# Patient Record
Sex: FEMALE | Race: WHITE | NOT HISPANIC OR LATINO | Employment: OTHER | URBAN - NONMETROPOLITAN AREA
[De-identification: names, ages, dates, MRNs, and addresses within clinical notes are randomized per-mention and may not be internally consistent; named-entity substitution may affect disease eponyms.]

---

## 2017-01-03 ENCOUNTER — OFFICE VISIT (OUTPATIENT)
Dept: UROLOGY | Facility: CLINIC | Age: 77
End: 2017-01-03

## 2017-01-03 ENCOUNTER — TELEPHONE (OUTPATIENT)
Dept: UROLOGY | Facility: CLINIC | Age: 77
End: 2017-01-03

## 2017-01-03 VITALS — HEIGHT: 62 IN | TEMPERATURE: 97 F | BODY MASS INDEX: 31.1 KG/M2 | WEIGHT: 169 LBS

## 2017-01-03 DIAGNOSIS — IMO0002 MEATAL STENOSIS: Primary | ICD-10-CM

## 2017-01-03 LAB
BILIRUB BLD-MCNC: NEGATIVE MG/DL
CLARITY, POC: CLEAR
COLOR UR: YELLOW
GLUCOSE UR STRIP-MCNC: NEGATIVE MG/DL
KETONES UR QL: NEGATIVE
LEUKOCYTE EST, POC: NEGATIVE
NITRITE UR-MCNC: NEGATIVE MG/ML
PH UR: 6 [PH] (ref 5–8)
PROT UR STRIP-MCNC: NEGATIVE MG/DL
RBC # UR STRIP: NEGATIVE /UL
SP GR UR: 1.01 (ref 1–1.03)
UROBILINOGEN UR QL: NORMAL

## 2017-01-03 PROCEDURE — 81003 URINALYSIS AUTO W/O SCOPE: CPT | Performed by: UROLOGY

## 2017-01-03 PROCEDURE — 99213 OFFICE O/P EST LOW 20 MIN: CPT | Performed by: UROLOGY

## 2017-01-03 PROCEDURE — 53661 DILATION OF URETHRA: CPT | Performed by: UROLOGY

## 2017-01-03 RX ORDER — LISINOPRIL 10 MG/1
10 TABLET ORAL DAILY
COMMUNITY
End: 2018-12-20 | Stop reason: SDUPTHER

## 2017-01-03 NOTE — MR AVS SNAPSHOT
Jameeldulce PENG Enrique   1/3/2017 2:10 PM   Office Visit    Dept Phone:  [phone]   Encounter #:  [id number]    Provider:  Trae Boggs MD   Department:  [facility] UROLOGY                Your Full Care Plan              Your Updated Medication List          This list is accurate as of: 1/3/17  3:32 PM.  Always use your most recent med list.                bisoprolol-hydrochlorothiazide 5-6.25 MG per tablet   Commonly known as:  ZIAC       calcium carbonate 600 MG tablet   Commonly known as:  OS-REAGAN       celecoxib 200 MG capsule   Commonly known as:  CeleBREX       diphenhydrAMINE 25 mg capsule   Commonly known as:  BENADRYL       esomeprazole 40 MG capsule   Commonly known as:  nexIUM       estrogens (conjugated) 0.625 MG tablet   Commonly known as:  PREMARIN       furosemide 40 MG tablet   Commonly known as:  LASIX       gabapentin 100 MG capsule   Commonly known as:  NEURONTIN       lisinopril 10 MG tablet   Commonly known as:  PRINIVIL,ZESTRIL       LORTAB 10  MG per tablet   Generic drug:  Hydrocodone-Acetaminophen       metFORMIN 500 MG tablet   Commonly known as:  GLUCOPHAGE       nitrofurantoin 50 MG capsule   Commonly known as:  MACRODANTIN   TAKE ONE CAPSULE BY MOUTH DAILY       potassium chloride 20 MEQ CR tablet   Commonly known as:  K-DUR,KLOR-CON       spironolactone 25 MG tablet   Commonly known as:  ALDACTONE               We Performed the Following     POC Urinalysis Dipstick, Automated       You Were Diagnosed With        Codes Comments    Meatal stenosis    -  Primary ICD-10-CM: N35.9  ICD-9-CM: 598.9       Instructions     None    Patient Instructions History      Upcoming Appointments     Visit Type Date Time Department    FOLLOW UP 1/3/2017  2:10 PM [facility]      MyChart Signup     [facility] allows you to send messages to your doctor, view your test results, renew your prescriptions, schedule appointments, and more. To sign  "up, go to [employer] and click on the Sign Up Now link in the New User? box. Enter your [employer] Activation Code exactly as it appears below along with the last four digits of your Social Security Number and your Date of Birth () to complete the sign-up process. If you do not sign up before the expiration date, you must request a new code.    [employer] Activation Code: [email or web address]  Expires: 2017  3:32 PM    If you have questions, you can email [email or web address] or call [phone] to talk to our [employer] staff. Remember, [employer] is NOT to be used for urgent needs. For medical emergencies, dial 911.               Other Info from Your Visit           Allergies     Septra [Sulfamethoxazole-trimethoprim]      Tequin [Gatifloxacin]      Trovan [Alatrofloxacin]      Amoxicillin-pot Clavulanate  Rash      Reason for Visit     Difficulty Urinating           Vital Signs     Temperature Height Weight Body Mass Index Smoking Status       97 °F (36.1 °C) 62\" (157.5 cm) 169 lb (76.7 kg) 30.91 kg/m2 Former Smoker       Problems and Diagnoses Noted     Urethral narrowing    -  Primary      Results     POC Urinalysis Dipstick, Automated      Component Value Standard Range & Units    Color Yellow Yellow, Straw, Dark Yellow, Madelin    Clarity, UA Clear Clear    Glucose, UA Negative Negative, 1000 mg/dL (3+) mg/dL    Bilirubin Negative Negative    Ketones, UA Negative Negative    Specific Gravity  1.010 1.005 - 1.030    Blood, UA Negative Negative    pH, Urine 6.0 5.0 - 8.0    Protein, POC Negative Negative mg/dL    Urobilinogen, UA Normal Normal    Leukocytes Negative Negative    Nitrite, UA Negative Negative                    "

## 2017-01-03 NOTE — PROGRESS NOTES
Subjective    Ms. Nunez is 76 y.o. female    CHIEF COMPLAINT: Ms. Nunez came back today with increasing symptoms of difficulties urinating she has been catheterizing herself twice a day but this she is been having more splaying of the stream and decreased force and tighter and more difficulties with the dilations    One ahead and try to dilate in fact I tried to pass a filiform and maybe even usually woven dilators just like to get in because a lot of time just as a blind shot but I could not even pass a filiform.    She went ahead and passed one of her urethral catheter dilation was is a 14 even tried to pass a filiform alongside that but would not go so a consult a way that the 14 was going so I using the female sounds are dilated with an 18-20 and a 22 this with relatively easily for her without a lot of bleeding etc. that we have gotten previously.    I did suggest that she continue dilators twice a day and I will see her back in 6 months    History of Present Illness      The following portions of the patient's history were reviewed and updated as appropriate: allergies, current medications, past family history, past medical history, past social history, past surgical history and problem list.    Review of Systems   Constitutional: Negative for chills and fever.   Gastrointestinal: Negative for abdominal pain, anal bleeding and blood in stool.   Genitourinary: Positive for difficulty urinating. Negative for flank pain and hematuria.         Current Outpatient Prescriptions:   •  bisoprolol-hydrochlorothiazide (ZIAC) 5-6.25 MG per tablet, Take 1 tablet by mouth Daily., Disp: , Rfl:   •  calcium carbonate (OS-REAGAN) 600 MG tablet, Take 600 mg by mouth Daily., Disp: , Rfl:   •  celecoxib (CeleBREX) 200 MG capsule, Take 200 mg by mouth Daily., Disp: , Rfl:   •  diphenhydrAMINE (BENADRYL) 25 mg capsule, Take 25 mg by mouth Every 6 (Six) Hours As Needed for itching., Disp: , Rfl:   •  esomeprazole (nexIUM) 40 MG  "capsule, Take 40 mg by mouth Every Morning Before Breakfast., Disp: , Rfl:   •  estrogens, conjugated, (PREMARIN) 0.625 MG tablet, Take 0.625 mg by mouth Daily. Take daily for 21 days then do not take for 7 days., Disp: , Rfl:   •  furosemide (LASIX) 40 MG tablet, Take 40 mg by mouth 2 (Two) Times a Day., Disp: , Rfl:   •  gabapentin (NEURONTIN) 100 MG capsule, Take 100 mg by mouth 3 (Three) Times a Day., Disp: , Rfl:   •  Hydrocodone-Acetaminophen (LORTAB 10)  MG per tablet, Take 1 tablet by mouth Every 6 (Six) Hours As Needed for moderate pain (4-6)., Disp: , Rfl:   •  lisinopril (PRINIVIL,ZESTRIL) 10 MG tablet, Take 10 mg by mouth Daily., Disp: , Rfl:   •  metFORMIN (GLUCOPHAGE) 500 MG tablet, Take 500 mg by mouth 2 (Two) Times a Day With Meals., Disp: , Rfl:   •  nitrofurantoin (MACRODANTIN) 50 MG capsule, TAKE ONE CAPSULE BY MOUTH DAILY, Disp: 90 capsule, Rfl: 3  •  potassium chloride (K-DUR,KLOR-CON) 20 MEQ CR tablet, Take 20 mEq by mouth 2 (Two) Times a Day., Disp: , Rfl:   •  spironolactone (ALDACTONE) 25 MG tablet, Take 25 mg by mouth Daily., Disp: , Rfl:     Past Medical History   Diagnosis Date   • Cellulitis    • Diabetes mellitus    • GERD (gastroesophageal reflux disease)    • Hyperlipidemia    • Hypertension        Past Surgical History   Procedure Laterality Date   • Hysterectomy     • Tonsillectomy     • Appendectomy     • Vagina surgery     • Colonoscopy  01/12/2011   • Endoscopy  07/01/2014       Social History     Social History   • Marital status:      Spouse name: N/A   • Number of children: N/A   • Years of education: N/A     Social History Main Topics   • Smoking status: Former Smoker   • Smokeless tobacco: Never Used   • Alcohol use No   • Drug use: No   • Sexual activity: Not Asked     Other Topics Concern   • None     Social History Narrative       History reviewed. No pertinent family history.    Objective    Visit Vitals   • Temp 97 °F (36.1 °C)   • Ht 62\" (157.5 cm)   • Wt " 169 lb (76.7 kg)   • BMI 30.91 kg/m2       Physical Exam      Admission on 03/17/2015, Discharged on 03/17/2015   Component Date Value Ref Range Status   • Color, UA 03/17/2015 Orange   Final   • Appearance, UA 03/17/2015 Cloudy* CLEAR Final   • pH, UA 03/17/2015 6.5  5.0 - 8.0 Final   • Specific Gravity, UA 03/17/2015 1.006  1.005 - 1.030 Final   • Glucose, UA 03/17/2015 Negative  NEGATIVE mg/dL Final   • Ketones, UA 03/17/2015 Negative  NEGATIVE mg/dL Final   • Bilirubin, UA 03/17/2015 Negative  NEGATIVE Final   • Blood, UA 03/17/2015 Negative  NEGATIVE Final   • Protein, UA 03/17/2015 Negative  NEGATIVE mg/dL Final   • Nitrite, UA 03/17/2015 Positive* NEGATIVE Final   • Leukocytes, UA 03/17/2015 Negative  NEGATIVE Final   • Urobilinogen, UA 03/17/2015 0.2  0.2,1.0 E.U./dL Final   • WBC, UA 03/17/2015 0-2* NONE SEEN /hpf Final   • RBC, UA 03/17/2015 0-2* NONE SEEN /hpf Final   • Epithelial Cells, UA 03/17/2015 0-2* NONE SEEN /hpf Final   • Bacteria, UA 03/17/2015 1+* NONE SEEN /hpf Final   • Casts 03/17/2015 None Seen  /lpf Final       Results for orders placed or performed in visit on 01/03/17   POC Urinalysis Dipstick, Automated   Result Value Ref Range    Color Yellow Yellow, Straw, Dark Yellow, Madelin    Clarity, UA Clear Clear    Glucose, UA Negative Negative, 1000 mg/dL (3+) mg/dL    Bilirubin Negative Negative    Ketones, UA Negative Negative    Specific Gravity  1.010 1.005 - 1.030    Blood, UA Negative Negative    pH, Urine 6.0 5.0 - 8.0    Protein, POC Negative Negative mg/dL    Urobilinogen, UA Normal Normal    Leukocytes Negative Negative    Nitrite, UA Negative Negative       Assessment and Plan    Dago was seen today for difficulty urinating.    Diagnoses and all orders for this visit:    Meatal stenosis  -     POC Urinalysis Dipstick, Automated   she is going to continue self-catheterization twice a day and I will see her back in 6 months she will call sooner as needed

## 2017-01-03 NOTE — TELEPHONE ENCOUNTER
Tried calling pt. I called [facility] and they said dr murphy office fills the lidocaine so she will have to call dr leon

## 2017-01-09 ENCOUNTER — TELEPHONE (OUTPATIENT)
Dept: UROLOGY | Facility: CLINIC | Age: 77
End: 2017-01-09

## 2017-01-09 NOTE — TELEPHONE ENCOUNTER
Tried to call the patient and let her know that she will have to call Dr. murphy office to get a refill on lidocaine jelly. ML

## 2017-05-04 ENCOUNTER — TRANSCRIBE ORDERS (OUTPATIENT)
Dept: ADMINISTRATIVE | Facility: HOSPITAL | Age: 77
End: 2017-05-04

## 2017-05-04 DIAGNOSIS — Z12.31 ENCOUNTER FOR SCREENING MAMMOGRAM FOR MALIGNANT NEOPLASM OF BREAST: Primary | ICD-10-CM

## 2017-05-05 ENCOUNTER — HOSPITAL ENCOUNTER (OUTPATIENT)
Dept: MAMMOGRAPHY | Facility: HOSPITAL | Age: 77
Discharge: HOME | End: 2017-05-05
Attending: INTERNAL MEDICINE | Admitting: INTERNAL MEDICINE

## 2017-05-05 DIAGNOSIS — Z12.31 ENCOUNTER FOR SCREENING MAMMOGRAM FOR MALIGNANT NEOPLASM OF BREAST: ICD-10-CM

## 2017-05-05 PROCEDURE — G0202 SCR MAMMO BI INCL CAD: HCPCS

## 2017-05-05 PROCEDURE — 77063 BREAST TOMOSYNTHESIS BI: CPT

## 2017-07-11 ENCOUNTER — OFFICE VISIT (OUTPATIENT)
Dept: UROLOGY | Facility: CLINIC | Age: 77
End: 2017-07-11

## 2017-07-11 VITALS
WEIGHT: 170 LBS | SYSTOLIC BLOOD PRESSURE: 110 MMHG | BODY MASS INDEX: 33.38 KG/M2 | TEMPERATURE: 97.6 F | HEIGHT: 60 IN | DIASTOLIC BLOOD PRESSURE: 74 MMHG

## 2017-07-11 DIAGNOSIS — IMO0002 MEATAL STENOSIS: Primary | ICD-10-CM

## 2017-07-11 DIAGNOSIS — R33.9 RETENTION OF URINE: ICD-10-CM

## 2017-07-11 LAB
BILIRUB BLD-MCNC: NEGATIVE MG/DL
CLARITY, POC: CLEAR
COLOR UR: YELLOW
GLUCOSE UR STRIP-MCNC: NEGATIVE MG/DL
KETONES UR QL: NEGATIVE
LEUKOCYTE EST, POC: NEGATIVE
NITRITE UR-MCNC: NEGATIVE MG/ML
PH UR: 5.5 [PH] (ref 5–8)
PROT UR STRIP-MCNC: NEGATIVE MG/DL
RBC # UR STRIP: NEGATIVE /UL
SP GR UR: 1.01 (ref 1–1.03)
UROBILINOGEN UR QL: NORMAL

## 2017-07-11 PROCEDURE — 99212 OFFICE O/P EST SF 10 MIN: CPT | Performed by: UROLOGY

## 2017-07-11 PROCEDURE — 53661 DILATION OF URETHRA: CPT | Performed by: UROLOGY

## 2017-07-11 PROCEDURE — 81003 URINALYSIS AUTO W/O SCOPE: CPT | Performed by: UROLOGY

## 2017-07-11 NOTE — PROGRESS NOTES
Subjective    Ms. Nunez is 76 y.o. female    CHIEF COMPLAINT: Urethral stricture    Patient comes back today follow-up of tight urethral stricture she has been cathing herself but her stream is gotten smaller and smaller and she comes in today for me to and dilator    We put the patient in the lithotomy position I could identify the urethral meatus we prepped her I was able to dilate at the meatus level up to about a 20 but again was quite difficult and did get some bleeding ramus stop there and let her go home if she has any problems she will let me know I see her back again in about 3-4 months    Urethral Dilation is explained to the patient. Risks of hematuria, pain, infection, recurrence or persistence of symptoms as well as major injuries such as urethral or bladder perforation are explained to the patient but the frequency of the latter is described as rare.     This is a/an a subseqent dilation.     Indication:  Meatal stenosis    Position:  lithotomy    Prep: hibiclens    Procedure: The labia were  and female urethral sounds are passed through the urethra consecutively at the following sizes 20    On the procedure bleeding was minimal.    This was quite difficult to do     The procedure was well tolerated.   History of Present Illness      The following portions of the patient's history were reviewed and updated as appropriate: allergies, current medications, past family history, past medical history, past social history, past surgical history and problem list.    Review of Systems   Constitutional: Negative for chills and fever.   Gastrointestinal: Negative for abdominal pain, anal bleeding and blood in stool.   Genitourinary: Positive for difficulty urinating. Negative for flank pain and hematuria.         Current Outpatient Prescriptions:   •  bisoprolol-hydrochlorothiazide (ZIAC) 5-6.25 MG per tablet, Take 1 tablet by mouth Daily., Disp: , Rfl:   •  calcium carbonate (OS-REAGAN) 600 MG tablet, Take  600 mg by mouth Daily., Disp: , Rfl:   •  celecoxib (CeleBREX) 200 MG capsule, Take 200 mg by mouth Daily., Disp: , Rfl:   •  diphenhydrAMINE (BENADRYL) 25 mg capsule, Take 25 mg by mouth Every 6 (Six) Hours As Needed for itching., Disp: , Rfl:   •  esomeprazole (nexIUM) 40 MG capsule, Take 40 mg by mouth Every Morning Before Breakfast., Disp: , Rfl:   •  estrogens, conjugated, (PREMARIN) 0.625 MG tablet, Take 0.625 mg by mouth Daily. Take daily for 21 days then do not take for 7 days., Disp: , Rfl:   •  furosemide (LASIX) 40 MG tablet, Take 40 mg by mouth 2 (Two) Times a Day., Disp: , Rfl:   •  gabapentin (NEURONTIN) 100 MG capsule, Take 100 mg by mouth 3 (Three) Times a Day., Disp: , Rfl:   •  Hydrocodone-Acetaminophen (LORTAB 10)  MG per tablet, Take 1 tablet by mouth Every 6 (Six) Hours As Needed for moderate pain (4-6)., Disp: , Rfl:   •  lisinopril (PRINIVIL,ZESTRIL) 10 MG tablet, Take 10 mg by mouth Daily., Disp: , Rfl:   •  metFORMIN (GLUCOPHAGE) 500 MG tablet, Take 500 mg by mouth 2 (Two) Times a Day With Meals., Disp: , Rfl:   •  nitrofurantoin (MACRODANTIN) 50 MG capsule, TAKE ONE CAPSULE BY MOUTH DAILY, Disp: 90 capsule, Rfl: 3  •  potassium chloride (K-DUR,KLOR-CON) 20 MEQ CR tablet, Take 20 mEq by mouth 2 (Two) Times a Day., Disp: , Rfl:   •  spironolactone (ALDACTONE) 25 MG tablet, Take 25 mg by mouth Daily., Disp: , Rfl:     Past Medical History:   Diagnosis Date   • Cellulitis    • Diabetes mellitus    • GERD (gastroesophageal reflux disease)    • Hyperlipidemia    • Hypertension        Past Surgical History:   Procedure Laterality Date   • APPENDECTOMY     • BREAST CYST ASPIRATION Left    • COLONOSCOPY  01/12/2011   • ENDOSCOPY  07/01/2014   • HYSTERECTOMY     • TONSILLECTOMY     • VAGINA SURGERY         Social History     Social History   • Marital status:      Spouse name: N/A   • Number of children: N/A   • Years of education: N/A     Social History Main Topics   • Smoking status:  "Former Smoker   • Smokeless tobacco: Never Used   • Alcohol use No   • Drug use: No   • Sexual activity: Not Asked     Other Topics Concern   • None     Social History Narrative       Family History   Problem Relation Age of Onset   • Breast cancer Neg Hx        Objective    /74  Temp 97.6 °F (36.4 °C)  Ht 60\" (152.4 cm)  Wt 170 lb (77.1 kg)  BMI 33.2 kg/m2    Physical Exam      Office Visit on 01/03/2017   Component Date Value Ref Range Status   • Color 01/03/2017 Yellow  Yellow, Straw, Dark Yellow, Madelin Final   • Clarity, UA 01/03/2017 Clear  Clear Final   • Glucose, UA 01/03/2017 Negative  Negative, 1000 mg/dL (3+) mg/dL Final   • Bilirubin 01/03/2017 Negative  Negative Final   • Ketones, UA 01/03/2017 Negative  Negative Final   • Specific Gravity  01/03/2017 1.010  1.005 - 1.030 Final   • Blood, UA 01/03/2017 Negative  Negative Final   • pH, Urine 01/03/2017 6.0  5.0 - 8.0 Final   • Protein, POC 01/03/2017 Negative  Negative mg/dL Final   • Urobilinogen, UA 01/03/2017 Normal  Normal Final   • Leukocytes 01/03/2017 Negative  Negative Final   • Nitrite, UA 01/03/2017 Negative  Negative Final       Results for orders placed or performed in visit on 07/11/17   POC Urinalysis Dipstick, Automated   Result Value Ref Range    Color Yellow Yellow, Straw, Dark Yellow, Madelin    Clarity, UA Clear Clear    Glucose, UA Negative Negative, 1000 mg/dL (3+) mg/dL    Bilirubin Negative Negative    Ketones, UA Negative Negative    Specific Gravity  1.010 1.005 - 1.030    Blood, UA Negative Negative    pH, Urine 5.5 5.0 - 8.0    Protein, POC Negative Negative mg/dL    Urobilinogen, UA Normal Normal    Leukocytes Negative Negative    Nitrite, UA Negative Negative       Assessment and Plan    Dago was seen today for difficulty urinating.    Diagnoses and all orders for this visit:    Meatal stenosis  -     POC Urinalysis Dipstick, Automated    Retention of urine  Plan I will see her back again in 3-4 months call sooner as " needed

## 2017-07-31 ENCOUNTER — TRANSCRIBE ORDERS (OUTPATIENT)
Dept: ADMINISTRATIVE | Facility: HOSPITAL | Age: 77
End: 2017-07-31

## 2017-07-31 DIAGNOSIS — M54.5 LOW BACK PAIN, UNSPECIFIED BACK PAIN LATERALITY, UNSPECIFIED CHRONICITY, WITH SCIATICA PRESENCE UNSPECIFIED: Primary | ICD-10-CM

## 2017-08-07 ENCOUNTER — HOSPITAL ENCOUNTER (OUTPATIENT)
Dept: MRI IMAGING | Facility: HOSPITAL | Age: 77
Discharge: HOME | End: 2017-08-07
Attending: PHYSICAL MEDICINE & REHABILITATION | Admitting: PHYSICAL MEDICINE & REHABILITATION

## 2017-08-07 DIAGNOSIS — M54.5 LOW BACK PAIN, UNSPECIFIED BACK PAIN LATERALITY, UNSPECIFIED CHRONICITY, WITH SCIATICA PRESENCE UNSPECIFIED: ICD-10-CM

## 2017-08-07 PROCEDURE — 72148 MRI LUMBAR SPINE W/O DYE: CPT

## 2018-01-29 ENCOUNTER — HOSPITAL ENCOUNTER (OUTPATIENT)
Dept: GENERAL RADIOLOGY | Facility: HOSPITAL | Age: 78
Discharge: HOME | End: 2018-01-29
Attending: INTERNAL MEDICINE | Admitting: INTERNAL MEDICINE

## 2018-01-29 ENCOUNTER — TRANSCRIBE ORDERS (OUTPATIENT)
Dept: GENERAL RADIOLOGY | Facility: HOSPITAL | Age: 78
End: 2018-01-29

## 2018-01-29 DIAGNOSIS — R05.8 POST-VIRAL COUGH SYNDROME: Primary | ICD-10-CM

## 2018-01-29 DIAGNOSIS — R05.8 POST-VIRAL COUGH SYNDROME: ICD-10-CM

## 2018-01-29 PROCEDURE — 71046 X-RAY EXAM CHEST 2 VIEWS: CPT

## 2018-01-31 ENCOUNTER — TRANSCRIBE ORDERS (OUTPATIENT)
Dept: ADMINISTRATIVE | Facility: HOSPITAL | Age: 78
End: 2018-01-31

## 2018-01-31 DIAGNOSIS — R91.1 LUNG NODULE: Primary | ICD-10-CM

## 2018-02-01 ENCOUNTER — HOSPITAL ENCOUNTER (OUTPATIENT)
Dept: CT IMAGING | Facility: HOSPITAL | Age: 78
Discharge: HOME | End: 2018-02-01
Attending: INTERNAL MEDICINE | Admitting: INTERNAL MEDICINE

## 2018-02-01 DIAGNOSIS — R91.1 LUNG NODULE: ICD-10-CM

## 2018-02-01 LAB — CREAT BLDA-MCNC: 0.9 MG/DL (ref 0.6–1.3)

## 2018-02-01 PROCEDURE — 71260 CT THORAX DX C+: CPT

## 2018-02-01 PROCEDURE — 82565 ASSAY OF CREATININE: CPT

## 2018-02-01 PROCEDURE — 0 IOPAMIDOL 61 % SOLUTION: Performed by: INTERNAL MEDICINE

## 2018-02-01 RX ADMIN — IOPAMIDOL 100 ML: 612 INJECTION, SOLUTION INTRAVENOUS at 11:00

## 2018-05-07 ENCOUNTER — TRANSCRIBE ORDERS (OUTPATIENT)
Dept: ADMINISTRATIVE | Facility: HOSPITAL | Age: 78
End: 2018-05-07

## 2018-05-07 DIAGNOSIS — Z12.31 ENCOUNTER FOR SCREENING MAMMOGRAM FOR MALIGNANT NEOPLASM OF BREAST: Primary | ICD-10-CM

## 2018-05-08 ENCOUNTER — HOSPITAL ENCOUNTER (OUTPATIENT)
Dept: MAMMOGRAPHY | Facility: HOSPITAL | Age: 78
Discharge: HOME | End: 2018-05-08
Attending: INTERNAL MEDICINE | Admitting: INTERNAL MEDICINE

## 2018-05-08 DIAGNOSIS — Z12.31 ENCOUNTER FOR SCREENING MAMMOGRAM FOR MALIGNANT NEOPLASM OF BREAST: ICD-10-CM

## 2018-05-08 PROCEDURE — 77063 BREAST TOMOSYNTHESIS BI: CPT

## 2018-05-08 PROCEDURE — 77067 SCR MAMMO BI INCL CAD: CPT

## 2018-05-23 ENCOUNTER — TRANSCRIBE ORDERS (OUTPATIENT)
Dept: ADMINISTRATIVE | Facility: HOSPITAL | Age: 78
End: 2018-05-23

## 2018-05-23 DIAGNOSIS — N18.30 CHRONIC KIDNEY DISEASE, STAGE III (MODERATE) (HCC): Primary | ICD-10-CM

## 2018-05-29 ENCOUNTER — HOSPITAL ENCOUNTER (OUTPATIENT)
Dept: ULTRASOUND IMAGING | Facility: HOSPITAL | Age: 78
Discharge: HOME | End: 2018-05-29
Attending: INTERNAL MEDICINE | Admitting: INTERNAL MEDICINE

## 2018-05-29 DIAGNOSIS — N18.30 CHRONIC KIDNEY DISEASE, STAGE III (MODERATE) (HCC): ICD-10-CM

## 2018-05-29 PROCEDURE — 76775 US EXAM ABDO BACK WALL LIM: CPT

## 2018-06-15 ENCOUNTER — TRANSCRIBE ORDERS (OUTPATIENT)
Dept: ADMINISTRATIVE | Facility: HOSPITAL | Age: 78
End: 2018-06-15

## 2018-06-15 DIAGNOSIS — R51.9 NONINTRACTABLE HEADACHE, UNSPECIFIED CHRONICITY PATTERN, UNSPECIFIED HEADACHE TYPE: Primary | ICD-10-CM

## 2018-06-18 ENCOUNTER — HOSPITAL ENCOUNTER (OUTPATIENT)
Dept: CT IMAGING | Facility: HOSPITAL | Age: 78
Discharge: HOME | End: 2018-06-18
Attending: INTERNAL MEDICINE | Admitting: INTERNAL MEDICINE

## 2018-06-18 DIAGNOSIS — R51.9 NONINTRACTABLE HEADACHE, UNSPECIFIED CHRONICITY PATTERN, UNSPECIFIED HEADACHE TYPE: ICD-10-CM

## 2018-06-18 PROCEDURE — 70450 CT HEAD/BRAIN W/O DYE: CPT

## 2018-10-24 ENCOUNTER — LAB (OUTPATIENT)
Dept: LAB | Facility: HOSPITAL | Age: 78
End: 2018-10-24

## 2018-10-24 ENCOUNTER — OFFICE VISIT (OUTPATIENT)
Dept: INTERNAL MEDICINE | Facility: CLINIC | Age: 78
End: 2018-10-24

## 2018-10-24 VITALS
RESPIRATION RATE: 16 BRPM | HEART RATE: 66 BPM | DIASTOLIC BLOOD PRESSURE: 78 MMHG | TEMPERATURE: 98.9 F | OXYGEN SATURATION: 98 % | HEIGHT: 60 IN | SYSTOLIC BLOOD PRESSURE: 124 MMHG | WEIGHT: 169.38 LBS | BODY MASS INDEX: 33.25 KG/M2

## 2018-10-24 DIAGNOSIS — M99.04 SOMATIC DYSFUNCTION OF SPINE, SACRAL: ICD-10-CM

## 2018-10-24 DIAGNOSIS — M99.06 SOMATIC DYSFUNCTION OF BOTH LOWER EXTREMITIES: ICD-10-CM

## 2018-10-24 DIAGNOSIS — G89.29 CHRONIC MIDLINE LOW BACK PAIN WITHOUT SCIATICA: ICD-10-CM

## 2018-10-24 DIAGNOSIS — E11.8 TYPE 2 DIABETES MELLITUS WITH COMPLICATION, WITHOUT LONG-TERM CURRENT USE OF INSULIN (HCC): ICD-10-CM

## 2018-10-24 DIAGNOSIS — M99.00 SOMATIC DYSFUNCTION OF HEAD REGION: ICD-10-CM

## 2018-10-24 DIAGNOSIS — I10 ESSENTIAL HYPERTENSION: ICD-10-CM

## 2018-10-24 DIAGNOSIS — M99.09 SEGMENTAL AND SOMATIC DYSFUNCTION OF ABDOMEN AND OTHER REGIONS: ICD-10-CM

## 2018-10-24 DIAGNOSIS — M99.05 SOMATIC DYSFUNCTION OF PELVIC REGION: ICD-10-CM

## 2018-10-24 DIAGNOSIS — M99.03 SOMATIC DYSFUNCTION OF SPINE, LUMBAR: ICD-10-CM

## 2018-10-24 DIAGNOSIS — R60.0 EDEMA OF LEFT LOWER EXTREMITY: ICD-10-CM

## 2018-10-24 DIAGNOSIS — F43.21 GRIEF REACTION: ICD-10-CM

## 2018-10-24 DIAGNOSIS — M54.50 CHRONIC MIDLINE LOW BACK PAIN WITHOUT SCIATICA: ICD-10-CM

## 2018-10-24 DIAGNOSIS — M99.02 SOMATIC DYSFUNCTION OF SPINE, THORACIC: ICD-10-CM

## 2018-10-24 DIAGNOSIS — E11.8 TYPE 2 DIABETES MELLITUS WITH COMPLICATION, WITHOUT LONG-TERM CURRENT USE OF INSULIN (HCC): Primary | ICD-10-CM

## 2018-10-24 PROBLEM — E11.9 TYPE 2 DIABETES MELLITUS, WITHOUT LONG-TERM CURRENT USE OF INSULIN: Status: ACTIVE | Noted: 2018-10-24

## 2018-10-24 PROBLEM — F43.20 GRIEF REACTION: Status: ACTIVE | Noted: 2018-10-24

## 2018-10-24 PROBLEM — D07.1 VULVAR INTRAEPITHELIAL NEOPLASIA (VIN) GRADE 3: Status: ACTIVE | Noted: 2018-10-24

## 2018-10-24 LAB
ANION GAP SERPL CALCULATED.3IONS-SCNC: 12 MMOL/L (ref 4–13)
ARTICHOKE IGE QN: 145 MG/DL (ref 0–99)
BUN BLD-MCNC: 15 MG/DL (ref 5–21)
BUN/CREAT SERPL: 15.3 (ref 7–25)
CALCIUM SPEC-SCNC: 10.4 MG/DL (ref 8.4–10.4)
CHLORIDE SERPL-SCNC: 96 MMOL/L (ref 98–110)
CHOLEST SERPL-MCNC: 242 MG/DL (ref 130–200)
CO2 SERPL-SCNC: 27 MMOL/L (ref 24–31)
CREAT BLD-MCNC: 0.98 MG/DL (ref 0.5–1.4)
GFR SERPL CREATININE-BSD FRML MDRD: 55 ML/MIN/1.73
GLUCOSE BLD-MCNC: 99 MG/DL (ref 70–100)
HBA1C MFR BLD: 5.8 %
HDLC SERPL-MCNC: 63 MG/DL
LDLC/HDLC SERPL: 2.28 {RATIO}
POTASSIUM BLD-SCNC: 4.1 MMOL/L (ref 3.5–5.3)
SODIUM BLD-SCNC: 135 MMOL/L (ref 135–145)
TRIGL SERPL-MCNC: 178 MG/DL (ref 0–149)

## 2018-10-24 PROCEDURE — 99204 OFFICE O/P NEW MOD 45 MIN: CPT | Performed by: FAMILY MEDICINE

## 2018-10-24 PROCEDURE — 98928 OSTEOPATH MANJ 7-8 REGIONS: CPT | Performed by: FAMILY MEDICINE

## 2018-10-24 PROCEDURE — 80061 LIPID PANEL: CPT | Performed by: FAMILY MEDICINE

## 2018-10-24 PROCEDURE — 80048 BASIC METABOLIC PNL TOTAL CA: CPT | Performed by: FAMILY MEDICINE

## 2018-10-24 PROCEDURE — 36415 COLL VENOUS BLD VENIPUNCTURE: CPT

## 2018-10-24 PROCEDURE — 83036 HEMOGLOBIN GLYCOSYLATED A1C: CPT | Performed by: FAMILY MEDICINE

## 2018-10-24 RX ORDER — GABAPENTIN 300 MG/1
600 CAPSULE ORAL 2 TIMES DAILY
COMMUNITY
End: 2018-12-20 | Stop reason: SDUPTHER

## 2018-10-24 RX ORDER — HYDROCODONE BITARTRATE AND ACETAMINOPHEN 10; 325 MG/1; MG/1
1 TABLET ORAL EVERY 6 HOURS PRN
COMMUNITY
End: 2019-01-09 | Stop reason: RX

## 2018-10-24 RX ORDER — VITAMIN B COMPLEX
1 CAPSULE ORAL DAILY
COMMUNITY
End: 2021-09-09

## 2018-10-24 RX ORDER — CHLORDIAZEPOXIDE HYDROCHLORIDE AND CLIDINIUM BROMIDE 5; 2.5 MG/1; MG/1
CAPSULE ORAL
COMMUNITY
Start: 2018-10-02 | End: 2019-08-01

## 2018-10-24 RX ORDER — CETIRIZINE HYDROCHLORIDE 10 MG/1
10 TABLET ORAL DAILY
COMMUNITY

## 2018-10-24 RX ORDER — SUCRALFATE ORAL 1 G/10ML
SUSPENSION ORAL
COMMUNITY
Start: 2018-07-02 | End: 2020-08-31

## 2018-10-24 NOTE — ASSESSMENT & PLAN NOTE
Appears well controlled, A1c today  
Chronic with severe compression of lumbar spine and poor motion of SI joints. OMT today to reduce spasm and improve respiratory compliance, follow up PRN. Encourage ambulation  
Chronic, on diuretic therapy. OMT to improve circulatory compliance, checking BMP today. Will obtain records from PCP to assess for prior workup  
Hypertension is controlled.  Continue current treatment regimen.  Labs today  F/u 3 months  
Suspect baseline DOMINGO, offered SSRI, pt declines at this time  
No

## 2018-10-24 NOTE — PROGRESS NOTES
CC:   Chief Complaint   Patient presents with   • Establish Care     Patient reports increased back pain and left foot swelling   • Back Pain   • Foot Swelling       History:  Dago Nunez is a 78 y.o. female who presents today for evaluation of the above problems.      Concerned about her BP meds, fears she is taking too many, taking bisoprolol/HCTZ, lasix, lisinopril, spironolactone. BP at home can be 130-150/80.     Very anxious today in the setting of sister with stage IV cancer with poor prognosis. Able to sleep, but constantly worrying. No on medication, has not tried med therapy for mood.     DMII for 3 years, does not test sugars at home, A1c usually lower than 6. Believes the last A1c in July. Does not like taking metformin    Getting an iron supplement for unknown anemia, has been feeling tired.     Very concerned about her chronic back pain. Has old Rx of Norco that she takes for exacerbations, never daily therapy. Last Rx for 12 pills in early September. Pain is located at the lumbosacral junction, better with chiropractic, has Hx of DDD L-spine and not having radicular symptoms currently.     Previous PCP Alex Alberts at [facility].     ROS:  Review of Systems   Constitutional: Positive for fatigue.   Respiratory: Negative for shortness of breath.    Cardiovascular: Negative for chest pain.   Musculoskeletal: Positive for back pain.   Psychiatric/Behavioral: Positive for behavioral problems. Negative for sleep disturbance. The patient is nervous/anxious.    All other systems reviewed and are negative.    Allergies   Allergen Reactions   • Septra [Sulfamethoxazole-Trimethoprim]    • Tequin [Gatifloxacin]    • Trovan [Alatrofloxacin]    • Amoxicillin-Pot Clavulanate Rash   • Keflex [Cephalexin] Rash     Past Medical History:   Diagnosis Date   • Arthritis    • Cellulitis    • Diabetes mellitus (CMS/HCC)    • GERD (gastroesophageal reflux disease)    • Hyperlipidemia    • Hypertension    •  Kyphosis    • Osteoporosis    • Scoliosis    • Vulvar intraepithelial neoplasia (MOODY) grade 3      Past Surgical History:   Procedure Laterality Date   • APPENDECTOMY     • BREAST CYST ASPIRATION Left    • COLONOSCOPY  01/12/2011   • ENDOSCOPY  07/01/2014   • HYSTERECTOMY     • TONSILLECTOMY     • VAGINA SURGERY       Family History   Problem Relation Age of Onset   • Cancer Mother    • Hypertension Mother    • Osteoporosis Mother    • Dementia Mother    • Heart disease Father    • Parkinsonism Father    • Cancer Sister    • Diabetes Brother    • Heart disease Paternal Grandfather    • Breast cancer Neg Hx       reports that she has quit smoking. She has never used smokeless tobacco. She reports that she does not drink alcohol or use drugs.      Current Outpatient Prescriptions:   •  B Complex Vitamins (VITAMIN B COMPLEX) capsule capsule, Take  by mouth Daily., Disp: , Rfl:   •  bisoprolol-hydrochlorothiazide (ZIAC) 5-6.25 MG per tablet, Take 1 tablet by mouth Daily., Disp: , Rfl:   •  calcium carbonate (OS-REAGAN) 600 MG tablet, Take 600 mg by mouth Daily., Disp: , Rfl:   •  cetirizine (zyrTEC) 10 MG tablet, Take 10 mg by mouth Daily., Disp: , Rfl:   •  diphenhydrAMINE (BENADRYL) 25 mg capsule, Take 25 mg by mouth Every 6 (Six) Hours As Needed for itching., Disp: , Rfl:   •  esomeprazole (nexIUM) 40 MG capsule, Take 40 mg by mouth Every Morning Before Breakfast., Disp: , Rfl:   •  furosemide (LASIX) 40 MG tablet, Take 40 mg by mouth 2 (Two) Times a Day., Disp: , Rfl:   •  gabapentin (NEURONTIN) 100 MG capsule, Take 100 mg by mouth 3 (Three) Times a Day., Disp: , Rfl:   •  HYDROcodone-acetaminophen (NORCO)  MG per tablet, Take 1 tablet by mouth Every 6 (Six) Hours As Needed for Moderate Pain ., Disp: , Rfl:   •  lisinopril (PRINIVIL,ZESTRIL) 10 MG tablet, Take 10 mg by mouth Daily., Disp: , Rfl:   •  metFORMIN (GLUCOPHAGE) 500 MG tablet, Take 500 mg by mouth 2 (Two) Times a Day With Meals., Disp: , Rfl:   •   "nitrofurantoin (MACRODANTIN) 50 MG capsule, TAKE ONE CAPSULE BY MOUTH DAILY, Disp: 90 capsule, Rfl: 3  •  potassium chloride (K-DUR,KLOR-CON) 20 MEQ CR tablet, Take 20 mEq by mouth 2 (Two) Times a Day., Disp: , Rfl:   •  spironolactone (ALDACTONE) 25 MG tablet, Take 25 mg by mouth Daily., Disp: , Rfl:   •  celecoxib (CeleBREX) 200 MG capsule, Take 200 mg by mouth Daily., Disp: , Rfl:   •  estrogens, conjugated, (PREMARIN) 0.625 MG tablet, Take 0.625 mg by mouth Daily. Take daily for 21 days then do not take for 7 days., Disp: , Rfl:     OBJECTIVE:  /78 (BP Location: Left arm, Patient Position: Sitting, Cuff Size: Adult)   Pulse 66   Temp 98.9 °F (37.2 °C) (Oral)   Resp 16   Ht 152.4 cm (60\")   Wt 76.8 kg (169 lb 6 oz)   SpO2 98%   BMI 33.08 kg/m²    Physical Exam   Constitutional: She is oriented to person, place, and time. No distress.   HENT:   Head: Normocephalic and atraumatic.   Eyes: Conjunctivae are normal.   Neck: Neck supple. No JVD present. No tracheal deviation present.   Cardiovascular: Normal rate, regular rhythm and normal heart sounds.    No murmur heard.  Pulmonary/Chest: Effort normal and breath sounds normal.   Abdominal: Soft.   Musculoskeletal: Edema: 1+ LLE. Tenderness: Lumbosacral junction.   Increased thoracic kyphosis and loss of lumbar lordosis with hypertonicity throughout paraspinals T7-L5 b/l   Lymphadenopathy:     She has no cervical adenopathy.   Neurological: She is alert and oriented to person, place, and time. She displays normal reflexes. No sensory deficit. She exhibits normal muscle tone. Coordination normal.   Skin: Skin is warm and dry. She is not diaphoretic.   Osteopathic Structural Exam  Procedure Note for Osteopathic Manipulative Treatment    Pre-procedure diagnoses: Somatic dysfunctions as listed below.  Consent: Oral consent given for Osteopathic Treatment  Post-procedure diagnoses: same  Complications of procedure: none, patient tolerated procedure " well    The evaluation including the history, physical exam and the management decisions, indicate than an appropriate intervention on this date of service is osteopathic manipulative treatment. Oral permission for the osteopathic procedure was obtained. The following treatment methods and the responses for each body region are listed below.        Region Somatic Dysfunction Severity OMT technique Response      Head R OM suture compression  OA ESrRl Moderate Osteopathy in the cranial field Improved      Cervical          Thoracic  Thoracic inlet FSrRl  T7 ERSL Moderate  Balanced ligamentous tension  Improved       Lumbar Lumbosacral compression  L4 FRSL  L3 FRSR Severe  Balanced ligamentous tension Improved      Sacral R > L SI compression  R on L torsion Moderate Balanced ligamentous tension Improved   Pelvic R posterior rotation  L anterior rotation with inflare Moderate Balanced ligamentous tension Improved      Lower Extremities  b/l psoas spasm Severe  Balanced ligamentous tension Improved       Upper Extremities          Rib Cage          Abdomen & Other Sites  thoracic diaphragm inhalation SD with celiac ganglion spasm Severe   Myofascial Release Improved        Assessment/Plan    Problem List Items Addressed This Visit     Type 2 diabetes mellitus, without long-term current use of insulin (CMS/[facility]) - Primary     Appears well controlled, A1c today         Relevant Orders    Hemoglobin A1c    Lipid panel    Basic metabolic panel    Essential hypertension     Hypertension is controlled.  Continue current treatment regimen.  Labs today  F/u 3 months         Relevant Orders    Basic metabolic panel    Grief reaction     Suspect baseline DOMINGO, offered SSRI, pt declines at this time         Chronic midline low back pain without sciatica     Chronic with severe compression of lumbar spine and poor motion of SI joints. OMT today to reduce spasm and improve respiratory compliance, follow up PRN. Encourage ambulation          Edema of left lower extremity     Chronic, on diuretic therapy. OMT to improve circulatory compliance, checking BMP today. Will obtain records from PCP to assess for prior workup           Other Visit Diagnoses     Somatic dysfunction of both lower extremities        Somatic dysfunction of pelvic region        Somatic dysfunction of spine, sacral        Somatic dysfunction of spine, lumbar        Segmental and somatic dysfunction of abdomen and other regions        Somatic dysfunction of spine, thoracic        Somatic dysfunction of head region              Patient's Body mass index is 33.08 kg/m². BMI is above normal parameters. Recommendations include: exercise counseling.      An After Visit Summary was printed and given to the patient at discharge.  Return in about 3 months (around 1/24/2019) for sooner if you need OMT for your back, Recheck.         Shaheen Akbar D.O.  Family Medicine  Osteopathic Neuromusculoskeletal Medicine  10/24/2018

## 2018-10-24 NOTE — PATIENT INSTRUCTIONS
What is Osteopathic Medicine  Osteopathic medicine provides all of the benefits of modern medicine including prescription drugs, surgery, and the use of technology to diagnose disease and evaluate injury. It also offers the added benefit of hands-on diagnosis and treatment through a system of therapy known as osteopathic manipulative medicine. Osteopathic medicine emphasizes helping each person achieve a high level of wellness by focusing on health promotion and disease prevention. ([email or web address])     What is a DO  Doctor's of Osteopathy (DO) are fully trained physicians, capable of practicing the entire scope of medicine. In addition to conventional medical practice, DO’s are trained to use their hands to palpate (feel) tissue function and dysfunction. Gentle manipulative techniques are applied, restoring optimal function (motion).     4 Principles define Osteopathic Medicine  • The body is a fully integrated being of body, mind and spirit  • The body is capable of self-regulation, self-healing, and health maintenance  • Structure and function are interrelated  • Rational treatment is based upon an understanding of the basic principles of body unity, self-regulation, and the relationship of structure and function     Osteopathic Manipulative Medicine (OMM)   OMM encompasses a wide range of techniques addressing problems in joints, ligaments, muscles, tendons, and fascia (tissue surrounding muscles and other organs) that may cause pain or interfere with the body’s function. When there are restrictions within the structure of the body it does not function properly, often times causing pain. Using different techniques (listed below) the restrictions in the body are relieved so the body can function at optimal health. Patients often experience a sense of deep relaxation, tingling, fluid flows and relief of pain. These changes may be experienced immediately as they occur or later after the treatment. OMM may be referred to  as Osteopathic Manipulative Treatment (OMT).     Common Treatment Modalities  Osteopathy in the Cranial Field- a system of treatment that utilizes the intrinsic motion of the cranial and neurological system to treat the whole body     Myofascial Release - used to treat restrictions of muscle and fascia     Counterstrain - focused on specific tender points on the body that are held in a position of comfort for 90 seconds, after which the tenderness is relieved     Muscle Energy - uses the relaxation after a muscle is contracted to stretch muscles and increase range of motion     Balanced Ligamentous Tension - ligaments or joints are placed into a state of balanced until the tension is relieved     Facilitated Positional Release - patient’s spine is placed at neutral position while the isolated segment for treatment is placed at ease. Compression or traction is then added to release the tension in the muscle, fascia, and/or joints     High Velocity Low Amplitude (HVLA)- use of fast, short thrusts through restricted joints; a technique with which most people are familiar (also known as the “cracking” or “popping” technique)     Who would benefit  Osteopathy treats the patient, not the disease. Our intention is to find and restore health as well as structural integrity and fluid continuity.      Some of the problems that typically respond to osteopathic treatment:  · SOMATIC PAIN  · Back, neck, and joint pain  · Sciatica  · Headaches/Migraines  · Temporal Mandibular Joint Dysfunction (TMJ)     · TRAUMATIC INJURIES  · Head trauma  · Post Concussion Syndrome  · Overuse Syndromes  · Whiplash Syndromes     · CHRONIC CONDITIONS UNRESPONSIVE TO CONVENTIAL TREATMENT  · Neurologic disorders  · Gastrointestinal disorders  · Genitourinary disorders  · Respiratory Disorders     · WOMEN DURING PREGNANCY can be made more comfortable, their labor and delivery eased considerably by providing freedom to the ligamentous support of the  uterus and pelvis.     ADDITIONAL RESOURCES  www.osteopathic.org  www.osteodoc.com  http://www.[employer].com/aboutosteopathy/research/ (Research articles)  Book: Dr. Bowles’s Touch of Life by Nik Bowles DO     Information gathered from osteodoc.[email or web address],  aacom.org, A Brief Guide to Osteopathic Medicine.

## 2018-10-25 ENCOUNTER — TELEPHONE (OUTPATIENT)
Dept: INTERNAL MEDICINE | Facility: CLINIC | Age: 78
End: 2018-10-25

## 2018-10-25 NOTE — TELEPHONE ENCOUNTER
Patient was informed          ----- Message from Shaheen Akbar DO sent at 10/25/2018  8:23 AM CDT -----  Please inform pt of the following:  -A1c looks great, if she wanted to talk about stopping the metformin or lowering the dose we could do that at an office visit, but I think she should take it until we chat about it at that time  -her cholesterol is high, and we need to come up with a plan to treat it (office visit preferred)  -electrolytes are ok while taking water pills, but she does have chronic kidney disease (they are filtering just below normal). She is already on a medication for this (lisinopril), and I don't think that she should make any changes to it at this time    If she has fears of questions on this matter, she has a 3 month f/u, but she may prefer an earlier appointment to discuss this in person

## 2018-12-04 ENCOUNTER — OFFICE VISIT (OUTPATIENT)
Dept: UROLOGY | Facility: CLINIC | Age: 78
End: 2018-12-04

## 2018-12-04 VITALS — TEMPERATURE: 97.5 F | HEIGHT: 62 IN | BODY MASS INDEX: 31.1 KG/M2 | WEIGHT: 169 LBS

## 2018-12-04 DIAGNOSIS — Z87.448 HISTORY OF URETHRAL STRICTURE: ICD-10-CM

## 2018-12-04 DIAGNOSIS — R33.9 RETENTION OF URINE: Primary | ICD-10-CM

## 2018-12-04 PROCEDURE — 99214 OFFICE O/P EST MOD 30 MIN: CPT | Performed by: UROLOGY

## 2018-12-04 PROCEDURE — 81001 URINALYSIS AUTO W/SCOPE: CPT | Performed by: UROLOGY

## 2018-12-04 RX ORDER — NITROFURANTOIN MACROCRYSTALS 50 MG/1
50 CAPSULE ORAL DAILY
Qty: 90 CAPSULE | Refills: 3 | Status: SHIPPED | OUTPATIENT
Start: 2018-12-04 | End: 2019-05-01

## 2018-12-04 NOTE — PROGRESS NOTES
Subjective    Ms. Nunez is 78 y.o. 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 again she self caths twice a day which keeps the stenosis (.    According the patient has been hours she was recently seen Dr. 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    She is very reluctant to have this done        History of Present Illness      The following portions of the patient's history were reviewed and updated as appropriate: allergies, current medications, past family history, past medical history, past social history, past surgical history and problem list.    Review of Systems   Constitutional: Negative for chills and fever.   Gastrointestinal: Negative for abdominal pain, anal bleeding and blood in stool.   Genitourinary: Positive for frequency. Negative for difficulty urinating, dysuria, enuresis, flank pain, hematuria, pelvic pain and urgency.   Psychiatric/Behavioral: Negative for agitation and confusion.         Current Outpatient Medications:   •  B Complex Vitamins (VITAMIN B COMPLEX) capsule capsule, Take  by mouth Daily., Disp: , Rfl:   •  bisoprolol-hydrochlorothiazide (ZIAC) 5-6.25 MG per tablet, Take 1 tablet by mouth Daily., Disp: , Rfl:   •  calcium carbonate (OS-REAGAN) 600 MG tablet, Take 600 mg by mouth Daily., Disp: , Rfl:   •  cetirizine (zyrTEC) 10 MG tablet, Take 10 mg by mouth Daily., Disp: , Rfl:   •  clidinium-chlordiazePOXIDE (LIBRAX) 5-2.5 MG per capsule, , Disp: , Rfl:   •  diphenhydrAMINE (BENADRYL) 25 mg capsule, Take 25 mg by mouth Every 6 (Six) Hours As Needed for itching., Disp: , Rfl:   •   esomeprazole (nexIUM) 40 MG capsule, Take 40 mg by mouth 2 (Two) Times a Day., Disp: , Rfl:   •  furosemide (LASIX) 40 MG tablet, Take 40 mg by mouth 2 (Two) Times a Day. Patient only takes once a day, Disp: , Rfl:   •  gabapentin (NEURONTIN) 300 MG capsule, Take 600 mg by mouth 2 (Two) Times a Day., Disp: , Rfl:   •  HYDROcodone-acetaminophen (NORCO)  MG per tablet, Take 1 tablet by mouth Every 6 (Six) Hours As Needed for Moderate Pain ., Disp: , Rfl:   •  lisinopril (PRINIVIL,ZESTRIL) 10 MG tablet, Take 10 mg by mouth Daily., Disp: , Rfl:   •  metFORMIN (GLUCOPHAGE) 500 MG tablet, Take 500 mg by mouth 2 (Two) Times a Day With Meals., Disp: , Rfl:   •  nitrofurantoin (MACRODANTIN) 50 MG capsule, Take 1 capsule by mouth Daily., Disp: 90 capsule, Rfl: 3  •  potassium chloride (K-DUR,KLOR-CON) 20 MEQ CR tablet, Take 20 mEq by mouth 2 (Two) Times a Day. Patient only takes once a day, Disp: , Rfl:   •  spironolactone (ALDACTONE) 25 MG tablet, Take 25 mg by mouth Daily., Disp: , Rfl:   •  sucralfate (CARAFATE) 1 GM/10ML suspension, , Disp: , Rfl:     Past Medical History:   Diagnosis Date   • Arthritis    • Cellulitis    • Diabetes mellitus (CMS/HCC)    • GERD (gastroesophageal reflux disease)    • Hyperlipidemia    • Hypertension    • Kyphosis    • Osteoporosis    • Scoliosis    • Vulvar intraepithelial neoplasia (MOODY) grade 3        Past Surgical History:   Procedure Laterality Date   • APPENDECTOMY     • BREAST CYST ASPIRATION Left    • COLONOSCOPY  01/12/2011   • ENDOSCOPY  07/01/2014   • HYSTERECTOMY     • TONSILLECTOMY     • VAGINA SURGERY         Social History     Socioeconomic History   • Marital status:      Spouse name: Not on file   • Number of children: Not on file   • Years of education: Not on file   • Highest education level: Not on file   Tobacco Use   • Smoking status: Former Smoker   • Smokeless tobacco: Never Used   Substance and Sexual Activity   • Alcohol use: No   • Drug use: No   •  "Sexual activity: No       Family History   Problem Relation Age of Onset   • Cancer Mother    • Hypertension Mother    • Osteoporosis Mother    • Dementia Mother    • Heart disease Father    • Parkinsonism Father    • Cancer Sister    • Diabetes Brother    • Heart disease Paternal Grandfather    • Breast cancer Neg Hx        Objective    Temp 97.5 °F (36.4 °C)   Ht 157.5 cm (62\")   Wt 76.7 kg (169 lb)   BMI 30.91 kg/m²     Physical Exam      Lab on 10/24/2018   Component Date Value Ref Range Status   • Hemoglobin A1C 10/24/2018 5.8  % Final   • Total Cholesterol 10/24/2018 242* 130 - 200 mg/dL Final   • Triglycerides 10/24/2018 178* 0 - 149 mg/dL Final   • HDL Cholesterol 10/24/2018 63  >=50 mg/dL Final   • LDL Cholesterol  10/24/2018 145* 0 - 99 mg/dL Final   • LDL/HDL Ratio 10/24/2018 2.28   Final   • Glucose 10/24/2018 99  70 - 100 mg/dL Final   • BUN 10/24/2018 15  5 - 21 mg/dL Final   • Creatinine 10/24/2018 0.98  0.50 - 1.40 mg/dL Final   • Sodium 10/24/2018 135  135 - 145 mmol/L Final   • Potassium 10/24/2018 4.1  3.5 - 5.3 mmol/L Final   • Chloride 10/24/2018 96* 98 - 110 mmol/L Final   • CO2 10/24/2018 27.0  24.0 - 31.0 mmol/L Final   • Calcium 10/24/2018 10.4  8.4 - 10.4 mg/dL Final   • eGFR Non African Amer 10/24/2018 55* >60 mL/min/1.73 Final   • BUN/Creatinine Ratio 10/24/2018 15.3  7.0 - 25.0 Final   • Anion Gap 10/24/2018 12.0  4.0 - 13.0 mmol/L Final       Results for orders placed or performed in visit on 12/04/18   POC Urinalysis Dipstick, Multipro   Result Value Ref Range    Color Yellow Yellow, Straw, Dark Yellow, Madelin    Clarity, UA Clear Clear    Glucose, UA Negative Negative, 1000 mg/dL (3+) mg/dL    Bilirubin Negative Negative    Ketones, UA Negative Negative    Specific Gravity  1.015 1.005 - 1.030    Blood, UA Negative Negative    pH, Urine 6.0 5.0 - 8.0    Protein, POC Negative Negative mg/dL    Urobilinogen, UA Normal Normal    Nitrite, UA Negative Negative    Leukocytes Negative " Negative   Patient's Body mass index is 30.91 kg/m². BMI is above normal parameters. Recommendations include: educational material.      Assessment and Plan    Diagnoses and all orders for this visit:    Retention of urine  -     POC Urinalysis Dipstick, Multipro  -     nitrofurantoin (MACRODANTIN) 50 MG capsule; Take 1 capsule by mouth Daily.    History of urethral stricture    Plan--I discussed a self cath with her recommend she continue that I did refill her Macrodantin for which he is under good control her urine today is clear.    Also had a very long discussion with both of them regarding the diagnosis of CIS and the concerns that we is urologist tablet CIS negative for cancer and spread of the cancer etc.    Apparently she does have appointment with Dr. Callahan back in several months I strongly recommended that she keep that observation she does not want to have a diversion but I told her that again unfortunately with the CIS we really do not know exactly when the disease may become aggressive and metastasize etc. and that really if he feels is necessary I think he is a very good position and would  go with his recommendation    I think I answered all her questions regarding this.    I am discussing the see her back when necessary since she is being followed by Dr. Callahan but if something happens prior then she will call

## 2018-12-04 NOTE — PATIENT INSTRUCTIONS

## 2018-12-19 DIAGNOSIS — J44.9 CHRONIC OBSTRUCTIVE PULMONARY DISEASE, UNSPECIFIED COPD TYPE (HCC): Primary | ICD-10-CM

## 2018-12-19 NOTE — TELEPHONE ENCOUNTER
Patient is needing a refill for Dulera Dr. Guzman gave her a sample and said if it worked he would send her more.

## 2018-12-20 RX ORDER — BISOPROLOL FUMARATE AND HYDROCHLOROTHIAZIDE 5; 6.25 MG/1; MG/1
1 TABLET ORAL DAILY
Qty: 30 TABLET | Refills: 5 | Status: SHIPPED | OUTPATIENT
Start: 2018-12-20 | End: 2019-06-21 | Stop reason: SDUPTHER

## 2018-12-20 RX ORDER — GABAPENTIN 300 MG/1
600 CAPSULE ORAL 2 TIMES DAILY
Qty: 120 CAPSULE | Refills: 0 | Status: SHIPPED | OUTPATIENT
Start: 2018-12-20 | End: 2019-03-18 | Stop reason: SDUPTHER

## 2018-12-20 RX ORDER — LISINOPRIL 10 MG/1
10 TABLET ORAL DAILY
Qty: 30 TABLET | Refills: 5 | Status: SHIPPED | OUTPATIENT
Start: 2018-12-20 | End: 2019-01-09 | Stop reason: ALTCHOICE

## 2018-12-20 RX ORDER — SPIRONOLACTONE 25 MG/1
25 TABLET ORAL DAILY
Qty: 30 TABLET | Refills: 5 | Status: SHIPPED | OUTPATIENT
Start: 2018-12-20 | End: 2019-06-21 | Stop reason: SDUPTHER

## 2019-01-03 ENCOUNTER — HOSPITAL ENCOUNTER (OUTPATIENT)
Dept: ULTRASOUND IMAGING | Age: 79
Discharge: HOME | End: 2019-01-03
Payer: MEDICARE

## 2019-01-03 DIAGNOSIS — N17.9 ACUTE KIDNEY INJURY (HCC): ICD-10-CM

## 2019-01-03 DIAGNOSIS — N17.9 ACUTE RENAL FAILURE, UNSPECIFIED ACUTE RENAL FAILURE TYPE (HCC): ICD-10-CM

## 2019-01-03 PROCEDURE — 76770 US EXAM ABDO BACK WALL COMP: CPT

## 2019-01-09 ENCOUNTER — OFFICE VISIT (OUTPATIENT)
Dept: INTERNAL MEDICINE | Facility: CLINIC | Age: 79
End: 2019-01-09

## 2019-01-09 VITALS
HEART RATE: 56 BPM | DIASTOLIC BLOOD PRESSURE: 92 MMHG | HEIGHT: 62 IN | WEIGHT: 171.38 LBS | SYSTOLIC BLOOD PRESSURE: 138 MMHG | RESPIRATION RATE: 16 BRPM | TEMPERATURE: 99.1 F | OXYGEN SATURATION: 97 % | BODY MASS INDEX: 31.54 KG/M2

## 2019-01-09 DIAGNOSIS — L72.0 EPIDERMAL CYST OF NECK: Primary | ICD-10-CM

## 2019-01-09 DIAGNOSIS — M54.2 NECK PAIN: ICD-10-CM

## 2019-01-09 PROCEDURE — 99213 OFFICE O/P EST LOW 20 MIN: CPT | Performed by: FAMILY MEDICINE

## 2019-01-09 PROCEDURE — 10060 I&D ABSCESS SIMPLE/SINGLE: CPT | Performed by: FAMILY MEDICINE

## 2019-01-09 RX ORDER — HYDROCODONE BITARTRATE AND ACETAMINOPHEN 5; 325 MG/1; MG/1
1 TABLET ORAL EVERY 8 HOURS PRN
Qty: 5 TABLET | Refills: 0 | OUTPATIENT
Start: 2019-01-09 | End: 2019-01-13

## 2019-01-09 RX ORDER — AMLODIPINE BESYLATE 10 MG/1
1 TABLET ORAL DAILY
COMMUNITY
Start: 2019-01-03 | End: 2019-01-29 | Stop reason: SDUPTHER

## 2019-01-09 NOTE — PATIENT INSTRUCTIONS
Warm compresses 4-6 times per day    If neck feels warmer, gets more red, or more painful, especially if fevers develop, see me or someone in urgent care. This should get better in 2-3 days.     Only use pain pill for severe pain.

## 2019-01-09 NOTE — ASSESSMENT & PLAN NOTE
Original concern for abscess, no purulent material obtained after I&D. Will use warm compresses at home, f/u if worsening or persistent.

## 2019-01-09 NOTE — PROGRESS NOTES
CC:   Chief Complaint   Patient presents with   • Cyst     Patient reports waking with a knot on the back of her neck this morning, painful to touch       History:  Dago Nunez is a 78 y.o. female who presents today for follow-up for evaluation of the above:    Painful bump on back of her neck started yesterday with severe pain. Felt chilled a few times today but no fever. Some warmth. Not used any treatment, no Hx of abscess. Cannot tell if size has changed    ROS:  Review of Systems   Constitutional: Positive for chills. Negative for fever.   Skin: Wound: neck        Ms. Nunez  reports that she has quit smoking. she has never used smokeless tobacco. She reports that she does not drink alcohol or use drugs.      Current Outpatient Medications:   •  amLODIPine (NORVASC) 10 MG tablet, Take 1 tablet by mouth Daily., Disp: , Rfl:   •  B Complex Vitamins (VITAMIN B COMPLEX) capsule capsule, Take  by mouth Daily., Disp: , Rfl:   •  bisoprolol-hydrochlorothiazide (ZIAC) 5-6.25 MG per tablet, Take 1 tablet by mouth Daily., Disp: 30 tablet, Rfl: 5  •  calcium carbonate (OS-REAGAN) 600 MG tablet, Take 600 mg by mouth Daily., Disp: , Rfl:   •  cetirizine (zyrTEC) 10 MG tablet, Take 10 mg by mouth Daily., Disp: , Rfl:   •  diphenhydrAMINE (BENADRYL) 25 mg capsule, Take 25 mg by mouth Every 6 (Six) Hours As Needed for itching., Disp: , Rfl:   •  esomeprazole (nexIUM) 40 MG capsule, Take 40 mg by mouth 2 (Two) Times a Day., Disp: , Rfl:   •  gabapentin (NEURONTIN) 300 MG capsule, Take 2 capsules by mouth 2 (Two) Times a Day., Disp: 120 capsule, Rfl: 0  •  metFORMIN (GLUCOPHAGE) 500 MG tablet, Take 1 tablet by mouth 2 (Two) Times a Day With Meals., Disp: 60 tablet, Rfl: 5  •  mometasone-formoterol (DULERA) 100-5 MCG/ACT inhaler, Inhale 2 puffs 2 (Two) Times a Day for 30 days., Disp: 1 inhaler, Rfl: 11  •  nitrofurantoin (MACRODANTIN) 50 MG capsule, Take 1 capsule by mouth Daily., Disp: 90 capsule, Rfl: 3  •  spironolactone  "(ALDACTONE) 25 MG tablet, Take 1 tablet by mouth Daily., Disp: 30 tablet, Rfl: 5  •  clidinium-chlordiazePOXIDE (LIBRAX) 5-2.5 MG per capsule, , Disp: , Rfl:   •  furosemide (LASIX) 40 MG tablet, Take 40 mg by mouth 2 (Two) Times a Day. Patient only takes once a day, Disp: , Rfl:   •  HYDROcodone-acetaminophen (NORCO) 5-325 MG per tablet, Take 1 tablet by mouth Every 8 (Eight) Hours As Needed for Severe Pain ., Disp: 5 tablet, Rfl: 0  •  potassium chloride (K-DUR,KLOR-CON) 20 MEQ CR tablet, Take 20 mEq by mouth 2 (Two) Times a Day. Patient only takes once a day, Disp: , Rfl:   •  sucralfate (CARAFATE) 1 GM/10ML suspension, , Disp: , Rfl:       OBJECTIVE:  /92 (BP Location: Left arm, Patient Position: Sitting, Cuff Size: Adult)   Pulse 56   Temp 99.1 °F (37.3 °C) (Temporal)   Resp 16   Ht 157.5 cm (62\")   Wt 77.7 kg (171 lb 6 oz)   SpO2 97%   BMI 31.34 kg/m²    Physical Exam   Constitutional: No distress.   No ill appearance   Lymphadenopathy:     She has no cervical adenopathy.   Skin: She is not diaphoretic.   5x2 cm raised soft tissue swelling of neck with small erythemic point medially measuring 1 cm with small amount of central fluctuance. Mild warmth, erythema confined as mentioned above. Moderately painful to exam.      Procedure Incision and Drainage  Informed consent obtained.  The area was prepped in the usual manner and the skin overlying the abscess was anesthetized with 3 cc of 1% plain lidocaine.  The area was sharply incised, after pressure there was no purulent material was obtained. Bandage applied, blood loss <2 mL with hemostasis achieved.         Plan:      Assessment/Plan    Problem List Items Addressed This Visit     Epidermal cyst of neck - Primary     Original concern for abscess, no purulent material obtained after I&D. Will use warm compresses at home, f/u if worsening or persistent.          Relevant Medications    HYDROcodone-acetaminophen (NORCO) 5-325 MG per tablet    "   Other Visit Diagnoses     Neck pain        PRN hydrocodone, 5 pills for severe pain    Relevant Medications    HYDROcodone-acetaminophen (NORCO) 5-325 MG per tablet        An After Visit Summary was printed and given to the patient at discharge.  Return if symptoms worsen or fail to improve. Sooner if problems arise.         Shaheen Akbar D.O.  [facility]  Osteopathic Neuromusculoskeletal Medicine

## 2019-01-11 PROCEDURE — 87070 CULTURE OTHR SPECIMN AEROBIC: CPT | Performed by: NURSE PRACTITIONER

## 2019-01-11 PROCEDURE — 87185 SC STD ENZYME DETCJ PER NZM: CPT | Performed by: NURSE PRACTITIONER

## 2019-01-11 PROCEDURE — 87186 SC STD MICRODIL/AGAR DIL: CPT | Performed by: NURSE PRACTITIONER

## 2019-01-11 PROCEDURE — 87147 CULTURE TYPE IMMUNOLOGIC: CPT | Performed by: NURSE PRACTITIONER

## 2019-01-11 PROCEDURE — 87205 SMEAR GRAM STAIN: CPT | Performed by: NURSE PRACTITIONER

## 2019-01-13 ENCOUNTER — HOSPITAL ENCOUNTER (EMERGENCY)
Facility: HOSPITAL | Age: 79
Discharge: HOME | End: 2019-01-13
Attending: EMERGENCY MEDICINE | Admitting: EMERGENCY MEDICINE

## 2019-01-13 VITALS
TEMPERATURE: 100 F | DIASTOLIC BLOOD PRESSURE: 39 MMHG | WEIGHT: 171 LBS | HEART RATE: 75 BPM | RESPIRATION RATE: 19 BRPM | OXYGEN SATURATION: 94 % | SYSTOLIC BLOOD PRESSURE: 122 MMHG | HEIGHT: 64 IN | BODY MASS INDEX: 29.19 KG/M2

## 2019-01-13 DIAGNOSIS — D64.9 NORMOCYTIC ANEMIA: ICD-10-CM

## 2019-01-13 DIAGNOSIS — L02.91 ABSCESS: Primary | ICD-10-CM

## 2019-01-13 LAB
ALBUMIN SERPL-MCNC: 4.2 G/DL (ref 3.5–5)
ALBUMIN/GLOB SERPL: 1.2 G/DL (ref 1.1–2.5)
ALP SERPL-CCNC: 54 U/L (ref 24–120)
ALT SERPL W P-5'-P-CCNC: 17 U/L (ref 0–54)
ANION GAP SERPL CALCULATED.3IONS-SCNC: 11 MMOL/L (ref 4–13)
AST SERPL-CCNC: 25 U/L (ref 7–45)
BASOPHILS # BLD AUTO: 0.03 10*3/MM3 (ref 0–0.2)
BASOPHILS NFR BLD AUTO: 0.3 % (ref 0–2)
BILIRUB SERPL-MCNC: 0.4 MG/DL (ref 0.1–1)
BUN BLD-MCNC: 12 MG/DL (ref 5–21)
BUN/CREAT SERPL: 14.3 (ref 7–25)
CALCIUM SPEC-SCNC: 9.4 MG/DL (ref 8.4–10.4)
CHLORIDE SERPL-SCNC: 95 MMOL/L (ref 98–110)
CO2 SERPL-SCNC: 29 MMOL/L (ref 24–31)
CREAT BLD-MCNC: 0.84 MG/DL (ref 0.5–1.4)
DEPRECATED RDW RBC AUTO: 43 FL (ref 40–54)
EOSINOPHIL # BLD AUTO: 0.23 10*3/MM3 (ref 0–0.7)
EOSINOPHIL NFR BLD AUTO: 2.2 % (ref 0–4)
ERYTHROCYTE [DISTWIDTH] IN BLOOD BY AUTOMATED COUNT: 12.2 % (ref 12–15)
GFR SERPL CREATININE-BSD FRML MDRD: 66 ML/MIN/1.73
GLOBULIN UR ELPH-MCNC: 3.6 GM/DL
GLUCOSE BLD-MCNC: 120 MG/DL (ref 70–100)
HCT VFR BLD AUTO: 30.9 % (ref 37–47)
HGB BLD-MCNC: 10.6 G/DL (ref 12–16)
IMM GRANULOCYTES # BLD AUTO: 0.05 10*3/MM3 (ref 0–0.03)
IMM GRANULOCYTES NFR BLD AUTO: 0.5 % (ref 0–5)
LYMPHOCYTES # BLD AUTO: 0.95 10*3/MM3 (ref 0.72–4.86)
LYMPHOCYTES NFR BLD AUTO: 9.1 % (ref 15–45)
MCH RBC QN AUTO: 32.6 PG (ref 28–32)
MCHC RBC AUTO-ENTMCNC: 34.3 G/DL (ref 33–36)
MCV RBC AUTO: 95.1 FL (ref 82–98)
MONOCYTES # BLD AUTO: 0.94 10*3/MM3 (ref 0.19–1.3)
MONOCYTES NFR BLD AUTO: 9 % (ref 4–12)
NEUTROPHILS # BLD AUTO: 8.24 10*3/MM3 (ref 1.87–8.4)
NEUTROPHILS NFR BLD AUTO: 78.9 % (ref 39–78)
NRBC BLD AUTO-RTO: 0 /100 WBC (ref 0–0)
PLATELET # BLD AUTO: 164 10*3/MM3 (ref 130–400)
PMV BLD AUTO: 11.1 FL (ref 6–12)
POTASSIUM BLD-SCNC: 3.6 MMOL/L (ref 3.5–5.3)
PROT SERPL-MCNC: 7.8 G/DL (ref 6.3–8.7)
RBC # BLD AUTO: 3.25 10*6/MM3 (ref 4.2–5.4)
SODIUM BLD-SCNC: 135 MMOL/L (ref 135–145)
WBC NRBC COR # BLD: 10.44 10*3/MM3 (ref 4.8–10.8)

## 2019-01-13 PROCEDURE — 25010000002 ONDANSETRON PER 1 MG: Performed by: EMERGENCY MEDICINE

## 2019-01-13 PROCEDURE — 96375 TX/PRO/DX INJ NEW DRUG ADDON: CPT

## 2019-01-13 PROCEDURE — 83540 ASSAY OF IRON: CPT | Performed by: FAMILY MEDICINE

## 2019-01-13 PROCEDURE — 99283 EMERGENCY DEPT VISIT LOW MDM: CPT

## 2019-01-13 PROCEDURE — 36415 COLL VENOUS BLD VENIPUNCTURE: CPT | Performed by: EMERGENCY MEDICINE

## 2019-01-13 PROCEDURE — 80053 COMPREHEN METABOLIC PANEL: CPT | Performed by: EMERGENCY MEDICINE

## 2019-01-13 PROCEDURE — 96374 THER/PROPH/DIAG INJ IV PUSH: CPT

## 2019-01-13 PROCEDURE — 25010000002 MORPHINE PER 10 MG: Performed by: EMERGENCY MEDICINE

## 2019-01-13 PROCEDURE — 85025 COMPLETE CBC W/AUTO DIFF WBC: CPT | Performed by: EMERGENCY MEDICINE

## 2019-01-13 RX ORDER — ONDANSETRON 2 MG/ML
4 INJECTION INTRAMUSCULAR; INTRAVENOUS ONCE
Status: COMPLETED | OUTPATIENT
Start: 2019-01-13 | End: 2019-01-13

## 2019-01-13 RX ORDER — HYDROCODONE BITARTRATE AND ACETAMINOPHEN 5; 325 MG/1; MG/1
1 TABLET ORAL EVERY 6 HOURS PRN
Qty: 5 TABLET | Refills: 0 | OUTPATIENT
Start: 2019-01-13 | End: 2019-01-14

## 2019-01-13 RX ORDER — SODIUM CHLORIDE 0.9 % (FLUSH) 0.9 %
10 SYRINGE (ML) INJECTION AS NEEDED
Status: DISCONTINUED | OUTPATIENT
Start: 2019-01-13 | End: 2019-01-13 | Stop reason: HOSPADM

## 2019-01-13 RX ORDER — LIDOCAINE HYDROCHLORIDE AND EPINEPHRINE 10; 10 MG/ML; UG/ML
10 INJECTION, SOLUTION INFILTRATION; PERINEURAL ONCE
Status: COMPLETED | OUTPATIENT
Start: 2019-01-13 | End: 2019-01-13

## 2019-01-13 RX ADMIN — LIDOCAINE HYDROCHLORIDE,EPINEPHRINE BITARTRATE 10 ML: 10; .01 INJECTION, SOLUTION INFILTRATION; PERINEURAL at 12:45

## 2019-01-13 RX ADMIN — MORPHINE SULFATE 4 MG: 4 INJECTION, SOLUTION INTRAMUSCULAR; INTRAVENOUS at 11:48

## 2019-01-13 RX ADMIN — ONDANSETRON 4 MG: 2 SOLUTION INTRAMUSCULAR; INTRAVENOUS at 12:04

## 2019-01-13 NOTE — ED PROVIDER NOTES
Subjective   History of Present Illness    78-year-old female presenting with abscess.    Patient notes onset approximately one week prior, gradually worsening. Patient went to urgent care and underwent a stab incision and drainage. Patient notes that it temporarily helped, but over the past several days has been gradually enlarging and worsening in pain. Patient is currently on clindamycin. The wound culture from the previous incision and drainage reveals MSSA susceptible to clindamycin. Patient reports pain in the area, worse with bending her neck area.    Patient reports subjective fevers and chills. Denies nausea, vomiting, diarrhea, shortness of breath or chest pain.    Review of Systems   Constitutional: Positive for chills and fever.   HENT: Negative for sinus pain.    Eyes: Negative for pain.   Respiratory: Negative for shortness of breath.    Cardiovascular: Negative for chest pain.   Gastrointestinal: Negative for abdominal pain.   Genitourinary: Negative for flank pain.   Musculoskeletal: Negative for back pain.   Skin: Positive for color change and wound.   Neurological: Negative for syncope and weakness.   Psychiatric/Behavioral: The patient is not hyperactive.        Past Medical History:   Diagnosis Date   • Arthritis    • Cellulitis    • Diabetes mellitus (CMS/HCC)    • GERD (gastroesophageal reflux disease)    • Hyperlipidemia    • Hypertension    • Kyphosis    • Osteoporosis    • Scoliosis    • Vulvar intraepithelial neoplasia (MOODY) grade 3        Allergies   Allergen Reactions   • Tequin [Gatifloxacin]    • Trovan [Alatrofloxacin]    • Amoxicillin-Pot Clavulanate Rash   • Keflex [Cephalexin] Rash   • Septra [Sulfamethoxazole-Trimethoprim] Rash       Past Surgical History:   Procedure Laterality Date   • APPENDECTOMY     • BREAST CYST ASPIRATION Left    • COLONOSCOPY  01/12/2011   • ENDOSCOPY  07/01/2014   • HYSTERECTOMY     • TONSILLECTOMY     • VAGINA SURGERY         Family History   Problem  Relation Age of Onset   • Cancer Mother    • Hypertension Mother    • Osteoporosis Mother    • Dementia Mother    • Heart disease Father    • Parkinsonism Father    • Cancer Sister    • Diabetes Brother    • Heart disease Paternal Grandfather    • Breast cancer Neg Hx        Social History     Socioeconomic History   • Marital status:      Spouse name: Not on file   • Number of children: Not on file   • Years of education: Not on file   • Highest education level: Not on file   Tobacco Use   • Smoking status: Former Smoker   • Smokeless tobacco: Never Used   Substance and Sexual Activity   • Alcohol use: No   • Drug use: No   • Sexual activity: No           Objective   Physical Exam   Constitutional: She is oriented to person, place, and time. She appears well-developed and well-nourished.   HENT:   Head: Normocephalic and atraumatic.       6 x 7 cm area of firmness, tenderness, erythema, warmth.  Bedside ultrasound reveals fluid collection approximately 2 cm deep.   Eyes: Conjunctivae are normal.   Neck: Normal range of motion.   Cardiovascular: Intact distal pulses.   Pulmonary/Chest: Effort normal and breath sounds normal. No respiratory distress.   Abdominal: Soft. She exhibits no distension. There is no tenderness.   Musculoskeletal: She exhibits no edema.   Neurological: She is alert and oriented to person, place, and time.   Skin: Skin is warm and dry.   Psychiatric: She has a normal mood and affect.   Nursing note and vitals reviewed.      Incision & Drainage  Date/Time: 1/13/2019 1:01 PM  Performed by: Daniel Nicole MD  Authorized by: Daniel Nicole MD     Consent:     Consent obtained:  Verbal and written    Consent given by:  Patient    Risks discussed:  Bleeding, incomplete drainage, pain, infection and damage to other organs    Alternatives discussed:  No treatment and alternative treatment  Universal protocol:     Immediately prior to procedure a time out was called: yes       Patient identity confirmed:  Verbally with patient and arm band  Location:     Type:  Abscess    Size:  6x7 cm    Location:  Neck    Neck location:  L posterior  Pre-procedure details:     Skin preparation:  Betadine  Anesthesia (see MAR for exact dosages):     Anesthesia method:  Local infiltration    Local anesthetic:  Lidocaine 1% WITH epi  Procedure type:     Complexity:  Complex  Procedure details:     Needle aspiration: no      Incision types:  Single straight    Incision depth:  Subcutaneous    Scalpel blade:  11    Wound management:  Probed and deloculated and extensive cleaning    Drainage:  Purulent and bloody    Drainage amount:  Moderate    Wound treatment:  Drain placed and wound left open    Packing materials:  1/2 in iodoform gauze  Post-procedure details:     Patient tolerance of procedure:  Tolerated well, no immediate complications               ED Course                  MDM  Number of Diagnoses or Management Options  Abscess:   Diagnosis management comments: 78-year-old female presenting with abscess. Previous stab incision likely inadequate, as it has closed off, and the abscess is now enlarging and worsening. Patient has no systemic signs of illness, with normal vital signs. Incision and drainage performed at bedside. See procedure note for further details. Patient has clindamycin at home. Instructed to continue this. Patient will need packing changes every 48 hours.       Amount and/or Complexity of Data Reviewed  Clinical lab tests: reviewed          Final diagnoses:   Abscess            Daniel Nicole MD  01/13/19 3795

## 2019-01-14 ENCOUNTER — OFFICE VISIT (OUTPATIENT)
Dept: INTERNAL MEDICINE | Facility: CLINIC | Age: 79
End: 2019-01-14

## 2019-01-14 ENCOUNTER — HOSPITAL ENCOUNTER (EMERGENCY)
Facility: HOSPITAL | Age: 79
Discharge: HOME | End: 2019-01-14
Attending: EMERGENCY MEDICINE | Admitting: EMERGENCY MEDICINE

## 2019-01-14 VITALS
RESPIRATION RATE: 18 BRPM | TEMPERATURE: 97.4 F | HEIGHT: 64 IN | BODY MASS INDEX: 29.19 KG/M2 | DIASTOLIC BLOOD PRESSURE: 54 MMHG | OXYGEN SATURATION: 100 % | HEART RATE: 62 BPM | SYSTOLIC BLOOD PRESSURE: 127 MMHG | WEIGHT: 171 LBS

## 2019-01-14 VITALS
HEIGHT: 64 IN | OXYGEN SATURATION: 98 % | TEMPERATURE: 98.3 F | HEART RATE: 62 BPM | RESPIRATION RATE: 12 BRPM | BODY MASS INDEX: 29.19 KG/M2 | DIASTOLIC BLOOD PRESSURE: 84 MMHG | SYSTOLIC BLOOD PRESSURE: 138 MMHG | WEIGHT: 171 LBS

## 2019-01-14 DIAGNOSIS — D64.9 NORMOCYTIC ANEMIA: ICD-10-CM

## 2019-01-14 DIAGNOSIS — L02.11 ABSCESS OF NECK: Primary | ICD-10-CM

## 2019-01-14 DIAGNOSIS — L02.11 NECK ABSCESS: Primary | ICD-10-CM

## 2019-01-14 LAB — IRON 24H UR-MRATE: 44 MCG/DL (ref 42–180)

## 2019-01-14 PROCEDURE — 99024 POSTOP FOLLOW-UP VISIT: CPT | Performed by: FAMILY MEDICINE

## 2019-01-14 PROCEDURE — 99283 EMERGENCY DEPT VISIT LOW MDM: CPT

## 2019-01-14 RX ORDER — LIDOCAINE HYDROCHLORIDE AND EPINEPHRINE 10; 10 MG/ML; UG/ML
10 INJECTION, SOLUTION INFILTRATION; PERINEURAL ONCE
Status: COMPLETED | OUTPATIENT
Start: 2019-01-14 | End: 2019-01-14

## 2019-01-14 RX ORDER — HYDROCODONE BITARTRATE AND ACETAMINOPHEN 7.5; 325 MG/1; MG/1
1 TABLET ORAL EVERY 4 HOURS PRN
Qty: 15 TABLET | Refills: 0 | Status: SHIPPED | OUTPATIENT
Start: 2019-01-14 | End: 2019-01-29

## 2019-01-14 RX ADMIN — LIDOCAINE HYDROCHLORIDE AND EPINEPHRINE 5 ML: 10; 10 INJECTION, SOLUTION INFILTRATION; PERINEURAL at 13:32

## 2019-01-14 NOTE — PATIENT INSTRUCTIONS
I would recommend seeing the ER to have your dressing changed in your neck. Just tell them it is for dressing change, and they will know what to do.     I will call home health and have a wound care nurse take care of your neck.

## 2019-01-14 NOTE — ED PROVIDER NOTES
Subjective   Patient had sore spot on neck that developed over past 5-7 days.  Was seen at one clinic and had probing but no I&D.  Was seen here in ED yesterday and had I&D with large amount of pus expressed and packing placed.  Followed up with PCP today to have it changed as directed and they sent her back here because they did not have correct supplies.  C/o continued neck pain and localized swelling and afraid there is still material in this swollen area.        History provided by:  Patient and spouse   used: No    Illness   Location:  Neck  Quality:  Painful, recheck I&D  Severity:  Moderate  Onset quality:  Gradual  Duration:  1 week  Timing:  Constant  Progression:  Partially resolved  Chronicity:  New  Associated symptoms: no abdominal pain, no chest pain, no congestion, no cough, no diarrhea, no ear pain, no fatigue, no fever, no headaches, no loss of consciousness, no myalgias, no nausea, no rash, no rhinorrhea, no shortness of breath, no sore throat, no vomiting and no wheezing        Review of Systems   Constitutional: Negative.  Negative for fatigue and fever.   HENT: Negative.  Negative for congestion, ear pain, rhinorrhea and sore throat.    Respiratory: Negative.  Negative for cough, shortness of breath and wheezing.    Cardiovascular: Negative.  Negative for chest pain.   Gastrointestinal: Negative.  Negative for abdominal pain, diarrhea, nausea and vomiting.   Genitourinary: Negative.    Musculoskeletal: Negative.  Negative for myalgias.   Skin: Negative.  Negative for rash.   Neurological: Negative.  Negative for loss of consciousness and headaches.   Hematological: Negative.    Psychiatric/Behavioral: Negative.    All other systems reviewed and are negative.      Past Medical History:   Diagnosis Date   • Arthritis    • Cellulitis    • Diabetes mellitus (CMS/HCC)    • GERD (gastroesophageal reflux disease)    • Hyperlipidemia    • Hypertension    • Kyphosis    • Osteoporosis     • Scoliosis    • Vulvar intraepithelial neoplasia (MOODY) grade 3        Allergies   Allergen Reactions   • Tequin [Gatifloxacin]    • Trovan [Alatrofloxacin]    • Amoxicillin-Pot Clavulanate Rash   • Keflex [Cephalexin] Rash   • Septra [Sulfamethoxazole-Trimethoprim] Rash       Past Surgical History:   Procedure Laterality Date   • APPENDECTOMY     • BREAST CYST ASPIRATION Left    • COLONOSCOPY  01/12/2011   • ENDOSCOPY  07/01/2014   • HYSTERECTOMY     • TONSILLECTOMY     • VAGINA SURGERY         Family History   Problem Relation Age of Onset   • Cancer Mother    • Hypertension Mother    • Osteoporosis Mother    • Dementia Mother    • Heart disease Father    • Parkinsonism Father    • Cancer Sister    • Diabetes Brother    • Heart disease Paternal Grandfather    • Breast cancer Neg Hx        Social History     Socioeconomic History   • Marital status:      Spouse name: Not on file   • Number of children: Not on file   • Years of education: Not on file   • Highest education level: Not on file   Tobacco Use   • Smoking status: Former Smoker   • Smokeless tobacco: Never Used   Substance and Sexual Activity   • Alcohol use: No   • Drug use: No   • Sexual activity: No       Prior to Admission medications    Medication Sig Start Date End Date Taking? Authorizing Provider   amLODIPine (NORVASC) 10 MG tablet Take 1 tablet by mouth Daily. 1/3/19   Homer Ahumada MD   B Complex Vitamins (VITAMIN B COMPLEX) capsule capsule Take  by mouth Daily.    ProviderHomer MD   bisoprolol-hydrochlorothiazide (ZIAC) 5-6.25 MG per tablet Take 1 tablet by mouth Daily. 12/20/18   Brandi Mccarthy APRN   calcium carbonate (OS-REAGAN) 600 MG tablet Take 600 mg by mouth Daily.    ProviderHomer MD   cetirizine (zyrTEC) 10 MG tablet Take 10 mg by mouth Daily.    ProviderHomer MD   clidinium-chlordiazePOXIDE (LIBRAX) 5-2.5 MG per capsule  10/2/18   ProviderHomer MD   clindamycin (CLEOCIN) 300 MG capsule  Take 1 capsule by mouth 3 (Three) Times a Day. 1/11/19   Latasha Alcantara APRN   diphenhydrAMINE (BENADRYL) 25 mg capsule Take 25 mg by mouth Every 6 (Six) Hours As Needed for itching.    Homer Ahumada MD   esomeprazole (nexIUM) 40 MG capsule Take 40 mg by mouth 2 (Two) Times a Day.    Homer Ahumada MD   furosemide (LASIX) 40 MG tablet Take 40 mg by mouth 2 (Two) Times a Day. Patient only takes once a day    Homer Ahumdaa MD   gabapentin (NEURONTIN) 300 MG capsule Take 2 capsules by mouth 2 (Two) Times a Day. 12/20/18   Brandi Mccarthy APRN   HYDROcodone-acetaminophen (NORCO) 5-325 MG per tablet Take 1 tablet by mouth Every 6 (Six) Hours As Needed for Severe Pain . 1/13/19   Daniel Nicole MD   metFORMIN (GLUCOPHAGE) 500 MG tablet Take 1 tablet by mouth 2 (Two) Times a Day With Meals. 12/20/18   Brandi Mccarthy APRN   mometasone-formoterol (DULERA) 100-5 MCG/ACT inhaler Inhale 2 puffs 2 (Two) Times a Day for 30 days. 12/19/18 1/18/19  Tunde Lee APRN   nitrofurantoin (MACRODANTIN) 50 MG capsule Take 1 capsule by mouth Daily. 12/4/18   Trae Boggs MD   potassium chloride (K-DUR,KLOR-CON) 20 MEQ CR tablet Take 20 mEq by mouth 2 (Two) Times a Day. Patient only takes once a day    Homer Ahumada MD   spironolactone (ALDACTONE) 25 MG tablet Take 1 tablet by mouth Daily. 12/20/18   Brandi Mccarthy APRN   sucralfate (CARAFATE) 1 GM/10ML suspension  7/2/18   Homer Ahumada MD       Medications   lidocaine-EPINEPHrine (XYLOCAINE W/EPI) 1 %-1:906976 injection 10 mL (5 mL Infiltration Given 1/14/19 1332)       Vitals:    01/14/19 1351   BP: 127/54   Pulse: 62   Resp: 18   Temp: 97.4 °F (36.3 °C)   SpO2: 100%         Objective   Physical Exam   Constitutional: She is oriented to person, place, and time. She appears well-developed and well-nourished.   HENT:   Head: Normocephalic and atraumatic.   Neck: Normal range of motion. Neck supple.   3 cm area of  localized swelling and erythema on left posterior neck area with indurated tissue with packing in I&D.   Cardiovascular: Normal rate and regular rhythm.   Pulmonary/Chest: Effort normal and breath sounds normal.   Neurological: She is alert and oriented to person, place, and time.   Psychiatric: She has a normal mood and affect. Her behavior is normal.   Nursing note and vitals reviewed.      Procedures         Lab Results (last 24 hours)     ** No results found for the last 24 hours. **          No orders to display       ED Course  ED Course as of Jan 14 1637 Mon Jan 14, 2019   1634 The packing was removed from the wound and then wound irrigated briefly with no further loculation or abscess found.  Round repacked with 1/2 inch iodoform gauze.  I told the patient the wound was healing but would take some time.  She says her PCP has arranged home health starting tomorrow.  [TR]      ED Course User Index  [TR] Mark Osorio Jr., MD          MDM  Number of Diagnoses or Management Options  Abscess of neck: established and improving  Risk of Complications, Morbidity, and/or Mortality  Presenting problems: moderate  Diagnostic procedures: minimal  Management options: moderate    Patient Progress  Patient progress: stable      Final diagnoses:   Abscess of neck          Mark Osorio Jr., MD  01/14/19 3467

## 2019-01-14 NOTE — PROGRESS NOTES
CC:   Chief Complaint   Patient presents with   • Follow-up     Patient here for ED f/u for cyst on her neck   • Cyst       History:  Dago Nunez is a 78 y.o. female who presents today for follow-up for evaluation of the above:    Here for f/u abscess, seen in clinic last week for I and D, then again in UC with culture revealing MSSA sensitive to clindamycin. Seen in ED yesterday.  Still reports pain in her neck, but overall better than her last visit with her.  She says that she needs packing removed after incision and I&D in ED.    Labs yesterday with normocytic anemia of 10.6. 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  She occasionally gets lightheaded and her balance is getting a little worse.    ROS:  Review of Systems   Constitutional: Negative for chills and fever.   Musculoskeletal: Positive for neck pain.   Skin: Positive for wound.     Ms. Nunez  reports that she has quit smoking. she has never used smokeless tobacco. She reports that she does not drink alcohol or use drugs.      Current Outpatient Medications:   •  amLODIPine (NORVASC) 10 MG tablet, Take 1 tablet by mouth Daily., Disp: , Rfl:   •  B Complex Vitamins (VITAMIN B COMPLEX) capsule capsule, Take  by mouth Daily., Disp: , Rfl:   •  bisoprolol-hydrochlorothiazide (ZIAC) 5-6.25 MG per tablet, Take 1 tablet by mouth Daily., Disp: 30 tablet, Rfl: 5  •  calcium carbonate (OS-REAGAN) 600 MG tablet, Take 600 mg by mouth Daily., Disp: , Rfl:   •  cetirizine (zyrTEC) 10 MG tablet, Take 10 mg by mouth Daily., Disp: , Rfl:   •  clidinium-chlordiazePOXIDE (LIBRAX) 5-2.5 MG per capsule, , Disp: , Rfl:   •  clindamycin (CLEOCIN) 300 MG capsule, Take 1 capsule by mouth 3 (Three) Times a Day., Disp: 30 capsule, Rfl: 0  •  esomeprazole (nexIUM) 40 MG capsule, Take 40 mg by mouth 2 (Two) Times a Day., Disp: , Rfl:   •  gabapentin (NEURONTIN) 300 MG capsule, Take 2 capsules by mouth 2 (Two) Times a Day., Disp:  "120 capsule, Rfl: 0  •  metFORMIN (GLUCOPHAGE) 500 MG tablet, Take 1 tablet by mouth 2 (Two) Times a Day With Meals., Disp: 60 tablet, Rfl: 5  •  nitrofurantoin (MACRODANTIN) 50 MG capsule, Take 1 capsule by mouth Daily., Disp: 90 capsule, Rfl: 3  •  potassium chloride (K-DUR,KLOR-CON) 20 MEQ CR tablet, Take 20 mEq by mouth 2 (Two) Times a Day. Patient only takes once a day, Disp: , Rfl:   •  spironolactone (ALDACTONE) 25 MG tablet, Take 1 tablet by mouth Daily., Disp: 30 tablet, Rfl: 5  •  diphenhydrAMINE (BENADRYL) 25 mg capsule, Take 25 mg by mouth Every 6 (Six) Hours As Needed for itching., Disp: , Rfl:   •  furosemide (LASIX) 40 MG tablet, Take 40 mg by mouth 2 (Two) Times a Day. Patient only takes once a day, Disp: , Rfl:   •  HYDROcodone-acetaminophen (NORCO) 7.5-325 MG per tablet, Take 1 tablet by mouth Every 4 (Four) Hours As Needed for Moderate Pain ., Disp: 15 tablet, Rfl: 0  •  mometasone-formoterol (DULERA) 100-5 MCG/ACT inhaler, Inhale 2 puffs 2 (Two) Times a Day for 30 days., Disp: 1 inhaler, Rfl: 11  •  sucralfate (CARAFATE) 1 GM/10ML suspension, , Disp: , Rfl:   No current facility-administered medications for this visit.       OBJECTIVE:  /84 (BP Location: Left arm, Patient Position: Sitting, Cuff Size: Adult)   Pulse 62   Temp 98.3 °F (36.8 °C) (Temporal)   Resp 12   Ht 162.6 cm (64\")   Wt 77.6 kg (171 lb)   SpO2 98%   BMI 29.35 kg/m²    Physical Exam   Cardiovascular: Normal rate, regular rhythm and normal heart sounds. Exam reveals no gallop and no friction rub.   No murmur heard.  Pulmonary/Chest: Effort normal and breath sounds normal. No respiratory distress. She has no wheezes. She has no rales.   Skin: Skin is warm and dry.   Improved erythema to abscess from my last visit with packing place and gauze bandage       Assessment/Plan    Problem List Items Addressed This Visit     Normocytic anemia     Unclear etiology, patient reports being followed by heme/onc, continue with " follow-up         Neck abscess - Primary     Overall improved.  Informed patient that we do not have packing and recommended follow-up in ER for packing removal.  Patient is very anxious and reports that  is unable to assist with wound care needs, referral to home health for wound care assistance at home.  Follow-up when necessary him a continue antibiotics         Relevant Orders    Ambulatory Referral to Home Health        An After Visit Summary was printed and given to the patient at discharge.  Return if symptoms worsen or fail to improve. Sooner if problems arise.         Shaheen Akbar D.O.  Family Medicine  Osteopathic Neuromusculoskeletal Medicine

## 2019-01-14 NOTE — ASSESSMENT & PLAN NOTE
Overall improved.  Informed patient that we do not have packing and recommended follow-up in ER for packing removal.  Patient is very anxious and reports that  is unable to assist with wound care needs, referral to home health for wound care assistance at home.  Follow-up when necessary him a continue antibiotics

## 2019-01-17 ENCOUNTER — TELEPHONE (OUTPATIENT)
Dept: INTERNAL MEDICINE | Facility: CLINIC | Age: 79
End: 2019-01-17

## 2019-01-17 DIAGNOSIS — L02.11 NECK ABSCESS: Primary | ICD-10-CM

## 2019-01-17 RX ORDER — ESOMEPRAZOLE MAGNESIUM 40 MG/1
40 CAPSULE, DELAYED RELEASE ORAL 2 TIMES DAILY
Qty: 180 CAPSULE | Refills: 3 | Status: SHIPPED | OUTPATIENT
Start: 2019-01-17 | End: 2020-06-19

## 2019-01-17 NOTE — TELEPHONE ENCOUNTER
Please tell pt that I will refer her to general surgery to take another look at her neck as it has had three I&Ds at this point. Continue clindamycin, culture was sensitive to it.

## 2019-01-17 NOTE — TELEPHONE ENCOUNTER
I spoke with the patient and she would like to go to Dr Hurtado's office, to see Jim Plata if possible, but Dr Hurtado will be fine.   MM

## 2019-01-17 NOTE — TELEPHONE ENCOUNTER
Lesa called from [facility]   ([phone]) stating that she went out to do the dressing change for the cyst on the back of her neck and it has redness around it, thick yellow drainage and patient reports an increase in pain level today. She has 3 days of Clindamycin left to take.   MANDY

## 2019-01-22 ENCOUNTER — OFFICE VISIT (OUTPATIENT)
Dept: SURGERY | Age: 79
End: 2019-01-22
Payer: MEDICARE

## 2019-01-22 VITALS
HEART RATE: 57 BPM | WEIGHT: 167 LBS | TEMPERATURE: 98.3 F | HEIGHT: 64 IN | BODY MASS INDEX: 28.51 KG/M2 | OXYGEN SATURATION: 99 %

## 2019-01-22 DIAGNOSIS — T14.8XXA OPEN WOUND: Primary | ICD-10-CM

## 2019-01-22 PROCEDURE — 99202 OFFICE O/P NEW SF 15 MIN: CPT | Performed by: PHYSICIAN ASSISTANT

## 2019-01-22 PROCEDURE — G8427 DOCREV CUR MEDS BY ELIG CLIN: HCPCS | Performed by: PHYSICIAN ASSISTANT

## 2019-01-22 PROCEDURE — G8484 FLU IMMUNIZE NO ADMIN: HCPCS | Performed by: PHYSICIAN ASSISTANT

## 2019-01-22 PROCEDURE — G8419 CALC BMI OUT NRM PARAM NOF/U: HCPCS | Performed by: PHYSICIAN ASSISTANT

## 2019-01-22 PROCEDURE — G8400 PT W/DXA NO RESULTS DOC: HCPCS | Performed by: PHYSICIAN ASSISTANT

## 2019-01-22 PROCEDURE — 4040F PNEUMOC VAC/ADMIN/RCVD: CPT | Performed by: PHYSICIAN ASSISTANT

## 2019-01-22 PROCEDURE — 1123F ACP DISCUSS/DSCN MKR DOCD: CPT | Performed by: PHYSICIAN ASSISTANT

## 2019-01-22 PROCEDURE — 1090F PRES/ABSN URINE INCON ASSESS: CPT | Performed by: PHYSICIAN ASSISTANT

## 2019-01-22 PROCEDURE — 1101F PT FALLS ASSESS-DOCD LE1/YR: CPT | Performed by: PHYSICIAN ASSISTANT

## 2019-01-22 PROCEDURE — 1036F TOBACCO NON-USER: CPT | Performed by: PHYSICIAN ASSISTANT

## 2019-01-22 RX ORDER — DOXYCYCLINE HYCLATE 100 MG/1
100 CAPSULE ORAL 2 TIMES DAILY
Qty: 20 CAPSULE | Refills: 0 | Status: SHIPPED | OUTPATIENT
Start: 2019-01-22 | End: 2019-02-01

## 2019-01-23 ENCOUNTER — TELEPHONE (OUTPATIENT)
Dept: SURGERY | Age: 79
End: 2019-01-23

## 2019-01-24 ENCOUNTER — TELEPHONE (OUTPATIENT)
Dept: SURGERY | Age: 79
End: 2019-01-24

## 2019-01-24 ENCOUNTER — TELEPHONE (OUTPATIENT)
Dept: INTERNAL MEDICINE | Facility: CLINIC | Age: 79
End: 2019-01-24

## 2019-01-24 RX ORDER — METFORMIN HYDROCHLORIDE 500 MG/1
1000 TABLET, EXTENDED RELEASE ORAL
Qty: 180 TABLET | Refills: 1 | Status: SHIPPED | OUTPATIENT
Start: 2019-01-24 | End: 2019-08-13 | Stop reason: SDUPTHER

## 2019-01-24 NOTE — TELEPHONE ENCOUNTER
Patient called stating that her Metformin is not the same as she has always taken and her pharmacist told her that she used to be on the ER, but that is not what was sent in for her.  Patient states she has been taking it 2 tablets once a day, is she supposed to take it 1 bid?  MANDY

## 2019-01-29 ENCOUNTER — TRANSCRIBE ORDERS (OUTPATIENT)
Dept: ADMINISTRATIVE | Facility: HOSPITAL | Age: 79
End: 2019-01-29

## 2019-01-29 ENCOUNTER — APPOINTMENT (OUTPATIENT)
Dept: LAB | Facility: HOSPITAL | Age: 79
End: 2019-01-29
Attending: INTERNAL MEDICINE

## 2019-01-29 ENCOUNTER — OFFICE VISIT (OUTPATIENT)
Dept: INTERNAL MEDICINE | Facility: CLINIC | Age: 79
End: 2019-01-29

## 2019-01-29 ENCOUNTER — LAB (OUTPATIENT)
Dept: LAB | Facility: HOSPITAL | Age: 79
End: 2019-01-29
Attending: FAMILY MEDICINE

## 2019-01-29 VITALS
TEMPERATURE: 98.7 F | RESPIRATION RATE: 12 BRPM | SYSTOLIC BLOOD PRESSURE: 126 MMHG | OXYGEN SATURATION: 99 % | HEART RATE: 58 BPM | WEIGHT: 160.56 LBS | DIASTOLIC BLOOD PRESSURE: 76 MMHG | HEIGHT: 64 IN | BODY MASS INDEX: 27.41 KG/M2

## 2019-01-29 DIAGNOSIS — I10 ESSENTIAL HYPERTENSION: ICD-10-CM

## 2019-01-29 DIAGNOSIS — E87.1 HYPONATREMIA: ICD-10-CM

## 2019-01-29 DIAGNOSIS — M54.50 CHRONIC MIDLINE LOW BACK PAIN WITHOUT SCIATICA: ICD-10-CM

## 2019-01-29 DIAGNOSIS — N18.30 CKD (CHRONIC KIDNEY DISEASE), STAGE III (HCC): ICD-10-CM

## 2019-01-29 DIAGNOSIS — E11.8 TYPE 2 DIABETES MELLITUS WITH COMPLICATION, WITHOUT LONG-TERM CURRENT USE OF INSULIN (HCC): ICD-10-CM

## 2019-01-29 DIAGNOSIS — N18.4 CHRONIC KIDNEY DISEASE, STAGE IV (SEVERE) (HCC): Primary | ICD-10-CM

## 2019-01-29 DIAGNOSIS — L02.11 NECK ABSCESS: ICD-10-CM

## 2019-01-29 DIAGNOSIS — D64.9 NORMOCYTIC ANEMIA: ICD-10-CM

## 2019-01-29 DIAGNOSIS — N18.30 CKD (CHRONIC KIDNEY DISEASE), STAGE III (HCC): Primary | ICD-10-CM

## 2019-01-29 DIAGNOSIS — K21.9 GASTROESOPHAGEAL REFLUX DISEASE, ESOPHAGITIS PRESENCE NOT SPECIFIED: ICD-10-CM

## 2019-01-29 DIAGNOSIS — G89.29 CHRONIC MIDLINE LOW BACK PAIN WITHOUT SCIATICA: ICD-10-CM

## 2019-01-29 PROBLEM — E78.5 HYPERLIPIDEMIA: Status: ACTIVE | Noted: 2019-01-29

## 2019-01-29 LAB
ANION GAP SERPL CALCULATED.3IONS-SCNC: 13 MMOL/L (ref 4–13)
BUN BLD-MCNC: 18 MG/DL (ref 5–21)
BUN/CREAT SERPL: 16.4 (ref 7–25)
CALCIUM SPEC-SCNC: 10 MG/DL (ref 8.4–10.4)
CHLORIDE SERPL-SCNC: 90 MMOL/L (ref 98–110)
CO2 SERPL-SCNC: 26 MMOL/L (ref 24–31)
CREAT BLD-MCNC: 1.1 MG/DL (ref 0.5–1.4)
GFR SERPL CREATININE-BSD FRML MDRD: 48 ML/MIN/1.73
GLUCOSE BLD-MCNC: 96 MG/DL (ref 70–100)
POTASSIUM BLD-SCNC: 3.6 MMOL/L (ref 3.5–5.3)
SODIUM BLD-SCNC: 129 MMOL/L (ref 135–145)

## 2019-01-29 PROCEDURE — 80048 BASIC METABOLIC PNL TOTAL CA: CPT | Performed by: INTERNAL MEDICINE

## 2019-01-29 PROCEDURE — 82043 UR ALBUMIN QUANTITATIVE: CPT | Performed by: FAMILY MEDICINE

## 2019-01-29 PROCEDURE — 99214 OFFICE O/P EST MOD 30 MIN: CPT | Performed by: FAMILY MEDICINE

## 2019-01-29 PROCEDURE — 36415 COLL VENOUS BLD VENIPUNCTURE: CPT

## 2019-01-29 RX ORDER — AMLODIPINE BESYLATE 10 MG/1
10 TABLET ORAL DAILY
Qty: 90 TABLET | Refills: 3 | Status: SHIPPED | OUTPATIENT
Start: 2019-01-29 | End: 2019-10-22 | Stop reason: SDUPTHER

## 2019-01-29 NOTE — PATIENT INSTRUCTIONS
Iron-Rich Diet  Iron is a mineral that helps your body to produce hemoglobin. Hemoglobin is a protein in your red blood cells that carries oxygen to your body's tissues. Eating too little iron may cause you to feel weak and tired, and it can increase your risk for infection. Eating enough iron is necessary for your body's metabolism, muscle function, and nervous system.  Iron is naturally found in many foods. It can also be added to foods or fortified in foods. There are two types of dietary iron:  · Heme iron. Heme iron is absorbed by the body more easily than nonheme iron. Heme iron is found in meat, poultry, and fish.  · Nonheme iron. Nonheme iron is found in dietary supplements, iron-fortified grains, beans, and vegetables.    You may need to follow an iron-rich diet if:  · You have been diagnosed with iron deficiency or iron-deficiency anemia.  · You have a condition that prevents you from absorbing dietary iron, such as:  ? Infection in your intestines.  ? Celiac disease. This involves long-lasting (chronic) inflammation of your intestines.  · You do not eat enough iron.  · You eat a diet that is high in foods that impair iron absorption.  · You have lost a lot of blood.  · You have heavy bleeding during your menstrual cycle.  · You are pregnant.    What is my plan?  Your health care provider may help you to determine how much iron you need per day based on your condition. Generally, when a person consumes sufficient amounts of iron in the diet, the following iron needs are met:  · Men.  ? 14-18 years old: 11 mg per day.  ? 19-50 years old: 8 mg per day.  · Women.  ? 14-18 years old: 15 mg per day.  ? 19-50 years old: 18 mg per day.  ? Over 50 years old: 8 mg per day.  ? Pregnant women: 27 mg per day.  ? Breastfeeding women: 9 mg per day.    What do I need to know about an iron-rich diet?  · Eat fresh fruits and vegetables that are high in vitamin C along with foods that are high in iron. This will help  increase the amount of iron that your body absorbs from food, especially with foods containing nonheme iron. Foods that are high in vitamin C include oranges, peppers, tomatoes, and colt.  · Take iron supplements only as directed by your health care provider. Overdose of iron can be life-threatening. If you were prescribed iron supplements, take them with orange juice or a vitamin C supplement.  · Cook foods in pots and pans that are made from iron.  · Eat nonheme iron-containing foods alongside foods that are high in heme iron. This helps to improve your iron absorption.  · Certain foods and drinks contain compounds that impair iron absorption. Avoid eating these foods in the same meal as iron-rich foods or with iron supplements. These include:  ? Coffee, black tea, and red wine.  ? Milk, dairy products, and foods that are high in calcium.  ? Beans, soybeans, and peas.  ? Whole grains.  · When eating foods that contain both nonheme iron and compounds that impair iron absorption, follow these tips to absorb iron better.  ? Soak beans overnight before cooking.  ? Soak whole grains overnight and drain them before using.  ? Ferment flours before baking, such as using yeast in bread dough.  What foods can I eat?  Grains  Iron-fortified breakfast cereal. Iron-fortified whole-wheat bread. Enriched rice. Sprouted grains.  Vegetables  Spinach. Potatoes with skin. Green peas. Broccoli. Red and green bell peppers. Fermented vegetables.  Fruits  Prunes. Raisins. Oranges. Strawberries. Colt. Grapefruit.  Meats and Other Protein Sources  Beef liver. Oysters. Beef. Shrimp. Turkey. Chicken. Tuna. Sardines. Chickpeas. Nuts. Tofu.  Beverages  Tomato juice. Fresh orange juice. Prune juice. Hibiscus tea. Fortified instant breakfast shakes.  Condiments  Tahini. Fermented soy sauce.  Sweets and Desserts  Black-strap molasses.  Other  Wheat germ.  The items listed above may not be a complete list of recommended foods or beverages.  Contact your dietitian for more options.  What foods are not recommended?  Grains  Whole grains. Bran cereal. Bran flour. Oats.  Vegetables  Artichokes. [place] sprouts. Kale.  Fruits  Blueberries. Raspberries. Strawberries. Figs.  Meats and Other Protein Sources  Soybeans. Products made from soy protein.  Dairy  Milk. Cream. Cheese. Yogurt. Cottage cheese.  Beverages  Coffee. Black tea. Red wine.  Sweets and Desserts  Cocoa. Chocolate. Ice cream.  Other  Basil. Oregano. Parsley.  The items listed above may not be a complete list of foods and beverages to avoid. Contact your dietitian for more information.  This information is not intended to replace advice given to you by your health care provider. Make sure you discuss any questions you have with your health care provider.  Document Released: 08/01/2006 Document Revised: 07/07/2017 Document Reviewed: 07/15/2015  [employer] Interactive Patient Education © 2018 [employer]

## 2019-01-29 NOTE — ASSESSMENT & PLAN NOTE
Followed by nephrology, not on ACE due to cough, on statin currently with stable blood pressure.   -BMP and microalbumin today

## 2019-01-29 NOTE — PROGRESS NOTES
CC:   Chief Complaint   Patient presents with   • Follow-up       History:  Dago Nunez is a 78 y.o. female who presents today for follow-up for evaluation of the above:    BP stable today    Neck feels better, using 1/2 amount of gauze packing. Has home health to assist.    Has lost weight and does not eat as much as she did a few months ago. Unsure if she is depressed. Weight 160 from 171. 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     DMII: last A1c 5.8 in October 2018, still taking metformin    CKD: last GFR in October 2018 was 55. Has been on lisinopril with stable blood pressure. Recently saw nephrologist and gave her new prescription of amlodipine. Says that nephrologist stopped lisinopril due to cough.     GERD well controlled on nexium    Back pain: chronic and worsening with prolonged sitting. Uses Tylenol and heating pad for pain. Interested in PT, wants to go to [place].     ROS:  Review of Systems   Constitutional: Positive for appetite change, fatigue and unexpected weight change.   Respiratory: Negative for shortness of breath.    Cardiovascular: Negative for chest pain.   Gastrointestinal:        No reflux   Musculoskeletal: Positive for back pain.       Ms. Nunez  reports that she has quit smoking. she has never used smokeless tobacco. She reports that she does not drink alcohol or use drugs.      Current Outpatient Medications:   •  amLODIPine (NORVASC) 10 MG tablet, Take 1 tablet by mouth Daily., Disp: 90 tablet, Rfl: 3  •  B Complex Vitamins (VITAMIN B COMPLEX) capsule capsule, Take  by mouth Daily., Disp: , Rfl:   •  bisoprolol-hydrochlorothiazide (ZIAC) 5-6.25 MG per tablet, Take 1 tablet by mouth Daily., Disp: 30 tablet, Rfl: 5  •  calcium carbonate (OS-REAGAN) 600 MG tablet, Take 600 mg by mouth Daily., Disp: , Rfl:   •  diphenhydrAMINE (BENADRYL) 25 mg capsule, Take 25 mg by mouth Every 6 (Six) Hours As  "Needed for itching., Disp: , Rfl:   •  esomeprazole (nexIUM) 40 MG capsule, Take 1 capsule by mouth 2 (Two) Times a Day., Disp: 180 capsule, Rfl: 3  •  gabapentin (NEURONTIN) 300 MG capsule, Take 2 capsules by mouth 2 (Two) Times a Day., Disp: 120 capsule, Rfl: 0  •  metFORMIN ER (GLUCOPHAGE-XR) 500 MG 24 hr tablet, Take 2 tablets by mouth Daily With Breakfast., Disp: 180 tablet, Rfl: 1  •  nitrofurantoin (MACRODANTIN) 50 MG capsule, Take 1 capsule by mouth Daily., Disp: 90 capsule, Rfl: 3  •  spironolactone (ALDACTONE) 25 MG tablet, Take 1 tablet by mouth Daily., Disp: 30 tablet, Rfl: 5  •  cetirizine (zyrTEC) 10 MG tablet, Take 10 mg by mouth Daily., Disp: , Rfl:   •  clidinium-chlordiazePOXIDE (LIBRAX) 5-2.5 MG per capsule, , Disp: , Rfl:   •  furosemide (LASIX) 40 MG tablet, Take 40 mg by mouth 2 (Two) Times a Day. Patient only takes once a day, Disp: , Rfl:   •  potassium chloride (K-DUR,KLOR-CON) 20 MEQ CR tablet, Take 20 mEq by mouth 2 (Two) Times a Day. Patient only takes once a day, Disp: , Rfl:   •  sucralfate (CARAFATE) 1 GM/10ML suspension, , Disp: , Rfl:       OBJECTIVE:  /76 (BP Location: Left arm, Patient Position: Sitting, Cuff Size: Adult)   Pulse 58   Temp 98.7 °F (37.1 °C) (Temporal)   Resp 12   Ht 162.6 cm (64\")   Wt 72.8 kg (160 lb 9 oz)   SpO2 99%   BMI 27.56 kg/m²    Physical Exam   Constitutional: She is oriented to person, place, and time. No distress.   HENT:   Head: Normocephalic and atraumatic.   Nose: Nose normal.   Neck abscess well bandaged   Eyes: Conjunctivae are normal. Right eye exhibits no discharge. Left eye exhibits no discharge. No scleral icterus.   Neck: No tracheal deviation present.   Cardiovascular: Normal rate, regular rhythm and normal heart sounds. Exam reveals no gallop and no friction rub.   No murmur heard.  Pulmonary/Chest: Effort normal and breath sounds normal. No respiratory distress. She has no wheezes. She has no rales.   Musculoskeletal: "   Hypertonic paraspinals of lower lumbar spine   Neurological: She is alert and oriented to person, place, and time.   Skin: Skin is warm and dry. She is not diaphoretic. No pallor.   Psychiatric: She has a normal mood and affect. Her behavior is normal. Judgment and thought content normal.   Nursing note and vitals reviewed.    Assessment/Plan    Problem List Items Addressed This Visit        Cardiovascular and Mediastinum    Essential hypertension     Stable         Relevant Medications    amLODIPine (NORVASC) 10 MG tablet       Digestive    GERD (gastroesophageal reflux disease)     Well controlled            Endocrine    Type 2 diabetes mellitus, without long-term current use of insulin (CMS/[facility])     Patient had an A1c of 5.8 in October, and is continued on metformin without adverse side effect         Relevant Orders    MicroAlbumin, Urine, Random - Urine, Clean Catch (Completed)       Nervous and Auditory    Chronic midline low back pain without sciatica    Relevant Orders    Ambulatory Referral to Physical Therapy       Genitourinary    CKD (chronic kidney disease), stage III (CMS/[facility]) - Primary     Followed by nephrology, not on ACE due to cough, on statin currently with stable blood pressure.   -BMP and microalbumin today         Relevant Orders    Basic metabolic panel    MicroAlbumin, Urine, Random - Urine, Clean Catch (Completed)       Hematopoietic and Hemostatic    Normocytic anemia     Chronic without clear etiology, repeat labs today         Relevant Orders    CBC w AUTO Differential    Reticulocytes    Ferritin    Transferrin Saturation    Vitamin B12    Folate       Other    Neck abscess     Stable, followed by general surgery         Hyponatremia     129 on labs, informed pt to stop furosemide and repeat labs in 1 week.          Relevant Orders    Basic metabolic panel          An After Visit Summary was printed and given to the patient at discharge.  Return in about 3 months (around 4/29/2019)  for Medicare Wellness. Sooner if problems arise.         Shaheen Akbar D.O.  [facility]  Osteopathic Neuromusculoskeletal Medicine

## 2019-01-30 ENCOUNTER — TELEPHONE (OUTPATIENT)
Dept: INTERNAL MEDICINE | Facility: CLINIC | Age: 79
End: 2019-01-30

## 2019-01-30 PROBLEM — E87.1 HYPONATREMIA: Status: ACTIVE | Noted: 2019-01-30

## 2019-01-30 LAB — MICROALBUMIN UR-MCNC: 4.5 UG/ML

## 2019-01-30 NOTE — TELEPHONE ENCOUNTER
Patient informed of results and that repeat labs are required in one week.  Patient informed to stop Lasix, per Dr. Akbar.

## 2019-01-30 NOTE — TELEPHONE ENCOUNTER
Patient called earlier and LM that she wants her Pt to be at [facility].  I did not see this message until now.   MM

## 2019-01-30 NOTE — TELEPHONE ENCOUNTER
Dago called with a request for her PT referral.  She requested Fernando Phillips, PT.  He is no longer at his [facility] office. The phone number has been disconnected.  I notified Dago of this.  She stated that she was going to call other PT facilities and let me know where she wanted to go.  So as soon as I hear from her, will complete her Physical Therapy referral.

## 2019-01-30 NOTE — TELEPHONE ENCOUNTER
Please let her know that in addition to labs for electrolytes I ordered additional labs for her anemia and fatigue. Already ordered for lab draw next week, will call with results

## 2019-01-30 NOTE — TELEPHONE ENCOUNTER
----- Message from Shaheen Akbar DO sent at 1/30/2019 11:22 AM CST -----  Pt's sodium is low, could be causing her fatigue. Tell her to stop lasix, repeat BMP in 1 week

## 2019-02-04 ENCOUNTER — RESULTS ENCOUNTER (OUTPATIENT)
Dept: INTERNAL MEDICINE | Facility: CLINIC | Age: 79
End: 2019-02-04

## 2019-02-04 DIAGNOSIS — D64.9 NORMOCYTIC ANEMIA: ICD-10-CM

## 2019-02-05 ENCOUNTER — LAB REQUISITION (OUTPATIENT)
Dept: LAB | Facility: HOSPITAL | Age: 79
End: 2019-02-05

## 2019-02-05 DIAGNOSIS — Z00.00 ENCOUNTER FOR GENERAL ADULT MEDICAL EXAMINATION WITHOUT ABNORMAL FINDINGS: ICD-10-CM

## 2019-02-05 LAB
FERRITIN SERPL-MCNC: 181 NG/ML (ref 11.1–264)
IRON 24H UR-MRATE: 117 MCG/DL (ref 42–180)
IRON SATN MFR SERPL: 36 % (ref 20–45)
TIBC SERPL-MCNC: 326 MCG/DL (ref 225–420)

## 2019-02-05 PROCEDURE — 83550 IRON BINDING TEST: CPT | Performed by: INTERNAL MEDICINE

## 2019-02-05 PROCEDURE — 82728 ASSAY OF FERRITIN: CPT | Performed by: INTERNAL MEDICINE

## 2019-02-05 PROCEDURE — 83540 ASSAY OF IRON: CPT | Performed by: INTERNAL MEDICINE

## 2019-02-07 ENCOUNTER — OFFICE VISIT (OUTPATIENT)
Dept: UROLOGY | Facility: CLINIC | Age: 79
End: 2019-02-07

## 2019-02-07 ENCOUNTER — LAB (OUTPATIENT)
Dept: LAB | Facility: HOSPITAL | Age: 79
End: 2019-02-07

## 2019-02-07 VITALS — HEIGHT: 64 IN | WEIGHT: 165 LBS | TEMPERATURE: 99.7 F | BODY MASS INDEX: 28.17 KG/M2

## 2019-02-07 DIAGNOSIS — Z87.448 HISTORY OF URETHRAL STRICTURE: ICD-10-CM

## 2019-02-07 DIAGNOSIS — D64.9 NORMOCYTIC ANEMIA: ICD-10-CM

## 2019-02-07 DIAGNOSIS — E87.1 HYPONATREMIA: ICD-10-CM

## 2019-02-07 DIAGNOSIS — R33.9 RETENTION OF URINE: Primary | ICD-10-CM

## 2019-02-07 LAB
ANION GAP SERPL CALCULATED.3IONS-SCNC: 12 MMOL/L (ref 4–13)
BASOPHILS # BLD AUTO: 0.05 10*3/MM3 (ref 0–0.2)
BASOPHILS NFR BLD AUTO: 0.6 % (ref 0–2)
BILIRUB BLD-MCNC: NEGATIVE MG/DL
BUN BLD-MCNC: 15 MG/DL (ref 5–21)
BUN/CREAT SERPL: 13.4 (ref 7–25)
CALCIUM SPEC-SCNC: 9.6 MG/DL (ref 8.4–10.4)
CHLORIDE SERPL-SCNC: 97 MMOL/L (ref 98–110)
CLARITY, POC: CLEAR
CO2 SERPL-SCNC: 25 MMOL/L (ref 24–31)
COLOR UR: YELLOW
CREAT BLD-MCNC: 1.12 MG/DL (ref 0.5–1.4)
DEPRECATED RDW RBC AUTO: 43.9 FL (ref 40–54)
EOSINOPHIL # BLD AUTO: 0.25 10*3/MM3 (ref 0–0.7)
EOSINOPHIL NFR BLD AUTO: 2.8 % (ref 0–4)
ERYTHROCYTE [DISTWIDTH] IN BLOOD BY AUTOMATED COUNT: 12.7 % (ref 12–15)
FERRITIN SERPL-MCNC: 166 NG/ML (ref 11.1–264)
FOLATE SERPL-MCNC: >20 NG/ML
GFR SERPL CREATININE-BSD FRML MDRD: 47 ML/MIN/1.73
GLUCOSE BLD-MCNC: 80 MG/DL (ref 70–100)
GLUCOSE UR STRIP-MCNC: ABNORMAL MG/DL
HCT VFR BLD AUTO: 32.4 % (ref 37–47)
HGB BLD-MCNC: 11.3 G/DL (ref 12–16)
IMM GRANULOCYTES # BLD AUTO: 0.02 10*3/MM3 (ref 0–0.03)
IMM GRANULOCYTES NFR BLD AUTO: 0.2 % (ref 0–5)
IRON 24H UR-MRATE: 84 MCG/DL (ref 42–180)
IRON SATN MFR SERPL: 27 % (ref 20–45)
KETONES UR QL: NEGATIVE
LEUKOCYTE EST, POC: ABNORMAL
LYMPHOCYTES # BLD AUTO: 1.5 10*3/MM3 (ref 0.72–4.86)
LYMPHOCYTES NFR BLD AUTO: 16.6 % (ref 15–45)
MCH RBC QN AUTO: 33.1 PG (ref 28–32)
MCHC RBC AUTO-ENTMCNC: 34.9 G/DL (ref 33–36)
MCV RBC AUTO: 95 FL (ref 82–98)
MONOCYTES # BLD AUTO: 0.83 10*3/MM3 (ref 0.19–1.3)
MONOCYTES NFR BLD AUTO: 9.2 % (ref 4–12)
NEUTROPHILS # BLD AUTO: 6.4 10*3/MM3 (ref 1.87–8.4)
NEUTROPHILS NFR BLD AUTO: 70.6 % (ref 39–78)
NITRITE UR-MCNC: NEGATIVE MG/ML
NRBC BLD AUTO-RTO: 0 /100 WBC (ref 0–0)
PH UR: 5.5 [PH] (ref 5–8)
PLATELET # BLD AUTO: 215 10*3/MM3 (ref 130–400)
PMV BLD AUTO: 10.6 FL (ref 6–12)
POTASSIUM BLD-SCNC: 4.1 MMOL/L (ref 3.5–5.3)
PROT UR STRIP-MCNC: NEGATIVE MG/DL
RBC # BLD AUTO: 3.41 10*6/MM3 (ref 4.2–5.4)
RBC # UR STRIP: ABNORMAL /UL
RETICS # AUTO: 0.06 10*6/MM3 (ref 0.02–0.13)
RETICS/RBC NFR AUTO: 1.81 % (ref 0.6–1.8)
SODIUM BLD-SCNC: 134 MMOL/L (ref 135–145)
SP GR UR: 1.01 (ref 1–1.03)
TIBC SERPL-MCNC: 315 MCG/DL (ref 225–420)
UROBILINOGEN UR QL: NORMAL
VIT B12 BLD-MCNC: 378 PG/ML (ref 239–931)
WBC NRBC COR # BLD: 9.05 10*3/MM3 (ref 4.8–10.8)

## 2019-02-07 PROCEDURE — 82607 VITAMIN B-12: CPT | Performed by: FAMILY MEDICINE

## 2019-02-07 PROCEDURE — 85045 AUTOMATED RETICULOCYTE COUNT: CPT | Performed by: FAMILY MEDICINE

## 2019-02-07 PROCEDURE — 53660 DILATION OF URETHRA: CPT | Performed by: UROLOGY

## 2019-02-07 PROCEDURE — 99214 OFFICE O/P EST MOD 30 MIN: CPT | Performed by: UROLOGY

## 2019-02-07 PROCEDURE — 81001 URINALYSIS AUTO W/SCOPE: CPT | Performed by: UROLOGY

## 2019-02-07 PROCEDURE — 82746 ASSAY OF FOLIC ACID SERUM: CPT | Performed by: FAMILY MEDICINE

## 2019-02-07 PROCEDURE — 82728 ASSAY OF FERRITIN: CPT | Performed by: FAMILY MEDICINE

## 2019-02-07 PROCEDURE — 83540 ASSAY OF IRON: CPT | Performed by: FAMILY MEDICINE

## 2019-02-07 PROCEDURE — 83550 IRON BINDING TEST: CPT | Performed by: FAMILY MEDICINE

## 2019-02-07 PROCEDURE — 36415 COLL VENOUS BLD VENIPUNCTURE: CPT

## 2019-02-07 PROCEDURE — 80048 BASIC METABOLIC PNL TOTAL CA: CPT | Performed by: FAMILY MEDICINE

## 2019-02-07 PROCEDURE — 85025 COMPLETE CBC W/AUTO DIFF WBC: CPT | Performed by: FAMILY MEDICINE

## 2019-02-07 NOTE — PROGRESS NOTES
Subjective    Ms. Nunez is 78 y.o. female    CHIEF COMPLAINT: Difficulty urinating    Ms. 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    Her urinalysis actually is clear today    She is also followed by [facility]    We have not had to dilate her previous recently but I feel that she really needs evaluation today she cannot get back down to [facility]    The patient needs a dilation today so I went ahead and put her up in stirrups we prepped her I used actually the follow worse with her and what I did was pass a small filiform into the bladder and then using a follow were starting with a 12 I got up to 20 she was quite tight there was a little bit of bleeding but not bad so again I think this hopefully should dilator and help her.          History of Present Illness      The following portions of the patient's history were reviewed and updated as appropriate: allergies, current medications, past family history, past medical history, past social history, past surgical history and problem list.    Review of Systems   Constitutional: Negative for chills and fever.   Gastrointestinal: Negative for abdominal pain, anal bleeding and blood in stool.   Genitourinary: Positive for decreased urine volume, difficulty urinating, frequency and urgency. Negative for dysuria, hematuria and vaginal bleeding.         Current Outpatient Medications:   •  amLODIPine (NORVASC) 10 MG tablet, Take 1 tablet by mouth Daily., Disp: 90 tablet, Rfl: 3  •  B Complex Vitamins (VITAMIN B COMPLEX) capsule capsule, Take  by mouth Daily., Disp: , Rfl:   •  bisoprolol-hydrochlorothiazide (ZIAC) 5-6.25 MG per tablet, Take 1 tablet by mouth Daily., Disp: 30 tablet, Rfl: 5  •  calcium carbonate (OS-REAGAN) 600 MG  tablet, Take 600 mg by mouth Daily., Disp: , Rfl:   •  cetirizine (zyrTEC) 10 MG tablet, Take 10 mg by mouth Daily., Disp: , Rfl:   •  clidinium-chlordiazePOXIDE (LIBRAX) 5-2.5 MG per capsule, , Disp: , Rfl:   •  diphenhydrAMINE (BENADRYL) 25 mg capsule, Take 25 mg by mouth Every 6 (Six) Hours As Needed for itching., Disp: , Rfl:   •  esomeprazole (nexIUM) 40 MG capsule, Take 1 capsule by mouth 2 (Two) Times a Day., Disp: 180 capsule, Rfl: 3  •  furosemide (LASIX) 40 MG tablet, Take 40 mg by mouth 2 (Two) Times a Day. Patient only takes once a day, Disp: , Rfl:   •  gabapentin (NEURONTIN) 300 MG capsule, Take 2 capsules by mouth 2 (Two) Times a Day., Disp: 120 capsule, Rfl: 0  •  metFORMIN ER (GLUCOPHAGE-XR) 500 MG 24 hr tablet, Take 2 tablets by mouth Daily With Breakfast., Disp: 180 tablet, Rfl: 1  •  nitrofurantoin (MACRODANTIN) 50 MG capsule, Take 1 capsule by mouth Daily., Disp: 90 capsule, Rfl: 3  •  potassium chloride (K-DUR,KLOR-CON) 20 MEQ CR tablet, Take 20 mEq by mouth 2 (Two) Times a Day. Patient only takes once a day, Disp: , Rfl:   •  spironolactone (ALDACTONE) 25 MG tablet, Take 1 tablet by mouth Daily., Disp: 30 tablet, Rfl: 5  •  sucralfate (CARAFATE) 1 GM/10ML suspension, , Disp: , Rfl:     Past Medical History:   Diagnosis Date   • Arthritis    • Cellulitis    • Diabetes mellitus (CMS/HCC)    • GERD (gastroesophageal reflux disease)    • Hyperlipidemia    • Hypertension    • Kyphosis    • Osteoporosis    • Scoliosis    • Vulvar intraepithelial neoplasia (MOODY) grade 3        Past Surgical History:   Procedure Laterality Date   • APPENDECTOMY     • BREAST CYST ASPIRATION Left    • COLONOSCOPY  01/12/2011   • ENDOSCOPY  07/01/2014   • HYSTERECTOMY     • TONSILLECTOMY     • VAGINA SURGERY         Social History     Socioeconomic History   • Marital status:      Spouse name: Not on file   • Number of children: Not on file   • Years of education: Not on file   • Highest education level: Not on  "file   Tobacco Use   • Smoking status: Former Smoker   • Smokeless tobacco: Never Used   Substance and Sexual Activity   • Alcohol use: No   • Drug use: No   • Sexual activity: No       Family History   Problem Relation Age of Onset   • Cancer Mother    • Hypertension Mother    • Osteoporosis Mother    • Dementia Mother    • Heart disease Father    • Parkinsonism Father    • Cancer Sister    • Diabetes Brother    • Heart disease Paternal Grandfather    • Breast cancer Neg Hx        Objective    Temp 99.7 °F (37.6 °C)   Ht 162.6 cm (64\")   Wt 74.8 kg (165 lb)   BMI 28.32 kg/m²     Physical Exam      Lab Requisition on 02/05/2019   Component Date Value Ref Range Status   • Iron 02/05/2019 117  42 - 180 mcg/dL Final   • TIBC 02/05/2019 326  225 - 420 mcg/dL Final   • Iron Saturation 02/05/2019 36  20 - 45 % Final   • Ferritin 02/05/2019 181.00  11.10 - 264.00 ng/mL Final       Results for orders placed or performed in visit on 02/07/19   POC Urinalysis Dipstick, Multipro   Result Value Ref Range    Color Yellow Yellow, Straw, Dark Yellow, [place]    Clarity, UA Clear Clear    Glucose,  mg/dL (A) Negative, 1000 mg/dL (3+) mg/dL    Bilirubin Negative Negative    Ketones, UA Negative Negative    Specific Gravity  1.015 1.005 - 1.030    Blood, UA Trace (A) Negative    pH, Urine 5.5 5.0 - 8.0    Protein, POC Negative Negative mg/dL    Urobilinogen, UA Normal Normal    Nitrite, UA Negative Negative    Leukocytes Trace (A) Negative     Patient's Body mass index is 28.32 kg/m². BMI is above normal parameters. Recommendations include: educational material.  Assessment and Plan    Diagnoses and all orders for this visit:    Retention of urine  -     POC Urinalysis Dipstick, Multipro    History of urethral stricture    Plan--patient does have an appointment in August and [facility] she will keep that she will continue to use a self caths to keep that this open if she has any problems prior to then she will call  "

## 2019-02-12 ENCOUNTER — OFFICE VISIT (OUTPATIENT)
Dept: SURGERY | Age: 79
End: 2019-02-12
Payer: MEDICARE

## 2019-02-12 ENCOUNTER — TELEPHONE (OUTPATIENT)
Dept: INTERNAL MEDICINE | Facility: CLINIC | Age: 79
End: 2019-02-12

## 2019-02-12 VITALS
TEMPERATURE: 97.8 F | HEIGHT: 64 IN | HEART RATE: 65 BPM | BODY MASS INDEX: 28.51 KG/M2 | OXYGEN SATURATION: 97 % | WEIGHT: 167 LBS

## 2019-02-12 DIAGNOSIS — A49.02 MRSA (METHICILLIN RESISTANT STAPHYLOCOCCUS AUREUS) INFECTION: Primary | ICD-10-CM

## 2019-02-12 PROCEDURE — G8427 DOCREV CUR MEDS BY ELIG CLIN: HCPCS | Performed by: PHYSICIAN ASSISTANT

## 2019-02-12 PROCEDURE — G8419 CALC BMI OUT NRM PARAM NOF/U: HCPCS | Performed by: PHYSICIAN ASSISTANT

## 2019-02-12 PROCEDURE — 99211 OFF/OP EST MAY X REQ PHY/QHP: CPT | Performed by: PHYSICIAN ASSISTANT

## 2019-02-12 NOTE — TELEPHONE ENCOUNTER
Patient was informed       ----- Message from Shaheen Akbar DO sent at 2/11/2019  6:14 PM CST -----  Please let pt know anemia labs look better, kidneys look the same without worsening, sodium looks better than 2 weeks ago

## 2019-03-12 ENCOUNTER — LAB REQUISITION (OUTPATIENT)
Dept: LAB | Facility: HOSPITAL | Age: 79
End: 2019-03-12

## 2019-03-12 DIAGNOSIS — Z00.00 ENCOUNTER FOR GENERAL ADULT MEDICAL EXAMINATION WITHOUT ABNORMAL FINDINGS: ICD-10-CM

## 2019-03-12 LAB
FERRITIN SERPL-MCNC: 93.3 NG/ML (ref 11.1–264)
IRON 24H UR-MRATE: 98 MCG/DL (ref 42–180)
IRON SATN MFR SERPL: 30 % (ref 20–45)
TIBC SERPL-MCNC: 327 MCG/DL (ref 225–420)

## 2019-03-12 PROCEDURE — 83540 ASSAY OF IRON: CPT | Performed by: INTERNAL MEDICINE

## 2019-03-12 PROCEDURE — 83550 IRON BINDING TEST: CPT | Performed by: INTERNAL MEDICINE

## 2019-03-12 PROCEDURE — 82728 ASSAY OF FERRITIN: CPT | Performed by: INTERNAL MEDICINE

## 2019-03-18 RX ORDER — FUROSEMIDE 40 MG/1
40 TABLET ORAL 2 TIMES DAILY PRN
Qty: 45 TABLET | Refills: 5 | Status: SHIPPED | OUTPATIENT
Start: 2019-03-18 | End: 2019-06-17 | Stop reason: SDUPTHER

## 2019-03-18 RX ORDER — POTASSIUM CHLORIDE 20 MEQ/1
20 TABLET, EXTENDED RELEASE ORAL DAILY
Qty: 30 TABLET | Refills: 11 | Status: SHIPPED | OUTPATIENT
Start: 2019-03-18 | End: 2019-05-01 | Stop reason: SDUPTHER

## 2019-03-18 RX ORDER — GABAPENTIN 300 MG/1
600 CAPSULE ORAL 2 TIMES DAILY
Qty: 120 CAPSULE | Refills: 2 | Status: SHIPPED | OUTPATIENT
Start: 2019-03-18 | End: 2019-07-12 | Stop reason: SDUPTHER

## 2019-04-08 ENCOUNTER — LAB REQUISITION (OUTPATIENT)
Dept: LAB | Facility: HOSPITAL | Age: 79
End: 2019-04-08

## 2019-04-08 DIAGNOSIS — Z00.00 ENCOUNTER FOR GENERAL ADULT MEDICAL EXAMINATION WITHOUT ABNORMAL FINDINGS: ICD-10-CM

## 2019-04-08 LAB
IRON 24H UR-MRATE: 91 MCG/DL (ref 42–180)
IRON SATN MFR SERPL: 27 % (ref 20–45)
TIBC SERPL-MCNC: 338 MCG/DL (ref 225–420)

## 2019-04-08 PROCEDURE — 83550 IRON BINDING TEST: CPT | Performed by: INTERNAL MEDICINE

## 2019-04-08 PROCEDURE — 82728 ASSAY OF FERRITIN: CPT | Performed by: INTERNAL MEDICINE

## 2019-04-08 PROCEDURE — 83540 ASSAY OF IRON: CPT | Performed by: INTERNAL MEDICINE

## 2019-04-09 LAB — FERRITIN SERPL-MCNC: 119 NG/ML (ref 11.1–264)

## 2019-04-30 ENCOUNTER — LAB REQUISITION (OUTPATIENT)
Dept: LAB | Facility: HOSPITAL | Age: 79
End: 2019-04-30

## 2019-04-30 DIAGNOSIS — Z00.00 ENCOUNTER FOR GENERAL ADULT MEDICAL EXAMINATION WITHOUT ABNORMAL FINDINGS: ICD-10-CM

## 2019-04-30 LAB
ALBUMIN SERPL-MCNC: 4.5 G/DL (ref 3.5–5)
ALBUMIN/GLOB SERPL: 1.5 G/DL (ref 1.1–2.5)
ALP SERPL-CCNC: 60 U/L (ref 24–120)
ALT SERPL W P-5'-P-CCNC: <15 U/L (ref 0–54)
ANION GAP SERPL CALCULATED.3IONS-SCNC: 13 MMOL/L (ref 4–13)
AST SERPL-CCNC: 21 U/L (ref 7–45)
BILIRUB SERPL-MCNC: 0.4 MG/DL (ref 0.1–1)
BUN BLD-MCNC: 13 MG/DL (ref 5–21)
BUN/CREAT SERPL: 16.3 (ref 7–25)
CALCIUM SPEC-SCNC: 9.6 MG/DL (ref 8.4–10.4)
CHLORIDE SERPL-SCNC: 94 MMOL/L (ref 98–110)
CO2 SERPL-SCNC: 27 MMOL/L (ref 24–31)
CREAT BLD-MCNC: 0.8 MG/DL (ref 0.5–1.4)
DEPRECATED RDW RBC AUTO: 45.4 FL (ref 40–54)
EOSINOPHIL # BLD MANUAL: 0.34 10*3/MM3 (ref 0–0.7)
EOSINOPHIL NFR BLD MANUAL: 6 % (ref 0–4)
ERYTHROCYTE [DISTWIDTH] IN BLOOD BY AUTOMATED COUNT: 12.5 % (ref 12–15)
FERRITIN SERPL-MCNC: 92 NG/ML (ref 11.1–264)
GFR SERPL CREATININE-BSD FRML MDRD: 70 ML/MIN/1.73
GLOBULIN UR ELPH-MCNC: 3 GM/DL
GLUCOSE BLD-MCNC: 186 MG/DL (ref 70–100)
HCT VFR BLD AUTO: 36 % (ref 37–47)
HGB BLD-MCNC: 11.9 G/DL (ref 12–16)
IRON 24H UR-MRATE: 125 MCG/DL (ref 42–180)
IRON SATN MFR SERPL: 36 % (ref 20–45)
LYMPHOCYTES # BLD MANUAL: 0.69 10*3/MM3 (ref 0.72–4.86)
LYMPHOCYTES NFR BLD MANUAL: 12 % (ref 15–45)
LYMPHOCYTES NFR BLD MANUAL: 12 % (ref 4–12)
MCH RBC QN AUTO: 32.4 PG (ref 28–32)
MCHC RBC AUTO-ENTMCNC: 33.1 G/DL (ref 33–36)
MCV RBC AUTO: 98.1 FL (ref 82–98)
MONOCYTES # BLD AUTO: 0.69 10*3/MM3 (ref 0.19–1.3)
NEUTROPHILS # BLD AUTO: 4 10*3/MM3 (ref 1.87–8.4)
NEUTROPHILS NFR BLD MANUAL: 70 % (ref 39–78)
PHOSPHATE SERPL-MCNC: 2.8 MG/DL (ref 2.5–4.5)
PLATELET # BLD AUTO: 55 10*3/MM3 (ref 130–400)
PMV BLD AUTO: 11.8 FL (ref 6–12)
POLYCHROMASIA BLD QL SMEAR: ABNORMAL
POTASSIUM BLD-SCNC: 4.1 MMOL/L (ref 3.5–5.3)
PROT SERPL-MCNC: 7.5 G/DL (ref 6.3–8.7)
PTH-INTACT SERPL-MCNC: 42.4 PG/ML (ref 7.5–53.5)
RBC # BLD AUTO: 3.67 10*6/MM3 (ref 4.2–5.4)
SMALL PLATELETS BLD QL SMEAR: ABNORMAL
SODIUM BLD-SCNC: 134 MMOL/L (ref 135–145)
TIBC SERPL-MCNC: 344 MCG/DL (ref 225–420)
WBC MORPH BLD: NORMAL
WBC NRBC COR # BLD: 5.71 10*3/MM3 (ref 4.8–10.8)

## 2019-04-30 PROCEDURE — 82728 ASSAY OF FERRITIN: CPT | Performed by: INTERNAL MEDICINE

## 2019-04-30 PROCEDURE — 83970 ASSAY OF PARATHORMONE: CPT | Performed by: INTERNAL MEDICINE

## 2019-04-30 PROCEDURE — 83550 IRON BINDING TEST: CPT | Performed by: INTERNAL MEDICINE

## 2019-04-30 PROCEDURE — 83540 ASSAY OF IRON: CPT | Performed by: INTERNAL MEDICINE

## 2019-04-30 PROCEDURE — 80053 COMPREHEN METABOLIC PANEL: CPT | Performed by: INTERNAL MEDICINE

## 2019-04-30 PROCEDURE — 85025 COMPLETE CBC W/AUTO DIFF WBC: CPT | Performed by: INTERNAL MEDICINE

## 2019-04-30 PROCEDURE — 84100 ASSAY OF PHOSPHORUS: CPT | Performed by: INTERNAL MEDICINE

## 2019-05-01 ENCOUNTER — OFFICE VISIT (OUTPATIENT)
Dept: INTERNAL MEDICINE | Facility: CLINIC | Age: 79
End: 2019-05-01

## 2019-05-01 VITALS
HEART RATE: 52 BPM | RESPIRATION RATE: 12 BRPM | OXYGEN SATURATION: 98 % | TEMPERATURE: 98.5 F | SYSTOLIC BLOOD PRESSURE: 124 MMHG | WEIGHT: 163.19 LBS | BODY MASS INDEX: 27.86 KG/M2 | HEIGHT: 64 IN | DIASTOLIC BLOOD PRESSURE: 72 MMHG

## 2019-05-01 DIAGNOSIS — Z00.00 MEDICARE ANNUAL WELLNESS VISIT, SUBSEQUENT: Primary | ICD-10-CM

## 2019-05-01 DIAGNOSIS — M89.9 DISORDER OF BONE: ICD-10-CM

## 2019-05-01 DIAGNOSIS — Z23 NEED FOR TDAP VACCINATION: ICD-10-CM

## 2019-05-01 DIAGNOSIS — M85.80 OSTEOPENIA, UNSPECIFIED LOCATION: ICD-10-CM

## 2019-05-01 PROCEDURE — G0439 PPPS, SUBSEQ VISIT: HCPCS | Performed by: FAMILY MEDICINE

## 2019-05-01 PROCEDURE — 90471 IMMUNIZATION ADMIN: CPT | Performed by: FAMILY MEDICINE

## 2019-05-01 PROCEDURE — 90715 TDAP VACCINE 7 YRS/> IM: CPT | Performed by: FAMILY MEDICINE

## 2019-05-01 RX ORDER — POTASSIUM CHLORIDE 20 MEQ/1
20 TABLET, EXTENDED RELEASE ORAL DAILY
Qty: 30 TABLET | Refills: 11 | Status: SHIPPED | OUTPATIENT
Start: 2019-05-01 | End: 2020-11-09 | Stop reason: SDUPTHER

## 2019-05-01 NOTE — PROGRESS NOTES
QUICK REFERENCE INFORMATION:  The ABCs of the Annual Wellness Visit    Subsequent Medicare Wellness Visit     HEALTH RISK ASSESSMENT    : 1940    Recent Hospitalizations:  No hospitalization(s) within the last year..      Current Medical Providers:  Patient Care Team:  Shaheen Akbar DO as PCP - General (Family Medicine)  Trae Boggs MD as Consulting Physician (Urology)        Smoking Status:  Social History     Tobacco Use   Smoking Status Former Smoker   Smokeless Tobacco Never Used       Alcohol Consumption:  Social History     Substance and Sexual Activity   Alcohol Use No       Depression Screen:   No flowsheet data found.    Health Habits and Functional and Cognitive Screening:  No flowsheet data found.        Does the patient have evidence of cognitive impairment? No    Asiprin use counseling: cannot take it due to stomach upset      Recent Lab Results:       Lab Results   Component Value Date    HGBA1C 5.8 10/24/2018     Lab Results   Component Value Date    CHOL 242 (H) 10/24/2018    TRIG 178 (H) 10/24/2018    HDL 63 10/24/2018    LDLHDL 2.28 10/24/2018         Age-appropriate Screening Schedule:  Refer to the list below for future screening recommendations based on patient's age, sex and/or medical conditions. Orders for these recommended tests are listed in the plan section. The patient has been provided with a written plan.    Health Maintenance   Topic Date Due   • TDAP/TD VACCINES (1 - Tdap) 1961   • ZOSTER VACCINE (2 of 3) 2015   • COLONOSCOPY  2017   • DXA SCAN  10/24/2018   • HEMOGLOBIN A1C  2019   • PNEUMOCOCCAL VACCINES (65+ LOW/MEDIUM RISK) (2 of 2 - PCV13) 10/01/2019 (Originally 10/1/2018)   • INFLUENZA VACCINE  2019   • LIPID PANEL  10/24/2019   • URINE MICROALBUMIN  2020   • MAMMOGRAM  2020        Subjective   History of Present Illness    Dago Nunez is a 78 y.o. female who presents for an Annual Wellness Visit.    The following  portions of the patient's history were reviewed and updated as appropriate: allergies, current medications, past family history, past medical history, past social history, past surgical history and problem list.    Outpatient Medications Prior to Visit   Medication Sig Dispense Refill   • amLODIPine (NORVASC) 10 MG tablet Take 1 tablet by mouth Daily. 90 tablet 3   • bisoprolol-hydrochlorothiazide (ZIAC) 5-6.25 MG per tablet Take 1 tablet by mouth Daily. 30 tablet 5   • calcium carbonate (OS-REAGAN) 600 MG tablet Take 600 mg by mouth Daily.     • diphenhydrAMINE (BENADRYL) 25 mg capsule Take 25 mg by mouth Every 6 (Six) Hours As Needed for itching.     • esomeprazole (nexIUM) 40 MG capsule Take 1 capsule by mouth 2 (Two) Times a Day. 180 capsule 3   • furosemide (LASIX) 40 MG tablet Take 1 tablet by mouth 2 (Two) Times a Day As Needed (edema). Patient only takes once a day 45 tablet 5   • gabapentin (NEURONTIN) 300 MG capsule Take 2 capsules by mouth 2 (Two) Times a Day. 120 capsule 2   • metFORMIN ER (GLUCOPHAGE-XR) 500 MG 24 hr tablet Take 2 tablets by mouth Daily With Breakfast. 180 tablet 1   • spironolactone (ALDACTONE) 25 MG tablet Take 1 tablet by mouth Daily. 30 tablet 5   • potassium chloride (K-DUR,KLOR-CON) 20 MEQ CR tablet Take 1 tablet by mouth Daily. Patient only takes once a day 30 tablet 11   • B Complex Vitamins (VITAMIN B COMPLEX) capsule capsule Take  by mouth Daily.     • cetirizine (zyrTEC) 10 MG tablet Take 10 mg by mouth Daily.     • clidinium-chlordiazePOXIDE (LIBRAX) 5-2.5 MG per capsule      • sucralfate (CARAFATE) 1 GM/10ML suspension      • nitrofurantoin (MACRODANTIN) 50 MG capsule Take 1 capsule by mouth Daily. 90 capsule 3     No facility-administered medications prior to visit.        Patient Active Problem List   Diagnosis   • Meatal stenosis   • Retention of urine   • Type 2 diabetes mellitus, without long-term current use of insulin (CMS/[facility])   • Essential hypertension   • Grief  reaction   • Vulvar intraepithelial neoplasia (MOODY) grade 3   • Chronic midline low back pain without sciatica   • Edema of left lower extremity   • History of urethral stricture   • Epidermal cyst of neck   • Normocytic anemia   • Neck abscess   • CKD (chronic kidney disease), stage III (CMS/HCC)   • Hyperlipidemia   • GERD (gastroesophageal reflux disease)   • Hyponatremia       Advance Care Planning:  Patient has an advance directive - a copy has been provided and is visible in patient header    Identification of Risk Factors:  Risk factors include: chronic pain.    Review of Systems   Constitutional: Negative.    HENT: Negative.    Eyes: Negative.    Respiratory: Negative.    Cardiovascular: Negative.    Gastrointestinal: Negative.    Endocrine: Negative.    Genitourinary: Negative.    Musculoskeletal: Back pain: better with PT.   Skin: Negative.    Allergic/Immunologic: Positive for environmental allergies.   Neurological: Negative.    Hematological: Negative.    Psychiatric/Behavioral: Negative.      Compared to one year ago, the patient feels her physical health is better.  Compared to one year ago, the patient feels her mental health is the same.    Objective     Physical Exam   Constitutional: She is oriented to person, place, and time. No distress.   HENT:   Head: Normocephalic and atraumatic.   Right Ear: External ear normal.   Left Ear: External ear normal.   Nose: Nose normal.   Mouth/Throat: Oropharynx is clear and moist. No oropharyngeal exudate.   Normal TMs   Eyes: Conjunctivae are normal. Right eye exhibits no discharge. Left eye exhibits no discharge. No scleral icterus.   Neck: Neck supple. No JVD present. No tracheal deviation present.   Cardiovascular: Normal rate, regular rhythm and normal heart sounds. Exam reveals no gallop and no friction rub.   No murmur heard.  Pulmonary/Chest: Effort normal and breath sounds normal. No respiratory distress. She has no wheezes. She has no rales.  "  Abdominal: Soft.   Musculoskeletal: Edema: 2+ LE.   Neurological: She is alert and oriented to person, place, and time. She exhibits normal muscle tone. Coordination normal.   Skin: Skin is warm and dry. She is not diaphoretic. No pallor.   Psychiatric: She has a normal mood and affect. Her behavior is normal. Judgment and thought content normal.   Nursing note and vitals reviewed.      Vitals:    05/01/19 1118   BP: 124/72   BP Location: Left arm   Patient Position: Sitting   Cuff Size: Adult   Pulse: 52   Resp: 12   Temp: 98.5 °F (36.9 °C)   TempSrc: Temporal   SpO2: 98%   Weight: 74 kg (163 lb 3 oz)   Height: 162.6 cm (64\")       Patient's Body mass index is 28.01 kg/m². BMI is above normal parameters. Recommendations include: nutrition counseling.      Assessment/Plan   Patient Self-Management and Personalized Health Advice  The patient has been provided with information about: diet, weight management and alcohol use and preventive services including:   · Alcohol use counseling completed.    Visit Diagnoses:    ICD-10-CM ICD-9-CM   1. Medicare annual wellness visit, subsequent Z00.00 V70.0   2. Osteopenia, unspecified location M85.80 733.90   3. Disorder of bone  M89.9 733.90   4. Need for Tdap vaccination Z23 V06.1       Orders Placed This Encounter   Procedures   • DEXA Bone Density Axial     Order Specific Question:   Reason for Exam:     Answer:   osteopenia   • Tdap Vaccine Greater Than or Equal To 8yo IM       Outpatient Encounter Medications as of 5/1/2019   Medication Sig Dispense Refill   • amLODIPine (NORVASC) 10 MG tablet Take 1 tablet by mouth Daily. 90 tablet 3   • bisoprolol-hydrochlorothiazide (ZIAC) 5-6.25 MG per tablet Take 1 tablet by mouth Daily. 30 tablet 5   • calcium carbonate (OS-REAGAN) 600 MG tablet Take 600 mg by mouth Daily.     • diphenhydrAMINE (BENADRYL) 25 mg capsule Take 25 mg by mouth Every 6 (Six) Hours As Needed for itching.     • esomeprazole (nexIUM) 40 MG capsule Take 1 " capsule by mouth 2 (Two) Times a Day. 180 capsule 3   • furosemide (LASIX) 40 MG tablet Take 1 tablet by mouth 2 (Two) Times a Day As Needed (edema). Patient only takes once a day 45 tablet 5   • gabapentin (NEURONTIN) 300 MG capsule Take 2 capsules by mouth 2 (Two) Times a Day. 120 capsule 2   • metFORMIN ER (GLUCOPHAGE-XR) 500 MG 24 hr tablet Take 2 tablets by mouth Daily With Breakfast. 180 tablet 1   • potassium chloride (K-DUR,KLOR-CON) 20 MEQ CR tablet Take 1 tablet by mouth Daily. Patient only takes once a day 30 tablet 11   • spironolactone (ALDACTONE) 25 MG tablet Take 1 tablet by mouth Daily. 30 tablet 5   • [DISCONTINUED] potassium chloride (K-DUR,KLOR-CON) 20 MEQ CR tablet Take 1 tablet by mouth Daily. Patient only takes once a day 30 tablet 11   • B Complex Vitamins (VITAMIN B COMPLEX) capsule capsule Take  by mouth Daily.     • cetirizine (zyrTEC) 10 MG tablet Take 10 mg by mouth Daily.     • clidinium-chlordiazePOXIDE (LIBRAX) 5-2.5 MG per capsule      • sucralfate (CARAFATE) 1 GM/10ML suspension      • [DISCONTINUED] nitrofurantoin (MACRODANTIN) 50 MG capsule Take 1 capsule by mouth Daily. 90 capsule 3     No facility-administered encounter medications on file as of 5/1/2019.        Reviewed use of high risk medication in the elderly: stable use of gabapentin  Reviewed for potential of harmful drug interactions in the elderly: yes    Follow Up:  Return in about 3 months (around 8/1/2019).     An After Visit Summary and PPPS with all of these plans were given to the patient.

## 2019-05-15 ENCOUNTER — TRANSCRIBE ORDERS (OUTPATIENT)
Dept: ADMINISTRATIVE | Facility: HOSPITAL | Age: 79
End: 2019-05-15

## 2019-05-15 DIAGNOSIS — Z12.39 BREAST SCREENING: Primary | ICD-10-CM

## 2019-05-20 ENCOUNTER — HOSPITAL ENCOUNTER (OUTPATIENT)
Dept: BONE DENSITY | Facility: HOSPITAL | Age: 79
Discharge: HOME | End: 2019-05-20

## 2019-05-20 ENCOUNTER — HOSPITAL ENCOUNTER (OUTPATIENT)
Dept: MAMMOGRAPHY | Facility: HOSPITAL | Age: 79
Discharge: HOME | End: 2019-05-20
Admitting: FAMILY MEDICINE

## 2019-05-20 DIAGNOSIS — Z12.39 BREAST SCREENING: ICD-10-CM

## 2019-05-20 PROCEDURE — 77063 BREAST TOMOSYNTHESIS BI: CPT

## 2019-05-20 PROCEDURE — 77067 SCR MAMMO BI INCL CAD: CPT

## 2019-05-20 PROCEDURE — 77080 DXA BONE DENSITY AXIAL: CPT

## 2019-05-21 ENCOUNTER — TELEPHONE (OUTPATIENT)
Dept: INTERNAL MEDICINE | Facility: CLINIC | Age: 79
End: 2019-05-21

## 2019-05-21 NOTE — TELEPHONE ENCOUNTER
----- Message from Shaheen Akbar DO sent at 5/21/2019 11:11 AM CDT -----  Neg study, please inform pt

## 2019-05-22 ENCOUNTER — TELEPHONE (OUTPATIENT)
Dept: INTERNAL MEDICINE | Facility: CLINIC | Age: 79
End: 2019-05-22

## 2019-05-22 NOTE — TELEPHONE ENCOUNTER
----- Message from Shaheen Akbar DO sent at 5/21/2019  3:07 PM CDT -----  Osteopenia but not to the point of osteoporosis, will need to monitor with further DEXA scans, please inform pt

## 2019-06-12 ENCOUNTER — LAB REQUISITION (OUTPATIENT)
Dept: LAB | Facility: HOSPITAL | Age: 79
End: 2019-06-12

## 2019-06-12 DIAGNOSIS — Z00.00 ENCOUNTER FOR GENERAL ADULT MEDICAL EXAMINATION WITHOUT ABNORMAL FINDINGS: ICD-10-CM

## 2019-06-12 LAB
FERRITIN SERPL-MCNC: 102 NG/ML (ref 11.1–264)
IRON 24H UR-MRATE: 93 MCG/DL (ref 42–180)
IRON SATN MFR SERPL: 27 % (ref 20–45)
TIBC SERPL-MCNC: 344 MCG/DL (ref 225–420)

## 2019-06-12 PROCEDURE — 83540 ASSAY OF IRON: CPT | Performed by: INTERNAL MEDICINE

## 2019-06-12 PROCEDURE — 82728 ASSAY OF FERRITIN: CPT | Performed by: INTERNAL MEDICINE

## 2019-06-12 PROCEDURE — 83550 IRON BINDING TEST: CPT | Performed by: INTERNAL MEDICINE

## 2019-06-17 ENCOUNTER — OFFICE VISIT (OUTPATIENT)
Dept: INTERNAL MEDICINE | Facility: CLINIC | Age: 79
End: 2019-06-17

## 2019-06-17 VITALS
TEMPERATURE: 98.9 F | OXYGEN SATURATION: 98 % | BODY MASS INDEX: 28.17 KG/M2 | WEIGHT: 165 LBS | HEART RATE: 76 BPM | HEIGHT: 64 IN | RESPIRATION RATE: 14 BRPM | DIASTOLIC BLOOD PRESSURE: 86 MMHG | SYSTOLIC BLOOD PRESSURE: 142 MMHG

## 2019-06-17 DIAGNOSIS — R60.0 BILATERAL LOWER EXTREMITY EDEMA: Primary | ICD-10-CM

## 2019-06-17 DIAGNOSIS — R05.8 POST-VIRAL COUGH SYNDROME: ICD-10-CM

## 2019-06-17 PROCEDURE — 99213 OFFICE O/P EST LOW 20 MIN: CPT | Performed by: FAMILY MEDICINE

## 2019-06-17 RX ORDER — FUROSEMIDE 40 MG/1
40 TABLET ORAL 2 TIMES DAILY PRN
Qty: 180 TABLET | Refills: 2 | Status: SHIPPED | OUTPATIENT
Start: 2019-06-17 | End: 2020-08-06 | Stop reason: SDUPTHER

## 2019-06-17 RX ORDER — NITROFURANTOIN MACROCRYSTALS 50 MG/1
CAPSULE ORAL
COMMUNITY
Start: 2019-06-07 | End: 2019-08-01

## 2019-06-17 RX ORDER — BENZONATATE 100 MG/1
100 CAPSULE ORAL 3 TIMES DAILY PRN
Qty: 90 CAPSULE | Refills: 0 | Status: SHIPPED | OUTPATIENT
Start: 2019-06-17 | End: 2019-10-22

## 2019-06-17 NOTE — PATIENT INSTRUCTIONS
Increase water pill to 1 tablet twice daily    Keep up with seeing kidney doctor    Try tessalon pearls for cough

## 2019-06-17 NOTE — PROGRESS NOTES
CC:   Chief Complaint   Patient presents with   • Recurrent Skin Infections     Patient reports bilateral edema       History:  Dago Nunez is a 78 y.o. female who presents today for follow-up for evaluation of the above:    Here for worsening bilateral lower extremity edema.  She had stopped taking her Lasix for a time and then only resumed 40 mg daily even though it is been written twice daily.    Has residual cough after recent bronchitis that is not controlled with OTC medication    ROS:  Review of Systems   Constitutional: Negative for chills and fever.   Respiratory: Negative.    Cardiovascular: Positive for leg swelling.   Skin: Positive for color change. Negative for rash.       Ms. Nunez  reports that she has quit smoking. She has never used smokeless tobacco. She reports that she does not drink alcohol or use drugs.      Current Outpatient Medications:   •  amLODIPine (NORVASC) 10 MG tablet, Take 1 tablet by mouth Daily., Disp: 90 tablet, Rfl: 3  •  bisoprolol-hydrochlorothiazide (ZIAC) 5-6.25 MG per tablet, Take 1 tablet by mouth Daily., Disp: 30 tablet, Rfl: 5  •  calcium carbonate (OS-REAGAN) 600 MG tablet, Take 600 mg by mouth Daily., Disp: , Rfl:   •  cetirizine (zyrTEC) 10 MG tablet, Take 10 mg by mouth Daily., Disp: , Rfl:   •  diphenhydrAMINE (BENADRYL) 25 mg capsule, Take 25 mg by mouth Every 6 (Six) Hours As Needed for itching., Disp: , Rfl:   •  esomeprazole (nexIUM) 40 MG capsule, Take 1 capsule by mouth 2 (Two) Times a Day., Disp: 180 capsule, Rfl: 3  •  furosemide (LASIX) 40 MG tablet, Take 1 tablet by mouth 2 (Two) Times a Day As Needed (edema). Patient only takes once a day, Disp: 45 tablet, Rfl: 5  •  gabapentin (NEURONTIN) 300 MG capsule, Take 2 capsules by mouth 2 (Two) Times a Day., Disp: 120 capsule, Rfl: 2  •  metFORMIN ER (GLUCOPHAGE-XR) 500 MG 24 hr tablet, Take 2 tablets by mouth Daily With Breakfast., Disp: 180 tablet, Rfl: 1  •  potassium chloride (K-DUR,KLOR-CON) 20 MEQ CR  "tablet, Take 1 tablet by mouth Daily. Patient only takes once a day, Disp: 30 tablet, Rfl: 11  •  spironolactone (ALDACTONE) 25 MG tablet, Take 1 tablet by mouth Daily., Disp: 30 tablet, Rfl: 5  •  B Complex Vitamins (VITAMIN B COMPLEX) capsule capsule, Take  by mouth Daily., Disp: , Rfl:   •  clidinium-chlordiazePOXIDE (LIBRAX) 5-2.5 MG per capsule, , Disp: , Rfl:   •  nitrofurantoin (MACRODANTIN) 50 MG capsule, , Disp: , Rfl:   •  sucralfate (CARAFATE) 1 GM/10ML suspension, , Disp: , Rfl:       OBJECTIVE:  /86 (BP Location: Left arm, Patient Position: Sitting, Cuff Size: Adult)   Pulse 76   Temp 98.9 °F (37.2 °C) (Temporal)   Resp 14   Ht 162.6 cm (64\")   Wt 74.8 kg (165 lb)   SpO2 98%   BMI 28.32 kg/m²    Physical Exam   Constitutional: She is oriented to person, place, and time. No distress.   HENT:   Head: Normocephalic and atraumatic.   Nose: Nose normal.   Eyes: Conjunctivae are normal. Right eye exhibits no discharge. Left eye exhibits no discharge. No scleral icterus.   Neck: No tracheal deviation present.   Cardiovascular: Normal rate, regular rhythm and normal heart sounds. Exam reveals no gallop and no friction rub.   No murmur heard.  Pulmonary/Chest: Effort normal and breath sounds normal. No respiratory distress. She has no wheezes. She has no rales.   Musculoskeletal: Edema: 3+ LE.   Neurological: She is alert and oriented to person, place, and time.   Skin: Skin is warm and dry. She is not diaphoretic. No pallor.   Dependent venous stasis change that resolves with elevation   Psychiatric: She has a normal mood and affect. Her behavior is normal. Judgment and thought content normal.   Nursing note and vitals reviewed.      Assessment/Plan    Problem List Items Addressed This Visit        Other    Bilateral lower extremity edema - Primary      Other Visit Diagnoses     Post-viral cough syndrome        Relevant Medications    benzonatate (TESSALON) 100 MG capsule      -Resume Lasix twice " daily  -Tessalon Perles as needed cough  -Follow-up PRN      An After Visit Summary was printed and given to the patient at discharge.  Return if symptoms worsen or fail to improve. Sooner if problems arise.         Shaheen Akbar D.O.  [facility]  Osteopathic Neuromusculoskeletal Medicine

## 2019-06-21 RX ORDER — BISOPROLOL FUMARATE AND HYDROCHLOROTHIAZIDE 5; 6.25 MG/1; MG/1
1 TABLET ORAL DAILY
Qty: 90 TABLET | Refills: 3 | Status: SHIPPED | OUTPATIENT
Start: 2019-06-21 | End: 2020-06-19

## 2019-06-21 RX ORDER — SPIRONOLACTONE 25 MG/1
25 TABLET ORAL DAILY
Qty: 90 TABLET | Refills: 3 | Status: SHIPPED | OUTPATIENT
Start: 2019-06-21 | End: 2020-06-15

## 2019-06-21 NOTE — TELEPHONE ENCOUNTER
PHARMACY CHANGE: Needing refills on the following medications.    Bisoprolol-hydrochlorothiazide 5-6.25 MG tablet     Spironolactone 25 MG tablet    She would like for these both sent to [facility] [phone] and would like a quantity of 100 tablets each

## 2019-07-10 ENCOUNTER — LAB REQUISITION (OUTPATIENT)
Dept: LAB | Facility: HOSPITAL | Age: 79
End: 2019-07-10

## 2019-07-10 DIAGNOSIS — Z00.00 ENCOUNTER FOR GENERAL ADULT MEDICAL EXAMINATION WITHOUT ABNORMAL FINDINGS: ICD-10-CM

## 2019-07-10 LAB
FERRITIN SERPL-MCNC: 96.9 NG/ML (ref 11.1–264)
IRON 24H UR-MRATE: 75 MCG/DL (ref 42–180)
IRON SATN MFR SERPL: 22 % (ref 20–45)
TIBC SERPL-MCNC: 343 MCG/DL (ref 225–420)

## 2019-07-10 PROCEDURE — 83540 ASSAY OF IRON: CPT | Performed by: INTERNAL MEDICINE

## 2019-07-10 PROCEDURE — 83550 IRON BINDING TEST: CPT | Performed by: INTERNAL MEDICINE

## 2019-07-10 PROCEDURE — 82728 ASSAY OF FERRITIN: CPT | Performed by: INTERNAL MEDICINE

## 2019-07-12 ENCOUNTER — TELEPHONE (OUTPATIENT)
Dept: INTERNAL MEDICINE | Facility: CLINIC | Age: 79
End: 2019-07-12

## 2019-07-12 NOTE — TELEPHONE ENCOUNTER
She stated that Dr. Akbar had told her to increase her Neurontin so she is about out of medication. If a new prescription can please be sent to [facility].

## 2019-07-12 NOTE — TELEPHONE ENCOUNTER
I tried to call the patient to see how many and how often she has been taking these, LM and asked her to call back and let me know.   MM

## 2019-07-15 ENCOUNTER — TELEPHONE (OUTPATIENT)
Dept: PULMONOLOGY | Facility: CLINIC | Age: 79
End: 2019-07-15

## 2019-07-15 DIAGNOSIS — J40 BRONCHITIS: Primary | ICD-10-CM

## 2019-07-15 RX ORDER — AZITHROMYCIN 250 MG/1
TABLET, FILM COATED ORAL
Qty: 6 TABLET | Refills: 0 | Status: SHIPPED | OUTPATIENT
Start: 2019-07-15 | End: 2019-08-01

## 2019-07-15 NOTE — TELEPHONE ENCOUNTER
Pt called and wants some azithromycin. Said she has a script that you wrote her but it has . And wants to know if she can get some sent to her pharmacy please

## 2019-07-16 ENCOUNTER — TELEPHONE (OUTPATIENT)
Dept: PULMONOLOGY | Facility: CLINIC | Age: 79
End: 2019-07-16

## 2019-07-16 RX ORDER — GABAPENTIN 300 MG/1
600 CAPSULE ORAL 3 TIMES DAILY
Qty: 180 CAPSULE | Refills: 2 | Status: SHIPPED | OUTPATIENT
Start: 2019-07-16 | End: 2020-01-08 | Stop reason: SDUPTHER

## 2019-07-23 ENCOUNTER — TELEPHONE (OUTPATIENT)
Dept: INTERNAL MEDICINE | Facility: CLINIC | Age: 79
End: 2019-07-23

## 2019-07-23 NOTE — TELEPHONE ENCOUNTER
I spoke with the patient, they are going to bring the X-ray photo by today for Dr Akbar to evaluate. Patient is extremely anxious about it and would like it looked at today.   MM

## 2019-07-23 NOTE — TELEPHONE ENCOUNTER
After speaking with Dr Akbar,, I called the patient and LM to let her know that it will be fine to wait and discuss this with Dr Akbar at her f/u appt 8-1-19.   MM

## 2019-07-23 NOTE — TELEPHONE ENCOUNTER
Pt went to Dentist and had an XRAY. The dentist office advised the pt to take the results to her PCP. She has a

## 2019-08-01 ENCOUNTER — OFFICE VISIT (OUTPATIENT)
Dept: INTERNAL MEDICINE | Facility: CLINIC | Age: 79
End: 2019-08-01

## 2019-08-01 VITALS
RESPIRATION RATE: 12 BRPM | BODY MASS INDEX: 27.49 KG/M2 | WEIGHT: 161 LBS | TEMPERATURE: 98 F | SYSTOLIC BLOOD PRESSURE: 142 MMHG | HEIGHT: 64 IN | HEART RATE: 62 BPM | OXYGEN SATURATION: 98 % | DIASTOLIC BLOOD PRESSURE: 78 MMHG

## 2019-08-01 DIAGNOSIS — E78.5 HYPERLIPIDEMIA, UNSPECIFIED HYPERLIPIDEMIA TYPE: Primary | ICD-10-CM

## 2019-08-01 DIAGNOSIS — F41.9 ANXIETY: ICD-10-CM

## 2019-08-01 DIAGNOSIS — I65.21 ATHEROSCLEROSIS OF RIGHT CAROTID ARTERY: ICD-10-CM

## 2019-08-01 PROCEDURE — 99214 OFFICE O/P EST MOD 30 MIN: CPT | Performed by: FAMILY MEDICINE

## 2019-08-01 RX ORDER — PRAVASTATIN SODIUM 10 MG
10 TABLET ORAL NIGHTLY
Qty: 30 TABLET | Refills: 0 | Status: SHIPPED | OUTPATIENT
Start: 2019-08-01 | End: 2019-08-01 | Stop reason: SDUPTHER

## 2019-08-01 RX ORDER — CITALOPRAM 10 MG/1
10 TABLET ORAL DAILY
Qty: 30 TABLET | Refills: 1 | Status: SHIPPED | OUTPATIENT
Start: 2019-08-01 | End: 2019-08-01 | Stop reason: SDUPTHER

## 2019-08-01 RX ORDER — NITROFURANTOIN 25; 75 MG/1; MG/1
50 CAPSULE ORAL DAILY
COMMUNITY
End: 2019-08-28

## 2019-08-01 RX ORDER — CITALOPRAM 10 MG/1
10 TABLET ORAL DAILY
Qty: 30 TABLET | Refills: 1 | Status: SHIPPED | OUTPATIENT
Start: 2019-08-01 | End: 2019-08-29 | Stop reason: SDUPTHER

## 2019-08-01 RX ORDER — PRAVASTATIN SODIUM 10 MG
10 TABLET ORAL NIGHTLY
Qty: 30 TABLET | Refills: 0 | Status: SHIPPED | OUTPATIENT
Start: 2019-08-01 | End: 2019-08-28

## 2019-08-01 NOTE — PROGRESS NOTES
CC:   Chief Complaint   Patient presents with   • Follow-up   • Cough       History:  Dago Nunez is a 79 y.o. female who presents today for follow-up for evaluation of the above:    Here to discuss plaques noted within carotid arteries on recent dental x-ray in the setting of well-controlled diabetes and chronic kidney disease followed by nephrology on amlodipine however the patient had normal GFR and creatinine on 4/30/2019.  She also reports that she had a kidney injury sustained after contrast imaging. She is not never had a carotid ultrasound.  Her last lipid panel did have an elevated cholesterol of 242 and LDL of 145 with HDLs of 63.  She says that she has tried 3 different statin medications at all produced myalgias in her legs, but does not remember trying pravastatin.  She has positional dizziness that occurs usually after she turns her head too quickly but not with prolonged neck extension, and it usually subsides after a few seconds    She has a great deal of anxiety, fears follow-up in [place] for her history of vulvar neoplasia, also under a great deal of stress as her brother just filed suit against her.  She wakes up often at night, feels like she is constantly worried about everything.    ROS:  Review of Systems   Neurological: Positive for dizziness.   Psychiatric/Behavioral: Positive for agitation, decreased concentration and sleep disturbance. The patient is nervous/anxious. The patient is not hyperactive.        Ms. Nunez  reports that she has quit smoking. She has never used smokeless tobacco. She reports that she does not drink alcohol or use drugs.      Current Outpatient Medications:   •  amLODIPine (NORVASC) 10 MG tablet, Take 1 tablet by mouth Daily., Disp: 90 tablet, Rfl: 3  •  benzonatate (TESSALON) 100 MG capsule, Take 1 capsule by mouth 3 (Three) Times a Day As Needed for Cough., Disp: 90 capsule, Rfl: 0  •  bisoprolol-hydrochlorothiazide (ZIAC) 5-6.25 MG per tablet, Take 1 tablet  by mouth Daily., Disp: 90 tablet, Rfl: 3  •  calcium carbonate (OS-REAGAN) 600 MG tablet, Take 600 mg by mouth Daily., Disp: , Rfl:   •  cetirizine (zyrTEC) 10 MG tablet, Take 10 mg by mouth Daily., Disp: , Rfl:   •  DULERA 100-5 MCG/ACT inhaler, , Disp: , Rfl:   •  esomeprazole (nexIUM) 40 MG capsule, Take 1 capsule by mouth 2 (Two) Times a Day., Disp: 180 capsule, Rfl: 3  •  furosemide (LASIX) 40 MG tablet, Take 1 tablet by mouth 2 (Two) Times a Day As Needed (edema). Patient only takes once a day, Disp: 180 tablet, Rfl: 2  •  gabapentin (NEURONTIN) 300 MG capsule, Take 2 capsules by mouth 3 (Three) Times a Day. (Patient taking differently: Take 600 mg by mouth 3 (Three) Times a Day. Patient is taking only twice daily because of dizziness), Disp: 180 capsule, Rfl: 2  •  metFORMIN ER (GLUCOPHAGE-XR) 500 MG 24 hr tablet, Take 2 tablets by mouth Daily With Breakfast. (Patient taking differently: Take 1,000 mg by mouth Daily With Breakfast. Patient has been taking 1 tablet once a day), Disp: 180 tablet, Rfl: 1  •  nitrofurantoin, macrocrystal-monohydrate, (MACROBID) 100 MG capsule, Take 50 mg by mouth Daily., Disp: , Rfl:   •  potassium chloride (K-DUR,KLOR-CON) 20 MEQ CR tablet, Take 1 tablet by mouth Daily. Patient only takes once a day, Disp: 30 tablet, Rfl: 11  •  spironolactone (ALDACTONE) 25 MG tablet, Take 1 tablet by mouth Daily., Disp: 90 tablet, Rfl: 3  •  B Complex Vitamins (VITAMIN B COMPLEX) capsule capsule, Take  by mouth Daily., Disp: , Rfl:   •  citalopram (CeleXA) 10 MG tablet, Take 1 tablet by mouth Daily., Disp: 30 tablet, Rfl: 1  •  diphenhydrAMINE (BENADRYL) 25 mg capsule, Take 25 mg by mouth Every 6 (Six) Hours As Needed for itching., Disp: , Rfl:   •  pravastatin (PRAVACHOL) 10 MG tablet, Take 1 tablet by mouth Every Night., Disp: 30 tablet, Rfl: 0  •  sucralfate (CARAFATE) 1 GM/10ML suspension, , Disp: , Rfl:     OBJECTIVE:  /78 (BP Location: Left arm, Patient Position: Sitting, Cuff Size:  "Adult)   Pulse 62   Temp 98 °F (36.7 °C) (Temporal)   Resp 12   Ht 162.6 cm (64\")   Wt 73 kg (161 lb)   SpO2 98%   BMI 27.64 kg/m²    Physical Exam   Constitutional: She is oriented to person, place, and time. No distress.   HENT:   Head: Normocephalic and atraumatic.   Nose: Nose normal.   Eyes: Conjunctivae are normal. Right eye exhibits no discharge. Left eye exhibits no discharge. No scleral icterus.   Neck: No JVD present. No tracheal deviation present.   No bruit   Pulmonary/Chest: Effort normal.   Musculoskeletal:   No dizziness with cervical extension   Neurological: She is alert and oriented to person, place, and time.   Skin: Skin is warm and dry. She is not diaphoretic. No pallor.   Psychiatric: She has a normal mood and affect. Her behavior is normal. Judgment and thought content normal.   Nursing note and vitals reviewed.      Assessment/Plan     Diagnosis Plan   1. Hyperlipidemia, unspecified hyperlipidemia type  US Carotid Bilateral    pravastatin (PRAVACHOL) 10 MG tablet   2. Atherosclerosis of right carotid artery  US Carotid Bilateral    pravastatin (PRAVACHOL) 10 MG tablet   3. Anxiety  citalopram (CeleXA) 10 MG tablet   -She has never had an ultrasound, ordered for carotids today  -Pravastatin trial  -Celexa trial    An After Visit Summary was printed and given to the patient at discharge.  Return in about 4 weeks (around 8/29/2019). Sooner if problems arise.         Shaheen Akbar D.O.  Family Medicine  Osteopathic Neuromusculoskeletal Medicine  "

## 2019-08-01 NOTE — PATIENT INSTRUCTIONS
Citalopram tablets  What is this medicine?  CITALOPRAM (sye FARNAZ oh pram) is a medicine for depression.  This medicine may be used for other purposes; ask your health care provider or pharmacist if you have questions.  COMMON BRAND NAME(S): Celexa  What should I tell my health care provider before I take this medicine?  They need to know if you have any of these conditions:  -bleeding disorders  -bipolar disorder or a family history of bipolar disorder  -glaucoma  -heart disease  -history of irregular heartbeat  -kidney disease  -liver disease  -low levels of magnesium or potassium in the blood  -receiving electroconvulsive therapy  -seizures  -suicidal thoughts, plans, or attempt; a previous suicide attempt by you or a family member  -take medicines that treat or prevent blood clots  -thyroid disease  -an unusual or allergic reaction to citalopram, escitalopram, other medicines, foods, dyes, or preservatives  -pregnant or trying to become pregnant  -breast-feeding  How should I use this medicine?  Take this medicine by mouth with a glass of water. Follow the directions on the prescription label. You can take it with or without food. Take your medicine at regular intervals. Do not take your medicine more often than directed. Do not stop taking this medicine suddenly except upon the advice of your doctor. Stopping this medicine too quickly may cause serious side effects or your condition may worsen.  A special MedGuide will be given to you by the pharmacist with each prescription and refill. Be sure to read this information carefully each time.  Talk to your pediatrician regarding the use of this medicine in children. Special care may be needed.  Patients over 60 years old may have a stronger reaction and need a smaller dose.  Overdosage: If you think you have taken too much of this medicine contact a poison control center or emergency room at once.  NOTE: This medicine is only for you. Do not share this medicine with  others.  What if I miss a dose?  If you miss a dose, take it as soon as you can. If it is almost time for your next dose, take only that dose. Do not take double or extra doses.  What may interact with this medicine?  Do not take this medicine with any of the following medications:  -certain medicines for fungal infections like fluconazole, itraconazole, ketoconazole, posaconazole, voriconazole  -cisapride  -dofetilide  -dronedarone  -escitalopram  -linezolid  -MAOIs like Carbex, Eldepryl, Marplan, Nardil, and Parnate  -methylene blue (injected into a vein)  -pimozide  -thioridazine  -ziprasidone  This medicine may also interact with the following medications:  -alcohol  -amphetamines  -aspirin and aspirin-like medicines  -carbamazepine  -certain medicines for depression, anxiety, or psychotic disturbances  -certain medicines for infections like chloroquine, clarithromycin, erythromycin, furazolidone, isoniazid, pentamidine  -certain medicines for migraine headaches like almotriptan, eletriptan, frovatriptan, naratriptan, rizatriptan, sumatriptan, zolmitriptan  -certain medicines for sleep  -certain medicines that treat or prevent blood clots like dalteparin, enoxaparin, warfarin  -cimetidine  -diuretics  -fentanyl  -lithium  -methadone  -metoprolol  -NSAIDs, medicines for pain and inflammation, like ibuprofen or naproxen  -omeprazole  -other medicines that prolong the QT interval (cause an abnormal heart rhythm)  -procarbazine  -rasagiline  -supplements like Estevan's wort, kava kava, valerian  -tramadol  -tryptophan  This list may not describe all possible interactions. Give your health care provider a list of all the medicines, herbs, non-prescription drugs, or dietary supplements you use. Also tell them if you smoke, drink alcohol, or use illegal drugs. Some items may interact with your medicine.  What should I watch for while using this medicine?  Tell your doctor if your symptoms do not get better or if they  get worse. Visit your doctor or health care professional for regular checks on your progress. Because it may take several weeks to see the full effects of this medicine, it is important to continue your treatment as prescribed by your doctor.  Patients and their families should watch out for new or worsening thoughts of suicide or depression. Also watch out for sudden changes in feelings such as feeling anxious, agitated, panicky, irritable, hostile, aggressive, impulsive, severely restless, overly excited and hyperactive, or not being able to sleep. If this happens, especially at the beginning of treatment or after a change in dose, call your health care professional.  You may get drowsy or dizzy. Do not drive, use machinery, or do anything that needs mental alertness until you know how this medicine affects you. Do not stand or sit up quickly, especially if you are an older patient. This reduces the risk of dizzy or fainting spells. Alcohol may interfere with the effect of this medicine. Avoid alcoholic drinks.  Your mouth may get dry. Chewing sugarless gum or sucking hard candy, and drinking plenty of water will help. Contact your doctor if the problem does not go away or is severe.  What side effects may I notice from receiving this medicine?  Side effects that you should report to your doctor or health care professional as soon as possible:  -allergic reactions like skin rash, itching or hives, swelling of the face, lips, or tongue  -anxious  -black, tarry stools  -breathing problems  -changes in vision  -chest pain  -confusion  -elevated mood, decreased need for sleep, racing thoughts, impulsive behavior  -eye pain  -fast, irregular heartbeat  -feeling faint or lightheaded, falls  -feeling agitated, angry, or irritable  -hallucination, loss of contact with reality  -loss of balance or coordination  -loss of memory  -painful or prolonged erections  -restlessness, pacing, inability to keep  still  -seizures  -stiff muscles  -suicidal thoughts or other mood changes  -trouble sleeping  -unusual bleeding or bruising  -unusually weak or tired  -vomiting  Side effects that usually do not require medical attention (report to your doctor or health care professional if they continue or are bothersome):  -change in appetite or weight  -change in sex drive or performance  -dizziness  -headache  -increased sweating  -indigestion, nausea  -tremors  This list may not describe all possible side effects. Call your doctor for medical advice about side effects. You may report side effects to FDA at 5-069-FDA-7992.  Where should I keep my medicine?  Keep out of reach of children.  Store at room temperature between 15 and 30 degrees C (59 and 86 degrees F). Throw away any unused medicine after the expiration date.  NOTE: This sheet is a summary. It may not cover all possible information. If you have questions about this medicine, talk to your doctor, pharmacist, or health care provider.  © 2019 [employer] (2017-05-22 13:18:52)

## 2019-08-07 ENCOUNTER — LAB REQUISITION (OUTPATIENT)
Dept: LAB | Facility: HOSPITAL | Age: 79
End: 2019-08-07

## 2019-08-07 DIAGNOSIS — Z00.00 ENCOUNTER FOR GENERAL ADULT MEDICAL EXAMINATION WITHOUT ABNORMAL FINDINGS: ICD-10-CM

## 2019-08-07 LAB
FERRITIN SERPL-MCNC: 87 NG/ML (ref 11.1–264)
IRON 24H UR-MRATE: 81 MCG/DL (ref 42–180)
IRON SATN MFR SERPL: 23 % (ref 20–45)
TIBC SERPL-MCNC: 359 MCG/DL (ref 225–420)

## 2019-08-07 PROCEDURE — 82728 ASSAY OF FERRITIN: CPT | Performed by: INTERNAL MEDICINE

## 2019-08-07 PROCEDURE — 83540 ASSAY OF IRON: CPT | Performed by: INTERNAL MEDICINE

## 2019-08-07 PROCEDURE — 83550 IRON BINDING TEST: CPT | Performed by: INTERNAL MEDICINE

## 2019-08-12 RX ORDER — METFORMIN HYDROCHLORIDE 500 MG/1
1000 TABLET, EXTENDED RELEASE ORAL
Qty: 180 TABLET | Refills: 1 | Status: CANCELLED | OUTPATIENT
Start: 2019-08-12

## 2019-08-13 RX ORDER — METFORMIN HYDROCHLORIDE 500 MG/1
1000 TABLET, EXTENDED RELEASE ORAL
Qty: 180 TABLET | Refills: 1 | Status: SHIPPED | OUTPATIENT
Start: 2019-08-13 | End: 2019-08-14 | Stop reason: SDUPTHER

## 2019-08-14 RX ORDER — METFORMIN HYDROCHLORIDE 500 MG/1
1000 TABLET, EXTENDED RELEASE ORAL
Qty: 180 TABLET | Refills: 1 | Status: SHIPPED | OUTPATIENT
Start: 2019-08-14 | End: 2020-05-05

## 2019-08-16 ENCOUNTER — HOSPITAL ENCOUNTER (OUTPATIENT)
Dept: ULTRASOUND IMAGING | Facility: HOSPITAL | Age: 79
Discharge: HOME | End: 2019-08-16
Admitting: FAMILY MEDICINE

## 2019-08-16 DIAGNOSIS — E78.5 HYPERLIPIDEMIA, UNSPECIFIED HYPERLIPIDEMIA TYPE: ICD-10-CM

## 2019-08-16 DIAGNOSIS — I65.21 ATHEROSCLEROSIS OF RIGHT CAROTID ARTERY: ICD-10-CM

## 2019-08-16 PROCEDURE — 93880 EXTRACRANIAL BILAT STUDY: CPT | Performed by: SURGERY

## 2019-08-16 PROCEDURE — 93880 EXTRACRANIAL BILAT STUDY: CPT

## 2019-08-20 ENCOUNTER — TELEPHONE (OUTPATIENT)
Dept: INTERNAL MEDICINE | Facility: CLINIC | Age: 79
End: 2019-08-20

## 2019-08-20 DIAGNOSIS — I65.21 ATHEROSCLEROSIS OF RIGHT CAROTID ARTERY: ICD-10-CM

## 2019-08-20 DIAGNOSIS — E78.5 HYPERLIPIDEMIA, UNSPECIFIED HYPERLIPIDEMIA TYPE: Primary | ICD-10-CM

## 2019-08-20 NOTE — TELEPHONE ENCOUNTER
----- Message from Shaheen Akbar DO sent at 8/20/2019  1:59 PM CDT -----  Please let pt know that she does have plaques in her carotid arteries, the R is worse than the L, but neither need surgery. I do think we need to talk more about cholesterol treatment as well as a medication like aspirin or plavix to reduce the clogging of the placques that she has. She has f/u in 8 days, will discuss then, please have her get fasting lipid panel 2 days prior to appt.

## 2019-08-26 ENCOUNTER — LAB (OUTPATIENT)
Dept: LAB | Facility: HOSPITAL | Age: 79
End: 2019-08-26

## 2019-08-26 DIAGNOSIS — I65.21 ATHEROSCLEROSIS OF RIGHT CAROTID ARTERY: ICD-10-CM

## 2019-08-26 DIAGNOSIS — E78.5 HYPERLIPIDEMIA, UNSPECIFIED HYPERLIPIDEMIA TYPE: ICD-10-CM

## 2019-08-26 LAB
ARTICHOKE IGE QN: 135 MG/DL (ref 0–99)
CHOLEST SERPL-MCNC: 216 MG/DL (ref 130–200)
HDLC SERPL-MCNC: 49 MG/DL
LDLC/HDLC SERPL: 2.61 {RATIO}
TRIGL SERPL-MCNC: 195 MG/DL (ref 0–149)

## 2019-08-26 PROCEDURE — 80061 LIPID PANEL: CPT | Performed by: FAMILY MEDICINE

## 2019-08-26 PROCEDURE — 36415 COLL VENOUS BLD VENIPUNCTURE: CPT

## 2019-08-28 ENCOUNTER — OFFICE VISIT (OUTPATIENT)
Dept: INTERNAL MEDICINE | Facility: CLINIC | Age: 79
End: 2019-08-28

## 2019-08-28 ENCOUNTER — OFFICE VISIT (OUTPATIENT)
Dept: UROLOGY | Facility: CLINIC | Age: 79
End: 2019-08-28

## 2019-08-28 VITALS
HEART RATE: 56 BPM | RESPIRATION RATE: 12 BRPM | BODY MASS INDEX: 27.49 KG/M2 | DIASTOLIC BLOOD PRESSURE: 88 MMHG | WEIGHT: 161 LBS | TEMPERATURE: 98.9 F | HEIGHT: 64 IN | SYSTOLIC BLOOD PRESSURE: 134 MMHG | OXYGEN SATURATION: 99 %

## 2019-08-28 VITALS — WEIGHT: 161.6 LBS | HEIGHT: 64 IN | BODY MASS INDEX: 27.59 KG/M2 | TEMPERATURE: 98.7 F

## 2019-08-28 DIAGNOSIS — E78.5 HYPERLIPIDEMIA, UNSPECIFIED HYPERLIPIDEMIA TYPE: Primary | ICD-10-CM

## 2019-08-28 DIAGNOSIS — F41.9 ANXIETY: ICD-10-CM

## 2019-08-28 DIAGNOSIS — R33.9 RETENTION OF URINE: Primary | ICD-10-CM

## 2019-08-28 DIAGNOSIS — I65.21 ATHEROSCLEROSIS OF RIGHT CAROTID ARTERY: ICD-10-CM

## 2019-08-28 DIAGNOSIS — R06.2 WHEEZING: ICD-10-CM

## 2019-08-28 DIAGNOSIS — Z87.448 HISTORY OF URETHRAL STRICTURE: ICD-10-CM

## 2019-08-28 PROCEDURE — 52281 CYSTOSCOPY AND TREATMENT: CPT | Performed by: UROLOGY

## 2019-08-28 PROCEDURE — 51798 US URINE CAPACITY MEASURE: CPT | Performed by: UROLOGY

## 2019-08-28 PROCEDURE — 99213 OFFICE O/P EST LOW 20 MIN: CPT | Performed by: UROLOGY

## 2019-08-28 PROCEDURE — 99214 OFFICE O/P EST MOD 30 MIN: CPT | Performed by: FAMILY MEDICINE

## 2019-08-28 RX ORDER — ALBUTEROL SULFATE 90 UG/1
2 AEROSOL, METERED RESPIRATORY (INHALATION) EVERY 4 HOURS PRN
Qty: 1 INHALER | Refills: 12 | Status: SHIPPED | OUTPATIENT
Start: 2019-08-28 | End: 2020-10-22 | Stop reason: HOSPADM

## 2019-08-28 RX ORDER — PRAVASTATIN SODIUM 20 MG
20 TABLET ORAL DAILY
Qty: 90 TABLET | Refills: 0 | Status: SHIPPED | OUTPATIENT
Start: 2019-08-28 | End: 2019-12-05 | Stop reason: SDUPTHER

## 2019-08-28 NOTE — PROGRESS NOTES
CC:   Chief Complaint   Patient presents with   • Follow-up     Patient is here to test carotid results   • Anxiety       History:  Dago Nunez is a 79 y.o. female who presents today for follow-up for evaluation of the above:    Mood is improved on the celexa, less anxiety.     Wheezes at times after recent URI with rhinorrhea and cough starting over the weekend.    Reviewed carotid ultrasound results with less than 50% stenosis of the left carotid artery and 50 to 69% stenosis on the right.  She has a history of chronic kidney disease, however she reports that after following with nephrology she had transient contrast-induced nephropathy that has since resolved.  She previously reports that while taking NSAIDs she would develop a little bit of stomach pain.  She is tried multiple statin agents in the past, but is currently tolerating pravastatin without myalgias.    ROS:  Review of Systems   Constitutional: Negative for chills, fatigue and fever.   Respiratory: Positive for cough and wheezing. Negative for shortness of breath.    Psychiatric/Behavioral: Negative for agitation, behavioral problems and sleep disturbance. Nervous/anxious: better.        Ms. Nunez  reports that she has quit smoking. She has never used smokeless tobacco. She reports that she does not drink alcohol or use drugs.      Current Outpatient Medications:   •  amLODIPine (NORVASC) 10 MG tablet, Take 1 tablet by mouth Daily., Disp: 90 tablet, Rfl: 3  •  B Complex Vitamins (VITAMIN B COMPLEX) capsule capsule, Take  by mouth Daily., Disp: , Rfl:   •  benzonatate (TESSALON) 100 MG capsule, Take 1 capsule by mouth 3 (Three) Times a Day As Needed for Cough., Disp: 90 capsule, Rfl: 0  •  bisoprolol-hydrochlorothiazide (ZIAC) 5-6.25 MG per tablet, Take 1 tablet by mouth Daily., Disp: 90 tablet, Rfl: 3  •  calcium carbonate (OS-REAGAN) 600 MG tablet, Take 600 mg by mouth Daily., Disp: , Rfl:   •  cetirizine (zyrTEC) 10 MG tablet, Take 10 mg by mouth  "Daily., Disp: , Rfl:   •  diphenhydrAMINE (BENADRYL) 25 mg capsule, Take 25 mg by mouth Every 6 (Six) Hours As Needed for itching., Disp: , Rfl:   •  DULERA 100-5 MCG/ACT inhaler, , Disp: , Rfl:   •  esomeprazole (nexIUM) 40 MG capsule, Take 1 capsule by mouth 2 (Two) Times a Day., Disp: 180 capsule, Rfl: 3  •  furosemide (LASIX) 40 MG tablet, Take 1 tablet by mouth 2 (Two) Times a Day As Needed (edema). Patient only takes once a day, Disp: 180 tablet, Rfl: 2  •  gabapentin (NEURONTIN) 300 MG capsule, Take 2 capsules by mouth 3 (Three) Times a Day. (Patient taking differently: Take 600 mg by mouth 3 (Three) Times a Day. Patient is taking only twice daily because of dizziness), Disp: 180 capsule, Rfl: 2  •  metFORMIN ER (GLUCOPHAGE-XR) 500 MG 24 hr tablet, Take 2 tablets by mouth Daily With Breakfast., Disp: 180 tablet, Rfl: 1  •  potassium chloride (K-DUR,KLOR-CON) 20 MEQ CR tablet, Take 1 tablet by mouth Daily. Patient only takes once a day, Disp: 30 tablet, Rfl: 11  •  spironolactone (ALDACTONE) 25 MG tablet, Take 1 tablet by mouth Daily., Disp: 90 tablet, Rfl: 3  •  albuterol sulfate  (90 Base) MCG/ACT inhaler, Inhale 2 puffs Every 4 (Four) Hours As Needed for Wheezing or Shortness of Air., Disp: 1 inhaler, Rfl: 12  •  citalopram (CeleXA) 10 MG tablet, TAKE 1 TABLET BY MOUTH 1 TIME DAILY, Disp: 30 tablet, Rfl: 11  •  pravastatin (PRAVACHOL) 20 MG tablet, Take 1 tablet by mouth Daily., Disp: 90 tablet, Rfl: 0  •  sucralfate (CARAFATE) 1 GM/10ML suspension, , Disp: , Rfl:       OBJECTIVE:  /88 (BP Location: Left arm, Patient Position: Sitting, Cuff Size: Adult)   Pulse 56   Temp 98.9 °F (37.2 °C) (Temporal)   Resp 12   Ht 162.6 cm (64\")   Wt 73 kg (161 lb)   SpO2 99%   BMI 27.64 kg/m²    Physical Exam   Constitutional: She is oriented to person, place, and time. No distress.   HENT:   Head: Normocephalic and atraumatic.   Nose: Nose normal.   Pale turbinate enlargement with postnasal drip "   Eyes: Conjunctivae are normal. Right eye exhibits no discharge. Left eye exhibits no discharge. No scleral icterus.   Neck: No tracheal deviation present.   Cardiovascular: Normal rate, regular rhythm and normal heart sounds. Exam reveals no gallop and no friction rub.   No murmur heard.  Pulmonary/Chest: Effort normal. No respiratory distress. She has wheezes. She has no rales.   Neurological: She is alert and oriented to person, place, and time.   Skin: Skin is warm and dry. She is not diaphoretic. No pallor.   Psychiatric: She has a normal mood and affect. Her behavior is normal. Judgment and thought content normal.   Less anxious than previous   Nursing note and vitals reviewed.    Assessment/Plan     Diagnosis Plan   1. Hyperlipidemia, unspecified hyperlipidemia type  pravastatin (PRAVACHOL) 20 MG tablet   2. Atherosclerosis of right carotid artery  pravastatin (PRAVACHOL) 20 MG tablet   3. Anxiety     4. Wheezing  albuterol sulfate  (90 Base) MCG/ACT inhaler   -Increase pravastatin today, if well tolerated in one month increase further to 40 mg daily  -Discussed Plavix and baby aspirin today, the patient elected to try baby aspirin for antiplatelet therapy  -Continue Celexa for anxiety  -Suspect bronchospasm from recent URI, albuterol as needed prescribed today    An After Visit Summary was printed and given to the patient at discharge.  Return in about 3 months (around 11/28/2019). Sooner if problems arise.         Shaheen Akbar D.O.  Family Medicine  Osteopathic Neuromusculoskeletal Medicine

## 2019-08-28 NOTE — PATIENT INSTRUCTIONS
Start baby aspirin daily. If it hurts your stomach, we can talk about using plavix.     Increase pravastatin to 20 mg daily for one month, then increase to 40 mg the month afterward. If you have severe muscle cramps, call for dose decrease.     Continue celexa for mood.

## 2019-08-28 NOTE — PROGRESS NOTES
Subjective    Ms. Nunez is 79 y.o. female    Chief Complaint: Urinary retention    History of Present Illness     Urinary Retention  Patient complains of urinary retention. Onset of retention was several years ago and was sudden in onset. Patient currently does not have a urinary catheter in place.  NA ml of urine were drained when catheter was placed. Prior to this event voiding symptoms consisted of slow stream, difficulty passing catheter. Prior treatments include urethral dilations. Recent medications that may have affected his voiding include none.      The following portions of the patient's history were reviewed and updated as appropriate: allergies, current medications, past family history, past medical history, past social history, past surgical history and problem list.    Review of Systems   Constitutional: Negative for chills and fever.   Gastrointestinal: Negative for abdominal pain, anal bleeding and blood in stool.   Genitourinary: Positive for difficulty urinating. Negative for dysuria, frequency, hematuria and urgency.         Current Outpatient Medications:   •  albuterol sulfate  (90 Base) MCG/ACT inhaler, Inhale 2 puffs Every 4 (Four) Hours As Needed for Wheezing or Shortness of Air., Disp: 1 inhaler, Rfl: 12  •  amLODIPine (NORVASC) 10 MG tablet, Take 1 tablet by mouth Daily., Disp: 90 tablet, Rfl: 3  •  B Complex Vitamins (VITAMIN B COMPLEX) capsule capsule, Take  by mouth Daily., Disp: , Rfl:   •  benzonatate (TESSALON) 100 MG capsule, Take 1 capsule by mouth 3 (Three) Times a Day As Needed for Cough., Disp: 90 capsule, Rfl: 0  •  bisoprolol-hydrochlorothiazide (ZIAC) 5-6.25 MG per tablet, Take 1 tablet by mouth Daily., Disp: 90 tablet, Rfl: 3  •  calcium carbonate (OS-REAGAN) 600 MG tablet, Take 600 mg by mouth Daily., Disp: , Rfl:   •  cetirizine (zyrTEC) 10 MG tablet, Take 10 mg by mouth Daily., Disp: , Rfl:   •  citalopram (CeleXA) 10 MG tablet, Take 1 tablet by mouth Daily., Disp: 30  tablet, Rfl: 1  •  diphenhydrAMINE (BENADRYL) 25 mg capsule, Take 25 mg by mouth Every 6 (Six) Hours As Needed for itching., Disp: , Rfl:   •  DULERA 100-5 MCG/ACT inhaler, , Disp: , Rfl:   •  esomeprazole (nexIUM) 40 MG capsule, Take 1 capsule by mouth 2 (Two) Times a Day., Disp: 180 capsule, Rfl: 3  •  furosemide (LASIX) 40 MG tablet, Take 1 tablet by mouth 2 (Two) Times a Day As Needed (edema). Patient only takes once a day, Disp: 180 tablet, Rfl: 2  •  gabapentin (NEURONTIN) 300 MG capsule, Take 2 capsules by mouth 3 (Three) Times a Day. (Patient taking differently: Take 600 mg by mouth 3 (Three) Times a Day. Patient is taking only twice daily because of dizziness), Disp: 180 capsule, Rfl: 2  •  metFORMIN ER (GLUCOPHAGE-XR) 500 MG 24 hr tablet, Take 2 tablets by mouth Daily With Breakfast., Disp: 180 tablet, Rfl: 1  •  potassium chloride (K-DUR,KLOR-CON) 20 MEQ CR tablet, Take 1 tablet by mouth Daily. Patient only takes once a day, Disp: 30 tablet, Rfl: 11  •  pravastatin (PRAVACHOL) 20 MG tablet, Take 1 tablet by mouth Daily., Disp: 90 tablet, Rfl: 0  •  spironolactone (ALDACTONE) 25 MG tablet, Take 1 tablet by mouth Daily., Disp: 90 tablet, Rfl: 3  •  sucralfate (CARAFATE) 1 GM/10ML suspension, , Disp: , Rfl:     Past Medical History:   Diagnosis Date   • Arthritis    • Cellulitis    • Diabetes mellitus (CMS/HCC)    • GERD (gastroesophageal reflux disease)    • Hyperlipidemia    • Hypertension    • Kyphosis    • Osteoporosis    • Scoliosis    • Vulvar intraepithelial neoplasia (MOODY) grade 3        Past Surgical History:   Procedure Laterality Date   • APPENDECTOMY     • BREAST CYST ASPIRATION Left    • COLONOSCOPY  01/12/2011   • ENDOSCOPY  07/01/2014   • HYSTERECTOMY     • TONSILLECTOMY     • VAGINA SURGERY         Social History     Socioeconomic History   • Marital status:      Spouse name: Not on file   • Number of children: Not on file   • Years of education: Not on file   • Highest education  "level: Not on file   Tobacco Use   • Smoking status: Former Smoker   • Smokeless tobacco: Never Used   Substance and Sexual Activity   • Alcohol use: No   • Drug use: No   • Sexual activity: No       Family History   Problem Relation Age of Onset   • Cancer Mother    • Hypertension Mother    • Osteoporosis Mother    • Dementia Mother    • Heart disease Father    • Parkinsonism Father    • Cancer Sister    • Breast cancer Sister    • Diabetes Brother    • Heart disease Paternal Grandfather        Objective    Temp 98.7 °F (37.1 °C)   Ht 162.6 cm (64\")   Wt 73.3 kg (161 lb 9.6 oz)   BMI 27.74 kg/m²     Physical Exam   Constitutional: She is oriented to person, place, and time. She appears well-developed and well-nourished. No distress.   Pulmonary/Chest: Effort normal.   Abdominal: Soft. She exhibits no distension and no mass. There is no tenderness. There is no rebound and no guarding. No hernia.   Neurological: She is alert and oriented to person, place, and time.   Skin: Skin is warm and dry. She is not diaphoretic.   Psychiatric: She has a normal mood and affect.   Vitals reviewed.          Results for orders placed or performed in visit on 08/26/19   Lipid panel   Result Value Ref Range    Total Cholesterol 216 (H) 130 - 200 mg/dL    Triglycerides 195 (H) 0 - 149 mg/dL    HDL Cholesterol 49 (L) >=50 mg/dL    LDL Cholesterol  135 (H) 0 - 99 mg/dL    LDL/HDL Ratio 2.61        Patient's Body mass index is 27.74 kg/m². BMI is above normal parameters. Recommendations include: educational material.      Bladder Scan interpretation  Estimation of residual urine via abdominal ultrasound  Residual Urine: 0 ml  Indication: urinary retention  Position: Supine  Examination: Incremental scanning of the suprapubic area using 3 MHz transducer using copious amounts of acoustic gel.   Findings: An anechoic area was demonstrated which represented the bladder, with measurement of residual urine as noted. I inspected this myself. " In that the residual urine was stable or insignificant, no treatment will be necessary at this time.         Patient was prepped and draped in usual sterile fashion.  She was in the dorsolithotomy position.  She was sequentially dilated from 12-18 Romanian using female dilators.    Assessment and Plan    Diagnoses and all orders for this visit:    Retention of urine  -     POC Urinalysis Dipstick, Multipro    History of urethral stricture        Former patient of Dr. Boggs's.  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  She had multiple external surgeries.  Dr. Boggs has been doing dilations periodically.  Her last dilation was in February 2019.  She was seen by Dr. Lezama in [place] and I do not have his last note.    She is having significant difficulty passing a catheter.  I sequentially dilated heard from 12-18 Romanian today I could not place a 20 Romanian dilator.  She has significantly altered anatomy and the dilators ago and at about 3:00.

## 2019-08-28 NOTE — PATIENT INSTRUCTIONS

## 2019-08-29 DIAGNOSIS — F41.9 ANXIETY: ICD-10-CM

## 2019-08-29 PROBLEM — N18.30 CKD (CHRONIC KIDNEY DISEASE), STAGE III: Status: RESOLVED | Noted: 2019-01-29 | Resolved: 2019-08-29

## 2019-08-29 RX ORDER — CITALOPRAM 10 MG/1
TABLET ORAL
Qty: 30 TABLET | Refills: 11 | Status: SHIPPED | OUTPATIENT
Start: 2019-08-29 | End: 2020-08-06 | Stop reason: SDUPTHER

## 2019-09-03 ENCOUNTER — LAB (OUTPATIENT)
Dept: ONCOLOGY | Facility: CLINIC | Age: 79
End: 2019-09-03

## 2019-09-03 DIAGNOSIS — D64.9 ANEMIA, UNSPECIFIED TYPE: Primary | ICD-10-CM

## 2019-09-03 DIAGNOSIS — D64.9 ANEMIA, UNSPECIFIED TYPE: ICD-10-CM

## 2019-09-03 LAB
ALBUMIN SERPL-MCNC: 4.4 G/DL (ref 3.5–5)
ALBUMIN/GLOB SERPL: 1.3 G/DL (ref 1.1–2.5)
ALP SERPL-CCNC: 53 U/L (ref 24–120)
ALT SERPL W P-5'-P-CCNC: <15 U/L (ref 0–54)
ANION GAP SERPL CALCULATED.3IONS-SCNC: 12 MMOL/L (ref 4–13)
AST SERPL-CCNC: 27 U/L (ref 7–45)
BILIRUB SERPL-MCNC: 0.4 MG/DL (ref 0.1–1)
BUN BLD-MCNC: 15 MG/DL (ref 5–21)
BUN/CREAT SERPL: 17.6 (ref 7–25)
CALCIUM SPEC-SCNC: 9.7 MG/DL (ref 8.4–10.4)
CHLORIDE SERPL-SCNC: 96 MMOL/L (ref 98–110)
CO2 SERPL-SCNC: 26 MMOL/L (ref 24–31)
CREAT BLD-MCNC: 0.85 MG/DL (ref 0.5–1.4)
FERRITIN SERPL-MCNC: 72.9 NG/ML (ref 11.1–264)
GFR SERPL CREATININE-BSD FRML MDRD: 65 ML/MIN/1.73
GLOBULIN UR ELPH-MCNC: 3.5 GM/DL
GLUCOSE BLD-MCNC: 158 MG/DL (ref 70–100)
IRON 24H UR-MRATE: 90 MCG/DL (ref 42–180)
IRON SATN MFR SERPL: 27 % (ref 20–45)
POTASSIUM BLD-SCNC: 3.8 MMOL/L (ref 3.5–5.3)
PROT SERPL-MCNC: 7.9 G/DL (ref 6.3–8.7)
SODIUM BLD-SCNC: 134 MMOL/L (ref 135–145)
TIBC SERPL-MCNC: 334 MCG/DL (ref 225–420)

## 2019-09-03 PROCEDURE — 80053 COMPREHEN METABOLIC PANEL: CPT | Performed by: INTERNAL MEDICINE

## 2019-09-03 PROCEDURE — 85025 COMPLETE CBC W/AUTO DIFF WBC: CPT | Performed by: INTERNAL MEDICINE

## 2019-09-03 PROCEDURE — 82728 ASSAY OF FERRITIN: CPT | Performed by: INTERNAL MEDICINE

## 2019-09-03 PROCEDURE — 83550 IRON BINDING TEST: CPT | Performed by: INTERNAL MEDICINE

## 2019-09-03 PROCEDURE — 83540 ASSAY OF IRON: CPT | Performed by: INTERNAL MEDICINE

## 2019-09-03 PROCEDURE — 36415 COLL VENOUS BLD VENIPUNCTURE: CPT | Performed by: INTERNAL MEDICINE

## 2019-09-03 NOTE — PROGRESS NOTES
"[facility] ONC [facility] ONCOLOGY  [address] [place] [address] [address]  [facility] [address]  [phone]    Patient Name: Dago Nunez  Encounter Date: 09/09/2019  YOB: 1940  Patient Number: [id number]      REASON FOR FOLLOW-UP: Dago Nunez is a pleasant 79-year-old  female who is seen on followup for macrocytic anemia from chronic kidney disease Stage III, GFR 51 mL/minute 03/05/2018, iron deficiency, and thrombocytopenia.  She is off oral iron for the past 11 months, intolerant (nausea, stomach cramps, and constipation).  She had Injectafer 10.5 months ago.  She is seen with , Joseph.  History is obtained from the patient. History is considered to be accurate.        Problem List Items Addressed This Visit        Hematopoietic and Hemostatic    Normocytic anemia - Primary    Overview     DIAGNOSTIC ABNORMALITIES:   The patient was seen by Dr. Greg Alberts 01/29/2018 complaining of coughing and wheezing. The patient was given Tussionex. Blood work was ordered. CBC 01/29/2018 revealed a WBC of 7.8, hemoglobin 11.2, hematocrit 35.1, MCV 96.2, platelets 43,000, and ANC 4.47. CMP remarkable for of glucose of 177 and GFR 59 mL/minute.  The patient was seen by VINCENT Jones, 02/02/2018. She was coughing and wheezing. Tussionex did not help. The patient was given prednisone.  The patient was seen by VINCENT Jones, 02/15/2018. She is followed for anemia from iron deficiency. CBC 02/15/2018 revealed a WBC of 12.9, hemoglobin 11.4, hematocrit 34.5, MCV 95, and platelets 68,000.       PREVIOUS INTERVENTIONS:   Ferrous sulfate 325 mg 03/07/2018 through 05/06/2018.  Not resumed 06/08/2018. \"I misunderstood.\"   Resume 09/05/2018 through 10/03/2018, stopped due to intolerance.  Injectafer 750 mg 10/20/2018 at the [place] office.              No history exists.       PAST MEDICAL HISTORY:  ALLERGIES:  Allergies   Allergen Reactions   • Scopolamine " Swelling     Other reaction(s): ANGIOEDEMA  Other reaction(s): ANGIOEDEMA     • Tequin [Gatifloxacin]    • Trovan [Alatrofloxacin]    • Amoxicillin-Pot Clavulanate Rash   • Keflex [Cephalexin] Rash   • Septra [Sulfamethoxazole-Trimethoprim] Rash     CURRENT MEDICATIONS:  Outpatient Encounter Medications as of 9/9/2019   Medication Sig Dispense Refill   • albuterol sulfate  (90 Base) MCG/ACT inhaler Inhale 2 puffs Every 4 (Four) Hours As Needed for Wheezing or Shortness of Air. 1 inhaler 12   • amLODIPine (NORVASC) 10 MG tablet Take 1 tablet by mouth Daily. 90 tablet 3   • B Complex Vitamins (VITAMIN B COMPLEX) capsule capsule Take  by mouth Daily.     • benzonatate (TESSALON) 100 MG capsule Take 1 capsule by mouth 3 (Three) Times a Day As Needed for Cough. 90 capsule 0   • bisoprolol-hydrochlorothiazide (ZIAC) 5-6.25 MG per tablet Take 1 tablet by mouth Daily. 90 tablet 3   • calcium carbonate (OS-REAGAN) 600 MG tablet Take 600 mg by mouth Daily.     • cetirizine (zyrTEC) 10 MG tablet Take 10 mg by mouth Daily.     • citalopram (CeleXA) 10 MG tablet TAKE 1 TABLET BY MOUTH 1 TIME DAILY 30 tablet 11   • diphenhydrAMINE (BENADRYL) 25 mg capsule Take 25 mg by mouth Every 6 (Six) Hours As Needed for itching.     • DULERA 100-5 MCG/ACT inhaler      • esomeprazole (nexIUM) 40 MG capsule Take 1 capsule by mouth 2 (Two) Times a Day. 180 capsule 3   • furosemide (LASIX) 40 MG tablet Take 1 tablet by mouth 2 (Two) Times a Day As Needed (edema). Patient only takes once a day 180 tablet 2   • gabapentin (NEURONTIN) 300 MG capsule Take 2 capsules by mouth 3 (Three) Times a Day. (Patient taking differently: Take 600 mg by mouth 3 (Three) Times a Day. Patient is taking only twice daily because of dizziness) 180 capsule 2   • metFORMIN ER (GLUCOPHAGE-XR) 500 MG 24 hr tablet Take 2 tablets by mouth Daily With Breakfast. 180 tablet 1   • potassium chloride (K-DUR,KLOR-CON) 20 MEQ CR tablet Take 1 tablet by mouth Daily. Patient  only takes once a day 30 tablet 11   • pravastatin (PRAVACHOL) 20 MG tablet Take 1 tablet by mouth Daily. 90 tablet 0   • spironolactone (ALDACTONE) 25 MG tablet Take 1 tablet by mouth Daily. 90 tablet 3   • sucralfate (CARAFATE) 1 GM/10ML suspension        No facility-administered encounter medications on file as of 9/9/2019.      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   • Chronic midline low back pain without sciatica M54.5, G89.29   • Bilateral lower extremity edema R60.0   • History of urethral stricture Z87.448   • Epidermal cyst of neck L72.0   • Normocytic anemia D64.9   • Neck abscess L02.11   • Hyperlipidemia E78.5   • GERD (gastroesophageal reflux disease) K21.9   • Hyponatremia E87.1   • Anxiety F41.9     SURGERIES:  Past Surgical History:   Procedure Laterality Date   • APPENDECTOMY     • BREAST CYST ASPIRATION Left    • COLONOSCOPY  01/12/2011   • ENDOSCOPY  07/01/2014   • HYSTERECTOMY     • TONSILLECTOMY     • VAGINA SURGERY       HEALTH MAINTENANCE ITEMS:  Health Maintenance Due   Topic Date Due   • ZOSTER VACCINE (2 of 3) 11/26/2015   • COLONOSCOPY  07/11/2017   • HEMOGLOBIN A1C  04/24/2019   • INFLUENZA VACCINE  08/01/2019       <no information>  Last Completed Colonoscopy       Status Date      COLONOSCOPY No completions recorded        Immunization History   Administered Date(s) Administered   • Flu Vaccine High Dose PF 65YR+ 10/01/2018   • Pneumococcal, Unspecified 10/01/2017   • Tdap 05/01/2019   • Zostavax 10/01/2015     Last Completed Mammogram       Status Date      MAMMOGRAM Done 5/20/2019 MAMMO SCREENING DIGITAL TOMOSYNTHESIS BILATERAL W CAD     Patient has more history with this topic...            FAMILY  "HISTORY:  Family History   Problem Relation Age of Onset   • Cancer Mother    • Hypertension Mother    • Osteoporosis Mother    • Dementia Mother    • Heart disease Father    • Parkinsonism Father    • Cancer Sister    • Breast cancer Sister    • Diabetes Brother    • Heart disease Paternal Grandfather      SOCIAL HISTORY:  Social History     Socioeconomic History   • Marital status:      Spouse name: Not on file   • Number of children: Not on file   • Years of education: Not on file   • Highest education level: Not on file   Tobacco Use   • Smoking status: Former Smoker   • Smokeless tobacco: Never Used   Substance and Sexual Activity   • Alcohol use: No   • Drug use: No   • Sexual activity: No       REVIEW OF SYSTEMS:    Review of Systems   Constitutional: Negative for chills, diaphoresis, fatigue, fever and unexpected weight loss.        \"I feel good.  Just slow.\"   HENT: Negative for ear pain, facial swelling, nosebleeds, sinus pressure, sore throat and voice change.    Eyes: Negative for blurred vision, double vision, pain and visual disturbance.   Respiratory: Negative for cough, shortness of breath and wheezing.    Cardiovascular: Positive for leg swelling. Negative for chest pain and palpitations.   Gastrointestinal: Negative for abdominal pain, blood in stool, constipation, diarrhea, nausea and vomiting.   Endocrine: Negative for cold intolerance and heat intolerance.   Genitourinary: Negative for breast lump, dysuria, frequency, hematuria, urgency and urinary incontinence.   Musculoskeletal: Negative for arthralgias, joint swelling and myalgias.   Skin: Positive for pallor. Negative for rash and skin lesions.   Allergic/Immunologic: Negative for immunocompromised state.   Neurological: Negative for dizziness, tremors, seizures, syncope, speech difficulty, weakness, headache and confusion.   Hematological: Negative for adenopathy. Does not bruise/bleed easily.   Psychiatric/Behavioral: Negative for " "agitation, behavioral problems, dysphoric mood and depressed mood.         VITAL SIGNS: /70   Pulse 60   Temp 97.8 °F (36.6 °C)   Resp 18   Ht 162.6 cm (64\")   Wt 73 kg (161 lb)   SpO2 97%   Breastfeeding? No   BMI 27.64 kg/m²  Body surface area is 1.78 meters squared.   Pain Score    09/09/19 1114   PainSc: 0-No pain           PHYSICAL EXAMINATION:     Physical Exam   Constitutional: She is oriented to person, place, and time. She appears well-developed and well-nourished. No distress.   HENT:   Head: Normocephalic and atraumatic.   Eyes: EOM are normal. Pupils are equal, round, and reactive to light. No scleral icterus.   Neck: Trachea normal. Neck supple. No JVD present.   Cardiovascular: Normal rate, regular rhythm and normal pulses.   No murmur heard.  Pulmonary/Chest: Effort normal and breath sounds normal. She has no wheezes. She has no rhonchi. She has no rales. Chest wall is not dull to percussion.   Abdominal: Soft. Normal appearance and bowel sounds are normal. There is no tenderness. There is no rebound and no guarding.   Musculoskeletal: She exhibits edema.   2+ bilateral leg edema.  No calf tenderness.   Lymphadenopathy:     She has no cervical adenopathy.     She has no axillary adenopathy.        Right: No inguinal and no supraclavicular adenopathy present.        Left: No inguinal and no supraclavicular adenopathy present.   Neurological: She is alert and oriented to person, place, and time. She has normal strength. No sensory deficit.   Skin: Skin is warm and dry. She is not diaphoretic. There is pallor.   Psychiatric: She has a normal mood and affect. Her behavior is normal. Judgment and thought content normal.   Vitals reviewed.      LABS    Lab Results - Last 18 Months   Lab Units 09/03/19  1119 04/30/19  1600 02/07/19  1508 01/13/19  1147   HEMOGLOBIN g/dL 11.4* 11.9* 11.3* 10.6*   HEMATOCRIT % 34.4 36.0* 32.4* 30.9*   MCV fL 96.6 98.1* 95.0 95.1   WBC 10*3/mm3 7.65 5.71 9.05 " 10.44   RDW % 12.2* 12.5 12.7 12.2   MPV fL 9.8 11.8 10.6 11.1   PLATELETS 10*3/mm3 213 55* 215 164   IMM GRAN % % 0.1  --  0.2 0.5   NEUTROS ABS 10*3/mm3 4.68 4.00 6.40 8.24   LYMPHS ABS 10*3/mm3 1.85  --  1.50 0.95   MONOS ABS 10*3/mm3 0.58  --  0.83 0.94   EOS ABS 10*3/mm3 0.48* 0.34 0.25 0.23   BASOS ABS 10*3/mm3 0.05  --  0.05 0.03   IMMATURE GRANS (ABS) 10*3/mm3 0.01  --  0.02 0.05*   NRBC /100 WBC  --   --  0.0 0.0   NEUTROPHIL % %  --  70.0  --   --    MONOCYTES % %  --  12.0  --   --        Lab Results - Last 18 Months   Lab Units 09/03/19  1119 04/30/19  1600 02/07/19  1508 01/29/19  1450 01/13/19  1147 10/24/18  1632   GLUCOSE mg/dL 158* 186* 80 96 120* 99   SODIUM mmol/L 134* 134* 134* 129* 135 135   POTASSIUM mmol/L 3.8 4.1 4.1 3.6 3.6 4.1   CO2 mmol/L 26.0 27.0 25.0 26.0 29.0 27.0   CHLORIDE mmol/L 96* 94* 97* 90* 95* 96*   ANION GAP mmol/L 12.0 13.0 12.0 13.0 11.0 12.0   CREATININE mg/dL 0.85 0.80 1.12 1.10 0.84 0.98   BUN mg/dL 15 13 15 18 12 15   BUN / CREAT RATIO  17.6 16.3 13.4 16.4 14.3 15.3   CALCIUM mg/dL 9.7 9.6 9.6 10.0 9.4 10.4   EGFR IF NONAFRICN AM mL/min/1.73 65 70 47* 48* 66 55*   ALK PHOS U/L 53 60  --   --  54  --    TOTAL PROTEIN g/dL 7.9 7.5  --   --  7.8  --    ALT (SGPT) U/L <15 <15  --   --  17  --    AST (SGOT) U/L 27 21  --   --  25  --    BILIRUBIN mg/dL 0.4 0.4  --   --  0.4  --    ALBUMIN g/dL 4.40 4.50  --   --  4.20  --    GLOBULIN gm/dL 3.5 3.0  --   --  3.6  --        No results for input(s): MSPIKE, KAPPALAMB, IGLFLC, URICACID, FREEKAPPAL, CEA, LDH, REFLABREPO in the last 94976 hours.    Lab Results - Last 18 Months   Lab Units 09/03/19  1119 08/07/19  1630 07/10/19  1630 06/12/19  1700 04/30/19  1600 04/08/19  1700  02/07/19  1508   IRON mcg/dL 90 81 75 93 125 91   < > 84   TIBC mcg/dL 334 359 343 344 344 338   < > 315   IRON SATURATION % 27 23 22 27 36 27   < > 27   FERRITIN ng/mL 72.90 87.00 96.90 102.00 92.00 119.00   < > 166.00   FOLATE ng/mL  --   --   --   --   --  "  --   --  >20.00    < > = values in this interval not displayed.         Dago Nunez reports a pain score of 0.        Patient's Body mass index is 27.64 kg/m². BMI is within normal parameters. No follow-up required.     ASSESSMENT:  1.    Macrocytic anemia from iron deficiency and history of chronic kidney disease Stage III, GFR 51 mL/minute 03/05/2018.  2.    Iron deficiency. Intolerant to oral iron.   3.    Chronic kidney disease Stage II, GFR 70 ml/minute 04/30/2019.  4.    Hyperlipidemia.  5.    Hypertension.  6.    Thrombocytopenia, 04/19/2018. (?) laboratory error.    7.    Squamous cell cancer in situ, urethra.  Followed by Dr. Callahan    PLAN:  1.     Re:  Urethral dilatation by Dr. Conway 08/28/2019.   2.     Re:  Interval hemoglobin 12.3 gm on 06/12/2019,  11.8 gm on 07/10/2019, and 11.5 gm on 08/07/2019  3.     Re:  Heme status.  Hemoglobin 11.4 gm.   4.     Re:  Pre-office CMP.   GFR 65 ml/minute.  5.     Re:  Pre-office ferritin, TIBC, % saturation, and iron. 27% saturation and ferritin 72.9 ng/ml.    6.    Stools for occult blood x3.  7.    Continue currently identified medications.  8.    Procrit 40,000 units subcutaneously every week if hemoglobin less than 10 and hematocrit less than 30% to target a hemoglobin of 11 and hematocrit of 33% if GFR below 60.  Move CBC with differential weekly if she starts Procrit. Observe for adverse events.   9.    CBC with differential, iron, TIBC, ferritin, and % saturation every 4 weeks at [facility].  10.  Continue ongoing management per primary care physician and other specialists.  11.  Plan of care discussed with patient and spouse.  Understanding expressed.  Patient agreeable to proceed.  12.  Intolerant to oral iron (nausea, cramps, and constipation).  IV iron as needed.   13.  She will bring a copy of her advance directive next visit. \"I will bring it.\"   14.  Return to the office in 4 months with pre-office " CMP.  15.  For the use of ESAs:   the patient about the appropriate use of ESAs for patients with cancer, MDS, and CKD.  Acknowledge that the [facility] risk: benefit discussion occurred using the [facility] Patient and Healthcare Professional (HCP) Acknowledgement Form.  Assure signature on the form.  Acknowledge government oversight and intervention in the care of the individual patient.    TIME SPENT:  Face to face time on this encounter,as defined by the American Medical Association in the 2019 Current Procedural Terminology codebook; assessment, record review, lab review, planning and education is 26 minutes.        cc: MD Moustapha Conley MD Shaukat Ali, MD

## 2019-09-04 LAB
BASOPHILS # BLD AUTO: 0.05 10*3/MM3 (ref 0–0.2)
BASOPHILS NFR BLD AUTO: 0.7 % (ref 0–1.5)
DEPRECATED RDW RBC AUTO: 43.7 FL (ref 37–54)
EOSINOPHIL # BLD AUTO: 0.48 10*3/MM3 (ref 0–0.4)
EOSINOPHIL NFR BLD AUTO: 6.3 % (ref 0.3–6.2)
ERYTHROCYTE [DISTWIDTH] IN BLOOD BY AUTOMATED COUNT: 12.2 % (ref 12.3–15.4)
HCT VFR BLD AUTO: 34.4 % (ref 34–46.6)
HGB BLD-MCNC: 11.4 G/DL (ref 12–15.9)
IMM GRANULOCYTES # BLD AUTO: 0.01 10*3/MM3 (ref 0–0.05)
IMM GRANULOCYTES NFR BLD AUTO: 0.1 % (ref 0–0.5)
LYMPHOCYTES # BLD AUTO: 1.85 10*3/MM3 (ref 0.7–3.1)
LYMPHOCYTES NFR BLD AUTO: 24.2 % (ref 19.6–45.3)
MCH RBC QN AUTO: 32 PG (ref 26.6–33)
MCHC RBC AUTO-ENTMCNC: 33.1 G/DL (ref 31.5–35.7)
MCV RBC AUTO: 96.6 FL (ref 79–97)
MONOCYTES # BLD AUTO: 0.58 10*3/MM3 (ref 0.1–0.9)
MONOCYTES NFR BLD AUTO: 7.6 % (ref 5–12)
NEUTROPHILS # BLD AUTO: 4.68 10*3/MM3 (ref 1.7–7)
NEUTROPHILS NFR BLD AUTO: 61.1 % (ref 42.7–76)
PLATELET # BLD AUTO: 213 10*3/MM3 (ref 140–450)
PMV BLD AUTO: 9.8 FL (ref 6–12)
RBC # BLD AUTO: 3.56 10*6/MM3 (ref 3.77–5.28)
WBC NRBC COR # BLD: 7.65 10*3/MM3 (ref 3.4–10.8)

## 2019-09-09 ENCOUNTER — OFFICE VISIT (OUTPATIENT)
Dept: ONCOLOGY | Facility: CLINIC | Age: 79
End: 2019-09-09

## 2019-09-09 VITALS
HEART RATE: 60 BPM | DIASTOLIC BLOOD PRESSURE: 70 MMHG | RESPIRATION RATE: 18 BRPM | SYSTOLIC BLOOD PRESSURE: 142 MMHG | TEMPERATURE: 97.8 F | WEIGHT: 161 LBS | OXYGEN SATURATION: 97 % | HEIGHT: 64 IN | BODY MASS INDEX: 27.49 KG/M2

## 2019-09-09 DIAGNOSIS — D64.9 NORMOCYTIC ANEMIA: Primary | ICD-10-CM

## 2019-09-09 PROCEDURE — 99214 OFFICE O/P EST MOD 30 MIN: CPT | Performed by: INTERNAL MEDICINE

## 2019-10-09 ENCOUNTER — LAB (OUTPATIENT)
Dept: ONCOLOGY | Facility: CLINIC | Age: 79
End: 2019-10-09

## 2019-10-09 DIAGNOSIS — D64.9 NORMOCYTIC ANEMIA: ICD-10-CM

## 2019-10-09 LAB
ALBUMIN SERPL-MCNC: 4.4 G/DL (ref 3.5–5.2)
ALBUMIN/GLOB SERPL: 1.3 G/DL
ALP SERPL-CCNC: 61 U/L (ref 39–117)
ALT SERPL W P-5'-P-CCNC: 10 U/L (ref 1–33)
ANION GAP SERPL CALCULATED.3IONS-SCNC: 14 MMOL/L (ref 5–15)
AST SERPL-CCNC: 15 U/L (ref 1–32)
BASOPHILS # BLD AUTO: 0.04 10*3/MM3 (ref 0–0.2)
BASOPHILS NFR BLD AUTO: 0.7 % (ref 0–1.5)
BILIRUB SERPL-MCNC: 0.3 MG/DL (ref 0.2–1.2)
BUN BLD-MCNC: 17 MG/DL (ref 8–23)
BUN/CREAT SERPL: 17.9 (ref 7–25)
CALCIUM SPEC-SCNC: 9.7 MG/DL (ref 8.6–10.5)
CHLORIDE SERPL-SCNC: 93 MMOL/L (ref 98–107)
CO2 SERPL-SCNC: 27 MMOL/L (ref 22–29)
CREAT BLD-MCNC: 0.95 MG/DL (ref 0.57–1)
DEPRECATED RDW RBC AUTO: 44.9 FL (ref 37–54)
EOSINOPHIL # BLD AUTO: 0.48 10*3/MM3 (ref 0–0.4)
EOSINOPHIL NFR BLD AUTO: 8.3 % (ref 0.3–6.2)
ERYTHROCYTE [DISTWIDTH] IN BLOOD BY AUTOMATED COUNT: 12.6 % (ref 12.3–15.4)
FERRITIN SERPL-MCNC: 107.7 NG/ML (ref 13–150)
GFR SERPL CREATININE-BSD FRML MDRD: 57 ML/MIN/1.73
GLOBULIN UR ELPH-MCNC: 3.5 GM/DL
GLUCOSE BLD-MCNC: 225 MG/DL (ref 65–99)
HCT VFR BLD AUTO: 35.9 % (ref 34–46.6)
HGB BLD-MCNC: 11.9 G/DL (ref 12–15.9)
IMM GRANULOCYTES # BLD AUTO: 0.01 10*3/MM3 (ref 0–0.05)
IMM GRANULOCYTES NFR BLD AUTO: 0.2 % (ref 0–0.5)
IRON 24H UR-MRATE: 78 MCG/DL (ref 37–145)
IRON SATN MFR SERPL: 19 % (ref 20–50)
LYMPHOCYTES # BLD AUTO: 1.57 10*3/MM3 (ref 0.7–3.1)
LYMPHOCYTES NFR BLD AUTO: 27.1 % (ref 19.6–45.3)
MCH RBC QN AUTO: 32.2 PG (ref 26.6–33)
MCHC RBC AUTO-ENTMCNC: 33.1 G/DL (ref 31.5–35.7)
MCV RBC AUTO: 97.3 FL (ref 79–97)
MONOCYTES # BLD AUTO: 0.43 10*3/MM3 (ref 0.1–0.9)
MONOCYTES NFR BLD AUTO: 7.4 % (ref 5–12)
NEUTROPHILS # BLD AUTO: 3.26 10*3/MM3 (ref 1.7–7)
NEUTROPHILS NFR BLD AUTO: 56.3 % (ref 42.7–76)
PLATELET # BLD AUTO: 207 10*3/MM3 (ref 140–450)
PMV BLD AUTO: 9.9 FL (ref 6–12)
POTASSIUM BLD-SCNC: 3.8 MMOL/L (ref 3.5–5.2)
PROT SERPL-MCNC: 7.9 G/DL (ref 6–8.5)
RBC # BLD AUTO: 3.69 10*6/MM3 (ref 3.77–5.28)
SODIUM BLD-SCNC: 134 MMOL/L (ref 136–145)
TIBC SERPL-MCNC: 408 MCG/DL (ref 298–536)
TRANSFERRIN SERPL-MCNC: 274 MG/DL (ref 200–360)
WBC NRBC COR # BLD: 5.79 10*3/MM3 (ref 3.4–10.8)

## 2019-10-09 PROCEDURE — 85025 COMPLETE CBC W/AUTO DIFF WBC: CPT | Performed by: INTERNAL MEDICINE

## 2019-10-09 PROCEDURE — 84466 ASSAY OF TRANSFERRIN: CPT | Performed by: INTERNAL MEDICINE

## 2019-10-09 PROCEDURE — 82728 ASSAY OF FERRITIN: CPT | Performed by: INTERNAL MEDICINE

## 2019-10-09 PROCEDURE — 80053 COMPREHEN METABOLIC PANEL: CPT | Performed by: INTERNAL MEDICINE

## 2019-10-09 PROCEDURE — 83540 ASSAY OF IRON: CPT | Performed by: INTERNAL MEDICINE

## 2019-10-09 PROCEDURE — 36415 COLL VENOUS BLD VENIPUNCTURE: CPT | Performed by: INTERNAL MEDICINE

## 2019-10-11 ENCOUNTER — TELEPHONE (OUTPATIENT)
Dept: ONCOLOGY | Facility: CLINIC | Age: 79
End: 2019-10-11

## 2019-10-11 DIAGNOSIS — D50.9 IRON DEFICIENCY ANEMIA, UNSPECIFIED IRON DEFICIENCY ANEMIA TYPE: ICD-10-CM

## 2019-10-11 RX ORDER — ACETAMINOPHEN 325 MG/1
650 TABLET ORAL ONCE
Status: CANCELLED | OUTPATIENT
Start: 2019-10-16

## 2019-10-11 RX ORDER — DIPHENHYDRAMINE HYDROCHLORIDE 50 MG/ML
25 INJECTION INTRAMUSCULAR; INTRAVENOUS ONCE
Status: CANCELLED | OUTPATIENT
Start: 2019-10-16

## 2019-10-11 RX ORDER — SODIUM CHLORIDE 9 MG/ML
250 INJECTION, SOLUTION INTRAVENOUS ONCE
Status: CANCELLED | OUTPATIENT
Start: 2019-10-16

## 2019-10-11 NOTE — TELEPHONE ENCOUNTER
Spoke with patient regarding need for iron infusion.  She is agreeable but wants to come to [facility] for infusion.  Spoke with Dr Stafford, he changed it to Injectafer.  Patient is scheduled for Oct 16, 2019 @ 1:30.

## 2019-10-14 ENCOUNTER — OFFICE VISIT (OUTPATIENT)
Dept: PULMONOLOGY | Facility: CLINIC | Age: 79
End: 2019-10-14

## 2019-10-14 VITALS
SYSTOLIC BLOOD PRESSURE: 128 MMHG | WEIGHT: 162 LBS | DIASTOLIC BLOOD PRESSURE: 72 MMHG | HEART RATE: 54 BPM | OXYGEN SATURATION: 98 % | BODY MASS INDEX: 27.66 KG/M2 | HEIGHT: 64 IN

## 2019-10-14 DIAGNOSIS — J40 BRONCHITIS: Primary | ICD-10-CM

## 2019-10-14 PROCEDURE — 99212 OFFICE O/P EST SF 10 MIN: CPT | Performed by: INTERNAL MEDICINE

## 2019-10-14 NOTE — PROGRESS NOTES
Subjective   Syndal DIDI Nunez is a 79 y.o. female.     Chief Complaint   Patient presents with   • Bronchitis      No My Sticky Note on file.    History of Present Illness   Patient states she has had problems with bronchitis periodically but particular in the winter months when she has such problems Dulera does well for her.  I gave her several samples of Dulera in this regard to 100/5 mcg size to take 2 puffs twice daily and rinse and spit after using when she has such problems.  Otherwise she is done well since last visit.  Medical/Family/Social History   has a past medical history of Arthritis, Cellulitis, Diabetes mellitus (CMS/HCC), GERD (gastroesophageal reflux disease), Hyperlipidemia, Hypertension, Kyphosis, Osteoporosis, Scoliosis, and Vulvar intraepithelial neoplasia (MOODY) grade 3.   has a past surgical history that includes Hysterectomy; Tonsillectomy; Appendectomy; Vagina surgery; Colonoscopy (01/12/2011); Esophagogastroduodenoscopy (07/01/2014); and Breast cyst aspiration (Left).  family history includes Breast cancer in her sister; Cancer in her mother and sister; Dementia in her mother; Diabetes in her brother; Heart disease in her father and paternal grandfather; Hypertension in her mother; Osteoporosis in her mother; Parkinsonism in her father.   reports that she has quit smoking. She has never used smokeless tobacco. She reports that she does not drink alcohol or use drugs.  Allergies   Allergen Reactions   • Scopolamine Swelling     Other reaction(s): ANGIOEDEMA  Other reaction(s): ANGIOEDEMA     • Tequin [Gatifloxacin]    • Trovan [Alatrofloxacin]    • Amoxicillin-Pot Clavulanate Rash   • Keflex [Cephalexin] Rash   • Septra [Sulfamethoxazole-Trimethoprim] Rash     Medications    Current Outpatient Medications:   •  Acetaminophen (TYLENOL ARTHRITIS PAIN PO), Take  by mouth., Disp: , Rfl:   •  albuterol sulfate  (90 Base) MCG/ACT inhaler, Inhale 2 puffs Every 4 (Four) Hours As Needed for  Wheezing or Shortness of Air., Disp: 1 inhaler, Rfl: 12  •  amLODIPine (NORVASC) 10 MG tablet, Take 1 tablet by mouth Daily., Disp: 90 tablet, Rfl: 3  •  aspirin 81 MG tablet, Take 81 mg by mouth Daily., Disp: , Rfl:   •  B Complex Vitamins (VITAMIN B COMPLEX) capsule capsule, Take  by mouth Daily., Disp: , Rfl:   •  benzonatate (TESSALON) 100 MG capsule, Take 1 capsule by mouth 3 (Three) Times a Day As Needed for Cough., Disp: 90 capsule, Rfl: 0  •  bisoprolol-hydrochlorothiazide (ZIAC) 5-6.25 MG per tablet, Take 1 tablet by mouth Daily., Disp: 90 tablet, Rfl: 3  •  calcium carbonate (OS-REAGAN) 600 MG tablet, Take 600 mg by mouth Daily., Disp: , Rfl:   •  cetirizine (zyrTEC) 10 MG tablet, Take 10 mg by mouth Daily., Disp: , Rfl:   •  citalopram (CeleXA) 10 MG tablet, TAKE 1 TABLET BY MOUTH 1 TIME DAILY, Disp: 30 tablet, Rfl: 11  •  diphenhydrAMINE (BENADRYL) 25 mg capsule, Take 25 mg by mouth Every 6 (Six) Hours As Needed for itching., Disp: , Rfl:   •  DULERA 100-5 MCG/ACT inhaler, Inhale 2 puffs 2 (Two) Times a Day. Rinse and spit after using., Disp: 6 inhaler, Rfl: 0  •  esomeprazole (nexIUM) 40 MG capsule, Take 1 capsule by mouth 2 (Two) Times a Day., Disp: 180 capsule, Rfl: 3  •  furosemide (LASIX) 40 MG tablet, Take 1 tablet by mouth 2 (Two) Times a Day As Needed (edema). Patient only takes once a day, Disp: 180 tablet, Rfl: 2  •  gabapentin (NEURONTIN) 300 MG capsule, Take 2 capsules by mouth 3 (Three) Times a Day. (Patient taking differently: Take 600 mg by mouth 3 (Three) Times a Day. Patient is taking only twice daily because of dizziness), Disp: 180 capsule, Rfl: 2  •  metFORMIN ER (GLUCOPHAGE-XR) 500 MG 24 hr tablet, Take 2 tablets by mouth Daily With Breakfast., Disp: 180 tablet, Rfl: 1  •  potassium chloride (K-DUR,KLOR-CON) 20 MEQ CR tablet, Take 1 tablet by mouth Daily. Patient only takes once a day, Disp: 30 tablet, Rfl: 11  •  pravastatin (PRAVACHOL) 20 MG tablet, Take 1 tablet by mouth Daily.,  "Disp: 90 tablet, Rfl: 0  •  spironolactone (ALDACTONE) 25 MG tablet, Take 1 tablet by mouth Daily., Disp: 90 tablet, Rfl: 3  •  sucralfate (CARAFATE) 1 GM/10ML suspension, , Disp: , Rfl:     Review of Systems   Constitutional: Negative for chills and fever.   HENT: Negative for congestion.    Eyes: Negative for visual disturbance.   Respiratory: Positive for cough and shortness of breath.    Cardiovascular: Negative for chest pain.   Gastrointestinal: Negative for diarrhea, nausea and vomiting.   Genitourinary: Negative for difficulty urinating.   Musculoskeletal: Negative for arthralgias.   Skin: Negative for rash.   Neurological: Positive for dizziness. Negative for speech difficulty.   Psychiatric/Behavioral: The patient is not nervous/anxious.      ------------------------------------  Objective   /72   Pulse 54   Ht 162.6 cm (64\")   Wt 73.5 kg (162 lb)   SpO2 98% Comment: RA  Breastfeeding? No   BMI 27.81 kg/m²   Physical Exam   Constitutional: She is oriented to person, place, and time. She appears well-developed and well-nourished.   HENT:   Head: Normocephalic and atraumatic.   Eyes: EOM are normal. Pupils are equal, round, and reactive to light.   Neck: Normal range of motion. Neck supple.   Cardiovascular: Normal rate, regular rhythm and normal heart sounds.   Pulmonary/Chest: Effort normal and breath sounds normal.   Abdominal: Soft.   Musculoskeletal: Normal range of motion.   Neurological: She is alert and oriented to person, place, and time.   Skin: Skin is warm and dry.   Psychiatric: She has a normal mood and affect.   Nursing note and vitals reviewed.          Pulmonary Functions Testing Results:  No results found for: FEV1, FVC, ZTE8RTA, TLC, DLCO     Assessment/Plan   Syndal was seen today for bronchitis.    Diagnoses and all orders for this visit:    Bronchitis  -     DULERA 100-5 MCG/ACT inhaler; Inhale 2 puffs 2 (Two) Times a Day. Rinse and spit after using.      Patient's Body mass " index is 27.81 kg/m². BMI is within normal parameters. No follow-up required..    I again gave her samples of Dulera to use when she has her bronchitic symptoms.  She is a past history of possible lung nodule but on her last chest CT no nodular densities were seen and she does not require any routine follow-up scans in this regard.  She is going to wait to the end of the month to get her flu shot.  I will see her back in 1 year.

## 2019-10-14 NOTE — PATIENT INSTRUCTIONS
The patient had a lot of problems with bronchitis through the winter.  She does do well with Dulera which she has since episodes.  I gave her several samples of the Dulera 100/5.  She is going to wait and get her flu shot later this month.  I will see her back in 1 year.

## 2019-10-16 ENCOUNTER — INFUSION (OUTPATIENT)
Dept: ONCOLOGY | Facility: HOSPITAL | Age: 79
End: 2019-10-16

## 2019-10-16 VITALS
HEIGHT: 64 IN | SYSTOLIC BLOOD PRESSURE: 149 MMHG | DIASTOLIC BLOOD PRESSURE: 52 MMHG | WEIGHT: 161 LBS | BODY MASS INDEX: 27.49 KG/M2 | OXYGEN SATURATION: 99 % | HEART RATE: 56 BPM | TEMPERATURE: 97.2 F

## 2019-10-16 DIAGNOSIS — D50.9 IRON DEFICIENCY ANEMIA, UNSPECIFIED IRON DEFICIENCY ANEMIA TYPE: Primary | ICD-10-CM

## 2019-10-16 PROCEDURE — 96365 THER/PROPH/DIAG IV INF INIT: CPT

## 2019-10-16 PROCEDURE — 96375 TX/PRO/DX INJ NEW DRUG ADDON: CPT

## 2019-10-16 PROCEDURE — 25010000002 FERRIC CARBOXYMALTOSE 750 MG/15ML SOLUTION 15 ML VIAL: Performed by: INTERNAL MEDICINE

## 2019-10-16 PROCEDURE — 25010000002 DIPHENHYDRAMINE PER 50 MG: Performed by: INTERNAL MEDICINE

## 2019-10-16 RX ORDER — SODIUM CHLORIDE 9 MG/ML
250 INJECTION, SOLUTION INTRAVENOUS ONCE
Status: COMPLETED | OUTPATIENT
Start: 2019-10-16 | End: 2019-10-16

## 2019-10-16 RX ORDER — DIPHENHYDRAMINE HYDROCHLORIDE 50 MG/ML
25 INJECTION INTRAMUSCULAR; INTRAVENOUS ONCE
Status: COMPLETED | OUTPATIENT
Start: 2019-10-16 | End: 2019-10-16

## 2019-10-16 RX ORDER — ACETAMINOPHEN 325 MG/1
650 TABLET ORAL ONCE
Status: COMPLETED | OUTPATIENT
Start: 2019-10-16 | End: 2019-10-16

## 2019-10-16 RX ADMIN — FERRIC CARBOXYMALTOSE INJECTION 750 MG: 50 INJECTION, SOLUTION INTRAVENOUS at 14:08

## 2019-10-16 RX ADMIN — ACETAMINOPHEN 650 MG: 325 TABLET, FILM COATED ORAL at 14:03

## 2019-10-16 RX ADMIN — SODIUM CHLORIDE 250 ML: 9 INJECTION, SOLUTION INTRAVENOUS at 14:04

## 2019-10-16 RX ADMIN — DIPHENHYDRAMINE HYDROCHLORIDE 25 MG: 50 INJECTION, SOLUTION INTRAMUSCULAR; INTRAVENOUS at 14:05

## 2019-10-22 ENCOUNTER — OFFICE VISIT (OUTPATIENT)
Dept: INTERNAL MEDICINE | Facility: CLINIC | Age: 79
End: 2019-10-22

## 2019-10-22 VITALS
BODY MASS INDEX: 27.49 KG/M2 | OXYGEN SATURATION: 98 % | WEIGHT: 161 LBS | HEART RATE: 54 BPM | TEMPERATURE: 99 F | RESPIRATION RATE: 16 BRPM | SYSTOLIC BLOOD PRESSURE: 130 MMHG | HEIGHT: 64 IN | DIASTOLIC BLOOD PRESSURE: 62 MMHG

## 2019-10-22 DIAGNOSIS — G89.29 CHRONIC MIDLINE LOW BACK PAIN WITHOUT SCIATICA: ICD-10-CM

## 2019-10-22 DIAGNOSIS — M54.50 CHRONIC MIDLINE LOW BACK PAIN WITHOUT SCIATICA: ICD-10-CM

## 2019-10-22 DIAGNOSIS — E11.42 TYPE 2 DIABETES MELLITUS WITH DIABETIC POLYNEUROPATHY, WITHOUT LONG-TERM CURRENT USE OF INSULIN (HCC): ICD-10-CM

## 2019-10-22 DIAGNOSIS — Z23 FLU VACCINE NEED: ICD-10-CM

## 2019-10-22 DIAGNOSIS — I10 ESSENTIAL HYPERTENSION: ICD-10-CM

## 2019-10-22 DIAGNOSIS — R60.0 BILATERAL LOWER EXTREMITY EDEMA: Primary | ICD-10-CM

## 2019-10-22 PROCEDURE — G0008 ADMIN INFLUENZA VIRUS VAC: HCPCS | Performed by: FAMILY MEDICINE

## 2019-10-22 PROCEDURE — 90653 IIV ADJUVANT VACCINE IM: CPT | Performed by: FAMILY MEDICINE

## 2019-10-22 PROCEDURE — 99214 OFFICE O/P EST MOD 30 MIN: CPT | Performed by: FAMILY MEDICINE

## 2019-10-22 RX ORDER — AMLODIPINE BESYLATE 5 MG/1
5 TABLET ORAL DAILY
Qty: 90 TABLET | Refills: 0 | Status: SHIPPED | OUTPATIENT
Start: 2019-10-22 | End: 2020-01-22

## 2019-10-22 NOTE — PROGRESS NOTES
CC:   Chief Complaint   Patient presents with   • Leg Swelling   • Medication Problem     med review       History:  Dago Nunez is a 79 y.o. female who presents today for follow-up for evaluation of the above:    Needs new prescription for diabetic shoes  Still having leg swelling despite use of Lasix  Had flavor packaging added to the vegetable soup last night for dinner   Wants to reduce meds if possible      ROS:  Review of Systems   Constitutional: Negative for activity change and fatigue.   Cardiovascular: Positive for leg swelling.   Musculoskeletal: Back pain: chronic.   Psychiatric/Behavioral: The patient is nervous/anxious.        Ms. Nunez  reports that she has quit smoking. She has never used smokeless tobacco. She reports that she does not drink alcohol or use drugs.      Current Outpatient Medications:   •  Acetaminophen (TYLENOL ARTHRITIS PAIN PO), Take  by mouth., Disp: , Rfl:   •  albuterol sulfate  (90 Base) MCG/ACT inhaler, Inhale 2 puffs Every 4 (Four) Hours As Needed for Wheezing or Shortness of Air., Disp: 1 inhaler, Rfl: 12  •  amLODIPine (NORVASC) 5 MG tablet, Take 1 tablet by mouth Daily., Disp: 90 tablet, Rfl: 0  •  aspirin 81 MG tablet, Take 81 mg by mouth Daily., Disp: , Rfl:   •  bisoprolol-hydrochlorothiazide (ZIAC) 5-6.25 MG per tablet, Take 1 tablet by mouth Daily., Disp: 90 tablet, Rfl: 3  •  calcium carbonate (OS-REAGAN) 600 MG tablet, Take 600 mg by mouth Daily., Disp: , Rfl:   •  cetirizine (zyrTEC) 10 MG tablet, Take 10 mg by mouth Daily., Disp: , Rfl:   •  citalopram (CeleXA) 10 MG tablet, TAKE 1 TABLET BY MOUTH 1 TIME DAILY, Disp: 30 tablet, Rfl: 11  •  diphenhydrAMINE (BENADRYL) 25 mg capsule, Take 25 mg by mouth Every 6 (Six) Hours As Needed for itching., Disp: , Rfl:   •  DULERA 100-5 MCG/ACT inhaler, Inhale 2 puffs 2 (Two) Times a Day. Rinse and spit after using., Disp: 6 inhaler, Rfl: 0  •  esomeprazole (nexIUM) 40 MG capsule, Take 1 capsule by mouth 2 (Two) Times a  "Day., Disp: 180 capsule, Rfl: 3  •  furosemide (LASIX) 40 MG tablet, Take 1 tablet by mouth 2 (Two) Times a Day As Needed (edema). Patient only takes once a day, Disp: 180 tablet, Rfl: 2  •  gabapentin (NEURONTIN) 300 MG capsule, Take 2 capsules by mouth 3 (Three) Times a Day. (Patient taking differently: Take 600 mg by mouth 3 (Three) Times a Day. Patient is taking only twice daily because of dizziness), Disp: 180 capsule, Rfl: 2  •  metFORMIN ER (GLUCOPHAGE-XR) 500 MG 24 hr tablet, Take 2 tablets by mouth Daily With Breakfast., Disp: 180 tablet, Rfl: 1  •  potassium chloride (K-DUR,KLOR-CON) 20 MEQ CR tablet, Take 1 tablet by mouth Daily. Patient only takes once a day, Disp: 30 tablet, Rfl: 11  •  pravastatin (PRAVACHOL) 20 MG tablet, Take 1 tablet by mouth Daily., Disp: 90 tablet, Rfl: 0  •  spironolactone (ALDACTONE) 25 MG tablet, Take 1 tablet by mouth Daily., Disp: 90 tablet, Rfl: 3  •  sucralfate (CARAFATE) 1 GM/10ML suspension, , Disp: , Rfl:   •  B Complex Vitamins (VITAMIN B COMPLEX) capsule capsule, Take  by mouth Daily., Disp: , Rfl:       OBJECTIVE:  /62 (BP Location: Left arm, Patient Position: Sitting, Cuff Size: Adult)   Pulse 54   Temp 99 °F (37.2 °C) (Temporal)   Resp 16   Ht 162.6 cm (64\")   Wt 73 kg (161 lb)   SpO2 98%   BMI 27.64 kg/m²    Physical Exam   Constitutional: She is oriented to person, place, and time. No distress.   HENT:   Head: Normocephalic and atraumatic.   Nose: Nose normal.   Eyes: Conjunctivae are normal. Right eye exhibits no discharge. Left eye exhibits no discharge. No scleral icterus.   Neck: No tracheal deviation present.   Cardiovascular: Normal rate, regular rhythm, normal heart sounds and intact distal pulses. Exam reveals no gallop and no friction rub.   No murmur heard.  Pulmonary/Chest: Effort normal and breath sounds normal. No respiratory distress. She has no wheezes. She has no rales.   Musculoskeletal: Edema: 2+ LE edema.   Neurological: She is " alert and oriented to person, place, and time.   Decreased foot sensation to monofilament test, 5/10 on right foot, 3/10 on left   Skin: Skin is warm and dry. She is not diaphoretic. No pallor.   Psychiatric: She has a normal mood and affect. Her behavior is normal. Judgment and thought content normal.   Nursing note and vitals reviewed.      Assessment/Plan     Diagnosis Plan   1. Bilateral lower extremity edema     2. Essential hypertension  amLODIPine (NORVASC) 5 MG tablet   3. Flu vaccine need  Fluad Tri 65yr ([id number])   4. Type 2 diabetes mellitus with diabetic polyneuropathy, without long-term current use of insulin (CMS/[facility])  Miscellaneous DME   5. Chronic midline low back pain without sciatica     -Blood pressures currently well controlled, however will reduce dose of amlodipine from 10 mg to 5 mg to see if this will improve her leg swelling  -Patient believes and has read online that gabapentin may be associated with her swelling so she is going to cut her dose in half  -Stressed sodium intake of no more than 2000 mg daily    An After Visit Summary was printed and given to the patient at discharge.  Return if symptoms worsen or fail to improve. Sooner if problems arise.         Shaheen Akbar D.O.  Family Medicine  Osteopathic Neuromusculoskeletal Medicine

## 2019-10-22 NOTE — PATIENT INSTRUCTIONS
Reduce amlodipine (norvasc) from 10 mg to 5 mg daily.     Ok to cut gabapentin dose in 1/2    You can use an extra water pill if needed from time to time.     Eat less than 2,000 mg of salt daily to prevent leg swelling

## 2019-11-11 ENCOUNTER — LAB (OUTPATIENT)
Dept: ONCOLOGY | Facility: CLINIC | Age: 79
End: 2019-11-11

## 2019-11-11 ENCOUNTER — CLINICAL SUPPORT (OUTPATIENT)
Dept: ONCOLOGY | Facility: CLINIC | Age: 79
End: 2019-11-11

## 2019-11-11 VITALS
TEMPERATURE: 97.8 F | DIASTOLIC BLOOD PRESSURE: 78 MMHG | SYSTOLIC BLOOD PRESSURE: 132 MMHG | HEART RATE: 64 BPM | RESPIRATION RATE: 18 BRPM | OXYGEN SATURATION: 98 %

## 2019-11-11 DIAGNOSIS — N18.30 STAGE 3 CHRONIC KIDNEY DISEASE (HCC): ICD-10-CM

## 2019-11-11 DIAGNOSIS — D63.1 ANEMIA IN STAGE 3 CHRONIC KIDNEY DISEASE (HCC): ICD-10-CM

## 2019-11-11 DIAGNOSIS — N18.30 ANEMIA IN STAGE 3 CHRONIC KIDNEY DISEASE (HCC): ICD-10-CM

## 2019-11-11 DIAGNOSIS — D64.9 NORMOCYTIC ANEMIA: ICD-10-CM

## 2019-11-11 LAB
ALBUMIN SERPL-MCNC: 4.5 G/DL (ref 3.5–5.2)
ALBUMIN/GLOB SERPL: 1.3 G/DL
ALP SERPL-CCNC: 74 U/L (ref 39–117)
ALT SERPL W P-5'-P-CCNC: 10 U/L (ref 1–33)
ANION GAP SERPL CALCULATED.3IONS-SCNC: 13 MMOL/L (ref 5–15)
AST SERPL-CCNC: 15 U/L (ref 1–32)
BASOPHILS # BLD AUTO: 0.06 10*3/MM3 (ref 0–0.2)
BASOPHILS NFR BLD AUTO: 0.8 % (ref 0–1.5)
BILIRUB SERPL-MCNC: 0.3 MG/DL (ref 0.2–1.2)
BUN BLD-MCNC: 18 MG/DL (ref 8–23)
BUN/CREAT SERPL: 19.4 (ref 7–25)
CALCIUM SPEC-SCNC: 9.8 MG/DL (ref 8.6–10.5)
CHLORIDE SERPL-SCNC: 97 MMOL/L (ref 98–107)
CO2 SERPL-SCNC: 27 MMOL/L (ref 22–29)
CREAT BLD-MCNC: 0.93 MG/DL (ref 0.57–1)
DEPRECATED RDW RBC AUTO: 45.9 FL (ref 37–54)
EOSINOPHIL # BLD AUTO: 0.41 10*3/MM3 (ref 0–0.4)
EOSINOPHIL NFR BLD AUTO: 5.7 % (ref 0.3–6.2)
ERYTHROCYTE [DISTWIDTH] IN BLOOD BY AUTOMATED COUNT: 12.7 % (ref 12.3–15.4)
FERRITIN SERPL-MCNC: 523.1 NG/ML (ref 13–150)
GFR SERPL CREATININE-BSD FRML MDRD: 58 ML/MIN/1.73
GLOBULIN UR ELPH-MCNC: 3.5 GM/DL
GLUCOSE BLD-MCNC: 154 MG/DL (ref 65–99)
HCT VFR BLD AUTO: 35.8 % (ref 34–46.6)
HGB BLD-MCNC: 11.9 G/DL (ref 12–15.9)
IMM GRANULOCYTES # BLD AUTO: 0.01 10*3/MM3 (ref 0–0.05)
IMM GRANULOCYTES NFR BLD AUTO: 0.1 % (ref 0–0.5)
IRON 24H UR-MRATE: 113 MCG/DL (ref 37–145)
IRON SATN MFR SERPL: 31 % (ref 20–50)
LYMPHOCYTES # BLD AUTO: 1.99 10*3/MM3 (ref 0.7–3.1)
LYMPHOCYTES NFR BLD AUTO: 27.6 % (ref 19.6–45.3)
MCH RBC QN AUTO: 32.5 PG (ref 26.6–33)
MCHC RBC AUTO-ENTMCNC: 33.2 G/DL (ref 31.5–35.7)
MCV RBC AUTO: 97.8 FL (ref 79–97)
MONOCYTES # BLD AUTO: 0.64 10*3/MM3 (ref 0.1–0.9)
MONOCYTES NFR BLD AUTO: 8.9 % (ref 5–12)
NEUTROPHILS # BLD AUTO: 4.09 10*3/MM3 (ref 1.7–7)
NEUTROPHILS NFR BLD AUTO: 56.9 % (ref 42.7–76)
PLATELET # BLD AUTO: 185 10*3/MM3 (ref 140–450)
PMV BLD AUTO: 9.3 FL (ref 6–12)
POTASSIUM BLD-SCNC: 3.8 MMOL/L (ref 3.5–5.2)
PROT SERPL-MCNC: 8 G/DL (ref 6–8.5)
RBC # BLD AUTO: 3.66 10*6/MM3 (ref 3.77–5.28)
SODIUM BLD-SCNC: 137 MMOL/L (ref 136–145)
TIBC SERPL-MCNC: 368 MCG/DL (ref 298–536)
TRANSFERRIN SERPL-MCNC: 247 MG/DL (ref 200–360)
WBC NRBC COR # BLD: 7.2 10*3/MM3 (ref 3.4–10.8)

## 2019-11-11 PROCEDURE — 84466 ASSAY OF TRANSFERRIN: CPT | Performed by: INTERNAL MEDICINE

## 2019-11-11 PROCEDURE — 85025 COMPLETE CBC W/AUTO DIFF WBC: CPT | Performed by: INTERNAL MEDICINE

## 2019-11-11 PROCEDURE — 82728 ASSAY OF FERRITIN: CPT | Performed by: INTERNAL MEDICINE

## 2019-11-11 PROCEDURE — 99211 OFF/OP EST MAY X REQ PHY/QHP: CPT | Performed by: INTERNAL MEDICINE

## 2019-11-11 PROCEDURE — 83540 ASSAY OF IRON: CPT | Performed by: INTERNAL MEDICINE

## 2019-11-11 PROCEDURE — 80053 COMPREHEN METABOLIC PANEL: CPT | Performed by: INTERNAL MEDICINE

## 2019-11-11 NOTE — PROGRESS NOTES
Patient here for lab evaluation and possible Procrit injection for stage III chronic kidney disease.  States that she is feeling good today.  No c/o voiced.  Skin w/d to touch. Color wnl.  Eating and drinking well.  Parameters set to start Procrit if Hgb <10 and Hct <30.  Patient has never required a Procrit injection at this time.  Reviewed labs with patient.  Hgb 11.9 and Hct 35.8-patient does not qualify for a Procrit shot.  Patient GFR was 57 on 10/09 and 65 on 09/03.  If GFR 60 or above patient will not be eligible for a Procrit injection.  Patient will return in 6 weeks for same.  Instructed to call with any problems.  Patient v/u.

## 2019-12-05 ENCOUNTER — OFFICE VISIT (OUTPATIENT)
Dept: INTERNAL MEDICINE | Facility: CLINIC | Age: 79
End: 2019-12-05

## 2019-12-05 VITALS
OXYGEN SATURATION: 99 % | BODY MASS INDEX: 26.12 KG/M2 | TEMPERATURE: 98.3 F | SYSTOLIC BLOOD PRESSURE: 128 MMHG | HEART RATE: 64 BPM | DIASTOLIC BLOOD PRESSURE: 82 MMHG | WEIGHT: 153 LBS | RESPIRATION RATE: 12 BRPM | HEIGHT: 64 IN

## 2019-12-05 DIAGNOSIS — I65.21 ATHEROSCLEROSIS OF RIGHT CAROTID ARTERY: ICD-10-CM

## 2019-12-05 DIAGNOSIS — M54.50 CHRONIC MIDLINE LOW BACK PAIN WITHOUT SCIATICA: ICD-10-CM

## 2019-12-05 DIAGNOSIS — R60.0 BILATERAL LOWER EXTREMITY EDEMA: Primary | ICD-10-CM

## 2019-12-05 DIAGNOSIS — M99.08 SEGMENTAL AND SOMATIC DYSFUNCTION OF RIB CAGE: ICD-10-CM

## 2019-12-05 DIAGNOSIS — G89.29 CHRONIC MIDLINE LOW BACK PAIN WITHOUT SCIATICA: ICD-10-CM

## 2019-12-05 DIAGNOSIS — M99.02 SOMATIC DYSFUNCTION OF SPINE, THORACIC: ICD-10-CM

## 2019-12-05 DIAGNOSIS — M99.05 SOMATIC DYSFUNCTION OF PELVIC REGION: ICD-10-CM

## 2019-12-05 DIAGNOSIS — M99.03 SOMATIC DYSFUNCTION OF SPINE, LUMBAR: ICD-10-CM

## 2019-12-05 DIAGNOSIS — E78.5 HYPERLIPIDEMIA, UNSPECIFIED HYPERLIPIDEMIA TYPE: ICD-10-CM

## 2019-12-05 PROCEDURE — 99214 OFFICE O/P EST MOD 30 MIN: CPT | Performed by: FAMILY MEDICINE

## 2019-12-05 PROCEDURE — 98926 OSTEOPATH MANJ 3-4 REGIONS: CPT | Performed by: FAMILY MEDICINE

## 2019-12-05 RX ORDER — PRAVASTATIN SODIUM 20 MG
20 TABLET ORAL DAILY
Qty: 90 TABLET | Refills: 3 | Status: CANCELLED | OUTPATIENT
Start: 2019-12-05

## 2019-12-05 RX ORDER — PRAVASTATIN SODIUM 40 MG
40 TABLET ORAL DAILY
Qty: 90 TABLET | Refills: 0 | Status: SHIPPED | OUTPATIENT
Start: 2019-12-05 | End: 2020-03-09

## 2019-12-05 RX ORDER — METAXALONE 400 MG/1
400 TABLET ORAL EVERY 8 HOURS SCHEDULED
Qty: 30 TABLET | Refills: 5 | Status: SHIPPED | OUTPATIENT
Start: 2019-12-05 | End: 2019-12-12 | Stop reason: SDUPTHER

## 2019-12-05 NOTE — PROGRESS NOTES
CC:   Chief Complaint   Patient presents with   • Follow-up     Patient c/o joint pain except for her knees. Left ear feels like something is in it.  Needs a diabetic foot exam also today   • Back Pain       History:  Dago Nunez is a 79 y.o. female who presents today for follow-up for evaluation of the above:    Still needs her diabetic shoe prescription, lower extremity edema improved    Complains of chronic achy low back and thoracic back pain that is worse in the morning as well as after rest but is improved with resting after chronic aching.    Needs refill of pravastatin for hyperlipidemia    ROS:  Review of Systems   Constitutional: Negative.    Musculoskeletal: Positive for back pain.   Psychiatric/Behavioral: Negative.         Past Medical History:   Diagnosis Date   • Anemia in stage 3 chronic kidney disease (CMS/[facility]) 11/11/2019   • Arthritis    • Cellulitis    • Diabetes mellitus (CMS/[facility])    • GERD (gastroesophageal reflux disease)    • Hyperlipidemia    • Hypertension    • Kyphosis    • Osteoporosis    • Scoliosis    • Stage 3 chronic kidney disease (CMS/[facility]) 11/11/2019   • Vulvar intraepithelial neoplasia (MOODY) grade 3      Ms. Nunez  reports that she has quit smoking. She has never used smokeless tobacco. She reports that she does not drink alcohol or use drugs.      Current Outpatient Medications:   •  Acetaminophen (TYLENOL ARTHRITIS PAIN PO), Take  by mouth., Disp: , Rfl:   •  albuterol sulfate  (90 Base) MCG/ACT inhaler, Inhale 2 puffs Every 4 (Four) Hours As Needed for Wheezing or Shortness of Air., Disp: 1 inhaler, Rfl: 12  •  amLODIPine (NORVASC) 5 MG tablet, Take 1 tablet by mouth Daily., Disp: 90 tablet, Rfl: 0  •  aspirin 81 MG tablet, Take 81 mg by mouth Daily., Disp: , Rfl:   •  bisoprolol-hydrochlorothiazide (ZIAC) 5-6.25 MG per tablet, Take 1 tablet by mouth Daily., Disp: 90 tablet, Rfl: 3  •  calcium carbonate (OS-REAGAN) 600 MG tablet, Take 600 mg by mouth Daily., Disp: , Rfl:  "  •  cetirizine (zyrTEC) 10 MG tablet, Take 10 mg by mouth Daily., Disp: , Rfl:   •  citalopram (CeleXA) 10 MG tablet, TAKE 1 TABLET BY MOUTH 1 TIME DAILY, Disp: 30 tablet, Rfl: 11  •  diphenhydrAMINE (BENADRYL) 25 mg capsule, Take 25 mg by mouth Every 6 (Six) Hours As Needed for itching., Disp: , Rfl:   •  DULERA 100-5 MCG/ACT inhaler, Inhale 2 puffs 2 (Two) Times a Day. Rinse and spit after using., Disp: 6 inhaler, Rfl: 0  •  esomeprazole (nexIUM) 40 MG capsule, Take 1 capsule by mouth 2 (Two) Times a Day., Disp: 180 capsule, Rfl: 3  •  furosemide (LASIX) 40 MG tablet, Take 1 tablet by mouth 2 (Two) Times a Day As Needed (edema). Patient only takes once a day, Disp: 180 tablet, Rfl: 2  •  gabapentin (NEURONTIN) 300 MG capsule, Take 2 capsules by mouth 3 (Three) Times a Day. (Patient taking differently: Take 600 mg by mouth 3 (Three) Times a Day. Patient is taking only twice daily because of dizziness), Disp: 180 capsule, Rfl: 2  •  metFORMIN ER (GLUCOPHAGE-XR) 500 MG 24 hr tablet, Take 2 tablets by mouth Daily With Breakfast., Disp: 180 tablet, Rfl: 1  •  potassium chloride (K-DUR,KLOR-CON) 20 MEQ CR tablet, Take 1 tablet by mouth Daily. Patient only takes once a day, Disp: 30 tablet, Rfl: 11  •  pravastatin (PRAVACHOL) 40 MG tablet, Take 1 tablet by mouth Daily., Disp: 90 tablet, Rfl: 0  •  spironolactone (ALDACTONE) 25 MG tablet, Take 1 tablet by mouth Daily., Disp: 90 tablet, Rfl: 3  •  B Complex Vitamins (VITAMIN B COMPLEX) capsule capsule, Take  by mouth Daily., Disp: , Rfl:   •  metaxalone (SKELAXIN) 400 MG tablet, Take 1 tablet by mouth Every 8 (Eight) Hours., Disp: 30 tablet, Rfl: 5  •  sucralfate (CARAFATE) 1 GM/10ML suspension, , Disp: , Rfl:       OBJECTIVE:  /82 (BP Location: Left arm, Patient Position: Sitting, Cuff Size: Adult)   Pulse 64   Temp 98.3 °F (36.8 °C) (Temporal)   Resp 12   Ht 162.6 cm (64\")   Wt 69.4 kg (153 lb)   SpO2 99%   BMI 26.26 kg/m²    Physical Exam   Constitutional: " She is oriented to person, place, and time. No distress.   HENT:   Head: Normocephalic and atraumatic.   Nose: Nose normal.   Eyes: Conjunctivae are normal. Right eye exhibits no discharge. Left eye exhibits no discharge. No scleral icterus.   Neck: No tracheal deviation present.   Pulmonary/Chest: Effort normal.   Musculoskeletal: Edema: 1+ LE edema.   Callus present on right fifth toe and left second toe   Neurological: She is alert and oriented to person, place, and time.   Skin: Skin is warm and dry. She is not diaphoretic. No pallor.   Psychiatric: She has a normal mood and affect. Her behavior is normal. Judgment and thought content normal.   Nursing note and vitals reviewed.    Osteopathic Structural Exam  Procedure Note for Osteopathic Manipulative Treatment    Pre-procedure diagnoses: Somatic dysfunctions as listed below.  Consent: Oral consent given for Osteopathic Treatment  Post-procedure diagnoses: same  Complications of procedure: none, patient tolerated procedure well    The evaluation including the history, physical exam and the management decisions, indicate than an appropriate intervention on this date of service is osteopathic manipulative treatment. Oral permission for the osteopathic procedure was obtained. The following treatment methods and the responses for each body region are listed below.        Region Somatic Dysfunction Severity OMT technique Response      Thoracic  T7 FRSR  T10 FRSl severe  Balanced ligamentous tension   [place] percussion Improved       Lumbar Lumbosacral compression Moderate Balanced ligamentous tension  [place] percussion Improved   Pelvic L pelvic diaphragm rotation Moderate Balanced ligamentous tension  [place] percussion Improved      Rib Cage  L rib 9 exhalation somatic dysfunction  Moderate  Balanced ligamentous tension   [place] percussion Improved          Assessment/Plan     Diagnosis Plan   1. Bilateral lower extremity edema     2. Hyperlipidemia,  unspecified hyperlipidemia type  pravastatin (PRAVACHOL) 40 MG tablet   3. Atherosclerosis of right carotid artery  pravastatin (PRAVACHOL) 40 MG tablet   4. Chronic midline low back pain without sciatica  metaxalone (SKELAXIN) 400 MG tablet   5. Somatic dysfunction of spine, thoracic     6. Somatic dysfunction of spine, lumbar     7. Somatic dysfunction of pelvic region     8. Segmental and somatic dysfunction of rib cage     -continue lasix  -Have a statin 20 mg well-tolerated, will increase to 40 mg daily Skelaxin as needed muscle spasms  -OMT to balance autonomic tone, improve fascial symmetry and respiratory/circulatory/lymphatic compliance    An After Visit Summary was printed and given to the patient at discharge.  Return in about 3 months (around 3/5/2020). Sooner if problems arise.         Shaheen Akbar D.O.  Family Medicine  Osteopathic Neuromusculoskeletal Medicine

## 2019-12-09 ENCOUNTER — TELEPHONE (OUTPATIENT)
Dept: INTERNAL MEDICINE | Facility: CLINIC | Age: 79
End: 2019-12-09

## 2019-12-09 NOTE — TELEPHONE ENCOUNTER
----- Message from Shaheen Akbar DO sent at 12/7/2019  2:52 PM CST -----  The patient know that since she tolerated 20 mg of pravastatin may increase to 40 mg for further cholesterol protection.  If she has muscle aches or worsening symptoms on this higher dose please let us know, but I think she will be okay

## 2019-12-12 DIAGNOSIS — M54.50 CHRONIC MIDLINE LOW BACK PAIN WITHOUT SCIATICA: ICD-10-CM

## 2019-12-12 DIAGNOSIS — G89.29 CHRONIC MIDLINE LOW BACK PAIN WITHOUT SCIATICA: ICD-10-CM

## 2019-12-13 RX ORDER — METAXALONE 400 MG/1
400 TABLET ORAL EVERY 8 HOURS SCHEDULED
Qty: 90 TABLET | Refills: 1 | Status: SHIPPED | OUTPATIENT
Start: 2019-12-13 | End: 2020-08-31

## 2019-12-23 ENCOUNTER — CLINICAL SUPPORT (OUTPATIENT)
Dept: ONCOLOGY | Facility: CLINIC | Age: 79
End: 2019-12-23

## 2019-12-23 ENCOUNTER — LAB (OUTPATIENT)
Dept: ONCOLOGY | Facility: CLINIC | Age: 79
End: 2019-12-23

## 2019-12-23 ENCOUNTER — TELEPHONE (OUTPATIENT)
Dept: UROLOGY | Facility: CLINIC | Age: 79
End: 2019-12-23

## 2019-12-23 VITALS
HEART RATE: 52 BPM | DIASTOLIC BLOOD PRESSURE: 70 MMHG | SYSTOLIC BLOOD PRESSURE: 122 MMHG | RESPIRATION RATE: 18 BRPM | OXYGEN SATURATION: 98 % | TEMPERATURE: 98.1 F

## 2019-12-23 DIAGNOSIS — N18.30 STAGE 3 CHRONIC KIDNEY DISEASE (HCC): ICD-10-CM

## 2019-12-23 DIAGNOSIS — D64.9 NORMOCYTIC ANEMIA: ICD-10-CM

## 2019-12-23 LAB
ALBUMIN SERPL-MCNC: 4.5 G/DL (ref 3.5–5.2)
ALBUMIN/GLOB SERPL: 1.4 G/DL
ALP SERPL-CCNC: 67 U/L (ref 39–117)
ALT SERPL W P-5'-P-CCNC: 13 U/L (ref 1–33)
ANION GAP SERPL CALCULATED.3IONS-SCNC: 13 MMOL/L (ref 5–15)
AST SERPL-CCNC: 15 U/L (ref 1–32)
BASOPHILS # BLD AUTO: 0.04 10*3/MM3 (ref 0–0.2)
BASOPHILS NFR BLD AUTO: 0.5 % (ref 0–1.5)
BILIRUB SERPL-MCNC: 0.2 MG/DL (ref 0.2–1.2)
BUN BLD-MCNC: 13 MG/DL (ref 8–23)
BUN/CREAT SERPL: 14.8 (ref 7–25)
CALCIUM SPEC-SCNC: 10.2 MG/DL (ref 8.6–10.5)
CHLORIDE SERPL-SCNC: 97 MMOL/L (ref 98–107)
CO2 SERPL-SCNC: 28 MMOL/L (ref 22–29)
CREAT BLD-MCNC: 0.88 MG/DL (ref 0.57–1)
DEPRECATED RDW RBC AUTO: 48 FL (ref 37–54)
EOSINOPHIL # BLD AUTO: 0.39 10*3/MM3 (ref 0–0.4)
EOSINOPHIL NFR BLD AUTO: 5.3 % (ref 0.3–6.2)
ERYTHROCYTE [DISTWIDTH] IN BLOOD BY AUTOMATED COUNT: 12.9 % (ref 12.3–15.4)
FERRITIN SERPL-MCNC: 309.5 NG/ML (ref 13–150)
GFR SERPL CREATININE-BSD FRML MDRD: 62 ML/MIN/1.73
GLOBULIN UR ELPH-MCNC: 3.3 GM/DL
GLUCOSE BLD-MCNC: 156 MG/DL (ref 65–99)
HCT VFR BLD AUTO: 35.7 % (ref 34–46.6)
HGB BLD-MCNC: 11.9 G/DL (ref 12–15.9)
IMM GRANULOCYTES # BLD AUTO: 0.01 10*3/MM3 (ref 0–0.05)
IMM GRANULOCYTES NFR BLD AUTO: 0.1 % (ref 0–0.5)
IRON 24H UR-MRATE: 93 MCG/DL (ref 37–145)
IRON SATN MFR SERPL: 24 % (ref 20–50)
LYMPHOCYTES # BLD AUTO: 2.01 10*3/MM3 (ref 0.7–3.1)
LYMPHOCYTES NFR BLD AUTO: 27.4 % (ref 19.6–45.3)
MCH RBC QN AUTO: 33.5 PG (ref 26.6–33)
MCHC RBC AUTO-ENTMCNC: 33.3 G/DL (ref 31.5–35.7)
MCV RBC AUTO: 100.6 FL (ref 79–97)
MONOCYTES # BLD AUTO: 0.54 10*3/MM3 (ref 0.1–0.9)
MONOCYTES NFR BLD AUTO: 7.4 % (ref 5–12)
NEUTROPHILS # BLD AUTO: 4.34 10*3/MM3 (ref 1.7–7)
NEUTROPHILS NFR BLD AUTO: 59.3 % (ref 42.7–76)
PLATELET # BLD AUTO: 180 10*3/MM3 (ref 140–450)
PMV BLD AUTO: 10.2 FL (ref 6–12)
POTASSIUM BLD-SCNC: 3.8 MMOL/L (ref 3.5–5.2)
PROT SERPL-MCNC: 7.8 G/DL (ref 6–8.5)
RBC # BLD AUTO: 3.55 10*6/MM3 (ref 3.77–5.28)
SODIUM BLD-SCNC: 138 MMOL/L (ref 136–145)
TIBC SERPL-MCNC: 380 MCG/DL (ref 298–536)
TRANSFERRIN SERPL-MCNC: 255 MG/DL (ref 200–360)
WBC NRBC COR # BLD: 7.33 10*3/MM3 (ref 3.4–10.8)

## 2019-12-23 PROCEDURE — 84466 ASSAY OF TRANSFERRIN: CPT | Performed by: INTERNAL MEDICINE

## 2019-12-23 PROCEDURE — 82728 ASSAY OF FERRITIN: CPT | Performed by: INTERNAL MEDICINE

## 2019-12-23 PROCEDURE — 83540 ASSAY OF IRON: CPT | Performed by: INTERNAL MEDICINE

## 2019-12-23 PROCEDURE — 80053 COMPREHEN METABOLIC PANEL: CPT | Performed by: INTERNAL MEDICINE

## 2019-12-23 PROCEDURE — 99211 OFF/OP EST MAY X REQ PHY/QHP: CPT | Performed by: INTERNAL MEDICINE

## 2019-12-23 PROCEDURE — 85025 COMPLETE CBC W/AUTO DIFF WBC: CPT | Performed by: INTERNAL MEDICINE

## 2019-12-23 NOTE — PROGRESS NOTES
Patient here for lab evaluation for possible Retacrit for stage III chronic kidney disease.  States that she is feeling good today.  No c/o voiced.  Skin w/d to touch.  Color wnl.  Eating and drinking well.  Parameters set for Retacrit 40,000 units for Hgb <10 and Hct <30.  Reviewed labs with patient, Hgb 11.9 and Hct 35.7-patient does not meet parameters to initiate injection.  Patient has never required an injection.  Patient has received one dose of Injectafer on 10/16/19 and counts have remained stable since then.  Will talk to Dr Stafford about decreasing her frequency of visits. She has an appointment with him on 01/13/20-she will discuss this with him at that time also.  Instructed to call with any problems.  Patient v/u.

## 2019-12-27 ENCOUNTER — TELEPHONE (OUTPATIENT)
Dept: UROLOGY | Facility: CLINIC | Age: 79
End: 2019-12-27

## 2019-12-27 NOTE — TELEPHONE ENCOUNTER
Pt called voicing concern when urinating her urine sprays out. Pt is thinking scar tissue has effected her urethra. Patient is under the impression that Dr. Conway will have to dilate her urethra. Pt is wondering when should she come in to have this done.

## 2020-01-06 ENCOUNTER — PROCEDURE VISIT (OUTPATIENT)
Dept: UROLOGY | Facility: CLINIC | Age: 80
End: 2020-01-06

## 2020-01-06 DIAGNOSIS — Z87.448 HISTORY OF URETHRAL STRICTURE: ICD-10-CM

## 2020-01-06 DIAGNOSIS — R33.9 RETENTION OF URINE: Primary | ICD-10-CM

## 2020-01-06 PROCEDURE — 99213 OFFICE O/P EST LOW 20 MIN: CPT | Performed by: UROLOGY

## 2020-01-06 NOTE — PROGRESS NOTES
Subjective    Ms. Nunez is 79 y.o. female    Chief Complaint: Urethral Stenosis  History of Present Illness     Urinary Retention  Patient complains of urinary retention. Onset of retention was several years ago and was sudden in onset. Patient currently does not have a urinary catheter in place.  NA ml of urine were drained when catheter was placed. Prior to this event voiding symptoms consisted of slow stream, difficulty passing catheter. Prior treatments include urethral dilations. Recent medications that may have affected his voiding include none.    The following portions of the patient's history were reviewed and updated as appropriate: allergies, current medications, past family history, past medical history, past social history, past surgical history and problem list.    Review of Systems   Constitutional: Negative for chills and fever.   Gastrointestinal: Negative for abdominal pain, anal bleeding and blood in stool.   Genitourinary: Positive for difficulty urinating. Negative for dysuria and hematuria.         Current Outpatient Medications:   •  Acetaminophen (TYLENOL ARTHRITIS PAIN PO), Take  by mouth., Disp: , Rfl:   •  albuterol sulfate  (90 Base) MCG/ACT inhaler, Inhale 2 puffs Every 4 (Four) Hours As Needed for Wheezing or Shortness of Air., Disp: 1 inhaler, Rfl: 12  •  amLODIPine (NORVASC) 5 MG tablet, Take 1 tablet by mouth Daily., Disp: 90 tablet, Rfl: 0  •  aspirin 81 MG tablet, Take 81 mg by mouth Daily., Disp: , Rfl:   •  B Complex Vitamins (VITAMIN B COMPLEX) capsule capsule, Take  by mouth Daily., Disp: , Rfl:   •  bisoprolol-hydrochlorothiazide (ZIAC) 5-6.25 MG per tablet, Take 1 tablet by mouth Daily., Disp: 90 tablet, Rfl: 3  •  calcium carbonate (OS-REAGAN) 600 MG tablet, Take 600 mg by mouth Daily., Disp: , Rfl:   •  cetirizine (zyrTEC) 10 MG tablet, Take 10 mg by mouth Daily., Disp: , Rfl:   •  citalopram (CeleXA) 10 MG tablet, TAKE 1 TABLET BY MOUTH 1 TIME DAILY, Disp: 30 tablet,  Rfl: 11  •  diphenhydrAMINE (BENADRYL) 25 mg capsule, Take 25 mg by mouth Every 6 (Six) Hours As Needed for itching., Disp: , Rfl:   •  DULERA 100-5 MCG/ACT inhaler, Inhale 2 puffs 2 (Two) Times a Day. Rinse and spit after using., Disp: 6 inhaler, Rfl: 0  •  esomeprazole (nexIUM) 40 MG capsule, Take 1 capsule by mouth 2 (Two) Times a Day., Disp: 180 capsule, Rfl: 3  •  furosemide (LASIX) 40 MG tablet, Take 1 tablet by mouth 2 (Two) Times a Day As Needed (edema). Patient only takes once a day, Disp: 180 tablet, Rfl: 2  •  gabapentin (NEURONTIN) 300 MG capsule, Take 2 capsules by mouth 3 (Three) Times a Day. (Patient taking differently: Take 600 mg by mouth 3 (Three) Times a Day. Patient is taking only twice daily because of dizziness), Disp: 180 capsule, Rfl: 2  •  metaxalone (SKELAXIN) 400 MG tablet, Take 1 tablet by mouth Every 8 (Eight) Hours., Disp: 90 tablet, Rfl: 1  •  metFORMIN ER (GLUCOPHAGE-XR) 500 MG 24 hr tablet, Take 2 tablets by mouth Daily With Breakfast., Disp: 180 tablet, Rfl: 1  •  potassium chloride (K-DUR,KLOR-CON) 20 MEQ CR tablet, Take 1 tablet by mouth Daily. Patient only takes once a day, Disp: 30 tablet, Rfl: 11  •  pravastatin (PRAVACHOL) 40 MG tablet, Take 1 tablet by mouth Daily., Disp: 90 tablet, Rfl: 0  •  spironolactone (ALDACTONE) 25 MG tablet, Take 1 tablet by mouth Daily., Disp: 90 tablet, Rfl: 3  •  sucralfate (CARAFATE) 1 GM/10ML suspension, , Disp: , Rfl:     Past Medical History:   Diagnosis Date   • Anemia in stage 3 chronic kidney disease (CMS/H[facility]) 11/11/2019   • Arthritis    • Cellulitis    • Diabetes mellitus (CMS/HCC)    • GERD (gastroesophageal reflux disease)    • Hyperlipidemia    • Hypertension    • Kyphosis    • Osteoporosis    • Scoliosis    • Stage 3 chronic kidney disease (CMS/HCC) 11/11/2019   • Vulvar intraepithelial neoplasia (MOODY) grade 3        Past Surgical History:   Procedure Laterality Date   • APPENDECTOMY     • BREAST CYST ASPIRATION Left    • COLONOSCOPY   01/12/2011   • ENDOSCOPY  07/01/2014   • HYSTERECTOMY     • TONSILLECTOMY     • VAGINA SURGERY         Social History     Socioeconomic History   • Marital status:      Spouse name: Not on file   • Number of children: Not on file   • Years of education: Not on file   • Highest education level: Not on file   Tobacco Use   • Smoking status: Former Smoker   • Smokeless tobacco: Never Used   Substance and Sexual Activity   • Alcohol use: No   • Drug use: No   • Sexual activity: Never       Family History   Problem Relation Age of Onset   • Cancer Mother    • Hypertension Mother    • Osteoporosis Mother    • Dementia Mother    • Heart disease Father    • Parkinsonism Father    • Cancer Sister    • Breast cancer Sister    • Diabetes Brother    • Heart disease Paternal Grandfather        Objective    There were no vitals taken for this visit.    Physical Exam   Constitutional: She is oriented to person, place, and time. She appears well-developed and well-nourished. No distress.   Pulmonary/Chest: Effort normal.   Abdominal: Soft. She exhibits no distension and no mass. There is no tenderness. There is no rebound and no guarding. No hernia.   Neurological: She is alert and oriented to person, place, and time.   Skin: Skin is warm and dry. She is not diaphoretic.   Psychiatric: She has a normal mood and affect.   Vitals reviewed.          Results for orders placed or performed in visit on 12/23/19   Comprehensive Metabolic Panel   Result Value Ref Range    Glucose 156 (H) 65 - 99 mg/dL    BUN 13 8 - 23 mg/dL    Creatinine 0.88 0.57 - 1.00 mg/dL    Sodium 138 136 - 145 mmol/L    Potassium 3.8 3.5 - 5.2 mmol/L    Chloride 97 (L) 98 - 107 mmol/L    CO2 28.0 22.0 - 29.0 mmol/L    Calcium 10.2 8.6 - 10.5 mg/dL    Total Protein 7.8 6.0 - 8.5 g/dL    Albumin 4.50 3.50 - 5.20 g/dL    ALT (SGPT) 13 1 - 33 U/L    AST (SGOT) 15 1 - 32 U/L    Alkaline Phosphatase 67 39 - 117 U/L    Total Bilirubin 0.2 0.2 - 1.2 mg/dL    eGFR Non   Amer 62 >60 mL/min/1.73    Globulin 3.3 gm/dL    A/G Ratio 1.4 g/dL    BUN/Creatinine Ratio 14.8 7.0 - 25.0    Anion Gap 13.0 5.0 - 15.0 mmol/L   Ferritin   Result Value Ref Range    Ferritin 309.50 (H) 13.00 - 150.00 ng/mL   Iron Profile   Result Value Ref Range    Iron 93 37 - 145 mcg/dL    Iron Saturation 24 20 - 50 %    Transferrin 255 200 - 360 mg/dL    TIBC 380 298 - 536 mcg/dL   CBC Auto Differential   Result Value Ref Range    WBC 7.33 3.40 - 10.80 10*3/mm3    RBC 3.55 (L) 3.77 - 5.28 10*6/mm3    Hemoglobin 11.9 (L) 12.0 - 15.9 g/dL    Hematocrit 35.7 34.0 - 46.6 %    .6 (H) 79.0 - 97.0 fL    MCH 33.5 (H) 26.6 - 33.0 pg    MCHC 33.3 31.5 - 35.7 g/dL    RDW 12.9 12.3 - 15.4 %    RDW-SD 48.0 37.0 - 54.0 fl    MPV 10.2 6.0 - 12.0 fL    Platelets 180 140 - 450 10*3/mm3    Neutrophil % 59.3 42.7 - 76.0 %    Lymphocyte % 27.4 19.6 - 45.3 %    Monocyte % 7.4 5.0 - 12.0 %    Eosinophil % 5.3 0.3 - 6.2 %    Basophil % 0.5 0.0 - 1.5 %    Immature Grans % 0.1 0.0 - 0.5 %    Neutrophils, Absolute 4.34 1.70 - 7.00 10*3/mm3    Lymphocytes, Absolute 2.01 0.70 - 3.10 10*3/mm3    Monocytes, Absolute 0.54 0.10 - 0.90 10*3/mm3    Eosinophils, Absolute 0.39 0.00 - 0.40 10*3/mm3    Basophils, Absolute 0.04 0.00 - 0.20 10*3/mm3    Immature Grans, Absolute 0.01 0.00 - 0.05 10*3/mm3     Assessment and Plan    There are no diagnoses linked to this encounter.      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  She had multiple external surgeries.  Dr. Boggs has been doing dilations periodically.  Her last dilation was in 2019.      I was unable to dilate her with a 14 [place] sound today.  Reports I do not have any sounds available to me that were smaller than this.  I counseled her she needs to follow-up quickly with Dr. Leblanc's at [facility].  I explained to her that we do not have the equipment to dilate her urethra at this  institution currently.  She does have a very complex problem and I believe would be best served given her multiple reconstructive surgeries being seen at a tertiary care center.

## 2020-01-08 DIAGNOSIS — E11.42 TYPE 2 DIABETES MELLITUS WITH DIABETIC POLYNEUROPATHY, WITHOUT LONG-TERM CURRENT USE OF INSULIN (HCC): Primary | ICD-10-CM

## 2020-01-08 NOTE — TELEPHONE ENCOUNTER
Patient received a letter stating that she needs a PA for Metformin  MG.     She is also needing a refill for gabapentin 300 MG sent to [facility].

## 2020-01-09 RX ORDER — GABAPENTIN 300 MG/1
600 CAPSULE ORAL 3 TIMES DAILY
Qty: 180 CAPSULE | Refills: 2 | Status: SHIPPED | OUTPATIENT
Start: 2020-01-09 | End: 2020-07-27

## 2020-01-09 NOTE — TELEPHONE ENCOUNTER
Can we do a new authorization? Her last CMP had normal liver function. She should have her metformin.

## 2020-01-09 NOTE — TELEPHONE ENCOUNTER
I tried to re-do the PA, it would not complete. States that you can call the pharmacist at [phone].   MM

## 2020-01-13 ENCOUNTER — PROCEDURE VISIT (OUTPATIENT)
Dept: UROLOGY | Facility: CLINIC | Age: 80
End: 2020-01-13

## 2020-01-13 DIAGNOSIS — R33.9 RETENTION OF URINE: Primary | ICD-10-CM

## 2020-01-13 LAB
BILIRUB BLD-MCNC: NEGATIVE MG/DL
CLARITY, POC: CLEAR
COLOR UR: YELLOW
GLUCOSE UR STRIP-MCNC: ABNORMAL MG/DL
KETONES UR QL: NEGATIVE
LEUKOCYTE EST, POC: NEGATIVE
NITRITE UR-MCNC: NEGATIVE MG/ML
PH UR: 5.5 [PH] (ref 5–8)
PROT UR STRIP-MCNC: NEGATIVE MG/DL
RBC # UR STRIP: NEGATIVE /UL
SP GR UR: 1.02 (ref 1–1.03)
UROBILINOGEN UR QL: NORMAL

## 2020-01-13 PROCEDURE — 52281 CYSTOSCOPY AND TREATMENT: CPT | Performed by: UROLOGY

## 2020-01-13 PROCEDURE — 81003 URINALYSIS AUTO W/O SCOPE: CPT | Performed by: UROLOGY

## 2020-01-13 NOTE — PROGRESS NOTES
Pre- operative diagnosis:  Urethral Stricture    Post operative diagnosis:  Same    Procedure:  The patient was prepped and draped in a normal sterile fashion.  The urethra was anesthetized with 2% lidocaine jelly.  The patient was sequentially dilated from 12 up to 22 [place] using female urethral sounds.    Patient tolerated the procedure well    Complications: none    Blood loss: minimal    Follow up:    Routine follow up

## 2020-01-14 ENCOUNTER — TELEPHONE (OUTPATIENT)
Dept: INTERNAL MEDICINE | Facility: CLINIC | Age: 80
End: 2020-01-14

## 2020-01-14 DIAGNOSIS — E11.42 TYPE 2 DIABETES MELLITUS WITH DIABETIC POLYNEUROPATHY, WITHOUT LONG-TERM CURRENT USE OF INSULIN (HCC): Primary | ICD-10-CM

## 2020-01-14 NOTE — TELEPHONE ENCOUNTER
Patient called, she spoke with her insurance co and wants to know if you have called to speak with the pharmacist.    MANDY

## 2020-01-20 ENCOUNTER — LAB (OUTPATIENT)
Dept: LAB | Facility: HOSPITAL | Age: 80
End: 2020-01-20

## 2020-01-20 DIAGNOSIS — E11.42 TYPE 2 DIABETES MELLITUS WITH DIABETIC POLYNEUROPATHY, WITHOUT LONG-TERM CURRENT USE OF INSULIN (HCC): ICD-10-CM

## 2020-01-20 PROCEDURE — 36415 COLL VENOUS BLD VENIPUNCTURE: CPT

## 2020-01-20 PROCEDURE — 83036 HEMOGLOBIN GLYCOSYLATED A1C: CPT | Performed by: FAMILY MEDICINE

## 2020-01-21 ENCOUNTER — TELEPHONE (OUTPATIENT)
Dept: INTERNAL MEDICINE | Facility: CLINIC | Age: 80
End: 2020-01-21

## 2020-01-21 LAB — HBA1C MFR BLD: 6 % (ref 4.8–5.6)

## 2020-01-21 NOTE — TELEPHONE ENCOUNTER
Patient was informed       ----- Message from Shaheen Akbar DO sent at 1/21/2020 10:33 AM CST -----  Please let patient know that A1c is stable enough that she can stop metformin and we can just repeat it in 3 months

## 2020-01-22 ENCOUNTER — OFFICE VISIT (OUTPATIENT)
Dept: ONCOLOGY | Facility: CLINIC | Age: 80
End: 2020-01-22

## 2020-01-22 ENCOUNTER — TELEPHONE (OUTPATIENT)
Dept: INTERNAL MEDICINE | Facility: CLINIC | Age: 80
End: 2020-01-22

## 2020-01-22 VITALS
WEIGHT: 162 LBS | SYSTOLIC BLOOD PRESSURE: 142 MMHG | TEMPERATURE: 98.6 F | DIASTOLIC BLOOD PRESSURE: 64 MMHG | HEART RATE: 56 BPM | HEIGHT: 64 IN | RESPIRATION RATE: 18 BRPM | OXYGEN SATURATION: 98 % | BODY MASS INDEX: 27.66 KG/M2

## 2020-01-22 DIAGNOSIS — I10 ESSENTIAL HYPERTENSION: ICD-10-CM

## 2020-01-22 DIAGNOSIS — D64.9 NORMOCYTIC ANEMIA: Primary | ICD-10-CM

## 2020-01-22 PROCEDURE — 99214 OFFICE O/P EST MOD 30 MIN: CPT | Performed by: INTERNAL MEDICINE

## 2020-01-22 NOTE — TELEPHONE ENCOUNTER
Patient is needing a PA for amlodipine 5 MG tablet. She is needing this prescribed in a 90 day supply and sent to [facility].

## 2020-01-23 RX ORDER — AMLODIPINE BESYLATE 5 MG/1
5 TABLET ORAL DAILY
Qty: 90 TABLET | Refills: 2 | Status: SHIPPED | OUTPATIENT
Start: 2020-01-23 | End: 2020-08-31 | Stop reason: DRUGHIGH

## 2020-02-26 ENCOUNTER — TELEPHONE (OUTPATIENT)
Dept: INTERNAL MEDICINE | Facility: CLINIC | Age: 80
End: 2020-02-26

## 2020-02-28 ENCOUNTER — TELEPHONE (OUTPATIENT)
Dept: INTERNAL MEDICINE | Facility: CLINIC | Age: 80
End: 2020-02-28

## 2020-03-02 ENCOUNTER — OFFICE VISIT (OUTPATIENT)
Dept: INTERNAL MEDICINE | Facility: CLINIC | Age: 80
End: 2020-03-02

## 2020-03-02 VITALS
DIASTOLIC BLOOD PRESSURE: 70 MMHG | SYSTOLIC BLOOD PRESSURE: 138 MMHG | HEART RATE: 58 BPM | HEIGHT: 64 IN | WEIGHT: 168 LBS | BODY MASS INDEX: 28.68 KG/M2 | OXYGEN SATURATION: 98 % | RESPIRATION RATE: 18 BRPM

## 2020-03-02 DIAGNOSIS — M99.04 SOMATIC DYSFUNCTION OF SPINE, SACRAL: ICD-10-CM

## 2020-03-02 DIAGNOSIS — M99.05 SOMATIC DYSFUNCTION OF PELVIC REGION: ICD-10-CM

## 2020-03-02 DIAGNOSIS — M99.09 SEGMENTAL AND SOMATIC DYSFUNCTION OF ABDOMEN AND OTHER REGIONS: ICD-10-CM

## 2020-03-02 DIAGNOSIS — M54.50 CHRONIC MIDLINE LOW BACK PAIN WITHOUT SCIATICA: Primary | ICD-10-CM

## 2020-03-02 DIAGNOSIS — M99.06 SOMATIC DYSFUNCTION OF LOWER EXTREMITY: ICD-10-CM

## 2020-03-02 DIAGNOSIS — M99.02 SOMATIC DYSFUNCTION OF SPINE, THORACIC: ICD-10-CM

## 2020-03-02 DIAGNOSIS — M99.08 SEGMENTAL AND SOMATIC DYSFUNCTION OF RIB CAGE: ICD-10-CM

## 2020-03-02 DIAGNOSIS — G89.29 CHRONIC MIDLINE LOW BACK PAIN WITHOUT SCIATICA: Primary | ICD-10-CM

## 2020-03-02 PROCEDURE — 98927 OSTEOPATH MANJ 5-6 REGIONS: CPT | Performed by: FAMILY MEDICINE

## 2020-03-02 PROCEDURE — 99214 OFFICE O/P EST MOD 30 MIN: CPT | Performed by: FAMILY MEDICINE

## 2020-03-02 RX ORDER — TRAMADOL HYDROCHLORIDE 50 MG/1
50 TABLET ORAL EVERY 8 HOURS PRN
Qty: 30 TABLET | Refills: 0 | Status: SHIPPED | OUTPATIENT
Start: 2020-03-02 | End: 2020-03-16 | Stop reason: SDUPTHER

## 2020-03-02 NOTE — PROGRESS NOTES
CC:   Chief Complaint   Patient presents with   • Follow-up     PATIENT IS HAVING BACK ISSUES. MEDS ARENT HELPING WITH THE PAIN       History:  Dago Nunez is a 79 y.o. female who presents today for follow-up for evaluation of the above:    Worsening thoracolumbar back pain not improved with Tylenol and gabapentin, worse with prolonged standing.  Pain is dull and cramping, has not been to PT in about a year. Might be worse due to weather change.     ROS:  Review of Systems   Constitutional: Positive for activity change. Negative for fatigue.   Musculoskeletal: Positive for arthralgias, back pain and myalgias. Negative for gait problem.   Psychiatric/Behavioral: Positive for sleep disturbance. Negative for behavioral problems.       Ms. Nunez  reports that she has quit smoking. She has never used smokeless tobacco. She reports that she does not drink alcohol or use drugs.      Current Outpatient Medications:   •  Acetaminophen (TYLENOL ARTHRITIS PAIN PO), Take  by mouth., Disp: , Rfl:   •  amLODIPine (NORVASC) 5 MG tablet, Take 1 tablet by mouth Daily., Disp: 90 tablet, Rfl: 2  •  aspirin 81 MG tablet, Take 81 mg by mouth Daily., Disp: , Rfl:   •  B Complex Vitamins (VITAMIN B COMPLEX) capsule capsule, Take  by mouth Daily., Disp: , Rfl:   •  bisoprolol-hydrochlorothiazide (ZIAC) 5-6.25 MG per tablet, Take 1 tablet by mouth Daily., Disp: 90 tablet, Rfl: 3  •  calcium carbonate (OS-REAGAN) 600 MG tablet, Take 600 mg by mouth Daily., Disp: , Rfl:   •  cetirizine (zyrTEC) 10 MG tablet, Take 10 mg by mouth Daily., Disp: , Rfl:   •  citalopram (CeleXA) 10 MG tablet, TAKE 1 TABLET BY MOUTH 1 TIME DAILY, Disp: 30 tablet, Rfl: 11  •  diphenhydrAMINE (BENADRYL) 25 mg capsule, Take 25 mg by mouth Every 6 (Six) Hours As Needed for itching., Disp: , Rfl:   •  DULERA 100-5 MCG/ACT inhaler, Inhale 2 puffs 2 (Two) Times a Day. Rinse and spit after using., Disp: 6 inhaler, Rfl: 0  •  esomeprazole (nexIUM) 40 MG capsule, Take 1  "capsule by mouth 2 (Two) Times a Day., Disp: 180 capsule, Rfl: 3  •  furosemide (LASIX) 40 MG tablet, Take 1 tablet by mouth 2 (Two) Times a Day As Needed (edema). Patient only takes once a day, Disp: 180 tablet, Rfl: 2  •  gabapentin (NEURONTIN) 300 MG capsule, Take 2 capsules by mouth 3 (Three) Times a Day., Disp: 180 capsule, Rfl: 2  •  metaxalone (SKELAXIN) 400 MG tablet, Take 1 tablet by mouth Every 8 (Eight) Hours., Disp: 90 tablet, Rfl: 1  •  potassium chloride (K-DUR,KLOR-CON) 20 MEQ CR tablet, Take 1 tablet by mouth Daily. Patient only takes once a day, Disp: 30 tablet, Rfl: 11  •  pravastatin (PRAVACHOL) 40 MG tablet, Take 1 tablet by mouth Daily., Disp: 90 tablet, Rfl: 0  •  spironolactone (ALDACTONE) 25 MG tablet, Take 1 tablet by mouth Daily., Disp: 90 tablet, Rfl: 3  •  albuterol sulfate  (90 Base) MCG/ACT inhaler, Inhale 2 puffs Every 4 (Four) Hours As Needed for Wheezing or Shortness of Air., Disp: 1 inhaler, Rfl: 12  •  metFORMIN ER (GLUCOPHAGE-XR) 500 MG 24 hr tablet, Take 2 tablets by mouth Daily With Breakfast., Disp: 180 tablet, Rfl: 1  •  sucralfate (CARAFATE) 1 GM/10ML suspension, , Disp: , Rfl:   •  traMADol (ULTRAM) 50 MG tablet, Take 1 tablet by mouth Every 8 (Eight) Hours As Needed for Moderate Pain  or Severe Pain ., Disp: 30 tablet, Rfl: 0      OBJECTIVE:  /70 (BP Location: Left arm, Patient Position: Sitting, Cuff Size: Adult)   Pulse 58   Resp 18   Ht 162.6 cm (64\")   Wt 76.2 kg (168 lb)   SpO2 98%   BMI 28.84 kg/m²    Physical Exam   Constitutional: She is oriented to person, place, and time. No distress.   HENT:   Head: Normocephalic and atraumatic.   Nose: Nose normal.   Eyes: Conjunctivae are normal. Right eye exhibits no discharge. Left eye exhibits no discharge. No scleral icterus.   Neck: No tracheal deviation present.   Pulmonary/Chest: Effort normal.   Musculoskeletal:   Hypertonic thoracolumbar paraspinals left greater than right   Neurological: She is " alert and oriented to person, place, and time.   Skin: Skin is warm and dry. She is not diaphoretic. No pallor.   Psychiatric: She has a normal mood and affect. Her behavior is normal. Judgment and thought content normal.   Nursing note and vitals reviewed.    Osteopathic Structural Exam  Procedure Note for Osteopathic Manipulative Treatment    Pre-procedure diagnoses: Somatic dysfunctions as listed below.  Consent: Oral consent given for Osteopathic Treatment  Post-procedure diagnoses: same  Complications of procedure: none, patient tolerated procedure well    The evaluation including the history, physical exam and the management decisions, indicate than an appropriate intervention on this date of service is osteopathic manipulative treatment. Oral permission for the osteopathic procedure was obtained. The following treatment methods and the responses for each body region are listed below.        Region Somatic Dysfunction Severity OMT technique Response      Thoracic  T11 FRSR  T10 ERSL Moderate  Balanced ligamentous tension  Improved       Lumbar L4 ERSR Moderate Balanced ligamentous tension Improved      Sacral Lumbosacral compression Severe  Balanced ligamentous tension Improved   Pelvic R anterior rotation Moderate Balanced ligamentous tension Improved      Lower Extremities  L psoas spasm  Moderate  Balanced ligamentous tension Improved       Rib Cage  L rib 11 exhalation somatic dysfunction  Moderate  Balanced ligamentous tension  Improved       Abdomen & Other Sites  L kidney inhalation SD  Thoracic diaphragm L crura spasm Moderate  Myofascial Release Improved      Assessment/Plan     Diagnosis Plan   1. Chronic midline low back pain without sciatica  traMADol (ULTRAM) 50 MG tablet   2. Somatic dysfunction of spine, thoracic     3. Segmental and somatic dysfunction of rib cage     4. Segmental and somatic dysfunction of abdomen and other regions     5. Somatic dysfunction of spine, sacral     6. Somatic  dysfunction of lower extremity     7. Somatic dysfunction of pelvic region     acute exacerbation  OMT to balance autonomic tone, improve fascial symmetry and respiratory/circulatory/lymphatic compliance  Tramadol trial  F/u 4 weeks, consider PT based on assessment at that time    An After Visit Summary was printed and given to the patient at discharge.  Return in about 4 weeks (around 3/30/2020). Sooner if problems arise.         Shaheen Akbar D.O.  [facility]  Osteopathic Neuromusculoskeletal Medicine

## 2020-03-09 DIAGNOSIS — I65.21 ATHEROSCLEROSIS OF RIGHT CAROTID ARTERY: ICD-10-CM

## 2020-03-09 DIAGNOSIS — E78.5 HYPERLIPIDEMIA, UNSPECIFIED HYPERLIPIDEMIA TYPE: ICD-10-CM

## 2020-03-09 RX ORDER — PRAVASTATIN SODIUM 40 MG
40 TABLET ORAL DAILY
Qty: 90 TABLET | Refills: 3 | Status: SHIPPED | OUTPATIENT
Start: 2020-03-09 | End: 2020-08-31 | Stop reason: DRUGHIGH

## 2020-03-16 ENCOUNTER — TELEPHONE (OUTPATIENT)
Dept: INTERNAL MEDICINE | Facility: CLINIC | Age: 80
End: 2020-03-16

## 2020-03-16 DIAGNOSIS — G89.29 CHRONIC MIDLINE LOW BACK PAIN WITHOUT SCIATICA: ICD-10-CM

## 2020-03-16 DIAGNOSIS — M54.50 CHRONIC MIDLINE LOW BACK PAIN WITHOUT SCIATICA: ICD-10-CM

## 2020-03-16 RX ORDER — TRAMADOL HYDROCHLORIDE 50 MG/1
50 TABLET ORAL EVERY 8 HOURS PRN
Qty: 21 TABLET | Refills: 0 | Status: SHIPPED | OUTPATIENT
Start: 2020-03-16 | End: 2020-03-27 | Stop reason: SDUPTHER

## 2020-03-16 NOTE — TELEPHONE ENCOUNTER
Patient is still waiting for her prescription for tramadol 50 MG tablet that was sent to [facility] March 2nd. [facility] informed the patient that her insurance will not pay for a 30 day supply without having a 7 day trial to see if she will be able to take the medication. She needs a 7 day prescription to be sent to [facility] in [place]. If the patient doesn't have any problems with the medication, she is to call [facility] and let them know and they will then fill the original prescription .    Please call [facility] or the patient with any questions.

## 2020-03-26 ENCOUNTER — TELEPHONE (OUTPATIENT)
Dept: INTERNAL MEDICINE | Facility: CLINIC | Age: 80
End: 2020-03-26

## 2020-03-26 NOTE — TELEPHONE ENCOUNTER
PATIENT STATED SHE IS TAKING HER PAIN MEDICATION ONCE A DAY INSTEAD OF tid. iT WAS MAKING HER TOO SLEEPY

## 2020-03-27 ENCOUNTER — TELEPHONE (OUTPATIENT)
Dept: ONCOLOGY | Facility: CLINIC | Age: 80
End: 2020-03-27

## 2020-03-27 DIAGNOSIS — M54.50 CHRONIC MIDLINE LOW BACK PAIN WITHOUT SCIATICA: ICD-10-CM

## 2020-03-27 DIAGNOSIS — G89.29 CHRONIC MIDLINE LOW BACK PAIN WITHOUT SCIATICA: ICD-10-CM

## 2020-03-27 RX ORDER — TRAMADOL HYDROCHLORIDE 50 MG/1
50 TABLET ORAL EVERY 8 HOURS PRN
Qty: 21 TABLET | Refills: 0 | Status: SHIPPED | OUTPATIENT
Start: 2020-03-27 | End: 2020-05-15

## 2020-03-27 NOTE — TELEPHONE ENCOUNTER
PATIENT WAS CALLED, GIVEN MESSAGE BELOW.  SHE IS NEEDING A REFILL OF TRAMADOL HCL 50mg.  PLEASE SEND TO [facility].

## 2020-03-27 NOTE — TELEPHONE ENCOUNTER
Pt of Dr Stafford: spoke with pt regarding r/s of missed lab appt 3/19/20. She is not having any problems at this time and does not want to come out for appts. She will keep her May appt and call us if she has any problems.

## 2020-05-05 ENCOUNTER — OFFICE VISIT (OUTPATIENT)
Dept: INTERNAL MEDICINE | Facility: CLINIC | Age: 80
End: 2020-05-05

## 2020-05-05 VITALS
DIASTOLIC BLOOD PRESSURE: 76 MMHG | HEIGHT: 64 IN | SYSTOLIC BLOOD PRESSURE: 132 MMHG | TEMPERATURE: 99.2 F | RESPIRATION RATE: 16 BRPM | BODY MASS INDEX: 27.04 KG/M2 | OXYGEN SATURATION: 97 % | HEART RATE: 62 BPM | WEIGHT: 158.4 LBS

## 2020-05-05 DIAGNOSIS — Z12.39 SCREENING FOR BREAST CANCER: ICD-10-CM

## 2020-05-05 DIAGNOSIS — Z12.31 ENCOUNTER FOR SCREENING MAMMOGRAM FOR MALIGNANT NEOPLASM OF BREAST: ICD-10-CM

## 2020-05-05 DIAGNOSIS — M99.02 SOMATIC DYSFUNCTION OF SPINE, THORACIC: ICD-10-CM

## 2020-05-05 DIAGNOSIS — F41.9 ANXIETY: ICD-10-CM

## 2020-05-05 DIAGNOSIS — M99.03 SOMATIC DYSFUNCTION OF SPINE, LUMBAR: ICD-10-CM

## 2020-05-05 DIAGNOSIS — M99.04 SOMATIC DYSFUNCTION OF SPINE, SACRAL: ICD-10-CM

## 2020-05-05 DIAGNOSIS — M54.50 CHRONIC MIDLINE LOW BACK PAIN WITHOUT SCIATICA: ICD-10-CM

## 2020-05-05 DIAGNOSIS — M99.05 SOMATIC DYSFUNCTION OF PELVIC REGION: ICD-10-CM

## 2020-05-05 DIAGNOSIS — G89.29 CHRONIC MIDLINE LOW BACK PAIN WITHOUT SCIATICA: ICD-10-CM

## 2020-05-05 DIAGNOSIS — M99.09 SEGMENTAL AND SOMATIC DYSFUNCTION OF ABDOMEN AND OTHER REGIONS: ICD-10-CM

## 2020-05-05 DIAGNOSIS — E11.42 TYPE 2 DIABETES MELLITUS WITH DIABETIC POLYNEUROPATHY, WITHOUT LONG-TERM CURRENT USE OF INSULIN (HCC): Primary | ICD-10-CM

## 2020-05-05 LAB — HBA1C MFR BLD: 6.4 %

## 2020-05-05 PROCEDURE — 83036 HEMOGLOBIN GLYCOSYLATED A1C: CPT | Performed by: FAMILY MEDICINE

## 2020-05-05 PROCEDURE — 98927 OSTEOPATH MANJ 5-6 REGIONS: CPT | Performed by: FAMILY MEDICINE

## 2020-05-05 PROCEDURE — 99214 OFFICE O/P EST MOD 30 MIN: CPT | Performed by: FAMILY MEDICINE

## 2020-05-07 ENCOUNTER — TELEPHONE (OUTPATIENT)
Dept: INTERNAL MEDICINE | Facility: CLINIC | Age: 80
End: 2020-05-07

## 2020-05-07 PROBLEM — Z12.39 SCREENING FOR BREAST CANCER: Status: ACTIVE | Noted: 2020-05-07

## 2020-05-07 NOTE — PROGRESS NOTES
CC:   Chief Complaint   Patient presents with   • Follow-up   • Back Pain       History:  Dago Nunez is a 79 y.o. female who presents today for follow-up for evaluation of the above:    Due for screening mammogram  Still having chronic aching low back pain, some improvement with tramadol and chiropractic. Worse with being on feet cooking, relieved with rest  Mood stable.   Due for A1c, 6.4 today    ROS:  Review of Systems   Constitutional: Negative.    Musculoskeletal: Positive for arthralgias, back pain, gait problem and myalgias.   Psychiatric/Behavioral: Negative.        Ms. Nunez  reports that she has quit smoking. She has never used smokeless tobacco. She reports that she does not drink alcohol or use drugs.      Current Outpatient Medications:   •  Acetaminophen (TYLENOL ARTHRITIS PAIN PO), Take  by mouth., Disp: , Rfl:   •  albuterol sulfate  (90 Base) MCG/ACT inhaler, Inhale 2 puffs Every 4 (Four) Hours As Needed for Wheezing or Shortness of Air., Disp: 1 inhaler, Rfl: 12  •  amLODIPine (NORVASC) 5 MG tablet, Take 1 tablet by mouth Daily., Disp: 90 tablet, Rfl: 2  •  B Complex Vitamins (VITAMIN B COMPLEX) capsule capsule, Take  by mouth Daily., Disp: , Rfl:   •  bisoprolol-hydrochlorothiazide (ZIAC) 5-6.25 MG per tablet, Take 1 tablet by mouth Daily., Disp: 90 tablet, Rfl: 3  •  calcium carbonate (OS-REAGAN) 600 MG tablet, Take 600 mg by mouth Daily., Disp: , Rfl:   •  cetirizine (zyrTEC) 10 MG tablet, Take 10 mg by mouth Daily., Disp: , Rfl:   •  citalopram (CeleXA) 10 MG tablet, TAKE 1 TABLET BY MOUTH 1 TIME DAILY, Disp: 30 tablet, Rfl: 11  •  diphenhydrAMINE (BENADRYL) 25 mg capsule, Take 25 mg by mouth Every 6 (Six) Hours As Needed for itching., Disp: , Rfl:   •  DULERA 100-5 MCG/ACT inhaler, Inhale 2 puffs 2 (Two) Times a Day. Rinse and spit after using., Disp: 6 inhaler, Rfl: 0  •  esomeprazole (nexIUM) 40 MG capsule, Take 1 capsule by mouth 2 (Two) Times a Day., Disp: 180 capsule, Rfl: 3  •   "furosemide (LASIX) 40 MG tablet, Take 1 tablet by mouth 2 (Two) Times a Day As Needed (edema). Patient only takes once a day, Disp: 180 tablet, Rfl: 2  •  gabapentin (NEURONTIN) 300 MG capsule, Take 2 capsules by mouth 3 (Three) Times a Day., Disp: 180 capsule, Rfl: 2  •  metaxalone (SKELAXIN) 400 MG tablet, Take 1 tablet by mouth Every 8 (Eight) Hours., Disp: 90 tablet, Rfl: 1  •  potassium chloride (K-DUR,KLOR-CON) 20 MEQ CR tablet, Take 1 tablet by mouth Daily. Patient only takes once a day, Disp: 30 tablet, Rfl: 11  •  pravastatin (PRAVACHOL) 40 MG tablet, Take 1 tablet by mouth Daily., Disp: 90 tablet, Rfl: 3  •  spironolactone (ALDACTONE) 25 MG tablet, Take 1 tablet by mouth Daily., Disp: 90 tablet, Rfl: 3  •  sucralfate (CARAFATE) 1 GM/10ML suspension, , Disp: , Rfl:   •  traMADol (ULTRAM) 50 MG tablet, Take 1 tablet by mouth Every 8 (Eight) Hours As Needed for Moderate Pain  or Severe Pain ., Disp: 21 tablet, Rfl: 0      OBJECTIVE:  /76 (BP Location: Left arm, Patient Position: Sitting, Cuff Size: Adult)   Pulse 62   Temp 99.2 °F (37.3 °C) (Tympanic)   Resp 16   Ht 162.6 cm (64\")   Wt 71.8 kg (158 lb 6.4 oz)   SpO2 97%   BMI 27.19 kg/m²    Physical Exam   Constitutional: She is oriented to person, place, and time. No distress.   HENT:   Head: Normocephalic and atraumatic.   Nose: Nose normal.   Eyes: Conjunctivae are normal. Right eye exhibits no discharge. Left eye exhibits no discharge. No scleral icterus.   Neck: No tracheal deviation present.   Pulmonary/Chest: Effort normal.   Neurological: She is alert and oriented to person, place, and time.   Skin: Skin is warm and dry. She is not diaphoretic. No pallor.   Psychiatric: She has a normal mood and affect. Her behavior is normal. Judgment and thought content normal.   Nursing note and vitals reviewed.    Osteopathic Structural Exam  Procedure Note for Osteopathic Manipulative Treatment    Pre-procedure diagnoses: Somatic dysfunctions as " listed below.  Consent: Oral consent given for Osteopathic Treatment  Post-procedure diagnoses: same  Complications of procedure: none, patient tolerated procedure well    The evaluation including the history, physical exam and the management decisions, indicate than an appropriate intervention on this date of service is osteopathic manipulative treatment. Oral permission for the osteopathic procedure was obtained. The following treatment methods and the responses for each body region are listed below.        Region Somatic Dysfunction Severity OMT technique Response      Thoracic  T12 FRSL Moderate  Balanced ligamentous tension  Improved       Lumbar L3 ERSL severe Balanced ligamentous tension Improved      Sacral R unilateral flexion Moderate Balanced ligamentous tension Improved   Pelvic L pelvic diaphragm rotation Moderate Balanced ligamentous tension Improved      Abdomen & Other Sites  L hemidiaphragm inhalation SD Moderate  Myofascial Release Improved          Assessment/Plan     Diagnosis Plan   1. Type 2 diabetes mellitus with diabetic polyneuropathy, without long-term current use of insulin (CMS/[facility])  POC Glycosylated Hemoglobin (Hb A1C)   2. Chronic midline low back pain without sciatica  Ambulatory Referral to Physical Therapy Evaluate and treat   3. Anxiety     4. Screening for breast cancer  Mammo Screening Bilateral With CAD   5. Encounter for screening mammogram for malignant neoplasm of breast   Mammo Screening Bilateral With CAD   6. Somatic dysfunction of spine, thoracic     7. Segmental and somatic dysfunction of abdomen and other regions     8. Somatic dysfunction of spine, lumbar     9. Somatic dysfunction of pelvic region     10. Somatic dysfunction of spine, sacral     DM stable  PT referral  OMT to balance autonomic tone, improve fascial symmetry and respiratory/circulatory/lymphatic compliance  Continue celexa  F/u 3 months         Shaheen Akbar D.O.  [facility]  Osteopathic  Neuromusculoskeletal Medicine

## 2020-05-07 NOTE — TELEPHONE ENCOUNTER
Patient left voicemail, stated that she would like to complete physical therapy at [facility] instead of [facility].  Can we reorder referral?

## 2020-05-12 NOTE — PROGRESS NOTES
Pre- operative diagnosis:  Urethral Stricture     Post operative diagnosis:  Same     Procedure:  The patient was prepped and draped in a normal sterile fashion.  The urethra was anesthetized with 2% lidocaine jelly.  The patient was sequentially dilated from 12 up to 22 [place] using female urethral sounds.     Patient tolerated the procedure well     Complications: none     Blood loss: minimal     Follow up:     Routine follow up

## 2020-05-13 ENCOUNTER — PROCEDURE VISIT (OUTPATIENT)
Dept: UROLOGY | Facility: CLINIC | Age: 80
End: 2020-05-13

## 2020-05-13 DIAGNOSIS — N18.30 STAGE 3 CHRONIC KIDNEY DISEASE (HCC): Primary | ICD-10-CM

## 2020-05-13 DIAGNOSIS — N99.12 POSTPROCEDURAL FEMALE URETHRAL STRICTURE: Primary | ICD-10-CM

## 2020-05-13 PROCEDURE — 52281 CYSTOSCOPY AND TREATMENT: CPT | Performed by: UROLOGY

## 2020-05-13 NOTE — PROGRESS NOTES
Subjective    Ms. Nunez is 79 y.o. female    Chief Complaint: ***    History of Present Illness    The following portions of the patient's history were reviewed and updated as appropriate: allergies, current medications, past family history, past medical history, past social history, past surgical history and problem list.    Review of Systems      Current Outpatient Medications:   •  Acetaminophen (TYLENOL ARTHRITIS PAIN PO), Take  by mouth., Disp: , Rfl:   •  albuterol sulfate  (90 Base) MCG/ACT inhaler, Inhale 2 puffs Every 4 (Four) Hours As Needed for Wheezing or Shortness of Air., Disp: 1 inhaler, Rfl: 12  •  amLODIPine (NORVASC) 5 MG tablet, Take 1 tablet by mouth Daily., Disp: 90 tablet, Rfl: 2  •  B Complex Vitamins (VITAMIN B COMPLEX) capsule capsule, Take  by mouth Daily., Disp: , Rfl:   •  bisoprolol-hydrochlorothiazide (ZIAC) 5-6.25 MG per tablet, Take 1 tablet by mouth Daily., Disp: 90 tablet, Rfl: 3  •  calcium carbonate (OS-REAGAN) 600 MG tablet, Take 600 mg by mouth Daily., Disp: , Rfl:   •  cetirizine (zyrTEC) 10 MG tablet, Take 10 mg by mouth Daily., Disp: , Rfl:   •  citalopram (CeleXA) 10 MG tablet, TAKE 1 TABLET BY MOUTH 1 TIME DAILY, Disp: 30 tablet, Rfl: 11  •  diphenhydrAMINE (BENADRYL) 25 mg capsule, Take 25 mg by mouth Every 6 (Six) Hours As Needed for itching., Disp: , Rfl:   •  DULERA 100-5 MCG/ACT inhaler, Inhale 2 puffs 2 (Two) Times a Day. Rinse and spit after using., Disp: 6 inhaler, Rfl: 0  •  esomeprazole (nexIUM) 40 MG capsule, Take 1 capsule by mouth 2 (Two) Times a Day., Disp: 180 capsule, Rfl: 3  •  furosemide (LASIX) 40 MG tablet, Take 1 tablet by mouth 2 (Two) Times a Day As Needed (edema). Patient only takes once a day, Disp: 180 tablet, Rfl: 2  •  gabapentin (NEURONTIN) 300 MG capsule, Take 2 capsules by mouth 3 (Three) Times a Day., Disp: 180 capsule, Rfl: 2  •  metaxalone (SKELAXIN) 400 MG tablet, Take 1 tablet by mouth Every 8 (Eight) Hours., Disp: 90 tablet, Rfl:  1  •  potassium chloride (K-DUR,KLOR-CON) 20 MEQ CR tablet, Take 1 tablet by mouth Daily. Patient only takes once a day, Disp: 30 tablet, Rfl: 11  •  pravastatin (PRAVACHOL) 40 MG tablet, Take 1 tablet by mouth Daily., Disp: 90 tablet, Rfl: 3  •  spironolactone (ALDACTONE) 25 MG tablet, Take 1 tablet by mouth Daily., Disp: 90 tablet, Rfl: 3  •  sucralfate (CARAFATE) 1 GM/10ML suspension, , Disp: , Rfl:   •  traMADol (ULTRAM) 50 MG tablet, Take 1 tablet by mouth Every 8 (Eight) Hours As Needed for Moderate Pain  or Severe Pain ., Disp: 21 tablet, Rfl: 0    Past Medical History:   Diagnosis Date   • Anemia in stage 3 chronic kidney disease (CMS/HCC) 11/11/2019   • Arthritis    • Cellulitis    • Diabetes mellitus (CMS/[facility])    • GERD (gastroesophageal reflux disease)    • Hyperlipidemia    • Hypertension    • Kyphosis    • Osteoporosis    • Scoliosis    • Stage 3 chronic kidney disease (CMS/HCC) 11/11/2019   • Vulvar intraepithelial neoplasia (MOODY) grade 3        Past Surgical History:   Procedure Laterality Date   • APPENDECTOMY     • BREAST CYST ASPIRATION Left    • COLONOSCOPY  01/12/2011   • ENDOSCOPY  07/01/2014   • HYSTERECTOMY     • TONSILLECTOMY     • VAGINA SURGERY         Social History     Socioeconomic History   • Marital status:      Spouse name: Not on file   • Number of children: Not on file   • Years of education: Not on file   • Highest education level: Not on file   Tobacco Use   • Smoking status: Former Smoker   • Smokeless tobacco: Never Used   Substance and Sexual Activity   • Alcohol use: No   • Drug use: No   • Sexual activity: Never       Family History   Problem Relation Age of Onset   • Cancer Mother    • Hypertension Mother    • Osteoporosis Mother    • Dementia Mother    • Heart disease Father    • Parkinsonism Father    • Cancer Sister    • Breast cancer Sister    • Diabetes Brother    • Heart disease Paternal Grandfather        Objective    There were no vitals taken for this  visit.    Physical Exam        Results for orders placed or performed in visit on 05/05/20   POC Glycosylated Hemoglobin (Hb A1C)   Result Value Ref Range    Hemoglobin A1C 6.4 %     Assessment and Plan    Diagnoses and all orders for this visit:    History of urethral stricture  -     POC Urinalysis Dipstick, Multipro

## 2020-05-13 NOTE — PROGRESS NOTES
Subjective    Ms. Nunez is 79 y.o. female    Chief Complaint: ***    History of Present Illness    The following portions of the patient's history were reviewed and updated as appropriate: allergies, current medications, past family history, past medical history, past social history, past surgical history and problem list.    Review of Systems      Current Outpatient Medications:   •  Acetaminophen (TYLENOL ARTHRITIS PAIN PO), Take  by mouth., Disp: , Rfl:   •  albuterol sulfate  (90 Base) MCG/ACT inhaler, Inhale 2 puffs Every 4 (Four) Hours As Needed for Wheezing or Shortness of Air., Disp: 1 inhaler, Rfl: 12  •  amLODIPine (NORVASC) 5 MG tablet, Take 1 tablet by mouth Daily., Disp: 90 tablet, Rfl: 2  •  B Complex Vitamins (VITAMIN B COMPLEX) capsule capsule, Take  by mouth Daily., Disp: , Rfl:   •  bisoprolol-hydrochlorothiazide (ZIAC) 5-6.25 MG per tablet, Take 1 tablet by mouth Daily., Disp: 90 tablet, Rfl: 3  •  calcium carbonate (OS-REAGAN) 600 MG tablet, Take 600 mg by mouth Daily., Disp: , Rfl:   •  cetirizine (zyrTEC) 10 MG tablet, Take 10 mg by mouth Daily., Disp: , Rfl:   •  citalopram (CeleXA) 10 MG tablet, TAKE 1 TABLET BY MOUTH 1 TIME DAILY, Disp: 30 tablet, Rfl: 11  •  diphenhydrAMINE (BENADRYL) 25 mg capsule, Take 25 mg by mouth Every 6 (Six) Hours As Needed for itching., Disp: , Rfl:   •  DULERA 100-5 MCG/ACT inhaler, Inhale 2 puffs 2 (Two) Times a Day. Rinse and spit after using., Disp: 6 inhaler, Rfl: 0  •  esomeprazole (nexIUM) 40 MG capsule, Take 1 capsule by mouth 2 (Two) Times a Day., Disp: 180 capsule, Rfl: 3  •  furosemide (LASIX) 40 MG tablet, Take 1 tablet by mouth 2 (Two) Times a Day As Needed (edema). Patient only takes once a day, Disp: 180 tablet, Rfl: 2  •  gabapentin (NEURONTIN) 300 MG capsule, Take 2 capsules by mouth 3 (Three) Times a Day., Disp: 180 capsule, Rfl: 2  •  metaxalone (SKELAXIN) 400 MG tablet, Take 1 tablet by mouth Every 8 (Eight) Hours., Disp: 90 tablet, Rfl:  1  •  potassium chloride (K-DUR,KLOR-CON) 20 MEQ CR tablet, Take 1 tablet by mouth Daily. Patient only takes once a day, Disp: 30 tablet, Rfl: 11  •  pravastatin (PRAVACHOL) 40 MG tablet, Take 1 tablet by mouth Daily., Disp: 90 tablet, Rfl: 3  •  spironolactone (ALDACTONE) 25 MG tablet, Take 1 tablet by mouth Daily., Disp: 90 tablet, Rfl: 3  •  sucralfate (CARAFATE) 1 GM/10ML suspension, , Disp: , Rfl:   •  traMADol (ULTRAM) 50 MG tablet, Take 1 tablet by mouth Every 8 (Eight) Hours As Needed for Moderate Pain  or Severe Pain ., Disp: 21 tablet, Rfl: 0    Past Medical History:   Diagnosis Date   • Anemia in stage 3 chronic kidney disease (CMS/HCC) 11/11/2019   • Arthritis    • Cellulitis    • Diabetes mellitus (CMS/[facility])    • GERD (gastroesophageal reflux disease)    • Hyperlipidemia    • Hypertension    • Kyphosis    • Osteoporosis    • Scoliosis    • Stage 3 chronic kidney disease (CMS/HCC) 11/11/2019   • Vulvar intraepithelial neoplasia (MOODY) grade 3        Past Surgical History:   Procedure Laterality Date   • APPENDECTOMY     • BREAST CYST ASPIRATION Left    • COLONOSCOPY  01/12/2011   • ENDOSCOPY  07/01/2014   • HYSTERECTOMY     • TONSILLECTOMY     • VAGINA SURGERY         Social History     Socioeconomic History   • Marital status:      Spouse name: Not on file   • Number of children: Not on file   • Years of education: Not on file   • Highest education level: Not on file   Tobacco Use   • Smoking status: Former Smoker   • Smokeless tobacco: Never Used   Substance and Sexual Activity   • Alcohol use: No   • Drug use: No   • Sexual activity: Never       Family History   Problem Relation Age of Onset   • Cancer Mother    • Hypertension Mother    • Osteoporosis Mother    • Dementia Mother    • Heart disease Father    • Parkinsonism Father    • Cancer Sister    • Breast cancer Sister    • Diabetes Brother    • Heart disease Paternal Grandfather        Objective    There were no vitals taken for this  visit.    Physical Exam        Results for orders placed or performed in visit on 05/05/20   POC Glycosylated Hemoglobin (Hb A1C)   Result Value Ref Range    Hemoglobin A1C 6.4 %     Assessment and Plan    Diagnoses and all orders for this visit:    Postprocedural female urethral stricture  -     Cancel: POC Urinalysis Dipstick, Multipro

## 2020-05-14 ENCOUNTER — LAB (OUTPATIENT)
Dept: ONCOLOGY | Facility: CLINIC | Age: 80
End: 2020-05-14

## 2020-05-14 ENCOUNTER — CLINICAL SUPPORT (OUTPATIENT)
Dept: ONCOLOGY | Facility: CLINIC | Age: 80
End: 2020-05-14

## 2020-05-14 VITALS
OXYGEN SATURATION: 94 % | DIASTOLIC BLOOD PRESSURE: 60 MMHG | RESPIRATION RATE: 18 BRPM | TEMPERATURE: 99.4 F | SYSTOLIC BLOOD PRESSURE: 102 MMHG | HEART RATE: 48 BPM

## 2020-05-14 DIAGNOSIS — N18.30 STAGE 3 CHRONIC KIDNEY DISEASE (HCC): ICD-10-CM

## 2020-05-14 DIAGNOSIS — D64.9 NORMOCYTIC ANEMIA: ICD-10-CM

## 2020-05-14 DIAGNOSIS — D63.1 ANEMIA IN STAGE 3 CHRONIC KIDNEY DISEASE (HCC): ICD-10-CM

## 2020-05-14 DIAGNOSIS — N18.30 ANEMIA IN STAGE 3 CHRONIC KIDNEY DISEASE (HCC): ICD-10-CM

## 2020-05-14 LAB
ALBUMIN SERPL-MCNC: 4.2 G/DL (ref 3.5–5.2)
ALBUMIN/GLOB SERPL: 1.1 G/DL
ALP SERPL-CCNC: 62 U/L (ref 39–117)
ALT SERPL W P-5'-P-CCNC: 10 U/L (ref 1–33)
ANION GAP SERPL CALCULATED.3IONS-SCNC: 16 MMOL/L (ref 5–15)
AST SERPL-CCNC: 19 U/L (ref 1–32)
BASOPHILS # BLD AUTO: 0.03 10*3/MM3 (ref 0–0.2)
BASOPHILS NFR BLD AUTO: 0.4 % (ref 0–1.5)
BILIRUB SERPL-MCNC: 0.3 MG/DL (ref 0.2–1.2)
BUN BLD-MCNC: 20 MG/DL (ref 8–23)
BUN/CREAT SERPL: 20 (ref 7–25)
CALCIUM SPEC-SCNC: 9.9 MG/DL (ref 8.6–10.5)
CHLORIDE SERPL-SCNC: 94 MMOL/L (ref 98–107)
CO2 SERPL-SCNC: 22 MMOL/L (ref 22–29)
CREAT BLD-MCNC: 1 MG/DL (ref 0.57–1)
DEPRECATED RDW RBC AUTO: 43.4 FL (ref 37–54)
EOSINOPHIL # BLD AUTO: 0.35 10*3/MM3 (ref 0–0.4)
EOSINOPHIL NFR BLD AUTO: 4.2 % (ref 0.3–6.2)
ERYTHROCYTE [DISTWIDTH] IN BLOOD BY AUTOMATED COUNT: 11.8 % (ref 12.3–15.4)
FERRITIN SERPL-MCNC: 286.5 NG/ML (ref 13–150)
GFR SERPL CREATININE-BSD FRML MDRD: 54 ML/MIN/1.73
GLOBULIN UR ELPH-MCNC: 3.9 GM/DL
GLUCOSE BLD-MCNC: 196 MG/DL (ref 65–99)
HCT VFR BLD AUTO: 34.7 % (ref 34–46.6)
HGB BLD-MCNC: 11.5 G/DL (ref 12–15.9)
IMM GRANULOCYTES # BLD AUTO: 0 10*3/MM3 (ref 0–0.05)
IMM GRANULOCYTES NFR BLD AUTO: 0 % (ref 0–0.5)
IRON 24H UR-MRATE: 92 MCG/DL (ref 37–145)
IRON SATN MFR SERPL: 30 % (ref 20–50)
LYMPHOCYTES # BLD AUTO: 1.65 10*3/MM3 (ref 0.7–3.1)
LYMPHOCYTES NFR BLD AUTO: 19.7 % (ref 19.6–45.3)
MCH RBC QN AUTO: 32.7 PG (ref 26.6–33)
MCHC RBC AUTO-ENTMCNC: 33.1 G/DL (ref 31.5–35.7)
MCV RBC AUTO: 98.6 FL (ref 79–97)
MONOCYTES # BLD AUTO: 0.53 10*3/MM3 (ref 0.1–0.9)
MONOCYTES NFR BLD AUTO: 6.3 % (ref 5–12)
NEUTROPHILS # BLD AUTO: 5.82 10*3/MM3 (ref 1.7–7)
NEUTROPHILS NFR BLD AUTO: 69.4 % (ref 42.7–76)
PLATELET # BLD AUTO: 220 10*3/MM3 (ref 140–450)
PMV BLD AUTO: 10.1 FL (ref 6–12)
POTASSIUM BLD-SCNC: 4.2 MMOL/L (ref 3.5–5.2)
PROT SERPL-MCNC: 8.1 G/DL (ref 6–8.5)
RBC # BLD AUTO: 3.52 10*6/MM3 (ref 3.77–5.28)
SODIUM BLD-SCNC: 132 MMOL/L (ref 136–145)
TIBC SERPL-MCNC: 311 MCG/DL (ref 298–536)
TRANSFERRIN SERPL-MCNC: 209 MG/DL (ref 200–360)
WBC NRBC COR # BLD: 8.38 10*3/MM3 (ref 3.4–10.8)

## 2020-05-14 PROCEDURE — 83540 ASSAY OF IRON: CPT | Performed by: INTERNAL MEDICINE

## 2020-05-14 PROCEDURE — 99211 OFF/OP EST MAY X REQ PHY/QHP: CPT | Performed by: NURSE PRACTITIONER

## 2020-05-14 PROCEDURE — 80053 COMPREHEN METABOLIC PANEL: CPT | Performed by: INTERNAL MEDICINE

## 2020-05-14 PROCEDURE — 84466 ASSAY OF TRANSFERRIN: CPT | Performed by: INTERNAL MEDICINE

## 2020-05-14 PROCEDURE — 85025 COMPLETE CBC W/AUTO DIFF WBC: CPT | Performed by: INTERNAL MEDICINE

## 2020-05-14 PROCEDURE — 82728 ASSAY OF FERRITIN: CPT | Performed by: INTERNAL MEDICINE

## 2020-05-14 NOTE — PROGRESS NOTES
Patient here for lab evaluation to initiate Retacrit due to stage III chronic kidney disease.  States that she is feeling ok today.  Gets a little tired with activity.  Skin w/d to touch.  Color wnl.  Eating and drinking well.  Parameters set to initiate Retacrit 40,000 units SQ for Hgb <10 and Hct <30.  Reviewed labs with patient, Hgb 11.5 and Hct 34.7 today.  Patient does not meet parameters to initiate Retacrit at this time.  Will return in 8 weeks for same.  Instructed to call with any problems.  Patient v/u.

## 2020-05-15 ENCOUNTER — TELEPHONE (OUTPATIENT)
Dept: INTERNAL MEDICINE | Facility: CLINIC | Age: 80
End: 2020-05-15

## 2020-05-15 DIAGNOSIS — M54.50 CHRONIC MIDLINE LOW BACK PAIN WITHOUT SCIATICA: ICD-10-CM

## 2020-05-15 DIAGNOSIS — G89.29 CHRONIC MIDLINE LOW BACK PAIN WITHOUT SCIATICA: ICD-10-CM

## 2020-05-15 RX ORDER — TRAMADOL HYDROCHLORIDE 50 MG/1
50 TABLET ORAL EVERY 8 HOURS PRN
Qty: 60 TABLET | Refills: 0 | Status: SHIPPED | OUTPATIENT
Start: 2020-05-15 | End: 2020-08-06

## 2020-05-15 NOTE — TELEPHONE ENCOUNTER
PLEASE CALL IN TRAMADOL 50 MG TABLET TO [facility]... SHE ALSO STATES THAT SHE WAS SUPPOSE TO HAVE PT.. SHE SAYS PLEASE SEND THAT ORDER [facility]

## 2020-05-15 NOTE — TELEPHONE ENCOUNTER
Refill request sent to provider.  PT orders placed for [facility] on 5/5/20.  Please refer to order in chart.

## 2020-05-21 ENCOUNTER — APPOINTMENT (OUTPATIENT)
Dept: MAMMOGRAPHY | Facility: HOSPITAL | Age: 80
End: 2020-05-21

## 2020-05-28 ENCOUNTER — HOSPITAL ENCOUNTER (OUTPATIENT)
Dept: MAMMOGRAPHY | Facility: HOSPITAL | Age: 80
Discharge: HOME | End: 2020-05-28
Admitting: FAMILY MEDICINE

## 2020-05-28 DIAGNOSIS — Z12.39 SCREENING FOR BREAST CANCER: ICD-10-CM

## 2020-05-28 DIAGNOSIS — Z12.31 ENCOUNTER FOR SCREENING MAMMOGRAM FOR MALIGNANT NEOPLASM OF BREAST: ICD-10-CM

## 2020-05-28 PROCEDURE — 77067 SCR MAMMO BI INCL CAD: CPT

## 2020-05-28 PROCEDURE — 77063 BREAST TOMOSYNTHESIS BI: CPT

## 2020-06-01 ENCOUNTER — OFFICE VISIT (OUTPATIENT)
Dept: ONCOLOGY | Facility: CLINIC | Age: 80
End: 2020-06-01

## 2020-06-01 VITALS
WEIGHT: 164.5 LBS | SYSTOLIC BLOOD PRESSURE: 134 MMHG | OXYGEN SATURATION: 90 % | HEART RATE: 62 BPM | RESPIRATION RATE: 18 BRPM | BODY MASS INDEX: 28.09 KG/M2 | HEIGHT: 64 IN | DIASTOLIC BLOOD PRESSURE: 62 MMHG | TEMPERATURE: 97.3 F

## 2020-06-01 DIAGNOSIS — D64.9 NORMOCYTIC ANEMIA: Primary | ICD-10-CM

## 2020-06-01 PROCEDURE — 99214 OFFICE O/P EST MOD 30 MIN: CPT | Performed by: INTERNAL MEDICINE

## 2020-06-08 ENCOUNTER — TELEPHONE (OUTPATIENT)
Dept: INTERNAL MEDICINE | Facility: CLINIC | Age: 80
End: 2020-06-08

## 2020-06-08 DIAGNOSIS — H91.90 HEARING LOSS, UNSPECIFIED HEARING LOSS TYPE, UNSPECIFIED LATERALITY: Primary | ICD-10-CM

## 2020-06-08 NOTE — TELEPHONE ENCOUNTER
Patient requesting referral to ENT, Dr Matos.  States that she is having issues with her hearing.

## 2020-06-11 ENCOUNTER — OFFICE VISIT (OUTPATIENT)
Dept: OTOLARYNGOLOGY | Facility: CLINIC | Age: 80
End: 2020-06-11

## 2020-06-11 ENCOUNTER — PROCEDURE VISIT (OUTPATIENT)
Dept: OTOLARYNGOLOGY | Facility: CLINIC | Age: 80
End: 2020-06-11

## 2020-06-11 VITALS
BODY MASS INDEX: 32.39 KG/M2 | WEIGHT: 165 LBS | RESPIRATION RATE: 20 BRPM | HEART RATE: 67 BPM | HEIGHT: 60 IN | SYSTOLIC BLOOD PRESSURE: 136 MMHG | TEMPERATURE: 98.2 F | DIASTOLIC BLOOD PRESSURE: 74 MMHG

## 2020-06-11 DIAGNOSIS — H90.3 SENSORINEURAL HEARING LOSS (SNHL) OF BOTH EARS: Primary | ICD-10-CM

## 2020-06-11 DIAGNOSIS — H61.21 EXCESSIVE CERUMEN IN RIGHT EAR CANAL: ICD-10-CM

## 2020-06-11 PROCEDURE — 99213 OFFICE O/P EST LOW 20 MIN: CPT | Performed by: NURSE PRACTITIONER

## 2020-06-11 NOTE — PROGRESS NOTES
PRIMARY CARE PROVIDER: Shaheen Akbar DO  REFERRING PROVIDER: No ref. provider found    Chief Complaint   Patient presents with   • Ear Problem       Subjective   History of Present Illness:  Dago Nunez is a  79 y.o. female who complains of decreased hearing. The symptoms are localized to both ears. The patient has had moderate symptoms. The symptoms have been present for the last 2-3 weeks. There have been no identified factors that aggravate the symptoms. There have been no factors that have improved the symptoms.  She denies otalgia, otorrhea, dizziness, vertigo, or tinnitus.    Review of Systems:  Review of Systems   Constitutional: Negative for chills and fever.   HENT: Positive for hearing loss. Negative for congestion, ear discharge, ear pain, rhinorrhea and sore throat.    Respiratory: Negative for cough and shortness of breath.    Cardiovascular: Negative for chest pain.   Gastrointestinal: Negative for diarrhea, nausea and vomiting.       Past History:  Past Medical History:   Diagnosis Date   • Anemia in stage 3 chronic kidney disease (CMS/[facility]) 11/11/2019   • Arthritis    • Cellulitis    • Diabetes mellitus (CMS/[facility])    • GERD (gastroesophageal reflux disease)    • Hyperlipidemia    • Hypertension    • Kyphosis    • Osteoporosis    • Scoliosis    • Stage 3 chronic kidney disease (CMS/[facility]) 11/11/2019   • Vulvar intraepithelial neoplasia (MOODY) grade 3      Past Surgical History:   Procedure Laterality Date   • APPENDECTOMY     • BREAST CYST ASPIRATION Left    • COLONOSCOPY  01/12/2011   • ENDOSCOPY  07/01/2014   • HYSTERECTOMY     • TONSILLECTOMY     • VAGINA SURGERY       Family History   Problem Relation Age of Onset   • Cancer Mother    • Hypertension Mother    • Osteoporosis Mother    • Dementia Mother    • Heart disease Father    • Parkinsonism Father    • Cancer Sister    • Breast cancer Sister    • Diabetes Brother    • Heart disease Paternal Grandfather      Social History     Tobacco Use    • Smoking status: Former Smoker   • Smokeless tobacco: Never Used   Substance Use Topics   • Alcohol use: No   • Drug use: No     Allergies:  Scopolamine; Tequin [gatifloxacin]; Trovan [alatrofloxacin]; Amoxicillin-pot clavulanate; Keflex [cephalexin]; and Septra [sulfamethoxazole-trimethoprim]    Current Outpatient Medications:   •  Acetaminophen (TYLENOL ARTHRITIS PAIN PO), Take  by mouth., Disp: , Rfl:   •  albuterol sulfate  (90 Base) MCG/ACT inhaler, Inhale 2 puffs Every 4 (Four) Hours As Needed for Wheezing or Shortness of Air., Disp: 1 inhaler, Rfl: 12  •  amLODIPine (NORVASC) 5 MG tablet, Take 1 tablet by mouth Daily., Disp: 90 tablet, Rfl: 2  •  B Complex Vitamins (VITAMIN B COMPLEX) capsule capsule, Take  by mouth Daily., Disp: , Rfl:   •  bisoprolol-hydrochlorothiazide (ZIAC) 5-6.25 MG per tablet, Take 1 tablet by mouth Daily., Disp: 90 tablet, Rfl: 3  •  calcium carbonate (OS-REAGAN) 600 MG tablet, Take 600 mg by mouth Daily., Disp: , Rfl:   •  cetirizine (zyrTEC) 10 MG tablet, Take 10 mg by mouth Daily., Disp: , Rfl:   •  citalopram (CeleXA) 10 MG tablet, TAKE 1 TABLET BY MOUTH 1 TIME DAILY, Disp: 30 tablet, Rfl: 11  •  diphenhydrAMINE (BENADRYL) 25 mg capsule, Take 25 mg by mouth Every 6 (Six) Hours As Needed for itching., Disp: , Rfl:   •  DULERA 100-5 MCG/ACT inhaler, Inhale 2 puffs 2 (Two) Times a Day. Rinse and spit after using., Disp: 6 inhaler, Rfl: 0  •  esomeprazole (nexIUM) 40 MG capsule, Take 1 capsule by mouth 2 (Two) Times a Day., Disp: 180 capsule, Rfl: 3  •  furosemide (LASIX) 40 MG tablet, Take 1 tablet by mouth 2 (Two) Times a Day As Needed (edema). Patient only takes once a day, Disp: 180 tablet, Rfl: 2  •  gabapentin (NEURONTIN) 300 MG capsule, Take 2 capsules by mouth 3 (Three) Times a Day., Disp: 180 capsule, Rfl: 2  •  metaxalone (SKELAXIN) 400 MG tablet, Take 1 tablet by mouth Every 8 (Eight) Hours., Disp: 90 tablet, Rfl: 1  •  potassium chloride (K-DUR,KLOR-CON) 20 MEQ CR  tablet, Take 1 tablet by mouth Daily. Patient only takes once a day, Disp: 30 tablet, Rfl: 11  •  pravastatin (PRAVACHOL) 40 MG tablet, Take 1 tablet by mouth Daily., Disp: 90 tablet, Rfl: 3  •  spironolactone (ALDACTONE) 25 MG tablet, Take 1 tablet by mouth Daily., Disp: 90 tablet, Rfl: 3  •  sucralfate (CARAFATE) 1 GM/10ML suspension, , Disp: , Rfl:   •  traMADol (ULTRAM) 50 MG tablet, Take 1 tablet by mouth Every 8 (Eight) Hours As Needed for Moderate Pain ., Disp: 60 tablet, Rfl: 0      Objective     Vital Signs:  Temp:  [98.2 °F (36.8 °C)] 98.2 °F (36.8 °C)  Heart Rate:  [67] 67  Resp:  [20] 20  BP: (136)/(74) 136/74    Physical Exam:  Physical Exam  CONSTITUTIONAL: well nourished, well-developed, alert, oriented, in no acute distress   COMMUNICATION AND VOICE: able to communicate normally, normal voice quality  HEAD: normocephalic, no lesions, atraumatic, no tenderness, no masses   FACE: appearance normal, no lesions, no tenderness, no deformities, facial motion symmetric  SALIVARY GLANDS: parotid glands with no tenderness, no swelling, no masses, submandibular glands with normal size, nontender  EYES: ocular motility normal, eyelids normal, orbits normal, no proptosis, conjunctiva normal , pupils equal, round   EARS:  Hearing: response to conversational voice normal bilaterally   External Ears: auricles without lesions  Otoscopic: tympanic membrane appearance normal, no lesions, no perforation, normal mobility, no fluid; moderate amount of cerumen was removed from the right EAC under microscopy with curette-patient reported immediate improvement in hearing following cerumen removal  NOSE: mask on   ORAL: mask on   NECK: neck appearance normal, no masses or tenderness  LYMPH NODES: no lymphadenopathy  CHEST/RESPIRATORY: respiratory effort normal, normal breath sounds   CARDIOVASCULAR: rate and rhythm normal, extremities without cyanosis or edema    NEUROLOGIC/PSYCHIATRIC: oriented to time, place and person,  mood normal, affect appropriate, CN II-XII intact grossly      Results Review:       Assessment   Assessment:  1. Sensorineural hearing loss (SNHL) of both ears    2. Excessive cerumen in right ear canal        Plan   Plan:  Use hearing protection in loud noise situations.  Consider hearing aid amplification.  Obtain a yearly audiogram to follow hearing.  Cerumen removed without difficulty.    There are no Patient Instructions on file for this visit.  Return in about 1 year (around 6/11/2021), or if symptoms worsen or fail to improve, for Recheck, With Audio.    My findings and recommendations were discussed and questions were answered.     Elsa Miner, APRN  06/11/20  16:44

## 2020-06-15 RX ORDER — SPIRONOLACTONE 25 MG/1
25 TABLET ORAL DAILY
Qty: 90 TABLET | Refills: 3 | Status: SHIPPED | OUTPATIENT
Start: 2020-06-15 | End: 2020-10-22 | Stop reason: HOSPADM

## 2020-06-22 RX ORDER — BISOPROLOL FUMARATE AND HYDROCHLOROTHIAZIDE 5; 6.25 MG/1; MG/1
1 TABLET ORAL DAILY
Qty: 90 TABLET | Refills: 3 | Status: SHIPPED | OUTPATIENT
Start: 2020-06-22 | End: 2020-10-22 | Stop reason: HOSPADM

## 2020-06-22 RX ORDER — ESOMEPRAZOLE MAGNESIUM 40 MG/1
40 CAPSULE, DELAYED RELEASE ORAL 2 TIMES DAILY
Qty: 180 CAPSULE | Refills: 3 | Status: SHIPPED | OUTPATIENT
Start: 2020-06-22 | End: 2021-04-08 | Stop reason: SDUPTHER

## 2020-07-02 ENCOUNTER — CLINICAL SUPPORT (OUTPATIENT)
Dept: ONCOLOGY | Facility: CLINIC | Age: 80
End: 2020-07-02

## 2020-07-02 ENCOUNTER — LAB (OUTPATIENT)
Dept: ONCOLOGY | Facility: CLINIC | Age: 80
End: 2020-07-02

## 2020-07-02 VITALS
OXYGEN SATURATION: 95 % | DIASTOLIC BLOOD PRESSURE: 74 MMHG | RESPIRATION RATE: 18 BRPM | HEART RATE: 64 BPM | TEMPERATURE: 98.6 F | SYSTOLIC BLOOD PRESSURE: 124 MMHG

## 2020-07-02 DIAGNOSIS — D64.9 NORMOCYTIC ANEMIA: ICD-10-CM

## 2020-07-02 DIAGNOSIS — N18.30 ANEMIA IN STAGE 3 CHRONIC KIDNEY DISEASE (HCC): ICD-10-CM

## 2020-07-02 DIAGNOSIS — N18.30 STAGE 3 CHRONIC KIDNEY DISEASE (HCC): ICD-10-CM

## 2020-07-02 DIAGNOSIS — D63.1 ANEMIA IN STAGE 3 CHRONIC KIDNEY DISEASE (HCC): ICD-10-CM

## 2020-07-02 LAB
ALBUMIN SERPL-MCNC: 4.2 G/DL (ref 3.5–5.2)
ALBUMIN/GLOB SERPL: 1.2 G/DL
ALP SERPL-CCNC: 59 U/L (ref 39–117)
ALT SERPL W P-5'-P-CCNC: 11 U/L (ref 1–33)
ANION GAP SERPL CALCULATED.3IONS-SCNC: 16 MMOL/L (ref 5–15)
AST SERPL-CCNC: 17 U/L (ref 1–32)
BASOPHILS # BLD AUTO: 0.05 10*3/MM3 (ref 0–0.2)
BASOPHILS NFR BLD AUTO: 0.7 % (ref 0–1.5)
BILIRUB SERPL-MCNC: 0.3 MG/DL (ref 0.2–1.2)
BUN SERPL-MCNC: 17 MG/DL (ref 8–23)
BUN/CREAT SERPL: 19.1 (ref 7–25)
CALCIUM SPEC-SCNC: 9.9 MG/DL (ref 8.6–10.5)
CHLORIDE SERPL-SCNC: 97 MMOL/L (ref 98–107)
CO2 SERPL-SCNC: 22 MMOL/L (ref 22–29)
CREAT SERPL-MCNC: 0.89 MG/DL (ref 0.57–1)
DEPRECATED RDW RBC AUTO: 45.3 FL (ref 37–54)
EOSINOPHIL # BLD AUTO: 0.37 10*3/MM3 (ref 0–0.4)
EOSINOPHIL NFR BLD AUTO: 4.9 % (ref 0.3–6.2)
ERYTHROCYTE [DISTWIDTH] IN BLOOD BY AUTOMATED COUNT: 12.8 % (ref 12.3–15.4)
FERRITIN SERPL-MCNC: 315.1 NG/ML (ref 13–150)
GFR SERPL CREATININE-BSD FRML MDRD: 62 ML/MIN/1.73
GLOBULIN UR ELPH-MCNC: 3.6 GM/DL
GLUCOSE SERPL-MCNC: 215 MG/DL (ref 65–99)
HCT VFR BLD AUTO: 35.7 % (ref 34–46.6)
HGB BLD-MCNC: 11.7 G/DL (ref 12–15.9)
IMM GRANULOCYTES # BLD AUTO: 0 10*3/MM3 (ref 0–0.05)
IMM GRANULOCYTES NFR BLD AUTO: 0 % (ref 0–0.5)
IRON 24H UR-MRATE: 91 MCG/DL (ref 37–145)
IRON SATN MFR SERPL: 27 % (ref 20–50)
LYMPHOCYTES # BLD AUTO: 1.97 10*3/MM3 (ref 0.7–3.1)
LYMPHOCYTES NFR BLD AUTO: 25.9 % (ref 19.6–45.3)
MCH RBC QN AUTO: 32 PG (ref 26.6–33)
MCHC RBC AUTO-ENTMCNC: 32.8 G/DL (ref 31.5–35.7)
MCV RBC AUTO: 97.5 FL (ref 79–97)
MONOCYTES # BLD AUTO: 0.52 10*3/MM3 (ref 0.1–0.9)
MONOCYTES NFR BLD AUTO: 6.8 % (ref 5–12)
NEUTROPHILS NFR BLD AUTO: 4.7 10*3/MM3 (ref 1.7–7)
NEUTROPHILS NFR BLD AUTO: 61.7 % (ref 42.7–76)
PLATELET # BLD AUTO: 218 10*3/MM3 (ref 140–450)
PMV BLD AUTO: 9.3 FL (ref 6–12)
POTASSIUM SERPL-SCNC: 4.1 MMOL/L (ref 3.5–5.2)
PROT SERPL-MCNC: 7.8 G/DL (ref 6–8.5)
RBC # BLD AUTO: 3.66 10*6/MM3 (ref 3.77–5.28)
SODIUM SERPL-SCNC: 135 MMOL/L (ref 136–145)
TIBC SERPL-MCNC: 338 MCG/DL (ref 298–536)
TRANSFERRIN SERPL-MCNC: 227 MG/DL (ref 200–360)
WBC # BLD AUTO: 7.61 10*3/MM3 (ref 3.4–10.8)

## 2020-07-02 PROCEDURE — 85025 COMPLETE CBC W/AUTO DIFF WBC: CPT | Performed by: INTERNAL MEDICINE

## 2020-07-02 PROCEDURE — 80053 COMPREHEN METABOLIC PANEL: CPT | Performed by: INTERNAL MEDICINE

## 2020-07-02 PROCEDURE — 99211 OFF/OP EST MAY X REQ PHY/QHP: CPT | Performed by: NURSE PRACTITIONER

## 2020-07-02 PROCEDURE — 82728 ASSAY OF FERRITIN: CPT | Performed by: INTERNAL MEDICINE

## 2020-07-02 PROCEDURE — 83540 ASSAY OF IRON: CPT | Performed by: INTERNAL MEDICINE

## 2020-07-02 PROCEDURE — 84466 ASSAY OF TRANSFERRIN: CPT | Performed by: INTERNAL MEDICINE

## 2020-07-02 NOTE — PROGRESS NOTES
Patient here for lab evaluation to initiate Retacrit for anemia due to stage III chronic kidney disease.  States that she is feeling good today.  No c/o voiced.  Skin w/d to touch.  Color wnl.  Eating and drinking well.  Parameters set to start Retacrit 40,000 units SQ for Hgb <10 and Hct <30.  Reviewed labs with patient, Hgb 11.7 and Hct 35.7 today.  Patient does not meet requirements to initiate injections.  Will return in 8 weeks for same.  Instructed to call with any problems.  Patient v/u.

## 2020-07-09 ENCOUNTER — TELEPHONE (OUTPATIENT)
Dept: INTERNAL MEDICINE | Facility: CLINIC | Age: 80
End: 2020-07-09

## 2020-07-09 DIAGNOSIS — M54.50 CHRONIC MIDLINE LOW BACK PAIN WITHOUT SCIATICA: Primary | ICD-10-CM

## 2020-07-09 DIAGNOSIS — G89.29 CHRONIC MIDLINE LOW BACK PAIN WITHOUT SCIATICA: Primary | ICD-10-CM

## 2020-07-09 NOTE — TELEPHONE ENCOUNTER
[facility] CALLED AND IS NEEDING A NEW ORDER FOR PT.  THEY ONE THEY HAVE IS DATED FOR MAY 2020..  PLEASE AND THANK YOU

## 2020-07-25 DIAGNOSIS — E11.42 TYPE 2 DIABETES MELLITUS WITH DIABETIC POLYNEUROPATHY, WITHOUT LONG-TERM CURRENT USE OF INSULIN (HCC): ICD-10-CM

## 2020-07-28 RX ORDER — GABAPENTIN 300 MG/1
300 CAPSULE ORAL 3 TIMES DAILY
Qty: 180 CAPSULE | Refills: 2 | Status: SHIPPED | OUTPATIENT
Start: 2020-07-28 | End: 2020-08-31 | Stop reason: DRUGHIGH

## 2020-08-06 ENCOUNTER — OFFICE VISIT (OUTPATIENT)
Dept: FAMILY MEDICINE CLINIC | Facility: CLINIC | Age: 80
End: 2020-08-06

## 2020-08-06 VITALS
SYSTOLIC BLOOD PRESSURE: 132 MMHG | BODY MASS INDEX: 33.31 KG/M2 | TEMPERATURE: 98.5 F | HEART RATE: 81 BPM | OXYGEN SATURATION: 96 % | DIASTOLIC BLOOD PRESSURE: 78 MMHG | WEIGHT: 169.7 LBS | HEIGHT: 60 IN | RESPIRATION RATE: 18 BRPM

## 2020-08-06 DIAGNOSIS — G89.29 CHRONIC MIDLINE LOW BACK PAIN WITHOUT SCIATICA: ICD-10-CM

## 2020-08-06 DIAGNOSIS — E11.42 TYPE 2 DIABETES MELLITUS WITH DIABETIC POLYNEUROPATHY, WITHOUT LONG-TERM CURRENT USE OF INSULIN (HCC): ICD-10-CM

## 2020-08-06 DIAGNOSIS — Z00.00 MEDICARE ANNUAL WELLNESS VISIT, SUBSEQUENT: Primary | ICD-10-CM

## 2020-08-06 DIAGNOSIS — M54.50 CHRONIC MIDLINE LOW BACK PAIN WITHOUT SCIATICA: ICD-10-CM

## 2020-08-06 DIAGNOSIS — R60.0 LOWER EXTREMITY EDEMA: ICD-10-CM

## 2020-08-06 DIAGNOSIS — C51.9 VULVAR CANCER, CARCINOMA (HCC): ICD-10-CM

## 2020-08-06 DIAGNOSIS — F41.9 ANXIETY: ICD-10-CM

## 2020-08-06 LAB — HBA1C MFR BLD: 6.6 %

## 2020-08-06 PROCEDURE — 83036 HEMOGLOBIN GLYCOSYLATED A1C: CPT | Performed by: FAMILY MEDICINE

## 2020-08-06 PROCEDURE — 99214 OFFICE O/P EST MOD 30 MIN: CPT | Performed by: FAMILY MEDICINE

## 2020-08-06 PROCEDURE — G0439 PPPS, SUBSEQ VISIT: HCPCS | Performed by: FAMILY MEDICINE

## 2020-08-06 RX ORDER — HYDROCODONE BITARTRATE AND ACETAMINOPHEN 5; 325 MG/1; MG/1
1 TABLET ORAL 2 TIMES DAILY PRN
Qty: 60 TABLET | Refills: 0 | Status: SHIPPED | OUTPATIENT
Start: 2020-08-06 | End: 2020-10-13 | Stop reason: DRUGHIGH

## 2020-08-06 RX ORDER — CITALOPRAM 20 MG/1
20 TABLET ORAL EVERY MORNING
Qty: 90 TABLET | Refills: 1 | Status: SHIPPED | OUTPATIENT
Start: 2020-08-06 | End: 2020-08-31 | Stop reason: DRUGHIGH

## 2020-08-06 RX ORDER — FUROSEMIDE 40 MG/1
40 TABLET ORAL DAILY
Qty: 90 TABLET | Refills: 1 | Status: SHIPPED | OUTPATIENT
Start: 2020-08-06 | End: 2020-11-17 | Stop reason: SDUPTHER

## 2020-08-06 NOTE — PROGRESS NOTES
The ABCs of the Annual Wellness Visit  Subsequent Medicare Wellness Visit    Chief Complaint   Patient presents with   • Follow-up   • Diabetes       Subjective   History of Present Illness:  Dago Nunez is a 80 y.o. female who presents for a Subsequent Medicare Wellness Visit.    HEALTH RISK ASSESSMENT    Recent Hospitalizations:  No hospitalization(s) within the last year.    Current Medical Providers:  Patient Care Team:  Shaheen Akbar DO as PCP - General (Family Medicine)  Shaheen Akbar DO as PCP - Claims Attributed  Trae Boggs MD as Consulting Physician (Urology)  Jesus Guzman MD as Consulting Physician (Pulmonary Disease)    Smoking Status:  Social History     Tobacco Use   Smoking Status Former Smoker   Smokeless Tobacco Never Used       Alcohol Consumption:  Social History     Substance and Sexual Activity   Alcohol Use No       Depression Screen:   PHQ-2/PHQ-9 Depression Screening 1/22/2020   Little interest or pleasure in doing things 0   Feeling down, depressed, or hopeless 0   Trouble falling or staying asleep, or sleeping too much 0   Feeling tired or having little energy 0   Poor appetite or overeating 0   Feeling bad about yourself - or that you are a failure or have let yourself or your family down 0   Trouble concentrating on things, such as reading the newspaper or watching television 0   Moving or speaking so slowly that other people could have noticed. Or the opposite - being so fidgety or restless that you have been moving around a lot more than usual 0   Thoughts that you would be better off dead, or of hurting yourself in some way 0   Total Score 0   If you checked off any problems, how difficult have these problems made it for you to do your work, take care of things at home, or get along with other people? -       Fall Risk Screen:  STEADI Fall Risk Assessment was completed, and patient is at MODERATE risk for falls. Assessment completed on:8/6/2020    Health  Habits and Functional and Cognitive Screening:  No flowsheet data found.      Does the patient have evidence of cognitive impairment? No    Asprin use counseling:Does not need ASA (and currently is not on it)    Age-appropriate Screening Schedule:  Refer to the list below for future screening recommendations based on patient's age, sex and/or medical conditions. Orders for these recommended tests are listed in the plan section. The patient has been provided with a written plan.    Health Maintenance   Topic Date Due   • ZOSTER VACCINE (2 of 3) 11/26/2015   • COLONOSCOPY  07/11/2017   • URINE MICROALBUMIN  01/29/2020   • INFLUENZA VACCINE  08/01/2020   • LIPID PANEL  08/26/2020   • HEMOGLOBIN A1C  11/05/2020   • DIABETIC EYE EXAM  11/25/2020   • DXA SCAN  05/20/2021   • MAMMOGRAM  05/28/2022   • TDAP/TD VACCINES (2 - Td) 05/01/2029          The following portions of the patient's history were reviewed and updated as appropriate: allergies, current medications, past family history, past medical history, past social history, past surgical history and problem list.    Outpatient Medications Prior to Visit   Medication Sig Dispense Refill   • Acetaminophen (TYLENOL ARTHRITIS PAIN PO) Take  by mouth.     • albuterol sulfate  (90 Base) MCG/ACT inhaler Inhale 2 puffs Every 4 (Four) Hours As Needed for Wheezing or Shortness of Air. 1 inhaler 12   • amLODIPine (NORVASC) 5 MG tablet Take 1 tablet by mouth Daily. 90 tablet 2   • B Complex Vitamins (VITAMIN B COMPLEX) capsule capsule Take  by mouth Daily.     • bisoprolol-hydrochlorothiazide (ZIAC) 5-6.25 MG per tablet Take 1 tablet by mouth Daily. 90 tablet 3   • calcium carbonate (OS-REAGAN) 600 MG tablet Take 600 mg by mouth Daily.     • cetirizine (zyrTEC) 10 MG tablet Take 10 mg by mouth Daily.     • diphenhydrAMINE (BENADRYL) 25 mg capsule Take 25 mg by mouth Every 6 (Six) Hours As Needed for itching.     • DULERA 100-5 MCG/ACT inhaler Inhale 2 puffs 2 (Two) Times a  Day. Rinse and spit after using. 6 inhaler 0   • esomeprazole (nexIUM) 40 MG capsule Take 1 capsule by mouth 2 (Two) Times a Day. 180 capsule 3   • gabapentin (NEURONTIN) 300 MG capsule Take 1 capsule by mouth 3 (Three) Times a Day. 180 capsule 2   • metaxalone (SKELAXIN) 400 MG tablet Take 1 tablet by mouth Every 8 (Eight) Hours. 90 tablet 1   • potassium chloride (K-DUR,KLOR-CON) 20 MEQ CR tablet Take 1 tablet by mouth Daily. Patient only takes once a day 30 tablet 11   • pravastatin (PRAVACHOL) 40 MG tablet Take 1 tablet by mouth Daily. 90 tablet 3   • spironolactone (ALDACTONE) 25 MG tablet Take 1 tablet by mouth Daily. 90 tablet 3   • sucralfate (CARAFATE) 1 GM/10ML suspension      • citalopram (CeleXA) 10 MG tablet TAKE 1 TABLET BY MOUTH 1 TIME DAILY 30 tablet 11   • furosemide (LASIX) 40 MG tablet Take 1 tablet by mouth 2 (Two) Times a Day As Needed (edema). Patient only takes once a day 180 tablet 2   • traMADol (ULTRAM) 50 MG tablet Take 1 tablet by mouth Every 8 (Eight) Hours As Needed for Moderate Pain . 60 tablet 0     No facility-administered medications prior to visit.        Patient Active Problem List   Diagnosis   • Meatal stenosis   • Retention of urine   • Type 2 diabetes mellitus, without long-term current use of insulin (CMS/HCC)   • Essential hypertension   • Grief reaction   • Vulvar intraepithelial neoplasia (MOODY) grade 3   • Chronic midline low back pain without sciatica   • Bilateral lower extremity edema   • History of urethral stricture   • Epidermal cyst of neck   • Normocytic anemia   • Neck abscess   • Hyperlipidemia   • GERD (gastroesophageal reflux disease)   • Hyponatremia   • Anxiety   • Iron deficiency anemia   • Stage 3 chronic kidney disease (CMS/HCC)   • Anemia in stage 3 chronic kidney disease (CMS/HCC)   • Screening for breast cancer   • Lower extremity edema   • Vulvar cancer, carcinoma (CMS/HCC)       Advanced Care Planning:  ACP discussion was declined by the patient.  "Patient has an advance directive in EMR which is still valid.     Review of Systems   See  note    Compared to one year ago, the patient feels her physical health is worse.  Compared to one year ago, the patient feels her mental health is the same.    Reviewed chart for potential of high risk medication in the elderly: yes  Reviewed chart for potential of harmful drug interactions in the elderly:yes    Objective         Vitals:    08/06/20 1015   BP: 132/78   BP Location: Left arm   Patient Position: Sitting   Cuff Size: Adult   Pulse: 81   Resp: 18   Temp: 98.5 °F (36.9 °C)   TempSrc: Temporal   SpO2: 96%   Weight: 77 kg (169 lb 11.2 oz)   Height: 152.4 cm (60\")       Body mass index is 33.14 kg/m².  Discussed the patient's BMI with her. The BMI is above average; BMI management plan is completed.    Physical Exam   See  note    Lab Results   Component Value Date    HGBA1C 6.6 08/06/2020        Assessment/Plan   Medicare Risks and Personalized Health Plan  CMS Preventative Services Quick Reference  Chronic Pain   Depression/Dysphoria  Fall Risk    The above risks/problems have been discussed with the patient.  Pertinent information has been shared with the patient in the After Visit Summary.  Follow up plans and orders are seen below in the Assessment/Plan Section.    Diagnoses and all orders for this visit:    1. Medicare annual wellness visit, subsequent (Primary)    2. Type 2 diabetes mellitus with diabetic polyneuropathy, without long-term current use of insulin (CMS/[facility])  -     POC Glycosylated Hemoglobin (Hb A1C)    3. Lower extremity edema  -     furosemide (LASIX) 40 MG tablet; Take 1 tablet by mouth Daily. Patient only takes once a day  Dispense: 90 tablet; Refill: 1    4. Vulvar cancer, carcinoma (CMS/[facility])    5. Anxiety  -     citalopram (CeleXA) 20 MG tablet; Take 1 tablet by mouth Every Morning.  Dispense: 90 tablet; Refill: 1    6. Chronic midline low back pain without sciatica  -     " HYDROcodone-acetaminophen (NORCO) 5-325 MG per tablet; Take 1 tablet by mouth 2 (Two) Times a Day As Needed for Moderate Pain  or Severe Pain .  Dispense: 60 tablet; Refill: 0      Follow Up:  Return in about 3 months (around 11/6/2020).     An After Visit Summary and PPPS were given to the patient.

## 2020-08-06 NOTE — PROGRESS NOTES
CC:   Chief Complaint   Patient presents with   • Follow-up   • Diabetes       History:  Dago Nunez is a 80 y.o. female who presents today for follow-up for evaluation of the above:    Here for follow up of diabetes with current A1c 6.6 on current regimen    Has had recent biopsy concern for worsening vulvar cancer with potential left-sided pelvic lymph node involvement on PET scan.  She is followed by oncology at [facility]    Says tramadol is not helping for chronic low back pain    Given worsening cancer status her anxiety has not been is well controlled on current dose of Celexa 10 mg daily    ROS:  Review of Systems   Constitutional: Negative for activity change. Fatigue: a little more since last year.   Musculoskeletal: Positive for back pain.   Psychiatric/Behavioral: Positive for behavioral problems and dysphoric mood. The patient is nervous/anxious.    All other systems reviewed and are negative.      Ms. Nunez  reports that she has quit smoking. She has never used smokeless tobacco. She reports that she does not drink alcohol or use drugs.      Current Outpatient Medications:   •  Acetaminophen (TYLENOL ARTHRITIS PAIN PO), Take  by mouth., Disp: , Rfl:   •  albuterol sulfate  (90 Base) MCG/ACT inhaler, Inhale 2 puffs Every 4 (Four) Hours As Needed for Wheezing or Shortness of Air., Disp: 1 inhaler, Rfl: 12  •  amLODIPine (NORVASC) 5 MG tablet, Take 1 tablet by mouth Daily., Disp: 90 tablet, Rfl: 2  •  B Complex Vitamins (VITAMIN B COMPLEX) capsule capsule, Take  by mouth Daily., Disp: , Rfl:   •  bisoprolol-hydrochlorothiazide (ZIAC) 5-6.25 MG per tablet, Take 1 tablet by mouth Daily., Disp: 90 tablet, Rfl: 3  •  calcium carbonate (OS-REAGAN) 600 MG tablet, Take 600 mg by mouth Daily., Disp: , Rfl:   •  cetirizine (zyrTEC) 10 MG tablet, Take 10 mg by mouth Daily., Disp: , Rfl:   •  citalopram (CeleXA) 20 MG tablet, Take 1 tablet by mouth Every Morning., Disp: 90 tablet, Rfl: 1  •  diphenhydrAMINE  "(BENADRYL) 25 mg capsule, Take 25 mg by mouth Every 6 (Six) Hours As Needed for itching., Disp: , Rfl:   •  DULERA 100-5 MCG/ACT inhaler, Inhale 2 puffs 2 (Two) Times a Day. Rinse and spit after using., Disp: 6 inhaler, Rfl: 0  •  esomeprazole (nexIUM) 40 MG capsule, Take 1 capsule by mouth 2 (Two) Times a Day., Disp: 180 capsule, Rfl: 3  •  furosemide (LASIX) 40 MG tablet, Take 1 tablet by mouth Daily. Patient only takes once a day, Disp: 90 tablet, Rfl: 1  •  gabapentin (NEURONTIN) 300 MG capsule, Take 1 capsule by mouth 3 (Three) Times a Day., Disp: 180 capsule, Rfl: 2  •  metaxalone (SKELAXIN) 400 MG tablet, Take 1 tablet by mouth Every 8 (Eight) Hours., Disp: 90 tablet, Rfl: 1  •  potassium chloride (K-DUR,KLOR-CON) 20 MEQ CR tablet, Take 1 tablet by mouth Daily. Patient only takes once a day, Disp: 30 tablet, Rfl: 11  •  pravastatin (PRAVACHOL) 40 MG tablet, Take 1 tablet by mouth Daily., Disp: 90 tablet, Rfl: 3  •  spironolactone (ALDACTONE) 25 MG tablet, Take 1 tablet by mouth Daily., Disp: 90 tablet, Rfl: 3  •  sucralfate (CARAFATE) 1 GM/10ML suspension, , Disp: , Rfl:   •  HYDROcodone-acetaminophen (NORCO) 5-325 MG per tablet, Take 1 tablet by mouth 2 (Two) Times a Day As Needed for Moderate Pain  or Severe Pain ., Disp: 60 tablet, Rfl: 0      OBJECTIVE:  /78 (BP Location: Left arm, Patient Position: Sitting, Cuff Size: Adult)   Pulse 81   Temp 98.5 °F (36.9 °C) (Temporal)   Resp 18   Ht 152.4 cm (60\")   Wt 77 kg (169 lb 11.2 oz)   SpO2 96%   BMI 33.14 kg/m²    Physical Exam   Constitutional: She is oriented to person, place, and time. No distress.   HENT:   Head: Normocephalic and atraumatic.   Nose: Nose normal.   Eyes: Conjunctivae are normal. Right eye exhibits no discharge. Left eye exhibits no discharge. No scleral icterus.   Neck: No tracheal deviation present.   Cardiovascular: Normal rate, regular rhythm and normal heart sounds. Exam reveals no gallop and no friction rub.   No murmur " heard.  Pulmonary/Chest: Effort normal and breath sounds normal. No respiratory distress. She has no wheezes. She has no rales.   Musculoskeletal: Edema: 2+ LE b/l.   Neurological: She is alert and oriented to person, place, and time.   Skin: Skin is warm and dry. She is not diaphoretic. No pallor.   Psychiatric: She has a normal mood and affect. Her behavior is normal. Judgment and thought content normal.   Nursing note and vitals reviewed.    Assessment/Plan     Diagnosis Plan   1. Medicare annual wellness visit, subsequent     2. Type 2 diabetes mellitus with diabetic polyneuropathy, without long-term current use of insulin (CMS/[facility])  POC Glycosylated Hemoglobin (Hb A1C)   3. Lower extremity edema  furosemide (LASIX) 40 MG tablet   4. Vulvar cancer, carcinoma (CMS/[facility])     5. Anxiety  citalopram (CeleXA) 20 MG tablet   6. Chronic midline low back pain without sciatica  HYDROcodone-acetaminophen (NORCO) 5-325 MG per tablet   Continue current diabetes regimen  Only takes Lasix as needed, recommended daily use  Continue oncology follow-up  Increase Celexa to 20 mg daily  Stop tramadol, twice daily hydrocodone trial    An After Visit Summary was printed and given to the patient at discharge.  Return in about 3 months (around 11/6/2020). Sooner if problems arise.         Shaheen Akbar D.O.  Family Medicine  Osteopathic Neuromusculoskeletal Medicine

## 2020-08-30 PROBLEM — Z87.891 FORMER SMOKER: Status: ACTIVE | Noted: 2020-08-30

## 2020-08-31 ENCOUNTER — CONSULT (OUTPATIENT)
Dept: RADIATION ONCOLOGY | Facility: HOSPITAL | Age: 80
End: 2020-08-31

## 2020-08-31 ENCOUNTER — HOSPITAL ENCOUNTER (OUTPATIENT)
Dept: RADIATION ONCOLOGY | Facility: HOSPITAL | Age: 80
Setting detail: RADIATION/ONCOLOGY SERIES
End: 2020-08-31

## 2020-08-31 VITALS
DIASTOLIC BLOOD PRESSURE: 69 MMHG | BODY MASS INDEX: 32.79 KG/M2 | SYSTOLIC BLOOD PRESSURE: 141 MMHG | HEIGHT: 60 IN | WEIGHT: 167 LBS

## 2020-08-31 DIAGNOSIS — C77.4 SECONDARY MALIGNANCY OF INGUINAL LYMPH NODES (HCC): ICD-10-CM

## 2020-08-31 DIAGNOSIS — Z87.891 FORMER SMOKER: ICD-10-CM

## 2020-08-31 DIAGNOSIS — C51.9 VULVAR CANCER, CARCINOMA (HCC): Primary | ICD-10-CM

## 2020-08-31 PROCEDURE — G0463 HOSPITAL OUTPT CLINIC VISIT: HCPCS | Performed by: RADIOLOGY

## 2020-08-31 PROCEDURE — 99205 OFFICE O/P NEW HI 60 MIN: CPT | Performed by: RADIOLOGY

## 2020-08-31 RX ORDER — CINCHONA OFFICINALIS BARK, ACONITUM NAPELLUS, PSEUDOGNAPHALIUM OBTUSIFOLIUM, LEDUM PALUSTRE TWIG, MAGNESIUM PHOSPHATE, DIBASIC TRIHYDRATE, TOXICODENDRON PUBESCENS LEAF, AND VISCUM ALBUM FRUITING TOP 3; 6; 3; 6; 6; 6; 3 [HP_X]/1; [HP_X]/1; [HP_X]/1; [HP_X]/1; [HP_X]/1; [HP_X]/1; [HP_X]/1
1 TABLET, SOLUBLE ORAL DAILY
COMMUNITY

## 2020-08-31 RX ORDER — GABAPENTIN 300 MG/1
600 CAPSULE ORAL
COMMUNITY
End: 2021-01-04 | Stop reason: SDUPTHER

## 2020-08-31 RX ORDER — PRAVASTATIN SODIUM 40 MG
40 TABLET ORAL DAILY
COMMUNITY
End: 2021-03-24

## 2020-08-31 RX ORDER — CITALOPRAM 20 MG/1
20 TABLET ORAL DAILY
COMMUNITY
End: 2020-11-02

## 2020-08-31 RX ORDER — AMLODIPINE BESYLATE 5 MG/1
5 TABLET ORAL DAILY
Status: ON HOLD | COMMUNITY
End: 2020-10-21 | Stop reason: DRUGHIGH

## 2020-08-31 RX ORDER — TRAMADOL HYDROCHLORIDE 50 MG/1
50 TABLET ORAL EVERY 8 HOURS PRN
Status: ON HOLD | COMMUNITY
End: 2020-10-21

## 2020-09-01 ENCOUNTER — HOSPITAL ENCOUNTER (OUTPATIENT)
Dept: RADIATION ONCOLOGY | Facility: HOSPITAL | Age: 80
Setting detail: RADIATION/ONCOLOGY SERIES
End: 2020-09-01

## 2020-09-03 ENCOUNTER — LAB (OUTPATIENT)
Dept: ONCOLOGY | Facility: CLINIC | Age: 80
End: 2020-09-03

## 2020-09-03 ENCOUNTER — CLINICAL SUPPORT (OUTPATIENT)
Dept: ONCOLOGY | Facility: CLINIC | Age: 80
End: 2020-09-03

## 2020-09-03 VITALS
OXYGEN SATURATION: 96 % | RESPIRATION RATE: 16 BRPM | DIASTOLIC BLOOD PRESSURE: 60 MMHG | HEART RATE: 64 BPM | SYSTOLIC BLOOD PRESSURE: 122 MMHG | TEMPERATURE: 98.3 F

## 2020-09-03 DIAGNOSIS — N18.30 STAGE 3 CHRONIC KIDNEY DISEASE (HCC): ICD-10-CM

## 2020-09-03 DIAGNOSIS — D64.9 NORMOCYTIC ANEMIA: ICD-10-CM

## 2020-09-03 LAB
ALBUMIN SERPL-MCNC: 4.5 G/DL (ref 3.5–5.2)
ALBUMIN/GLOB SERPL: 1.2 G/DL
ALP SERPL-CCNC: 68 U/L (ref 39–117)
ALT SERPL W P-5'-P-CCNC: 9 U/L (ref 1–33)
ANION GAP SERPL CALCULATED.3IONS-SCNC: 15 MMOL/L (ref 5–15)
AST SERPL-CCNC: 15 U/L (ref 1–32)
BASOPHILS # BLD AUTO: 0.04 10*3/MM3 (ref 0–0.2)
BASOPHILS NFR BLD AUTO: 0.5 % (ref 0–1.5)
BILIRUB SERPL-MCNC: 0.4 MG/DL (ref 0–1.2)
BUN SERPL-MCNC: 14 MG/DL (ref 8–23)
BUN/CREAT SERPL: 14.4 (ref 7–25)
CALCIUM SPEC-SCNC: 10.1 MG/DL (ref 8.6–10.5)
CHLORIDE SERPL-SCNC: 98 MMOL/L (ref 98–107)
CO2 SERPL-SCNC: 23 MMOL/L (ref 22–29)
CREAT SERPL-MCNC: 0.97 MG/DL (ref 0.57–1)
DEPRECATED RDW RBC AUTO: 45.1 FL (ref 37–54)
EOSINOPHIL # BLD AUTO: 0.22 10*3/MM3 (ref 0–0.4)
EOSINOPHIL NFR BLD AUTO: 3 % (ref 0.3–6.2)
ERYTHROCYTE [DISTWIDTH] IN BLOOD BY AUTOMATED COUNT: 12.7 % (ref 12.3–15.4)
FERRITIN SERPL-MCNC: 341.9 NG/ML (ref 13–150)
GFR SERPL CREATININE-BSD FRML MDRD: 56 ML/MIN/1.73
GLOBULIN UR ELPH-MCNC: 3.8 GM/DL
GLUCOSE SERPL-MCNC: 274 MG/DL (ref 65–99)
HCT VFR BLD AUTO: 36.1 % (ref 34–46.6)
HGB BLD-MCNC: 12.1 G/DL (ref 12–15.9)
IMM GRANULOCYTES # BLD AUTO: 0.01 10*3/MM3 (ref 0–0.05)
IMM GRANULOCYTES NFR BLD AUTO: 0.1 % (ref 0–0.5)
IRON 24H UR-MRATE: 129 MCG/DL (ref 37–145)
IRON SATN MFR SERPL: 34 % (ref 20–50)
LYMPHOCYTES # BLD AUTO: 1.65 10*3/MM3 (ref 0.7–3.1)
LYMPHOCYTES NFR BLD AUTO: 22.4 % (ref 19.6–45.3)
MCH RBC QN AUTO: 32.4 PG (ref 26.6–33)
MCHC RBC AUTO-ENTMCNC: 33.5 G/DL (ref 31.5–35.7)
MCV RBC AUTO: 96.8 FL (ref 79–97)
MONOCYTES # BLD AUTO: 0.35 10*3/MM3 (ref 0.1–0.9)
MONOCYTES NFR BLD AUTO: 4.8 % (ref 5–12)
NEUTROPHILS NFR BLD AUTO: 5.08 10*3/MM3 (ref 1.7–7)
NEUTROPHILS NFR BLD AUTO: 69.2 % (ref 42.7–76)
PLATELET # BLD AUTO: 208 10*3/MM3 (ref 140–450)
PMV BLD AUTO: 9.6 FL (ref 6–12)
POTASSIUM SERPL-SCNC: 4 MMOL/L (ref 3.5–5.2)
PROT SERPL-MCNC: 8.3 G/DL (ref 6–8.5)
RBC # BLD AUTO: 3.73 10*6/MM3 (ref 3.77–5.28)
SODIUM SERPL-SCNC: 136 MMOL/L (ref 136–145)
TIBC SERPL-MCNC: 384 MCG/DL (ref 298–536)
TRANSFERRIN SERPL-MCNC: 258 MG/DL (ref 200–360)
WBC # BLD AUTO: 7.35 10*3/MM3 (ref 3.4–10.8)

## 2020-09-03 PROCEDURE — 84466 ASSAY OF TRANSFERRIN: CPT | Performed by: INTERNAL MEDICINE

## 2020-09-03 PROCEDURE — 99211 OFF/OP EST MAY X REQ PHY/QHP: CPT | Performed by: NURSE PRACTITIONER

## 2020-09-03 PROCEDURE — 83540 ASSAY OF IRON: CPT | Performed by: INTERNAL MEDICINE

## 2020-09-03 PROCEDURE — 85025 COMPLETE CBC W/AUTO DIFF WBC: CPT | Performed by: INTERNAL MEDICINE

## 2020-09-03 PROCEDURE — 82728 ASSAY OF FERRITIN: CPT | Performed by: INTERNAL MEDICINE

## 2020-09-03 PROCEDURE — 80053 COMPREHEN METABOLIC PANEL: CPT | Performed by: INTERNAL MEDICINE

## 2020-09-03 NOTE — PROGRESS NOTES
Patient here for lab evaluation to intiate Retacrit for anemia due to stage III chronic kidney disease.  States that she is feeling ok, just gets tired with minimal exertion.  Skin w/d to touch.  Color wnl.  Eating and drinking well.  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  Parameters set to initiate Retacrit 40,000 for Hgb <10 and Hct <30. Reviewed labs with patient, Hgb 12.1 and Hct 36.1 today.  Patient does not meet requirements to initiate Retacrit at this time.  Patient has an appointment with Dr Roche on 09/09 and Dr Stafford on 09/16 to set up treatments.  Will not make follow-up with me at this time.  Instructed to call with any problems.  Patient v/u.

## 2020-09-08 NOTE — PROGRESS NOTES
[facility] Sussy Telles, JOSSELINN, PA-C  ____________________________________________________________  [facility]  Department of Radiation Oncology  [address] [place] [id number]  Office:  [phone]  Fax: [phone]    DATE:  09/09/2020  PATIENT: Dago Nunez  1940                         MEDICAL RECORD #:  [id number]                                                       REASON FOR FOLLOW UP VISIT: Dago Nunez is a very pleasant 80 y.o. 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 First seen in clinic for consultation on 08/31/2020 where definitive intent chemoradiation or single modality radiation therapy was recommended, final treatment plan was to be determined after evaluation per  and she has not seen him as of yet, appt next week.  Upon exam patient reports SOB, leg swelling, difficulty urinating, hematuria, and vaginal pain. Denies activity change, appetite change, unexpected weight change, nausea/vomiting, diarrhea, light-headedness, weakness, and headaches.     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  • Underwent modified radical vulvectomy for MOODY III in 11/2013 with final pathology showing microinvasive SCCa with negative margins.   • Persistent VAIN and underwent a total vaginectomy in 12/2014 with final pathology showing VAIN III with positive margins.  • 2/2015 she was admitted with urinary retention due to labial agglutination requiring surgery. Periurethral biopsies at that time  showed MOODY II.   • Periurethral Pap on 9/6/2017 revealed HG dysplasia.   • MRI of the pelvis on 7/9/2018 revealed a 2.3 x 1.8 cm urethral mass. Biopsy on 7/20/2018 revealed DOMINIQUE. An MRI on 1/8/2019 and on 2/18/2020 revealed the mass to be stable.   • Recurrence: Patient performs intermittent self-cath, has noticed something growing superior to her urethra.  07/10/2020 PERIURETHRA, BIOPSY: 1 cm polypoid lesion protruding from the introitus. Pathology: POORLY DIFFERENTIATED CARCINOMA WITH SQUAMOUS AND PAPILLARY FEATURES, FOCALLY INVASIVE IN THE SUBEPITHELIAL CONNECTIVE TISSUE; Comment: history of vulvar microinvasive squamous cell carcinoma is noted, current biopsy shows a poorly differentiated carcinoma with papillary formation. P16 immunostain and HPV RNA in situ hybridization are strongly and diffusely positive. A KERRI-3 immunostain shows patchy, weak positivity in the lesional cells. The patient's prior biopsy ([id number]) is reviewed and shows similar morphologic and immunophenotypic features but the papillary features were not present in the prior material. PET scan positive for left inguinal metastasis. Dr. Benavides recommended chemoradiation, pt declined chemotherapy.     03/19/2001 - VAGINA, BIOPSY:   • FOCAL ULCERATION, MARKED ACUTE AND CHRONIC INFLAMMATION, SPONGIOSIS AND REACTIVE ATYPIA;   • NEGATIVE FOR DYSPLASIA.  • Biopsy - Clinical Features: red vaginal lesion with bleeding since 1995.   • TX with Carafate douche and Premarin vaginal cream with intermittent improvement.    08/17/2001 - Pap Smear:  • Atypical keratinized squamous cells, suspicious for squamous cell carcinoma.  COMMENTS: There are numerous atypical keratinized cells present in an inflammatory background.  These are suspicious for squamous cell carcinoma.  Biopsy confirmation is recommended.     10/16/2001 - Pap Smear:  • DIAGNOSIS: Mild dysplasia  -Marked inflammation, Excessive hyperkeratosis  • ADEQUACY: Specimen satisfactory for  evaluation  • SOURCE: Vagina, diagnostic thin prep PAP    11/07/2001 - VAGINA AND VULVA, RIGHT POSTERIOR INTROITUS, WIDE LOCAL EXCISION:  • VAGINAL INTRAEPITHELIAL NEOPLASIA (VAIN) II-III, FOCALLY EXTENDING TO  • VAGINAL MARGIN AT 12-4:00; ALL OTHER MARGINS NEGATIVE FOR  • INTRAEPITHELIAL NEOPLASIA. (SEE COMMENT)  COMMENT: The lesion involves vaginal type epithelium with associated chronic inflammation and erosion. The adjacent vulvar epithelium is hyperkeratotic.    03/15/2002 - Pap Smear: Vagina, Thin Prep Pap, Diagnostic  • BETHESDA SYSTEM: High grade squamous intraepithelial lesion  • DESCRIPTOR: Moderate dysplasia  • ADEQUACY: Specimen satisfactory for evaluation  • SOURCE:     06/13/2003 - Pap Smear: Vagina, Thin Prep Pap, Diagnostic  • BETHESDA SYSTEM: High grade squamous intraepithelial lesion  • DESCRIPTOR: Moderate dysplasia  • ADDITIONAL FINDINGS: Inflammation  • ADEQUACY: Specimen satisfactory for evaluation  • HUMAN PAPILLOMAVIRUS     12/05/2003 - Gynecological Cytology   • CASE ACCESSION #: [id number]  • BETHESDA SYSTEM: Low grade squamous intraepithelial lesion  • DESCRIPTOR: Mild dysplasia  • ADEQUACY: Specimen satisfactory for evaluation  • SOURCE: Vagina, Thin Prep Pap, Diagnostic    11/29/2011 - Biopsy:    DIAGNOSIS:  • PERINEUM, BIOPSY:   o SQUAMOUS EPITHELIUM WITH FLORID HYPERKERATOSIS, CONSISTENT WITH CHRONIC IRRITATION; NO EVIDENCE OF DYSPLASIA OR MALIGNANCY (SEE COMMENT).  • Comment: Immunohistochemical studies (p16, p53, MIB-1) confirm the rendered interpretations.  • VULVA, RIGHT LABIUM MAJORUM, BIOPSY:   o VULVAR INTRAEPITHELIAL NEOPLASIA II   o (MODERATE DYSPLASIA); (SEE COMMENT).  • Comment: Immunohistochemical studies for p16 (nuclear and cytoplasmic positivity in lower epithelial half) and MIB-1 (nuclear positivity in lower epithelial half) confirms the diagnosis; p53 is positive only in rare cells. A GMS histochemical study for fungal forms is negative. Nevertheless, parakeratosis  associated with neutrophils, as was seen herein, is commonly associated with fungal vulvitis.  ADDENDUM FINDINGS:  • The high grade MOODY in the right labia majora biopsy was of the usual type, no evidence of differentiated MOODY.    07/03/2012 - Gynecological Cytology-  Vagina, Thin Prep Pap, Diagnostic  • CASE ACCESSION #: [id number]  • BETHESDA SYSTEM: High grade squamous intraepithelial lesion  • DESCRIPTOR: Mild to moderate dysplasia  • ADEQUACY: Specimen satisfactory for evaluation    01/29/2013 - VULVA, RIGHT, ANTERIOR, BIOPSY:    • SPONGIOTIC DERMATITIS WITH EXTENSIVE DERMAL GRANULATION TISSUE, MIXED INFLAMMATION AND FIBROSIS (SEE COMENT).  Comment: Sections show spongiosis, basement membrane thickening, dermal fibrosis, and extensive dermal granulation tissue with a predominantly chronic inflammatory infiltrate. There is no evidence of dysplasia or malignancy. The basement membrane thickening and dermal fibrosis suggests that there may be component of lichen sclerosus. However, the underlying cause of the ongoing inflammation and repair (granulation tissue) is not clear from this sample. A tissue gram stain fails to reveal any large bacterial aggregates.  GMS and AFB studies for fungal forms and acid fast bacilli were negative respectively    11/27/2013 -  Vulvectomy  • VULVA, LEFT ANTERIOR, BIOPSY:   o HIGH GRADE SQUAMOUS INTRAEPITHELIAL LESION (SEVERE DYSPLASIA, MOODY III).  • VAGINAL WALL, ANTERIOR, BIOPSY:   o HIGH GRADE SQUAMOUS INTRAEPITHELIAL LESION (SEVERE DYSPLASIA, VaIN III).  • VULVA, VULVECTOMY:   o FOCUS OF MICROINVASIVE SQUAMOUS CELL CARCINOMA, WELL-DIFFERENTIATED, DEPTH OF STROMAL INVASION 0.4 MM,  8 MM FROM CLOSEST DEEP MARGINS, 4 MM FROM RIGHT ANTERIOR VAGINAL MARGINS AT 8 - 9 O'CLOCK (PROXIMAL TIP OF SPECIMEN DESIGNATED 12 O'CLOCK);   o NEGATIVE FOR LYMPHOVASCULAR INVASION;   o ARISING IN A BACKGROUND OF EXTENSIVE VULVAR INTRAEPITHELIAL NEOPLASIA (SEVERE DYSPLASIA, MOODY III, CLASSICAL AND  DIFFERENTIATED TYPES);   o MOODY III IS PRESENT AT RIGHT LATERAL SURGICAL MARGIN (7 - 9 O'CLOCK),  o LEFT LATERAL SURGICAL MARGIN (3 - 5 O'CLOCK) AND RIGHT AND LEFT VAGINAL MARGINS;   o TOTAL LESIONAL AREA (INVASIVE CARCINOMA AND MOODY III) MEASURES 8.2 CM IN GREATEST DIMENSION (GROSS MEASUREMENT);   o BACKGROUND SEVERE INTERFACE DERMATITIS AND LICHEN SCLEROSUS.    Synoptic Diagnosis  VULVA:     • Specimen: Vulva  • Procedure: Other: Vulvectomy  • Lymph Node Sampling: Not applicable  • Specimen Size:   o Greatest dimension: 13.5 cm  o Additional dimension: 9.5 cm  o Additional dimension: 1.2 cm  • Tumor Site: Right vulva, labium minus  • Tumor Size: Greatest dimension: 0.04 cm  • Tumor Focality: Unifocal  • Histologic Type: Squamous cell carcinoma  • Histologic Grade: G1: Well differentiated  • Microscopic Tumor Extension: Depth of invasion: 0.4 mm  • Margins:   o Uninvolved by invasive carcinoma  o Distance of invasive carcinoma from closest margin: 4 mm  • Lymph-Vascular Invasion: Not identified  • Lymph Nodes: No nodes submitted or found  • Extranodal Extension: Cannot be determined (explain):    • Fixed or Ulcerated Femoral-Inguinal Lymph Nodes: Not identified  • Laterality of Involved Lymph Nodes: Cannot be determined:    • Primary Tumor (pT): pT1a [FIGO IA]: Lesions 2 cm or less in size, confined to the vulva or perineum, and with stromal invasion 1.0 mm or less  • Regional Lymph Nodes (pN): pNX:  Regional lymph nodes cannot be assessed  • Distant Metastasis (pM): Not applicable  • Additional Pathologic Findings:Vulvar intraepithelial neoplasia (MOODY) 3 (severe dysplasia/carcinoma in situ)    05/20/2014 - Gynecologic Cytology  • Arnold Diagnosis: High grade squamous intraepithelial lesion.  • Descriptor/Additional Findings: Moderate dysplasia.  • Adequacy: Specimen satisfactory for evaluation.  • Source: Vagina, Thin Prep Pap, Imaged Diagnostic    11/11/2014 - ANTERIOR VAGINAL WALL, BIOPSY:    • HIGH GRADE  "SQUAMOUS INTRAEPITHELIAL LESION (VaIN 3, SEVERE DYSPLASIA)      12/19/2014 - PARTIAL VAGINECTOMY:  • ANTERIOR VAGINAL WALL, PARTIAL VAGINECTOMY:  o HIGH-GRADE SQUAMOUS INTRAEPITHELIAL LESION (VaIN 3, SEVERE DYSPLASIA), 2.7 CM;   o HIGH GRADE DYSPLASIA EXTENDS TO THE 2 AND 8 O'CLOCK TIP MARGINS, THE 5-8 O'CLOCK LATERAL MARGIN, AND THE 11-2 O'CLOCK LATERAL MARGIN.     • JOYA-CLITORAL AREA, EXCISION:   o HIGH-GRADE SQUAMOUS INTRAEPITHELIAL LESION (VaIN 3, SEVERE DYSPLASIA), EXTENDING TO A LATERAL MARGIN.      02/09/2015 - Vulva excision:   • VULVA, LEFT PERIURETHRAL PORTION, EXCISION:   o FOCAL HIGH-GRADE SQUAMOUS INTRAEPITHELIAL LESION (MOODY 2, MODERATE DYSPLASIA); MARGINS ARE NEGATIVE FOR DYSPLASIA.  • VULVA, RIGHT PERIURETHRAL PORTION, EXCISION:   o BENIGN SQUAMOUS MUCOSA WITH ACUTE AND CHRONIC INFLAMMATION AND REACTIVE CHANGES.  • VULVA, LEFT, LOWER PORTION, EXCISION:   o BENIGN SQUAMOUS MUCOSA WITH ACUTE AND CHRONIC INFLAMMATION, REACTIVE CHANGES AND FEATURES CONSISTENT WITH PREVIOUS SURGICAL PROCEDURE.  • VULVA, RIGHT PORTION, EXCISION:   o BENIGN SQUAMOUS MUCOSA WITH CHRONIC INFLAMMATION AND DERMAL FIBROSIS.     09/06/2017 - \"PAP SMEAR FROM THE URETHRA\":    • HIGH GRADE SQUAMOUS INTRAEPITHELIAL LESION.      10/26/2017 - Urethral Meatus Biopsy:  • Urothelial mucosa with ulceration, chronic inflammation and focal high grade squamous intraepithelial lesion, moderate dysplasia    07/10/2018 - MRI Pelvis:  • There is a 2.3 x 1.8 cm urethral lesion at the external urethral meatus demonstrating enhancement and T2 hyperintensity. The lesion is located approximately 8 mm from the bladder neck/internal urethral orifice.  There is no extension of the lesion into the para vaginal fat or ischiorectal fossa. There is some edema of the bilateral ischiocavernosus muscles without invasion by the mass. No pelvic or inguinal adenopathy is identified.   • The patient has a 2.5 cm cystocele and the urethra is almost horizontal. There " is pelvic floor laxity with loss of upper convexity of the left iliococcygeus muscle.    07/20/2018 - URETHRA, BIOPSY:    • SQUAMOUS CELL CARCINOMA IN SITU; SEE COMMENT.  Comments: P16 immunostain and HPV RNA in situ hybridization are strongly and diffusely positive within the lesion, consistent with HPV-related pathogenesis.    01/08/2019 - MRI Pelvis:  • Stable size and appearance of a nonspecific enhancing nodular lesion at the caudal margin of the vaginal introitus adjacent to extensive postsurgical changes related to prior vulvectomy and vaginectomy. This lesion does not appear to conform to the expected course of the urethra which is somewhat poorly defined, and this felt more likely be located immediately caudal to the urethra. It is possible this may reflect postsurgical scarring as opposed to a true lesion. Continued follow-up is suggested to ensure stability.  • No lymphadenopathy or other evidence of metastatic disease in the pelvis.  • Evidence of pelvic floor laxity with cystocele, not significantly changed.    08/06/2019 - MRI Pelvis:  • No significant change from the prior exam on this limited noncontrast study.  • Stable indeterminate nodule anterior to the external urethral  meatus which may represent focal scarring; however, residual/recurrent disease is not entirely excluded. Recommend continued attention on follow-up.   • Extensive pelvic postsurgical changes with no evidence of local recurrence.  • Pelvic floor laxity with cystocele unchanged from prior exam.    01/13/2020 - Urethral stricture dilation.    02/18/2020 - MRI Pelvis:  • No significant interval change in 1.7 x 1.7 cm T2 hyperintense ovoid lesion at the urethral meatus.  • Numerous postoperative findings status post vaginectomy and hysterectomy.  • Pelvic floor laxity with persistent cystocele.  • No pelvic adenopathy or bony lesion identified.    05/13/2020 - Urethral stricture dilation .    07/10/2020 - Appointment with  :  • Periurethral lesion concerning for malignancy. Biopsy performed. Further w/u depending on diagnosis.  • Discussed with patient and her daughter the concern for malignancy. All questions answered.    07/10/2020 - PERIURETHRA, BIOPSY:   • POORLY DIFFERENTIATED CARCINOMA WITH SQUAMOUS AND PAPILLARY FEATURES, FOCALLY INVASIVE IN THE SUBEPITHELIAL CONNECTIVE TISSUE; SEE COMMENT.  Comments: The patient's history of vulvar microinvasive squamous cell carcinoma is noted. The current biopsy shows a poorly differentiated carcinoma with papillary formation. P16 immunostain and HPV RNA in situ hybridization are strongly and diffusely positive within the lesion, consistent with HPV-related pathogenesis. A KERRI-3 immunostain shows patchy, weak positivity in the lesional cells. The patient's prior biopsy ([id number]) is reviewed and shows similar morphologic and immunophenotypic features but the papillary features were not present in the prior material.  • Dr. Ilene Pablo reviewed this case and concurs with the diagnosis.     07/31/2020 - MRI Pelvis:  • Redemonstration of a mildly T2 hyperintense mass involving the urethral meatus, similar dimensions to prior exam on 2/18/2020, however there is now a polypoid lesion seen along the anterior aspect of the perineum, possibly contiguous with the urethral mass, concerning for disease progression. This could potentially represent the recently biopsied lesion.  • Marked interval enlargement of a left inguinal lymph node, almost certainly representing disease involvement. This would be amenable to ultrasound-guided biopsy if warranted.  • Findings related to pelvic floor laxity, with cystocele.    07/31/2020 - PET Scan:  • Intensely FDG avid 2.1 x 1.6 cm left inguinal lymph node seen.   • Intense physiologic urine activity obscures  visualization of periurethral mass and therefore better characterized on MRI of the pelvis dated 7/31/2020.   • There is physiologic  uptake of FDG in the  and GI tracts.   Impression:  • Intensely FDG avid left inguinal lymph node is highly suspicious for locoregional metastasis from known vulvar squamous cell carcinoma. There are no additional sites of suspicious FDG activity.  • Intense physiologic FDG activity within the urine obscures visualization of the periurethral mass. Findings are better characterized on same day pelvic MRI.    08/19/2020 - Telephone encounter with :  • I reached patient today. I reviewed biopsy results showing cancer, PET/CT results showing an approximately 2 cm left inguinal lymph node likely with metastatic disease but no other evidence of metastatic disease, and tumor board recommendations from last week for chemoradiation. Patient is reluctant to accept chemotherapy, but would accept radiation.   • She lives near [place] and would like to have treatment there.   • Explained daily treatments on weekdays x 6 weeks and that she will not be radioactive so can be around small children, etc while receiving treatments.   • I asked her to call and make an appointment to f/u with me in about 4-6 weeks after her radiation is completed.   • Referral to Dr. Roche [facility] in [place].     08/31/2020 - Consult with  (Covering for ):  • After careful consideration of the diagnostic data and evaluation of the patient, I am recommending definitive intent chemoradiation or single modality radiation therapy if patient refuses chemotherapy.    • Anticipate a total dose of treatment to a dose of 6480 -7000 cGy to be given in 33-39 daily fractions with final dose to be determined per planning and pending chemotherapy decision.    • We will schedule a CT simulation/exam on the same day, pt requests September 9, 2020 and we will await decision on final treatment, definitive chemoradiation vs radiation therapy alone, pending decision making after her evaluation by Dr. Stafford.    History obtained from  PATIENT  and CHART    PAST MEDICAL HISTORY  Past Medical History:   Diagnosis Date   • Anemia in stage 3 chronic kidney disease (CMS/HCC) 11/11/2019   • Arthritis    • Cellulitis    • Diabetes mellitus (CMS/[facility])    • GERD (gastroesophageal reflux disease)    • Hyperlipidemia    • Hypertension    • Kyphosis    • Osteoporosis    • Scoliosis    • Stage 3 chronic kidney disease (CMS/HCC) 11/11/2019   • Vulva cancer (CMS/[facility])    • Vulvar intraepithelial neoplasia (MOODY) grade 3       PAST SURGICAL HISTORY  Past Surgical History:   Procedure Laterality Date   • APPENDECTOMY     • BREAST CYST ASPIRATION Left    • COLONOSCOPY  01/12/2011   • ENDOSCOPY  07/01/2014   • HYSTERECTOMY     • TONSILLECTOMY     • VAGINA SURGERY        FAMILY HISTORY  family history includes Breast cancer in her sister; Cancer in her mother and sister; Dementia in her mother; Diabetes in her brother; Heart disease in her father and paternal grandfather; Hypertension in her mother; Osteoporosis in her mother; Parkinsonism in her father.     SOCIAL HISTORY  Social History     Tobacco Use   • Smoking status: Former Smoker   • Smokeless tobacco: Never Used   Substance Use Topics   • Alcohol use: No   • Drug use: No     ALLERGIES  Scopolamine, Tequin [gatifloxacin], Trovan [alatrofloxacin], Amoxicillin-pot clavulanate, Keflex [cephalexin], and Septra [sulfamethoxazole-trimethoprim]     MEDICATIONS    Current Outpatient Medications:   •  Acetaminophen (TYLENOL ARTHRITIS PAIN PO), Take 1 tablet by mouth Daily., Disp: , Rfl:   •  albuterol sulfate  (90 Base) MCG/ACT inhaler, Inhale 2 puffs Every 4 (Four) Hours As Needed for Wheezing or Shortness of Air., Disp: 1 inhaler, Rfl: 12  •  amLODIPine (NORVASC) 10 MG tablet, Take 10 mg by mouth Daily., Disp: , Rfl:   •  B Complex Vitamins (VITAMIN B COMPLEX) capsule capsule, Take  by mouth Daily., Disp: , Rfl:   •  bisoprolol-hydrochlorothiazide (ZIAC) 5-6.25 MG per tablet, Take 1 tablet by mouth Daily., Disp: 90  tablet, Rfl: 3  •  calcium carbonate (OS-REAGAN) 600 MG tablet, Take 600 mg by mouth Daily., Disp: , Rfl:   •  cetirizine (zyrTEC) 10 MG tablet, Take 10 mg by mouth Daily., Disp: , Rfl:   •  citalopram (CeleXA) 10 MG tablet, Take 10 mg by mouth Daily., Disp: , Rfl:   •  diphenhydrAMINE (BENADRYL) 25 mg capsule, Take 25 mg by mouth Every 6 (Six) Hours As Needed for itching., Disp: , Rfl:   •  DULERA 100-5 MCG/ACT inhaler, Inhale 2 puffs 2 (Two) Times a Day. Rinse and spit after using., Disp: 6 inhaler, Rfl: 0  •  esomeprazole (nexIUM) 40 MG capsule, Take 1 capsule by mouth 2 (Two) Times a Day., Disp: 180 capsule, Rfl: 3  •  furosemide (LASIX) 40 MG tablet, Take 1 tablet by mouth Daily. Patient only takes once a day, Disp: 90 tablet, Rfl: 1  •  gabapentin (NEURONTIN) 300 MG capsule, Take 600 mg by mouth 2 (two) times a day., Disp: , Rfl:   •  Homeopathic Products (LEG CRAMPS) tablet, Take 1 tablet by mouth Daily., Disp: , Rfl:   •  HYDROcodone-acetaminophen (NORCO) 5-325 MG per tablet, Take 1 tablet by mouth 2 (Two) Times a Day As Needed for Moderate Pain  or Severe Pain ., Disp: 60 tablet, Rfl: 0  •  lidocaine (XYLOCAINE) 2 % jelly, Apply 2 mL topically to the appropriate area as directed As Needed for Mild Pain ., Disp: , Rfl:   •  potassium chloride (K-DUR,KLOR-CON) 20 MEQ CR tablet, Take 1 tablet by mouth Daily. Patient only takes once a day, Disp: 30 tablet, Rfl: 11  •  pravastatin (PRAVACHOL) 10 MG tablet, Take 10 mg by mouth Daily., Disp: , Rfl:   •  spironolactone (ALDACTONE) 25 MG tablet, Take 1 tablet by mouth Daily., Disp: 90 tablet, Rfl: 3  •  traMADol (ULTRAM) 50 MG tablet, Take 50 mg by mouth Every 8 (Eight) Hours As Needed for Moderate Pain ., Disp: , Rfl:     The following portions of the patient's history were reviewed and updated as appropriate: allergies, current medications, past family history, past medical history, past social history, past surgical history and problem list.    REVIEW OF  "SYSTEMS  Review of Systems   Constitutional: Negative.    HENT: Negative.    Eyes: Negative.         Glasses   Respiratory: Positive for shortness of breath (with exertion).    Cardiovascular: Positive for leg swelling (bilateral leg/ankle edema).   Gastrointestinal: Negative.    Endocrine: Negative.    Genitourinary: Positive for difficulty urinating (r/t previous surgeries; frequent urethral dilations and self cathing), hematuria (occasional after self-cathing) and vaginal pain (occasional pressure type pain when sitting).        Lesion to vulva  Patient self caths in am and pm   Musculoskeletal: Positive for arthralgias (back).   Skin: Negative.    Allergic/Immunologic: Negative.    Neurological: Negative.    Hematological: Negative.    Psychiatric/Behavioral: Negative.      PHYSICAL EXAM  VITAL SIGNS:   Vitals:    09/09/20 1425   BP: 163/69   Weight: 76.7 kg (169 lb)   Height: 152.4 cm (60\")   PainSc:   2   PainLoc: Vagina  Comment: occasional pressure type pain when sitting      General:  Alert and oriented, no acute distress,Vitals reviewed.  Head:  Normocephalic, without obvious abnormality    Nose/Sinuses:  Nares normal externally, no sinus tenderness.  Mouth/Throat:  Mucosa moist, pharynx without erythema  Neck:  supple, No evidence of adenopathy in the cervical or supraclavicular areas.  Eyes: No gross abnormalities   Ears: Ears intact with no external abnormalities noted  Chest:  Respiratory efforts are normal and unlabored  Cardiovascular: Regular rate and rhythm without murmurs, rubs, or gallops.   Abdomen:  Soft, non-tender, normal bowel sounds; no CVA tenderness    Pelvic:  Pelvic exam reveal extensive surgical changes with absence of normal anatomy, there is a palpable tumor nodule at the vaginal introitus.  Extremities:  BUNCH well, warm to touch  Skin: No suspicious lesions or rashes of concern  Neurologic:  Alert and oriented, non focal exam     Psych: Mood and affect are appropriate    Performance " Status: ECOG (0) Fully active, able to carry on all predisease performance without restriction    Clinical Quality Measures  -Pain Documented by Standardized Tool, FPS Dago Nunez reports a pain score of 2.  Given her pain assessment as noted, treatment options were discussed and the following options were decided upon as a follow-up plan to address the patient's pain: continuation of current treatment plan for pain and use of non-medical modalities (ice, heat, stretching and/or behavior modifications).     Pain Medications             Acetaminophen (TYLENOL ARTHRITIS PAIN PO) Take 1 tablet by mouth Daily.    citalopram (CeleXA) 10 MG tablet Take 10 mg by mouth Daily.    gabapentin (NEURONTIN) 300 MG capsule Take 600 mg by mouth 2 (two) times a day.    HYDROcodone-acetaminophen (NORCO) 5-325 MG per tablet Take 1 tablet by mouth 2 (Two) Times a Day As Needed for Moderate Pain  or Severe Pain .    traMADol (ULTRAM) 50 MG tablet Take 50 mg by mouth Every 8 (Eight) Hours As Needed for Moderate Pain .        -ADVANCED DIRECTIVE Advance Care Planning   ACP discussion was held with the patient during this visit. Patient has an advance directive in EMR which is still valid.     Body Mass Index Screening and Follow-Up Plan Body mass index is 33.01 kg/m². Patient's Body mass index is 33.01 kg/m². BMI is above normal parameters. Recommendations include: educational material.  Tobacco Use: Screening and Cessation Intervention Social History    Tobacco Use      Smoking status: Former Smoker      Smokeless tobacco: Never Used    ASSESSMENT AND PLAN  1. Vulvar cancer, carcinoma (CMS/HCC)    2. Secondary malignancy of inguinal lymph nodes (CMS/HCC)    3. Former smoker      RECOMMENDATIONS: Dago Nunez is a very pleasant 80 y.o. 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 First seen in clinic for consultation on 08/31/2020 where definitive intent chemoradiation or single modality radiation therapy was recommended, final treatment plan was to be determined after evaluation per  and she has not seen him as of yet, appt next week. We reviewed the multiple treatment modalities, radiation alone vs chemoradiation, she understands for optimal treatment results, chemotherapy is indicated for the positive lymph nodes. I will see her next week after she discusses this with Dr. Stafford.   Todays appointment time was scheduled for coordination of care with Dr. Stafford and begin radiation planning although she has not yet seen Dr. Stafford.     I anticipate a dose of 3203-2555 cGy with concomitant chemotherapy if she accepts that treatment.    Ulises Roche III, MD   09/09/2020

## 2020-09-09 ENCOUNTER — HOSPITAL ENCOUNTER (OUTPATIENT)
Dept: RADIATION ONCOLOGY | Facility: HOSPITAL | Age: 80
Setting detail: RADIATION/ONCOLOGY SERIES
End: 2020-09-09

## 2020-09-09 ENCOUNTER — OFFICE VISIT (OUTPATIENT)
Dept: RADIATION ONCOLOGY | Facility: HOSPITAL | Age: 80
End: 2020-09-09

## 2020-09-09 VITALS
WEIGHT: 169 LBS | DIASTOLIC BLOOD PRESSURE: 69 MMHG | BODY MASS INDEX: 33.18 KG/M2 | HEIGHT: 60 IN | SYSTOLIC BLOOD PRESSURE: 163 MMHG

## 2020-09-09 DIAGNOSIS — Z87.891 FORMER SMOKER: ICD-10-CM

## 2020-09-09 DIAGNOSIS — C77.4 SECONDARY MALIGNANCY OF INGUINAL LYMPH NODES (HCC): ICD-10-CM

## 2020-09-09 DIAGNOSIS — C51.9 VULVAR CANCER, CARCINOMA (HCC): Primary | ICD-10-CM

## 2020-09-09 PROCEDURE — 77334 RADIATION TREATMENT AID(S): CPT | Performed by: RADIOLOGY

## 2020-09-09 NOTE — PROGRESS NOTES
[facility] ONC [facility] HEMATOLOGY AND ONCOLOGY  [address]  [facility] [address] [phone]    Patient Name: Dago Nunez  Encounter Date: 09/16/2020  YOB: 1940  Patient Number: [id number]      REASON FOR FOLLOW-UP: Dago Nunez is a pleasant 80-year-old  female who is seen on followup for macrocytic anemia from chronic kidney disease Stage III, GFR 51 mL/minute 03/05/2018, iron deficiency, and thrombocytopenia.  She is intolerant to oral iron (nausea, stomach cramps, and constipation).  She had Injectafer 23 months ago. She is also seen for vulvar cancer. She is seen with spouse.  History is obtained from the patient. History is considered to be accurate.    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  The patient was seen by Dr. Marks in favor definitive chemo radiation.    Pathology report 07/10/2020 periurethral biopsy, poorly differentiated carcinoma with squamous and papillary features, focally invasive in the subepithelial connective tissue.    PET 07/31/2020 showed intense FDG avid left inguinal node is highly suspicious for local regional metastasis from known vulvar squamous cell carcinoma.  No additional sites of suspicious FDG activity.    MRI 07/31/2020 showed redemonstration of mildly T2 hyperintense mass involving the urethral meatus, similar dimensions to prior exam on 02/18/2020 however there is now a polypoid lesion seen along the anterior aspect of the perineum, possibly contiguous with the urethral mass, concerning for disease progression.  Market interval enlargement of the left inguinal node almost certainly representing disease involvement.    Patient was notified by Dr. Siddiqui 08/19/2020.  Consensus for chemoradiation.  Patient reluctant to take chemotherapy.  Follow-up with Dr. Siddiqui in 4 to 6 weeks after radiation is completed.  Phone Notes  09/02/2020.  Patient will try chemoradiation and see how it goes.    Seen by Dr. Roche 09/09/2020. Plan for chemoradiation.    Problem List Items Addressed This Visit     None        Oncology/Hematology History   Vulvar cancer, carcinoma (CMS/HCC)    Initial Diagnosis    Vulvar cancer, carcinoma (CMS/HCC)     3/19/2001 Biopsy    Clinical Features: red vaginal lesion with bleeding since 1995. TX with  Carafate douche and Premarin vaginal cream with intermittent improvement.    DIAGNOSIS:    VAGINA, BIOPSY: FOCAL ULCERATION, MARKED ACUTE AND CHRONIC INFLAMMATION,  SPONGIOSIS AND REACTIVE ATYPIA; NEGATIVE FOR DYSPLASIA.     8/17/2001 Procedure    Pap Smear:  DIAGNOSIS:            Atypical keratinized squamous cells, suspicious                           for squamous cell carcinoma.         COMMENTS:             There are numerous atypical keratinized cells                           present in an inflammatory background.  These are                           suspicious for squamous cell carcinoma.  Biopsy                           confirmation is recommended.      10/16/2001 Procedure    Pap Smear:  DIAGNOSIS:            Mild dysplasia       ADDITIONAL FINDINGS:  Marked inflammation                           Excessive hyperkeratosis         BETHESDA SYSTEM:      Low grade squamous intraepithelial lesion    DIAGNOSIS:            Negative, no abnormal cells seen           BETHESDA SYSTEM:      Within normal limits.       ADEQUACY:             Specimen satisfactory for evaluation       SOURCE:               Vagina, diagnostic thin prep PAP     11/7/2001 Biopsy      DIAGNOSIS:    VAGINA AND VULVA, RIGHT POSTERIOR INTROITUS, WIDE LOCAL EXCISION:  VAGINAL INTRAEPITHELIAL NEOPLASIA (VAIN) II-III, FOCALLY EXTENDING TO  VAGINAL MARGIN AT 12-4:00; ALL OTHER MARGINS NEGATIVE FOR  INTRAEPITHELIAL NEOPLASIA. (SEE COMMENT)    COMMENT: The lesion involves vaginal type epithelium with associated  chronic inflammation and erosion. The  adjacent vulvar epithelium is  hyperkeratotic.     3/15/2002 Procedure    Pap Smear:  BETHESDA SYSTEM:      High grade squamous intraepithelial lesion       DESCRIPTOR:           Moderate dysplasia           ADEQUACY:             Specimen satisfactory for evaluation       SOURCE:               Vagina, Thin Prep Pap, Diagnostic     6/13/2003 Procedure         BETHESDA SYSTEM:      High grade squamous intraepithelial lesion       DESCRIPTOR:           Moderate dysplasia       ADDITIONAL FINDINGS:  Inflammation         ADEQUACY:             Specimen satisfactory for evaluation       SOURCE:               Vagina, Thin Prep Pap, Diagnostic    HUMAN PAPILLOMAVIRUS         CASE ACCESSION #:    [id number]        Category                  HPV Types                Patient Results         High/Intermed Risk    16,18,31,33,35,39              Negative                            45,51,52,56,58,59,68      Specimen Source        Cervical / Vaginal Specimen     12/5/2003 Procedure    Gynecological Cytology        CASE ACCESSION #:     [id number]       BETHESDA SYSTEM:      Low grade squamous intraepithelial lesion       DESCRIPTOR:           Mild dysplasia           ADEQUACY:             Specimen satisfactory for evaluation       SOURCE:               Vagina, Thin Prep Pap, Diagnostic     11/29/2011 Biopsy    ADDENDUM FINDINGS:    The high grade MOODY in the right labia majora biopsy was of the usual type.  There was no evidence of differentiated MOODY.      DIAGNOSIS:    1) PERINEUM, BIOPSY: SQUAMOUS EPITHELIUM WITH FLORID HYPERKERATOSIS,  CONSISTENT WITH CHRONIC IRRITATION; NO EVIDENCE OF DYSPLASIA OR MALIGNANCY  (SEE COMMENT).    Comment: Immunohistochemical studies (p16, p53, MIB-1) confirm the rendered  interpretations.    2) VULVA, RIGHT LABIUM MAJORUM, BIOPSY: VULVAR INTRAEPITHELIAL NEOPLASIA II  (MODERATE DYSPLASIA); (SEE COMMENT).    Comment: Immunohistochemical studies for p16 (nuclear and cytoplasmic  positivity in lower  epithelial half) and MIB-1 (nuclear positivity in lower  epithelial half) confirms the diagnosis; p53 is positive only in rare  cells. A GMS histochemical study for fungal forms is negative.  Nevertheless, parakeratosis associated with neutrophils, as was seen  herein, is commonly associated with fungal vulvitis.     7/3/2012 Procedure    Gynecological Cytology    CASE ACCESSION #:                     [id number]  BETHESDA SYSTEM:                      High grade squamous intraepithelial                                        lesion  DESCRIPTOR:                           Mild to moderate dysplasia  ADEQUACY:                             Specimen satisfactory for evaluation  SOURCE:                               Vagina, Thin Prep Pap, Diagnostic  # SLIDES REVIEWED:                    1     1/29/2013 Biopsy    DIAGNOSIS:    1) VULVA, RIGHT, ANTERIOR, BIOPSY:  SPONGIOTIC DERMATITIS WITH EXTENSIVE  DERMAL GRANULATION TISSUE, MIXED INFLAMMATION AND FIBROSIS (SEE COMENT).    Comment: Sections show spongiosis, basement membrane thickening, dermal  fibrosis, and extensive dermal granulation tissue with a predominantly  chronic inflammatory infiltrate. There is no evidence of dysplasia or  malignancy. The basement membrane thickening and dermal fibrosis suggests  that there may be component of lichen sclerosus. However, the underlying  cause of the ongoing inflammation and repair (granulation tissue) is not  clear from this sample. A tissue gram stain fails to reveal any large  bacterial aggregates.  GMS and AFB studies for fungal forms and acid fast  bacilli were negative respectively     11/27/2013 Surgery      Diagnosis    1) VULVA, LEFT ANTERIOR, BIOPSY: HIGH GRADE SQUAMOUS INTRAEPITHELIAL LESION (SEVERE DYSPLASIA, MOODY III).    2) VAGINAL WALL, ANTERIOR, BIOPSY: HIGH GRADE SQUAMOUS INTRAEPITHELIAL LESION (SEVERE DYSPLASIA, VaIN III).    3) VULVA, VULVECTOMY: FOCUS OF MICROINVASIVE SQUAMOUS CELL CARCINOMA,  WELL-DIFFERENTIATED, DEPTH OF STROMAL INVASION 0.4 MM,  8 MM FROM CLOSEST DEEP MARGINS, 4 MM FROM RIGHT ANTERIOR VAGINAL MARGINS AT 8 - 9 O'CLOCK (PROXIMAL TIP OF SPECIMEN DESIGNATED   12 O'CLOCK); NEGATIVE FOR LYMPHOVASCULAR INVASION; ARISING IN A BACKGROUND OF EXTENSIVE VULVAR INTRAEPITHELIAL NEOPLASIA (SEVERE DYSPLASIA, MOODY III, CLASSICAL AND DIFFERENTIATED TYPES); MOODY III IS PRESENT AT RIGHT LATERAL SURGICAL MARGIN (7 - 9 O'CLOCK),   LEFT LATERAL SURGICAL MARGIN (3 - 5 O'CLOCK) AND RIGHT AND LEFT VAGINAL MARGINS; TOTAL LESIONAL AREA (INVASIVE CARCINOMA AND MOODY III) MEASURES 8.2 CM IN GREATEST DIMENSION (GROSS MEASUREMENT); BACKGROUND SEVERE INTERFACE DERMATITIS AND LICHEN SCLEROSUS.        **Electronically signed out by Dimas Cardenas M.D.**on 12/2/2013  HAYLEY/YAZMIN/franky  Case reviewed by Attending Pathologist      Synoptic Diagnosis  3)  VULVA:     Specimen:                        Vulva  Procedure:                       Other: Vulvectomy  Lymph Node Sampling:             Not applicable  Specimen Size:                   Greatest dimension: 13.5 cm                                   Additional dimension: 9.5 cm                                   Additional dimension: 1.2 cm  Tumor Site:                      Right vulva, labium minus  Tumor Size:                      Greatest dimension: 0.04 cm  Tumor Focality:                  Unifocal  Histologic Type:                 Squamous cell carcinoma  Histologic Grade:                G1: Well differentiated  Microscopic Tumor Extension:     Depth of invasion: 0.4 mm  Margins:                         Uninvolved by invasive carcinoma                                   Distance of invasive carcinoma from closest margin: 4 mm  Lymph-Vascular Invasion:         Not identified  Lymph Nodes:                     No nodes submitted or found  Extranodal Extension:            Cannot be determined (explain):    Fixed or Ulcerated Femoral-Inguinal Lymph Nodes:                                     Not identified  Laterality of Involved Lymph Nodes:                                    Cannot be determined:    Primary Tumor (pT):              pT1a [FIGO IA]: Lesions 2 cm or less in size, confined to the vulva or perineum, and with stromal invasion 1.0 mm or less  Regional Lymph Nodes (pN):       pNX:  Regional lymph nodes cannot be assessed  Distant Metastasis (pM):         Not applicable  Additional Pathologic Findings:  Vulvar intraepithelial neoplasia (MOODY) 3 (severe dysplasia/carcinoma in situ)  --------------------------------------------------------     5/20/2014 Procedure    Gynecologic Cytology  [place] Diagnosis:  High grade squamous intraepithelial lesion.    Descriptor/Additional Findings:  Moderate dysplasia.    Adequacy:  Specimen satisfactory for evaluation.    Source:  Vagina, Thin Prep Pap, Imaged Diagnostic     11/11/2014 Biopsy    Diagnosis    ANTERIOR VAGINAL WALL, BIOPSY:  HIGH GRADE SQUAMOUS INTRAEPITHELIAL LESION (VaIN 3, SEVERE DYSPLASIA)       12/19/2014 Surgery    Diagnosis  1)  ANTERIOR VAGINAL WALL, PARTIAL VAGINECTOMY: HIGH-GRADE SQUAMOUS INTRAEPITHELIAL LESION (VaIN 3, SEVERE DYSPLASIA), 2.7 CM; HIGH GRADE DYSPLASIA EXTENDS TO THE 2 AND 8 O'CLOCK TIP MARGINS, THE 5-8 O'CLOCK LATERAL MARGIN, AND THE 11-2 O'CLOCK LATERAL   MARGIN.          2)  JOYA-CLITORAL AREA, EXCISION: HIGH-GRADE SQUAMOUS INTRAEPITHELIAL LESION (VaIN 3, SEVERE DYSPLASIA), EXTENDING TO A LATERAL MARGIN.         2/9/2015 Surgery    Diagnosis  1.          VULVA, LEFT PERIURETHRAL PORTION, EXCISION: FOCAL HIGH-GRADE SQUAMOUS INTRAEPITHELIAL LESION (MOODY 2, MODERATE DYSPLASIA); MARGINS ARE NEGATIVE FOR DYSPLASIA.    2.          VULVA, RIGHT PERIURETHRAL PORTION, EXCISION: BENIGN SQUAMOUS MUCOSA WITH ACUTE AND CHRONIC INFLAMMATION AND REACTIVE CHANGES.         3.          VULVA, LEFT, LOWER PORTION, EXCISION: BENIGN SQUAMOUS MUCOSA WITH ACUTE AND CHRONIC INFLAMMATION, REACTIVE CHANGES  "AND FEATURES CONSISTENT WITH PREVIOUS SURGICAL PROCEDURE.         4.          VULVA, RIGHT PORTION, EXCISION: BENIGN SQUAMOUS MUCOSA WITH CHRONIC INFLAMMATION AND DERMAL FIBROSIS.        9/6/2017 Procedure    Diagnosis  \"PAP SMEAR FROM THE URETHRA\":  HIGH GRADE SQUAMOUS INTRAEPITHELIAL LESION.          10/26/2017 Biopsy    Urethral Meatus Biopsy:  Urothelial mucosa with ulceration, chronic inflammation and focal high grade squamous intraepithelial lesion, moderate dysplasia     7/10/2018 Imaging    MRI Pelvis:  Impression:    1.  There is a 2.3 x 1.8 cm urethral lesion at the external urethral   meatus demonstrating enhancement and T2 hyperintensity. The lesion is   located approximately 8 mm from the bladder neck/internal urethral   orifice.  There is no extension of the lesion into the para vaginal   fat or ischiorectal fossa. There is some edema of the bilateral   ischiocavernosus muscles without invasion by the mass. No pelvic or   inguinal adenopathy is identified.     2.  The patient has a 2.5 cm cystocele and the urethra is almost   horizontal. There is pelvic floor laxity with loss of upper convexity   of the left iliococcygeus muscle.     7/20/2018 Biopsy    Diagnosis  URETHRA, BIOPSY:  SQUAMOUS CELL CARCINOMA IN SITU; SEE COMMENT.    Comments  P16 immunostain and HPV RNA in situ hybridization are strongly and diffusely positive within the lesion, consistent with HPV-related pathogenesis.     1/8/2019 Imaging    MRI Pelvis:  1.  Stable size and appearance of a nonspecific enhancing nodular   lesion at the caudal margin of the vaginal introitus adjacent to   extensive postsurgical changes related to prior vulvectomy and   vaginectomy. This lesion does not appear to conform to the expected   course of the urethra which is somewhat poorly defined, and this felt   more likely be located immediately caudal to the urethra. It is   possible this may reflect postsurgical scarring as opposed to a true   lesion. " Continued follow-up is suggested to ensure stability.  2.  No lymphadenopathy or other evidence of metastatic disease in the   pelvis.  3.  Evidence of pelvic floor laxity with cystocele, not significantly   changed.     8/6/2019 Imaging    MRI Pelvis:  1. No significant change from the prior exam on this limited   noncontrast study.  2. Stable indeterminate nodule anterior to the external urethral   meatus which may represent focal scarring; however,   residual/recurrent disease is not entirely excluded.  Recommend continued attention on follow-up. 3. Extensive pelvic   postsurgical changes with no evidence of local recurrence.  4. Pelvic floor laxity with cystocele unchanged from prior exam.     1/13/2020 Procedure    Urethral stricture dilation     2/18/2020 Imaging    MRI Pelvis:  Impression:  1.  No significant interval change in 1.7 x 1.7 cm T2 hyperintense   ovoid lesion at the urethral meatus.  2.  Numerous postoperative findings status post vaginectomy and   hysterectomy.  3.  Pelvic floor laxity with persistent cystocele.  4.  No pelvic adenopathy or bony lesion identified.     5/13/2020 Procedure    Urethral stricture dilation      7/10/2020 Biopsy    Diagnosis  PERIURETHRA, BIOPSY: POORLY DIFFERENTIATED CARCINOMA WITH SQUAMOUS AND PAPILLARY FEATURES, FOCALLY INVASIVE IN THE SUBEPITHELIAL CONNECTIVE TISSUE; SEE COMMENT.    Comments  The patient's history of vulvar microinvasive squamous cell carcinoma is noted. The current biopsy shows a poorly differentiated carcinoma with papillary formation. P16 immunostain and HPV RNA in situ hybridization are strongly and diffusely positive within the lesion, consistent with HPV-related pathogenesis. A KERRI-3 immunostain shows patchy, weak positivity in the lesional cells. The patient's prior biopsy ([id number]) is reviewed and shows similar morphologic and immunophenotypic features but the papillary features were not present in the prior material.  Dr. Odom  Bev reviewed this case and concurs with the diagnosis.      7/31/2020 Imaging    MRI Pelvis:  1.  Redemonstration of a mildly T2 hyperintense mass involving the   urethral meatus, similar dimensions to prior exam on 2/18/2020,   however there is now a polypoid lesion seen along the anterior aspect   of the perineum, possibly contiguous with the urethral mass,   concerning for disease progression. This could potentially represent   the recently biopsied lesion.  2.  Marked interval enlargement of a left inguinal lymph node, almost   certainly representing disease involvement. This would be amenable to   ultrasound-guided biopsy if warranted.  3.  Findings related to pelvic floor laxity, with cystocele.    PET/CT:  1.  Intensely FDG avid left inguinal lymph node is highly suspicious   for locoregional metastasis from known vulvar squamous cell   carcinoma. There are no additional sites of suspicious FDG activity.  2.   Intense physiologic FDG activity within the urine obscures   visualization of the periurethral mass. Findings are better   characterized on same day pelvic MRI.         PAST MEDICAL HISTORY:  ALLERGIES:  Allergies   Allergen Reactions   • Scopolamine Swelling     Other reaction(s): ANGIOEDEMA  Other reaction(s): ANGIOEDEMA     • Tequin [Gatifloxacin]    • Trovan [Alatrofloxacin]    • Amoxicillin-Pot Clavulanate Rash   • Keflex [Cephalexin] Rash   • Septra [Sulfamethoxazole-Trimethoprim] Rash     CURRENT MEDICATIONS:  Outpatient Encounter Medications as of 9/16/2020   Medication Sig Dispense Refill   • Acetaminophen (TYLENOL ARTHRITIS PAIN PO) Take 1 tablet by mouth Daily.     • albuterol sulfate  (90 Base) MCG/ACT inhaler Inhale 2 puffs Every 4 (Four) Hours As Needed for Wheezing or Shortness of Air. 1 inhaler 12   • amLODIPine (NORVASC) 10 MG tablet Take 10 mg by mouth Daily.     • B Complex Vitamins (VITAMIN B COMPLEX) capsule capsule Take  by mouth Daily.     •  bisoprolol-hydrochlorothiazide (ZIAC) 5-6.25 MG per tablet Take 1 tablet by mouth Daily. 90 tablet 3   • calcium carbonate (OS-REAGAN) 600 MG tablet Take 600 mg by mouth Daily.     • cetirizine (zyrTEC) 10 MG tablet Take 10 mg by mouth Daily.     • citalopram (CeleXA) 10 MG tablet Take 10 mg by mouth Daily.     • diphenhydrAMINE (BENADRYL) 25 mg capsule Take 25 mg by mouth Every 6 (Six) Hours As Needed for itching.     • DULERA 100-5 MCG/ACT inhaler Inhale 2 puffs 2 (Two) Times a Day. Rinse and spit after using. 6 inhaler 0   • esomeprazole (nexIUM) 40 MG capsule Take 1 capsule by mouth 2 (Two) Times a Day. 180 capsule 3   • furosemide (LASIX) 40 MG tablet Take 1 tablet by mouth Daily. Patient only takes once a day 90 tablet 1   • gabapentin (NEURONTIN) 300 MG capsule Take 600 mg by mouth 2 (two) times a day.     • Homeopathic Products (LEG CRAMPS) tablet Take 1 tablet by mouth Daily.     • HYDROcodone-acetaminophen (NORCO) 5-325 MG per tablet Take 1 tablet by mouth 2 (Two) Times a Day As Needed for Moderate Pain  or Severe Pain . 60 tablet 0   • lidocaine (XYLOCAINE) 2 % jelly Apply 2 mL topically to the appropriate area as directed As Needed for Mild Pain .     • potassium chloride (K-DUR,KLOR-CON) 20 MEQ CR tablet Take 1 tablet by mouth Daily. Patient only takes once a day 30 tablet 11   • pravastatin (PRAVACHOL) 10 MG tablet Take 10 mg by mouth Daily.     • spironolactone (ALDACTONE) 25 MG tablet Take 1 tablet by mouth Daily. 90 tablet 3   • traMADol (ULTRAM) 50 MG tablet Take 50 mg by mouth Every 8 (Eight) Hours As Needed for Moderate Pain .       No facility-administered encounter medications on file as of 9/16/2020.      ADULT ILLNESSES:  Patient Active Problem List   Diagnosis Code   • Meatal stenosis [id number]   • Retention of urine R33.9   • Type 2 diabetes mellitus, without long-term current use of insulin (CMS/[facility]) E11.9   • Essential hypertension I10   • Grief reaction F43.21   • 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   • Chronic midline low back pain without sciatica M54.5, G89.29   • Bilateral lower extremity edema R60.0   • History of urethral stricture Z87.448   • Epidermal cyst of neck L72.0   • Normocytic anemia D64.9   • Neck abscess L02.11   • Hyperlipidemia E78.5   • GERD (gastroesophageal reflux disease) K21.9   • Hyponatremia E87.1   • Anxiety F41.9   • Iron deficiency anemia D50.9   • Stage 3 chronic kidney disease (CMS/HCC) N18.3   • Anemia in stage 3 chronic kidney disease (CMS/HCC) N18.3, D63.1   • Screening for breast cancer Z12.39   • Lower extremity edema R60.0   • Vulvar cancer, carcinoma (CMS/HCC) C51.9   • Former smoker Z87.891   • 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     SURGERIES:  Past Surgical History:   Procedure Laterality Date   • APPENDECTOMY     • BREAST CYST ASPIRATION Left    • COLONOSCOPY  01/12/2011   • ENDOSCOPY  07/01/2014   • HYSTERECTOMY     • TONSILLECTOMY     • VAGINA SURGERY       HEALTH MAINTENANCE ITEMS:  Health Maintenance Due   Topic Date Due   • PNEUMOCOCCAL VACCINE (65+ HIGH RISK) (2 of 2 - PCV13) 10/16/2014   • ZOSTER VACCINE (2 of 3) 11/26/2015   • HEPATITIS C SCREENING  07/11/2017   • COLONOSCOPY  07/11/2017   • URINE MICROALBUMIN  01/29/2020   • INFLUENZA VACCINE  08/01/2020   • LIPID PANEL  08/26/2020       <no information>  Last Completed Colonoscopy       Status Date      COLONOSCOPY No completions recorded        Immunization History   Administered Date(s) Administered   • FLUAD TRI 65YR+ 10/22/2019   • Fluzone High Dose =>65 Years ([employer] ONLY) 10/01/2018   • Pneumococcal Polysaccharide (PPSV23) 10/16/2013   • Pneumococcal, Unspecified 10/01/2017   • Tdap 05/01/2019   • Zostavax 10/01/2015     Last Completed Mammogram       Status Date      MAMMOGRAM Done 1/2/2013 Ext Proc: [facility] MAMMOGRAM SCREENING BILAT DIGITAL W CAD     Patient has more history with this topic...            FAMILY HISTORY:  Family History  "  Problem Relation Age of Onset   • Cancer Mother    • Hypertension Mother    • Osteoporosis Mother    • Dementia Mother    • Heart disease Father    • Parkinsonism Father    • Cancer Sister    • Breast cancer Sister    • Diabetes Brother    • Heart disease Paternal Grandfather      SOCIAL HISTORY:  Social History     Socioeconomic History   • Marital status:      Spouse name: Not on file   • Number of children: Not on file   • Years of education: Not on file   • Highest education level: Not on file   Tobacco Use   • Smoking status: Former Smoker   • Smokeless tobacco: Never Used   Substance and Sexual Activity   • Alcohol use: No   • Drug use: No   • Sexual activity: Never       REVIEW OF SYSTEMS:    Review of Systems   Constitutional: Positive for fatigue. Negative for chills, diaphoresis and fever.   HENT: Negative for congestion and trouble swallowing.    Eyes: Negative for redness and visual disturbance.   Respiratory: Negative for cough, shortness of breath and wheezing.    Cardiovascular: Negative for chest pain and palpitations.   Gastrointestinal: Negative for abdominal pain, constipation, diarrhea, nausea and vomiting.   Endocrine: Negative for cold intolerance and heat intolerance.   Genitourinary: Positive for difficulty urinating and hematuria. Negative for vaginal bleeding.        \"I self catheterize 2 to 3 times a day.\"   Musculoskeletal: Negative for joint swelling.   Skin: Negative for pallor.   Allergic/Immunologic: Negative for food allergies.   Neurological: Negative for facial asymmetry, speech difficulty and confusion.   Hematological: Does not bruise/bleed easily.   Psychiatric/Behavioral: Negative for agitation and hallucinations.         VITAL SIGNS: /68   Pulse 52   Temp 98.2 °F (36.8 °C)   Resp 18   Ht 160 cm (63\")   Wt 76.5 kg (168 lb 11.2 oz)   SpO2 97%   Breastfeeding No   BMI 29.88 kg/m²  Body surface area is 1.8 meters squared.   Pain Score    09/16/20 1051 "   PainSc: 0-No pain           PHYSICAL EXAMINATION:     Physical Exam   Constitutional: She is oriented to person, place, and time.  Non-toxic appearance.   HENT:   Head: Normocephalic and atraumatic.   Eyes: No scleral icterus.   Neck: Neck supple.   Cardiovascular: Normal rate and regular rhythm.   Pulmonary/Chest: Effort normal. She has no wheezes. She has no rales.   Abdominal: Bowel sounds are normal. She exhibits no distension. There is no abdominal tenderness.   Musculoskeletal: Swelling present.      Comments: 2 + bilateral leg edema. No erythema.    Neurological: She is alert and oriented to person, place, and time.   Skin: Skin is warm and dry. She is not diaphoretic. There is pallor.   Psychiatric: Her behavior is normal. Mood, judgment and thought content normal.   Vitals reviewed.      LABS    Lab Results - Last 18 Months   Lab Units 09/03/20  1134 07/02/20  1019 05/14/20  1038 12/23/19  1322 11/11/19  1131 10/09/19  1117  04/30/19  1600   HEMOGLOBIN g/dL 12.1 11.7* 11.5* 11.9* 11.9* 11.9*   < > 11.9*   HEMATOCRIT % 36.1 35.7 34.7 35.7 35.8 35.9   < > 36.0*   MCV fL 96.8 97.5* 98.6* 100.6* 97.8* 97.3*   < > 98.1*   WBC 10*3/mm3 7.35 7.61 8.38 7.33 7.20 5.79   < > 5.71   RDW % 12.7 12.8 11.8* 12.9 12.7 12.6   < > 12.5   MPV fL 9.6 9.3 10.1 10.2 9.3 9.9   < > 11.8   PLATELETS 10*3/mm3 208 218 220 180 185 207   < > 55*   IMM GRAN % % 0.1 0.0 0.0 0.1 0.1 0.2   < >  --    NEUTROS ABS 10*3/mm3 5.08 4.70 5.82 4.34 4.09 3.26   < > 4.00   LYMPHS ABS 10*3/mm3 1.65 1.97 1.65 2.01 1.99 1.57   < >  --    MONOS ABS 10*3/mm3 0.35 0.52 0.53 0.54 0.64 0.43   < >  --    EOS ABS 10*3/mm3 0.22 0.37 0.35 0.39 0.41* 0.48*   < > 0.34   BASOS ABS 10*3/mm3 0.04 0.05 0.03 0.04 0.06 0.04   < >  --    IMMATURE GRANS (ABS) 10*3/mm3 0.01 0.00 0.00 0.01 0.01 0.01   < >  --    NEUTROPHIL % %  --   --   --   --   --   --   --  70.0   MONOCYTES % %  --   --   --   --   --   --   --  12.0    < > = values in this interval not displayed.        Lab Results - Last 18 Months   Lab Units 09/03/20  1134 07/02/20  1019 05/14/20  1038 12/23/19  1322 11/11/19  1131 10/09/19  1117   GLUCOSE mg/dL 274* 215* 196* 156* 154* 225*   SODIUM mmol/L 136 135* 132* 138 137 134*   POTASSIUM mmol/L 4.0 4.1 4.2 3.8 3.8 3.8   CO2 mmol/L 23.0 22.0 22.0 28.0 27.0 27.0   CHLORIDE mmol/L 98 97* 94* 97* 97* 93*   ANION GAP mmol/L 15.0 16.0* 16.0* 13.0 13.0 14.0   CREATININE mg/dL 0.97 0.89 1.00 0.88 0.93 0.95   BUN mg/dL 14 17 20 13 18 17   BUN / CREAT RATIO  14.4 19.1 20.0 14.8 19.4 17.9   CALCIUM mg/dL 10.1 9.9 9.9 10.2 9.8 9.7   EGFR IF NONAFRICN AM mL/min/1.73 56* 62 54* 62 58* 57*   ALK PHOS U/L 68 59 62 67 74 61   TOTAL PROTEIN g/dL 8.3 7.8 8.1 7.8 8.0 7.9   ALT (SGPT) U/L 9 11 10 13 10 10   AST (SGOT) U/L 15 17 19 15 15 15   BILIRUBIN mg/dL 0.4 0.3 0.3 0.2 0.3 0.3   ALBUMIN g/dL 4.50 4.20 4.20 4.50 4.50 4.40   GLOBULIN gm/dL 3.8 3.6 3.9 3.3 3.5 3.5       No results for input(s): MSPIKE, KAPPALAMB, IGLFLC, URICACID, FREEKAPPAL, CEA, LDH, REFLABREPO in the last [id number] hours.    Lab Results - Last 18 Months   Lab Units 09/03/20  1134 07/02/20  1019 05/14/20  1038 12/23/19  1322 11/11/19  1131 10/09/19  1117   IRON mcg/dL 129 91 92 93 113 78   TIBC mcg/dL 384 338 311 380 368 408   IRON SATURATION % 34 27 30 24 31 19*   FERRITIN ng/mL 341.90* 315.10* 286.50* 309.50* 523.10* 107.70         Dago DIDI Nunez reports a pain score of 0.      Patient's Body mass index is 29.88 kg/m². BMI is within normal parameters. No follow-up required..    ASSESSMENT:  1.   Recurrent squamous cell carcinoma, vulva.  AJCC stage IIIA (T2, Nib, M0, G3).  Treatment status.  For definitive chemoradiation.   2.  Macrocytic anemia from iron deficiency and history of chronic kidney disease Stage III, GFR 56 mL/min on 09/03/2020.  3.    Iron deficiency. Intolerant to oral iron.   4.    Chronic kidney disease Stage III, GFR 56 ml/min on 09/03/2020.  5.    Performance status of 1.    6.    Thrombocytopenia,  04/19/2018. (?) laboratory error.    7.    Squamous cell cancer in situ, urethra.  Followed by Dr. Callahan          PLAN:  1.     Re:  No Procrit required since her last visit.   2.     Re:  Recurrent vulvar carcinoma left inguinal node.   3.     Re:  Heme status.  Hemoglobin 12.1 gm and hematocrit 36.1  4.     Re:  Pre-office CMP.  GFR 56 ml/minute.  5.     Re:  Pre-office ferritin, TIBC, % saturation, and iron. 34% saturation and ferritin 341.9 ng/ml.    6.     Re:  Role of mitomycin C and CIV 5-FU D1 to D4 and D29 to D32 with radiation. Aware of potential infusion reactions, nausea, vomiting, diarrhea, cardiotoxicity, cytopenias, and thrombotic thrombocytopenic purpura. No cisplatin due to chronic kidney disease.  7.  Blood for CBC with differential, CMP, and magnesium  6.  Port by Dr. Guillermina Rosario.  8.  Schedule chemo after port.  Mitomycin C 10 mg/m2 = 18 mg D1.  CIV 5-FU 1,000 mg/m2 = 1,800 mg D1 to D4.  7.  Premed:  Aloxi 0.25 mg IV  Decadron 12 mg IV D1 to D4.  8.  Neulasta for D4 as primary prophylaxis due to her age above 65.  High risk for febrile neutropenia.   9.  Weekly CBC with differential once chemo starts.  10.  Procrit 40,000 units subcutaneously every week if hemoglobin less than 10 and hematocrit less than 30% to target a hemoglobin of 11 and hematocrit of 33%.  Move CBC with differential weekly if she starts Procrit. Observe for side effects.   11.  Continue ongoing management per primary care physician and other specialists.  12.  Plan of care discussed with patient and spouse.  Understanding expressed.  Patient agreeable to proceed.  13.  Intolerant to oral iron (nausea, cramps, and constipation).  IV iron as needed.   14.  Advance Care Planning   ACP discussion was held with the patient during this visit. Patient has an advance directive (not in EMR), copy requested.  15.  Return to the office in 4 weeks.          I spent 52 total minutes,  face-to-face, caring for Syndal today.  Greater than 50% of this time involved counseling and/or coordination of care as documented within this note regarding the patient's illness(es), pros and cons of various treatment options, instructions and/or risk reduction.              cc: MD Ulises Conley MD Krsitin Williams, MD Shaukat Ali, MD        (Moustapha Callahan MD)        (Radha Marks MD)

## 2020-09-11 NOTE — PROGRESS NOTES
Subjective    Ms. Nunez is 80 y.o. female      Pre- operative diagnosis:  Urethral Stricture     Post operative diagnosis:  Same     Procedure:  The patient was prepped and draped in a normal sterile fashion.  The urethra was anesthetized with 2% lidocaine jelly.  The patient was sequentially dilated from 12 up to 20 [place] using female urethral sounds.     Patient tolerated the procedure well     Complications: none     Blood loss: minimal     Follow up:     4 months  We will discuss his case with Dr. Taylor I am certainly concerned about her receiving radiation to her pelvis and urinary retention.  She is very difficult to find her urethra.

## 2020-09-14 ENCOUNTER — OFFICE VISIT (OUTPATIENT)
Dept: UROLOGY | Facility: CLINIC | Age: 80
End: 2020-09-14

## 2020-09-14 VITALS — HEIGHT: 63 IN | WEIGHT: 166.4 LBS | BODY MASS INDEX: 29.48 KG/M2 | TEMPERATURE: 98.4 F

## 2020-09-14 DIAGNOSIS — N99.12 POSTPROCEDURAL FEMALE URETHRAL STRICTURE: Primary | ICD-10-CM

## 2020-09-14 LAB
BILIRUB BLD-MCNC: NEGATIVE MG/DL
CLARITY, POC: ABNORMAL
COLOR UR: ABNORMAL
GLUCOSE UR STRIP-MCNC: NEGATIVE MG/DL
KETONES UR QL: NEGATIVE
LEUKOCYTE EST, POC: ABNORMAL
NITRITE UR-MCNC: NEGATIVE MG/ML
PH UR: 5 [PH] (ref 5–8)
PROT UR STRIP-MCNC: ABNORMAL MG/DL
RBC # UR STRIP: ABNORMAL /UL
SP GR UR: 1.02 (ref 1–1.03)
UROBILINOGEN UR QL: NORMAL

## 2020-09-14 PROCEDURE — 77300 RADIATION THERAPY DOSE PLAN: CPT | Performed by: RADIOLOGY

## 2020-09-14 PROCEDURE — 77301 RADIOTHERAPY DOSE PLAN IMRT: CPT | Performed by: RADIOLOGY

## 2020-09-14 PROCEDURE — 77338 DESIGN MLC DEVICE FOR IMRT: CPT | Performed by: RADIOLOGY

## 2020-09-14 PROCEDURE — 52281 CYSTOSCOPY AND TREATMENT: CPT | Performed by: UROLOGY

## 2020-09-14 PROCEDURE — 81003 URINALYSIS AUTO W/O SCOPE: CPT | Performed by: UROLOGY

## 2020-09-16 ENCOUNTER — LAB (OUTPATIENT)
Dept: LAB | Facility: HOSPITAL | Age: 80
End: 2020-09-16

## 2020-09-16 ENCOUNTER — OFFICE VISIT (OUTPATIENT)
Dept: ONCOLOGY | Facility: CLINIC | Age: 80
End: 2020-09-16

## 2020-09-16 VITALS
OXYGEN SATURATION: 97 % | BODY MASS INDEX: 29.89 KG/M2 | HEIGHT: 63 IN | TEMPERATURE: 98.2 F | HEART RATE: 52 BPM | SYSTOLIC BLOOD PRESSURE: 142 MMHG | DIASTOLIC BLOOD PRESSURE: 68 MMHG | RESPIRATION RATE: 18 BRPM | WEIGHT: 168.7 LBS

## 2020-09-16 DIAGNOSIS — C77.4 SECONDARY MALIGNANCY OF INGUINAL LYMPH NODES (HCC): ICD-10-CM

## 2020-09-16 DIAGNOSIS — C77.4 SECONDARY MALIGNANCY OF INGUINAL LYMPH NODES (HCC): Primary | ICD-10-CM

## 2020-09-16 DIAGNOSIS — C51.9 VULVAR CANCER, CARCINOMA (HCC): ICD-10-CM

## 2020-09-16 LAB
ALBUMIN SERPL-MCNC: 4.4 G/DL (ref 3.5–5.2)
ALBUMIN/GLOB SERPL: 1.2 G/DL
ALP SERPL-CCNC: 70 U/L (ref 39–117)
ALT SERPL W P-5'-P-CCNC: 10 U/L (ref 1–33)
ANION GAP SERPL CALCULATED.3IONS-SCNC: 13 MMOL/L (ref 5–15)
AST SERPL-CCNC: 17 U/L (ref 1–32)
BASOPHILS # BLD AUTO: 0.06 10*3/MM3 (ref 0–0.2)
BASOPHILS NFR BLD AUTO: 0.8 % (ref 0–1.5)
BILIRUB SERPL-MCNC: 0.4 MG/DL (ref 0–1.2)
BUN SERPL-MCNC: 20 MG/DL (ref 8–23)
BUN/CREAT SERPL: 16.4 (ref 7–25)
CALCIUM SPEC-SCNC: 9.9 MG/DL (ref 8.6–10.5)
CHLORIDE SERPL-SCNC: 98 MMOL/L (ref 98–107)
CO2 SERPL-SCNC: 27 MMOL/L (ref 22–29)
CREAT SERPL-MCNC: 1.22 MG/DL (ref 0.57–1)
DEPRECATED RDW RBC AUTO: 44.8 FL (ref 37–54)
EOSINOPHIL # BLD AUTO: 0.3 10*3/MM3 (ref 0–0.4)
EOSINOPHIL NFR BLD AUTO: 3.8 % (ref 0.3–6.2)
ERYTHROCYTE [DISTWIDTH] IN BLOOD BY AUTOMATED COUNT: 12.9 % (ref 12.3–15.4)
GFR SERPL CREATININE-BSD FRML MDRD: 43 ML/MIN/1.73
GLOBULIN UR ELPH-MCNC: 3.6 GM/DL
GLUCOSE SERPL-MCNC: 208 MG/DL (ref 65–99)
HCT VFR BLD AUTO: 34.7 % (ref 34–46.6)
HGB BLD-MCNC: 11.4 G/DL (ref 12–15.9)
HOLD SPECIMEN: NORMAL
IMM GRANULOCYTES # BLD AUTO: 0.02 10*3/MM3 (ref 0–0.05)
IMM GRANULOCYTES NFR BLD AUTO: 0.3 % (ref 0–0.5)
LYMPHOCYTES # BLD AUTO: 1.39 10*3/MM3 (ref 0.7–3.1)
LYMPHOCYTES NFR BLD AUTO: 17.7 % (ref 19.6–45.3)
MAGNESIUM SERPL-MCNC: 2.2 MG/DL (ref 1.6–2.4)
MCH RBC QN AUTO: 31.3 PG (ref 26.6–33)
MCHC RBC AUTO-ENTMCNC: 32.9 G/DL (ref 31.5–35.7)
MCV RBC AUTO: 95.3 FL (ref 79–97)
MONOCYTES # BLD AUTO: 0.53 10*3/MM3 (ref 0.1–0.9)
MONOCYTES NFR BLD AUTO: 6.8 % (ref 5–12)
NEUTROPHILS NFR BLD AUTO: 5.54 10*3/MM3 (ref 1.7–7)
NEUTROPHILS NFR BLD AUTO: 70.6 % (ref 42.7–76)
NRBC BLD AUTO-RTO: 0 /100 WBC (ref 0–0.2)
PLATELET # BLD AUTO: 145 10*3/MM3 (ref 140–450)
PMV BLD AUTO: 11.7 FL (ref 6–12)
POTASSIUM SERPL-SCNC: 4.2 MMOL/L (ref 3.5–5.2)
PROT SERPL-MCNC: 8 G/DL (ref 6–8.5)
RBC # BLD AUTO: 3.64 10*6/MM3 (ref 3.77–5.28)
SODIUM SERPL-SCNC: 138 MMOL/L (ref 136–145)
WBC # BLD AUTO: 7.84 10*3/MM3 (ref 3.4–10.8)

## 2020-09-16 PROCEDURE — 85025 COMPLETE CBC W/AUTO DIFF WBC: CPT

## 2020-09-16 PROCEDURE — 99215 OFFICE O/P EST HI 40 MIN: CPT | Performed by: INTERNAL MEDICINE

## 2020-09-16 PROCEDURE — 83735 ASSAY OF MAGNESIUM: CPT

## 2020-09-16 PROCEDURE — 36415 COLL VENOUS BLD VENIPUNCTURE: CPT

## 2020-09-16 PROCEDURE — 80053 COMPREHEN METABOLIC PANEL: CPT

## 2020-09-16 RX ORDER — PALONOSETRON 0.05 MG/ML
0.25 INJECTION, SOLUTION INTRAVENOUS ONCE
OUTPATIENT
Start: 2020-11-02

## 2020-09-16 RX ORDER — SODIUM CHLORIDE 9 MG/ML
250 INJECTION, SOLUTION INTRAVENOUS ONCE
Status: CANCELLED | OUTPATIENT
Start: 2020-10-05

## 2020-09-16 RX ORDER — MITOMYCIN 20 MG/40ML
10 INJECTION, POWDER, LYOPHILIZED, FOR SOLUTION INTRAVENOUS ONCE
Status: CANCELLED | OUTPATIENT
Start: 2020-10-05

## 2020-09-16 RX ORDER — SODIUM CHLORIDE 9 MG/ML
250 INJECTION, SOLUTION INTRAVENOUS ONCE
OUTPATIENT
Start: 2020-11-02

## 2020-09-16 RX ORDER — PALONOSETRON 0.05 MG/ML
0.25 INJECTION, SOLUTION INTRAVENOUS ONCE
Status: CANCELLED | OUTPATIENT
Start: 2020-10-05

## 2020-09-16 RX ORDER — MITOMYCIN 20 MG/40ML
10 INJECTION, POWDER, LYOPHILIZED, FOR SOLUTION INTRAVENOUS ONCE
OUTPATIENT
Start: 2020-11-02

## 2020-09-20 NOTE — PROGRESS NOTES
[facility]  HEMATOLOGY & ONCOLOGY    [facility] [place] [facility]OGY  [address]  [facility] [id number] [phone]    Patient Name: Dago Nunez  Encounter Date: 09/21/2020  YOB: 1940  Patient Number: [id number]    Chief Complaint   Patient presents with   • malignant lymph nodes     here for chemo education       REASON FOR VISIT: Dago Nunez is a pleasant 80-year-old  female who is seen on followup for macrocytic anemia from chronic kidney disease Stage III, GFR 51 mL/minute 03/05/2018, iron deficiency, and thrombocytopenia.  She is intolerant to oral iron (nausea, stomach cramps, and constipation).  She had Injectafer 23 months ago. She is also seen for vulvar cancer. She is seen with spouse.  History is obtained from the patient. History is considered to be accurate.     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  The patient was seen by Dr. Marks in favor definitive chemo radiation.     Pathology report 07/10/2020 periurethral biopsy, poorly differentiated carcinoma with squamous and papillary features, focally invasive in the subepithelial connective tissue.     PET 07/31/2020 showed intense FDG avid left inguinal node is highly suspicious for local regional metastasis from known vulvar squamous cell carcinoma.  No additional sites of suspicious FDG activity.     MRI 07/31/2020 showed redemonstration of mildly T2 hyperintense mass involving the urethral meatus, similar dimensions to prior exam on 02/18/2020 however there is now a polypoid lesion seen along the anterior aspect of the perineum, possibly contiguous with the urethral mass, concerning for disease progression.  Market interval enlargement of the left inguinal node almost certainly representing disease involvement.     Patient was notified by Dr. Siddiqui 08/19/2020.   Consensus for chemoradiation.  Patient reluctant to take chemotherapy.  Follow-up with Dr. Siddiqui in 4 to 6 weeks after radiation is completed.  Phone Notes 09/02/2020.  Patient will try chemoradiation and see how it goes.     Seen by Dr. Roche 09/09/2020. Plan for chemoradiation.    INTERVAL HISTORY  Mrs. Nunez is a very pleasant 80-year-old female patient here today for chemotherapy education.  She is now planning chemoradiation for vulvar cancer.  She has seen Dr. Stafford and has been recommended to undergo mitomycin 5-FU.  She began radiation therapy today and has 30 planned.  She states that she is having some discomfort in the perineal region but otherwise she is doing okay.  Her appetite stable.  She is having no GI complaints.  No fever chills or night sweats.       PAST MEDICAL HISTORY:  ALLERGIES:  Allergies   Allergen Reactions   • Scopolamine Swelling     Other reaction(s): ANGIOEDEMA  Other reaction(s): ANGIOEDEMA     • Tequin [Gatifloxacin]    • Trovan [Alatrofloxacin]    • Amoxicillin-Pot Clavulanate Rash   • Keflex [Cephalexin] Rash   • Septra [Sulfamethoxazole-Trimethoprim] Rash       CURRENT MEDICATIONS:  Outpatient Encounter Medications as of 9/21/2020   Medication Sig Dispense Refill   • Acetaminophen (TYLENOL ARTHRITIS PAIN PO) Take 1 tablet by mouth Daily.     • albuterol sulfate  (90 Base) MCG/ACT inhaler Inhale 2 puffs Every 4 (Four) Hours As Needed for Wheezing or Shortness of Air. 1 inhaler 12   • amLODIPine (NORVASC) 10 MG tablet Take 10 mg by mouth Daily.     • B Complex Vitamins (VITAMIN B COMPLEX) capsule capsule Take  by mouth Daily.     • bisoprolol-hydrochlorothiazide (ZIAC) 5-6.25 MG per tablet Take 1 tablet by mouth Daily. 90 tablet 3   • calcium carbonate (OS-REAGAN) 600 MG tablet Take 600 mg by mouth Daily.     • cetirizine (zyrTEC) 10 MG tablet Take 10 mg by mouth Daily.     • citalopram (CeleXA) 10 MG tablet Take 10 mg by mouth Daily.     • diphenhydrAMINE (BENADRYL) 25 mg  capsule Take 25 mg by mouth Every 6 (Six) Hours As Needed for itching.     • DULERA 100-5 MCG/ACT inhaler Inhale 2 puffs 2 (Two) Times a Day. Rinse and spit after using. 6 inhaler 0   • esomeprazole (nexIUM) 40 MG capsule Take 1 capsule by mouth 2 (Two) Times a Day. 180 capsule 3   • furosemide (LASIX) 40 MG tablet Take 1 tablet by mouth Daily. Patient only takes once a day 90 tablet 1   • gabapentin (NEURONTIN) 300 MG capsule Take 600 mg by mouth 2 (two) times a day.     • Homeopathic Products (LEG CRAMPS) tablet Take 1 tablet by mouth Daily.     • HYDROcodone-acetaminophen (NORCO) 5-325 MG per tablet Take 1 tablet by mouth 2 (Two) Times a Day As Needed for Moderate Pain  or Severe Pain . 60 tablet 0   • lidocaine (XYLOCAINE) 2 % jelly Apply 2 mL topically to the appropriate area as directed As Needed for Mild Pain .     • potassium chloride (K-DUR,KLOR-CON) 20 MEQ CR tablet Take 1 tablet by mouth Daily. Patient only takes once a day 30 tablet 11   • pravastatin (PRAVACHOL) 10 MG tablet Take 10 mg by mouth Daily.     • spironolactone (ALDACTONE) 25 MG tablet Take 1 tablet by mouth Daily. 90 tablet 3   • traMADol (ULTRAM) 50 MG tablet Take 50 mg by mouth Every 8 (Eight) Hours As Needed for Moderate Pain .       No facility-administered encounter medications on file as of 9/21/2020.      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   • Chronic midline low back pain without sciatica M54.5, G89.29   • Bilateral lower extremity edema R60.0   • History of urethral stricture Z87.448   • Epidermal cyst of neck L72.0   • Normocytic anemia D64.9   • Neck abscess L02.11   • Hyperlipidemia E78.5   • GERD (gastroesophageal reflux disease) K21.9   •  Hyponatremia E87.1   • Anxiety F41.9   • Iron deficiency anemia D50.9   • Stage 3 chronic kidney disease (CMS/HCC) N18.3   • Anemia in stage 3 chronic kidney disease (CMS/HCC) N18.3, D63.1   • Screening for breast cancer Z12.39   • Lower extremity edema R60.0   • Vulvar cancer, carcinoma (CMS/HCC) C51.9   • Former smoker Z87.891   • 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     SURGERIES:  Past Surgical History:   Procedure Laterality Date   • APPENDECTOMY     • BREAST CYST ASPIRATION Left    • COLONOSCOPY  01/12/2011   • ENDOSCOPY  07/01/2014   • HYSTERECTOMY     • TONSILLECTOMY     • VAGINA SURGERY       HEALTH MAINTENANCE ITEMS:  Health Maintenance Due   Topic Date Due   • PNEUMOCOCCAL VACCINE (65+ HIGH RISK) (2 of 2 - PCV13) 10/16/2014   • ZOSTER VACCINE (2 of 3) 11/26/2015   • HEPATITIS C SCREENING  07/11/2017   • COLONOSCOPY  07/11/2017   • URINE MICROALBUMIN  01/29/2020   • INFLUENZA VACCINE  08/01/2020   • LIPID PANEL  08/26/2020       <no information>  Last Completed Colonoscopy       Status Date      COLONOSCOPY No completions recorded        Immunization History   Administered Date(s) Administered   • FLUAD TRI 65YR+ 10/22/2019   • Fluzone High Dose =>65 Years ([employer] ONLY) 10/01/2018   • Pneumococcal Polysaccharide (PPSV23) 10/16/2013   • Pneumococcal, Unspecified 10/01/2017   • Tdap 05/01/2019   • Zostavax 10/01/2015     Last Completed Mammogram       Status Date      MAMMOGRAM Done 1/2/2013 Ext Proc: [facility] - MAMMOGRAM SCREENING BILAT DIGITAL W CAD     Patient has more history with this topic...            FAMILY HISTORY:  Family History   Problem Relation Age of Onset   • Cancer Mother    • Hypertension Mother    • Osteoporosis Mother    • Dementia Mother    • Uterine cancer Mother    • Heart disease Father    • Parkinsonism Father    • Cancer Sister    • Breast cancer Sister    • Kidney cancer Sister    • Diabetes Brother    • Heart disease Paternal Grandfather    • No Known  "Problems Maternal Grandmother    • No Known Problems Maternal Grandfather    • No Known Problems Paternal Grandmother      SOCIAL HISTORY:  Social History     Socioeconomic History   • Marital status:      Spouse name: Not on file   • Number of children: Not on file   • Years of education: Not on file   • Highest education level: Not on file   Tobacco Use   • Smoking status: Former Smoker   • Smokeless tobacco: Never Used   Substance and Sexual Activity   • Alcohol use: No   • Drug use: No   • Sexual activity: Never       REVIEW OF SYSTEMS:  Review of Systems   Constitutional: Negative for activity change, appetite change, chills, diaphoresis, fatigue, fever and unexpected weight loss.   HENT: Negative for ear pain, nosebleeds, sinus pressure, sore throat and voice change.    Eyes: Negative for blurred vision, double vision, pain and visual disturbance.   Respiratory: Negative for cough and shortness of breath.    Cardiovascular: Negative for chest pain, palpitations and leg swelling.   Gastrointestinal: Negative for abdominal pain, anal bleeding, blood in stool, constipation, diarrhea, nausea and vomiting.   Endocrine: Negative for heat intolerance, polydipsia and polyuria.   Genitourinary: Positive for vaginal pain (\"perineal region\"). Negative for dysuria, frequency, hematuria, urgency and urinary incontinence.   Musculoskeletal: Negative for arthralgias and myalgias.   Skin: Negative for rash and skin lesions.   Neurological: Negative for dizziness, tremors, seizures, syncope, speech difficulty, weakness and headache.   Hematological: Negative for adenopathy. Does not bruise/bleed easily.   Psychiatric/Behavioral: Negative for dysphoric mood, sleep disturbance, suicidal ideas and depressed mood.       /62   Pulse 56   Temp 97.8 °F (36.6 °C) (Temporal)   Resp 16   Ht 152.4 cm (60\")   Wt 76.6 kg (168 lb 12.8 oz)   SpO2 98%   BMI 32.97 kg/m²  Body surface area is 1.74 meters squared.  Pain Score "    09/21/20 1125   PainSc:   6   PainLoc: Back       Physical Exam:  Physical Exam  Vitals signs reviewed.   Constitutional:       Appearance: Normal appearance.   HENT:      Head: Normocephalic and atraumatic.      Mouth/Throat:      Mouth: Mucous membranes are dry.   Eyes:      Pupils: Pupils are equal, round, and reactive to light.   Cardiovascular:      Rate and Rhythm: Normal rate and regular rhythm.      Heart sounds: No murmur.   Pulmonary:      Effort: Pulmonary effort is normal.      Breath sounds: Normal breath sounds.   Skin:     General: Skin is warm and dry.   Neurological:      General: No focal deficit present.      Mental Status: She is alert and oriented to person, place, and time.   Psychiatric:         Mood and Affect: Mood normal.         Behavior: Behavior normal.         Judgment: Judgment normal.         Dago Nunez reports a pain score of 6.  Given her pain assessment as noted, treatment options were discussed and the following options were decided upon as a follow-up plan to address the patient's pain: continuation of current treatment plan for pain.      Patient's Body mass index is 32.97 kg/m². BMI is above normal parameters. Recommendations include: defer to pcp.      LABS    Lab Results - Last 18 Months   Lab Units 09/16/20  1134 09/03/20  1134 07/02/20  1019 05/14/20  1038 12/23/19  1322 11/11/19  1131  04/30/19  1600   HEMOGLOBIN g/dL 11.4* 12.1 11.7* 11.5* 11.9* 11.9*   < > 11.9*   HEMATOCRIT % 34.7 36.1 35.7 34.7 35.7 35.8   < > 36.0*   MCV fL 95.3 96.8 97.5* 98.6* 100.6* 97.8*   < > 98.1*   WBC 10*3/mm3 7.84 7.35 7.61 8.38 7.33 7.20   < > 5.71   RDW % 12.9 12.7 12.8 11.8* 12.9 12.7   < > 12.5   MPV fL 11.7 9.6 9.3 10.1 10.2 9.3   < > 11.8   PLATELETS 10*3/mm3 145 208 218 220 180 185   < > 55*   IMM GRAN % % 0.3 0.1 0.0 0.0 0.1 0.1   < >  --    NEUTROS ABS 10*3/mm3 5.54 5.08 4.70 5.82 4.34 4.09   < > 4.00   LYMPHS ABS 10*3/mm3 1.39 1.65 1.97 1.65 2.01 1.99   < >  --    MONOS ABS  10*3/mm3 0.53 0.35 0.52 0.53 0.54 0.64   < >  --    EOS ABS 10*3/mm3 0.30 0.22 0.37 0.35 0.39 0.41*   < > 0.34   BASOS ABS 10*3/mm3 0.06 0.04 0.05 0.03 0.04 0.06   < >  --    IMMATURE GRANS (ABS) 10*3/mm3 0.02 0.01 0.00 0.00 0.01 0.01   < >  --    NRBC /100 WBC 0.0  --   --   --   --   --   --   --    NEUTROPHIL % %  --   --   --   --   --   --   --  70.0   MONOCYTES % %  --   --   --   --   --   --   --  12.0    < > = values in this interval not displayed.       Lab Results - Last 18 Months   Lab Units 09/16/20  1134 09/03/20  1134 07/02/20  1019 05/14/20  1038 12/23/19  1322 11/11/19  1131   GLUCOSE mg/dL 208* 274* 215* 196* 156* 154*   SODIUM mmol/L 138 136 135* 132* 138 137   POTASSIUM mmol/L 4.2 4.0 4.1 4.2 3.8 3.8   CO2 mmol/L 27.0 23.0 22.0 22.0 28.0 27.0   CHLORIDE mmol/L 98 98 97* 94* 97* 97*   ANION GAP mmol/L 13.0 15.0 16.0* 16.0* 13.0 13.0   CREATININE mg/dL 1.22* 0.97 0.89 1.00 0.88 0.93   BUN mg/dL 20 14 17 20 13 18   BUN / CREAT RATIO  16.4 14.4 19.1 20.0 14.8 19.4   CALCIUM mg/dL 9.9 10.1 9.9 9.9 10.2 9.8   EGFR IF NONAFRICN AM mL/min/1.73 43* 56* 62 54* 62 58*   ALK PHOS U/L 70 68 59 62 67 74   TOTAL PROTEIN g/dL 8.0 8.3 7.8 8.1 7.8 8.0   ALT (SGPT) U/L 10 9 11 10 13 10   AST (SGOT) U/L 17 15 17 19 15 15   BILIRUBIN mg/dL 0.4 0.4 0.3 0.3 0.2 0.3   ALBUMIN g/dL 4.40 4.50 4.20 4.20 4.50 4.50   GLOBULIN gm/dL 3.6 3.8 3.6 3.9 3.3 3.5       Lab Results - Last 18 Months   Lab Units 09/03/20  1134 07/02/20  1019 05/14/20  1038 12/23/19  1322 11/11/19  1131 10/09/19  1117   IRON mcg/dL 129 91 92 93 113 78   TIBC mcg/dL 384 338 311 380 368 408   IRON SATURATION % 34 27 30 24 31 19*   FERRITIN ng/mL 341.90* 315.10* 286.50* 309.50* 523.10* 107.70     ASSESSMENT:  1.   Recurrent squamous cell carcinoma, vulva.   AJCC stage IIIA (T2, Nib, M0, G3).   Treatment status.  For definitive chemoradiation.   2.  Macrocytic anemia from iron deficiency and history of chronic kidney disease Stage III, GFR 56 mL/min on  09/03/2020.  3.  Iron deficiency resolved currently. Intolerant to oral iron. Iron studies normal on 9/3/2020 with saturation of 34% and ferritin 341.90.  4.  Chronic kidney disease Stage III, GFR 56 ml/min on 09/03/2020.  5.  Performance status of 1-2, due to pain with walking in perineal region.    6. Thrombocytopenia, resolved.  Isolated incidence on 04/19/2018. (?) laboratory error.    7. Squamous cell cancer in situ, urethra.  Followed by Dr. Callahan       PLAN:  1.    Explained to the patient the role of mitomycin C and CIV 5-FU D1 to D4 and D29 to D32 with radiation.  2.    Port by Dr. Guillermina Rosario -patient has appointment on Thursday, 9/24/2020.  3.    Will plan to schedule chemo after port.   Mitomycin C 10 mg/m2 = 18 mg D1.   CIV 5-FU 1,000 mg/m2 = 1,800 mg D1 to D4.  4.  Premed:   Aloxi 0.25 mg IV   Decadron 12 mg IV D1 to D4.  5.  Neulasta for D4 as primary prophylaxis due to her age above 65.  High risk for febrile neutropenia. -Explained Neulasta risks benefits and potential side effects to the patient and her .  They verbalized understanding.  6.  Weekly CBC with differential once chemo starts.  7.  Procrit 40,000 units subcutaneously every week if hemoglobin less than 10 and hematocrit less than 30% to target a hemoglobin of 11 and hematocrit of 33%.  Move CBC with differential weekly if she starts Procrit. Observe for side effects.   8.  Continue ongoing management per primary care physician and other specialists.  9.  Intolerant to oral iron (nausea, cramps, and constipation).  IV iron as needed.   10.  Advance Care Planning completed per Dr. Stafford previously   ACP discussion was held with the patient during this visit. Patient has an advance directive (not in EMR), copy requested.  11.  Return to the office in 4 weeks.  12. Chemotherapy education provided today on the Mitomycin 5FU regimen for Vulva Ca.  The information provided was but not limited to the common side effects that may be  experienced as well as information on when to contact the provider. The information that was discussed is listed below.     Side Effects:  Important things to remember about the side effects of chemotherapy:  • Most people do not experience all of the side effects listed.   • Side effects are often predictable in terms of their onset and duration.   • Side effects are almost always reversible and will go away after treatment is complete.   • There are many options to help minimize or prevent side effects.   • There is no relationship between the presence or severity of side effects and the effectiveness of the medication.   The following side effects are common for patients taking mitomycin:  • Low blood counts. Your white and red blood cells and platelets may temporarily decrease. This can put you at increased risk for infection, anemia and/or bleeding. The lars counts are delayed with this drug.  • Mouth sores   • Poor appetite   • Fatigue  • Nausea and vomiting, usually mild   • Diarrhea   • Hair loss   These are rare but serious complications of mitomycin therapy:  • Lung problems: pneumonitis, rarely pulmonary fibrosis. The incidence of lung problems increases with age and pre-existing lung conditions. There is a maximum lifetime dose of mitomycin. Your health care professional will monitor the amount of mitomycin you receive as well as your lung function during treatment.   • Hemolytic-uremic syndrome. This syndrome is characterized by destruction of red blood cells, damage to the lining of blood vessel walls, and, in severe cases, kidney failure. This is a rare event seen in less than 2 % of patients treated with mitomycin, it can occur at any time but usually seen after several cycles of therapy. Your health care provider will monitor your blood tests for early signs of this condition.  The following side effects are common for patients taking Fluorouracil:   • Diarrhea   • Nausea and possible occasional  vomiting   • Mouth sores   • Poor appetite   • Watery eyes, sensitivity to light (photophobia)   • Taste changes, metallic taste in mouth during infusion   • Low blood counts Your white and red blood cells and platelets may temporarily decrease. This can put you at increased risk for infection, anemia and/or bleeding.   • Skin reactions : Dry, cracking, peeling skin. Darkening of the skin (hyperpigmentation), darkening of the skin where previous radiation treatment has been given (radiation recall).   • Hair thinning   • Nail changes - discoloration, loss of nails (rare)(see skin reactions).   • Hand -foot syndrome (Palmar-plantar erythrodysesthesia or PPE) -skin rash, swelling, redness, pain and/or peeling of the skin on the palms of hands and soles of feet. Usually mild, starting 5-6 weeks after start of treatment. May require reductions in the dose of the medication.   Serious adverse reactions to Fluorouracil are; chest pain, EKG changes and increases in cardiac enzymes - which may indicate problems with the heart (see Cardiovascular events). These symptoms are very rare but increased for patients with a prior history of heart disease.     Not all side effects are listed above. Some that are rare (occurring in less than 10% of patients) are not listed here. However, you should always inform your health care provider if you experience any unusual symptoms.    When to contact your doctor or health care provider:  Contact your health care provider immediately, day or night, if you should experience any of the following symptoms:  • Fever of 100.4° F (38° C) or higher, chills (possible signs of infection)    The following symptoms require medical attention, but are not an emergency. Contact your health care provider within 24 hours of noticing any of the following:  • Nausea (interferes with ability to eat and unrelieved with prescribed medication)   • Vomiting (vomiting more than 4-5 times in a 24 hour period)    • Mouth sores (painful redness, swelling or ulcers)   • Diarrhea (4-6 episodes in a 24-hour period)   • Unusual bleeding or bruising   • Black or tarry stools, or blood in your stools   • Extreme fatigue (unable to carry on self-care activities)   • Blood in the urine   • Pain or burning with urination   • Swelling of the feet or ankles. Sudden weight gain. Shortness of breath.   • Signs of infection such as redness or swelling, pain on swallowing, coughing up mucous, or painful urination.  Always inform your health care provider if you experience any unusual symptoms.    I spent 40 total minutes, face-to-face, caring for Syndal today.  Greater than 50% of this time involved counseling and/or coordination of care as documented within this note regarding the patient's illness(es), pros and cons of various treatment options, instructions and/or risk reduction.    Faina Patel, VINCENT  09/21/2020  14:11 CDT

## 2020-09-21 ENCOUNTER — HOSPITAL ENCOUNTER (OUTPATIENT)
Dept: RADIATION ONCOLOGY | Facility: HOSPITAL | Age: 80
Setting detail: RADIATION/ONCOLOGY SERIES
Discharge: HOME | End: 2020-09-21

## 2020-09-21 ENCOUNTER — OFFICE VISIT (OUTPATIENT)
Dept: ONCOLOGY | Facility: CLINIC | Age: 80
End: 2020-09-21

## 2020-09-21 VITALS
OXYGEN SATURATION: 98 % | RESPIRATION RATE: 16 BRPM | TEMPERATURE: 97.8 F | HEIGHT: 60 IN | WEIGHT: 168.8 LBS | BODY MASS INDEX: 33.14 KG/M2 | HEART RATE: 56 BPM | SYSTOLIC BLOOD PRESSURE: 142 MMHG | DIASTOLIC BLOOD PRESSURE: 62 MMHG

## 2020-09-21 DIAGNOSIS — D63.1 ANEMIA IN STAGE 3 CHRONIC KIDNEY DISEASE (HCC): ICD-10-CM

## 2020-09-21 DIAGNOSIS — N18.30 ANEMIA IN STAGE 3 CHRONIC KIDNEY DISEASE (HCC): ICD-10-CM

## 2020-09-21 DIAGNOSIS — N18.30 STAGE 3 CHRONIC KIDNEY DISEASE (HCC): ICD-10-CM

## 2020-09-21 DIAGNOSIS — C77.4 SECONDARY MALIGNANCY OF INGUINAL LYMPH NODES (HCC): ICD-10-CM

## 2020-09-21 DIAGNOSIS — D07.1 VULVAR INTRAEPITHELIAL NEOPLASIA (VIN) GRADE 3: Primary | ICD-10-CM

## 2020-09-21 PROCEDURE — 77386: CPT | Performed by: RADIOLOGY

## 2020-09-21 PROCEDURE — 99213 OFFICE O/P EST LOW 20 MIN: CPT | Performed by: NURSE PRACTITIONER

## 2020-09-21 RX ORDER — ONDANSETRON HYDROCHLORIDE 8 MG/1
8 TABLET, FILM COATED ORAL EVERY 8 HOURS PRN
Qty: 60 TABLET | Refills: 2 | Status: SHIPPED | OUTPATIENT
Start: 2020-09-21 | End: 2020-11-10 | Stop reason: SDUPTHER

## 2020-09-22 ENCOUNTER — DOCUMENTATION (OUTPATIENT)
Dept: RADIATION ONCOLOGY | Facility: HOSPITAL | Age: 80
End: 2020-09-22

## 2020-09-22 ENCOUNTER — TELEPHONE (OUTPATIENT)
Dept: RADIATION ONCOLOGY | Facility: HOSPITAL | Age: 80
End: 2020-09-22

## 2020-09-22 ENCOUNTER — HOSPITAL ENCOUNTER (OUTPATIENT)
Dept: RADIATION ONCOLOGY | Facility: HOSPITAL | Age: 80
Setting detail: RADIATION/ONCOLOGY SERIES
Discharge: HOME | End: 2020-09-22

## 2020-09-22 PROCEDURE — 77386: CPT | Performed by: RADIOLOGY

## 2020-09-22 NOTE — TELEPHONE ENCOUNTER
"Patient was explaining that she was getting port and then a \"pod\" to keep her blood counts up with her chemotherapy.  Contacted Dr. Stafford's office to ask if we could coordinate so she does not miss XRT treatment due to On Body Injector placement.  Murtaza Coles RN said she will make a note to use injection instead of OBI.  "

## 2020-09-22 NOTE — PROGRESS NOTES
KARLY met with  and Mr. Nunez to discuss high distress score. Mrs. Nunez gave permission to speak with her  in the room. Mrs. Nunez lives with her  and has a strong support system including her three children. She said her one daughter is a  for hospice. She said overall she is doing well but yesterday she was stressed because her sister in law recently passed away. She has also had several family member pass away from cancer or had bad side effects from chemo. She is nervous to start chemo due to the possible side effects. When she is stressed her coping strategies include yard work, flower bed, taking naps, and spending time with her cats. Other coping strategies were discussed and emotional support provided.  She does not have any financial or transportation concerns. Her  will drive her to treatments. Mrs. Nunez will contact this writer if any assistance is needed in the future.

## 2020-09-23 ENCOUNTER — HOSPITAL ENCOUNTER (OUTPATIENT)
Dept: RADIATION ONCOLOGY | Facility: HOSPITAL | Age: 80
Setting detail: RADIATION/ONCOLOGY SERIES
Discharge: HOME | End: 2020-09-23

## 2020-09-23 PROCEDURE — 77386: CPT | Performed by: RADIOLOGY

## 2020-09-23 PROCEDURE — 77336 RADIATION PHYSICS CONSULT: CPT | Performed by: RADIOLOGY

## 2020-09-24 ENCOUNTER — HOSPITAL ENCOUNTER (OUTPATIENT)
Dept: RADIATION ONCOLOGY | Facility: HOSPITAL | Age: 80
Setting detail: RADIATION/ONCOLOGY SERIES
Discharge: HOME | End: 2020-09-24

## 2020-09-24 ENCOUNTER — HOSPITAL ENCOUNTER (OUTPATIENT)
Dept: GENERAL RADIOLOGY | Facility: HOSPITAL | Age: 80
Discharge: HOME | End: 2020-09-24

## 2020-09-24 ENCOUNTER — APPOINTMENT (OUTPATIENT)
Dept: PREADMISSION TESTING | Facility: HOSPITAL | Age: 80
End: 2020-09-24

## 2020-09-24 VITALS
DIASTOLIC BLOOD PRESSURE: 63 MMHG | WEIGHT: 169.09 LBS | HEIGHT: 60 IN | RESPIRATION RATE: 18 BRPM | OXYGEN SATURATION: 96 % | HEART RATE: 55 BPM | SYSTOLIC BLOOD PRESSURE: 143 MMHG | BODY MASS INDEX: 33.2 KG/M2

## 2020-09-24 PROCEDURE — 93005 ELECTROCARDIOGRAM TRACING: CPT

## 2020-09-24 PROCEDURE — 93010 ELECTROCARDIOGRAM REPORT: CPT | Performed by: INTERNAL MEDICINE

## 2020-09-24 PROCEDURE — 71046 X-RAY EXAM CHEST 2 VIEWS: CPT

## 2020-09-24 PROCEDURE — 77386: CPT | Performed by: RADIOLOGY

## 2020-09-24 NOTE — DISCHARGE INSTRUCTIONS
DAY OF SURGERY INSTRUCTIONS        YOUR SURGEON: Dr. Rosario    PROCEDURE: SINGLE LUMEN PORT - A- CATH PLACEMENT WITH FLUOROSCOPY    DATE OF SURGERY: 9/29/20    ARRIVAL TIME: AS DIRECTED BY OFFICE    YOU MAY TAKE THE FOLLOWING MEDICATION(S) THE MORNING OF SURGERY WITH A SIP OF WATER: albuterol sulfate  (90 Base), amLODIPine (NORVASC), DULERA 100-5 MCG/ACT inhaler, gabapentin (NEURONTIN),, HYDROcodone-acetaminophen (NORCO)              ALL OTHER HOME MEDICATION CHECK WITH YOUR PHYSICIAN      DO NOT TAKE ANY ERECTILE DYSFUNCTION MEDICATIONS (EX: CIALIS, VIAGRA) 24 HOURS PRIOR TO SURGERY                      MANAGING PAIN AFTER SURGERY    We know you are probably wondering what your pain will be like after surgery.  Following surgery it is unrealistic to expect you will not have pain.   Pain is how our bodies let us know that something is wrong or cautions us to be careful.  That said, our goal is to make your pain tolerable.    Methods we may use to treat your pain include (oral or IV medications, PCAs, epidurals, nerve blocks, etc.)   While some procedures require IV pain medications for a short time after surgery, transitioning to pain medications by mouth allows for better management of pain.   Your nurse will encourage you to take oral pain medications whenever possible.  IV medications work almost immediately, but only last a short while.  Taking medications by mouth allows for a more constant level of medication in your blood stream for a longer period of time.      Once your pain is out of control it is harder to get back under control.  It is important you are aware when your next dose of pain medication is due.  If you are admitted, your nurse may write the time of your next dose on the white board in your room to help you remember.      We are interested in your pain and encourage you to inform us about aggravating factors during your visit.   Many times a simple repositioning every few hours can  make a big difference.    If your physician says it is okay, do not let your pain prevent you from getting out of bed. Be sure to call your nurse for assistance prior to getting up so you do not fall.      Before surgery, please decide your tolerable pain goal.  These faces help describe the pain ratings we use on a 0-10 scale.   Be prepared to tell us your goal and whether or not you take pain or anxiety medications at home.          BEFORE YOU COME TO THE HOSPITAL  (Pre-op instructions)  • Do not eat, drink, smoke or chew gum after midnight the night before surgery.  This also includes no mints.  • Morning of surgery take only the medicines you have been instructed with a sip of water unless otherwise instructed  by your physician.  • Do not shave, wear makeup or dark nail polish.  • Remove all jewelry including rings.  • Leave anything you consider valuable at home.  • Leave your suitcase in the car until after your surgery.  • Bring the following with you if applicable:  o Picture ID and insurance, Medicare or Medicaid cards  o Co-pay/deductible required by insurance (cash, check, credit card)  o Copy of advance directive, living will or power-of- documents if not brought to PAT  o CPAP or BIPAP mask and tubing  o Relaxation aids ( book, magazine), etc.  o Hearing aids                        ON THE DAY OF SURGERY  · On the day of surgery check in at registration located at the main entrance of the hospital.   ? You will be registered and given a beeper with instructions where to wait in the main lobby.  ? When your beeper lights up and vibrates a member of the Outpatient Surgery staff will meet you at the double doors under the stair steps and escort you to your preoperative room.   · You may have cloth compression devices placed on your legs. These help to prevent blood clots and reduce swelling in your legs.  · An IV may be inserted into one of your veins.  · In the operating room, you may be given one  "or more of the following:  ? A medicine to help you relax (sedative).  ? A medicine to numb the area (local anesthetic).  ? A medicine to make you fall asleep (general anesthetic).  ? A medicine that is injected into an area of your body to numb everything below the injection site (regional anesthetic).  · Your surgical site will be marked or identified.  · You may be given an antibiotic through your IV to help prevent infection.  Contact a health care provider if you:  · Develop a fever of more than 100.4°F (38°C) or other feelings of illness during the 48 hours before your surgery.  · Have symptoms that get worse.  Have questions or concerns about your surgery    General Anesthesia/Surgery, Adult  General anesthesia is the use of medicines to make a person \"go to sleep\" (unconscious) for a medical procedure. General anesthesia must be used for certain procedures, and is often recommended for procedures that:  · Last a long time.  · Require you to be still or in an unusual position.  · Are major and can cause blood loss.  The medicines used for general anesthesia are called general anesthetics. As well as making you unconscious for a certain amount of time, these medicines:  · Prevent pain.  · Control your blood pressure.  · Relax your muscles.  Tell a health care provider about:  · Any allergies you have.  · All medicines you are taking, including vitamins, herbs, eye drops, creams, and over-the-counter medicines.  · Any problems you or family members have had with anesthetic medicines.  · Types of anesthetics you have had in the past.  · Any blood disorders you have.  · Any surgeries you have had.  · Any medical conditions you have.  · Any recent upper respiratory, chest, or ear infections.  · Any history of:  ? Heart or lung conditions, such as heart failure, sleep apnea, asthma, or chronic obstructive pulmonary disease (COPD).  ?  service.  ? Depression or anxiety.  · Any tobacco or drug use, including " marijuana or alcohol use.  · Whether you are pregnant or may be pregnant.  What are the risks?  Generally, this is a safe procedure. However, problems may occur, including:  · Allergic reaction.  · Lung and heart problems.  · Inhaling food or liquid from the stomach into the lungs (aspiration).  · Nerve injury.  · Air in the bloodstream, which can lead to stroke.  · Extreme agitation or confusion (delirium) when you wake up from the anesthetic.  · Waking up during your procedure and being unable to move. This is rare.  These problems are more likely to develop if you are having a major surgery or if you have an advanced or serious medical condition. You can prevent some of these complications by answering all of your health care provider's questions thoroughly and by following all instructions before your procedure.  General anesthesia can cause side effects, including:  · Nausea or vomiting.  · A sore throat from the breathing tube.  · Hoarseness.  · Wheezing or coughing.  · Shaking chills.  · Tiredness.  · Body aches.  · Anxiety.  · Sleepiness or drowsiness.  · Confusion or agitation.  RISKS AND COMPLICATIONS OF SURGERY  Your health care provider will discuss possible risks and complications with you before surgery. Common risks and complications include:    · Problems due to the use of anesthetics.  · Blood loss and replacement (does not apply to minor surgical procedures).  · Temporary increase in pain due to surgery.  · Uncorrected pain or problems that the surgery was meant to correct.  · Infection.  · New damage.    What happens before the procedure?    Medicines  Ask your health care provider about:  · Changing or stopping your regular medicines. This is especially important if you are taking diabetes medicines or blood thinners.  · Taking medicines such as aspirin and ibuprofen. These medicines can thin your blood. Do not take these medicines unless your health care provider tells you to take  them.  · Taking over-the-counter medicines, vitamins, herbs, and supplements. Do not take these during the week before your procedure unless your health care provider approves them.  General instructions  · Starting 3-6 weeks before the procedure, do not use any products that contain nicotine or tobacco, such as cigarettes and e-cigarettes. If you need help quitting, ask your health care provider.  · If you brush your teeth on the morning of the procedure, make sure to spit out all of the toothpaste.  · Tell your health care provider if you become ill or develop a cold, cough, or fever.  · If instructed by your health care provider, bring your sleep apnea device with you on the day of your surgery (if applicable).  · Ask your health care provider if you will be going home the same day, the following day, or after a longer hospital stay.  ? Plan to have someone take you home from the hospital or clinic.  ? Plan to have a responsible adult care for you for at least 24 hours after you leave the hospital or clinic. This is important.  What happens during the procedure?  · You will be given anesthetics through both of the following:  ? A mask placed over your nose and mouth.  ? An IV in one of your veins.  · You may receive a medicine to help you relax (sedative).  · After you are unconscious, a breathing tube may be inserted down your throat to help you breathe. This will be removed before you wake up.  · An anesthesia specialist will stay with you throughout your procedure. He or she will:  ? Keep you comfortable and safe by continuing to give you medicines and adjusting the amount of medicine that you get.  ? Monitor your blood pressure, pulse, and oxygen levels to make sure that the anesthetics do not cause any problems.  The procedure may vary among health care providers and hospitals.  What happens after the procedure?  · Your blood pressure, temperature, heart rate, breathing rate, and blood oxygen level will be  monitored until the medicines you were given have worn off.  · You will wake up in a recovery area. You may wake up slowly.  · If you feel anxious or agitated, you may be given medicine to help you calm down.  · If you will be going home the same day, your health care provider may check to make sure you can walk, drink, and urinate.  · Your health care provider will treat any pain or side effects you have before you go home.  · Do not drive for 24 hours if you were given a sedative.  Summary  · General anesthesia is used to keep you still and prevent pain during a procedure.  · It is important to tell your healthcare provider about your medical history and any surgeries you have had, and previous experience with anesthesia.  · Follow your healthcare provider’s instructions about when to stop eating, drinking, or taking certain medicines before your procedure.  · Plan to have someone take you home from the hospital or clinic.  This information is not intended to replace advice given to you by your health care provider. Make sure you discuss any questions you have with your health care provider.  Document Released: 03/26/2009 Document Revised: 08/03/2018 Document Reviewed: 08/03/2018  [employer] Interactive Patient Education © 2019 [employer]       Fall Prevention in Hospitals, Adult  As a hospital patient, your condition and the treatments you receive can increase your risk for falls. Some additional risk factors for falls in a hospital include:  · Being in an unfamiliar environment.  · Being on bed rest.  · Your surgery.  · Taking certain medicines.  · Your tubing requirements, such as intravenous (IV) therapy or catheters.  It is important that you learn how to decrease fall risks while at the hospital. Below are important tips that can help prevent falls.  SAFETY TIPS FOR PREVENTING FALLS  Talk about your risk of falling.  · Ask your health care provider why you are at risk for falling. Is it your medicine,  illness, tubing placement, or something else?  · Make a plan with your health care provider to keep you safe from falls.  · Ask your health care provider or pharmacist about side effects of your medicines. Some medicines can make you dizzy or affect your coordination.  Ask for help.  · Ask for help before getting out of bed. You may need to press your call button.  · Ask for assistance in getting safely to the toilet.  · Ask for a walker or cane to be put at your bedside. Ask that most of the side rails on your bed be placed up before your health care provider leaves the room.  · Ask family or friends to sit with you.  · Ask for things that are out of your reach, such as your glasses, hearing aids, telephone, bedside table, or call button.  Follow these tips to avoid falling:  · Stay lying or seated, rather than standing, while waiting for help.  · Wear rubber-soled slippers or shoes whenever you walk in the hospital.  · Avoid quick, sudden movements.  ¨ Change positions slowly.  ¨ Sit on the side of your bed before standing.  ¨ Stand up slowly and wait before you start to walk.  · Let your health care provider know if there is a spill on the floor.  · Pay careful attention to the medical equipment, electrical cords, and tubes around you.  · When you need help, use your call button by your bed or in the bathroom. Wait for one of your health care providers to help you.  · If you feel dizzy or unsure of your footing, return to bed and wait for assistance.  · Avoid being distracted by the TV, telephone, or another person in your room.  · Do not lean or support yourself on rolling objects, such as IV poles or bedside tables.     This information is not intended to replace advice given to you by your health care provider. Make sure you discuss any questions you have with your health care provider.     Document Released: 12/15/2001 Document Revised: 01/08/2016 Document Reviewed: 08/25/2013  [employer] Patient  Education ©2016 [employer]       [facility]  CHG 4% Patient Instruction Sheet    Chlorhexidine Before Surgery  Chlorhexidine gluconate (CHG) is a germ-killing (antiseptic) solution that is used to clean the skin. It gets rid of the bacteria that normally live on the skin. Cleaning your skin with CHG before surgery helps lower the risk for infection after surgery.    How to use CHG solution  · You will take 2 showers, one shower the night before surgery, the second shower the morning of surgery before coming to the hospital.  · Use CHG only as told by your health care provider, and follow the instructions on the label.  · Use CHG solution while taking a shower. Follow these steps when using CHG solution (unless your health care provider gives you different instructions):  1. Start the shower.  2. Use your normal soap and shampoo to wash your face and hair.  3. Turn off the shower or move out of the shower stream.  4. Pour the CHG onto a clean washcloth. Do not use any type of brush or rough-edged sponge.  5. Starting at your neck, lather your body down to your toes. Make sure you:  6. Pay special attention to the part of your body where you will be having surgery. Scrub this area for at least 1 minute.  7. Use the full amount of CHG as directed. Usually, this is one half bottle for each shower.  8. Do not use CHG on your head or face. If the solution gets into your ears or eyes, rinse them well with water.  9. Avoid your genital area.  10. Avoid any areas of skin that have broken skin, cuts, or scrapes.  11. Scrub your back and under your arms. Make sure to wash skin folds.  12. Let the lather sit on your skin for 1-2 minutes or as long as told by your health care  provider.  13. Thoroughly rinse your entire body in the shower. Make sure that all body creases and crevices are rinsed well.  14. Dry off with a clean towel. Do not put any substances on your body afterward, such as powder, lotion, or  perfume.  15. Put on clean clothes or pajamas.  16. If it is the night before your surgery, sleep in clean sheets.    What are the risks?  Risks of using CHG include:  · A skin reaction.  · Hearing loss, if CHG gets in your ears.  · Eye injury, if CHG gets in your eyes and is not rinsed out.  · The CHG product catching fire.  Make sure that you avoid smoking and flames after applying CHG to your skin.  Do not use CHG:  · If you have a chlorhexidine allergy or have previously reacted to chlorhexidine.  · On babies younger than 2 months of age.      On the day of surgery, when you are taken to your room in Outpatient Surgery you will be given a CHG prepackaged cloth to wipe the site for your surgery.  How to use CHG prepackaged cloths  · Follow the instructions on the label.  · Use the CHG cloth on clean, dry skin. Follow these steps when using a CHG cloth (unless your health care provider gives you different instructions):  1. Using the CHG cloth, vigorously scrub the part of your body where you will be having surgery. Scrub using a back-and-forth motion for 3 minutes. The area on your body should be completely wet with CHG when you are finished scrubbing.  2. Do not rinse. Discard the cloth and let the area air-dry for 1 minute. Do not put any substances on your body afterward, such as powder, lotion, or perfume.  Contact a health care provider if:  · Your skin gets irritated after scrubbing.  · You have questions about using your solution or cloth.  Get help right away if:  · Your eyes become very red or swollen.  · Your eyes itch badly.  · Your skin itches badly and is red or swollen.  · Your hearing changes.  · You have trouble seeing.  · You have swelling or tingling in your mouth or throat.  · You have trouble breathing.  · You swallow any chlorhexidine.  Summary  · Chlorhexidine gluconate (CHG) is a germ-killing (antiseptic) solution that is used to clean the skin. Cleaning your skin with CHG before surgery  helps lower the risk for infection after surgery.  · You may be given CHG to use at home. It may be in a bottle or in a prepackaged cloth to use on your skin. Carefully follow your health care provider's instructions and the instructions on the product label.  · Do not use CHG if you have a chlorhexidine allergy.  · Contact your health care provider if your skin gets irritated after scrubbing.  This information is not intended to replace advice given to you by your health care provider. Make sure you discuss any questions you have with your health care provider.  Document Released: 09/11/2013 Document Revised: 11/15/2018 Document Reviewed: 11/15/2018  [employer] Interactive Patient Education © 2019 [employer]          PATIENT/FAMILY/RESPONSIBLE PARTY VERBALIZES UNDERSTANDING OF ABOVE EDUCATION.  COPY OF PAIN SCALE GIVEN AND REVIEWED WITH VERBALIZED UNDERSTANDING.

## 2020-09-24 NOTE — PROGRESS NOTES
"[facility] ONC [facility] ONCOLOGY  [address] [place] [address] CIR   [facility] [address]  [phone]    Patient Name: Dago Nunez  Encounter Date: 01/13/2020  YOB: 1940  Patient Number: [id number]      REASON FOR FOLLOW-UP: Dago Nunez is a pleasant 79-year-old  female who is seen on followup for macrocytic anemia from chronic kidney disease Stage III, GFR 51 mL/minute 03/05/2018, iron deficiency, and thrombocytopenia.  She is off oral iron for the past 15.5 months, intolerant (nausea, stomach cramps, and constipation).  She had Injectafer 15 months ago.  She is seen with , Joseph.  History is obtained from the patient. History is considered to be accurate.       Problem List Items Addressed This Visit        Hematopoietic and Hemostatic    Normocytic anemia - Primary    Overview     DIAGNOSTIC ABNORMALITIES:   The patient was seen by Dr. Greg Alberts 01/29/2018 complaining of coughing and wheezing. The patient was given Tussionex. Blood work was ordered. CBC 01/29/2018 revealed a WBC of 7.8, hemoglobin 11.2, hematocrit 35.1, MCV 96.2, platelets 43,000, and ANC 4.47. CMP remarkable for of glucose of 177 and GFR 59 mL/minute.  The patient was seen by VINCENT Jones, 02/02/2018. She was coughing and wheezing. Tussionex did not help. The patient was given prednisone.  The patient was seen by VINCENT Jones, 02/15/2018. She is followed for anemia from iron deficiency. CBC 02/15/2018 revealed a WBC of 12.9, hemoglobin 11.4, hematocrit 34.5, MCV 95, and platelets 68,000.       PREVIOUS INTERVENTIONS:   Ferrous sulfate 325 mg 03/07/2018 through 05/06/2018.  Not resumed 06/08/2018. \"I misunderstood.\"   Resume 09/05/2018 through 10/03/2018, stopped due to intolerance.  Injectafer 750 mg 10/20/2018 at the [place] office.              No history exists.       PAST MEDICAL HISTORY:  ALLERGIES:  Allergies   Allergen Reactions   • Scopolamine " Tim Jordan MD  09/23/2020   Swelling     Other reaction(s): ANGIOEDEMA  Other reaction(s): ANGIOEDEMA     • Tequin [Gatifloxacin]    • Trovan [Alatrofloxacin]    • Amoxicillin-Pot Clavulanate Rash   • Keflex [Cephalexin] Rash   • Septra [Sulfamethoxazole-Trimethoprim] Rash     CURRENT MEDICATIONS:  Outpatient Encounter Medications as of 1/22/2020   Medication Sig Dispense Refill   • Acetaminophen (TYLENOL ARTHRITIS PAIN PO) Take  by mouth.     • albuterol sulfate  (90 Base) MCG/ACT inhaler Inhale 2 puffs Every 4 (Four) Hours As Needed for Wheezing or Shortness of Air. 1 inhaler 12   • amLODIPine (NORVASC) 5 MG tablet Take 1 tablet by mouth Daily. 90 tablet 0   • aspirin 81 MG tablet Take 81 mg by mouth Daily.     • B Complex Vitamins (VITAMIN B COMPLEX) capsule capsule Take  by mouth Daily.     • bisoprolol-hydrochlorothiazide (ZIAC) 5-6.25 MG per tablet Take 1 tablet by mouth Daily. 90 tablet 3   • calcium carbonate (OS-REAGAN) 600 MG tablet Take 600 mg by mouth Daily.     • cetirizine (zyrTEC) 10 MG tablet Take 10 mg by mouth Daily.     • citalopram (CeleXA) 10 MG tablet TAKE 1 TABLET BY MOUTH 1 TIME DAILY 30 tablet 11   • diphenhydrAMINE (BENADRYL) 25 mg capsule Take 25 mg by mouth Every 6 (Six) Hours As Needed for itching.     • DULERA 100-5 MCG/ACT inhaler Inhale 2 puffs 2 (Two) Times a Day. Rinse and spit after using. 6 inhaler 0   • esomeprazole (nexIUM) 40 MG capsule Take 1 capsule by mouth 2 (Two) Times a Day. 180 capsule 3   • furosemide (LASIX) 40 MG tablet Take 1 tablet by mouth 2 (Two) Times a Day As Needed (edema). Patient only takes once a day 180 tablet 2   • gabapentin (NEURONTIN) 300 MG capsule Take 2 capsules by mouth 3 (Three) Times a Day. 180 capsule 2   • metaxalone (SKELAXIN) 400 MG tablet Take 1 tablet by mouth Every 8 (Eight) Hours. 90 tablet 1   • potassium chloride (K-DUR,KLOR-CON) 20 MEQ CR tablet Take 1 tablet by mouth Daily. Patient only takes once a day 30 tablet 11   • pravastatin (PRAVACHOL) 40 MG tablet  Take 1 tablet by mouth Daily. 90 tablet 0   • spironolactone (ALDACTONE) 25 MG tablet Take 1 tablet by mouth Daily. 90 tablet 3   • sucralfate (CARAFATE) 1 GM/10ML suspension      • metFORMIN ER (GLUCOPHAGE-XR) 500 MG 24 hr tablet Take 2 tablets by mouth Daily With Breakfast. 180 tablet 1     No facility-administered encounter medications on file as of 1/22/2020.      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   • Chronic midline low back pain without sciatica M54.5, G89.29   • Bilateral lower extremity edema R60.0   • History of urethral stricture Z87.448   • Epidermal cyst of neck L72.0   • Normocytic anemia D64.9   • Neck abscess L02.11   • Hyperlipidemia E78.5   • GERD (gastroesophageal reflux disease) K21.9   • Hyponatremia E87.1   • Anxiety F41.9   • Iron deficiency anemia D50.9   • Stage 3 chronic kidney disease (CMS/HCC) N18.3   • Anemia in stage 3 chronic kidney disease (CMS/[facility]) N18.3, D63.1     SURGERIES:  Past Surgical History:   Procedure Laterality Date   • APPENDECTOMY     • BREAST CYST ASPIRATION Left    • COLONOSCOPY  01/12/2011   • ENDOSCOPY  07/01/2014   • HYSTERECTOMY     • TONSILLECTOMY     • VAGINA SURGERY       HEALTH MAINTENANCE ITEMS:  Health Maintenance Due   Topic Date Due   • PNEUMOCOCCAL VACCINE (65+ HIGH RISK) (2 of 2 - PCV13) 10/16/2014   • ZOSTER VACCINE (2 of 3) 11/26/2015   • DIABETIC EYE EXAM  07/11/2017   • COLONOSCOPY  07/11/2017       <no information>  Last Completed Colonoscopy       Status Date      COLONOSCOPY No completions recorded        Immunization History   Administered Date(s) Administered   • FLUAD TRI 65YR+ 10/22/2019   • Fluzone High Dose =>65 Years ([employer] ONLY) 10/01/2018   • Pneumococcal  "Polysaccharide (PPSV23) 10/16/2013   • Pneumococcal, Unspecified 10/01/2017   • Tdap 05/01/2019   • Zostavax 10/01/2015     Last Completed Mammogram       Status Date      MAMMOGRAM Done 5/20/2019 MAMMO SCREENING DIGITAL TOMOSYNTHESIS BILATERAL W CAD     Patient has more history with this topic...            FAMILY HISTORY:  Family History   Problem Relation Age of Onset   • Cancer Mother    • Hypertension Mother    • Osteoporosis Mother    • Dementia Mother    • Heart disease Father    • Parkinsonism Father    • Cancer Sister    • Breast cancer Sister    • Diabetes Brother    • Heart disease Paternal Grandfather      SOCIAL HISTORY:  Social History     Socioeconomic History   • Marital status:      Spouse name: Not on file   • Number of children: Not on file   • Years of education: Not on file   • Highest education level: Not on file   Tobacco Use   • Smoking status: Former Smoker   • Smokeless tobacco: Never Used   Substance and Sexual Activity   • Alcohol use: No   • Drug use: No   • Sexual activity: Never       REVIEW OF SYSTEMS:    Review of Systems   Constitutional: Negative for activity change, chills, diaphoresis, fatigue, fever and unexpected weight loss.        \"I do feel good today.\"   HENT: Negative for congestion, mouth sores, sore throat and trouble swallowing.    Eyes: Negative for blurred vision, redness and visual disturbance.   Respiratory: Negative for cough, shortness of breath and wheezing.    Cardiovascular: Negative for chest pain, palpitations and leg swelling.   Gastrointestinal: Negative for abdominal distention, abdominal pain, blood in stool, constipation, diarrhea, nausea and vomiting.   Endocrine: Negative for cold intolerance and heat intolerance.   Genitourinary: Negative for breast lump, dysuria, frequency, hematuria, urgency and vaginal bleeding.   Musculoskeletal: Negative for arthralgias, gait problem and neck stiffness.   Skin: Positive for pallor.   Allergic/Immunologic: " "Negative for food allergies.   Neurological: Negative for dizziness, tremors, seizures, syncope, speech difficulty, weakness, headache and confusion.   Hematological: Negative for adenopathy. Does not bruise/bleed easily.   Psychiatric/Behavioral: Negative for agitation, behavioral problems and hallucinations.         VITAL SIGNS: /64   Pulse 56   Temp 98.6 °F (37 °C)   Resp 18   Ht 162.6 cm (64\")   Wt 73.5 kg (162 lb)   SpO2 98%   Breastfeeding No   BMI 27.81 kg/m²  Body surface area is 1.79 meters squared.   Pain Score    01/22/20 1454   PainSc: 0-No pain         PHYSICAL EXAMINATION:     Physical Exam   Constitutional: She is oriented to person, place, and time. She appears well-developed and well-nourished. No distress.   HENT:   Head: Normocephalic and atraumatic.   Eyes: EOM are normal. No scleral icterus.   Neck: Trachea normal. Neck supple.   Cardiovascular: Normal rate, regular rhythm and normal pulses.   Pulmonary/Chest: Effort normal and breath sounds normal. She has no wheezes. She has no rhonchi. She has no rales. Chest wall is not dull to percussion.   Abdominal: Soft. Normal appearance and bowel sounds are normal. There is no tenderness. There is no rebound and no guarding.   Musculoskeletal: She exhibits no edema.   Bilateral ankel edema.   Lymphadenopathy:     She has no cervical adenopathy.     She has no axillary adenopathy.        Right: No inguinal and no supraclavicular adenopathy present.        Left: No inguinal and no supraclavicular adenopathy present.   Neurological: She is alert and oriented to person, place, and time. She has normal strength. No sensory deficit.   Skin: Skin is warm and dry. She is not diaphoretic. There is pallor.   Psychiatric: She has a normal mood and affect. Her behavior is normal. Judgment and thought content normal.   Vitals reviewed.      LABS    Lab Results - Last 18 Months   Lab Units 12/23/19  1322 11/11/19  1131 10/09/19  1117 09/03/19  1119 " 04/30/19  1600 02/07/19  1508 01/13/19  1147   HEMOGLOBIN g/dL 11.9* 11.9* 11.9* 11.4* 11.9* 11.3* 10.6*   HEMATOCRIT % 35.7 35.8 35.9 34.4 36.0* 32.4* 30.9*   MCV fL 100.6* 97.8* 97.3* 96.6 98.1* 95.0 95.1   WBC 10*3/mm3 7.33 7.20 5.79 7.65 5.71 9.05 10.44   RDW % 12.9 12.7 12.6 12.2* 12.5 12.7 12.2   MPV fL 10.2 9.3 9.9 9.8 11.8 10.6 11.1   PLATELETS 10*3/mm3 180 185 207 213 55* 215 164   IMM GRAN % % 0.1 0.1 0.2 0.1  --  0.2 0.5   NEUTROS ABS 10*3/mm3 4.34 4.09 3.26 4.68 4.00 6.40 8.24   LYMPHS ABS 10*3/mm3 2.01 1.99 1.57 1.85  --  1.50 0.95   MONOS ABS 10*3/mm3 0.54 0.64 0.43 0.58  --  0.83 0.94   EOS ABS 10*3/mm3 0.39 0.41* 0.48* 0.48* 0.34 0.25 0.23   BASOS ABS 10*3/mm3 0.04 0.06 0.04 0.05  --  0.05 0.03   IMMATURE GRANS (ABS) 10*3/mm3 0.01 0.01 0.01 0.01  --  0.02 0.05*   NRBC /100 WBC  --   --   --   --   --  0.0 0.0   NEUTROPHIL % %  --   --   --   --  70.0  --   --    MONOCYTES % %  --   --   --   --  12.0  --   --        Lab Results - Last 18 Months   Lab Units 12/23/19  1322 11/11/19  1131 10/09/19  1117 09/03/19  1119 04/30/19  1600 02/07/19  1508  01/13/19  1147   GLUCOSE mg/dL 156* 154* 225* 158* 186* 80   < > 120*   SODIUM mmol/L 138 137 134* 134* 134* 134*   < > 135   POTASSIUM mmol/L 3.8 3.8 3.8 3.8 4.1 4.1   < > 3.6   CO2 mmol/L 28.0 27.0 27.0 26.0 27.0 25.0   < > 29.0   CHLORIDE mmol/L 97* 97* 93* 96* 94* 97*   < > 95*   ANION GAP mmol/L 13.0 13.0 14.0 12.0 13.0 12.0   < > 11.0   CREATININE mg/dL 0.88 0.93 0.95 0.85 0.80 1.12   < > 0.84   BUN mg/dL 13 18 17 15 13 15   < > 12   BUN / CREAT RATIO  14.8 19.4 17.9 17.6 16.3 13.4   < > 14.3   CALCIUM mg/dL 10.2 9.8 9.7 9.7 9.6 9.6   < > 9.4   EGFR IF NONAFRICN AM mL/min/1.73 62 58* 57* 65 70 47*   < > 66   ALK PHOS U/L 67 74 61 53 60  --   --  54   TOTAL PROTEIN g/dL 7.8 8.0 7.9 7.9 7.5  --   --  7.8   ALT (SGPT) U/L 13 10 10 <15 <15  --   --  17   AST (SGOT) U/L 15 15 15 27 21  --   --  25   BILIRUBIN mg/dL 0.2 0.3 0.3 0.4 0.4  --   --  0.4   ALBUMIN  g/dL 4.50 4.50 4.40 4.40 4.50  --   --  4.20   GLOBULIN gm/dL 3.3 3.5 3.5 3.5 3.0  --   --  3.6    < > = values in this interval not displayed.       No results for input(s): MSPIKE, KAPPALAMB, IGLFLC, URICACID, FREEKAPPAL, CEA, LDH, REFLABREPO in the last 15246 hours.    Lab Results - Last 18 Months   Lab Units 12/23/19  1322 11/11/19  1131 10/09/19  1117 09/03/19  1119 08/07/19  1630 07/10/19  1630  02/07/19  1508   IRON mcg/dL 93 113 78 90 81 75   < > 84   TIBC mcg/dL 380 368 408 334 359 343   < > 315   IRON SATURATION % 24 31 19* 27 23 22   < > 27   FERRITIN ng/mL 309.50* 523.10* 107.70 72.90 87.00 96.90   < > 166.00   FOLATE ng/mL  --   --   --   --   --   --   --  >20.00    < > = values in this interval not displayed.         Jameeldulce Nunez reports a pain score of 0.       Patient's Body mass index is 27.81 kg/m². BMI is within normal parameters. No follow-up required..      ASSESSMENT:  1.    Macrocytic anemia from iron deficiency and history of chronic kidney disease Stage III, GFR 51 mL/minute 03/05/2018.  2.    Iron deficiency. Intolerant to oral iron.   3.    Chronic kidney disease Stage II, GFR 70 ml/minute 04/30/2019.  4.    Hyperlipidemia.  5.    Hypertension.  6.    Thrombocytopenia, 04/19/2018. (?) laboratory error.    7.    Squamous cell cancer in situ, urethra.  Followed by Dr. Callahan       PLAN:  1.     Re:  No Procrit needed since her last visit.   2.     Re:  Interval hemoglobin 11.9 gm, hematocrit 35.8, and GFR 58 mL/min on 11/11/2019.  3.     Re:  Heme status.  Hemoglobin 11.9 gm.   4.     Re:  Pre-office CMP.  GFR 62 ml/minute.  5.     Re:  Pre-office ferritin, TIBC, % saturation, and iron. 24% saturation and ferritin 309.5 ng/ml.    6.    Stools for occult blood x3.  7.    Continue currently identified medications.  8.    Procrit 40,000 units subcutaneously every week if hemoglobin less than 10 and hematocrit less than 30% to target a hemoglobin of 11 and  "hematocrit of 33% if GFR below 60.  Move CBC with differential weekly if she starts Procrit. Observe for side effects.   9.    CBC with differential, iron, TIBC, ferritin, and % saturation every 8 weeks at [facility].  10.  Continue ongoing management per primary care physician and other specialists.  11.  Plan of care discussed with patient and spouse.  Understanding expressed.  Patient agreeable to proceed.  12.  Intolerant to oral iron (nausea, cramps, and constipation).  IV iron as needed.   13.  She will bring a copy of her advance directive next visit. \"I will bring it.\"   14.  Return to the office in 4 months with pre-office CMP.  15.  For the use of ESAs:   the patient about the appropriate use of ESAs for patients with cancer, MDS, and CKD.  Acknowledge that the [facility] risk: benefit discussion occurred using the [facility] Patient and Healthcare Professional (HCP) Acknowledgement Form.  Assure signature on the form.  Acknowledge government oversight and intervention in the care of the individual patient.       TIME SPENT:  Face to face time on this encounter,as defined by the American Medical Association in the 2020 Current Procedural Terminology codebook; assessment, record review, lab review, planning and education is 29 minutes.           cc: MD Gilberto Conley MD        (Moustapha Callahan MD)                        "

## 2020-09-25 ENCOUNTER — TRANSCRIBE ORDERS (OUTPATIENT)
Dept: ADMINISTRATIVE | Facility: HOSPITAL | Age: 80
End: 2020-09-25

## 2020-09-25 ENCOUNTER — HOSPITAL ENCOUNTER (OUTPATIENT)
Dept: RADIATION ONCOLOGY | Facility: HOSPITAL | Age: 80
Setting detail: RADIATION/ONCOLOGY SERIES
Discharge: HOME | End: 2020-09-25

## 2020-09-25 DIAGNOSIS — Z11.59 SCREENING FOR VIRAL DISEASE: Primary | ICD-10-CM

## 2020-09-25 PROCEDURE — 77386: CPT | Performed by: RADIOLOGY

## 2020-09-26 ENCOUNTER — LAB (OUTPATIENT)
Dept: LAB | Facility: HOSPITAL | Age: 80
End: 2020-09-26

## 2020-09-26 PROCEDURE — U0003 INFECTIOUS AGENT DETECTION BY NUCLEIC ACID (DNA OR RNA); SEVERE ACUTE RESPIRATORY SYNDROME CORONAVIRUS 2 (SARS-COV-2) (CORONAVIRUS DISEASE [COVID-19]), AMPLIFIED PROBE TECHNIQUE, MAKING USE OF HIGH THROUGHPUT TECHNOLOGIES AS DESCRIBED BY CMS-2020-01-R: HCPCS | Performed by: STUDENT IN AN ORGANIZED HEALTH CARE EDUCATION/TRAINING PROGRAM

## 2020-09-26 PROCEDURE — C9803 HOPD COVID-19 SPEC COLLECT: HCPCS | Performed by: STUDENT IN AN ORGANIZED HEALTH CARE EDUCATION/TRAINING PROGRAM

## 2020-09-28 LAB
COVID LABCORP PRIORITY: NORMAL
SARS-COV-2 RNA RESP QL NAA+PROBE: NOT DETECTED

## 2020-09-29 ENCOUNTER — APPOINTMENT (OUTPATIENT)
Dept: GENERAL RADIOLOGY | Facility: HOSPITAL | Age: 80
End: 2020-09-29

## 2020-09-29 ENCOUNTER — HOSPITAL ENCOUNTER (OUTPATIENT)
Facility: HOSPITAL | Age: 80
Setting detail: HOSPITAL OUTPATIENT SURGERY
Discharge: HOME | End: 2020-09-29
Attending: STUDENT IN AN ORGANIZED HEALTH CARE EDUCATION/TRAINING PROGRAM | Admitting: STUDENT IN AN ORGANIZED HEALTH CARE EDUCATION/TRAINING PROGRAM

## 2020-09-29 ENCOUNTER — ANESTHESIA (OUTPATIENT)
Dept: PERIOP | Facility: HOSPITAL | Age: 80
End: 2020-09-29

## 2020-09-29 ENCOUNTER — APPOINTMENT (OUTPATIENT)
Dept: RADIATION ONCOLOGY | Facility: HOSPITAL | Age: 80
End: 2020-09-29

## 2020-09-29 ENCOUNTER — ANESTHESIA EVENT (OUTPATIENT)
Dept: PERIOP | Facility: HOSPITAL | Age: 80
End: 2020-09-29

## 2020-09-29 VITALS
HEART RATE: 57 BPM | TEMPERATURE: 98.1 F | RESPIRATION RATE: 18 BRPM | SYSTOLIC BLOOD PRESSURE: 141 MMHG | DIASTOLIC BLOOD PRESSURE: 51 MMHG | OXYGEN SATURATION: 94 %

## 2020-09-29 DIAGNOSIS — D07.1 VULVAR INTRAEPITHELIAL NEOPLASIA (VIN) GRADE 3: Primary | ICD-10-CM

## 2020-09-29 LAB — GLUCOSE BLDC GLUCOMTR-MCNC: 123 MG/DL (ref 70–130)

## 2020-09-29 PROCEDURE — 25010000002 FENTANYL CITRATE (PF) 100 MCG/2ML SOLUTION: Performed by: NURSE ANESTHETIST, CERTIFIED REGISTERED

## 2020-09-29 PROCEDURE — 82962 GLUCOSE BLOOD TEST: CPT

## 2020-09-29 PROCEDURE — 71045 X-RAY EXAM CHEST 1 VIEW: CPT

## 2020-09-29 PROCEDURE — 25010000002 MIDAZOLAM PER 1 MG: Performed by: NURSE ANESTHETIST, CERTIFIED REGISTERED

## 2020-09-29 PROCEDURE — 25010000002 VANCOMYCIN 1 G RECONSTITUTED SOLUTION: Performed by: STUDENT IN AN ORGANIZED HEALTH CARE EDUCATION/TRAINING PROGRAM

## 2020-09-29 PROCEDURE — 76000 FLUOROSCOPY <1 HR PHYS/QHP: CPT

## 2020-09-29 PROCEDURE — C1788 PORT, INDWELLING, IMP: HCPCS | Performed by: STUDENT IN AN ORGANIZED HEALTH CARE EDUCATION/TRAINING PROGRAM

## 2020-09-29 PROCEDURE — 25010000003 HEPARIN LOCK FLUSH PER 10 UNITS: Performed by: STUDENT IN AN ORGANIZED HEALTH CARE EDUCATION/TRAINING PROGRAM

## 2020-09-29 PROCEDURE — 25010000002 PROPOFOL 10 MG/ML EMULSION: Performed by: NURSE ANESTHETIST, CERTIFIED REGISTERED

## 2020-09-29 DEVICE — POWERPORT M.R.I. IMPLANTABLE PORT WITH ATTACHABLE 9.6F OPEN-ENDED SINGLE-LUMEN VENOUS CATHETER INTERMEDIATE KIT (WITH SUTURE PLUGS)
Type: IMPLANTABLE DEVICE | Status: FUNCTIONAL
Brand: POWERPORT M.R.I.

## 2020-09-29 RX ORDER — MAGNESIUM HYDROXIDE 1200 MG/15ML
LIQUID ORAL AS NEEDED
Status: DISCONTINUED | OUTPATIENT
Start: 2020-09-29 | End: 2020-09-29 | Stop reason: HOSPADM

## 2020-09-29 RX ORDER — SODIUM CHLORIDE, SODIUM LACTATE, POTASSIUM CHLORIDE, CALCIUM CHLORIDE 600; 310; 30; 20 MG/100ML; MG/100ML; MG/100ML; MG/100ML
9 INJECTION, SOLUTION INTRAVENOUS CONTINUOUS
Status: DISCONTINUED | OUTPATIENT
Start: 2020-09-29 | End: 2020-09-29 | Stop reason: HOSPADM

## 2020-09-29 RX ORDER — SODIUM CHLORIDE 0.9 % (FLUSH) 0.9 %
10 SYRINGE (ML) INJECTION AS NEEDED
Status: DISCONTINUED | OUTPATIENT
Start: 2020-09-29 | End: 2020-09-29 | Stop reason: HOSPADM

## 2020-09-29 RX ORDER — FENTANYL CITRATE 50 UG/ML
INJECTION, SOLUTION INTRAMUSCULAR; INTRAVENOUS AS NEEDED
Status: DISCONTINUED | OUTPATIENT
Start: 2020-09-29 | End: 2020-09-29 | Stop reason: SURG

## 2020-09-29 RX ORDER — FENTANYL CITRATE 50 UG/ML
25 INJECTION, SOLUTION INTRAMUSCULAR; INTRAVENOUS
Status: DISCONTINUED | OUTPATIENT
Start: 2020-09-29 | End: 2020-09-29 | Stop reason: HOSPADM

## 2020-09-29 RX ORDER — HEPARIN SODIUM (PORCINE) LOCK FLUSH IV SOLN 100 UNIT/ML 100 UNIT/ML
SOLUTION INTRAVENOUS AS NEEDED
Status: DISCONTINUED | OUTPATIENT
Start: 2020-09-29 | End: 2020-09-29 | Stop reason: HOSPADM

## 2020-09-29 RX ORDER — IBUPROFEN 600 MG/1
600 TABLET ORAL ONCE AS NEEDED
Status: DISCONTINUED | OUTPATIENT
Start: 2020-09-29 | End: 2020-09-29 | Stop reason: HOSPADM

## 2020-09-29 RX ORDER — SODIUM CHLORIDE 9 MG/ML
INJECTION, SOLUTION INTRAVENOUS AS NEEDED
Status: DISCONTINUED | OUTPATIENT
Start: 2020-09-29 | End: 2020-09-29 | Stop reason: HOSPADM

## 2020-09-29 RX ORDER — OXYCODONE AND ACETAMINOPHEN 10; 325 MG/1; MG/1
1 TABLET ORAL ONCE AS NEEDED
Status: DISCONTINUED | OUTPATIENT
Start: 2020-09-29 | End: 2020-09-29 | Stop reason: HOSPADM

## 2020-09-29 RX ORDER — OXYCODONE HYDROCHLORIDE 5 MG/1
5 TABLET ORAL EVERY 8 HOURS PRN
Qty: 10 TABLET | Refills: 0 | Status: SHIPPED | OUTPATIENT
Start: 2020-09-29 | End: 2020-10-13

## 2020-09-29 RX ORDER — LIDOCAINE HYDROCHLORIDE 10 MG/ML
0.5 INJECTION, SOLUTION EPIDURAL; INFILTRATION; INTRACAUDAL; PERINEURAL ONCE AS NEEDED
Status: DISCONTINUED | OUTPATIENT
Start: 2020-09-29 | End: 2020-09-29 | Stop reason: HOSPADM

## 2020-09-29 RX ORDER — OXYCODONE AND ACETAMINOPHEN 7.5; 325 MG/1; MG/1
2 TABLET ORAL EVERY 4 HOURS PRN
Status: DISCONTINUED | OUTPATIENT
Start: 2020-09-29 | End: 2020-09-29 | Stop reason: HOSPADM

## 2020-09-29 RX ORDER — SODIUM CHLORIDE 0.9 % (FLUSH) 0.9 %
3 SYRINGE (ML) INJECTION AS NEEDED
Status: DISCONTINUED | OUTPATIENT
Start: 2020-09-29 | End: 2020-09-29 | Stop reason: HOSPADM

## 2020-09-29 RX ORDER — NALOXONE HCL 0.4 MG/ML
0.4 VIAL (ML) INJECTION AS NEEDED
Status: DISCONTINUED | OUTPATIENT
Start: 2020-09-29 | End: 2020-09-29 | Stop reason: HOSPADM

## 2020-09-29 RX ORDER — PROPOFOL 10 MG/ML
VIAL (ML) INTRAVENOUS AS NEEDED
Status: DISCONTINUED | OUTPATIENT
Start: 2020-09-29 | End: 2020-09-29 | Stop reason: SURG

## 2020-09-29 RX ORDER — DEXTROSE MONOHYDRATE 25 G/50ML
12.5 INJECTION, SOLUTION INTRAVENOUS AS NEEDED
Status: DISCONTINUED | OUTPATIENT
Start: 2020-09-29 | End: 2020-09-29 | Stop reason: HOSPADM

## 2020-09-29 RX ORDER — MIDAZOLAM HYDROCHLORIDE 1 MG/ML
INJECTION INTRAMUSCULAR; INTRAVENOUS AS NEEDED
Status: DISCONTINUED | OUTPATIENT
Start: 2020-09-29 | End: 2020-09-29 | Stop reason: SURG

## 2020-09-29 RX ORDER — FLUMAZENIL 0.1 MG/ML
0.2 INJECTION INTRAVENOUS AS NEEDED
Status: DISCONTINUED | OUTPATIENT
Start: 2020-09-29 | End: 2020-09-29 | Stop reason: HOSPADM

## 2020-09-29 RX ORDER — LABETALOL HYDROCHLORIDE 5 MG/ML
5 INJECTION, SOLUTION INTRAVENOUS
Status: DISCONTINUED | OUTPATIENT
Start: 2020-09-29 | End: 2020-09-29 | Stop reason: HOSPADM

## 2020-09-29 RX ORDER — SODIUM CHLORIDE, SODIUM LACTATE, POTASSIUM CHLORIDE, CALCIUM CHLORIDE 600; 310; 30; 20 MG/100ML; MG/100ML; MG/100ML; MG/100ML
1000 INJECTION, SOLUTION INTRAVENOUS CONTINUOUS
Status: DISCONTINUED | OUTPATIENT
Start: 2020-09-29 | End: 2020-09-29 | Stop reason: HOSPADM

## 2020-09-29 RX ORDER — ONDANSETRON 2 MG/ML
4 INJECTION INTRAMUSCULAR; INTRAVENOUS ONCE AS NEEDED
Status: DISCONTINUED | OUTPATIENT
Start: 2020-09-29 | End: 2020-09-29 | Stop reason: HOSPADM

## 2020-09-29 RX ORDER — SODIUM CHLORIDE 0.9 % (FLUSH) 0.9 %
10 SYRINGE (ML) INJECTION EVERY 12 HOURS SCHEDULED
Status: DISCONTINUED | OUTPATIENT
Start: 2020-09-29 | End: 2020-09-29 | Stop reason: HOSPADM

## 2020-09-29 RX ADMIN — MIDAZOLAM 2 MG: 1 INJECTION INTRAMUSCULAR; INTRAVENOUS at 12:40

## 2020-09-29 RX ADMIN — PROPOFOL 300 MG: 10 INJECTION, EMULSION INTRAVENOUS at 13:47

## 2020-09-29 RX ADMIN — FENTANYL CITRATE 100 MCG: 50 INJECTION, SOLUTION INTRAMUSCULAR; INTRAVENOUS at 12:40

## 2020-09-29 RX ADMIN — VANCOMYCIN HYDROCHLORIDE 1000 MG: 1 INJECTION, POWDER, LYOPHILIZED, FOR SOLUTION INTRAVENOUS at 12:05

## 2020-09-29 RX ADMIN — SODIUM CHLORIDE, POTASSIUM CHLORIDE, SODIUM LACTATE AND CALCIUM CHLORIDE 1000 ML: 600; 310; 30; 20 INJECTION, SOLUTION INTRAVENOUS at 10:56

## 2020-09-29 NOTE — ANESTHESIA POSTPROCEDURE EVALUATION
Patient: Dago Nunez    Procedure Summary     Date: 09/29/20 Room / Location:  [facility] OR 14 /  [facility] OR    Anesthesia Start: 1235 Anesthesia Stop: 1406    Procedure: SINGLE LUMEN PORT - A- CATH PLACEMENT WITH FLUOROSCOPY (N/A Chin to Nipples) Diagnosis: (VULVAR CANCER)    Surgeon: Quynh Rosario MD Provider: Sully Matthews CRNA    Anesthesia Type: MAC ASA Status: 3          Anesthesia Type: MAC    Vitals  Vitals Value Taken Time   /61 09/29/20 1404   Temp     Pulse 58 09/29/20 1409   Resp     SpO2 95 % 09/29/20 1409   Vitals shown include unvalidated device data.        Post Anesthesia Care and Evaluation    Patient location during evaluation: PHASE II  Patient participation: complete - patient participated  Level of consciousness: awake and alert  Pain score: 0  Pain management: adequate  Airway patency: patent  Anesthetic complications: No anesthetic complications  PONV Status: none  Cardiovascular status: acceptable  Respiratory status: acceptable  Hydration status: acceptable    Comments: Blood pressure 152/62, pulse 57, temperature 97.4 °F (36.3 °C), temperature source Temporal, resp. rate 16, SpO2 98 %, not currently breastfeeding.    Pt discharged from PACU based on mikki score >8

## 2020-09-29 NOTE — ANESTHESIA PREPROCEDURE EVALUATION
Anesthesia Evaluation     Patient summary reviewed   no history of anesthetic complications:  NPO Solid Status: > 8 hours             Airway   Mallampati: II  TM distance: >3 FB  Neck ROM: full  Dental    (+) partials    Pulmonary    (-) COPD, asthma, sleep apnea, not a smoker  Cardiovascular   Exercise tolerance: good (4-7 METS)    ECG reviewed    (+) hypertension, hyperlipidemia,   (-) pacemaker, past MI, angina, cardiac stents      Neuro/Psych  (-) seizures, TIA, CVA  GI/Hepatic/Renal/Endo    (+) obesity,  GERD,  renal disease CRI,   (-) liver disease, diabetes    Musculoskeletal     Abdominal    Substance History      OB/GYN          Other      history of cancer                    Anesthesia Plan    ASA 3     MAC       Anesthetic plan, all risks, benefits, and alternatives have been provided, discussed and informed consent has been obtained with: patient.

## 2020-09-30 ENCOUNTER — TELEPHONE (OUTPATIENT)
Dept: ONCOLOGY | Facility: CLINIC | Age: 80
End: 2020-09-30

## 2020-09-30 ENCOUNTER — HOSPITAL ENCOUNTER (OUTPATIENT)
Dept: RADIATION ONCOLOGY | Facility: HOSPITAL | Age: 80
Setting detail: RADIATION/ONCOLOGY SERIES
Discharge: HOME | End: 2020-09-30

## 2020-09-30 PROCEDURE — 77386: CPT | Performed by: RADIOLOGY

## 2020-09-30 NOTE — TELEPHONE ENCOUNTER
PATIENT CALLING, SHE JUST GOT HER PORT PUT IN YESTERDAY FOR HER CHEMO AND SHE WANTS TO KNOW WHEN IT WILL BE SCHEDULED, SHE SAID THEY DID NOT TELL HER ANYTHING YESTERDAY, PLEASE ADVISE?    PT CALL BACK # [phone] OR # [phone]

## 2020-10-01 ENCOUNTER — HOSPITAL ENCOUNTER (OUTPATIENT)
Dept: RADIATION ONCOLOGY | Facility: HOSPITAL | Age: 80
Setting detail: RADIATION/ONCOLOGY SERIES
Discharge: HOME | End: 2020-10-01

## 2020-10-01 ENCOUNTER — TELEPHONE (OUTPATIENT)
Dept: ONCOLOGY | Facility: CLINIC | Age: 80
End: 2020-10-01

## 2020-10-01 ENCOUNTER — HOSPITAL ENCOUNTER (OUTPATIENT)
Dept: RADIATION ONCOLOGY | Facility: HOSPITAL | Age: 80
Setting detail: RADIATION/ONCOLOGY SERIES
End: 2020-10-01

## 2020-10-01 PROCEDURE — 77386: CPT | Performed by: RADIOLOGY

## 2020-10-01 NOTE — TELEPHONE ENCOUNTER
"Received call from patient Dago Nunez she calls with lots of questions regarding her planned chemotherapy start date of Monday 10/5/20. She questions how could she receive her chemo txt at 9 am and radiation therapy at 10 am \"my chemo txt must not take to long?\" . Discussed with patient how her txt and radiation therapy would work together.   We also discussed possible side effects of her treatments, premedications prior to txt, oral medications, things she could use to help control nausea if this occurs, etc. Encouraged her call if she has any questions or concerns not to wait, we would be happy to help her in any way. She v/u.  "

## 2020-10-02 ENCOUNTER — HOSPITAL ENCOUNTER (OUTPATIENT)
Dept: RADIATION ONCOLOGY | Facility: HOSPITAL | Age: 80
Setting detail: RADIATION/ONCOLOGY SERIES
Discharge: HOME | End: 2020-10-02

## 2020-10-02 PROCEDURE — 77386: CPT | Performed by: RADIOLOGY

## 2020-10-05 ENCOUNTER — INFUSION (OUTPATIENT)
Dept: ONCOLOGY | Facility: HOSPITAL | Age: 80
End: 2020-10-05

## 2020-10-05 ENCOUNTER — LAB (OUTPATIENT)
Dept: LAB | Facility: HOSPITAL | Age: 80
End: 2020-10-05

## 2020-10-05 ENCOUNTER — HOSPITAL ENCOUNTER (OUTPATIENT)
Dept: RADIATION ONCOLOGY | Facility: HOSPITAL | Age: 80
Setting detail: RADIATION/ONCOLOGY SERIES
Discharge: HOME | End: 2020-10-05

## 2020-10-05 VITALS
SYSTOLIC BLOOD PRESSURE: 136 MMHG | WEIGHT: 170 LBS | OXYGEN SATURATION: 98 % | HEART RATE: 54 BPM | TEMPERATURE: 96.7 F | HEIGHT: 60 IN | RESPIRATION RATE: 18 BRPM | DIASTOLIC BLOOD PRESSURE: 43 MMHG | BODY MASS INDEX: 33.38 KG/M2

## 2020-10-05 DIAGNOSIS — C77.4 SECONDARY MALIGNANCY OF INGUINAL LYMPH NODES (HCC): ICD-10-CM

## 2020-10-05 DIAGNOSIS — C51.9 VULVAR CANCER, CARCINOMA (HCC): Primary | ICD-10-CM

## 2020-10-05 DIAGNOSIS — D64.9 NORMOCYTIC ANEMIA: ICD-10-CM

## 2020-10-05 LAB
ALBUMIN SERPL-MCNC: 4.3 G/DL (ref 3.5–5.2)
ALBUMIN/GLOB SERPL: 1.3 G/DL
ALP SERPL-CCNC: 61 U/L (ref 39–117)
ALT SERPL W P-5'-P-CCNC: 8 U/L (ref 1–33)
ANION GAP SERPL CALCULATED.3IONS-SCNC: 11 MMOL/L (ref 5–15)
AST SERPL-CCNC: 14 U/L (ref 1–32)
BASOPHILS # BLD AUTO: 0.03 10*3/MM3 (ref 0–0.2)
BASOPHILS NFR BLD AUTO: 0.5 % (ref 0–1.5)
BILIRUB SERPL-MCNC: 0.3 MG/DL (ref 0–1.2)
BUN SERPL-MCNC: 14 MG/DL (ref 8–23)
BUN/CREAT SERPL: 14.9 (ref 7–25)
CALCIUM SPEC-SCNC: 9.8 MG/DL (ref 8.6–10.5)
CHLORIDE SERPL-SCNC: 101 MMOL/L (ref 98–107)
CO2 SERPL-SCNC: 26 MMOL/L (ref 22–29)
CREAT SERPL-MCNC: 0.94 MG/DL (ref 0.57–1)
DEPRECATED RDW RBC AUTO: 46 FL (ref 37–54)
EOSINOPHIL # BLD AUTO: 0.39 10*3/MM3 (ref 0–0.4)
EOSINOPHIL NFR BLD AUTO: 6 % (ref 0.3–6.2)
ERYTHROCYTE [DISTWIDTH] IN BLOOD BY AUTOMATED COUNT: 13 % (ref 12.3–15.4)
GFR SERPL CREATININE-BSD FRML MDRD: 58 ML/MIN/1.73
GLOBULIN UR ELPH-MCNC: 3.3 GM/DL
GLUCOSE SERPL-MCNC: 182 MG/DL (ref 65–99)
HCT VFR BLD AUTO: 33.3 % (ref 34–46.6)
HGB BLD-MCNC: 11.1 G/DL (ref 12–15.9)
IMM GRANULOCYTES # BLD AUTO: 0.03 10*3/MM3 (ref 0–0.05)
IMM GRANULOCYTES NFR BLD AUTO: 0.5 % (ref 0–0.5)
LYMPHOCYTES # BLD AUTO: 0.68 10*3/MM3 (ref 0.7–3.1)
LYMPHOCYTES NFR BLD AUTO: 10.4 % (ref 19.6–45.3)
MCH RBC QN AUTO: 32.4 PG (ref 26.6–33)
MCHC RBC AUTO-ENTMCNC: 33.3 G/DL (ref 31.5–35.7)
MCV RBC AUTO: 97.1 FL (ref 79–97)
MONOCYTES # BLD AUTO: 0.37 10*3/MM3 (ref 0.1–0.9)
MONOCYTES NFR BLD AUTO: 5.7 % (ref 5–12)
NEUTROPHILS NFR BLD AUTO: 5.02 10*3/MM3 (ref 1.7–7)
NEUTROPHILS NFR BLD AUTO: 76.9 % (ref 42.7–76)
NRBC BLD AUTO-RTO: 0 /100 WBC (ref 0–0.2)
PLATELET # BLD AUTO: 146 10*3/MM3 (ref 140–450)
PMV BLD AUTO: 10 FL (ref 6–12)
POTASSIUM SERPL-SCNC: 3.8 MMOL/L (ref 3.5–5.2)
PROT SERPL-MCNC: 7.6 G/DL (ref 6–8.5)
RBC # BLD AUTO: 3.43 10*6/MM3 (ref 3.77–5.28)
SODIUM SERPL-SCNC: 138 MMOL/L (ref 136–145)
WBC # BLD AUTO: 6.52 10*3/MM3 (ref 3.4–10.8)

## 2020-10-05 PROCEDURE — 25010000002 PALONOSETRON PER 25 MCG: Performed by: INTERNAL MEDICINE

## 2020-10-05 PROCEDURE — 36415 COLL VENOUS BLD VENIPUNCTURE: CPT

## 2020-10-05 PROCEDURE — 96409 CHEMO IV PUSH SNGL DRUG: CPT

## 2020-10-05 PROCEDURE — 25010000002 MITOMYCIN PER 5 MG: Performed by: INTERNAL MEDICINE

## 2020-10-05 PROCEDURE — 96375 TX/PRO/DX INJ NEW DRUG ADDON: CPT

## 2020-10-05 PROCEDURE — 85025 COMPLETE CBC W/AUTO DIFF WBC: CPT

## 2020-10-05 PROCEDURE — 96367 TX/PROPH/DG ADDL SEQ IV INF: CPT

## 2020-10-05 PROCEDURE — G0498 CHEMO EXTEND IV INFUS W/PUMP: HCPCS

## 2020-10-05 PROCEDURE — 80053 COMPREHEN METABOLIC PANEL: CPT

## 2020-10-05 PROCEDURE — 77386: CPT | Performed by: RADIOLOGY

## 2020-10-05 PROCEDURE — 25010000002 DEXAMETHASONE SODIUM PHOSPHATE 100 MG/10ML SOLUTION: Performed by: INTERNAL MEDICINE

## 2020-10-05 PROCEDURE — 25010000002 FLUOROURACIL PER 500 MG: Performed by: INTERNAL MEDICINE

## 2020-10-05 RX ORDER — PALONOSETRON 0.05 MG/ML
0.25 INJECTION, SOLUTION INTRAVENOUS ONCE
Status: COMPLETED | OUTPATIENT
Start: 2020-10-05 | End: 2020-10-05

## 2020-10-05 RX ORDER — MITOMYCIN 20 MG/40ML
10 INJECTION, POWDER, LYOPHILIZED, FOR SOLUTION INTRAVENOUS ONCE
Status: COMPLETED | OUTPATIENT
Start: 2020-10-05 | End: 2020-10-05

## 2020-10-05 RX ORDER — SODIUM CHLORIDE 9 MG/ML
250 INJECTION, SOLUTION INTRAVENOUS ONCE
Status: COMPLETED | OUTPATIENT
Start: 2020-10-05 | End: 2020-10-05

## 2020-10-05 RX ADMIN — FLUOROURACIL 7200 MG: 50 INJECTION, SOLUTION INTRAVENOUS at 10:53

## 2020-10-05 RX ADMIN — DEXAMETHASONE SODIUM PHOSPHATE 12 MG: 10 INJECTION, SOLUTION INTRAMUSCULAR; INTRAVENOUS at 10:07

## 2020-10-05 RX ADMIN — SODIUM CHLORIDE 250 ML: 9 INJECTION, SOLUTION INTRAVENOUS at 10:00

## 2020-10-05 RX ADMIN — PALONOSETRON HYDROCHLORIDE 0.25 MG: 0.25 INJECTION, SOLUTION INTRAVENOUS at 10:06

## 2020-10-05 RX ADMIN — MITOMYCIN 18 MG: 20 INJECTION, POWDER, LYOPHILIZED, FOR SOLUTION INTRAVENOUS at 10:41

## 2020-10-06 ENCOUNTER — HOSPITAL ENCOUNTER (OUTPATIENT)
Dept: RADIATION ONCOLOGY | Facility: HOSPITAL | Age: 80
Setting detail: RADIATION/ONCOLOGY SERIES
Discharge: HOME | End: 2020-10-06

## 2020-10-06 ENCOUNTER — CLINICAL SUPPORT (OUTPATIENT)
Dept: ONCOLOGY | Facility: HOSPITAL | Age: 80
End: 2020-10-06

## 2020-10-06 DIAGNOSIS — C51.9 VULVAR CANCER, CARCINOMA (HCC): ICD-10-CM

## 2020-10-06 PROCEDURE — 77386: CPT | Performed by: RADIOLOGY

## 2020-10-06 PROCEDURE — G0463 HOSPITAL OUTPT CLINIC VISIT: HCPCS

## 2020-10-06 NOTE — PROGRESS NOTES
Patient here for radiation and wants port site assessed due to bleeding last night.  SHANNA Villagran, Rn

## 2020-10-07 ENCOUNTER — HOSPITAL ENCOUNTER (OUTPATIENT)
Dept: RADIATION ONCOLOGY | Facility: HOSPITAL | Age: 80
Setting detail: RADIATION/ONCOLOGY SERIES
Discharge: HOME | End: 2020-10-07

## 2020-10-07 PROCEDURE — 77386: CPT | Performed by: RADIOLOGY

## 2020-10-07 PROCEDURE — 77336 RADIATION PHYSICS CONSULT: CPT | Performed by: RADIOLOGY

## 2020-10-08 ENCOUNTER — HOSPITAL ENCOUNTER (OUTPATIENT)
Dept: RADIATION ONCOLOGY | Facility: HOSPITAL | Age: 80
Setting detail: RADIATION/ONCOLOGY SERIES
Discharge: HOME | End: 2020-10-08

## 2020-10-08 PROCEDURE — 77386: CPT | Performed by: RADIOLOGY

## 2020-10-08 NOTE — PROGRESS NOTES
[facility] ON[facility] GROUP HEMATOLOGY AND ONCOLOGY  [address]  [facility] [address] [phone]    Patient Name: Dago Nunez  Encounter Date: 10/13/2020  YOB: 1940  Patient Number: [id number]      REASON FOR FOLLOW-UP:Dago Nunez is a pleasant 80-year-old  female who is seen on followup for macrocytic anemia from chronic kidney disease Stage III, GFR 51 mL/minute 03/05/2018, iron deficiency, and thrombocytopenia.  She is intolerant to oral iron (nausea, stomach cramps, and constipation).  She had Injectafer 23.75 months ago. She is also seen for vulvar cancer. She is seen C1D7 of Mitomycin C and 5FU with radiation. She is seen with spouse.  History is obtained from the patient. History is considered to be accurate.          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  The patient was seen by Dr. Marks in favor definitive chemo radiation.  Pathology report 07/10/2020 periurethral biopsy, poorly differentiated carcinoma with squamous and papillary features, focally invasive in the subepithelial connective tissue.  PET 07/31/2020 showed intense FDG avid left inguinal node is highly suspicious for local regional metastasis from known vulvar squamous cell carcinoma.  No additional sites of suspicious FDG activity.  MRI 07/31/2020 showed redemonstration of mildly T2 hyperintense mass involving the urethral meatus, similar dimensions to prior exam on 02/18/2020 however there is now a polypoid lesion seen along the anterior aspect of the perineum, possibly contiguous with the urethral mass, concerning for disease progression.  Market interval enlargement of the left inguinal node almost certainly representing disease involvement.  Patient was notified by Dr. Siddiqui 08/19/2020.  Consensus for chemoradiation.   "Patient reluctant to take chemotherapy.  Follow-up with Dr. Siddiqui in 4 to 6 weeks after radiation is completed.         PREVIOUS INTERVENTIONS:  Mitomycin C and 5-FU 10/05/2020 with radiation through present at [facility].      DIAGNOSTIC ABNORMALITIES:   The patient was seen by Dr. Greg Alberts 01/29/2018 complaining of coughing and wheezing. The patient was given Tussionex. Blood work was ordered. CBC 01/29/2018 revealed a WBC of 7.8, hemoglobin 11.2, hematocrit 35.1, MCV 96.2, platelets 43,000, and ANC 4.47. CMP remarkable for of glucose of 177 and GFR 59 mL/minute.  The patient was seen by VINCENT Jones, 02/02/2018. She was coughing and wheezing. Tussionex did not help. The patient was given prednisone.  The patient was seen by VINCENT Jones, 02/15/2018. She is followed for anemia from iron deficiency. CBC 02/15/2018 revealed a WBC of 12.9, hemoglobin 11.4, hematocrit 34.5, MCV 95, and platelets 68,000.       PREVIOUS INTERVENTIONS:   Ferrous sulfate 325 mg 03/07/2018 through 05/06/2018.  Not resumed 06/08/2018. \"I misunderstood.\"   Resume 09/05/2018 through 10/03/2018, stopped due to intolerance.  Injectafer 750 mg 10/20/2018 at the [place] office.         Vulvar cancer, carcinoma (CMS/HCC)    Initial Diagnosis    Vulvar cancer, carcinoma (CMS/HCC)     3/19/2001 Biopsy    Clinical Features: red vaginal lesion with bleeding since 1995. TX with  Carafate douche and Premarin vaginal cream with intermittent improvement.    DIAGNOSIS:    VAGINA, BIOPSY: FOCAL ULCERATION, MARKED ACUTE AND CHRONIC INFLAMMATION,  SPONGIOSIS AND REACTIVE ATYPIA; NEGATIVE FOR DYSPLASIA.     8/17/2001 Procedure    Pap Smear:  DIAGNOSIS:            Atypical keratinized squamous cells, suspicious                           for squamous cell carcinoma.         COMMENTS:             There are numerous atypical keratinized cells                           present in an inflammatory background.  These " are                           suspicious for squamous cell carcinoma.  Biopsy                           confirmation is recommended.      10/16/2001 Procedure    Pap Smear:  DIAGNOSIS:            Mild dysplasia       ADDITIONAL FINDINGS:  Marked inflammation                           Excessive hyperkeratosis         BETHESDA SYSTEM:      Low grade squamous intraepithelial lesion    DIAGNOSIS:            Negative, no abnormal cells seen           BETHESDA SYSTEM:      Within normal limits.       ADEQUACY:             Specimen satisfactory for evaluation       SOURCE:               Vagina, diagnostic thin prep PAP     11/7/2001 Biopsy      DIAGNOSIS:    VAGINA AND VULVA, RIGHT POSTERIOR INTROITUS, WIDE LOCAL EXCISION:  VAGINAL INTRAEPITHELIAL NEOPLASIA (VAIN) II-III, FOCALLY EXTENDING TO  VAGINAL MARGIN AT 12-4:00; ALL OTHER MARGINS NEGATIVE FOR  INTRAEPITHELIAL NEOPLASIA. (SEE COMMENT)    COMMENT: The lesion involves vaginal type epithelium with associated  chronic inflammation and erosion. The adjacent vulvar epithelium is  hyperkeratotic.     3/15/2002 Procedure    Pap Smear:  BETHESDA SYSTEM:      High grade squamous intraepithelial lesion       DESCRIPTOR:           Moderate dysplasia           ADEQUACY:             Specimen satisfactory for evaluation       SOURCE:               Vagina, Thin Prep Pap, Diagnostic     6/13/2003 Procedure         BETHESDA SYSTEM:      High grade squamous intraepithelial lesion       DESCRIPTOR:           Moderate dysplasia       ADDITIONAL FINDINGS:  Inflammation         ADEQUACY:             Specimen satisfactory for evaluation       SOURCE:               Vagina, Thin Prep Pap, Diagnostic    HUMAN PAPILLOMAVIRUS         CASE ACCESSION #:    [id number]        Category                  HPV Types                Patient Results         High/Intermed Risk    16,18,31,33,35,39              Negative                            45,51,52,56,58,59,68      Specimen Source        Cervical /  Vaginal Specimen     12/5/2003 Procedure    Gynecological Cytology        CASE ACCESSION #:     [id number]       BETHESDA SYSTEM:      Low grade squamous intraepithelial lesion       DESCRIPTOR:           Mild dysplasia           ADEQUACY:             Specimen satisfactory for evaluation       SOURCE:               Vagina, Thin Prep Pap, Diagnostic     11/29/2011 Biopsy    ADDENDUM FINDINGS:    The high grade MOODY in the right labia majora biopsy was of the usual type.  There was no evidence of differentiated MOODY.      DIAGNOSIS:    1) PERINEUM, BIOPSY: SQUAMOUS EPITHELIUM WITH FLORID HYPERKERATOSIS,  CONSISTENT WITH CHRONIC IRRITATION; NO EVIDENCE OF DYSPLASIA OR MALIGNANCY  (SEE COMMENT).    Comment: Immunohistochemical studies (p16, p53, MIB-1) confirm the rendered  interpretations.    2) VULVA, RIGHT LABIUM MAJORUM, BIOPSY: VULVAR INTRAEPITHELIAL NEOPLASIA II  (MODERATE DYSPLASIA); (SEE COMMENT).    Comment: Immunohistochemical studies for p16 (nuclear and cytoplasmic  positivity in lower epithelial half) and MIB-1 (nuclear positivity in lower  epithelial half) confirms the diagnosis; p53 is positive only in rare  cells. A GMS histochemical study for fungal forms is negative.  Nevertheless, parakeratosis associated with neutrophils, as was seen  herein, is commonly associated with fungal vulvitis.     7/3/2012 Procedure    Gynecological Cytology    CASE ACCESSION #:                     [id number]  BETHESDA SYSTEM:                      High grade squamous intraepithelial                                        lesion  DESCRIPTOR:                           Mild to moderate dysplasia  ADEQUACY:                             Specimen satisfactory for evaluation  SOURCE:                               Vagina, Thin Prep Pap, Diagnostic  # SLIDES REVIEWED:                    1     1/29/2013 Biopsy    DIAGNOSIS:    1) VULVA, RIGHT, ANTERIOR, BIOPSY:  SPONGIOTIC DERMATITIS WITH EXTENSIVE  DERMAL GRANULATION TISSUE, MIXED  INFLAMMATION AND FIBROSIS (SEE COMENT).    Comment: Sections show spongiosis, basement membrane thickening, dermal  fibrosis, and extensive dermal granulation tissue with a predominantly  chronic inflammatory infiltrate. There is no evidence of dysplasia or  malignancy. The basement membrane thickening and dermal fibrosis suggests  that there may be component of lichen sclerosus. However, the underlying  cause of the ongoing inflammation and repair (granulation tissue) is not  clear from this sample. A tissue gram stain fails to reveal any large  bacterial aggregates.  GMS and AFB studies for fungal forms and acid fast  bacilli were negative respectively     11/27/2013 Surgery      Diagnosis    1) VULVA, LEFT ANTERIOR, BIOPSY: HIGH GRADE SQUAMOUS INTRAEPITHELIAL LESION (SEVERE DYSPLASIA, MOODY III).    2) VAGINAL WALL, ANTERIOR, BIOPSY: HIGH GRADE SQUAMOUS INTRAEPITHELIAL LESION (SEVERE DYSPLASIA, VaIN III).    3) VULVA, VULVECTOMY: FOCUS OF MICROINVASIVE SQUAMOUS CELL CARCINOMA, WELL-DIFFERENTIATED, DEPTH OF STROMAL INVASION 0.4 MM,  8 MM FROM CLOSEST DEEP MARGINS, 4 MM FROM RIGHT ANTERIOR VAGINAL MARGINS AT 8 - 9 O'CLOCK (PROXIMAL TIP OF SPECIMEN DESIGNATED   12 O'CLOCK); NEGATIVE FOR LYMPHOVASCULAR INVASION; ARISING IN A BACKGROUND OF EXTENSIVE VULVAR INTRAEPITHELIAL NEOPLASIA (SEVERE DYSPLASIA, MOODY III, CLASSICAL AND DIFFERENTIATED TYPES); MOODY III IS PRESENT AT RIGHT LATERAL SURGICAL MARGIN (7 - 9 O'CLOCK),   LEFT LATERAL SURGICAL MARGIN (3 - 5 O'CLOCK) AND RIGHT AND LEFT VAGINAL MARGINS; TOTAL LESIONAL AREA (INVASIVE CARCINOMA AND MOODY III) MEASURES 8.2 CM IN GREATEST DIMENSION (GROSS MEASUREMENT); BACKGROUND SEVERE INTERFACE DERMATITIS AND LICHEN SCLEROSUS.        **Electronically signed out by Dimas Cardenas M.D.**on 12/2/2013  HAYLEY/YAZMIN/franky  Case reviewed by Attending Pathologist      Synoptic Diagnosis  3)  VULVA:     Specimen:                        Vulva  Procedure:                       Other:  Vulvectomy  Lymph Node Sampling:             Not applicable  Specimen Size:                   Greatest dimension: 13.5 cm                                   Additional dimension: 9.5 cm                                   Additional dimension: 1.2 cm  Tumor Site:                      Right vulva, labium minus  Tumor Size:                      Greatest dimension: 0.04 cm  Tumor Focality:                  Unifocal  Histologic Type:                 Squamous cell carcinoma  Histologic Grade:                G1: Well differentiated  Microscopic Tumor Extension:     Depth of invasion: 0.4 mm  Margins:                         Uninvolved by invasive carcinoma                                   Distance of invasive carcinoma from closest margin: 4 mm  Lymph-Vascular Invasion:         Not identified  Lymph Nodes:                     No nodes submitted or found  Extranodal Extension:            Cannot be determined (explain):    Fixed or Ulcerated Femoral-Inguinal Lymph Nodes:                                    Not identified  Laterality of Involved Lymph Nodes:                                    Cannot be determined:    Primary Tumor (pT):              pT1a [FIGO IA]: Lesions 2 cm or less in size, confined to the vulva or perineum, and with stromal invasion 1.0 mm or less  Regional Lymph Nodes (pN):       pNX:  Regional lymph nodes cannot be assessed  Distant Metastasis (pM):         Not applicable  Additional Pathologic Findings:  Vulvar intraepithelial neoplasia (MOODY) 3 (severe dysplasia/carcinoma in situ)  --------------------------------------------------------     5/20/2014 Procedure    Gynecologic Cytology  Polo Diagnosis:  High grade squamous intraepithelial lesion.    Descriptor/Additional Findings:  Moderate dysplasia.    Adequacy:  Specimen satisfactory for evaluation.    Source:  Vagina, Thin Prep Pap, Imaged Diagnostic     11/11/2014 Biopsy    Diagnosis    ANTERIOR VAGINAL WALL, BIOPSY:  HIGH GRADE SQUAMOUS  "INTRAEPITHELIAL LESION (VaIN 3, SEVERE DYSPLASIA)       12/19/2014 Surgery    Diagnosis  1)  ANTERIOR VAGINAL WALL, PARTIAL VAGINECTOMY: HIGH-GRADE SQUAMOUS INTRAEPITHELIAL LESION (VaIN 3, SEVERE DYSPLASIA), 2.7 CM; HIGH GRADE DYSPLASIA EXTENDS TO THE 2 AND 8 O'CLOCK TIP MARGINS, THE 5-8 O'CLOCK LATERAL MARGIN, AND THE 11-2 O'CLOCK LATERAL   MARGIN.          2)  JOYA-CLITORAL AREA, EXCISION: HIGH-GRADE SQUAMOUS INTRAEPITHELIAL LESION (VaIN 3, SEVERE DYSPLASIA), EXTENDING TO A LATERAL MARGIN.         2/9/2015 Surgery    Diagnosis  1.          VULVA, LEFT PERIURETHRAL PORTION, EXCISION: FOCAL HIGH-GRADE SQUAMOUS INTRAEPITHELIAL LESION (MOODY 2, MODERATE DYSPLASIA); MARGINS ARE NEGATIVE FOR DYSPLASIA.    2.          VULVA, RIGHT PERIURETHRAL PORTION, EXCISION: BENIGN SQUAMOUS MUCOSA WITH ACUTE AND CHRONIC INFLAMMATION AND REACTIVE CHANGES.         3.          VULVA, LEFT, LOWER PORTION, EXCISION: BENIGN SQUAMOUS MUCOSA WITH ACUTE AND CHRONIC INFLAMMATION, REACTIVE CHANGES AND FEATURES CONSISTENT WITH PREVIOUS SURGICAL PROCEDURE.         4.          VULVA, RIGHT PORTION, EXCISION: BENIGN SQUAMOUS MUCOSA WITH CHRONIC INFLAMMATION AND DERMAL FIBROSIS.        9/6/2017 Procedure    Diagnosis  \"PAP SMEAR FROM THE URETHRA\":  HIGH GRADE SQUAMOUS INTRAEPITHELIAL LESION.          10/26/2017 Biopsy    Urethral Meatus Biopsy:  Urothelial mucosa with ulceration, chronic inflammation and focal high grade squamous intraepithelial lesion, moderate dysplasia     7/10/2018 Imaging    MRI Pelvis:  Impression:    1.  There is a 2.3 x 1.8 cm urethral lesion at the external urethral   meatus demonstrating enhancement and T2 hyperintensity. The lesion is   located approximately 8 mm from the bladder neck/internal urethral   orifice.  There is no extension of the lesion into the para vaginal   fat or ischiorectal fossa. There is some edema of the bilateral   ischiocavernosus muscles without invasion by the mass. No pelvic or   inguinal " adenopathy is identified.     2.  The patient has a 2.5 cm cystocele and the urethra is almost   horizontal. There is pelvic floor laxity with loss of upper convexity   of the left iliococcygeus muscle.     7/20/2018 Biopsy    Diagnosis  URETHRA, BIOPSY:  SQUAMOUS CELL CARCINOMA IN SITU; SEE COMMENT.    Comments  P16 immunostain and HPV RNA in situ hybridization are strongly and diffusely positive within the lesion, consistent with HPV-related pathogenesis.     1/8/2019 Imaging    MRI Pelvis:  1.  Stable size and appearance of a nonspecific enhancing nodular   lesion at the caudal margin of the vaginal introitus adjacent to   extensive postsurgical changes related to prior vulvectomy and   vaginectomy. This lesion does not appear to conform to the expected   course of the urethra which is somewhat poorly defined, and this felt   more likely be located immediately caudal to the urethra. It is   possible this may reflect postsurgical scarring as opposed to a true   lesion. Continued follow-up is suggested to ensure stability.  2.  No lymphadenopathy or other evidence of metastatic disease in the   pelvis.  3.  Evidence of pelvic floor laxity with cystocele, not significantly   changed.     8/6/2019 Imaging    MRI Pelvis:  1. No significant change from the prior exam on this limited   noncontrast study.  2. Stable indeterminate nodule anterior to the external urethral   meatus which may represent focal scarring; however,   residual/recurrent disease is not entirely excluded.  Recommend continued attention on follow-up. 3. Extensive pelvic   postsurgical changes with no evidence of local recurrence.  4. Pelvic floor laxity with cystocele unchanged from prior exam.     1/13/2020 Procedure    Urethral stricture dilation     2/18/2020 Imaging    MRI Pelvis:  Impression:  1.  No significant interval change in 1.7 x 1.7 cm T2 hyperintense   ovoid lesion at the urethral meatus.  2.  Numerous postoperative findings status post  vaginectomy and   hysterectomy.  3.  Pelvic floor laxity with persistent cystocele.  4.  No pelvic adenopathy or bony lesion identified.     5/13/2020 Procedure    Urethral stricture dilation      7/10/2020 Biopsy    Diagnosis  PERIURETHRA, BIOPSY: POORLY DIFFERENTIATED CARCINOMA WITH SQUAMOUS AND PAPILLARY FEATURES, FOCALLY INVASIVE IN THE SUBEPITHELIAL CONNECTIVE TISSUE; SEE COMMENT.    Comments  The patient's history of vulvar microinvasive squamous cell carcinoma is noted. The current biopsy shows a poorly differentiated carcinoma with papillary formation. P16 immunostain and HPV RNA in situ hybridization are strongly and diffusely positive within the lesion, consistent with HPV-related pathogenesis. A KERRI-3 immunostain shows patchy, weak positivity in the lesional cells. The patient's prior biopsy ([id number]) is reviewed and shows similar morphologic and immunophenotypic features but the papillary features were not present in the prior material.  Dr. Ilene Pablo reviewed this case and concurs with the diagnosis.      7/31/2020 Imaging    MRI Pelvis:  1.  Redemonstration of a mildly T2 hyperintense mass involving the   urethral meatus, similar dimensions to prior exam on 2/18/2020,   however there is now a polypoid lesion seen along the anterior aspect   of the perineum, possibly contiguous with the urethral mass,   concerning for disease progression. This could potentially represent   the recently biopsied lesion.  2.  Marked interval enlargement of a left inguinal lymph node, almost   certainly representing disease involvement. This would be amenable to   ultrasound-guided biopsy if warranted.  3.  Findings related to pelvic floor laxity, with cystocele.    PET/CT:  1.  Intensely FDG avid left inguinal lymph node is highly suspicious   for locoregional metastasis from known vulvar squamous cell   carcinoma. There are no additional sites of suspicious FDG activity.  2.   Intense physiologic FDG activity  within the urine obscures   visualization of the periurethral mass. Findings are better   characterized on same day pelvic MRI.     10/5/2020 -  Chemotherapy    OP Vulvar MitoMYcin / Fluorouracil CIV + XRT       10/9/2020 -  Chemotherapy    OP CENTRAL VENOUS ACCESS DEVICE ACCESS, CARE, AND MAINTENANCE (CVAD)     Secondary malignancy of inguinal lymph nodes (CMS/HCC)   8/31/2020 Initial Diagnosis    Secondary malignancy of inguinal lymph nodes (CMS/HCC)     10/9/2020 -  Chemotherapy    OP CENTRAL VENOUS ACCESS DEVICE ACCESS, CARE, AND MAINTENANCE (CVAD)         PAST MEDICAL HISTORY:  ALLERGIES:  Allergies   Allergen Reactions   • Scopolamine Swelling     Other reaction(s): ANGIOEDEMA  Other reaction(s): ANGIOEDEMA     • Tequin [Gatifloxacin] Other (See Comments)     Doesn't remember   • Amoxicillin-Pot Clavulanate Rash   • Keflex [Cephalexin] Rash   • Septra [Sulfamethoxazole-Trimethoprim] Rash   • Trovan [Alatrofloxacin] Dizziness     CURRENT MEDICATIONS:  Outpatient Encounter Medications as of 10/13/2020   Medication Sig Dispense Refill   • Acetaminophen (TYLENOL ARTHRITIS PAIN PO) Take 1 tablet by mouth Daily.     • albuterol sulfate  (90 Base) MCG/ACT inhaler Inhale 2 puffs Every 4 (Four) Hours As Needed for Wheezing or Shortness of Air. 1 inhaler 12   • amLODIPine (NORVASC) 10 MG tablet Take 10 mg by mouth Daily.     • B Complex Vitamins (VITAMIN B COMPLEX) capsule capsule Take  by mouth Daily.     • bisoprolol-hydrochlorothiazide (ZIAC) 5-6.25 MG per tablet Take 1 tablet by mouth Daily. 90 tablet 3   • calcium carbonate (OS-REAGAN) 600 MG tablet Take 600 mg by mouth Daily.     • cetirizine (zyrTEC) 10 MG tablet Take 10 mg by mouth Daily.     • citalopram (CeleXA) 10 MG tablet Take 10 mg by mouth Daily.     • diphenhydrAMINE (BENADRYL) 25 mg capsule Take 25 mg by mouth Every 6 (Six) Hours As Needed for itching.     • diphenhydrAMINE 12.5 MG/5ML elixir 20 mL, aluminum-magnesium hydroxide-simethicone  400-400-40 MG/5ML suspension 20 mL, Lidocaine Viscous HCl 2 % solution 15 mL Swish and spit 15 mL Every 4 (Four) Hours As Needed for Mucositis. 320 mL 5   • DULERA 100-5 MCG/ACT inhaler Inhale 2 puffs 2 (Two) Times a Day. Rinse and spit after using. 6 inhaler 0   • esomeprazole (nexIUM) 40 MG capsule Take 1 capsule by mouth 2 (Two) Times a Day. 180 capsule 3   • furosemide (LASIX) 40 MG tablet Take 1 tablet by mouth Daily. Patient only takes once a day 90 tablet 1   • gabapentin (NEURONTIN) 300 MG capsule Take 600 mg by mouth 2 (two) times a day.     • Homeopathic Products (LEG CRAMPS) tablet Take 1 tablet by mouth Daily.     • HYDROcodone-acetaminophen (NORCO) 5-325 MG per tablet Take 1 tablet by mouth 2 (Two) Times a Day As Needed for Moderate Pain  or Severe Pain . 60 tablet 0   • lidocaine (XYLOCAINE) 2 % jelly Apply 2 mL topically to the appropriate area as directed As Needed for Mild Pain .     • ondansetron (Zofran) 8 MG tablet Take 1 tablet by mouth Every 8 (Eight) Hours As Needed for Nausea or Vomiting. 60 tablet 2   • oxyCODONE (Roxicodone) 5 MG immediate release tablet Take 1 tablet by mouth Every 8 (Eight) Hours As Needed for Severe Pain . 10 tablet 0   • potassium chloride (K-DUR,KLOR-CON) 20 MEQ CR tablet Take 1 tablet by mouth Daily. Patient only takes once a day 30 tablet 11   • pravastatin (PRAVACHOL) 10 MG tablet Take 10 mg by mouth Daily.     • spironolactone (ALDACTONE) 25 MG tablet Take 1 tablet by mouth Daily. 90 tablet 3   • traMADol (ULTRAM) 50 MG tablet Take 50 mg by mouth Every 8 (Eight) Hours As Needed for Moderate Pain .     • [] pegfilgrastim (NEULASTA) syringe 6 mg        No facility-administered encounter medications on file as of 10/13/2020.      ADULT ILLNESSES:  Patient Active Problem List   Diagnosis Code   • Meatal stenosis [id number]   • Retention of urine R33.9   • Type 2 diabetes mellitus, without long-term current use of insulin (CMS/[facility]) E11.9   • Essential hypertension  I10   • Grief reaction F43.21   • 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   • Chronic midline low back pain without sciatica M54.5, G89.29   • Bilateral lower extremity edema R60.0   • History of urethral stricture Z87.448   • Epidermal cyst of neck L72.0   • Normocytic anemia D64.9   • Neck abscess L02.11   • Hyperlipidemia E78.5   • GERD (gastroesophageal reflux disease) K21.9   • Hyponatremia E87.1   • Anxiety F41.9   • Iron deficiency anemia D50.9   • Stage 3 chronic kidney disease N18.30   • Anemia in stage 3 chronic kidney disease N18.30, D63.1   • Screening for breast cancer Z12.39   • Lower extremity edema R60.0   • Vulvar cancer, carcinoma (CMS/HCC) C51.9   • Former smoker Z87.891   • 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     SURGERIES:  Past Surgical History:   Procedure Laterality Date   • APPENDECTOMY     • BREAST CYST ASPIRATION Left    • COLONOSCOPY  01/12/2011   • ENDOSCOPY  07/01/2014   • HYSTERECTOMY     • TONSILLECTOMY     • VAGINA SURGERY     • VENOUS ACCESS DEVICE (PORT) INSERTION N/A 9/29/2020    Procedure: SINGLE LUMEN PORT - A- CATH PLACEMENT WITH FLUOROSCOPY;  Surgeon: Quynh Rosario MD;  Location: [facility] OR;  Service: General;  Laterality: N/A;     HEALTH MAINTENANCE ITEMS:  Health Maintenance Due   Topic Date Due   • COLONOSCOPY  1940   • ZOSTER VACCINE (2 of 3) 11/26/2015   • HEPATITIS C SCREENING  07/11/2017   • URINE MICROALBUMIN  01/29/2020   • INFLUENZA VACCINE  08/01/2020   • LIPID PANEL  08/26/2020       <no information>  Last Completed Colonoscopy       Status Date      COLONOSCOPY No completions recorded        Immunization History   Administered Date(s) Administered   • FLUAD TRI 65YR+ 10/22/2019   • Fluzone High Dose =>65 Years ([employer] ONLY) 10/01/2018   • Pneumococcal Polysaccharide (PPSV23) 10/16/2013   • Pneumococcal, Unspecified 10/01/2017   • Tdap 05/01/2019   • Zostavax 10/01/2015     Last Completed Mammogram       Status  "Date      MAMMOGRAM Done 1/2/2013 Ext Proc: INACTIVE -HC MAMMOGRAM SCREENING BILAT DIGITAL W CAD     Patient has more history with this topic...            FAMILY HISTORY:  Family History   Problem Relation Age of Onset   • Cancer Mother    • Hypertension Mother    • Osteoporosis Mother    • Dementia Mother    • Uterine cancer Mother    • Heart disease Father    • Parkinsonism Father    • Cancer Sister    • Breast cancer Sister    • Kidney cancer Sister    • Diabetes Brother    • Heart disease Paternal Grandfather    • No Known Problems Maternal Grandmother    • No Known Problems Maternal Grandfather    • No Known Problems Paternal Grandmother      SOCIAL HISTORY:  Social History     Socioeconomic History   • Marital status:      Spouse name: Not on file   • Number of children: Not on file   • Years of education: Not on file   • Highest education level: Not on file   Tobacco Use   • Smoking status: Former Smoker   • Smokeless tobacco: Never Used   Substance and Sexual Activity   • Alcohol use: No   • Drug use: No   • Sexual activity: Never       REVIEW OF SYSTEMS:    Review of Systems   Constitutional: Positive for fatigue. Negative for chills, diaphoresis and fever.        \"My mouth is better with the mouthwash.\"   HENT: Negative for congestion and trouble swallowing.    Eyes: Negative for redness and visual disturbance.   Respiratory: Negative for cough and shortness of breath.    Cardiovascular: Positive for leg swelling. Negative for chest pain.   Gastrointestinal: Negative for abdominal pain, constipation, diarrhea, nausea and vomiting.   Endocrine: Negative for cold intolerance and heat intolerance.   Genitourinary: Positive for difficulty urinating. Negative for hematuria.        It burns in the rectal and when I pee.\"   Musculoskeletal: Negative for joint swelling and neck stiffness.   Skin: Positive for pallor.   Allergic/Immunologic: Negative for food allergies.   Neurological: Negative for " "dizziness, speech difficulty and weakness.   Hematological: Does not bruise/bleed easily.   Psychiatric/Behavioral: Negative for agitation and confusion. The patient is not nervous/anxious.        VITAL SIGNS: /58   Pulse 57   Temp 97.6 °F (36.4 °C)   Resp 18   Ht 152.4 cm (60\")   Wt 75.3 kg (166 lb 1.6 oz)   SpO2 98%   Breastfeeding No   BMI 32.44 kg/m²   Pain Score    10/13/20 0943   PainSc: 0-No pain       PHYSICAL EXAMINATION:     Physical Exam  Vitals signs reviewed.   Constitutional:       General: She is not in acute distress.     Appearance: She is obese. She is not diaphoretic.   HENT:      Head: Normocephalic and atraumatic.   Eyes:      General: No scleral icterus.  Neck:      Musculoskeletal: Neck supple.   Cardiovascular:      Rate and Rhythm: Normal rate and regular rhythm.   Pulmonary:      Effort: No respiratory distress.      Breath sounds: No wheezing or rales.      Comments: Port, left, no erythema.   Abdominal:      General: Bowel sounds are normal.      Palpations: Abdomen is soft.      Tenderness: There is no abdominal tenderness.   Musculoskeletal:         General: Swelling present.   Skin:     General: Skin is warm and dry.      Coloration: Skin is pale.   Neurological:      Mental Status: She is oriented to person, place, and time.   Psychiatric:         Mood and Affect: Mood normal.         Behavior: Behavior normal.         Thought Content: Thought content normal.         Judgment: Judgment normal.         LABS    Lab Results - Last 18 Months   Lab Units 10/05/20  0920 09/16/20  1134 09/03/20  1134 07/02/20  1019 05/14/20  1038 12/23/19  1322  04/30/19  1600   HEMOGLOBIN g/dL 11.1* 11.4* 12.1 11.7* 11.5* 11.9*   < > 11.9*   HEMATOCRIT % 33.3* 34.7 36.1 35.7 34.7 35.7   < > 36.0*   MCV fL 97.1* 95.3 96.8 97.5* 98.6* 100.6*   < > 98.1*   WBC 10*3/mm3 6.52 7.84 7.35 7.61 8.38 7.33   < > 5.71   RDW % 13.0 12.9 12.7 12.8 11.8* 12.9   < > 12.5   MPV fL 10.0 11.7 9.6 9.3 10.1 10.2   " < > 11.8   PLATELETS 10*3/mm3 146 145 208 218 220 180   < > 55*   IMM GRAN % % 0.5 0.3 0.1 0.0 0.0 0.1   < >  --    NEUTROS ABS 10*3/mm3 5.02 5.54 5.08 4.70 5.82 4.34   < > 4.00   LYMPHS ABS 10*3/mm3 0.68* 1.39 1.65 1.97 1.65 2.01   < >  --    MONOS ABS 10*3/mm3 0.37 0.53 0.35 0.52 0.53 0.54   < >  --    EOS ABS 10*3/mm3 0.39 0.30 0.22 0.37 0.35 0.39   < > 0.34   BASOS ABS 10*3/mm3 0.03 0.06 0.04 0.05 0.03 0.04   < >  --    IMMATURE GRANS (ABS) 10*3/mm3 0.03 0.02 0.01 0.00 0.00 0.01   < >  --    NRBC /100 WBC 0.0 0.0  --   --   --   --   --   --    NEUTROPHIL % %  --   --   --   --   --   --   --  70.0   MONOCYTES % %  --   --   --   --   --   --   --  12.0    < > = values in this interval not displayed.       Lab Results - Last 18 Months   Lab Units 10/05/20  0920 09/16/20  1134 09/03/20  1134 07/02/20  1019 05/14/20  1038 12/23/19  1322   GLUCOSE mg/dL 182* 208* 274* 215* 196* 156*   SODIUM mmol/L 138 138 136 135* 132* 138   POTASSIUM mmol/L 3.8 4.2 4.0 4.1 4.2 3.8   CO2 mmol/L 26.0 27.0 23.0 22.0 22.0 28.0   CHLORIDE mmol/L 101 98 98 97* 94* 97*   ANION GAP mmol/L 11.0 13.0 15.0 16.0* 16.0* 13.0   CREATININE mg/dL 0.94 1.22* 0.97 0.89 1.00 0.88   BUN mg/dL 14 20 14 17 20 13   BUN / CREAT RATIO  14.9 16.4 14.4 19.1 20.0 14.8   CALCIUM mg/dL 9.8 9.9 10.1 9.9 9.9 10.2   EGFR IF NONAFRICN AM mL/min/1.73 58* 43* 56* 62 54* 62   ALK PHOS U/L 61 70 68 59 62 67   TOTAL PROTEIN g/dL 7.6 8.0 8.3 7.8 8.1 7.8   ALT (SGPT) U/L 8 10 9 11 10 13   AST (SGOT) U/L 14 17 15 17 19 15   BILIRUBIN mg/dL 0.3 0.4 0.4 0.3 0.3 0.2   ALBUMIN g/dL 4.30 4.40 4.50 4.20 4.20 4.50   GLOBULIN gm/dL 3.3 3.6 3.8 3.6 3.9 3.3       No results for input(s): MSPIKE, KAPPALAMB, IGLFLC, URICACID, FREEKAPPAL, CEA, LDH, REFLABREPO in the last 87847 hours.    Lab Results - Last 18 Months   Lab Units 09/03/20  1134 07/02/20  1019 05/14/20  1038 12/23/19  1322 11/11/19  1131 10/09/19  1117   IRON mcg/dL 129 91 92 93 113 78   TIBC mcg/dL 384 338 311 380 368  "408   IRON SATURATION % 34 27 30 24 31 19*   FERRITIN ng/mL 341.90* 315.10* 286.50* 309.50* 523.10* 107.70       Dago Nunez reports a pain score of 0.      Patient's Body mass index is 32.44 kg/m². BMI is above normal parameters. Recommendations include: none (medical contraindication).    ASSESSMENT:  1.   Recurrent squamous cell carcinoma, vulva.  AJCC stage IIIA (T2, Nib, M0, G3).  Treatment status.  On Mitomycin C and 5 FU with radiation.    2.  Macrocytic anemia from iron deficiency and history of chronic kidney disease Stage III, GFR 56 mL/min on 09/03/2020.  3.   Iron deficiency. Intolerant to oral iron.   4.   Chronic kidney disease Stage III, GFR 56 ml/min on 09/03/2020.  5.   Performance status of 1.    6.   Thrombocytopenia, 04/19/2018. (?) laboratory error.    7.   Squamous cell cancer in situ, urethra.  Followed by Dr. Callahan  8.   Grade 1 mucositis from chemo.         PLAN:  1.     Re:  Tolerance to chemo.   2.     Re:  Continue mouth wash for mucositis.   3.     Re:  Heme status.  Hemoglobin 11.1 gm and hematocrit 33.3  4.     Re:  Pre-office CMP.  GFR 58 ml/minute.  5.     Re:  Pre-office ferritin, TIBC, % saturation, and iron. None.    6.     Re:  Monitor for potential infusion reactions, nausea, vomiting, diarrhea, cardiotoxicity, cytopenias, and thrombotic thrombocytopenic purpura. No cisplatin due to chronic kidney disease.  7.    Patient to inform Dr. Roche. \"It calderon in the rectal area and when I pee.\"  8.  Schedule chemo on 11/02/2020.   Mitomycin C 10 mg/m2 = 17 mg D29.  CIV 5-FU 1,000 mg/m2 = 1,700 mg D29 to D32.  9.  Premed:  Aloxi 0.25 mg IV  Decadron 12 mg IV D1 to D4.  10.  Neulasta for D4 as primary prophylaxis due to her age above 65.  High risk for febrile neutropenia.   11.  Weekly CBC with differential.  12.  Retacrit 40,000 units subcutaneously every week if hemoglobin less than 10 and hematocrit less than 30% to target a hemoglobin " of 11 and hematocrit of 33%.  Move CBC with differential weekly if she starts Procrit. Observe for side effects.   13.  Continue ongoing management per primary care physician and other specialists.  14.  Plan of care discussed with patient and spouse.  Understanding expressed.  Patient agreeable to proceed.  15.  Intolerant to oral iron (nausea, cramps, and constipation).  IV iron as needed.   16.  Advance Care Planning   ACP discussion was held with the patient during this visit. Patient has an advance directive (not in EMR), copy requested.  17.  eRx Zofran 8 mg po every 8 hours as needed for nausea/vomiting, # 60, 2 refills if needed.  18.  eRx miracle mouth wash, 15 ml swish and spit every 4 hours as needed for mucositis, 480 ml, 2 refills if needed.  19.  Return to the office in 6 weeks.  20.  Blood for CBC with differential, magnesium, ferritin and iron panel today.         I spent 30 total minutes, face-to-face, caring for Syndal today.  Greater than 50% of this time involved counseling and/or coordination of care as documented within this note regarding the patient's illness(es), pros and cons of various treatment options, instructions and/or risk reduction.             cc: MD Ulises Conley MD        (Trini Rosario MD)        Gilberto Mills MD        (Moustapha Callahan MD)        (Radha Marks MD)

## 2020-10-09 ENCOUNTER — INFUSION (OUTPATIENT)
Dept: ONCOLOGY | Facility: HOSPITAL | Age: 80
End: 2020-10-09

## 2020-10-09 ENCOUNTER — HOSPITAL ENCOUNTER (OUTPATIENT)
Dept: RADIATION ONCOLOGY | Facility: HOSPITAL | Age: 80
Setting detail: RADIATION/ONCOLOGY SERIES
Discharge: HOME | End: 2020-10-09

## 2020-10-09 VITALS
SYSTOLIC BLOOD PRESSURE: 138 MMHG | HEIGHT: 60 IN | RESPIRATION RATE: 18 BRPM | WEIGHT: 170 LBS | DIASTOLIC BLOOD PRESSURE: 40 MMHG | TEMPERATURE: 97.6 F | BODY MASS INDEX: 33.38 KG/M2 | OXYGEN SATURATION: 100 % | HEART RATE: 62 BPM

## 2020-10-09 DIAGNOSIS — C51.9 VULVAR CANCER, CARCINOMA (HCC): Primary | ICD-10-CM

## 2020-10-09 DIAGNOSIS — C77.4 SECONDARY MALIGNANCY OF INGUINAL LYMPH NODES (HCC): ICD-10-CM

## 2020-10-09 PROCEDURE — 25010000003 HEPARIN LOCK FLUSH PER 10 UNITS: Performed by: INTERNAL MEDICINE

## 2020-10-09 PROCEDURE — 77386: CPT | Performed by: RADIOLOGY

## 2020-10-09 RX ORDER — HEPARIN SODIUM (PORCINE) LOCK FLUSH IV SOLN 100 UNIT/ML 100 UNIT/ML
500 SOLUTION INTRAVENOUS AS NEEDED
Status: DISCONTINUED | OUTPATIENT
Start: 2020-10-09 | End: 2020-10-09 | Stop reason: HOSPADM

## 2020-10-09 RX ORDER — SODIUM CHLORIDE 0.9 % (FLUSH) 0.9 %
10 SYRINGE (ML) INJECTION AS NEEDED
Status: CANCELLED | OUTPATIENT
Start: 2020-10-09

## 2020-10-09 RX ORDER — SODIUM CHLORIDE 0.9 % (FLUSH) 0.9 %
10 SYRINGE (ML) INJECTION AS NEEDED
Status: DISCONTINUED | OUTPATIENT
Start: 2020-10-09 | End: 2020-10-09 | Stop reason: HOSPADM

## 2020-10-09 RX ORDER — HEPARIN SODIUM (PORCINE) LOCK FLUSH IV SOLN 100 UNIT/ML 100 UNIT/ML
500 SOLUTION INTRAVENOUS AS NEEDED
Status: CANCELLED | OUTPATIENT
Start: 2020-10-09

## 2020-10-09 RX ADMIN — SODIUM CHLORIDE, PRESERVATIVE FREE 10 ML: 5 INJECTION INTRAVENOUS at 11:01

## 2020-10-09 RX ADMIN — Medication 500 UNITS: at 11:01

## 2020-10-12 ENCOUNTER — INFUSION (OUTPATIENT)
Dept: ONCOLOGY | Facility: HOSPITAL | Age: 80
End: 2020-10-12

## 2020-10-12 ENCOUNTER — HOSPITAL ENCOUNTER (OUTPATIENT)
Dept: RADIATION ONCOLOGY | Facility: HOSPITAL | Age: 80
Setting detail: RADIATION/ONCOLOGY SERIES
Discharge: HOME | End: 2020-10-12

## 2020-10-12 VITALS
TEMPERATURE: 98.1 F | BODY MASS INDEX: 32.79 KG/M2 | RESPIRATION RATE: 16 BRPM | WEIGHT: 167 LBS | OXYGEN SATURATION: 96 % | HEART RATE: 56 BPM | DIASTOLIC BLOOD PRESSURE: 45 MMHG | SYSTOLIC BLOOD PRESSURE: 138 MMHG | HEIGHT: 60 IN

## 2020-10-12 DIAGNOSIS — C51.9 VULVAR CANCER, CARCINOMA (HCC): Primary | ICD-10-CM

## 2020-10-12 PROCEDURE — 96372 THER/PROPH/DIAG INJ SC/IM: CPT

## 2020-10-12 PROCEDURE — 25010000002 PEGFILGRASTIM 6 MG/0.6ML SOLUTION PREFILLED SYRINGE: Performed by: INTERNAL MEDICINE

## 2020-10-12 PROCEDURE — 77386: CPT | Performed by: RADIOLOGY

## 2020-10-12 RX ADMIN — PEGFILGRASTIM 6 MG: 6 INJECTION SUBCUTANEOUS at 09:46

## 2020-10-12 NOTE — PROGRESS NOTES
S/w Murtaza Coles Rn with Dr Stafford regarding c/o mouth sores.  They will ozzy in prescription to [facility].  Patient v/u.. Cherelle KC

## 2020-10-13 ENCOUNTER — OFFICE VISIT (OUTPATIENT)
Dept: ONCOLOGY | Facility: CLINIC | Age: 80
End: 2020-10-13

## 2020-10-13 ENCOUNTER — HOSPITAL ENCOUNTER (OUTPATIENT)
Dept: RADIATION ONCOLOGY | Facility: HOSPITAL | Age: 80
Setting detail: RADIATION/ONCOLOGY SERIES
Discharge: HOME | End: 2020-10-13

## 2020-10-13 ENCOUNTER — LAB (OUTPATIENT)
Dept: LAB | Facility: HOSPITAL | Age: 80
End: 2020-10-13

## 2020-10-13 VITALS
DIASTOLIC BLOOD PRESSURE: 58 MMHG | HEIGHT: 60 IN | SYSTOLIC BLOOD PRESSURE: 128 MMHG | RESPIRATION RATE: 18 BRPM | BODY MASS INDEX: 32.61 KG/M2 | OXYGEN SATURATION: 98 % | HEART RATE: 57 BPM | WEIGHT: 166.1 LBS | TEMPERATURE: 97.6 F

## 2020-10-13 DIAGNOSIS — N18.31 ANEMIA DUE TO STAGE 3A CHRONIC KIDNEY DISEASE (HCC): ICD-10-CM

## 2020-10-13 DIAGNOSIS — C51.9 VULVAR CANCER, CARCINOMA (HCC): ICD-10-CM

## 2020-10-13 DIAGNOSIS — D63.1 ANEMIA DUE TO STAGE 3A CHRONIC KIDNEY DISEASE (HCC): ICD-10-CM

## 2020-10-13 DIAGNOSIS — C51.9 VULVAR CANCER, CARCINOMA (HCC): Primary | ICD-10-CM

## 2020-10-13 DIAGNOSIS — G89.3 CANCER RELATED PAIN: ICD-10-CM

## 2020-10-13 DIAGNOSIS — D64.9 NORMOCYTIC ANEMIA: Primary | ICD-10-CM

## 2020-10-13 LAB
BASOPHILS # BLD MANUAL: 0.06 10*3/MM3 (ref 0–0.2)
BASOPHILS NFR BLD AUTO: 1 % (ref 0–1.5)
DEPRECATED RDW RBC AUTO: 43.4 FL (ref 37–54)
EOSINOPHIL # BLD MANUAL: 0.18 10*3/MM3 (ref 0–0.4)
EOSINOPHIL NFR BLD MANUAL: 3 % (ref 0.3–6.2)
ERYTHROCYTE [DISTWIDTH] IN BLOOD BY AUTOMATED COUNT: 12.4 % (ref 12.3–15.4)
FERRITIN SERPL-MCNC: 599.1 NG/ML (ref 13–150)
HCT VFR BLD AUTO: 28.7 % (ref 34–46.6)
HGB BLD-MCNC: 10 G/DL (ref 12–15.9)
HOLD SPECIMEN: NORMAL
HYPOCHROMIA BLD QL: ABNORMAL
IRON 24H UR-MRATE: 135 MCG/DL (ref 37–145)
IRON SATN MFR SERPL: 40 % (ref 20–50)
LYMPHOCYTES # BLD MANUAL: 0.18 10*3/MM3 (ref 0.7–3.1)
LYMPHOCYTES NFR BLD MANUAL: 1 % (ref 5–12)
LYMPHOCYTES NFR BLD MANUAL: 3 % (ref 19.6–45.3)
MAGNESIUM SERPL-MCNC: 1.7 MG/DL (ref 1.6–2.4)
MCH RBC QN AUTO: 33 PG (ref 26.6–33)
MCHC RBC AUTO-ENTMCNC: 34.8 G/DL (ref 31.5–35.7)
MCV RBC AUTO: 94.7 FL (ref 79–97)
MONOCYTES # BLD AUTO: 0.06 10*3/MM3 (ref 0.1–0.9)
NEUTROPHILS # BLD AUTO: 5.63 10*3/MM3 (ref 1.7–7)
NEUTROPHILS NFR BLD MANUAL: 88 % (ref 42.7–76)
NEUTS BAND NFR BLD MANUAL: 4 % (ref 0–5)
PLATELET # BLD AUTO: 85 10*3/MM3 (ref 140–450)
PMV BLD AUTO: 10.5 FL (ref 6–12)
RBC # BLD AUTO: 3.03 10*6/MM3 (ref 3.77–5.28)
SMALL PLATELETS BLD QL SMEAR: ABNORMAL
TIBC SERPL-MCNC: 335 MCG/DL (ref 298–536)
TRANSFERRIN SERPL-MCNC: 225 MG/DL (ref 200–360)
WBC # BLD AUTO: 6.12 10*3/MM3 (ref 3.4–10.8)
WBC MORPH BLD: NORMAL

## 2020-10-13 PROCEDURE — 77300 RADIATION THERAPY DOSE PLAN: CPT | Performed by: RADIOLOGY

## 2020-10-13 PROCEDURE — 85007 BL SMEAR W/DIFF WBC COUNT: CPT

## 2020-10-13 PROCEDURE — 82728 ASSAY OF FERRITIN: CPT

## 2020-10-13 PROCEDURE — 83735 ASSAY OF MAGNESIUM: CPT

## 2020-10-13 PROCEDURE — 99214 OFFICE O/P EST MOD 30 MIN: CPT | Performed by: INTERNAL MEDICINE

## 2020-10-13 PROCEDURE — 77386: CPT | Performed by: RADIOLOGY

## 2020-10-13 PROCEDURE — 84466 ASSAY OF TRANSFERRIN: CPT

## 2020-10-13 PROCEDURE — 85025 COMPLETE CBC W/AUTO DIFF WBC: CPT

## 2020-10-13 PROCEDURE — 83540 ASSAY OF IRON: CPT

## 2020-10-13 PROCEDURE — 36415 COLL VENOUS BLD VENIPUNCTURE: CPT

## 2020-10-13 RX ORDER — HYDROCODONE BITARTRATE AND ACETAMINOPHEN 10; 325 MG/1; MG/1
1 TABLET ORAL EVERY 4 HOURS PRN
Qty: 60 TABLET | Refills: 0 | Status: SHIPPED | OUTPATIENT
Start: 2020-10-13 | End: 2020-11-16 | Stop reason: SDUPTHER

## 2020-10-14 PROCEDURE — 77336 RADIATION PHYSICS CONSULT: CPT | Performed by: RADIOLOGY

## 2020-10-16 DIAGNOSIS — I10 ESSENTIAL HYPERTENSION: ICD-10-CM

## 2020-10-19 ENCOUNTER — INFUSION (OUTPATIENT)
Dept: ONCOLOGY | Facility: HOSPITAL | Age: 80
End: 2020-10-19

## 2020-10-19 ENCOUNTER — HOSPITAL ENCOUNTER (INPATIENT)
Facility: HOSPITAL | Age: 80
LOS: 2 days | Discharge: HOME HEALTH | End: 2020-10-22
Attending: EMERGENCY MEDICINE | Admitting: INTERNAL MEDICINE

## 2020-10-19 ENCOUNTER — LAB (OUTPATIENT)
Dept: LAB | Facility: HOSPITAL | Age: 80
End: 2020-10-19

## 2020-10-19 ENCOUNTER — APPOINTMENT (OUTPATIENT)
Dept: GENERAL RADIOLOGY | Facility: HOSPITAL | Age: 80
End: 2020-10-19

## 2020-10-19 ENCOUNTER — APPOINTMENT (OUTPATIENT)
Dept: CT IMAGING | Facility: HOSPITAL | Age: 80
End: 2020-10-19

## 2020-10-19 ENCOUNTER — TELEPHONE (OUTPATIENT)
Dept: ONCOLOGY | Facility: CLINIC | Age: 80
End: 2020-10-19

## 2020-10-19 VITALS
DIASTOLIC BLOOD PRESSURE: 46 MMHG | HEART RATE: 63 BPM | HEIGHT: 60 IN | OXYGEN SATURATION: 95 % | SYSTOLIC BLOOD PRESSURE: 143 MMHG | BODY MASS INDEX: 32.44 KG/M2 | RESPIRATION RATE: 18 BRPM | TEMPERATURE: 98.6 F

## 2020-10-19 DIAGNOSIS — N18.31 STAGE 3A CHRONIC KIDNEY DISEASE (HCC): ICD-10-CM

## 2020-10-19 DIAGNOSIS — D07.1 VULVAR INTRAEPITHELIAL NEOPLASIA (VIN) GRADE 3: ICD-10-CM

## 2020-10-19 DIAGNOSIS — Z79.899 IMMUNOSUPPRESSED DUE TO CHEMOTHERAPY (HCC): ICD-10-CM

## 2020-10-19 DIAGNOSIS — D84.821 IMMUNOSUPPRESSED DUE TO CHEMOTHERAPY (HCC): ICD-10-CM

## 2020-10-19 DIAGNOSIS — N18.31 ANEMIA IN STAGE 3A CHRONIC KIDNEY DISEASE (HCC): ICD-10-CM

## 2020-10-19 DIAGNOSIS — E87.6 HYPOKALEMIA: ICD-10-CM

## 2020-10-19 DIAGNOSIS — R26.89 DECREASED FUNCTIONAL MOBILITY: ICD-10-CM

## 2020-10-19 DIAGNOSIS — Z78.9 DECREASED ACTIVITIES OF DAILY LIVING (ADL): ICD-10-CM

## 2020-10-19 DIAGNOSIS — R50.9 FEBRILE ILLNESS: Primary | ICD-10-CM

## 2020-10-19 DIAGNOSIS — D07.1 VULVAR INTRAEPITHELIAL NEOPLASIA (VIN) GRADE 3: Primary | ICD-10-CM

## 2020-10-19 DIAGNOSIS — N17.9 AKI (ACUTE KIDNEY INJURY) (HCC): ICD-10-CM

## 2020-10-19 DIAGNOSIS — D63.1 ANEMIA IN STAGE 3A CHRONIC KIDNEY DISEASE (HCC): ICD-10-CM

## 2020-10-19 DIAGNOSIS — D69.6 THROMBOCYTOPENIA (HCC): ICD-10-CM

## 2020-10-19 DIAGNOSIS — T45.1X5A IMMUNOSUPPRESSED DUE TO CHEMOTHERAPY (HCC): ICD-10-CM

## 2020-10-19 DIAGNOSIS — E87.1 HYPONATREMIA: ICD-10-CM

## 2020-10-19 PROBLEM — D69.59 CHEMOTHERAPY-INDUCED THROMBOCYTOPENIA: Status: ACTIVE | Noted: 2020-10-19

## 2020-10-19 LAB
ADV 40+41 DNA STL QL NAA+NON-PROBE: NOT DETECTED
ALBUMIN SERPL-MCNC: 3.5 G/DL (ref 3.5–5.2)
ALBUMIN SERPL-MCNC: 3.5 G/DL (ref 3.5–5.2)
ALBUMIN/GLOB SERPL: 1.1 G/DL
ALBUMIN/GLOB SERPL: 1.2 G/DL
ALP SERPL-CCNC: 46 U/L (ref 39–117)
ALP SERPL-CCNC: 51 U/L (ref 39–117)
ALT SERPL W P-5'-P-CCNC: 14 U/L (ref 1–33)
ALT SERPL W P-5'-P-CCNC: 15 U/L (ref 1–33)
ANION GAP SERPL CALCULATED.3IONS-SCNC: 10 MMOL/L (ref 5–15)
ANION GAP SERPL CALCULATED.3IONS-SCNC: 12 MMOL/L (ref 5–15)
ANISOCYTOSIS BLD QL: ABNORMAL
APTT PPP: 28.6 SECONDS (ref 24.1–35)
AST SERPL-CCNC: 37 U/L (ref 1–32)
AST SERPL-CCNC: 40 U/L (ref 1–32)
ASTRO TYP 1-8 RNA STL QL NAA+NON-PROBE: NOT DETECTED
BACTERIA UR QL AUTO: ABNORMAL /HPF
BILIRUB SERPL-MCNC: 0.4 MG/DL (ref 0–1.2)
BILIRUB SERPL-MCNC: 0.4 MG/DL (ref 0–1.2)
BILIRUB UR QL STRIP: NEGATIVE
BUN SERPL-MCNC: 24 MG/DL (ref 8–23)
BUN SERPL-MCNC: 24 MG/DL (ref 8–23)
BUN/CREAT SERPL: 17.3 (ref 7–25)
BUN/CREAT SERPL: 20.2 (ref 7–25)
C CAYETANENSIS DNA STL QL NAA+NON-PROBE: NOT DETECTED
C DIFF TOX GENS STL QL NAA+PROBE: NEGATIVE
CALCIUM SPEC-SCNC: 9.1 MG/DL (ref 8.6–10.5)
CALCIUM SPEC-SCNC: 9.1 MG/DL (ref 8.6–10.5)
CAMPY SP DNA.DIARRHEA STL QL NAA+PROBE: NOT DETECTED
CHLORIDE SERPL-SCNC: 90 MMOL/L (ref 98–107)
CHLORIDE SERPL-SCNC: 90 MMOL/L (ref 98–107)
CLARITY UR: CLEAR
CLUMPED PLATELETS: PRESENT
CO2 SERPL-SCNC: 26 MMOL/L (ref 22–29)
CO2 SERPL-SCNC: 27 MMOL/L (ref 22–29)
COLOR UR: YELLOW
CREAT SERPL-MCNC: 1.19 MG/DL (ref 0.57–1)
CREAT SERPL-MCNC: 1.39 MG/DL (ref 0.57–1)
CRYPTOSP STL CULT: NOT DETECTED
D-LACTATE SERPL-SCNC: 1.8 MMOL/L (ref 0.5–2)
DEPRECATED RDW RBC AUTO: 41.1 FL (ref 37–54)
DEPRECATED RDW RBC AUTO: 41.9 FL (ref 37–54)
E COLI DNA SPEC QL NAA+PROBE: NOT DETECTED
E HISTOLYT AG STL-ACNC: NOT DETECTED
EAEC PAA PLAS AGGR+AATA ST NAA+NON-PRB: NOT DETECTED
EC STX1 + STX2 GENES STL NAA+PROBE: NOT DETECTED
EPEC EAE GENE STL QL NAA+NON-PROBE: NOT DETECTED
ERYTHROCYTE [DISTWIDTH] IN BLOOD BY AUTOMATED COUNT: 12.5 % (ref 12.3–15.4)
ERYTHROCYTE [DISTWIDTH] IN BLOOD BY AUTOMATED COUNT: 12.6 % (ref 12.3–15.4)
ETEC LTA+ST1A+ST1B TOX ST NAA+NON-PROBE: NOT DETECTED
G LAMBLIA DNA SPEC QL NAA+PROBE: NOT DETECTED
GFR SERPL CREATININE-BSD FRML MDRD: 37 ML/MIN/1.73
GFR SERPL CREATININE-BSD FRML MDRD: 44 ML/MIN/1.73
GLOBULIN UR ELPH-MCNC: 2.9 GM/DL
GLOBULIN UR ELPH-MCNC: 3.1 GM/DL
GLUCOSE SERPL-MCNC: 152 MG/DL (ref 65–99)
GLUCOSE SERPL-MCNC: 159 MG/DL (ref 65–99)
GLUCOSE UR STRIP-MCNC: ABNORMAL MG/DL
HCT VFR BLD AUTO: 25.7 % (ref 34–46.6)
HCT VFR BLD AUTO: 26.4 % (ref 34–46.6)
HGB BLD-MCNC: 9.1 G/DL (ref 12–15.9)
HGB BLD-MCNC: 9.3 G/DL (ref 12–15.9)
HGB UR QL STRIP.AUTO: ABNORMAL
HYALINE CASTS UR QL AUTO: ABNORMAL /LPF
HYPOCHROMIA BLD QL: ABNORMAL
INR PPP: 1.22 (ref 0.91–1.09)
KETONES UR QL STRIP: NEGATIVE
LEUKOCYTE ESTERASE UR QL STRIP.AUTO: ABNORMAL
LYMPHOCYTES # BLD MANUAL: 0.18 10*3/MM3 (ref 0.7–3.1)
LYMPHOCYTES # BLD MANUAL: 0.33 10*3/MM3 (ref 0.7–3.1)
LYMPHOCYTES NFR BLD MANUAL: 12 % (ref 5–12)
LYMPHOCYTES NFR BLD MANUAL: 13.1 % (ref 5–12)
LYMPHOCYTES NFR BLD MANUAL: 4 % (ref 19.6–45.3)
LYMPHOCYTES NFR BLD MANUAL: 8.1 % (ref 19.6–45.3)
MAGNESIUM SERPL-MCNC: 1.6 MG/DL (ref 1.6–2.4)
MAGNESIUM SERPL-MCNC: 1.7 MG/DL (ref 1.6–2.4)
MCH RBC QN AUTO: 32.5 PG (ref 26.6–33)
MCH RBC QN AUTO: 32.9 PG (ref 26.6–33)
MCHC RBC AUTO-ENTMCNC: 35.2 G/DL (ref 31.5–35.7)
MCHC RBC AUTO-ENTMCNC: 35.4 G/DL (ref 31.5–35.7)
MCV RBC AUTO: 92.3 FL (ref 79–97)
MCV RBC AUTO: 92.8 FL (ref 79–97)
MONOCYTES # BLD AUTO: 0.53 10*3/MM3 (ref 0.1–0.9)
MONOCYTES # BLD AUTO: 0.54 10*3/MM3 (ref 0.1–0.9)
MYELOCYTES NFR BLD MANUAL: 1 % (ref 0–0)
NEUTROPHILS # BLD AUTO: 3.09 10*3/MM3 (ref 1.7–7)
NEUTROPHILS # BLD AUTO: 3.72 10*3/MM3 (ref 1.7–7)
NEUTROPHILS NFR BLD MANUAL: 64.6 % (ref 42.7–76)
NEUTROPHILS NFR BLD MANUAL: 67 % (ref 42.7–76)
NEUTS BAND NFR BLD MANUAL: 12.1 % (ref 0–5)
NEUTS BAND NFR BLD MANUAL: 15 % (ref 0–5)
NEUTS VAC BLD QL SMEAR: ABNORMAL
NITRITE UR QL STRIP: NEGATIVE
NOROVIRUS GI+II RNA STL QL NAA+NON-PROBE: NOT DETECTED
P SHIGELLOIDES DNA STL QL NAA+PROBE: NOT DETECTED
PH UR STRIP.AUTO: 5.5 [PH] (ref 5–8)
PLASMA CELL PREC NFR BLD MANUAL: 1 % (ref 0–0)
PLATELET # BLD AUTO: 35 10*3/MM3 (ref 140–450)
PLATELET # BLD AUTO: 40 10*3/MM3 (ref 140–450)
PMV BLD AUTO: 10.9 FL (ref 6–12)
PMV BLD AUTO: 12.9 FL (ref 6–12)
POLYCHROMASIA BLD QL SMEAR: ABNORMAL
POTASSIUM SERPL-SCNC: 2.9 MMOL/L (ref 3.5–5.2)
POTASSIUM SERPL-SCNC: 3.1 MMOL/L (ref 3.5–5.2)
PROCALCITONIN SERPL-MCNC: 0.39 NG/ML (ref 0–0.25)
PROT SERPL-MCNC: 6.4 G/DL (ref 6–8.5)
PROT SERPL-MCNC: 6.6 G/DL (ref 6–8.5)
PROT UR QL STRIP: ABNORMAL
PROTHROMBIN TIME: 15 SECONDS (ref 11.9–14.6)
RBC # BLD AUTO: 2.77 10*6/MM3 (ref 3.77–5.28)
RBC # BLD AUTO: 2.86 10*6/MM3 (ref 3.77–5.28)
RBC # UR: ABNORMAL /HPF
REF LAB TEST METHOD: ABNORMAL
RV RNA STL NAA+PROBE: NOT DETECTED
SALMONELLA DNA SPEC QL NAA+PROBE: NOT DETECTED
SAPO I+II+IV+V RNA STL QL NAA+NON-PROBE: NOT DETECTED
SARS-COV-2 RNA PNL SPEC NAA+PROBE: NOT DETECTED
SHIGELLA SP+EIEC IPAH STL QL NAA+PROBE: NOT DETECTED
SMALL PLATELETS BLD QL SMEAR: ABNORMAL
SMALL PLATELETS BLD QL SMEAR: ABNORMAL
SODIUM SERPL-SCNC: 127 MMOL/L (ref 136–145)
SODIUM SERPL-SCNC: 128 MMOL/L (ref 136–145)
SP GR UR STRIP: 1.02 (ref 1–1.03)
SQUAMOUS #/AREA URNS HPF: ABNORMAL /HPF
TOXIC GRANULATION: ABNORMAL
UROBILINOGEN UR QL STRIP: ABNORMAL
V CHOLERAE DNA SPEC QL NAA+PROBE: NOT DETECTED
VARIANT LYMPHS NFR BLD MANUAL: 1 % (ref 0–5)
VARIANT LYMPHS NFR BLD MANUAL: 1 % (ref 0–5)
VIBRIO DNA SPEC NAA+PROBE: NOT DETECTED
WBC # BLD AUTO: 4.02 10*3/MM3 (ref 3.4–10.8)
WBC # BLD AUTO: 4.54 10*3/MM3 (ref 3.4–10.8)
WBC MORPH BLD: NORMAL
WBC UR QL AUTO: ABNORMAL /HPF
YERSINIA STL CULT: NOT DETECTED

## 2020-10-19 PROCEDURE — 71045 X-RAY EXAM CHEST 1 VIEW: CPT

## 2020-10-19 PROCEDURE — 70450 CT HEAD/BRAIN W/O DYE: CPT

## 2020-10-19 PROCEDURE — 0097U HC BIOFIRE FILMARRAY GI PANEL: CPT | Performed by: EMERGENCY MEDICINE

## 2020-10-19 PROCEDURE — 36415 COLL VENOUS BLD VENIPUNCTURE: CPT

## 2020-10-19 PROCEDURE — 93005 ELECTROCARDIOGRAM TRACING: CPT | Performed by: EMERGENCY MEDICINE

## 2020-10-19 PROCEDURE — 85025 COMPLETE CBC W/AUTO DIFF WBC: CPT | Performed by: EMERGENCY MEDICINE

## 2020-10-19 PROCEDURE — 85610 PROTHROMBIN TIME: CPT | Performed by: EMERGENCY MEDICINE

## 2020-10-19 PROCEDURE — 87040 BLOOD CULTURE FOR BACTERIA: CPT | Performed by: EMERGENCY MEDICINE

## 2020-10-19 PROCEDURE — 94640 AIRWAY INHALATION TREATMENT: CPT

## 2020-10-19 PROCEDURE — 93010 ELECTROCARDIOGRAM REPORT: CPT | Performed by: INTERNAL MEDICINE

## 2020-10-19 PROCEDURE — P9612 CATHETERIZE FOR URINE SPEC: HCPCS

## 2020-10-19 PROCEDURE — 99285 EMERGENCY DEPT VISIT HI MDM: CPT

## 2020-10-19 PROCEDURE — 83735 ASSAY OF MAGNESIUM: CPT | Performed by: EMERGENCY MEDICINE

## 2020-10-19 PROCEDURE — 25010000002 ONDANSETRON PER 1 MG: Performed by: EMERGENCY MEDICINE

## 2020-10-19 PROCEDURE — G0378 HOSPITAL OBSERVATION PER HR: HCPCS

## 2020-10-19 PROCEDURE — 84145 PROCALCITONIN (PCT): CPT | Performed by: EMERGENCY MEDICINE

## 2020-10-19 PROCEDURE — 87635 SARS-COV-2 COVID-19 AMP PRB: CPT | Performed by: EMERGENCY MEDICINE

## 2020-10-19 PROCEDURE — 83605 ASSAY OF LACTIC ACID: CPT | Performed by: EMERGENCY MEDICINE

## 2020-10-19 PROCEDURE — 80053 COMPREHEN METABOLIC PANEL: CPT | Performed by: EMERGENCY MEDICINE

## 2020-10-19 PROCEDURE — 85007 BL SMEAR W/DIFF WBC COUNT: CPT | Performed by: EMERGENCY MEDICINE

## 2020-10-19 PROCEDURE — 94799 UNLISTED PULMONARY SVC/PX: CPT

## 2020-10-19 PROCEDURE — 85007 BL SMEAR W/DIFF WBC COUNT: CPT

## 2020-10-19 PROCEDURE — 85025 COMPLETE CBC W/AUTO DIFF WBC: CPT

## 2020-10-19 PROCEDURE — 25010000003 POTASSIUM CHLORIDE 10 MEQ/100ML SOLUTION: Performed by: EMERGENCY MEDICINE

## 2020-10-19 PROCEDURE — 81001 URINALYSIS AUTO W/SCOPE: CPT | Performed by: EMERGENCY MEDICINE

## 2020-10-19 PROCEDURE — 25010000002 CEFEPIME PER 500 MG: Performed by: INTERNAL MEDICINE

## 2020-10-19 PROCEDURE — 83735 ASSAY OF MAGNESIUM: CPT

## 2020-10-19 PROCEDURE — 80053 COMPREHEN METABOLIC PANEL: CPT

## 2020-10-19 PROCEDURE — 25010000002 DEXAMETHASONE SODIUM PHOSPHATE 10 MG/ML SOLUTION: Performed by: EMERGENCY MEDICINE

## 2020-10-19 PROCEDURE — 87493 C DIFF AMPLIFIED PROBE: CPT | Performed by: EMERGENCY MEDICINE

## 2020-10-19 PROCEDURE — 85730 THROMBOPLASTIN TIME PARTIAL: CPT | Performed by: EMERGENCY MEDICINE

## 2020-10-19 RX ORDER — ACETAMINOPHEN 325 MG/1
650 TABLET ORAL EVERY 6 HOURS PRN
Status: DISCONTINUED | OUTPATIENT
Start: 2020-10-19 | End: 2020-10-22 | Stop reason: HOSPADM

## 2020-10-19 RX ORDER — SODIUM CHLORIDE 0.9 % (FLUSH) 0.9 %
10 SYRINGE (ML) INJECTION EVERY 12 HOURS SCHEDULED
Status: DISCONTINUED | OUTPATIENT
Start: 2020-10-19 | End: 2020-10-19

## 2020-10-19 RX ORDER — NITROGLYCERIN 0.4 MG/1
0.4 TABLET SUBLINGUAL
Status: DISCONTINUED | OUTPATIENT
Start: 2020-10-19 | End: 2020-10-22 | Stop reason: HOSPADM

## 2020-10-19 RX ORDER — ONDANSETRON 4 MG/1
4 TABLET, FILM COATED ORAL EVERY 6 HOURS PRN
Status: DISCONTINUED | OUTPATIENT
Start: 2020-10-19 | End: 2020-10-22 | Stop reason: HOSPADM

## 2020-10-19 RX ORDER — ONDANSETRON 2 MG/ML
4 INJECTION INTRAMUSCULAR; INTRAVENOUS EVERY 6 HOURS PRN
Status: DISCONTINUED | OUTPATIENT
Start: 2020-10-19 | End: 2020-10-22 | Stop reason: HOSPADM

## 2020-10-19 RX ORDER — SODIUM CHLORIDE, SODIUM LACTATE, POTASSIUM CHLORIDE, CALCIUM CHLORIDE 600; 310; 30; 20 MG/100ML; MG/100ML; MG/100ML; MG/100ML
75 INJECTION, SOLUTION INTRAVENOUS CONTINUOUS
Status: DISCONTINUED | OUTPATIENT
Start: 2020-10-19 | End: 2020-10-21

## 2020-10-19 RX ORDER — PANTOPRAZOLE SODIUM 40 MG/1
40 TABLET, DELAYED RELEASE ORAL
Status: DISCONTINUED | OUTPATIENT
Start: 2020-10-19 | End: 2020-10-22 | Stop reason: HOSPADM

## 2020-10-19 RX ORDER — IPRATROPIUM BROMIDE AND ALBUTEROL SULFATE 2.5; .5 MG/3ML; MG/3ML
3 SOLUTION RESPIRATORY (INHALATION) ONCE
Status: COMPLETED | OUTPATIENT
Start: 2020-10-19 | End: 2020-10-19

## 2020-10-19 RX ORDER — ACETAMINOPHEN 500 MG
1000 TABLET ORAL ONCE
Status: COMPLETED | OUTPATIENT
Start: 2020-10-19 | End: 2020-10-19

## 2020-10-19 RX ORDER — POTASSIUM CHLORIDE 7.45 MG/ML
10 INJECTION INTRAVENOUS ONCE
Status: COMPLETED | OUTPATIENT
Start: 2020-10-19 | End: 2020-10-19

## 2020-10-19 RX ORDER — HYDROCODONE BITARTRATE AND ACETAMINOPHEN 5; 325 MG/1; MG/1
1 TABLET ORAL EVERY 4 HOURS PRN
Status: DISCONTINUED | OUTPATIENT
Start: 2020-10-19 | End: 2020-10-22 | Stop reason: HOSPADM

## 2020-10-19 RX ORDER — DEXAMETHASONE SODIUM PHOSPHATE 10 MG/ML
10 INJECTION, SOLUTION INTRAMUSCULAR; INTRAVENOUS ONCE
Status: COMPLETED | OUTPATIENT
Start: 2020-10-19 | End: 2020-10-19

## 2020-10-19 RX ORDER — ONDANSETRON 2 MG/ML
4 INJECTION INTRAMUSCULAR; INTRAVENOUS ONCE
Status: COMPLETED | OUTPATIENT
Start: 2020-10-19 | End: 2020-10-19

## 2020-10-19 RX ORDER — LIDOCAINE HYDROCHLORIDE 20 MG/ML
5 SOLUTION OROPHARYNGEAL
Status: DISCONTINUED | OUTPATIENT
Start: 2020-10-19 | End: 2020-10-22 | Stop reason: HOSPADM

## 2020-10-19 RX ORDER — SODIUM CHLORIDE 0.9 % (FLUSH) 0.9 %
10 SYRINGE (ML) INJECTION AS NEEDED
Status: DISCONTINUED | OUTPATIENT
Start: 2020-10-19 | End: 2020-10-22 | Stop reason: HOSPADM

## 2020-10-19 RX ORDER — POTASSIUM CHLORIDE 750 MG/1
40 CAPSULE, EXTENDED RELEASE ORAL ONCE
Status: COMPLETED | OUTPATIENT
Start: 2020-10-19 | End: 2020-10-19

## 2020-10-19 RX ADMIN — PANTOPRAZOLE SODIUM 40 MG: 40 TABLET, DELAYED RELEASE ORAL at 18:50

## 2020-10-19 RX ADMIN — DEXAMETHASONE SODIUM PHOSPHATE 10 MG: 10 INJECTION INTRAMUSCULAR; INTRAVENOUS at 16:24

## 2020-10-19 RX ADMIN — SODIUM CHLORIDE, POTASSIUM CHLORIDE, SODIUM LACTATE AND CALCIUM CHLORIDE 1000 ML: 600; 310; 30; 20 INJECTION, SOLUTION INTRAVENOUS at 15:04

## 2020-10-19 RX ADMIN — ACETAMINOPHEN 650 MG: 325 TABLET, FILM COATED ORAL at 18:50

## 2020-10-19 RX ADMIN — ONDANSETRON HYDROCHLORIDE 4 MG: 2 SOLUTION INTRAMUSCULAR; INTRAVENOUS at 16:24

## 2020-10-19 RX ADMIN — ACETAMINOPHEN 1000 MG: 500 TABLET, FILM COATED ORAL at 17:17

## 2020-10-19 RX ADMIN — POTASSIUM CHLORIDE 10 MEQ: 7.46 INJECTION, SOLUTION INTRAVENOUS at 16:26

## 2020-10-19 RX ADMIN — HYDROCODONE BITARTRATE AND ACETAMINOPHEN 1 TABLET: 5; 325 TABLET ORAL at 21:32

## 2020-10-19 RX ADMIN — SODIUM CHLORIDE, POTASSIUM CHLORIDE, SODIUM LACTATE AND CALCIUM CHLORIDE 75 ML/HR: 600; 310; 30; 20 INJECTION, SOLUTION INTRAVENOUS at 18:34

## 2020-10-19 RX ADMIN — IPRATROPIUM BROMIDE AND ALBUTEROL SULFATE 3 ML: 2.5; .5 SOLUTION RESPIRATORY (INHALATION) at 16:13

## 2020-10-19 RX ADMIN — CEFEPIME HYDROCHLORIDE 2 G: 2 INJECTION, POWDER, FOR SOLUTION INTRAVENOUS at 21:32

## 2020-10-19 RX ADMIN — LIDOCAINE HYDROCHLORIDE 5 ML: 20 SOLUTION ORAL; TOPICAL at 21:32

## 2020-10-19 RX ADMIN — POTASSIUM CHLORIDE 40 MEQ: 750 CAPSULE, EXTENDED RELEASE ORAL at 18:50

## 2020-10-19 NOTE — PROGRESS NOTES
VTO: Dr. Stafford/Muriel R/T labwork, and decline in patient's condition since Friday.  Patient arms stiff, stated had 4 x diarrhea Sat/Sun and 2 times today.  Per Dr. Stafford/Muriel to send Er to be evaluated. DE Alfaro

## 2020-10-19 NOTE — TELEPHONE ENCOUNTER
Received a call from patient daughter this am.  States that he mother had a bad weekend.  Is weak, not wanting to eat much, and fell this am.  Daughter states that there is no injury, that mother states that she just slid off side of bed.  Will bring patient in today for lab evaluation and hydration fluids.  Daughter v/u.

## 2020-10-20 ENCOUNTER — APPOINTMENT (OUTPATIENT)
Dept: LAB | Facility: HOSPITAL | Age: 80
End: 2020-10-20

## 2020-10-20 ENCOUNTER — APPOINTMENT (OUTPATIENT)
Dept: ONCOLOGY | Facility: HOSPITAL | Age: 80
End: 2020-10-20

## 2020-10-20 PROBLEM — R50.81 NEUTROPENIC FEVER (HCC): Status: ACTIVE | Noted: 2020-10-20

## 2020-10-20 PROBLEM — D70.9 NEUTROPENIC FEVER: Status: ACTIVE | Noted: 2020-10-20

## 2020-10-20 LAB
ALBUMIN SERPL-MCNC: 3 G/DL (ref 3.5–5.2)
ALBUMIN/GLOB SERPL: 1.1 G/DL
ALP SERPL-CCNC: 39 U/L (ref 39–117)
ALT SERPL W P-5'-P-CCNC: 14 U/L (ref 1–33)
ANION GAP SERPL CALCULATED.3IONS-SCNC: 7 MMOL/L (ref 5–15)
AST SERPL-CCNC: 32 U/L (ref 1–32)
B PARAPERT DNA SPEC QL NAA+PROBE: NOT DETECTED
B PERT DNA SPEC QL NAA+PROBE: NOT DETECTED
BILIRUB SERPL-MCNC: 0.3 MG/DL (ref 0–1.2)
BUN SERPL-MCNC: 18 MG/DL (ref 8–23)
BUN/CREAT SERPL: 18.8 (ref 7–25)
C PNEUM DNA NPH QL NAA+NON-PROBE: NOT DETECTED
CALCIUM SPEC-SCNC: 8.8 MG/DL (ref 8.6–10.5)
CHLORIDE SERPL-SCNC: 98 MMOL/L (ref 98–107)
CLUMPED PLATELETS: PRESENT
CO2 SERPL-SCNC: 28 MMOL/L (ref 22–29)
CREAT SERPL-MCNC: 0.96 MG/DL (ref 0.57–1)
DEPRECATED RDW RBC AUTO: 40.8 FL (ref 37–54)
ERYTHROCYTE [DISTWIDTH] IN BLOOD BY AUTOMATED COUNT: 12.4 % (ref 12.3–15.4)
FLUAV H1 2009 PAND RNA NPH QL NAA+PROBE: NOT DETECTED
FLUAV H1 HA GENE NPH QL NAA+PROBE: NOT DETECTED
FLUAV H3 RNA NPH QL NAA+PROBE: NOT DETECTED
FLUAV SUBTYP SPEC NAA+PROBE: NOT DETECTED
FLUBV RNA ISLT QL NAA+PROBE: NOT DETECTED
GFR SERPL CREATININE-BSD FRML MDRD: 56 ML/MIN/1.73
GIANT PLATELETS: ABNORMAL
GLOBULIN UR ELPH-MCNC: 2.7 GM/DL
GLUCOSE SERPL-MCNC: 198 MG/DL (ref 65–99)
HADV DNA SPEC NAA+PROBE: NOT DETECTED
HCOV 229E RNA SPEC QL NAA+PROBE: NOT DETECTED
HCOV HKU1 RNA SPEC QL NAA+PROBE: NOT DETECTED
HCOV NL63 RNA SPEC QL NAA+PROBE: NOT DETECTED
HCOV OC43 RNA SPEC QL NAA+PROBE: NOT DETECTED
HCT VFR BLD AUTO: 23.5 % (ref 34–46.6)
HGB BLD-MCNC: 8.4 G/DL (ref 12–15.9)
HMPV RNA NPH QL NAA+NON-PROBE: NOT DETECTED
HPIV1 RNA SPEC QL NAA+PROBE: NOT DETECTED
HPIV2 RNA SPEC QL NAA+PROBE: NOT DETECTED
HPIV3 RNA NPH QL NAA+PROBE: NOT DETECTED
HPIV4 P GENE NPH QL NAA+PROBE: NOT DETECTED
LYMPHOCYTES # BLD MANUAL: 0.19 10*3/MM3 (ref 0.7–3.1)
LYMPHOCYTES NFR BLD MANUAL: 7 % (ref 19.6–45.3)
LYMPHOCYTES NFR BLD MANUAL: 9 % (ref 5–12)
M PNEUMO IGG SER IA-ACNC: NOT DETECTED
MCH RBC QN AUTO: 32.8 PG (ref 26.6–33)
MCHC RBC AUTO-ENTMCNC: 35.7 G/DL (ref 31.5–35.7)
MCV RBC AUTO: 91.8 FL (ref 79–97)
MONOCYTES # BLD AUTO: 0.24 10*3/MM3 (ref 0.1–0.9)
NEUTROPHILS # BLD AUTO: 2.17 10*3/MM3 (ref 1.7–7)
NEUTROPHILS NFR BLD MANUAL: 78 % (ref 42.7–76)
NEUTS BAND NFR BLD MANUAL: 4 % (ref 0–5)
PLATELET # BLD AUTO: 35 10*3/MM3 (ref 140–450)
PMV BLD AUTO: 12.7 FL (ref 6–12)
POIKILOCYTOSIS BLD QL SMEAR: ABNORMAL
POTASSIUM SERPL-SCNC: 3.3 MMOL/L (ref 3.5–5.2)
PROCALCITONIN SERPL-MCNC: 0.3 NG/ML (ref 0–0.25)
PROT SERPL-MCNC: 5.7 G/DL (ref 6–8.5)
RBC # BLD AUTO: 2.56 10*6/MM3 (ref 3.77–5.28)
RHINOVIRUS RNA SPEC NAA+PROBE: NOT DETECTED
RSV RNA NPH QL NAA+NON-PROBE: NOT DETECTED
SMALL PLATELETS BLD QL SMEAR: ABNORMAL
SODIUM SERPL-SCNC: 133 MMOL/L (ref 136–145)
TSH SERPL DL<=0.05 MIU/L-ACNC: 0.83 UIU/ML (ref 0.27–4.2)
VARIANT LYMPHS NFR BLD MANUAL: 2 % (ref 0–5)
WBC # BLD AUTO: 2.65 10*3/MM3 (ref 3.4–10.8)
WBC MORPH BLD: NORMAL

## 2020-10-20 PROCEDURE — 0100U HC BIOFIRE FILMARRAY RESP PANEL 2: CPT | Performed by: INTERNAL MEDICINE

## 2020-10-20 PROCEDURE — 85007 BL SMEAR W/DIFF WBC COUNT: CPT | Performed by: INTERNAL MEDICINE

## 2020-10-20 PROCEDURE — 84443 ASSAY THYROID STIM HORMONE: CPT | Performed by: INTERNAL MEDICINE

## 2020-10-20 PROCEDURE — 97166 OT EVAL MOD COMPLEX 45 MIN: CPT

## 2020-10-20 PROCEDURE — 84145 PROCALCITONIN (PCT): CPT | Performed by: INTERNAL MEDICINE

## 2020-10-20 PROCEDURE — 80053 COMPREHEN METABOLIC PANEL: CPT | Performed by: INTERNAL MEDICINE

## 2020-10-20 PROCEDURE — 25010000002 CEFEPIME PER 500 MG: Performed by: INTERNAL MEDICINE

## 2020-10-20 PROCEDURE — 25010000002 ONDANSETRON PER 1 MG: Performed by: INTERNAL MEDICINE

## 2020-10-20 PROCEDURE — 85025 COMPLETE CBC W/AUTO DIFF WBC: CPT | Performed by: INTERNAL MEDICINE

## 2020-10-20 RX ORDER — POTASSIUM CHLORIDE 750 MG/1
40 CAPSULE, EXTENDED RELEASE ORAL ONCE
Status: COMPLETED | OUTPATIENT
Start: 2020-10-20 | End: 2020-10-20

## 2020-10-20 RX ORDER — LOPERAMIDE HYDROCHLORIDE 2 MG/1
4 CAPSULE ORAL ONCE
Status: COMPLETED | OUTPATIENT
Start: 2020-10-20 | End: 2020-10-20

## 2020-10-20 RX ORDER — LOPERAMIDE HYDROCHLORIDE 2 MG/1
2 CAPSULE ORAL 4 TIMES DAILY PRN
Status: DISCONTINUED | OUTPATIENT
Start: 2020-10-20 | End: 2020-10-22 | Stop reason: HOSPADM

## 2020-10-20 RX ORDER — AMLODIPINE BESYLATE 5 MG/1
10 TABLET ORAL DAILY
Qty: 90 TABLET | Refills: 2 | OUTPATIENT
Start: 2020-10-20 | End: 2020-10-22 | Stop reason: HOSPADM

## 2020-10-20 RX ADMIN — POTASSIUM CHLORIDE 40 MEQ: 750 CAPSULE, EXTENDED RELEASE ORAL at 13:37

## 2020-10-20 RX ADMIN — ONDANSETRON HYDROCHLORIDE 4 MG: 2 SOLUTION INTRAMUSCULAR; INTRAVENOUS at 10:26

## 2020-10-20 RX ADMIN — CEFEPIME HYDROCHLORIDE 2 G: 2 INJECTION, POWDER, FOR SOLUTION INTRAVENOUS at 04:07

## 2020-10-20 RX ADMIN — SODIUM CHLORIDE, POTASSIUM CHLORIDE, SODIUM LACTATE AND CALCIUM CHLORIDE 75 ML/HR: 600; 310; 30; 20 INJECTION, SOLUTION INTRAVENOUS at 21:53

## 2020-10-20 RX ADMIN — HYDROCODONE BITARTRATE AND ACETAMINOPHEN 1 TABLET: 5; 325 TABLET ORAL at 04:07

## 2020-10-20 RX ADMIN — LOPERAMIDE HYDROCHLORIDE 4 MG: 2 CAPSULE ORAL at 13:37

## 2020-10-20 RX ADMIN — LIDOCAINE HYDROCHLORIDE 5 ML: 20 SOLUTION ORAL; TOPICAL at 10:21

## 2020-10-20 RX ADMIN — HYDROCODONE BITARTRATE AND ACETAMINOPHEN 1 TABLET: 5; 325 TABLET ORAL at 10:20

## 2020-10-20 RX ADMIN — LIDOCAINE HYDROCHLORIDE 5 ML: 20 SOLUTION ORAL; TOPICAL at 11:34

## 2020-10-20 RX ADMIN — DIPHENHYDRAMINE HYDROCHLORIDE 30 ML: 12.5 SOLUTION ORAL at 20:19

## 2020-10-20 RX ADMIN — HYDROCODONE BITARTRATE AND ACETAMINOPHEN 1 TABLET: 5; 325 TABLET ORAL at 15:27

## 2020-10-20 RX ADMIN — ONDANSETRON HYDROCHLORIDE 4 MG: 2 SOLUTION INTRAMUSCULAR; INTRAVENOUS at 19:29

## 2020-10-20 RX ADMIN — CEFEPIME HYDROCHLORIDE 2 G: 2 INJECTION, POWDER, FOR SOLUTION INTRAVENOUS at 15:27

## 2020-10-20 RX ADMIN — LIDOCAINE HYDROCHLORIDE 5 ML: 20 SOLUTION ORAL; TOPICAL at 21:53

## 2020-10-20 RX ADMIN — LOPERAMIDE HYDROCHLORIDE 2 MG: 2 CAPSULE ORAL at 20:19

## 2020-10-21 PROBLEM — E44.0 MODERATE MALNUTRITION: Status: ACTIVE | Noted: 2020-10-21

## 2020-10-21 LAB
ALBUMIN SERPL-MCNC: 3 G/DL (ref 3.5–5.2)
ALBUMIN/GLOB SERPL: 1.1 G/DL
ALP SERPL-CCNC: 43 U/L (ref 39–117)
ALT SERPL W P-5'-P-CCNC: 16 U/L (ref 1–33)
ANION GAP SERPL CALCULATED.3IONS-SCNC: 7 MMOL/L (ref 5–15)
ANISOCYTOSIS BLD QL: ABNORMAL
AST SERPL-CCNC: 25 U/L (ref 1–32)
BILIRUB SERPL-MCNC: 0.2 MG/DL (ref 0–1.2)
BUN SERPL-MCNC: 16 MG/DL (ref 8–23)
BUN/CREAT SERPL: 19.8 (ref 7–25)
CALCIUM SPEC-SCNC: 8.7 MG/DL (ref 8.6–10.5)
CHLORIDE SERPL-SCNC: 101 MMOL/L (ref 98–107)
CO2 SERPL-SCNC: 27 MMOL/L (ref 22–29)
CREAT SERPL-MCNC: 0.81 MG/DL (ref 0.57–1)
DEPRECATED RDW RBC AUTO: 43 FL (ref 37–54)
ERYTHROCYTE [DISTWIDTH] IN BLOOD BY AUTOMATED COUNT: 13 % (ref 12.3–15.4)
GFR SERPL CREATININE-BSD FRML MDRD: 68 ML/MIN/1.73
GIANT PLATELETS: ABNORMAL
GLOBULIN UR ELPH-MCNC: 2.7 GM/DL
GLUCOSE SERPL-MCNC: 164 MG/DL (ref 65–99)
HCT VFR BLD AUTO: 23 % (ref 34–46.6)
HGB BLD-MCNC: 7.8 G/DL (ref 12–15.9)
LYMPHOCYTES # BLD MANUAL: 0.19 10*3/MM3 (ref 0.7–3.1)
LYMPHOCYTES NFR BLD MANUAL: 3 % (ref 19.6–45.3)
LYMPHOCYTES NFR BLD MANUAL: 3 % (ref 5–12)
MCH RBC QN AUTO: 31.8 PG (ref 26.6–33)
MCHC RBC AUTO-ENTMCNC: 33.9 G/DL (ref 31.5–35.7)
MCV RBC AUTO: 93.9 FL (ref 79–97)
METAMYELOCYTES NFR BLD MANUAL: 1 % (ref 0–0)
MONOCYTES # BLD AUTO: 0.19 10*3/MM3 (ref 0.1–0.9)
NEUTROPHILS # BLD AUTO: 5.93 10*3/MM3 (ref 1.7–7)
NEUTROPHILS NFR BLD MANUAL: 89.9 % (ref 42.7–76)
NEUTS BAND NFR BLD MANUAL: 3 % (ref 0–5)
PLATELET # BLD AUTO: 51 10*3/MM3 (ref 140–450)
PMV BLD AUTO: 12.7 FL (ref 6–12)
POLYCHROMASIA BLD QL SMEAR: ABNORMAL
POTASSIUM SERPL-SCNC: 3.7 MMOL/L (ref 3.5–5.2)
PROT SERPL-MCNC: 5.7 G/DL (ref 6–8.5)
RBC # BLD AUTO: 2.45 10*6/MM3 (ref 3.77–5.28)
SMALL PLATELETS BLD QL SMEAR: ABNORMAL
SODIUM SERPL-SCNC: 135 MMOL/L (ref 136–145)
WBC # BLD AUTO: 6.38 10*3/MM3 (ref 3.4–10.8)
WBC MORPH BLD: NORMAL

## 2020-10-21 PROCEDURE — 80053 COMPREHEN METABOLIC PANEL: CPT | Performed by: INTERNAL MEDICINE

## 2020-10-21 PROCEDURE — 25010000002 ONDANSETRON PER 1 MG: Performed by: INTERNAL MEDICINE

## 2020-10-21 PROCEDURE — 97535 SELF CARE MNGMENT TRAINING: CPT

## 2020-10-21 PROCEDURE — 97162 PT EVAL MOD COMPLEX 30 MIN: CPT

## 2020-10-21 PROCEDURE — 25010000002 CEFEPIME PER 500 MG: Performed by: INTERNAL MEDICINE

## 2020-10-21 PROCEDURE — 85007 BL SMEAR W/DIFF WBC COUNT: CPT | Performed by: INTERNAL MEDICINE

## 2020-10-21 PROCEDURE — 85025 COMPLETE CBC W/AUTO DIFF WBC: CPT | Performed by: INTERNAL MEDICINE

## 2020-10-21 RX ORDER — CALCIUM CARBONATE 200(500)MG
2 TABLET,CHEWABLE ORAL ONCE
Status: COMPLETED | OUTPATIENT
Start: 2020-10-21 | End: 2020-10-21

## 2020-10-21 RX ORDER — ALUMINA, MAGNESIA, AND SIMETHICONE 2400; 2400; 240 MG/30ML; MG/30ML; MG/30ML
15 SUSPENSION ORAL EVERY 6 HOURS PRN
Status: DISCONTINUED | OUTPATIENT
Start: 2020-10-21 | End: 2020-10-22 | Stop reason: HOSPADM

## 2020-10-21 RX ORDER — FAMOTIDINE 10 MG/ML
20 INJECTION, SOLUTION INTRAVENOUS ONCE
Status: COMPLETED | OUTPATIENT
Start: 2020-10-21 | End: 2020-10-21

## 2020-10-21 RX ORDER — FAMOTIDINE 20 MG/1
40 TABLET, FILM COATED ORAL ONCE
Status: COMPLETED | OUTPATIENT
Start: 2020-10-21 | End: 2020-10-21

## 2020-10-21 RX ORDER — GABAPENTIN 300 MG/1
300 CAPSULE ORAL DAILY
COMMUNITY
End: 2021-01-04 | Stop reason: SDUPTHER

## 2020-10-21 RX ADMIN — BACITRACIN: 500 OINTMENT TOPICAL at 02:32

## 2020-10-21 RX ADMIN — HYDROCODONE BITARTRATE AND ACETAMINOPHEN 1 TABLET: 5; 325 TABLET ORAL at 11:12

## 2020-10-21 RX ADMIN — LOPERAMIDE HYDROCHLORIDE 2 MG: 2 CAPSULE ORAL at 20:47

## 2020-10-21 RX ADMIN — LOPERAMIDE HYDROCHLORIDE 2 MG: 2 CAPSULE ORAL at 11:39

## 2020-10-21 RX ADMIN — ALUMINUM HYDROXIDE, MAGNESIUM HYDROXIDE, AND DIMETHICONE 15 ML: 400; 400; 40 SUSPENSION ORAL at 17:12

## 2020-10-21 RX ADMIN — ALUMINUM HYDROXIDE, MAGNESIUM HYDROXIDE, AND DIMETHICONE 15 ML: 400; 400; 40 SUSPENSION ORAL at 11:39

## 2020-10-21 RX ADMIN — LIDOCAINE HYDROCHLORIDE 5 ML: 20 SOLUTION ORAL; TOPICAL at 07:37

## 2020-10-21 RX ADMIN — LIDOCAINE HYDROCHLORIDE 5 ML: 20 SOLUTION ORAL; TOPICAL at 11:11

## 2020-10-21 RX ADMIN — PANTOPRAZOLE SODIUM 40 MG: 40 TABLET, DELAYED RELEASE ORAL at 07:37

## 2020-10-21 RX ADMIN — HYDROCODONE BITARTRATE AND ACETAMINOPHEN 1 TABLET: 5; 325 TABLET ORAL at 20:47

## 2020-10-21 RX ADMIN — LIDOCAINE HYDROCHLORIDE 5 ML: 20 SOLUTION ORAL; TOPICAL at 17:12

## 2020-10-21 RX ADMIN — ANTACID TABLETS 2 TABLET: 500 TABLET, CHEWABLE ORAL at 01:03

## 2020-10-21 RX ADMIN — PANTOPRAZOLE SODIUM 40 MG: 40 TABLET, DELAYED RELEASE ORAL at 17:12

## 2020-10-21 RX ADMIN — DIPHENHYDRAMINE HYDROCHLORIDE 30 ML: 12.5 SOLUTION ORAL at 03:13

## 2020-10-21 RX ADMIN — ONDANSETRON HYDROCHLORIDE 4 MG: 2 SOLUTION INTRAMUSCULAR; INTRAVENOUS at 01:51

## 2020-10-21 RX ADMIN — ONDANSETRON HYDROCHLORIDE 4 MG: 2 SOLUTION INTRAMUSCULAR; INTRAVENOUS at 11:12

## 2020-10-21 RX ADMIN — FAMOTIDINE 40 MG: 20 TABLET, FILM COATED ORAL at 03:13

## 2020-10-21 RX ADMIN — CEFEPIME HYDROCHLORIDE 2 G: 2 INJECTION, POWDER, FOR SOLUTION INTRAVENOUS at 03:13

## 2020-10-21 RX ADMIN — BACITRACIN: 500 OINTMENT TOPICAL at 17:18

## 2020-10-21 RX ADMIN — LOPERAMIDE HYDROCHLORIDE 2 MG: 2 CAPSULE ORAL at 02:27

## 2020-10-21 RX ADMIN — DIPHENHYDRAMINE HYDROCHLORIDE 30 ML: 12.5 SOLUTION ORAL at 20:47

## 2020-10-21 RX ADMIN — BACITRACIN: 500 OINTMENT TOPICAL at 01:03

## 2020-10-21 RX ADMIN — HYDROCODONE BITARTRATE AND ACETAMINOPHEN 1 TABLET: 5; 325 TABLET ORAL at 01:51

## 2020-10-22 ENCOUNTER — READMISSION MANAGEMENT (OUTPATIENT)
Dept: CALL CENTER | Facility: HOSPITAL | Age: 80
End: 2020-10-22

## 2020-10-22 VITALS
DIASTOLIC BLOOD PRESSURE: 38 MMHG | OXYGEN SATURATION: 98 % | TEMPERATURE: 97.6 F | WEIGHT: 120 LBS | RESPIRATION RATE: 16 BRPM | HEIGHT: 60 IN | HEART RATE: 63 BPM | BODY MASS INDEX: 23.56 KG/M2 | SYSTOLIC BLOOD PRESSURE: 132 MMHG

## 2020-10-22 LAB
ALBUMIN SERPL-MCNC: 3.4 G/DL (ref 3.5–5.2)
ALBUMIN/GLOB SERPL: 1.2 G/DL
ALP SERPL-CCNC: 47 U/L (ref 39–117)
ALT SERPL W P-5'-P-CCNC: 19 U/L (ref 1–33)
ANION GAP SERPL CALCULATED.3IONS-SCNC: 10 MMOL/L (ref 5–15)
AST SERPL-CCNC: 29 U/L (ref 1–32)
BILIRUB SERPL-MCNC: 0.3 MG/DL (ref 0–1.2)
BUN SERPL-MCNC: 12 MG/DL (ref 8–23)
BUN/CREAT SERPL: 15 (ref 7–25)
CALCIUM SPEC-SCNC: 9 MG/DL (ref 8.6–10.5)
CHLORIDE SERPL-SCNC: 99 MMOL/L (ref 98–107)
CLUMPED PLATELETS: ABNORMAL
CO2 SERPL-SCNC: 25 MMOL/L (ref 22–29)
CREAT SERPL-MCNC: 0.8 MG/DL (ref 0.57–1)
DEPRECATED RDW RBC AUTO: 43 FL (ref 37–54)
ERYTHROCYTE [DISTWIDTH] IN BLOOD BY AUTOMATED COUNT: 13.2 % (ref 12.3–15.4)
GFR SERPL CREATININE-BSD FRML MDRD: 69 ML/MIN/1.73
GIANT PLATELETS: ABNORMAL
GLOBULIN UR ELPH-MCNC: 2.9 GM/DL
GLUCOSE SERPL-MCNC: 107 MG/DL (ref 65–99)
HCT VFR BLD AUTO: 27.4 % (ref 34–46.6)
HGB BLD-MCNC: 9.4 G/DL (ref 12–15.9)
HYPOCHROMIA BLD QL: ABNORMAL
LYMPHOCYTES # BLD MANUAL: 0.25 10*3/MM3 (ref 0.7–3.1)
LYMPHOCYTES NFR BLD MANUAL: 3 % (ref 19.6–45.3)
LYMPHOCYTES NFR BLD MANUAL: 4 % (ref 5–12)
MCH RBC QN AUTO: 32.1 PG (ref 26.6–33)
MCHC RBC AUTO-ENTMCNC: 34.3 G/DL (ref 31.5–35.7)
MCV RBC AUTO: 93.5 FL (ref 79–97)
MONOCYTES # BLD AUTO: 0.34 10*3/MM3 (ref 0.1–0.9)
MYELOCYTES NFR BLD MANUAL: 2 % (ref 0–0)
NEUTROPHILS # BLD AUTO: 7.49 10*3/MM3 (ref 1.7–7)
NEUTROPHILS NFR BLD MANUAL: 81 % (ref 42.7–76)
NEUTS BAND NFR BLD MANUAL: 8 % (ref 0–5)
NRBC SPEC MANUAL: 2 /100 WBC (ref 0–0.2)
PLATELET # BLD AUTO: 71 10*3/MM3 (ref 140–450)
PMV BLD AUTO: 12.2 FL (ref 6–12)
POLYCHROMASIA BLD QL SMEAR: ABNORMAL
POTASSIUM SERPL-SCNC: 3.5 MMOL/L (ref 3.5–5.2)
PROMYELOCYTES NFR BLD MANUAL: 2 % (ref 0–0)
PROT SERPL-MCNC: 6.3 G/DL (ref 6–8.5)
RBC # BLD AUTO: 2.93 10*6/MM3 (ref 3.77–5.28)
SMALL PLATELETS BLD QL SMEAR: ABNORMAL
SODIUM SERPL-SCNC: 134 MMOL/L (ref 136–145)
WBC # BLD AUTO: 8.42 10*3/MM3 (ref 3.4–10.8)
WBC MORPH BLD: NORMAL

## 2020-10-22 PROCEDURE — 99221 1ST HOSP IP/OBS SF/LOW 40: CPT | Performed by: INTERNAL MEDICINE

## 2020-10-22 PROCEDURE — 97116 GAIT TRAINING THERAPY: CPT

## 2020-10-22 PROCEDURE — 97530 THERAPEUTIC ACTIVITIES: CPT

## 2020-10-22 PROCEDURE — 85025 COMPLETE CBC W/AUTO DIFF WBC: CPT | Performed by: INTERNAL MEDICINE

## 2020-10-22 PROCEDURE — 85007 BL SMEAR W/DIFF WBC COUNT: CPT | Performed by: INTERNAL MEDICINE

## 2020-10-22 PROCEDURE — 80053 COMPREHEN METABOLIC PANEL: CPT | Performed by: INTERNAL MEDICINE

## 2020-10-22 RX ORDER — LIDOCAINE HYDROCHLORIDE 20 MG/ML
5 SOLUTION OROPHARYNGEAL
Qty: 100 ML | Refills: 0 | Status: SHIPPED | OUTPATIENT
Start: 2020-10-22 | End: 2022-09-14 | Stop reason: SDUPTHER

## 2020-10-22 RX ADMIN — PANTOPRAZOLE SODIUM 40 MG: 40 TABLET, DELAYED RELEASE ORAL at 08:20

## 2020-10-22 RX ADMIN — ALUMINUM HYDROXIDE, MAGNESIUM HYDROXIDE, AND DIMETHICONE 15 ML: 400; 400; 40 SUSPENSION ORAL at 01:45

## 2020-10-22 RX ADMIN — LOPERAMIDE HYDROCHLORIDE 2 MG: 2 CAPSULE ORAL at 04:31

## 2020-10-22 RX ADMIN — LIDOCAINE HYDROCHLORIDE 5 ML: 20 SOLUTION ORAL; TOPICAL at 08:20

## 2020-10-22 RX ADMIN — BACITRACIN: 500 OINTMENT TOPICAL at 01:45

## 2020-10-22 NOTE — OUTREACH NOTE
Prep Survey      Responses   [facility] facility patient discharged from?  [facility]   Is LACE score < 7 ?  No   Eligibility  [facility]   Date of Admission  10/19/20   Date of Discharge  10/22/20   Discharge Disposition  Home-Health Care Sv   Discharge diagnosis  Vulvar intraepithelial neoplasia    Does the patient have one of the following disease processes/diagnoses(primary or secondary)?  Other   Does the patient have Home health ordered?  Yes   What is the Home health agency?   [facility].    Is there a DME ordered?  No   Prep survey completed?  Yes          Mecca Palomo RN

## 2020-10-23 ENCOUNTER — TRANSITIONAL CARE MANAGEMENT TELEPHONE ENCOUNTER (OUTPATIENT)
Dept: CALL CENTER | Facility: HOSPITAL | Age: 80
End: 2020-10-23

## 2020-10-23 NOTE — OUTREACH NOTE
Call Center TCM Note      Responses   [facility] patient discharged from?  [place]   Does the patient have one of the following disease processes/diagnoses(primary or secondary)?  Other   TCM attempt successful?  Yes   Call start time  1440   Call end time  1446   Discharge diagnosis  Vulvar intraepithelial neoplasia    Is patient permission given to speak with other caregiver?  Yes   List who call center can speak with  Joseph Nunez, spouse   Person spoke with today (if not patient) and relationship  spouse   Meds reviewed with patient/caregiver?  No [ states  nurse there at time of call reviewing medication list]   Is the patient having any side effects they believe may be caused by any medication additions or changes?  No   Does the patient have all medications ordered at discharge?  Yes   Is the patient taking all medications as directed (includes completed medication regime)?  Yes   Does the patient have a primary care provider?   Yes   Does the patient have an appointment with their PCP within 7 days of discharge?  Yes   Comments regarding PCP  Office Visit with Shaheen Akbar, DO Thursday Oct 29, 2020 11:15 AM   Has the patient kept scheduled appointments due by today?  N/A   Comments   reports that patient is deciding whether or not to continue radiation treatments. He reports that they will contact the cancer center.    What is the Home health agency?   [facility]    Has home health visited the patient within 72 hours of discharge?  Yes   Home health comments  Present at time of call.    Psychosocial issues?  No   Did the patient receive a copy of their discharge instructions?  Yes   Nursing interventions  Reviewed instructions with patient [spouse]   What is the patient's perception of their health status since discharge?  Same   Is the patient/caregiver able to teach back signs and symptoms related to disease process for when to call PCP?  Yes   Is the patient/caregiver able to  teach back the hierarchy of who to call/visit for symptoms/problems? PCP, Specialist, Home health nurse, Urgent Care, ED, 911  Yes   TCM call completed?  Yes          Mecca Portillo RN    10/23/2020, 14:46 EDT

## 2020-10-24 LAB
BACTERIA SPEC AEROBE CULT: NORMAL
BACTERIA SPEC AEROBE CULT: NORMAL

## 2020-10-26 ENCOUNTER — TELEPHONE (OUTPATIENT)
Dept: RADIATION ONCOLOGY | Facility: HOSPITAL | Age: 80
End: 2020-10-26

## 2020-10-26 PROBLEM — D64.81 ANTINEOPLASTIC CHEMOTHERAPY INDUCED ANEMIA: Status: ACTIVE | Noted: 2020-10-26

## 2020-10-26 PROBLEM — T45.1X5A ANTINEOPLASTIC CHEMOTHERAPY INDUCED ANEMIA: Status: ACTIVE | Noted: 2020-10-26

## 2020-10-26 NOTE — TELEPHONE ENCOUNTER
Mrs. Nunez called and states she will not be able to make it to radiation treatment this week. She still feels very weak and is having diarrhea. She said she will call when she feels better. This writer informed radiation nurse and therapist.

## 2020-10-27 ENCOUNTER — HOSPITAL ENCOUNTER (OUTPATIENT)
Dept: RADIATION ONCOLOGY | Facility: HOSPITAL | Age: 80
Setting detail: RADIATION/ONCOLOGY SERIES
Discharge: HOME | End: 2020-10-27

## 2020-10-27 ENCOUNTER — LAB (OUTPATIENT)
Dept: LAB | Facility: HOSPITAL | Age: 80
End: 2020-10-27

## 2020-10-27 ENCOUNTER — INFUSION (OUTPATIENT)
Dept: ONCOLOGY | Facility: HOSPITAL | Age: 80
End: 2020-10-27

## 2020-10-27 ENCOUNTER — DOCUMENTATION (OUTPATIENT)
Dept: RADIATION ONCOLOGY | Facility: HOSPITAL | Age: 80
End: 2020-10-27

## 2020-10-27 VITALS
HEIGHT: 60 IN | BODY MASS INDEX: 32.98 KG/M2 | DIASTOLIC BLOOD PRESSURE: 56 MMHG | RESPIRATION RATE: 17 BRPM | HEART RATE: 76 BPM | SYSTOLIC BLOOD PRESSURE: 157 MMHG | WEIGHT: 168 LBS | OXYGEN SATURATION: 99 % | TEMPERATURE: 98.9 F

## 2020-10-27 DIAGNOSIS — D64.81 ANTINEOPLASTIC CHEMOTHERAPY INDUCED ANEMIA: Primary | ICD-10-CM

## 2020-10-27 DIAGNOSIS — K52.1 DIARRHEA DUE TO DRUG: ICD-10-CM

## 2020-10-27 DIAGNOSIS — T45.1X5A ANTINEOPLASTIC CHEMOTHERAPY INDUCED ANEMIA: Primary | ICD-10-CM

## 2020-10-27 DIAGNOSIS — C51.9 VULVAR CANCER, CARCINOMA (HCC): Primary | ICD-10-CM

## 2020-10-27 DIAGNOSIS — C77.4 SECONDARY MALIGNANCY OF INGUINAL LYMPH NODES (HCC): Primary | ICD-10-CM

## 2020-10-27 LAB
ANISOCYTOSIS BLD QL: ABNORMAL
BASOPHILS # BLD MANUAL: 0.07 10*3/MM3 (ref 0–0.2)
BASOPHILS NFR BLD AUTO: 1 % (ref 0–1.5)
DEPRECATED RDW RBC AUTO: 46.5 FL (ref 37–54)
ERYTHROCYTE [DISTWIDTH] IN BLOOD BY AUTOMATED COUNT: 14.4 % (ref 12.3–15.4)
GIANT PLATELETS: ABNORMAL
HCT VFR BLD AUTO: 27.5 % (ref 34–46.6)
HGB BLD-MCNC: 9.4 G/DL (ref 12–15.9)
HOLD SPECIMEN: NORMAL
LYMPHOCYTES # BLD MANUAL: 0.27 10*3/MM3 (ref 0.7–3.1)
LYMPHOCYTES NFR BLD MANUAL: 4 % (ref 19.6–45.3)
LYMPHOCYTES NFR BLD MANUAL: 8 % (ref 5–12)
MACROCYTES BLD QL SMEAR: ABNORMAL
MCH RBC QN AUTO: 32.9 PG (ref 26.6–33)
MCHC RBC AUTO-ENTMCNC: 34.2 G/DL (ref 31.5–35.7)
MCV RBC AUTO: 96.2 FL (ref 79–97)
METAMYELOCYTES NFR BLD MANUAL: 1 % (ref 0–0)
MONOCYTES # BLD AUTO: 0.55 10*3/MM3 (ref 0.1–0.9)
MYELOCYTES NFR BLD MANUAL: 1 % (ref 0–0)
NEUTROPHILS # BLD AUTO: 5.82 10*3/MM3 (ref 1.7–7)
NEUTROPHILS NFR BLD MANUAL: 85 % (ref 42.7–76)
PLATELET # BLD AUTO: 271 10*3/MM3 (ref 140–450)
PMV BLD AUTO: 9.9 FL (ref 6–12)
RBC # BLD AUTO: 2.86 10*6/MM3 (ref 3.77–5.28)
WBC # BLD AUTO: 6.85 10*3/MM3 (ref 3.4–10.8)
WBC MORPH BLD: NORMAL

## 2020-10-27 PROCEDURE — 25010000002 EPOETIN ALFA-EPBX 40000 UNIT/ML SOLUTION: Performed by: INTERNAL MEDICINE

## 2020-10-27 PROCEDURE — 96372 THER/PROPH/DIAG INJ SC/IM: CPT

## 2020-10-27 PROCEDURE — 85007 BL SMEAR W/DIFF WBC COUNT: CPT

## 2020-10-27 PROCEDURE — 36415 COLL VENOUS BLD VENIPUNCTURE: CPT

## 2020-10-27 PROCEDURE — 85025 COMPLETE CBC W/AUTO DIFF WBC: CPT

## 2020-10-27 PROCEDURE — 77386: CPT | Performed by: RADIOLOGY

## 2020-10-27 RX ORDER — LOPERAMIDE HYDROCHLORIDE 2 MG/1
2 CAPSULE ORAL 4 TIMES DAILY PRN
COMMUNITY
End: 2020-10-27

## 2020-10-27 RX ORDER — DIPHENOXYLATE HYDROCHLORIDE AND ATROPINE SULFATE 2.5; .025 MG/1; MG/1
2 TABLET ORAL 4 TIMES DAILY PRN
Qty: 90 TABLET | Refills: 0 | Status: SHIPPED | OUTPATIENT
Start: 2020-10-27 | End: 2020-11-10 | Stop reason: SDUPTHER

## 2020-10-27 RX ADMIN — EPOETIN ALFA-EPBX 40000 UNITS: 40000 INJECTION, SOLUTION INTRAVENOUS; SUBCUTANEOUS at 11:30

## 2020-10-27 NOTE — PROGRESS NOTES
1140 S/w Murtaza Coles RN just to let Dr. Stafford know patient still having diarrhea but much improved has had too stools this am taking imodium as directed. Had moderate swelling bilat both lower extremities to her ankles, was told to hold her B/P med and Lasix due to diarrhea. To discuss with Dr. Stafford.Kym KC

## 2020-10-28 ENCOUNTER — HOSPITAL ENCOUNTER (OUTPATIENT)
Dept: RADIATION ONCOLOGY | Facility: HOSPITAL | Age: 80
Setting detail: RADIATION/ONCOLOGY SERIES
Discharge: HOME | End: 2020-10-28

## 2020-10-28 PROCEDURE — 77386: CPT | Performed by: RADIOLOGY

## 2020-10-29 ENCOUNTER — HOSPITAL ENCOUNTER (OUTPATIENT)
Dept: RADIATION ONCOLOGY | Facility: HOSPITAL | Age: 80
Setting detail: RADIATION/ONCOLOGY SERIES
Discharge: HOME | End: 2020-10-29

## 2020-10-29 ENCOUNTER — OFFICE VISIT (OUTPATIENT)
Dept: FAMILY MEDICINE CLINIC | Facility: CLINIC | Age: 80
End: 2020-10-29

## 2020-10-29 VITALS
TEMPERATURE: 97 F | HEART RATE: 69 BPM | RESPIRATION RATE: 16 BRPM | SYSTOLIC BLOOD PRESSURE: 137 MMHG | DIASTOLIC BLOOD PRESSURE: 64 MMHG | OXYGEN SATURATION: 99 % | WEIGHT: 168 LBS | BODY MASS INDEX: 32.98 KG/M2 | HEIGHT: 60 IN

## 2020-10-29 DIAGNOSIS — C77.4 SECONDARY MALIGNANCY OF INGUINAL LYMPH NODES (HCC): ICD-10-CM

## 2020-10-29 DIAGNOSIS — D70.9 NEUTROPENIC FEVER (HCC): Primary | ICD-10-CM

## 2020-10-29 DIAGNOSIS — R50.81 NEUTROPENIC FEVER (HCC): Primary | ICD-10-CM

## 2020-10-29 PROCEDURE — 77386: CPT | Performed by: RADIOLOGY

## 2020-10-29 PROCEDURE — 99214 OFFICE O/P EST MOD 30 MIN: CPT | Performed by: FAMILY MEDICINE

## 2020-10-29 PROCEDURE — 77336 RADIATION PHYSICS CONSULT: CPT | Performed by: RADIOLOGY

## 2020-10-29 NOTE — PROGRESS NOTES
CC:   Chief Complaint   Patient presents with   • Follow-up     from hospital admission, patient states that she is concerned with chemo treatments        History:  Dago Nunez is a 80 y.o. female who presents today for follow-up for evaluation of the above:    Hospital follow-up after 3-day stay admitted on 10/19/2020 for febrile illness and weakness in the setting of chemotherapy and immunocompromise treated for neutropenic fever and additional chemotherapy induced thrombocytopenia, as well as hyponatremia and hypokalemia.  Despite counts not being neutropenic, she was treated with cefepime for neutropenic fever with improvement, and was continued on this antibiotic at discharge.  Feels overall improved, plan is to continue with at least 1 more round of chemotherapy, still having some lower extremity edema despite Lasix usage.    ROS:  Review of Systems   Constitutional: Negative.    Respiratory: Negative.    Cardiovascular: Positive for leg swelling.   Psychiatric/Behavioral: The patient is nervous/anxious.        Ms. Nunez  reports that she has quit smoking. She has never used smokeless tobacco. She reports that she does not drink alcohol or use drugs.      Current Outpatient Medications:   •  Acetaminophen (TYLENOL ARTHRITIS PAIN PO), Take 1 tablet by mouth Daily., Disp: , Rfl:   •  B Complex Vitamins (VITAMIN B COMPLEX) capsule capsule, Take  by mouth Daily., Disp: , Rfl:   •  calcium carbonate (OS-REAGAN) 600 MG tablet, Take 600 mg by mouth Daily., Disp: , Rfl:   •  cetirizine (zyrTEC) 10 MG tablet, Take 10 mg by mouth Daily., Disp: , Rfl:   •  citalopram (CeleXA) 20 MG tablet, Take 20 mg by mouth Daily., Disp: , Rfl:   •  diphenoxylate-atropine (LOMOTIL) 2.5-0.025 MG per tablet, Take 2 tablets by mouth 4 (Four) Times a Day As Needed for Diarrhea., Disp: 90 tablet, Rfl: 0  •  DULERA 100-5 MCG/ACT inhaler, Inhale 2 puffs 2 (Two) Times a Day. Rinse and spit after using., Disp: 6 inhaler, Rfl: 0  •  esomeprazole  "(nexIUM) 40 MG capsule, Take 1 capsule by mouth 2 (Two) Times a Day., Disp: 180 capsule, Rfl: 3  •  furosemide (LASIX) 40 MG tablet, Take 1 tablet by mouth Daily. Patient only takes once a day, Disp: 90 tablet, Rfl: 1  •  gabapentin (NEURONTIN) 300 MG capsule, Take 600 mg by mouth every night at bedtime., Disp: , Rfl:   •  gabapentin (NEURONTIN) 300 MG capsule, Take 300 mg by mouth Daily., Disp: , Rfl:   •  Homeopathic Products (LEG CRAMPS) tablet, Take 1 tablet by mouth Daily., Disp: , Rfl:   •  HYDROcodone-acetaminophen (NORCO)  MG per tablet, Take 1 tablet by mouth Every 4 (Four) Hours As Needed for Moderate Pain  or Severe Pain ., Disp: 60 tablet, Rfl: 0  •  Hydrocortisone (britany's amazing butt) cream, Apply 1 application topically to the appropriate area as directed As Needed (irritation)., Disp: 120 g, Rfl: 0  •  Lidocaine Viscous HCl (XYLOCAINE) 2 % solution, Take 5 mL by mouth 3 (Three) Times a Day With Meals., Disp: 100 mL, Rfl: 0  •  ondansetron (Zofran) 8 MG tablet, Take 1 tablet by mouth Every 8 (Eight) Hours As Needed for Nausea or Vomiting., Disp: 60 tablet, Rfl: 2  •  potassium chloride (K-DUR,KLOR-CON) 20 MEQ CR tablet, Take 1 tablet by mouth Daily. Patient only takes once a day, Disp: 30 tablet, Rfl: 11  •  pravastatin (PRAVACHOL) 40 MG tablet, Take 40 mg by mouth Daily., Disp: , Rfl:       OBJECTIVE:  /64 (BP Location: Left arm, Patient Position: Sitting, Cuff Size: Adult)   Pulse 69   Temp 97 °F (36.1 °C) (Temporal)   Resp 16   Ht 152.4 cm (60\")   Wt 76.2 kg (168 lb)   SpO2 99%   BMI 32.81 kg/m²    Physical Exam  Vitals signs and nursing note reviewed.   Constitutional:       General: She is not in acute distress.     Appearance: She is not diaphoretic.   HENT:      Head: Normocephalic and atraumatic.      Nose: Nose normal.   Eyes:      General: No scleral icterus.        Right eye: No discharge.         Left eye: No discharge.      Conjunctiva/sclera: Conjunctivae normal. "   Neck:      Trachea: No tracheal deviation.   Cardiovascular:      Rate and Rhythm: Normal rate and regular rhythm.      Heart sounds: Normal heart sounds. No murmur. No friction rub. No gallop.    Pulmonary:      Effort: Pulmonary effort is normal. No respiratory distress.      Breath sounds: Normal breath sounds. No wheezing or rales.   Abdominal:      General: Bowel sounds are normal.      Tenderness: There is no abdominal tenderness.   Musculoskeletal:      Right lower leg: Edema present.      Left lower leg: Edema present.   Skin:     General: Skin is warm and dry.      Coloration: Skin is not pale.   Neurological:      Mental Status: She is alert and oriented to person, place, and time.   Psychiatric:         Mood and Affect: Mood is anxious.         Behavior: Behavior normal.         Thought Content: Thought content normal.         Judgment: Judgment normal.         Assessment/Plan     Diagnosis Plan   1. 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  No follow-ups on file. Sooner if problems arise.         Shaheen Akbar D.O.  [facility]  Osteopathic Neuromusculoskeletal Medicine

## 2020-10-30 ENCOUNTER — LAB REQUISITION (OUTPATIENT)
Dept: LAB | Facility: HOSPITAL | Age: 80
End: 2020-10-30

## 2020-10-30 ENCOUNTER — READMISSION MANAGEMENT (OUTPATIENT)
Dept: CALL CENTER | Facility: HOSPITAL | Age: 80
End: 2020-10-30

## 2020-10-30 ENCOUNTER — HOSPITAL ENCOUNTER (OUTPATIENT)
Dept: RADIATION ONCOLOGY | Facility: HOSPITAL | Age: 80
Setting detail: RADIATION/ONCOLOGY SERIES
Discharge: HOME | End: 2020-10-30

## 2020-10-30 ENCOUNTER — TELEPHONE (OUTPATIENT)
Dept: FAMILY MEDICINE CLINIC | Facility: CLINIC | Age: 80
End: 2020-10-30

## 2020-10-30 DIAGNOSIS — L03.115 CELLULITIS OF RIGHT LOWER EXTREMITY: Primary | ICD-10-CM

## 2020-10-30 DIAGNOSIS — Z00.00 ENCOUNTER FOR GENERAL ADULT MEDICAL EXAMINATION WITHOUT ABNORMAL FINDINGS: ICD-10-CM

## 2020-10-30 LAB
BILIRUB UR QL STRIP: NEGATIVE
CLARITY UR: CLEAR
COLOR UR: YELLOW
GLUCOSE UR STRIP-MCNC: NEGATIVE MG/DL
HGB UR QL STRIP.AUTO: NEGATIVE
KETONES UR QL STRIP: NEGATIVE
LEUKOCYTE ESTERASE UR QL STRIP.AUTO: NEGATIVE
NITRITE UR QL STRIP: NEGATIVE
PH UR STRIP.AUTO: 7.5 [PH] (ref 5–8)
PROT UR QL STRIP: NEGATIVE
SP GR UR STRIP: 1.01 (ref 1–1.03)
UROBILINOGEN UR QL STRIP: NORMAL

## 2020-10-30 PROCEDURE — 77386: CPT | Performed by: RADIOLOGY

## 2020-10-30 PROCEDURE — 81003 URINALYSIS AUTO W/O SCOPE: CPT | Performed by: FAMILY MEDICINE

## 2020-10-30 RX ORDER — DOXYCYCLINE HYCLATE 100 MG/1
100 TABLET, DELAYED RELEASE ORAL 2 TIMES DAILY
Qty: 10 TABLET | Refills: 0 | Status: SHIPPED | OUTPATIENT
Start: 2020-10-30 | End: 2020-11-04

## 2020-10-30 NOTE — TELEPHONE ENCOUNTER
NILSON FROM HOME HEALTH CALLING TO RELAY THE FOLLOWING INFO TO THE DR AND NURSES -- SHE HAS FREQUENCY AND BURNING URINATION, SHE GOT A URINE TEST FROM HER. ALSO BOTH LEGS ARE REALLY PINK AND WARM --RIGHT SHIN IS STARTING TO GET BLISTERS ON IT AND THE PATIENT HAS HISTORY OF CELLULITIS.      NILSON CALLBACK # [phone]

## 2020-10-30 NOTE — OUTREACH NOTE
Medical Week 2 Survey      Responses   [facility] patient discharged from?  [place]   Does the patient have one of the following disease processes/diagnoses(primary or secondary)?  Other   Week 2 attempt successful?  Yes   Call start time  1922   Discharge diagnosis  Vulvar intraepithelial neoplasia    Call end time  1927   Meds reviewed with patient/caregiver?  Yes   Is the patient having any side effects they believe may be caused by any medication additions or changes?  No   Does the patient have all medications ordered at discharge?  Yes   Is the patient taking all medications as directed (includes completed medication regime)?  Yes   Does the patient have a primary care provider?   Yes   Does the patient have an appointment with their PCP within 7 days of discharge?  Yes   Has the patient kept scheduled appointments due by today?  Yes   What is the Home health agency?   [facility].    Has home health visited the patient within 72 hours of discharge?  Yes   Psychosocial issues?  No   Did the patient receive a copy of their discharge instructions?  Yes   Nursing interventions  Reviewed instructions with patient   What is the patient's perception of their health status since discharge?  New symptoms unrelated to diagnosis   Is the patient/caregiver able to teach back signs and symptoms related to disease process for when to call PCP?  Yes   Is the patient/caregiver able to teach back signs and symptoms related to disease process for when to call 911?  Yes   Is the patient/caregiver able to teach back the hierarchy of who to call/visit for symptoms/problems? PCP, Specialist, Home health nurse, Urgent Care, ED, 911  Yes   If the patient is a current smoker, are they able to teach back resources for cessation?  Not a smoker   Additional teach back comments  ABX for cellulitis on legs, Radiation TX each day this week.    Week 2 Call Completed?  Yes   Wrap up additional comments  Encouraged to call for help if  legs get much worse. Encouraged daily weights.          Anita Landin RN

## 2020-10-30 NOTE — TELEPHONE ENCOUNTER
Discussed in detail with Dr Akbar, he will send in Doxycycline 100mg  BID x5 days to treat cellulitis.

## 2020-11-02 ENCOUNTER — APPOINTMENT (OUTPATIENT)
Dept: ONCOLOGY | Facility: HOSPITAL | Age: 80
End: 2020-11-02

## 2020-11-02 ENCOUNTER — LAB (OUTPATIENT)
Dept: LAB | Facility: HOSPITAL | Age: 80
End: 2020-11-02

## 2020-11-02 ENCOUNTER — HOSPITAL ENCOUNTER (OUTPATIENT)
Dept: RADIATION ONCOLOGY | Facility: HOSPITAL | Age: 80
Setting detail: RADIATION/ONCOLOGY SERIES
End: 2020-11-02

## 2020-11-02 ENCOUNTER — HOSPITAL ENCOUNTER (OUTPATIENT)
Dept: RADIATION ONCOLOGY | Facility: HOSPITAL | Age: 80
Setting detail: RADIATION/ONCOLOGY SERIES
Discharge: HOME | End: 2020-11-02

## 2020-11-02 DIAGNOSIS — N18.31 ANEMIA DUE TO STAGE 3A CHRONIC KIDNEY DISEASE (HCC): ICD-10-CM

## 2020-11-02 DIAGNOSIS — D63.1 ANEMIA DUE TO STAGE 3A CHRONIC KIDNEY DISEASE (HCC): ICD-10-CM

## 2020-11-02 DIAGNOSIS — C51.9 VULVAR CANCER, CARCINOMA (HCC): Primary | ICD-10-CM

## 2020-11-02 LAB
BASOPHILS # BLD AUTO: 0.03 10*3/MM3 (ref 0–0.2)
BASOPHILS NFR BLD AUTO: 0.5 % (ref 0–1.5)
DEPRECATED RDW RBC AUTO: 50.7 FL (ref 37–54)
EOSINOPHIL # BLD AUTO: 0.01 10*3/MM3 (ref 0–0.4)
EOSINOPHIL NFR BLD AUTO: 0.2 % (ref 0.3–6.2)
ERYTHROCYTE [DISTWIDTH] IN BLOOD BY AUTOMATED COUNT: 15.6 % (ref 12.3–15.4)
HCT VFR BLD AUTO: 28.5 % (ref 34–46.6)
HGB BLD-MCNC: 9.5 G/DL (ref 12–15.9)
HOLD SPECIMEN: NORMAL
IMM GRANULOCYTES # BLD AUTO: 0.08 10*3/MM3 (ref 0–0.05)
IMM GRANULOCYTES NFR BLD AUTO: 1.2 % (ref 0–0.5)
LYMPHOCYTES # BLD AUTO: 0.37 10*3/MM3 (ref 0.7–3.1)
LYMPHOCYTES NFR BLD AUTO: 5.7 % (ref 19.6–45.3)
MCH RBC QN AUTO: 32.3 PG (ref 26.6–33)
MCHC RBC AUTO-ENTMCNC: 33.3 G/DL (ref 31.5–35.7)
MCV RBC AUTO: 96.9 FL (ref 79–97)
MONOCYTES # BLD AUTO: 0.62 10*3/MM3 (ref 0.1–0.9)
MONOCYTES NFR BLD AUTO: 9.6 % (ref 5–12)
NEUTROPHILS NFR BLD AUTO: 5.38 10*3/MM3 (ref 1.7–7)
NEUTROPHILS NFR BLD AUTO: 82.8 % (ref 42.7–76)
NRBC BLD AUTO-RTO: 0 /100 WBC (ref 0–0.2)
PLATELET # BLD AUTO: 331 10*3/MM3 (ref 140–450)
PMV BLD AUTO: 9.7 FL (ref 6–12)
RBC # BLD AUTO: 2.94 10*6/MM3 (ref 3.77–5.28)
WBC # BLD AUTO: 6.49 10*3/MM3 (ref 3.4–10.8)

## 2020-11-02 PROCEDURE — 36415 COLL VENOUS BLD VENIPUNCTURE: CPT

## 2020-11-02 PROCEDURE — 85025 COMPLETE CBC W/AUTO DIFF WBC: CPT

## 2020-11-02 PROCEDURE — 77386: CPT | Performed by: RADIOLOGY

## 2020-11-03 ENCOUNTER — APPOINTMENT (OUTPATIENT)
Dept: LAB | Facility: HOSPITAL | Age: 80
End: 2020-11-03

## 2020-11-03 ENCOUNTER — APPOINTMENT (OUTPATIENT)
Dept: ONCOLOGY | Facility: HOSPITAL | Age: 80
End: 2020-11-03

## 2020-11-03 ENCOUNTER — HOSPITAL ENCOUNTER (OUTPATIENT)
Dept: RADIATION ONCOLOGY | Facility: HOSPITAL | Age: 80
Setting detail: RADIATION/ONCOLOGY SERIES
Discharge: HOME | End: 2020-11-03

## 2020-11-03 PROCEDURE — 77386: CPT | Performed by: RADIOLOGY

## 2020-11-03 RX ORDER — CITALOPRAM 20 MG/1
20 TABLET ORAL DAILY
Qty: 90 TABLET | Refills: 1 | Status: SHIPPED | OUTPATIENT
Start: 2020-11-03 | End: 2021-05-04

## 2020-11-03 NOTE — PROGRESS NOTES
[facility] GROUP HEMATOLOGY AND ONCOLOGY  [address]  [facility] [address] [phone]    Patient Name: Dago Nunez  Encounter Date: 11/04/2020  YOB: 1940  Patient Number: [id number]      REASON FOR FOLLOW-UP: Dago Nunez is a pleasant 80-year-old  female who is seen on followup for macrocytic anemia from chronic kidney disease Stage III, GFR 51 mL/minute 03/05/2018, iron deficiency, and thrombocytopenia.  She is intolerant to oral iron (nausea, stomach cramps, and constipation).  She had Injectafer 24.5 months ago. She is also seen for vulvar cancer. She is seen C1D31 of Mitomycin C and 5FU with radiation. She is seen with spouse.  History is obtained from the patient. History is considered to be accurate.    She was admitted 10/19/2020 for nausea, vomiting, diarrhea, mucositis and cytopenias.  She was discharged 10/22/2020.   Her diarrhea has improved on 10/27/2020.  She had a follow-up with Dr. Akbar 10/30/2020.  She was given doxycycline 100 mg twice a day for 5 days for cellulitis.    She had resume radiation.  She needs 16 more fractions.     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  The patient was seen by Dr. Marks in favor definitive chemo radiation.  Pathology report 07/10/2020 periurethral biopsy, poorly differentiated carcinoma with squamous and papillary features, focally invasive in the subepithelial connective tissue.  PET 07/31/2020 showed intense FDG avid left inguinal node is highly suspicious for local regional metastasis from known vulvar squamous cell carcinoma.  No additional sites of suspicious FDG activity.  MRI 07/31/2020 showed redemonstration of mildly T2 hyperintense mass involving the urethral meatus, similar dimensions to prior exam on 02/18/2020 however there  "is now a polypoid lesion seen along the anterior aspect of the perineum, possibly contiguous with the urethral mass, concerning for disease progression.  Market interval enlargement of the left inguinal node almost certainly representing disease involvement.  Patient was notified by Dr. Siddiqui 08/19/2020.  Consensus for chemoradiation.  Patient reluctant to take chemotherapy.  Follow-up with Dr. Siddiqui in 4 to 6 weeks after radiation is completed.         PREVIOUS INTERVENTIONS:  Mitomycin C and 5-FU 10/05/2020 with radiation through present at [facility].      DIAGNOSTIC ABNORMALITIES:   The patient was seen by Dr. Greg Alberts 01/29/2018 complaining of coughing and wheezing. The patient was given Tussionex. Blood work was ordered. CBC 01/29/2018 revealed a WBC of 7.8, hemoglobin 11.2, hematocrit 35.1, MCV 96.2, platelets 43,000, and ANC 4.47. CMP remarkable for of glucose of 177 and GFR 59 mL/minute.  The patient was seen by VINCENT Jones, 02/02/2018. She was coughing and wheezing. Tussionex did not help. The patient was given prednisone.  The patient was seen by VINCENT Jones, 02/15/2018. She is followed for anemia from iron deficiency. CBC 02/15/2018 revealed a WBC of 12.9, hemoglobin 11.4, hematocrit 34.5, MCV 95, and platelets 68,000.       PREVIOUS INTERVENTIONS:   Ferrous sulfate 325 mg 03/07/2018 through 05/06/2018.  Not resumed 06/08/2018. \"I misunderstood.\"   Resume 09/05/2018 through 10/03/2018, stopped due to intolerance.  Injectafer 750 mg 10/20/2018 at the [place] office.         Vulvar cancer, carcinoma (CMS/HCC)    Initial Diagnosis    Vulvar cancer, carcinoma (CMS/HCC)     3/19/2001 Biopsy    Clinical Features: red vaginal lesion with bleeding since 1995. TX with  Carafate douche and Premarin vaginal cream with intermittent improvement.    DIAGNOSIS:    VAGINA, BIOPSY: FOCAL ULCERATION, MARKED ACUTE AND CHRONIC INFLAMMATION,  SPONGIOSIS AND REACTIVE ATYPIA; " NEGATIVE FOR DYSPLASIA.     8/17/2001 Procedure    Pap Smear:  DIAGNOSIS:            Atypical keratinized squamous cells, suspicious                           for squamous cell carcinoma.         COMMENTS:             There are numerous atypical keratinized cells                           present in an inflammatory background.  These are                           suspicious for squamous cell carcinoma.  Biopsy                           confirmation is recommended.      10/16/2001 Procedure    Pap Smear:  DIAGNOSIS:            Mild dysplasia       ADDITIONAL FINDINGS:  Marked inflammation                           Excessive hyperkeratosis         BETHESDA SYSTEM:      Low grade squamous intraepithelial lesion    DIAGNOSIS:            Negative, no abnormal cells seen           BETHESDA SYSTEM:      Within normal limits.       ADEQUACY:             Specimen satisfactory for evaluation       SOURCE:               Vagina, diagnostic thin prep PAP     11/7/2001 Biopsy      DIAGNOSIS:    VAGINA AND VULVA, RIGHT POSTERIOR INTROITUS, WIDE LOCAL EXCISION:  VAGINAL INTRAEPITHELIAL NEOPLASIA (VAIN) II-III, FOCALLY EXTENDING TO  VAGINAL MARGIN AT 12-4:00; ALL OTHER MARGINS NEGATIVE FOR  INTRAEPITHELIAL NEOPLASIA. (SEE COMMENT)    COMMENT: The lesion involves vaginal type epithelium with associated  chronic inflammation and erosion. The adjacent vulvar epithelium is  hyperkeratotic.     3/15/2002 Procedure    Pap Smear:  BETHESDA SYSTEM:      High grade squamous intraepithelial lesion       DESCRIPTOR:           Moderate dysplasia           ADEQUACY:             Specimen satisfactory for evaluation       SOURCE:               Vagina, Thin Prep Pap, Diagnostic     6/13/2003 Procedure         BETHESDA SYSTEM:      High grade squamous intraepithelial lesion       DESCRIPTOR:           Moderate dysplasia       ADDITIONAL FINDINGS:  Inflammation         ADEQUACY:             Specimen satisfactory for evaluation        SOURCE:               Vagina, Thin Prep Pap, Diagnostic    HUMAN PAPILLOMAVIRUS         CASE ACCESSION #:    [id number]        Category                  HPV Types                Patient Results         High/Intermed Risk    16,18,31,33,35,39              Negative                            45,51,52,56,58,59,68      Specimen Source        Cervical / Vaginal Specimen     12/5/2003 Procedure    Gynecological Cytology        CASE ACCESSION #:     [id number]       BETHESDA SYSTEM:      Low grade squamous intraepithelial lesion       DESCRIPTOR:           Mild dysplasia           ADEQUACY:             Specimen satisfactory for evaluation       SOURCE:               Vagina, Thin Prep Pap, Diagnostic     11/29/2011 Biopsy    ADDENDUM FINDINGS:    The high grade MOODY in the right labia majora biopsy was of the usual type.  There was no evidence of differentiated MOODY.      DIAGNOSIS:    1) PERINEUM, BIOPSY: SQUAMOUS EPITHELIUM WITH FLORID HYPERKERATOSIS,  CONSISTENT WITH CHRONIC IRRITATION; NO EVIDENCE OF DYSPLASIA OR MALIGNANCY  (SEE COMMENT).    Comment: Immunohistochemical studies (p16, p53, MIB-1) confirm the rendered  interpretations.    2) VULVA, RIGHT LABIUM MAJORUM, BIOPSY: VULVAR INTRAEPITHELIAL NEOPLASIA II  (MODERATE DYSPLASIA); (SEE COMMENT).    Comment: Immunohistochemical studies for p16 (nuclear and cytoplasmic  positivity in lower epithelial half) and MIB-1 (nuclear positivity in lower  epithelial half) confirms the diagnosis; p53 is positive only in rare  cells. A GMS histochemical study for fungal forms is negative.  Nevertheless, parakeratosis associated with neutrophils, as was seen  herein, is commonly associated with fungal vulvitis.     7/3/2012 Procedure    Gynecological Cytology    CASE ACCESSION #:                     [id number]  BETHESDA SYSTEM:                      High grade squamous intraepithelial                                        lesion  DESCRIPTOR:                           Mild to  moderate dysplasia  ADEQUACY:                             Specimen satisfactory for evaluation  SOURCE:                               Vagina, Thin Prep Pap, Diagnostic  # SLIDES REVIEWED:                    1     1/29/2013 Biopsy    DIAGNOSIS:    1) VULVA, RIGHT, ANTERIOR, BIOPSY:  SPONGIOTIC DERMATITIS WITH EXTENSIVE  DERMAL GRANULATION TISSUE, MIXED INFLAMMATION AND FIBROSIS (SEE COMENT).    Comment: Sections show spongiosis, basement membrane thickening, dermal  fibrosis, and extensive dermal granulation tissue with a predominantly  chronic inflammatory infiltrate. There is no evidence of dysplasia or  malignancy. The basement membrane thickening and dermal fibrosis suggests  that there may be component of lichen sclerosus. However, the underlying  cause of the ongoing inflammation and repair (granulation tissue) is not  clear from this sample. A tissue gram stain fails to reveal any large  bacterial aggregates.  GMS and AFB studies for fungal forms and acid fast  bacilli were negative respectively     11/27/2013 Surgery      Diagnosis    1) VULVA, LEFT ANTERIOR, BIOPSY: HIGH GRADE SQUAMOUS INTRAEPITHELIAL LESION (SEVERE DYSPLASIA, MOODY III).    2) VAGINAL WALL, ANTERIOR, BIOPSY: HIGH GRADE SQUAMOUS INTRAEPITHELIAL LESION (SEVERE DYSPLASIA, VaIN III).    3) VULVA, VULVECTOMY: FOCUS OF MICROINVASIVE SQUAMOUS CELL CARCINOMA, WELL-DIFFERENTIATED, DEPTH OF STROMAL INVASION 0.4 MM,  8 MM FROM CLOSEST DEEP MARGINS, 4 MM FROM RIGHT ANTERIOR VAGINAL MARGINS AT 8 - 9 O'CLOCK (PROXIMAL TIP OF SPECIMEN DESIGNATED   12 O'CLOCK); NEGATIVE FOR LYMPHOVASCULAR INVASION; ARISING IN A BACKGROUND OF EXTENSIVE VULVAR INTRAEPITHELIAL NEOPLASIA (SEVERE DYSPLASIA, MOODY III, CLASSICAL AND DIFFERENTIATED TYPES); MOODY III IS PRESENT AT RIGHT LATERAL SURGICAL MARGIN (7 - 9 O'CLOCK),   LEFT LATERAL SURGICAL MARGIN (3 - 5 O'CLOCK) AND RIGHT AND LEFT VAGINAL MARGINS; TOTAL LESIONAL AREA (INVASIVE CARCINOMA AND MOODY III) MEASURES 8.2 CM IN  GREATEST DIMENSION (GROSS MEASUREMENT); BACKGROUND SEVERE INTERFACE DERMATITIS AND LICHEN SCLEROSUS.        **Electronically signed out by Dimas Cardenas M.D.**on 12/2/2013  HAYLEY/YAZMIN/franky  Case reviewed by Attending Pathologist      Synoptic Diagnosis  3)  VULVA:     Specimen:                        Vulva  Procedure:                       Other: Vulvectomy  Lymph Node Sampling:             Not applicable  Specimen Size:                   Greatest dimension: 13.5 cm                                   Additional dimension: 9.5 cm                                   Additional dimension: 1.2 cm  Tumor Site:                      Right vulva, labium minus  Tumor Size:                      Greatest dimension: 0.04 cm  Tumor Focality:                  Unifocal  Histologic Type:                 Squamous cell carcinoma  Histologic Grade:                G1: Well differentiated  Microscopic Tumor Extension:     Depth of invasion: 0.4 mm  Margins:                         Uninvolved by invasive carcinoma                                   Distance of invasive carcinoma from closest margin: 4 mm  Lymph-Vascular Invasion:         Not identified  Lymph Nodes:                     No nodes submitted or found  Extranodal Extension:            Cannot be determined (explain):    Fixed or Ulcerated Femoral-Inguinal Lymph Nodes:                                    Not identified  Laterality of Involved Lymph Nodes:                                    Cannot be determined:    Primary Tumor (pT):              pT1a [FIGO IA]: Lesions 2 cm or less in size, confined to the vulva or perineum, and with stromal invasion 1.0 mm or less  Regional Lymph Nodes (pN):       pNX:  Regional lymph nodes cannot be assessed  Distant Metastasis (pM):         Not applicable  Additional Pathologic Findings:  Vulvar intraepithelial neoplasia (MOODY) 3 (severe dysplasia/carcinoma in situ)  --------------------------------------------------------     5/20/2014  "Procedure    Gynecologic Cytology  [place] Diagnosis:  High grade squamous intraepithelial lesion.    Descriptor/Additional Findings:  Moderate dysplasia.    Adequacy:  Specimen satisfactory for evaluation.    Source:  Vagina, Thin Prep Pap, Imaged Diagnostic     11/11/2014 Biopsy    Diagnosis    ANTERIOR VAGINAL WALL, BIOPSY:  HIGH GRADE SQUAMOUS INTRAEPITHELIAL LESION (VaIN 3, SEVERE DYSPLASIA)       12/19/2014 Surgery    Diagnosis  1)  ANTERIOR VAGINAL WALL, PARTIAL VAGINECTOMY: HIGH-GRADE SQUAMOUS INTRAEPITHELIAL LESION (VaIN 3, SEVERE DYSPLASIA), 2.7 CM; HIGH GRADE DYSPLASIA EXTENDS TO THE 2 AND 8 O'CLOCK TIP MARGINS, THE 5-8 O'CLOCK LATERAL MARGIN, AND THE 11-2 O'CLOCK LATERAL   MARGIN.          2)  JOYA-CLITORAL AREA, EXCISION: HIGH-GRADE SQUAMOUS INTRAEPITHELIAL LESION (VaIN 3, SEVERE DYSPLASIA), EXTENDING TO A LATERAL MARGIN.         2/9/2015 Surgery    Diagnosis  1.          VULVA, LEFT PERIURETHRAL PORTION, EXCISION: FOCAL HIGH-GRADE SQUAMOUS INTRAEPITHELIAL LESION (MOODY 2, MODERATE DYSPLASIA); MARGINS ARE NEGATIVE FOR DYSPLASIA.    2.          VULVA, RIGHT PERIURETHRAL PORTION, EXCISION: BENIGN SQUAMOUS MUCOSA WITH ACUTE AND CHRONIC INFLAMMATION AND REACTIVE CHANGES.         3.          VULVA, LEFT, LOWER PORTION, EXCISION: BENIGN SQUAMOUS MUCOSA WITH ACUTE AND CHRONIC INFLAMMATION, REACTIVE CHANGES AND FEATURES CONSISTENT WITH PREVIOUS SURGICAL PROCEDURE.         4.          VULVA, RIGHT PORTION, EXCISION: BENIGN SQUAMOUS MUCOSA WITH CHRONIC INFLAMMATION AND DERMAL FIBROSIS.        9/6/2017 Procedure    Diagnosis  \"PAP SMEAR FROM THE URETHRA\":  HIGH GRADE SQUAMOUS INTRAEPITHELIAL LESION.          10/26/2017 Biopsy    Urethral Meatus Biopsy:  Urothelial mucosa with ulceration, chronic inflammation and focal high grade squamous intraepithelial lesion, moderate dysplasia     7/10/2018 Imaging    MRI Pelvis:  Impression:    1.  There is a 2.3 x 1.8 cm urethral lesion at the external urethral   meatus " demonstrating enhancement and T2 hyperintensity. The lesion is   located approximately 8 mm from the bladder neck/internal urethral   orifice.  There is no extension of the lesion into the para vaginal   fat or ischiorectal fossa. There is some edema of the bilateral   ischiocavernosus muscles without invasion by the mass. No pelvic or   inguinal adenopathy is identified.     2.  The patient has a 2.5 cm cystocele and the urethra is almost   horizontal. There is pelvic floor laxity with loss of upper convexity   of the left iliococcygeus muscle.     7/20/2018 Biopsy    Diagnosis  URETHRA, BIOPSY:  SQUAMOUS CELL CARCINOMA IN SITU; SEE COMMENT.    Comments  P16 immunostain and HPV RNA in situ hybridization are strongly and diffusely positive within the lesion, consistent with HPV-related pathogenesis.     1/8/2019 Imaging    MRI Pelvis:  1.  Stable size and appearance of a nonspecific enhancing nodular   lesion at the caudal margin of the vaginal introitus adjacent to   extensive postsurgical changes related to prior vulvectomy and   vaginectomy. This lesion does not appear to conform to the expected   course of the urethra which is somewhat poorly defined, and this felt   more likely be located immediately caudal to the urethra. It is   possible this may reflect postsurgical scarring as opposed to a true   lesion. Continued follow-up is suggested to ensure stability.  2.  No lymphadenopathy or other evidence of metastatic disease in the   pelvis.  3.  Evidence of pelvic floor laxity with cystocele, not significantly   changed.     8/6/2019 Imaging    MRI Pelvis:  1. No significant change from the prior exam on this limited   noncontrast study.  2. Stable indeterminate nodule anterior to the external urethral   meatus which may represent focal scarring; however,   residual/recurrent disease is not entirely excluded.  Recommend continued attention on follow-up. 3. Extensive pelvic   postsurgical changes with no  evidence of local recurrence.  4. Pelvic floor laxity with cystocele unchanged from prior exam.     1/13/2020 Procedure    Urethral stricture dilation     2/18/2020 Imaging    MRI Pelvis:  Impression:  1.  No significant interval change in 1.7 x 1.7 cm T2 hyperintense   ovoid lesion at the urethral meatus.  2.  Numerous postoperative findings status post vaginectomy and   hysterectomy.  3.  Pelvic floor laxity with persistent cystocele.  4.  No pelvic adenopathy or bony lesion identified.     5/13/2020 Procedure    Urethral stricture dilation      7/10/2020 Biopsy    Diagnosis  PERIURETHRA, BIOPSY: POORLY DIFFERENTIATED CARCINOMA WITH SQUAMOUS AND PAPILLARY FEATURES, FOCALLY INVASIVE IN THE SUBEPITHELIAL CONNECTIVE TISSUE; SEE COMMENT.    Comments  The patient's history of vulvar microinvasive squamous cell carcinoma is noted. The current biopsy shows a poorly differentiated carcinoma with papillary formation. P16 immunostain and HPV RNA in situ hybridization are strongly and diffusely positive within the lesion, consistent with HPV-related pathogenesis. A KERRI-3 immunostain shows patchy, weak positivity in the lesional cells. The patient's prior biopsy ([id number]) is reviewed and shows similar morphologic and immunophenotypic features but the papillary features were not present in the prior material.  Dr. Ilene Pablo reviewed this case and concurs with the diagnosis.      7/31/2020 Imaging    MRI Pelvis:  1.  Redemonstration of a mildly T2 hyperintense mass involving the   urethral meatus, similar dimensions to prior exam on 2/18/2020,   however there is now a polypoid lesion seen along the anterior aspect   of the perineum, possibly contiguous with the urethral mass,   concerning for disease progression. This could potentially represent   the recently biopsied lesion.  2.  Marked interval enlargement of a left inguinal lymph node, almost   certainly representing disease involvement. This would be amenable  to   ultrasound-guided biopsy if warranted.  3.  Findings related to pelvic floor laxity, with cystocele.    PET/CT:  1.  Intensely FDG avid left inguinal lymph node is highly suspicious   for locoregional metastasis from known vulvar squamous cell   carcinoma. There are no additional sites of suspicious FDG activity.  2.   Intense physiologic FDG activity within the urine obscures   visualization of the periurethral mass. Findings are better   characterized on same day pelvic MRI.     10/5/2020 -  Chemotherapy    OP Vulvar MitoMYcin / Fluorouracil CIV + XRT       10/9/2020 -  Chemotherapy    OP CENTRAL VENOUS ACCESS DEVICE ACCESS, CARE, AND MAINTENANCE (CVAD)     Secondary malignancy of inguinal lymph nodes (CMS/HCC)   8/31/2020 Initial Diagnosis    Secondary malignancy of inguinal lymph nodes (CMS/HCC)     10/9/2020 -  Chemotherapy    OP CENTRAL VENOUS ACCESS DEVICE ACCESS, CARE, AND MAINTENANCE (CVAD)         PAST MEDICAL HISTORY:  ALLERGIES:  Allergies   Allergen Reactions   • Scopolamine Swelling     Other reaction(s): ANGIOEDEMA  Other reaction(s): ANGIOEDEMA     • Tequin [Gatifloxacin] Other (See Comments)     Doesn't remember   • Amoxicillin-Pot Clavulanate Rash   • Keflex [Cephalexin] Rash   • Septra [Sulfamethoxazole-Trimethoprim] Rash   • Trovan [Alatrofloxacin] Dizziness     CURRENT MEDICATIONS:  Outpatient Encounter Medications as of 11/4/2020   Medication Sig Dispense Refill   • Acetaminophen (TYLENOL ARTHRITIS PAIN PO) Take 1 tablet by mouth Daily.     • B Complex Vitamins (VITAMIN B COMPLEX) capsule capsule Take  by mouth Daily.     • calcium carbonate (OS-REAGAN) 600 MG tablet Take 600 mg by mouth Daily.     • cetirizine (zyrTEC) 10 MG tablet Take 10 mg by mouth Daily.     • citalopram (CeleXA) 20 MG tablet Take 1 tablet by mouth Daily. 90 tablet 1   • diphenoxylate-atropine (LOMOTIL) 2.5-0.025 MG per tablet Take 2 tablets by mouth 4 (Four) Times a Day As Needed for Diarrhea. 90 tablet 0   •  doxycycline (DORYX) 100 MG enteric coated tablet Take 1 tablet by mouth 2 (Two) Times a Day for 5 days. 10 tablet 0   • DULERA 100-5 MCG/ACT inhaler Inhale 2 puffs 2 (Two) Times a Day. Rinse and spit after using. 6 inhaler 0   • esomeprazole (nexIUM) 40 MG capsule Take 1 capsule by mouth 2 (Two) Times a Day. 180 capsule 3   • furosemide (LASIX) 40 MG tablet Take 1 tablet by mouth Daily. Patient only takes once a day 90 tablet 1   • gabapentin (NEURONTIN) 300 MG capsule Take 600 mg by mouth every night at bedtime.     • gabapentin (NEURONTIN) 300 MG capsule Take 300 mg by mouth Daily.     • Homeopathic Products (LEG CRAMPS) tablet Take 1 tablet by mouth Daily.     • HYDROcodone-acetaminophen (NORCO)  MG per tablet Take 1 tablet by mouth Every 4 (Four) Hours As Needed for Moderate Pain  or Severe Pain . 60 tablet 0   • Hydrocortisone (britany's amazing butt) cream Apply 1 application topically to the appropriate area as directed As Needed (irritation). 120 g 0   • Lidocaine Viscous HCl (XYLOCAINE) 2 % solution Take 5 mL by mouth 3 (Three) Times a Day With Meals. 100 mL 0   • ondansetron (Zofran) 8 MG tablet Take 1 tablet by mouth Every 8 (Eight) Hours As Needed for Nausea or Vomiting. 60 tablet 2   • potassium chloride (K-DUR,KLOR-CON) 20 MEQ CR tablet Take 1 tablet by mouth Daily. Patient only takes once a day 30 tablet 11   • pravastatin (PRAVACHOL) 40 MG tablet Take 40 mg by mouth Daily.     • [DISCONTINUED] citalopram (CeleXA) 20 MG tablet Take 20 mg by mouth Daily.       No facility-administered encounter medications on file as of 11/4/2020.      ADULT ILLNESSES:  Patient Active Problem List   Diagnosis Code   • Meatal stenosis [id number]   • Retention of urine R33.9   • Type 2 diabetes mellitus, without long-term current use of insulin (CMS/[facility]) E11.9   • Essential hypertension I10   • Grief reaction F43.21   • 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   • Chronic midline low back pain without  sciatica M54.5, G89.29   • Bilateral lower extremity edema R60.0   • History of urethral stricture Z87.448   • Epidermal cyst of neck L72.0   • Normocytic anemia D64.9   • Neck abscess L02.11   • Hyperlipidemia E78.5   • GERD (gastroesophageal reflux disease) K21.9   • Anxiety F41.9   • Iron deficiency and chemotherapy induced anemia D50.9   • Stage 3 chronic kidney disease N18.30   • Anemia in stage 3 chronic kidney disease N18.30, D63.1   • Screening for breast cancer Z12.39   • Lower extremity edema R60.0   • Vulvar cancer, carcinoma (CMS/HCC) C51.9   • Former smoker Z87.891   • 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   • Febrile illness R50.9   • Chemotherapy-induced thrombocytopenia D69.59, T45.1X5A   • Hyponatremia E87.1   • Hypokalemia E87.6   • Neutropenic fever (CMS/HCC) D70.9, R50.81   • Moderate malnutrition (CMS/HCC) E44.0   • Antineoplastic chemotherapy induced anemia D64.81, T45.1X5A     SURGERIES:  Past Surgical History:   Procedure Laterality Date   • APPENDECTOMY     • BREAST CYST ASPIRATION Left    • COLONOSCOPY  01/12/2011   • ENDOSCOPY  07/01/2014   • HYSTERECTOMY     • TONSILLECTOMY     • VAGINA SURGERY     • VENOUS ACCESS DEVICE (PORT) INSERTION N/A 9/29/2020    Procedure: SINGLE LUMEN PORT - A- CATH PLACEMENT WITH FLUOROSCOPY;  Surgeon: Quynh Rosario MD;  Location: [facility];  Service: General;  Laterality: N/A;     HEALTH MAINTENANCE ITEMS:  Health Maintenance Due   Topic Date Due   • COLONOSCOPY  1940   • ZOSTER VACCINE (2 of 3) 11/26/2015   • HEPATITIS C SCREENING  07/11/2017   • URINE MICROALBUMIN  01/29/2020   • INFLUENZA VACCINE  08/01/2020   • LIPID PANEL  08/26/2020       <no information>  Last Completed Colonoscopy       Status Date      COLONOSCOPY No completions recorded        Immunization History   Administered Date(s) Administered   • FLUAD TRI 65YR+ 10/22/2019   • Fluzone High Dose =>65 Years ([employer] ONLY) 10/01/2018   • Pneumococcal  "Polysaccharide (PPSV23) 10/16/2013   • Pneumococcal, Unspecified 10/01/2017   • Tdap 05/01/2019   • Zostavax 10/01/2015     Last Completed Mammogram       Status Date      MAMMOGRAM Done 1/2/2013 Ext Proc: INACTIVE -HC MAMMOGRAM SCREENING BILAT DIGITAL W CAD     Patient has more history with this topic...            FAMILY HISTORY:  Family History   Problem Relation Age of Onset   • Cancer Mother    • Hypertension Mother    • Osteoporosis Mother    • Dementia Mother    • Uterine cancer Mother    • Heart disease Father    • Parkinsonism Father    • Cancer Sister    • Breast cancer Sister    • Kidney cancer Sister    • Diabetes Brother    • Heart disease Paternal Grandfather    • No Known Problems Maternal Grandmother    • No Known Problems Maternal Grandfather    • No Known Problems Paternal Grandmother      SOCIAL HISTORY:  Social History     Socioeconomic History   • Marital status:      Spouse name: Not on file   • Number of children: Not on file   • Years of education: Not on file   • Highest education level: Not on file   Tobacco Use   • Smoking status: Former Smoker   • Smokeless tobacco: Never Used   Substance and Sexual Activity   • Alcohol use: No   • Drug use: No   • Sexual activity: Never       REVIEW OF SYSTEMS:    Review of Systems   Constitutional: Positive for fatigue. Negative for chills and fever.        \"I am so tired.  I sit or lay down most of the day.\"    HENT: Negative for congestion and trouble swallowing.    Eyes: Negative for redness and visual disturbance.   Respiratory: Negative for cough, shortness of breath and wheezing.    Cardiovascular: Positive for leg swelling. Negative for chest pain.   Gastrointestinal: Positive for diarrhea and nausea. Negative for abdominal pain, constipation and vomiting.        \"I got medicine.\"   Endocrine: Negative for cold intolerance and heat intolerance.   Genitourinary: Negative for dysuria, flank pain and hematuria.   Musculoskeletal: Negative for " "joint swelling and neck stiffness.   Skin: Positive for pallor.   Allergic/Immunologic: Negative for food allergies.   Neurological: Negative for dizziness, tremors and speech difficulty.   Hematological: Negative for adenopathy. Does not bruise/bleed easily.   Psychiatric/Behavioral: Negative for agitation and confusion.       VITAL SIGNS: /62   Pulse 57   Temp 97.6 °F (36.4 °C)   Resp 18   Ht 152.4 cm (60\")   Wt 75.3 kg (166 lb)   SpO2 96%   Breastfeeding No   BMI 32.42 kg/m²   Pain Score    11/04/20 0930   PainSc: 0-No pain       PHYSICAL EXAMINATION:     Physical Exam  Vitals signs reviewed.   Constitutional:       Appearance: She is ill-appearing.      Comments: She arrived in the exam room in a wheelchair.   HENT:      Head: Normocephalic and atraumatic.   Eyes:      General: No scleral icterus.  Neck:      Musculoskeletal: Neck supple.   Cardiovascular:      Rate and Rhythm: Normal rate and regular rhythm.   Pulmonary:      Effort: No respiratory distress.      Breath sounds: No wheezing or rales.      Comments: Port, right. No erythema.  Abdominal:      General: Bowel sounds are normal.      Palpations: Abdomen is soft.      Tenderness: There is no abdominal tenderness.   Musculoskeletal:      Comments: 2+ edema and redness, legs, R > L.    Skin:     General: Skin is warm and dry.      Coloration: Skin is pale.   Neurological:      Mental Status: She is alert and oriented to person, place, and time.   Psychiatric:         Mood and Affect: Mood normal.         Behavior: Behavior normal.         Thought Content: Thought content normal.         Judgment: Judgment normal.         LABS    Lab Results - Last 18 Months   Lab Units 11/02/20  0937 10/27/20  0949 10/22/20  0430 10/21/20  0521 10/20/20  0448 10/19/20  1535 10/19/20  1244 10/13/20  1114  10/05/20  0920 09/16/20  1134 09/03/20  1134 07/02/20  1019 05/14/20  1038   HEMOGLOBIN g/dL 9.5* 9.4* 9.4* 7.8* 8.4* 9.1* 9.3* 10.0*  --  11.1* 11.4* " 12.1 11.7* 11.5*   HEMATOCRIT % 28.5* 27.5* 27.4* 23.0* 23.5* 25.7* 26.4* 28.7*  --  33.3* 34.7 36.1 35.7 34.7   MCV fL 96.9 96.2 93.5 93.9 91.8 92.8 92.3 94.7  --  97.1* 95.3 96.8 97.5* 98.6*   WBC 10*3/mm3 6.49 6.85 8.42 6.38 2.65* 4.02 4.54 6.12  --  6.52 7.84 7.35 7.61 8.38   RDW % 15.6* 14.4 13.2 13.0 12.4 12.6 12.5 12.4  --  13.0 12.9 12.7 12.8 11.8*   MPV fL 9.7 9.9 12.2* 12.7* 12.7* 12.9* 10.9 10.5  --  10.0 11.7 9.6 9.3 10.1   PLATELETS 10*3/mm3 331 271 71* 51* 35* 35* 40* 85*  --  146 145 208 218 220   IMM GRAN % % 1.2*  --   --   --   --   --   --   --   --  0.5 0.3 0.1 0.0 0.0   NEUTROS ABS 10*3/mm3 5.38 5.82 7.49* 5.93 2.17 3.09 3.72 5.63   < > 5.02 5.54 5.08 4.70 5.82   LYMPHS ABS 10*3/mm3 0.37*  --   --   --   --   --   --   --   --  0.68* 1.39 1.65 1.97 1.65   MONOS ABS 10*3/mm3 0.62  --   --   --   --   --   --   --   --  0.37 0.53 0.35 0.52 0.53   EOS ABS 10*3/mm3 0.01  --   --   --   --   --   --  0.18  --  0.39 0.30 0.22 0.37 0.35   BASOS ABS 10*3/mm3 0.03 0.07  --   --   --   --   --  0.06  --  0.03 0.06 0.04 0.05 0.03   IMMATURE GRANS (ABS) 10*3/mm3 0.08*  --   --   --   --   --   --   --   --  0.03 0.02 0.01 0.00 0.00   NRBC /100 WBC 0.0  --  2.0*  --   --   --   --   --   --  0.0 0.0  --   --   --    NEUTROPHIL % %  --  85.0* 81.0* 89.9* 78.0* 64.6 67.0 88.0*   < >  --   --   --   --   --    MONOCYTES % %  --  8.0 4.0* 3.0* 9.0 13.1* 12.0 1.0*   < >  --   --   --   --   --    BASOPHIL % %  --  1.0  --   --   --   --   --  1.0  --   --   --   --   --   --    ATYP LYMPH % %  --   --   --   --  2.0 1.0 1.0  --   --   --   --   --   --   --    ANISOCYTOSIS   --  Slight/1+  --  Slight/1+  --   --  Slight/1+  --   --   --   --   --   --   --    GIANT PLT   --  Slight/1+ Slight/1+ Slight/1+ Slight/1+  --   --   --   --   --   --   --   --   --     < > = values in this interval not displayed.       Lab Results - Last 18 Months   Lab Units 10/22/20  0430 10/21/20  0521 10/20/20  6578 10/19/20  7134  10/19/20  1244 10/05/20  0920   GLUCOSE mg/dL 107* 164* 198* 152* 159* 182*   SODIUM mmol/L 134* 135* 133* 127* 128* 138   POTASSIUM mmol/L 3.5 3.7 3.3* 3.1* 2.9* 3.8   CO2 mmol/L 25.0 27.0 28.0 27.0 26.0 26.0   CHLORIDE mmol/L 99 101 98 90* 90* 101   ANION GAP mmol/L 10.0 7.0 7.0 10.0 12.0 11.0   CREATININE mg/dL 0.80 0.81 0.96 1.19* 1.39* 0.94   BUN mg/dL 12 16 18 24* 24* 14   BUN / CREAT RATIO  15.0 19.8 18.8 20.2 17.3 14.9   CALCIUM mg/dL 9.0 8.7 8.8 9.1 9.1 9.8   EGFR IF NONAFRICN AM mL/min/1.73 69 68 56* 44* 37* 58*   ALK PHOS U/L 47 43 39 51 46 61   TOTAL PROTEIN g/dL 6.3 5.7* 5.7* 6.4 6.6 7.6   ALT (SGPT) U/L 19 16 14 15 14 8   AST (SGOT) U/L 29 25 32 40* 37* 14   BILIRUBIN mg/dL 0.3 0.2 0.3 0.4 0.4 0.3   ALBUMIN g/dL 3.40* 3.00* 3.00* 3.50 3.50 4.30   GLOBULIN gm/dL 2.9 2.7 2.7 2.9 3.1 3.3       No results for input(s): MSPIKE, KAPPALAMB, IGLFLC, URICACID, FREEKAPPAL, CEA, LDH, REFLABREPO in the last 44648 hours.    Lab Results - Last 18 Months   Lab Units 10/20/20  0448 10/13/20  1114 09/03/20  1134 07/02/20  1019 05/14/20  1038 12/23/19  1322 11/11/19  1131   IRON mcg/dL  --  135 129 91 92 93 113   TIBC mcg/dL  --  335 384 338 311 380 368   IRON SATURATION %  --  40 34 27 30 24 31   FERRITIN ng/mL  --  599.10* 341.90* 315.10* 286.50* 309.50* 523.10*   TSH uIU/mL 0.831  --   --   --   --   --   --        Dago DIDI Nunez reports a pain score of 0.    Patient's Body mass index is 32.42 kg/m². BMI is above normal parameters. Recommendations include: none (medical contraindication).    ASSESSMENT:  1.   Recurrent squamous cell carcinoma, vulva.  AJCC stage IIIA (T2, Nib, M0, G3).  Treatment status.  On Mitomycin C and 5 FU with radiation.  D29 of chemo on hold.   2.  Macrocytic anemia from iron deficiency and history of chronic kidney disease Stage III, GFR 56 mL/min on 09/03/2020.  3.   Iron deficiency. Intolerant to oral iron.   4.   Chronic kidney disease Stage III, GFR 56 ml/min on 09/03/2020.  5.    Performance status of 3.     6.   Thrombocytopenia, 04/19/2018. (?) laboratory error.    7.   Squamous cell cancer in situ, urethra.  Followed by Dr. Callahan  8.   Grade 1 mucositis from chemo.             PLAN:  1.     Re:  Events from her hospitalization.  2.     Re:  Continue mouth wash for mucositis.   3.     Re:  Heme status.  Hemoglobin 9.5 gm and hematocrit 28.5  4.     Re:  Pre-office CMP.  GFR 69 ml/minute.  5.     Re:  Pre-office ferritin, TIBC, % saturation, and iron.   40% saturation and ferritin at 599.1      6.     Re:  Monitor for potential infusion reactions, nausea, vomiting, diarrhea, cardiotoxicity, cytopenias, and thrombotic thrombocytopenic purpura. No cisplatin due to chronic kidney disease.  7.    Re:  Hold chemo due to poor performance status.   8.   Will reevaluate in 2 weeks.   Mitomycin C 10 mg/m2 = 17 mg D29, on hold.  CIV 5-FU 1,000 mg/m2 = 1,700 mg D29 to D32, on hold.  9.  Premed:  Aloxi 0.25 mg IV  Decadron 12 mg IV D1 to D4.  10.  Neulasta for D4 as primary prophylaxis due to her age above 65.  High risk for febrile neutropenia.   11.  Weekly CBC with differential.  12.  Retacrit 40,000 units subcutaneously every week if hemoglobin less than 10 and hematocrit less than 30% to target a hemoglobin of 11 and hematocrit of 33%.  Move CBC with differential weekly if she starts Procrit. Observe for side effects.   13.  Continue ongoing management per primary care physician and other specialists.  14.  Plan of care discussed with patient and spouse.  Understanding expressed.  Patient agreeable to proceed.  15.  Intolerant to oral iron (nausea, cramps, and constipation).  IV iron as needed.   16.  Advance Care Planning   ACP discussion was held with the patient during this visit. Patient has an advance directive (not in EMR), copy requested.  17.  eRx Zofran 8 mg po every 8 hours as needed for nausea/vomiting, # 60, 2 refills if needed.  18.   eRx miracle mouth wash, 15 ml swish and spit every 4 hours as needed for mucositis, 480 ml, 2 refills if needed.  19.  Flush port every 6 weeks.   20.  Return to the office in 2 weeks.  Reevaluate for D29 of chemo if performance status improves.  21.  Blood for CBC with differential and CMP today.   22.  Order venous doppler both legs for edema.         I spent 29 total minutes, face-to-face, caring for Syndal today.  Greater than 50% of this time involved counseling and/or coordination of care as documented within this note regarding the patient's illness(es), pros and cons of various treatment options, instructions and/or risk reduction.              cc: MD Ulises Conley MD        (Trini Rosario MD)        Gilberto Mills MD        (Moustapha Callahan MD)        (Radha Marks MD)

## 2020-11-04 ENCOUNTER — LAB (OUTPATIENT)
Dept: LAB | Facility: HOSPITAL | Age: 80
End: 2020-11-04

## 2020-11-04 ENCOUNTER — HOSPITAL ENCOUNTER (OUTPATIENT)
Dept: ULTRASOUND IMAGING | Facility: HOSPITAL | Age: 80
Discharge: HOME | End: 2020-11-04
Admitting: INTERNAL MEDICINE

## 2020-11-04 ENCOUNTER — HOSPITAL ENCOUNTER (OUTPATIENT)
Dept: RADIATION ONCOLOGY | Facility: HOSPITAL | Age: 80
Setting detail: RADIATION/ONCOLOGY SERIES
Discharge: HOME | End: 2020-11-04

## 2020-11-04 ENCOUNTER — OFFICE VISIT (OUTPATIENT)
Dept: ONCOLOGY | Facility: CLINIC | Age: 80
End: 2020-11-04

## 2020-11-04 ENCOUNTER — TELEPHONE (OUTPATIENT)
Dept: ONCOLOGY | Facility: CLINIC | Age: 80
End: 2020-11-04

## 2020-11-04 VITALS
SYSTOLIC BLOOD PRESSURE: 124 MMHG | OXYGEN SATURATION: 96 % | HEART RATE: 57 BPM | TEMPERATURE: 97.6 F | WEIGHT: 166 LBS | BODY MASS INDEX: 32.59 KG/M2 | DIASTOLIC BLOOD PRESSURE: 62 MMHG | HEIGHT: 60 IN | RESPIRATION RATE: 18 BRPM

## 2020-11-04 DIAGNOSIS — M79.604 BILATERAL LOWER EXTREMITY PAIN: ICD-10-CM

## 2020-11-04 DIAGNOSIS — C51.9 VULVAR CANCER, CARCINOMA (HCC): Primary | ICD-10-CM

## 2020-11-04 DIAGNOSIS — R60.0 BILATERAL LOWER EXTREMITY EDEMA: ICD-10-CM

## 2020-11-04 DIAGNOSIS — M79.605 BILATERAL LOWER EXTREMITY PAIN: ICD-10-CM

## 2020-11-04 LAB
ALBUMIN SERPL-MCNC: 3.4 G/DL (ref 3.5–5.2)
ALBUMIN/GLOB SERPL: 1.1 G/DL
ALP SERPL-CCNC: 49 U/L (ref 39–117)
ALT SERPL W P-5'-P-CCNC: 9 U/L (ref 1–33)
ANION GAP SERPL CALCULATED.3IONS-SCNC: 11 MMOL/L (ref 5–15)
AST SERPL-CCNC: 15 U/L (ref 1–32)
BASOPHILS # BLD AUTO: 0.02 10*3/MM3 (ref 0–0.2)
BASOPHILS NFR BLD AUTO: 0.4 % (ref 0–1.5)
BILIRUB SERPL-MCNC: 0.3 MG/DL (ref 0–1.2)
BUN SERPL-MCNC: 15 MG/DL (ref 8–23)
BUN/CREAT SERPL: 8.8 (ref 7–25)
CALCIUM SPEC-SCNC: 8.8 MG/DL (ref 8.6–10.5)
CHLORIDE SERPL-SCNC: 91 MMOL/L (ref 98–107)
CO2 SERPL-SCNC: 32 MMOL/L (ref 22–29)
CREAT SERPL-MCNC: 1.71 MG/DL (ref 0.57–1)
DEPRECATED RDW RBC AUTO: 53.5 FL (ref 37–54)
EOSINOPHIL # BLD AUTO: 0.04 10*3/MM3 (ref 0–0.4)
EOSINOPHIL NFR BLD AUTO: 0.9 % (ref 0.3–6.2)
ERYTHROCYTE [DISTWIDTH] IN BLOOD BY AUTOMATED COUNT: 15.9 % (ref 12.3–15.4)
GFR SERPL CREATININE-BSD FRML MDRD: 29 ML/MIN/1.73
GLOBULIN UR ELPH-MCNC: 3.1 GM/DL
GLUCOSE SERPL-MCNC: 177 MG/DL (ref 65–99)
HCT VFR BLD AUTO: 29.9 % (ref 34–46.6)
HGB BLD-MCNC: 9.9 G/DL (ref 12–15.9)
IMM GRANULOCYTES # BLD AUTO: 0.06 10*3/MM3 (ref 0–0.05)
IMM GRANULOCYTES NFR BLD AUTO: 1.3 % (ref 0–0.5)
LYMPHOCYTES # BLD AUTO: 0.41 10*3/MM3 (ref 0.7–3.1)
LYMPHOCYTES NFR BLD AUTO: 8.8 % (ref 19.6–45.3)
MCH RBC QN AUTO: 32.2 PG (ref 26.6–33)
MCHC RBC AUTO-ENTMCNC: 33.1 G/DL (ref 31.5–35.7)
MCV RBC AUTO: 97.4 FL (ref 79–97)
MONOCYTES # BLD AUTO: 0.55 10*3/MM3 (ref 0.1–0.9)
MONOCYTES NFR BLD AUTO: 11.8 % (ref 5–12)
NEUTROPHILS NFR BLD AUTO: 3.6 10*3/MM3 (ref 1.7–7)
NEUTROPHILS NFR BLD AUTO: 76.8 % (ref 42.7–76)
NRBC BLD AUTO-RTO: 0 /100 WBC (ref 0–0.2)
PLATELET # BLD AUTO: 260 10*3/MM3 (ref 140–450)
PMV BLD AUTO: 9.8 FL (ref 6–12)
POTASSIUM SERPL-SCNC: 3 MMOL/L (ref 3.5–5.2)
PROT SERPL-MCNC: 6.5 G/DL (ref 6–8.5)
RBC # BLD AUTO: 3.07 10*6/MM3 (ref 3.77–5.28)
SODIUM SERPL-SCNC: 134 MMOL/L (ref 136–145)
WBC # BLD AUTO: 4.68 10*3/MM3 (ref 3.4–10.8)

## 2020-11-04 PROCEDURE — 85025 COMPLETE CBC W/AUTO DIFF WBC: CPT | Performed by: INTERNAL MEDICINE

## 2020-11-04 PROCEDURE — 77386: CPT | Performed by: RADIOLOGY

## 2020-11-04 PROCEDURE — 99214 OFFICE O/P EST MOD 30 MIN: CPT | Performed by: INTERNAL MEDICINE

## 2020-11-04 PROCEDURE — 80053 COMPREHEN METABOLIC PANEL: CPT | Performed by: INTERNAL MEDICINE

## 2020-11-04 PROCEDURE — 93970 EXTREMITY STUDY: CPT | Performed by: SURGERY

## 2020-11-04 PROCEDURE — 36415 COLL VENOUS BLD VENIPUNCTURE: CPT | Performed by: INTERNAL MEDICINE

## 2020-11-04 PROCEDURE — 93970 EXTREMITY STUDY: CPT

## 2020-11-04 NOTE — TELEPHONE ENCOUNTER
----- Message from Drake Stafford MD sent at 11/4/2020 11:27 AM CST -----  Fax CMP to PCP potassium 3 on potassium supplements and GFR 29.

## 2020-11-05 ENCOUNTER — TELEPHONE (OUTPATIENT)
Dept: FAMILY MEDICINE CLINIC | Facility: CLINIC | Age: 80
End: 2020-11-05

## 2020-11-05 ENCOUNTER — HOSPITAL ENCOUNTER (OUTPATIENT)
Dept: RADIATION ONCOLOGY | Facility: HOSPITAL | Age: 80
Setting detail: RADIATION/ONCOLOGY SERIES
Discharge: HOME | End: 2020-11-05

## 2020-11-05 ENCOUNTER — TELEPHONE (OUTPATIENT)
Dept: ONCOLOGY | Facility: CLINIC | Age: 80
End: 2020-11-05

## 2020-11-05 PROCEDURE — 77386: CPT | Performed by: RADIOLOGY

## 2020-11-05 PROCEDURE — 77336 RADIATION PHYSICS CONSULT: CPT | Performed by: RADIOLOGY

## 2020-11-05 NOTE — TELEPHONE ENCOUNTER
HOME HEALTH CALLED AND STATED THAT PATIENT IS REQUESTING A FLU VACCINE. THEY WOULD LIKE TO KNOW DR. SHAHID'S OPINON SINCE SHE IS IMMUNOCOMPROMISED.   IF HE AGREE SHE CAN HAVE THE VACCINE CAN HE PLEASE SEND THE ORDER INTO     [facility] - [place], [place] - [address] - [phone] [facility] [phone]   [phone]    [facility] NUMBER   [phone]

## 2020-11-05 NOTE — TELEPHONE ENCOUNTER
----- Message from Drake Stafford MD sent at 11/4/2020  5:50 PM CST -----  There is no evidence of deep venous thrombosis or  superficial thrombophlebitis of right or left lower extremities.    Call patient.  No DVT.

## 2020-11-06 ENCOUNTER — HOSPITAL ENCOUNTER (OUTPATIENT)
Dept: RADIATION ONCOLOGY | Facility: HOSPITAL | Age: 80
Setting detail: RADIATION/ONCOLOGY SERIES
Discharge: HOME | End: 2020-11-06

## 2020-11-06 ENCOUNTER — APPOINTMENT (OUTPATIENT)
Dept: ONCOLOGY | Facility: HOSPITAL | Age: 80
End: 2020-11-06

## 2020-11-06 PROCEDURE — 77386: CPT | Performed by: RADIOLOGY

## 2020-11-09 ENCOUNTER — HOSPITAL ENCOUNTER (OUTPATIENT)
Dept: RADIATION ONCOLOGY | Facility: HOSPITAL | Age: 80
Setting detail: RADIATION/ONCOLOGY SERIES
Discharge: HOME | End: 2020-11-09

## 2020-11-09 ENCOUNTER — OFFICE VISIT (OUTPATIENT)
Dept: FAMILY MEDICINE CLINIC | Facility: CLINIC | Age: 80
End: 2020-11-09

## 2020-11-09 ENCOUNTER — TELEPHONE (OUTPATIENT)
Dept: FAMILY MEDICINE CLINIC | Facility: CLINIC | Age: 80
End: 2020-11-09

## 2020-11-09 VITALS
OXYGEN SATURATION: 96 % | SYSTOLIC BLOOD PRESSURE: 126 MMHG | RESPIRATION RATE: 18 BRPM | DIASTOLIC BLOOD PRESSURE: 70 MMHG | BODY MASS INDEX: 32.12 KG/M2 | TEMPERATURE: 98 F | WEIGHT: 163.6 LBS | HEART RATE: 62 BPM | HEIGHT: 60 IN

## 2020-11-09 DIAGNOSIS — Z23 FLU VACCINE NEED: Primary | ICD-10-CM

## 2020-11-09 DIAGNOSIS — R60.0 LOWER EXTREMITY EDEMA: ICD-10-CM

## 2020-11-09 DIAGNOSIS — E87.6 HYPOKALEMIA: ICD-10-CM

## 2020-11-09 PROCEDURE — 99214 OFFICE O/P EST MOD 30 MIN: CPT | Performed by: FAMILY MEDICINE

## 2020-11-09 PROCEDURE — G0008 ADMIN INFLUENZA VIRUS VAC: HCPCS | Performed by: FAMILY MEDICINE

## 2020-11-09 PROCEDURE — 90694 VACC AIIV4 NO PRSRV 0.5ML IM: CPT | Performed by: FAMILY MEDICINE

## 2020-11-09 PROCEDURE — 77386: CPT | Performed by: RADIOLOGY

## 2020-11-09 RX ORDER — POTASSIUM CHLORIDE 20 MEQ/1
40 TABLET, EXTENDED RELEASE ORAL DAILY
Qty: 60 TABLET | Refills: 11 | Status: SHIPPED | OUTPATIENT
Start: 2020-11-09 | End: 2020-11-17 | Stop reason: SDUPTHER

## 2020-11-09 NOTE — TELEPHONE ENCOUNTER
potassium chloride (K-DUR,KLOR-CON) 20 MEQ CR tablet HAS 2 SETS OF DIRECTIONS. PHARMACY WOULD LIKE A CALLBACK TO DETERMINE HOW TO FILL    PLEASE ADVISE

## 2020-11-10 ENCOUNTER — HOSPITAL ENCOUNTER (OUTPATIENT)
Dept: RADIATION ONCOLOGY | Facility: HOSPITAL | Age: 80
Setting detail: RADIATION/ONCOLOGY SERIES
Discharge: HOME | End: 2020-11-10

## 2020-11-10 ENCOUNTER — LAB (OUTPATIENT)
Dept: LAB | Facility: HOSPITAL | Age: 80
End: 2020-11-10

## 2020-11-10 ENCOUNTER — INFUSION (OUTPATIENT)
Dept: ONCOLOGY | Facility: HOSPITAL | Age: 80
End: 2020-11-10

## 2020-11-10 VITALS
RESPIRATION RATE: 18 BRPM | TEMPERATURE: 97.1 F | SYSTOLIC BLOOD PRESSURE: 110 MMHG | OXYGEN SATURATION: 100 % | DIASTOLIC BLOOD PRESSURE: 50 MMHG | HEART RATE: 61 BPM

## 2020-11-10 DIAGNOSIS — C51.9 VULVAR CANCER, CARCINOMA (HCC): Primary | ICD-10-CM

## 2020-11-10 DIAGNOSIS — C77.4 SECONDARY MALIGNANCY OF INGUINAL LYMPH NODES (HCC): ICD-10-CM

## 2020-11-10 DIAGNOSIS — D64.81 ANTINEOPLASTIC CHEMOTHERAPY INDUCED ANEMIA: Primary | ICD-10-CM

## 2020-11-10 DIAGNOSIS — C51.9 VULVAR CANCER, CARCINOMA (HCC): ICD-10-CM

## 2020-11-10 DIAGNOSIS — K52.1 DIARRHEA DUE TO DRUG: ICD-10-CM

## 2020-11-10 DIAGNOSIS — T45.1X5A ANTINEOPLASTIC CHEMOTHERAPY INDUCED ANEMIA: Primary | ICD-10-CM

## 2020-11-10 LAB
BASOPHILS # BLD AUTO: 0.03 10*3/MM3 (ref 0–0.2)
BASOPHILS NFR BLD AUTO: 0.4 % (ref 0–1.5)
DEPRECATED RDW RBC AUTO: 55.2 FL (ref 37–54)
EOSINOPHIL # BLD AUTO: 0.13 10*3/MM3 (ref 0–0.4)
EOSINOPHIL NFR BLD AUTO: 1.8 % (ref 0.3–6.2)
ERYTHROCYTE [DISTWIDTH] IN BLOOD BY AUTOMATED COUNT: 16.2 % (ref 12.3–15.4)
HCT VFR BLD AUTO: 28.9 % (ref 34–46.6)
HGB BLD-MCNC: 9.8 G/DL (ref 12–15.9)
HOLD SPECIMEN: NORMAL
IMM GRANULOCYTES # BLD AUTO: 0.05 10*3/MM3 (ref 0–0.05)
IMM GRANULOCYTES NFR BLD AUTO: 0.7 % (ref 0–0.5)
LYMPHOCYTES # BLD AUTO: 0.41 10*3/MM3 (ref 0.7–3.1)
LYMPHOCYTES NFR BLD AUTO: 5.8 % (ref 19.6–45.3)
MCH RBC QN AUTO: 32.2 PG (ref 26.6–33)
MCHC RBC AUTO-ENTMCNC: 33.9 G/DL (ref 31.5–35.7)
MCV RBC AUTO: 95.1 FL (ref 79–97)
MONOCYTES # BLD AUTO: 0.68 10*3/MM3 (ref 0.1–0.9)
MONOCYTES NFR BLD AUTO: 9.7 % (ref 5–12)
NEUTROPHILS NFR BLD AUTO: 5.73 10*3/MM3 (ref 1.7–7)
NEUTROPHILS NFR BLD AUTO: 81.6 % (ref 42.7–76)
NRBC BLD AUTO-RTO: 0 /100 WBC (ref 0–0.2)
PLATELET # BLD AUTO: 264 10*3/MM3 (ref 140–450)
PMV BLD AUTO: 10.2 FL (ref 6–12)
RBC # BLD AUTO: 3.04 10*6/MM3 (ref 3.77–5.28)
WBC # BLD AUTO: 7.03 10*3/MM3 (ref 3.4–10.8)

## 2020-11-10 PROCEDURE — 36415 COLL VENOUS BLD VENIPUNCTURE: CPT

## 2020-11-10 PROCEDURE — 77300 RADIATION THERAPY DOSE PLAN: CPT | Performed by: RADIOLOGY

## 2020-11-10 PROCEDURE — 96372 THER/PROPH/DIAG INJ SC/IM: CPT

## 2020-11-10 PROCEDURE — 85025 COMPLETE CBC W/AUTO DIFF WBC: CPT

## 2020-11-10 PROCEDURE — 25010000002 EPOETIN ALFA-EPBX 40000 UNIT/ML SOLUTION: Performed by: INTERNAL MEDICINE

## 2020-11-10 PROCEDURE — 77386: CPT | Performed by: RADIOLOGY

## 2020-11-10 RX ORDER — DIPHENOXYLATE HYDROCHLORIDE AND ATROPINE SULFATE 2.5; .025 MG/1; MG/1
2 TABLET ORAL 4 TIMES DAILY PRN
Qty: 90 TABLET | Refills: 0 | Status: SHIPPED | OUTPATIENT
Start: 2020-11-10 | End: 2020-12-16 | Stop reason: SDUPTHER

## 2020-11-10 RX ORDER — ONDANSETRON HYDROCHLORIDE 8 MG/1
8 TABLET, FILM COATED ORAL EVERY 8 HOURS PRN
Qty: 60 TABLET | Refills: 2 | Status: SHIPPED | OUTPATIENT
Start: 2020-11-10 | End: 2021-04-22 | Stop reason: SDUPTHER

## 2020-11-10 RX ADMIN — EPOETIN ALFA-EPBX 40000 UNITS: 40000 INJECTION, SOLUTION INTRAVENOUS; SUBCUTANEOUS at 10:43

## 2020-11-10 NOTE — PROGRESS NOTES
[facility] ONC [facility] HEMATOLOGY AND ONCOLOGY  [address]  [facility] [address] [phone]    Patient Name: Dago Nunez  Encounter Date: 11/17/2020  YOB: 1940  Patient Number: [id number]      REASON FOR FOLLOW-UP: Dago Nunez is a pleasant 80-year-old  female who is seen on followup for macrocytic anemia from chronic kidney disease Stage III, GFR 51 mL/minute 03/05/2018, iron deficiency, and thrombocytopenia.  She is intolerant to oral iron (nausea, stomach cramps, and constipation).  She had Injectafer 25 months ago. She is also seen for vulvar cancer. She is seen C1D44 of Mitomycin C and 5FU with radiation. Her D29 chemo was delayed secondary to hospitalization, tolerance, and poor performance status. She is seen with with daughter, Clara.  History is obtained from the patient. 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  The patient was seen by Dr. Marks in favor definitive chemo radiation.  Pathology report 07/10/2020 periurethral biopsy, poorly differentiated carcinoma with squamous and papillary features, focally invasive in the subepithelial connective tissue.  PET 07/31/2020 showed intense FDG avid left inguinal node is highly suspicious for local regional metastasis from known vulvar squamous cell carcinoma.  No additional sites of suspicious FDG activity.  MRI 07/31/2020 showed redemonstration of mildly T2 hyperintense mass involving the urethral meatus, similar dimensions to prior exam on 02/18/2020 however there is now a polypoid lesion seen along the anterior aspect of the perineum, possibly contiguous with the urethral mass, concerning for disease progression.  Market interval enlargement of the left inguinal node almost certainly  "representing disease involvement.  Patient was notified by Dr. Siddiqui 08/19/2020.  Consensus for chemoradiation.  Patient reluctant to take chemotherapy.  Follow-up with Dr. Siddiqui in 4 to 6 weeks after radiation is completed.         PREVIOUS INTERVENTIONS:  Mitomycin C and 5-FU 10/05/2020 through 10/08/2020 with radiation at [facility].  D29 to d32 of chemo discontinued due to poor performance status.       DIAGNOSTIC ABNORMALITIES:   The patient was seen by Dr. Greg Alberts 01/29/2018 complaining of coughing and wheezing. The patient was given Tussionex. Blood work was ordered. CBC 01/29/2018 revealed a WBC of 7.8, hemoglobin 11.2, hematocrit 35.1, MCV 96.2, platelets 43,000, and ANC 4.47. CMP remarkable for of glucose of 177 and GFR 59 mL/minute.  The patient was seen by VINCENT Jones, 02/02/2018. She was coughing and wheezing. Tussionex did not help. The patient was given prednisone.  The patient was seen by VINCENT Jones, 02/15/2018. She is followed for anemia from iron deficiency. CBC 02/15/2018 revealed a WBC of 12.9, hemoglobin 11.4, hematocrit 34.5, MCV 95, and platelets 68,000.       PREVIOUS INTERVENTIONS:   Ferrous sulfate 325 mg 03/07/2018 through 05/06/2018.  Not resumed 06/08/2018. \"I misunderstood.\"   Resume 09/05/2018 through 10/03/2018, stopped due to intolerance.  Injectafer 750 mg 10/20/2018 at the [place] office.  Retacrit 10/27/2020 through present.       Vulvar cancer, carcinoma (CMS/HCC)    Initial Diagnosis    Vulvar cancer, carcinoma (CMS/HCC)     3/19/2001 Biopsy    Clinical Features: red vaginal lesion with bleeding since 1995. TX with  Carafate douche and Premarin vaginal cream with intermittent improvement.    DIAGNOSIS:    VAGINA, BIOPSY: FOCAL ULCERATION, MARKED ACUTE AND CHRONIC INFLAMMATION,  SPONGIOSIS AND REACTIVE ATYPIA; NEGATIVE FOR DYSPLASIA.     8/17/2001 Procedure    Pap Smear:  DIAGNOSIS:            Atypical keratinized squamous cells, " suspicious                           for squamous cell carcinoma.         COMMENTS:             There are numerous atypical keratinized cells                           present in an inflammatory background.  These are                           suspicious for squamous cell carcinoma.  Biopsy                           confirmation is recommended.      10/16/2001 Procedure    Pap Smear:  DIAGNOSIS:            Mild dysplasia       ADDITIONAL FINDINGS:  Marked inflammation                           Excessive hyperkeratosis         BETHESDA SYSTEM:      Low grade squamous intraepithelial lesion    DIAGNOSIS:            Negative, no abnormal cells seen           BETHESDA SYSTEM:      Within normal limits.       ADEQUACY:             Specimen satisfactory for evaluation       SOURCE:               Vagina, diagnostic thin prep PAP     11/7/2001 Biopsy      DIAGNOSIS:    VAGINA AND VULVA, RIGHT POSTERIOR INTROITUS, WIDE LOCAL EXCISION:  VAGINAL INTRAEPITHELIAL NEOPLASIA (VAIN) II-III, FOCALLY EXTENDING TO  VAGINAL MARGIN AT 12-4:00; ALL OTHER MARGINS NEGATIVE FOR  INTRAEPITHELIAL NEOPLASIA. (SEE COMMENT)    COMMENT: The lesion involves vaginal type epithelium with associated  chronic inflammation and erosion. The adjacent vulvar epithelium is  hyperkeratotic.     3/15/2002 Procedure    Pap Smear:  BETHESDA SYSTEM:      High grade squamous intraepithelial lesion       DESCRIPTOR:           Moderate dysplasia           ADEQUACY:             Specimen satisfactory for evaluation       SOURCE:               Vagina, Thin Prep Pap, Diagnostic     6/13/2003 Procedure         BETHESDA SYSTEM:      High grade squamous intraepithelial lesion       DESCRIPTOR:           Moderate dysplasia       ADDITIONAL FINDINGS:  Inflammation         ADEQUACY:             Specimen satisfactory for evaluation       SOURCE:               Vagina, Thin Prep Pap, Diagnostic    HUMAN PAPILLOMAVIRUS         CASE ACCESSION  #:    [id number]        Category                  HPV Types                Patient Results         High/Intermed Risk    16,18,31,33,35,39              Negative                            45,51,52,56,58,59,68      Specimen Source        Cervical / Vaginal Specimen     12/5/2003 Procedure    Gynecological Cytology        CASE ACCESSION #:     [id number]       BETHESDA SYSTEM:      Low grade squamous intraepithelial lesion       DESCRIPTOR:           Mild dysplasia           ADEQUACY:             Specimen satisfactory for evaluation       SOURCE:               Vagina, Thin Prep Pap, Diagnostic     11/29/2011 Biopsy    ADDENDUM FINDINGS:    The high grade MOODY in the right labia majora biopsy was of the usual type.  There was no evidence of differentiated MOODY.      DIAGNOSIS:    1) PERINEUM, BIOPSY: SQUAMOUS EPITHELIUM WITH FLORID HYPERKERATOSIS,  CONSISTENT WITH CHRONIC IRRITATION; NO EVIDENCE OF DYSPLASIA OR MALIGNANCY  (SEE COMMENT).    Comment: Immunohistochemical studies (p16, p53, MIB-1) confirm the rendered  interpretations.    2) VULVA, RIGHT LABIUM MAJORUM, BIOPSY: VULVAR INTRAEPITHELIAL NEOPLASIA II  (MODERATE DYSPLASIA); (SEE COMMENT).    Comment: Immunohistochemical studies for p16 (nuclear and cytoplasmic  positivity in lower epithelial half) and MIB-1 (nuclear positivity in lower  epithelial half) confirms the diagnosis; p53 is positive only in rare  cells. A GMS histochemical study for fungal forms is negative.  Nevertheless, parakeratosis associated with neutrophils, as was seen  herein, is commonly associated with fungal vulvitis.     7/3/2012 Procedure    Gynecological Cytology    CASE ACCESSION #:                     [id number]  BETHESDA SYSTEM:                      High grade squamous intraepithelial                                        lesion  DESCRIPTOR:                           Mild to moderate dysplasia  ADEQUACY:                             Specimen satisfactory for  evaluation  SOURCE:                               Vagina, Thin Prep Pap, Diagnostic  # [id number] REVIEWED:                    1     1/29/2013 Biopsy    DIAGNOSIS:    1) VULVA, RIGHT, ANTERIOR, BIOPSY:  SPONGIOTIC DERMATITIS WITH EXTENSIVE  DERMAL GRANULATION TISSUE, MIXED INFLAMMATION AND FIBROSIS (SEE COMENT).    Comment: Sections show spongiosis, basement membrane thickening, dermal  fibrosis, and extensive dermal granulation tissue with a predominantly  chronic inflammatory infiltrate. There is no evidence of dysplasia or  malignancy. The basement membrane thickening and dermal fibrosis suggests  that there may be component of lichen sclerosus. However, the underlying  cause of the ongoing inflammation and repair (granulation tissue) is not  clear from this sample. A tissue gram stain fails to reveal any large  bacterial aggregates.  GMS and AFB studies for fungal forms and acid fast  bacilli were negative respectively     11/27/2013 Surgery      Diagnosis    1) VULVA, LEFT ANTERIOR, BIOPSY: HIGH GRADE SQUAMOUS INTRAEPITHELIAL LESION (SEVERE DYSPLASIA, MOODY III).    2) VAGINAL WALL, ANTERIOR, BIOPSY: HIGH GRADE SQUAMOUS INTRAEPITHELIAL LESION (SEVERE DYSPLASIA, VaIN III).    3) VULVA, VULVECTOMY: FOCUS OF MICROINVASIVE SQUAMOUS CELL CARCINOMA, WELL-DIFFERENTIATED, DEPTH OF STROMAL INVASION 0.4 MM,  8 MM FROM CLOSEST DEEP MARGINS, 4 MM FROM RIGHT ANTERIOR VAGINAL MARGINS AT 8 - 9 O'CLOCK (PROXIMAL TIP OF SPECIMEN DESIGNATED   12 O'CLOCK); NEGATIVE FOR LYMPHOVASCULAR INVASION; ARISING IN A BACKGROUND OF EXTENSIVE VULVAR INTRAEPITHELIAL NEOPLASIA (SEVERE DYSPLASIA, MOODY III, CLASSICAL AND DIFFERENTIATED TYPES); MOODY III IS PRESENT AT RIGHT LATERAL SURGICAL MARGIN (7 - 9 O'CLOCK),   LEFT LATERAL SURGICAL MARGIN (3 - 5 O'CLOCK) AND RIGHT AND LEFT VAGINAL MARGINS; TOTAL LESIONAL AREA (INVASIVE CARCINOMA AND MOODY III) MEASURES 8.2 CM IN GREATEST DIMENSION (GROSS MEASUREMENT); BACKGROUND SEVERE INTERFACE DERMATITIS AND LICHEN  SCLEROSUS.        **Electronically signed out by Dimas Cardenas M.D.**on 12/2/2013  HAYLEY/YAZMIN/franky  Case reviewed by Attending Pathologist      Synoptic Diagnosis  3)  VULVA:     Specimen:                        Vulva  Procedure:                       Other: Vulvectomy  Lymph Node Sampling:             Not applicable  Specimen Size:                   Greatest dimension: 13.5 cm                                   Additional dimension: 9.5 cm                                   Additional dimension: 1.2 cm  Tumor Site:                      Right vulva, labium minus  Tumor Size:                      Greatest dimension: 0.04 cm  Tumor Focality:                  Unifocal  Histologic Type:                 Squamous cell carcinoma  Histologic Grade:                G1: Well differentiated  Microscopic Tumor Extension:     Depth of invasion: 0.4 mm  Margins:                         Uninvolved by invasive carcinoma                                   Distance of invasive carcinoma from closest margin: 4 mm  Lymph-Vascular Invasion:         Not identified  Lymph Nodes:                     No nodes submitted or found  Extranodal Extension:            Cannot be determined (explain):    Fixed or Ulcerated Femoral-Inguinal Lymph Nodes:                                    Not identified  Laterality of Involved Lymph Nodes:                                    Cannot be determined:    Primary Tumor (pT):              pT1a [FIGO IA]: Lesions 2 cm or less in size, confined to the vulva or perineum, and with stromal invasion 1.0 mm or less  Regional Lymph Nodes (pN):       pNX:  Regional lymph nodes cannot be assessed  Distant Metastasis (pM):         Not applicable  Additional Pathologic Findings:  Vulvar intraepithelial neoplasia (MOODY) 3 (severe dysplasia/carcinoma in situ)  --------------------------------------------------------     5/20/2014 Procedure    Gynecologic Cytology  Curtis Diagnosis:  High grade squamous intraepithelial  "lesion.    Descriptor/Additional Findings:  Moderate dysplasia.    Adequacy:  Specimen satisfactory for evaluation.    Source:  Vagina, Thin Prep Pap, Imaged Diagnostic     11/11/2014 Biopsy    Diagnosis    ANTERIOR VAGINAL WALL, BIOPSY:  HIGH GRADE SQUAMOUS INTRAEPITHELIAL LESION (VaIN 3, SEVERE DYSPLASIA)       12/19/2014 Surgery    Diagnosis  1)  ANTERIOR VAGINAL WALL, PARTIAL VAGINECTOMY: HIGH-GRADE SQUAMOUS INTRAEPITHELIAL LESION (VaIN 3, SEVERE DYSPLASIA), 2.7 CM; HIGH GRADE DYSPLASIA EXTENDS TO THE 2 AND 8 O'CLOCK TIP MARGINS, THE 5-8 O'CLOCK LATERAL MARGIN, AND THE 11-2 O'CLOCK LATERAL   MARGIN.          2)  JOYA-CLITORAL AREA, EXCISION: HIGH-GRADE SQUAMOUS INTRAEPITHELIAL LESION (VaIN 3, SEVERE DYSPLASIA), EXTENDING TO A LATERAL MARGIN.         2/9/2015 Surgery    Diagnosis  1.          VULVA, LEFT PERIURETHRAL PORTION, EXCISION: FOCAL HIGH-GRADE SQUAMOUS INTRAEPITHELIAL LESION (MOODY 2, MODERATE DYSPLASIA); MARGINS ARE NEGATIVE FOR DYSPLASIA.    2.          VULVA, RIGHT PERIURETHRAL PORTION, EXCISION: BENIGN SQUAMOUS MUCOSA WITH ACUTE AND CHRONIC INFLAMMATION AND REACTIVE CHANGES.         3.          VULVA, LEFT, LOWER PORTION, EXCISION: BENIGN SQUAMOUS MUCOSA WITH ACUTE AND CHRONIC INFLAMMATION, REACTIVE CHANGES AND FEATURES CONSISTENT WITH PREVIOUS SURGICAL PROCEDURE.         4.          VULVA, RIGHT PORTION, EXCISION: BENIGN SQUAMOUS MUCOSA WITH CHRONIC INFLAMMATION AND DERMAL FIBROSIS.        9/6/2017 Procedure    Diagnosis  \"PAP SMEAR FROM THE URETHRA\":  HIGH GRADE SQUAMOUS INTRAEPITHELIAL LESION.          10/26/2017 Biopsy    Urethral Meatus Biopsy:  Urothelial mucosa with ulceration, chronic inflammation and focal high grade squamous intraepithelial lesion, moderate dysplasia     7/10/2018 Imaging    MRI Pelvis:  Impression:    1.  There is a 2.3 x 1.8 cm urethral lesion at the external urethral   meatus demonstrating enhancement and T2 hyperintensity. The lesion is   located approximately 8 mm from " the bladder neck/internal urethral   orifice.  There is no extension of the lesion into the para vaginal   fat or ischiorectal fossa. There is some edema of the bilateral   ischiocavernosus muscles without invasion by the mass. No pelvic or   inguinal adenopathy is identified.     2.  The patient has a 2.5 cm cystocele and the urethra is almost   horizontal. There is pelvic floor laxity with loss of upper convexity   of the left iliococcygeus muscle.     7/20/2018 Biopsy    Diagnosis  URETHRA, BIOPSY:  SQUAMOUS CELL CARCINOMA IN SITU; SEE COMMENT.    Comments  P16 immunostain and HPV RNA in situ hybridization are strongly and diffusely positive within the lesion, consistent with HPV-related pathogenesis.     1/8/2019 Imaging    MRI Pelvis:  1.  Stable size and appearance of a nonspecific enhancing nodular   lesion at the caudal margin of the vaginal introitus adjacent to   extensive postsurgical changes related to prior vulvectomy and   vaginectomy. This lesion does not appear to conform to the expected   course of the urethra which is somewhat poorly defined, and this felt   more likely be located immediately caudal to the urethra. It is   possible this may reflect postsurgical scarring as opposed to a true   lesion. Continued follow-up is suggested to ensure stability.  2.  No lymphadenopathy or other evidence of metastatic disease in the   pelvis.  3.  Evidence of pelvic floor laxity with cystocele, not significantly   changed.     8/6/2019 Imaging    MRI Pelvis:  1. No significant change from the prior exam on this limited   noncontrast study.  2. Stable indeterminate nodule anterior to the external urethral   meatus which may represent focal scarring; however,   residual/recurrent disease is not entirely excluded.  Recommend continued attention on follow-up. 3. Extensive pelvic   postsurgical changes with no evidence of local recurrence.  4. Pelvic floor laxity with cystocele unchanged from prior exam.      1/13/2020 Procedure    Urethral stricture dilation     2/18/2020 Imaging    MRI Pelvis:  Impression:  1.  No significant interval change in 1.7 x 1.7 cm T2 hyperintense   ovoid lesion at the urethral meatus.  2.  Numerous postoperative findings status post vaginectomy and   hysterectomy.  3.  Pelvic floor laxity with persistent cystocele.  4.  No pelvic adenopathy or bony lesion identified.     5/13/2020 Procedure    Urethral stricture dilation      7/10/2020 Biopsy    Diagnosis  PERIURETHRA, BIOPSY: POORLY DIFFERENTIATED CARCINOMA WITH SQUAMOUS AND PAPILLARY FEATURES, FOCALLY INVASIVE IN THE SUBEPITHELIAL CONNECTIVE TISSUE; SEE COMMENT.    Comments  The patient's history of vulvar microinvasive squamous cell carcinoma is noted. The current biopsy shows a poorly differentiated carcinoma with papillary formation. P16 immunostain and HPV RNA in situ hybridization are strongly and diffusely positive within the lesion, consistent with HPV-related pathogenesis. A KERRI-3 immunostain shows patchy, weak positivity in the lesional cells. The patient's prior biopsy ([id number]) is reviewed and shows similar morphologic and immunophenotypic features but the papillary features were not present in the prior material.  Dr. Ilene Pablo reviewed this case and concurs with the diagnosis.      7/31/2020 Imaging    MRI Pelvis:  1.  Redemonstration of a mildly T2 hyperintense mass involving the   urethral meatus, similar dimensions to prior exam on 2/18/2020,   however there is now a polypoid lesion seen along the anterior aspect   of the perineum, possibly contiguous with the urethral mass,   concerning for disease progression. This could potentially represent   the recently biopsied lesion.  2.  Marked interval enlargement of a left inguinal lymph node, almost   certainly representing disease involvement. This would be amenable to   ultrasound-guided biopsy if warranted.  3.  Findings related to pelvic floor laxity, with  cystocele.    PET/CT:  1.  Intensely FDG avid left inguinal lymph node is highly suspicious   for locoregional metastasis from known vulvar squamous cell   carcinoma. There are no additional sites of suspicious FDG activity.  2.   Intense physiologic FDG activity within the urine obscures   visualization of the periurethral mass. Findings are better   characterized on same day pelvic MRI.     10/5/2020 -  Chemotherapy    OP Vulvar MitoMYcin / Fluorouracil CIV + XRT       10/9/2020 -  Chemotherapy    OP CENTRAL VENOUS ACCESS DEVICE ACCESS, CARE, AND MAINTENANCE (CVAD)     Secondary malignancy of inguinal lymph nodes (CMS/HCC)   8/31/2020 Initial Diagnosis    Secondary malignancy of inguinal lymph nodes (CMS/HCC)     10/9/2020 -  Chemotherapy    OP CENTRAL VENOUS ACCESS DEVICE ACCESS, CARE, AND MAINTENANCE (CVAD)         PAST MEDICAL HISTORY:  ALLERGIES:  Allergies   Allergen Reactions   • Scopolamine Swelling     Other reaction(s): ANGIOEDEMA  Other reaction(s): ANGIOEDEMA     • Tequin [Gatifloxacin] Other (See Comments)     Doesn't remember   • Amoxicillin-Pot Clavulanate Rash   • Keflex [Cephalexin] Rash   • Septra [Sulfamethoxazole-Trimethoprim] Rash   • Trovan [Alatrofloxacin] Dizziness     CURRENT MEDICATIONS:  Outpatient Encounter Medications as of 11/17/2020   Medication Sig Dispense Refill   • Acetaminophen (TYLENOL ARTHRITIS PAIN PO) Take 1 tablet by mouth Daily.     • B Complex Vitamins (VITAMIN B COMPLEX) capsule capsule Take  by mouth Daily.     • calcium carbonate (OS-REAGAN) 600 MG tablet Take 600 mg by mouth Daily.     • cetirizine (zyrTEC) 10 MG tablet Take 10 mg by mouth Daily.     • citalopram (CeleXA) 20 MG tablet Take 1 tablet by mouth Daily. 90 tablet 1   • diphenoxylate-atropine (LOMOTIL) 2.5-0.025 MG per tablet Take 2 tablets by mouth 4 (Four) Times a Day As Needed for Diarrhea. 90 tablet 0   • DULERA 100-5 MCG/ACT inhaler Inhale 2 puffs 2 (Two) Times a Day. Rinse and spit after using. 6 inhaler 0    • esomeprazole (nexIUM) 40 MG capsule Take 1 capsule by mouth 2 (Two) Times a Day. 180 capsule 3   • furosemide (LASIX) 40 MG tablet Take 1 tablet by mouth Daily. Patient only takes once a day 90 tablet 1   • gabapentin (NEURONTIN) 300 MG capsule Take 600 mg by mouth every night at bedtime.     • gabapentin (NEURONTIN) 300 MG capsule Take 300 mg by mouth Daily.     • Homeopathic Products (LEG CRAMPS) tablet Take 1 tablet by mouth Daily.     • HYDROcodone-acetaminophen (NORCO)  MG per tablet Take 1 tablet by mouth Every 4 (Four) Hours As Needed for Moderate Pain  or Severe Pain . 60 tablet 0   • Hydrocortisone (britany's amazing butt) cream Apply 1 application topically to the appropriate area as directed As Needed (irritation). 120 g 0   • Lidocaine Viscous HCl (XYLOCAINE) 2 % solution Take 5 mL by mouth 3 (Three) Times a Day With Meals. 100 mL 0   • ondansetron (Zofran) 8 MG tablet Take 1 tablet by mouth Every 8 (Eight) Hours As Needed for Nausea or Vomiting. 60 tablet 2   • potassium chloride (K-DUR,KLOR-CON) 20 MEQ CR tablet Take 2 tablets by mouth Daily. Patient only takes once a day 60 tablet 11   • pravastatin (PRAVACHOL) 40 MG tablet Take 40 mg by mouth Daily.     • [DISCONTINUED] diphenoxylate-atropine (LOMOTIL) 2.5-0.025 MG per tablet Take 2 tablets by mouth 4 (Four) Times a Day As Needed for Diarrhea. 90 tablet 0   • [DISCONTINUED] ondansetron (Zofran) 8 MG tablet Take 1 tablet by mouth Every 8 (Eight) Hours As Needed for Nausea or Vomiting. 60 tablet 2   • [] epoetin timo-epbx (RETACRIT) injection 40,000 Units        No facility-administered encounter medications on file as of 2020.      ADULT ILLNESSES:  Patient Active Problem List   Diagnosis Code   • Meatal stenosis [id number]   • Retention of urine R33.9   • Type 2 diabetes mellitus, without long-term current use of insulin (CMS/[facility]) E11.9   • Essential hypertension I10   • Grief reaction F43.21   • 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   • Chronic midline low back pain without sciatica M54.5, G89.29   • Bilateral lower extremity edema R60.0   • History of urethral stricture Z87.448   • Epidermal cyst of neck L72.0   • Normocytic anemia D64.9   • Neck abscess L02.11   • Hyperlipidemia E78.5   • GERD (gastroesophageal reflux disease) K21.9   • Anxiety F41.9   • Iron deficiency and chemotherapy induced anemia D50.9   • Stage 3 chronic kidney disease N18.30   • Anemia in stage 3 chronic kidney disease N18.30, D63.1   • Screening for breast cancer Z12.39   • Lower extremity edema R60.0   • Vulvar cancer, carcinoma (CMS/HCC) C51.9   • Former smoker Z87.891   • 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   • Febrile illness R50.9   • Chemotherapy-induced thrombocytopenia D69.59, T45.1X5A   • Hyponatremia E87.1   • Hypokalemia E87.6   • Neutropenic fever (CMS/HCC) D70.9, R50.81   • Moderate malnutrition (CMS/HCC) E44.0   • Antineoplastic chemotherapy induced anemia D64.81, T45.1X5A     SURGERIES:  Past Surgical History:   Procedure Laterality Date   • APPENDECTOMY     • BREAST CYST ASPIRATION Left    • COLONOSCOPY  01/12/2011   • ENDOSCOPY  07/01/2014   • HYSTERECTOMY     • TONSILLECTOMY     • VAGINA SURGERY     • VENOUS ACCESS DEVICE (PORT) INSERTION N/A 9/29/2020    Procedure: SINGLE LUMEN PORT - A- CATH PLACEMENT WITH FLUOROSCOPY;  Surgeon: Quynh Rosario MD;  Location: [facility];  Service: General;  Laterality: N/A;     HEALTH MAINTENANCE ITEMS:  Health Maintenance Due   Topic Date Due   • COLONOSCOPY  1940   • ZOSTER VACCINE (2 of 3) 11/26/2015   • HEPATITIS C SCREENING  07/11/2017   • URINE MICROALBUMIN  01/29/2020   • LIPID PANEL  08/26/2020       <no information>  Last Completed Colonoscopy       Status Date      COLONOSCOPY No completions recorded        Immunization History   Administered Date(s) Administered   • FLUAD TRI 65YR+ 10/22/2019   • Fluad Quad 65+ 11/09/2020   • Fluzone High Dose =>65  "Years ([employer] ONLY) 10/01/2018   • Pneumococcal Polysaccharide (PPSV23) 10/16/2013   • Pneumococcal, Unspecified 10/01/2017   • Tdap 05/01/2019   • Zostavax 10/01/2015     Last Completed Mammogram       Status Date      MAMMOGRAM Done 1/2/2013 Ext Proc: INACTIVE -HC MAMMOGRAM SCREENING BILAT DIGITAL W CAD     Patient has more history with this topic...            FAMILY HISTORY:  Family History   Problem Relation Age of Onset   • Cancer Mother    • Hypertension Mother    • Osteoporosis Mother    • Dementia Mother    • Uterine cancer Mother    • Heart disease Father    • Parkinsonism Father    • Cancer Sister    • Breast cancer Sister    • Kidney cancer Sister    • Diabetes Brother    • Heart disease Paternal Grandfather    • No Known Problems Maternal Grandmother    • No Known Problems Maternal Grandfather    • No Known Problems Paternal Grandmother      SOCIAL HISTORY:  Social History     Socioeconomic History   • Marital status:      Spouse name: Not on file   • Number of children: Not on file   • Years of education: Not on file   • Highest education level: Not on file   Tobacco Use   • Smoking status: Former Smoker   • Smokeless tobacco: Never Used   Substance and Sexual Activity   • Alcohol use: No   • Drug use: No   • Sexual activity: Never       REVIEW OF SYSTEMS:    Review of Systems   Constitutional: Positive for fatigue. Negative for chills and fever.        \"I am very weak but appetite is better.\"   HENT: Negative for congestion and trouble swallowing.    Eyes: Negative for redness and visual disturbance.   Respiratory: Negative for cough, shortness of breath and wheezing.    Cardiovascular: Positive for leg swelling. Negative for chest pain.   Gastrointestinal: Negative for constipation, diarrhea, nausea and vomiting.   Endocrine: Negative for cold intolerance and heat intolerance.   Genitourinary: Negative for difficulty urinating, flank pain, hematuria and vaginal bleeding.   Musculoskeletal: " "Negative for joint swelling.   Skin: Positive for pallor.   Allergic/Immunologic: Negative for food allergies.   Neurological: Negative for dizziness and speech difficulty.   Hematological: Negative for adenopathy.   Psychiatric/Behavioral: Negative for agitation, confusion and hallucinations.       VITAL SIGNS: /60   Pulse 50   Temp 98.8 °F (37.1 °C)   Resp 18   Ht 152.4 cm (60\")   Wt 73.9 kg (163 lb)   SpO2 98%   Breastfeeding No   BMI 31.83 kg/m²   Pain Score    11/17/20 1123   PainSc: 0-No pain       PHYSICAL EXAMINATION:     Physical Exam  Vitals signs reviewed.   Constitutional:       General: She is not in acute distress.     Appearance: She is ill-appearing.      Comments: She arrived in the exam room in a wheelchair.   HENT:      Head: Normocephalic and atraumatic.   Eyes:      General: No scleral icterus.  Neck:      Musculoskeletal: Neck supple.   Cardiovascular:      Rate and Rhythm: Normal rate and regular rhythm.   Pulmonary:      Breath sounds: No wheezing or rales.      Comments: Port, right. No erythema.  Abdominal:      General: Bowel sounds are normal.      Palpations: Abdomen is soft.      Tenderness: There is no abdominal tenderness.   Musculoskeletal:      Comments: 2 + bilateral leg edema, unchanged. \"No worse.\"   Skin:     Coloration: Skin is pale.   Neurological:      Mental Status: She is alert and oriented to person, place, and time.   Psychiatric:         Mood and Affect: Mood normal.         Behavior: Behavior normal.         Thought Content: Thought content normal.         Judgment: Judgment normal.         LABS    Lab Results - Last 18 Months   Lab Units 11/17/20  0958 11/10/20  0947 11/04/20  1043 11/02/20  0937 10/27/20  0949 10/22/20  0430 10/21/20  0521 10/20/20  0448 10/19/20  1535 10/19/20  1244 10/13/20  1114  10/05/20  0920 09/16/20  1134   HEMOGLOBIN g/dL 10.9* 9.8* 9.9* 9.5* 9.4* 9.4* 7.8* 8.4* 9.1* 9.3* 10.0*  --  11.1* 11.4*   HEMATOCRIT % 33.5* 28.9* 29.9* " 28.5* 27.5* 27.4* 23.0* 23.5* 25.7* 26.4* 28.7*  --  33.3* 34.7   MCV fL 98.2* 95.1 97.4* 96.9 96.2 93.5 93.9 91.8 92.8 92.3 94.7  --  97.1* 95.3   WBC 10*3/mm3 7.39 7.03 4.68 6.49 6.85 8.42 6.38 2.65* 4.02 4.54 6.12  --  6.52 7.84   RDW % 16.7* 16.2* 15.9* 15.6* 14.4 13.2 13.0 12.4 12.6 12.5 12.4  --  13.0 12.9   MPV fL 10.0 10.2 9.8 9.7 9.9 12.2* 12.7* 12.7* 12.9* 10.9 10.5  --  10.0 11.7   PLATELETS 10*3/mm3 182 264 260 331 271 71* 51* 35* 35* 40* 85*  --  146 145   IMM GRAN % % 0.8* 0.7* 1.3* 1.2*  --   --   --   --   --   --   --   --  0.5 0.3   NEUTROS ABS 10*3/mm3 6.24 5.73 3.60 5.38 5.82 7.49* 5.93 2.17 3.09 3.72 5.63   < > 5.02 5.54   LYMPHS ABS 10*3/mm3 0.44* 0.41* 0.41* 0.37*  --   --   --   --   --   --   --   --  0.68* 1.39   MONOS ABS 10*3/mm3 0.50 0.68 0.55 0.62  --   --   --   --   --   --   --   --  0.37 0.53   EOS ABS 10*3/mm3 0.11 0.13 0.04 0.01  --   --   --   --   --   --  0.18  --  0.39 0.30   BASOS ABS 10*3/mm3 0.04 0.03 0.02 0.03 0.07  --   --   --   --   --  0.06  --  0.03 0.06   IMMATURE GRANS (ABS) 10*3/mm3 0.06* 0.05 0.06* 0.08*  --   --   --   --   --   --   --   --  0.03 0.02   NRBC /100 WBC 0.0 0.0 0.0 0.0  --  2.0*  --   --   --   --   --   --  0.0 0.0   NEUTROPHIL % %  --   --   --   --  85.0* 81.0* 89.9* 78.0* 64.6 67.0 88.0*   < >  --   --    MONOCYTES % %  --   --   --   --  8.0 4.0* 3.0* 9.0 13.1* 12.0 1.0*   < >  --   --    BASOPHIL % %  --   --   --   --  1.0  --   --   --   --   --  1.0  --   --   --    ATYP LYMPH % %  --   --   --   --   --   --   --  2.0 1.0 1.0  --   --   --   --    ANISOCYTOSIS   --   --   --   --  Slight/1+  --  Slight/1+  --   --  Slight/1+  --   --   --   --    GIANT PLT   --   --   --   --  Slight/1+ Slight/1+ Slight/1+ Slight/1+  --   --   --   --   --   --     < > = values in this interval not displayed.       Lab Results - Last 18 Months   Lab Units 11/17/20  0958 11/04/20  1043 10/22/20  0430 10/21/20  0521 10/20/20  0448 10/19/20  1434   GLUCOSE  mg/dL 185* 177* 107* 164* 198* 152*   SODIUM mmol/L 133* 134* 134* 135* 133* 127*   POTASSIUM mmol/L 4.1 3.0* 3.5 3.7 3.3* 3.1*   CO2 mmol/L 29.0 32.0* 25.0 27.0 28.0 27.0   CHLORIDE mmol/L 92* 91* 99 101 98 90*   ANION GAP mmol/L 12.0 11.0 10.0 7.0 7.0 10.0   CREATININE mg/dL 1.52* 1.71* 0.80 0.81 0.96 1.19*   BUN mg/dL 18 15 12 16 18 24*   BUN / CREAT RATIO  11.8 8.8 15.0 19.8 18.8 20.2   CALCIUM mg/dL 8.8 8.8 9.0 8.7 8.8 9.1   EGFR IF NONAFRICN AM mL/min/1.73 33* 29* 69 68 56* 44*   ALK PHOS U/L 56 49 47 43 39 51   TOTAL PROTEIN g/dL 6.6 6.5 6.3 5.7* 5.7* 6.4   ALT (SGPT) U/L 10 9 19 16 14 15   AST (SGOT) U/L 21 15 29 25 32 40*   BILIRUBIN mg/dL 0.3 0.3 0.3 0.2 0.3 0.4   ALBUMIN g/dL 3.40* 3.40* 3.40* 3.00* 3.00* 3.50   GLOBULIN gm/dL 3.2 3.1 2.9 2.7 2.7 2.9       No results for input(s): MSPIKE, KAPPALAMB, IGLFLC, URICACID, FREEKAPPAL, CEA, LDH, REFLABREPO in the last 60465 hours.    Lab Results - Last 18 Months   Lab Units 10/20/20  0448 10/13/20  1114 09/03/20  1134 07/02/20  1019 05/14/20  1038 12/23/19  1322 11/11/19  1131   IRON mcg/dL  --  135 129 91 92 93 113   TIBC mcg/dL  --  335 384 338 311 380 368   IRON SATURATION %  --  40 34 27 30 24 31   FERRITIN ng/mL  --  599.10* 341.90* 315.10* 286.50* 309.50* 523.10*   TSH uIU/mL 0.831  --   --   --   --   --   --        Jameeldulce Nunez reports a pain score of 0.      Patient's Body mass index is 31.83 kg/m². BMI is above normal parameters. Recommendations include: none (medical contraindication).      ASSESSMENT:  1.   Recurrent squamous cell carcinoma, vulva.  AJCC stage IIIA (T2, Nib, M0, G3).  Treatment status.  Had Mitomycin C and 5 FU D1 to D4 with radiation.  D29 of chemo on hold due to poor performance status.   2.  Macrocytic anemia from iron deficiency and history of chronic kidney disease Stage III, GFR 56 mL/min on 09/03/2020.  3.   Iron deficiency. Intolerant to oral iron.   4.   Chronic kidney disease Stage III, GFR 56 ml/min on 09/03/2020.  5.  "  Performance status of 3.     6.   Thrombocytopenia, 04/19/2018. (?) laboratory error.    7.   Squamous cell cancer in situ, urethra.  Followed by Dr. Callahan  8.   Grade 1 mucositis from chemo.               PLAN:  1.     Re:  Patient's performance status.  She needs 12 more fractions. Resume 11/23/2020.  2.     Re:  Chemo withheld due to poor performance status.    3.     Re:  Heme status.  Hemoglobin 10.9 and hematocrit 33.5.  4.     Re:  Pre-office CMP.  GFR 33 ml/minute.  5.     Re:  Venous Doppler 11/04/2020 showed no evidence of deep venous thrombosis or superficial thrombophlebitis, both legs.     6.     Re:  Hold chemo due to poor performance status.  \"I don't believe I can handle it.  That makes me very happy, no more chemo.\"  7.    Weekly CBC with differential.  8.    Retacrit 40,000 units subcutaneously every week if hemoglobin less than 11 and hematocrit less than 33. Observe for side effects.   9.    Continue ongoing management per primary care physician and other specialists.  10.  Plan of care discussed with patient and daughter.  Understanding expressed.  Patient agreeable to proceed.  11.  Intolerant to oral iron (nausea, cramps, and constipation).  IV iron as needed.   12.  Advance Care Planning   ACP discussion was held with the patient during this visit. Patient has an advance directive (not in EMR), copy requested.  13.  eRx Zofran 8 mg po every 8 hours as needed for nausea/vomiting, # 60, 2 refills if needed.  14.  eRx miracle mouth wash, 15 ml swish and spit every 4 hours as needed for mucositis, 480 ml, 2 refills if needed.  15.  Flush port every 6 weeks.   16.  Return to the office in 4 weeks with CMP.           I spent 30 total minutes, face-to-face, caring for Syndal today.  Greater than 50% of this time involved counseling and/or coordination of care as documented within this note regarding the patient's illness(es), pros and cons of various " treatment options, instructions and/or risk reduction.           cc: MD Ulises Conley MD        (Trini Rosario MD)        Gilberto Mills MD        (Moustapha Callahan MD)        (Radha Marks MD)

## 2020-11-11 NOTE — PROGRESS NOTES
CC:   Chief Complaint   Patient presents with   • Follow-up     requesting flu vaccine    • Leg Swelling     worsening, skin is red per patient        History:  Dago Nunez is a 80 y.o. female who presents today for follow-up for evaluation of the above:    Here for flu vaccine, reports bilateral leg swelling with some redness without any fever or chills, recently had ultrasound without evidence of DVT.  Also reports taking muscle aches, potassium on 11/4/2020 was 3.0.    ROS:  Review of Systems   Constitutional: Positive for fatigue. Negative for chills and fever.   Cardiovascular: Positive for leg swelling.   Musculoskeletal: Positive for myalgias.       Ms. Nunez  reports that she has quit smoking. She has never used smokeless tobacco. She reports that she does not drink alcohol or use drugs.      Current Outpatient Medications:   •  Acetaminophen (TYLENOL ARTHRITIS PAIN PO), Take 1 tablet by mouth Daily., Disp: , Rfl:   •  B Complex Vitamins (VITAMIN B COMPLEX) capsule capsule, Take  by mouth Daily., Disp: , Rfl:   •  calcium carbonate (OS-REAAGN) 600 MG tablet, Take 600 mg by mouth Daily., Disp: , Rfl:   •  cetirizine (zyrTEC) 10 MG tablet, Take 10 mg by mouth Daily., Disp: , Rfl:   •  citalopram (CeleXA) 20 MG tablet, Take 1 tablet by mouth Daily., Disp: 90 tablet, Rfl: 1  •  DULERA 100-5 MCG/ACT inhaler, Inhale 2 puffs 2 (Two) Times a Day. Rinse and spit after using., Disp: 6 inhaler, Rfl: 0  •  esomeprazole (nexIUM) 40 MG capsule, Take 1 capsule by mouth 2 (Two) Times a Day., Disp: 180 capsule, Rfl: 3  •  furosemide (LASIX) 40 MG tablet, Take 1 tablet by mouth Daily. Patient only takes once a day, Disp: 90 tablet, Rfl: 1  •  gabapentin (NEURONTIN) 300 MG capsule, Take 600 mg by mouth every night at bedtime., Disp: , Rfl:   •  gabapentin (NEURONTIN) 300 MG capsule, Take 300 mg by mouth Daily., Disp: , Rfl:   •  Homeopathic Products (LEG CRAMPS) tablet, Take 1 tablet by mouth Daily., Disp: , Rfl:   •   "HYDROcodone-acetaminophen (NORCO)  MG per tablet, Take 1 tablet by mouth Every 4 (Four) Hours As Needed for Moderate Pain  or Severe Pain ., Disp: 60 tablet, Rfl: 0  •  Hydrocortisone (britany's amazing butt) cream, Apply 1 application topically to the appropriate area as directed As Needed (irritation)., Disp: 120 g, Rfl: 0  •  Lidocaine Viscous HCl (XYLOCAINE) 2 % solution, Take 5 mL by mouth 3 (Three) Times a Day With Meals., Disp: 100 mL, Rfl: 0  •  potassium chloride (K-DUR,KLOR-CON) 20 MEQ CR tablet, Take 2 tablets by mouth Daily. Patient only takes once a day, Disp: 60 tablet, Rfl: 11  •  pravastatin (PRAVACHOL) 40 MG tablet, Take 40 mg by mouth Daily., Disp: , Rfl:   •  diphenoxylate-atropine (LOMOTIL) 2.5-0.025 MG per tablet, Take 2 tablets by mouth 4 (Four) Times a Day As Needed for Diarrhea., Disp: 90 tablet, Rfl: 0  •  ondansetron (Zofran) 8 MG tablet, Take 1 tablet by mouth Every 8 (Eight) Hours As Needed for Nausea or Vomiting., Disp: 60 tablet, Rfl: 2  No current facility-administered medications for this visit.       OBJECTIVE:  /70 (BP Location: Left arm, Patient Position: Sitting, Cuff Size: Adult)   Pulse 62   Temp 98 °F (36.7 °C) (Temporal)   Resp 18   Ht 152.4 cm (60\")   Wt 74.2 kg (163 lb 9.6 oz)   SpO2 96%   BMI 31.95 kg/m²    Physical Exam  Vitals signs and nursing note reviewed.   Constitutional:       General: She is not in acute distress.     Appearance: She is not diaphoretic.   HENT:      Head: Normocephalic and atraumatic.      Nose: Nose normal.   Eyes:      General: No scleral icterus.        Right eye: No discharge.         Left eye: No discharge.      Conjunctiva/sclera: Conjunctivae normal.   Neck:      Trachea: No tracheal deviation.   Pulmonary:      Effort: Pulmonary effort is normal.   Musculoskeletal:      Right lower leg: Edema present.      Left lower leg: Edema present.      Comments: 3+ pitting edema with venous stasis change to right leg, mild tenderness " to palpation without increased warmth or concern for cellulitis   Skin:     General: Skin is warm and dry.      Coloration: Skin is not pale.   Neurological:      Mental Status: She is alert and oriented to person, place, and time.   Psychiatric:         Behavior: Behavior normal.         Thought Content: Thought content normal.         Judgment: Judgment normal.         Assessment/Plan     Diagnosis Plan   1. Flu vaccine need  Fluad Quad 65+ yrs (9211-3434)   2. Hypokalemia  Basic metabolic panel    potassium chloride (K-DUR,KLOR-CON) 20 MEQ CR tablet   3. Lower extremity edema     Continue Lasix with potassium replacement, repeat BMP ordered, follow-up as needed      Shaheen Akbar D.O.  [facility]  Osteopathic Neuromusculoskeletal Medicine

## 2020-11-12 ENCOUNTER — READMISSION MANAGEMENT (OUTPATIENT)
Dept: CALL CENTER | Facility: HOSPITAL | Age: 80
End: 2020-11-12

## 2020-11-12 NOTE — OUTREACH NOTE
Medical Week 3 Survey      Responses   [facility] patient discharged from?  [place]   Does the patient have one of the following disease processes/diagnoses(primary or secondary)?  Other   Week 3 attempt successful?  Yes   Call start time  1317   Call end time  1326   Discharge diagnosis  Vulvar intraepithelial neoplasia    Is patient permission given to speak with other caregiver?  Yes   List who call center can speak with  Joseph Nunez, spouse   Person spoke with today (if not patient) and relationship  patient   Meds reviewed with patient/caregiver?  Yes   Is the patient having any side effects they believe may be caused by any medication additions or changes?  No   Does the patient have all medications ordered at discharge?  Yes   Is the patient taking all medications as directed (includes completed medication regime)?  Yes   Does the patient have a primary care provider?   Yes   Does the patient have an appointment with their PCP within 7 days of discharge?  Yes   Has the patient kept scheduled appointments due by today?  Yes   Comments  Patient reports radiation treatments on hold for about a week to give her a break.    What is the Home health agency?   [facility].    Psychosocial issues?  No   Did the patient receive a copy of their discharge instructions?  Yes   Nursing interventions  Reviewed instructions with patient   What is the patient's perception of their health status since discharge?  Improving   Is the patient/caregiver able to teach back signs and symptoms related to disease process for when to call PCP?  Yes   Is the patient/caregiver able to teach back the hierarchy of who to call/visit for symptoms/problems? PCP, Specialist, Home health nurse, Urgent Care, ED, 911  Yes   Week 3 Call Completed?  Yes          Mecca Portillo RN

## 2020-11-16 ENCOUNTER — TELEPHONE (OUTPATIENT)
Dept: FAMILY MEDICINE CLINIC | Facility: CLINIC | Age: 80
End: 2020-11-16

## 2020-11-16 DIAGNOSIS — C51.9 VULVAR CANCER, CARCINOMA (HCC): ICD-10-CM

## 2020-11-16 DIAGNOSIS — R60.0 LOWER EXTREMITY EDEMA: ICD-10-CM

## 2020-11-16 DIAGNOSIS — G89.3 CANCER RELATED PAIN: ICD-10-CM

## 2020-11-16 DIAGNOSIS — E87.6 HYPOKALEMIA: ICD-10-CM

## 2020-11-16 NOTE — TELEPHONE ENCOUNTER
Caller: Dago Nunez    Relationship: Self    Best call back number: [phone]    Medication needed:   Requested Prescriptions     Pending Prescriptions Disp Refills   • HYDROcodone-acetaminophen (NORCO)  MG per tablet 60 tablet 0     Sig: Take 1 tablet by mouth Every 4 (Four) Hours As Needed for Moderate Pain  or Severe Pain .       When do you need the refill by:ASAP    What details did the patient provide when requesting the medication: PATIENT IS CLOSE TO BEING OUT.  Does the patient have less than a 3 day supply:  [x] Yes  [] No    What is the patient's preferred pharmacy: [facility] - [address] [phone] [facility] [phone]

## 2020-11-16 NOTE — TELEPHONE ENCOUNTER
PATIENT IS CALLING STATES THAT SHE WAS ADVISED TO INCREASE HER furosemide (LASIX) 40 MG tablet TO 1.5 TABLETS DAILY AND WILL NEED A REFILL WITH NEW DOSAGE.     [facility] - [place], [place] - [address] AT [address] [facility] - [phone]  - [phone]   [phone]

## 2020-11-17 ENCOUNTER — LAB (OUTPATIENT)
Dept: LAB | Facility: HOSPITAL | Age: 80
End: 2020-11-17

## 2020-11-17 ENCOUNTER — INFUSION (OUTPATIENT)
Dept: ONCOLOGY | Facility: HOSPITAL | Age: 80
End: 2020-11-17

## 2020-11-17 ENCOUNTER — HOSPITAL ENCOUNTER (OUTPATIENT)
Dept: RADIATION ONCOLOGY | Facility: HOSPITAL | Age: 80
Setting detail: RADIATION/ONCOLOGY SERIES
Discharge: HOME | End: 2020-11-17

## 2020-11-17 ENCOUNTER — OFFICE VISIT (OUTPATIENT)
Dept: ONCOLOGY | Facility: CLINIC | Age: 80
End: 2020-11-17

## 2020-11-17 VITALS
HEART RATE: 50 BPM | RESPIRATION RATE: 18 BRPM | BODY MASS INDEX: 32 KG/M2 | DIASTOLIC BLOOD PRESSURE: 60 MMHG | HEIGHT: 60 IN | OXYGEN SATURATION: 98 % | SYSTOLIC BLOOD PRESSURE: 128 MMHG | WEIGHT: 163 LBS | TEMPERATURE: 98.8 F

## 2020-11-17 DIAGNOSIS — C51.9 VULVAR CANCER, CARCINOMA (HCC): Primary | ICD-10-CM

## 2020-11-17 LAB
ALBUMIN SERPL-MCNC: 3.4 G/DL (ref 3.5–5.2)
ALBUMIN/GLOB SERPL: 1.1 G/DL
ALP SERPL-CCNC: 56 U/L (ref 39–117)
ALT SERPL W P-5'-P-CCNC: 10 U/L (ref 1–33)
ANION GAP SERPL CALCULATED.3IONS-SCNC: 12 MMOL/L (ref 5–15)
AST SERPL-CCNC: 21 U/L (ref 1–32)
BASOPHILS # BLD AUTO: 0.04 10*3/MM3 (ref 0–0.2)
BASOPHILS NFR BLD AUTO: 0.5 % (ref 0–1.5)
BILIRUB SERPL-MCNC: 0.3 MG/DL (ref 0–1.2)
BUN SERPL-MCNC: 18 MG/DL (ref 8–23)
BUN/CREAT SERPL: 11.8 (ref 7–25)
CALCIUM SPEC-SCNC: 8.8 MG/DL (ref 8.6–10.5)
CHLORIDE SERPL-SCNC: 92 MMOL/L (ref 98–107)
CO2 SERPL-SCNC: 29 MMOL/L (ref 22–29)
CREAT SERPL-MCNC: 1.52 MG/DL (ref 0.57–1)
DEPRECATED RDW RBC AUTO: 57.6 FL (ref 37–54)
EOSINOPHIL # BLD AUTO: 0.11 10*3/MM3 (ref 0–0.4)
EOSINOPHIL NFR BLD AUTO: 1.5 % (ref 0.3–6.2)
ERYTHROCYTE [DISTWIDTH] IN BLOOD BY AUTOMATED COUNT: 16.7 % (ref 12.3–15.4)
GFR SERPL CREATININE-BSD FRML MDRD: 33 ML/MIN/1.73
GLOBULIN UR ELPH-MCNC: 3.2 GM/DL
GLUCOSE SERPL-MCNC: 185 MG/DL (ref 65–99)
HCT VFR BLD AUTO: 33.5 % (ref 34–46.6)
HGB BLD-MCNC: 10.9 G/DL (ref 12–15.9)
HOLD SPECIMEN: NORMAL
IMM GRANULOCYTES # BLD AUTO: 0.06 10*3/MM3 (ref 0–0.05)
IMM GRANULOCYTES NFR BLD AUTO: 0.8 % (ref 0–0.5)
LYMPHOCYTES # BLD AUTO: 0.44 10*3/MM3 (ref 0.7–3.1)
LYMPHOCYTES NFR BLD AUTO: 6 % (ref 19.6–45.3)
MCH RBC QN AUTO: 32 PG (ref 26.6–33)
MCHC RBC AUTO-ENTMCNC: 32.5 G/DL (ref 31.5–35.7)
MCV RBC AUTO: 98.2 FL (ref 79–97)
MONOCYTES # BLD AUTO: 0.5 10*3/MM3 (ref 0.1–0.9)
MONOCYTES NFR BLD AUTO: 6.8 % (ref 5–12)
NEUTROPHILS NFR BLD AUTO: 6.24 10*3/MM3 (ref 1.7–7)
NEUTROPHILS NFR BLD AUTO: 84.4 % (ref 42.7–76)
NRBC BLD AUTO-RTO: 0 /100 WBC (ref 0–0.2)
PLATELET # BLD AUTO: 182 10*3/MM3 (ref 140–450)
PMV BLD AUTO: 10 FL (ref 6–12)
POTASSIUM SERPL-SCNC: 4.1 MMOL/L (ref 3.5–5.2)
PROT SERPL-MCNC: 6.6 G/DL (ref 6–8.5)
RBC # BLD AUTO: 3.41 10*6/MM3 (ref 3.77–5.28)
SODIUM SERPL-SCNC: 133 MMOL/L (ref 136–145)
WBC # BLD AUTO: 7.39 10*3/MM3 (ref 3.4–10.8)

## 2020-11-17 PROCEDURE — 85025 COMPLETE CBC W/AUTO DIFF WBC: CPT

## 2020-11-17 PROCEDURE — 80053 COMPREHEN METABOLIC PANEL: CPT

## 2020-11-17 PROCEDURE — 36415 COLL VENOUS BLD VENIPUNCTURE: CPT

## 2020-11-17 PROCEDURE — 99214 OFFICE O/P EST MOD 30 MIN: CPT | Performed by: INTERNAL MEDICINE

## 2020-11-17 RX ORDER — HYDROCODONE BITARTRATE AND ACETAMINOPHEN 10; 325 MG/1; MG/1
1 TABLET ORAL EVERY 4 HOURS PRN
Qty: 60 TABLET | Refills: 0 | Status: SHIPPED | OUTPATIENT
Start: 2020-11-17 | End: 2021-03-01 | Stop reason: SDUPTHER

## 2020-11-17 RX ORDER — POTASSIUM CHLORIDE 20 MEQ/1
40 TABLET, EXTENDED RELEASE ORAL DAILY
Qty: 60 TABLET | Refills: 11 | Status: SHIPPED | OUTPATIENT
Start: 2020-11-17 | End: 2021-09-09

## 2020-11-17 RX ORDER — FUROSEMIDE 40 MG/1
60 TABLET ORAL DAILY
Qty: 135 TABLET | Refills: 3 | Status: SHIPPED | OUTPATIENT
Start: 2020-11-17 | End: 2022-06-04

## 2020-11-17 NOTE — PROGRESS NOTES
1035 Patient does not meet parameters for injection today and potassium, and kidney functions improved. AVS delivered to patient. Ermelinda Barnett RN

## 2020-11-18 ENCOUNTER — APPOINTMENT (OUTPATIENT)
Dept: RADIATION ONCOLOGY | Facility: HOSPITAL | Age: 80
End: 2020-11-18

## 2020-11-23 ENCOUNTER — INFUSION (OUTPATIENT)
Dept: ONCOLOGY | Facility: HOSPITAL | Age: 80
End: 2020-11-23

## 2020-11-23 ENCOUNTER — TELEPHONE (OUTPATIENT)
Dept: ONCOLOGY | Facility: CLINIC | Age: 80
End: 2020-11-23

## 2020-11-23 ENCOUNTER — HOSPITAL ENCOUNTER (OUTPATIENT)
Dept: RADIATION ONCOLOGY | Facility: HOSPITAL | Age: 80
Setting detail: RADIATION/ONCOLOGY SERIES
Discharge: HOME | End: 2020-11-23

## 2020-11-23 ENCOUNTER — LAB (OUTPATIENT)
Dept: LAB | Facility: HOSPITAL | Age: 80
End: 2020-11-23

## 2020-11-23 ENCOUNTER — READMISSION MANAGEMENT (OUTPATIENT)
Dept: CALL CENTER | Facility: HOSPITAL | Age: 80
End: 2020-11-23

## 2020-11-23 VITALS
RESPIRATION RATE: 18 BRPM | SYSTOLIC BLOOD PRESSURE: 130 MMHG | BODY MASS INDEX: 30.43 KG/M2 | HEART RATE: 59 BPM | HEIGHT: 60 IN | DIASTOLIC BLOOD PRESSURE: 50 MMHG | OXYGEN SATURATION: 98 % | WEIGHT: 155 LBS | TEMPERATURE: 97.2 F

## 2020-11-23 DIAGNOSIS — N18.31 ANEMIA DUE TO STAGE 3A CHRONIC KIDNEY DISEASE (HCC): ICD-10-CM

## 2020-11-23 DIAGNOSIS — D63.1 ANEMIA DUE TO STAGE 3A CHRONIC KIDNEY DISEASE (HCC): ICD-10-CM

## 2020-11-23 DIAGNOSIS — C77.4 SECONDARY MALIGNANCY OF INGUINAL LYMPH NODES (HCC): Primary | ICD-10-CM

## 2020-11-23 DIAGNOSIS — C51.9 VULVAR CANCER, CARCINOMA (HCC): ICD-10-CM

## 2020-11-23 LAB
ALBUMIN SERPL-MCNC: 3.4 G/DL (ref 3.5–5.2)
ALBUMIN/GLOB SERPL: 0.9 G/DL
ALP SERPL-CCNC: 58 U/L (ref 39–117)
ALT SERPL W P-5'-P-CCNC: 9 U/L (ref 1–33)
ANION GAP SERPL CALCULATED.3IONS-SCNC: 9 MMOL/L (ref 5–15)
AST SERPL-CCNC: 18 U/L (ref 1–32)
BASOPHILS # BLD AUTO: 0.03 10*3/MM3 (ref 0–0.2)
BASOPHILS NFR BLD AUTO: 0.4 % (ref 0–1.5)
BILIRUB SERPL-MCNC: 0.3 MG/DL (ref 0–1.2)
BUN SERPL-MCNC: 19 MG/DL (ref 8–23)
BUN/CREAT SERPL: 11.9 (ref 7–25)
CALCIUM SPEC-SCNC: 9.2 MG/DL (ref 8.6–10.5)
CHLORIDE SERPL-SCNC: 90 MMOL/L (ref 98–107)
CO2 SERPL-SCNC: 31 MMOL/L (ref 22–29)
CREAT SERPL-MCNC: 1.59 MG/DL (ref 0.57–1)
DEPRECATED RDW RBC AUTO: 55.2 FL (ref 37–54)
EOSINOPHIL # BLD AUTO: 0.14 10*3/MM3 (ref 0–0.4)
EOSINOPHIL NFR BLD AUTO: 2 % (ref 0.3–6.2)
ERYTHROCYTE [DISTWIDTH] IN BLOOD BY AUTOMATED COUNT: 15.6 % (ref 12.3–15.4)
GFR SERPL CREATININE-BSD FRML MDRD: 31 ML/MIN/1.73
GLOBULIN UR ELPH-MCNC: 3.6 GM/DL
GLUCOSE SERPL-MCNC: 149 MG/DL (ref 65–99)
HCT VFR BLD AUTO: 33.2 % (ref 34–46.6)
HGB BLD-MCNC: 11.1 G/DL (ref 12–15.9)
IMM GRANULOCYTES # BLD AUTO: 0.04 10*3/MM3 (ref 0–0.05)
IMM GRANULOCYTES NFR BLD AUTO: 0.6 % (ref 0–0.5)
LYMPHOCYTES # BLD AUTO: 0.84 10*3/MM3 (ref 0.7–3.1)
LYMPHOCYTES NFR BLD AUTO: 12.2 % (ref 19.6–45.3)
MCH RBC QN AUTO: 32.4 PG (ref 26.6–33)
MCHC RBC AUTO-ENTMCNC: 33.4 G/DL (ref 31.5–35.7)
MCV RBC AUTO: 96.8 FL (ref 79–97)
MONOCYTES # BLD AUTO: 0.73 10*3/MM3 (ref 0.1–0.9)
MONOCYTES NFR BLD AUTO: 10.6 % (ref 5–12)
NEUTROPHILS NFR BLD AUTO: 5.11 10*3/MM3 (ref 1.7–7)
NEUTROPHILS NFR BLD AUTO: 74.2 % (ref 42.7–76)
NRBC BLD AUTO-RTO: 0 /100 WBC (ref 0–0.2)
PLATELET # BLD AUTO: 188 10*3/MM3 (ref 140–450)
PMV BLD AUTO: 9.8 FL (ref 6–12)
POTASSIUM SERPL-SCNC: 3.2 MMOL/L (ref 3.5–5.2)
PROT SERPL-MCNC: 7 G/DL (ref 6–8.5)
RBC # BLD AUTO: 3.43 10*6/MM3 (ref 3.77–5.28)
SODIUM SERPL-SCNC: 130 MMOL/L (ref 136–145)
WBC # BLD AUTO: 6.89 10*3/MM3 (ref 3.4–10.8)

## 2020-11-23 PROCEDURE — 77386: CPT | Performed by: RADIOLOGY

## 2020-11-23 PROCEDURE — G0463 HOSPITAL OUTPT CLINIC VISIT: HCPCS

## 2020-11-23 PROCEDURE — 85025 COMPLETE CBC W/AUTO DIFF WBC: CPT

## 2020-11-23 PROCEDURE — 80053 COMPREHEN METABOLIC PANEL: CPT

## 2020-11-23 PROCEDURE — 36415 COLL VENOUS BLD VENIPUNCTURE: CPT

## 2020-11-23 PROCEDURE — 96523 IRRIG DRUG DELIVERY DEVICE: CPT

## 2020-11-23 PROCEDURE — 25010000003 HEPARIN LOCK FLUSH PER 10 UNITS: Performed by: INTERNAL MEDICINE

## 2020-11-23 RX ORDER — SODIUM CHLORIDE 0.9 % (FLUSH) 0.9 %
10 SYRINGE (ML) INJECTION AS NEEDED
Status: DISCONTINUED | OUTPATIENT
Start: 2020-11-23 | End: 2020-11-23 | Stop reason: HOSPADM

## 2020-11-23 RX ORDER — HEPARIN SODIUM (PORCINE) LOCK FLUSH IV SOLN 100 UNIT/ML 100 UNIT/ML
500 SOLUTION INTRAVENOUS AS NEEDED
Status: CANCELLED | OUTPATIENT
Start: 2020-11-23

## 2020-11-23 RX ORDER — HEPARIN SODIUM (PORCINE) LOCK FLUSH IV SOLN 100 UNIT/ML 100 UNIT/ML
500 SOLUTION INTRAVENOUS AS NEEDED
Status: DISCONTINUED | OUTPATIENT
Start: 2020-11-23 | End: 2020-11-23 | Stop reason: HOSPADM

## 2020-11-23 RX ORDER — SODIUM CHLORIDE 0.9 % (FLUSH) 0.9 %
10 SYRINGE (ML) INJECTION AS NEEDED
Status: CANCELLED | OUTPATIENT
Start: 2020-11-23

## 2020-11-23 RX ADMIN — Medication 500 UNITS: at 11:41

## 2020-11-23 RX ADMIN — SODIUM CHLORIDE, PRESERVATIVE FREE 10 ML: 5 INJECTION INTRAVENOUS at 11:41

## 2020-11-23 NOTE — OUTREACH NOTE
Medical Week 4 Survey      Responses   [facility] patient discharged from?  [place]   Does the patient have one of the following disease processes/diagnoses(primary or secondary)?  Other   Week 4 attempt successful?  No          Anita Landin RN

## 2020-11-23 NOTE — TELEPHONE ENCOUNTER
Faxed to [facility] [phone]    ----- Message from Drake Stafford MD sent at 11/23/2020 11:10 AM CST -----  Fax CMP to PCP.  Potassium 3.2 on diuretic.

## 2020-11-24 ENCOUNTER — HOSPITAL ENCOUNTER (OUTPATIENT)
Dept: RADIATION ONCOLOGY | Facility: HOSPITAL | Age: 80
Setting detail: RADIATION/ONCOLOGY SERIES
End: 2020-11-24

## 2020-11-24 PROCEDURE — 77386: CPT | Performed by: RADIOLOGY

## 2020-11-25 ENCOUNTER — HOSPITAL ENCOUNTER (OUTPATIENT)
Dept: RADIATION ONCOLOGY | Facility: HOSPITAL | Age: 80
Setting detail: RADIATION/ONCOLOGY SERIES
Discharge: HOME | End: 2020-11-25

## 2020-11-25 PROCEDURE — 77336 RADIATION PHYSICS CONSULT: CPT | Performed by: RADIOLOGY

## 2020-11-25 PROCEDURE — 77386: CPT | Performed by: RADIOLOGY

## 2020-11-28 ENCOUNTER — NURSE TRIAGE (OUTPATIENT)
Dept: CALL CENTER | Facility: HOSPITAL | Age: 80
End: 2020-11-28

## 2020-11-28 ENCOUNTER — LAB REQUISITION (OUTPATIENT)
Dept: LAB | Facility: HOSPITAL | Age: 80
End: 2020-11-28

## 2020-11-28 DIAGNOSIS — Z00.00 ENCOUNTER FOR GENERAL ADULT MEDICAL EXAMINATION WITHOUT ABNORMAL FINDINGS: ICD-10-CM

## 2020-11-28 LAB
BACTERIA UR QL AUTO: ABNORMAL /HPF
BILIRUB UR QL STRIP: NEGATIVE
CLARITY UR: CLEAR
COLOR UR: ABNORMAL
GLUCOSE UR STRIP-MCNC: NEGATIVE MG/DL
HGB UR QL STRIP.AUTO: NEGATIVE
HYALINE CASTS UR QL AUTO: ABNORMAL /LPF
KETONES UR QL STRIP: NEGATIVE
LEUKOCYTE ESTERASE UR QL STRIP.AUTO: ABNORMAL
NITRITE UR QL STRIP: POSITIVE
PH UR STRIP.AUTO: 6 [PH] (ref 5–8)
PROT UR QL STRIP: NEGATIVE
RBC # UR: ABNORMAL /HPF
REF LAB TEST METHOD: ABNORMAL
SP GR UR STRIP: 1.01 (ref 1–1.03)
SQUAMOUS #/AREA URNS HPF: ABNORMAL /HPF
UROBILINOGEN UR QL STRIP: ABNORMAL
WBC UR QL AUTO: ABNORMAL /HPF

## 2020-11-28 PROCEDURE — 81001 URINALYSIS AUTO W/SCOPE: CPT | Performed by: FAMILY MEDICINE

## 2020-11-28 PROCEDURE — 87086 URINE CULTURE/COLONY COUNT: CPT | Performed by: FAMILY MEDICINE

## 2020-11-28 NOTE — TELEPHONE ENCOUNTER
"    Reason for Disposition  • Health Information question, no triage required and triager able to answer question    Additional Information  • Negative: [1] Caller is not with the adult (patient) AND [2] reporting urgent symptoms  • Negative: Lab result questions  • Negative: Medication questions  • Negative: Caller can't be reached by phone  • Negative: Caller has already spoken to PCP or another triager  • Negative: RN needs further essential information from caller in order to complete triage  • Negative: Requesting regular office appointment  • Negative: [1] Caller requesting NON-URGENT health information AND [2] PCP's office is the best resource    Answer Assessment - Initial Assessment Questions  1. REASON FOR CALL or QUESTION: \"What is your reason for calling today?\" or \"How can I best help you?\" or \"What question do you have that I can help answer?\"      Home Health would like order to obtain a u/a for burning with urination. Call to Dr. Mathias who states this is ok. Home Health notified to obtain u/a.    Protocols used: INFORMATION ONLY CALL - NO TRIAGE-ADULT-      "

## 2020-11-29 LAB — BACTERIA SPEC AEROBE CULT: NORMAL

## 2020-11-30 ENCOUNTER — LAB (OUTPATIENT)
Dept: LAB | Facility: HOSPITAL | Age: 80
End: 2020-11-30

## 2020-11-30 ENCOUNTER — HOSPITAL ENCOUNTER (OUTPATIENT)
Dept: RADIATION ONCOLOGY | Facility: HOSPITAL | Age: 80
Setting detail: RADIATION/ONCOLOGY SERIES
Discharge: HOME | End: 2020-11-30

## 2020-11-30 ENCOUNTER — INFUSION (OUTPATIENT)
Dept: ONCOLOGY | Facility: HOSPITAL | Age: 80
End: 2020-11-30

## 2020-11-30 ENCOUNTER — TELEPHONE (OUTPATIENT)
Dept: ONCOLOGY | Facility: CLINIC | Age: 80
End: 2020-11-30

## 2020-11-30 DIAGNOSIS — C51.9 VULVAR CANCER, CARCINOMA (HCC): ICD-10-CM

## 2020-11-30 LAB
ALBUMIN SERPL-MCNC: 3.4 G/DL (ref 3.5–5.2)
ALBUMIN/GLOB SERPL: 0.9 G/DL
ALP SERPL-CCNC: 55 U/L (ref 39–117)
ALT SERPL W P-5'-P-CCNC: 14 U/L (ref 1–33)
ANION GAP SERPL CALCULATED.3IONS-SCNC: 7 MMOL/L (ref 5–15)
AST SERPL-CCNC: 60 U/L (ref 1–32)
BASOPHILS # BLD AUTO: 0.05 10*3/MM3 (ref 0–0.2)
BASOPHILS NFR BLD AUTO: 1 % (ref 0–1.5)
BILIRUB SERPL-MCNC: 0.3 MG/DL (ref 0–1.2)
BUN SERPL-MCNC: 22 MG/DL (ref 8–23)
BUN/CREAT SERPL: 11.5 (ref 7–25)
CALCIUM SPEC-SCNC: 9.7 MG/DL (ref 8.6–10.5)
CHLORIDE SERPL-SCNC: 94 MMOL/L (ref 98–107)
CO2 SERPL-SCNC: 33 MMOL/L (ref 22–29)
CREAT SERPL-MCNC: 1.91 MG/DL (ref 0.57–1)
DEPRECATED RDW RBC AUTO: 51.6 FL (ref 37–54)
EOSINOPHIL # BLD AUTO: 0.23 10*3/MM3 (ref 0–0.4)
EOSINOPHIL NFR BLD AUTO: 4.5 % (ref 0.3–6.2)
ERYTHROCYTE [DISTWIDTH] IN BLOOD BY AUTOMATED COUNT: 14.7 % (ref 12.3–15.4)
GFR SERPL CREATININE-BSD FRML MDRD: 25 ML/MIN/1.73
GLOBULIN UR ELPH-MCNC: 3.8 GM/DL
GLUCOSE SERPL-MCNC: 170 MG/DL (ref 65–99)
HCT VFR BLD AUTO: 31.8 % (ref 34–46.6)
HGB BLD-MCNC: 10.5 G/DL (ref 12–15.9)
IMM GRANULOCYTES # BLD AUTO: 0.03 10*3/MM3 (ref 0–0.05)
IMM GRANULOCYTES NFR BLD AUTO: 0.6 % (ref 0–0.5)
LYMPHOCYTES # BLD AUTO: 1.07 10*3/MM3 (ref 0.7–3.1)
LYMPHOCYTES NFR BLD AUTO: 20.9 % (ref 19.6–45.3)
MCH RBC QN AUTO: 31.8 PG (ref 26.6–33)
MCHC RBC AUTO-ENTMCNC: 33 G/DL (ref 31.5–35.7)
MCV RBC AUTO: 96.4 FL (ref 79–97)
MONOCYTES # BLD AUTO: 0.41 10*3/MM3 (ref 0.1–0.9)
MONOCYTES NFR BLD AUTO: 8 % (ref 5–12)
NEUTROPHILS NFR BLD AUTO: 3.34 10*3/MM3 (ref 1.7–7)
NEUTROPHILS NFR BLD AUTO: 65 % (ref 42.7–76)
NRBC BLD AUTO-RTO: 0 /100 WBC (ref 0–0.2)
PLATELET # BLD AUTO: 207 10*3/MM3 (ref 140–450)
PMV BLD AUTO: 10.5 FL (ref 6–12)
POTASSIUM SERPL-SCNC: 4.1 MMOL/L (ref 3.5–5.2)
PROT SERPL-MCNC: 7.2 G/DL (ref 6–8.5)
RBC # BLD AUTO: 3.3 10*6/MM3 (ref 3.77–5.28)
SODIUM SERPL-SCNC: 134 MMOL/L (ref 136–145)
WBC # BLD AUTO: 5.13 10*3/MM3 (ref 3.4–10.8)

## 2020-11-30 PROCEDURE — 36415 COLL VENOUS BLD VENIPUNCTURE: CPT

## 2020-11-30 PROCEDURE — 80053 COMPREHEN METABOLIC PANEL: CPT

## 2020-11-30 PROCEDURE — 85025 COMPLETE CBC W/AUTO DIFF WBC: CPT

## 2020-11-30 PROCEDURE — 77386: CPT | Performed by: RADIOLOGY

## 2020-11-30 NOTE — TELEPHONE ENCOUNTER
Received call from Humaira, [facility] she calls to report patient Dago Nunez is there for evaluation of her labs, which she does not require Procrit injection today, she questions decline in her renal function.   Last Chemo txt 10/5/20  Currently receiving daily radiation therapy    Discussed with Dr Stafford, he recommends labs to be faxed to her Nephrologist Dr Mills for review and patient should f/u with him if she does not have apt melanie in the next week or so.   Relayed this information to Humaira, she v/u and will inform patient  Labs faxed Dr Mills for review

## 2020-11-30 NOTE — PROGRESS NOTES
1100 - Spoke with Haydee MCCOY at office. Per Dr. Stafford no fluids today. Mrs. Nunez labs are being sent to Dr. Mills. This was explained to her and that she needs to call Dr. Mills's office and schedule an appointment with him soon. It was stressed to her that she needs to drink more water and not so much ice tea. No injection was needed today. - NICHOLAS Sarmiento RN

## 2020-12-01 ENCOUNTER — HOSPITAL ENCOUNTER (OUTPATIENT)
Dept: RADIATION ONCOLOGY | Facility: HOSPITAL | Age: 80
Setting detail: RADIATION/ONCOLOGY SERIES
End: 2020-12-01

## 2020-12-01 ENCOUNTER — HOSPITAL ENCOUNTER (OUTPATIENT)
Dept: RADIATION ONCOLOGY | Facility: HOSPITAL | Age: 80
Setting detail: RADIATION/ONCOLOGY SERIES
Discharge: HOME | End: 2020-12-01

## 2020-12-01 PROCEDURE — 77386: CPT | Performed by: RADIOLOGY

## 2020-12-01 RX ORDER — LIDOCAINE 50 MG/G
OINTMENT TOPICAL EVERY 4 HOURS PRN
Qty: 240 G | Refills: 1 | Status: SHIPPED | OUTPATIENT
Start: 2020-12-01

## 2020-12-02 ENCOUNTER — HOSPITAL ENCOUNTER (OUTPATIENT)
Dept: RADIATION ONCOLOGY | Facility: HOSPITAL | Age: 80
Setting detail: RADIATION/ONCOLOGY SERIES
Discharge: HOME | End: 2020-12-02

## 2020-12-02 PROCEDURE — 77386: CPT | Performed by: RADIOLOGY

## 2020-12-03 ENCOUNTER — HOSPITAL ENCOUNTER (OUTPATIENT)
Dept: RADIATION ONCOLOGY | Facility: HOSPITAL | Age: 80
Setting detail: RADIATION/ONCOLOGY SERIES
Discharge: HOME | End: 2020-12-03

## 2020-12-03 PROCEDURE — 77386: CPT | Performed by: RADIOLOGY

## 2020-12-03 PROCEDURE — 77336 RADIATION PHYSICS CONSULT: CPT | Performed by: RADIOLOGY

## 2020-12-04 ENCOUNTER — HOSPITAL ENCOUNTER (OUTPATIENT)
Dept: RADIATION ONCOLOGY | Facility: HOSPITAL | Age: 80
Setting detail: RADIATION/ONCOLOGY SERIES
Discharge: HOME | End: 2020-12-04

## 2020-12-04 PROCEDURE — 77386: CPT | Performed by: RADIOLOGY

## 2020-12-07 ENCOUNTER — HOSPITAL ENCOUNTER (OUTPATIENT)
Dept: RADIATION ONCOLOGY | Facility: HOSPITAL | Age: 80
Setting detail: RADIATION/ONCOLOGY SERIES
Discharge: HOME | End: 2020-12-07

## 2020-12-07 ENCOUNTER — INFUSION (OUTPATIENT)
Dept: ONCOLOGY | Facility: HOSPITAL | Age: 80
End: 2020-12-07

## 2020-12-07 ENCOUNTER — LAB (OUTPATIENT)
Dept: LAB | Facility: HOSPITAL | Age: 80
End: 2020-12-07

## 2020-12-07 DIAGNOSIS — C51.9 VULVAR CANCER, CARCINOMA (HCC): Primary | ICD-10-CM

## 2020-12-07 LAB
BASOPHILS # BLD AUTO: 0.02 10*3/MM3 (ref 0–0.2)
BASOPHILS NFR BLD AUTO: 0.3 % (ref 0–1.5)
DEPRECATED RDW RBC AUTO: 53.5 FL (ref 37–54)
EOSINOPHIL # BLD AUTO: 0.16 10*3/MM3 (ref 0–0.4)
EOSINOPHIL NFR BLD AUTO: 2.7 % (ref 0.3–6.2)
ERYTHROCYTE [DISTWIDTH] IN BLOOD BY AUTOMATED COUNT: 14.7 % (ref 12.3–15.4)
HCT VFR BLD AUTO: 31.4 % (ref 34–46.6)
HGB BLD-MCNC: 10 G/DL (ref 12–15.9)
HOLD SPECIMEN: NORMAL
IMM GRANULOCYTES # BLD AUTO: 0.02 10*3/MM3 (ref 0–0.05)
IMM GRANULOCYTES NFR BLD AUTO: 0.3 % (ref 0–0.5)
LYMPHOCYTES # BLD AUTO: 0.76 10*3/MM3 (ref 0.7–3.1)
LYMPHOCYTES NFR BLD AUTO: 13 % (ref 19.6–45.3)
MCH RBC QN AUTO: 31.8 PG (ref 26.6–33)
MCHC RBC AUTO-ENTMCNC: 31.8 G/DL (ref 31.5–35.7)
MCV RBC AUTO: 100 FL (ref 79–97)
MONOCYTES # BLD AUTO: 0.54 10*3/MM3 (ref 0.1–0.9)
MONOCYTES NFR BLD AUTO: 9.3 % (ref 5–12)
NEUTROPHILS NFR BLD AUTO: 4.33 10*3/MM3 (ref 1.7–7)
NEUTROPHILS NFR BLD AUTO: 74.4 % (ref 42.7–76)
NRBC BLD AUTO-RTO: 0 /100 WBC (ref 0–0.2)
PLATELET # BLD AUTO: 231 10*3/MM3 (ref 140–450)
PMV BLD AUTO: 9.6 FL (ref 6–12)
RBC # BLD AUTO: 3.14 10*6/MM3 (ref 3.77–5.28)
WBC # BLD AUTO: 5.83 10*3/MM3 (ref 3.4–10.8)

## 2020-12-07 PROCEDURE — 36415 COLL VENOUS BLD VENIPUNCTURE: CPT

## 2020-12-07 PROCEDURE — 85025 COMPLETE CBC W/AUTO DIFF WBC: CPT

## 2020-12-07 PROCEDURE — 77386: CPT | Performed by: RADIOLOGY

## 2020-12-07 NOTE — PROGRESS NOTES
[facility] ONC [facility] GROUP HEMATOLOGY AND ONCOLOGY  [address]  [place] [address] [phone]    Patient Name: Dago Nunez  Encounter Date: 12/14/2020  YOB: 1940  Patient Number: [id number]      REASON FOR FOLLOW-UP: Dago Nunez is a pleasant 80-year-old  female who is seen on followup for macrocytic anemia from chronic kidney disease Stage III, GFR 51 mL/minute 03/05/2018, iron deficiency, and thrombocytopenia.  She is intolerant to oral iron (nausea, stomach cramps, and constipation).  She had Injectafer 26 months ago. She is also seen for vulvar cancer. She is seen 10 weeks post C1D1 Mitomycin C and 5FU with radiation. Her D29 to 32 chemo was cancelled due to hospitalization, tolerance, and poor performance status. She is seen with spouse.  History is obtained from the patient. History is considered to be accurate    She completed radiation 12/10/2020.     I have reviewed the HPI and verified with the patient the accuracy of it. No changes to interval history since the information was documented. 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  The patient was seen by Dr. Marks in favor definitive chemo radiation.  Pathology report 07/10/2020 periurethral biopsy, poorly differentiated carcinoma with squamous and papillary features, focally invasive in the subepithelial connective tissue.  PET 07/31/2020 showed intense FDG avid left inguinal node is highly suspicious for local regional metastasis from known vulvar squamous cell carcinoma.  No additional sites of suspicious FDG activity.  MRI 07/31/2020 showed redemonstration of mildly T2 hyperintense mass involving the urethral meatus, similar dimensions to prior exam on 02/18/2020 however there is now a polypoid lesion  "seen along the anterior aspect of the perineum, possibly contiguous with the urethral mass, concerning for disease progression.  Market interval enlargement of the left inguinal node almost certainly representing disease involvement.  Patient was notified by Dr. Siddiqui 08/19/2020.  Consensus for chemoradiation.  Patient reluctant to take chemotherapy.  Follow-up with Dr. Siddiqui in 4 to 6 weeks after radiation is completed.         PREVIOUS INTERVENTIONS:  Mitomycin C and 5-FU 10/05/2020 through 10/08/2020 with radiation completed 12/10/2020 at [facility].  D29 to d32 of chemo discontinued due to poor performance status.       DIAGNOSTIC ABNORMALITIES:   The patient was seen by Dr. Greg Alberts 01/29/2018 complaining of coughing and wheezing. The patient was given Tussionex. Blood work was ordered. CBC 01/29/2018 revealed a WBC of 7.8, hemoglobin 11.2, hematocrit 35.1, MCV 96.2, platelets 43,000, and ANC 4.47. CMP remarkable for of glucose of 177 and GFR 59 mL/minute.  The patient was seen by VINCENT Jones, 02/02/2018. She was coughing and wheezing. Tussionex did not help. The patient was given prednisone.  The patient was seen by VINCENT Jones, 02/15/2018. She is followed for anemia from iron deficiency. CBC 02/15/2018 revealed a WBC of 12.9, hemoglobin 11.4, hematocrit 34.5, MCV 95, and platelets 68,000.       PREVIOUS INTERVENTIONS:   Ferrous sulfate 325 mg 03/07/2018 through 05/06/2018.  Not resumed 06/08/2018. \"I misunderstood.\"   Resume 09/05/2018 through 10/03/2018, stopped due to intolerance.  Injectafer 750 mg 10/20/2018 at the [place] office.  Retacrit 10/27/2020 through present.       Vulvar cancer, carcinoma (CMS/HCC)    Initial Diagnosis    Vulvar cancer, carcinoma (CMS/HCC)     3/19/2001 Biopsy    Clinical Features: red vaginal lesion with bleeding since 1995. TX with  Carafate douche and Premarin vaginal cream with intermittent improvement.    DIAGNOSIS:    VAGINA, " BIOPSY: FOCAL ULCERATION, MARKED ACUTE AND CHRONIC INFLAMMATION,  SPONGIOSIS AND REACTIVE ATYPIA; NEGATIVE FOR DYSPLASIA.     8/17/2001 Procedure    Pap Smear:  DIAGNOSIS:            Atypical keratinized squamous cells, suspicious                           for squamous cell carcinoma.         COMMENTS:             There are numerous atypical keratinized cells                           present in an inflammatory background.  These are                           suspicious for squamous cell carcinoma.  Biopsy                           confirmation is recommended.      10/16/2001 Procedure    Pap Smear:  DIAGNOSIS:            Mild dysplasia       ADDITIONAL FINDINGS:  Marked inflammation                           Excessive hyperkeratosis         BETHESDA SYSTEM:      Low grade squamous intraepithelial lesion    DIAGNOSIS:            Negative, no abnormal cells seen           BETHESDA SYSTEM:      Within normal limits.       ADEQUACY:             Specimen satisfactory for evaluation       SOURCE:               Vagina, diagnostic thin prep PAP     11/7/2001 Biopsy      DIAGNOSIS:    VAGINA AND VULVA, RIGHT POSTERIOR INTROITUS, WIDE LOCAL EXCISION:  VAGINAL INTRAEPITHELIAL NEOPLASIA (VAIN) II-III, FOCALLY EXTENDING TO  VAGINAL MARGIN AT 12-4:00; ALL OTHER MARGINS NEGATIVE FOR  INTRAEPITHELIAL NEOPLASIA. (SEE COMMENT)    COMMENT: The lesion involves vaginal type epithelium with associated  chronic inflammation and erosion. The adjacent vulvar epithelium is  hyperkeratotic.     3/15/2002 Procedure    Pap Smear:  BETHESDA SYSTEM:      High grade squamous intraepithelial lesion       DESCRIPTOR:           Moderate dysplasia           ADEQUACY:             Specimen satisfactory for evaluation       SOURCE:               Vagina, Thin Prep Pap, Diagnostic     6/13/2003 Procedure         BETHESDA SYSTEM:      High grade squamous intraepithelial lesion       DESCRIPTOR:           Moderate dysplasia       ADDITIONAL  FINDINGS:  Inflammation         ADEQUACY:             Specimen satisfactory for evaluation       SOURCE:               Vagina, Thin Prep Pap, Diagnostic    HUMAN PAPILLOMAVIRUS         CASE ACCESSION #:    [id number]        Category                  HPV Types                Patient Results         High/Intermed Risk    16,18,31,33,35,39              Negative                            45,51,52,56,58,59,68      Specimen Source        Cervical / Vaginal Specimen     12/5/2003 Procedure    Gynecological Cytology        CASE ACCESSION #:     [id number]       BETHESDA SYSTEM:      Low grade squamous intraepithelial lesion       DESCRIPTOR:           Mild dysplasia           ADEQUACY:             Specimen satisfactory for evaluation       SOURCE:               Vagina, Thin Prep Pap, Diagnostic     11/29/2011 Biopsy    ADDENDUM FINDINGS:    The high grade MOODY in the right labia majora biopsy was of the usual type.  There was no evidence of differentiated MOODY.      DIAGNOSIS:    1) PERINEUM, BIOPSY: SQUAMOUS EPITHELIUM WITH FLORID HYPERKERATOSIS,  CONSISTENT WITH CHRONIC IRRITATION; NO EVIDENCE OF DYSPLASIA OR MALIGNANCY  (SEE COMMENT).    Comment: Immunohistochemical studies (p16, p53, MIB-1) confirm the rendered  interpretations.    2) VULVA, RIGHT LABIUM MAJORUM, BIOPSY: VULVAR INTRAEPITHELIAL NEOPLASIA II  (MODERATE DYSPLASIA); (SEE COMMENT).    Comment: Immunohistochemical studies for p16 (nuclear and cytoplasmic  positivity in lower epithelial half) and MIB-1 (nuclear positivity in lower  epithelial half) confirms the diagnosis; p53 is positive only in rare  cells. A GMS histochemical study for fungal forms is negative.  Nevertheless, parakeratosis associated with neutrophils, as was seen  herein, is commonly associated with fungal vulvitis.     7/3/2012 Procedure    Gynecological Cytology    CASE ACCESSION #:                     [id number]  BETHESDA SYSTEM:                      High grade squamous  intraepithelial                                        lesion  DESCRIPTOR:                           Mild to moderate dysplasia  ADEQUACY:                             Specimen satisfactory for evaluation  SOURCE:                               Vagina, Thin Prep Pap, Diagnostic  # SLIDES REVIEWED:                    1     1/29/2013 Biopsy    DIAGNOSIS:    1) VULVA, RIGHT, ANTERIOR, BIOPSY:  SPONGIOTIC DERMATITIS WITH EXTENSIVE  DERMAL GRANULATION TISSUE, MIXED INFLAMMATION AND FIBROSIS (SEE COMENT).    Comment: Sections show spongiosis, basement membrane thickening, dermal  fibrosis, and extensive dermal granulation tissue with a predominantly  chronic inflammatory infiltrate. There is no evidence of dysplasia or  malignancy. The basement membrane thickening and dermal fibrosis suggests  that there may be component of lichen sclerosus. However, the underlying  cause of the ongoing inflammation and repair (granulation tissue) is not  clear from this sample. A tissue gram stain fails to reveal any large  bacterial aggregates.  GMS and AFB studies for fungal forms and acid fast  bacilli were negative respectively     11/27/2013 Surgery      Diagnosis    1) VULVA, LEFT ANTERIOR, BIOPSY: HIGH GRADE SQUAMOUS INTRAEPITHELIAL LESION (SEVERE DYSPLASIA, MOODY III).    2) VAGINAL WALL, ANTERIOR, BIOPSY: HIGH GRADE SQUAMOUS INTRAEPITHELIAL LESION (SEVERE DYSPLASIA, VaIN III).    3) VULVA, VULVECTOMY: FOCUS OF MICROINVASIVE SQUAMOUS CELL CARCINOMA, WELL-DIFFERENTIATED, DEPTH OF STROMAL INVASION 0.4 MM,  8 MM FROM CLOSEST DEEP MARGINS, 4 MM FROM RIGHT ANTERIOR VAGINAL MARGINS AT 8 - 9 O'CLOCK (PROXIMAL TIP OF SPECIMEN DESIGNATED   12 O'CLOCK); NEGATIVE FOR LYMPHOVASCULAR INVASION; ARISING IN A BACKGROUND OF EXTENSIVE VULVAR INTRAEPITHELIAL NEOPLASIA (SEVERE DYSPLASIA, MOODY III, CLASSICAL AND DIFFERENTIATED TYPES); MOODY III IS PRESENT AT RIGHT LATERAL SURGICAL MARGIN (7 - 9 O'CLOCK),   LEFT LATERAL SURGICAL MARGIN (3 - 5 O'CLOCK) AND  RIGHT AND LEFT VAGINAL MARGINS; TOTAL LESIONAL AREA (INVASIVE CARCINOMA AND MOODY III) MEASURES 8.2 CM IN GREATEST DIMENSION (GROSS MEASUREMENT); BACKGROUND SEVERE INTERFACE DERMATITIS AND LICHEN SCLEROSUS.        **Electronically signed out by Dimas Cardenas M.D.**on 12/2/2013  HAYLEY/YAZMIN/franky  Case reviewed by Attending Pathologist      Synoptic Diagnosis  3)  VULVA:     Specimen:                        Vulva  Procedure:                       Other: Vulvectomy  Lymph Node Sampling:             Not applicable  Specimen Size:                   Greatest dimension: 13.5 cm                                   Additional dimension: 9.5 cm                                   Additional dimension: 1.2 cm  Tumor Site:                      Right vulva, labium minus  Tumor Size:                      Greatest dimension: 0.04 cm  Tumor Focality:                  Unifocal  Histologic Type:                 Squamous cell carcinoma  Histologic Grade:                G1: Well differentiated  Microscopic Tumor Extension:     Depth of invasion: 0.4 mm  Margins:                         Uninvolved by invasive carcinoma                                   Distance of invasive carcinoma from closest margin: 4 mm  Lymph-Vascular Invasion:         Not identified  Lymph Nodes:                     No nodes submitted or found  Extranodal Extension:            Cannot be determined (explain):    Fixed or Ulcerated Femoral-Inguinal Lymph Nodes:                                    Not identified  Laterality of Involved Lymph Nodes:                                    Cannot be determined:    Primary Tumor (pT):              pT1a [FIGO IA]: Lesions 2 cm or less in size, confined to the vulva or perineum, and with stromal invasion 1.0 mm or less  Regional Lymph Nodes (pN):       pNX:  Regional lymph nodes cannot be assessed  Distant Metastasis (pM):         Not applicable  Additional Pathologic Findings:  Vulvar intraepithelial neoplasia (MOODY) 3 (severe  "dysplasia/carcinoma in situ)  --------------------------------------------------------     5/20/2014 Procedure    Gynecologic Cytology  [place] Diagnosis:  High grade squamous intraepithelial lesion.    Descriptor/Additional Findings:  Moderate dysplasia.    Adequacy:  Specimen satisfactory for evaluation.    Source:  Vagina, Thin Prep Pap, Imaged Diagnostic     11/11/2014 Biopsy    Diagnosis    ANTERIOR VAGINAL WALL, BIOPSY:  HIGH GRADE SQUAMOUS INTRAEPITHELIAL LESION (VaIN 3, SEVERE DYSPLASIA)       12/19/2014 Surgery    Diagnosis  1)  ANTERIOR VAGINAL WALL, PARTIAL VAGINECTOMY: HIGH-GRADE SQUAMOUS INTRAEPITHELIAL LESION (VaIN 3, SEVERE DYSPLASIA), 2.7 CM; HIGH GRADE DYSPLASIA EXTENDS TO THE 2 AND 8 O'CLOCK TIP MARGINS, THE 5-8 O'CLOCK LATERAL MARGIN, AND THE 11-2 O'CLOCK LATERAL   MARGIN.          2)  JOYA-CLITORAL AREA, EXCISION: HIGH-GRADE SQUAMOUS INTRAEPITHELIAL LESION (VaIN 3, SEVERE DYSPLASIA), EXTENDING TO A LATERAL MARGIN.         2/9/2015 Surgery    Diagnosis  1.          VULVA, LEFT PERIURETHRAL PORTION, EXCISION: FOCAL HIGH-GRADE SQUAMOUS INTRAEPITHELIAL LESION (MOODY 2, MODERATE DYSPLASIA); MARGINS ARE NEGATIVE FOR DYSPLASIA.    2.          VULVA, RIGHT PERIURETHRAL PORTION, EXCISION: BENIGN SQUAMOUS MUCOSA WITH ACUTE AND CHRONIC INFLAMMATION AND REACTIVE CHANGES.         3.          VULVA, LEFT, LOWER PORTION, EXCISION: BENIGN SQUAMOUS MUCOSA WITH ACUTE AND CHRONIC INFLAMMATION, REACTIVE CHANGES AND FEATURES CONSISTENT WITH PREVIOUS SURGICAL PROCEDURE.         4.          VULVA, RIGHT PORTION, EXCISION: BENIGN SQUAMOUS MUCOSA WITH CHRONIC INFLAMMATION AND DERMAL FIBROSIS.        9/6/2017 Procedure    Diagnosis  \"PAP SMEAR FROM THE URETHRA\":  HIGH GRADE SQUAMOUS INTRAEPITHELIAL LESION.          10/26/2017 Biopsy    Urethral Meatus Biopsy:  Urothelial mucosa with ulceration, chronic inflammation and focal high grade squamous intraepithelial lesion, moderate dysplasia     7/10/2018 Imaging    MRI " Pelvis:  Impression:    1.  There is a 2.3 x 1.8 cm urethral lesion at the external urethral   meatus demonstrating enhancement and T2 hyperintensity. The lesion is   located approximately 8 mm from the bladder neck/internal urethral   orifice.  There is no extension of the lesion into the para vaginal   fat or ischiorectal fossa. There is some edema of the bilateral   ischiocavernosus muscles without invasion by the mass. No pelvic or   inguinal adenopathy is identified.     2.  The patient has a 2.5 cm cystocele and the urethra is almost   horizontal. There is pelvic floor laxity with loss of upper convexity   of the left iliococcygeus muscle.     7/20/2018 Biopsy    Diagnosis  URETHRA, BIOPSY:  SQUAMOUS CELL CARCINOMA IN SITU; SEE COMMENT.    Comments  P16 immunostain and HPV RNA in situ hybridization are strongly and diffusely positive within the lesion, consistent with HPV-related pathogenesis.     1/8/2019 Imaging    MRI Pelvis:  1.  Stable size and appearance of a nonspecific enhancing nodular   lesion at the caudal margin of the vaginal introitus adjacent to   extensive postsurgical changes related to prior vulvectomy and   vaginectomy. This lesion does not appear to conform to the expected   course of the urethra which is somewhat poorly defined, and this felt   more likely be located immediately caudal to the urethra. It is   possible this may reflect postsurgical scarring as opposed to a true   lesion. Continued follow-up is suggested to ensure stability.  2.  No lymphadenopathy or other evidence of metastatic disease in the   pelvis.  3.  Evidence of pelvic floor laxity with cystocele, not significantly   changed.     8/6/2019 Imaging    MRI Pelvis:  1. No significant change from the prior exam on this limited   noncontrast study.  2. Stable indeterminate nodule anterior to the external urethral   meatus which may represent focal scarring; however,   residual/recurrent disease is not entirely  excluded.  Recommend continued attention on follow-up. 3. Extensive pelvic   postsurgical changes with no evidence of local recurrence.  4. Pelvic floor laxity with cystocele unchanged from prior exam.     1/13/2020 Procedure    Urethral stricture dilation     2/18/2020 Imaging    MRI Pelvis:  Impression:  1.  No significant interval change in 1.7 x 1.7 cm T2 hyperintense   ovoid lesion at the urethral meatus.  2.  Numerous postoperative findings status post vaginectomy and   hysterectomy.  3.  Pelvic floor laxity with persistent cystocele.  4.  No pelvic adenopathy or bony lesion identified.     5/13/2020 Procedure    Urethral stricture dilation      7/10/2020 Biopsy    Diagnosis  PERIURETHRA, BIOPSY: POORLY DIFFERENTIATED CARCINOMA WITH SQUAMOUS AND PAPILLARY FEATURES, FOCALLY INVASIVE IN THE SUBEPITHELIAL CONNECTIVE TISSUE; SEE COMMENT.    Comments  The patient's history of vulvar microinvasive squamous cell carcinoma is noted. The current biopsy shows a poorly differentiated carcinoma with papillary formation. P16 immunostain and HPV RNA in situ hybridization are strongly and diffusely positive within the lesion, consistent with HPV-related pathogenesis. A KERRI-3 immunostain shows patchy, weak positivity in the lesional cells. The patient's prior biopsy ([id number]) is reviewed and shows similar morphologic and immunophenotypic features but the papillary features were not present in the prior material.  Dr. Ilene Pablo reviewed this case and concurs with the diagnosis.      7/31/2020 Imaging    MRI Pelvis:  1.  Redemonstration of a mildly T2 hyperintense mass involving the   urethral meatus, similar dimensions to prior exam on 2/18/2020,   however there is now a polypoid lesion seen along the anterior aspect   of the perineum, possibly contiguous with the urethral mass,   concerning for disease progression. This could potentially represent   the recently biopsied lesion.  2.  Marked interval enlargement of  a left inguinal lymph node, almost   certainly representing disease involvement. This would be amenable to   ultrasound-guided biopsy if warranted.  3.  Findings related to pelvic floor laxity, with cystocele.    PET/CT:  1.  Intensely FDG avid left inguinal lymph node is highly suspicious   for locoregional metastasis from known vulvar squamous cell   carcinoma. There are no additional sites of suspicious FDG activity.  2.   Intense physiologic FDG activity within the urine obscures   visualization of the periurethral mass. Findings are better   characterized on same day pelvic MRI.     10/5/2020 -  Chemotherapy    OP Vulvar MitoMYcin / Fluorouracil CIV + XRT       10/9/2020 -  Chemotherapy    OP CENTRAL VENOUS ACCESS DEVICE ACCESS, CARE, AND MAINTENANCE (CVAD)     Secondary malignancy of inguinal lymph nodes (CMS/HCC)   8/31/2020 Initial Diagnosis    Secondary malignancy of inguinal lymph nodes (CMS/HCC)     10/9/2020 -  Chemotherapy    OP CENTRAL VENOUS ACCESS DEVICE ACCESS, CARE, AND MAINTENANCE (CVAD)         PAST MEDICAL HISTORY:  ALLERGIES:  Allergies   Allergen Reactions   • Scopolamine Swelling     Other reaction(s): ANGIOEDEMA  Other reaction(s): ANGIOEDEMA     • Tequin [Gatifloxacin] Other (See Comments)     Doesn't remember   • Amoxicillin-Pot Clavulanate Rash   • Keflex [Cephalexin] Rash   • Septra [Sulfamethoxazole-Trimethoprim] Rash   • Trovan [Alatrofloxacin] Dizziness     CURRENT MEDICATIONS:  Outpatient Encounter Medications as of 12/14/2020   Medication Sig Dispense Refill   • Acetaminophen (TYLENOL ARTHRITIS PAIN PO) Take 1 tablet by mouth Daily.     • B Complex Vitamins (VITAMIN B COMPLEX) capsule capsule Take  by mouth Daily.     • calcium carbonate (OS-REAGAN) 600 MG tablet Take 600 mg by mouth Daily.     • cetirizine (zyrTEC) 10 MG tablet Take 10 mg by mouth Daily.     • citalopram (CeleXA) 20 MG tablet Take 1 tablet by mouth Daily. 90 tablet 1   • diphenoxylate-atropine (LOMOTIL) 2.5-0.025 MG  per tablet Take 2 tablets by mouth 4 (Four) Times a Day As Needed for Diarrhea. 90 tablet 0   • DULERA 100-5 MCG/ACT inhaler Inhale 2 puffs 2 (Two) Times a Day. Rinse and spit after using. 6 inhaler 0   • esomeprazole (nexIUM) 40 MG capsule Take 1 capsule by mouth 2 (Two) Times a Day. 180 capsule 3   • furosemide (LASIX) 40 MG tablet Take 1.5 tablets by mouth Daily. Patient only takes once a day 135 tablet 3   • gabapentin (NEURONTIN) 300 MG capsule Take 600 mg by mouth every night at bedtime.     • gabapentin (NEURONTIN) 300 MG capsule Take 300 mg by mouth Daily.     • Homeopathic Products (LEG CRAMPS) tablet Take 1 tablet by mouth Daily.     • HYDROcodone-acetaminophen (NORCO)  MG per tablet Take 1 tablet by mouth Every 4 (Four) Hours As Needed for Moderate Pain  or Severe Pain . 60 tablet 0   • Hydrocortisone (britany's amazing butt) cream Apply 1 application topically to the appropriate area as directed As Needed (irritation). 120 g 0   • lidocaine (XYLOCAINE) 5 % ointment Apply  topically to the appropriate area as directed Every 4 (Four) Hours As Needed for Mild Pain  (pain secondary to radiation). 240 g 1   • Lidocaine Viscous HCl (XYLOCAINE) 2 % solution Take 5 mL by mouth 3 (Three) Times a Day With Meals. 100 mL 0   • ondansetron (Zofran) 8 MG tablet Take 1 tablet by mouth Every 8 (Eight) Hours As Needed for Nausea or Vomiting. 60 tablet 2   • potassium chloride (K-DUR,KLOR-CON) 20 MEQ CR tablet Take 2 tablets by mouth Daily. Patient only takes once a day 60 tablet 11   • pravastatin (PRAVACHOL) 40 MG tablet Take 40 mg by mouth Daily.       No facility-administered encounter medications on file as of 12/14/2020.      ADULT ILLNESSES:  Patient Active Problem List   Diagnosis Code   • Meatal stenosis [id number]   • Retention of urine R33.9   • Type 2 diabetes mellitus, without long-term current use of insulin (CMS/[facility]) E11.9   • Essential hypertension I10   • Grief reaction F43.21   • 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   • Chronic midline low back pain without sciatica M54.5, G89.29   • Bilateral lower extremity edema R60.0   • History of urethral stricture Z87.448   • Epidermal cyst of neck L72.0   • Normocytic anemia D64.9   • Neck abscess L02.11   • Hyperlipidemia E78.5   • GERD (gastroesophageal reflux disease) K21.9   • Anxiety F41.9   • Iron deficiency and chemotherapy induced anemia D50.9   • Stage 3 chronic kidney disease N18.30   • Anemia in stage 3 chronic kidney disease N18.30, D63.1   • Screening for breast cancer Z12.39   • Lower extremity edema R60.0   • Vulvar cancer, carcinoma (CMS/HCC) C51.9   • Former smoker Z87.891   • 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   • Febrile illness R50.9   • Chemotherapy-induced thrombocytopenia D69.59, T45.1X5A   • Hyponatremia E87.1   • Hypokalemia E87.6   • Neutropenic fever (CMS/HCC) D70.9, R50.81   • Moderate malnutrition (CMS/HCC) E44.0   • Antineoplastic chemotherapy induced anemia D64.81, T45.1X5A     SURGERIES:  Past Surgical History:   Procedure Laterality Date   • APPENDECTOMY     • BREAST CYST ASPIRATION Left    • COLONOSCOPY  01/12/2011   • ENDOSCOPY  07/01/2014   • HYSTERECTOMY     • TONSILLECTOMY     • VAGINA SURGERY     • VENOUS ACCESS DEVICE (PORT) INSERTION N/A 9/29/2020    Procedure: SINGLE LUMEN PORT - A- CATH PLACEMENT WITH FLUOROSCOPY;  Surgeon: Quynh Rosario MD;  Location: [facility];  Service: General;  Laterality: N/A;     HEALTH MAINTENANCE ITEMS:  Health Maintenance Due   Topic Date Due   • COLONOSCOPY  1940   • ZOSTER VACCINE (2 of 3) 11/26/2015   • HEPATITIS C SCREENING  07/11/2017   • URINE MICROALBUMIN  01/29/2020   • LIPID PANEL  08/26/2020   • DIABETIC EYE EXAM  11/25/2020       <no information>  Last Completed Colonoscopy       Status Date      COLONOSCOPY No completions recorded        Immunization History   Administered Date(s) Administered   • FLUAD TRI 65YR+ 10/22/2019   • Fluad Quad  "65+ 11/09/2020   • Fluzone High Dose =>65 Years ([employer] ONLY) 10/01/2018   • Pneumococcal Polysaccharide (PPSV23) 10/16/2013   • Pneumococcal, Unspecified 10/01/2017   • Tdap 05/01/2019   • Zostavax 10/01/2015     Last Completed Mammogram       Status Date      MAMMOGRAM Done 1/2/2013 Ext Proc: INACTIVE -HC MAMMOGRAM SCREENING BILAT DIGITAL W CAD     Patient has more history with this topic...            FAMILY HISTORY:  Family History   Problem Relation Age of Onset   • Cancer Mother    • Hypertension Mother    • Osteoporosis Mother    • Dementia Mother    • Uterine cancer Mother    • Heart disease Father    • Parkinsonism Father    • Cancer Sister    • Breast cancer Sister    • Kidney cancer Sister    • Diabetes Brother    • Heart disease Paternal Grandfather    • No Known Problems Maternal Grandmother    • No Known Problems Maternal Grandfather    • No Known Problems Paternal Grandmother      SOCIAL HISTORY:  Social History     Socioeconomic History   • Marital status:      Spouse name: Not on file   • Number of children: Not on file   • Years of education: Not on file   • Highest education level: Not on file   Tobacco Use   • Smoking status: Former Smoker   • Smokeless tobacco: Never Used   Substance and Sexual Activity   • Alcohol use: No   • Drug use: No   • Sexual activity: Never       REVIEW OF SYSTEMS:    Review of Systems   Constitutional: Positive for fatigue. Negative for chills, diaphoresis and fever.        \"Low energy.\"  She needs to sit most of the daytime.\"   HENT: Negative for congestion, hearing loss and trouble swallowing.    Eyes: Negative for redness and visual disturbance.   Respiratory: Negative for cough, shortness of breath and wheezing.    Cardiovascular: Positive for leg swelling. Negative for chest pain.   Gastrointestinal: Positive for diarrhea. Negative for abdominal distention, constipation, nausea and vomiting.        \"\"Dr. Roche gave me something.\"   Endocrine: Negative for " "cold intolerance and heat intolerance.   Genitourinary: Negative for difficulty urinating, flank pain, hematuria and vaginal bleeding.   Musculoskeletal: Negative for arthralgias and neck stiffness.   Skin: Positive for pallor.   Allergic/Immunologic: Negative for food allergies.   Neurological: Negative for dizziness, speech difficulty and weakness.   Hematological: Negative for adenopathy. Does not bruise/bleed easily.   Psychiatric/Behavioral: Negative for agitation, confusion and hallucinations.       VITAL SIGNS: /54   Pulse 102   Temp 99.6 °F (37.6 °C)   Resp 18   Ht 152.4 cm (60\")   Wt 75.2 kg (165 lb 11.2 oz)   SpO2 92%   Breastfeeding No   BMI 32.36 kg/m²   Pain Score    12/14/20 1121   PainSc:   2       PHYSICAL EXAMINATION:     Physical Exam  Vitals signs reviewed.   Constitutional:       General: She is not in acute distress.     Appearance: She is ill-appearing.      Comments: She arrived in the exam room in a wheelchair.   HENT:      Head: Normocephalic and atraumatic.   Eyes:      General: No scleral icterus.  Neck:      Musculoskeletal: Neck supple.   Cardiovascular:      Rate and Rhythm: Normal rate and regular rhythm.   Pulmonary:      Effort: No respiratory distress.      Breath sounds: No wheezing.      Comments: Port, right, no erythema.  Abdominal:      General: Bowel sounds are normal.      Palpations: Abdomen is soft.      Tenderness: There is no abdominal tenderness.   Musculoskeletal:         General: Swelling present.      Comments: Chronic 2 + bilateral leg edema.    Skin:     General: Skin is warm and dry.      Coloration: Skin is pale.   Neurological:      Mental Status: She is alert and oriented to person, place, and time.   Psychiatric:         Mood and Affect: Mood normal.         Behavior: Behavior normal.         Thought Content: Thought content normal.         Judgment: Judgment normal.         LABS    Lab Results - Last 18 Months   Lab Units 12/07/20  0958 " 11/30/20  0954 11/23/20  1003 11/17/20  0958 11/10/20  0947 11/04/20  1043  10/27/20  0949 10/22/20  0430 10/21/20  0521 10/20/20  0448 10/19/20  1535 10/19/20  1244 10/13/20  1114   HEMOGLOBIN g/dL 10.0* 10.5* 11.1* 10.9* 9.8* 9.9*   < > 9.4* 9.4* 7.8* 8.4* 9.1* 9.3* 10.0*   HEMATOCRIT % 31.4* 31.8* 33.2* 33.5* 28.9* 29.9*   < > 27.5* 27.4* 23.0* 23.5* 25.7* 26.4* 28.7*   MCV fL 100.0* 96.4 96.8 98.2* 95.1 97.4*   < > 96.2 93.5 93.9 91.8 92.8 92.3 94.7   WBC 10*3/mm3 5.83 5.13 6.89 7.39 7.03 4.68   < > 6.85 8.42 6.38 2.65* 4.02 4.54 6.12   RDW % 14.7 14.7 15.6* 16.7* 16.2* 15.9*   < > 14.4 13.2 13.0 12.4 12.6 12.5 12.4   MPV fL 9.6 10.5 9.8 10.0 10.2 9.8   < > 9.9 12.2* 12.7* 12.7* 12.9* 10.9 10.5   PLATELETS 10*3/mm3 231 207 188 182 264 260   < > 271 71* 51* 35* 35* 40* 85*   IMM GRAN % % 0.3 0.6* 0.6* 0.8* 0.7* 1.3*   < >  --   --   --   --   --   --   --    NEUTROS ABS 10*3/mm3 4.33 3.34 5.11 6.24 5.73 3.60   < > 5.82 7.49* 5.93 2.17 3.09 3.72 5.63   LYMPHS ABS 10*3/mm3 0.76 1.07 0.84 0.44* 0.41* 0.41*   < >  --   --   --   --   --   --   --    MONOS ABS 10*3/mm3 0.54 0.41 0.73 0.50 0.68 0.55   < >  --   --   --   --   --   --   --    EOS ABS 10*3/mm3 0.16 0.23 0.14 0.11 0.13 0.04   < >  --   --   --   --   --   --  0.18   BASOS ABS 10*3/mm3 0.02 0.05 0.03 0.04 0.03 0.02   < > 0.07  --   --   --   --   --  0.06   IMMATURE GRANS (ABS) 10*3/mm3 0.02 0.03 0.04 0.06* 0.05 0.06*   < >  --   --   --   --   --   --   --    NRBC /100 WBC 0.0 0.0 0.0 0.0 0.0 0.0   < >  --  2.0*  --   --   --   --   --    NEUTROPHIL % %  --   --   --   --   --   --   --  85.0* 81.0* 89.9* 78.0* 64.6 67.0 88.0*   MONOCYTES % %  --   --   --   --   --   --   --  8.0 4.0* 3.0* 9.0 13.1* 12.0 1.0*   BASOPHIL % %  --   --   --   --   --   --   --  1.0  --   --   --   --   --  1.0   ATYP LYMPH % %  --   --   --   --   --   --   --   --   --   --  2.0 1.0 1.0  --    ANISOCYTOSIS   --   --   --   --   --   --   --  Slight/1+  --  Slight/1+  --    --  Slight/1+  --    GIANT PLT   --   --   --   --   --   --   --  Slight/1+ Slight/1+ Slight/1+ Slight/1+  --   --   --     < > = values in this interval not displayed.       Lab Results - Last 18 Months   Lab Units 11/30/20  0954 11/23/20  1003 11/17/20  0958 11/04/20  1043 10/22/20  0430 10/21/20  0521   GLUCOSE mg/dL 170* 149* 185* 177* 107* 164*   SODIUM mmol/L 134* 130* 133* 134* 134* 135*   POTASSIUM mmol/L 4.1 3.2* 4.1 3.0* 3.5 3.7   CO2 mmol/L 33.0* 31.0* 29.0 32.0* 25.0 27.0   CHLORIDE mmol/L 94* 90* 92* 91* 99 101   ANION GAP mmol/L 7.0 9.0 12.0 11.0 10.0 7.0   CREATININE mg/dL 1.91* 1.59* 1.52* 1.71* 0.80 0.81   BUN mg/dL 22 19 18 15 12 16   BUN / CREAT RATIO  11.5 11.9 11.8 8.8 15.0 19.8   CALCIUM mg/dL 9.7 9.2 8.8 8.8 9.0 8.7   EGFR IF NONAFRICN AM mL/min/1.73 25* 31* 33* 29* 69 68   ALK PHOS U/L 55 58 56 49 47 43   TOTAL PROTEIN g/dL 7.2 7.0 6.6 6.5 6.3 5.7*   ALT (SGPT) U/L 14 9 10 9 19 16   AST (SGOT) U/L 60* 18 21 15 29 25   BILIRUBIN mg/dL 0.3 0.3 0.3 0.3 0.3 0.2   ALBUMIN g/dL 3.40* 3.40* 3.40* 3.40* 3.40* 3.00*   GLOBULIN gm/dL 3.8 3.6 3.2 3.1 2.9 2.7       No results for input(s): MSPIKE, KAPPALAMB, IGLFLC, URICACID, FREEKAPPAL, CEA, LDH, REFLABREPO in the last [id number] hours.    Lab Results - Last 18 Months   Lab Units 10/20/20  0448 10/13/20  1114 09/03/20  1134 07/02/20  1019 05/14/20  1038 12/23/19  1322 11/11/19  1131   IRON mcg/dL  --  135 129 91 92 93 113   TIBC mcg/dL  --  335 384 338 311 380 368   IRON SATURATION %  --  40 34 27 30 24 31   FERRITIN ng/mL  --  599.10* 341.90* 315.10* 286.50* 309.50* 523.10*   TSH uIU/mL 0.831  --   --   --   --   --   --        Dago DIDI Nunez reports a pain score of 2.  Given her pain assessment as noted, treatment options were discussed and the following options were decided upon as a follow-up plan to address the patient's pain: continuation of current treatment plan for pain.      Patient's Body mass index is 32.36 kg/m². BMI is above normal parameters.  Recommendations include: none (medical contraindication).    ASSESSMENT:  1.   Recurrent squamous cell carcinoma, vulva.  AJCC stage IIIA (T2, Nib, M0, G3).  Treatment status.  Had Mitomycin C and 5 FU D1 to D4 with radiation.  D29 - 32 chemo was cancelled due to poor performance status.   2.  Macrocytic anemia from iron deficiency and history of chronic kidney disease Stage III, GFR 56 mL/min on 09/03/2020.  3.   Iron deficiency. Intolerant to oral iron.   4.   Chronic kidney disease Stage III, GFR 56 ml/min on 09/03/2020.  5.   Performance status of 3.     6.   Thrombocytopenia, 04/19/2018. (?) laboratory error.    7.   Squamous cell cancer in situ, urethra.  Followed by Dr. Callahan  8.   Grade 1 mucositis from chemo.      9.   Obesity BMI 32.3.         PLAN:  1.     Re:  Patient's poor performance status.  Radiation resumed 11/23/2020 through 12/10/2020.  2.     Re:  Chemo withheld due to poor performance status.    3.     Re:  Heme status.  Hemoglobin 10 and hematocrit 31.4.  4.     Re:  Pre-office CMP.  GFR 25 ml/minute and AST 60.  5.     Re:  Stable for observation.  6.     Re:  Keep appointment with Dr. Roche.    7.    CBC with differential weekly x4.  8.    Discontinue Retacrit  9.    Continue ongoing management per primary care physician and other specialists.  10.  Plan of care discussed with patient and spouse.  Understanding expressed.  Patient agreeable to proceed.  11.  Intolerant to oral iron (nausea, cramps, and constipation).  IV iron as needed.   12.  Advance Care Planning   ACP discussion was held with the patient during this visit. Patient has an advance directive (not in EMR), copy requested.  13.  eRx Zofran 8 mg po every 8 hours as needed for nausea/vomiting, # 60, 2 refills if needed.  14.  eRx miracle mouth wash, 15 ml swish and spit every 4 hours as needed for mucositis, 480 ml, 2 refills if needed.  15.  Flush port every 6 weeks.   16.  Transfuse  2 units packed RBCs if hemoglobin less than seven.  Premed Tylenol 500 mg p.o. and Benadryl 12.5 mg IV.  Lasix 20 mg IV push after each unit of blood.  Observe for transfusion reactions.  17.  Return to the office in 3 months with CBC with differential and CMP.  She will be seen every 3 to 6 months for the first 3 years then every 6 months for the next 3 years.  Cervical/vaginal cytology as indicated.           I spent 25 total minutes, face-to-face, caring for Syndal today.  Greater than 50% of this time involved counseling and/or coordination of care as documented within this note regarding the patient's illness(es), pros and cons of various treatment options, instructions and/or risk reduction.             cc: Shaheen Akbar MD         (Ulises Roche MD)        (Trini Rosario MD)        Gilberto Mills MD        (Moustapha Callahan MD)        (Radha Marks MD)

## 2020-12-08 ENCOUNTER — HOSPITAL ENCOUNTER (OUTPATIENT)
Dept: RADIATION ONCOLOGY | Facility: HOSPITAL | Age: 80
Setting detail: RADIATION/ONCOLOGY SERIES
Discharge: HOME | End: 2020-12-08

## 2020-12-08 PROCEDURE — 77386: CPT | Performed by: RADIOLOGY

## 2020-12-09 ENCOUNTER — HOSPITAL ENCOUNTER (OUTPATIENT)
Dept: RADIATION ONCOLOGY | Facility: HOSPITAL | Age: 80
Setting detail: RADIATION/ONCOLOGY SERIES
Discharge: HOME | End: 2020-12-09

## 2020-12-09 PROCEDURE — 77386: CPT | Performed by: RADIOLOGY

## 2020-12-10 ENCOUNTER — HOSPITAL ENCOUNTER (OUTPATIENT)
Dept: RADIATION ONCOLOGY | Facility: HOSPITAL | Age: 80
Discharge: HOME | End: 2020-12-10

## 2020-12-10 PROCEDURE — 77336 RADIATION PHYSICS CONSULT: CPT | Performed by: RADIOLOGY

## 2020-12-10 PROCEDURE — 77386: CPT | Performed by: RADIOLOGY

## 2020-12-14 ENCOUNTER — OFFICE VISIT (OUTPATIENT)
Dept: ONCOLOGY | Facility: CLINIC | Age: 80
End: 2020-12-14

## 2020-12-14 VITALS
HEIGHT: 60 IN | SYSTOLIC BLOOD PRESSURE: 118 MMHG | DIASTOLIC BLOOD PRESSURE: 54 MMHG | WEIGHT: 165.7 LBS | OXYGEN SATURATION: 92 % | HEART RATE: 102 BPM | BODY MASS INDEX: 32.53 KG/M2 | TEMPERATURE: 99.6 F | RESPIRATION RATE: 18 BRPM

## 2020-12-14 DIAGNOSIS — C51.9 VULVAR CANCER, CARCINOMA (HCC): Primary | ICD-10-CM

## 2020-12-14 PROCEDURE — 99214 OFFICE O/P EST MOD 30 MIN: CPT | Performed by: INTERNAL MEDICINE

## 2020-12-16 DIAGNOSIS — K52.1 DIARRHEA DUE TO DRUG: ICD-10-CM

## 2020-12-16 DIAGNOSIS — C51.9 VULVAR CANCER, CARCINOMA (HCC): ICD-10-CM

## 2020-12-16 RX ORDER — DIPHENOXYLATE HYDROCHLORIDE AND ATROPINE SULFATE 2.5; .025 MG/1; MG/1
TABLET ORAL
Qty: 90 TABLET | Refills: 0 | OUTPATIENT
Start: 2020-12-16

## 2020-12-16 RX ORDER — DIPHENOXYLATE HYDROCHLORIDE AND ATROPINE SULFATE 2.5; .025 MG/1; MG/1
2 TABLET ORAL 4 TIMES DAILY PRN
Qty: 90 TABLET | Refills: 0 | Status: SHIPPED | OUTPATIENT
Start: 2020-12-16 | End: 2021-01-17 | Stop reason: SDUPTHER

## 2020-12-28 ENCOUNTER — LAB (OUTPATIENT)
Dept: LAB | Facility: HOSPITAL | Age: 80
End: 2020-12-28

## 2020-12-28 ENCOUNTER — INFUSION (OUTPATIENT)
Dept: ONCOLOGY | Facility: HOSPITAL | Age: 80
End: 2020-12-28

## 2020-12-28 VITALS
DIASTOLIC BLOOD PRESSURE: 44 MMHG | RESPIRATION RATE: 16 BRPM | HEART RATE: 66 BPM | WEIGHT: 163 LBS | HEIGHT: 60 IN | BODY MASS INDEX: 32 KG/M2 | SYSTOLIC BLOOD PRESSURE: 138 MMHG | OXYGEN SATURATION: 100 % | TEMPERATURE: 98 F

## 2020-12-28 DIAGNOSIS — C51.9 VULVAR CANCER, CARCINOMA (HCC): Primary | ICD-10-CM

## 2020-12-28 DIAGNOSIS — C51.9 VULVAR CANCER, CARCINOMA (HCC): ICD-10-CM

## 2020-12-28 DIAGNOSIS — C77.4 SECONDARY MALIGNANCY OF INGUINAL LYMPH NODES (HCC): Primary | ICD-10-CM

## 2020-12-28 LAB
BASOPHILS # BLD AUTO: 0.03 10*3/MM3 (ref 0–0.2)
BASOPHILS NFR BLD AUTO: 0.5 % (ref 0–1.5)
DEPRECATED RDW RBC AUTO: 50.4 FL (ref 37–54)
EOSINOPHIL # BLD AUTO: 0.12 10*3/MM3 (ref 0–0.4)
EOSINOPHIL NFR BLD AUTO: 1.8 % (ref 0.3–6.2)
ERYTHROCYTE [DISTWIDTH] IN BLOOD BY AUTOMATED COUNT: 14.3 % (ref 12.3–15.4)
HCT VFR BLD AUTO: 29.7 % (ref 34–46.6)
HGB BLD-MCNC: 10.1 G/DL (ref 12–15.9)
HOLD SPECIMEN: NORMAL
IMM GRANULOCYTES # BLD AUTO: 0.04 10*3/MM3 (ref 0–0.05)
IMM GRANULOCYTES NFR BLD AUTO: 0.6 % (ref 0–0.5)
LYMPHOCYTES # BLD AUTO: 0.83 10*3/MM3 (ref 0.7–3.1)
LYMPHOCYTES NFR BLD AUTO: 12.6 % (ref 19.6–45.3)
MCH RBC QN AUTO: 32.9 PG (ref 26.6–33)
MCHC RBC AUTO-ENTMCNC: 34 G/DL (ref 31.5–35.7)
MCV RBC AUTO: 96.7 FL (ref 79–97)
MONOCYTES # BLD AUTO: 0.59 10*3/MM3 (ref 0.1–0.9)
MONOCYTES NFR BLD AUTO: 8.9 % (ref 5–12)
NEUTROPHILS NFR BLD AUTO: 4.99 10*3/MM3 (ref 1.7–7)
NEUTROPHILS NFR BLD AUTO: 75.6 % (ref 42.7–76)
NRBC BLD AUTO-RTO: 0 /100 WBC (ref 0–0.2)
PLATELET # BLD AUTO: 192 10*3/MM3 (ref 140–450)
PMV BLD AUTO: 9.8 FL (ref 6–12)
RBC # BLD AUTO: 3.07 10*6/MM3 (ref 3.77–5.28)
WBC # BLD AUTO: 6.6 10*3/MM3 (ref 3.4–10.8)

## 2020-12-28 PROCEDURE — 96523 IRRIG DRUG DELIVERY DEVICE: CPT

## 2020-12-28 PROCEDURE — G0463 HOSPITAL OUTPT CLINIC VISIT: HCPCS

## 2020-12-28 PROCEDURE — 85025 COMPLETE CBC W/AUTO DIFF WBC: CPT

## 2020-12-28 PROCEDURE — 25010000003 HEPARIN LOCK FLUSH PER 10 UNITS: Performed by: INTERNAL MEDICINE

## 2020-12-28 PROCEDURE — 36415 COLL VENOUS BLD VENIPUNCTURE: CPT

## 2020-12-28 RX ORDER — HEPARIN SODIUM (PORCINE) LOCK FLUSH IV SOLN 100 UNIT/ML 100 UNIT/ML
500 SOLUTION INTRAVENOUS AS NEEDED
Status: DISCONTINUED | OUTPATIENT
Start: 2020-12-28 | End: 2020-12-28 | Stop reason: HOSPADM

## 2020-12-28 RX ORDER — SODIUM CHLORIDE 0.9 % (FLUSH) 0.9 %
10 SYRINGE (ML) INJECTION AS NEEDED
Status: DISCONTINUED | OUTPATIENT
Start: 2020-12-28 | End: 2020-12-28 | Stop reason: HOSPADM

## 2020-12-28 RX ORDER — HEPARIN SODIUM (PORCINE) LOCK FLUSH IV SOLN 100 UNIT/ML 100 UNIT/ML
500 SOLUTION INTRAVENOUS AS NEEDED
Status: CANCELLED | OUTPATIENT
Start: 2020-12-28

## 2020-12-28 RX ORDER — SODIUM CHLORIDE 0.9 % (FLUSH) 0.9 %
10 SYRINGE (ML) INJECTION AS NEEDED
Status: CANCELLED | OUTPATIENT
Start: 2020-12-28

## 2020-12-28 RX ADMIN — SODIUM CHLORIDE, PRESERVATIVE FREE 10 ML: 5 INJECTION INTRAVENOUS at 11:27

## 2020-12-28 RX ADMIN — Medication 500 UNITS: at 11:27

## 2021-01-01 DIAGNOSIS — E11.42 TYPE 2 DIABETES MELLITUS WITH DIABETIC POLYNEUROPATHY, WITHOUT LONG-TERM CURRENT USE OF INSULIN (HCC): ICD-10-CM

## 2021-01-04 ENCOUNTER — LAB (OUTPATIENT)
Dept: LAB | Facility: HOSPITAL | Age: 81
End: 2021-01-04

## 2021-01-04 ENCOUNTER — TELEPHONE (OUTPATIENT)
Dept: ONCOLOGY | Facility: CLINIC | Age: 81
End: 2021-01-04

## 2021-01-04 ENCOUNTER — INFUSION (OUTPATIENT)
Dept: ONCOLOGY | Facility: HOSPITAL | Age: 81
End: 2021-01-04

## 2021-01-04 VITALS
SYSTOLIC BLOOD PRESSURE: 128 MMHG | HEART RATE: 65 BPM | RESPIRATION RATE: 18 BRPM | BODY MASS INDEX: 32.2 KG/M2 | HEIGHT: 60 IN | WEIGHT: 164 LBS | DIASTOLIC BLOOD PRESSURE: 42 MMHG | OXYGEN SATURATION: 94 % | TEMPERATURE: 97.5 F

## 2021-01-04 DIAGNOSIS — D50.9 IRON DEFICIENCY ANEMIA, UNSPECIFIED IRON DEFICIENCY ANEMIA TYPE: ICD-10-CM

## 2021-01-04 DIAGNOSIS — C51.9 VULVAR CANCER, CARCINOMA (HCC): Primary | ICD-10-CM

## 2021-01-04 DIAGNOSIS — N18.32 STAGE 3B CHRONIC KIDNEY DISEASE (HCC): Primary | ICD-10-CM

## 2021-01-04 DIAGNOSIS — N18.32 STAGE 3B CHRONIC KIDNEY DISEASE (HCC): ICD-10-CM

## 2021-01-04 LAB
ALBUMIN SERPL-MCNC: 3.6 G/DL (ref 3.5–5.2)
ALBUMIN/GLOB SERPL: 0.9 G/DL
ALP SERPL-CCNC: 49 U/L (ref 39–117)
ALT SERPL W P-5'-P-CCNC: 6 U/L (ref 1–33)
ANION GAP SERPL CALCULATED.3IONS-SCNC: 8 MMOL/L (ref 5–15)
AST SERPL-CCNC: 13 U/L (ref 1–32)
BASOPHILS # BLD AUTO: 0.03 10*3/MM3 (ref 0–0.2)
BASOPHILS NFR BLD AUTO: 0.5 % (ref 0–1.5)
BILIRUB SERPL-MCNC: 0.4 MG/DL (ref 0–1.2)
BUN SERPL-MCNC: 16 MG/DL (ref 8–23)
BUN/CREAT SERPL: 10.3 (ref 7–25)
CALCIUM SPEC-SCNC: 9.4 MG/DL (ref 8.6–10.5)
CHLORIDE SERPL-SCNC: 97 MMOL/L (ref 98–107)
CO2 SERPL-SCNC: 28 MMOL/L (ref 22–29)
CREAT SERPL-MCNC: 1.56 MG/DL (ref 0.57–1)
DEPRECATED RDW RBC AUTO: 51.1 FL (ref 37–54)
EOSINOPHIL # BLD AUTO: 0.21 10*3/MM3 (ref 0–0.4)
EOSINOPHIL NFR BLD AUTO: 3.8 % (ref 0.3–6.2)
ERYTHROCYTE [DISTWIDTH] IN BLOOD BY AUTOMATED COUNT: 14.5 % (ref 12.3–15.4)
FERRITIN SERPL-MCNC: 401.1 NG/ML (ref 13–150)
GFR SERPL CREATININE-BSD FRML MDRD: 32 ML/MIN/1.73
GLOBULIN UR ELPH-MCNC: 3.9 GM/DL
GLUCOSE SERPL-MCNC: 156 MG/DL (ref 65–99)
HCT VFR BLD AUTO: 28.4 % (ref 34–46.6)
HGB BLD-MCNC: 9.8 G/DL (ref 12–15.9)
HOLD SPECIMEN: NORMAL
IMM GRANULOCYTES # BLD AUTO: 0.03 10*3/MM3 (ref 0–0.05)
IMM GRANULOCYTES NFR BLD AUTO: 0.5 % (ref 0–0.5)
IRON 24H UR-MRATE: 74 MCG/DL (ref 37–145)
IRON SATN MFR SERPL: 25 % (ref 20–50)
LYMPHOCYTES # BLD AUTO: 0.82 10*3/MM3 (ref 0.7–3.1)
LYMPHOCYTES NFR BLD AUTO: 14.7 % (ref 19.6–45.3)
MCH RBC QN AUTO: 33.6 PG (ref 26.6–33)
MCHC RBC AUTO-ENTMCNC: 34.5 G/DL (ref 31.5–35.7)
MCV RBC AUTO: 97.3 FL (ref 79–97)
MONOCYTES # BLD AUTO: 0.52 10*3/MM3 (ref 0.1–0.9)
MONOCYTES NFR BLD AUTO: 9.3 % (ref 5–12)
NEUTROPHILS NFR BLD AUTO: 3.96 10*3/MM3 (ref 1.7–7)
NEUTROPHILS NFR BLD AUTO: 71.2 % (ref 42.7–76)
NRBC BLD AUTO-RTO: 0 /100 WBC (ref 0–0.2)
PLATELET # BLD AUTO: 191 10*3/MM3 (ref 140–450)
PMV BLD AUTO: 9.6 FL (ref 6–12)
POTASSIUM SERPL-SCNC: 3.9 MMOL/L (ref 3.5–5.2)
PROT SERPL-MCNC: 7.5 G/DL (ref 6–8.5)
RBC # BLD AUTO: 2.92 10*6/MM3 (ref 3.77–5.28)
SODIUM SERPL-SCNC: 133 MMOL/L (ref 136–145)
TIBC SERPL-MCNC: 301 MCG/DL (ref 298–536)
TRANSFERRIN SERPL-MCNC: 202 MG/DL (ref 200–360)
WBC # BLD AUTO: 5.57 10*3/MM3 (ref 3.4–10.8)

## 2021-01-04 PROCEDURE — 84466 ASSAY OF TRANSFERRIN: CPT

## 2021-01-04 PROCEDURE — G0463 HOSPITAL OUTPT CLINIC VISIT: HCPCS

## 2021-01-04 PROCEDURE — 85025 COMPLETE CBC W/AUTO DIFF WBC: CPT

## 2021-01-04 PROCEDURE — 36415 COLL VENOUS BLD VENIPUNCTURE: CPT

## 2021-01-04 PROCEDURE — 82728 ASSAY OF FERRITIN: CPT

## 2021-01-04 PROCEDURE — 80053 COMPREHEN METABOLIC PANEL: CPT

## 2021-01-04 PROCEDURE — 83540 ASSAY OF IRON: CPT

## 2021-01-04 RX ORDER — GABAPENTIN 300 MG/1
600 CAPSULE ORAL 3 TIMES DAILY
Qty: 180 CAPSULE | Refills: 2 | Status: SHIPPED | OUTPATIENT
Start: 2021-01-04 | End: 2021-06-28

## 2021-01-04 NOTE — PROGRESS NOTES
VTO: Dr. Stafford/Murtaza R/T md note has D/C Retacrit and patient has CKD, so called to see if wanted it or to D/C it..  Per Dr. Stafford/Murtaza, to let patient go home today, will add on labs for today and to c and will see in [place] next Thursday, and call patient with times/results.  Patient aware. DE Alfaro

## 2021-01-07 ENCOUNTER — CLINICAL SUPPORT (OUTPATIENT)
Dept: ONCOLOGY | Facility: CLINIC | Age: 81
End: 2021-01-07

## 2021-01-07 VITALS
DIASTOLIC BLOOD PRESSURE: 60 MMHG | RESPIRATION RATE: 18 BRPM | SYSTOLIC BLOOD PRESSURE: 138 MMHG | OXYGEN SATURATION: 97 % | TEMPERATURE: 97.9 F | HEART RATE: 72 BPM

## 2021-01-07 DIAGNOSIS — D63.1 ANEMIA IN STAGE 3B CHRONIC KIDNEY DISEASE (HCC): ICD-10-CM

## 2021-01-07 DIAGNOSIS — N18.32 STAGE 3B CHRONIC KIDNEY DISEASE (HCC): Primary | ICD-10-CM

## 2021-01-07 DIAGNOSIS — N18.32 ANEMIA IN STAGE 3B CHRONIC KIDNEY DISEASE (HCC): ICD-10-CM

## 2021-01-07 PROCEDURE — 96372 THER/PROPH/DIAG INJ SC/IM: CPT | Performed by: INTERNAL MEDICINE

## 2021-01-07 NOTE — PROGRESS NOTES
Patient here for Retacrit injection for anemia due to stage IIIB chronic kidney disease. States that she is having a lot of fatigue since chemotherapy and radiation.  Has physical therapy coming to her house but does not fill like she is making any progress.  Patient ambulating with cane, a little weak and unsteady but walks slowly and is able to keep her balance well.  Skin w/d to touch.  Color wnl.  Eating and drinking better-states that her appetite is slowly coming back.  Parameters set to initiate Reatcrit for Hgb <10 and Hct >30 for kidney disease.  Patient had labs drawn on 01/04, Hgb 9.8 and Hct 28.4.  Will give Retacrit 40,000 units SQ today per VINCENT Cobos orders.  Will return in one week for same.  Instructed to call with any problems.  Patient v/u.

## 2021-01-11 ENCOUNTER — APPOINTMENT (OUTPATIENT)
Dept: ONCOLOGY | Facility: HOSPITAL | Age: 81
End: 2021-01-11

## 2021-01-11 ENCOUNTER — APPOINTMENT (OUTPATIENT)
Dept: LAB | Facility: HOSPITAL | Age: 81
End: 2021-01-11

## 2021-01-12 NOTE — PROGRESS NOTES
Subjective    Ms. Nunez is 80 y.o. female    Chief Complaint: ***    History of Present Illness    The following portions of the patient's history were reviewed and updated as appropriate: allergies, current medications, past family history, past medical history, past social history, past surgical history and problem list.    Review of Systems      Current Outpatient Medications:   •  Acetaminophen (TYLENOL ARTHRITIS PAIN PO), Take 1 tablet by mouth Daily., Disp: , Rfl:   •  B Complex Vitamins (VITAMIN B COMPLEX) capsule capsule, Take  by mouth Daily., Disp: , Rfl:   •  calcium carbonate (OS-REAGAN) 600 MG tablet, Take 600 mg by mouth Daily., Disp: , Rfl:   •  cetirizine (zyrTEC) 10 MG tablet, Take 10 mg by mouth Daily., Disp: , Rfl:   •  citalopram (CeleXA) 20 MG tablet, Take 1 tablet by mouth Daily., Disp: 90 tablet, Rfl: 1  •  diphenoxylate-atropine (LOMOTIL) 2.5-0.025 MG per tablet, Take 2 tablets by mouth 4 (Four) Times a Day As Needed for Diarrhea., Disp: 90 tablet, Rfl: 0  •  DULERA 100-5 MCG/ACT inhaler, Inhale 2 puffs 2 (Two) Times a Day. Rinse and spit after using., Disp: 6 inhaler, Rfl: 0  •  esomeprazole (nexIUM) 40 MG capsule, Take 1 capsule by mouth 2 (Two) Times a Day., Disp: 180 capsule, Rfl: 3  •  furosemide (LASIX) 40 MG tablet, Take 1.5 tablets by mouth Daily. Patient only takes once a day, Disp: 135 tablet, Rfl: 3  •  gabapentin (NEURONTIN) 300 MG capsule, Take 2 capsules by mouth 3 (Three) Times a Day., Disp: 180 capsule, Rfl: 2  •  Homeopathic Products (LEG CRAMPS) tablet, Take 1 tablet by mouth Daily., Disp: , Rfl:   •  HYDROcodone-acetaminophen (NORCO)  MG per tablet, Take 1 tablet by mouth Every 4 (Four) Hours As Needed for Moderate Pain  or Severe Pain ., Disp: 60 tablet, Rfl: 0  •  Hydrocortisone (britany's amazing butt) cream, Apply 1 application topically to the appropriate area as directed As Needed (irritation)., Disp: 120 g, Rfl: 0  •  lidocaine (XYLOCAINE) 5 % ointment, Apply   topically to the appropriate area as directed Every 4 (Four) Hours As Needed for Mild Pain  (pain secondary to radiation)., Disp: 240 g, Rfl: 1  •  Lidocaine Viscous HCl (XYLOCAINE) 2 % solution, Take 5 mL by mouth 3 (Three) Times a Day With Meals., Disp: 100 mL, Rfl: 0  •  ondansetron (Zofran) 8 MG tablet, Take 1 tablet by mouth Every 8 (Eight) Hours As Needed for Nausea or Vomiting., Disp: 60 tablet, Rfl: 2  •  potassium chloride (K-DUR,KLOR-CON) 20 MEQ CR tablet, Take 2 tablets by mouth Daily. Patient only takes once a day, Disp: 60 tablet, Rfl: 11  •  pravastatin (PRAVACHOL) 40 MG tablet, Take 40 mg by mouth Daily., Disp: , Rfl:     Past Medical History:   Diagnosis Date   • Anemia in stage 3 chronic kidney disease 11/11/2019   • Arthritis    • Bronchitis    • Cellulitis    • Diabetes mellitus (CMS/HCC)    • GERD (gastroesophageal reflux disease)    • Hyperlipidemia    • Hypertension    • Kyphosis    • Osteoporosis    • Scoliosis    • Stage 3 chronic kidney disease 11/11/2019   • Vulva cancer (CMS/HCC)    • Vulvar intraepithelial neoplasia (MOODY) grade 3        Past Surgical History:   Procedure Laterality Date   • APPENDECTOMY     • BREAST CYST ASPIRATION Left    • COLONOSCOPY  01/12/2011   • ENDOSCOPY  07/01/2014   • HYSTERECTOMY     • TONSILLECTOMY     • VAGINA SURGERY     • VENOUS ACCESS DEVICE (PORT) INSERTION N/A 9/29/2020    Procedure: SINGLE LUMEN PORT - A- CATH PLACEMENT WITH FLUOROSCOPY;  Surgeon: Quynh Rosario MD;  Location: [facility];  Service: General;  Laterality: N/A;       Social History     Socioeconomic History   • Marital status:      Spouse name: Not on file   • Number of children: Not on file   • Years of education: Not on file   • Highest education level: Not on file   Tobacco Use   • Smoking status: Former Smoker   • Smokeless tobacco: Never Used   Substance and Sexual Activity   • Alcohol use: No   • Drug use: No   • Sexual activity: Never       Family History   Problem  Relation Age of Onset   • Cancer Mother    • Hypertension Mother    • Osteoporosis Mother    • Dementia Mother    • Uterine cancer Mother    • Heart disease Father    • Parkinsonism Father    • Cancer Sister    • Breast cancer Sister    • Kidney cancer Sister    • Diabetes Brother    • Heart disease Paternal Grandfather    • No Known Problems Maternal Grandmother    • No Known Problems Maternal Grandfather    • No Known Problems Paternal Grandmother        Objective    There were no vitals taken for this visit.    Physical Exam        Results for orders placed or performed in visit on 01/04/21   Comprehensive Metabolic Panel    Specimen: Blood   Result Value Ref Range    Glucose 156 (H) 65 - 99 mg/dL    BUN 16 8 - 23 mg/dL    Creatinine 1.56 (H) 0.57 - 1.00 mg/dL    Sodium 133 (L) 136 - 145 mmol/L    Potassium 3.9 3.5 - 5.2 mmol/L    Chloride 97 (L) 98 - 107 mmol/L    CO2 28.0 22.0 - 29.0 mmol/L    Calcium 9.4 8.6 - 10.5 mg/dL    Total Protein 7.5 6.0 - 8.5 g/dL    Albumin 3.60 3.50 - 5.20 g/dL    ALT (SGPT) 6 1 - 33 U/L    AST (SGOT) 13 1 - 32 U/L    Alkaline Phosphatase 49 39 - 117 U/L    Total Bilirubin 0.4 0.0 - 1.2 mg/dL    eGFR Non African Amer 32 (L) >60 mL/min/1.73    Globulin 3.9 gm/dL    A/G Ratio 0.9 g/dL    BUN/Creatinine Ratio 10.3 7.0 - 25.0    Anion Gap 8.0 5.0 - 15.0 mmol/L   Ferritin    Specimen: Blood   Result Value Ref Range    Ferritin 401.10 (H) 13.00 - 150.00 ng/mL   Iron Profile    Specimen: Blood   Result Value Ref Range    Iron 74 37 - 145 mcg/dL    Iron Saturation 25 20 - 50 %    Transferrin 202 200 - 360 mg/dL    TIBC 301 298 - 536 mcg/dL   Green Top (Gel)   Result Value Ref Range    Extra Tube Hold for add-ons.      Assessment and Plan    Diagnoses and all orders for this visit:    1. Postprocedural female urethral stricture (Primary)

## 2021-01-14 ENCOUNTER — LAB (OUTPATIENT)
Dept: ONCOLOGY | Facility: CLINIC | Age: 81
End: 2021-01-14

## 2021-01-14 ENCOUNTER — CLINICAL SUPPORT (OUTPATIENT)
Dept: ONCOLOGY | Facility: CLINIC | Age: 81
End: 2021-01-14

## 2021-01-14 VITALS
TEMPERATURE: 98.3 F | HEART RATE: 64 BPM | OXYGEN SATURATION: 97 % | SYSTOLIC BLOOD PRESSURE: 132 MMHG | DIASTOLIC BLOOD PRESSURE: 64 MMHG | RESPIRATION RATE: 16 BRPM

## 2021-01-14 DIAGNOSIS — N18.32 ANEMIA IN STAGE 3B CHRONIC KIDNEY DISEASE (HCC): ICD-10-CM

## 2021-01-14 DIAGNOSIS — N18.32 STAGE 3B CHRONIC KIDNEY DISEASE (HCC): Primary | ICD-10-CM

## 2021-01-14 DIAGNOSIS — C51.9 VULVAR CANCER, CARCINOMA (HCC): ICD-10-CM

## 2021-01-14 DIAGNOSIS — D63.1 ANEMIA IN STAGE 3B CHRONIC KIDNEY DISEASE (HCC): ICD-10-CM

## 2021-01-14 LAB
BASOPHILS # BLD AUTO: 0.02 10*3/MM3 (ref 0–0.2)
BASOPHILS NFR BLD AUTO: 0.4 % (ref 0–1.5)
DEPRECATED RDW RBC AUTO: 53.1 FL (ref 37–54)
EOSINOPHIL # BLD AUTO: 0.21 10*3/MM3 (ref 0–0.4)
EOSINOPHIL NFR BLD AUTO: 3.7 % (ref 0.3–6.2)
ERYTHROCYTE [DISTWIDTH] IN BLOOD BY AUTOMATED COUNT: 14.4 % (ref 12.3–15.4)
HCT VFR BLD AUTO: 31.4 % (ref 34–46.6)
HGB BLD-MCNC: 10.1 G/DL (ref 12–15.9)
IMM GRANULOCYTES # BLD AUTO: 0.02 10*3/MM3 (ref 0–0.05)
IMM GRANULOCYTES NFR BLD AUTO: 0.4 % (ref 0–0.5)
LYMPHOCYTES # BLD AUTO: 0.91 10*3/MM3 (ref 0.7–3.1)
LYMPHOCYTES NFR BLD AUTO: 16.2 % (ref 19.6–45.3)
MCH RBC QN AUTO: 32.4 PG (ref 26.6–33)
MCHC RBC AUTO-ENTMCNC: 32.2 G/DL (ref 31.5–35.7)
MCV RBC AUTO: 100.6 FL (ref 79–97)
MONOCYTES # BLD AUTO: 0.56 10*3/MM3 (ref 0.1–0.9)
MONOCYTES NFR BLD AUTO: 10 % (ref 5–12)
NEUTROPHILS NFR BLD AUTO: 3.89 10*3/MM3 (ref 1.7–7)
NEUTROPHILS NFR BLD AUTO: 69.3 % (ref 42.7–76)
PLATELET # BLD AUTO: 193 10*3/MM3 (ref 140–450)
PMV BLD AUTO: 8.9 FL (ref 6–12)
RBC # BLD AUTO: 3.12 10*6/MM3 (ref 3.77–5.28)
WBC # BLD AUTO: 5.61 10*3/MM3 (ref 3.4–10.8)

## 2021-01-14 PROCEDURE — 96372 THER/PROPH/DIAG INJ SC/IM: CPT | Performed by: INTERNAL MEDICINE

## 2021-01-14 PROCEDURE — 85025 COMPLETE CBC W/AUTO DIFF WBC: CPT | Performed by: INTERNAL MEDICINE

## 2021-01-14 NOTE — PROGRESS NOTES
Patient here for lab evaluation and possible Retacrit injection for anemia due to stage IIIB chronic kidney disease.  States that she is feeling ok today.  Has had a little more energy this past week but still gets tired with exertion.  Skin w/d to touch.  Color wnl.  Eating and drinking well.  Parameters set for Retacrit 40,000 units for Hgb <11 and Hct <33. Last injection was on 01/07-patient states that she tolerated it well without side effects.  Reviewed labs with patient, Hgb 10.1 and Hct 31.4 today.  Will give Retacrit 40,000 units SQ per VINCENT Cobos orders.  Will return in two weeks for same.  Instructed to call with any problems.  Patient v/u.

## 2021-01-17 DIAGNOSIS — K52.1 DIARRHEA DUE TO DRUG: ICD-10-CM

## 2021-01-17 DIAGNOSIS — C51.9 VULVAR CANCER, CARCINOMA (HCC): ICD-10-CM

## 2021-01-17 RX ORDER — DIPHENOXYLATE HYDROCHLORIDE AND ATROPINE SULFATE 2.5; .025 MG/1; MG/1
2 TABLET ORAL 4 TIMES DAILY PRN
Qty: 90 TABLET | Refills: 0 | Status: SHIPPED | OUTPATIENT
Start: 2021-01-17 | End: 2021-03-01 | Stop reason: SDUPTHER

## 2021-01-18 ENCOUNTER — APPOINTMENT (OUTPATIENT)
Dept: LAB | Facility: HOSPITAL | Age: 81
End: 2021-01-18

## 2021-01-18 ENCOUNTER — APPOINTMENT (OUTPATIENT)
Dept: ONCOLOGY | Facility: HOSPITAL | Age: 81
End: 2021-01-18

## 2021-01-18 ENCOUNTER — PROCEDURE VISIT (OUTPATIENT)
Dept: UROLOGY | Facility: CLINIC | Age: 81
End: 2021-01-18

## 2021-01-18 DIAGNOSIS — N99.12 POSTPROCEDURAL FEMALE URETHRAL STRICTURE: Primary | ICD-10-CM

## 2021-01-18 LAB
BILIRUB BLD-MCNC: NEGATIVE MG/DL
CLARITY, POC: CLEAR
COLOR UR: YELLOW
GLUCOSE UR STRIP-MCNC: NEGATIVE MG/DL
KETONES UR QL: NEGATIVE
LEUKOCYTE EST, POC: ABNORMAL
NITRITE UR-MCNC: NEGATIVE MG/ML
PH UR: 5 [PH] (ref 5–8)
PROT UR STRIP-MCNC: ABNORMAL MG/DL
RBC # UR STRIP: ABNORMAL /UL
SP GR UR: 1.02 (ref 1–1.03)
UROBILINOGEN UR QL: NORMAL

## 2021-01-18 PROCEDURE — 81003 URINALYSIS AUTO W/O SCOPE: CPT | Performed by: UROLOGY

## 2021-01-18 PROCEDURE — 52285 CYSTOSCOPY AND TREATMENT: CPT | Performed by: UROLOGY

## 2021-01-18 NOTE — PROGRESS NOTES
Pre- operative diagnosis:  Urethral Stricture     Post operative diagnosis:  Same     Procedure:  The patient was prepped and draped in a normal sterile fashion.  The urethra was anesthetized with 2% lidocaine jelly.  The patient was sequentially dilated from 7 up to 14 Swedish using female urethral sounds.  Attempts made with a 16 Swedish sound were unsuccessful and the procedure was terminated.     Patient tolerated the procedure well     Complications: none     Blood loss: minimal     Follow up:     4 months    She is followed for recurrent vulvar cancer in [place].

## 2021-01-20 ENCOUNTER — HOSPITAL ENCOUNTER (OUTPATIENT)
Dept: RADIATION ONCOLOGY | Facility: HOSPITAL | Age: 81
Setting detail: RADIATION/ONCOLOGY SERIES
End: 2021-01-20

## 2021-01-20 PROBLEM — Z92.3 HISTORY OF RADIATION THERAPY: Status: ACTIVE | Noted: 2021-01-20

## 2021-01-21 ENCOUNTER — OFFICE VISIT (OUTPATIENT)
Dept: RADIATION ONCOLOGY | Facility: HOSPITAL | Age: 81
End: 2021-01-21

## 2021-01-21 VITALS — WEIGHT: 163 LBS | HEIGHT: 60 IN | BODY MASS INDEX: 32 KG/M2

## 2021-01-21 DIAGNOSIS — C51.9 VULVAR CANCER, CARCINOMA (HCC): Primary | ICD-10-CM

## 2021-01-21 DIAGNOSIS — Z87.891 FORMER SMOKER: ICD-10-CM

## 2021-01-21 DIAGNOSIS — C77.4 SECONDARY MALIGNANCY OF INGUINAL LYMPH NODES (HCC): ICD-10-CM

## 2021-01-21 DIAGNOSIS — Z92.3 HISTORY OF RADIATION THERAPY: ICD-10-CM

## 2021-01-21 PROCEDURE — G0463 HOSPITAL OUTPT CLINIC VISIT: HCPCS | Performed by: RADIOLOGY

## 2021-01-28 ENCOUNTER — LAB (OUTPATIENT)
Dept: ONCOLOGY | Facility: CLINIC | Age: 81
End: 2021-01-28

## 2021-01-28 ENCOUNTER — CLINICAL SUPPORT (OUTPATIENT)
Dept: ONCOLOGY | Facility: CLINIC | Age: 81
End: 2021-01-28

## 2021-01-28 VITALS
RESPIRATION RATE: 16 BRPM | SYSTOLIC BLOOD PRESSURE: 124 MMHG | HEART RATE: 64 BPM | DIASTOLIC BLOOD PRESSURE: 68 MMHG | TEMPERATURE: 98.8 F | OXYGEN SATURATION: 98 %

## 2021-01-28 DIAGNOSIS — D63.1 ANEMIA IN STAGE 3B CHRONIC KIDNEY DISEASE (HCC): ICD-10-CM

## 2021-01-28 DIAGNOSIS — N18.32 STAGE 3B CHRONIC KIDNEY DISEASE (HCC): ICD-10-CM

## 2021-01-28 DIAGNOSIS — N18.32 ANEMIA IN STAGE 3B CHRONIC KIDNEY DISEASE (HCC): ICD-10-CM

## 2021-01-28 DIAGNOSIS — C51.9 VULVAR CANCER, CARCINOMA (HCC): ICD-10-CM

## 2021-01-28 LAB
BASOPHILS # BLD AUTO: 0.05 10*3/MM3 (ref 0–0.2)
BASOPHILS NFR BLD AUTO: 0.9 % (ref 0–1.5)
DEPRECATED RDW RBC AUTO: 55.1 FL (ref 37–54)
EOSINOPHIL # BLD AUTO: 0.22 10*3/MM3 (ref 0–0.4)
EOSINOPHIL NFR BLD AUTO: 3.9 % (ref 0.3–6.2)
ERYTHROCYTE [DISTWIDTH] IN BLOOD BY AUTOMATED COUNT: 14.5 % (ref 12.3–15.4)
HCT VFR BLD AUTO: 37 % (ref 34–46.6)
HGB BLD-MCNC: 11.8 G/DL (ref 12–15.9)
IMM GRANULOCYTES # BLD AUTO: 0 10*3/MM3 (ref 0–0.05)
IMM GRANULOCYTES NFR BLD AUTO: 0 % (ref 0–0.5)
LYMPHOCYTES # BLD AUTO: 0.95 10*3/MM3 (ref 0.7–3.1)
LYMPHOCYTES NFR BLD AUTO: 17 % (ref 19.6–45.3)
MCH RBC QN AUTO: 32.5 PG (ref 26.6–33)
MCHC RBC AUTO-ENTMCNC: 31.9 G/DL (ref 31.5–35.7)
MCV RBC AUTO: 101.9 FL (ref 79–97)
MONOCYTES # BLD AUTO: 0.51 10*3/MM3 (ref 0.1–0.9)
MONOCYTES NFR BLD AUTO: 9.1 % (ref 5–12)
NEUTROPHILS NFR BLD AUTO: 3.87 10*3/MM3 (ref 1.7–7)
NEUTROPHILS NFR BLD AUTO: 69.1 % (ref 42.7–76)
PLATELET # BLD AUTO: 230 10*3/MM3 (ref 140–450)
PMV BLD AUTO: 9.4 FL (ref 6–12)
RBC # BLD AUTO: 3.63 10*6/MM3 (ref 3.77–5.28)
WBC # BLD AUTO: 5.6 10*3/MM3 (ref 3.4–10.8)

## 2021-01-28 PROCEDURE — 85025 COMPLETE CBC W/AUTO DIFF WBC: CPT | Performed by: INTERNAL MEDICINE

## 2021-01-28 PROCEDURE — 99211 OFF/OP EST MAY X REQ PHY/QHP: CPT | Performed by: NURSE PRACTITIONER

## 2021-01-28 NOTE — PROGRESS NOTES
Patient here for lab evaluation for possible Retacrit for anemia due to stage IIIB chronic kidney disease. States that she is starting to feel some better.  Has a little more energy and able to do a little more around the house.  Skin w/d to touch.  Color wnl.  Eating and drinking well.  Still has physical therapy coming into the home for strengthening exercises. Parameters set for Retacrit 40,000 units for Hgb <11 and Hct <33.  Last injection was on 01/14-patient states that she tolerated it well without side effects. Reviewed labs with patient, Hgb 11.8 and Hct 37 today.  Patient does not need an injection today.  Will return in four weeks for same.  Instructed to call with any problems.  Patient v/u.

## 2021-02-08 ENCOUNTER — INFUSION (OUTPATIENT)
Dept: ONCOLOGY | Facility: CLINIC | Age: 81
End: 2021-02-08

## 2021-02-08 DIAGNOSIS — C51.9 VULVAR CANCER, CARCINOMA (HCC): ICD-10-CM

## 2021-02-08 DIAGNOSIS — C77.4 SECONDARY MALIGNANCY OF INGUINAL LYMPH NODES (HCC): Primary | ICD-10-CM

## 2021-02-08 RX ORDER — SODIUM CHLORIDE 0.9 % (FLUSH) 0.9 %
10 SYRINGE (ML) INJECTION AS NEEDED
Status: CANCELLED | OUTPATIENT
Start: 2021-02-08

## 2021-02-08 RX ORDER — HEPARIN SODIUM (PORCINE) LOCK FLUSH IV SOLN 100 UNIT/ML 100 UNIT/ML
500 SOLUTION INTRAVENOUS AS NEEDED
Status: DISCONTINUED | OUTPATIENT
Start: 2021-02-08 | End: 2021-02-08 | Stop reason: HOSPADM

## 2021-02-08 RX ORDER — HEPARIN SODIUM (PORCINE) LOCK FLUSH IV SOLN 100 UNIT/ML 100 UNIT/ML
500 SOLUTION INTRAVENOUS AS NEEDED
Status: CANCELLED | OUTPATIENT
Start: 2021-02-08

## 2021-02-08 RX ORDER — SODIUM CHLORIDE 0.9 % (FLUSH) 0.9 %
10 SYRINGE (ML) INJECTION AS NEEDED
Status: DISCONTINUED | OUTPATIENT
Start: 2021-02-08 | End: 2021-02-08 | Stop reason: HOSPADM

## 2021-02-08 RX ADMIN — HEPARIN SODIUM (PORCINE) LOCK FLUSH IV SOLN 100 UNIT/ML 500 UNITS: 100 SOLUTION at 11:16

## 2021-02-08 RX ADMIN — Medication 10 ML: at 11:15

## 2021-02-17 ENCOUNTER — TELEPHONE (OUTPATIENT)
Dept: GASTROENTEROLOGY | Facility: CLINIC | Age: 81
End: 2021-02-17

## 2021-02-17 NOTE — TELEPHONE ENCOUNTER
I have sent 2 letters to pt re: recall colon and have had no response. I called and spoke to pt about it today. She tells me she just finished 38 radiation treatments for cancer and just doesn't feel up to having a procedure-she doesn't think she could even tolerate the prep at this point due to how sick she got from the treatments.     I am going to change her recall to 8/2021-she is hopeful by that time she will be feeling better-she is not having any GI issues at this time. She was advised to call us back between now and then if she feels up to it, otherwise I will reach out to her in about 6 months.

## 2021-02-18 ENCOUNTER — TELEPHONE (OUTPATIENT)
Dept: FAMILY MEDICINE CLINIC | Facility: CLINIC | Age: 81
End: 2021-02-18

## 2021-02-18 ENCOUNTER — APPOINTMENT (OUTPATIENT)
Dept: ULTRASOUND IMAGING | Facility: HOSPITAL | Age: 81
End: 2021-02-18

## 2021-02-18 ENCOUNTER — HOSPITAL ENCOUNTER (EMERGENCY)
Facility: HOSPITAL | Age: 81
Discharge: HOME | End: 2021-02-18
Admitting: EMERGENCY MEDICINE

## 2021-02-18 VITALS
TEMPERATURE: 98.6 F | SYSTOLIC BLOOD PRESSURE: 118 MMHG | WEIGHT: 162 LBS | DIASTOLIC BLOOD PRESSURE: 56 MMHG | RESPIRATION RATE: 20 BRPM | HEIGHT: 60 IN | OXYGEN SATURATION: 96 % | BODY MASS INDEX: 31.8 KG/M2 | HEART RATE: 67 BPM

## 2021-02-18 DIAGNOSIS — L03.115 CELLULITIS OF RIGHT LOWER EXTREMITY: Primary | ICD-10-CM

## 2021-02-18 DIAGNOSIS — R60.9 CHRONIC EDEMA: ICD-10-CM

## 2021-02-18 LAB
ALBUMIN SERPL-MCNC: 4.1 G/DL (ref 3.5–5.2)
ALBUMIN/GLOB SERPL: 1.1 G/DL
ALP SERPL-CCNC: 55 U/L (ref 39–117)
ALT SERPL W P-5'-P-CCNC: 7 U/L (ref 1–33)
ANION GAP SERPL CALCULATED.3IONS-SCNC: 11 MMOL/L (ref 5–15)
APTT PPP: 26.3 SECONDS (ref 24.1–35)
AST SERPL-CCNC: 18 U/L (ref 1–32)
BASOPHILS # BLD AUTO: 0.03 10*3/MM3 (ref 0–0.2)
BASOPHILS NFR BLD AUTO: 0.3 % (ref 0–1.5)
BILIRUB SERPL-MCNC: 0.3 MG/DL (ref 0–1.2)
BUN SERPL-MCNC: 17 MG/DL (ref 8–23)
BUN/CREAT SERPL: 12.9 (ref 7–25)
CALCIUM SPEC-SCNC: 9.9 MG/DL (ref 8.6–10.5)
CHLORIDE SERPL-SCNC: 98 MMOL/L (ref 98–107)
CO2 SERPL-SCNC: 28 MMOL/L (ref 22–29)
CREAT SERPL-MCNC: 1.32 MG/DL (ref 0.57–1)
DEPRECATED RDW RBC AUTO: 50.4 FL (ref 37–54)
EOSINOPHIL # BLD AUTO: 0.3 10*3/MM3 (ref 0–0.4)
EOSINOPHIL NFR BLD AUTO: 3.3 % (ref 0.3–6.2)
ERYTHROCYTE [DISTWIDTH] IN BLOOD BY AUTOMATED COUNT: 14.6 % (ref 12.3–15.4)
GFR SERPL CREATININE-BSD FRML MDRD: 39 ML/MIN/1.73
GLOBULIN UR ELPH-MCNC: 3.7 GM/DL
GLUCOSE SERPL-MCNC: 110 MG/DL (ref 65–99)
HCT VFR BLD AUTO: 32.5 % (ref 34–46.6)
HGB BLD-MCNC: 11.3 G/DL (ref 12–15.9)
IMM GRANULOCYTES # BLD AUTO: 0.04 10*3/MM3 (ref 0–0.05)
IMM GRANULOCYTES NFR BLD AUTO: 0.4 % (ref 0–0.5)
INR PPP: 1.05 (ref 0.91–1.09)
LYMPHOCYTES # BLD AUTO: 1.33 10*3/MM3 (ref 0.7–3.1)
LYMPHOCYTES NFR BLD AUTO: 14.4 % (ref 19.6–45.3)
MCH RBC QN AUTO: 33 PG (ref 26.6–33)
MCHC RBC AUTO-ENTMCNC: 34.8 G/DL (ref 31.5–35.7)
MCV RBC AUTO: 95 FL (ref 79–97)
MONOCYTES # BLD AUTO: 0.76 10*3/MM3 (ref 0.1–0.9)
MONOCYTES NFR BLD AUTO: 8.2 % (ref 5–12)
NEUTROPHILS NFR BLD AUTO: 6.77 10*3/MM3 (ref 1.7–7)
NEUTROPHILS NFR BLD AUTO: 73.4 % (ref 42.7–76)
NRBC BLD AUTO-RTO: 0 /100 WBC (ref 0–0.2)
NT-PROBNP SERPL-MCNC: 2083 PG/ML (ref 0–1800)
PLATELET # BLD AUTO: 204 10*3/MM3 (ref 140–450)
PMV BLD AUTO: 10.2 FL (ref 6–12)
POTASSIUM SERPL-SCNC: 4.8 MMOL/L (ref 3.5–5.2)
PROT SERPL-MCNC: 7.8 G/DL (ref 6–8.5)
PROTHROMBIN TIME: 13.3 SECONDS (ref 11.9–14.6)
RBC # BLD AUTO: 3.42 10*6/MM3 (ref 3.77–5.28)
SODIUM SERPL-SCNC: 137 MMOL/L (ref 136–145)
WBC # BLD AUTO: 9.23 10*3/MM3 (ref 3.4–10.8)

## 2021-02-18 PROCEDURE — 85610 PROTHROMBIN TIME: CPT | Performed by: NURSE PRACTITIONER

## 2021-02-18 PROCEDURE — 85025 COMPLETE CBC W/AUTO DIFF WBC: CPT | Performed by: NURSE PRACTITIONER

## 2021-02-18 PROCEDURE — 96365 THER/PROPH/DIAG IV INF INIT: CPT

## 2021-02-18 PROCEDURE — 85730 THROMBOPLASTIN TIME PARTIAL: CPT | Performed by: NURSE PRACTITIONER

## 2021-02-18 PROCEDURE — 93970 EXTREMITY STUDY: CPT

## 2021-02-18 PROCEDURE — 83880 ASSAY OF NATRIURETIC PEPTIDE: CPT | Performed by: NURSE PRACTITIONER

## 2021-02-18 PROCEDURE — 99283 EMERGENCY DEPT VISIT LOW MDM: CPT

## 2021-02-18 PROCEDURE — 93970 EXTREMITY STUDY: CPT | Performed by: SURGERY

## 2021-02-18 PROCEDURE — 87040 BLOOD CULTURE FOR BACTERIA: CPT | Performed by: NURSE PRACTITIONER

## 2021-02-18 PROCEDURE — 80053 COMPREHEN METABOLIC PANEL: CPT | Performed by: NURSE PRACTITIONER

## 2021-02-18 RX ORDER — CLINDAMYCIN PHOSPHATE 900 MG/50ML
900 INJECTION INTRAVENOUS ONCE
Status: COMPLETED | OUTPATIENT
Start: 2021-02-18 | End: 2021-02-18

## 2021-02-18 RX ORDER — CLINDAMYCIN HYDROCHLORIDE 300 MG/1
300 CAPSULE ORAL 4 TIMES DAILY
Qty: 40 CAPSULE | Refills: 0 | Status: SHIPPED | OUTPATIENT
Start: 2021-02-18 | End: 2021-02-28

## 2021-02-18 RX ORDER — SODIUM CHLORIDE 0.9 % (FLUSH) 0.9 %
10 SYRINGE (ML) INJECTION AS NEEDED
Status: DISCONTINUED | OUTPATIENT
Start: 2021-02-18 | End: 2021-02-19 | Stop reason: HOSPADM

## 2021-02-18 RX ADMIN — CLINDAMYCIN IN 5 PERCENT DEXTROSE 900 MG: 18 INJECTION, SOLUTION INTRAVENOUS at 21:22

## 2021-02-18 NOTE — TELEPHONE ENCOUNTER
Discussed in detail with Dr. Akbar, he would like patient to go to ED for further evaluation.  Concerned for cellulitis.   Returned call to patient, informed her that Dr. Akbar wanted her to go to ED, voiced understanding.  Recheck appt rescheduled for Wed. 2/24 as a telehealth visit.

## 2021-02-18 NOTE — TELEPHONE ENCOUNTER
"Patient contacted the office, patient very upset and crying.  Stated that she has bilateral lower extremity edema with pain and redness.  Says that she is having a difficult time bearing weight on her legs, explained that the pain feels like \"sprained ankle pain\" denies any injury.  Patient denies chills, body aches, and is afebrile.  Wanting to know what she needs to do.      "

## 2021-02-19 NOTE — ED PROVIDER NOTES
"Subjective   Patient is a 80-year-old female presents emergency department with chief complaints of lower extremity swelling.  Patient states that she has had swelling for \"2 to 3 months.\"  She describes this as a chronic issue for her however worsening in nature.  She states for 2 to 3 weeks she has noted redness in particular to the right lower extremity and again however this has been worsening in nature.  She takes Lasix on a regular basis and typically wears support hose however due to worsening symptoms it was recommended by her primary care physician to come to the ER for further evaluation and treatment.  Patient denies any shortness of breath, chest pain, or other associated symptoms or modifying factors.          Review of Systems   Constitutional: Negative.  Negative for fever.   HENT: Negative.  Negative for congestion.    Eyes: Negative.    Respiratory: Negative.  Negative for cough and shortness of breath.    Cardiovascular: Positive for leg swelling. Negative for chest pain.   Gastrointestinal: Negative.  Negative for abdominal pain, diarrhea, nausea and vomiting.   Genitourinary: Negative.    Musculoskeletal: Negative.  Negative for back pain.   Skin: Positive for rash.   All other systems reviewed and are negative.      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[facility])    • Vulvar intraepithelial neoplasia (MOODY) grade 3        Allergies   Allergen Reactions   • Scopolamine Swelling     Other reaction(s): ANGIOEDEMA  Other reaction(s): ANGIOEDEMA     • Tequin [Gatifloxacin] Other (See Comments)     Doesn't remember   • Amoxicillin-Pot Clavulanate Rash   • " Keflex [Cephalexin] Rash   • Septra [Sulfamethoxazole-Trimethoprim] Rash   • Trovan [Alatrofloxacin] Dizziness       Past Surgical History:   Procedure Laterality Date   • APPENDECTOMY     • BREAST CYST ASPIRATION Left    • COLONOSCOPY  01/12/2011   • ENDOSCOPY  07/01/2014   • HYSTERECTOMY     • TONSILLECTOMY     • VAGINA SURGERY     • VENOUS ACCESS DEVICE (PORT) INSERTION N/A 9/29/2020    Procedure: SINGLE LUMEN PORT - A- CATH PLACEMENT WITH FLUOROSCOPY;  Surgeon: Quynh Rosario MD;  Location: [facility];  Service: General;  Laterality: N/A;       Family History   Problem Relation Age of Onset   • Cancer Mother    • Hypertension Mother    • Osteoporosis Mother    • Dementia Mother    • Uterine cancer Mother    • Heart disease Father    • Parkinsonism Father    • Cancer Sister    • Breast cancer Sister    • Kidney cancer Sister    • Diabetes Brother    • Heart disease Paternal Grandfather    • No Known Problems Maternal Grandmother    • No Known Problems Maternal Grandfather    • No Known Problems Paternal Grandmother        Social History     Socioeconomic History   • Marital status:      Spouse name: Not on file   • Number of children: Not on file   • Years of education: Not on file   • Highest education level: Not on file   Tobacco Use   • Smoking status: Former Smoker   • Smokeless tobacco: Never Used   Substance and Sexual Activity   • Alcohol use: No   • Drug use: No   • Sexual activity: Never           Objective   Physical Exam  Vitals signs and nursing note reviewed.   Constitutional:       Appearance: She is well-developed.   HENT:      Head: Normocephalic and atraumatic.      Right Ear: External ear normal.      Left Ear: External ear normal.      Nose: Nose normal.      Mouth/Throat:      Mouth: Mucous membranes are moist.      Pharynx: Oropharynx is clear.   Eyes:      Conjunctiva/sclera: Conjunctivae normal.      Pupils: Pupils are equal, round, and reactive to light.   Neck:       Musculoskeletal: Normal range of motion and neck supple.   Cardiovascular:      Rate and Rhythm: Normal rate and regular rhythm.      Pulses: Normal pulses.      Heart sounds: Normal heart sounds.   Pulmonary:      Effort: Pulmonary effort is normal.      Breath sounds: Normal breath sounds.   Abdominal:      General: Bowel sounds are normal.      Palpations: Abdomen is soft.   Musculoskeletal: Normal range of motion.         General: Swelling present.      Right lower leg: Edema present.      Left lower leg: Edema present.      Comments: Erythema noted to the distal aspect of the right lower extremity, bilateral edema identified 3-4+ edema, neurovascular intact, sensory intact   Skin:     General: Skin is warm and dry.      Capillary Refill: Capillary refill takes less than 2 seconds.   Neurological:      General: No focal deficit present.      Mental Status: She is alert and oriented to person, place, and time.   Psychiatric:         Mood and Affect: Mood normal.         Behavior: Behavior normal.         Thought Content: Thought content normal.         Judgment: Judgment normal.         Procedures           ED Course  ED Course as of Feb 18 2326   Thu Feb 18, 2021 2032 Patient's BNP is slightly elevated at 2083.  BUN and creatinine is 17 and 1.32.  This is actually an improvement as per her typical creatinine levels.  Potassium is 4.8.  White count is within normal limits at 9.23.  Hemoglobin hematocrit is 11.3 and 32.5.  Venous duplex Doppler ultrasound read by tech is negative for any obvious DVT.    [TW]      ED Course User Index  [TW] Lizzeth Newman APRN                                           MDM  Number of Diagnoses or Management Options  Cellulitis of right lower extremity: new and requires workup  Chronic edema: new and requires workup     Amount and/or Complexity of Data Reviewed  Clinical lab tests: ordered and reviewed  Tests in the radiology section of CPT®: ordered and reviewed  Decide to  obtain previous medical records or to obtain history from someone other than the patient: yes  Discuss the patient with other providers: yes    Risk of Complications, Morbidity, and/or Mortality  Presenting problems: moderate  Diagnostic procedures: moderate  Management options: moderate    Patient Progress  Patient progress: improved      Final diagnoses:   Cellulitis of right lower extremity   Chronic edema            Lizzeth Newman, APRN  02/18/21 [id number]

## 2021-02-22 ENCOUNTER — TELEPHONE (OUTPATIENT)
Dept: FAMILY MEDICINE CLINIC | Facility: CLINIC | Age: 81
End: 2021-02-22

## 2021-02-22 DIAGNOSIS — L03.119 CELLULITIS OF LOWER EXTREMITY, UNSPECIFIED LATERALITY: Primary | ICD-10-CM

## 2021-02-22 RX ORDER — DOXYCYCLINE HYCLATE 100 MG/1
100 TABLET, DELAYED RELEASE ORAL 2 TIMES DAILY
Qty: 10 TABLET | Refills: 0 | Status: SHIPPED | OUTPATIENT
Start: 2021-02-22 | End: 2021-02-27

## 2021-02-22 NOTE — TELEPHONE ENCOUNTER
PATIENT CALLS BACK       STATES THAT DARCY WAS SUPPOSE TO CALL HER BACK FOR HER LAB RESULTS       GOOD CONTACT NUMBER   [phone] ([place])

## 2021-02-22 NOTE — TELEPHONE ENCOUNTER
Returned call to patient, stated that her legs are still really red and sore to the touch.  Currently taking Cleocin 300mg, has stomach burning and severe diarrhea since taking.  Patient wanting to know if medication can be changed?  Also, wanting to know results of her blood culture.

## 2021-02-22 NOTE — TELEPHONE ENCOUNTER
PATIENT CALLING IN REGARDING clindamycin (CLEOCIN) 300 MG capsule THAT SHE HAS BEEN TAKING. PATIENT STATED THAT EVER SINCE SHE HAS TAKEN THIS SHE IS EXPERIENCING BURNING IN HER STOMACH, INDIGESTION, DIARRHEA AND IS WANTING TO SPEAK WITH DARCY OR DR. SHAHID ABOUT WHAT SHE SHOULD DO.    PATIENT CALLBACK # [phone]       [facility] - [place], [place] - [address] [place] [address] - [phone]  - [phone]   [phone]

## 2021-02-22 NOTE — TELEPHONE ENCOUNTER
Please have her stop clindamycin, start doxycycline, and if diarrhea worsens or she develops fever/chills, needs to f/u in  ED

## 2021-02-23 DIAGNOSIS — D63.1 ANEMIA IN STAGE 3B CHRONIC KIDNEY DISEASE (HCC): ICD-10-CM

## 2021-02-23 DIAGNOSIS — N18.32 ANEMIA IN STAGE 3B CHRONIC KIDNEY DISEASE (HCC): ICD-10-CM

## 2021-02-23 DIAGNOSIS — N18.32 STAGE 3B CHRONIC KIDNEY DISEASE (HCC): Primary | ICD-10-CM

## 2021-02-23 LAB
BACTERIA SPEC AEROBE CULT: NORMAL
BACTERIA SPEC AEROBE CULT: NORMAL

## 2021-02-25 ENCOUNTER — TELEPHONE (OUTPATIENT)
Dept: ONCOLOGY | Facility: CLINIC | Age: 81
End: 2021-02-25

## 2021-02-25 NOTE — TELEPHONE ENCOUNTER
DR STAFFORD PT: Pt called today stating she has had diarrhea through the night and still today. She was not able to come to her appt with Murtaza in [place] today but Murtaza did review her lab from yesterday. Pt also states she went to [facility] ER 2/18/21 and would like to Dr Stafford to review those labs and let her know if everything was ok. I will forward this message to Taisha.

## 2021-02-25 NOTE — TELEPHONE ENCOUNTER
Patient called office and is unable to come for appointment today.  Reviewed labs results with her, Hgb 11.2 and Hct 34 today.  Patient does not require an injection at this time.  Will scheduled appointment for 4 weeks.  Patient v/u.

## 2021-03-01 ENCOUNTER — OFFICE VISIT (OUTPATIENT)
Dept: FAMILY MEDICINE CLINIC | Facility: CLINIC | Age: 81
End: 2021-03-01

## 2021-03-01 ENCOUNTER — HOSPITAL ENCOUNTER (OUTPATIENT)
Dept: GENERAL RADIOLOGY | Facility: HOSPITAL | Age: 81
Discharge: HOME | End: 2021-03-01

## 2021-03-01 VITALS
BODY MASS INDEX: 32.55 KG/M2 | HEIGHT: 60 IN | HEART RATE: 63 BPM | OXYGEN SATURATION: 96 % | DIASTOLIC BLOOD PRESSURE: 76 MMHG | WEIGHT: 165.8 LBS | SYSTOLIC BLOOD PRESSURE: 148 MMHG | TEMPERATURE: 98.2 F

## 2021-03-01 DIAGNOSIS — M25.551 BILATERAL HIP PAIN: ICD-10-CM

## 2021-03-01 DIAGNOSIS — M25.561 CHRONIC PAIN OF BOTH KNEES: ICD-10-CM

## 2021-03-01 DIAGNOSIS — L03.116 CELLULITIS OF LEFT LOWER EXTREMITY: ICD-10-CM

## 2021-03-01 DIAGNOSIS — M25.562 CHRONIC PAIN OF BOTH KNEES: ICD-10-CM

## 2021-03-01 DIAGNOSIS — G89.3 CANCER RELATED PAIN: ICD-10-CM

## 2021-03-01 DIAGNOSIS — M25.551 BILATERAL HIP PAIN: Primary | ICD-10-CM

## 2021-03-01 DIAGNOSIS — C51.9 VULVAR CANCER, CARCINOMA (HCC): ICD-10-CM

## 2021-03-01 DIAGNOSIS — M25.552 BILATERAL HIP PAIN: Primary | ICD-10-CM

## 2021-03-01 DIAGNOSIS — M25.552 BILATERAL HIP PAIN: ICD-10-CM

## 2021-03-01 DIAGNOSIS — G89.29 CHRONIC PAIN OF BOTH KNEES: ICD-10-CM

## 2021-03-01 DIAGNOSIS — K52.1 DIARRHEA DUE TO DRUG: ICD-10-CM

## 2021-03-01 PROCEDURE — 73521 X-RAY EXAM HIPS BI 2 VIEWS: CPT

## 2021-03-01 PROCEDURE — 73560 X-RAY EXAM OF KNEE 1 OR 2: CPT

## 2021-03-01 PROCEDURE — 99214 OFFICE O/P EST MOD 30 MIN: CPT | Performed by: FAMILY MEDICINE

## 2021-03-01 RX ORDER — HYDROCODONE BITARTRATE AND ACETAMINOPHEN 10; 325 MG/1; MG/1
1 TABLET ORAL EVERY 4 HOURS PRN
Qty: 60 TABLET | Refills: 0 | Status: SHIPPED | OUTPATIENT
Start: 2021-03-01 | End: 2021-05-07 | Stop reason: SDUPTHER

## 2021-03-01 RX ORDER — BISOPROLOL FUMARATE AND HYDROCHLOROTHIAZIDE 5; 6.25 MG/1; MG/1
TABLET ORAL
COMMUNITY
Start: 2021-02-23 | End: 2021-06-15 | Stop reason: SDUPTHER

## 2021-03-01 RX ORDER — AMLODIPINE BESYLATE 5 MG/1
TABLET ORAL
COMMUNITY
Start: 2021-02-11 | End: 2021-04-27 | Stop reason: SDUPTHER

## 2021-03-01 RX ORDER — DOXYCYCLINE HYCLATE 100 MG
TABLET ORAL
COMMUNITY
Start: 2021-02-23 | End: 2021-03-04

## 2021-03-01 RX ORDER — DIPHENOXYLATE HYDROCHLORIDE AND ATROPINE SULFATE 2.5; .025 MG/1; MG/1
2 TABLET ORAL 4 TIMES DAILY PRN
Qty: 90 TABLET | Refills: 0 | Status: SHIPPED | OUTPATIENT
Start: 2021-03-01 | End: 2021-05-25

## 2021-03-04 ENCOUNTER — TELEPHONE (OUTPATIENT)
Dept: FAMILY MEDICINE CLINIC | Facility: CLINIC | Age: 81
End: 2021-03-04

## 2021-03-04 RX ORDER — DOXYCYCLINE HYCLATE 100 MG
TABLET ORAL
Qty: 10 TABLET | Refills: 0 | Status: SHIPPED | OUTPATIENT
Start: 2021-03-04 | End: 2021-09-09

## 2021-03-04 NOTE — TELEPHONE ENCOUNTER
[facility], stated that she saw patient yesterday, still with some R lower extremity edema and redness, hot to the touch some.  Left leg has improved.  Patient has finished antibiotics.  Wanted to inform PCP and find out if patient needs any additional medications.        Please advise

## 2021-03-04 NOTE — TELEPHONE ENCOUNTER
Caller: Dago Nunez    Relationship: Self    Best call back number: [phone]    Medication needed:   Requested Prescriptions     Pending Prescriptions Disp Refills   • doxycycline (VIBRAMYICN) 100 MG tablet [Pharmacy Med Name: DOXYCYCLINE HYCLATE 100  Tablet] 10 tablet 0     Sig: TAKE 1 TABLET BY MOUTH TWO TIMES A DAY FOR 5 DAYS       When do you need the refill by: SHE HAS NONE LEFT    What details did the patient provide when requesting the medication: ASKING FOR ANOTHER 5 DAYS WORTH OF MEDICATION    Does the patient have less than a 3 day supply:  [x] Yes  [] No    What is the patient's preferred pharmacy: [address] [phone] [facility] [phone] FX           PATIENT CONTINUES TO HAVE RED LEGS, AND ARE SORE TO TOUCH

## 2021-03-05 ENCOUNTER — TELEPHONE (OUTPATIENT)
Dept: ONCOLOGY | Facility: CLINIC | Age: 81
End: 2021-03-05

## 2021-03-05 NOTE — TELEPHONE ENCOUNTER
Caller: PT    Relationship to patient: PT    Best call back number: [phone]  TXT MSG NOT OK    PT TO RESCHED 3/9 APPT  DUE TO A CONFLICT IN SCHED    PLEASE RETURN CALL    THANK YOU

## 2021-03-06 NOTE — PROGRESS NOTES
"Chief Complaint  Follow-up (3 month)    Subjective          Syndal DIDI Nunez presents to [facility]  History of Present Illness  Mild worsening of fatigue and weakness, still too ill to undergo chemotherapy.  Is still having occasional diarrhea and needs refill of Lomotil.  She also requests refill for hydrocodone, some DME supplies for her limited mobility requires diagnosis of severe arthritis in hips or knees, and we do not have any recent imaging.  Recently treated for cellulitis of the left lower extremity with improved erythema and pain following doxycycline, diarrhea also worsened while on clindamycin    Objective   Vital Signs:   /76 (BP Location: Left arm, Patient Position: Sitting, Cuff Size: Adult)   Pulse 63   Temp 98.2 °F (36.8 °C) (Temporal)   Ht 152.4 cm (60\")   Wt 75.2 kg (165 lb 12.8 oz)   SpO2 96%   BMI 32.38 kg/m²     Physical Exam  Vitals and nursing note reviewed.   Constitutional:       General: She is not in acute distress.     Appearance: She is not diaphoretic.   HENT:      Head: Normocephalic and atraumatic.      Nose: Nose normal.   Eyes:      General: No scleral icterus.        Right eye: No discharge.         Left eye: No discharge.      Conjunctiva/sclera: Conjunctivae normal.   Neck:      Trachea: No tracheal deviation.   Cardiovascular:      Rate and Rhythm: Normal rate and regular rhythm.      Heart sounds: Normal heart sounds. No murmur. No friction rub. No gallop.    Pulmonary:      Effort: Pulmonary effort is normal. No respiratory distress.      Breath sounds: Normal breath sounds. No wheezing or rales.   Skin:     General: Skin is warm and dry.      Coloration: Skin is not pale.      Comments: Receding erythema of left ankle   Neurological:      Mental Status: She is alert and oriented to person, place, and time.   Psychiatric:         Behavior: Behavior normal.         Thought Content: Thought content normal.         Judgment: " Judgment normal.        Result Review :                 Assessment and Plan    Diagnoses and all orders for this visit:    1. Bilateral hip pain (Primary)  -     XR Hips Bilateral With or Without Pelvis 2 View; Future    2. Vulvar cancer, carcinoma (CMS/HCC)  -     diphenoxylate-atropine (LOMOTIL) 2.5-0.025 MG per tablet; Take 2 tablets by mouth 4 (Four) Times a Day As Needed for Diarrhea.  Dispense: 90 tablet; Refill: 0  -     HYDROcodone-acetaminophen (NORCO)  MG per tablet; Take 1 tablet by mouth Every 4 (Four) Hours As Needed for Moderate Pain  or Severe Pain .  Dispense: 60 tablet; Refill: 0    3. Diarrhea due to drug  -     diphenoxylate-atropine (LOMOTIL) 2.5-0.025 MG per tablet; Take 2 tablets by mouth 4 (Four) Times a Day As Needed for Diarrhea.  Dispense: 90 tablet; Refill: 0    4. Cancer related pain  -     HYDROcodone-acetaminophen (NORCO)  MG per tablet; Take 1 tablet by mouth Every 4 (Four) Hours As Needed for Moderate Pain  or Severe Pain .  Dispense: 60 tablet; Refill: 0    5. Chronic pain of both knees  -     XR Knee 1 or 2 View Bilateral; Future    6. Cellulitis of left lower extremity    Refill hydrocodone  X-ray as above  Refill antidiarrheal  Follow-up in 3 months, call if lower extremity pain worsens

## 2021-03-09 ENCOUNTER — APPOINTMENT (OUTPATIENT)
Dept: LAB | Facility: HOSPITAL | Age: 81
End: 2021-03-09

## 2021-03-09 NOTE — PROGRESS NOTES
[facility] GROUP HEMATOLOGY AND ONCOLOGY  [address]  [facility] [address] [phone]    Patient Name: Dago Nunez  Encounter Date: 03/16/2021  YOB: 1940  Patient Number: [id number]      REASON FOR FOLLOW-UP: Dago Nunez is a pleasant 80-year-old  female who is seen on followup for macrocytic anemia from chronic kidney disease Stage III, GFR 51 mL/minute 03/05/2018, iron deficiency, and thrombocytopenia.  She is intolerant to oral iron (nausea, stomach cramps, and constipation).  She had Injectafer 29 months ago. She is also seen for vulvar cancer. She is seen 5 months post C1D1 Mitomycin C and 5FU with radiation. Her D29 to 32 chemo was cancelled due to hospitalization, tolerance, and poor performance status. She is seen with spouse.  History is obtained from the patient. History is considered to be accurate     She had seen Dr. Roche 01/21/2021.  No evidence of recurrent or metastatic disease.    She had seen Dr. Akbar 03/06/2021.  She has occasional diarrhea and Lomotil refill was done.        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  The patient was seen by Dr. Marks in favor definitive chemo radiation.  Pathology report 07/10/2020 periurethral biopsy, poorly differentiated carcinoma with squamous and papillary features, focally invasive in the subepithelial connective tissue.  PET 07/31/2020 showed intense FDG avid left inguinal node is highly suspicious for local regional metastasis from known vulvar squamous cell carcinoma.  No additional sites of suspicious FDG activity.  MRI 07/31/2020 showed redemonstration of mildly T2 hyperintense mass involving the urethral meatus, similar dimensions to prior exam on 02/18/2020 however there is now a polypoid lesion seen along the anterior  "aspect of the perineum, possibly contiguous with the urethral mass, concerning for disease progression.  Market interval enlargement of the left inguinal node almost certainly representing disease involvement.  Patient was notified by Dr. Siddiqui 08/19/2020.  Consensus for chemoradiation.  Patient reluctant to take chemotherapy.  Follow-up with Dr. Siddiqui in 4 to 6 weeks after radiation is completed.         PREVIOUS INTERVENTIONS:  Mitomycin C and 5-FU 10/05/2020 through 10/08/2020 with radiation completed 12/10/2020 at [facility].  D29 to d32 of chemo discontinued due to poor performance status.       DIAGNOSTIC ABNORMALITIES:   The patient was seen by Dr. Greg Alberts 01/29/2018 complaining of coughing and wheezing. The patient was given Tussionex. Blood work was ordered. CBC 01/29/2018 revealed a WBC of 7.8, hemoglobin 11.2, hematocrit 35.1, MCV 96.2, platelets 43,000, and ANC 4.47. CMP remarkable for of glucose of 177 and GFR 59 mL/minute.  The patient was seen by VINCENT Jones, 02/02/2018. She was coughing and wheezing. Tussionex did not help. The patient was given prednisone.  The patient was seen by VINCENT Jones, 02/15/2018. She is followed for anemia from iron deficiency. CBC 02/15/2018 revealed a WBC of 12.9, hemoglobin 11.4, hematocrit 34.5, MCV 95, and platelets 68,000.       PREVIOUS INTERVENTIONS:   Ferrous sulfate 325 mg 03/07/2018 through 05/06/2018.  Not resumed 06/08/2018. \"I misunderstood.\"   Resume 09/05/2018 through 10/03/2018, stopped due to intolerance.  Injectafer 750 mg 10/20/2018 at the [place] office.  Retacrit 10/27/2020 through present.           Vulvar cancer, carcinoma (CMS/HCC)    Initial Diagnosis    Vulvar cancer, carcinoma (CMS/HCC)     3/19/2001 Biopsy    Clinical Features: red vaginal lesion with bleeding since 1995. TX with  Carafate douche and Premarin vaginal cream with intermittent improvement.    DIAGNOSIS:    VAGINA, BIOPSY: FOCAL " ULCERATION, MARKED ACUTE AND CHRONIC INFLAMMATION,  SPONGIOSIS AND REACTIVE ATYPIA; NEGATIVE FOR DYSPLASIA.     8/17/2001 Procedure    Pap Smear:  DIAGNOSIS:            Atypical keratinized squamous cells, suspicious                           for squamous cell carcinoma.         COMMENTS:             There are numerous atypical keratinized cells                           present in an inflammatory background.  These are                           suspicious for squamous cell carcinoma.  Biopsy                           confirmation is recommended.      10/16/2001 Procedure    Pap Smear:  DIAGNOSIS:            Mild dysplasia       ADDITIONAL FINDINGS:  Marked inflammation                           Excessive hyperkeratosis         BETHESDA SYSTEM:      Low grade squamous intraepithelial lesion    DIAGNOSIS:            Negative, no abnormal cells seen           BETHESDA SYSTEM:      Within normal limits.       ADEQUACY:             Specimen satisfactory for evaluation       SOURCE:               Vagina, diagnostic thin prep PAP     11/7/2001 Biopsy      DIAGNOSIS:    VAGINA AND VULVA, RIGHT POSTERIOR INTROITUS, WIDE LOCAL EXCISION:  VAGINAL INTRAEPITHELIAL NEOPLASIA (VAIN) II-III, FOCALLY EXTENDING TO  VAGINAL MARGIN AT 12-4:00; ALL OTHER MARGINS NEGATIVE FOR  INTRAEPITHELIAL NEOPLASIA. (SEE COMMENT)    COMMENT: The lesion involves vaginal type epithelium with associated  chronic inflammation and erosion. The adjacent vulvar epithelium is  hyperkeratotic.     3/15/2002 Procedure    Pap Smear:  BETHESDA SYSTEM:      High grade squamous intraepithelial lesion       DESCRIPTOR:           Moderate dysplasia           ADEQUACY:             Specimen satisfactory for evaluation       SOURCE:               Vagina, Thin Prep Pap, Diagnostic     6/13/2003 Procedure         BETHESDA SYSTEM:      High grade squamous intraepithelial lesion       DESCRIPTOR:           Moderate dysplasia       ADDITIONAL  FINDINGS:  Inflammation         ADEQUACY:             Specimen satisfactory for evaluation       SOURCE:               Vagina, Thin Prep Pap, Diagnostic    HUMAN PAPILLOMAVIRUS         CASE ACCESSION #:    [id number]        Category                  HPV Types                Patient Results         High/Intermed Risk    16,18,31,33,35,39              Negative                            45,51,52,56,58,59,68      Specimen Source        Cervical / Vaginal Specimen     12/5/2003 Procedure    Gynecological Cytology        CASE ACCESSION #:     [id number]       BETHESDA SYSTEM:      Low grade squamous intraepithelial lesion       DESCRIPTOR:           Mild dysplasia           ADEQUACY:             Specimen satisfactory for evaluation       SOURCE:               Vagina, Thin Prep Pap, Diagnostic     11/29/2011 Biopsy    ADDENDUM FINDINGS:    The high grade MOODY in the right labia majora biopsy was of the usual type.  There was no evidence of differentiated MOODY.      DIAGNOSIS:    1) PERINEUM, BIOPSY: SQUAMOUS EPITHELIUM WITH FLORID HYPERKERATOSIS,  CONSISTENT WITH CHRONIC IRRITATION; NO EVIDENCE OF DYSPLASIA OR MALIGNANCY  (SEE COMMENT).    Comment: Immunohistochemical studies (p16, p53, MIB-1) confirm the rendered  interpretations.    2) VULVA, RIGHT LABIUM MAJORUM, BIOPSY: VULVAR INTRAEPITHELIAL NEOPLASIA II  (MODERATE DYSPLASIA); (SEE COMMENT).    Comment: Immunohistochemical studies for p16 (nuclear and cytoplasmic  positivity in lower epithelial half) and MIB-1 (nuclear positivity in lower  epithelial half) confirms the diagnosis; p53 is positive only in rare  cells. A GMS histochemical study for fungal forms is negative.  Nevertheless, parakeratosis associated with neutrophils, as was seen  herein, is commonly associated with fungal vulvitis.     7/3/2012 Procedure    Gynecological Cytology    CASE ACCESSION #:                     [id number]  BETHESDA SYSTEM:                      High grade squamous  intraepithelial                                        lesion  DESCRIPTOR:                           Mild to moderate dysplasia  ADEQUACY:                             Specimen satisfactory for evaluation  SOURCE:                               Vagina, Thin Prep Pap, Diagnostic  # SLIDES REVIEWED:                    1     1/29/2013 Biopsy    DIAGNOSIS:    1) VULVA, RIGHT, ANTERIOR, BIOPSY:  SPONGIOTIC DERMATITIS WITH EXTENSIVE  DERMAL GRANULATION TISSUE, MIXED INFLAMMATION AND FIBROSIS (SEE COMENT).    Comment: Sections show spongiosis, basement membrane thickening, dermal  fibrosis, and extensive dermal granulation tissue with a predominantly  chronic inflammatory infiltrate. There is no evidence of dysplasia or  malignancy. The basement membrane thickening and dermal fibrosis suggests  that there may be component of lichen sclerosus. However, the underlying  cause of the ongoing inflammation and repair (granulation tissue) is not  clear from this sample. A tissue gram stain fails to reveal any large  bacterial aggregates.  GMS and AFB studies for fungal forms and acid fast  bacilli were negative respectively     11/27/2013 Surgery      Diagnosis    1) VULVA, LEFT ANTERIOR, BIOPSY: HIGH GRADE SQUAMOUS INTRAEPITHELIAL LESION (SEVERE DYSPLASIA, MOODY III).    2) VAGINAL WALL, ANTERIOR, BIOPSY: HIGH GRADE SQUAMOUS INTRAEPITHELIAL LESION (SEVERE DYSPLASIA, VaIN III).    3) VULVA, VULVECTOMY: FOCUS OF MICROINVASIVE SQUAMOUS CELL CARCINOMA, WELL-DIFFERENTIATED, DEPTH OF STROMAL INVASION 0.4 MM,  8 MM FROM CLOSEST DEEP MARGINS, 4 MM FROM RIGHT ANTERIOR VAGINAL MARGINS AT 8 - 9 O'CLOCK (PROXIMAL TIP OF SPECIMEN DESIGNATED   12 O'CLOCK); NEGATIVE FOR LYMPHOVASCULAR INVASION; ARISING IN A BACKGROUND OF EXTENSIVE VULVAR INTRAEPITHELIAL NEOPLASIA (SEVERE DYSPLASIA, MOODY III, CLASSICAL AND DIFFERENTIATED TYPES); MOODY III IS PRESENT AT RIGHT LATERAL SURGICAL MARGIN (7 - 9 O'CLOCK),   LEFT LATERAL SURGICAL MARGIN (3 - 5 O'CLOCK) AND  RIGHT AND LEFT VAGINAL MARGINS; TOTAL LESIONAL AREA (INVASIVE CARCINOMA AND MOODY III) MEASURES 8.2 CM IN GREATEST DIMENSION (GROSS MEASUREMENT); BACKGROUND SEVERE INTERFACE DERMATITIS AND LICHEN SCLEROSUS.        **Electronically signed out by Dimas Cardenas M.D.**on 12/2/2013  HAYLEY/YAZMIN/franky  Case reviewed by Attending Pathologist      Synoptic Diagnosis  3)  VULVA:     Specimen:                        Vulva  Procedure:                       Other: Vulvectomy  Lymph Node Sampling:             Not applicable  Specimen Size:                   Greatest dimension: 13.5 cm                                   Additional dimension: 9.5 cm                                   Additional dimension: 1.2 cm  Tumor Site:                      Right vulva, labium minus  Tumor Size:                      Greatest dimension: 0.04 cm  Tumor Focality:                  Unifocal  Histologic Type:                 Squamous cell carcinoma  Histologic Grade:                G1: Well differentiated  Microscopic Tumor Extension:     Depth of invasion: 0.4 mm  Margins:                         Uninvolved by invasive carcinoma                                   Distance of invasive carcinoma from closest margin: 4 mm  Lymph-Vascular Invasion:         Not identified  Lymph Nodes:                     No nodes submitted or found  Extranodal Extension:            Cannot be determined (explain):    Fixed or Ulcerated Femoral-Inguinal Lymph Nodes:                                    Not identified  Laterality of Involved Lymph Nodes:                                    Cannot be determined:    Primary Tumor (pT):              pT1a [FIGO IA]: Lesions 2 cm or less in size, confined to the vulva or perineum, and with stromal invasion 1.0 mm or less  Regional Lymph Nodes (pN):       pNX:  Regional lymph nodes cannot be assessed  Distant Metastasis (pM):         Not applicable  Additional Pathologic Findings:  Vulvar intraepithelial neoplasia (MOODY) 3 (severe  "dysplasia/carcinoma in situ)  --------------------------------------------------------     5/20/2014 Procedure    Gynecologic Cytology  [place] Diagnosis:  High grade squamous intraepithelial lesion.    Descriptor/Additional Findings:  Moderate dysplasia.    Adequacy:  Specimen satisfactory for evaluation.    Source:  Vagina, Thin Prep Pap, Imaged Diagnostic     11/11/2014 Biopsy    Diagnosis    ANTERIOR VAGINAL WALL, BIOPSY:  HIGH GRADE SQUAMOUS INTRAEPITHELIAL LESION (VaIN 3, SEVERE DYSPLASIA)       12/19/2014 Surgery    Diagnosis  1)  ANTERIOR VAGINAL WALL, PARTIAL VAGINECTOMY: HIGH-GRADE SQUAMOUS INTRAEPITHELIAL LESION (VaIN 3, SEVERE DYSPLASIA), 2.7 CM; HIGH GRADE DYSPLASIA EXTENDS TO THE 2 AND 8 O'CLOCK TIP MARGINS, THE 5-8 O'CLOCK LATERAL MARGIN, AND THE 11-2 O'CLOCK LATERAL   MARGIN.          2)  JOYA-CLITORAL AREA, EXCISION: HIGH-GRADE SQUAMOUS INTRAEPITHELIAL LESION (VaIN 3, SEVERE DYSPLASIA), EXTENDING TO A LATERAL MARGIN.         2/9/2015 Surgery    Diagnosis  1.          VULVA, LEFT PERIURETHRAL PORTION, EXCISION: FOCAL HIGH-GRADE SQUAMOUS INTRAEPITHELIAL LESION (MOODY 2, MODERATE DYSPLASIA); MARGINS ARE NEGATIVE FOR DYSPLASIA.    2.          VULVA, RIGHT PERIURETHRAL PORTION, EXCISION: BENIGN SQUAMOUS MUCOSA WITH ACUTE AND CHRONIC INFLAMMATION AND REACTIVE CHANGES.         3.          VULVA, LEFT, LOWER PORTION, EXCISION: BENIGN SQUAMOUS MUCOSA WITH ACUTE AND CHRONIC INFLAMMATION, REACTIVE CHANGES AND FEATURES CONSISTENT WITH PREVIOUS SURGICAL PROCEDURE.         4.          VULVA, RIGHT PORTION, EXCISION: BENIGN SQUAMOUS MUCOSA WITH CHRONIC INFLAMMATION AND DERMAL FIBROSIS.        9/6/2017 Procedure    Diagnosis  \"PAP SMEAR FROM THE URETHRA\":  HIGH GRADE SQUAMOUS INTRAEPITHELIAL LESION.          10/26/2017 Biopsy    Urethral Meatus Biopsy:  Urothelial mucosa with ulceration, chronic inflammation and focal high grade squamous intraepithelial lesion, moderate dysplasia     7/10/2018 Imaging    MRI " Pelvis:  Impression:    1.  There is a 2.3 x 1.8 cm urethral lesion at the external urethral   meatus demonstrating enhancement and T2 hyperintensity. The lesion is   located approximately 8 mm from the bladder neck/internal urethral   orifice.  There is no extension of the lesion into the para vaginal   fat or ischiorectal fossa. There is some edema of the bilateral   ischiocavernosus muscles without invasion by the mass. No pelvic or   inguinal adenopathy is identified.     2.  The patient has a 2.5 cm cystocele and the urethra is almost   horizontal. There is pelvic floor laxity with loss of upper convexity   of the left iliococcygeus muscle.     7/20/2018 Biopsy    Diagnosis  URETHRA, BIOPSY:  SQUAMOUS CELL CARCINOMA IN SITU; SEE COMMENT.    Comments  P16 immunostain and HPV RNA in situ hybridization are strongly and diffusely positive within the lesion, consistent with HPV-related pathogenesis.     1/8/2019 Imaging    MRI Pelvis:  1.  Stable size and appearance of a nonspecific enhancing nodular   lesion at the caudal margin of the vaginal introitus adjacent to   extensive postsurgical changes related to prior vulvectomy and   vaginectomy. This lesion does not appear to conform to the expected   course of the urethra which is somewhat poorly defined, and this felt   more likely be located immediately caudal to the urethra. It is   possible this may reflect postsurgical scarring as opposed to a true   lesion. Continued follow-up is suggested to ensure stability.  2.  No lymphadenopathy or other evidence of metastatic disease in the   pelvis.  3.  Evidence of pelvic floor laxity with cystocele, not significantly   changed.     8/6/2019 Imaging    MRI Pelvis:  1. No significant change from the prior exam on this limited   noncontrast study.  2. Stable indeterminate nodule anterior to the external urethral   meatus which may represent focal scarring; however,   residual/recurrent disease is not entirely  excluded.  Recommend continued attention on follow-up. 3. Extensive pelvic   postsurgical changes with no evidence of local recurrence.  4. Pelvic floor laxity with cystocele unchanged from prior exam.     1/13/2020 Procedure    Urethral stricture dilation     2/18/2020 Imaging    MRI Pelvis:  Impression:  1.  No significant interval change in 1.7 x 1.7 cm T2 hyperintense   ovoid lesion at the urethral meatus.  2.  Numerous postoperative findings status post vaginectomy and   hysterectomy.  3.  Pelvic floor laxity with persistent cystocele.  4.  No pelvic adenopathy or bony lesion identified.     5/13/2020 Procedure    Urethral stricture dilation      7/10/2020 Biopsy    Diagnosis  PERIURETHRA, BIOPSY: POORLY DIFFERENTIATED CARCINOMA WITH SQUAMOUS AND PAPILLARY FEATURES, FOCALLY INVASIVE IN THE SUBEPITHELIAL CONNECTIVE TISSUE; SEE COMMENT.    Comments  The patient's history of vulvar microinvasive squamous cell carcinoma is noted. The current biopsy shows a poorly differentiated carcinoma with papillary formation. P16 immunostain and HPV RNA in situ hybridization are strongly and diffusely positive within the lesion, consistent with HPV-related pathogenesis. A KERRI-3 immunostain shows patchy, weak positivity in the lesional cells. The patient's prior biopsy ([id number]) is reviewed and shows similar morphologic and immunophenotypic features but the papillary features were not present in the prior material.  Dr. Ilene Pablo reviewed this case and concurs with the diagnosis.      7/31/2020 Imaging    MRI Pelvis:  1.  Redemonstration of a mildly T2 hyperintense mass involving the   urethral meatus, similar dimensions to prior exam on 2/18/2020,   however there is now a polypoid lesion seen along the anterior aspect   of the perineum, possibly contiguous with the urethral mass,   concerning for disease progression. This could potentially represent   the recently biopsied lesion.  2.  Marked interval enlargement of  a left inguinal lymph node, almost   certainly representing disease involvement. This would be amenable to   ultrasound-guided biopsy if warranted.  3.  Findings related to pelvic floor laxity, with cystocele.    PET/CT:  1.  Intensely FDG avid left inguinal lymph node is highly suspicious   for locoregional metastasis from known vulvar squamous cell   carcinoma. There are no additional sites of suspicious FDG activity.  2.   Intense physiologic FDG activity within the urine obscures   visualization of the periurethral mass. Findings are better   characterized on same day pelvic MRI.     9/21/2020 - 12/10/2020 Radiation    Radiation OncologyTreatment Course:  Dago Nunez received 6940 cGy in 38 fractions to vulva via external beam radiation therapy.     10/5/2020 -  Chemotherapy    OP Vulvar MitoMYcin / Fluorouracil CIV + XRT       10/9/2020 -  Chemotherapy    OP CENTRAL VENOUS ACCESS DEVICE ACCESS, CARE, AND MAINTENANCE (CVAD)     10/19/2020 Imaging    CT Head:  Impression:    1. No acute intracranial process.     Secondary malignancy of inguinal lymph nodes (CMS/HCC)   8/31/2020 Initial Diagnosis    Secondary malignancy of inguinal lymph nodes (CMS/HCC)     10/9/2020 -  Chemotherapy    OP CENTRAL VENOUS ACCESS DEVICE ACCESS, CARE, AND MAINTENANCE (CVAD)         PAST MEDICAL HISTORY:  ALLERGIES:  Allergies   Allergen Reactions   • Scopolamine Swelling     Other reaction(s): ANGIOEDEMA  Other reaction(s): ANGIOEDEMA     • Tequin [Gatifloxacin] Other (See Comments)     Doesn't remember   • Amoxicillin-Pot Clavulanate Rash   • Keflex [Cephalexin] Rash   • Septra [Sulfamethoxazole-Trimethoprim] Rash   • Trovan [Alatrofloxacin] Dizziness     CURRENT MEDICATIONS:  Outpatient Encounter Medications as of 3/16/2021   Medication Sig Dispense Refill   • Acetaminophen (TYLENOL ARTHRITIS PAIN PO) Take 1 tablet by mouth Daily.     • amLODIPine (NORVASC) 5 MG tablet      • B Complex Vitamins (VITAMIN B COMPLEX) capsule capsule  Take 1 capsule by mouth Daily.     • bisoprolol-hydrochlorothiazide (ZIAC) 5-6.25 MG per tablet      • calcium carbonate (OS-REAGAN) 600 MG tablet Take 600 mg by mouth Daily.     • cetirizine (zyrTEC) 10 MG tablet Take 10 mg by mouth Daily.     • citalopram (CeleXA) 20 MG tablet Take 1 tablet by mouth Daily. 90 tablet 1   • diphenoxylate-atropine (LOMOTIL) 2.5-0.025 MG per tablet Take 2 tablets by mouth 4 (Four) Times a Day As Needed for Diarrhea. 90 tablet 0   • DULERA 100-5 MCG/ACT inhaler Inhale 2 puffs 2 (Two) Times a Day. Rinse and spit after using. 6 inhaler 0   • esomeprazole (nexIUM) 40 MG capsule Take 1 capsule by mouth 2 (Two) Times a Day. 180 capsule 3   • furosemide (LASIX) 40 MG tablet Take 1.5 tablets by mouth Daily. Patient only takes once a day 135 tablet 3   • gabapentin (NEURONTIN) 300 MG capsule Take 2 capsules by mouth 3 (Three) Times a Day. 180 capsule 2   • Homeopathic Products (LEG CRAMPS) tablet Take 1 tablet by mouth Daily.     • HYDROcodone-acetaminophen (NORCO)  MG per tablet Take 1 tablet by mouth Every 4 (Four) Hours As Needed for Moderate Pain  or Severe Pain . 60 tablet 0   • Hydrocortisone (britany's amazing butt) cream Apply 1 application topically to the appropriate area as directed As Needed (irritation). 120 g 0   • lidocaine (XYLOCAINE) 5 % ointment Apply  topically to the appropriate area as directed Every 4 (Four) Hours As Needed for Mild Pain  (pain secondary to radiation). 240 g 1   • Lidocaine Viscous HCl (XYLOCAINE) 2 % solution Take 5 mL by mouth 3 (Three) Times a Day With Meals. 100 mL 0   • ondansetron (Zofran) 8 MG tablet Take 1 tablet by mouth Every 8 (Eight) Hours As Needed for Nausea or Vomiting. 60 tablet 2   • potassium chloride (K-DUR,KLOR-CON) 20 MEQ CR tablet Take 2 tablets by mouth Daily. Patient only takes once a day 60 tablet 11   • pravastatin (PRAVACHOL) 40 MG tablet Take 40 mg by mouth Daily.     • doxycycline (VIBRAMYICN) 100 MG tablet TAKE 1 TABLET BY  MOUTH TWO TIMES A DAY FOR 5 DAYS 10 tablet 0     No facility-administered encounter medications on file as of 3/16/2021.     ADULT ILLNESSES:  Patient Active Problem List   Diagnosis Code   • Meatal stenosis [id number]   • Retention of urine R33.9   • Type 2 diabetes mellitus, without long-term current use of insulin (CMS/HCC) E11.9   • Essential hypertension I10   • Grief reaction F43.21   • 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   • Chronic midline low back pain without sciatica M54.5, G89.29   • Bilateral lower extremity edema R60.0   • History of urethral stricture Z87.448   • Epidermal cyst of neck L72.0   • Normocytic anemia D64.9   • Neck abscess L02.11   • Hyperlipidemia E78.5   • GERD (gastroesophageal reflux disease) K21.9   • Anxiety F41.9   • Iron deficiency and chemotherapy induced anemia D50.9   • Stage 3 chronic kidney disease (CMS/HCC) N18.30   • Anemia in stage 3 chronic kidney disease (CMS/HCC) N18.30, D63.1   • Screening for breast cancer Z12.39   • Lower extremity edema R60.0   • Vulvar cancer, carcinoma (CMS/HCC) C51.9   • Former smoker Z87.891   • 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   • Febrile illness R50.9   • Chemotherapy-induced thrombocytopenia D69.59, T45.1X5A   • Hyponatremia E87.1   • Hypokalemia E87.6   • Neutropenic fever (CMS/HCC) D70.9, R50.81   • Moderate malnutrition (CMS/HCC) E44.0   • Antineoplastic chemotherapy induced anemia D64.81, T45.1X5A   • History of radiation therapy Z92.3     SURGERIES:  Past Surgical History:   Procedure Laterality Date   • APPENDECTOMY     • BREAST CYST ASPIRATION Left    • COLONOSCOPY  01/12/2011   • ENDOSCOPY  07/01/2014   • HYSTERECTOMY     • TONSILLECTOMY     • VAGINA SURGERY     • VENOUS ACCESS DEVICE (PORT) INSERTION N/A 9/29/2020    Procedure: SINGLE LUMEN PORT - A- CATH PLACEMENT WITH FLUOROSCOPY;  Surgeon: Quynh Rosario MD;  Location: [facility];  Service: General;  Laterality: N/A;     HEALTH  MAINTENANCE ITEMS:  Health Maintenance Due   Topic Date Due   • COLONOSCOPY  Never done   • COVID-19 Vaccine (1 of 2) Never done   • ZOSTER VACCINE (2 of 3) 11/26/2015   • HEPATITIS C SCREENING  Never done   • URINE MICROALBUMIN  01/29/2020   • LIPID PANEL  08/26/2020   • DIABETIC EYE EXAM  11/25/2020   • HEMOGLOBIN A1C  02/06/2021       <no information>  Last Completed Colonoscopy       Status Date      COLONOSCOPY No completions recorded        Immunization History   Administered Date(s) Administered   • FLUAD TRI 65YR+ 10/22/2019   • Fluad Quad 65+ 11/09/2020   • Fluzone High Dose =>65 Years ([employer] ONLY) 10/01/2018   • Pneumococcal Polysaccharide (PPSV23) 10/16/2013   • Pneumococcal, Unspecified 10/01/2017   • Tdap 05/01/2019   • Zostavax 01/14/2010, 10/01/2015     Last Completed Mammogram       Status Date      MAMMOGRAM Done 1/2/2013 Ext Proc: [facility] - MAMMOGRAM SCREENING BILAT DIGITAL W CAD     Patient has more history with this topic...            FAMILY HISTORY:  Family History   Problem Relation Age of Onset   • Cancer Mother    • Hypertension Mother    • Osteoporosis Mother    • Dementia Mother    • Uterine cancer Mother    • Heart disease Father    • Parkinsonism Father    • Cancer Sister    • Breast cancer Sister    • Kidney cancer Sister    • Diabetes Brother    • Heart disease Paternal Grandfather    • No Known Problems Maternal Grandmother    • No Known Problems Maternal Grandfather    • No Known Problems Paternal Grandmother      SOCIAL HISTORY:  Social History     Socioeconomic History   • Marital status:      Spouse name: Not on file   • Number of children: Not on file   • Years of education: Not on file   • Highest education level: Not on file   Tobacco Use   • Smoking status: Former Smoker   • Smokeless tobacco: Never Used   Vaping Use   • Vaping Use: Never used   Substance and Sexual Activity   • Alcohol use: No   • Drug use: No   • Sexual activity: Never       REVIEW OF  "SYSTEMS:    Review of Systems   Constitutional: Positive for fatigue. Negative for chills and fever.   Respiratory: Negative for cough, shortness of breath and wheezing.    Cardiovascular: Positive for leg swelling. Negative for chest pain.   Gastrointestinal: Negative for abdominal pain, nausea and vomiting.   Genitourinary: Negative for difficulty urinating, dysuria and flank pain.   Skin: Positive for pallor.   Neurological: Negative for dizziness, speech difficulty and weakness.   Psychiatric/Behavioral: Negative for agitation and confusion. The patient is not nervous/anxious.        VITAL SIGNS: /68   Pulse 76   Temp 98.3 °F (36.8 °C)   Resp 18   Ht 152.4 cm (60\")   Wt 76.4 kg (168 lb 6.4 oz)   SpO2 96%   Breastfeeding No   BMI 32.89 kg/m²   Pain Score    03/16/21 1054   PainSc: 0-No pain       PHYSICAL EXAMINATION:     Physical Exam  Vitals reviewed.   Constitutional:       General: She is not in acute distress.     Appearance: She is obese.   Cardiovascular:      Rate and Rhythm: Normal rate and regular rhythm.   Pulmonary:      Effort: No respiratory distress.      Breath sounds: No wheezing.   Abdominal:      General: Bowel sounds are normal. There is no distension.      Palpations: Abdomen is soft.   Musculoskeletal:         General: Swelling present.   Skin:     General: Skin is dry.      Coloration: Skin is pale.   Neurological:      Mental Status: She is alert and oriented to person, place, and time.   Psychiatric:         Mood and Affect: Mood normal.         Behavior: Behavior normal.         LABS    Lab Results - Last 18 Months   Lab Units 03/12/21  1113 02/18/21  1948 01/28/21  1322 01/14/21  1436 01/04/21  1053 12/28/20  1047 12/07/20  0958 11/30/20  0954 10/27/20  0949 10/22/20  0430 10/21/20  0521 10/20/20  0448 10/19/20  1535 10/19/20  1244 10/13/20  1114 10/13/20  1114   HEMOGLOBIN g/dL 10.3* 11.3* 11.8* 10.1* 9.8* 10.1* 10.0* 10.5* 9.4* 9.4* 7.8* 8.4* 9.1* 9.3*   < > 10.0* "   HEMATOCRIT % 31.1* 32.5* 37.0 31.4* 28.4* 29.7* 31.4* 31.8* 27.5* 27.4* 23.0* 23.5* 25.7* 26.4*   < > 28.7*   MCV fL 95.1 95.0 101.9* 100.6* 97.3* 96.7 100.0* 96.4 96.2 93.5 93.9 91.8 92.8 92.3   < > 94.7   WBC 10*3/mm3 5.71 9.23 5.60 5.61 5.57 6.60 5.83 5.13 6.85 8.42 6.38 2.65* 4.02 4.54   < > 6.12   RDW % 14.0 14.6 14.5 14.4 14.5 14.3 14.7 14.7 14.4 13.2 13.0 12.4 12.6 12.5   < > 12.4   MPV fL 10.0 10.2 9.4 8.9 9.6 9.8 9.6 10.5 9.9 12.2* 12.7* 12.7* 12.9* 10.9   < > 10.5   PLATELETS 10*3/mm3 171 204 230 193 191 192 231 207 271 71* 51* 35* 35* 40*   < > 85*   IMM GRAN % % 0.4 0.4 0.0 0.4 0.5 0.6* 0.3 0.6*  --   --   --   --   --   --    < >  --    NEUTROS ABS 10*3/mm3 4.42 6.77 3.87 3.89 3.96 4.99 4.33 3.34 5.82 7.49* 5.93 2.17 3.09 3.72   < > 5.63   LYMPHS ABS 10*3/mm3 0.72 1.33 0.95 0.91 0.82 0.83 0.76 1.07  --   --   --   --   --   --    < >  --    MONOS ABS 10*3/mm3 0.43 0.76 0.51 0.56 0.52 0.59 0.54 0.41  --   --   --   --   --   --    < >  --    EOS ABS 10*3/mm3 0.10 0.30 0.22 0.21 0.21 0.12 0.16 0.23  --   --   --   --   --   --    < > 0.18   BASOS ABS 10*3/mm3 0.02 0.03 0.05 0.02 0.03 0.03 0.02 0.05 0.07  --   --   --   --   --    < > 0.06   IMMATURE GRANS (ABS) 10*3/mm3 0.02 0.04 0.00 0.02 0.03 0.04 0.02 0.03  --   --   --   --   --   --    < >  --    NRBC /100 WBC 0.0 0.0  --   --  0.0 0.0 0.0 0.0  --  2.0*  --   --   --   --    < >  --    NEUTROPHIL % %  --   --   --   --   --   --   --   --  85.0* 81.0* 89.9* 78.0* 64.6 67.0  --  88.0*   MONOCYTES % %  --   --   --   --   --   --   --   --  8.0 4.0* 3.0* 9.0 13.1* 12.0  --  1.0*   BASOPHIL % %  --   --   --   --   --   --   --   --  1.0  --   --   --   --   --   --  1.0   ATYP LYMPH % %  --   --   --   --   --   --   --   --   --   --   --  2.0 1.0 1.0  --   --    ANISOCYTOSIS   --   --   --   --   --   --   --   --  Slight/1+  --  Slight/1+  --   --  Slight/1+  --   --    GIANT PLT   --   --   --   --   --   --   --   --  Slight/1+ Slight/1+  Slight/1+ Slight/1+  --   --   --   --     < > = values in this interval not displayed.       Lab Results - Last 18 Months   Lab Units 03/12/21  1113 02/18/21  1948 01/04/21  1053 11/30/20  0954 11/23/20  1003 11/17/20  0958   GLUCOSE mg/dL 148* 110* 156* 170* 149* 185*   SODIUM mmol/L 136 137 133* 134* 130* 133*   POTASSIUM mmol/L 4.1 4.8 3.9 4.1 3.2* 4.1   CO2 mmol/L 28.0 28.0 28.0 33.0* 31.0* 29.0   CHLORIDE mmol/L 99 98 97* 94* 90* 92*   ANION GAP mmol/L 9.0 11.0 8.0 7.0 9.0 12.0   CREATININE mg/dL 1.40* 1.32* 1.56* 1.91* 1.59* 1.52*   BUN mg/dL 20 17 16 22 19 18   BUN / CREAT RATIO  14.3 12.9 10.3 11.5 11.9 11.8   CALCIUM mg/dL 10.1 9.9 9.4 9.7 9.2 8.8   EGFR IF NONAFRICN AM mL/min/1.73 36* 39* 32* 25* 31* 33*   ALK PHOS U/L 57 55 49 55 58 56   TOTAL PROTEIN g/dL 7.6 7.8 7.5 7.2 7.0 6.6   ALT (SGPT) U/L 5 7 6 14 9 10   AST (SGOT) U/L 14 18 13 60* 18 21   BILIRUBIN mg/dL 0.5 0.3 0.4 0.3 0.3 0.3   ALBUMIN g/dL 4.10 4.10 3.60 3.40* 3.40* 3.40*   GLOBULIN gm/dL 3.5 3.7 3.9 3.8 3.6 3.2       No results for input(s): MSPIKE, KAPPALAMB, IGLFLC, URICACID, FREEKAPPAL, CEA, LDH, REFLABREPO in the last 40201 hours.    Lab Results - Last 18 Months   Lab Units 01/04/21  1053 10/20/20  0448 10/13/20  1114 09/03/20  1134 07/02/20  1019 05/14/20  1038 12/23/19  1322   IRON mcg/dL 74  --  135 129 91 92 93   TIBC mcg/dL 301  --  335 384 338 311 380   IRON SATURATION % 25  --  40 34 27 30 24   FERRITIN ng/mL 401.10*  --  599.10* 341.90* 315.10* 286.50* 309.50*   TSH uIU/mL  --  0.831  --   --   --   --   --        Dago Nunez reports a pain score of 0.       Patient's Body mass index is 32.89 kg/m². BMI is above normal parameters. Recommendations include: none (medical contraindication).      ASSESSMENT:  1.   Recurrent squamous cell carcinoma, vulva.  AJCC stage IIIA (T2, Nib, M0, G3).  Treatment status.  Had Mitomycin C and 5 FU D1 to D4 with radiation.  D29 - 32 chemo was cancelled due to poor performance  status.   2.  Macrocytic anemia from iron deficiency and history of chronic kidney disease Stage III, GFR 56 mL/min on 09/03/2020.  3.   Iron deficiency. Intolerant to oral iron.   4.   Chronic kidney disease Stage III, GFR 56 ml/min on 09/03/2020.  5.   Performance status of 3.     6.   Thrombocytopenia, 04/19/2018. (?) laboratory error.    7.   Squamous cell cancer in situ, urethra.  Followed by Dr. Callahan  8.   Grade 1 mucositis from chemo.      9.   Obesity BMI 32.89.         PLAN:  1.     Re:  Heme status.  Hemoglobin 10.3 and hematocrit 31.1  2.     Re:  Pre-office CMP.  GFR 36 ml/minute   3.     Re:  Stable for observation.  4.     Re:  Keep appointment with Dr Marks.    5.    CBC with differential, ferritin and iron panel in 2 months.  6.    Continue ongoing management per primary care physician and other specialists.  7.    Plan of care discussed with patient and spouse.  Understanding expressed.  Patient agreeable to proceed.  8.    Intolerant to oral iron (nausea, cramps, and constipation).  IV iron as needed.   9.   Advance Care Planning   ACP discussion was held with the patient during this visit. Patient has an advance directive (not in EMR), copy requested.  10.  eRx Zofran 8 mg po every 8 hours as needed for nausea/vomiting, # 60, 2 refills if needed.  11.  eRx miracle mouth wash, 15 ml swish and spit every 4 hours as needed for mucositis, 480 ml, 2 refills if needed.  12.  Flush port every 6 weeks.   13.  Transfuse 2 units packed RBCs if hemoglobin less than seven.  Premed Tylenol 500 mg p.o. and Benadryl 12.5 mg IV.  Lasix 20 mg IV push after each unit of blood.  Observe for transfusion reactions.  14.  She will be seen every 3 to 6 months for the first 3 years then every 6 months for the next 3 years.  Cervical/vaginal cytology as indicated.  15.  Retacrit 40,000 units SQ wekly if hemoglobin below 10 and hematocrit below 30 to target a hemoglobin of 11 and  hematocrit 33. Move CBC weekly if she starts Retacrit.   15.  Return to the office in 4 months with CBC with differential, ferritin, iron panel, and CMP.    16.  Okay for Covid 19 vaccine.          I have reviewed the assessment and plan and verified the accuracy of it. No changes to assessment and plan since the information was documented. Drake Stafford MD 03/16/21       I spent 20 total minutes, face-to-face, caring for Syndal today.  Greater than 50% of this time involved counseling and/or coordination of care as documented within this note regarding the patient's illness(es), pros and cons of various treatment options, instructions and/or risk reduction.                cc: Shaheen Akbar MD         (Ulises Roche MD)        (Trini Rosario MD)        Gilberto Mills MD        (Moustapha Callahan MD)        (Radha Marks MD)

## 2021-03-11 ENCOUNTER — TELEPHONE (OUTPATIENT)
Dept: FAMILY MEDICINE CLINIC | Facility: CLINIC | Age: 81
End: 2021-03-11

## 2021-03-11 DIAGNOSIS — D07.1 VULVAR INTRAEPITHELIAL NEOPLASIA (VIN) GRADE 3: Primary | ICD-10-CM

## 2021-03-11 NOTE — TELEPHONE ENCOUNTER
Caller: JOSE    Relationship to patient: [facility]   Best call back number: [phone]        PATIENT TOLD HOME HEALTH RN THAT SHE HAD CANCER AND SHE HAD BLOOD ON THE TOILET PAPER AND PATIENT FELT LIKE IT MIGHT BE HEMORROIDS BUT UNCERTAIN.    ANTIBIOTICS DON'T SEEM TO BE WORKING SINCE CELLUTIS IN  BOTH LEGS ARE VERY RED, SWOLLEN AND HOT TO TOUCH.     BURNING WITH URINATION DR CARRILLO, KIDNEY DOCTOR, WILL DO LAB WORK PN URINE TO DETERMINE IF PATIENT HAS UTI.     PLEASE ADVISE.    [facility] IS WHAT [facility] HAS AS HER PHARMACY--WASN'T SURE IF THAT WAS CORRECT.

## 2021-03-12 ENCOUNTER — LAB (OUTPATIENT)
Dept: LAB | Facility: HOSPITAL | Age: 81
End: 2021-03-12

## 2021-03-12 ENCOUNTER — INFUSION (OUTPATIENT)
Dept: ONCOLOGY | Facility: CLINIC | Age: 81
End: 2021-03-12

## 2021-03-12 DIAGNOSIS — C51.9 VULVAR CANCER, CARCINOMA (HCC): ICD-10-CM

## 2021-03-12 DIAGNOSIS — C77.4 SECONDARY MALIGNANCY OF INGUINAL LYMPH NODES (HCC): Primary | ICD-10-CM

## 2021-03-12 DIAGNOSIS — D07.1 VULVAR INTRAEPITHELIAL NEOPLASIA (VIN) GRADE 3: ICD-10-CM

## 2021-03-12 LAB
ALBUMIN SERPL-MCNC: 4.1 G/DL (ref 3.5–5.2)
ALBUMIN/GLOB SERPL: 1.2 G/DL
ALP SERPL-CCNC: 57 U/L (ref 39–117)
ALT SERPL W P-5'-P-CCNC: 5 U/L (ref 1–33)
ANION GAP SERPL CALCULATED.3IONS-SCNC: 9 MMOL/L (ref 5–15)
AST SERPL-CCNC: 14 U/L (ref 1–32)
BASOPHILS # BLD AUTO: 0.02 10*3/MM3 (ref 0–0.2)
BASOPHILS NFR BLD AUTO: 0.4 % (ref 0–1.5)
BILIRUB SERPL-MCNC: 0.5 MG/DL (ref 0–1.2)
BUN SERPL-MCNC: 20 MG/DL (ref 8–23)
BUN/CREAT SERPL: 14.3 (ref 7–25)
CALCIUM SPEC-SCNC: 10.1 MG/DL (ref 8.6–10.5)
CHLORIDE SERPL-SCNC: 99 MMOL/L (ref 98–107)
CO2 SERPL-SCNC: 28 MMOL/L (ref 22–29)
CREAT SERPL-MCNC: 1.4 MG/DL (ref 0.57–1)
DEPRECATED RDW RBC AUTO: 49.1 FL (ref 37–54)
EOSINOPHIL # BLD AUTO: 0.1 10*3/MM3 (ref 0–0.4)
EOSINOPHIL NFR BLD AUTO: 1.8 % (ref 0.3–6.2)
ERYTHROCYTE [DISTWIDTH] IN BLOOD BY AUTOMATED COUNT: 14 % (ref 12.3–15.4)
GFR SERPL CREATININE-BSD FRML MDRD: 36 ML/MIN/1.73
GLOBULIN UR ELPH-MCNC: 3.5 GM/DL
GLUCOSE SERPL-MCNC: 148 MG/DL (ref 65–99)
HCT VFR BLD AUTO: 31.1 % (ref 34–46.6)
HGB BLD-MCNC: 10.3 G/DL (ref 12–15.9)
IMM GRANULOCYTES # BLD AUTO: 0.02 10*3/MM3 (ref 0–0.05)
IMM GRANULOCYTES NFR BLD AUTO: 0.4 % (ref 0–0.5)
LYMPHOCYTES # BLD AUTO: 0.72 10*3/MM3 (ref 0.7–3.1)
LYMPHOCYTES NFR BLD AUTO: 12.6 % (ref 19.6–45.3)
MCH RBC QN AUTO: 31.5 PG (ref 26.6–33)
MCHC RBC AUTO-ENTMCNC: 33.1 G/DL (ref 31.5–35.7)
MCV RBC AUTO: 95.1 FL (ref 79–97)
MONOCYTES # BLD AUTO: 0.43 10*3/MM3 (ref 0.1–0.9)
MONOCYTES NFR BLD AUTO: 7.5 % (ref 5–12)
NEUTROPHILS NFR BLD AUTO: 4.42 10*3/MM3 (ref 1.7–7)
NEUTROPHILS NFR BLD AUTO: 77.3 % (ref 42.7–76)
NRBC BLD AUTO-RTO: 0 /100 WBC (ref 0–0.2)
PLATELET # BLD AUTO: 171 10*3/MM3 (ref 140–450)
PMV BLD AUTO: 10 FL (ref 6–12)
POTASSIUM SERPL-SCNC: 4.1 MMOL/L (ref 3.5–5.2)
PROT SERPL-MCNC: 7.6 G/DL (ref 6–8.5)
RBC # BLD AUTO: 3.27 10*6/MM3 (ref 3.77–5.28)
SODIUM SERPL-SCNC: 136 MMOL/L (ref 136–145)
WBC # BLD AUTO: 5.71 10*3/MM3 (ref 3.4–10.8)

## 2021-03-12 PROCEDURE — 80053 COMPREHEN METABOLIC PANEL: CPT

## 2021-03-12 PROCEDURE — 85025 COMPLETE CBC W/AUTO DIFF WBC: CPT

## 2021-03-12 PROCEDURE — 36415 COLL VENOUS BLD VENIPUNCTURE: CPT

## 2021-03-12 RX ORDER — HEPARIN SODIUM (PORCINE) LOCK FLUSH IV SOLN 100 UNIT/ML 100 UNIT/ML
500 SOLUTION INTRAVENOUS AS NEEDED
Status: CANCELLED | OUTPATIENT
Start: 2021-03-12

## 2021-03-12 RX ORDER — HEPARIN SODIUM (PORCINE) LOCK FLUSH IV SOLN 100 UNIT/ML 100 UNIT/ML
500 SOLUTION INTRAVENOUS AS NEEDED
Status: DISCONTINUED | OUTPATIENT
Start: 2021-03-12 | End: 2021-03-12 | Stop reason: HOSPADM

## 2021-03-12 RX ORDER — SODIUM CHLORIDE 0.9 % (FLUSH) 0.9 %
10 SYRINGE (ML) INJECTION AS NEEDED
Status: CANCELLED | OUTPATIENT
Start: 2021-03-12

## 2021-03-12 RX ORDER — SODIUM CHLORIDE 0.9 % (FLUSH) 0.9 %
10 SYRINGE (ML) INJECTION AS NEEDED
Status: DISCONTINUED | OUTPATIENT
Start: 2021-03-12 | End: 2021-03-12 | Stop reason: HOSPADM

## 2021-03-12 RX ADMIN — Medication 10 ML: at 12:09

## 2021-03-12 RX ADMIN — HEPARIN SODIUM (PORCINE) LOCK FLUSH IV SOLN 100 UNIT/ML 500 UNITS: 100 SOLUTION at 12:09

## 2021-03-14 NOTE — TELEPHONE ENCOUNTER
If still concern for cellulitis will need UC or ED eval    Recommend stool softener if hemorrhoids persist

## 2021-03-15 NOTE — TELEPHONE ENCOUNTER
Returned call to [facility], spoke with Smiley, informed her per Dr Akbar, if there is still concern for cellulitis, will need UC or ED eval.  Also, stool softener recommended for hemorrhoids

## 2021-03-16 ENCOUNTER — OFFICE VISIT (OUTPATIENT)
Dept: ONCOLOGY | Facility: CLINIC | Age: 81
End: 2021-03-16

## 2021-03-16 VITALS
HEIGHT: 60 IN | RESPIRATION RATE: 18 BRPM | BODY MASS INDEX: 33.06 KG/M2 | SYSTOLIC BLOOD PRESSURE: 124 MMHG | TEMPERATURE: 98.3 F | HEART RATE: 76 BPM | OXYGEN SATURATION: 96 % | DIASTOLIC BLOOD PRESSURE: 68 MMHG | WEIGHT: 168.4 LBS

## 2021-03-16 DIAGNOSIS — N18.32 STAGE 3B CHRONIC KIDNEY DISEASE (HCC): Primary | ICD-10-CM

## 2021-03-16 DIAGNOSIS — D63.1 ANEMIA DUE TO STAGE 3B CHRONIC KIDNEY DISEASE (HCC): ICD-10-CM

## 2021-03-16 DIAGNOSIS — N18.32 ANEMIA DUE TO STAGE 3B CHRONIC KIDNEY DISEASE (HCC): ICD-10-CM

## 2021-03-16 PROCEDURE — 99213 OFFICE O/P EST LOW 20 MIN: CPT | Performed by: INTERNAL MEDICINE

## 2021-03-24 DIAGNOSIS — E11.42 TYPE 2 DIABETES MELLITUS WITH DIABETIC POLYNEUROPATHY, WITHOUT LONG-TERM CURRENT USE OF INSULIN (HCC): ICD-10-CM

## 2021-03-24 DIAGNOSIS — E78.2 MIXED HYPERLIPIDEMIA: Primary | ICD-10-CM

## 2021-03-24 DIAGNOSIS — N18.31 STAGE 3A CHRONIC KIDNEY DISEASE (HCC): ICD-10-CM

## 2021-03-24 RX ORDER — PRAVASTATIN SODIUM 40 MG
40 TABLET ORAL DAILY
Qty: 90 TABLET | Refills: 0 | Status: SHIPPED | OUTPATIENT
Start: 2021-03-24 | End: 2021-06-28

## 2021-03-24 NOTE — TELEPHONE ENCOUNTER
Patient's daughter, Clara Diez, returned call to the office.  She confirmed that patient is still taking the pravastatin 40mg daily.  She also frequently has labs drawn, informed Clara, that she will need to have a lipid panel drawn during her next set of labs and it would need to be 12 hour fasting lab.  Patient's daughter voiced understanding

## 2021-03-29 ENCOUNTER — TELEPHONE (OUTPATIENT)
Dept: FAMILY MEDICINE CLINIC | Facility: CLINIC | Age: 81
End: 2021-03-29

## 2021-03-30 DIAGNOSIS — M17.0 BILATERAL PRIMARY OSTEOARTHRITIS OF KNEE: ICD-10-CM

## 2021-03-30 DIAGNOSIS — M16.9 OSTEOARTHROSIS, HIP: Primary | ICD-10-CM

## 2021-03-30 DIAGNOSIS — M16.10 PRIMARY OSTEOARTHRITIS OF HIP, UNSPECIFIED LATERALITY: ICD-10-CM

## 2021-04-01 ENCOUNTER — TELEPHONE (OUTPATIENT)
Dept: FAMILY MEDICINE CLINIC | Facility: CLINIC | Age: 81
End: 2021-04-01

## 2021-04-01 NOTE — TELEPHONE ENCOUNTER
Patient called in stating she brought in  some paperwork for medicare needed for [facility] for a lift chair about a month ago and is requesting it to be sent to the pharmacy. She is requesting a call back when it has been sent.

## 2021-04-02 NOTE — TELEPHONE ENCOUNTER
DME order for lift chair faxed, along with last OV, imaging results, PT notes  Sent to [facility]

## 2021-04-08 RX ORDER — ESOMEPRAZOLE MAGNESIUM 40 MG/1
40 CAPSULE, DELAYED RELEASE ORAL 2 TIMES DAILY
Qty: 180 CAPSULE | Refills: 3 | Status: SHIPPED | OUTPATIENT
Start: 2021-04-08 | End: 2021-05-12 | Stop reason: SDUPTHER

## 2021-04-08 NOTE — TELEPHONE ENCOUNTER
Caller: Dago Nunez DIDI    Relationship: Self    Best call back number: [phone]    Medication needed:   Requested Prescriptions     Pending Prescriptions Disp Refills   • esomeprazole (nexIUM) 40 MG capsule 180 capsule 3     Sig: Take 1 capsule by mouth 2 (Two) Times a Day.       When do you need the refill by: 04/08/2021    What additional details did the patient provide when requesting the medication:PATIENT STATES THAT [facility] HAS TRIED TO NOTIFY THE OFFICE OF PRIOR APPROVAL OF THIS MEDICATION VIA FAX. PATIENT ONLY HAS TWO DAYS OF MEDICATION REMAINING.     Does the patient have less than a 3 day supply:    [x] Yes  [] No    What is the patient's preferred pharmacy: [facility] [place] [place] - [address] AT PORTAL TO [facility] - [phone]  - [phone] FX

## 2021-04-12 ENCOUNTER — TELEPHONE (OUTPATIENT)
Dept: ONCOLOGY | Facility: CLINIC | Age: 81
End: 2021-04-12

## 2021-04-12 NOTE — TELEPHONE ENCOUNTER
"Caller: SHANNON SONG    Relationship to patient: SELF    Best call back number: [phone]    Patient is needing: TO CONFIRM WHETHER SHE NEEDS A LAB EARLIER THAN July. SHE SAID SHE JUST \"DOESN'T WANT TO BE TOO LOW\".  "

## 2021-04-22 RX ORDER — AMLODIPINE BESYLATE 5 MG/1
TABLET ORAL
Status: CANCELLED | OUTPATIENT
Start: 2021-04-22

## 2021-04-22 RX ORDER — ONDANSETRON HYDROCHLORIDE 8 MG/1
8 TABLET, FILM COATED ORAL EVERY 8 HOURS PRN
Qty: 60 TABLET | Refills: 2 | Status: CANCELLED | OUTPATIENT
Start: 2021-04-22

## 2021-04-22 NOTE — TELEPHONE ENCOUNTER
Caller: Dago Nunez    Relationship: Self    Best call back number: [phone] (M)  Medication needed:   Requested Prescriptions     Pending Prescriptions Disp Refills   • amLODIPine (NORVASC) 5 MG tablet     • ondansetron (Zofran) 8 MG tablet 60 tablet 2     Sig: Take 1 tablet by mouth Every 8 (Eight) Hours As Needed for Nausea or Vomiting.       When do you need the refill by:04/22/21    What additional details did the patient provide when requesting the medication: SENT NORVASC TO [facility] AND ZOFRAN TO [facility]    Does the patient have less than a 3 day supply:  [x] Yes  [] No    What is the patient's preferred pharmacy: [facility] - [address] [phone] [facility] [phone] FX<br>[facility] [place], [place] - [address] AT PORTAL TO [facility] - [phone] [facility] [phone] FX<br>[facility] - [place]

## 2021-04-23 RX ORDER — ONDANSETRON HYDROCHLORIDE 8 MG/1
8 TABLET, FILM COATED ORAL EVERY 8 HOURS PRN
Qty: 60 TABLET | Refills: 2 | Status: SHIPPED | OUTPATIENT
Start: 2021-04-23 | End: 2022-07-08

## 2021-04-27 RX ORDER — AMLODIPINE BESYLATE 5 MG/1
5 TABLET ORAL DAILY
Qty: 90 TABLET | Refills: 1 | Status: SHIPPED | OUTPATIENT
Start: 2021-04-27 | End: 2021-04-30 | Stop reason: SDUPTHER

## 2021-04-30 ENCOUNTER — TELEPHONE (OUTPATIENT)
Dept: FAMILY MEDICINE CLINIC | Facility: CLINIC | Age: 81
End: 2021-04-30

## 2021-04-30 NOTE — TELEPHONE ENCOUNTER
Home Health called regarding patient's leg swelling and decrease in gabapentin dose.  Discussed in detail with Dr. Akbar, he advised to have patient increase lasix 40mg to BID dosing (one at 9am, second dose at 3pm), take throughout the weekend and to give patient update on Monday.  Also, keep gabapentin dose at new dosing schedule. Mayuri at [facility] health informed, voiced understanding

## 2021-05-02 RX ORDER — AMLODIPINE BESYLATE 5 MG/1
5 TABLET ORAL DAILY
Qty: 90 TABLET | Refills: 1 | Status: SHIPPED | OUTPATIENT
Start: 2021-05-02 | End: 2021-07-19

## 2021-05-03 ENCOUNTER — TRANSCRIBE ORDERS (OUTPATIENT)
Dept: ADMINISTRATIVE | Facility: HOSPITAL | Age: 81
End: 2021-05-03

## 2021-05-03 DIAGNOSIS — C51.0 MALIGNANT NEOPLASM OF LABIUM MAJUS (HCC): ICD-10-CM

## 2021-05-03 DIAGNOSIS — C51.9 CANCER OF VULVA (HCC): Primary | ICD-10-CM

## 2021-05-04 RX ORDER — CITALOPRAM 20 MG/1
TABLET ORAL
Qty: 90 TABLET | Refills: 1 | Status: SHIPPED | OUTPATIENT
Start: 2021-05-04 | End: 2021-09-09

## 2021-05-07 ENCOUNTER — LAB (OUTPATIENT)
Dept: LAB | Facility: HOSPITAL | Age: 81
End: 2021-05-07

## 2021-05-07 ENCOUNTER — INFUSION (OUTPATIENT)
Dept: ONCOLOGY | Facility: CLINIC | Age: 81
End: 2021-05-07

## 2021-05-07 DIAGNOSIS — D63.1 ANEMIA DUE TO STAGE 3B CHRONIC KIDNEY DISEASE (HCC): ICD-10-CM

## 2021-05-07 DIAGNOSIS — C51.9 VULVAR CANCER, CARCINOMA (HCC): ICD-10-CM

## 2021-05-07 DIAGNOSIS — C77.4 SECONDARY MALIGNANCY OF INGUINAL LYMPH NODES (HCC): Primary | ICD-10-CM

## 2021-05-07 DIAGNOSIS — N18.32 ANEMIA DUE TO STAGE 3B CHRONIC KIDNEY DISEASE (HCC): ICD-10-CM

## 2021-05-07 DIAGNOSIS — N18.31 STAGE 3A CHRONIC KIDNEY DISEASE (HCC): ICD-10-CM

## 2021-05-07 DIAGNOSIS — E78.2 MIXED HYPERLIPIDEMIA: ICD-10-CM

## 2021-05-07 DIAGNOSIS — Z45.2 ENCOUNTER FOR CARE RELATED TO PORT-A-CATH: ICD-10-CM

## 2021-05-07 DIAGNOSIS — E11.42 TYPE 2 DIABETES MELLITUS WITH DIABETIC POLYNEUROPATHY, WITHOUT LONG-TERM CURRENT USE OF INSULIN (HCC): ICD-10-CM

## 2021-05-07 DIAGNOSIS — N18.32 STAGE 3B CHRONIC KIDNEY DISEASE (HCC): ICD-10-CM

## 2021-05-07 DIAGNOSIS — G89.3 CANCER RELATED PAIN: ICD-10-CM

## 2021-05-07 LAB
BASOPHILS # BLD AUTO: 0.03 10*3/MM3 (ref 0–0.2)
BASOPHILS NFR BLD AUTO: 0.5 % (ref 0–1.5)
CHOLEST SERPL-MCNC: 151 MG/DL (ref 0–200)
DEPRECATED RDW RBC AUTO: 50 FL (ref 37–54)
EOSINOPHIL # BLD AUTO: 0.21 10*3/MM3 (ref 0–0.4)
EOSINOPHIL NFR BLD AUTO: 3.8 % (ref 0.3–6.2)
ERYTHROCYTE [DISTWIDTH] IN BLOOD BY AUTOMATED COUNT: 14.1 % (ref 12.3–15.4)
FERRITIN SERPL-MCNC: 245.9 NG/ML (ref 13–150)
HCT VFR BLD AUTO: 29.3 % (ref 34–46.6)
HDLC SERPL-MCNC: 42 MG/DL (ref 40–60)
HGB BLD-MCNC: 9.6 G/DL (ref 12–15.9)
IMM GRANULOCYTES # BLD AUTO: 0.02 10*3/MM3 (ref 0–0.05)
IMM GRANULOCYTES NFR BLD AUTO: 0.4 % (ref 0–0.5)
IRON 24H UR-MRATE: 50 MCG/DL (ref 37–145)
IRON SATN MFR SERPL: 15 % (ref 20–50)
LDLC SERPL CALC-MCNC: 78 MG/DL (ref 0–100)
LDLC/HDLC SERPL: 1.72 {RATIO}
LYMPHOCYTES # BLD AUTO: 0.96 10*3/MM3 (ref 0.7–3.1)
LYMPHOCYTES NFR BLD AUTO: 17.4 % (ref 19.6–45.3)
MCH RBC QN AUTO: 31.9 PG (ref 26.6–33)
MCHC RBC AUTO-ENTMCNC: 32.8 G/DL (ref 31.5–35.7)
MCV RBC AUTO: 97.3 FL (ref 79–97)
MONOCYTES # BLD AUTO: 0.49 10*3/MM3 (ref 0.1–0.9)
MONOCYTES NFR BLD AUTO: 8.9 % (ref 5–12)
NEUTROPHILS NFR BLD AUTO: 3.82 10*3/MM3 (ref 1.7–7)
NEUTROPHILS NFR BLD AUTO: 69 % (ref 42.7–76)
NRBC BLD AUTO-RTO: 0 /100 WBC (ref 0–0.2)
PLATELET # BLD AUTO: 184 10*3/MM3 (ref 140–450)
PMV BLD AUTO: 10 FL (ref 6–12)
RBC # BLD AUTO: 3.01 10*6/MM3 (ref 3.77–5.28)
TIBC SERPL-MCNC: 331 MCG/DL (ref 298–536)
TRANSFERRIN SERPL-MCNC: 222 MG/DL (ref 200–360)
TRIGL SERPL-MCNC: 183 MG/DL (ref 0–150)
VLDLC SERPL-MCNC: 31 MG/DL (ref 5–40)
WBC # BLD AUTO: 5.53 10*3/MM3 (ref 3.4–10.8)

## 2021-05-07 PROCEDURE — 84466 ASSAY OF TRANSFERRIN: CPT

## 2021-05-07 PROCEDURE — 85025 COMPLETE CBC W/AUTO DIFF WBC: CPT

## 2021-05-07 PROCEDURE — 36415 COLL VENOUS BLD VENIPUNCTURE: CPT

## 2021-05-07 PROCEDURE — 96523 IRRIG DRUG DELIVERY DEVICE: CPT | Performed by: INTERNAL MEDICINE

## 2021-05-07 PROCEDURE — 83540 ASSAY OF IRON: CPT

## 2021-05-07 PROCEDURE — 82728 ASSAY OF FERRITIN: CPT

## 2021-05-07 PROCEDURE — 80061 LIPID PANEL: CPT

## 2021-05-07 RX ORDER — HYDROCODONE BITARTRATE AND ACETAMINOPHEN 10; 325 MG/1; MG/1
1 TABLET ORAL EVERY 4 HOURS PRN
Qty: 60 TABLET | Refills: 0 | Status: SHIPPED | OUTPATIENT
Start: 2021-05-07 | End: 2021-09-17 | Stop reason: SDUPTHER

## 2021-05-07 RX ORDER — SODIUM CHLORIDE 0.9 % (FLUSH) 0.9 %
10 SYRINGE (ML) INJECTION AS NEEDED
Status: DISCONTINUED | OUTPATIENT
Start: 2021-05-07 | End: 2021-05-07 | Stop reason: HOSPADM

## 2021-05-07 RX ORDER — SODIUM CHLORIDE 0.9 % (FLUSH) 0.9 %
10 SYRINGE (ML) INJECTION AS NEEDED
Status: CANCELLED | OUTPATIENT
Start: 2021-05-07

## 2021-05-07 RX ORDER — HEPARIN SODIUM (PORCINE) LOCK FLUSH IV SOLN 100 UNIT/ML 100 UNIT/ML
500 SOLUTION INTRAVENOUS AS NEEDED
Status: DISCONTINUED | OUTPATIENT
Start: 2021-05-07 | End: 2021-05-07 | Stop reason: HOSPADM

## 2021-05-07 RX ORDER — HEPARIN SODIUM (PORCINE) LOCK FLUSH IV SOLN 100 UNIT/ML 100 UNIT/ML
500 SOLUTION INTRAVENOUS AS NEEDED
Status: CANCELLED | OUTPATIENT
Start: 2021-05-07

## 2021-05-07 RX ADMIN — HEPARIN SODIUM (PORCINE) LOCK FLUSH IV SOLN 100 UNIT/ML 500 UNITS: 100 SOLUTION at 11:37

## 2021-05-07 RX ADMIN — Medication 10 ML: at 11:35

## 2021-05-11 ENCOUNTER — TELEPHONE (OUTPATIENT)
Dept: ONCOLOGY | Facility: CLINIC | Age: 81
End: 2021-05-11

## 2021-05-11 DIAGNOSIS — D50.9 IRON DEFICIENCY ANEMIA, UNSPECIFIED IRON DEFICIENCY ANEMIA TYPE: Primary | ICD-10-CM

## 2021-05-11 RX ORDER — DIPHENHYDRAMINE HYDROCHLORIDE 50 MG/ML
25 INJECTION INTRAMUSCULAR; INTRAVENOUS ONCE
Status: CANCELLED | OUTPATIENT
Start: 2021-05-18

## 2021-05-11 RX ORDER — FAMOTIDINE 10 MG/ML
20 INJECTION, SOLUTION INTRAVENOUS AS NEEDED
Status: CANCELLED | OUTPATIENT
Start: 2021-05-18

## 2021-05-11 RX ORDER — ACETAMINOPHEN 325 MG/1
650 TABLET ORAL ONCE
Status: CANCELLED | OUTPATIENT
Start: 2021-05-18

## 2021-05-11 RX ORDER — SODIUM CHLORIDE 9 MG/ML
250 INJECTION, SOLUTION INTRAVENOUS ONCE
Status: CANCELLED | OUTPATIENT
Start: 2021-05-18

## 2021-05-11 RX ORDER — DIPHENHYDRAMINE HYDROCHLORIDE 50 MG/ML
50 INJECTION INTRAMUSCULAR; INTRAVENOUS AS NEEDED
Status: CANCELLED | OUTPATIENT
Start: 2021-05-18

## 2021-05-11 NOTE — TELEPHONE ENCOUNTER
----- Message from Drake Stafford MD sent at 5/7/2021 12:20 PM CDT -----  Ferrous sulfate 325 mg p.o. daily #60 with 2 refills.  Stop and call if intolerant.

## 2021-05-11 NOTE — TELEPHONE ENCOUNTER
Called patient and reviewed lab results.  Iron saturation 15%.  Instructed that Dr Stafford wants her to take some oral iron-patient states that she tried it in the past and it did not work, her iron level never went up.  Talked to Dr Stafford and he wants her to have one dose of Injectafer.  This has been set up for 05/18 at 1300.  Patient v/u and agreeable to plan.

## 2021-05-12 ENCOUNTER — TELEPHONE (OUTPATIENT)
Dept: FAMILY MEDICINE CLINIC | Facility: CLINIC | Age: 81
End: 2021-05-12

## 2021-05-13 ENCOUNTER — TRANSCRIBE ORDERS (OUTPATIENT)
Dept: ADMINISTRATIVE | Facility: HOSPITAL | Age: 81
End: 2021-05-13

## 2021-05-13 DIAGNOSIS — C51.8 MALIGNANT NEOPLASM OF OVERLAPPING SITES OF VULVA (HCC): Primary | ICD-10-CM

## 2021-05-14 ENCOUNTER — APPOINTMENT (OUTPATIENT)
Dept: CT IMAGING | Facility: HOSPITAL | Age: 81
End: 2021-05-14

## 2021-05-14 ENCOUNTER — APPOINTMENT (OUTPATIENT)
Dept: MRI IMAGING | Facility: HOSPITAL | Age: 81
End: 2021-05-14

## 2021-05-14 ENCOUNTER — HOSPITAL ENCOUNTER (OUTPATIENT)
Dept: CT IMAGING | Facility: HOSPITAL | Age: 81
End: 2021-05-14

## 2021-05-18 ENCOUNTER — INFUSION (OUTPATIENT)
Dept: ONCOLOGY | Facility: HOSPITAL | Age: 81
End: 2021-05-18

## 2021-05-18 VITALS
RESPIRATION RATE: 16 BRPM | HEIGHT: 57 IN | DIASTOLIC BLOOD PRESSURE: 42 MMHG | TEMPERATURE: 97.8 F | HEART RATE: 59 BPM | SYSTOLIC BLOOD PRESSURE: 128 MMHG | BODY MASS INDEX: 35.17 KG/M2 | OXYGEN SATURATION: 100 % | WEIGHT: 163 LBS

## 2021-05-18 DIAGNOSIS — C77.4 SECONDARY MALIGNANCY OF INGUINAL LYMPH NODES (HCC): ICD-10-CM

## 2021-05-18 DIAGNOSIS — D50.9 IRON DEFICIENCY ANEMIA, UNSPECIFIED IRON DEFICIENCY ANEMIA TYPE: Primary | ICD-10-CM

## 2021-05-18 DIAGNOSIS — C51.9 VULVAR CANCER, CARCINOMA (HCC): ICD-10-CM

## 2021-05-18 PROCEDURE — 25010000002 HEPARIN LOCK FLUSH PER 10 UNITS: Performed by: INTERNAL MEDICINE

## 2021-05-18 PROCEDURE — 96365 THER/PROPH/DIAG IV INF INIT: CPT

## 2021-05-18 PROCEDURE — 96367 TX/PROPH/DG ADDL SEQ IV INF: CPT

## 2021-05-18 PROCEDURE — 25010000002 FERRIC CARBOXYMALTOSE 750 MG/15ML SOLUTION 15 ML VIAL: Performed by: INTERNAL MEDICINE

## 2021-05-18 RX ORDER — ACETAMINOPHEN 325 MG/1
650 TABLET ORAL ONCE
Status: DISCONTINUED | OUTPATIENT
Start: 2021-05-18 | End: 2021-05-18 | Stop reason: HOSPADM

## 2021-05-18 RX ORDER — DIPHENHYDRAMINE HYDROCHLORIDE 50 MG/ML
25 INJECTION INTRAMUSCULAR; INTRAVENOUS ONCE
Status: DISCONTINUED | OUTPATIENT
Start: 2021-05-18 | End: 2021-05-18 | Stop reason: HOSPADM

## 2021-05-18 RX ORDER — FAMOTIDINE 10 MG/ML
20 INJECTION, SOLUTION INTRAVENOUS AS NEEDED
Status: DISCONTINUED | OUTPATIENT
Start: 2021-05-18 | End: 2021-05-18 | Stop reason: HOSPADM

## 2021-05-18 RX ORDER — DIPHENHYDRAMINE HYDROCHLORIDE 50 MG/ML
50 INJECTION INTRAMUSCULAR; INTRAVENOUS AS NEEDED
Status: DISCONTINUED | OUTPATIENT
Start: 2021-05-18 | End: 2021-05-18 | Stop reason: HOSPADM

## 2021-05-18 RX ORDER — HEPARIN SODIUM (PORCINE) LOCK FLUSH IV SOLN 100 UNIT/ML 100 UNIT/ML
500 SOLUTION INTRAVENOUS AS NEEDED
Status: CANCELLED | OUTPATIENT
Start: 2021-05-18

## 2021-05-18 RX ORDER — SODIUM CHLORIDE 9 MG/ML
250 INJECTION, SOLUTION INTRAVENOUS ONCE
Status: COMPLETED | OUTPATIENT
Start: 2021-05-18 | End: 2021-05-18

## 2021-05-18 RX ORDER — SODIUM CHLORIDE 0.9 % (FLUSH) 0.9 %
10 SYRINGE (ML) INJECTION AS NEEDED
Status: CANCELLED | OUTPATIENT
Start: 2021-05-18

## 2021-05-18 RX ORDER — HEPARIN SODIUM (PORCINE) LOCK FLUSH IV SOLN 100 UNIT/ML 100 UNIT/ML
500 SOLUTION INTRAVENOUS AS NEEDED
Status: DISCONTINUED | OUTPATIENT
Start: 2021-05-18 | End: 2021-05-18 | Stop reason: HOSPADM

## 2021-05-18 RX ORDER — SODIUM CHLORIDE 0.9 % (FLUSH) 0.9 %
10 SYRINGE (ML) INJECTION AS NEEDED
Status: DISCONTINUED | OUTPATIENT
Start: 2021-05-18 | End: 2021-05-18 | Stop reason: HOSPADM

## 2021-05-18 RX ADMIN — SODIUM CHLORIDE 250 ML: 9 INJECTION, SOLUTION INTRAVENOUS at 13:47

## 2021-05-18 RX ADMIN — FERRIC CARBOXYMALTOSE INJECTION 750 MG: 50 INJECTION, SOLUTION INTRAVENOUS at 13:55

## 2021-05-18 RX ADMIN — Medication 500 UNITS: at 14:26

## 2021-05-18 RX ADMIN — SODIUM CHLORIDE, PRESERVATIVE FREE 10 ML: 5 INJECTION INTRAVENOUS at 14:25

## 2021-05-19 ENCOUNTER — PROCEDURE VISIT (OUTPATIENT)
Dept: UROLOGY | Facility: CLINIC | Age: 81
End: 2021-05-19

## 2021-05-19 DIAGNOSIS — N99.12 POSTPROCEDURAL FEMALE URETHRAL STRICTURE: Primary | ICD-10-CM

## 2021-05-19 PROCEDURE — 81003 URINALYSIS AUTO W/O SCOPE: CPT | Performed by: UROLOGY

## 2021-05-19 PROCEDURE — 52285 CYSTOSCOPY AND TREATMENT: CPT | Performed by: UROLOGY

## 2021-05-19 NOTE — PROGRESS NOTES
Pre- operative diagnosis:  Urethral Stricture     Post operative diagnosis:  Same     Procedure:  The patient was prepped and draped in a normal sterile fashion.  The urethra was anesthetized with 2% lidocaine jelly.  The patient was sequentially dilated from 10 up to 12 Polish using female urethralsequentially dilated from 10 up to 12 Polish using female urethral  sounds.  I could not pass 14 Polish sound with significant force.     Patient tolerated the procedure well     Complications: none     Blood loss: minimal     Follow up:    Follow-up with [facility] [employer] [facility].  At this point I believe she is passed what we are capable of taking care of.

## 2021-05-20 RX ORDER — ESOMEPRAZOLE MAGNESIUM 40 MG/1
40 CAPSULE, DELAYED RELEASE ORAL 2 TIMES DAILY
Qty: 180 CAPSULE | Refills: 3 | Status: SHIPPED | OUTPATIENT
Start: 2021-05-20 | End: 2022-06-08 | Stop reason: SDUPTHER

## 2021-05-24 DIAGNOSIS — K52.1 DIARRHEA DUE TO DRUG: ICD-10-CM

## 2021-05-24 DIAGNOSIS — C51.9 VULVAR CANCER, CARCINOMA (HCC): ICD-10-CM

## 2021-05-25 RX ORDER — DIPHENOXYLATE HYDROCHLORIDE AND ATROPINE SULFATE 2.5; .025 MG/1; MG/1
TABLET ORAL
Qty: 90 TABLET | Refills: 0 | Status: SHIPPED | OUTPATIENT
Start: 2021-05-25 | End: 2021-09-14 | Stop reason: SDUPTHER

## 2021-06-15 DIAGNOSIS — I10 ESSENTIAL HYPERTENSION: Primary | ICD-10-CM

## 2021-06-15 RX ORDER — SPIRONOLACTONE 25 MG/1
TABLET ORAL
COMMUNITY
Start: 2021-03-20 | End: 2021-06-15 | Stop reason: SDUPTHER

## 2021-06-15 RX ORDER — DIAZEPAM 10 MG/1
TABLET ORAL
Status: ON HOLD | COMMUNITY
Start: 2021-05-13 | End: 2021-11-05

## 2021-06-15 RX ORDER — METOLAZONE 2.5 MG/1
TABLET ORAL
COMMUNITY
Start: 2021-05-24

## 2021-06-15 NOTE — TELEPHONE ENCOUNTER
Caller: Dago Nunez    Relationship: Self    Best call back number: [phone] (H)    Medication needed:   bisoprolol-hydrochlorothiazide (ZIAC) 5-6.25 MG per tablet    SPIRNALACTONE     When do you need the refill by: AS SOON AS POSSIBLE WILL NEED THEM BY Friday 06/18/21  What additional details did the patient provide when requesting the medication: STATES SHE HAS 4 PILLS LEFT AND WILL NEED THEM SOON     Does the patient have less than a 3 day supply:  [] Yes  [x] No    What is the patient's preferred pharmacy:    [facility] - [place], [place] - [address] AT Portal to [facility] - [phone] [facility] [phone]   [phone]

## 2021-06-17 RX ORDER — SPIRONOLACTONE 25 MG/1
25 TABLET ORAL DAILY
Qty: 90 TABLET | Refills: 1 | Status: ON HOLD | OUTPATIENT
Start: 2021-06-17 | End: 2021-11-05

## 2021-06-17 RX ORDER — BISOPROLOL FUMARATE AND HYDROCHLOROTHIAZIDE 5; 6.25 MG/1; MG/1
1 TABLET ORAL DAILY
Qty: 90 TABLET | Refills: 1 | Status: SHIPPED | OUTPATIENT
Start: 2021-06-17 | End: 2021-12-06

## 2021-06-21 ENCOUNTER — TELEPHONE (OUTPATIENT)
Dept: ONCOLOGY | Facility: CLINIC | Age: 81
End: 2021-06-21

## 2021-06-21 NOTE — TELEPHONE ENCOUNTER
Caller: Dago Nunez    Relationship to patient: Self    Best call back number: [phone]    Type of visit: LAB AND FOLLOW UP    Requested date: ASAP    If rescheduling, when is the original appointment: 07/13 AND 07/14    Additional notes: DAGO SAYS SHE IS FEELING WEAK AND HAS FALLEN A COUPLE OF TIMES. SHE IS CONCERNED ABOUT HER BLOOD LEVELS. SHE WOULD LIKE TO GET IN SOONER THAN HER ORIGINAL APPT. PLEASE CALL BACK TO ADVISE.    SHE ALSO SAYS SHE NEVER RECEIVED THE RESULTS OF HER PET SCAN FROM [facility]. SHE SAYS SHE'S CALLED MULTIPLE TIMES WITH NO ANSWER.

## 2021-06-24 ENCOUNTER — LAB (OUTPATIENT)
Dept: ONCOLOGY | Facility: CLINIC | Age: 81
End: 2021-06-24

## 2021-06-24 DIAGNOSIS — N18.32 ANEMIA DUE TO STAGE 3B CHRONIC KIDNEY DISEASE (HCC): ICD-10-CM

## 2021-06-24 DIAGNOSIS — D63.1 ANEMIA DUE TO STAGE 3B CHRONIC KIDNEY DISEASE (HCC): ICD-10-CM

## 2021-06-24 DIAGNOSIS — N18.32 STAGE 3B CHRONIC KIDNEY DISEASE (HCC): ICD-10-CM

## 2021-06-24 LAB
BASOPHILS # BLD AUTO: 0.03 10*3/MM3 (ref 0–0.2)
BASOPHILS NFR BLD AUTO: 0.5 % (ref 0–1.5)
DEPRECATED RDW RBC AUTO: 52 FL (ref 37–54)
EOSINOPHIL # BLD AUTO: 0.14 10*3/MM3 (ref 0–0.4)
EOSINOPHIL NFR BLD AUTO: 2.2 % (ref 0.3–6.2)
ERYTHROCYTE [DISTWIDTH] IN BLOOD BY AUTOMATED COUNT: 13.8 % (ref 12.3–15.4)
FERRITIN SERPL-MCNC: 712.2 NG/ML (ref 13–150)
HCT VFR BLD AUTO: 34.3 % (ref 34–46.6)
HGB BLD-MCNC: 11.3 G/DL (ref 12–15.9)
IMM GRANULOCYTES # BLD AUTO: 0.01 10*3/MM3 (ref 0–0.05)
IMM GRANULOCYTES NFR BLD AUTO: 0.2 % (ref 0–0.5)
IRON 24H UR-MRATE: 74 MCG/DL (ref 37–145)
IRON SATN MFR SERPL: 23 % (ref 20–50)
LYMPHOCYTES # BLD AUTO: 0.92 10*3/MM3 (ref 0.7–3.1)
LYMPHOCYTES NFR BLD AUTO: 14.6 % (ref 19.6–45.3)
MCH RBC QN AUTO: 33.1 PG (ref 26.6–33)
MCHC RBC AUTO-ENTMCNC: 32.9 G/DL (ref 31.5–35.7)
MCV RBC AUTO: 100.6 FL (ref 79–97)
MONOCYTES # BLD AUTO: 0.58 10*3/MM3 (ref 0.1–0.9)
MONOCYTES NFR BLD AUTO: 9.2 % (ref 5–12)
NEUTROPHILS NFR BLD AUTO: 4.6 10*3/MM3 (ref 1.7–7)
NEUTROPHILS NFR BLD AUTO: 73.3 % (ref 42.7–76)
PLATELET # BLD AUTO: 203 10*3/MM3 (ref 140–450)
PMV BLD AUTO: 9.3 FL (ref 6–12)
RBC # BLD AUTO: 3.41 10*6/MM3 (ref 3.77–5.28)
TIBC SERPL-MCNC: 320 MCG/DL (ref 298–536)
TRANSFERRIN SERPL-MCNC: 215 MG/DL (ref 200–360)
WBC # BLD AUTO: 6.28 10*3/MM3 (ref 3.4–10.8)

## 2021-06-24 PROCEDURE — 82728 ASSAY OF FERRITIN: CPT | Performed by: INTERNAL MEDICINE

## 2021-06-24 PROCEDURE — 83540 ASSAY OF IRON: CPT | Performed by: INTERNAL MEDICINE

## 2021-06-24 PROCEDURE — 85025 COMPLETE CBC W/AUTO DIFF WBC: CPT | Performed by: INTERNAL MEDICINE

## 2021-06-24 PROCEDURE — 84466 ASSAY OF TRANSFERRIN: CPT | Performed by: INTERNAL MEDICINE

## 2021-06-26 DIAGNOSIS — E11.42 TYPE 2 DIABETES MELLITUS WITH DIABETIC POLYNEUROPATHY, WITHOUT LONG-TERM CURRENT USE OF INSULIN (HCC): ICD-10-CM

## 2021-06-28 RX ORDER — PRAVASTATIN SODIUM 40 MG
TABLET ORAL
Qty: 90 TABLET | Refills: 0 | Status: SHIPPED | OUTPATIENT
Start: 2021-06-28 | End: 2021-08-30

## 2021-06-28 RX ORDER — GABAPENTIN 300 MG/1
CAPSULE ORAL
Qty: 180 CAPSULE | Refills: 2 | Status: SHIPPED | OUTPATIENT
Start: 2021-06-28 | End: 2021-09-09

## 2021-06-30 ENCOUNTER — TELEPHONE (OUTPATIENT)
Dept: CARDIOLOGY CLINIC | Age: 81
End: 2021-06-30

## 2021-06-30 NOTE — TELEPHONE ENCOUNTER
Provider is calling   for status of referral  Please return call to update Provider on status. The best time to call is  Anytime,Dr Hernandez Passer office called they faxed referral a few times ,please call Patient. Thank you!

## 2021-07-01 ENCOUNTER — OFFICE VISIT (OUTPATIENT)
Dept: ONCOLOGY | Facility: CLINIC | Age: 81
End: 2021-07-01

## 2021-07-01 ENCOUNTER — LAB (OUTPATIENT)
Dept: LAB | Facility: HOSPITAL | Age: 81
End: 2021-07-01

## 2021-07-01 VITALS
SYSTOLIC BLOOD PRESSURE: 138 MMHG | HEART RATE: 56 BPM | HEIGHT: 57 IN | BODY MASS INDEX: 34.15 KG/M2 | WEIGHT: 158.3 LBS | TEMPERATURE: 98.4 F | DIASTOLIC BLOOD PRESSURE: 68 MMHG | RESPIRATION RATE: 18 BRPM | OXYGEN SATURATION: 98 %

## 2021-07-01 DIAGNOSIS — D63.1 ANEMIA DUE TO STAGE 3A CHRONIC KIDNEY DISEASE (HCC): Primary | ICD-10-CM

## 2021-07-01 DIAGNOSIS — D63.1 ANEMIA DUE TO STAGE 3A CHRONIC KIDNEY DISEASE (HCC): ICD-10-CM

## 2021-07-01 DIAGNOSIS — N18.31 ANEMIA DUE TO STAGE 3A CHRONIC KIDNEY DISEASE (HCC): Primary | ICD-10-CM

## 2021-07-01 DIAGNOSIS — C77.4 SECONDARY MALIGNANCY OF INGUINAL LYMPH NODES (HCC): ICD-10-CM

## 2021-07-01 DIAGNOSIS — N18.31 ANEMIA DUE TO STAGE 3A CHRONIC KIDNEY DISEASE (HCC): ICD-10-CM

## 2021-07-01 DIAGNOSIS — C51.9 VULVAR CANCER, CARCINOMA (HCC): ICD-10-CM

## 2021-07-01 LAB
ALBUMIN SERPL-MCNC: 4.1 G/DL (ref 3.5–5.2)
ALBUMIN/GLOB SERPL: 1 G/DL
ALP SERPL-CCNC: 75 U/L (ref 39–117)
ALT SERPL W P-5'-P-CCNC: 9 U/L (ref 1–33)
ANION GAP SERPL CALCULATED.3IONS-SCNC: 8 MMOL/L (ref 5–15)
AST SERPL-CCNC: 16 U/L (ref 1–32)
BILIRUB SERPL-MCNC: 0.4 MG/DL (ref 0–1.2)
BUN SERPL-MCNC: 26 MG/DL (ref 8–23)
BUN/CREAT SERPL: 25 (ref 7–25)
CALCIUM SPEC-SCNC: 9.8 MG/DL (ref 8.6–10.5)
CHLORIDE SERPL-SCNC: 95 MMOL/L (ref 98–107)
CO2 SERPL-SCNC: 31 MMOL/L (ref 22–29)
CREAT SERPL-MCNC: 1.04 MG/DL (ref 0.57–1)
GFR SERPL CREATININE-BSD FRML MDRD: 51 ML/MIN/1.73
GLOBULIN UR ELPH-MCNC: 4 GM/DL
GLUCOSE SERPL-MCNC: 110 MG/DL (ref 65–99)
POTASSIUM SERPL-SCNC: 4.1 MMOL/L (ref 3.5–5.2)
PROT SERPL-MCNC: 8.1 G/DL (ref 6–8.5)
SODIUM SERPL-SCNC: 134 MMOL/L (ref 136–145)

## 2021-07-01 PROCEDURE — 80053 COMPREHEN METABOLIC PANEL: CPT

## 2021-07-01 PROCEDURE — 99214 OFFICE O/P EST MOD 30 MIN: CPT | Performed by: INTERNAL MEDICINE

## 2021-07-01 PROCEDURE — 82607 VITAMIN B-12: CPT

## 2021-07-01 PROCEDURE — 36415 COLL VENOUS BLD VENIPUNCTURE: CPT

## 2021-07-01 RX ORDER — HEPARIN SODIUM (PORCINE) LOCK FLUSH IV SOLN 100 UNIT/ML 100 UNIT/ML
500 SOLUTION INTRAVENOUS AS NEEDED
Status: CANCELLED | OUTPATIENT
Start: 2021-07-01

## 2021-07-01 RX ORDER — HEPARIN SODIUM (PORCINE) LOCK FLUSH IV SOLN 100 UNIT/ML 100 UNIT/ML
500 SOLUTION INTRAVENOUS AS NEEDED
Status: SHIPPED | OUTPATIENT
Start: 2021-07-01

## 2021-07-01 RX ORDER — SODIUM CHLORIDE 0.9 % (FLUSH) 0.9 %
10 SYRINGE (ML) INJECTION AS NEEDED
Status: SHIPPED | OUTPATIENT
Start: 2021-07-01

## 2021-07-01 RX ORDER — SODIUM CHLORIDE 0.9 % (FLUSH) 0.9 %
10 SYRINGE (ML) INJECTION AS NEEDED
Status: CANCELLED | OUTPATIENT
Start: 2021-07-01

## 2021-07-01 RX ADMIN — Medication 10 ML: at 13:54

## 2021-07-01 RX ADMIN — HEPARIN SODIUM (PORCINE) LOCK FLUSH IV SOLN 100 UNIT/ML 500 UNITS: 100 SOLUTION at 13:54

## 2021-07-02 ENCOUNTER — TELEPHONE (OUTPATIENT)
Dept: FAMILY MEDICINE CLINIC | Facility: CLINIC | Age: 81
End: 2021-07-02

## 2021-07-02 LAB — VIT B12 BLD-MCNC: <150 PG/ML (ref 211–946)

## 2021-07-02 RX ORDER — CYANOCOBALAMIN 1000 UG/ML
INJECTION, SOLUTION INTRAMUSCULAR; SUBCUTANEOUS
Qty: 9 ML | Refills: 0 | Status: SHIPPED | OUTPATIENT
Start: 2021-07-02 | End: 2021-08-05 | Stop reason: SDUPTHER

## 2021-07-02 RX ORDER — SYRINGE W-NEEDLE,DISPOSAB,3 ML 25GX5/8"
1 SYRINGE, EMPTY DISPOSABLE MISCELLANEOUS DAILY
Qty: 25 EACH | Refills: 1 | Status: SHIPPED | OUTPATIENT
Start: 2021-07-02 | End: 2021-08-05 | Stop reason: SDUPTHER

## 2021-07-02 NOTE — TELEPHONE ENCOUNTER
----- Message from Drake Stafford MD sent at 7/2/2021  5:36 AM CDT -----  B12 shot 1,000 mcg IM daily times 5, then weekly times 4, then monthly theraefter.

## 2021-07-02 NOTE — TELEPHONE ENCOUNTER
Returned call to [email or web address], spoke with Smiley. She said that Dr. Stafford's office would like for patient to have Vit B-12 injections daily for 5 days, then weekly for one month, then monthly, due to low b-12 level.  Requesting order from PCP.

## 2021-07-02 NOTE — TELEPHONE ENCOUNTER
Caller: [facility]     Relationship to patient:     Best call back number: [phone]    Patient is needing: They would like to add nursing back into her care. Please advise

## 2021-07-02 NOTE — TELEPHONE ENCOUNTER
Notified Dago that her B12 level is low and that Dr. Stafford wants her to get B12 injections daily for 5 days, then once a week for 4 weeks, then monthly.  Dago stated that she has [facility].  Spoke with Hortencia at [facility] and she stated that they can do the B12 injections for Dago.  Notified Jameelal that home health can do her injections but will probably not start until next week.

## 2021-07-02 NOTE — TELEPHONE ENCOUNTER
[facility] called to report patient had a fall on 6/29 seems to be okay but a little sore, and renal dr referred patient to cardiologist for shortness of breath. Patient has an appointment with cardio on 7/08.

## 2021-07-08 ENCOUNTER — OFFICE VISIT (OUTPATIENT)
Dept: CARDIOLOGY CLINIC | Age: 81
End: 2021-07-08
Payer: MEDICARE

## 2021-07-08 VITALS
HEART RATE: 53 BPM | HEIGHT: 60 IN | SYSTOLIC BLOOD PRESSURE: 124 MMHG | WEIGHT: 159 LBS | BODY MASS INDEX: 31.22 KG/M2 | DIASTOLIC BLOOD PRESSURE: 68 MMHG

## 2021-07-08 DIAGNOSIS — R06.02 SHORTNESS OF BREATH: ICD-10-CM

## 2021-07-08 DIAGNOSIS — N18.9 CHRONIC KIDNEY DISEASE, UNSPECIFIED CKD STAGE: ICD-10-CM

## 2021-07-08 DIAGNOSIS — I10 ESSENTIAL HYPERTENSION: ICD-10-CM

## 2021-07-08 DIAGNOSIS — E78.2 MIXED HYPERLIPIDEMIA: ICD-10-CM

## 2021-07-08 DIAGNOSIS — Z82.49 FAMILY HISTORY OF HEART DISEASE: ICD-10-CM

## 2021-07-08 DIAGNOSIS — R60.0 EDEMA OF BOTH LOWER EXTREMITIES: Primary | ICD-10-CM

## 2021-07-08 PROCEDURE — G8427 DOCREV CUR MEDS BY ELIG CLIN: HCPCS | Performed by: CLINICAL NURSE SPECIALIST

## 2021-07-08 PROCEDURE — 1090F PRES/ABSN URINE INCON ASSESS: CPT | Performed by: CLINICAL NURSE SPECIALIST

## 2021-07-08 PROCEDURE — 1123F ACP DISCUSS/DSCN MKR DOCD: CPT | Performed by: CLINICAL NURSE SPECIALIST

## 2021-07-08 PROCEDURE — 4040F PNEUMOC VAC/ADMIN/RCVD: CPT | Performed by: CLINICAL NURSE SPECIALIST

## 2021-07-08 PROCEDURE — 1036F TOBACCO NON-USER: CPT | Performed by: CLINICAL NURSE SPECIALIST

## 2021-07-08 PROCEDURE — G8400 PT W/DXA NO RESULTS DOC: HCPCS | Performed by: CLINICAL NURSE SPECIALIST

## 2021-07-08 PROCEDURE — G8417 CALC BMI ABV UP PARAM F/U: HCPCS | Performed by: CLINICAL NURSE SPECIALIST

## 2021-07-08 PROCEDURE — 93000 ELECTROCARDIOGRAM COMPLETE: CPT | Performed by: CLINICAL NURSE SPECIALIST

## 2021-07-08 PROCEDURE — 99204 OFFICE O/P NEW MOD 45 MIN: CPT | Performed by: CLINICAL NURSE SPECIALIST

## 2021-07-08 RX ORDER — CYANOCOBALAMIN 1000 UG/ML
INJECTION INTRAMUSCULAR; SUBCUTANEOUS
COMMUNITY
Start: 2021-07-02

## 2021-07-08 RX ORDER — CITALOPRAM 20 MG/1
TABLET ORAL
COMMUNITY
Start: 2021-05-03

## 2021-07-08 RX ORDER — AMLODIPINE BESYLATE 5 MG/1
TABLET ORAL
COMMUNITY
Start: 2021-04-27 | End: 2021-08-10

## 2021-07-08 RX ORDER — DIPHENOXYLATE HYDROCHLORIDE AND ATROPINE SULFATE 2.5; .025 MG/1; MG/1
TABLET ORAL
COMMUNITY
Start: 2021-05-25

## 2021-07-08 RX ORDER — PRAVASTATIN SODIUM 40 MG
40 TABLET ORAL DAILY
COMMUNITY

## 2021-07-08 RX ORDER — METOLAZONE 2.5 MG/1
TABLET ORAL
COMMUNITY
Start: 2021-07-01 | End: 2022-05-04 | Stop reason: SDUPTHER

## 2021-07-08 RX ORDER — CETIRIZINE HYDROCHLORIDE 10 MG/1
10 TABLET ORAL DAILY
COMMUNITY

## 2021-07-08 NOTE — PROGRESS NOTES
Take 1 tablet by mouth daily       HYDROcodone-acetaminophen (NORCO)  MG per tablet Take 1 tablet by mouth every 6 hours as needed. No current facility-administered medications for this visit. Allergies: Alatrofloxacin, Gatifloxacin, Scopolamine, Amoxicillin-pot clavulanate, Cephalexin, and Sulfamethoxazole-trimethoprim    Review of Systems  Review of Systems   Constitutional: Positive for fatigue. Negative for activity change, diaphoresis, fever and unexpected weight change. HENT: Negative for facial swelling and nosebleeds. Eyes: Negative for redness and visual disturbance. Respiratory: Positive for shortness of breath. Negative for cough, chest tightness and wheezing. Cardiovascular: Positive for leg swelling. Negative for chest pain and palpitations. Gastrointestinal: Negative for abdominal pain, nausea and vomiting. Endocrine: Negative for cold intolerance and heat intolerance. Genitourinary: Negative for dysuria and hematuria. Musculoskeletal: Negative for arthralgias and myalgias. Skin: Negative for pallor and rash. Neurological: Positive for weakness. Negative for dizziness, seizures, syncope and light-headedness. Hematological: Does not bruise/bleed easily. Psychiatric/Behavioral: Negative for agitation. The patient is not nervous/anxious. Objective  Vital Signs - /68   Pulse 53   Ht 5' (1.524 m)   Wt 159 lb (72.1 kg)   BMI 31.05 kg/m²   Physical Exam  Vitals and nursing note reviewed. Constitutional:       General: She is not in acute distress. Appearance: She is well-developed. She is not diaphoretic. Comments: Deconditioned   HENT:      Head: Normocephalic and atraumatic. Right Ear: Hearing and external ear normal.      Left Ear: Hearing and external ear normal.      Nose: Nose normal.   Eyes:      General:         Right eye: No discharge. Left eye: No discharge.       Pupils: Pupils are equal, round, and reactive to light.   Neck:      Thyroid: No thyromegaly. Vascular: No carotid bruit or JVD. Trachea: No tracheal deviation. Cardiovascular:      Rate and Rhythm: Normal rate and regular rhythm. Heart sounds: Normal heart sounds. No murmur heard. No friction rub. No gallop. Pulmonary:      Effort: Pulmonary effort is normal. No respiratory distress. Breath sounds: Normal breath sounds. No wheezing or rales. Abdominal:      Palpations: Abdomen is soft. Tenderness: There is no abdominal tenderness. Musculoskeletal:         General: No swelling or deformity. Cervical back: Neck supple. No muscular tenderness. Right lower leg: Edema (2+) present. Left lower leg: Edema (2+) present. Skin:     General: Skin is warm and dry. Findings: No rash. Neurological:      General: No focal deficit present. Mental Status: She is alert and oriented to person, place, and time. Cranial Nerves: No cranial nerve deficit. Psychiatric:         Mood and Affect: Mood normal.         Behavior: Behavior normal.         Judgment: Judgment normal.         Data:    EKG shows sinus bradycardia rate 53    Assessment:     Diagnosis Orders   1. Edema of both lower extremities  ECHO Complete 2D W Doppler W Color   2. Shortness of breath  ECHO Complete 2D W Doppler W Color    ECHO Pharmacological Stress Test   3. Chronic kidney disease, unspecified CKD stage     4. Essential hypertension     5. Mixed hyperlipidemia     6. Family history of heart disease         Patient with edema of the lower extremities, dyspnea on HCTZ, Lasix, metolazone and persistent edema. Patient has multiple cardiac risk factors including hypertension, hyperlipidemia and family history of heart disease. Plan will be to check a 2D echocardiogram and a dobutamine stress echo to rule out myocardial ischemia.   Encourage low-sodium diet, elevation of legs when sitting and compression stockings    CKDfollows with nephrology and could be a component of her edema    Hypertensionstable on current regimen    Hyperlipidemiaon statin therapy with atorvastatin  Stable cardiovascular status. No evidence of overt heart failure,angina or dysrhythmia. Plan    Orders Placed This Encounter   Procedures    EKG 12 lead     Order Specific Question:   Reason for Exam?     Answer: Other    ECHO Complete 2D W Doppler W Color     Standing Status:   Future     Standing Expiration Date:   7/8/2022     Order Specific Question:   Reason for exam:     Answer:   dyspnea, edema    ECHO Pharmacological Stress Test     Standing Status:   Future     Standing Expiration Date:   7/8/2022     Order Specific Question:   Reason for exam:     Answer:   shortness of breath     Return in about 1 month (around 8/8/2021) for APRVAMSHI. Echocardiogram  DSE  Low sodium diet, elevate legs,compression stockings      Call with any questionsor concerns  Follow up with Rayray See DO,  for non cardiac problems  Report any new problems  Cardiovascular Fitness-Exercise as tolerated. Strive for 15 minutes of exercise most days of the week. Cardiac / HealthyDiet  Continue current medications as directed  Continue plan of treatment  It is always recommended that you bring your medicationsbottles with you to each visit - this is for your safety!        JOSE Batres

## 2021-07-08 NOTE — PATIENT INSTRUCTIONS
chocolate, cold medications, etc.  Any product that might contain caffeine. Do not eat or drink anything, except water, eight (8) hours before the test.   No alcohol or nicotine twelve (12) hours prior to your test.   Wear comfortable clothing. Hold Bisoprolol/ HCTZ (Ziac) morning of test     If you need to change this appointment, please call outpatient scheduling at [phone].

## 2021-07-09 DIAGNOSIS — M25.562 CHRONIC PAIN OF BOTH KNEES: ICD-10-CM

## 2021-07-09 DIAGNOSIS — N18.31 STAGE 3A CHRONIC KIDNEY DISEASE (HCC): ICD-10-CM

## 2021-07-09 DIAGNOSIS — G89.3 CANCER RELATED PAIN: ICD-10-CM

## 2021-07-09 DIAGNOSIS — G89.29 CHRONIC PAIN OF BOTH KNEES: ICD-10-CM

## 2021-07-09 DIAGNOSIS — M25.551 BILATERAL HIP PAIN: ICD-10-CM

## 2021-07-09 DIAGNOSIS — E11.42 TYPE 2 DIABETES MELLITUS WITH DIABETIC POLYNEUROPATHY, WITHOUT LONG-TERM CURRENT USE OF INSULIN (HCC): ICD-10-CM

## 2021-07-09 DIAGNOSIS — C51.9 VULVAR CANCER, CARCINOMA (HCC): Primary | ICD-10-CM

## 2021-07-09 DIAGNOSIS — M25.561 CHRONIC PAIN OF BOTH KNEES: ICD-10-CM

## 2021-07-09 DIAGNOSIS — M25.552 BILATERAL HIP PAIN: ICD-10-CM

## 2021-07-12 ENCOUNTER — HOME HEALTH ADMISSION (OUTPATIENT)
Dept: HOME HEALTH SERVICES | Facility: HOME HEALTHCARE | Age: 81
End: 2021-07-12

## 2021-07-12 ASSESSMENT — ENCOUNTER SYMPTOMS
CHEST TIGHTNESS: 0
COUGH: 0
WHEEZING: 0
EYE REDNESS: 0
ABDOMINAL PAIN: 0
NAUSEA: 0
VOMITING: 0
SHORTNESS OF BREATH: 1
FACIAL SWELLING: 0

## 2021-07-19 RX ORDER — AMLODIPINE BESYLATE 5 MG/1
TABLET ORAL
Qty: 90 TABLET | Refills: 1 | Status: SHIPPED | OUTPATIENT
Start: 2021-07-19 | End: 2021-09-09

## 2021-07-22 ENCOUNTER — HOSPITAL ENCOUNTER (OUTPATIENT)
Dept: NON INVASIVE DIAGNOSTICS | Age: 81
Discharge: HOME | End: 2021-07-22
Payer: MEDICARE

## 2021-07-22 VITALS
SYSTOLIC BLOOD PRESSURE: 153 MMHG | WEIGHT: 159 LBS | BODY MASS INDEX: 31.05 KG/M2 | DIASTOLIC BLOOD PRESSURE: 58 MMHG | HEART RATE: 61 BPM

## 2021-07-22 DIAGNOSIS — R06.02 SHORTNESS OF BREATH: ICD-10-CM

## 2021-07-22 DIAGNOSIS — R60.0 EDEMA OF BOTH LOWER EXTREMITIES: ICD-10-CM

## 2021-07-22 LAB
LV EF: 58 %
LVEF MODALITY: NORMAL

## 2021-07-22 PROCEDURE — 93350 STRESS TTE ONLY: CPT

## 2021-07-22 PROCEDURE — 2500000003 HC RX 250 WO HCPCS: Performed by: INTERNAL MEDICINE

## 2021-07-22 PROCEDURE — 6360000002 HC RX W HCPCS: Performed by: INTERNAL MEDICINE

## 2021-07-22 PROCEDURE — 93306 TTE W/DOPPLER COMPLETE: CPT

## 2021-07-22 PROCEDURE — 2580000003 HC RX 258: Performed by: INTERNAL MEDICINE

## 2021-07-22 RX ORDER — SODIUM CHLORIDE 0.9 % (FLUSH) 0.9 %
5-40 SYRINGE (ML) INJECTION PRN
Status: DISCONTINUED | OUTPATIENT
Start: 2021-07-22 | End: 2021-07-23 | Stop reason: HOSPADM

## 2021-07-22 RX ORDER — SODIUM CHLORIDE 9 MG/ML
INJECTION, SOLUTION INTRAVENOUS
Status: COMPLETED | OUTPATIENT
Start: 2021-07-22 | End: 2021-07-22

## 2021-07-22 RX ORDER — METOPROLOL TARTRATE 5 MG/5ML
5 INJECTION INTRAVENOUS PRN
Status: DISCONTINUED | OUTPATIENT
Start: 2021-07-22 | End: 2021-07-23 | Stop reason: HOSPADM

## 2021-07-22 RX ORDER — DOBUTAMINE HYDROCHLORIDE 200 MG/100ML
2.5-4 INJECTION INTRAVENOUS CONTINUOUS PRN
Status: DISCONTINUED | OUTPATIENT
Start: 2021-07-22 | End: 2021-07-23 | Stop reason: HOSPADM

## 2021-07-22 RX ORDER — HEPARIN SODIUM (PORCINE) LOCK FLUSH IV SOLN 100 UNIT/ML 100 UNIT/ML
300 SOLUTION INTRAVENOUS PRN
Status: DISCONTINUED | OUTPATIENT
Start: 2021-07-22 | End: 2021-07-24 | Stop reason: HOSPADM

## 2021-07-22 RX ADMIN — SODIUM CHLORIDE: 9 INJECTION, SOLUTION INTRAVENOUS at 11:13

## 2021-07-22 RX ADMIN — DOBUTAMINE HYDROCHLORIDE 10 MCG/KG/MIN: 200 INJECTION INTRAVENOUS at 11:13

## 2021-07-22 RX ADMIN — METOPROLOL TARTRATE 2 MG: 1 INJECTION, SOLUTION INTRAVENOUS at 11:26

## 2021-07-22 RX ADMIN — SODIUM CHLORIDE, PRESERVATIVE FREE 300 UNITS: 5 INJECTION INTRAVENOUS at 11:40

## 2021-08-05 ENCOUNTER — TELEPHONE (OUTPATIENT)
Dept: ONCOLOGY | Facility: CLINIC | Age: 81
End: 2021-08-05

## 2021-08-05 RX ORDER — SYRINGE W-NEEDLE,DISPOSAB,3 ML 25GX5/8"
1 SYRINGE, EMPTY DISPOSABLE MISCELLANEOUS DAILY
Qty: 25 EACH | Refills: 1 | Status: SHIPPED | OUTPATIENT
Start: 2021-08-05

## 2021-08-05 RX ORDER — CYANOCOBALAMIN 1000 UG/ML
INJECTION, SOLUTION INTRAMUSCULAR; SUBCUTANEOUS
Qty: 12 ML | Refills: 0 | Status: SHIPPED | OUTPATIENT
Start: 2021-08-05 | End: 2022-07-28

## 2021-08-05 NOTE — TELEPHONE ENCOUNTER
Caller: GERARDO    Relationship: PHARMACY    Best call back number: [phone]    What is the best time to reach you: ASAP    Who are you requesting to speak with (clinical staff, provider,  specific staff member): NURSE    Do you know the name of the person who called:     What was the call regarding: B12 INJECTIONS    Do you require a callback: YES

## 2021-08-10 ENCOUNTER — OFFICE VISIT (OUTPATIENT)
Dept: CARDIOLOGY CLINIC | Age: 81
End: 2021-08-10
Payer: MEDICARE

## 2021-08-10 VITALS
WEIGHT: 157 LBS | SYSTOLIC BLOOD PRESSURE: 106 MMHG | HEART RATE: 60 BPM | BODY MASS INDEX: 30.82 KG/M2 | DIASTOLIC BLOOD PRESSURE: 64 MMHG | HEIGHT: 60 IN

## 2021-08-10 DIAGNOSIS — I51.89 DIASTOLIC DYSFUNCTION: Primary | ICD-10-CM

## 2021-08-10 DIAGNOSIS — I10 ESSENTIAL HYPERTENSION: ICD-10-CM

## 2021-08-10 DIAGNOSIS — R06.02 SHORTNESS OF BREATH: ICD-10-CM

## 2021-08-10 DIAGNOSIS — R60.0 EDEMA OF BOTH LOWER EXTREMITIES: ICD-10-CM

## 2021-08-10 DIAGNOSIS — N18.30 STAGE 3 CHRONIC KIDNEY DISEASE, UNSPECIFIED WHETHER STAGE 3A OR 3B CKD (HCC): ICD-10-CM

## 2021-08-10 PROCEDURE — 4040F PNEUMOC VAC/ADMIN/RCVD: CPT | Performed by: CLINICAL NURSE SPECIALIST

## 2021-08-10 PROCEDURE — G8427 DOCREV CUR MEDS BY ELIG CLIN: HCPCS | Performed by: CLINICAL NURSE SPECIALIST

## 2021-08-10 PROCEDURE — G8400 PT W/DXA NO RESULTS DOC: HCPCS | Performed by: CLINICAL NURSE SPECIALIST

## 2021-08-10 PROCEDURE — 99214 OFFICE O/P EST MOD 30 MIN: CPT | Performed by: CLINICAL NURSE SPECIALIST

## 2021-08-10 PROCEDURE — 1123F ACP DISCUSS/DSCN MKR DOCD: CPT | Performed by: CLINICAL NURSE SPECIALIST

## 2021-08-10 PROCEDURE — 1090F PRES/ABSN URINE INCON ASSESS: CPT | Performed by: CLINICAL NURSE SPECIALIST

## 2021-08-10 PROCEDURE — G8417 CALC BMI ABV UP PARAM F/U: HCPCS | Performed by: CLINICAL NURSE SPECIALIST

## 2021-08-10 PROCEDURE — 1036F TOBACCO NON-USER: CPT | Performed by: CLINICAL NURSE SPECIALIST

## 2021-08-10 RX ORDER — OLMESARTAN MEDOXOMIL 20 MG/1
20 TABLET ORAL DAILY
Qty: 30 TABLET | Refills: 5 | Status: SHIPPED | OUTPATIENT
Start: 2021-08-10 | End: 2021-12-02 | Stop reason: SDUPTHER

## 2021-08-10 ASSESSMENT — ENCOUNTER SYMPTOMS
FACIAL SWELLING: 0
COUGH: 0
NAUSEA: 0
EYE REDNESS: 0
SHORTNESS OF BREATH: 1
WHEEZING: 0
VOMITING: 0
CHEST TIGHTNESS: 0
ABDOMINAL PAIN: 0

## 2021-08-10 NOTE — PATIENT INSTRUCTIONS
Return in about 2 months (around 10/10/2021) for Dr Nahomy Villarreal. Compression stockings, leg elevation when sitting  Low sodium diet  Stop Amlodipine (Norvasc) due to swelling)  Start Olmesartan 20mg daily    - Weigh daily and report weight gain of 3lbs or more in 24hrs or 5lbs in one week. - Call for increasing shortness of breath or increasing swelling in feet and legs.     (This could mean you are retaining too much fluid)  - 2000mg low sodium diet

## 2021-08-10 NOTE — PROGRESS NOTES
[facility], [address] [place] [address]  Phone: [phone]  Fax: [phone]    OFFICE VISIT:  8/10/2021    Meenakshi Gordon - : 1940    Reason For Visit:  Judge Kraft is a 80 y.o. female who is here for Follow-up (patient still has edema in lower legs. ) and Edema       Diagnosis Orders   1. Diastolic dysfunction     2. Shortness of breath     3. Edema of both lower extremities     4. Stage 3 chronic kidney disease, unspecified whether stage 3a or 3b CKD ([facility] [address]     5. Essential hypertension         HPI  Patient was referred to our office on 2021 for concerns of congestive heart failure with persistent lower extremity edema despite three diuretics. She was referred by her nephrologist, Dr. Landen Muir, who sees her for chronic kidney disease stage IIIb. Other history includes urethral carcinoma, cellulitis of extremities    At last visit an echocardiogram and DSE was ordered and she is here today to discuss results. Patient reports edema has been ongoing since treatment for her urethral carcinoma. She denies orthopnea, PND but does have persistent dyspnea, generalized weakness and fatigue. She denies chest pain. Other history includes prediabetes and hyperlipidemia. Patient reports family history of heart disease in her father and brother. Patient is very deconditioned. She reports a heart cath in  that showed no significant issues      Paramjit Oconnor DO,  is PCP.   Rg Harp has the following history as recorded in [facility]:    Patient Active Problem List    Diagnosis Date Noted    Fibrocystic breast disease 01/10/2012     Past Medical History:   Diagnosis Date    Cervical dysplasia     Chronic back pain     Fibrocystic disease of breast     GERD (gastroesophageal reflux disease)     Hyperlipidemia     Osteoarthritis     Type II or unspecified type diabetes mellitus without mention of complication, not stated as uncontrolled Past Surgical History:   Procedure Laterality Date    APPENDECTOMY      BREAST CYST ASPIRATION      left    CARDIAC CATHETERIZATION      no disease per pt report    CERVIX LESION DESTRUCTION      HYSTERECTOMY  1976    PORT SURGERY      TONSILLECTOMY       Family History   Problem Relation Age of Onset    Cancer Mother         uterine    Heart Disease Father     Cancer Sister         kidney    Heart Disease Brother      Social History     Tobacco Use    Smoking status: Former Smoker     Quit date: 1970     Years since quittin.6    Smokeless tobacco: Never Used   Substance Use Topics    Alcohol use: No      Current Outpatient Medications   Medication Sig Dispense Refill    olmesartan (BENICAR) 20 MG tablet Take 1 tablet by mouth daily 30 tablet 5    calcium carbonate 1500 (600 Ca) MG TABS tablet Take 600 mg by mouth daily      cetirizine (ZYRTEC) 10 MG tablet Take 10 mg by mouth daily      citalopram (CELEXA) 20 MG tablet TAKE 1 TABLET BY MOUTH EVERY DAY      diphenoxylate-atropine (LOMOTIL) 2.5-0.025 MG per tablet TAKE 2 TABLETS BY MOUTH FOUR TIMES A DAY AS NEEDED FOR DIARRHEA      mometasone-formoterol (DULERA) 100-5 MCG/ACT inhaler Inhale 2 puffs into the lungs 2 times daily      pravastatin (PRAVACHOL) 40 MG tablet Take 40 mg by mouth daily      cyanocobalamin 1000 MCG/ML injection INJECT 1ML INTO THE MUSCLE EVERY DAY FOR 5 DAYS, THEN ON THE SAME DAY EACH WEEK FOR 4 WEEKS, THEN 1 TIME EACH MONTH.  metOLazone (ZAROXOLYN) 2.5 MG tablet TAKE 1 TABLET BY MOUTH THREE TIMES A WEEK      furosemide (LASIX) 40 MG tablet Take 40 mg by mouth daily Patient takes 1.5 tablets daily      potassium chloride SA (K-DUR;KLOR-CON) 20 MEQ tablet Take 20 mEq by mouth daily       gabapentin (NEURONTIN) 300 MG capsule Take 300 mg by mouth 3 times daily.        NEXIUM 40 MG capsule Take 40 mg by mouth 2 times daily       bisoprolol-hydrochlorothiazide (ZIAC) 5-6.25 MG per tablet Take 1 tablet by mouth daily       HYDROcodone-acetaminophen (NORCO)  MG per tablet Take 1 tablet by mouth every 6 hours as needed. No current facility-administered medications for this visit. Allergies: Alatrofloxacin, Gatifloxacin, Scopolamine, Amoxicillin-pot clavulanate, Cephalexin, and Sulfamethoxazole-trimethoprim    Review of Systems  Review of Systems   Constitutional: Positive for fatigue. Negative for activity change, diaphoresis, fever and unexpected weight change. HENT: Negative for facial swelling and nosebleeds. Eyes: Negative for redness and visual disturbance. Respiratory: Positive for shortness of breath. Negative for cough, chest tightness and wheezing. Cardiovascular: Positive for leg swelling. Negative for chest pain and palpitations. Gastrointestinal: Negative for abdominal pain, nausea and vomiting. Endocrine: Negative for cold intolerance and heat intolerance. Genitourinary: Negative for dysuria and hematuria. Musculoskeletal: Negative for arthralgias and myalgias. Skin: Negative for pallor and rash. Neurological: Positive for weakness. Negative for dizziness, seizures, syncope and light-headedness. Hematological: Does not bruise/bleed easily. Psychiatric/Behavioral: Negative for agitation. The patient is not nervous/anxious. Objective  Vital Signs - /64   Pulse 60   Ht 5' (1.524 m)   Wt 157 lb (71.2 kg)   BMI 30.66 kg/m²    Wt Readings from Last 3 Encounters:   08/10/21 157 lb (71.2 kg)   07/22/21 159 lb (72.1 kg)   07/08/21 159 lb (72.1 kg)      Physical Exam  Vitals and nursing note reviewed. Constitutional:       General: She is not in acute distress. Appearance: She is well-developed. She is not diaphoretic. Comments: Deconditioned   HENT:      Head: Normocephalic and atraumatic.       Right Ear: Hearing and external ear normal.      Left Ear: Hearing and external ear normal.      Nose: Nose normal.   Eyes:      General: Right eye: No discharge. Left eye: No discharge. Pupils: Pupils are equal, round, and reactive to light. Neck:      Thyroid: No thyromegaly. Vascular: No carotid bruit or JVD. Trachea: No tracheal deviation. Cardiovascular:      Rate and Rhythm: Normal rate and regular rhythm. Heart sounds: Normal heart sounds. No murmur heard. No friction rub. No gallop. Pulmonary:      Effort: Pulmonary effort is normal. No respiratory distress. Breath sounds: Normal breath sounds. No wheezing or rales. Abdominal:      Palpations: Abdomen is soft. Tenderness: There is no abdominal tenderness. Musculoskeletal:         General: No swelling or deformity. Cervical back: Neck supple. No muscular tenderness. Right lower leg: Edema (2+) present. Left lower leg: Edema (2+) present. Skin:     General: Skin is warm and dry. Findings: No rash. Neurological:      General: No focal deficit present. Mental Status: She is alert and oriented to person, place, and time. Cranial Nerves: No cranial nerve deficit.    Psychiatric:         Mood and Affect: Mood normal.         Behavior: Behavior normal.         Judgment: Judgment normal.         Data:    Echo 7/22/21  Conclusions      Summary   Normal left ventricular size and systolic function EF 32-18%   Mild concentric left ventricular hypertrophy with mild [grade 1] diastolic   dysfunction   Mild left atrial enlargement   Tricuspid aortic valve with adequate cusp separation and neither   significant stenosis or insufficiency   Mitral annular calcification with mild thickening of a normally mobile   mitral valve with mild regurgitation   Mild pulmonic insufficiency   Normal right-sided chambers with preserved RV systolic function   Mild tricuspid regurgitation with RVSP estimate 34 mmHg   IVC dimension and is throwing motion normal consistent with normal right   atrial filling pressures   Aortic root and ascending segments measured within normal limits   No significant pericardial effusion    DSE 7/22/21  Conclusions      Summary   Dobutamine stress echocardiogram without clinical, electrocardiographic,   or echocardiographic evidence of myocardial ischemia. Assessment:     Diagnosis Orders   1. Diastolic dysfunction     2. Shortness of breath     3. Edema of both lower extremities     4. Stage 3 chronic kidney disease, unspecified whether stage 3a or 3b CKD ([facility] [address]     5. Essential hypertension         Shortness of breath/ lower extremity edema/ diastolic dysfunction-symptoms are likely multifactorial related to diastolic dysfunction, CKD, amlodipine. BNP has not been elevated previously. She is currently on the beta-blocker, bisoprolol. Lower extremity edema could be worsened by amlodipine. Plan will be to discontinue amlodipine and instead start olmesartan 20 mg daily which would be indicated for congestive heart failure as well. We will see if this helps her lower extremity edema. She has been counseled to follow a low-sodium diet and to monitor weight daily and to report sudden weight gain such as 3 pounds overnight or 5 pounds in a week. CKDfollows with nephrology and could contributing to her edema    Hypertensionstable on current regimen. Will change amlodipine to olmesartan as amlodipine may be contributing to lower extremity edema. Reviewed 2D echocardiogram and dobutamine stress echo in detail with patient and her     Stable cardiovascular status. Plan    Return in about 1 month (around 9/10/2021) for Dr Nuria Gardner. Compression stockings, leg elevation when sitting  Low sodium diet  Stop Amlodipine (Norvasc) due to swelling)  Start Olmesartan 20mg daily    - Weigh daily and report weight gain of 3lbs or more in 24hrs or 5lbs in one week. - Call for increasing shortness of breath or increasing swelling in feet and legs.     (This could mean you are retaining too much fluid)  -

## 2021-08-12 ENCOUNTER — TELEPHONE (OUTPATIENT)
Dept: FAMILY MEDICINE CLINIC | Facility: CLINIC | Age: 81
End: 2021-08-12

## 2021-08-12 DIAGNOSIS — Z12.31 ENCOUNTER FOR SCREENING MAMMOGRAM FOR MALIGNANT NEOPLASM OF BREAST: Primary | ICD-10-CM

## 2021-08-12 NOTE — TELEPHONE ENCOUNTER
PATIENT CALLED IN REQUESTING WE SEND ORDER AND SCHEDULE HER ANNUAL MAMMOGRAM     PLEASE CALL BACK ONCE SCHEDULE   [phone]

## 2021-08-17 ENCOUNTER — TELEPHONE (OUTPATIENT)
Dept: FAMILY MEDICINE CLINIC | Facility: CLINIC | Age: 81
End: 2021-08-17

## 2021-08-17 NOTE — TELEPHONE ENCOUNTER
Caller: TESSA CARRASCO    Relationship: Home Health    Best call back number: [phone]    What orders are you requesting (i.e. lab or imaging): 485 PLAN OF CARE FROM 6/20, THEY HAVE NOT RECEIVED BACK AND SINGED.    7/3 - SIGNIFICANT CHANGE IN CONDITION FORM NEEDS TO BE SENT BACK.   [facility] ORDER    SHE CAN STOP BY THE OFFICE AND PICK THEM UP    PLEASE ADVISE ASAP

## 2021-08-18 ENCOUNTER — HOSPITAL ENCOUNTER (OUTPATIENT)
Dept: MAMMOGRAPHY | Facility: HOSPITAL | Age: 81
Discharge: HOME | End: 2021-08-18
Admitting: FAMILY MEDICINE

## 2021-08-18 DIAGNOSIS — Z12.31 ENCOUNTER FOR SCREENING MAMMOGRAM FOR MALIGNANT NEOPLASM OF BREAST: ICD-10-CM

## 2021-08-18 PROCEDURE — 77063 BREAST TOMOSYNTHESIS BI: CPT

## 2021-08-18 PROCEDURE — 77067 SCR MAMMO BI INCL CAD: CPT

## 2021-08-20 ENCOUNTER — HOSPITAL ENCOUNTER (OUTPATIENT)
Dept: MAMMOGRAPHY | Facility: HOSPITAL | Age: 81
Discharge: HOME | End: 2021-08-20

## 2021-08-20 ENCOUNTER — HOSPITAL ENCOUNTER (OUTPATIENT)
Dept: ULTRASOUND IMAGING | Facility: HOSPITAL | Age: 81
Discharge: HOME | End: 2021-08-20

## 2021-08-20 DIAGNOSIS — R92.8 ABNORMAL MAMMOGRAM: ICD-10-CM

## 2021-08-20 PROCEDURE — 77065 DX MAMMO INCL CAD UNI: CPT

## 2021-08-20 PROCEDURE — G0279 TOMOSYNTHESIS, MAMMO: HCPCS

## 2021-08-20 PROCEDURE — 76642 ULTRASOUND BREAST LIMITED: CPT

## 2021-08-23 ENCOUNTER — HOSPITAL ENCOUNTER (OUTPATIENT)
Dept: MAMMOGRAPHY | Facility: HOSPITAL | Age: 81
Discharge: HOME | End: 2021-08-23

## 2021-08-23 ENCOUNTER — HOSPITAL ENCOUNTER (OUTPATIENT)
Dept: ULTRASOUND IMAGING | Facility: HOSPITAL | Age: 81
Discharge: HOME | End: 2021-08-23

## 2021-08-23 DIAGNOSIS — R92.8 ABNORMAL MAMMOGRAM: ICD-10-CM

## 2021-08-23 PROCEDURE — A4648 IMPLANTABLE TISSUE MARKER: HCPCS

## 2021-08-23 PROCEDURE — 88361 TUMOR IMMUNOHISTOCHEM/COMPUT: CPT

## 2021-08-23 PROCEDURE — 88305 TISSUE EXAM BY PATHOLOGIST: CPT | Performed by: FAMILY MEDICINE

## 2021-08-23 RX ORDER — LIDOCAINE HYDROCHLORIDE 10 MG/ML
10 INJECTION, SOLUTION INFILTRATION; PERINEURAL ONCE
Status: DISPENSED | OUTPATIENT
Start: 2021-08-23

## 2021-08-23 RX ORDER — LIDOCAINE HYDROCHLORIDE AND EPINEPHRINE 10; 10 MG/ML; UG/ML
10 INJECTION, SOLUTION INFILTRATION; PERINEURAL ONCE
Status: DISPENSED | OUTPATIENT
Start: 2021-08-23

## 2021-08-25 ENCOUNTER — TELEPHONE (OUTPATIENT)
Dept: FAMILY MEDICINE CLINIC | Facility: CLINIC | Age: 81
End: 2021-08-25

## 2021-08-25 NOTE — TELEPHONE ENCOUNTER
Patient stated she had them ordered with a doctor at [facility] that is no longer in the practice. Her name was Dr. Lisa Sinclair Patient stated she was told by pharmacies her PCP will need to order them. She has been getting her supplies from [facility], [phone].

## 2021-08-25 NOTE — TELEPHONE ENCOUNTER
I don't see where we have ever ordered these for patient.  Do you mind to call her back to verify?  Or see if there is a specific company she gets them from?  May have come in as a DME order faxed.

## 2021-08-30 ENCOUNTER — TELEPHONE (OUTPATIENT)
Dept: FAMILY MEDICINE CLINIC | Facility: CLINIC | Age: 81
End: 2021-08-30

## 2021-08-30 DIAGNOSIS — C50.919 INFILTRATING DUCT CARCINOMA (HCC): Primary | ICD-10-CM

## 2021-08-30 RX ORDER — PRAVASTATIN SODIUM 40 MG
TABLET ORAL
Qty: 90 TABLET | Refills: 0 | Status: SHIPPED | OUTPATIENT
Start: 2021-08-30 | End: 2022-01-17

## 2021-08-30 NOTE — TELEPHONE ENCOUNTER
Called patient as patient is over due for medicare wellness, patient stated she would like to wait due to COVID and her health issues.

## 2021-09-01 LAB
LAB AP CASE REPORT: NORMAL
LAB AP CLINICAL INFORMATION: NORMAL
LAB AP DIAGNOSIS COMMENT: NORMAL
Lab: NORMAL
PATH REPORT.FINAL DX SPEC: NORMAL
PATH REPORT.GROSS SPEC: NORMAL

## 2021-09-02 ENCOUNTER — TRANSCRIBE ORDERS (OUTPATIENT)
Dept: ADMINISTRATIVE | Facility: HOSPITAL | Age: 81
End: 2021-09-02

## 2021-09-02 DIAGNOSIS — C50.919 MALIGNANT NEOPLASM OF FEMALE BREAST, UNSPECIFIED ESTROGEN RECEPTOR STATUS, UNSPECIFIED LATERALITY, UNSPECIFIED SITE OF BREAST (HCC): Primary | ICD-10-CM

## 2021-09-02 DIAGNOSIS — C50.912 MALIGNANT NEOPLASM OF LEFT FEMALE BREAST, UNSPECIFIED ESTROGEN RECEPTOR STATUS, UNSPECIFIED SITE OF BREAST (HCC): ICD-10-CM

## 2021-09-02 DIAGNOSIS — N63.20 LUMP OF LEFT BREAST: ICD-10-CM

## 2021-09-02 DIAGNOSIS — C50.412 MALIGNANT NEOPLASM OF UPPER-OUTER QUADRANT OF LEFT FEMALE BREAST, UNSPECIFIED ESTROGEN RECEPTOR STATUS (HCC): ICD-10-CM

## 2021-09-02 DIAGNOSIS — C50.919 METASTATIC BREAST CANCER: ICD-10-CM

## 2021-09-02 DIAGNOSIS — N63.0 LUMP OR MASS IN BREAST: ICD-10-CM

## 2021-09-02 DIAGNOSIS — D05.92: ICD-10-CM

## 2021-09-02 DIAGNOSIS — S05.52XS: ICD-10-CM

## 2021-09-07 ENCOUNTER — CLINICAL SUPPORT (OUTPATIENT)
Dept: ONCOLOGY | Facility: CLINIC | Age: 81
End: 2021-09-07

## 2021-09-07 ENCOUNTER — TRANSCRIBE ORDERS (OUTPATIENT)
Dept: ADMINISTRATIVE | Facility: HOSPITAL | Age: 81
End: 2021-09-07

## 2021-09-07 DIAGNOSIS — Z85.3 HISTORY OF BREAST CANCER: Primary | ICD-10-CM

## 2021-09-07 DIAGNOSIS — C77.4 SECONDARY MALIGNANCY OF INGUINAL LYMPH NODES (HCC): Primary | ICD-10-CM

## 2021-09-07 DIAGNOSIS — Z45.2 ENCOUNTER FOR CARE RELATED TO PORT-A-CATH: ICD-10-CM

## 2021-09-07 DIAGNOSIS — Z80.3 FAMILY HISTORY OF BREAST CANCER: ICD-10-CM

## 2021-09-07 DIAGNOSIS — C50.912 MALIGNANT NEOPLASM OF LEFT FEMALE BREAST, UNSPECIFIED ESTROGEN RECEPTOR STATUS, UNSPECIFIED SITE OF BREAST (HCC): Primary | ICD-10-CM

## 2021-09-07 DIAGNOSIS — C51.9 VULVAR CANCER, CARCINOMA (HCC): ICD-10-CM

## 2021-09-07 PROCEDURE — 96523 IRRIG DRUG DELIVERY DEVICE: CPT | Performed by: NURSE PRACTITIONER

## 2021-09-07 RX ORDER — HEPARIN SODIUM (PORCINE) LOCK FLUSH IV SOLN 100 UNIT/ML 100 UNIT/ML
500 SOLUTION INTRAVENOUS AS NEEDED
Status: DISCONTINUED | OUTPATIENT
Start: 2021-09-07 | End: 2021-09-07 | Stop reason: HOSPADM

## 2021-09-07 RX ORDER — SODIUM CHLORIDE 0.9 % (FLUSH) 0.9 %
10 SYRINGE (ML) INJECTION AS NEEDED
Status: DISCONTINUED | OUTPATIENT
Start: 2021-09-07 | End: 2021-09-07 | Stop reason: HOSPADM

## 2021-09-07 RX ORDER — SODIUM CHLORIDE 0.9 % (FLUSH) 0.9 %
10 SYRINGE (ML) INJECTION AS NEEDED
Status: CANCELLED | OUTPATIENT
Start: 2021-09-07

## 2021-09-07 RX ORDER — HEPARIN SODIUM (PORCINE) LOCK FLUSH IV SOLN 100 UNIT/ML 100 UNIT/ML
500 SOLUTION INTRAVENOUS AS NEEDED
Status: CANCELLED | OUTPATIENT
Start: 2021-09-07

## 2021-09-07 RX ADMIN — HEPARIN SODIUM (PORCINE) LOCK FLUSH IV SOLN 100 UNIT/ML 500 UNITS: 100 SOLUTION at 13:45

## 2021-09-07 RX ADMIN — Medication 10 ML: at 13:43

## 2021-09-08 ENCOUNTER — TRANSCRIBE ORDERS (OUTPATIENT)
Dept: LAB | Facility: HOSPITAL | Age: 81
End: 2021-09-08

## 2021-09-08 DIAGNOSIS — Z11.59 SCREENING FOR VIRAL DISEASE: Primary | ICD-10-CM

## 2021-09-09 ENCOUNTER — PRE-ADMISSION TESTING (OUTPATIENT)
Dept: PREADMISSION TESTING | Facility: HOSPITAL | Age: 81
End: 2021-09-09

## 2021-09-09 ENCOUNTER — HOSPITAL ENCOUNTER (OUTPATIENT)
Dept: GENERAL RADIOLOGY | Facility: HOSPITAL | Age: 81
Discharge: HOME | End: 2021-09-09

## 2021-09-09 ENCOUNTER — TELEPHONE (OUTPATIENT)
Dept: CARDIOLOGY CLINIC | Age: 81
End: 2021-09-09

## 2021-09-09 VITALS
SYSTOLIC BLOOD PRESSURE: 121 MMHG | RESPIRATION RATE: 16 BRPM | OXYGEN SATURATION: 100 % | HEIGHT: 58 IN | WEIGHT: 160.72 LBS | BODY MASS INDEX: 33.74 KG/M2 | HEART RATE: 57 BPM | DIASTOLIC BLOOD PRESSURE: 48 MMHG

## 2021-09-09 LAB
ALBUMIN SERPL-MCNC: 4.2 G/DL (ref 3.5–5.2)
ALBUMIN/GLOB SERPL: 1.2 G/DL
ALP SERPL-CCNC: 79 U/L (ref 39–117)
ALT SERPL W P-5'-P-CCNC: 6 U/L (ref 1–33)
ANION GAP SERPL CALCULATED.3IONS-SCNC: 8 MMOL/L (ref 5–15)
AST SERPL-CCNC: 11 U/L (ref 1–32)
BASOPHILS # BLD AUTO: 0.03 10*3/MM3 (ref 0–0.2)
BASOPHILS NFR BLD AUTO: 0.5 % (ref 0–1.5)
BILIRUB SERPL-MCNC: 0.3 MG/DL (ref 0–1.2)
BUN SERPL-MCNC: 45 MG/DL (ref 8–23)
BUN/CREAT SERPL: 28.8 (ref 7–25)
CALCIUM SPEC-SCNC: 9.7 MG/DL (ref 8.6–10.5)
CHLORIDE SERPL-SCNC: 102 MMOL/L (ref 98–107)
CO2 SERPL-SCNC: 24 MMOL/L (ref 22–29)
CREAT SERPL-MCNC: 1.56 MG/DL (ref 0.57–1)
DEPRECATED RDW RBC AUTO: 51.8 FL (ref 37–54)
EOSINOPHIL # BLD AUTO: 0.24 10*3/MM3 (ref 0–0.4)
EOSINOPHIL NFR BLD AUTO: 3.9 % (ref 0.3–6.2)
ERYTHROCYTE [DISTWIDTH] IN BLOOD BY AUTOMATED COUNT: 13.5 % (ref 12.3–15.4)
GFR SERPL CREATININE-BSD FRML MDRD: 32 ML/MIN/1.73
GLOBULIN UR ELPH-MCNC: 3.6 GM/DL
GLUCOSE SERPL-MCNC: 85 MG/DL (ref 65–99)
HCT VFR BLD AUTO: 30 % (ref 34–46.6)
HGB BLD-MCNC: 9.9 G/DL (ref 12–15.9)
LYMPHOCYTES # BLD AUTO: 1.2 10*3/MM3 (ref 0.7–3.1)
LYMPHOCYTES NFR BLD AUTO: 19.7 % (ref 19.6–45.3)
MCH RBC QN AUTO: 34.1 PG (ref 26.6–33)
MCHC RBC AUTO-ENTMCNC: 33 G/DL (ref 31.5–35.7)
MCV RBC AUTO: 103.4 FL (ref 79–97)
MONOCYTES # BLD AUTO: 0.75 10*3/MM3 (ref 0.1–0.9)
MONOCYTES NFR BLD AUTO: 12.3 % (ref 5–12)
NEUTROPHILS NFR BLD AUTO: 3.85 10*3/MM3 (ref 1.7–7)
NEUTROPHILS NFR BLD AUTO: 63.3 % (ref 42.7–76)
PLATELET # BLD AUTO: 156 10*3/MM3 (ref 140–450)
PMV BLD AUTO: 11.7 FL (ref 6–12)
POTASSIUM SERPL-SCNC: 5.4 MMOL/L (ref 3.5–5.2)
PROT SERPL-MCNC: 7.8 G/DL (ref 6–8.5)
RBC # BLD AUTO: 2.9 10*6/MM3 (ref 3.77–5.28)
SODIUM SERPL-SCNC: 134 MMOL/L (ref 136–145)
WBC # BLD AUTO: 6.09 10*3/MM3 (ref 3.4–10.8)

## 2021-09-09 PROCEDURE — 71046 X-RAY EXAM CHEST 2 VIEWS: CPT

## 2021-09-09 PROCEDURE — 85025 COMPLETE CBC W/AUTO DIFF WBC: CPT

## 2021-09-09 PROCEDURE — 93010 ELECTROCARDIOGRAM REPORT: CPT | Performed by: INTERNAL MEDICINE

## 2021-09-09 PROCEDURE — 80053 COMPREHEN METABOLIC PANEL: CPT

## 2021-09-09 PROCEDURE — 93005 ELECTROCARDIOGRAM TRACING: CPT

## 2021-09-09 PROCEDURE — 36415 COLL VENOUS BLD VENIPUNCTURE: CPT

## 2021-09-09 RX ORDER — CITALOPRAM 10 MG/1
10 TABLET ORAL DAILY
COMMUNITY
End: 2021-11-02

## 2021-09-09 RX ORDER — GABAPENTIN 300 MG/1
300 CAPSULE ORAL 2 TIMES DAILY
COMMUNITY
End: 2021-12-06

## 2021-09-09 RX ORDER — POTASSIUM CHLORIDE 20 MEQ/1
20 TABLET, EXTENDED RELEASE ORAL 2 TIMES DAILY
COMMUNITY
End: 2022-01-04

## 2021-09-09 RX ORDER — DIPHENHYDRAMINE HCL 25 MG
25 CAPSULE ORAL EVERY 6 HOURS PRN
COMMUNITY

## 2021-09-09 NOTE — DISCHARGE INSTRUCTIONS
DAY OF SURGERY INSTRUCTIONS        YOUR SURGEON: DR BARAKAT    PROCEDURE: LEFT PARTIAL MASTECTOMY WITH MAGSEED AND LEFT SENTINEL LYMPH NODE BIOPSY MAGTRACE    DATE OF SURGERY: September 14, 2021    ARRIVAL TIME: AS DIRECTED BY OFFICE    YOU MAY TAKE THE FOLLOWING MEDICATION(S) THE MORNING OF SURGERY WITH A SIP OF WATER: GABAPENTIN      ALL OTHER HOME MEDICATION CHECK WITH YOUR PHYSICIAN      DO NOT TAKE ANY ERECTILE DYSFUNCTION MEDICATIONS (EX: CIALIS, VIAGRA) 24 HOURS PRIOR TO SURGERY                      MANAGING PAIN AFTER SURGERY    We know you are probably wondering what your pain will be like after surgery.  Following surgery it is unrealistic to expect you will not have pain.   Pain is how our bodies let us know that something is wrong or cautions us to be careful.  That said, our goal is to make your pain tolerable.    Methods we may use to treat your pain include (oral or IV medications, PCAs, epidurals, nerve blocks, etc.)   While some procedures require IV pain medications for a short time after surgery, transitioning to pain medications by mouth allows for better management of pain.   Your nurse will encourage you to take oral pain medications whenever possible.  IV medications work almost immediately, but only last a short while.  Taking medications by mouth allows for a more constant level of medication in your blood stream for a longer period of time.      Once your pain is out of control it is harder to get back under control.  It is important you are aware when your next dose of pain medication is due.  If you are admitted, your nurse may write the time of your next dose on the white board in your room to help you remember.      We are interested in your pain and encourage you to inform us about aggravating factors during your visit.   Many times a simple repositioning every few hours can make a big difference.    If your physician says it is okay, do not let your pain prevent you from getting  out of bed. Be sure to call your nurse for assistance prior to getting up so you do not fall.      Before surgery, please decide your tolerable pain goal.  These faces help describe the pain ratings we use on a 0-10 scale.   Be prepared to tell us your goal and whether or not you take pain or anxiety medications at home.          BEFORE YOU COME TO THE HOSPITAL  (Pre-op instructions)  • Do not eat, drink, smoke or chew gum after midnight the night before surgery.  This also includes no mints.  • Morning of surgery take only the medicines you have been instructed with a sip of water unless otherwise instructed  by your physician.  • Do not shave, wear makeup or dark nail polish.  • Remove all jewelry including rings.  • Leave anything you consider valuable at home.  • Leave your suitcase in the car until after your surgery.  • Bring the following with you if applicable:  o Picture ID and insurance, Medicare or Medicaid cards  o Co-pay/deductible required by insurance (cash, check, credit card)  o Copy of advance directive, living will or power-of- documents if not brought to PAT  o CPAP or BIPAP mask and tubing  o Relaxation aids ( book, magazine), etc.  o Hearing aids                        ON THE DAY OF SURGERY  · On the day of surgery check in at registration located at the main entrance of the hospital.   ? You will be registered and given a beeper with instructions where to wait in the main lobby.  ? When your beeper lights up and vibrates a member of the Outpatient Surgery staff will meet you at the double doors under the stair steps and escort you to your preoperative room.   · You may have cloth compression devices placed on your legs. These help to prevent blood clots and reduce swelling in your legs.  · An IV may be inserted into one of your veins.  · In the operating room, you may be given one or more of the following:  ? A medicine to help you relax (sedative).  ? A medicine to numb the area  "(local anesthetic).  ? A medicine to make you fall asleep (general anesthetic).  ? A medicine that is injected into an area of your body to numb everything below the injection site (regional anesthetic).  · Your surgical site will be marked or identified.  · You may be given an antibiotic through your IV to help prevent infection.  Contact a health care provider if you:  · Develop a fever of more than 100.4°F (38°C) or other feelings of illness during the 48 hours before your surgery.  · Have symptoms that get worse.  Have questions or concerns about your surgery    General Anesthesia/Surgery, Adult  General anesthesia is the use of medicines to make a person \"go to sleep\" (unconscious) for a medical procedure. General anesthesia must be used for certain procedures, and is often recommended for procedures that:  · Last a long time.  · Require you to be still or in an unusual position.  · Are major and can cause blood loss.  The medicines used for general anesthesia are called general anesthetics. As well as making you unconscious for a certain amount of time, these medicines:  · Prevent pain.  · Control your blood pressure.  · Relax your muscles.  Tell a health care provider about:  · Any allergies you have.  · All medicines you are taking, including vitamins, herbs, eye drops, creams, and over-the-counter medicines.  · Any problems you or family members have had with anesthetic medicines.  · Types of anesthetics you have had in the past.  · Any blood disorders you have.  · Any surgeries you have had.  · Any medical conditions you have.  · Any recent upper respiratory, chest, or ear infections.  · Any history of:  ? Heart or lung conditions, such as heart failure, sleep apnea, asthma, or chronic obstructive pulmonary disease (COPD).  ?  service.  ? Depression or anxiety.  · Any tobacco or drug use, including marijuana or alcohol use.  · Whether you are pregnant or may be pregnant.  What are the " risks?  Generally, this is a safe procedure. However, problems may occur, including:  · Allergic reaction.  · Lung and heart problems.  · Inhaling food or liquid from the stomach into the lungs (aspiration).  · Nerve injury.  · Air in the bloodstream, which can lead to stroke.  · Extreme agitation or confusion (delirium) when you wake up from the anesthetic.  · Waking up during your procedure and being unable to move. This is rare.  These problems are more likely to develop if you are having a major surgery or if you have an advanced or serious medical condition. You can prevent some of these complications by answering all of your health care provider's questions thoroughly and by following all instructions before your procedure.  General anesthesia can cause side effects, including:  · Nausea or vomiting.  · A sore throat from the breathing tube.  · Hoarseness.  · Wheezing or coughing.  · Shaking chills.  · Tiredness.  · Body aches.  · Anxiety.  · Sleepiness or drowsiness.  · Confusion or agitation.  RISKS AND COMPLICATIONS OF SURGERY  Your health care provider will discuss possible risks and complications with you before surgery. Common risks and complications include:    · Problems due to the use of anesthetics.  · Blood loss and replacement (does not apply to minor surgical procedures).  · Temporary increase in pain due to surgery.  · Uncorrected pain or problems that the surgery was meant to correct.  · Infection.  · New damage.    What happens before the procedure?    Medicines  Ask your health care provider about:  · Changing or stopping your regular medicines. This is especially important if you are taking diabetes medicines or blood thinners.  · Taking medicines such as aspirin and ibuprofen. These medicines can thin your blood. Do not take these medicines unless your health care provider tells you to take them.  · Taking over-the-counter medicines, vitamins, herbs, and supplements. Do not take these during  the week before your procedure unless your health care provider approves them.  General instructions  · Starting 3-6 weeks before the procedure, do not use any products that contain nicotine or tobacco, such as cigarettes and e-cigarettes. If you need help quitting, ask your health care provider.  · If you brush your teeth on the morning of the procedure, make sure to spit out all of the toothpaste.  · Tell your health care provider if you become ill or develop a cold, cough, or fever.  · If instructed by your health care provider, bring your sleep apnea device with you on the day of your surgery (if applicable).  · Ask your health care provider if you will be going home the same day, the following day, or after a longer hospital stay.  ? Plan to have someone take you home from the hospital or clinic.  ? Plan to have a responsible adult care for you for at least 24 hours after you leave the hospital or clinic. This is important.  What happens during the procedure?  · You will be given anesthetics through both of the following:  ? A mask placed over your nose and mouth.  ? An IV in one of your veins.  · You may receive a medicine to help you relax (sedative).  · After you are unconscious, a breathing tube may be inserted down your throat to help you breathe. This will be removed before you wake up.  · An anesthesia specialist will stay with you throughout your procedure. He or she will:  ? Keep you comfortable and safe by continuing to give you medicines and adjusting the amount of medicine that you get.  ? Monitor your blood pressure, pulse, and oxygen levels to make sure that the anesthetics do not cause any problems.  The procedure may vary among health care providers and hospitals.  What happens after the procedure?  · Your blood pressure, temperature, heart rate, breathing rate, and blood oxygen level will be monitored until the medicines you were given have worn off.  · You will wake up in a recovery area. You  may wake up slowly.  · If you feel anxious or agitated, you may be given medicine to help you calm down.  · If you will be going home the same day, your health care provider may check to make sure you can walk, drink, and urinate.  · Your health care provider will treat any pain or side effects you have before you go home.  · Do not drive for 24 hours if you were given a sedative.  Summary  · General anesthesia is used to keep you still and prevent pain during a procedure.  · It is important to tell your healthcare provider about your medical history and any surgeries you have had, and previous experience with anesthesia.  · Follow your healthcare provider’s instructions about when to stop eating, drinking, or taking certain medicines before your procedure.  · Plan to have someone take you home from the hospital or clinic.  This information is not intended to replace advice given to you by your health care provider. Make sure you discuss any questions you have with your health care provider.  Document Released: 03/26/2009 Document Revised: 08/03/2018 Document Reviewed: 08/03/2018  [facility] Interactive Patient Education © 2019 [facility] [employer]       Fall Prevention in Hospitals, Adult  As a hospital patient, your condition and the treatments you receive can increase your risk for falls. Some additional risk factors for falls in a hospital include:  · Being in an unfamiliar environment.  · Being on bed rest.  · Your surgery.  · Taking certain medicines.  · Your tubing requirements, such as intravenous (IV) therapy or catheters.  It is important that you learn how to decrease fall risks while at the hospital. Below are important tips that can help prevent falls.  SAFETY TIPS FOR PREVENTING FALLS  Talk about your risk of falling.  · Ask your health care provider why you are at risk for falling. Is it your medicine, illness, tubing placement, or something else?  · Make a plan with your health care provider to keep you safe  from falls.  · Ask your health care provider or pharmacist about side effects of your medicines. Some medicines can make you dizzy or affect your coordination.  Ask for help.  · Ask for help before getting out of bed. You may need to press your call button.  · Ask for assistance in getting safely to the toilet.  · Ask for a walker or cane to be put at your bedside. Ask that most of the side rails on your bed be placed up before your health care provider leaves the room.  · Ask family or friends to sit with you.  · Ask for things that are out of your reach, such as your glasses, hearing aids, telephone, bedside table, or call button.  Follow these tips to avoid falling:  · Stay lying or seated, rather than standing, while waiting for help.  · Wear rubber-soled slippers or shoes whenever you walk in the hospital.  · Avoid quick, sudden movements.  ¨ Change positions slowly.  ¨ Sit on the side of your bed before standing.  ¨ Stand up slowly and wait before you start to walk.  · Let your health care provider know if there is a spill on the floor.  · Pay careful attention to the medical equipment, electrical cords, and tubes around you.  · When you need help, use your call button by your bed or in the bathroom. Wait for one of your health care providers to help you.  · If you feel dizzy or unsure of your footing, return to bed and wait for assistance.  · Avoid being distracted by the TV, telephone, or another person in your room.  · Do not lean or support yourself on rolling objects, such as IV poles or bedside tables.     This information is not intended to replace advice given to you by your health care provider. Make sure you discuss any questions you have with your health care provider.     Document Released: 12/15/2001 Document Revised: 01/08/2016 Document Reviewed: 08/25/2013  [employer] Interactive Patient Education ©2016 [employer]       [facility] 4% Patient Instruction Sheet    Chlorhexidine  Before Surgery  Chlorhexidine gluconate (CHG) is a germ-killing (antiseptic) solution that is used to clean the skin. It gets rid of the bacteria that normally live on the skin. Cleaning your skin with CHG before surgery helps lower the risk for infection after surgery.    How to use CHG solution  · You will take 2 showers, one shower the night before surgery, the second shower the morning of surgery before coming to the hospital.  · Use CHG only as told by your health care provider, and follow the instructions on the label.  · Use CHG solution while taking a shower. Follow these steps when using CHG solution (unless your health care provider gives you different instructions):  1. Start the shower.  2. Use your normal soap and shampoo to wash your face and hair.  3. Turn off the shower or move out of the shower stream.  4. Pour the CHG onto a clean washcloth. Do not use any type of brush or rough-edged sponge.  5. Starting at your neck, lather your body down to your toes. Make sure you:  6. Pay special attention to the part of your body where you will be having surgery. Scrub this area for at least 1 minute.  7. Use the full amount of CHG as directed. Usually, this is one half bottle for each shower.  8. Do not use CHG on your head or face. If the solution gets into your ears or eyes, rinse them well with water.  9. Avoid your genital area.  10. Avoid any areas of skin that have broken skin, cuts, or scrapes.  11. Scrub your back and under your arms. Make sure to wash skin folds.  12. Let the lather sit on your skin for 1-2 minutes or as long as told by your health care  provider.  13. Thoroughly rinse your entire body in the shower. Make sure that all body creases and crevices are rinsed well.  14. Dry off with a clean towel. Do not put any substances on your body afterward, such as powder, lotion, or perfume.  15. Put on clean clothes or pajamas.  16. If it is the night before your surgery, sleep in clean  sheets.    What are the risks?  Risks of using CHG include:  · A skin reaction.  · Hearing loss, if CHG gets in your ears.  · Eye injury, if CHG gets in your eyes and is not rinsed out.  · The CHG product catching fire.  Make sure that you avoid smoking and flames after applying CHG to your skin.  Do not use CHG:  · If you have a chlorhexidine allergy or have previously reacted to chlorhexidine.  · On babies younger than 2 months of age.      On the day of surgery, when you are taken to your room in Outpatient Surgery you will be given a CHG prepackaged cloth to wipe the site for your surgery.  How to use CHG prepackaged cloths  · Follow the instructions on the label.  · Use the CHG cloth on clean, dry skin. Follow these steps when using a CHG cloth (unless your health care provider gives you different instructions):  1. Using the CHG cloth, vigorously scrub the part of your body where you will be having surgery. Scrub using a back-and-forth motion for 3 minutes. The area on your body should be completely wet with CHG when you are finished scrubbing.  2. Do not rinse. Discard the cloth and let the area air-dry for 1 minute. Do not put any substances on your body afterward, such as powder, lotion, or perfume.  Contact a health care provider if:  · Your skin gets irritated after scrubbing.  · You have questions about using your solution or cloth.  Get help right away if:  · Your eyes become very red or swollen.  · Your eyes itch badly.  · Your skin itches badly and is red or swollen.  · Your hearing changes.  · You have trouble seeing.  · You have swelling or tingling in your mouth or throat.  · You have trouble breathing.  · You swallow any chlorhexidine.  Summary  · Chlorhexidine gluconate (CHG) is a germ-killing (antiseptic) solution that is used to clean the skin. Cleaning your skin with CHG before surgery helps lower the risk for infection after surgery.  · You may be given CHG to use at home. It may be in a  bottle or in a prepackaged cloth to use on your skin. Carefully follow your health care provider's instructions and the instructions on the product label.  · Do not use CHG if you have a chlorhexidine allergy.  · Contact your health care provider if your skin gets irritated after scrubbing.  This information is not intended to replace advice given to you by your health care provider. Make sure you discuss any questions you have with your health care provider.  Document Released: 09/11/2013 Document Revised: 11/15/2018 Document Reviewed: 11/15/2018  [employer] Interactive Patient Education © 2019 [employer]          PATIENT/FAMILY/RESPONSIBLE PARTY VERBALIZES UNDERSTANDING OF ABOVE EDUCATION.  COPY OF PAIN SCALE GIVEN AND REVIEWED WITH VERBALIZED UNDERSTANDING.

## 2021-09-10 LAB
QT INTERVAL: 402 MS
QTC INTERVAL: 394 MS

## 2021-09-13 ENCOUNTER — LAB (OUTPATIENT)
Dept: LAB | Facility: HOSPITAL | Age: 81
End: 2021-09-13

## 2021-09-13 ENCOUNTER — HOSPITAL ENCOUNTER (OUTPATIENT)
Dept: ULTRASOUND IMAGING | Facility: HOSPITAL | Age: 81
Discharge: HOME | End: 2021-09-13

## 2021-09-13 ENCOUNTER — APPOINTMENT (OUTPATIENT)
Dept: MAMMOGRAPHY | Facility: HOSPITAL | Age: 81
End: 2021-09-13

## 2021-09-13 ENCOUNTER — TELEPHONE (OUTPATIENT)
Dept: FAMILY MEDICINE CLINIC | Facility: CLINIC | Age: 81
End: 2021-09-13

## 2021-09-13 DIAGNOSIS — C50.412 MALIGNANT NEOPLASM OF UPPER-OUTER QUADRANT OF LEFT FEMALE BREAST, UNSPECIFIED ESTROGEN RECEPTOR STATUS (HCC): ICD-10-CM

## 2021-09-13 DIAGNOSIS — N63.20 LUMP OF LEFT BREAST: ICD-10-CM

## 2021-09-13 LAB — SARS-COV-2 RNA PNL SPEC NAA+PROBE: NOT DETECTED

## 2021-09-13 PROCEDURE — C9803 HOPD COVID-19 SPEC COLLECT: HCPCS | Performed by: STUDENT IN AN ORGANIZED HEALTH CARE EDUCATION/TRAINING PROGRAM

## 2021-09-13 PROCEDURE — 87635 SARS-COV-2 COVID-19 AMP PRB: CPT | Performed by: STUDENT IN AN ORGANIZED HEALTH CARE EDUCATION/TRAINING PROGRAM

## 2021-09-13 PROCEDURE — A4648 IMPLANTABLE TISSUE MARKER: HCPCS

## 2021-09-13 NOTE — TELEPHONE ENCOUNTER
Patient called wanting to know if you had talked with a supplier for size 10 cath. She is requesting a call back at [phone].

## 2021-09-14 ENCOUNTER — ANESTHESIA EVENT (OUTPATIENT)
Dept: PERIOP | Facility: HOSPITAL | Age: 81
End: 2021-09-14

## 2021-09-14 ENCOUNTER — HOSPITAL ENCOUNTER (OUTPATIENT)
Dept: NUCLEAR MEDICINE | Facility: HOSPITAL | Age: 81
End: 2021-09-14

## 2021-09-14 ENCOUNTER — ANESTHESIA (OUTPATIENT)
Dept: PERIOP | Facility: HOSPITAL | Age: 81
End: 2021-09-14

## 2021-09-14 ENCOUNTER — HOSPITAL ENCOUNTER (OUTPATIENT)
Dept: MAMMOGRAPHY | Facility: HOSPITAL | Age: 81
Discharge: HOME | End: 2021-09-14

## 2021-09-14 ENCOUNTER — HOSPITAL ENCOUNTER (OUTPATIENT)
Facility: HOSPITAL | Age: 81
Setting detail: HOSPITAL OUTPATIENT SURGERY
Discharge: HOME | End: 2021-09-14
Attending: STUDENT IN AN ORGANIZED HEALTH CARE EDUCATION/TRAINING PROGRAM | Admitting: STUDENT IN AN ORGANIZED HEALTH CARE EDUCATION/TRAINING PROGRAM

## 2021-09-14 VITALS
OXYGEN SATURATION: 96 % | SYSTOLIC BLOOD PRESSURE: 116 MMHG | DIASTOLIC BLOOD PRESSURE: 55 MMHG | TEMPERATURE: 98.4 F | HEART RATE: 58 BPM | RESPIRATION RATE: 16 BRPM

## 2021-09-14 DIAGNOSIS — N63.20 LUMP OF LEFT BREAST: ICD-10-CM

## 2021-09-14 DIAGNOSIS — C50.919 BREAST CANCER (HCC): ICD-10-CM

## 2021-09-14 DIAGNOSIS — Z17.0 MALIGNANT NEOPLASM OF UPPER-OUTER QUADRANT OF LEFT BREAST IN FEMALE, ESTROGEN RECEPTOR POSITIVE (HCC): Primary | ICD-10-CM

## 2021-09-14 DIAGNOSIS — C50.412 MALIGNANT NEOPLASM OF UPPER-OUTER QUADRANT OF LEFT BREAST IN FEMALE, ESTROGEN RECEPTOR POSITIVE (HCC): Primary | ICD-10-CM

## 2021-09-14 DIAGNOSIS — K52.1 DIARRHEA DUE TO DRUG: ICD-10-CM

## 2021-09-14 DIAGNOSIS — C51.9 VULVAR CANCER, CARCINOMA (HCC): ICD-10-CM

## 2021-09-14 LAB
ALBUMIN SERPL-MCNC: 4.5 G/DL (ref 3.5–5.2)
ALBUMIN/GLOB SERPL: 1.3 G/DL
ALP SERPL-CCNC: 78 U/L (ref 39–117)
ALT SERPL W P-5'-P-CCNC: <5 U/L (ref 1–33)
ANION GAP SERPL CALCULATED.3IONS-SCNC: 11 MMOL/L (ref 5–15)
AST SERPL-CCNC: 16 U/L (ref 1–32)
BILIRUB SERPL-MCNC: 0.2 MG/DL (ref 0–1.2)
BUN SERPL-MCNC: 38 MG/DL (ref 8–23)
BUN/CREAT SERPL: 24.1 (ref 7–25)
CALCIUM SPEC-SCNC: 9.6 MG/DL (ref 8.6–10.5)
CHLORIDE SERPL-SCNC: 101 MMOL/L (ref 98–107)
CO2 SERPL-SCNC: 24 MMOL/L (ref 22–29)
CREAT SERPL-MCNC: 1.58 MG/DL (ref 0.57–1)
GFR SERPL CREATININE-BSD FRML MDRD: 32 ML/MIN/1.73
GLOBULIN UR ELPH-MCNC: 3.5 GM/DL
GLUCOSE BLDC GLUCOMTR-MCNC: 100 MG/DL (ref 70–130)
GLUCOSE SERPL-MCNC: 120 MG/DL (ref 65–99)
POTASSIUM SERPL-SCNC: 4.9 MMOL/L (ref 3.5–5.2)
PROT SERPL-MCNC: 8 G/DL (ref 6–8.5)
SODIUM SERPL-SCNC: 136 MMOL/L (ref 136–145)

## 2021-09-14 PROCEDURE — 25010000002 DEXAMETHASONE PER 1 MG: Performed by: ANESTHESIOLOGY

## 2021-09-14 PROCEDURE — 25010000002 FENTANYL CITRATE (PF) 100 MCG/2ML SOLUTION: Performed by: NURSE ANESTHETIST, CERTIFIED REGISTERED

## 2021-09-14 PROCEDURE — 25010000002 ONDANSETRON PER 1 MG: Performed by: NURSE ANESTHETIST, CERTIFIED REGISTERED

## 2021-09-14 PROCEDURE — 80053 COMPREHEN METABOLIC PANEL: CPT | Performed by: STUDENT IN AN ORGANIZED HEALTH CARE EDUCATION/TRAINING PROGRAM

## 2021-09-14 PROCEDURE — 25010000002 FENTANYL CITRATE (PF) 50 MCG/ML SOLUTION: Performed by: ANESTHESIOLOGY

## 2021-09-14 PROCEDURE — 76098 X-RAY EXAM SURGICAL SPECIMEN: CPT

## 2021-09-14 PROCEDURE — 25010000003 METHYLENE BLUE 50 MG/10ML SOLUTION: Performed by: STUDENT IN AN ORGANIZED HEALTH CARE EDUCATION/TRAINING PROGRAM

## 2021-09-14 PROCEDURE — 25010000002 ONDANSETRON PER 1 MG: Performed by: ANESTHESIOLOGY

## 2021-09-14 PROCEDURE — 25010000002 PROPOFOL 10 MG/ML EMULSION: Performed by: NURSE ANESTHETIST, CERTIFIED REGISTERED

## 2021-09-14 PROCEDURE — 25010000002 HEPARIN (PORCINE) PER 1000 UNITS: Performed by: STUDENT IN AN ORGANIZED HEALTH CARE EDUCATION/TRAINING PROGRAM

## 2021-09-14 PROCEDURE — 88341 IMHCHEM/IMCYTCHM EA ADD ANTB: CPT | Performed by: STUDENT IN AN ORGANIZED HEALTH CARE EDUCATION/TRAINING PROGRAM

## 2021-09-14 PROCEDURE — 88307 TISSUE EXAM BY PATHOLOGIST: CPT | Performed by: STUDENT IN AN ORGANIZED HEALTH CARE EDUCATION/TRAINING PROGRAM

## 2021-09-14 PROCEDURE — C1889 IMPLANT/INSERT DEVICE, NOC: HCPCS | Performed by: STUDENT IN AN ORGANIZED HEALTH CARE EDUCATION/TRAINING PROGRAM

## 2021-09-14 PROCEDURE — 82962 GLUCOSE BLOOD TEST: CPT

## 2021-09-14 DEVICE — SEAL HEMO SURG ARISTA/AH ABS/PWDR 3GM: Type: IMPLANTABLE DEVICE | Site: BREAST | Status: FUNCTIONAL

## 2021-09-14 RX ORDER — NALOXONE HCL 0.4 MG/ML
0.4 VIAL (ML) INJECTION AS NEEDED
Status: DISCONTINUED | OUTPATIENT
Start: 2021-09-14 | End: 2021-09-14 | Stop reason: HOSPADM

## 2021-09-14 RX ORDER — LIDOCAINE HYDROCHLORIDE 20 MG/ML
INJECTION, SOLUTION EPIDURAL; INFILTRATION; INTRACAUDAL; PERINEURAL AS NEEDED
Status: DISCONTINUED | OUTPATIENT
Start: 2021-09-14 | End: 2021-09-14 | Stop reason: SURG

## 2021-09-14 RX ORDER — ONDANSETRON 2 MG/ML
4 INJECTION INTRAMUSCULAR; INTRAVENOUS ONCE AS NEEDED
Status: COMPLETED | OUTPATIENT
Start: 2021-09-14 | End: 2021-09-14

## 2021-09-14 RX ORDER — FENTANYL CITRATE 50 UG/ML
25 INJECTION, SOLUTION INTRAMUSCULAR; INTRAVENOUS
Status: DISCONTINUED | OUTPATIENT
Start: 2021-09-14 | End: 2021-09-14 | Stop reason: HOSPADM

## 2021-09-14 RX ORDER — ACETAMINOPHEN 325 MG/1
975 TABLET ORAL EVERY 8 HOURS
Qty: 100 TABLET | Refills: 2
Start: 2021-09-14 | End: 2022-04-14 | Stop reason: ALTCHOICE

## 2021-09-14 RX ORDER — SODIUM CHLORIDE, SODIUM LACTATE, POTASSIUM CHLORIDE, CALCIUM CHLORIDE 600; 310; 30; 20 MG/100ML; MG/100ML; MG/100ML; MG/100ML
100 INJECTION, SOLUTION INTRAVENOUS CONTINUOUS
Status: DISCONTINUED | OUTPATIENT
Start: 2021-09-14 | End: 2021-09-14 | Stop reason: HOSPADM

## 2021-09-14 RX ORDER — FLUMAZENIL 0.1 MG/ML
0.2 INJECTION INTRAVENOUS AS NEEDED
Status: DISCONTINUED | OUTPATIENT
Start: 2021-09-14 | End: 2021-09-14 | Stop reason: HOSPADM

## 2021-09-14 RX ORDER — SODIUM CHLORIDE, SODIUM LACTATE, POTASSIUM CHLORIDE, CALCIUM CHLORIDE 600; 310; 30; 20 MG/100ML; MG/100ML; MG/100ML; MG/100ML
1000 INJECTION, SOLUTION INTRAVENOUS CONTINUOUS
Status: DISCONTINUED | OUTPATIENT
Start: 2021-09-14 | End: 2021-09-14 | Stop reason: HOSPADM

## 2021-09-14 RX ORDER — HEPARIN SODIUM 5000 [USP'U]/ML
5000 INJECTION, SOLUTION INTRAVENOUS; SUBCUTANEOUS ONCE
Status: COMPLETED | OUTPATIENT
Start: 2021-09-14 | End: 2021-09-14

## 2021-09-14 RX ORDER — PROPOFOL 10 MG/ML
VIAL (ML) INTRAVENOUS AS NEEDED
Status: DISCONTINUED | OUTPATIENT
Start: 2021-09-14 | End: 2021-09-14 | Stop reason: SURG

## 2021-09-14 RX ORDER — FAMOTIDINE 10 MG/ML
20 INJECTION, SOLUTION INTRAVENOUS
Status: COMPLETED | OUTPATIENT
Start: 2021-09-14 | End: 2021-09-14

## 2021-09-14 RX ORDER — SODIUM CHLORIDE 0.9 % (FLUSH) 0.9 %
3-10 SYRINGE (ML) INJECTION AS NEEDED
Status: DISCONTINUED | OUTPATIENT
Start: 2021-09-14 | End: 2021-09-14 | Stop reason: HOSPADM

## 2021-09-14 RX ORDER — LIDOCAINE HYDROCHLORIDE 10 MG/ML
0.5 INJECTION, SOLUTION EPIDURAL; INFILTRATION; INTRACAUDAL; PERINEURAL ONCE AS NEEDED
Status: DISCONTINUED | OUTPATIENT
Start: 2021-09-14 | End: 2021-09-14 | Stop reason: HOSPADM

## 2021-09-14 RX ORDER — WATER 1000 ML/1000ML
INJECTION, SOLUTION INTRAVENOUS AS NEEDED
Status: DISCONTINUED | OUTPATIENT
Start: 2021-09-14 | End: 2021-09-14 | Stop reason: HOSPADM

## 2021-09-14 RX ORDER — SODIUM CHLORIDE 0.9 % (FLUSH) 0.9 %
3 SYRINGE (ML) INJECTION EVERY 12 HOURS SCHEDULED
Status: DISCONTINUED | OUTPATIENT
Start: 2021-09-14 | End: 2021-09-14 | Stop reason: HOSPADM

## 2021-09-14 RX ORDER — LIDOCAINE HYDROCHLORIDE 10 MG/ML
INJECTION, SOLUTION EPIDURAL; INFILTRATION; INTRACAUDAL; PERINEURAL AS NEEDED
Status: DISCONTINUED | OUTPATIENT
Start: 2021-09-14 | End: 2021-09-14 | Stop reason: HOSPADM

## 2021-09-14 RX ORDER — DEXAMETHASONE SODIUM PHOSPHATE 4 MG/ML
4 INJECTION, SOLUTION INTRA-ARTICULAR; INTRALESIONAL; INTRAMUSCULAR; INTRAVENOUS; SOFT TISSUE ONCE AS NEEDED
Status: COMPLETED | OUTPATIENT
Start: 2021-09-14 | End: 2021-09-14

## 2021-09-14 RX ORDER — METOPROLOL TARTRATE 5 MG/5ML
2 INJECTION INTRAVENOUS ONCE AS NEEDED
Status: COMPLETED | OUTPATIENT
Start: 2021-09-14 | End: 2021-09-14

## 2021-09-14 RX ORDER — BUPIVACAINE HYDROCHLORIDE 5 MG/ML
INJECTION, SOLUTION PERINEURAL AS NEEDED
Status: DISCONTINUED | OUTPATIENT
Start: 2021-09-14 | End: 2021-09-14 | Stop reason: HOSPADM

## 2021-09-14 RX ORDER — LABETALOL HYDROCHLORIDE 5 MG/ML
5 INJECTION, SOLUTION INTRAVENOUS
Status: DISCONTINUED | OUTPATIENT
Start: 2021-09-14 | End: 2021-09-14 | Stop reason: HOSPADM

## 2021-09-14 RX ORDER — CLINDAMYCIN PHOSPHATE 600 MG/50ML
600 INJECTION INTRAVENOUS
Status: COMPLETED | OUTPATIENT
Start: 2021-09-14 | End: 2021-09-14

## 2021-09-14 RX ORDER — ONDANSETRON 2 MG/ML
INJECTION INTRAMUSCULAR; INTRAVENOUS AS NEEDED
Status: DISCONTINUED | OUTPATIENT
Start: 2021-09-14 | End: 2021-09-14 | Stop reason: SURG

## 2021-09-14 RX ORDER — TRAMADOL HYDROCHLORIDE 50 MG/1
25 TABLET ORAL EVERY 6 HOURS PRN
Qty: 10 TABLET | Refills: 0 | Status: ON HOLD | OUTPATIENT
Start: 2021-09-14 | End: 2021-11-05

## 2021-09-14 RX ORDER — ACETAMINOPHEN 500 MG
1000 TABLET ORAL ONCE
Status: COMPLETED | OUTPATIENT
Start: 2021-09-14 | End: 2021-09-14

## 2021-09-14 RX ORDER — OXYCODONE AND ACETAMINOPHEN 7.5; 325 MG/1; MG/1
1 TABLET ORAL EVERY 4 HOURS PRN
Status: DISCONTINUED | OUTPATIENT
Start: 2021-09-14 | End: 2021-09-14 | Stop reason: HOSPADM

## 2021-09-14 RX ORDER — OXYCODONE HYDROCHLORIDE AND ACETAMINOPHEN 5; 325 MG/1; MG/1
1 TABLET ORAL ONCE AS NEEDED
Status: COMPLETED | OUTPATIENT
Start: 2021-09-14 | End: 2021-09-14

## 2021-09-14 RX ORDER — SODIUM CHLORIDE 0.9 % (FLUSH) 0.9 %
3 SYRINGE (ML) INJECTION AS NEEDED
Status: DISCONTINUED | OUTPATIENT
Start: 2021-09-14 | End: 2021-09-14 | Stop reason: HOSPADM

## 2021-09-14 RX ORDER — FENTANYL CITRATE 50 UG/ML
INJECTION, SOLUTION INTRAMUSCULAR; INTRAVENOUS AS NEEDED
Status: DISCONTINUED | OUTPATIENT
Start: 2021-09-14 | End: 2021-09-14 | Stop reason: SURG

## 2021-09-14 RX ORDER — PHENYLEPHRINE HCL IN 0.9% NACL 1 MG/10 ML
SYRINGE (ML) INTRAVENOUS AS NEEDED
Status: DISCONTINUED | OUTPATIENT
Start: 2021-09-14 | End: 2021-09-14 | Stop reason: SURG

## 2021-09-14 RX ADMIN — HEPARIN SODIUM 5000 UNITS: 5000 INJECTION INTRAVENOUS; SUBCUTANEOUS at 08:35

## 2021-09-14 RX ADMIN — OXYCODONE HYDROCHLORIDE AND ACETAMINOPHEN 1 TABLET: 5; 325 TABLET ORAL at 10:48

## 2021-09-14 RX ADMIN — Medication 100 MCG: at 09:00

## 2021-09-14 RX ADMIN — CLINDAMYCIN IN 5 PERCENT DEXTROSE 600 MG: 12 INJECTION, SOLUTION INTRAVENOUS at 08:58

## 2021-09-14 RX ADMIN — PROPOFOL 100 MG: 10 INJECTION, EMULSION INTRAVENOUS at 08:57

## 2021-09-14 RX ADMIN — DEXAMETHASONE SODIUM PHOSPHATE 4 MG: 4 INJECTION, SOLUTION INTRA-ARTICULAR; INTRALESIONAL; INTRAMUSCULAR; INTRAVENOUS; SOFT TISSUE at 08:48

## 2021-09-14 RX ADMIN — LIDOCAINE HYDROCHLORIDE 100 MG: 20 INJECTION, SOLUTION EPIDURAL; INFILTRATION; INTRACAUDAL; PERINEURAL at 08:57

## 2021-09-14 RX ADMIN — FAMOTIDINE 20 MG: 10 INJECTION INTRAVENOUS at 08:48

## 2021-09-14 RX ADMIN — ACETAMINOPHEN 1000 MG: 500 TABLET, FILM COATED ORAL at 08:48

## 2021-09-14 RX ADMIN — SODIUM CHLORIDE, POTASSIUM CHLORIDE, SODIUM LACTATE AND CALCIUM CHLORIDE 1000 ML: 600; 310; 30; 20 INJECTION, SOLUTION INTRAVENOUS at 07:05

## 2021-09-14 RX ADMIN — FENTANYL CITRATE 25 MCG: 50 INJECTION INTRAMUSCULAR; INTRAVENOUS at 10:34

## 2021-09-14 RX ADMIN — FENTANYL CITRATE 75 MCG: 50 INJECTION, SOLUTION INTRAMUSCULAR; INTRAVENOUS at 09:25

## 2021-09-14 RX ADMIN — Medication 100 MCG: at 09:05

## 2021-09-14 RX ADMIN — FENTANYL CITRATE 25 MCG: 50 INJECTION, SOLUTION INTRAMUSCULAR; INTRAVENOUS at 08:57

## 2021-09-14 RX ADMIN — ONDANSETRON 4 MG: 2 INJECTION INTRAMUSCULAR; INTRAVENOUS at 09:56

## 2021-09-14 RX ADMIN — METOPROLOL TARTRATE 2 MG: 5 INJECTION INTRAVENOUS at 08:48

## 2021-09-14 RX ADMIN — Medication 100 MCG: at 09:02

## 2021-09-14 RX ADMIN — Medication 200 MCG: at 09:21

## 2021-09-14 RX ADMIN — ONDANSETRON 4 MG: 2 INJECTION INTRAMUSCULAR; INTRAVENOUS at 10:33

## 2021-09-14 NOTE — DISCHARGE INSTRUCTIONS
IM Hospitalist Note       Patient:  Jennifer Alcazar   Date:   5/4/2021  Adm. Date: 4/28/2021  MRN:  [id number]   Attending: Indiana Johnson MD    Problem List:  Chief Complaint   Patient presents with   • Low Blood Sugar Symptomatic           PMHx, Social Hx , Medications and Allergies reviewed.    ALLERGIES:   Allergen Reactions   • Amoxicillin PRURITUS   • Percocet [Oxycodone-Acetaminophen] ANXIETY   • Penicillins Cross Reactors PRURITUS   • Sulfa Drugs Cross Reactors PRURITUS     CodeStatus: Full Resuscitation    Subjective:    Jennifer Alcazar is a 76 year old female who was admitted on 4/28/2021 symptomatic hypoglycemia and worsening kidney function.  Past medical history of diabetes mellitus type 2, hypertension, anemia of chronic disease, dyslipidemia, gout, CVA, CKD 4    Patient feels  nauseated today and constipated.  Feels weak.  Not ready to go home.      ROS: No fever, chills, no abd pain ,  denies SOB No rashes       Objective:    Vital Last Value 24 Hour Range   Temperature 97.8 °F (36.6 °C) (05/04/21 1400) Temp  Min: 97.8 °F (36.6 °C)  Max: 98.5 °F (36.9 °C)   Pulse 68 (05/04/21 1805) Pulse  Min: 65  Max: 69   Respiratory 18 (05/04/21 1400) Resp  Min: 18  Max: 20   Non-Invasive  Blood Pressure (!) 145/78 (05/04/21 1805) BP  Min: 128/66  Max: 160/80   Pulse Oximetry 99 % (05/04/21 1600) SpO2  Min: 98 %  Max: 99 %   Arterial   Blood Pressure   No data recorded     Ht/Wt Today Admit   Weight 86.3 kg Weight: 89.8 kg   Height N/A Height: 4' 11\" (149.9 cm)   BMI N/A BMI (Calculated): 38.43     Intake/Output:           Last StoolOccurrence: 1(per patient, yellow and loose ) (05/04/21 0000)                 Intake/Output Summary (Last 24 hours) at 5/4/2021 1834  Last data filed at 5/4/2021 1300  Gross per 24 hour   Intake 480 ml   Output --   Net 480 ml                Physical Exam:  General : A and O x 3, NAD  Pulm: Clear to auscultation, no wheezes, no rales  GI:  YOUR NEXT PAIN MEDICATION IS DUE AT______________         General Anesthesia, Adult, Care After  This sheet gives you information about how to care for yourself after your procedure. Your health care provider may also give you more specific instructions. If you have problems or questions, contact your health care provider.  What can I expect after the procedure?  After the procedure, the following side effects are common:  · Pain or discomfort at the IV site.  · Nausea.  · Vomiting.  · Sore throat.  · Trouble concentrating.  · Feeling cold or chills.  · Weak or tired.  · Sleepiness and fatigue.  · Soreness and body aches. These side effects can affect parts of the body that were not involved in surgery.  Follow these instructions at home:   For at least 24 hours after the procedure:  1. Have a responsible adult stay with you. It is important to have someone help care for you until you are awake and alert.  2. Rest as needed.  3. Do not:  ? Participate in activities in which you could fall or become injured.  ? Drive.  ? Use heavy machinery.  ? Drink alcohol.  ? Take sleeping pills or medicines that cause drowsiness.  ? Make important decisions or sign legal documents.  ? Take care of children on your own.  Eating and drinking  · Follow any instructions from your health care provider about eating or drinking restrictions.  · When you feel hungry, start by eating small amounts of foods that are soft and easy to digest (bland), such as toast. Gradually return to your regular diet.  · Drink enough fluid to keep your urine pale yellow.  · If you vomit, rehydrate by drinking water, juice, or clear broth.  General instructions  1. If you have sleep apnea, surgery and certain medicines can increase your risk for breathing problems. Follow instructions from your health care provider about wearing your sleep device:  ? Anytime you are sleeping, including during daytime naps.  ? While taking prescription pain medicines,  sleeping medicines, or medicines that make you drowsy.  2. Return to your normal activities as told by your health care provider. Ask your health care provider what activities are safe for you.  3. Take over-the-counter and prescription medicines only as told by your health care provider.  4. If you smoke, do not smoke without supervision.  5. Keep all follow-up visits as told by your health care provider. This is important.  Contact a health care provider if:  · You have nausea or vomiting that does not get better with medicine.  · You cannot eat or drink without vomiting.  · You have pain that does not get better with medicine.  · You are unable to pass urine.  · You develop a skin rash.  · You have a fever.  · You have redness around your IV site that gets worse.  Get help right away if:  · You have difficulty breathing.  · You have chest pain.  · You have blood in your urine or stool, or you vomit blood.  Summary  · After the procedure, it is common to have a sore throat or nausea. It is also common to feel tired.  · Have a responsible adult stay with you for the first 24 hours after general anesthesia. It is important to have someone help care for you until you are awake and alert.  · When you feel hungry, start by eating small amounts of foods that are soft and easy to digest (bland), such as toast. Gradually return to your regular diet.  · Drink enough fluid to keep your urine pale yellow.  · Return to your normal activities as told by your health care provider. Ask your health care provider what activities are safe for you.  This information is not intended to replace advice given to you by your health care provider. Make sure you discuss any questions you have with your health care provider.  Document Revised: 12/21/2018 Document Reviewed: 08/03/2018    CALL YOUR PHYSICIAN IF YOU EXPERIENCE  INCREASED PAIN NOT HELPED BY YOUR PAIN MEDICATION.      .                                              Fall  Prevention in the Home      Falls can cause injuries. They can happen to people of all ages. There are many things you can do to make your home safe and to help prevent falls.    WHAT CAN I DO ON THE OUTSIDE OF MY HOME?  · Regularly fix the edges of walkways and driveways and fix any cracks.  · Remove anything that might make you trip as you walk through a door, such as a raised step or threshold.  · Trim any bushes or trees on the path to your home.  · Use bright outdoor lighting.  · Clear any walking paths of anything that might make someone trip, such as rocks or tools.  · Regularly check to see if handrails are loose or broken. Make sure that both sides of any steps have handrails.  · Any raised decks and porches should have guardrails on the edges.  · Have any leaves, snow, or ice cleared regularly.  · Use sand or salt on walking paths during winter.  · Clean up any spills in your garage right away. This includes oil or grease spills.  WHAT CAN I DO IN THE BATHROOM?    · Use night lights.  · Install grab bars by the toilet and in the tub and shower. Do not use towel bars as grab bars.  · Use non-skid mats or decals in the tub or shower.  · If you need to sit down in the shower, use a plastic, non-slip stool.  · Keep the floor dry. Clean up any water that spills on the floor as soon as it happens.  · Remove soap buildup in the tub or shower regularly.  · Attach bath mats securely with double-sided non-slip rug tape.  · Do not have throw rugs and other things on the floor that can make you trip.  WHAT CAN I DO IN THE BEDROOM?  · Use night lights.  · Make sure that you have a light by your bed that is easy to reach.  · Do not use any sheets or blankets that are too big for your bed. They should not hang down onto the floor.  · Have a firm chair that has side arms. You can use this for support while you get dressed.  · Do not have throw rugs and other things on the floor that can make you trip.  WHAT CAN I DO IN  Indiana Johnson MD     THE KITCHEN?  · Clean up any spills right away.  · Avoid walking on wet floors.  · Keep items that you use a lot in easy-to-reach places.  · If you need to reach something above you, use a strong step stool that has a grab bar.  · Keep electrical cords out of the way.  · Do not use floor polish or wax that makes floors slippery. If you must use wax, use non-skid floor wax.  · Do not have throw rugs and other things on the floor that can make you trip.  WHAT CAN I DO WITH MY STAIRS?  · Do not leave any items on the stairs.  · Make sure that there are handrails on both sides of the stairs and use them. Fix handrails that are broken or loose. Make sure that handrails are as long as the stairways.  · Check any carpeting to make sure that it is firmly attached to the stairs. Fix any carpet that is loose or worn.  · Avoid having throw rugs at the top or bottom of the stairs. If you do have throw rugs, attach them to the floor with carpet tape.  · Make sure that you have a light switch at the top of the stairs and the bottom of the stairs. If you do not have them, ask someone to add them for you.  WHAT ELSE CAN I DO TO HELP PREVENT FALLS?  · Wear shoes that:  ¨ Do not have high heels.  ¨ Have rubber bottoms.  ¨ Are comfortable and fit you well.  ¨ Are closed at the toe. Do not wear sandals.  · If you use a stepladder:  ¨ Make sure that it is fully opened. Do not climb a closed stepladder.  ¨ Make sure that both sides of the stepladder are locked into place.  ¨ Ask someone to hold it for you, if possible.  · Clearly nawaf and make sure that you can see:  ¨ Any grab bars or handrails.  ¨ First and last steps.  ¨ Where the edge of each step is.  · Use tools that help you move around (mobility aids) if they are needed. These include:  ¨ Canes.  ¨ Walkers.  ¨ Scooters.  ¨ Crutches.  · Turn on the lights when you go into a dark area. Replace any light bulbs as soon as they burn out.  · Set up your furniture so you have a clear  path. Avoid moving your furniture around.  · If any of your floors are uneven, fix them.  · If there are any pets around you, be aware of where they are.  · Review your medicines with your doctor. Some medicines can make you feel dizzy. This can increase your chance of falling.  Ask your doctor what other things that you can do to help prevent falls.     This information is not intended to replace advice given to you by your health care provider. Make sure you discuss any questions you have with your health care provider.     Document Released: 10/14/2010 Document Revised: 05/03/2016 Document Reviewed: 01/22/2016  [employer] Interactive Patient Education ©2016 [employer]     PATIENT/FAMILY/RESPONSIBLE PARTY VERBALIZES UNDERSTANDING OF ABOVE EDUCATION.  COPY OF PAIN SCALE GIVEN AND REVIEWED WITH VERBALIZED UNDERSTANDING.Wound:   - you have skin glue on your incisions. Okay to shower tomorrow.   - Leave skin glue in place, it should slowly fall off over 2 weeks   - No swimming/soaking/bathing x 2 weeks to allow incisions to heal.     Activity:   - Activity as tolerated. N  - No driving or operating machinery on narcotic pain medication.     Pain medication:   - Take 1000mg of tylenol every 8 hours for 3 days. After three days, take it prn.   - You have a prescription for a narcotic. It will be Ultram tabs. Take these only as needed after you have taken the tylenol. If you are taking the Ultram, make sure to take a stool softener (colace) with it as it can cause constipation.   - The narcotic may make you nauseated, you will have a prescription for zofran in case of nausea.     Follow up:   - make an appointment to see me on Monday   - If you have any concerns before then, call me office at [phone]

## 2021-09-14 NOTE — ANESTHESIA POSTPROCEDURE EVALUATION
Patient: Dago Nunez    Procedure Summary     Date: 09/14/21 Room / Location:  [facility] OR  /  [facility] OR    Anesthesia Start: 0852 Anesthesia Stop: 1022    Procedure: LEFT PARTIAL MASTECTOMY WITH MAGSEED AND LEFT SENTINEL LYMPH NODE BIOPSY MAGTRACE (Left Breast) Diagnosis: (BREAST CANCER)    Surgeons: Quynh Rosario MD Provider: Adonay Florence CRNA    Anesthesia Type: general ASA Status: 3          Anesthesia Type: general    Vitals  Vitals Value Taken Time   /48 09/14/21 1104   Temp 98.4 °F (36.9 °C) 09/14/21 1104   Pulse 59 09/14/21 1104   Resp 12 09/14/21 1104   SpO2 94 % 09/14/21 1104           Post Anesthesia Care and Evaluation    Patient location during evaluation: PACU  Level of consciousness: awake  Pain management: adequate  Airway patency: patent  Anesthetic complications: No anesthetic complications  PONV Status: none  Cardiovascular status: acceptable  Respiratory status: acceptable  Hydration status: acceptable

## 2021-09-14 NOTE — ANESTHESIA PROCEDURE NOTES
Airway  Urgency: elective    Date/Time: 9/14/2021 8:57 AM  Airway not difficult    General Information and Staff    Patient location during procedure: OR  CRNA: Adonay Florence CRNA    Indications and Patient Condition    Preoxygenated: yes  Mask difficulty assessment: 0 - not attempted    Final Airway Details  Final airway type: supraglottic airway      Successful airway: I-gel  Size 4    Number of attempts at approach: 1  Assessment: lips, teeth, and gum same as pre-op

## 2021-09-14 NOTE — OP NOTE
Left partial mastectomy with magseed localization and left sentinel lymph node biopsy with magtrace Operative Report:     Patient: Dago Nunez  MRN: [id number]    YOB: 1940  Age: 81 y.o.  Sex: female  Unit:  [facility] OR Room/Bed: [facility] OR/MAIN OR Location: [facility]      Admitting Physician: INDIA ROSARIO    Primary Care Physician: Shaheen Akbar DO             INDICATIONS: This is a 81 y.o. year old female who presents with ER+MS+ HER2 negative biopsy proven left invasive ductal carcinoma with radiologically negative lymph nodes. After a long discussion of risks, benefits, and alternatives, the patient was consented for a left lumpectomy with sentinel lymph node biopsy. Her magseed was placed yesterday in radiology and I have reviewed the imaging.     DATE OF OPERATION: 9/14/2021     Surgeon(s) and Role:     * India Rosario MD - Primary    ANESTHESIA: General     PREOPERATIVE DIAGNOSIS: Invasive ductal carcinoma of the left breast, ER+ MS+     POSTOPERATIVE DIAGNOSIS: Same    PROCEDURES PERFORMED:    (1) Left Breast Magseed Guided Lumpectomy   (2) Left [place] Lymph Node Biopsy     PROCEDURE DETAILS:   Consent was obtained. Pre-operatively the mag seed was placed in the left breast lesion. Pre-operative antibiotics and subcutaneous heparin were administered. The patient was brought to the OR and general anesthesia was induced. After a time out, mag trace and methylene blue were injected in the left breast deep to the nipple areolar complex. The breast was then massaged for 5 minutes. The left breast and axilla were prepped and draped in a sterile fashion. A time out was performed. The mag probe was used to localize the area of the lesion and mag seed. There was a skin puckering overlying the lesion. An elliptical incision was made, encompassing the area of skin puckering. Skin flaps were raised. The lesion was localized with the mag probe. A plastic clamp was placed on the lesion and  it was dissected out with cautery circumferentially. The lesion was marked with a short stitch superior, long lateral. It was sent on a grid for xray. This showed that the seed and clip were in the center of the specimen and that the lesion was in the specimen as well.The specimen was then sent for permanent pathology. Hemostasis was obtained and clips were placed in the bed. I then turned my attention to the sentinel lymph node.  The mag probe was used to localize the sentinel node. An incision was made over the sentinel node and dissection was carried down and through the clavipectoral fascia. The lymph node was again localized with the mag probe. It was dissected out with cautery and the stalk was clipped. It was sent for permanent pathology. There were no further palpable nodes and no further positivity with the magprobe. Hemostasis was obtained. Both incisions had hemostasis confirmed again. Both were closed with interrupted 3-0 vicryl dermal sutures followed by a 4-0 monocryl subcuticular suture. Both were dressed Exofin Fusion. The patient tolerated the procedure well and was transferred to PACU in good condition.     Findings: Magseed, clip and lesion in the specimen. Hot and blue sentinel lymph node   Estimated Blood Loss: 30mL  Complications: none apparent            Specimens:   (1) left breast lumpectomy, short stitch superior, long lateral   (2) left sentinel lymph node, hot and blue      Disposition: PACU - hemodynamically stable.           Condition: stable    Quynh Rosario MD  09/03/2021

## 2021-09-14 NOTE — TELEPHONE ENCOUNTER
Caller: JÚNIOR RICHARDS    Relationship: Emergency Contact     Best call back number: [phone]    Medication needed:   Requested Prescriptions     Pending Prescriptions Disp Refills   • diphenoxylate-atropine (LOMOTIL) 2.5-0.025 MG per tablet 90 tablet 0     Does the patient have less than a 3 day supply:  [x] Yes  [] No    What is the patient's preferred pharmacy: [address] [phone] [facility] [phone]

## 2021-09-14 NOTE — ANESTHESIA PREPROCEDURE EVALUATION
Anesthesia Evaluation     Patient summary reviewed   no history of anesthetic complications:  NPO Solid Status: > 8 hours  NPO Liquid Status: > 8 hours           Airway   Mallampati: II  TM distance: >3 FB  Neck ROM: full  No difficulty expected  Dental    (+) partials    Pulmonary    (-) COPD, asthma, sleep apnea, not a smoker  Cardiovascular   Exercise tolerance: poor (<4 METS)    ECG reviewed    (+) hypertension, hyperlipidemia,   (-) pacemaker, past MI, angina, cardiac stents    ROS comment: 7/2021 7/22/21 DSE Dobutamine stress echocardiogram without clinical, electrocardiographic,  or echocardiographic evidence of myocardial ischemia.     Echo 7/2021  Summary  Normal left ventricular size and systolic function EF 55-60%  Mild concentric left ventricular hypertrophy with mild (grade 1) diastolic  dysfunction  Mild left atrial enlargement  Tricuspid aortic valve with adequate cusp separation and neither  significant stenosis or insufficiency  Mitral annular calcification with mild thickening of a normally mobile  mitral valve with mild regurgitation  Mild pulmonic insufficiency  Normal right-sided chambers with preserved RV systolic function  Mild tricuspid regurgitation with RVSP estimate 34 mmHg  IVC dimension and is throwing motion normal consistent with normal right  atrial filling pressures  Aortic root and ascending segments measured within normal limits  No significant pericardial effusion    Neuro/Psych  (-) seizures, TIA, CVA  GI/Hepatic/Renal/Endo    (+) obesity,  GERD,  renal disease CRI,   (-) liver disease, diabetes    Musculoskeletal     Abdominal    Substance History      OB/GYN          Other   blood dyscrasia anemia thrombocytopenia,   history of cancer active      Other Comment: Pt with vulvar cancer, unable to tolerate chemo  Ow with breast cancer, having mastectomy and SLN biopsy                  Anesthesia Plan    ASA 3     general     intravenous induction     Anesthetic plan, all risks,  benefits, and alternatives have been provided, discussed and informed consent has been obtained with: patient.

## 2021-09-14 NOTE — BRIEF OP NOTE
BREAST MASTECTOMY WITH MAG SEED PLACEMENT AND SENTINEL NODE BIOPSY  Progress Note    Dago Nunez  9/14/2021    Pre-op Diagnosis:   BREAST CANCER       Post-Op Diagnosis Codes:   same     Procedure/CPT® Codes:        Procedure(s):  LEFT PARTIAL MASTECTOMY WITH MAGSEED AND LEFT SENTINEL LYMPH NODE BIOPSY MAGTRACE    Surgeon(s):  Quynh Rosario MD    Anesthesia: General    Staff:   Circulator: Delaney Vasques RN; Peter Elder RN  Scrub Person: Claudia Canada; Destin Link; Wicho Suero         Estimated Blood Loss: 30mL    Urine Voided: * No values recorded between 9/14/2021  8:52 AM and 9/14/2021 10:18 AM *    Specimens:                Specimens     ID Source Type Tests Collected By Collected At Frozen?    A Breast, Left Tissue · TISSUE PATHOLOGY EXAM   Quynh Rosario MD 9/14/21 0945     Description: sentinal lymph node #1 hot and blue    This specimen was not marked as sent.    B Breast, Left Tissue · TISSUE PATHOLOGY EXAM   Quynh Rosario MD 9/14/21 0949 No    Description: Left breast lateral.Short stitch superior long stitch lateral    This specimen was not marked as sent.                Drains: * No LDAs found *    Findings: specimen with clip and mag seed on mammo. One hot and blue sentinel node     Complications: none apparent        was responsible for performing the following activities: Retraction, Suction and Irrigation and their skilled assistance was necessary for the success of this case.    Quynh Rosario MD     Date: 9/14/2021  Time: 10:27 CDT

## 2021-09-15 RX ORDER — DIPHENOXYLATE HYDROCHLORIDE AND ATROPINE SULFATE 2.5; .025 MG/1; MG/1
2 TABLET ORAL 4 TIMES DAILY PRN
Qty: 90 TABLET | Refills: 2 | Status: SHIPPED | OUTPATIENT
Start: 2021-09-15 | End: 2021-09-17 | Stop reason: SDUPTHER

## 2021-09-16 LAB
CYTO UR: NORMAL
LAB AP CASE REPORT: NORMAL
LAB AP SYNOPTIC CHECKLIST: NORMAL
PATH REPORT.FINAL DX SPEC: NORMAL
PATH REPORT.GROSS SPEC: NORMAL

## 2021-09-17 DIAGNOSIS — C51.9 VULVAR CANCER, CARCINOMA (HCC): ICD-10-CM

## 2021-09-17 DIAGNOSIS — K52.1 DIARRHEA DUE TO DRUG: ICD-10-CM

## 2021-09-17 DIAGNOSIS — G89.3 CANCER RELATED PAIN: ICD-10-CM

## 2021-09-17 RX ORDER — DIPHENOXYLATE HYDROCHLORIDE AND ATROPINE SULFATE 2.5; .025 MG/1; MG/1
2 TABLET ORAL 4 TIMES DAILY PRN
Qty: 90 TABLET | Refills: 2 | Status: SHIPPED | OUTPATIENT
Start: 2021-09-17 | End: 2022-06-23

## 2021-09-17 RX ORDER — HYDROCODONE BITARTRATE AND ACETAMINOPHEN 10; 325 MG/1; MG/1
1 TABLET ORAL EVERY 4 HOURS PRN
Qty: 60 TABLET | Refills: 0 | Status: SHIPPED | OUTPATIENT
Start: 2021-09-17 | End: 2022-03-18 | Stop reason: SDUPTHER

## 2021-09-17 NOTE — TELEPHONE ENCOUNTER
Rx Refill Note  Requested Prescriptions     Pending Prescriptions Disp Refills   • diphenoxylate-atropine (LOMOTIL) 2.5-0.025 MG per tablet 90 tablet 2     Sig: Take 2 tablets by mouth 4 (Four) Times a Day As Needed for Diarrhea.   • HYDROcodone-acetaminophen (NORCO)  MG per tablet 60 tablet 0     Sig: Take 1 tablet by mouth Every 4 (Four) Hours As Needed for Moderate Pain  or Severe Pain .      Last office visit with prescribing clinician: 3/1/2021      Next office visit with prescribing clinician: Visit date not found            ADAMA Rodriguez  09/17/21, 13:07 CDT

## 2021-09-17 NOTE — TELEPHONE ENCOUNTER
Caller: Dago Nunez    Relationship: Self    Medication needed:   Requested Prescriptions     Pending Prescriptions Disp Refills   • diphenoxylate-atropine (LOMOTIL) 2.5-0.025 MG per tablet 90 tablet 2     Sig: Take 2 tablets by mouth 4 (Four) Times a Day As Needed for Diarrhea.   • HYDROcodone-acetaminophen (NORCO)  MG per tablet 60 tablet 0     Sig: Take 1 tablet by mouth Every 4 (Four) Hours As Needed for Moderate Pain  or Severe Pain .     What is the patient's preferred pharmacy: [facility] - [place], [place] - [address] [phone] [facility] [phone]

## 2021-09-22 ENCOUNTER — TELEPHONE (OUTPATIENT)
Dept: ONCOLOGY | Facility: CLINIC | Age: 81
End: 2021-09-22

## 2021-09-22 NOTE — TELEPHONE ENCOUNTER
SPOKE W/PT AND CONFIRMED APPT FOR 10/1/21 AT9AM WITH DR STAFFORD    ----- Message from Drake Stafford MD sent at 9/22/2021  2:26 PM CDT -----  See her 10/01/2021 at 9 AM.    ----- Message -----  From: Sussy Savage  Sent: 9/22/2021   2:05 PM CDT  To: Drake Stafford MD    Est pt has new dx of breast cancer. Do you want to move her appt with you sooner?

## 2021-09-29 ENCOUNTER — TELEPHONE (OUTPATIENT)
Dept: ONCOLOGY | Facility: CLINIC | Age: 81
End: 2021-09-29

## 2021-09-29 NOTE — TELEPHONE ENCOUNTER
Caller: SYNDAL    Relationship: PATIENT    Best call back number: [phone]     What is the best time to reach you: ANYTIME    What was the call regarding: SHE WANTED TO KNOW IF/WHEN SHE'S SUPPOSED TO HAVE LABS. SHE'S SCHEDULED FOR A FOLLOW UP ON 10/01 BUT NO LABS HAVE BEEN SCHEDULED.    Do you require a callback: YES

## 2021-09-29 NOTE — TELEPHONE ENCOUNTER
Called patient and appointment set up for labs on 09/30 at 1030 at [facility].  Patient v/u.

## 2021-09-30 ENCOUNTER — TELEPHONE (OUTPATIENT)
Dept: ONCOLOGY | Facility: CLINIC | Age: 81
End: 2021-09-30

## 2021-09-30 ENCOUNTER — LAB (OUTPATIENT)
Dept: ONCOLOGY | Facility: CLINIC | Age: 81
End: 2021-09-30

## 2021-09-30 ENCOUNTER — HOSPITAL ENCOUNTER (EMERGENCY)
Facility: HOSPITAL | Age: 81
Discharge: HOME | End: 2021-09-30
Attending: STUDENT IN AN ORGANIZED HEALTH CARE EDUCATION/TRAINING PROGRAM | Admitting: STUDENT IN AN ORGANIZED HEALTH CARE EDUCATION/TRAINING PROGRAM

## 2021-09-30 ENCOUNTER — APPOINTMENT (OUTPATIENT)
Dept: CT IMAGING | Facility: HOSPITAL | Age: 81
End: 2021-09-30

## 2021-09-30 VITALS
HEART RATE: 62 BPM | DIASTOLIC BLOOD PRESSURE: 79 MMHG | HEIGHT: 58 IN | RESPIRATION RATE: 16 BRPM | SYSTOLIC BLOOD PRESSURE: 164 MMHG | BODY MASS INDEX: 33.17 KG/M2 | WEIGHT: 158 LBS | OXYGEN SATURATION: 99 % | TEMPERATURE: 98.5 F

## 2021-09-30 DIAGNOSIS — N18.31 ANEMIA DUE TO STAGE 3A CHRONIC KIDNEY DISEASE (HCC): ICD-10-CM

## 2021-09-30 DIAGNOSIS — N30.90 CYSTITIS: Primary | ICD-10-CM

## 2021-09-30 DIAGNOSIS — D63.1 ANEMIA DUE TO STAGE 3A CHRONIC KIDNEY DISEASE (HCC): ICD-10-CM

## 2021-09-30 DIAGNOSIS — K52.9 GASTROENTERITIS: ICD-10-CM

## 2021-09-30 LAB
ALBUMIN SERPL-MCNC: 4.2 G/DL (ref 3.5–5.2)
ALBUMIN SERPL-MCNC: 4.3 G/DL (ref 3.5–5.2)
ALBUMIN/GLOB SERPL: 1.3 G/DL
ALBUMIN/GLOB SERPL: 1.4 G/DL
ALP SERPL-CCNC: 78 U/L (ref 39–117)
ALP SERPL-CCNC: 82 U/L (ref 39–117)
ALT SERPL W P-5'-P-CCNC: 5 U/L (ref 1–33)
ALT SERPL W P-5'-P-CCNC: 8 U/L (ref 1–33)
ANION GAP SERPL CALCULATED.3IONS-SCNC: 10 MMOL/L (ref 5–15)
ANION GAP SERPL CALCULATED.3IONS-SCNC: 13 MMOL/L (ref 5–15)
APTT PPP: 27 SECONDS (ref 24.1–35)
AST SERPL-CCNC: 14 U/L (ref 1–32)
AST SERPL-CCNC: 15 U/L (ref 1–32)
BACTERIA UR QL AUTO: ABNORMAL /HPF
BASOPHILS # BLD AUTO: 0.02 10*3/MM3 (ref 0–0.2)
BASOPHILS # BLD AUTO: 0.02 10*3/MM3 (ref 0–0.2)
BASOPHILS NFR BLD AUTO: 0.3 % (ref 0–1.5)
BASOPHILS NFR BLD AUTO: 0.4 % (ref 0–1.5)
BILIRUB SERPL-MCNC: 0.2 MG/DL (ref 0–1.2)
BILIRUB SERPL-MCNC: 0.3 MG/DL (ref 0–1.2)
BILIRUB UR QL STRIP: NEGATIVE
BUN SERPL-MCNC: 19 MG/DL (ref 8–23)
BUN SERPL-MCNC: 20 MG/DL (ref 8–23)
BUN/CREAT SERPL: 13.9 (ref 7–25)
BUN/CREAT SERPL: 15.7 (ref 7–25)
CALCIUM SPEC-SCNC: 9.1 MG/DL (ref 8.6–10.5)
CALCIUM SPEC-SCNC: 9.4 MG/DL (ref 8.6–10.5)
CHLORIDE SERPL-SCNC: 100 MMOL/L (ref 98–107)
CHLORIDE SERPL-SCNC: 122 MMOL/L (ref 98–107)
CLARITY UR: ABNORMAL
CO2 SERPL-SCNC: 25 MMOL/L (ref 22–29)
CO2 SERPL-SCNC: 26 MMOL/L (ref 22–29)
COLOR UR: YELLOW
CREAT SERPL-MCNC: 1.21 MG/DL (ref 0.57–1)
CREAT SERPL-MCNC: 1.44 MG/DL (ref 0.57–1)
DEPRECATED RDW RBC AUTO: 52.3 FL (ref 37–54)
DEPRECATED RDW RBC AUTO: 54.2 FL (ref 37–54)
EOSINOPHIL # BLD AUTO: 0.24 10*3/MM3 (ref 0–0.4)
EOSINOPHIL # BLD AUTO: 0.27 10*3/MM3 (ref 0–0.4)
EOSINOPHIL NFR BLD AUTO: 4.1 % (ref 0.3–6.2)
EOSINOPHIL NFR BLD AUTO: 5.3 % (ref 0.3–6.2)
ERYTHROCYTE [DISTWIDTH] IN BLOOD BY AUTOMATED COUNT: 13.8 % (ref 12.3–15.4)
ERYTHROCYTE [DISTWIDTH] IN BLOOD BY AUTOMATED COUNT: 14 % (ref 12.3–15.4)
FERRITIN SERPL-MCNC: 493 NG/ML (ref 13–150)
GFR SERPL CREATININE-BSD FRML MDRD: 35 ML/MIN/1.73
GFR SERPL CREATININE-BSD FRML MDRD: 43 ML/MIN/1.73
GLOBULIN UR ELPH-MCNC: 3 GM/DL
GLOBULIN UR ELPH-MCNC: 3.3 GM/DL
GLUCOSE SERPL-MCNC: 123 MG/DL (ref 65–99)
GLUCOSE SERPL-MCNC: 129 MG/DL (ref 65–99)
GLUCOSE UR STRIP-MCNC: NEGATIVE MG/DL
HCT VFR BLD AUTO: 29.9 % (ref 34–46.6)
HCT VFR BLD AUTO: 31.3 % (ref 34–46.6)
HGB BLD-MCNC: 10 G/DL (ref 12–15.9)
HGB BLD-MCNC: 9.7 G/DL (ref 12–15.9)
HGB UR QL STRIP.AUTO: NEGATIVE
HOLD SPECIMEN: NORMAL
HYALINE CASTS UR QL AUTO: ABNORMAL /LPF
IMM GRANULOCYTES # BLD AUTO: 0.01 10*3/MM3 (ref 0–0.05)
IMM GRANULOCYTES # BLD AUTO: 0.02 10*3/MM3 (ref 0–0.05)
IMM GRANULOCYTES NFR BLD AUTO: 0.2 % (ref 0–0.5)
IMM GRANULOCYTES NFR BLD AUTO: 0.3 % (ref 0–0.5)
INR PPP: 1.1 (ref 0.91–1.09)
IRON 24H UR-MRATE: 85 MCG/DL (ref 37–145)
IRON SATN MFR SERPL: 27 % (ref 20–50)
KETONES UR QL STRIP: NEGATIVE
LEUKOCYTE ESTERASE UR QL STRIP.AUTO: ABNORMAL
LYMPHOCYTES # BLD AUTO: 1.04 10*3/MM3 (ref 0.7–3.1)
LYMPHOCYTES # BLD AUTO: 1.12 10*3/MM3 (ref 0.7–3.1)
LYMPHOCYTES NFR BLD AUTO: 19.3 % (ref 19.6–45.3)
LYMPHOCYTES NFR BLD AUTO: 20.4 % (ref 19.6–45.3)
MCH RBC QN AUTO: 33.6 PG (ref 26.6–33)
MCH RBC QN AUTO: 33.8 PG (ref 26.6–33)
MCHC RBC AUTO-ENTMCNC: 31.9 G/DL (ref 31.5–35.7)
MCHC RBC AUTO-ENTMCNC: 32.4 G/DL (ref 31.5–35.7)
MCV RBC AUTO: 103.5 FL (ref 79–97)
MCV RBC AUTO: 105.7 FL (ref 79–97)
MONOCYTES # BLD AUTO: 0.37 10*3/MM3 (ref 0.1–0.9)
MONOCYTES # BLD AUTO: 0.51 10*3/MM3 (ref 0.1–0.9)
MONOCYTES NFR BLD AUTO: 7.3 % (ref 5–12)
MONOCYTES NFR BLD AUTO: 8.8 % (ref 5–12)
NEUTROPHILS NFR BLD AUTO: 3.39 10*3/MM3 (ref 1.7–7)
NEUTROPHILS NFR BLD AUTO: 3.88 10*3/MM3 (ref 1.7–7)
NEUTROPHILS NFR BLD AUTO: 66.4 % (ref 42.7–76)
NEUTROPHILS NFR BLD AUTO: 67.2 % (ref 42.7–76)
NITRITE UR QL STRIP: POSITIVE
NRBC BLD AUTO-RTO: 0 /100 WBC (ref 0–0.2)
PH UR STRIP.AUTO: <=5 [PH] (ref 5–8)
PLATELET # BLD AUTO: 179 10*3/MM3 (ref 140–450)
PLATELET # BLD AUTO: 197 10*3/MM3 (ref 140–450)
PMV BLD AUTO: 10.1 FL (ref 6–12)
PMV BLD AUTO: 9.1 FL (ref 6–12)
POTASSIUM SERPL-SCNC: 3.9 MMOL/L (ref 3.5–5.2)
POTASSIUM SERPL-SCNC: 4.9 MMOL/L (ref 3.5–5.2)
PROT SERPL-MCNC: 7.3 G/DL (ref 6–8.5)
PROT SERPL-MCNC: 7.5 G/DL (ref 6–8.5)
PROT UR QL STRIP: NEGATIVE
PROTHROMBIN TIME: 13.8 SECONDS (ref 11.9–14.6)
RBC # BLD AUTO: 2.89 10*6/MM3 (ref 3.77–5.28)
RBC # BLD AUTO: 2.96 10*6/MM3 (ref 3.77–5.28)
RBC # UR: ABNORMAL /HPF
REF LAB TEST METHOD: ABNORMAL
SODIUM SERPL-SCNC: 136 MMOL/L (ref 136–145)
SODIUM SERPL-SCNC: 160 MMOL/L (ref 136–145)
SP GR UR STRIP: 1.01 (ref 1–1.03)
SQUAMOUS #/AREA URNS HPF: ABNORMAL /HPF
TIBC SERPL-MCNC: 314 MCG/DL (ref 298–536)
TRANSFERRIN SERPL-MCNC: 211 MG/DL (ref 200–360)
UROBILINOGEN UR QL STRIP: ABNORMAL
WBC # BLD AUTO: 5.1 10*3/MM3 (ref 3.4–10.8)
WBC # BLD AUTO: 5.79 10*3/MM3 (ref 3.4–10.8)
WBC UR QL AUTO: ABNORMAL /HPF
WHOLE BLOOD HOLD SPECIMEN: NORMAL
WHOLE BLOOD HOLD SPECIMEN: NORMAL

## 2021-09-30 PROCEDURE — 87077 CULTURE AEROBIC IDENTIFY: CPT | Performed by: STUDENT IN AN ORGANIZED HEALTH CARE EDUCATION/TRAINING PROGRAM

## 2021-09-30 PROCEDURE — 81001 URINALYSIS AUTO W/SCOPE: CPT | Performed by: STUDENT IN AN ORGANIZED HEALTH CARE EDUCATION/TRAINING PROGRAM

## 2021-09-30 PROCEDURE — 85025 COMPLETE CBC W/AUTO DIFF WBC: CPT | Performed by: INTERNAL MEDICINE

## 2021-09-30 PROCEDURE — 80053 COMPREHEN METABOLIC PANEL: CPT | Performed by: INTERNAL MEDICINE

## 2021-09-30 PROCEDURE — 87186 SC STD MICRODIL/AGAR DIL: CPT | Performed by: STUDENT IN AN ORGANIZED HEALTH CARE EDUCATION/TRAINING PROGRAM

## 2021-09-30 PROCEDURE — 84466 ASSAY OF TRANSFERRIN: CPT | Performed by: INTERNAL MEDICINE

## 2021-09-30 PROCEDURE — 85730 THROMBOPLASTIN TIME PARTIAL: CPT | Performed by: STUDENT IN AN ORGANIZED HEALTH CARE EDUCATION/TRAINING PROGRAM

## 2021-09-30 PROCEDURE — 85025 COMPLETE CBC W/AUTO DIFF WBC: CPT | Performed by: STUDENT IN AN ORGANIZED HEALTH CARE EDUCATION/TRAINING PROGRAM

## 2021-09-30 PROCEDURE — 85610 PROTHROMBIN TIME: CPT | Performed by: STUDENT IN AN ORGANIZED HEALTH CARE EDUCATION/TRAINING PROGRAM

## 2021-09-30 PROCEDURE — 80053 COMPREHEN METABOLIC PANEL: CPT | Performed by: STUDENT IN AN ORGANIZED HEALTH CARE EDUCATION/TRAINING PROGRAM

## 2021-09-30 PROCEDURE — 74177 CT ABD & PELVIS W/CONTRAST: CPT

## 2021-09-30 PROCEDURE — 25010000002 IOPAMIDOL 61 % SOLUTION: Performed by: STUDENT IN AN ORGANIZED HEALTH CARE EDUCATION/TRAINING PROGRAM

## 2021-09-30 PROCEDURE — 83540 ASSAY OF IRON: CPT | Performed by: INTERNAL MEDICINE

## 2021-09-30 PROCEDURE — 82728 ASSAY OF FERRITIN: CPT | Performed by: INTERNAL MEDICINE

## 2021-09-30 PROCEDURE — 87086 URINE CULTURE/COLONY COUNT: CPT | Performed by: STUDENT IN AN ORGANIZED HEALTH CARE EDUCATION/TRAINING PROGRAM

## 2021-09-30 PROCEDURE — 99283 EMERGENCY DEPT VISIT LOW MDM: CPT

## 2021-09-30 RX ORDER — CEFDINIR 300 MG/1
300 CAPSULE ORAL DAILY
Qty: 7 CAPSULE | Refills: 0 | Status: SHIPPED | OUTPATIENT
Start: 2021-09-30 | End: 2021-10-07

## 2021-09-30 RX ORDER — LOPERAMIDE HYDROCHLORIDE 2 MG/1
2 CAPSULE ORAL 4 TIMES DAILY PRN
Qty: 20 CAPSULE | Refills: 0 | Status: SHIPPED | OUTPATIENT
Start: 2021-09-30 | End: 2021-10-05

## 2021-09-30 RX ADMIN — SODIUM CHLORIDE, POTASSIUM CHLORIDE, SODIUM LACTATE AND CALCIUM CHLORIDE 1000 ML: 600; 310; 30; 20 INJECTION, SOLUTION INTRAVENOUS at 20:02

## 2021-09-30 RX ADMIN — IOPAMIDOL 100 ML: 612 INJECTION, SOLUTION INTRAVENOUS at 21:40

## 2021-09-30 NOTE — TELEPHONE ENCOUNTER
CRITICAL LAB VALUE  Called and notified patient  Mr. Nunez he was informed to take his wife/patient Dago to the ER dept now we had just received lab results from this am draw and her   SODIUM 160  He was informed this is very critical and take her now, he v/u and will take her to [facility].

## 2021-10-01 ENCOUNTER — OFFICE VISIT (OUTPATIENT)
Dept: ONCOLOGY | Facility: CLINIC | Age: 81
End: 2021-10-01

## 2021-10-01 ENCOUNTER — TELEPHONE (OUTPATIENT)
Dept: ONCOLOGY | Facility: CLINIC | Age: 81
End: 2021-10-01

## 2021-10-01 VITALS
HEIGHT: 58 IN | RESPIRATION RATE: 18 BRPM | HEART RATE: 62 BPM | SYSTOLIC BLOOD PRESSURE: 128 MMHG | WEIGHT: 165.3 LBS | OXYGEN SATURATION: 96 % | DIASTOLIC BLOOD PRESSURE: 62 MMHG | TEMPERATURE: 98 F | BODY MASS INDEX: 34.7 KG/M2

## 2021-10-01 DIAGNOSIS — C50.412 MALIGNANT NEOPLASM OF UPPER-OUTER QUADRANT OF LEFT BREAST IN FEMALE, ESTROGEN RECEPTOR POSITIVE (HCC): Primary | ICD-10-CM

## 2021-10-01 DIAGNOSIS — K22.89 ESOPHAGEAL THICKENING: ICD-10-CM

## 2021-10-01 DIAGNOSIS — Z17.0 MALIGNANT NEOPLASM OF UPPER-OUTER QUADRANT OF LEFT BREAST IN FEMALE, ESTROGEN RECEPTOR POSITIVE (HCC): Primary | ICD-10-CM

## 2021-10-01 DIAGNOSIS — N18.32 ANEMIA DUE TO STAGE 3B CHRONIC KIDNEY DISEASE (HCC): ICD-10-CM

## 2021-10-01 DIAGNOSIS — D63.1 ANEMIA DUE TO STAGE 3B CHRONIC KIDNEY DISEASE (HCC): ICD-10-CM

## 2021-10-01 DIAGNOSIS — Z78.0 MENOPAUSE: ICD-10-CM

## 2021-10-01 PROCEDURE — 99215 OFFICE O/P EST HI 40 MIN: CPT | Performed by: INTERNAL MEDICINE

## 2021-10-01 RX ORDER — LETROZOLE 2.5 MG/1
2.5 TABLET, FILM COATED ORAL DAILY
Qty: 90 TABLET | Refills: 2 | Status: SHIPPED | OUTPATIENT
Start: 2021-10-01 | End: 2022-04-04

## 2021-10-01 NOTE — DISCHARGE INSTRUCTIONS
You were evaluated in the ER for high sodium but your sodium was normal on today's evaluation. You do have a UTI. Your workup showed no indication at this time for admission to the hospital. Please use the antibiotics and imodium and hydration with LOTS of fluids for symptomatic improvement.     It is VERY IMPORTANT that you follow up (call them to set up an appointment) with your primary care doctor* within the next few days or as soon as possible so that you can be re-evaluated for improvement in your symptoms or for any other questions. If you were prescribed any medications, please take them as directed or call us back with any questions.     Return to the ER within 24-48 hours if you have any new symptoms, worsening symptoms, or any other concerns.

## 2021-10-01 NOTE — TELEPHONE ENCOUNTER
----- Message from Drake Stafford MD sent at 9/30/2021  4:23 PM CDT -----  Sodium 160 and chloride 122.  Notify PCP.

## 2021-10-02 LAB — BACTERIA SPEC AEROBE CULT: ABNORMAL

## 2021-10-05 ENCOUNTER — TELEPHONE (OUTPATIENT)
Dept: ONCOLOGY | Facility: CLINIC | Age: 81
End: 2021-10-05

## 2021-10-05 ENCOUNTER — HOSPITAL ENCOUNTER (OUTPATIENT)
Dept: BONE DENSITY | Facility: HOSPITAL | Age: 81
Discharge: HOME | End: 2021-10-05
Admitting: INTERNAL MEDICINE

## 2021-10-05 DIAGNOSIS — D63.1 ANEMIA DUE TO STAGE 3B CHRONIC KIDNEY DISEASE (HCC): ICD-10-CM

## 2021-10-05 DIAGNOSIS — Z78.0 MENOPAUSE: ICD-10-CM

## 2021-10-05 DIAGNOSIS — Z17.0 MALIGNANT NEOPLASM OF UPPER-OUTER QUADRANT OF LEFT BREAST IN FEMALE, ESTROGEN RECEPTOR POSITIVE (HCC): ICD-10-CM

## 2021-10-05 DIAGNOSIS — N18.32 ANEMIA DUE TO STAGE 3B CHRONIC KIDNEY DISEASE (HCC): ICD-10-CM

## 2021-10-05 DIAGNOSIS — C50.412 MALIGNANT NEOPLASM OF UPPER-OUTER QUADRANT OF LEFT BREAST IN FEMALE, ESTROGEN RECEPTOR POSITIVE (HCC): ICD-10-CM

## 2021-10-05 PROCEDURE — 77080 DXA BONE DENSITY AXIAL: CPT

## 2021-10-05 NOTE — TELEPHONE ENCOUNTER
----- Message from Drake Stafford MD sent at 10/5/2021  1:31 PM CDT -----  Bone density osteoporosis.  She will start Femara.  Start OTC Caltrate D twice daily.

## 2021-10-06 ENCOUNTER — TELEPHONE (OUTPATIENT)
Dept: ONCOLOGY | Facility: CLINIC | Age: 81
End: 2021-10-06

## 2021-10-06 NOTE — TELEPHONE ENCOUNTER
Notified Jameelal that bone density showed osteoporosis and that she needs to take Caltrate D twice a day.  Dago stated that she is already taking the Calcium and vitamin D twice a day.

## 2021-10-20 ENCOUNTER — TELEPHONE (OUTPATIENT)
Dept: ONCOLOGY | Facility: CLINIC | Age: 81
End: 2021-10-20

## 2021-10-20 NOTE — TELEPHONE ENCOUNTER
Attempted to call pt to let her know she will not need lab drawn tomorrow 10/21/21, just port flush. I have r/s her lab for [facility] 12/30/21.

## 2021-10-21 ENCOUNTER — INFUSION (OUTPATIENT)
Dept: ONCOLOGY | Facility: CLINIC | Age: 81
End: 2021-10-21

## 2021-10-21 ENCOUNTER — APPOINTMENT (OUTPATIENT)
Dept: LAB | Facility: HOSPITAL | Age: 81
End: 2021-10-21

## 2021-10-21 ENCOUNTER — OFFICE VISIT (OUTPATIENT)
Dept: GASTROENTEROLOGY | Facility: CLINIC | Age: 81
End: 2021-10-21

## 2021-10-21 VITALS
HEIGHT: 58 IN | BODY MASS INDEX: 33.58 KG/M2 | OXYGEN SATURATION: 97 % | WEIGHT: 160 LBS | HEART RATE: 53 BPM | DIASTOLIC BLOOD PRESSURE: 70 MMHG | SYSTOLIC BLOOD PRESSURE: 112 MMHG | TEMPERATURE: 97.1 F

## 2021-10-21 DIAGNOSIS — C51.9 VULVAR CANCER, CARCINOMA (HCC): ICD-10-CM

## 2021-10-21 DIAGNOSIS — C77.4 SECONDARY MALIGNANCY OF INGUINAL LYMPH NODES (HCC): ICD-10-CM

## 2021-10-21 DIAGNOSIS — K21.9 GASTROESOPHAGEAL REFLUX DISEASE, UNSPECIFIED WHETHER ESOPHAGITIS PRESENT: ICD-10-CM

## 2021-10-21 DIAGNOSIS — Z12.11 COLON CANCER SCREENING: Primary | ICD-10-CM

## 2021-10-21 DIAGNOSIS — R93.3 ABNORMAL FINDING ON GI TRACT IMAGING: ICD-10-CM

## 2021-10-21 DIAGNOSIS — R12 HEARTBURN: ICD-10-CM

## 2021-10-21 DIAGNOSIS — Z45.2 ENCOUNTER FOR CARE RELATED TO VASCULAR ACCESS PORT: Primary | ICD-10-CM

## 2021-10-21 PROCEDURE — 99213 OFFICE O/P EST LOW 20 MIN: CPT | Performed by: NURSE PRACTITIONER

## 2021-10-21 RX ORDER — HEPARIN SODIUM (PORCINE) LOCK FLUSH IV SOLN 100 UNIT/ML 100 UNIT/ML
500 SOLUTION INTRAVENOUS AS NEEDED
Status: CANCELLED | OUTPATIENT
Start: 2021-10-21

## 2021-10-21 RX ORDER — SODIUM CHLORIDE 0.9 % (FLUSH) 0.9 %
10 SYRINGE (ML) INJECTION AS NEEDED
Status: DISCONTINUED | OUTPATIENT
Start: 2021-10-21 | End: 2021-10-21 | Stop reason: HOSPADM

## 2021-10-21 RX ORDER — HEPARIN SODIUM (PORCINE) LOCK FLUSH IV SOLN 100 UNIT/ML 100 UNIT/ML
500 SOLUTION INTRAVENOUS AS NEEDED
Status: DISCONTINUED | OUTPATIENT
Start: 2021-10-21 | End: 2021-10-21 | Stop reason: HOSPADM

## 2021-10-21 RX ORDER — SODIUM CHLORIDE 0.9 % (FLUSH) 0.9 %
10 SYRINGE (ML) INJECTION AS NEEDED
Status: CANCELLED | OUTPATIENT
Start: 2021-10-21

## 2021-10-21 RX ORDER — POLYETHYLENE GLYCOL 3350, SODIUM SULFATE ANHYDROUS, SODIUM BICARBONATE, SODIUM CHLORIDE, POTASSIUM CHLORIDE 236; 22.74; 6.74; 5.86; 2.97 G/4L; G/4L; G/4L; G/4L; G/4L
4 POWDER, FOR SOLUTION ORAL ONCE
Qty: 4000 ML | Refills: 0 | Status: SHIPPED | OUTPATIENT
Start: 2021-10-21 | End: 2021-10-21

## 2021-10-21 RX ADMIN — Medication 10 ML: at 11:43

## 2021-10-21 RX ADMIN — HEPARIN SODIUM (PORCINE) LOCK FLUSH IV SOLN 100 UNIT/ML 500 UNITS: 100 SOLUTION at 11:43

## 2021-10-21 NOTE — PROGRESS NOTES
Chief Complaint   Patient presents with   • Follow-up     Pt was in [facility] 9/30/21 due to elevated sodium levels-had CT that shows questionable thickening in the proximal stomach and distal esophagus-presents today to discuss    • Colon Cancer Screening     Pt also presents today for evaluation for colonoscopy-pt's last colon was 1/12/2011     Subjective   HPI  Dago Nunez is a 81 y.o. female who presents as a referral for preventative maintenance. She has no complaints of nausea or vomiting. No change in bowels. No wt loss. No BRBPR. No melena. There is nono family hx for colon cancer. No abdominal pain. There was no Cologuard screening this year.  The patient's last colonoscopy was performed on 1/12/2011 found to be normal.    In addition the patient was referred by Dr. Drake Stafford for abnormal imaging of esophagus and stomach.  The patient's last EGD performed on 7/1/2014 found to be normal with Borges deployed.  Patient does carry history of GERD and esophagitis treated with Nexium 40 mg twice daily as she feels that her heartburn and GERD are not controlled..  The patient has been treated in the past for vulvar cancer in [place].  He is currently being seen by Dr. Stafford for breast cancer patient has undergone removal of the lump as well as lymph nodes.  No treatment has been initiated at this time as she is to follow-up with Dr. Roche. The patient denies any nausea, vomiting, epigastric pain, dysphagia, or hematemesis.  Denies any unintentional weight loss or loss of appetite.      Study Result    Narrative & Impression   CT ABDOMEN PELVIS W CONTRAST- 9/30/2021 9:29 PM CDT     HISTORY: diarrhea, suprapubic pain, hx of vulvar/breast cancer      COMPARISON: 7/7/2012       IMPRESSION:  1. Liquid stool of the colon consistent with diarrhea. No evidence of  bowel obstruction. No free air or abscess.  2. Mild diffuse bladder wall thickening may relate to underdistention.  Correlation for cystitis recommended. No  hydronephrosis. Small renal  cysts.  3. Questionable wall thickening of the proximal stomach and distal  esophagus with probable small hiatal hernia. Clinical correlation  recommended in terms of follow-up nonemergent endoscopy.  This report was finalized on 09/30/2021 21:56 by Dr. Ilene Katz MD.        Past Medical History:   Diagnosis Date   • Anemia in stage 3 chronic kidney disease (HCC) 11/11/2019   • Arthritis    • Bronchitis    • Cellulitis     ROSA LEGS   • GERD (gastroesophageal reflux disease)    • Hyperlipidemia    • Hypertension    • Kyphosis    • Osteoporosis    • Scoliosis    • Self-catheterizes urinary bladder     10FR SIZED CATH   • Stage 3 chronic kidney disease (HCC) 11/11/2019   • Type 2 diabetes mellitus (HCC)    • Vulva cancer ([facility])    • Vulvar intraepithelial neoplasia (MOODY) grade 3      Past Surgical History:   Procedure Laterality Date   • APPENDECTOMY     • BENJMAIN PROCEDURE      No evidence of reflux disease while on Nexium 40mg daily-See report   • BREAST CYST ASPIRATION Left    • COLONOSCOPY  01/12/2011    Diverticulosis sigmoid colon; The examination was otherwise normal; Repeat 10 years   • COLONOSCOPY  11/03/2003    Dr. Laguerre-Normal colonoscopy; Normal terminal ileum; Repeat 5 years   • ENDOSCOPY  07/01/2014    Normal esophagus; Normal stomach; Normal examined duodenum; BRAVO pH capsule deployed;    • ENDOSCOPY  08/14/2013    Mild gastritis-biopsies for H.Pylor obtained   • ENDOSCOPY  02/16/2005    Dr. LaguerreHsjcz-Satrzoobu-hroryynv   • ENDOSCOPY  10/21/2003    Dr. Laguerre-Stage 1 reflux esophagitis   • HYSTERECTOMY     • MASTECTOMY W/ SENTINEL NODE BIOPSY Left 9/14/2021    Procedure: LEFT PARTIAL MASTECTOMY WITH MAGSEED AND LEFT SENTINEL LYMPH NODE BIOPSY MAGTRACE;  Surgeon: Quynh Rosario MD;  Location: [facility];  Service: General;  Laterality: Left;   • TONSILLECTOMY     • VAGINA SURGERY      Laser surgery X 2   • VENOUS ACCESS DEVICE (PORT) INSERTION N/A 9/29/2020    Procedure:  SINGLE LUMEN PORT - A- CATH PLACEMENT WITH FLUOROSCOPY;  Surgeon: Quynh Rosario MD;  Location: [facility] OR;  Service: General;  Laterality: N/A;       Current Outpatient Medications:   •  Acetaminophen (TYLENOL ARTHRITIS PAIN PO), Take 1 tablet by mouth Daily As Needed (BACK PAIN)., Disp: , Rfl:   •  acetaminophen (Tylenol) 325 MG tablet, Take 3 tablets by mouth Every 8 (Eight) Hours. Take every 8 hours for 3 days then take prn as needed., Disp: 100 tablet, Rfl: 2  •  bisoprolol-hydrochlorothiazide (ZIAC) 5-6.25 MG per tablet, Take 1 tablet by mouth Daily., Disp: 90 tablet, Rfl: 1  •  calcium carbonate (OS-REAGAN) 600 MG tablet, Take 600 mg by mouth Daily., Disp: , Rfl:   •  cetirizine (zyrTEC) 10 MG tablet, Take 10 mg by mouth Daily., Disp: , Rfl:   •  citalopram (CeleXA) 10 MG tablet, Take 10 mg by mouth Daily., Disp: , Rfl:   •  cyanocobalamin 1000 MCG/ML injection, Inject 1ml IM once every 4 weeks, Disp: 12 mL, Rfl: 0  •  diazePAM (VALIUM) 10 MG tablet, TAKE BY MOUTH 1/2 TABLET 30 MINUTES PRIOR TO PROCEDURE. CAN TAKE THE OTHER HALF IF NEEDED., Disp: , Rfl:   •  diphenhydrAMINE (BENADRYL) 25 mg capsule, Take 25 mg by mouth Every 6 (Six) Hours As Needed for Allergies., Disp: , Rfl:   •  diphenoxylate-atropine (LOMOTIL) 2.5-0.025 MG per tablet, Take 2 tablets by mouth 4 (Four) Times a Day As Needed for Diarrhea., Disp: 90 tablet, Rfl: 2  •  DULERA 100-5 MCG/ACT inhaler, Inhale 2 puffs 2 (Two) Times a Day. Rinse and spit after using., Disp: 6 inhaler, Rfl: 0  •  esomeprazole (nexIUM) 40 MG capsule, Take 1 capsule by mouth 2 (Two) Times a Day., Disp: 180 capsule, Rfl: 3  •  furosemide (LASIX) 40 MG tablet, Take 1.5 tablets by mouth Daily. Patient only takes once a day, Disp: 135 tablet, Rfl: 3  •  gabapentin (NEURONTIN) 300 MG capsule, Take 300 mg by mouth 2 (two) times a day., Disp: , Rfl:   •  Homeopathic Products (LEG CRAMPS) tablet, Take 1 tablet by mouth Daily., Disp: , Rfl:   •  HYDROcodone-acetaminophen  "(NORCO)  MG per tablet, Take 1 tablet by mouth Every 4 (Four) Hours As Needed for Moderate Pain  or Severe Pain ., Disp: 60 tablet, Rfl: 0  •  Hydrocortisone (britany's amazing butt) cream, Apply 1 application topically to the appropriate area as directed As Needed (irritation)., Disp: 120 g, Rfl: 0  •  letrozole (FEMARA) 2.5 MG tablet, Take 1 tablet by mouth Daily., Disp: 90 tablet, Rfl: 2  •  lidocaine (XYLOCAINE) 5 % ointment, Apply  topically to the appropriate area as directed Every 4 (Four) Hours As Needed for Mild Pain  (pain secondary to radiation)., Disp: 240 g, Rfl: 1  •  Lidocaine Viscous HCl (XYLOCAINE) 2 % solution, Take 5 mL by mouth 3 (Three) Times a Day With Meals., Disp: 100 mL, Rfl: 0  •  metOLazone (ZAROXOLYN) 2.5 MG tablet, TAKE 1 TABLET BY MOUTH THREE TIMES A WEEK, Disp: , Rfl:   •  ondansetron (Zofran) 8 MG tablet, Take 1 tablet by mouth Every 8 (Eight) Hours As Needed for Nausea or Vomiting., Disp: 60 tablet, Rfl: 2  •  potassium chloride (K-DUR,KLOR-CON) 20 MEQ CR tablet, Take 20 mEq by mouth 2 (Two) Times a Day., Disp: , Rfl:   •  pravastatin (PRAVACHOL) 40 MG tablet, TAKE 1 TABLET DAILY FOR    CHOLESTEROL AND HEART (Patient taking differently: Take 40 mg by mouth Every Night.), Disp: 90 tablet, Rfl: 0  •  spironolactone (ALDACTONE) 25 MG tablet, Take 1 tablet by mouth Daily., Disp: 90 tablet, Rfl: 1  •  Syringe 25G X 1\" 3 ML misc, 1 each Daily., Disp: 25 each, Rfl: 1  •  traMADol (Ultram) 50 MG tablet, Take 0.5 tablets by mouth Every 6 (Six) Hours As Needed for Severe Pain ., Disp: 10 tablet, Rfl: 0  •  polyethylene glycol (Golytely) 236 g solution, Take 4,000 mL by mouth 1 (One) Time for 1 dose. As directed by instructions provided at office, Disp: 4000 mL, Rfl: 0    Current Facility-Administered Medications:   •  lidocaine (XYLOCAINE) 1 % injection 10 mL, 10 mL, Infiltration, Once, Shaheen Akbar, DO  •  lidocaine 1% - EPINEPHrine 1:524023 (XYLOCAINE W/EPI) 1 %-1:735852 injection " 10 mL, 10 mL, Infiltration, Once, Shaheen Akbar DO  •  sodium bicarbonate injection 1 mEq, 2 mL, Injection, Once, Shaheen Akbar DO    Facility-Administered Medications Ordered in Other Visits:   •  heparin injection 500 Units, 500 Units, Intravenous, PRN, Drake Stafford MD, 500 Units at 07/01/21 1354  •  sodium chloride 0.9 % flush 10 mL, 10 mL, Intravenous, PRN, Drake Stafford MD, 10 mL at 07/01/21 1354  Allergies   Allergen Reactions   • Scopolamine Swelling     Other reaction(s): ANGIOEDEMA  Other reaction(s): ANGIOEDEMA     • Amoxicillin-Pot Clavulanate Rash   • Keflex [Cephalexin] Rash   • Septra [Sulfamethoxazole-Trimethoprim] Rash   • Tequin [Gatifloxacin] Other (See Comments)     Doesn't remember   • Trovan [Alatrofloxacin] Dizziness     Social History     Socioeconomic History   • Marital status:    Tobacco Use   • Smoking status: Former Smoker   • Smokeless tobacco: Never Used   Vaping Use   • Vaping Use: Never used   Substance and Sexual Activity   • Alcohol use: Yes     Comment: Occasional glass of wine   • Drug use: No   • Sexual activity: Never     Family History   Problem Relation Age of Onset   • Cancer Mother    • Hypertension Mother    • Osteoporosis Mother    • Dementia Mother    • Uterine cancer Mother    • Heart disease Father    • Parkinsonism Father    • Cancer Sister    • Breast cancer Sister    • Kidney cancer Sister    • Diabetes Brother    • Heart disease Paternal Grandfather    • No Known Problems Maternal Grandmother    • No Known Problems Maternal Grandfather    • No Known Problems Paternal Grandmother    • Colon cancer Neg Hx    • Colon polyps Neg Hx    • Esophageal cancer Neg Hx    • Liver cancer Neg Hx    • Liver disease Neg Hx    • Rectal cancer Neg Hx    • Stomach cancer Neg Hx        REVIEW OF SYSTEMS  General: well appearing, no fever chills or sweats, no unexplained wt loss  HEENT: no acute visual or hearing disturbances  Cardiovascular: No chest pain or  "palpitations  Pulmonary: No shortness of breath, coughing, wheezing or hemoptysis  : No burning, urgency, hematuria, or dysuria  Musculoskeletal: No joint pain or stiffness  Peripheral: no edema  Skin: No lesions or rashes  Neuro: No dizziness, headaches, stroke, syncope  Endocrine: No hot or cold intolerances  Hematological: No blood dyscrasias    Objective   Vitals:    10/21/21 1312   BP: 112/70   BP Location: Left arm   Patient Position: Sitting   Cuff Size: Adult   Pulse: 53   Temp: 97.1 °F (36.2 °C)   TempSrc: Infrared   SpO2: 97%   Weight: 72.6 kg (160 lb)   Height: 147.3 cm (58\")         PHYSICAL EXAM  General: age appropriate well nourished well appearing, no acute distress  Head: normocephalic and atraumatic  Global assessment-supple  Neck-No JVD noted, no lymphadenopathy  Pulmonary-clear to auscultation bilaterally, normal respiratory effort  Cardiovascular-normal rate and rhythm, normal heart sounds, S1 and S2 noted  Abdomen-soft, non tender, non distended, normal bowel sounds all 4 quadrants, no hepatosplenomegaly noted  Extremities-No clubbing cyanosis or edema  Neuro-Non focal, converses appropriately, awake, alert, oriented    Lab Results - Last 18 Months   Lab Units 09/30/21  1841 09/30/21  1053 09/14/21  0702 09/09/21  1459 07/01/21  1412 03/12/21  1113   GLUCOSE mg/dL 123* 129* 120* 85 110* 148*   BUN mg/dL 20 19 38* 45* 26* 20   CREATININE mg/dL 1.44* 1.21* 1.58* 1.56* 1.04* 1.40*   SODIUM mmol/L 136 160* 136 134* 134* 136   POTASSIUM mmol/L 3.9 4.9 4.9 5.4* 4.1 4.1   CHLORIDE mmol/L 100 122* 101 102 95* 99   CO2 mmol/L 26.0 25.0 24.0 24.0 31.0* 28.0   TOTAL PROTEIN g/dL 7.3 7.5 8.0 7.8 8.1 7.6   ALBUMIN g/dL 4.30 4.20 4.50 4.20 4.10 4.10   ALT (SGPT) U/L 8 5 <5 6 9 5   AST (SGOT) U/L 15 14 16 11 16 14   ALK PHOS U/L 82 78 78 79 75 57   BILIRUBIN mg/dL 0.2 0.3 0.2 0.3 0.4 0.5   GLOBULIN gm/dL 3.0 3.3 3.5 3.6 4.0 3.5       Lab Results - Last 18 Months   Lab Units 09/30/21  1841 09/30/21  1053 " 09/09/21  1459 06/24/21  1118 05/07/21  1117 03/12/21  1113 02/18/21  1948   HEMOGLOBIN g/dL 9.7* 10.0* 9.9* 11.3* 9.6* 10.3*  --    HEMATOCRIT % 29.9* 31.3* 30.0* 34.3 29.3* 31.1*  --    MCV fL 103.5* 105.7* 103.4* 100.6* 97.3* 95.1  --    WBC 10*3/mm3 5.79 5.10 6.09 6.28 5.53 5.71  --    RDW % 14.0 13.8 13.5 13.8 14.1 14.0  --    MPV fL 10.1 9.1 11.7 9.3 10.0 10.0  --    PLATELETS 10*3/mm3 197 179 156 203 184 171  --    INR  1.10*  --   --   --   --   --    < >    < > = values in this interval not displayed.       Lab Results - Last 18 Months   Lab Units 09/30/21  1053 06/24/21  1118 05/07/21  1117 01/04/21  1053 10/20/20  0448 10/13/20  1114 09/03/20  1134   IRON mcg/dL 85 74 50 74  --  135 129   TIBC mcg/dL 314 320 331 301  --  335 384   IRON SATURATION % 27 23 15* 25  --  40 34   FERRITIN ng/mL 493.00* 712.20* 245.90* 401.10*  --  599.10* 341.90*   TSH uIU/mL  --   --   --   --  0.831  --   --         Lab Results - Last 18 Months   Lab Units 09/30/21  1053   FERRITIN ng/mL 493.00*       Imaging Results (Most Recent)     None        Assessment/Plan   Diagnoses and all orders for this visit:    1. Colon cancer screening (Primary)  -     Case Request; Standing  -     Case Request    2. Abnormal finding on GI tract imaging  -     Case Request; Standing  -     Case Request    3. Gastroesophageal reflux disease, unspecified whether esophagitis present    4. Heartburn    Other orders  -     Follow Anesthesia Guidelines / Protocol; Future  -     Obtain Informed Consent; Future  -     polyethylene glycol (Golytely) 236 g solution; Take 4,000 mL by mouth 1 (One) Time for 1 dose. As directed by instructions provided at office  Dispense: 4000 mL; Refill: 0      ESOPHAGOGASTRODUODENOSCOPY WITH ANESTHESIA (N/A), COLONOSCOPY WITH ANESTHESIA (N/A)       Schedule patient for both EGD and colonoscopy.      All risks, benefits, alternatives, and indications of colonoscopy procedure have been discussed with the patient. Risks to  include perforation of the colon requiring possible surgery or colostomy, risk of bleeding from biopsies or removal of colon tissue, possibility of missing a colon polyp or cancer, or adverse drug reaction.  Benefits to include the diagnosis and management of disease of the colon and rectum. Alternatives to include barium enema, radiographic evaluation, lab testing or no intervention. Pt verbalizes understanding and agrees.

## 2021-10-22 DIAGNOSIS — Z01.818 PRE-PROCEDURAL EXAMINATION: Primary | ICD-10-CM

## 2021-10-22 PROBLEM — R93.3 ABNORMAL FINDING ON GI TRACT IMAGING: Status: ACTIVE | Noted: 2021-10-22

## 2021-11-01 ENCOUNTER — OFFICE VISIT (OUTPATIENT)
Dept: CARDIOLOGY CLINIC | Age: 81
End: 2021-11-01
Payer: MEDICARE

## 2021-11-01 VITALS
BODY MASS INDEX: 31.02 KG/M2 | SYSTOLIC BLOOD PRESSURE: 104 MMHG | WEIGHT: 158 LBS | OXYGEN SATURATION: 98 % | HEART RATE: 58 BPM | HEIGHT: 60 IN | DIASTOLIC BLOOD PRESSURE: 62 MMHG

## 2021-11-01 DIAGNOSIS — I10 ESSENTIAL HYPERTENSION: ICD-10-CM

## 2021-11-01 DIAGNOSIS — E66.01 MORBID OBESITY (HCC): ICD-10-CM

## 2021-11-01 DIAGNOSIS — I87.2 CHRONIC VENOUS INSUFFICIENCY: Primary | ICD-10-CM

## 2021-11-01 PROCEDURE — G8400 PT W/DXA NO RESULTS DOC: HCPCS | Performed by: INTERNAL MEDICINE

## 2021-11-01 PROCEDURE — 99213 OFFICE O/P EST LOW 20 MIN: CPT | Performed by: INTERNAL MEDICINE

## 2021-11-01 PROCEDURE — G8417 CALC BMI ABV UP PARAM F/U: HCPCS | Performed by: INTERNAL MEDICINE

## 2021-11-01 PROCEDURE — 1090F PRES/ABSN URINE INCON ASSESS: CPT | Performed by: INTERNAL MEDICINE

## 2021-11-01 PROCEDURE — G8427 DOCREV CUR MEDS BY ELIG CLIN: HCPCS | Performed by: INTERNAL MEDICINE

## 2021-11-01 PROCEDURE — 1036F TOBACCO NON-USER: CPT | Performed by: INTERNAL MEDICINE

## 2021-11-01 PROCEDURE — 4040F PNEUMOC VAC/ADMIN/RCVD: CPT | Performed by: INTERNAL MEDICINE

## 2021-11-01 PROCEDURE — 1123F ACP DISCUSS/DSCN MKR DOCD: CPT | Performed by: INTERNAL MEDICINE

## 2021-11-01 PROCEDURE — G8484 FLU IMMUNIZE NO ADMIN: HCPCS | Performed by: INTERNAL MEDICINE

## 2021-11-01 RX ORDER — ASCORBIC ACID 1000 MG
TABLET ORAL DAILY
COMMUNITY

## 2021-11-01 ASSESSMENT — ENCOUNTER SYMPTOMS
BLOOD IN STOOL: 0
COUGH: 0
SHORTNESS OF BREATH: 1
ANAL BLEEDING: 0

## 2021-11-01 NOTE — PROGRESS NOTES
Office Visit  Jigna Garcia is a 80 y.o. female; who present today for Establish Cardiologist (No Cardiac Sx ) and 1 Month Follow-Up      HPI  I am seeing this 65-year-old white female in routine follow-up but it is the first time I am seeing her personally. She primarily was evaluated because of lower extremity edema and concern of congestive heart failure. She does have chronic kidney disease with a creatinine of about 1.4 and she did have a slightly elevated proBNP which is inaccurate in light of renal failure. Apparently she did not have other findings of congestive heart failure, just lower extremity edema and she is mostly sedentary and is not able to ambulate a lot. Her cardiac evaluation including a dobutamine echo stress test and a complete 2D echo were unremarkable except for grade 1 LV diastolic dysfunction, mild left atrial enlargement and some minor valvular regurgitation. She was never diagnosed with a cardiac problem in the past.  She does not experience chest discomfort. She will get some shortness of breath when she goes upstairs which is not much but this has not changed. She denies palpitations and no presyncope or syncope.   Her blood pressure has been normal.  Current Outpatient Medications   Medication Sig Dispense Refill    Coenzyme Q10 (CO Q 10) 10 MG CAPS Take by mouth daily      olmesartan (BENICAR) 20 MG tablet Take 1 tablet by mouth daily 30 tablet 5    calcium carbonate 1500 (600 Ca) MG TABS tablet Take 600 mg by mouth daily      cetirizine (ZYRTEC) 10 MG tablet Take 10 mg by mouth daily      citalopram (CELEXA) 20 MG tablet TAKE 1 TABLET BY MOUTH EVERY DAY      diphenoxylate-atropine (LOMOTIL) 2.5-0.025 MG per tablet TAKE 2 TABLETS BY MOUTH FOUR TIMES A DAY AS NEEDED FOR DIARRHEA      mometasone-formoterol (DULERA) 100-5 MCG/ACT inhaler Inhale 2 puffs into the lungs 2 times daily as needed       pravastatin (PRAVACHOL) 40 MG tablet Take 40 mg by mouth daily      cyanocobalamin 1000 MCG/ML injection INJECT 1ML INTO THE MUSCLE EVERY DAY FOR 5 DAYS, THEN ON THE SAME DAY EACH WEEK FOR 4 WEEKS, THEN 1 TIME EACH MONTH.  metOLazone (ZAROXOLYN) 2.5 MG tablet TAKE 1 TABLET BY MOUTH THREE TIMES A WEEK      furosemide (LASIX) 40 MG tablet Take 40 mg by mouth daily       potassium chloride SA (K-DUR;KLOR-CON) 20 MEQ tablet Take 20 mEq by mouth 2 times daily       gabapentin (NEURONTIN) 300 MG capsule Take 300 mg by mouth 3 times daily.  NEXIUM 40 MG capsule Take 40 mg by mouth 2 times daily       bisoprolol-hydrochlorothiazide (ZIAC) 5-6.25 MG per tablet Take 1 tablet by mouth daily       HYDROcodone-acetaminophen (NORCO)  MG per tablet Take 1 tablet by mouth every 6 hours as needed. No current facility-administered medications for this visit. There are no discontinued medications. Allergies   Allergen Reactions    Alatrofloxacin     Gatifloxacin     Scopolamine      Other reaction(s): ANGIOEDEMA    Amoxicillin-Pot Clavulanate Rash    Cephalexin Rash     rash    Sulfamethoxazole-Trimethoprim Rash     nausea       Past Medical History:   Diagnosis Date    Cervical dysplasia     Chronic back pain     Fibrocystic disease of breast     GERD (gastroesophageal reflux disease)     Hyperlipidemia     Osteoarthritis     Type II or unspecified type diabetes mellitus without mention of complication, not stated as uncontrolled      Negative - Past Medical History for  No past medical history pertinent negatives.     Past Surgical History:   Procedure Laterality Date    APPENDECTOMY      BREAST CYST ASPIRATION      left    CARDIAC CATHETERIZATION      no disease per pt report    CERVIX LESION DESTRUCTION      HYSTERECTOMY  1976    PORT SURGERY      TONSILLECTOMY       Social History     Occupational History    Not on file   Tobacco Use    Smoking status: Former Smoker     Quit date: 1970     Years since quittin.8    Smokeless tobacco: Never Used   Vaping Use    Vaping Use: Never used   Substance and Sexual Activity    Alcohol use: Yes     Comment: occ glass of wine     Drug use: No    Sexual activity: Not on file        Family History   Problem Relation Age of Onset    Cancer Mother         uterine    Heart Disease Father     Cancer Sister         kidney    Heart Disease Brother        Review of Systems  Review of Systems   Constitutional: Negative for fatigue and fever. Respiratory: Positive for shortness of breath. Negative for cough. Cardiovascular: Positive for leg swelling. Negative for chest pain and palpitations. Gastrointestinal: Negative for anal bleeding and blood in stool. Neurological: Negative for syncope, facial asymmetry and speech difficulty. Physical Exam  /62   Pulse 58   Ht 5' (1.524 m)   Wt 158 lb (71.7 kg)   SpO2 98%   BMI 30.86 kg/m²    Physical Exam  Constitutional:       Appearance: Normal appearance. She is obese. Cardiovascular:      Rate and Rhythm: Normal rate and regular rhythm. Heart sounds: Normal heart sounds. No gallop. Pulmonary:      Effort: Pulmonary effort is normal.      Breath sounds: Normal breath sounds. Abdominal:      General: Abdomen is flat. Bowel sounds are normal.      Palpations: Abdomen is soft. Musculoskeletal:         General: No swelling. Right lower leg: 3+ Pitting Edema present. Left lower le+ Pitting Edema present. Skin:     General: Skin is warm and dry. Neurological:      Mental Status: She is alert. Assessment/Plan    EKG Findings:  Not performed today    Problem List Items Addressed This Visit        Cardiology Problems    Chronic venous insufficiency - Primary    Essential hypertension       Other    Morbid obesity ([facility] [address]           Diagnosis Orders   1. Chronic venous insufficiency      Secondary chronic bilateral lower extremity edema, exacerbated by dependency   2.  Essential hypertension 3. Morbid obesity ([facility] [address]         Recommendations:   Diet: Low-sodium, calorie reduced diet to lose weight  Activity: Increase ambulation if possible, keep legs elevated, hip high compression hose  Medication Changes: No changes    It seems clear that this patient's major contributor to her lower extremity edema is venous insufficiency. Although she has grade 1 LV diastolic dysfunction I believe it is less likely that this is contributing much. If she was able to lose weight this would be helpful but compression stockings are probably going to be the most important thing. She states that she tried to buy a pair recently but they did not her size and she is returning to the same pharmacy and picking them up. If she develops new cardiovascular symptoms or if her lower extremity edema gets much worse she is to let us know. No orders of the defined types were placed in this encounter. No orders of the defined types were placed in this encounter. Return in about 6 months (around 5/1/2022) for Routine, APRN.

## 2021-11-02 RX ORDER — CITALOPRAM 20 MG/1
TABLET ORAL
Qty: 90 TABLET | Refills: 1 | Status: SHIPPED | OUTPATIENT
Start: 2021-11-02 | End: 2022-05-20

## 2021-11-03 ENCOUNTER — LAB (OUTPATIENT)
Dept: LAB | Facility: HOSPITAL | Age: 81
End: 2021-11-03

## 2021-11-03 DIAGNOSIS — Z01.818 PRE-PROCEDURAL EXAMINATION: ICD-10-CM

## 2021-11-03 LAB — SARS-COV-2 ORF1AB RESP QL NAA+PROBE: NOT DETECTED

## 2021-11-03 PROCEDURE — C9803 HOPD COVID-19 SPEC COLLECT: HCPCS

## 2021-11-03 PROCEDURE — U0004 COV-19 TEST NON-CDC HGH THRU: HCPCS

## 2021-11-03 PROCEDURE — U0005 INFEC AGEN DETEC AMPLI PROBE: HCPCS

## 2021-11-05 ENCOUNTER — ANESTHESIA (OUTPATIENT)
Dept: GASTROENTEROLOGY | Facility: HOSPITAL | Age: 81
End: 2021-11-05

## 2021-11-05 ENCOUNTER — HOSPITAL ENCOUNTER (OUTPATIENT)
Facility: HOSPITAL | Age: 81
Setting detail: HOSPITAL OUTPATIENT SURGERY
Discharge: HOME | End: 2021-11-05
Attending: INTERNAL MEDICINE | Admitting: INTERNAL MEDICINE

## 2021-11-05 ENCOUNTER — ANESTHESIA EVENT (OUTPATIENT)
Dept: GASTROENTEROLOGY | Facility: HOSPITAL | Age: 81
End: 2021-11-05

## 2021-11-05 VITALS
SYSTOLIC BLOOD PRESSURE: 121 MMHG | WEIGHT: 159 LBS | TEMPERATURE: 98 F | BODY MASS INDEX: 33.37 KG/M2 | RESPIRATION RATE: 20 BRPM | HEIGHT: 58 IN | HEART RATE: 55 BPM | OXYGEN SATURATION: 96 % | DIASTOLIC BLOOD PRESSURE: 78 MMHG

## 2021-11-05 DIAGNOSIS — R93.3 ABNORMAL FINDING ON GI TRACT IMAGING: ICD-10-CM

## 2021-11-05 DIAGNOSIS — Z12.11 COLON CANCER SCREENING: ICD-10-CM

## 2021-11-05 PROCEDURE — 43251 EGD REMOVE LESION SNARE: CPT | Performed by: INTERNAL MEDICINE

## 2021-11-05 PROCEDURE — G0121 COLON CA SCRN NOT HI RSK IND: HCPCS | Performed by: INTERNAL MEDICINE

## 2021-11-05 PROCEDURE — 88305 TISSUE EXAM BY PATHOLOGIST: CPT | Performed by: INTERNAL MEDICINE

## 2021-11-05 PROCEDURE — 25010000002 PROPOFOL 10 MG/ML EMULSION: Performed by: NURSE ANESTHETIST, CERTIFIED REGISTERED

## 2021-11-05 RX ORDER — LIDOCAINE HYDROCHLORIDE 20 MG/ML
INJECTION, SOLUTION EPIDURAL; INFILTRATION; INTRACAUDAL; PERINEURAL AS NEEDED
Status: DISCONTINUED | OUTPATIENT
Start: 2021-11-05 | End: 2021-11-05 | Stop reason: SURG

## 2021-11-05 RX ORDER — SODIUM CHLORIDE 9 MG/ML
500 INJECTION, SOLUTION INTRAVENOUS CONTINUOUS PRN
Status: DISCONTINUED | OUTPATIENT
Start: 2021-11-05 | End: 2021-11-05 | Stop reason: HOSPADM

## 2021-11-05 RX ORDER — SODIUM CHLORIDE 0.9 % (FLUSH) 0.9 %
10 SYRINGE (ML) INJECTION AS NEEDED
Status: DISCONTINUED | OUTPATIENT
Start: 2021-11-05 | End: 2021-11-05 | Stop reason: HOSPADM

## 2021-11-05 RX ORDER — PROPOFOL 10 MG/ML
VIAL (ML) INTRAVENOUS AS NEEDED
Status: DISCONTINUED | OUTPATIENT
Start: 2021-11-05 | End: 2021-11-05 | Stop reason: SURG

## 2021-11-05 RX ADMIN — PROPOFOL 220 MG: 10 INJECTION, EMULSION INTRAVENOUS at 10:56

## 2021-11-05 RX ADMIN — SODIUM CHLORIDE 500 ML: 9 INJECTION, SOLUTION INTRAVENOUS at 10:29

## 2021-11-05 RX ADMIN — LIDOCAINE HYDROCHLORIDE 150 MG: 20 INJECTION, SOLUTION EPIDURAL; INFILTRATION; INTRACAUDAL; PERINEURAL at 10:56

## 2021-11-05 NOTE — ANESTHESIA POSTPROCEDURE EVALUATION
"Patient: Dago Nunez    Procedure Summary     Date: 11/05/21 Room / Location:  [facility] /  [facility]    Anesthesia Start: 1052 Anesthesia Stop: 1128    Procedures:       ESOPHAGOGASTRODUODENOSCOPY WITH ANESTHESIA (N/A )      COLONOSCOPY WITH ANESTHESIA (N/A ) Diagnosis:       Colon cancer screening      Abnormal finding on GI tract imaging      (Colon cancer screening [Z12.11])      (Abnormal finding on GI tract imaging [R93.3])    Surgeons: Annita Loaiza MD Provider: Adonay Florence CRNA    Anesthesia Type: MAC ASA Status: 3          Anesthesia Type: MAC    Vitals  Vitals Value Taken Time   /78 11/05/21 1145   Temp     Pulse 55 11/05/21 1145   Resp 20 11/05/21 1145   SpO2 96 % 11/05/21 1145           Post Anesthesia Care and Evaluation    Patient location during evaluation: PACU  Patient participation: complete - patient participated  Level of consciousness: awake and alert  Pain management: adequate  Airway patency: patent  Anesthetic complications: No anesthetic complications    Cardiovascular status: acceptable  Respiratory status: acceptable  Hydration status: acceptable    Comments: Blood pressure 121/78, pulse 55, temperature 98 °F (36.7 °C), temperature source Temporal, resp. rate 20, height 147.3 cm (58\"), weight 72.1 kg (159 lb), SpO2 96 %, not currently breastfeeding.    Pt discharged from PACU based on mikki score >8      "

## 2021-11-05 NOTE — ANESTHESIA PREPROCEDURE EVALUATION
Anesthesia Evaluation     Patient summary reviewed   no history of anesthetic complications:  NPO Solid Status: > 8 hours  NPO Liquid Status: > 8 hours           Airway   Mallampati: II  TM distance: >3 FB  Neck ROM: full  No difficulty expected  Dental    (+) partials    Pulmonary    (-) COPD, asthma, sleep apnea, not a smoker  Cardiovascular   Exercise tolerance: poor (<4 METS)    ECG reviewed    (+) hypertension, hyperlipidemia,   (-) pacemaker, past MI, angina, cardiac stents    ROS comment: 7/2021 7/22/21 DSE Dobutamine stress echocardiogram without clinical, electrocardiographic,  or echocardiographic evidence of myocardial ischemia.     Echo 7/2021  Summary  Normal left ventricular size and systolic function EF 55-60%  Mild concentric left ventricular hypertrophy with mild (grade 1) diastolic  dysfunction  Mild left atrial enlargement  Tricuspid aortic valve with adequate cusp separation and neither  significant stenosis or insufficiency  Mitral annular calcification with mild thickening of a normally mobile  mitral valve with mild regurgitation  Mild pulmonic insufficiency  Normal right-sided chambers with preserved RV systolic function  Mild tricuspid regurgitation with RVSP estimate 34 mmHg  IVC dimension and is throwing motion normal consistent with normal right  atrial filling pressures  Aortic root and ascending segments measured within normal limits  No significant pericardial effusion    Neuro/Psych  (-) seizures, TIA, CVA  GI/Hepatic/Renal/Endo    (+) obesity,  GERD,  renal disease CRI,   (-) liver disease, diabetes    Musculoskeletal     Abdominal    Substance History      OB/GYN          Other   blood dyscrasia anemia thrombocytopenia,   history of cancer active      Other Comment: Pt with vulvar cancer, 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

## 2021-11-12 ENCOUNTER — CLINICAL SUPPORT (OUTPATIENT)
Dept: PULMONOLOGY | Facility: CLINIC | Age: 81
End: 2021-11-12

## 2021-11-12 DIAGNOSIS — Z23 NEED FOR INFLUENZA VACCINATION: Primary | ICD-10-CM

## 2021-11-12 PROCEDURE — G0008 ADMIN INFLUENZA VIRUS VAC: HCPCS | Performed by: INTERNAL MEDICINE

## 2021-11-12 PROCEDURE — 90662 IIV NO PRSV INCREASED AG IM: CPT | Performed by: INTERNAL MEDICINE

## 2021-11-13 LAB
CYTO UR: NORMAL
LAB AP CASE REPORT: NORMAL
PATH REPORT.FINAL DX SPEC: NORMAL
PATH REPORT.GROSS SPEC: NORMAL

## 2021-12-02 ENCOUNTER — TRANSCRIBE ORDERS (OUTPATIENT)
Dept: ADMINISTRATIVE | Facility: HOSPITAL | Age: 81
End: 2021-12-02

## 2021-12-02 DIAGNOSIS — Z85.3 PERSONAL HISTORY OF BREAST CANCER: Primary | ICD-10-CM

## 2021-12-02 RX ORDER — OLMESARTAN MEDOXOMIL 20 MG/1
20 TABLET ORAL DAILY
Qty: 90 TABLET | Refills: 1 | Status: SHIPPED | OUTPATIENT
Start: 2021-12-02 | End: 2022-03-16

## 2021-12-04 DIAGNOSIS — M25.562 CHRONIC PAIN OF BOTH KNEES: Primary | ICD-10-CM

## 2021-12-04 DIAGNOSIS — M25.561 CHRONIC PAIN OF BOTH KNEES: Primary | ICD-10-CM

## 2021-12-04 DIAGNOSIS — G89.29 CHRONIC PAIN OF BOTH KNEES: Primary | ICD-10-CM

## 2021-12-04 DIAGNOSIS — I10 ESSENTIAL HYPERTENSION: ICD-10-CM

## 2021-12-06 RX ORDER — BISOPROLOL FUMARATE AND HYDROCHLOROTHIAZIDE 5; 6.25 MG/1; MG/1
TABLET ORAL
Qty: 90 TABLET | Refills: 1 | Status: SHIPPED | OUTPATIENT
Start: 2021-12-06 | End: 2022-06-04

## 2021-12-06 RX ORDER — GABAPENTIN 300 MG/1
CAPSULE ORAL
Qty: 180 CAPSULE | Refills: 2 | Status: SHIPPED | OUTPATIENT
Start: 2021-12-06 | End: 2022-04-21 | Stop reason: SDUPTHER

## 2021-12-06 NOTE — TELEPHONE ENCOUNTER
Rx Refill Note  Requested Prescriptions     Pending Prescriptions Disp Refills   • bisoprolol-hydrochlorothiazide (ZIAC) 5-6.25 MG per tablet [Pharmacy Med Name: BISOPRL/HCTZ TAB 5-6.25MG] 90 tablet 1     Sig: TAKE 1 TABLET DAILY   • gabapentin (NEURONTIN) 300 MG capsule [Pharmacy Med Name: GABAPENTIN CAP 300MG] 180 capsule 2     Sig: TAKE 2 CAPSULES 3 TIMES A  DAY      Last office visit with prescribing clinician: 3/1/2021      Next office visit with prescribing clinician: Visit date not found            ADAMA Rodriguez  12/06/21, 09:39 CST

## 2021-12-16 ENCOUNTER — TELEPHONE (OUTPATIENT)
Dept: FAMILY MEDICINE CLINIC | Facility: CLINIC | Age: 81
End: 2021-12-16

## 2021-12-16 ENCOUNTER — LAB (OUTPATIENT)
Dept: LAB | Facility: HOSPITAL | Age: 81
End: 2021-12-16

## 2021-12-16 DIAGNOSIS — R30.0 DYSURIA: Primary | ICD-10-CM

## 2021-12-16 DIAGNOSIS — R30.0 DYSURIA: ICD-10-CM

## 2021-12-16 LAB
BACTERIA UR QL AUTO: ABNORMAL /HPF
BILIRUB UR QL STRIP: NEGATIVE
CLARITY UR: ABNORMAL
COLOR UR: YELLOW
GLUCOSE UR STRIP-MCNC: NEGATIVE MG/DL
HGB UR QL STRIP.AUTO: ABNORMAL
HYALINE CASTS UR QL AUTO: ABNORMAL /LPF
KETONES UR QL STRIP: NEGATIVE
LEUKOCYTE ESTERASE UR QL STRIP.AUTO: ABNORMAL
NITRITE UR QL STRIP: NEGATIVE
PH UR STRIP.AUTO: 5.5 [PH] (ref 5–8)
PROT UR QL STRIP: NEGATIVE
RBC # UR STRIP: ABNORMAL /HPF
REF LAB TEST METHOD: ABNORMAL
SP GR UR STRIP: 1.01 (ref 1–1.03)
SQUAMOUS #/AREA URNS HPF: ABNORMAL /HPF
UROBILINOGEN UR QL STRIP: ABNORMAL
WBC # UR STRIP: ABNORMAL /HPF

## 2021-12-16 PROCEDURE — 87086 URINE CULTURE/COLONY COUNT: CPT

## 2021-12-16 PROCEDURE — 81001 URINALYSIS AUTO W/SCOPE: CPT

## 2021-12-16 NOTE — TELEPHONE ENCOUNTER
Patient called stating she is having what feels like bladder spasm from a possible bladder infection. She stated she is having some pain while urinating and having the urge to urinate. She is also having some nausea with this. She stated she is on lots of pain and needs some relief.

## 2021-12-17 ENCOUNTER — TELEPHONE (OUTPATIENT)
Dept: FAMILY MEDICINE CLINIC | Facility: CLINIC | Age: 81
End: 2021-12-17

## 2021-12-17 DIAGNOSIS — R30.0 DYSURIA: Primary | ICD-10-CM

## 2021-12-17 DIAGNOSIS — N18.31 STAGE 3A CHRONIC KIDNEY DISEASE (HCC): ICD-10-CM

## 2021-12-17 DIAGNOSIS — N39.0 URINARY TRACT INFECTION WITH HEMATURIA, SITE UNSPECIFIED: ICD-10-CM

## 2021-12-17 DIAGNOSIS — R30.1 PAINFUL BLADDER SPASM: ICD-10-CM

## 2021-12-17 DIAGNOSIS — Z87.448 HISTORY OF URETHRAL STRICTURE: ICD-10-CM

## 2021-12-17 DIAGNOSIS — C51.9 VULVAR CANCER, CARCINOMA (HCC): ICD-10-CM

## 2021-12-17 DIAGNOSIS — R31.9 URINARY TRACT INFECTION WITH HEMATURIA, SITE UNSPECIFIED: ICD-10-CM

## 2021-12-17 LAB — BACTERIA SPEC AEROBE CULT: ABNORMAL

## 2021-12-17 RX ORDER — PHENAZOPYRIDINE HYDROCHLORIDE 200 MG/1
200 TABLET, FILM COATED ORAL 3 TIMES DAILY PRN
Qty: 15 TABLET | Refills: 0 | Status: SHIPPED | OUTPATIENT
Start: 2021-12-17 | End: 2022-01-12 | Stop reason: SDUPTHER

## 2021-12-17 RX ORDER — CEFDINIR 300 MG/1
300 CAPSULE ORAL DAILY
Qty: 5 CAPSULE | Refills: 0 | Status: SHIPPED | OUTPATIENT
Start: 2021-12-17 | End: 2022-01-12

## 2021-12-17 NOTE — TELEPHONE ENCOUNTER
Chart and labs reviewed:    Allergies   Allergen Reactions   • Scopolamine Swelling     Other reaction(s): ANGIOEDEMA  Other reaction(s): ANGIOEDEMA     • Amoxicillin-Pot Clavulanate Rash   • Keflex [Cephalexin] Rash   • Septra [Sulfamethoxazole-Trimethoprim] Rash   • Tequin [Gatifloxacin] Other (See Comments)     Doesn't remember   • Trovan [Alatrofloxacin] Dizziness       U Cx was no growth  UA had WBC TNTC on microscopic  UA was + for LE but neg for nitrites    Burning with urination could be a sx of another problem other than UTI, e.g. yeast infection which could be made worse with Rx of abx.  UA had a trace of blood and was cloudy but the SG was normal/good at 1.006.    Pt's last UTI was 9/30/21  U Cx then grew > 100,000 CFU/ml Klebsiella pneumoniae R to ampicillin.   And intermediate sensitivity to nitrofurantoin.    Pt is on Femara due to breast cancer so risk of atrophic vaginitis as cause of dysuria and pain is high.  Pt also has a h/o urethral stricture and has vulvar intraepithelial neoplasia grade 3 listed on her problem list.     She has CKD stage 3 with creatinine of 1.44 and GFR of 35 on last check 9/30/21.    Pt self-caths herself
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
## 2021-12-17 NOTE — TELEPHONE ENCOUNTER
At 4 45 PM I called pt's mobile number, which it turns out is the home number; granddaughter told me she had gone out to get her hair cut and had been gone a while, didn't know when they would be getting home.    I called her other number, [phone], which is her mobile number and got unidentified  and did not leave a message.  Will try to contact pt again tonight.    ------------------    Called mobile number again at 505 PM with no answer    ---------------    I called the [facility] Rx Center and the [facility] location and they close at 530 PM.  I went ahead and sent some pyridium anti-spasmodic medicine in for her bladder cramps and dysuria.     Review of U Cx now has some growth of gram neg bacilli ( < 25,000), pt may need to see urology if she has another urethral stricture/mass/cancer and she may need to see GYN if she has vaginal issues/post radiation damage causing the dysuria.  I'm not familiar with which specialists she has seen in the past or in [place].    Since she was given Cefdnir by the ER on 9/30/21 for the previous UTI and it seems she tolerated it okay from review of the chart, I will send that in for her too; one pill daily for 5 days - renally adjusted.     I am sending the Rx to [facility] in [place] since they are open later into the evening.  Also sent a note to pt via [employer].  Asked her to contact us on the 21st with a status report on her sx.    Tried to contact pt at home and on the mobile one more time at 545 PM with no answer.  Left a message on her VM asking her to check [employer] for a message and to  Rx at [facility], call back with any questions.

## 2021-12-28 ENCOUNTER — TELEPHONE (OUTPATIENT)
Dept: ONCOLOGY | Facility: CLINIC | Age: 81
End: 2021-12-28

## 2021-12-28 NOTE — TELEPHONE ENCOUNTER
Caller: Dago Nunez    Relationship to patient: Self    Best call back number: [phone]    Type of visit: LAB AND PORT FLUSH    Requested date: SAME DAY BUT 1PM    If rescheduling, when is the original appointment: 12/30    Additional notes: PLEASE CALL ONCE R/S. IF NO ANSWER LEAVE A VM

## 2022-01-03 NOTE — TELEPHONE ENCOUNTER
Rx Refill Note  Requested Prescriptions     Pending Prescriptions Disp Refills   • potassium chloride (K-DUR,KLOR-CON) 20 MEQ CR tablet [Pharmacy Med Name: POTASSIUM CHLORIDE ER 20MEQ 20 Tablet] 60 tablet 5     Sig: TAKE 2 TABLETS BY MOUTH EVERY DAY      Last office visit with prescribing clinician: 3/1/2021      Next office visit with prescribing clinician: Visit date not found            Michelle Bro PANCHO  01/03/22, 08:25 CST

## 2022-01-04 RX ORDER — POTASSIUM CHLORIDE 20 MEQ/1
TABLET, EXTENDED RELEASE ORAL
Qty: 60 TABLET | Refills: 5 | Status: SHIPPED | OUTPATIENT
Start: 2022-01-04 | End: 2022-12-14

## 2022-01-05 NOTE — PROGRESS NOTES
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  She is on adjuvant Femara for the past 3.25 months.  She declined adjuvant radiation.  She is also seen for macrocytic anemia from chronic kidney disease Stage IIIa, GFR 51 mL/minute 03/05/2018, iron deficiency, and thrombocytopenia.  She is intolerant to oral iron (nausea, stomach cramps, and constipation).  She had Injectafer 7.75 months ago. She is also seen for vulvar cancer. She is seen 15 months post C1D1 Mitomycin C and 5FU with radiation. Her D29 to 32 chemo was cancelled due to hospitalization, tolerance, and poor performance status.  She is seen with spouse, Joseph.  History is obtained from the patient. History is considered to be accurate.           Oncology/Hematology History Overview Note   DIAGNOSTIC ABNORMALITIES: Breast cancer.  Screening mammogram 08/18/2021.New dense 7 mm partially obscured nodule in the upper outer quadrant of the left breast, with associated architectural distortion.  Diagnostic mammogram 08/20/2021.  Small subcentimeter suspicious spiculated mass at 12:00 in the left breast, approximately 3 cm to 4 cm deep from the nipple. This is suspicious for breast carcinoma, ultrasound-guided biopsy is recommended.  Sonogram 08/20/2021.  Small suspicious solid mass at 12:00 in the left breast. 5.5 x 5.1 x 4.7 mm, suspicious for breast carcinoma. This corresponds with the finding on mammograms.  Successful ultrasound-guided left breast biopsy and clip on 08/23/2021.  Pathology report 09/01/2021.  Left breast at 12:00, core needle biopsies: Invasive carcinoma no special type  (ductal).  Histologic grade (Abdullahi histologic score).   Glandular (acinar)/tubular differentiation: Score 1.   Nuclear pleomorphism: Score 2.   Mitotic rate: Score 1.  Overall grade: Grade 1. Maximum tumor diameter is at least 0.8 cm.  Estrogen 87%, progesterone 47% and negative HER-2/gabriela.  Genetic testing was sent.  Patient was seen by Dr. Quynh Rosario 2021.  She is .  She had first delivery at 20.  She took birth control for 1 month.  She took hormone replacement therapy for approximately 5 years.  Family history positive for breast cancer, sister at 78. No ovarian cancer  Chest x-ray 2021.  No acute cardiopulmonary process.  Pathology report 2021.  Left sentinel lymph nodes: Four of 4 lymph nodes are negative for metastatic mammary carcinoma.Comment: Absence of micrometastases is confirmed utilizing immunohistochemical stains for pankeratin.  Left breast, partial mastectomy:  A.  Invasive carcinoma of no special type (ductal), grade 1 (1.1 cm in greatest dimension).  B.  Minor associated low-grade ductal carcinoma in situ component.  C.  The inked surgical margins and skin are negative for malignancy.  D.  Prior biopsy site changes including fat necrosis.  Comment: Microscopically, the tumor is approximately 7 mm from the closest inked (superior) surgical margin.  AJCC stage: pT1c snpN0.      PREVIOUS INTERVENTIONS:  She had undergone left partial mastectomy with left sentinel lymph node biopsy 2021 by Dr. Rosario.  Declined adjuvant radiation.  Adjuvant Femara 10/01/2021 through present.        DIAGNOSTIC ABNORMALITIES: Vulvar cancer  She had developed recurrent vulvar cancer left inguinal node that is PET positive measuring 2.1 cm.  The patient was seen by Dr. Marks in favor definitive chemo radiation.  Pathology report 07/10/2020 periurethral biopsy, poorly differentiated carcinoma with squamous and papillary features, focally invasive in the subepithelial connective  "tissue.  PET 07/31/2020 showed intense FDG avid left inguinal node is highly suspicious for local regional metastasis from known vulvar squamous cell carcinoma.  No additional sites of suspicious FDG activity.  MRI 07/31/2020 showed redemonstration of mildly T2 hyperintense mass involving the urethral meatus, similar dimensions to prior exam on 02/18/2020 however there is now a polypoid lesion seen along the anterior aspect of the perineum, possibly contiguous with the urethral mass, concerning for disease progression.  Market interval enlargement of the left inguinal node almost certainly representing disease involvement.  Patient was notified by Dr. Siddiqui 08/19/2020.  Consensus for chemoradiation.  Patient reluctant to take chemotherapy.  Follow-up with Dr. Siddiqui in 4 to 6 weeks after radiation is completed.         PREVIOUS INTERVENTIONS:  Mitomycin C and 5-FU 10/05/2020 through 10/08/2020 with radiation completed 12/10/2020 at [facility].  D29 to d32 of chemo discontinued due to poor performance status.       DIAGNOSTIC ABNORMALITIES:   The patient was seen by Dr. Greg Alberts 01/29/2018 complaining of coughing and wheezing. The patient was given Tussionex. Blood work was ordered. CBC 01/29/2018 revealed a WBC of 7.8, hemoglobin 11.2, hematocrit 35.1, MCV 96.2, platelets 43,000, and ANC 4.47. CMP remarkable for of glucose of 177 and GFR 59 mL/minute.  The patient was seen by VINCENT Jones, 02/02/2018. She was coughing and wheezing. Tussionex did not help. The patient was given prednisone.  The patient was seen by VINCENT Jones, 02/15/2018. She is followed for anemia from iron deficiency. CBC 02/15/2018 revealed a WBC of 12.9, hemoglobin 11.4, hematocrit 34.5, MCV 95, and platelets 68,000.       PREVIOUS INTERVENTIONS:   Ferrous sulfate 325 mg 03/07/2018 through 05/06/2018.  Not resumed 06/08/2018. \"I misunderstood.\"   Resume 09/05/2018 through 10/03/2018, stopped due to " intolerance.  Injectafer 750 mg 10/20/2018 at the [place] office.  Retacrit 10/27/2020 through present.  Injectafer 750 [facility]/18/2021 at [facility]           Vulvar cancer, carcinoma (HCC)    Initial Diagnosis    Vulvar cancer, carcinoma (CMS/HCC)     3/19/2001 Biopsy    Clinical Features: red vaginal lesion with bleeding since 1995. TX with  Carafate douche and Premarin vaginal cream with intermittent improvement.    DIAGNOSIS:    VAGINA, BIOPSY: FOCAL ULCERATION, MARKED ACUTE AND CHRONIC INFLAMMATION,  SPONGIOSIS AND REACTIVE ATYPIA; NEGATIVE FOR DYSPLASIA.     8/17/2001 Procedure    Pap Smear:  DIAGNOSIS:            Atypical keratinized squamous cells, suspicious                           for squamous cell carcinoma.         COMMENTS:             There are numerous atypical keratinized cells                           present in an inflammatory background.  These are                           suspicious for squamous cell carcinoma.  Biopsy                           confirmation is recommended.      10/16/2001 Procedure    Pap Smear:  DIAGNOSIS:            Mild dysplasia       ADDITIONAL FINDINGS:  Marked inflammation                           Excessive hyperkeratosis         BETHESDA SYSTEM:      Low grade squamous intraepithelial lesion    DIAGNOSIS:            Negative, no abnormal cells seen           BETHESDA SYSTEM:      Within normal limits.       ADEQUACY:             Specimen satisfactory for evaluation       SOURCE:               Vagina, diagnostic thin prep PAP     11/7/2001 Biopsy      DIAGNOSIS:    VAGINA AND VULVA, RIGHT POSTERIOR INTROITUS, WIDE LOCAL EXCISION:  VAGINAL INTRAEPITHELIAL NEOPLASIA (VAIN) II-III, FOCALLY EXTENDING TO  VAGINAL MARGIN AT 12-4:00; ALL OTHER MARGINS NEGATIVE FOR  INTRAEPITHELIAL NEOPLASIA. (SEE COMMENT)    COMMENT: The lesion involves vaginal type epithelium with associated  chronic inflammation and erosion. The adjacent vulvar epithelium is  hyperkeratotic.      3/15/2002 Procedure    Pap Smear:  BETHESDA SYSTEM:      High grade squamous intraepithelial lesion       DESCRIPTOR:           Moderate dysplasia           ADEQUACY:             Specimen satisfactory for evaluation       SOURCE:               Vagina, Thin Prep Pap, Diagnostic     6/13/2003 Procedure         BETHESDA SYSTEM:      High grade squamous intraepithelial lesion       DESCRIPTOR:           Moderate dysplasia       ADDITIONAL FINDINGS:  Inflammation         ADEQUACY:             Specimen satisfactory for evaluation       SOURCE:               Vagina, Thin Prep Pap, Diagnostic    HUMAN PAPILLOMAVIRUS         CASE ACCESSION #:    [id number]        Category                  HPV Types                Patient Results         High/Intermed Risk    16,18,31,33,35,39              Negative                            45,51,52,56,58,59,68      Specimen Source        Cervical / Vaginal Specimen     12/5/2003 Procedure    Gynecological Cytology        CASE ACCESSION #:     [id number]       BETHESDA SYSTEM:      Low grade squamous intraepithelial lesion       DESCRIPTOR:           Mild dysplasia           ADEQUACY:             Specimen satisfactory for evaluation       SOURCE:               Vagina, Thin Prep Pap, Diagnostic     11/29/2011 Biopsy    ADDENDUM FINDINGS:    The high grade MOODY in the right labia majora biopsy was of the usual type.  There was no evidence of differentiated MOODY.      DIAGNOSIS:    1) PERINEUM, BIOPSY: SQUAMOUS EPITHELIUM WITH FLORID HYPERKERATOSIS,  CONSISTENT WITH CHRONIC IRRITATION; NO EVIDENCE OF DYSPLASIA OR MALIGNANCY  (SEE COMMENT).    Comment: Immunohistochemical studies (p16, p53, MIB-1) confirm the rendered  interpretations.    2) VULVA, RIGHT LABIUM MAJORUM, BIOPSY: VULVAR INTRAEPITHELIAL NEOPLASIA II  (MODERATE DYSPLASIA); (SEE COMMENT).    Comment: Immunohistochemical studies for p16 (nuclear and cytoplasmic  positivity in lower epithelial half) and MIB-1 (nuclear positivity in  lower  epithelial half) confirms the diagnosis; p53 is positive only in rare  cells. A GMS histochemical study for fungal forms is negative.  Nevertheless, parakeratosis associated with neutrophils, as was seen  herein, is commonly associated with fungal vulvitis.     7/3/2012 Procedure    Gynecological Cytology    CASE ACCESSION #:                     [id number]  BETHESDA SYSTEM:                      High grade squamous intraepithelial                                        lesion  DESCRIPTOR:                           Mild to moderate dysplasia  ADEQUACY:                             Specimen satisfactory for evaluation  SOURCE:                               Vagina, Thin Prep Pap, Diagnostic  # [id number] REVIEWED:                    1     1/29/2013 Biopsy    DIAGNOSIS:    1) VULVA, RIGHT, ANTERIOR, BIOPSY:  SPONGIOTIC DERMATITIS WITH EXTENSIVE  DERMAL GRANULATION TISSUE, MIXED INFLAMMATION AND FIBROSIS (SEE COMENT).    Comment: Sections show spongiosis, basement membrane thickening, dermal  fibrosis, and extensive dermal granulation tissue with a predominantly  chronic inflammatory infiltrate. There is no evidence of dysplasia or  malignancy. The basement membrane thickening and dermal fibrosis suggests  that there may be component of lichen sclerosus. However, the underlying  cause of the ongoing inflammation and repair (granulation tissue) is not  clear from this sample. A tissue gram stain fails to reveal any large  bacterial aggregates.  GMS and AFB studies for fungal forms and acid fast  bacilli were negative respectively     11/27/2013 Surgery      Diagnosis    1) VULVA, LEFT ANTERIOR, BIOPSY: HIGH GRADE SQUAMOUS INTRAEPITHELIAL LESION (SEVERE DYSPLASIA, MOODY III).    2) VAGINAL WALL, ANTERIOR, BIOPSY: HIGH GRADE SQUAMOUS INTRAEPITHELIAL LESION (SEVERE DYSPLASIA, VaIN III).    3) VULVA, VULVECTOMY: FOCUS OF MICROINVASIVE SQUAMOUS CELL CARCINOMA, WELL-DIFFERENTIATED, DEPTH OF STROMAL INVASION 0.4 MM,  8 MM FROM  CLOSEST DEEP MARGINS, 4 MM FROM RIGHT ANTERIOR VAGINAL MARGINS AT 8 - 9 O'CLOCK (PROXIMAL TIP OF SPECIMEN DESIGNATED   12 O'CLOCK); NEGATIVE FOR LYMPHOVASCULAR INVASION; ARISING IN A BACKGROUND OF EXTENSIVE VULVAR INTRAEPITHELIAL NEOPLASIA (SEVERE DYSPLASIA, MOODY III, CLASSICAL AND DIFFERENTIATED TYPES); MOODY III IS PRESENT AT RIGHT LATERAL SURGICAL MARGIN (7 - 9 O'CLOCK),   LEFT LATERAL SURGICAL MARGIN (3 - 5 O'CLOCK) AND RIGHT AND LEFT VAGINAL MARGINS; TOTAL LESIONAL AREA (INVASIVE CARCINOMA AND MOODY III) MEASURES 8.2 CM IN GREATEST DIMENSION (GROSS MEASUREMENT); BACKGROUND SEVERE INTERFACE DERMATITIS AND LICHEN SCLEROSUS.        **Electronically signed out by Dimas Cardenas M.D.**on 12/2/2013  HAYLEY/YAZMIN/franky  Case reviewed by Attending Pathologist      Synoptic Diagnosis  3)  VULVA:     Specimen:                        Vulva  Procedure:                       Other: Vulvectomy  Lymph Node Sampling:             Not applicable  Specimen Size:                   Greatest dimension: 13.5 cm                                   Additional dimension: 9.5 cm                                   Additional dimension: 1.2 cm  Tumor Site:                      Right vulva, labium minus  Tumor Size:                      Greatest dimension: 0.04 cm  Tumor Focality:                  Unifocal  Histologic Type:                 Squamous cell carcinoma  Histologic Grade:                G1: Well differentiated  Microscopic Tumor Extension:     Depth of invasion: 0.4 mm  Margins:                         Uninvolved by invasive carcinoma                                   Distance of invasive carcinoma from closest margin: 4 mm  Lymph-Vascular Invasion:         Not identified  Lymph Nodes:                     No nodes submitted or found  Extranodal Extension:            Cannot be determined (explain):    Fixed or Ulcerated Femoral-Inguinal Lymph Nodes:                                    Not identified  Laterality of Involved Lymph Nodes:                                     Cannot be determined:    Primary Tumor (pT):              pT1a [FIGO IA]: Lesions 2 cm or less in size, confined to the vulva or perineum, and with stromal invasion 1.0 mm or less  Regional Lymph Nodes (pN):       pNX:  Regional lymph nodes cannot be assessed  Distant Metastasis (pM):         Not applicable  Additional Pathologic Findings:  Vulvar intraepithelial neoplasia (MOODY) 3 (severe dysplasia/carcinoma in situ)  --------------------------------------------------------     5/20/2014 Procedure    Gynecologic Cytology  Nikolai Diagnosis:  High grade squamous intraepithelial lesion.    Descriptor/Additional Findings:  Moderate dysplasia.    Adequacy:  Specimen satisfactory for evaluation.    Source:  Vagina, Thin Prep Pap, Imaged Diagnostic     11/11/2014 Biopsy    Diagnosis    ANTERIOR VAGINAL WALL, BIOPSY:  HIGH GRADE SQUAMOUS INTRAEPITHELIAL LESION (VaIN 3, SEVERE DYSPLASIA)       12/19/2014 Surgery    Diagnosis  1)  ANTERIOR VAGINAL WALL, PARTIAL VAGINECTOMY: HIGH-GRADE SQUAMOUS INTRAEPITHELIAL LESION (VaIN 3, SEVERE DYSPLASIA), 2.7 CM; HIGH GRADE DYSPLASIA EXTENDS TO THE 2 AND 8 O'CLOCK TIP MARGINS, THE 5-8 O'CLOCK LATERAL MARGIN, AND THE 11-2 O'CLOCK LATERAL   MARGIN.          2)  JOYA-CLITORAL AREA, EXCISION: HIGH-GRADE SQUAMOUS INTRAEPITHELIAL LESION (VaIN 3, SEVERE DYSPLASIA), EXTENDING TO A LATERAL MARGIN.         2/9/2015 Surgery    Diagnosis  1.          VULVA, LEFT PERIURETHRAL PORTION, EXCISION: FOCAL HIGH-GRADE SQUAMOUS INTRAEPITHELIAL LESION (MOODY 2, MODERATE DYSPLASIA); MARGINS ARE NEGATIVE FOR DYSPLASIA.    2.          VULVA, RIGHT PERIURETHRAL PORTION, EXCISION: BENIGN SQUAMOUS MUCOSA WITH ACUTE AND CHRONIC INFLAMMATION AND REACTIVE CHANGES.         3.          VULVA, LEFT, LOWER PORTION, EXCISION: BENIGN SQUAMOUS MUCOSA WITH ACUTE AND CHRONIC INFLAMMATION, REACTIVE CHANGES AND FEATURES CONSISTENT WITH PREVIOUS SURGICAL PROCEDURE.         4.          VULVA,  "RIGHT PORTION, EXCISION: BENIGN SQUAMOUS MUCOSA WITH CHRONIC INFLAMMATION AND DERMAL FIBROSIS.        9/6/2017 Procedure    Diagnosis  \"PAP SMEAR FROM THE URETHRA\":  HIGH GRADE SQUAMOUS INTRAEPITHELIAL LESION.          10/26/2017 Biopsy    Urethral Meatus Biopsy:  Urothelial mucosa with ulceration, chronic inflammation and focal high grade squamous intraepithelial lesion, moderate dysplasia     7/10/2018 Imaging    MRI Pelvis:  Impression:    1.  There is a 2.3 x 1.8 cm urethral lesion at the external urethral   meatus demonstrating enhancement and T2 hyperintensity. The lesion is   located approximately 8 mm from the bladder neck/internal urethral   orifice.  There is no extension of the lesion into the para vaginal   fat or ischiorectal fossa. There is some edema of the bilateral   ischiocavernosus muscles without invasion by the mass. No pelvic or   inguinal adenopathy is identified.     2.  The patient has a 2.5 cm cystocele and the urethra is almost   horizontal. There is pelvic floor laxity with loss of upper convexity   of the left iliococcygeus muscle.     7/20/2018 Biopsy    Diagnosis  URETHRA, BIOPSY:  SQUAMOUS CELL CARCINOMA IN SITU; SEE COMMENT.    Comments  P16 immunostain and HPV RNA in situ hybridization are strongly and diffusely positive within the lesion, consistent with HPV-related pathogenesis.     1/8/2019 Imaging    MRI Pelvis:  1.  Stable size and appearance of a nonspecific enhancing nodular   lesion at the caudal margin of the vaginal introitus adjacent to   extensive postsurgical changes related to prior vulvectomy and   vaginectomy. This lesion does not appear to conform to the expected   course of the urethra which is somewhat poorly defined, and this felt   more likely be located immediately caudal to the urethra. It is   possible this may reflect postsurgical scarring as opposed to a true   lesion. Continued follow-up is suggested to ensure stability.  2.  No lymphadenopathy or other " evidence of metastatic disease in the   pelvis.  3.  Evidence of pelvic floor laxity with cystocele, not significantly   changed.     8/6/2019 Imaging    MRI Pelvis:  1. No significant change from the prior exam on this limited   noncontrast study.  2. Stable indeterminate nodule anterior to the external urethral   meatus which may represent focal scarring; however,   residual/recurrent disease is not entirely excluded.  Recommend continued attention on follow-up. 3. Extensive pelvic   postsurgical changes with no evidence of local recurrence.  4. Pelvic floor laxity with cystocele unchanged from prior exam.     1/13/2020 Procedure    Urethral stricture dilation     2/18/2020 Imaging    MRI Pelvis:  Impression:  1.  No significant interval change in 1.7 x 1.7 cm T2 hyperintense   ovoid lesion at the urethral meatus.  2.  Numerous postoperative findings status post vaginectomy and   hysterectomy.  3.  Pelvic floor laxity with persistent cystocele.  4.  No pelvic adenopathy or bony lesion identified.     5/13/2020 Procedure    Urethral stricture dilation      7/10/2020 Biopsy    Diagnosis  PERIURETHRA, BIOPSY: POORLY DIFFERENTIATED CARCINOMA WITH SQUAMOUS AND PAPILLARY FEATURES, FOCALLY INVASIVE IN THE SUBEPITHELIAL CONNECTIVE TISSUE; SEE COMMENT.    Comments  The patient's history of vulvar microinvasive squamous cell carcinoma is noted. The current biopsy shows a poorly differentiated carcinoma with papillary formation. P16 immunostain and HPV RNA in situ hybridization are strongly and diffusely positive within the lesion, consistent with HPV-related pathogenesis. A KERRI-3 immunostain shows patchy, weak positivity in the lesional cells. The patient's prior biopsy ([id number]) is reviewed and shows similar morphologic and immunophenotypic features but the papillary features were not present in the prior material.  Dr. Ilene Pablo reviewed this case and concurs with the diagnosis.      7/31/2020 Imaging    MRI  Pelvis:  1.  Redemonstration of a mildly T2 hyperintense mass involving the   urethral meatus, similar dimensions to prior exam on 2/18/2020,   however there is now a polypoid lesion seen along the anterior aspect   of the perineum, possibly contiguous with the urethral mass,   concerning for disease progression. This could potentially represent   the recently biopsied lesion.  2.  Marked interval enlargement of a left inguinal lymph node, almost   certainly representing disease involvement. This would be amenable to   ultrasound-guided biopsy if warranted.  3.  Findings related to pelvic floor laxity, with cystocele.    PET/CT:  1.  Intensely FDG avid left inguinal lymph node is highly suspicious   for locoregional metastasis from known vulvar squamous cell   carcinoma. There are no additional sites of suspicious FDG activity.  2.   Intense physiologic FDG activity within the urine obscures   visualization of the periurethral mass. Findings are better   characterized on same day pelvic MRI.     9/21/2020 - 12/10/2020 Radiation    Radiation OncologyTreatment Course:  Dago Nunez received 6940 cGy in 38 fractions to vulva via external beam radiation therapy.     10/5/2020 -  Chemotherapy    OP Vulvar MitoMYcin / Fluorouracil CIV + XRT       10/9/2020 - 9/7/2021 Chemotherapy    OP CENTRAL VENOUS ACCESS DEVICE ACCESS, CARE, AND MAINTENANCE ([facility])     10/19/2020 Imaging    CT Head:  Impression:    1. No acute intracranial process.     10/21/2021 -  Chemotherapy    OP CENTRAL VENOUS ACCESS DEVICE ACCESS, CARE, AND MAINTENANCE ([facility])     Secondary malignancy of inguinal lymph nodes (HCC)   8/31/2020 Initial Diagnosis    Secondary malignancy of inguinal lymph nodes (CMS/HCC)     10/9/2020 - 9/7/2021 Chemotherapy    OP CENTRAL VENOUS ACCESS DEVICE ACCESS, CARE, AND MAINTENANCE ([facility])     10/21/2021 -  Chemotherapy    OP CENTRAL VENOUS ACCESS DEVICE ACCESS, CARE, AND MAINTENANCE ([facility])         PAST MEDICAL  HISTORY:  ALLERGIES:  Allergies   Allergen Reactions   • Scopolamine Swelling     Other reaction(s): ANGIOEDEMA  Other reaction(s): ANGIOEDEMA     • Amoxicillin-Pot Clavulanate Rash   • Keflex [Cephalexin] Rash   • Septra [Sulfamethoxazole-Trimethoprim] Rash   • Tequin [Gatifloxacin] Other (See Comments)     Doesn't remember   • Trovan [Alatrofloxacin] Dizziness     CURRENT MEDICATIONS:  Outpatient Encounter Medications as of 1/11/2022   Medication Sig Dispense Refill   • Acetaminophen (TYLENOL ARTHRITIS PAIN PO) Take 1 tablet by mouth Daily As Needed (BACK PAIN).     • acetaminophen (Tylenol) 325 MG tablet Take 3 tablets by mouth Every 8 (Eight) Hours. Take every 8 hours for 3 days then take prn as needed. 100 tablet 2   • bisoprolol-hydrochlorothiazide (ZIAC) 5-6.25 MG per tablet TAKE 1 TABLET DAILY 90 tablet 1   • calcium carbonate (OS-REAGAN) 600 MG tablet Take 600 mg by mouth Daily.     • cetirizine (zyrTEC) 10 MG tablet Take 10 mg by mouth Daily.     • citalopram (CeleXA) 20 MG tablet TAKE 1 TABLET BY MOUTH EVERY DAY 90 tablet 1   • cyanocobalamin 1000 MCG/ML injection Inject 1ml IM once every 4 weeks 12 mL 0   • diphenhydrAMINE (BENADRYL) 25 mg capsule Take 25 mg by mouth Every 6 (Six) Hours As Needed for Allergies.     • diphenoxylate-atropine (LOMOTIL) 2.5-0.025 MG per tablet Take 2 tablets by mouth 4 (Four) Times a Day As Needed for Diarrhea. 90 tablet 2   • DULERA 100-5 MCG/ACT inhaler Inhale 2 puffs 2 (Two) Times a Day. Rinse and spit after using. 6 inhaler 0   • esomeprazole (nexIUM) 40 MG capsule Take 1 capsule by mouth 2 (Two) Times a Day. 180 capsule 3   • furosemide (LASIX) 40 MG tablet Take 1.5 tablets by mouth Daily. Patient only takes once a day 135 tablet 3   • gabapentin (NEURONTIN) 300 MG capsule TAKE 2 CAPSULES 3 TIMES A   capsule 2   • Homeopathic Products (LEG CRAMPS) tablet Take 1 tablet by mouth Daily.     • HYDROcodone-acetaminophen (NORCO)  MG per tablet Take 1 tablet by mouth  "Every 4 (Four) Hours As Needed for Moderate Pain  or Severe Pain . 60 tablet 0   • Hydrocortisone (britany's amazing butt) cream Apply 1 application topically to the appropriate area as directed As Needed (irritation). 120 g 0   • letrozole (FEMARA) 2.5 MG tablet Take 1 tablet by mouth Daily. 90 tablet 2   • lidocaine (XYLOCAINE) 5 % ointment Apply  topically to the appropriate area as directed Every 4 (Four) Hours As Needed for Mild Pain  (pain secondary to radiation). 240 g 1   • Lidocaine Viscous HCl (XYLOCAINE) 2 % solution Take 5 mL by mouth 3 (Three) Times a Day With Meals. 100 mL 0   • metOLazone (ZAROXOLYN) 2.5 MG tablet TAKE 1 TABLET BY MOUTH THREE TIMES A WEEK     • ondansetron (Zofran) 8 MG tablet Take 1 tablet by mouth Every 8 (Eight) Hours As Needed for Nausea or Vomiting. 60 tablet 2   • phenazopyridine (Pyridium) 200 MG tablet Take 1 tablet by mouth 3 (Three) Times a Day As Needed for Bladder Spasms. Or painful urination 15 tablet 0   • potassium chloride (K-DUR,KLOR-CON) 20 MEQ CR tablet TAKE 2 TABLETS BY MOUTH EVERY DAY 60 tablet 5   • pravastatin (PRAVACHOL) 40 MG tablet TAKE 1 TABLET DAILY FOR    CHOLESTEROL AND HEART (Patient taking differently: Take 40 mg by mouth Every Night.) 90 tablet 0   • Syringe 25G X 1\" 3 ML misc 1 each Daily. 25 each 1   • cefdinir (OMNICEF) 300 MG capsule Take 1 capsule by mouth Daily. For urinary tract infection 5 capsule 0     Facility-Administered Encounter Medications as of 1/11/2022   Medication Dose Route Frequency Provider Last Rate Last Admin   • heparin injection 500 Units  500 Units Intravenous PRN Drake Stafford MD   500 Units at 07/01/21 1354   • lidocaine (XYLOCAINE) 1 % injection 10 mL  10 mL Infiltration Once Shaheen Akbar, DO       • lidocaine 1% - EPINEPHrine 1:030793 (XYLOCAINE W/EPI) 1 %-1:613146 injection 10 mL  10 mL Infiltration Once Shaheen Akbar, DO       • sodium bicarbonate injection 1 mEq  2 mL Injection Once Shaheen Akbar, DO     "   • sodium chloride 0.9 % flush 10 mL  10 mL Intravenous PRN Drake Stafford MD   10 mL at 07/01/21 1354     ADULT ILLNESSES:  Patient Active Problem List   Diagnosis Code   • Meatal stenosis [id number]   • Retention of urine R33.9   • Type 2 diabetes mellitus, without long-term current use of insulin (HCC) E11.9   • Essential hypertension I10   • Grief reaction F43.21   • 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   • Chronic midline low back pain without sciatica M54.50, G89.29   • Bilateral lower extremity edema R60.0   • History of urethral stricture Z87.448   • Epidermal cyst of neck L72.0   • Normocytic anemia D64.9   • Neck abscess L02.11   • Mixed hyperlipidemia E78.2   • GERD (gastroesophageal reflux disease) K21.9   • Anxiety F41.9   • Iron deficiency and chemotherapy induced anemia D50.9   • Stage 3 chronic kidney disease (HCC) N18.30   • Anemia in stage 3 chronic kidney disease (HCC) N18.30, D63.1   • Screening for breast cancer Z12.39   • Lower extremity edema R60.0   • Vulvar cancer, carcinoma (HCC) C51.9   • Former smoker Z87.891   • 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   • Febrile illness R50.9   • Chemotherapy-induced thrombocytopenia D69.59, T45.1X5A   • Hyponatremia E87.1   • Hypokalemia E87.6   • Neutropenic fever (HCC) D70.9, R50.81   • Moderate malnutrition (HCC) E44.0   • Antineoplastic chemotherapy induced anemia D64.81, T45.1X5A   • History of radiation therapy Z92.3   • Encounter for care related to Port-a-Cath Z45.2   • Malignant neoplasm of upper-outer quadrant of left breast in female, estrogen receptor positive (HCC) C50.412, Z17.0   • Encounter for care related to vascular access port Z45.2   • Abnormal finding on GI tract imaging R93.3     SURGERIES:  Past Surgical History:   Procedure Laterality Date   • APPENDECTOMY     • BENJAMIN PROCEDURE      No evidence of reflux disease while on Nexium 40mg daily-See report   • BREAST CYST ASPIRATION Left    • COLONOSCOPY   01/12/2011    Diverticulosis sigmoid colon; The examination was otherwise normal; Repeat 10 years   • COLONOSCOPY  11/03/2003    Dr. Laguerre-Normal colonoscopy; Normal terminal ileum; Repeat 5 years   • COLONOSCOPY N/A 11/5/2021    Procedure: COLONOSCOPY WITH ANESTHESIA;  Surgeon: Annita Loaiza MD;  Location: [facility] ENDOSCOPY;  Service: Gastroenterology;  Laterality: N/A;  pre abnormal imaging  post  Shaheen Akbar DO   • ENDOSCOPY  07/01/2014    Normal esophagus; Normal stomach; Normal examined duodenum; BRAVO pH capsule deployed;    • ENDOSCOPY  08/14/2013    Mild gastritis-biopsies for H.Pylor obtained   • ENDOSCOPY  02/16/2005    Dr. LaguerreMjusq-Jelkvxvnk-efzjupfb   • ENDOSCOPY  10/21/2003    Dr. Laguerre-Stage 1 reflux esophagitis   • ENDOSCOPY N/A 11/5/2021    Procedure: ESOPHAGOGASTRODUODENOSCOPY WITH ANESTHESIA;  Surgeon: Annita Loaiza MD;  Location: [facility] ENDOSCOPY;  Service: Gastroenterology;  Laterality: N/A;  pre abnormal imaging  post AVM; hiatal hernia; esophageal polyp  Shaheen Akbar DO   • HYSTERECTOMY     • MASTECTOMY W/ SENTINEL NODE BIOPSY Left 9/14/2021    Procedure: LEFT PARTIAL MASTECTOMY WITH MAGSEED AND LEFT SENTINEL LYMPH NODE BIOPSY MAGTRACE;  Surgeon: Quynh Rosario MD;  Location: [facility] OR;  Service: General;  Laterality: Left;   • TONSILLECTOMY     • VAGINA SURGERY      Laser surgery X 2   • VENOUS ACCESS DEVICE (PORT) INSERTION N/A 9/29/2020    Procedure: SINGLE LUMEN PORT - A- CATH PLACEMENT WITH FLUOROSCOPY;  Surgeon: Quynh Rosario MD;  Location: [facility] OR;  Service: General;  Laterality: N/A;     HEALTH MAINTENANCE ITEMS:  Health Maintenance Due   Topic Date Due   • COVID-19 Vaccine (1) Never done   • ZOSTER VACCINE (2 of 3) 11/26/2015   • HEPATITIS C SCREENING  Never done   • Pneumococcal Vaccine 65+ (2 of 2 - PCV13) 10/01/2018   • URINE MICROALBUMIN  01/29/2020   • DIABETIC EYE EXAM  11/25/2020   • HEMOGLOBIN A1C  02/06/2021   • ANNUAL WELLNESS VISIT  08/06/2021        <no information>  Last Completed Colonoscopy          COLORECTAL CANCER SCREENING (COLONOSCOPY - Every 10 Years) Next due on 11/5/2031 11/05/2021  Surgical Procedure: COLONOSCOPY    11/05/2021  COLONOSCOPY    01/29/2014  Outside Claim: AR FECAL BLOOD SCRN IMMUNOASSAY    02/01/2013  Outside Claim: CHG BLOOD,OCCULT,FECAL HGB,FECES,1-3 SIMULT    01/12/2011  SCANNED - COLONOSCOPY    Only the first 5 history entries have been loaded, but more history exists.              Immunization History   Administered Date(s) Administered   • FLUAD TRI 65YR+ 10/22/2019   • Fluad Quad 65+ 11/09/2020   • Fluzone High Dose =>65 Years ([employer] ONLY) 10/01/2018   • Fluzone High-Dose 65+yrs 11/12/2021   • Pneumococcal Polysaccharide (PPSV23) 10/16/2013   • Pneumococcal, Unspecified 10/01/2017   • Tdap 05/01/2019   • Zostavax 01/14/2010, 10/01/2015     Last Completed Mammogram          Scheduled - MAMMOGRAM (Yearly) Scheduled for 3/7/2022    08/20/2021  Mammo Diagnostic Digital Tomosynthesis Left With CAD    08/18/2021  Mammo Screening Digital Tomosynthesis Bilateral With CAD    05/28/2020  Mammo Screening Digital Tomosynthesis Bilateral With CAD    05/20/2019  Mammo Screening Digital Tomosynthesis Bilateral With CAD    05/08/2018  Mammo screening digital tomosynthesis bilateral w CAD    Only the first 5 history entries have been loaded, but more history exists.                  FAMILY HISTORY:  Family History   Problem Relation Age of Onset   • Cancer Mother    • Hypertension Mother    • Osteoporosis Mother    • Dementia Mother    • Uterine cancer Mother    • Heart disease Father    • Parkinsonism Father    • Cancer Sister    • Breast cancer Sister    • Kidney cancer Sister    • Diabetes Brother    • Heart disease Paternal Grandfather    • No Known Problems Maternal Grandmother    • No Known Problems Maternal Grandfather    • No Known Problems Paternal Grandmother    • Colon cancer Neg Hx    • Colon polyps Neg Hx    •  "Esophageal cancer Neg Hx    • Liver cancer Neg Hx    • Liver disease Neg Hx    • Rectal cancer Neg Hx    • Stomach cancer Neg Hx      SOCIAL HISTORY:  Social History     Socioeconomic History   • Marital status:    Tobacco Use   • Smoking status: Former Smoker   • Smokeless tobacco: Never Used   Vaping Use   • Vaping Use: Never used   Substance and Sexual Activity   • Alcohol use: Yes     Comment: Occasional glass of wine   • Drug use: No   • Sexual activity: Never       REVIEW OF SYSTEMS:    Review of Systems   Constitutional: Positive for fatigue. Negative for chills and fever.        \"I feel weak and tired.\"   HENT: Negative for congestion, mouth sores and trouble swallowing.    Eyes: Negative for discharge and redness.   Respiratory: Negative for cough and wheezing.    Cardiovascular: Positive for leg swelling. Negative for chest pain.   Gastrointestinal: Positive for abdominal pain. Negative for nausea and vomiting.   Endocrine: Negative for polydipsia and polyphagia.   Genitourinary:        \"Burning and I catheterize twice a day.\" Per PCP.    Musculoskeletal: Negative for myalgias and neck stiffness.   Skin: Positive for pallor.   Allergic/Immunologic: Negative for food allergies.   Neurological: Negative for dizziness, speech difficulty and weakness.   Hematological: Does not bruise/bleed easily.   Psychiatric/Behavioral: Negative for agitation, confusion and hallucinations.       VITAL SIGNS: /66   Pulse 52   Temp 97.9 °F (36.6 °C)   Resp 18   Ht 147.3 cm (58\")   Wt 72.6 kg (160 lb)   SpO2 95%   Breastfeeding No   BMI 33.44 kg/m²   Pain Score    01/11/22 1100   PainSc: 0-No pain       PHYSICAL EXAMINATION:     Physical Exam  Vitals reviewed.   Constitutional:       Appearance: She is ill-appearing.      Comments: She arrived in the exam room in a wheelchair.   HENT:      Head: Normocephalic and atraumatic.   Eyes:      General: No scleral icterus.  Cardiovascular:      Rate and Rhythm: " "Normal rate.   Pulmonary:      Effort: No respiratory distress.      Breath sounds: No wheezing or rales.   Abdominal:      General: Bowel sounds are normal.      Palpations: Abdomen is soft.      Tenderness: There is no abdominal tenderness.   Musculoskeletal:         General: Swelling present.      Cervical back: Neck supple.      Comments: \"I take water pills.\"   Skin:     General: Skin is warm.      Coloration: Skin is pale.   Neurological:      Mental Status: She is alert and oriented to person, place, and time.   Psychiatric:         Mood and Affect: Mood normal.         Behavior: Behavior normal.         Thought Content: Thought content normal.         Judgment: Judgment normal.         LABS    Lab Results - Last 18 Months   Lab Units 09/30/21  1841 09/30/21  1053 09/09/21  1459 06/24/21  1118 05/07/21  1117 03/12/21  1113 02/18/21  1948 02/18/21  1948 01/14/21  1436 01/04/21  1053 12/28/20  1047 12/28/20  1047 11/02/20  0937 10/27/20  0949 10/22/20  0430 10/21/20  0521 10/21/20  0521 10/20/20  0448 10/20/20  0448 10/19/20  1535 10/19/20  1535 10/19/20  1244 10/19/20  1244 10/13/20  1114 10/13/20  1114   HEMOGLOBIN g/dL 9.7* 10.0* 9.9* 11.3* 9.6* 10.3*   < > 11.3*   < > 9.8*   < > 10.1*   < > 9.4* 9.4*   < > 7.8*   < > 8.4*   < > 9.1*   < > 9.3*   < > 10.0*   HEMATOCRIT % 29.9* 31.3* 30.0* 34.3 29.3* 31.1*   < > 32.5*   < > 28.4*   < > 29.7*   < > 27.5* 27.4*   < > 23.0*   < > 23.5*   < > 25.7*   < > 26.4*   < > 28.7*   MCV fL 103.5* 105.7* 103.4* 100.6* 97.3* 95.1   < > 95.0   < > 97.3*   < > 96.7   < > 96.2 93.5   < > 93.9   < > 91.8   < > 92.8   < > 92.3   < > 94.7   WBC 10*3/mm3 5.79 5.10 6.09 6.28 5.53 5.71   < > 9.23   < > 5.57   < > 6.60   < > 6.85 8.42   < > 6.38   < > 2.65*   < > 4.02   < > 4.54   < > 6.12   RDW % 14.0 13.8 13.5 13.8 14.1 14.0   < > 14.6   < > 14.5   < > 14.3   < > 14.4 13.2   < > 13.0   < > 12.4   < > 12.6   < > 12.5   < > 12.4   MPV fL 10.1 9.1 11.7 9.3 10.0 10.0   < > 10.2   < > " 9.6   < > 9.8   < > 9.9 12.2*   < > 12.7*   < > 12.7*   < > 12.9*   < > 10.9   < > 10.5   PLATELETS 10*3/mm3 197 179 156 203 184 171   < > 204   < > 191   < > 192   < > 271 71*   < > 51*   < > 35*   < > 35*   < > 40*   < > 85*   IMM GRAN % % 0.3 0.2  --  0.2 0.4 0.4  --  0.4   < > 0.5   < > 0.6*   < >  --   --   --   --   --   --   --   --   --   --   --   --    NEUTROS ABS 10*3/mm3 3.88 3.39 3.85 4.60 3.82 4.42   < > 6.77   < > 3.96   < > 4.99   < > 5.82 7.49*   < > 5.93   < > 2.17   < > 3.09   < > 3.72   < > 5.63   LYMPHS ABS 10*3/mm3 1.12 1.04 1.20 0.92 0.96 0.72   < > 1.33   < > 0.82   < > 0.83   < >  --   --   --   --   --   --   --   --   --   --   --   --    MONOS ABS 10*3/mm3 0.51 0.37 0.75 0.58 0.49 0.43   < > 0.76   < > 0.52   < > 0.59   < >  --   --   --   --   --   --   --   --   --   --   --   --    EOS ABS 10*3/mm3 0.24 0.27 0.24 0.14 0.21 0.10   < > 0.30   < > 0.21   < > 0.12   < >  --   --   --   --   --   --   --   --   --   --   --  0.18   BASOS ABS 10*3/mm3 0.02 0.02 0.03 0.03 0.03 0.02   < > 0.03   < > 0.03   < > 0.03   < > 0.07  --   --   --   --   --   --   --   --   --    < > 0.06   IMMATURE GRANS (ABS) 10*3/mm3 0.02 0.01  --  0.01 0.02 0.02  --  0.04   < > 0.03   < > 0.04   < >  --   --   --   --   --   --   --   --   --   --   --   --    NRBC /100 WBC 0.0  --   --   --  0.0 0.0  --  0.0  --  0.0  --  0.0   < >  --  2.0*  --   --   --   --   --   --   --   --   --   --    NEUTROPHIL % %  --   --   --   --   --   --   --   --   --   --   --   --   --  85.0* 81.0*  --  89.9*  --  78.0*  --  64.6  --  67.0   < > 88.0*   MONOCYTES % %  --   --   --   --   --   --   --   --   --   --   --   --   --  8.0 4.0*  --  3.0*  --  9.0  --  13.1*  --  12.0   < > 1.0*   BASOPHIL % %  --   --   --   --   --   --   --   --   --   --   --   --   --  1.0  --   --   --   --   --   --   --   --   --   --  1.0   ATYP LYMPH % %  --   --   --   --   --   --   --   --   --   --   --   --   --   --   --   --   --   --   2.0  --  1.0  --  1.0  --   --    ANISOCYTOSIS   --   --   --   --   --   --   --   --   --   --   --   --   --  Slight/1+  --   --  Slight/1+  --   --   --   --   --  Slight/1+  --   --    GIANT PLT   --   --   --   --   --   --   --   --   --   --   --   --   --  Slight/1+ Slight/1+  --  Slight/1+  --  Slight/1+  --   --   --   --   --   --     < > = values in this interval not displayed.       Lab Results - Last 18 Months   Lab Units 09/30/21  1841 09/30/21  1053 09/14/21  0702 09/09/21  1459 07/01/21  1412 03/12/21  1113   GLUCOSE mg/dL 123* 129* 120* 85 110* 148*   SODIUM mmol/L 136 160* 136 134* 134* 136   POTASSIUM mmol/L 3.9 4.9 4.9 5.4* 4.1 4.1   CO2 mmol/L 26.0 25.0 24.0 24.0 31.0* 28.0   CHLORIDE mmol/L 100 122* 101 102 95* 99   ANION GAP mmol/L 10.0 13.0 11.0 8.0 8.0 9.0   CREATININE mg/dL 1.44* 1.21* 1.58* 1.56* 1.04* 1.40*   BUN mg/dL 20 19 38* 45* 26* 20   BUN / CREAT RATIO  13.9 15.7 24.1 28.8* 25.0 14.3   CALCIUM mg/dL 9.1 9.4 9.6 9.7 9.8 10.1   EGFR IF NONAFRICN AM mL/min/1.73 35* 43* 32* 32* 51* 36*   ALK PHOS U/L 82 78 78 79 75 57   TOTAL PROTEIN g/dL 7.3 7.5 8.0 7.8 8.1 7.6   ALT (SGPT) U/L 8 5 <5 6 9 5   AST (SGOT) U/L 15 14 16 11 16 14   BILIRUBIN mg/dL 0.2 0.3 0.2 0.3 0.4 0.5   ALBUMIN g/dL 4.30 4.20 4.50 4.20 4.10 4.10   GLOBULIN gm/dL 3.0 3.3 3.5 3.6 4.0 3.5       No results for input(s): MSPIKE, KAPPALAMB, IGLFLC, URICACID, FREEKAPPAL, CEA, LDH, REFLABREPO in the last 27176 hours.    Lab Results - Last 18 Months   Lab Units 09/30/21  1053 06/24/21  1118 05/07/21  1117 01/04/21  1053 10/20/20  0448 10/13/20  1114 09/03/20  1134   IRON mcg/dL 85 74 50 74  --  135 129   TIBC mcg/dL 314 320 331 301  --  335 384   IRON SATURATION % 27 23 15* 25  --  40 34   FERRITIN ng/mL 493.00* 712.20* 245.90* 401.10*  --  599.10* 341.90*   TSH uIU/mL  --   --   --   --  0.831  --   --        Dago Nunez reports a pain score of 0.       ASSESSMENT:  1.  Left breast cancer.  Tumor size 1.1 cm.  AJCC  stage: 1A (pT1c, snpN0, cM0)  Receptor status: Estrogen 87%, progesterone 47% and negative HER-2/gabriela.  Treatment status: Post left lumpectomy and sentinel lymph node biopsy.  Declined adjuvant radiation. On adjuvant Femara.  2.  Macrocytic anemia from iron deficiency, B12 deficiency and history of chronic kidney disease Stage III, GFR 56 mL/min on 09/03/2020.  3.   Iron deficiency. Intolerant to oral iron.   4.   Chronic kidney disease Stage IIIa, GFR 56 ml/min on 09/03/2020.  5.   Performance status of 3.     6.   Osteoporosis.  On calcium and D.  7.   Squamous cell cancer in situ, urethra.  Followed by Dr. Callahan  8.   Grade 1 mucositis from chemo, history of.      9.   Recurrent squamous cell carcinoma, vulva.  AJCC stage IIIA (T2, Nib, M0, G3).  Treatment status.  Had Mitomycin C and 5 FU D1 to D4 with radiation.  D29 - 32 chemo was cancelled due to poor performance status.          PLAN:  1.    Re:  Heme status.  Hemoglobin 9.2, hematocrit 29.6, and .4.  Bone density 10/05/2021 showed osteoporosis.  Vitamin D and calcium started.  2.    Re:  Pre-office CMP.  GFR pending ml/min.  3.    Re:  Ferritin and saturation %. Pending.  4.    Re:  Keep appointment with Dr Marks.  She was seen 10/29/2021. No evidence of recurrent vulvar cancer. Office visit in 1 year.  5.    Re:  Endoscopy 11/05/2021.Small hiatal hernia. A single gastroesophageal junction polyp. Resected and retrieved. Normal stomach. A single non-bleeding angiodysplastic lesion in the duodenum.  Colonoscopy 11/05/2021. Petechia(e) in the rectum, in the recto-sigmoid colon and in the distal sigmoid colon. No specimens collected.  6.   CBC with differential, ferritin and iron panel in 2 months.  7.   Retacrit 40,000 units SQ wekly if hemoglobin below 10 and hematocrit below 30 to target a hemoglobin of 11 and hematocrit 33. Move CBC weekly if she starts Retacrit.   Monitor for hypertension.  8.  Transfuse 1 unit  packed RBCs if hemoglobin less than 6.9.  Premed Tylenol 500 mg p.o. and Benadryl 12.5 mg IV.  Lasix 20 mg IV push after a unit of blood.  Observe for transfusion reactions.  9.   Intolerant to oral iron (nausea, cramps, and constipation).  IV iron as needed.   10.   Advance Care Planning   ACP discussion was held with the patient during this visit. Patient has an advance directive in EMR which is still valid.   11.  eRx Zofran 8 mg po every 8 hours as needed for nausea/vomiting, # 60, 2 refills if needed.  12.  Flush port every 6 weeks.   13.  Cervical/vaginal cytology as indicated.  14.  Vitamin B12 1000 mcg IM monthly by [facility].    Monitor for local reaction.  15.  Continue ongoing management per primary care physician and other specialists.  16.  Will not obtain breast tumor markers or Oncotype DX.  Patient is not a candidate for adjuvant chemotherapy.  17.  eRx Femara 2.5 mg p.o. daily #90 with 3 refills if needed.    Monitor for potential hot flashes and bone loss.  18.Plan of care discussed with patient and spouse.  Understanding expressed.  Patient agreeable to proceed.  19. She will see the genetic counselor 03/2022.   20.  Next bone density 10/2023.  21.  Return to the office in 4 months with CBC with differential, ferritin, iron panel, and CMP.          I have reviewed the assessment and plan and verified the accuracy of it. No changes to assessment and plan since the information was documented. Drake Stafford MD 01/11/22        I spent 33 total minutes, face-to-face, caring for Syndal today.  Greater than 50% of this time involved counseling and/or coordination of care as documented within this note regarding the patient's illness(es), pros and cons of various treatment options, instructions and/or risk reduction.              cc: Shaheen Akbar MD         (Annita Loaiza MD)        (Ulises Roche MD)        (Trini Rosario MD)        Gilberto Mills MD        (Moustapha Callahan MD)        (Radha  MD Selina)

## 2022-01-11 ENCOUNTER — LAB (OUTPATIENT)
Dept: LAB | Facility: HOSPITAL | Age: 82
End: 2022-01-11

## 2022-01-11 ENCOUNTER — OFFICE VISIT (OUTPATIENT)
Dept: ONCOLOGY | Facility: CLINIC | Age: 82
End: 2022-01-11

## 2022-01-11 VITALS
RESPIRATION RATE: 18 BRPM | TEMPERATURE: 97.9 F | HEART RATE: 52 BPM | SYSTOLIC BLOOD PRESSURE: 140 MMHG | HEIGHT: 58 IN | WEIGHT: 160 LBS | OXYGEN SATURATION: 95 % | DIASTOLIC BLOOD PRESSURE: 66 MMHG | BODY MASS INDEX: 33.58 KG/M2

## 2022-01-11 DIAGNOSIS — D63.1 ANEMIA DUE TO STAGE 3B CHRONIC KIDNEY DISEASE: ICD-10-CM

## 2022-01-11 DIAGNOSIS — C51.9 VULVAR CANCER, CARCINOMA: ICD-10-CM

## 2022-01-11 DIAGNOSIS — C50.412 MALIGNANT NEOPLASM OF UPPER-OUTER QUADRANT OF LEFT BREAST IN FEMALE, ESTROGEN RECEPTOR POSITIVE: ICD-10-CM

## 2022-01-11 DIAGNOSIS — Z17.0 MALIGNANT NEOPLASM OF UPPER-OUTER QUADRANT OF LEFT BREAST IN FEMALE, ESTROGEN RECEPTOR POSITIVE: ICD-10-CM

## 2022-01-11 DIAGNOSIS — N18.31 ANEMIA DUE TO STAGE 3A CHRONIC KIDNEY DISEASE: ICD-10-CM

## 2022-01-11 DIAGNOSIS — C77.4 SECONDARY MALIGNANCY OF INGUINAL LYMPH NODES: Primary | ICD-10-CM

## 2022-01-11 DIAGNOSIS — Z78.0 MENOPAUSE: ICD-10-CM

## 2022-01-11 DIAGNOSIS — Z45.2 ENCOUNTER FOR CARE RELATED TO VASCULAR ACCESS PORT: ICD-10-CM

## 2022-01-11 DIAGNOSIS — D63.1 ANEMIA DUE TO STAGE 3A CHRONIC KIDNEY DISEASE: ICD-10-CM

## 2022-01-11 DIAGNOSIS — N18.32 ANEMIA DUE TO STAGE 3B CHRONIC KIDNEY DISEASE: ICD-10-CM

## 2022-01-11 LAB
ALBUMIN SERPL-MCNC: 4 G/DL (ref 3.5–5.2)
ALBUMIN/GLOB SERPL: 1.2 G/DL
ALP SERPL-CCNC: 79 U/L (ref 39–117)
ALT SERPL W P-5'-P-CCNC: 5 U/L (ref 1–33)
ANION GAP SERPL CALCULATED.3IONS-SCNC: 11 MMOL/L (ref 5–15)
AST SERPL-CCNC: 13 U/L (ref 1–32)
BASOPHILS # BLD AUTO: 0.03 10*3/MM3 (ref 0–0.2)
BASOPHILS NFR BLD AUTO: 0.7 % (ref 0–1.5)
BILIRUB SERPL-MCNC: 0.2 MG/DL (ref 0–1.2)
BUN SERPL-MCNC: 21 MG/DL (ref 8–23)
BUN/CREAT SERPL: 14.9 (ref 7–25)
CALCIUM SPEC-SCNC: 9.5 MG/DL (ref 8.6–10.5)
CHLORIDE SERPL-SCNC: 103 MMOL/L (ref 98–107)
CO2 SERPL-SCNC: 24 MMOL/L (ref 22–29)
CREAT SERPL-MCNC: 1.41 MG/DL (ref 0.57–1)
DEPRECATED RDW RBC AUTO: 49.8 FL (ref 37–54)
EOSINOPHIL # BLD AUTO: 0.4 10*3/MM3 (ref 0–0.4)
EOSINOPHIL NFR BLD AUTO: 9.6 % (ref 0.3–6.2)
ERYTHROCYTE [DISTWIDTH] IN BLOOD BY AUTOMATED COUNT: 13.3 % (ref 12.3–15.4)
FERRITIN SERPL-MCNC: 469.4 NG/ML (ref 13–150)
GFR SERPL CREATININE-BSD FRML MDRD: 36 ML/MIN/1.73
GLOBULIN UR ELPH-MCNC: 3.4 GM/DL
GLUCOSE SERPL-MCNC: 92 MG/DL (ref 65–99)
HCT VFR BLD AUTO: 29.6 % (ref 34–46.6)
HGB BLD-MCNC: 9.2 G/DL (ref 12–15.9)
HOLD SPECIMEN: NORMAL
IRON 24H UR-MRATE: 52 MCG/DL (ref 37–145)
IRON SATN MFR SERPL: 16 % (ref 20–50)
LYMPHOCYTES # BLD AUTO: 0.92 10*3/MM3 (ref 0.7–3.1)
LYMPHOCYTES NFR BLD AUTO: 22.2 % (ref 19.6–45.3)
MCH RBC QN AUTO: 31.8 PG (ref 26.6–33)
MCHC RBC AUTO-ENTMCNC: 31.1 G/DL (ref 31.5–35.7)
MCV RBC AUTO: 102.4 FL (ref 79–97)
MONOCYTES # BLD AUTO: 0.48 10*3/MM3 (ref 0.1–0.9)
MONOCYTES NFR BLD AUTO: 11.6 % (ref 5–12)
NEUTROPHILS NFR BLD AUTO: 2.3 10*3/MM3 (ref 1.7–7)
NEUTROPHILS NFR BLD AUTO: 55.4 % (ref 42.7–76)
PLATELET # BLD AUTO: 153 10*3/MM3 (ref 140–450)
PMV BLD AUTO: 10.9 FL (ref 6–12)
POTASSIUM SERPL-SCNC: 4.3 MMOL/L (ref 3.5–5.2)
PROT SERPL-MCNC: 7.4 G/DL (ref 6–8.5)
RBC # BLD AUTO: 2.89 10*6/MM3 (ref 3.77–5.28)
SODIUM SERPL-SCNC: 138 MMOL/L (ref 136–145)
TIBC SERPL-MCNC: 328 MCG/DL (ref 298–536)
TRANSFERRIN SERPL-MCNC: 220 MG/DL (ref 200–360)
WBC NRBC COR # BLD: 4.15 10*3/MM3 (ref 3.4–10.8)

## 2022-01-11 PROCEDURE — 99214 OFFICE O/P EST MOD 30 MIN: CPT | Performed by: INTERNAL MEDICINE

## 2022-01-11 PROCEDURE — 85025 COMPLETE CBC W/AUTO DIFF WBC: CPT

## 2022-01-11 PROCEDURE — 84466 ASSAY OF TRANSFERRIN: CPT

## 2022-01-11 PROCEDURE — 36415 COLL VENOUS BLD VENIPUNCTURE: CPT

## 2022-01-11 PROCEDURE — 83540 ASSAY OF IRON: CPT

## 2022-01-11 PROCEDURE — 80053 COMPREHEN METABOLIC PANEL: CPT

## 2022-01-11 PROCEDURE — 82728 ASSAY OF FERRITIN: CPT

## 2022-01-11 RX ORDER — HEPARIN SODIUM (PORCINE) LOCK FLUSH IV SOLN 100 UNIT/ML 100 UNIT/ML
500 SOLUTION INTRAVENOUS AS NEEDED
Status: CANCELLED | OUTPATIENT
Start: 2022-01-11

## 2022-01-11 RX ORDER — SODIUM CHLORIDE 0.9 % (FLUSH) 0.9 %
10 SYRINGE (ML) INJECTION AS NEEDED
Status: SHIPPED | OUTPATIENT
Start: 2022-01-11

## 2022-01-11 RX ORDER — HEPARIN SODIUM (PORCINE) LOCK FLUSH IV SOLN 100 UNIT/ML 100 UNIT/ML
500 SOLUTION INTRAVENOUS AS NEEDED
Status: SHIPPED | OUTPATIENT
Start: 2022-01-11

## 2022-01-11 RX ORDER — SODIUM CHLORIDE 0.9 % (FLUSH) 0.9 %
10 SYRINGE (ML) INJECTION AS NEEDED
Status: CANCELLED | OUTPATIENT
Start: 2022-01-11

## 2022-01-11 RX ADMIN — HEPARIN SODIUM (PORCINE) LOCK FLUSH IV SOLN 100 UNIT/ML 500 UNITS: 100 SOLUTION at 11:34

## 2022-01-11 RX ADMIN — Medication 10 ML: at 11:34

## 2022-01-12 ENCOUNTER — TELEPHONE (OUTPATIENT)
Dept: ONCOLOGY | Facility: CLINIC | Age: 82
End: 2022-01-12

## 2022-01-12 ENCOUNTER — LAB (OUTPATIENT)
Dept: LAB | Facility: HOSPITAL | Age: 82
End: 2022-01-12

## 2022-01-12 ENCOUNTER — OFFICE VISIT (OUTPATIENT)
Dept: FAMILY MEDICINE CLINIC | Facility: CLINIC | Age: 82
End: 2022-01-12

## 2022-01-12 VITALS
TEMPERATURE: 97.5 F | OXYGEN SATURATION: 97 % | BODY MASS INDEX: 33.58 KG/M2 | DIASTOLIC BLOOD PRESSURE: 58 MMHG | HEART RATE: 60 BPM | HEIGHT: 58 IN | SYSTOLIC BLOOD PRESSURE: 136 MMHG | WEIGHT: 160 LBS

## 2022-01-12 DIAGNOSIS — R30.1 PAINFUL BLADDER SPASM: ICD-10-CM

## 2022-01-12 DIAGNOSIS — D50.9 IRON DEFICIENCY ANEMIA, UNSPECIFIED IRON DEFICIENCY ANEMIA TYPE: Primary | ICD-10-CM

## 2022-01-12 DIAGNOSIS — R30.0 DYSURIA: ICD-10-CM

## 2022-01-12 LAB
BACTERIA UR QL AUTO: ABNORMAL /HPF
BILIRUB UR QL STRIP: NEGATIVE
CLARITY UR: CLEAR
COLOR UR: YELLOW
GLUCOSE UR STRIP-MCNC: NEGATIVE MG/DL
HGB UR QL STRIP.AUTO: NEGATIVE
HYALINE CASTS UR QL AUTO: ABNORMAL /LPF
KETONES UR QL STRIP: NEGATIVE
LEUKOCYTE ESTERASE UR QL STRIP.AUTO: ABNORMAL
NITRITE UR QL STRIP: NEGATIVE
PH UR STRIP.AUTO: <=5 [PH] (ref 5–8)
PROT UR QL STRIP: NEGATIVE
RBC # UR STRIP: ABNORMAL /HPF
REF LAB TEST METHOD: ABNORMAL
SP GR UR STRIP: 1.01 (ref 1–1.03)
SQUAMOUS #/AREA URNS HPF: ABNORMAL /HPF
UROBILINOGEN UR QL STRIP: ABNORMAL
WBC # UR STRIP: ABNORMAL /HPF

## 2022-01-12 PROCEDURE — 87086 URINE CULTURE/COLONY COUNT: CPT | Performed by: FAMILY MEDICINE

## 2022-01-12 PROCEDURE — 99214 OFFICE O/P EST MOD 30 MIN: CPT | Performed by: FAMILY MEDICINE

## 2022-01-12 PROCEDURE — 81001 URINALYSIS AUTO W/SCOPE: CPT

## 2022-01-12 RX ORDER — DIPHENHYDRAMINE HYDROCHLORIDE 50 MG/ML
50 INJECTION INTRAMUSCULAR; INTRAVENOUS AS NEEDED
Status: CANCELLED | OUTPATIENT
Start: 2022-01-20

## 2022-01-12 RX ORDER — FAMOTIDINE 10 MG/ML
20 INJECTION, SOLUTION INTRAVENOUS AS NEEDED
Status: CANCELLED | OUTPATIENT
Start: 2022-01-20

## 2022-01-12 RX ORDER — SODIUM CHLORIDE 9 MG/ML
250 INJECTION, SOLUTION INTRAVENOUS ONCE
Status: CANCELLED | OUTPATIENT
Start: 2022-01-20

## 2022-01-12 RX ORDER — ACETAMINOPHEN 325 MG/1
650 TABLET ORAL ONCE
Status: CANCELLED | OUTPATIENT
Start: 2022-01-20

## 2022-01-12 RX ORDER — PHENAZOPYRIDINE HYDROCHLORIDE 200 MG/1
200 TABLET, FILM COATED ORAL 3 TIMES DAILY PRN
Qty: 15 TABLET | Refills: 0 | Status: SHIPPED | OUTPATIENT
Start: 2022-01-12 | End: 2022-01-31 | Stop reason: SDUPTHER
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
## 2022-01-12 NOTE — TELEPHONE ENCOUNTER
Notified pt of Injectafer scheduled at 10:30 on 1/20/22. Muriel MUNOZ will put plan in and Guillermina SWAN approved.      ----- Message from Disha Cummings MA sent at 1/11/2022  3:06 PM CST -----  Hey do you care to get this set up for me? I forgot to tell Dr. Stafford I wouldn't be there this afternoon but I think he knows now so this should be the last thing.    Thanks!  ----- Message -----  From: Drake Stafford MD  Sent: 1/11/2022  12:21 PM CST  To: Disha Cummings MA    Schedule Injectafer 1 dose.  Premed Tylenol 500 mg p.o.

## 2022-01-13 DIAGNOSIS — R30.0 DYSURIA: Primary | ICD-10-CM

## 2022-01-13 DIAGNOSIS — Z87.448 HISTORY OF URETHRAL STRICTURE: ICD-10-CM

## 2022-01-13 DIAGNOSIS — R30.1 PAINFUL BLADDER SPASM: ICD-10-CM

## 2022-01-13 LAB — BACTERIA SPEC AEROBE CULT: NORMAL

## 2022-01-13 RX ORDER — NITROFURANTOIN MACROCRYSTALS 25 MG/1
25 CAPSULE ORAL NIGHTLY
Qty: 7 CAPSULE | Refills: 0 | Status: SHIPPED | OUTPATIENT
Start: 2022-01-13

## 2022-01-14 NOTE — PROGRESS NOTES
Case discussed with PCP Dr. Akbar this AM.  Pt has multiple reasons to have dysuria beyond UTI and these include her self-cathing and other causes of urethritis, as well as her cancer.  She has several medications that already could contribute to her CKD Stage 3 level labs. She has tolerated nitrofurantoin in the past.  Will send in Rx for low dose nitrofurantoin for her and track sx over the next week.  She likely needs f/u with urology and may eventually need consideration of indwelling or suprapubic cath if the cancer progresses locally.  Might be a candidate for prn use of abx if she has acute urethral pain but her multiple allergies to medications make that a complicated option.  Pt said Dr. Conway told her there was nothing else he could do for her.    Please let pt and  know I sent in the rx to [facility] and although there is no UTI there is likely inflammation in the tube that brings the urine out and the abx may help that heal up faster.

## 2022-01-17 RX ORDER — PRAVASTATIN SODIUM 40 MG
40 TABLET ORAL NIGHTLY
Qty: 90 TABLET | Refills: 3 | Status: SHIPPED | OUTPATIENT
Start: 2022-01-17 | End: 2022-12-14

## 2022-01-20 ENCOUNTER — APPOINTMENT (OUTPATIENT)
Dept: ONCOLOGY | Facility: HOSPITAL | Age: 82
End: 2022-01-20

## 2022-01-26 ENCOUNTER — TELEPHONE (OUTPATIENT)
Dept: FAMILY MEDICINE CLINIC | Facility: CLINIC | Age: 82
End: 2022-01-26

## 2022-01-26 ENCOUNTER — INFUSION (OUTPATIENT)
Dept: ONCOLOGY | Facility: HOSPITAL | Age: 82
End: 2022-01-26

## 2022-01-26 VITALS
DIASTOLIC BLOOD PRESSURE: 40 MMHG | RESPIRATION RATE: 20 BRPM | TEMPERATURE: 97.6 F | SYSTOLIC BLOOD PRESSURE: 135 MMHG | OXYGEN SATURATION: 99 % | HEART RATE: 53 BPM

## 2022-01-26 DIAGNOSIS — D50.9 IRON DEFICIENCY ANEMIA, UNSPECIFIED IRON DEFICIENCY ANEMIA TYPE: Primary | ICD-10-CM

## 2022-01-26 PROCEDURE — 96365 THER/PROPH/DIAG IV INF INIT: CPT

## 2022-01-26 PROCEDURE — 25010000002 FERRIC CARBOXYMALTOSE 750 MG/15ML SOLUTION 15 ML VIAL: Performed by: INTERNAL MEDICINE

## 2022-01-26 RX ORDER — SODIUM CHLORIDE 9 MG/ML
250 INJECTION, SOLUTION INTRAVENOUS ONCE
Status: COMPLETED | OUTPATIENT
Start: 2022-01-26 | End: 2022-01-26

## 2022-01-26 RX ORDER — DIPHENHYDRAMINE HYDROCHLORIDE 50 MG/ML
50 INJECTION INTRAMUSCULAR; INTRAVENOUS AS NEEDED
Status: DISCONTINUED | OUTPATIENT
Start: 2022-01-26 | End: 2022-01-26 | Stop reason: HOSPADM

## 2022-01-26 RX ORDER — FAMOTIDINE 10 MG/ML
20 INJECTION, SOLUTION INTRAVENOUS AS NEEDED
Status: DISCONTINUED | OUTPATIENT
Start: 2022-01-26 | End: 2022-01-26 | Stop reason: HOSPADM

## 2022-01-26 RX ADMIN — SODIUM CHLORIDE 250 ML: 9 INJECTION, SOLUTION INTRAVENOUS at 14:20

## 2022-01-26 RX ADMIN — FERRIC CARBOXYMALTOSE INJECTION 750 MG: 50 INJECTION, SOLUTION INTRAVENOUS at 14:28

## 2022-01-26 NOTE — TELEPHONE ENCOUNTER
Patient called stating Dr Younger was going to send in a lidocaine mix with greg zimmering butt cream for her buttock area sent to [facility] but it has not been sent in.

## 2022-01-28 ENCOUNTER — TELEPHONE (OUTPATIENT)
Dept: FAMILY MEDICINE CLINIC | Facility: CLINIC | Age: 82
End: 2022-01-28

## 2022-01-28 DIAGNOSIS — R30.0 DYSURIA: Primary | ICD-10-CM

## 2022-01-28 RX ORDER — CEFDINIR 300 MG/1
300 CAPSULE ORAL DAILY
Qty: 7 CAPSULE | Refills: 0 | Status: SHIPPED | OUTPATIENT
Start: 2022-01-28 | End: 2022-02-04

## 2022-01-28 NOTE — TELEPHONE ENCOUNTER
Caller: Ted RODRIGUEZ    Relationship to patient:     Best call back number: [phone]    Patient is needing an order for a UA placed. She ha been having some odor with her urine.  will send someone out to do this.  Please advise.

## 2022-01-28 NOTE — TELEPHONE ENCOUNTER
ORALIA WITH [employer] CALL FOR PATIENT AGAING TO TALK TO SOMEONE ABOUT GETTING A NORDER FOR THE PATIENT [phone]

## 2022-01-28 NOTE — TELEPHONE ENCOUNTER
Patient called stating she is in severe pain and her urine has a very foul odor. She is requesting an antibiotic or something be called in as soon as possible to [facility] in [place].

## 2022-01-31 DIAGNOSIS — R30.0 DYSURIA: ICD-10-CM

## 2022-01-31 DIAGNOSIS — R30.1 PAINFUL BLADDER SPASM: ICD-10-CM

## 2022-02-01 RX ORDER — PHENAZOPYRIDINE HYDROCHLORIDE 200 MG/1
200 TABLET, FILM COATED ORAL 3 TIMES DAILY PRN
Qty: 15 TABLET | Refills: 0 | Status: SHIPPED | OUTPATIENT
Start: 2022-02-01 | End: 2022-09-16

## 2022-02-01 NOTE — TELEPHONE ENCOUNTER
Rx Refill Note  Requested Prescriptions     Pending Prescriptions Disp Refills   • phenazopyridine (Pyridium) 200 MG tablet 15 tablet 0     Sig: Take 1 tablet by mouth 3 (Three) Times a Day As Needed for Bladder Spasms. Or painful urination      Last office visit with prescribing clinician: 3/1/2021      Next office visit with prescribing clinician: Visit date not found            Allegra Swanson MA  02/01/22, 09:51 CST

## 2022-02-10 ENCOUNTER — TELEPHONE (OUTPATIENT)
Dept: FAMILY MEDICINE CLINIC | Facility: CLINIC | Age: 82
End: 2022-02-10

## 2022-02-10 DIAGNOSIS — R53.81 PHYSICAL DECONDITIONING: Primary | ICD-10-CM

## 2022-02-10 NOTE — TELEPHONE ENCOUNTER
Caller: OR[facility]IA[facility]    Relationship: HOME HEALTH    Best call back number: [phone]    What is the best time to reach you: ANY    Who are you requesting to speak with (clinical staff, provider,  specific staff member): LEAVE MESSAGE FOR DR. SHAHID    What was the call regarding: ORALIA FROM [facility] CALLED IN REGARDS OF PATIENTS INCREASED WEAKNESS AND PAIN. SHE WOULD LIKE TO SPEAK WITH DR. SHAHID ABOUT ADDING PHYSICAL THERAPY.    Do you require a callback: YES

## 2022-02-18 ENCOUNTER — HOME HEALTH ADMISSION (OUTPATIENT)
Dept: HOME HEALTH SERVICES | Facility: HOME HEALTHCARE | Age: 82
End: 2022-02-18

## 2022-03-07 ENCOUNTER — HOSPITAL ENCOUNTER (OUTPATIENT)
Dept: MAMMOGRAPHY | Facility: HOSPITAL | Age: 82
Discharge: HOME | End: 2022-03-07
Admitting: STUDENT IN AN ORGANIZED HEALTH CARE EDUCATION/TRAINING PROGRAM

## 2022-03-07 ENCOUNTER — INFUSION (OUTPATIENT)
Dept: ONCOLOGY | Facility: CLINIC | Age: 82
End: 2022-03-07

## 2022-03-07 ENCOUNTER — APPOINTMENT (OUTPATIENT)
Dept: ULTRASOUND IMAGING | Facility: HOSPITAL | Age: 82
End: 2022-03-07

## 2022-03-07 DIAGNOSIS — Z85.3 PERSONAL HISTORY OF BREAST CANCER: ICD-10-CM

## 2022-03-07 DIAGNOSIS — Z45.2 ENCOUNTER FOR CARE RELATED TO VASCULAR ACCESS PORT: Primary | ICD-10-CM

## 2022-03-07 DIAGNOSIS — C51.9 VULVAR CANCER, CARCINOMA: ICD-10-CM

## 2022-03-07 DIAGNOSIS — C77.4 SECONDARY MALIGNANCY OF INGUINAL LYMPH NODES: ICD-10-CM

## 2022-03-07 PROCEDURE — 77065 DX MAMMO INCL CAD UNI: CPT

## 2022-03-07 PROCEDURE — G0279 TOMOSYNTHESIS, MAMMO: HCPCS

## 2022-03-07 RX ORDER — SODIUM CHLORIDE 0.9 % (FLUSH) 0.9 %
10 SYRINGE (ML) INJECTION AS NEEDED
Status: DISCONTINUED | OUTPATIENT
Start: 2022-03-07 | End: 2022-03-07 | Stop reason: HOSPADM

## 2022-03-07 RX ORDER — SODIUM CHLORIDE 0.9 % (FLUSH) 0.9 %
10 SYRINGE (ML) INJECTION AS NEEDED
Status: CANCELLED | OUTPATIENT
Start: 2022-03-07

## 2022-03-07 RX ORDER — HEPARIN SODIUM (PORCINE) LOCK FLUSH IV SOLN 100 UNIT/ML 100 UNIT/ML
500 SOLUTION INTRAVENOUS AS NEEDED
Status: DISCONTINUED | OUTPATIENT
Start: 2022-03-07 | End: 2022-03-07 | Stop reason: HOSPADM

## 2022-03-07 RX ORDER — HEPARIN SODIUM (PORCINE) LOCK FLUSH IV SOLN 100 UNIT/ML 100 UNIT/ML
500 SOLUTION INTRAVENOUS AS NEEDED
Status: CANCELLED | OUTPATIENT
Start: 2022-03-07

## 2022-03-07 RX ADMIN — HEPARIN SODIUM (PORCINE) LOCK FLUSH IV SOLN 100 UNIT/ML 500 UNITS: 100 SOLUTION at 13:10

## 2022-03-07 RX ADMIN — Medication 10 ML: at 13:10

## 2022-03-10 ENCOUNTER — APPOINTMENT (OUTPATIENT)
Dept: ONCOLOGY | Facility: HOSPITAL | Age: 82
End: 2022-03-10

## 2022-03-10 ENCOUNTER — DOCUMENTATION (OUTPATIENT)
Dept: GENETICS | Facility: HOSPITAL | Age: 82
End: 2022-03-10

## 2022-03-10 NOTE — PROGRESS NOTES
Dago Nunez is a 81-year-old female who was seen for genetic counseling due to a personal and family history of breast cancer.  Genetic counseling was provided via telehealth.  Ms. Nunez was diagnosed with a breast cancer at age 81 and has had a lumpectomy.  She also has a history of vulvar cancer.   Ms. Nunez is post-menopausal, has had a hysterectomy but retains her ovaries. Ms. Nunez was interested in discussing her risk for a hereditary cancer syndrome, and decided to pursue genetic testing.   Ms. Nunez opted to pursue comprehensive testing via the CancerNext panel ordered through [facility] which includes BRCA1/2 and 34 additional genes associated with an increased risk of breast cancer or other cancers (APC, GILMER, AXIN2, BARD1, BMPR1A, BRCA1, BRCA2, BRIP1, CDH1, CDK4, CDKN2A, CHEK2, DICER1, EPCAM, GREM1, HOXB13, MLH1, MRE11A, MSH2, MSH3, MSH6, MUTYH, NBN, NF1, NTHL1, PALB2, PMS2, POLD1, POLE, PTEN, RAD51C, RAD51D, RECQL, SMAD4, SMARCA4, STK11, and TP53). Results are expected in 2-3 weeks.     PERTINENT FAMILY HISTORY:  Mother:  Uterine cancer, 75  Sister:   Breast cancer, 78  Mat. 1st cousin: Pancreatic cancer  Pat. Great-aunt: Breast cancer  Nephew:  Kidney cancer, 40s  Pat. Uncle:  Bone cancer  Pat. Uncle:  Throat cancer  Pat. 1st cousin: Stomach cancer    RISK ASSESSMENT:  Ms. Nunez’s personal history of breast cancer in combination with the family history of breast cancer raises the question of a hereditary cancer syndrome.   NCCN guidelines recommend BRCA1/2 testing for individuals diagnosed with breast cancer at any age if they have two close relative diagnosed with breast cancer at any age, or any close relative with a pancreatic cancer.  Therefore, testing is appropriate for Ms. Nunez.  We discussed that the standard approach to BRCA1/2 testing is via a multigene panel that evaluates BRCA1/2 and multiple other genes known to impact cancer risk.  This risk assessment is based on the family history  information provided at the time of the appointment.  The assessment could change in the future should new information be obtained.     GENETIC COUNSELING: We reviewed the family history information in detail.  Cases of breast cancer follow three general patterns: sporadic, familial, and hereditary.  While most cancer is sporadic, some cases appear to occur in family clusters.  These cases are said to be familial and account for 10-20% of breast cancer cases.  Familial cases may be due to a combination of shared genes and environmental factors among family members.  In even fewer cases (5-10%), the risk for cancer is inherited, and the genes responsible for the increased cancer risk are known.       Family histories typical of hereditary cancer syndromes usually include multiple first- and second-degree relatives diagnosed with cancer types that define a syndrome.  These cases tend to be diagnosed at younger-than-expected ages and can be bilateral or multifocal.  The cancer in these families follows an autosomal dominant inheritance pattern, which indicates the likely presence of a mutation in a cancer susceptibility gene.  Children and siblings of an individual believed to carry this mutation have a 50% chance of inheriting that mutation, thereby inheriting the increased risk to develop cancer.  These mutations can be passed down from the maternal or the paternal lineage.     Hereditary breast cancer accounts for 5-10% of all cases of breast cancer.  A significant proportion of hereditary breast cancer can be attributed to mutations in the BRCA1 and BRCA2 genes.  Mutations in these genes confer an increased risk for breast cancer, ovarian cancer, male breast cancer, prostate cancer and pancreatic cancer.  There are other genes that are known to be associated with an increased risk for breast cancer and other cancers. In order to get as much information as possible regarding Ms. Nunez’s personal risks and potential  risks for her family, testing was pursued through a multigene panel that would look at several other genes known to increase the risk for breast cancer and other cancers.     GENETIC TESTING:  The risks, benefits and limitations of genetic testing and implications for clinical management following testing were reviewed.  DNA test results can influence decisions regarding screening, prevention and surgical management.  Genetic testing can have significant psychological implications for both individuals and families.  Also discussed was the possibility of insurance discrimination based on genetic test results and the laws ([place]) in place to prevent this.     We discussed panel testing, which would involve testing for BRCA1/2 as well as several other cancer susceptibility genes at the same time.  The benefits and limitations of genetic testing were discussed and Ms. Nunez decided to pursue testing. The implications of a positive or negative test result were discussed. We discussed the possibility that, in some cases, genetic test results may be uninformative due to the identification of a genetic variant of uncertain significance. These variants may or may not be associated with an increased cancer risk.  VUSs are frequently reported through multigene panel testing, given the presence of genetic variation in the population and the number of genes being analyzed. Given her personal history, a negative test result would not eliminate all breast cancer risk to her relatives, although the risk would not be as high as it would with positive genetic testing.          PLAN:   The patient will be contacted by telephone once results are available.  Genetic counseling remains available to Ms. Nunez and her family in the future, and she is welcome to contact us at [phone] with any questions she may have.        Tamara Parada MS, [facility], [facility]  Licensed Certified Genetic Counselor

## 2022-03-16 RX ORDER — OLMESARTAN MEDOXOMIL 20 MG/1
TABLET ORAL
Qty: 90 TABLET | Refills: 1 | Status: SHIPPED | OUTPATIENT
Start: 2022-03-16 | End: 2022-09-12

## 2022-03-18 DIAGNOSIS — C51.9 VULVAR CANCER, CARCINOMA: ICD-10-CM

## 2022-03-18 DIAGNOSIS — G89.3 CANCER RELATED PAIN: ICD-10-CM

## 2022-03-18 NOTE — TELEPHONE ENCOUNTER
Rx Refill Note  Requested Prescriptions     Pending Prescriptions Disp Refills   • HYDROcodone-acetaminophen (NORCO)  MG per tablet 60 tablet 0     Sig: Take 1 tablet by mouth Every 4 (Four) Hours As Needed for Moderate Pain  or Severe Pain .      Last office visit with prescribing clinician: 3/1/2021      Next office visit with prescribing clinician: 4/27/2022            ADAMA Rodriguez  03/18/22, 15:29 CDT

## 2022-03-18 NOTE — TELEPHONE ENCOUNTER
Caller: Dago Nunez Terrence    Relationship: Self    Best call back number: [phone]    Requested Prescriptions:   Requested Prescriptions     Pending Prescriptions Disp Refills   • HYDROcodone-acetaminophen (NORCO)  MG per tablet 60 tablet 0     Sig: Take 1 tablet by mouth Every 4 (Four) Hours As Needed for Moderate Pain  or Severe Pain .        Pharmacy where request should be sent: [facility] - [place], [place] - [address] - [phone]  - [phone] FX         Does the patient have less than a 3 day supply:  [] Yes  [x] No    Marlen SOTO Rep   03/18/22 15:17 CDT

## 2022-03-21 RX ORDER — HYDROCODONE BITARTRATE AND ACETAMINOPHEN 10; 325 MG/1; MG/1
1 TABLET ORAL EVERY 4 HOURS PRN
Qty: 60 TABLET | Refills: 0 | Status: SHIPPED | OUTPATIENT
Start: 2022-03-21 | End: 2022-09-14 | Stop reason: SDUPTHER

## 2022-03-28 ENCOUNTER — TELEPHONE (OUTPATIENT)
Dept: ONCOLOGY | Facility: HOSPITAL | Age: 82
End: 2022-03-28

## 2022-03-29 ENCOUNTER — TELEPHONE (OUTPATIENT)
Dept: ONCOLOGY | Facility: HOSPITAL | Age: 82
End: 2022-03-29

## 2022-03-29 ENCOUNTER — DOCUMENTATION (OUTPATIENT)
Dept: GENETICS | Facility: HOSPITAL | Age: 82
End: 2022-03-29

## 2022-03-29 NOTE — PROGRESS NOTES
Dago Nunez is a 81-year-old female who was referred for genetic counseling due to a personal and family history of breast cancer.  Genetic counseling was provided via telehealth.  Ms. Nunez was diagnosed with a breast cancer at age 81 and has had a lumpectomy.  She also has a history of vulvar cancer.   Ms. Nunez is post-menopausal, has had a hysterectomy but retains her ovaries. Ms. Nunez was interested in discussing her risk for a hereditary cancer syndrome, and decided to pursue genetic testing.   Ms. Nunez opted to pursue comprehensive testing via the CancerNext panel ordered through [facility] which includes BRCA1/2 and 34 additional genes associated with an increased risk of breast cancer or other cancers (APC, GILMER, AXIN2, BARD1, BMPR1A, BRCA1, BRCA2, BRIP1, CDH1, CDK4, CDKN2A, CHEK2, DICER1, EPCAM, GREM1, HOXB13, MLH1, MRE11A, MSH2, MSH3, MSH6, MUTYH, NBN, NF1, NTHL1, PALB2, PMS2, POLD1, POLE, PTEN, RAD51C, RAD51D, RECQL, SMAD4, SMARCA4, STK11, and TP53). Genetic testing was negative by DNA sequencing and rearrangement testing for deleterious mutations in the BRCA1/2 genes and 34 additional genes included on the CancerNext Panel (see attached results). These normal results were discussed with Ms. Nunez by telephone on 3/29/22.     PERTINENT FAMILY HISTORY:  Mother:  Uterine cancer, 75  Sister:   Breast cancer, 78  Mat. 1st cousin: Pancreatic cancer  Pat. Great-aunt: Breast cancer  Nephew:  Kidney cancer, 40s  Pat. Uncle:  Bone cancer  Pat. Uncle:  Throat cancer  Pat. 1st cousin: Stomach cancer    RISK ASSESSMENT:  Ms. Nunez’s personal history of breast cancer in combination with the family history of breast cancer raises the question of a hereditary cancer syndrome.   NCCN guidelines recommend BRCA1/2 testing for individuals diagnosed with breast cancer at any age if they have two close relative diagnosed with breast cancer at any age, or any close relative with a pancreatic cancer.  Therefore, testing  is appropriate for Ms. Nunez.  We discussed that the standard approach to BRCA1/2 testing is via a multigene panel that evaluates BRCA1/2 and multiple other genes known to impact cancer risk.  This risk assessment is based on the family history information provided at the time of the appointment.  The assessment could change in the future should new information be obtained.     GENETIC COUNSELING: We reviewed the family history information in detail.  Cases of breast cancer follow three general patterns: sporadic, familial, and hereditary.  While most cancer is sporadic, some cases appear to occur in family clusters.  These cases are said to be familial and account for 10-20% of breast cancer cases.  Familial cases may be due to a combination of shared genes and environmental factors among family members.  In even fewer cases (5-10%), the risk for cancer is inherited, and the genes responsible for the increased cancer risk are known.       Family histories typical of hereditary cancer syndromes usually include multiple first- and second-degree relatives diagnosed with cancer types that define a syndrome.  These cases tend to be diagnosed at younger-than-expected ages and can be bilateral or multifocal.  The cancer in these families follows an autosomal dominant inheritance pattern, which indicates the likely presence of a mutation in a cancer susceptibility gene.  Children and siblings of an individual believed to carry this mutation have a 50% chance of inheriting that mutation, thereby inheriting the increased risk to develop cancer.  These mutations can be passed down from the maternal or the paternal lineage.     Hereditary breast cancer accounts for 5-10% of all cases of breast cancer.  A significant proportion of hereditary breast cancer can be attributed to mutations in the BRCA1 and BRCA2 genes.  Mutations in these genes confer an increased risk for breast cancer, ovarian cancer, male breast cancer, prostate  cancer and pancreatic cancer.  There are other genes that are known to be associated with an increased risk for breast cancer and other cancers. In order to get as much information as possible regarding Ms. Nunez’s personal risks and potential risks for her family, testing was pursued through a multigene panel that would look at several other genes known to increase the risk for breast cancer and other cancers.     GENETIC TESTING:  The risks, benefits and limitations of genetic testing and implications for clinical management following testing were reviewed.  DNA test results can influence decisions regarding screening, prevention and surgical management.  Genetic testing can have significant psychological implications for both individuals and families.  Also discussed was the possibility of insurance discrimination based on genetic test results and the laws ([place]) in place to prevent this.     We discussed panel testing, which would involve testing for BRCA1/2 as well as several other cancer susceptibility genes at the same time.  The benefits and limitations of genetic testing were discussed and Ms. Nunez decided to pursue testing. The implications of a positive or negative test result were discussed. We discussed the possibility that, in some cases, genetic test results may be uninformative due to the identification of a genetic variant of uncertain significance. These variants may or may not be associated with an increased cancer risk.  VUSs are frequently reported through multigene panel testing, given the presence of genetic variation in the population and the number of genes being analyzed. Given her personal history, a negative test result would not eliminate all breast cancer risk to her relatives, although the risk would not be as high as it would with positive genetic testing.       TEST RESULTS:  Genetic testing was negative by sequencing, deletion/duplication testing for mutations in BRCA1/2 and the  additional 34 genes on the CancerNext panel.  The negative result greatly lowers the risk of a hereditary cancer syndrome for Ms. Nunez.  Ms. Nunez’s unaffected female relatives may still be considered to have a somewhat increased risk for breast cancer based on family history. This assessment is based on the information provided at the time of the consultation.    Cancer Prevention:  Despite the negative genetic test results, Ms. Nunez’s female relatives may have a somewhat increased lifetime risk for breast cancer based on family history.  Female relatives could have a risk assessment performed using a family history-based model, such as the Tyrer-Cuzick model, to determine their individual risks.   Given the increased risk, options available to individuals with an elevated lifetime risk for breast cancer were briefly discussed.   Surveillance for relatives found to have an elevated lifetime risk of breast cancer (>20%, versus the average risk of 12%), based on NCCN guidelines, would consist of semi-annual clinical breast exams and monthly self-breast exams starting by age 18 and annual mammography starting 10 years younger than the earliest diagnosis in a close relative, or by age 40.  According to an American Cancer Society expert panel and NCCN guidelines, annual breast MRI should be offered to women whose lifetime risk of breast cancer is 20-25 percent or more, also starting 10 years prior to the earliest diagnosis in the family, or by age 40.      PLAN:  Genetic counseling remains available for Ms. Nunez.  If Ms. Nunez has any questions, she is welcome to call us at [phone].        Tamara Parada MS, [facility], [facility]  Licensed Certified Genetic Counselor    Cc: MD Drake Guzman MD

## 2022-03-29 NOTE — TELEPHONE ENCOUNTER
At the request of the genetic counselor, I spoke with the patient to disclose the results from recent genetic testing for hereditary cancer.

## 2022-04-04 DIAGNOSIS — Z78.0 MENOPAUSE: ICD-10-CM

## 2022-04-04 RX ORDER — LETROZOLE 2.5 MG/1
TABLET, FILM COATED ORAL
Qty: 90 TABLET | Refills: 2 | Status: SHIPPED | OUTPATIENT
Start: 2022-04-04

## 2022-04-14 ENCOUNTER — OFFICE VISIT (OUTPATIENT)
Dept: PULMONOLOGY | Facility: CLINIC | Age: 82
End: 2022-04-14

## 2022-04-14 VITALS
OXYGEN SATURATION: 98 % | DIASTOLIC BLOOD PRESSURE: 78 MMHG | HEIGHT: 58 IN | BODY MASS INDEX: 33.58 KG/M2 | SYSTOLIC BLOOD PRESSURE: 124 MMHG | WEIGHT: 160 LBS | HEART RATE: 61 BPM

## 2022-04-14 DIAGNOSIS — E66.9 OBESITY (BMI 30-39.9): ICD-10-CM

## 2022-04-14 DIAGNOSIS — Z87.891 FORMER SMOKER: ICD-10-CM

## 2022-04-14 DIAGNOSIS — J40 BRONCHITIS: Primary | ICD-10-CM

## 2022-04-14 PROCEDURE — 99214 OFFICE O/P EST MOD 30 MIN: CPT | Performed by: INTERNAL MEDICINE

## 2022-04-14 NOTE — PATIENT INSTRUCTIONS
The patient is doing well from a pulmonary standpoint with no issues with bronchitis at present.  If she has such issues in the future she can contact the office for further evaluation and we can always call her in medications or see her back here in the office if need be.  Otherwise I will see her back in 1 year for a routine follow-up.

## 2022-04-15 NOTE — PROGRESS NOTES
Chief Complaint  Bronchitis    Subjective    History of Present Illness     Dago Nunez presents to [facility] for bronchitis.    History of Present Illness   The patient states she is doing well from the standpoint of her bronchitis.  Dulera in particular is helping her.  I told her we will just plan on yearly visits but will hold off on pulmonary functions in the absence of any acute respiratory symptoms.  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  Her last PET scan showed no activity in the inguinal area and no activity within the thorax in particular.  She has had her COVID-19 vaccine in the form of the Moderna vaccine including a booster.  She would be a potential candidate for a second booster sometime later this month when it is at least 4 months since her initial booster dose.  She has had her flu shot as well as her Prevnar 13 and her Pneumovax.  She is accompanied by her  today.  Prior to Admission medications    Medication Sig Start Date End Date Taking? Authorizing Provider   Acetaminophen (TYLENOL ARTHRITIS PAIN PO) Take 1 tablet by mouth Daily As Needed (BACK PAIN).   Yes Homer Ahumada MD   bisoprolol-hydrochlorothiazide (ZIAC) 5-6.25 MG per tablet TAKE 1 TABLET DAILY 12/6/21  Yes Shaheen Akbar, DO   calcium carbonate (OS-REAGAN) 600 MG tablet Take 600 mg by mouth Daily.   Yes Homer Ahumada MD   cetirizine (zyrTEC) 10 MG tablet Take 10 mg by mouth Daily.   Yes Homer Ahumada MD   citalopram (CeleXA) 20 MG tablet TAKE 1 TABLET BY MOUTH EVERY DAY 11/2/21  Yes Shaheen Akbar, DO   cyanocobalamin 1000 MCG/ML injection Inject 1ml IM once every 4 weeks 8/5/21  Yes Drake Stafford MD   diphenhydrAMINE (BENADRYL) 25 mg capsule Take 25 mg by mouth Every 6 (Six) Hours As Needed for Allergies.   Yes Homer Ahumada MD   diphenoxylate-atropine (LOMOTIL) 2.5-0.025 MG  per tablet Take 2 tablets by mouth 4 (Four) Times a Day As Needed for Diarrhea. 9/17/21  Yes Shaheen Akbar DO   DULERA 100-5 MCG/ACT inhaler Inhale 2 puffs 2 (Two) Times a Day. Rinse and spit after using. 10/14/19  Yes Jesus Guzman MD   esomeprazole (nexIUM) 40 MG capsule Take 1 capsule by mouth 2 (Two) Times a Day. 5/20/21  Yes Shaheen Akbar DO   furosemide (LASIX) 40 MG tablet Take 1.5 tablets by mouth Daily. Patient only takes once a day 11/17/20  Yes Shaheen Akbar DO   gabapentin (NEURONTIN) 300 MG capsule TAKE 2 CAPSULES 3 TIMES A  DAY 12/6/21  Yes Shaheen Akbar DO   Homeopathic Products (LEG CRAMPS) tablet Take 1 tablet by mouth Daily.   Yes Provider, MD Homer   HYDROcodone-acetaminophen (NORCO)  MG per tablet Take 1 tablet by mouth Every 4 (Four) Hours As Needed for Moderate Pain  or Severe Pain . 3/21/22  Yes Shaheen Akbar DO   Hydrocortisone (britany's amazing butt) cream Apply 1 application topically to the appropriate area as directed As Needed (irritation). 10/27/20  Yes Ulises Roche III, MD   letrozole (FEMARA) 2.5 MG tablet TAKE 1 TABLET BY MOUTH 1 TIME DAILY 4/4/22  Yes Drake Stafford MD   lidocaine (XYLOCAINE) 5 % ointment Apply  topically to the appropriate area as directed Every 4 (Four) Hours As Needed for Mild Pain  (pain secondary to radiation). 12/1/20  Yes Sussy Kwon PA-C   metOLazone (ZAROXOLYN) 2.5 MG tablet TAKE 1 TABLET BY MOUTH THREE TIMES A WEEK 5/24/21  Yes ProviderHomer MD   ondansetron (Zofran) 8 MG tablet Take 1 tablet by mouth Every 8 (Eight) Hours As Needed for Nausea or Vomiting. 4/23/21  Yes Shaheen Akbar DO   phenazopyridine (Pyridium) 200 MG tablet Take 1 tablet by mouth 3 (Three) Times a Day As Needed for Bladder Spasms. Or painful urination 2/1/22  Yes Shaheen Akbar DO   potassium chloride (K-DUR,KLOR-CON) 20 MEQ CR tablet TAKE 2 TABLETS BY MOUTH EVERY DAY 1/4/22  Yes Shaheen Akbar, DO  "  pravastatin (PRAVACHOL) 40 MG tablet Take 1 tablet by mouth Every Night. 1/17/22  Yes Shaehen Akbar,    Syringe 25G X 1\" 3 ML misc 1 each Daily. 8/5/21  Yes Drake Stafford MD   Lidocaine Viscous HCl (XYLOCAINE) 2 % solution Take 5 mL by mouth 3 (Three) Times a Day With Meals. 10/22/20   Adonay Castorena MD   nitrofurantoin (MACRODANTIN) 25 MG capsule Take 1 capsule by mouth Every Night. To help reduce pain with urination 1/13/22   Jaida Younger MD   acetaminophen (Tylenol) 325 MG tablet Take 3 tablets by mouth Every 8 (Eight) Hours. Take every 8 hours for 3 days then take prn as needed. 9/14/21 4/14/22  Quynh Rosario MD       Social History     Socioeconomic History   • Marital status:    Tobacco Use   • Smoking status: Former Smoker     Packs/day: 0.25     Years: 3.00     Pack years: 0.75   • Smokeless tobacco: Never Used   • Tobacco comment: social smoker in [place]   Vaping Use   • Vaping Use: Never used   Substance and Sexual Activity   • Alcohol use: Yes     Comment: Occasional glass of wine   • Drug use: No   • Sexual activity: Never       Objective   Vital Signs:   /78   Pulse 61   Ht 147.3 cm (58\")   Wt 72.6 kg (160 lb)   SpO2 98% Comment: RA  BMI 33.44 kg/m²     Physical Exam  Vitals and nursing note reviewed.   HENT:      Head: Normocephalic.      Comments: She is wearing a mask.  Eyes:      Extraocular Movements: Extraocular movements intact.      Pupils: Pupils are equal, round, and reactive to light.   Cardiovascular:      Rate and Rhythm: Normal rate and regular rhythm.   Pulmonary:      Effort: Pulmonary effort is normal.      Breath sounds: Normal breath sounds.   Musculoskeletal:         General: Normal range of motion.   Skin:     General: Skin is warm and dry.   Neurological:      General: No focal deficit present.      Mental Status: She is alert and oriented to person, place, and time.   Psychiatric:         Mood and Affect: Mood normal.         Behavior: " Behavior normal.        Result Review :          No results found for this or any previous visit.                 My interpretation of imaging:    PET CT tumor skull base to mid thigh (06/02/2021 17:08 EDT)  PET CT tumor skull base to mid thigh (07/31/2020 13:48 EDT)  CT Chest With Contrast (02/01/2018 10:37)        Assessment and Plan     Diagnoses and all orders for this visit:    1. Bronchitis (Primary)  Assessment & Plan:  Her symptoms are well controlled at present on her Dulera inhaler and she will continue this.      2. Former smoker  Assessment & Plan:  She has a minimal smoking history.      3. Obesity (BMI 30-39.9)  Assessment & Plan:  Diet and exercise are encouraged and she will follow-up with her primary care physician regarding her elevated BMI otherwise.        Patient's Body mass index is 33.44 kg/m². indicating that she is obese (BMI >30). Obesity-related health conditions include the following: dyslipidemias. Obesity is unchanged.  Diet and exercise are encouraged and she will follow up with her primary care physician regarding her elevated BMI otherwise.        Jesus Guzman MD  4/14/2022  19:41 CDT    Follow Up   Return in about 1 year (around 4/14/2023).    Patient was given instructions and counseling regarding her condition or for health maintenance advice. Please see specific information pulled into the AVS if appropriate.

## 2022-04-15 NOTE — ASSESSMENT & PLAN NOTE
Diet and exercise are encouraged and she will follow-up with her primary care physician regarding her elevated BMI otherwise.

## 2022-04-21 ENCOUNTER — OFFICE VISIT (OUTPATIENT)
Dept: FAMILY MEDICINE CLINIC | Facility: CLINIC | Age: 82
End: 2022-04-21

## 2022-04-21 ENCOUNTER — TELEPHONE (OUTPATIENT)
Dept: FAMILY MEDICINE CLINIC | Facility: CLINIC | Age: 82
End: 2022-04-21

## 2022-04-21 VITALS
TEMPERATURE: 97.6 F | WEIGHT: 165 LBS | SYSTOLIC BLOOD PRESSURE: 133 MMHG | BODY MASS INDEX: 34.63 KG/M2 | HEIGHT: 58 IN | HEART RATE: 54 BPM | DIASTOLIC BLOOD PRESSURE: 62 MMHG | OXYGEN SATURATION: 98 %

## 2022-04-21 DIAGNOSIS — E11.42 TYPE 2 DIABETES MELLITUS WITH DIABETIC POLYNEUROPATHY, WITHOUT LONG-TERM CURRENT USE OF INSULIN: ICD-10-CM

## 2022-04-21 DIAGNOSIS — G89.29 CHRONIC MIDLINE LOW BACK PAIN WITHOUT SCIATICA: ICD-10-CM

## 2022-04-21 DIAGNOSIS — F41.9 ANXIETY: ICD-10-CM

## 2022-04-21 DIAGNOSIS — N18.32 STAGE 3B CHRONIC KIDNEY DISEASE: ICD-10-CM

## 2022-04-21 DIAGNOSIS — M25.562 CHRONIC PAIN OF BOTH KNEES: ICD-10-CM

## 2022-04-21 DIAGNOSIS — M54.50 CHRONIC MIDLINE LOW BACK PAIN WITHOUT SCIATICA: ICD-10-CM

## 2022-04-21 DIAGNOSIS — M25.561 CHRONIC PAIN OF BOTH KNEES: ICD-10-CM

## 2022-04-21 DIAGNOSIS — G89.29 CHRONIC PAIN OF BOTH KNEES: ICD-10-CM

## 2022-04-21 DIAGNOSIS — Z00.00 MEDICARE ANNUAL WELLNESS VISIT, SUBSEQUENT: Primary | ICD-10-CM

## 2022-04-21 PROCEDURE — G0439 PPPS, SUBSEQ VISIT: HCPCS | Performed by: FAMILY MEDICINE

## 2022-04-21 PROCEDURE — 99214 OFFICE O/P EST MOD 30 MIN: CPT | Performed by: FAMILY MEDICINE

## 2022-04-21 PROCEDURE — 1159F MED LIST DOCD IN RCRD: CPT | Performed by: FAMILY MEDICINE

## 2022-04-21 PROCEDURE — 1125F AMNT PAIN NOTED PAIN PRSNT: CPT | Performed by: FAMILY MEDICINE

## 2022-04-21 PROCEDURE — 1170F FXNL STATUS ASSESSED: CPT | Performed by: FAMILY MEDICINE

## 2022-04-21 RX ORDER — GABAPENTIN 300 MG/1
600 CAPSULE ORAL 3 TIMES DAILY
Qty: 180 CAPSULE | Refills: 2 | Status: SHIPPED | OUTPATIENT
Start: 2022-04-21 | End: 2022-09-12

## 2022-04-21 NOTE — PROGRESS NOTES
The ABCs of the Annual Wellness Visit  Subsequent Medicare Wellness Visit    Chief Complaint   Patient presents with   • Medicare Wellness-subsequent     Reports increased stress and anxiety due to family issues    • Back Pain     Has been seeing chiropractor       Subjective    History of Present Illness:  Dago Nunez is a 81 y.o. female who presents for a Subsequent Medicare Wellness Visit.    The following portions of the patient's history were reviewed and   updated as appropriate: allergies, current medications, past family history, past medical history, past social history, past surgical history and problem list.    Compared to one year ago, the patient feels her physical   health is worse.    Compared to one year ago, the patient feels her mental   health is worse.    Recent Hospitalizations:  She was not admitted to the hospital during the last year.       Current Medical Providers:  Patient Care Team:  Shaheen Akbar DO as PCP - General (Family Medicine)  Trae Boggs MD as Consulting Physician (Urology)  Jesus Guzman MD as Consulting Physician (Pulmonary Disease)    Outpatient Medications Prior to Visit   Medication Sig Dispense Refill   • Acetaminophen (TYLENOL ARTHRITIS PAIN PO) Take 1 tablet by mouth Daily As Needed (BACK PAIN).     • bisoprolol-hydrochlorothiazide (ZIAC) 5-6.25 MG per tablet TAKE 1 TABLET DAILY 90 tablet 1   • calcium carbonate (OS-REAGAN) 600 MG tablet Take 600 mg by mouth Daily.     • cetirizine (zyrTEC) 10 MG tablet Take 10 mg by mouth Daily.     • citalopram (CeleXA) 20 MG tablet TAKE 1 TABLET BY MOUTH EVERY DAY 90 tablet 1   • cyanocobalamin 1000 MCG/ML injection Inject 1ml IM once every 4 weeks 12 mL 0   • diphenhydrAMINE (BENADRYL) 25 mg capsule Take 25 mg by mouth Every 6 (Six) Hours As Needed for Allergies.     • diphenoxylate-atropine (LOMOTIL) 2.5-0.025 MG per tablet Take 2 tablets by mouth 4 (Four) Times a Day As Needed for Diarrhea. 90 tablet 2   •  "DULERA 100-5 MCG/ACT inhaler Inhale 2 puffs 2 (Two) Times a Day. Rinse and spit after using. 6 inhaler 0   • esomeprazole (nexIUM) 40 MG capsule Take 1 capsule by mouth 2 (Two) Times a Day. 180 capsule 3   • furosemide (LASIX) 40 MG tablet Take 1.5 tablets by mouth Daily. Patient only takes once a day 135 tablet 3   • Homeopathic Products (LEG CRAMPS) tablet Take 1 tablet by mouth Daily.     • HYDROcodone-acetaminophen (NORCO)  MG per tablet Take 1 tablet by mouth Every 4 (Four) Hours As Needed for Moderate Pain  or Severe Pain . 60 tablet 0   • Hydrocortisone (britany's amazing butt) cream Apply 1 application topically to the appropriate area as directed As Needed (irritation). 120 g 0   • letrozole (FEMARA) 2.5 MG tablet TAKE 1 TABLET BY MOUTH 1 TIME DAILY 90 tablet 2   • lidocaine (XYLOCAINE) 5 % ointment Apply  topically to the appropriate area as directed Every 4 (Four) Hours As Needed for Mild Pain  (pain secondary to radiation). 240 g 1   • Lidocaine Viscous HCl (XYLOCAINE) 2 % solution Take 5 mL by mouth 3 (Three) Times a Day With Meals. 100 mL 0   • metOLazone (ZAROXOLYN) 2.5 MG tablet TAKE 1 TABLET BY MOUTH THREE TIMES A WEEK     • nitrofurantoin (MACRODANTIN) 25 MG capsule Take 1 capsule by mouth Every Night. To help reduce pain with urination 7 capsule 0   • ondansetron (Zofran) 8 MG tablet Take 1 tablet by mouth Every 8 (Eight) Hours As Needed for Nausea or Vomiting. 60 tablet 2   • phenazopyridine (Pyridium) 200 MG tablet Take 1 tablet by mouth 3 (Three) Times a Day As Needed for Bladder Spasms. Or painful urination 15 tablet 0   • potassium chloride (K-DUR,KLOR-CON) 20 MEQ CR tablet TAKE 2 TABLETS BY MOUTH EVERY DAY 60 tablet 5   • pravastatin (PRAVACHOL) 40 MG tablet Take 1 tablet by mouth Every Night. 90 tablet 3   • Syringe 25G X 1\" 3 ML misc 1 each Daily. 25 each 1   • gabapentin (NEURONTIN) 300 MG capsule TAKE 2 CAPSULES 3 TIMES A   capsule 2     Facility-Administered Medications Prior " to Visit   Medication Dose Route Frequency Provider Last Rate Last Admin   • heparin injection 500 Units  500 Units Intravenous PRN Drake Stafford MD   500 Units at 07/01/21 1354   • heparin injection 500 Units  500 Units Intravenous PRN Drake Stafford MD   500 Units at 01/11/22 1134   • lidocaine (XYLOCAINE) 1 % injection 10 mL  10 mL Infiltration Once Shaheen Akbar,        • lidocaine 1% - EPINEPHrine 1:963515 (XYLOCAINE W/EPI) 1 %-1:811931 injection 10 mL  10 mL Infiltration Once Shaheen Akbar DO       • sodium bicarbonate injection 1 mEq  2 mL Injection Once Shaheen Akbar DO       • sodium chloride 0.9 % flush 10 mL  10 mL Intravenous PRN Drake Stafford MD   10 mL at 07/01/21 1354   • sodium chloride 0.9 % flush 10 mL  10 mL Intravenous PRN Drake Stafford MD   10 mL at 01/11/22 1134       Opioid medication/s are on active medication list.  and I have evaluated her active treatment plan and pain score trends (see table).  Vitals:    04/21/22 1043   PainSc:   5   PainLoc: Back     I have reviewed the chart for potential of high risk medication and harmful drug interactions in the elderly.            Aspirin is not on active medication list.  Aspirin use is not indicated based on review of current medical condition/s. Risk of harm outweighs potential benefits.  .    Patient Active Problem List   Diagnosis   • Meatal stenosis   • Retention of urine   • Type 2 diabetes mellitus, without long-term current use of insulin (HCC)   • Essential hypertension   • Grief reaction   • Vulvar intraepithelial neoplasia (MOODY) grade 3   • Chronic midline low back pain without sciatica   • Bilateral lower extremity edema   • History of urethral stricture   • Epidermal cyst of neck   • Normocytic anemia   • Neck abscess   • Mixed hyperlipidemia   • GERD (gastroesophageal reflux disease)   • Anxiety   • Iron deficiency and chemotherapy induced anemia   • Stage 3 chronic kidney disease (HCC)   • Anemia in stage 3  "chronic kidney disease (HCC)   • Screening for breast cancer   • Lower extremity edema   • Vulvar cancer, carcinoma (HCC)   • Former smoker   • Secondary malignancy of inguinal lymph nodes (HCC)   • Febrile illness   • Chemotherapy-induced thrombocytopenia   • Hyponatremia   • Hypokalemia   • Neutropenic fever (HCC)   • Moderate malnutrition (HCC)   • Antineoplastic chemotherapy induced anemia   • History of radiation therapy   • Encounter for care related to Port-a-Cath   • Malignant neoplasm of upper-outer quadrant of left breast in female, estrogen receptor positive (HCC)   • Encounter for care related to vascular access port   • Abnormal finding on GI tract imaging   • Bronchitis   • Obesity (BMI 30-39.9)     Advance Care Planning  Advance Directive is on file.  ACP discussion was held with the patient during this visit. Patient has an advance directive in EMR which is still valid.           Objective    Vitals:    04/21/22 1043   BP: 133/62   BP Location: Left arm   Patient Position: Sitting   Cuff Size: Adult   Pulse: 54   Temp: 97.6 °F (36.4 °C)   TempSrc: Temporal   SpO2: 98%   Weight: 74.8 kg (165 lb)   Height: 147.3 cm (58\")   PainSc:   5   PainLoc: Back     BMI Readings from Last 1 Encounters:   04/21/22 34.49 kg/m²   BMI is above normal parameters. Recommendations include: nutrition counseling    Does the patient have evidence of cognitive impairment? No    Physical Exam  Vitals and nursing note reviewed.   Constitutional:       General: She is not in acute distress.     Appearance: She is not diaphoretic.   HENT:      Head: Normocephalic and atraumatic.      Nose: Nose normal.   Eyes:      General: No scleral icterus.        Right eye: No discharge.         Left eye: No discharge.      Conjunctiva/sclera: Conjunctivae normal.   Neck:      Trachea: No tracheal deviation.   Cardiovascular:      Rate and Rhythm: Normal rate and regular rhythm.      Heart sounds: Normal heart sounds. No murmur heard.    No " friction rub. No gallop.   Pulmonary:      Effort: Pulmonary effort is normal. No respiratory distress.      Breath sounds: Normal breath sounds. No wheezing or rales.   Skin:     General: Skin is warm and dry.      Coloration: Skin is not pale.   Neurological:      Mental Status: She is alert and oriented to person, place, and time.   Psychiatric:         Behavior: Behavior normal.         Thought Content: Thought content normal.         Judgment: Judgment normal.                 HEALTH RISK ASSESSMENT    Smoking Status:  Social History     Tobacco Use   Smoking Status Former Smoker   • Packs/day: 0.25   • Years: 3.00   • Pack years: 0.75   Smokeless Tobacco Never Used   Tobacco Comment    social smoker in college     Alcohol Consumption:  Social History     Substance and Sexual Activity   Alcohol Use Yes    Comment: Occasional glass of wine     Fall Risk Screen:    JAMAL Fall Risk Assessment was completed, and patient is at MODERATE risk for falls. Assessment completed on:4/21/2022    Depression Screening:  PHQ-2/PHQ-9 Depression Screening 4/21/2022   Retired PHQ-9 Total Score -   Retired Total Score -   Little Interest or Pleasure in Doing Things 0-->not at all   Feeling Down, Depressed or Hopeless 0-->not at all   PHQ-9: Brief Depression Severity Measure Score 0       Health Habits and Functional and Cognitive Screening:  Functional & Cognitive Status 4/21/2022   Do you have difficulty preparing food and eating? No   Do you have difficulty bathing yourself, getting dressed or grooming yourself? No   Do you have difficulty using the toilet? No   Do you have difficulty moving around from place to place? Yes   Do you have trouble with steps or getting out of a bed or a chair? Yes   Current Diet Well Balanced Diet   Dental Exam Up to date   Eye Exam Up to date   Exercise (times per week) 2 times per week   Current Exercises Include Walking   Do you need help using the phone?  No   Are you deaf or do you have  serious difficulty hearing?  No   Do you need help with transportation? Yes   Do you need help shopping? No   Do you need help preparing meals?  No   Do you need help with housework?  Yes   Do you need help with laundry? Yes   Do you need help taking your medications? No   Do you need help managing money? No   Do you ever drive or ride in a car without wearing a seat belt? No   Have you felt unusual stress, anger or loneliness in the last month? Yes   Who do you live with? Spouse   If you need help, do you have trouble finding someone available to you? No   Have you been bothered in the last four weeks by sexual problems? No   Do you have difficulty concentrating, remembering or making decisions? No       Age-appropriate Screening Schedule:  Refer to the list below for future screening recommendations based on patient's age, sex and/or medical conditions. Orders for these recommended tests are listed in the plan section. The patient has been provided with a written plan.    Health Maintenance   Topic Date Due   • ZOSTER VACCINE (2 of 3) 11/26/2015   • URINE MICROALBUMIN  01/29/2020   • DIABETIC EYE EXAM  11/25/2020   • HEMOGLOBIN A1C  02/06/2021   • LIPID PANEL  05/07/2022   • INFLUENZA VACCINE  08/01/2022   • MAMMOGRAM  03/07/2023   • DXA SCAN  10/05/2023   • TDAP/TD VACCINES (2 - Td or Tdap) 05/01/2029              Assessment/Plan   CMS Preventative Services Quick Reference  Risk Factors Identified During Encounter  Chronic Pain   Depression/Dysphoria  The above risks/problems have been discussed with the patient.  Follow up actions/plans if indicated are seen below in the Assessment/Plan Section.  Pertinent information has been shared with the patient in the After Visit Summary.    Diagnoses and all orders for this visit:    1. Medicare annual wellness visit, subsequent (Primary)    2. Anxiety    3. Chronic midline low back pain without sciatica    4. Type 2 diabetes mellitus with diabetic polyneuropathy, without  long-term current use of insulin (HCC)  -     Hemoglobin A1c; Future  -     MicroAlbumin, Urine, Random - Urine, Clean Catch; Future  -     Ambulatory Referral for Diabetic Eye Exam-Optometry    5. Stage 3b chronic kidney disease (HCC)  -     PTH, Intact; Future  -     Vitamin D 25 hydroxy; Future    6. Chronic pain of both knees  -     gabapentin (NEURONTIN) 300 MG capsule; Take 2 capsules by mouth 3 (Three) Times a Day.  Dispense: 180 capsule; Refill: 2        Follow Up:   Return in about 6 months (around 10/21/2022).     An After Visit Summary and PPPS were made available to the patient.

## 2022-04-21 NOTE — PROGRESS NOTES
"Chief Complaint  Medicare Wellness-subsequent (Reports increased stress and anxiety due to family issues ) and Back Pain (Has been seeing chiropractor )    Subjective          Syndal Terrence Nunez presents to [facility]  History of Present Illness  Back pain stable with chiropractic and using 1/2 of hydrocodone tablet when needed    Increased stress, has court mary with family over land, denies needing increase of celexa, handling stress well at the moment      Objective   Vital Signs:   /62 (BP Location: Left arm, Patient Position: Sitting, Cuff Size: Adult)   Pulse 54   Temp 97.6 °F (36.4 °C) (Temporal)   Ht 147.3 cm (58\")   Wt 74.8 kg (165 lb)   SpO2 98%   BMI 34.49 kg/m²            Physical Exam  Constitutional:       Comments: See  note        Result Review :                 Assessment and Plan    Diagnoses and all orders for this visit:    1. Medicare annual wellness visit, subsequent (Primary)    2. Anxiety    3. Chronic midline low back pain without sciatica    4. Type 2 diabetes mellitus with diabetic polyneuropathy, without long-term current use of insulin ([facility])  -     Hemoglobin A1c; Future  -     MicroAlbumin, Urine, Random - Urine, Clean Catch; Future  -     Ambulatory Referral for Diabetic Eye Exam-Optometry    5. Stage 3b chronic kidney disease ([facility])  -     PTH, Intact; Future  -     Vitamin D 25 hydroxy; Future    6. Chronic pain of both knees  -     gabapentin (NEURONTIN) 300 MG capsule; Take 2 capsules by mouth 3 (Three) Times a Day.  Dispense: 180 capsule; Refill: 2    labs above  Refill gabapentin, will reduce hydrocodone dose at next refill  F/u 6 months        "

## 2022-04-21 NOTE — TELEPHONE ENCOUNTER
Mayuri with [employer] called requesting labs for patient. Faxed orders to [employer] at [phone].

## 2022-04-26 ENCOUNTER — LAB REQUISITION (OUTPATIENT)
Dept: LAB | Facility: HOSPITAL | Age: 82
End: 2022-04-26

## 2022-04-26 DIAGNOSIS — Z00.00 ENCOUNTER FOR GENERAL ADULT MEDICAL EXAMINATION WITHOUT ABNORMAL FINDINGS: ICD-10-CM

## 2022-04-26 LAB
25(OH)D3 SERPL-MCNC: 25.5 NG/ML (ref 30–100)
HBA1C MFR BLD: 5.7 % (ref 4.8–5.6)
PTH-INTACT SERPL-MCNC: 45 PG/ML (ref 15–65)

## 2022-04-26 PROCEDURE — 82306 VITAMIN D 25 HYDROXY: CPT | Performed by: FAMILY MEDICINE

## 2022-04-26 PROCEDURE — 83036 HEMOGLOBIN GLYCOSYLATED A1C: CPT | Performed by: FAMILY MEDICINE

## 2022-04-26 PROCEDURE — 83970 ASSAY OF PARATHORMONE: CPT | Performed by: FAMILY MEDICINE

## 2022-04-29 ENCOUNTER — TELEPHONE (OUTPATIENT)
Dept: ONCOLOGY | Facility: CLINIC | Age: 82
End: 2022-04-29

## 2022-05-02 NOTE — TELEPHONE ENCOUNTER
Called and left message on patient's home number. Tried calling back her mobile number and it kept answering and cutting out on me.

## 2022-05-03 ENCOUNTER — INFUSION (OUTPATIENT)
Dept: ONCOLOGY | Facility: CLINIC | Age: 82
End: 2022-05-03

## 2022-05-03 ENCOUNTER — LAB (OUTPATIENT)
Dept: LAB | Facility: HOSPITAL | Age: 82
End: 2022-05-03

## 2022-05-03 DIAGNOSIS — N18.31 ANEMIA DUE TO STAGE 3A CHRONIC KIDNEY DISEASE: ICD-10-CM

## 2022-05-03 DIAGNOSIS — Z45.2 ENCOUNTER FOR CARE RELATED TO VASCULAR ACCESS PORT: Primary | ICD-10-CM

## 2022-05-03 DIAGNOSIS — C51.9 VULVAR CANCER, CARCINOMA: ICD-10-CM

## 2022-05-03 DIAGNOSIS — D63.1 ANEMIA DUE TO STAGE 3A CHRONIC KIDNEY DISEASE: ICD-10-CM

## 2022-05-03 DIAGNOSIS — C77.4 SECONDARY MALIGNANCY OF INGUINAL LYMPH NODES: ICD-10-CM

## 2022-05-03 LAB
ALBUMIN SERPL-MCNC: 4 G/DL (ref 3.5–5.2)
ALBUMIN/GLOB SERPL: 1.2 G/DL
ALP SERPL-CCNC: 80 U/L (ref 39–117)
ALT SERPL W P-5'-P-CCNC: 6 U/L (ref 1–33)
ANION GAP SERPL CALCULATED.3IONS-SCNC: 12 MMOL/L (ref 5–15)
AST SERPL-CCNC: 12 U/L (ref 1–32)
BASOPHILS # BLD AUTO: 0.03 10*3/MM3 (ref 0–0.2)
BASOPHILS NFR BLD AUTO: 0.5 % (ref 0–1.5)
BILIRUB SERPL-MCNC: 0.3 MG/DL (ref 0–1.2)
BUN SERPL-MCNC: 17 MG/DL (ref 8–23)
BUN/CREAT SERPL: 12.6 (ref 7–25)
CALCIUM SPEC-SCNC: 9.6 MG/DL (ref 8.6–10.5)
CHLORIDE SERPL-SCNC: 100 MMOL/L (ref 98–107)
CO2 SERPL-SCNC: 23 MMOL/L (ref 22–29)
CREAT SERPL-MCNC: 1.35 MG/DL (ref 0.57–1)
DEPRECATED RDW RBC AUTO: 49.3 FL (ref 37–54)
EGFRCR SERPLBLD CKD-EPI 2021: 40.1 ML/MIN/1.73
EOSINOPHIL # BLD AUTO: 0.31 10*3/MM3 (ref 0–0.4)
EOSINOPHIL NFR BLD AUTO: 5.4 % (ref 0.3–6.2)
ERYTHROCYTE [DISTWIDTH] IN BLOOD BY AUTOMATED COUNT: 12.9 % (ref 12.3–15.4)
FERRITIN SERPL-MCNC: 785.9 NG/ML (ref 13–150)
GLOBULIN UR ELPH-MCNC: 3.4 GM/DL
GLUCOSE SERPL-MCNC: 129 MG/DL (ref 65–99)
HCT VFR BLD AUTO: 30.1 % (ref 34–46.6)
HGB BLD-MCNC: 9.9 G/DL (ref 12–15.9)
IMM GRANULOCYTES # BLD AUTO: 0.02 10*3/MM3 (ref 0–0.05)
IMM GRANULOCYTES NFR BLD AUTO: 0.3 % (ref 0–0.5)
IRON 24H UR-MRATE: 53 MCG/DL (ref 37–145)
IRON SATN MFR SERPL: 18 % (ref 20–50)
LYMPHOCYTES # BLD AUTO: 1.2 10*3/MM3 (ref 0.7–3.1)
LYMPHOCYTES NFR BLD AUTO: 20.9 % (ref 19.6–45.3)
MCH RBC QN AUTO: 33.9 PG (ref 26.6–33)
MCHC RBC AUTO-ENTMCNC: 32.9 G/DL (ref 31.5–35.7)
MCV RBC AUTO: 103.1 FL (ref 79–97)
MONOCYTES # BLD AUTO: 0.46 10*3/MM3 (ref 0.1–0.9)
MONOCYTES NFR BLD AUTO: 8 % (ref 5–12)
NEUTROPHILS NFR BLD AUTO: 3.71 10*3/MM3 (ref 1.7–7)
NEUTROPHILS NFR BLD AUTO: 64.9 % (ref 42.7–76)
NRBC BLD AUTO-RTO: 0 /100 WBC (ref 0–0.2)
PLATELET # BLD AUTO: 176 10*3/MM3 (ref 140–450)
PMV BLD AUTO: 10.7 FL (ref 6–12)
POTASSIUM SERPL-SCNC: 4.3 MMOL/L (ref 3.5–5.2)
PROT SERPL-MCNC: 7.4 G/DL (ref 6–8.5)
RBC # BLD AUTO: 2.92 10*6/MM3 (ref 3.77–5.28)
SODIUM SERPL-SCNC: 135 MMOL/L (ref 136–145)
TIBC SERPL-MCNC: 302 MCG/DL (ref 298–536)
TRANSFERRIN SERPL-MCNC: 203 MG/DL (ref 200–360)
WBC NRBC COR # BLD: 5.73 10*3/MM3 (ref 3.4–10.8)

## 2022-05-03 PROCEDURE — 82728 ASSAY OF FERRITIN: CPT

## 2022-05-03 PROCEDURE — 85025 COMPLETE CBC W/AUTO DIFF WBC: CPT

## 2022-05-03 PROCEDURE — 84466 ASSAY OF TRANSFERRIN: CPT

## 2022-05-03 PROCEDURE — 83540 ASSAY OF IRON: CPT

## 2022-05-03 PROCEDURE — 36415 COLL VENOUS BLD VENIPUNCTURE: CPT

## 2022-05-03 PROCEDURE — 80053 COMPREHEN METABOLIC PANEL: CPT

## 2022-05-03 RX ORDER — SODIUM CHLORIDE 0.9 % (FLUSH) 0.9 %
10 SYRINGE (ML) INJECTION AS NEEDED
Status: CANCELLED | OUTPATIENT
Start: 2022-05-03

## 2022-05-03 RX ORDER — HEPARIN SODIUM (PORCINE) LOCK FLUSH IV SOLN 100 UNIT/ML 100 UNIT/ML
500 SOLUTION INTRAVENOUS AS NEEDED
Status: DISCONTINUED | OUTPATIENT
Start: 2022-05-03 | End: 2022-05-03 | Stop reason: HOSPADM

## 2022-05-03 RX ORDER — HEPARIN SODIUM (PORCINE) LOCK FLUSH IV SOLN 100 UNIT/ML 100 UNIT/ML
500 SOLUTION INTRAVENOUS AS NEEDED
Status: CANCELLED | OUTPATIENT
Start: 2022-05-03

## 2022-05-03 RX ORDER — SODIUM CHLORIDE 0.9 % (FLUSH) 0.9 %
10 SYRINGE (ML) INJECTION AS NEEDED
Status: DISCONTINUED | OUTPATIENT
Start: 2022-05-03 | End: 2022-05-03 | Stop reason: HOSPADM

## 2022-05-03 RX ADMIN — HEPARIN SODIUM (PORCINE) LOCK FLUSH IV SOLN 100 UNIT/ML 500 UNITS: 100 SOLUTION at 14:33

## 2022-05-03 RX ADMIN — Medication 10 ML: at 14:33

## 2022-05-04 ENCOUNTER — OFFICE VISIT (OUTPATIENT)
Dept: CARDIOLOGY CLINIC | Age: 82
End: 2022-05-04
Payer: MEDICARE

## 2022-05-04 VITALS
SYSTOLIC BLOOD PRESSURE: 112 MMHG | DIASTOLIC BLOOD PRESSURE: 54 MMHG | WEIGHT: 165 LBS | HEART RATE: 61 BPM | HEIGHT: 58 IN | OXYGEN SATURATION: 96 % | BODY MASS INDEX: 34.63 KG/M2

## 2022-05-04 DIAGNOSIS — R60.0 EDEMA OF BOTH LOWER EXTREMITIES: ICD-10-CM

## 2022-05-04 DIAGNOSIS — R06.02 SHORTNESS OF BREATH: Primary | ICD-10-CM

## 2022-05-04 DIAGNOSIS — N18.9 CHRONIC KIDNEY DISEASE, UNSPECIFIED CKD STAGE: ICD-10-CM

## 2022-05-04 DIAGNOSIS — I51.89 DIASTOLIC DYSFUNCTION: ICD-10-CM

## 2022-05-04 DIAGNOSIS — I87.2 CHRONIC VENOUS INSUFFICIENCY: ICD-10-CM

## 2022-05-04 DIAGNOSIS — I10 ESSENTIAL HYPERTENSION: ICD-10-CM

## 2022-05-04 DIAGNOSIS — E66.01 MORBID OBESITY (HCC): ICD-10-CM

## 2022-05-04 PROCEDURE — 1036F TOBACCO NON-USER: CPT | Performed by: CLINICAL NURSE SPECIALIST

## 2022-05-04 PROCEDURE — 4040F PNEUMOC VAC/ADMIN/RCVD: CPT | Performed by: CLINICAL NURSE SPECIALIST

## 2022-05-04 PROCEDURE — 1123F ACP DISCUSS/DSCN MKR DOCD: CPT | Performed by: CLINICAL NURSE SPECIALIST

## 2022-05-04 PROCEDURE — 1090F PRES/ABSN URINE INCON ASSESS: CPT | Performed by: CLINICAL NURSE SPECIALIST

## 2022-05-04 PROCEDURE — G8417 CALC BMI ABV UP PARAM F/U: HCPCS | Performed by: CLINICAL NURSE SPECIALIST

## 2022-05-04 PROCEDURE — 99213 OFFICE O/P EST LOW 20 MIN: CPT | Performed by: CLINICAL NURSE SPECIALIST

## 2022-05-04 PROCEDURE — G8400 PT W/DXA NO RESULTS DOC: HCPCS | Performed by: CLINICAL NURSE SPECIALIST

## 2022-05-04 PROCEDURE — G8427 DOCREV CUR MEDS BY ELIG CLIN: HCPCS | Performed by: CLINICAL NURSE SPECIALIST

## 2022-05-04 RX ORDER — METOLAZONE 2.5 MG/1
2.5 TABLET ORAL DAILY PRN
Qty: 30 TABLET | Refills: 5 | Status: SHIPPED | OUTPATIENT
Start: 2022-05-04

## 2022-05-04 ASSESSMENT — ENCOUNTER SYMPTOMS
WHEEZING: 0
CHEST TIGHTNESS: 0
SHORTNESS OF BREATH: 1
ABDOMINAL PAIN: 0
FACIAL SWELLING: 0
NAUSEA: 0
COUGH: 0
VOMITING: 0
EYE REDNESS: 0

## 2022-05-04 NOTE — PATIENT INSTRUCTIONS
Return in about 6 months (around 11/4/2022) for APRN. Compression stockings, leg elevation when sitting  Low sodium diet    Metolazone for weight gain over 3lbs in 24 hours or 5lbs over a week. If weight is not improving by the next day, call the office. May take antihistamines for sinus congestion/ allergies such as Zyrtec, Claritin, Allegra, and Benadryl. Avoid decongestants such as Sudafed that may elevate blood pressure and heart rate. - Weigh daily and report weight gain of 3lbs or more in 24hrs or 5lbs in one week. - Call for increasing shortness of breath or increasing swelling in feet and legs.     (This could mean you are retaining too much fluid)  - 2000mg low sodium diet

## 2022-05-04 NOTE — PROGRESS NOTES
[facility], [address] [place] [address], [id number]  Phone: [phone]  Fax: [phone]    OFFICE VISIT:  2022    Maggie Kirby - : 1940    Reason For Visit:  Bean Washington is a 80 y.o. female who is here for 6 Month Follow-Up (Follow up on edmea and SOB) and Hypertension       Diagnosis Orders   1. Shortness of breath     2. Edema of both lower extremities     3. Chronic venous insufficiency     4. Essential hypertension     5. Morbid obesity ([address]     6. Diastolic dysfunction     7. Chronic kidney disease, unspecified CKD stage         HPI  Patient was referred to our office on 2021 for concerns of congestive heart failure with persistent lower extremity edema despite three diuretics. She was referred by her nephrologist, Dr. Faraz Mora, who sees her for chronic kidney disease stage IIIb. Other history includes urethral carcinoma, cellulitis of extremities. Cardiac work-up with a DSE was negative. Echocardiogram showed mild LVH, diastolic dysfunction, mild tricuspid and pulmonary insufficiency. Heart cath in  showed no significant issues per patient report    For dyspnea and edema are likely multifactorial related to chronic venous insufficiency, obesity, diastolic dysfunction, CKD    At last visit 6 months ago, we did discontinue amlodipine to see if this may perhaps help her lower extremity edema. Olmesartan was started in its place. She states she noticed no improvement. Patient reports a short course of metolazone was helpful given to her by Dr. Faraz Mora. Shaina Flores DO,  is PCP.   Rg Rose has the following history as recorded in [facility]:    Patient Active Problem List    Diagnosis Date Noted    Chronic venous insufficiency 2021    Essential hypertension 2021    Morbid obesity ([address] 2021    Fibrocystic breast disease 01/10/2012     Past Medical History:   Diagnosis Date    Cancer [facility])     Cervical dysplasia  Chronic back pain     Fibrocystic disease of breast     GERD (gastroesophageal reflux disease)     Hyperlipidemia     Osteoarthritis     Type II or unspecified type diabetes mellitus without mention of complication, not stated as uncontrolled      Past Surgical History:   Procedure Laterality Date    APPENDECTOMY      BREAST CYST ASPIRATION      left    CARDIAC CATHETERIZATION      no disease per pt report    CERVIX LESION DESTRUCTION      HYSTERECTOMY  1976    PORT SURGERY      TONSILLECTOMY       Family History   Problem Relation Age of Onset    Cancer Mother         uterine    Heart Disease Father     Cancer Sister         kidney    Heart Disease Brother      Social History     Tobacco Use    Smoking status: Former Smoker     Quit date: 1970     Years since quittin.3    Smokeless tobacco: Never Used   Substance Use Topics    Alcohol use: Yes     Comment: occ glass of wine       Current Outpatient Medications   Medication Sig Dispense Refill    metOLazone (ZAROXOLYN) 2.5 MG tablet Take 1 tablet by mouth daily as needed (for weight gain of 3lbs or more in 24 hrs) 30 tablet 5    olmesartan (BENICAR) 20 MG tablet TAKE 1 TABLET BY MOUTH 1 TIME DAILY 90 tablet 1    Coenzyme Q10 (CO Q 10) 10 MG CAPS Take by mouth daily      calcium carbonate 1500 (600 Ca) MG TABS tablet Take 600 mg by mouth daily      cetirizine (ZYRTEC) 10 MG tablet Take 10 mg by mouth daily      citalopram (CELEXA) 20 MG tablet TAKE 1 TABLET BY MOUTH EVERY DAY      diphenoxylate-atropine (LOMOTIL) 2.5-0.025 MG per tablet TAKE 2 TABLETS BY MOUTH FOUR TIMES A DAY AS NEEDED FOR DIARRHEA      mometasone-formoterol (DULERA) 100-5 MCG/ACT inhaler Inhale 2 puffs into the lungs 2 times daily as needed       pravastatin (PRAVACHOL) 40 MG tablet Take 40 mg by mouth daily      cyanocobalamin 1000 MCG/ML injection INJECT 1ML INTO THE MUSCLE EVERY DAY FOR 5 DAYS, THEN ON THE SAME DAY EACH WEEK FOR 4 WEEKS, THEN 1 TIME EACH MONTH.  furosemide (LASIX) 40 MG tablet Take 40 mg by mouth daily       potassium chloride SA (K-DUR;KLOR-CON) 20 MEQ tablet Take 20 mEq by mouth 2 times daily       gabapentin (NEURONTIN) 300 MG capsule Take 300 mg by mouth 3 times daily.  NEXIUM 40 MG capsule Take 40 mg by mouth 2 times daily       bisoprolol-hydrochlorothiazide (ZIAC) 5-6.25 MG per tablet Take 1 tablet by mouth daily       HYDROcodone-acetaminophen (NORCO)  MG per tablet Take 1 tablet by mouth every 6 hours as needed. No current facility-administered medications for this visit. Allergies: Alatrofloxacin, Gatifloxacin, Scopolamine, Amoxicillin-pot clavulanate, Cephalexin, and Sulfamethoxazole-trimethoprim    Review of Systems  Review of Systems   Constitutional: Positive for fatigue. Negative for activity change, diaphoresis, fever and unexpected weight change. HENT: Negative for facial swelling and nosebleeds. Eyes: Negative for redness and visual disturbance. Respiratory: Positive for shortness of breath. Negative for cough, chest tightness and wheezing. Cardiovascular: Positive for leg swelling. Negative for chest pain and palpitations. Gastrointestinal: Negative for abdominal pain, nausea and vomiting. Endocrine: Negative for cold intolerance and heat intolerance. Genitourinary: Negative for dysuria and hematuria. Musculoskeletal: Negative for arthralgias and myalgias. Skin: Negative for pallor and rash. Neurological: Negative for dizziness, seizures, syncope, weakness and light-headedness. Hematological: Does not bruise/bleed easily. Psychiatric/Behavioral: Negative for agitation. The patient is not nervous/anxious.         Objective  Vital Signs - BP (!) 112/54   Pulse 61   Ht 4' 10\" (1.473 m)   Wt 165 lb (74.8 kg)   SpO2 96%   BMI 34.49 kg/m²    Wt Readings from Last 3 Encounters:   05/04/22 165 lb (74.8 kg)   11/01/21 158 lb (71.7 kg)   08/10/21 157 lb (71.2 kg) Physical Exam  Vitals and nursing note reviewed. Constitutional:       General: She is not in acute distress. Appearance: She is well-developed. She is not diaphoretic. Comments: Deconditioned   HENT:      Head: Normocephalic and atraumatic. Right Ear: Hearing and external ear normal.      Left Ear: Hearing and external ear normal.      Nose: Nose normal.   Eyes:      General:         Right eye: No discharge. Left eye: No discharge. Pupils: Pupils are equal, round, and reactive to light. Neck:      Thyroid: No thyromegaly. Vascular: No carotid bruit or JVD. Trachea: No tracheal deviation. Cardiovascular:      Rate and Rhythm: Normal rate and regular rhythm. Heart sounds: Normal heart sounds. No murmur heard. No friction rub. No gallop. Pulmonary:      Effort: Pulmonary effort is normal. No respiratory distress. Breath sounds: Normal breath sounds. No wheezing or rales. Abdominal:      Palpations: Abdomen is soft. Tenderness: There is no abdominal tenderness. Musculoskeletal:         General: No swelling or deformity. Cervical back: Neck supple. No muscular tenderness. Right lower leg: Edema (2+) present. Left lower leg: Edema (2+) present. Skin:     General: Skin is warm and dry. Findings: No rash. Neurological:      General: No focal deficit present. Mental Status: She is alert and oriented to person, place, and time. Cranial Nerves: No cranial nerve deficit.    Psychiatric:         Mood and Affect: Mood normal.         Behavior: Behavior normal.         Judgment: Judgment normal.         Data:    Echo 7/22/21  Conclusions      Summary   Normal left ventricular size and systolic function EF 17-78%   Mild concentric left ventricular hypertrophy with mild [grade 1] diastolic   dysfunction   Mild left atrial enlargement   Tricuspid aortic valve with adequate cusp separation and neither   significant stenosis or insufficiency   Mitral annular calcification with mild thickening of a normally mobile   mitral valve with mild regurgitation   Mild pulmonic insufficiency   Normal right-sided chambers with preserved RV systolic function   Mild tricuspid regurgitation with RVSP estimate 34 mmHg   IVC dimension and is throwing motion normal consistent with normal right   atrial filling pressures   Aortic root and ascending segments measured within normal limits   No significant pericardial effusion    DSE 7/22/21  Conclusions      Summary   Dobutamine stress echocardiogram without clinical, electrocardiographic,   or echocardiographic evidence of myocardial ischemia. Assessment:     Diagnosis Orders   1. Shortness of breath     2. Edema of both lower extremities     3. Chronic venous insufficiency     4. Essential hypertension     5. Morbid obesity ([address]     6. Diastolic dysfunction     7. Chronic kidney disease, unspecified CKD stage         Shortness of breath/ lower extremity edema/ diastolic dysfunction-  symptoms are likely multifactorial related to diastolic dysfunction, CKD, obesity, chronic venous insufficiency. BNP has not been elevated previously. She is currently on the beta-blocker, bisoprolol for recently on olmesartan. Continuation of amlodipine to improve her lower extremity edema. She has been counseled to follow a low-sodium diet. We will give her a prescription of metolazone to use on an as-needed basis only for weight gain of 3 pounds or more overnight or 5 pounds in a week. Continue to use compression stockings and elevate feet when sitting    CKDfollows with nephrology and likely a contributing factor to her edema    Hypertensionstable on current regimen with bisoprolol and olmesartan    Stable cardiovascular status.      Plan    Return in about 6 months (around 11/4/2022) for Dr. John Quiñonez.   Compression stockings, leg elevation when sitting  Low sodium diet    Metolazone for weight gain over 3lbs in 24 hours or 5lbs over a week. If weight is not improving by the next day, call the office. May take antihistamines for sinus congestion/ allergies such as Zyrtec, Claritin, Allegra, and Benadryl. Avoid decongestants such as Sudafed that may elevate blood pressure and heart rate. - Weigh daily and report weight gain of 3lbs or more in 24hrs or 5lbs in one week. - Call for increasing shortness of breath or increasing swelling in feet and legs. (This could mean you are retaining too much fluid)  - 2000mg low sodium diet        Call with any questionsor concerns  Follow up with Mirta Johnson, DO, DO for non cardiac problems  Report any new problems  Cardiovascular Fitness-Exercise as tolerated. Strive for 15 minutes of exercise most days of the week. Cardiac / HealthyDiet  Continue current medications as directed  Continue plan of treatment  It is always recommended that you bring your medicationsbottles with you to each visit - this is for your safety!        JOSE Dotson

## 2022-05-13 DIAGNOSIS — E55.9 VITAMIN D DEFICIENCY: Primary | ICD-10-CM

## 2022-05-13 RX ORDER — ERGOCALCIFEROL 1.25 MG/1
50000 CAPSULE ORAL WEEKLY
Qty: 12 CAPSULE | Refills: 3 | Status: SHIPPED | OUTPATIENT
Start: 2022-05-13 | End: 2022-10-24 | Stop reason: SDUPTHER

## 2022-05-16 NOTE — PROGRESS NOTES
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  She is on adjuvant Femara for the past 7.5 months.  She declined adjuvant radiation.  She is also seen for macrocytic anemia from chronic kidney disease Stage IIIa, GFR 51 mL/minute 03/05/2018, iron deficiency, and thrombocytopenia.  She is intolerant to oral iron (nausea, stomach cramps, and constipation).  She had Injectafer 4 months ago. She is also seen for vulvar cancer. She is seen 17.5 months post C1D1 Mitomycin C and 5FU with radiation. Her D29 to 32 chemo was cancelled due to hospitalization, tolerance, and poor performance status.  She is seen with spouse, Joseph.  History is obtained from the patient. History is considered to be accurate.         Oncology/Hematology History Overview Note   DIAGNOSTIC ABNORMALITIES: Breast cancer.  Screening mammogram 08/18/2021.New dense 7 mm partially obscured nodule in the upper outer quadrant of the left breast, with associated architectural distortion.  Diagnostic mammogram 08/20/2021.  Small subcentimeter suspicious spiculated mass at 12:00 in the left breast, approximately 3 cm to 4 cm deep from the nipple. This is suspicious for breast carcinoma, ultrasound-guided biopsy is recommended.  Sonogram 08/20/2021.  Small suspicious solid mass at 12:00 in the left breast. 5.5 x 5.1 x 4.7 mm, suspicious for breast carcinoma. This corresponds with the finding on mammograms.  Successful ultrasound-guided left breast biopsy and clip on 08/23/2021.  Pathology report 09/01/2021.  Left breast at 12:00, core needle biopsies: Invasive carcinoma no special type  (ductal).  Histologic grade (Abdullahi histologic score).   Glandular (acinar)/tubular differentiation: Score 1.   Nuclear pleomorphism: Score 2.   Mitotic rate: Score 1.  Overall grade: Grade 1. Maximum tumor diameter is at least 0.8 cm.  Estrogen 87%, progesterone 47% and negative HER-2/gabriela.  Genetic testing was sent.  Patient was seen by Dr. Quynh Rosario 2021.  She is .  She had first delivery at 20.  She took birth control for 1 month.  She took hormone replacement therapy for approximately 5 years.  Family history positive for breast cancer, sister at 78. No ovarian cancer  Chest x-ray 2021.  No acute cardiopulmonary process.  Pathology report 2021.  Left sentinel lymph nodes: Four of 4 lymph nodes are negative for metastatic mammary carcinoma.Comment: Absence of micrometastases is confirmed utilizing immunohistochemical stains for pankeratin.  Left breast, partial mastectomy:  A.  Invasive carcinoma of no special type (ductal), grade 1 (1.1 cm in greatest dimension).  B.  Minor associated low-grade ductal carcinoma in situ component.  C.  The inked surgical margins and skin are negative for malignancy.  D.  Prior biopsy site changes including fat necrosis.  Comment: Microscopically, the tumor is approximately 7 mm from the closest inked (superior) surgical margin.  AJCC stage: pT1c snpN0.      PREVIOUS INTERVENTIONS:  She had undergone left partial mastectomy with left sentinel lymph node biopsy 2021 by Dr. Rosario.  Declined adjuvant radiation.  Adjuvant Femara 10/01/2021 through present.        DIAGNOSTIC ABNORMALITIES: Vulvar cancer  She had developed recurrent vulvar cancer left inguinal node that is PET positive measuring 2.1 cm.  The patient was seen by Dr. Marks in favor definitive chemo radiation.  Pathology report 07/10/2020 periurethral biopsy, poorly differentiated carcinoma with squamous and papillary features, focally invasive in the subepithelial connective  "tissue.  PET 07/31/2020 showed intense FDG avid left inguinal node is highly suspicious for local regional metastasis from known vulvar squamous cell carcinoma.  No additional sites of suspicious FDG activity.  MRI 07/31/2020 showed redemonstration of mildly T2 hyperintense mass involving the urethral meatus, similar dimensions to prior exam on 02/18/2020 however there is now a polypoid lesion seen along the anterior aspect of the perineum, possibly contiguous with the urethral mass, concerning for disease progression.  Market interval enlargement of the left inguinal node almost certainly representing disease involvement.  Patient was notified by Dr. Siddiqui 08/19/2020.  Consensus for chemoradiation.  Patient reluctant to take chemotherapy.  Follow-up with Dr. Siddiqui in 4 to 6 weeks after radiation is completed.         PREVIOUS INTERVENTIONS:  Mitomycin C and 5-FU 10/05/2020 through 10/08/2020 with radiation completed 12/10/2020 at [facility].  D29 to d32 of chemo discontinued due to poor performance status.       DIAGNOSTIC ABNORMALITIES:   The patient was seen by Dr. Greg Alberts 01/29/2018 complaining of coughing and wheezing. The patient was given Tussionex. Blood work was ordered. CBC 01/29/2018 revealed a WBC of 7.8, hemoglobin 11.2, hematocrit 35.1, MCV 96.2, platelets 43,000, and ANC 4.47. CMP remarkable for of glucose of 177 and GFR 59 mL/minute.  The patient was seen by VINCENT Jones, 02/02/2018. She was coughing and wheezing. Tussionex did not help. The patient was given prednisone.  The patient was seen by VINCENT Jones, 02/15/2018. She is followed for anemia from iron deficiency. CBC 02/15/2018 revealed a WBC of 12.9, hemoglobin 11.4, hematocrit 34.5, MCV 95, and platelets 68,000.       PREVIOUS INTERVENTIONS:   Ferrous sulfate 325 mg 03/07/2018 through 05/06/2018.  Not resumed 06/08/2018. \"I misunderstood.\"   Resume 09/05/2018 through 10/03/2018, stopped due to " intolerance.  Injectafer 750 mg 10/20/2018 at the [place] office.  Retacrit 10/27/2020 through present.  Injectafer 750 mg 05/18/2021 and 01/26/2022 at [facility]           Vulvar cancer, carcinoma (HCC)    Initial Diagnosis    Vulvar cancer, carcinoma (CMS/HCC)     3/19/2001 Biopsy    Clinical Features: red vaginal lesion with bleeding since 1995. TX with  Carafate douche and Premarin vaginal cream with intermittent improvement.    DIAGNOSIS:    VAGINA, BIOPSY: FOCAL ULCERATION, MARKED ACUTE AND CHRONIC INFLAMMATION,  SPONGIOSIS AND REACTIVE ATYPIA; NEGATIVE FOR DYSPLASIA.     8/17/2001 Procedure    Pap Smear:  DIAGNOSIS:            Atypical keratinized squamous cells, suspicious                           for squamous cell carcinoma.         COMMENTS:             There are numerous atypical keratinized cells                           present in an inflammatory background.  These are                           suspicious for squamous cell carcinoma.  Biopsy                           confirmation is recommended.      10/16/2001 Procedure    Pap Smear:  DIAGNOSIS:            Mild dysplasia       ADDITIONAL FINDINGS:  Marked inflammation                           Excessive hyperkeratosis         BETHESDA SYSTEM:      Low grade squamous intraepithelial lesion    DIAGNOSIS:            Negative, no abnormal cells seen           BETHESDA SYSTEM:      Within normal limits.       ADEQUACY:             Specimen satisfactory for evaluation       SOURCE:               Vagina, diagnostic thin prep PAP     11/7/2001 Biopsy      DIAGNOSIS:    VAGINA AND VULVA, RIGHT POSTERIOR INTROITUS, WIDE LOCAL EXCISION:  VAGINAL INTRAEPITHELIAL NEOPLASIA (VAIN) II-III, FOCALLY EXTENDING TO  VAGINAL MARGIN AT 12-4:00; ALL OTHER MARGINS NEGATIVE FOR  INTRAEPITHELIAL NEOPLASIA. (SEE COMMENT)    COMMENT: The lesion involves vaginal type epithelium with associated  chronic inflammation and erosion. The adjacent vulvar epithelium  is  hyperkeratotic.     3/15/2002 Procedure    Pap Smear:  BETHESDA SYSTEM:      High grade squamous intraepithelial lesion       DESCRIPTOR:           Moderate dysplasia           ADEQUACY:             Specimen satisfactory for evaluation       SOURCE:               Vagina, Thin Prep Pap, Diagnostic     6/13/2003 Procedure         BETHESDA SYSTEM:      High grade squamous intraepithelial lesion       DESCRIPTOR:           Moderate dysplasia       ADDITIONAL FINDINGS:  Inflammation         ADEQUACY:             Specimen satisfactory for evaluation       SOURCE:               Vagina, Thin Prep Pap, Diagnostic    HUMAN PAPILLOMAVIRUS         CASE ACCESSION #:    [id number]        Category                  HPV Types                Patient Results         High/Intermed Risk    16,18,31,33,35,39              Negative                            45,51,52,56,58,59,68      Specimen Source        Cervical / Vaginal Specimen     12/5/2003 Procedure    Gynecological Cytology        CASE ACCESSION #:     [id number]       BETHESDA SYSTEM:      Low grade squamous intraepithelial lesion       DESCRIPTOR:           Mild dysplasia           ADEQUACY:             Specimen satisfactory for evaluation       SOURCE:               Vagina, Thin Prep Pap, Diagnostic     11/29/2011 Biopsy    ADDENDUM FINDINGS:    The high grade MOODY in the right labia majora biopsy was of the usual type.  There was no evidence of differentiated MOODY.      DIAGNOSIS:    1) PERINEUM, BIOPSY: SQUAMOUS EPITHELIUM WITH FLORID HYPERKERATOSIS,  CONSISTENT WITH CHRONIC IRRITATION; NO EVIDENCE OF DYSPLASIA OR MALIGNANCY  (SEE COMMENT).    Comment: Immunohistochemical studies (p16, p53, MIB-1) confirm the rendered  interpretations.    2) VULVA, RIGHT LABIUM MAJORUM, BIOPSY: VULVAR INTRAEPITHELIAL NEOPLASIA II  (MODERATE DYSPLASIA); (SEE COMMENT).    Comment: Immunohistochemical studies for p16 (nuclear and cytoplasmic  positivity in lower epithelial half) and MIB-1  (nuclear positivity in lower  epithelial half) confirms the diagnosis; p53 is positive only in rare  cells. A GMS histochemical study for fungal forms is negative.  Nevertheless, parakeratosis associated with neutrophils, as was seen  herein, is commonly associated with fungal vulvitis.     7/3/2012 Procedure    Gynecological Cytology    CASE ACCESSION #:                     [id number]  BETHESDA SYSTEM:                      High grade squamous intraepithelial                                        lesion  DESCRIPTOR:                           Mild to moderate dysplasia  ADEQUACY:                             Specimen satisfactory for evaluation  SOURCE:                               Vagina, Thin Prep Pap, Diagnostic  # [id number] REVIEWED:                    1     1/29/2013 Biopsy    DIAGNOSIS:    1) VULVA, RIGHT, ANTERIOR, BIOPSY:  SPONGIOTIC DERMATITIS WITH EXTENSIVE  DERMAL GRANULATION TISSUE, MIXED INFLAMMATION AND FIBROSIS (SEE COMENT).    Comment: Sections show spongiosis, basement membrane thickening, dermal  fibrosis, and extensive dermal granulation tissue with a predominantly  chronic inflammatory infiltrate. There is no evidence of dysplasia or  malignancy. The basement membrane thickening and dermal fibrosis suggests  that there may be component of lichen sclerosus. However, the underlying  cause of the ongoing inflammation and repair (granulation tissue) is not  clear from this sample. A tissue gram stain fails to reveal any large  bacterial aggregates.  GMS and AFB studies for fungal forms and acid fast  bacilli were negative respectively     11/27/2013 Surgery      Diagnosis    1) VULVA, LEFT ANTERIOR, BIOPSY: HIGH GRADE SQUAMOUS INTRAEPITHELIAL LESION (SEVERE DYSPLASIA, MOODY III).    2) VAGINAL WALL, ANTERIOR, BIOPSY: HIGH GRADE SQUAMOUS INTRAEPITHELIAL LESION (SEVERE DYSPLASIA, VaIN III).    3) VULVA, VULVECTOMY: FOCUS OF MICROINVASIVE SQUAMOUS CELL CARCINOMA, WELL-DIFFERENTIATED, DEPTH OF STROMAL  INVASION 0.4 MM,  8 MM FROM CLOSEST DEEP MARGINS, 4 MM FROM RIGHT ANTERIOR VAGINAL MARGINS AT 8 - 9 O'CLOCK (PROXIMAL TIP OF SPECIMEN DESIGNATED   12 O'CLOCK); NEGATIVE FOR LYMPHOVASCULAR INVASION; ARISING IN A BACKGROUND OF EXTENSIVE VULVAR INTRAEPITHELIAL NEOPLASIA (SEVERE DYSPLASIA, MOODY III, CLASSICAL AND DIFFERENTIATED TYPES); MOODY III IS PRESENT AT RIGHT LATERAL SURGICAL MARGIN (7 - 9 O'CLOCK),   LEFT LATERAL SURGICAL MARGIN (3 - 5 O'CLOCK) AND RIGHT AND LEFT VAGINAL MARGINS; TOTAL LESIONAL AREA (INVASIVE CARCINOMA AND MOODY III) MEASURES 8.2 CM IN GREATEST DIMENSION (GROSS MEASUREMENT); BACKGROUND SEVERE INTERFACE DERMATITIS AND LICHEN SCLEROSUS.        **Electronically signed out by Dimas Cardenas M.D.**on 12/2/2013  HAYLEY/YAZMIN/franky  Case reviewed by Attending Pathologist      Synoptic Diagnosis  3)  VULVA:     Specimen:                        Vulva  Procedure:                       Other: Vulvectomy  Lymph Node Sampling:             Not applicable  Specimen Size:                   Greatest dimension: 13.5 cm                                   Additional dimension: 9.5 cm                                   Additional dimension: 1.2 cm  Tumor Site:                      Right vulva, labium minus  Tumor Size:                      Greatest dimension: 0.04 cm  Tumor Focality:                  Unifocal  Histologic Type:                 Squamous cell carcinoma  Histologic Grade:                G1: Well differentiated  Microscopic Tumor Extension:     Depth of invasion: 0.4 mm  Margins:                         Uninvolved by invasive carcinoma                                   Distance of invasive carcinoma from closest margin: 4 mm  Lymph-Vascular Invasion:         Not identified  Lymph Nodes:                     No nodes submitted or found  Extranodal Extension:            Cannot be determined (explain):    Fixed or Ulcerated Femoral-Inguinal Lymph Nodes:                                    Not identified  Laterality of  Involved Lymph Nodes:                                    Cannot be determined:    Primary Tumor (pT):              pT1a [FIGO IA]: Lesions 2 cm or less in size, confined to the vulva or perineum, and with stromal invasion 1.0 mm or less  Regional Lymph Nodes (pN):       pNX:  Regional lymph nodes cannot be assessed  Distant Metastasis (pM):         Not applicable  Additional Pathologic Findings:  Vulvar intraepithelial neoplasia (MOODY) 3 (severe dysplasia/carcinoma in situ)  --------------------------------------------------------     5/20/2014 Procedure    Gynecologic Cytology  [place] Diagnosis:  High grade squamous intraepithelial lesion.    Descriptor/Additional Findings:  Moderate dysplasia.    Adequacy:  Specimen satisfactory for evaluation.    Source:  Vagina, Thin Prep Pap, Imaged Diagnostic     11/11/2014 Biopsy    Diagnosis    ANTERIOR VAGINAL WALL, BIOPSY:  HIGH GRADE SQUAMOUS INTRAEPITHELIAL LESION (VaIN 3, SEVERE DYSPLASIA)       12/19/2014 Surgery    Diagnosis  1)  ANTERIOR VAGINAL WALL, PARTIAL VAGINECTOMY: HIGH-GRADE SQUAMOUS INTRAEPITHELIAL LESION (VaIN 3, SEVERE DYSPLASIA), 2.7 CM; HIGH GRADE DYSPLASIA EXTENDS TO THE 2 AND 8 O'CLOCK TIP MARGINS, THE 5-8 O'CLOCK LATERAL MARGIN, AND THE 11-2 O'CLOCK LATERAL   MARGIN.          2)  JOYA-CLITORAL AREA, EXCISION: HIGH-GRADE SQUAMOUS INTRAEPITHELIAL LESION (VaIN 3, SEVERE DYSPLASIA), EXTENDING TO A LATERAL MARGIN.         2/9/2015 Surgery    Diagnosis  1.          VULVA, LEFT PERIURETHRAL PORTION, EXCISION: FOCAL HIGH-GRADE SQUAMOUS INTRAEPITHELIAL LESION (MOODY 2, MODERATE DYSPLASIA); MARGINS ARE NEGATIVE FOR DYSPLASIA.    2.          VULVA, RIGHT PERIURETHRAL PORTION, EXCISION: BENIGN SQUAMOUS MUCOSA WITH ACUTE AND CHRONIC INFLAMMATION AND REACTIVE CHANGES.         3.          VULVA, LEFT, LOWER PORTION, EXCISION: BENIGN SQUAMOUS MUCOSA WITH ACUTE AND CHRONIC INFLAMMATION, REACTIVE CHANGES AND FEATURES CONSISTENT WITH PREVIOUS SURGICAL PROCEDURE.        "  4.          VULVA, RIGHT PORTION, EXCISION: BENIGN SQUAMOUS MUCOSA WITH CHRONIC INFLAMMATION AND DERMAL FIBROSIS.        9/6/2017 Procedure    Diagnosis  \"PAP SMEAR FROM THE URETHRA\":  HIGH GRADE SQUAMOUS INTRAEPITHELIAL LESION.          10/26/2017 Biopsy    Urethral Meatus Biopsy:  Urothelial mucosa with ulceration, chronic inflammation and focal high grade squamous intraepithelial lesion, moderate dysplasia     7/10/2018 Imaging    MRI Pelvis:  Impression:    1.  There is a 2.3 x 1.8 cm urethral lesion at the external urethral   meatus demonstrating enhancement and T2 hyperintensity. The lesion is   located approximately 8 mm from the bladder neck/internal urethral   orifice.  There is no extension of the lesion into the para vaginal   fat or ischiorectal fossa. There is some edema of the bilateral   ischiocavernosus muscles without invasion by the mass. No pelvic or   inguinal adenopathy is identified.     2.  The patient has a 2.5 cm cystocele and the urethra is almost   horizontal. There is pelvic floor laxity with loss of upper convexity   of the left iliococcygeus muscle.     7/20/2018 Biopsy    Diagnosis  URETHRA, BIOPSY:  SQUAMOUS CELL CARCINOMA IN SITU; SEE COMMENT.    Comments  P16 immunostain and HPV RNA in situ hybridization are strongly and diffusely positive within the lesion, consistent with HPV-related pathogenesis.     1/8/2019 Imaging    MRI Pelvis:  1.  Stable size and appearance of a nonspecific enhancing nodular   lesion at the caudal margin of the vaginal introitus adjacent to   extensive postsurgical changes related to prior vulvectomy and   vaginectomy. This lesion does not appear to conform to the expected   course of the urethra which is somewhat poorly defined, and this felt   more likely be located immediately caudal to the urethra. It is   possible this may reflect postsurgical scarring as opposed to a true   lesion. Continued follow-up is suggested to ensure stability.  2.  No " lymphadenopathy or other evidence of metastatic disease in the   pelvis.  3.  Evidence of pelvic floor laxity with cystocele, not significantly   changed.     8/6/2019 Imaging    MRI Pelvis:  1. No significant change from the prior exam on this limited   noncontrast study.  2. Stable indeterminate nodule anterior to the external urethral   meatus which may represent focal scarring; however,   residual/recurrent disease is not entirely excluded.  Recommend continued attention on follow-up. 3. Extensive pelvic   postsurgical changes with no evidence of local recurrence.  4. Pelvic floor laxity with cystocele unchanged from prior exam.     1/13/2020 Procedure    Urethral stricture dilation     2/18/2020 Imaging    MRI Pelvis:  Impression:  1.  No significant interval change in 1.7 x 1.7 cm T2 hyperintense   ovoid lesion at the urethral meatus.  2.  Numerous postoperative findings status post vaginectomy and   hysterectomy.  3.  Pelvic floor laxity with persistent cystocele.  4.  No pelvic adenopathy or bony lesion identified.     5/13/2020 Procedure    Urethral stricture dilation      7/10/2020 Biopsy    Diagnosis  PERIURETHRA, BIOPSY: POORLY DIFFERENTIATED CARCINOMA WITH SQUAMOUS AND PAPILLARY FEATURES, FOCALLY INVASIVE IN THE SUBEPITHELIAL CONNECTIVE TISSUE; SEE COMMENT.    Comments  The patient's history of vulvar microinvasive squamous cell carcinoma is noted. The current biopsy shows a poorly differentiated carcinoma with papillary formation. P16 immunostain and HPV RNA in situ hybridization are strongly and diffusely positive within the lesion, consistent with HPV-related pathogenesis. A KERRI-3 immunostain shows patchy, weak positivity in the lesional cells. The patient's prior biopsy ([id number]) is reviewed and shows similar morphologic and immunophenotypic features but the papillary features were not present in the prior material.  Dr. Ilene Pablo reviewed this case and concurs with the diagnosis.       7/31/2020 Imaging    MRI Pelvis:  1.  Redemonstration of a mildly T2 hyperintense mass involving the   urethral meatus, similar dimensions to prior exam on 2/18/2020,   however there is now a polypoid lesion seen along the anterior aspect   of the perineum, possibly contiguous with the urethral mass,   concerning for disease progression. This could potentially represent   the recently biopsied lesion.  2.  Marked interval enlargement of a left inguinal lymph node, almost   certainly representing disease involvement. This would be amenable to   ultrasound-guided biopsy if warranted.  3.  Findings related to pelvic floor laxity, with cystocele.    PET/CT:  1.  Intensely FDG avid left inguinal lymph node is highly suspicious   for locoregional metastasis from known vulvar squamous cell   carcinoma. There are no additional sites of suspicious FDG activity.  2.   Intense physiologic FDG activity within the urine obscures   visualization of the periurethral mass. Findings are better   characterized on same day pelvic MRI.     9/21/2020 - 12/10/2020 Radiation    Radiation OncologyTreatment Course:  Dago Nunez received 6940 cGy in 38 fractions to vulva via external beam radiation therapy.     10/5/2020 -  Chemotherapy    OP Vulvar MitoMYcin / Fluorouracil CIV + XRT       10/9/2020 - 9/7/2021 Chemotherapy    OP CENTRAL VENOUS ACCESS DEVICE ACCESS, CARE, AND MAINTENANCE (CV[facility])     10/19/2020 Imaging    CT Head:  Impression:    1. No acute intracranial process.     10/21/2021 -  Chemotherapy    OP CENTRAL VENOUS ACCESS DEVICE ACCESS, CARE, AND MAINTENANCE ([facility])     Secondary malignancy of inguinal lymph nodes (HCC)   8/31/2020 Initial Diagnosis    Secondary malignancy of inguinal lymph nodes (CMS/HCC)     10/9/2020 - 9/7/2021 Chemotherapy    OP CENTRAL VENOUS ACCESS DEVICE ACCESS, CARE, AND MAINTENANCE ([facility])     10/21/2021 -  Chemotherapy    OP CENTRAL VENOUS ACCESS DEVICE ACCESS, CARE, AND MAINTENANCE ([facility])          PAST MEDICAL HISTORY:  ALLERGIES:  Allergies   Allergen Reactions   • Scopolamine Swelling     Other reaction(s): ANGIOEDEMA  Other reaction(s): ANGIOEDEMA     • Amoxicillin-Pot Clavulanate Rash   • Keflex [Cephalexin] Rash   • Septra [Sulfamethoxazole-Trimethoprim] Rash   • Tequin [Gatifloxacin] Other (See Comments)     Doesn't remember   • Trovan [Alatrofloxacin] Dizziness     CURRENT MEDICATIONS:  Outpatient Encounter Medications as of 5/25/2022   Medication Sig Dispense Refill   • Acetaminophen (TYLENOL ARTHRITIS PAIN PO) Take 1 tablet by mouth Daily As Needed (BACK PAIN).     • bisoprolol-hydrochlorothiazide (ZIAC) 5-6.25 MG per tablet TAKE 1 TABLET DAILY 90 tablet 1   • calcium carbonate (OS-REAGAN) 600 MG tablet Take 600 mg by mouth Daily.     • cetirizine (zyrTEC) 10 MG tablet Take 10 mg by mouth Daily.     • citalopram (CeleXA) 20 MG tablet TAKE 1 TABLET BY MOUTH EVERY DAY 90 tablet 1   • cyanocobalamin 1000 MCG/ML injection Inject 1ml IM once every 4 weeks 12 mL 0   • diphenhydrAMINE (BENADRYL) 25 mg capsule Take 25 mg by mouth Every 6 (Six) Hours As Needed for Allergies.     • diphenoxylate-atropine (LOMOTIL) 2.5-0.025 MG per tablet Take 2 tablets by mouth 4 (Four) Times a Day As Needed for Diarrhea. 90 tablet 2   • DULERA 100-5 MCG/ACT inhaler Inhale 2 puffs 2 (Two) Times a Day. Rinse and spit after using. 6 inhaler 0   • esomeprazole (nexIUM) 40 MG capsule Take 1 capsule by mouth 2 (Two) Times a Day. 180 capsule 3   • furosemide (LASIX) 40 MG tablet Take 1.5 tablets by mouth Daily. Patient only takes once a day 135 tablet 3   • gabapentin (NEURONTIN) 300 MG capsule Take 2 capsules by mouth 3 (Three) Times a Day. 180 capsule 2   • Homeopathic Products (LEG CRAMPS) tablet Take 1 tablet by mouth Daily.     • HYDROcodone-acetaminophen (NORCO)  MG per tablet Take 1 tablet by mouth Every 4 (Four) Hours As Needed for Moderate Pain  or Severe Pain . 60 tablet 0   • Hydrocortisone (britany's amazing butt)  "cream Apply 1 application topically to the appropriate area as directed As Needed (irritation). 120 g 0   • letrozole (FEMARA) 2.5 MG tablet TAKE 1 TABLET BY MOUTH 1 TIME DAILY 90 tablet 2   • lidocaine (XYLOCAINE) 5 % ointment Apply  topically to the appropriate area as directed Every 4 (Four) Hours As Needed for Mild Pain  (pain secondary to radiation). 240 g 1   • Lidocaine Viscous HCl (XYLOCAINE) 2 % solution Take 5 mL by mouth 3 (Three) Times a Day With Meals. 100 mL 0   • metOLazone (ZAROXOLYN) 2.5 MG tablet TAKE 1 TABLET BY MOUTH THREE TIMES A WEEK     • nitrofurantoin (MACRODANTIN) 25 MG capsule Take 1 capsule by mouth Every Night. To help reduce pain with urination 7 capsule 0   • ondansetron (Zofran) 8 MG tablet Take 1 tablet by mouth Every 8 (Eight) Hours As Needed for Nausea or Vomiting. 60 tablet 2   • phenazopyridine (Pyridium) 200 MG tablet Take 1 tablet by mouth 3 (Three) Times a Day As Needed for Bladder Spasms. Or painful urination 15 tablet 0   • potassium chloride (K-DUR,KLOR-CON) 20 MEQ CR tablet TAKE 2 TABLETS BY MOUTH EVERY DAY 60 tablet 5   • pravastatin (PRAVACHOL) 40 MG tablet Take 1 tablet by mouth Every Night. 90 tablet 3   • Syringe 25G X 1\" 3 ML misc 1 each Daily. 25 each 1   • vitamin D (ERGOCALCIFEROL) 1.25 MG (89027 UT) capsule capsule Take 1 capsule by mouth 1 (One) Time Per Week. 12 capsule 3   • [DISCONTINUED] citalopram (CeleXA) 20 MG tablet TAKE 1 TABLET BY MOUTH EVERY DAY 90 tablet 1     Facility-Administered Encounter Medications as of 5/25/2022   Medication Dose Route Frequency Provider Last Rate Last Admin   • heparin injection 500 Units  500 Units Intravenous PRN Drake Stafford MD   500 Units at 07/01/21 1354   • heparin injection 500 Units  500 Units Intravenous PRN Drake Stafford MD   500 Units at 01/11/22 1134   • lidocaine (XYLOCAINE) 1 % injection 10 mL  10 mL Infiltration Once Shaheen Akbar DO       • lidocaine 1% - EPINEPHrine 1:313270 (XYLOCAINE W/EPI) 1 " %-1:633430 injection 10 mL  10 mL Infiltration Once Shaheen Akbar DO       • sodium bicarbonate injection 1 mEq  2 mL Injection Once Shaheen Akbar DO       • sodium chloride 0.9 % flush 10 mL  10 mL Intravenous PRN Drake Stafford MD   10 mL at 07/01/21 1354   • sodium chloride 0.9 % flush 10 mL  10 mL Intravenous PRN Drake Stafford MD   10 mL at 01/11/22 1134     ADULT ILLNESSES:  Patient Active Problem List   Diagnosis Code   • Meatal stenosis [id number]   • Retention of urine R33.9   • Type 2 diabetes mellitus, without long-term current use of insulin (HCC) E11.9   • Essential hypertension I10   • Grief reaction F43.21   • 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   • Chronic midline low back pain without sciatica M54.50, G89.29   • Bilateral lower extremity edema R60.0   • History of urethral stricture Z87.448   • Epidermal cyst of neck L72.0   • Normocytic anemia D64.9   • Neck abscess L02.11   • Mixed hyperlipidemia E78.2   • GERD (gastroesophageal reflux disease) K21.9   • Anxiety F41.9   • Iron deficiency and chemotherapy induced anemia D50.9   • Stage 3 chronic kidney disease (HCC) N18.30   • Anemia in stage 3 chronic kidney disease (HCC) N18.30, D63.1   • Screening for breast cancer Z12.39   • Lower extremity edema R60.0   • Vulvar cancer, carcinoma (HCC) C51.9   • Former smoker Z87.891   • 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   • Febrile illness R50.9   • Chemotherapy-induced thrombocytopenia D69.59, T45.1X5A   • Hyponatremia E87.1   • Hypokalemia E87.6   • Neutropenic fever (HCC) D70.9, R50.81   • Moderate malnutrition (HCC) E44.0   • Antineoplastic chemotherapy induced anemia D64.81, T45.1X5A   • History of radiation therapy Z92.3   • Encounter for care related to Port-a-Cath Z45.2   • Malignant neoplasm of upper-outer quadrant of left breast in female, estrogen receptor positive (HCC) C50.412, Z17.0   • Encounter for care related to vascular access port Z45.2   •  Abnormal finding on GI tract imaging R93.3   • Bronchitis J40   • Obesity (BMI 30-39.9) E66.9     SURGERIES:  Past Surgical History:   Procedure Laterality Date   • APPENDECTOMY     • BENJAMIN PROCEDURE      No evidence of reflux disease while on Nexium 40mg daily-See report   • BREAST CYST ASPIRATION Left    • BREAST LUMPECTOMY     • COLONOSCOPY  01/12/2011    Diverticulosis sigmoid colon; The examination was otherwise normal; Repeat 10 years   • COLONOSCOPY  11/03/2003    Dr. Laguerre-Normal colonoscopy; Normal terminal ileum; Repeat 5 years   • COLONOSCOPY N/A 11/05/2021    Procedure: COLONOSCOPY WITH ANESTHESIA;  Surgeon: Annita Loaiza MD;  Location: [facility] ENDOSCOPY;  Service: Gastroenterology;  Laterality: N/A;  pre abnormal imaging  post  Shaheen Akbar DO   • ENDOSCOPY  07/01/2014    Normal esophagus; Normal stomach; Normal examined duodenum; BRAVO pH capsule deployed;    • ENDOSCOPY  08/14/2013    Mild gastritis-biopsies for H.Pylor obtained   • ENDOSCOPY  02/16/2005    Dr. LaguerreXxfpa-Nvzetbzcz-wpvutvor   • ENDOSCOPY  10/21/2003    Dr. Laguerre-Stage 1 reflux esophagitis   • ENDOSCOPY N/A 11/05/2021    Procedure: ESOPHAGOGASTRODUODENOSCOPY WITH ANESTHESIA;  Surgeon: Annita Loaiza MD;  Location: [facility] ENDOSCOPY;  Service: Gastroenterology;  Laterality: N/A;  pre abnormal imaging  post AVM; hiatal hernia; esophageal polyp  Shaheen Akbar DO   • HYSTERECTOMY     • MASTECTOMY W/ SENTINEL NODE BIOPSY Left 09/14/2021    Procedure: LEFT PARTIAL MASTECTOMY WITH MAGSEED AND LEFT SENTINEL LYMPH NODE BIOPSY MAGTRACE;  Surgeon: Quynh Rosario MD;  Location: [facility] OR;  Service: General;  Laterality: Left;   • TONSILLECTOMY     • VAGINA SURGERY      Laser surgery X 2   • VENOUS ACCESS DEVICE (PORT) INSERTION N/A 09/29/2020    Procedure: SINGLE LUMEN PORT - A- CATH PLACEMENT WITH FLUOROSCOPY;  Surgeon: Quynh Rosario MD;  Location: [facility] OR;  Service: General;  Laterality: N/A;     HEALTH MAINTENANCE  ITEMS:  Health Maintenance Due   Topic Date Due   • ZOSTER VACCINE (2 of 3) 11/26/2015   • HEPATITIS C SCREENING  Never done   • Pneumococcal Vaccine 65+ (2 - PCV) 10/01/2018   • URINE MICROALBUMIN  01/29/2020   • DIABETIC EYE EXAM  11/25/2020   • COVID-19 Vaccine (3 - Moderna risk series) 01/25/2022   • LIPID PANEL  05/07/2022       <no information>  Last Completed Colonoscopy          COLORECTAL CANCER SCREENING (COLONOSCOPY - Every 10 Years) Next due on 11/5/2031 11/05/2021  Surgical Procedure: COLONOSCOPY    11/05/2021  COLONOSCOPY    01/29/2014  Outside Claim: [place] FECAL BLOOD SCRN IMMUNOASSAY    02/01/2013  Outside Claim: CHG BLOOD,OCCULT,FECAL HGB,FECES,1-3 SIMULT    01/12/2011  SCANNED - COLONOSCOPY    Only the first 5 history entries have been loaded, but more history exists.              Immunization History   Administered Date(s) Administered   • COVID-19 (MODERNA) 1st, 2nd, 3rd Dose Only 03/26/2021, 04/23/2021   • COVID-19 (MODERNA) BOOSTER 12/28/2021   • FLUAD TRI 65YR+ 10/22/2019   • Flu Vaccine Split Quad 10/12/2018   • Fluad Quad 65+ 11/09/2020   • Fluzone High Dose =>65 Years ([employer] ONLY) 10/01/2018, 11/12/2021   • Fluzone High-Dose 65+yrs 11/12/2021   • Pneumococcal Polysaccharide (PPSV23) 10/16/2013   • Pneumococcal, Unspecified 10/01/2017   • Tdap 05/01/2019   • Zostavax 01/14/2010, 10/01/2015     Last Completed Mammogram          MAMMOGRAM (Yearly) Next due on 3/7/2023    03/07/2022  Mammo Diagnostic Digital Tomosynthesis Left With CAD    08/20/2021  Mammo Diagnostic Digital Tomosynthesis Left With CAD    08/18/2021  Mammo Screening Digital Tomosynthesis Bilateral With CAD    05/28/2020  Mammo Screening Digital Tomosynthesis Bilateral With CAD    05/20/2019  Mammo Screening Digital Tomosynthesis Bilateral With CAD    Only the first 5 history entries have been loaded, but more history exists.                  FAMILY HISTORY:  Family History   Problem Relation Age of Onset   • Cancer Mother   "  • Hypertension Mother    • Osteoporosis Mother    • Dementia Mother    • Uterine cancer Mother    • Heart disease Father    • Parkinsonism Father    • Cancer Sister    • Breast cancer Sister    • Kidney cancer Sister    • Diabetes Brother    • Heart disease Paternal Grandfather    • No Known Problems Maternal Grandmother    • No Known Problems Maternal Grandfather    • No Known Problems Paternal Grandmother    • Colon cancer Neg Hx    • Colon polyps Neg Hx    • Esophageal cancer Neg Hx    • Liver cancer Neg Hx    • Liver disease Neg Hx    • Rectal cancer Neg Hx    • Stomach cancer Neg Hx      SOCIAL HISTORY:  Social History     Socioeconomic History   • Marital status:    Tobacco Use   • Smoking status: Former Smoker     Packs/day: 0.25     Years: 3.00     Pack years: 0.75   • Smokeless tobacco: Never Used   • Tobacco comment: social smoker in college   Vaping Use   • Vaping Use: Never used   Substance and Sexual Activity   • Alcohol use: Yes     Comment: Occasional glass of wine   • Drug use: No   • Sexual activity: Never       REVIEW OF SYSTEMS:    Review of Systems   Constitutional: Positive for fatigue. Negative for chills.        \"Tired.\"   HENT: Positive for hearing loss. Negative for congestion and trouble swallowing.    Eyes: Negative for discharge and redness.   Respiratory: Negative for cough, shortness of breath and wheezing.    Cardiovascular: Positive for leg swelling. Negative for chest pain.   Gastrointestinal: Negative for abdominal pain, constipation, diarrhea, nausea and vomiting.   Endocrine: Negative for polydipsia and polyphagia.   Genitourinary: Negative for flank pain and hematuria.        She self catheterize as needed.   Musculoskeletal: Negative for myalgias and neck pain.   Skin: Positive for pallor.   Allergic/Immunologic: Negative for food allergies.   Neurological: Negative for dizziness, speech difficulty and weakness.   Hematological: Negative for adenopathy. Does not " "bruise/bleed easily.   Psychiatric/Behavioral: Negative for agitation and confusion. The patient is not nervous/anxious.        VITAL SIGNS: /66   Pulse 58   Temp 98.2 °F (36.8 °C)   Resp 18   Ht 147.3 cm (58\")   Wt 73.5 kg (162 lb)   SpO2 96%   Breastfeeding No   BMI 33.86 kg/m²   Pain Score    05/25/22 1341   PainSc: 0-No pain       PHYSICAL EXAMINATION:     Physical Exam  Vitals reviewed.   Constitutional:       Appearance: She is ill-appearing.      Comments: She arrived in the exam room in a wheelchair   HENT:      Head: Normocephalic and atraumatic.   Eyes:      General: No scleral icterus.  Cardiovascular:      Rate and Rhythm: Normal rate.   Pulmonary:      Effort: No respiratory distress.      Breath sounds: No wheezing or rales.      Comments: Port, right. No erythema.  Abdominal:      General: Bowel sounds are normal.      Palpations: Abdomen is soft.      Tenderness: There is no abdominal tenderness.   Musculoskeletal:         General: Swelling present.      Cervical back: Neck supple.   Skin:     General: Skin is warm.      Coloration: Skin is pale.   Neurological:      Mental Status: She is alert and oriented to person, place, and time.   Psychiatric:         Mood and Affect: Mood normal.         Behavior: Behavior normal.         Thought Content: Thought content normal.         Judgment: Judgment normal.         LABS    Lab Results - Last 18 Months   Lab Units 05/03/22  1352 01/11/22  1046 09/30/21  1841 09/30/21  1053 09/09/21  1459 06/24/21  1118 05/07/21  1117 03/12/21  1113 02/18/21  1948 01/14/21  1436 01/04/21  1053   HEMOGLOBIN g/dL 9.9* 9.2* 9.7* 10.0* 9.9* 11.3* 9.6* 10.3* 11.3*   < > 9.8*   HEMATOCRIT % 30.1* 29.6* 29.9* 31.3* 30.0* 34.3 29.3* 31.1* 32.5*   < > 28.4*   MCV fL 103.1* 102.4* 103.5* 105.7* 103.4* 100.6* 97.3* 95.1 95.0   < > 97.3*   WBC 10*3/mm3 5.73 4.15 5.79 5.10 6.09 6.28 5.53 5.71 9.23   < > 5.57   RDW % 12.9 13.3 14.0 13.8 13.5 13.8 14.1 14.0 14.6   < > 14.5 "   MPV fL 10.7 10.9 10.1 9.1 11.7 9.3 10.0 10.0 10.2   < > 9.6   PLATELETS 10*3/mm3 176 153 197 179 156 203 184 171 204   < > 191   IMM GRAN % % 0.3  --  0.3 0.2  --  0.2 0.4 0.4 0.4   < > 0.5   NEUTROS ABS 10*3/mm3 3.71 2.30 3.88 3.39 3.85 4.60 3.82 4.42 6.77   < > 3.96   LYMPHS ABS 10*3/mm3 1.20 0.92 1.12 1.04 1.20 0.92 0.96 0.72 1.33   < > 0.82   MONOS ABS 10*3/mm3 0.46 0.48 0.51 0.37 0.75 0.58 0.49 0.43 0.76   < > 0.52   EOS ABS 10*3/mm3 0.31 0.40 0.24 0.27 0.24 0.14 0.21 0.10 0.30   < > 0.21   BASOS ABS 10*3/mm3 0.03 0.03 0.02 0.02 0.03 0.03 0.03 0.02 0.03   < > 0.03   IMMATURE GRANS (ABS) 10*3/mm3 0.02  --  0.02 0.01  --  0.01 0.02 0.02 0.04   < > 0.03   NRBC /100 WBC 0.0  --  0.0  --   --   --  0.0 0.0 0.0  --  0.0    < > = values in this interval not displayed.       Lab Results - Last 18 Months   Lab Units 05/03/22  1352 01/11/22  1046 09/30/21  1841 09/30/21  1053 09/14/21  0702 09/09/21  1459 07/01/21  1412   GLUCOSE mg/dL 129* 92 123* 129* 120* 85 110*   SODIUM mmol/L 135* 138 136 160* 136 134* 134*   POTASSIUM mmol/L 4.3 4.3 3.9 4.9 4.9 5.4* 4.1   CO2 mmol/L 23.0 24.0 26.0 25.0 24.0 24.0 31.0*   CHLORIDE mmol/L 100 103 100 122* 101 102 95*   ANION GAP mmol/L 12.0 11.0 10.0 13.0 11.0 8.0 8.0   CREATININE mg/dL 1.35* 1.41* 1.44* 1.21* 1.58* 1.56* 1.04*   BUN mg/dL 17 21 20 19 38* 45* 26*   BUN / CREAT RATIO  12.6 14.9 13.9 15.7 24.1 28.8* 25.0   CALCIUM mg/dL 9.6 9.5 9.1 9.4 9.6 9.7 9.8   EGFR IF NONAFRICN AM mL/min/1.73  --  36* 35* 43* 32* 32* 51*   ALK PHOS U/L 80 79 82 78 78 79 75   TOTAL PROTEIN g/dL 7.4 7.4 7.3 7.5 8.0 7.8 8.1   ALT (SGPT) U/L 6 5 8 5 <5 6 9   AST (SGOT) U/L 12 13 15 14 16 11 16   BILIRUBIN mg/dL 0.3 0.2 0.2 0.3 0.2 0.3 0.4   ALBUMIN g/dL 4.00 4.00 4.30 4.20 4.50 4.20 4.10   GLOBULIN gm/dL 3.4 3.4 3.0 3.3 3.5 3.6 4.0       No results for input(s): MSPIKE, KAPPALAMB, IGLFLC, URICACID, FREEKAPPAL, CEA, LDH, REFLABREPO in the last 66044 hours.    Lab Results - Last 18 Months   Lab Units  05/03/22  1352 01/11/22  1046 09/30/21  1053 06/24/21  1118 05/07/21  1117 01/04/21  1053   IRON mcg/dL 53 52 85 74 50 74   TIBC mcg/dL 302 328 314 320 331 301   IRON SATURATION % 18* 16* 27 23 15* 25   FERRITIN ng/mL 785.90* 469.40* 493.00* 712.20* 245.90* 401.10*       Dago Nunez reports a pain score of 0.          ASSESSMENT:  1.  Left breast cancer.  Tumor size 1.1 cm.  AJCC stage: 1A (pT1c, snpN0, cM0)  Receptor status: Estrogen 87%, progesterone 47% and negative HER-2/gabriela.  Treatment status: Post left lumpectomy and sentinel lymph node biopsy.  Declined adjuvant radiation. On adjuvant Femara.  2.  Macrocytic anemia from iron deficiency, B12 deficiency and history of chronic kidney disease Stage III, GFR 56 mL/min on 09/03/2020.  3.   Iron deficiency. Intolerant to oral iron.   4.   Chronic kidney disease Stage IIIa, GFR 56 ml/min on 09/03/2020.  5.   Performance status of 3.     6.   Osteoporosis.  On calcium and vitamin D.  7.   Squamous cell cancer in situ, urethra.  Followed by Dr. Callahan  8.   Recurrent squamous cell carcinoma, vulva.  AJCC stage IIIA (T2, Nib, M0, G3).  Treatment status.  Had Mitomycin C and 5 FU D1 to D4 with radiation.  D29 - 32 chemo was cancelled due to poor performance status.          PLAN:  1.    Re:  Heme status.  Hemoglobin 9.9, hematocrit 30.1, and .1.    2.    Re:  Pre-office CMP.  GFR 40.1 from 36 from 35 ml/min.  3.    Re:  Ferritin 785.9 from 469.4 and saturation 18 from 16%.  Injectafer was given on 01/26/2022.  4.    Re:  Keep appointment with Dr Marks 10/2022.  She had seen Dr. Mills on 03/09/2022 for chronic kidney disease.  5.    Re:  Note from Ms. Tamara Burnett, genetic counselor 03/29/2022.  Negative for germline mutations in BRCA 1/2 and 34 additional genes.  6.   CBC with differential, ferritin and iron panel in 2 months.  7.   Retacrit 40,000 units SQ wekly if hemoglobin below 10 and hematocrit below 30 to target a  "hemoglobin of 11 and hematocrit 33. Move CBC weekly if she starts Retacrit.   Monitor for hypertension.  8.  Transfuse 1 unit packed RBCs if hemoglobin less than 6.9.  Premed Tylenol 500 mg p.o. and Benadryl 12.5 mg IV.  Lasix 20 mg IV push after a unit of blood.  Observe for transfusion reactions.  9.   Intolerant to oral iron (nausea, cramps, and constipation).  IV iron as needed.   10.  Advance Care Planning   ACP discussion was held with the patient during this visit. Patient has an advance directive in EMR which is still valid.   11.  eRx Zofran 8 mg po every 8 hours as needed for nausea/vomiting, # 60, 2 refills if needed.  12.  Flush port every 6 weeks.   13.  Cervical/vaginal cytology as indicated.  14.  Vitamin B12 1000 mcg IM monthly by [facility].    Observe for local reaction.  15.  Continue ongoing management per primary care physician and other specialists.  16.  Will not obtain breast tumor markers or Oncotype DX.  Patient is not a candidate for adjuvant chemotherapy.  17.  eRx Femara 2.5 mg p.o. daily #90 with 3 refills if needed. She has abdominal pain.  \"When I take it at night.  She will try to take it in the morning.   Monitor for hot flashes and bone loss.  18.Plan of care discussed with patient and spouse.  Understanding expressed.  Patient agreeable to proceed.  19.  Order bone density 10/2023.  20.  Mammogram order per surgery.  21.  Order Injectafer 750 mg one dose.  Premed Tylenol 500 mg p.o. Observe for infusion reactions or anaphylaxis.  22.  Return to the office in 4 months with CBC with differential, ferritin, iron panel, and CMP.            I have reviewed the assessment and plan and verified the accuracy of it. No changes to assessment and plan since the information was documented. Drake Stafford MD 05/25/22       I spent 33 total minutes, face-to-face, caring for Syndal today.  Greater than 50% of this time involved counseling and/or coordination of care as documented within this note " regarding the patient's illness(es), pros and cons of various treatment options, instructions and/or risk reduction.                 cc: Shaheen Akbar MD         (Annita Loaiza MD)        (Ulises Roche MD)        (Trini Rosario MD)        (Shaheen Conway MD)        Gilberto Mills MD        (Moustapha Callahan MD)        (Radha Marks MD)

## 2022-05-19 NOTE — TELEPHONE ENCOUNTER
Rx Refill Note  Requested Prescriptions     Pending Prescriptions Disp Refills   • citalopram (CeleXA) 20 MG tablet [Pharmacy Med Name: CITALOPRAM HBR 20 MG TABLET 20 Tablet] 90 tablet 1     Sig: TAKE 1 TABLET BY MOUTH EVERY DAY      Last office visit with prescribing clinician: 4/21/2022      Next office visit with prescribing clinician: 10/24/2022            Allegra Swanson MA  05/19/22, 15:07 CDT

## 2022-05-20 RX ORDER — CITALOPRAM 20 MG/1
TABLET ORAL
Qty: 90 TABLET | Refills: 1 | Status: SHIPPED | OUTPATIENT
Start: 2022-05-20 | End: 2022-11-04

## 2022-05-24 ENCOUNTER — TELEPHONE (OUTPATIENT)
Dept: ONCOLOGY | Facility: CLINIC | Age: 82
End: 2022-05-24

## 2022-05-25 ENCOUNTER — OFFICE VISIT (OUTPATIENT)
Dept: ONCOLOGY | Facility: CLINIC | Age: 82
End: 2022-05-25

## 2022-05-25 ENCOUNTER — INFUSION (OUTPATIENT)
Dept: ONCOLOGY | Facility: HOSPITAL | Age: 82
End: 2022-05-25

## 2022-05-25 VITALS
SYSTOLIC BLOOD PRESSURE: 140 MMHG | WEIGHT: 164 LBS | HEART RATE: 58 BPM | DIASTOLIC BLOOD PRESSURE: 40 MMHG | OXYGEN SATURATION: 100 % | HEIGHT: 58 IN | BODY MASS INDEX: 34.43 KG/M2 | RESPIRATION RATE: 18 BRPM | TEMPERATURE: 97.6 F

## 2022-05-25 VITALS
HEART RATE: 58 BPM | DIASTOLIC BLOOD PRESSURE: 66 MMHG | BODY MASS INDEX: 34 KG/M2 | OXYGEN SATURATION: 96 % | SYSTOLIC BLOOD PRESSURE: 128 MMHG | HEIGHT: 58 IN | TEMPERATURE: 98.2 F | WEIGHT: 162 LBS | RESPIRATION RATE: 18 BRPM

## 2022-05-25 DIAGNOSIS — D50.9 IRON DEFICIENCY ANEMIA, UNSPECIFIED IRON DEFICIENCY ANEMIA TYPE: Primary | ICD-10-CM

## 2022-05-25 DIAGNOSIS — C50.412 MALIGNANT NEOPLASM OF UPPER-OUTER QUADRANT OF LEFT BREAST IN FEMALE, ESTROGEN RECEPTOR POSITIVE: ICD-10-CM

## 2022-05-25 DIAGNOSIS — D63.1 ANEMIA DUE TO STAGE 3A CHRONIC KIDNEY DISEASE: ICD-10-CM

## 2022-05-25 DIAGNOSIS — C51.9 VULVAR CANCER, CARCINOMA: ICD-10-CM

## 2022-05-25 DIAGNOSIS — N18.31 STAGE 3A CHRONIC KIDNEY DISEASE: Primary | ICD-10-CM

## 2022-05-25 DIAGNOSIS — N18.31 ANEMIA DUE TO STAGE 3A CHRONIC KIDNEY DISEASE: ICD-10-CM

## 2022-05-25 DIAGNOSIS — C77.4 SECONDARY MALIGNANCY OF INGUINAL LYMPH NODES: ICD-10-CM

## 2022-05-25 DIAGNOSIS — Z17.0 MALIGNANT NEOPLASM OF UPPER-OUTER QUADRANT OF LEFT BREAST IN FEMALE, ESTROGEN RECEPTOR POSITIVE: ICD-10-CM

## 2022-05-25 DIAGNOSIS — Z45.2 ENCOUNTER FOR CARE RELATED TO VASCULAR ACCESS PORT: ICD-10-CM

## 2022-05-25 PROCEDURE — 25010000002 HEPARIN LOCK FLUSH PER 10 UNITS: Performed by: INTERNAL MEDICINE

## 2022-05-25 PROCEDURE — 99214 OFFICE O/P EST MOD 30 MIN: CPT | Performed by: INTERNAL MEDICINE

## 2022-05-25 PROCEDURE — 25010000002 FERRIC CARBOXYMALTOSE 750 MG/15ML SOLUTION 15 ML VIAL: Performed by: INTERNAL MEDICINE

## 2022-05-25 PROCEDURE — 96365 THER/PROPH/DIAG IV INF INIT: CPT

## 2022-05-25 RX ORDER — FAMOTIDINE 10 MG/ML
20 INJECTION, SOLUTION INTRAVENOUS AS NEEDED
Status: DISCONTINUED | OUTPATIENT
Start: 2022-05-25 | End: 2022-05-25 | Stop reason: HOSPADM

## 2022-05-25 RX ORDER — SODIUM CHLORIDE 9 MG/ML
250 INJECTION, SOLUTION INTRAVENOUS ONCE
Status: COMPLETED | OUTPATIENT
Start: 2022-05-25 | End: 2022-05-25

## 2022-05-25 RX ORDER — SODIUM CHLORIDE 0.9 % (FLUSH) 0.9 %
10 SYRINGE (ML) INJECTION AS NEEDED
Status: CANCELLED | OUTPATIENT
Start: 2022-05-25

## 2022-05-25 RX ORDER — SODIUM CHLORIDE 0.9 % (FLUSH) 0.9 %
10 SYRINGE (ML) INJECTION AS NEEDED
Status: DISCONTINUED | OUTPATIENT
Start: 2022-05-25 | End: 2022-05-25 | Stop reason: HOSPADM

## 2022-05-25 RX ORDER — ACETAMINOPHEN 325 MG/1
650 TABLET ORAL ONCE
Status: CANCELLED | OUTPATIENT
Start: 2022-05-25

## 2022-05-25 RX ORDER — DIPHENHYDRAMINE HYDROCHLORIDE 50 MG/ML
50 INJECTION INTRAMUSCULAR; INTRAVENOUS AS NEEDED
Status: DISCONTINUED | OUTPATIENT
Start: 2022-05-25 | End: 2022-05-25 | Stop reason: HOSPADM

## 2022-05-25 RX ORDER — HEPARIN SODIUM (PORCINE) LOCK FLUSH IV SOLN 100 UNIT/ML 100 UNIT/ML
500 SOLUTION INTRAVENOUS AS NEEDED
Status: DISCONTINUED | OUTPATIENT
Start: 2022-05-25 | End: 2022-05-25 | Stop reason: HOSPADM

## 2022-05-25 RX ORDER — DIPHENHYDRAMINE HYDROCHLORIDE 50 MG/ML
50 INJECTION INTRAMUSCULAR; INTRAVENOUS AS NEEDED
Status: CANCELLED | OUTPATIENT
Start: 2022-05-25

## 2022-05-25 RX ORDER — ACETAMINOPHEN 325 MG/1
650 TABLET ORAL ONCE
Status: COMPLETED | OUTPATIENT
Start: 2022-05-25 | End: 2022-05-25

## 2022-05-25 RX ORDER — HEPARIN SODIUM (PORCINE) LOCK FLUSH IV SOLN 100 UNIT/ML 100 UNIT/ML
500 SOLUTION INTRAVENOUS AS NEEDED
Status: CANCELLED | OUTPATIENT
Start: 2022-05-25

## 2022-05-25 RX ORDER — SODIUM CHLORIDE 9 MG/ML
250 INJECTION, SOLUTION INTRAVENOUS ONCE
Status: CANCELLED | OUTPATIENT
Start: 2022-05-25

## 2022-05-25 RX ORDER — FAMOTIDINE 10 MG/ML
20 INJECTION, SOLUTION INTRAVENOUS AS NEEDED
Status: CANCELLED | OUTPATIENT
Start: 2022-05-25

## 2022-05-25 RX ADMIN — SODIUM CHLORIDE 250 ML: 9 INJECTION, SOLUTION INTRAVENOUS at 14:58

## 2022-05-25 RX ADMIN — FERRIC CARBOXYMALTOSE INJECTION 750 MG: 50 INJECTION, SOLUTION INTRAVENOUS at 14:59

## 2022-05-25 RX ADMIN — Medication 500 UNITS: at 15:35

## 2022-05-25 RX ADMIN — Medication 10 ML: at 15:35

## 2022-05-25 RX ADMIN — ACETAMINOPHEN 650 MG: 325 TABLET ORAL at 14:58

## 2022-05-28 DIAGNOSIS — I10 ESSENTIAL HYPERTENSION: ICD-10-CM

## 2022-05-28 DIAGNOSIS — R60.0 LOWER EXTREMITY EDEMA: ICD-10-CM

## 2022-06-04 RX ORDER — FUROSEMIDE 40 MG/1
TABLET ORAL
Qty: 135 TABLET | Refills: 3 | Status: SHIPPED | OUTPATIENT
Start: 2022-06-04 | End: 2022-06-14

## 2022-06-04 RX ORDER — BISOPROLOL FUMARATE AND HYDROCHLOROTHIAZIDE 5; 6.25 MG/1; MG/1
TABLET ORAL
Qty: 90 TABLET | Refills: 1 | Status: SHIPPED | OUTPATIENT
Start: 2022-06-04 | End: 2022-12-14

## 2022-06-08 RX ORDER — ESOMEPRAZOLE MAGNESIUM 40 MG/1
40 CAPSULE, DELAYED RELEASE ORAL 2 TIMES DAILY
Qty: 180 CAPSULE | Refills: 3 | Status: SHIPPED | OUTPATIENT
Start: 2022-06-08 | End: 2022-06-13 | Stop reason: SDUPTHER

## 2022-06-08 NOTE — TELEPHONE ENCOUNTER
Rx Refill Note  Requested Prescriptions     Pending Prescriptions Disp Refills   • esomeprazole (nexIUM) 40 MG capsule 180 capsule 3     Sig: Take 1 capsule by mouth 2 (Two) Times a Day.      Last office visit with prescribing clinician: 4/21/2022      Next office visit with prescribing clinician: 10/24/2022            ADAMA Rodriguez  06/08/22, 14:29 CDT

## 2022-06-08 NOTE — TELEPHONE ENCOUNTER
Caller: Dago Nunez    Relationship: Self    Best call back number: [phone]    Requested Prescriptions:   Requested Prescriptions     Pending Prescriptions Disp Refills   • esomeprazole (nexIUM) 40 MG capsule 180 capsule 3     Sig: Take 1 capsule by mouth 2 (Two) Times a Day.        Pharmacy where request should be sent: [facility] [place], [place] - [address] AT PORTAL TO REGISTERED [facility] - [phone] [facility] [phone]      Additional details provided by patient: PATIENT STATES [facility] IS NEEDING PRIOR APPROVAL. PATIENT HAS FIVE DAYS LEFT OF MEDICATION.     Does the patient have less than a 3 day supply:  [] Yes  [x] No    Marlen Mcfadden Rep   06/08/22 12:46 CDT

## 2022-06-13 DIAGNOSIS — R60.0 LOWER EXTREMITY EDEMA: ICD-10-CM

## 2022-06-13 NOTE — TELEPHONE ENCOUNTER
Rx Refill Note  Requested Prescriptions     Pending Prescriptions Disp Refills   • esomeprazole (nexIUM) 40 MG capsule 180 capsule 3     Sig: Take 1 capsule by mouth 2 (Two) Times a Day.      Last office visit with prescribing clinician: 4/21/2022      Next office visit with prescribing clinician: 10/24/2022            ADAMA Rodriguez  06/13/22, 13:07 CDT

## 2022-06-13 NOTE — TELEPHONE ENCOUNTER
fransisco stated she has been trying to get a refill for the Nexium for the last month. She stated she is almost out and would like a call back when sent in.

## 2022-06-14 RX ORDER — FUROSEMIDE 40 MG/1
TABLET ORAL
Qty: 135 TABLET | Refills: 3 | Status: SHIPPED | OUTPATIENT
Start: 2022-06-14

## 2022-06-14 RX ORDER — ESOMEPRAZOLE MAGNESIUM 40 MG/1
40 CAPSULE, DELAYED RELEASE ORAL 2 TIMES DAILY
Qty: 180 CAPSULE | Refills: 3 | Status: SHIPPED | OUTPATIENT
Start: 2022-06-14 | End: 2022-06-15 | Stop reason: SDUPTHER

## 2022-06-14 NOTE — TELEPHONE ENCOUNTER
Rx Refill Note  Requested Prescriptions     Pending Prescriptions Disp Refills   • furosemide (LASIX) 40 MG tablet [Pharmacy Med Name: FUROSEMIDE TAB 40MG] 135 tablet 3     Sig: TAKE 1 AND 1/2 TABLETS ONCEDAILY      Last office visit with prescribing clinician: 1/12/2022      Next office visit with prescribing clinician: Visit date not found            Michelle Bro PANCHO  06/14/22, 08:38 CDT

## 2022-06-15 NOTE — TELEPHONE ENCOUNTER
Caller: Dago Nunez    Relationship: Self    Best call back number: [phone]    Requested Prescriptions:   Requested Prescriptions     Pending Prescriptions Disp Refills   • esomeprazole (nexIUM) 40 MG capsule 180 capsule 3     Sig: Take 1 capsule by mouth 2 (Two) Times a Day.        Pharmacy where request should be sent: [facility] [place] [place] - [address] AT PORTAL TO REGISTERED [facility] - [phone] [facility] [phone]      Additional details provided by patient: PATIENT IS NEEDING SCRIPT SENT TO [facility]. SHE IS COMPLETELY OUT OF MEDICATION.     Does the patient have less than a 3 day supply:  [x] Yes  [] No    Marlen Mcfadden Rep   06/15/22 14:06 CDT

## 2022-06-16 RX ORDER — ESOMEPRAZOLE MAGNESIUM 40 MG/1
40 CAPSULE, DELAYED RELEASE ORAL 2 TIMES DAILY
Qty: 180 CAPSULE | Refills: 3 | Status: SHIPPED | OUTPATIENT
Start: 2022-06-16

## 2022-06-20 ENCOUNTER — TELEPHONE (OUTPATIENT)
Dept: FAMILY MEDICINE CLINIC | Facility: CLINIC | Age: 82
End: 2022-06-20

## 2022-06-20 NOTE — TELEPHONE ENCOUNTER
Caller: Dago Nunez Terrence    Relationship: Self    Best call back number: [phone]    What is the best time to reach you:  AS SOON AS POSSIBLE PLEASE     Who are you requesting to speak with (clinical staff, provider,  specific staff member): PROVIDER OR NURSE         What was the call regarding: PATIENT REQUESTING A CALL BACK TO DISCUSS GETTING AN EXCUSE FOR JURY DUTY        Do you require a callback YES

## 2022-06-21 NOTE — TELEPHONE ENCOUNTER
Contacted patient, left voicemail regarding letter, ready for  or mail, whichever patient prefers.

## 2022-06-23 ENCOUNTER — TELEPHONE (OUTPATIENT)
Dept: FAMILY MEDICINE CLINIC | Facility: CLINIC | Age: 82
End: 2022-06-23

## 2022-06-23 DIAGNOSIS — C51.9 VULVAR CANCER, CARCINOMA: ICD-10-CM

## 2022-06-23 DIAGNOSIS — K52.1 DIARRHEA DUE TO DRUG: ICD-10-CM

## 2022-06-23 RX ORDER — DIPHENOXYLATE HYDROCHLORIDE AND ATROPINE SULFATE 2.5; .025 MG/1; MG/1
TABLET ORAL
Qty: 90 TABLET | Refills: 2 | Status: SHIPPED | OUTPATIENT
Start: 2022-06-23 | End: 2023-01-26

## 2022-06-23 NOTE — TELEPHONE ENCOUNTER
Caller: Dago Nunez    Relationship: Self    Best call back number: [phone]    Requested Prescriptions:   Requested Prescriptions     Pending Prescriptions Disp Refills   • diphenoxylate-atropine (LOMOTIL) 2.5-0.025 MG per tablet [Pharmacy Med Name: DIPHENOXYLATE-ATROPINE 2.5- 2.5-0.025 Tablet] 90 tablet 2     Sig: TAKE 2 TABLETS BY MOUTH FOUR TIMES A DAY AS NEEDED FOR DIARRHEA        Pharmacy where request should be sent: [facility] - [place], [place] - [address] - [phone] [facility] [phone] [facility]     Additional details provided by patient: PATIENT IS REQUESTING AS SOON AS POSSIBLE, SHE IS CURRENTLY COMPLETELY OUT OF MEDICATION AND EXPERIENCING AN EPISODE OF DIARRHEA LASTING 3 DAYS.     Does the patient have less than a 3 day supply:  [x] Yes  [] No    Marlen Mcfadden Rep   06/23/22 13:45 CDT

## 2022-06-23 NOTE — TELEPHONE ENCOUNTER
The Prior Authorization request has been approved for Esomeprazole Magnesium 40MG OR  CPDR.  The authorization is valid from 05/24/2022 through 06/23/2023. Patient notified via message

## 2022-06-23 NOTE — TELEPHONE ENCOUNTER
Patient called requesting note to excuse from jury duty. Provider competed note, mailed excuse today.

## 2022-06-23 NOTE — TELEPHONE ENCOUNTER
PATIENT CALLED IN STATING SHE NEEDS A PRIOR AUTHORIZATION FOR HER esomeprazole (nexIUM) 40 MG capsule    PATIENT STATES HER INSURANCE GAVE HER THIS NUMBER   1 [phone]     PATIENT STATES SHE HAS BEEN WAITING FOR A MONTH NOW AND IS OUT OF MEDICATION AND SHE HAD TO BUT SOME OFF THE SHELF AT [facility] AND IT DOES NOT WORK THE SAME     GOOD CALL BACK   [phone]

## 2022-07-08 RX ORDER — ONDANSETRON HYDROCHLORIDE 8 MG/1
TABLET, FILM COATED ORAL
Qty: 60 TABLET | Refills: 2 | Status: SHIPPED | OUTPATIENT
Start: 2022-07-08

## 2022-07-20 ENCOUNTER — INFUSION (OUTPATIENT)
Dept: ONCOLOGY | Facility: CLINIC | Age: 82
End: 2022-07-20

## 2022-07-20 ENCOUNTER — LAB (OUTPATIENT)
Dept: LAB | Facility: HOSPITAL | Age: 82
End: 2022-07-20

## 2022-07-20 DIAGNOSIS — C77.4 SECONDARY MALIGNANCY OF INGUINAL LYMPH NODES: ICD-10-CM

## 2022-07-20 DIAGNOSIS — Z17.0 MALIGNANT NEOPLASM OF UPPER-OUTER QUADRANT OF LEFT BREAST IN FEMALE, ESTROGEN RECEPTOR POSITIVE: ICD-10-CM

## 2022-07-20 DIAGNOSIS — C50.412 MALIGNANT NEOPLASM OF UPPER-OUTER QUADRANT OF LEFT BREAST IN FEMALE, ESTROGEN RECEPTOR POSITIVE: ICD-10-CM

## 2022-07-20 DIAGNOSIS — C51.9 VULVAR CANCER, CARCINOMA: ICD-10-CM

## 2022-07-20 DIAGNOSIS — D63.1 ANEMIA DUE TO STAGE 3A CHRONIC KIDNEY DISEASE: ICD-10-CM

## 2022-07-20 DIAGNOSIS — N18.31 STAGE 3A CHRONIC KIDNEY DISEASE: ICD-10-CM

## 2022-07-20 DIAGNOSIS — Z45.2 ENCOUNTER FOR CARE RELATED TO VASCULAR ACCESS PORT: Primary | ICD-10-CM

## 2022-07-20 DIAGNOSIS — E11.42 TYPE 2 DIABETES MELLITUS WITH DIABETIC POLYNEUROPATHY, WITHOUT LONG-TERM CURRENT USE OF INSULIN: ICD-10-CM

## 2022-07-20 DIAGNOSIS — N18.31 ANEMIA DUE TO STAGE 3A CHRONIC KIDNEY DISEASE: ICD-10-CM

## 2022-07-20 LAB
ANISOCYTOSIS BLD QL: ABNORMAL
DEPRECATED RDW RBC AUTO: 56 FL (ref 37–54)
EOSINOPHIL # BLD MANUAL: 0.16 10*3/MM3 (ref 0–0.4)
EOSINOPHIL NFR BLD MANUAL: 2 % (ref 0.3–6.2)
ERYTHROCYTE [DISTWIDTH] IN BLOOD BY AUTOMATED COUNT: 14.2 % (ref 12.3–15.4)
FERRITIN SERPL-MCNC: 1063 NG/ML (ref 13–150)
HCT VFR BLD AUTO: 30.9 % (ref 34–46.6)
HGB BLD-MCNC: 9.9 G/DL (ref 12–15.9)
HYPOCHROMIA BLD QL: ABNORMAL
IRON 24H UR-MRATE: 75 MCG/DL (ref 37–145)
IRON SATN MFR SERPL: 24 % (ref 20–50)
LYMPHOCYTES # BLD MANUAL: 1 10*3/MM3 (ref 0.7–3.1)
LYMPHOCYTES NFR BLD MANUAL: 2 % (ref 5–12)
MACROCYTES BLD QL SMEAR: ABNORMAL
MCH RBC QN AUTO: 34.5 PG (ref 26.6–33)
MCHC RBC AUTO-ENTMCNC: 32 G/DL (ref 31.5–35.7)
MCV RBC AUTO: 107.7 FL (ref 79–97)
MONOCYTES # BLD: 0.16 10*3/MM3 (ref 0.1–0.9)
NEUTROPHILS # BLD AUTO: 6.91 10*3/MM3 (ref 1.7–7)
NEUTROPHILS NFR BLD MANUAL: 77.8 % (ref 42.7–76)
NEUTS BAND NFR BLD MANUAL: 6.1 % (ref 0–5)
PLAT MORPH BLD: NORMAL
PLATELET # BLD AUTO: 177 10*3/MM3 (ref 140–450)
PMV BLD AUTO: 10.2 FL (ref 6–12)
POIKILOCYTOSIS BLD QL SMEAR: ABNORMAL
POLYCHROMASIA BLD QL SMEAR: ABNORMAL
RBC # BLD AUTO: 2.87 10*6/MM3 (ref 3.77–5.28)
STOMATOCYTES BLD QL SMEAR: ABNORMAL
TIBC SERPL-MCNC: 317 MCG/DL (ref 298–536)
TRANSFERRIN SERPL-MCNC: 213 MG/DL (ref 200–360)
VARIANT LYMPHS NFR BLD MANUAL: 1 % (ref 0–5)
VARIANT LYMPHS NFR BLD MANUAL: 11.1 % (ref 19.6–45.3)
WBC MORPH BLD: NORMAL
WBC NRBC COR # BLD: 8.24 10*3/MM3 (ref 3.4–10.8)

## 2022-07-20 PROCEDURE — 85025 COMPLETE CBC W/AUTO DIFF WBC: CPT

## 2022-07-20 PROCEDURE — 83540 ASSAY OF IRON: CPT

## 2022-07-20 PROCEDURE — 82043 UR ALBUMIN QUANTITATIVE: CPT

## 2022-07-20 PROCEDURE — 82728 ASSAY OF FERRITIN: CPT

## 2022-07-20 PROCEDURE — 36415 COLL VENOUS BLD VENIPUNCTURE: CPT

## 2022-07-20 PROCEDURE — 85007 BL SMEAR W/DIFF WBC COUNT: CPT

## 2022-07-20 PROCEDURE — 84466 ASSAY OF TRANSFERRIN: CPT

## 2022-07-20 RX ORDER — SODIUM CHLORIDE 0.9 % (FLUSH) 0.9 %
10 SYRINGE (ML) INJECTION AS NEEDED
Status: DISCONTINUED | OUTPATIENT
Start: 2022-07-20 | End: 2022-07-20 | Stop reason: HOSPADM

## 2022-07-20 RX ORDER — HEPARIN SODIUM (PORCINE) LOCK FLUSH IV SOLN 100 UNIT/ML 100 UNIT/ML
500 SOLUTION INTRAVENOUS AS NEEDED
Status: DISCONTINUED | OUTPATIENT
Start: 2022-07-20 | End: 2022-07-20 | Stop reason: HOSPADM

## 2022-07-20 RX ORDER — SODIUM CHLORIDE 0.9 % (FLUSH) 0.9 %
10 SYRINGE (ML) INJECTION AS NEEDED
Status: CANCELLED | OUTPATIENT
Start: 2022-07-20

## 2022-07-20 RX ORDER — HEPARIN SODIUM (PORCINE) LOCK FLUSH IV SOLN 100 UNIT/ML 100 UNIT/ML
500 SOLUTION INTRAVENOUS AS NEEDED
Status: CANCELLED | OUTPATIENT
Start: 2022-07-20

## 2022-07-20 RX ADMIN — HEPARIN SODIUM (PORCINE) LOCK FLUSH IV SOLN 100 UNIT/ML 500 UNITS: 100 SOLUTION at 14:14

## 2022-07-20 RX ADMIN — Medication 10 ML: at 14:14

## 2022-07-21 LAB — ALBUMIN UR-MCNC: 5.6 MG/DL

## 2022-07-28 DIAGNOSIS — E53.8 LOW SERUM VITAMIN B12: Primary | ICD-10-CM

## 2022-07-28 RX ORDER — CYANOCOBALAMIN 1000 UG/ML
INJECTION, SOLUTION INTRAMUSCULAR; SUBCUTANEOUS
Qty: 3 ML | Refills: 0 | Status: SHIPPED | OUTPATIENT
Start: 2022-07-28 | End: 2022-11-07 | Stop reason: SDUPTHER

## 2022-08-05 ENCOUNTER — TELEPHONE (OUTPATIENT)
Dept: PULMONOLOGY | Facility: CLINIC | Age: 82
End: 2022-08-05

## 2022-08-05 ENCOUNTER — LAB (OUTPATIENT)
Dept: LAB | Facility: HOSPITAL | Age: 82
End: 2022-08-05

## 2022-08-05 DIAGNOSIS — Z20.822 CLOSE EXPOSURE TO COVID-19 VIRUS: Primary | ICD-10-CM

## 2022-08-05 DIAGNOSIS — Z20.822 CLOSE EXPOSURE TO COVID-19 VIRUS: ICD-10-CM

## 2022-08-05 DIAGNOSIS — J40 BRONCHITIS: Primary | ICD-10-CM

## 2022-08-05 LAB — SARS-COV-2 RNA PNL SPEC NAA+PROBE: DETECTED

## 2022-08-05 PROCEDURE — C9803 HOPD COVID-19 SPEC COLLECT: HCPCS

## 2022-08-05 PROCEDURE — 87635 SARS-COV-2 COVID-19 AMP PRB: CPT

## 2022-08-05 RX ORDER — AZITHROMYCIN 250 MG/1
TABLET, FILM COATED ORAL
Qty: 6 TABLET | Refills: 0 | Status: SHIPPED | OUTPATIENT
Start: 2022-08-05 | End: 2022-09-19

## 2022-08-05 NOTE — TELEPHONE ENCOUNTER
The patient would like to have a COVID test ordered and I did put that order in for the [facility] drive-through system.  I will contact her with the results but it will likely be tomorrow until I will be able to access them.  Her  stated she would like to have an antibiotic on hand particularly if her COVID test was negative and she had any respiratory symptoms I did E prescribe Zithromax Z-Serge which she has taken in the past.

## 2022-08-08 DIAGNOSIS — N18.32 STAGE 3B CHRONIC KIDNEY DISEASE: Primary | ICD-10-CM

## 2022-08-25 ENCOUNTER — TELEPHONE (OUTPATIENT)
Dept: FAMILY MEDICINE CLINIC | Facility: CLINIC | Age: 82
End: 2022-08-25

## 2022-08-25 DIAGNOSIS — K92.1 BLOOD IN STOOL: ICD-10-CM

## 2022-08-25 DIAGNOSIS — R53.81 PHYSICAL DECONDITIONING: Primary | ICD-10-CM

## 2022-08-25 NOTE — TELEPHONE ENCOUNTER
[facility]     REQUESTING AN ORDER FOR PHYSICAL THERAPY EVALUATION FOR INCREASED WEAKNESS AND ENDURANCE     VITAMIN B 12 WAS NOT  IN HOME JUST NEED OK TO GIVE TOMORROW     PATIENT REPORTED SOME BLOOD WITH BOWEL MOVEMENTS AND WOULD LIKE A GI CONSULT  BECAUSE SHE HAS NEVER HAD AN ISSUE WITH HEMORRHOIDS   AND STATES SHE IS RED BETWEEN HER BUTT CHEEKS  BUT WOULD NOT LET HOME HEALTH CHECK IT     GOOD CALL BACK   [phone]

## 2022-08-30 ENCOUNTER — TELEPHONE (OUTPATIENT)
Dept: UROLOGY | Facility: CLINIC | Age: 82
End: 2022-08-30

## 2022-08-30 NOTE — TELEPHONE ENCOUNTER
Caller: Dago Nunez    Relationship to patient: Self    Best call back number: [phone]    Chief complaint: DIFFICULT TIME URINATING AND PAIN WHEN URINATING    Type of visit: FOLLOW UP    Requested date: ASAP

## 2022-08-31 RX ORDER — DOCUSATE SODIUM 100 MG/1
100 CAPSULE, LIQUID FILLED ORAL 2 TIMES DAILY
Qty: 60 CAPSULE | Refills: 0 | Status: SHIPPED | OUTPATIENT
Start: 2022-08-31 | End: 2023-05-17

## 2022-08-31 NOTE — TELEPHONE ENCOUNTER
Home health PT referral complete, recommend twice daily stool softener if patient is currently already taking it, as she has multiple risk factors for developing hemorrhoids at this time.  If her symptoms are persistent we can discuss GI referral at next follow-up visit.

## 2022-09-01 ENCOUNTER — TELEPHONE (OUTPATIENT)
Dept: FAMILY MEDICINE CLINIC | Facility: CLINIC | Age: 82
End: 2022-09-01

## 2022-09-01 ENCOUNTER — HOME HEALTH ADMISSION (OUTPATIENT)
Dept: HOME HEALTH SERVICES | Facility: HOME HEALTHCARE | Age: 82
End: 2022-09-01

## 2022-09-01 LAB
ALBUMIN SERPL-MCNC: 4.3 G/DL (ref 3.5–5.2)
ALP BLD-CCNC: 64 U/L (ref 35–104)
ALT SERPL-CCNC: 10 U/L (ref 5–33)
ANION GAP SERPL CALCULATED.3IONS-SCNC: 11 MMOL/L (ref 7–19)
AST SERPL-CCNC: 18 U/L (ref 5–32)
BASOPHILS ABSOLUTE: 0 K/UL (ref 0–0.2)
BASOPHILS RELATIVE PERCENT: 0.7 % (ref 0–1)
BILIRUB SERPL-MCNC: <0.2 MG/DL (ref 0.2–1.2)
BILIRUBIN URINE: NEGATIVE
BLOOD, URINE: ABNORMAL
BUN BLDV-MCNC: 23 MG/DL (ref 8–23)
CALCIUM SERPL-MCNC: 9.7 MG/DL (ref 8.8–10.2)
CHLORIDE BLD-SCNC: 98 MMOL/L (ref 98–111)
CLARITY: CLEAR
CO2: 23 MMOL/L (ref 22–29)
COLOR: YELLOW
CREAT SERPL-MCNC: 1.4 MG/DL (ref 0.5–0.9)
CREATININE URINE: 92.8 MG/DL (ref 4.2–622)
EOSINOPHILS ABSOLUTE: 0.3 K/UL (ref 0–0.6)
EOSINOPHILS RELATIVE PERCENT: 4.7 % (ref 0–5)
GFR AFRICAN AMERICAN: 44
GFR NON-AFRICAN AMERICAN: 36
GLUCOSE BLD-MCNC: 95 MG/DL (ref 74–109)
GLUCOSE URINE: NEGATIVE MG/DL
HCT VFR BLD CALC: 29.4 % (ref 37–47)
HEMOGLOBIN: 9.5 G/DL (ref 12–16)
IMMATURE GRANULOCYTES #: 0 K/UL
KETONES, URINE: NEGATIVE MG/DL
LEUKOCYTE ESTERASE, URINE: ABNORMAL
LYMPHOCYTES ABSOLUTE: 1.2 K/UL (ref 1.1–4.5)
LYMPHOCYTES RELATIVE PERCENT: 19.8 % (ref 20–40)
MCH RBC QN AUTO: 35.6 PG (ref 27–31)
MCHC RBC AUTO-ENTMCNC: 32.3 G/DL (ref 33–37)
MCV RBC AUTO: 110.1 FL (ref 81–99)
MONOCYTES ABSOLUTE: 0.6 K/UL (ref 0–0.9)
MONOCYTES RELATIVE PERCENT: 10.3 % (ref 0–10)
NEUTROPHILS ABSOLUTE: 3.8 K/UL (ref 1.5–7.5)
NEUTROPHILS RELATIVE PERCENT: 64.2 % (ref 50–65)
NITRITE, URINE: POSITIVE
PARATHYROID HORMONE INTACT: 48 PG/ML (ref 15–65)
PDW BLD-RTO: 12.9 % (ref 11.5–14.5)
PH UA: 6 (ref 5–8)
PLATELET # BLD: 74 K/UL (ref 130–400)
PLATELET SLIDE REVIEW: ABNORMAL
PMV BLD AUTO: 11.2 FL (ref 9.4–12.3)
POTASSIUM SERPL-SCNC: 5.7 MMOL/L (ref 3.5–5)
PROTEIN PROTEIN: 17 MG/DL (ref 15–45)
PROTEIN UA: NEGATIVE MG/DL
RBC # BLD: 2.67 M/UL (ref 4.2–5.4)
SODIUM BLD-SCNC: 132 MMOL/L (ref 136–145)
SPECIFIC GRAVITY UA: 1.02 (ref 1–1.03)
TOTAL PROTEIN: 7.5 G/DL (ref 6.6–8.7)
UROBILINOGEN, URINE: 0.2 E.U./DL
VITAMIN D 25-HYDROXY: 51.3 NG/ML
WBC # BLD: 6 K/UL (ref 4.8–10.8)

## 2022-09-01 NOTE — TELEPHONE ENCOUNTER
I called pt yesterday and today and left messages for her to call back so we can get her in with Gloria.  jacque I neglegted to make note on 8/31 re pt message-dp

## 2022-09-01 NOTE — TELEPHONE ENCOUNTER
[facility] called pt has services with [facility] may jut need a physical therapy order.

## 2022-09-08 DIAGNOSIS — Z17.0 MALIGNANT NEOPLASM OF UPPER-OUTER QUADRANT OF LEFT BREAST IN FEMALE, ESTROGEN RECEPTOR POSITIVE: Primary | ICD-10-CM

## 2022-09-08 DIAGNOSIS — C50.412 MALIGNANT NEOPLASM OF UPPER-OUTER QUADRANT OF LEFT BREAST IN FEMALE, ESTROGEN RECEPTOR POSITIVE: Primary | ICD-10-CM

## 2022-09-10 DIAGNOSIS — M25.561 CHRONIC PAIN OF BOTH KNEES: ICD-10-CM

## 2022-09-10 DIAGNOSIS — G89.29 CHRONIC PAIN OF BOTH KNEES: ICD-10-CM

## 2022-09-10 DIAGNOSIS — M25.562 CHRONIC PAIN OF BOTH KNEES: ICD-10-CM

## 2022-09-12 ENCOUNTER — TELEPHONE (OUTPATIENT)
Dept: UROLOGY | Facility: CLINIC | Age: 82
End: 2022-09-12

## 2022-09-12 RX ORDER — GABAPENTIN 300 MG/1
CAPSULE ORAL
Qty: 180 CAPSULE | Refills: 2 | Status: SHIPPED | OUTPATIENT
Start: 2022-09-12 | End: 2022-10-24 | Stop reason: SDUPTHER

## 2022-09-12 RX ORDER — OLMESARTAN MEDOXOMIL 20 MG/1
TABLET ORAL
Qty: 90 TABLET | Refills: 1 | Status: SHIPPED | OUTPATIENT
Start: 2022-09-12

## 2022-09-12 NOTE — TELEPHONE ENCOUNTER
Caller: Joseph Nunez    Relationship: Emergency Contact    Best call back number: [phone]    What is the best time to reach you: ANY    Who are you requesting to speak with (clinical staff, provider,  specific staff member):ANY    Do you know the name of the person who called: JOSEPH NUNEZ    What was the call regarding: PT SPOUSE CALLED WANTED TO SPEAK TO SOMEONE IN REGARDS TO PT NOT BEING ABLE TO GO TO BATHROOM ON HER OWN AND WANTS TO LOOK INTO SURGICAL PROCEDURE    Do you require a callback: YES

## 2022-09-13 ENCOUNTER — HOSPITAL ENCOUNTER (EMERGENCY)
Facility: HOSPITAL | Age: 82
Discharge: HOME | End: 2022-09-13
Attending: EMERGENCY MEDICINE | Admitting: EMERGENCY MEDICINE

## 2022-09-13 VITALS
DIASTOLIC BLOOD PRESSURE: 71 MMHG | RESPIRATION RATE: 16 BRPM | BODY MASS INDEX: 34.85 KG/M2 | SYSTOLIC BLOOD PRESSURE: 142 MMHG | OXYGEN SATURATION: 94 % | HEIGHT: 58 IN | WEIGHT: 166 LBS | HEART RATE: 60 BPM | TEMPERATURE: 98.6 F

## 2022-09-13 DIAGNOSIS — N30.00 ACUTE CYSTITIS WITHOUT HEMATURIA: ICD-10-CM

## 2022-09-13 DIAGNOSIS — R33.9 URINARY RETENTION: Primary | ICD-10-CM

## 2022-09-13 LAB
ALBUMIN SERPL-MCNC: 4.2 G/DL (ref 3.5–5.2)
ALBUMIN/GLOB SERPL: 1.4 G/DL
ALP SERPL-CCNC: 56 U/L (ref 39–117)
ALT SERPL W P-5'-P-CCNC: 10 U/L (ref 1–33)
ANION GAP SERPL CALCULATED.3IONS-SCNC: 12 MMOL/L (ref 5–15)
AST SERPL-CCNC: 17 U/L (ref 1–32)
BACTERIA UR QL AUTO: ABNORMAL /HPF
BASOPHILS # BLD AUTO: 0.03 10*3/MM3 (ref 0–0.2)
BASOPHILS NFR BLD AUTO: 0.4 % (ref 0–1.5)
BILIRUB SERPL-MCNC: 0.3 MG/DL (ref 0–1.2)
BILIRUB UR QL STRIP: NEGATIVE
BUN SERPL-MCNC: 18 MG/DL (ref 8–23)
BUN/CREAT SERPL: 12.6 (ref 7–25)
CALCIUM SPEC-SCNC: 9.4 MG/DL (ref 8.6–10.5)
CHLORIDE SERPL-SCNC: 99 MMOL/L (ref 98–107)
CLARITY UR: CLEAR
CO2 SERPL-SCNC: 25 MMOL/L (ref 22–29)
COLOR UR: YELLOW
CREAT SERPL-MCNC: 1.43 MG/DL (ref 0.57–1)
DEPRECATED RDW RBC AUTO: 48.4 FL (ref 37–54)
EGFRCR SERPLBLD CKD-EPI 2021: 37.2 ML/MIN/1.73
EOSINOPHIL # BLD AUTO: 0.27 10*3/MM3 (ref 0–0.4)
EOSINOPHIL NFR BLD AUTO: 3.8 % (ref 0.3–6.2)
ERYTHROCYTE [DISTWIDTH] IN BLOOD BY AUTOMATED COUNT: 12.9 % (ref 12.3–15.4)
GLOBULIN UR ELPH-MCNC: 3.1 GM/DL
GLUCOSE SERPL-MCNC: 98 MG/DL (ref 65–99)
GLUCOSE UR STRIP-MCNC: NEGATIVE MG/DL
HCT VFR BLD AUTO: 27.3 % (ref 34–46.6)
HGB BLD-MCNC: 9.4 G/DL (ref 12–15.9)
HGB UR QL STRIP.AUTO: NEGATIVE
HYALINE CASTS UR QL AUTO: ABNORMAL /LPF
IMM GRANULOCYTES # BLD AUTO: 0.03 10*3/MM3 (ref 0–0.05)
IMM GRANULOCYTES NFR BLD AUTO: 0.4 % (ref 0–0.5)
INR PPP: 1.16 (ref 0.91–1.09)
KETONES UR QL STRIP: NEGATIVE
LEUKOCYTE ESTERASE UR QL STRIP.AUTO: ABNORMAL
LYMPHOCYTES # BLD AUTO: 1.49 10*3/MM3 (ref 0.7–3.1)
LYMPHOCYTES NFR BLD AUTO: 21.2 % (ref 19.6–45.3)
MCH RBC QN AUTO: 35.9 PG (ref 26.6–33)
MCHC RBC AUTO-ENTMCNC: 34.4 G/DL (ref 31.5–35.7)
MCV RBC AUTO: 104.2 FL (ref 79–97)
MONOCYTES # BLD AUTO: 0.55 10*3/MM3 (ref 0.1–0.9)
MONOCYTES NFR BLD AUTO: 7.8 % (ref 5–12)
NEUTROPHILS NFR BLD AUTO: 4.66 10*3/MM3 (ref 1.7–7)
NEUTROPHILS NFR BLD AUTO: 66.4 % (ref 42.7–76)
NITRITE UR QL STRIP: POSITIVE
NRBC BLD AUTO-RTO: 0 /100 WBC (ref 0–0.2)
PH UR STRIP.AUTO: <=5 [PH] (ref 5–8)
PLATELET # BLD AUTO: 145 10*3/MM3 (ref 140–450)
PMV BLD AUTO: 10.4 FL (ref 6–12)
POTASSIUM SERPL-SCNC: 4 MMOL/L (ref 3.5–5.2)
PROT SERPL-MCNC: 7.3 G/DL (ref 6–8.5)
PROT UR QL STRIP: NEGATIVE
PROTHROMBIN TIME: 14.4 SECONDS (ref 11.9–14.6)
RBC # BLD AUTO: 2.62 10*6/MM3 (ref 3.77–5.28)
RBC # UR STRIP: ABNORMAL /HPF
REF LAB TEST METHOD: ABNORMAL
SODIUM SERPL-SCNC: 136 MMOL/L (ref 136–145)
SP GR UR STRIP: 1.01 (ref 1–1.03)
SQUAMOUS #/AREA URNS HPF: ABNORMAL /HPF
UROBILINOGEN UR QL STRIP: ABNORMAL
WBC # UR STRIP: ABNORMAL /HPF
WBC NRBC COR # BLD: 7.03 10*3/MM3 (ref 3.4–10.8)

## 2022-09-13 PROCEDURE — P9612 CATHETERIZE FOR URINE SPEC: HCPCS

## 2022-09-13 PROCEDURE — 87086 URINE CULTURE/COLONY COUNT: CPT | Performed by: EMERGENCY MEDICINE

## 2022-09-13 PROCEDURE — 80053 COMPREHEN METABOLIC PANEL: CPT | Performed by: EMERGENCY MEDICINE

## 2022-09-13 PROCEDURE — 87077 CULTURE AEROBIC IDENTIFY: CPT | Performed by: EMERGENCY MEDICINE

## 2022-09-13 PROCEDURE — 36415 COLL VENOUS BLD VENIPUNCTURE: CPT

## 2022-09-13 PROCEDURE — 85610 PROTHROMBIN TIME: CPT | Performed by: EMERGENCY MEDICINE

## 2022-09-13 PROCEDURE — 96365 THER/PROPH/DIAG IV INF INIT: CPT

## 2022-09-13 PROCEDURE — 99283 EMERGENCY DEPT VISIT LOW MDM: CPT

## 2022-09-13 PROCEDURE — 25010000002 CEFTRIAXONE PER 250 MG: Performed by: EMERGENCY MEDICINE

## 2022-09-13 PROCEDURE — 87186 SC STD MICRODIL/AGAR DIL: CPT | Performed by: EMERGENCY MEDICINE

## 2022-09-13 PROCEDURE — 85025 COMPLETE CBC W/AUTO DIFF WBC: CPT | Performed by: EMERGENCY MEDICINE

## 2022-09-13 PROCEDURE — 81001 URINALYSIS AUTO W/SCOPE: CPT | Performed by: EMERGENCY MEDICINE

## 2022-09-13 RX ORDER — CEFDINIR 300 MG/1
300 CAPSULE ORAL DAILY
Qty: 7 CAPSULE | Refills: 0 | Status: SHIPPED | OUTPATIENT
Start: 2022-09-13

## 2022-09-13 RX ADMIN — SODIUM CHLORIDE 1 G: 9 INJECTION, SOLUTION INTRAVENOUS at 21:59

## 2022-09-13 NOTE — H&P (VIEW-ONLY)
"Subjective    Ms. Nunez is 82 y.o. female    Chief Complaint: \"Just dribbling, history of urethral stricture\"    History of Present Illness     82-year-old female established patient in for complaint of \"just dribbling\" patient reports this has been present for approximately 1 week.  Patient states she had been doing fine until about 1 week ago.  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  She has had multiple external surgeries.  This patient has required multiple urethral dilations over the years.  Was last seen by Dr. Conway 5/2021 for urethral dilation for which at that time Dr. Conway states he was only able to dilate her up to a 12 Belarusian he was unable to complete the 14 Belarusian and he recommended that she continue to follow-up with her [place] physician as he stated that we are no longer equipped to take care of her needs properly however patient presents at this time stating that she is unable to make frequent trips to [place] and this happened within the last week and she is needing urethral dilation as soon as possible.  Patient reports that she is able to insert her size 10 Bel[place]ian female cath however even with this catheter she is only able to insert it a short distance and she is meeting a lot of resistance which is causing her increased pain and she is experiencing a lot of urgency.    While in office patient was able to insert the 10 Belarusian female cath in my presence and appeared to fully drain her bladder approximately 375 mL however patient did complain of resistance and pain with cath insertion.  Patient reports she had been only having to use the In-N-Out catheterization 2 times daily however states now for the past week has had to use in and out catheterization with each void.    The following portions of the patient's history were reviewed and updated as appropriate: allergies, current medications, past family history, past medical history, past social history, past " surgical history and problem list.    Review of Systems   Constitutional: Negative for chills, fatigue and fever.   Gastrointestinal: Negative for nausea and vomiting.   Genitourinary: Positive for decreased urine volume, difficulty urinating, dysuria, frequency and urgency. Negative for flank pain, hematuria and pelvic pain.         Current Outpatient Medications:   •  Acetaminophen (TYLENOL ARTHRITIS PAIN PO), Take 1 tablet by mouth Daily As Needed (BACK PAIN)., Disp: , Rfl:   •  azithromycin (ZITHROMAX) 250 MG tablet, Take 2 by mouth today then 1 daily for 4 days, Disp: 6 tablet, Rfl: 0  •  bisoprolol-hydrochlorothiazide (ZIAC) 5-6.25 MG per tablet, TAKE 1 TABLET DAILY, Disp: 90 tablet, Rfl: 1  •  calcium carbonate (OS-REAGAN) 600 MG tablet, Take 600 mg by mouth Daily., Disp: , Rfl:   •  cefdinir (OMNICEF) 300 MG capsule, Take 1 capsule by mouth Daily., Disp: 7 capsule, Rfl: 0  •  cetirizine (zyrTEC) 10 MG tablet, Take 10 mg by mouth Daily., Disp: , Rfl:   •  citalopram (CeleXA) 20 MG tablet, TAKE 1 TABLET BY MOUTH EVERY DAY, Disp: 90 tablet, Rfl: 1  •  cyanocobalamin 1000 MCG/ML injection, INJECT 1 ML INTO THE MUSCLE EVERY 4 WEEKS., Disp: 3 mL, Rfl: 0  •  diphenhydrAMINE (BENADRYL) 25 mg capsule, Take 25 mg by mouth Every 6 (Six) Hours As Needed for Allergies., Disp: , Rfl:   •  diphenoxylate-atropine (LOMOTIL) 2.5-0.025 MG per tablet, TAKE 2 TABLETS BY MOUTH FOUR TIMES A DAY AS NEEDED FOR DIARRHEA, Disp: 90 tablet, Rfl: 2  •  docusate sodium (Colace) 100 MG capsule, Take 1 capsule by mouth 2 (Two) Times a Day., Disp: 60 capsule, Rfl: 0  •  DULERA 100-5 MCG/ACT inhaler, Inhale 2 puffs 2 (Two) Times a Day. Rinse and spit after using., Disp: 6 inhaler, Rfl: 0  •  esomeprazole (nexIUM) 40 MG capsule, Take 1 capsule by mouth 2 (Two) Times a Day., Disp: 180 capsule, Rfl: 3  •  furosemide (LASIX) 40 MG tablet, TAKE 1 AND 1/2 TABLETS ONCEDAILY, Disp: 135 tablet, Rfl: 3  •  gabapentin (NEURONTIN) 300 MG capsule, TAKE TWO  "CAPSULES BY MOUTH THREE TIMES A DAY, Disp: 180 capsule, Rfl: 2  •  Homeopathic Products (LEG CRAMPS) tablet, Take 1 tablet by mouth Daily., Disp: , Rfl:   •  HYDROcodone-acetaminophen (NORCO)  MG per tablet, Take 1 tablet by mouth Every 4 (Four) Hours As Needed for Moderate Pain or Severe Pain., Disp: 60 tablet, Rfl: 0  •  Hydrocortisone (briatny's amazing butt) cream, Apply 1 application topically to the appropriate area as directed As Needed (irritation)., Disp: 120 g, Rfl: 0  •  letrozole (FEMARA) 2.5 MG tablet, TAKE 1 TABLET BY MOUTH 1 TIME DAILY, Disp: 90 tablet, Rfl: 2  •  lidocaine (XYLOCAINE) 5 % ointment, Apply  topically to the appropriate area as directed Every 4 (Four) Hours As Needed for Mild Pain  (pain secondary to radiation)., Disp: 240 g, Rfl: 1  •  Lidocaine Viscous HCl (XYLOCAINE) 2 % solution, Take 5 mL by mouth 3 (Three) Times a Day With Meals., Disp: 100 mL, Rfl: 0  •  metOLazone (ZAROXOLYN) 2.5 MG tablet, TAKE 1 TABLET BY MOUTH THREE TIMES A WEEK, Disp: , Rfl:   •  nitrofurantoin (MACRODANTIN) 25 MG capsule, Take 1 capsule by mouth Every Night. To help reduce pain with urination, Disp: 7 capsule, Rfl: 0  •  ondansetron (ZOFRAN) 8 MG tablet, TAKE 1 TABLET BY MOUTH EVERY 8 HOURS AS NEEDED FOR NAUSEA AND VOMITING, Disp: 60 tablet, Rfl: 2  •  potassium chloride (K-DUR,KLOR-CON) 20 MEQ CR tablet, TAKE 2 TABLETS BY MOUTH EVERY DAY, Disp: 60 tablet, Rfl: 5  •  pravastatin (PRAVACHOL) 40 MG tablet, Take 1 tablet by mouth Every Night., Disp: 90 tablet, Rfl: 3  •  Syringe 25G X 1\" 3 ML misc, 1 each Daily., Disp: 25 each, Rfl: 1  •  vitamin D (ERGOCALCIFEROL) 1.25 MG (38625 UT) capsule capsule, Take 1 capsule by mouth 1 (One) Time Per Week., Disp: 12 capsule, Rfl: 3  •  phenazopyridine (PYRIDIUM) 100 MG tablet, Take 1 tablet by mouth 3 (Three) Times a Day As Needed for Bladder Spasms., Disp: 20 tablet, Rfl: 0    Current Facility-Administered Medications:   •  lidocaine (XYLOCAINE) 1 % injection 10 " mL, 10 mL, Infiltration, Once, Shaheen Akbar DO  •  lidocaine 1% - EPINEPHrine 1:687271 (XYLOCAINE W/EPI) 1 %-1:699477 injection 10 mL, 10 mL, Infiltration, Once, Shaheen Akbar DO  •  sodium bicarbonate injection 1 mEq, 2 mL, Injection, Once, Shaheen Akbar DO    Facility-Administered Medications Ordered in Other Visits:   •  heparin injection 500 Units, 500 Units, Intravenous, Rivera DAMON Winston, MD, 500 Units at 07/01/21 1354  •  heparin injection 500 Units, 500 Units, Intravenous, HARDIKNRivera Winston, MD, 500 Units at 01/11/22 1134  •  sodium chloride 0.9 % flush 10 mL, 10 mL, IntravenousMARISOL Chua, Winston, MD, 10 mL at 07/01/21 1354  •  sodium chloride 0.9 % flush 10 mL, 10 mL, IntravenousMARISOL Chua, Winston, MD, 10 mL at 01/11/22 1134    Past Medical History:   Diagnosis Date   • Anemia in stage 3 chronic kidney disease (HCC) 11/11/2019   • Arthritis    • Breast cancer ([facility])    • Bronchitis    • Cellulitis     ROSA LEGS   • GERD (gastroesophageal reflux disease)    • Hyperlipidemia    • Hypertension    • Kyphosis    • Osteoporosis    • Scoliosis    • Self-catheterizes urinary bladder     10FR SIZED CATH   • Stage 3 chronic kidney disease (HCC) 11/11/2019   • Vulva cancer ([facility])    • Vulvar intraepithelial neoplasia (MODOY) grade 3        Past Surgical History:   Procedure Laterality Date   • APPENDECTOMY     • BENJAMIN PROCEDURE      No evidence of reflux disease while on Nexium 40mg daily-See report   • BREAST CYST ASPIRATION Left    • BREAST LUMPECTOMY     • COLONOSCOPY  01/12/2011    Diverticulosis sigmoid colon; The examination was otherwise normal; Repeat 10 years   • COLONOSCOPY  11/03/2003    Dr. Laguerre-Normal colonoscopy; Normal terminal ileum; Repeat 5 years   • COLONOSCOPY N/A 11/05/2021    Procedure: COLONOSCOPY WITH ANESTHESIA;  Surgeon: Annita Loaiza MD;  Location: [facility] ENDOSCOPY;  Service: Gastroenterology;  Laterality: N/A;  pre abnormal imaging  post  Shaheen Akbar DO   •  ENDOSCOPY  07/01/2014    Normal esophagus; Normal stomach; Normal examined duodenum; BRAVO pH capsule deployed;    • ENDOSCOPY  08/14/2013    Mild gastritis-biopsies for H.Pylor obtained   • ENDOSCOPY  02/16/2005    Dr. LaguerreEihzn-Qgtjgglki-khrcvldy   • ENDOSCOPY  10/21/2003    Dr. Laguerre-Stage 1 reflux esophagitis   • ENDOSCOPY N/A 11/05/2021    Procedure: ESOPHAGOGASTRODUODENOSCOPY WITH ANESTHESIA;  Surgeon: Annita Loaiza MD;  Location:  [facility] ENDOSCOPY;  Service: Gastroenterology;  Laterality: N/A;  pre abnormal imaging  post AVM; hiatal hernia; esophageal polyp  Shaheen Akbar, DO   • HYSTERECTOMY     • MASTECTOMY W/ SENTINEL NODE BIOPSY Left 09/14/2021    Procedure: LEFT PARTIAL MASTECTOMY WITH MAGSEED AND LEFT SENTINEL LYMPH NODE BIOPSY MAGTRACE;  Surgeon: Quynh Rosario MD;  Location: [facility] OR;  Service: General;  Laterality: Left;   • TONSILLECTOMY     • VAGINA SURGERY      Laser surgery X 2   • VENOUS ACCESS DEVICE (PORT) INSERTION N/A 09/29/2020    Procedure: SINGLE LUMEN PORT - A- CATH PLACEMENT WITH FLUOROSCOPY;  Surgeon: Quynh Rosario MD;  Location: [facility] OR;  Service: General;  Laterality: N/A;       Social History     Socioeconomic History   • Marital status:    Tobacco Use   • Smoking status: Former Smoker     Packs/day: 0.25     Years: 3.00     Pack years: 0.75   • Smokeless tobacco: Never Used   • Tobacco comment: social smoker in college   Vaping Use   • Vaping Use: Never used   Substance and Sexual Activity   • Alcohol use: Not Currently     Comment: Occasional glass of wine   • Drug use: No   • Sexual activity: Never       Family History   Problem Relation Age of Onset   • Cancer Mother    • Hypertension Mother    • Osteoporosis Mother    • Dementia Mother    • Uterine cancer Mother    • Heart disease Father    • Parkinsonism Father    • Cancer Sister    • Breast cancer Sister    • Kidney cancer Sister    • Diabetes Brother    • Heart disease Paternal Grandfather    • No  Known Problems Maternal Grandmother    • No Known Problems Maternal Grandfather    • No Known Problems Paternal Grandmother    • Colon cancer Neg Hx    • Colon polyps Neg Hx    • Esophageal cancer Neg Hx    • Liver cancer Neg Hx    • Liver disease Neg Hx    • Rectal cancer Neg Hx    • Stomach cancer Neg Hx        Objective    There were no vitals taken for this visit.    Physical Exam  Nursing note reviewed.   Constitutional:       General: She is not in acute distress.     Appearance: Normal appearance. She is not ill-appearing.   HENT:      Nose: No congestion.   Abdominal:      Tenderness: There is no right CVA tenderness or left CVA tenderness.      Hernia: No hernia is present.   Skin:     General: Skin is warm and dry.   Neurological:      Mental Status: She is alert and oriented to person, place, and time.   Psychiatric:         Mood and Affect: Mood normal.         Behavior: Behavior normal.             Results for orders placed or performed during the hospital encounter of 09/13/22   Urine Culture - Urine, Urine, Catheter    Specimen: Urine, Catheter   Result Value Ref Range    Urine Culture >100,000 CFU/mL Escherichia coli (A)        Susceptibility    Escherichia coli - CIERA     Ampicillin  Susceptible ug/ml     Ampicillin + Sulbactam  Susceptible ug/ml     Cefazolin  Susceptible ug/ml     Cefepime  Susceptible ug/ml     Gentamicin  Resistant ug/ml     Levofloxacin  Susceptible ug/ml     Nitrofurantoin  Susceptible ug/ml     Piperacillin + Tazobactam  Susceptible ug/ml     Trimethoprim + Sulfamethoxazole  Susceptible ug/ml   Urinalysis With Culture If Indicated - Urine, Catheter    Specimen: Urine, Catheter   Result Value Ref Range    Color, UA Yellow Yellow, Straw    Appearance, UA Clear Clear    pH, UA <=5.0 5.0 - 8.0    Specific Gravity, UA 1.007 1.005 - 1.030    Glucose, UA Negative Negative    Ketones, UA Negative Negative    Bilirubin, UA Negative Negative    Blood, UA Negative Negative    Protein, UA  Negative Negative    Leuk Esterase, UA Small (1+) (A) Negative    Nitrite, UA Positive (A) Negative    Urobilinogen, UA 0.2 E.U./dL 0.2 - 1.0 E.U./dL   Comprehensive Metabolic Panel    Specimen: Blood   Result Value Ref Range    Glucose 98 65 - 99 mg/dL    BUN 18 8 - 23 mg/dL    Creatinine 1.43 (H) 0.57 - 1.00 mg/dL    Sodium 136 136 - 145 mmol/L    Potassium 4.0 3.5 - 5.2 mmol/L    Chloride 99 98 - 107 mmol/L    CO2 25.0 22.0 - 29.0 mmol/L    Calcium 9.4 8.6 - 10.5 mg/dL    Total Protein 7.3 6.0 - 8.5 g/dL    Albumin 4.20 3.50 - 5.20 g/dL    ALT (SGPT) 10 1 - 33 U/L    AST (SGOT) 17 1 - 32 U/L    Alkaline Phosphatase 56 39 - 117 U/L    Total Bilirubin 0.3 0.0 - 1.2 mg/dL    Globulin 3.1 gm/dL    A/G Ratio 1.4 g/dL    BUN/Creatinine Ratio 12.6 7.0 - 25.0    Anion Gap 12.0 5.0 - 15.0 mmol/L    eGFR 37.2 (L) >60.0 mL/min/1.73   Protime-INR    Specimen: Blood   Result Value Ref Range    Protime 14.4 11.9 - 14.6 Seconds    INR 1.16 (H) 0.91 - 1.09   CBC Auto Differential    Specimen: Blood   Result Value Ref Range    WBC 7.03 3.40 - 10.80 10*3/mm3    RBC 2.62 (L) 3.77 - 5.28 10*6/mm3    Hemoglobin 9.4 (L) 12.0 - 15.9 g/dL    Hematocrit 27.3 (L) 34.0 - 46.6 %    .2 (H) 79.0 - 97.0 fL    MCH 35.9 (H) 26.6 - 33.0 pg    MCHC 34.4 31.5 - 35.7 g/dL    RDW 12.9 12.3 - 15.4 %    RDW-SD 48.4 37.0 - 54.0 fl    MPV 10.4 6.0 - 12.0 fL    Platelets 145 140 - 450 10*3/mm3    Neutrophil % 66.4 42.7 - 76.0 %    Lymphocyte % 21.2 19.6 - 45.3 %    Monocyte % 7.8 5.0 - 12.0 %    Eosinophil % 3.8 0.3 - 6.2 %    Basophil % 0.4 0.0 - 1.5 %    Immature Grans % 0.4 0.0 - 0.5 %    Neutrophils, Absolute 4.66 1.70 - 7.00 10*3/mm3    Lymphocytes, Absolute 1.49 0.70 - 3.10 10*3/mm3    Monocytes, Absolute 0.55 0.10 - 0.90 10*3/mm3    Eosinophils, Absolute 0.27 0.00 - 0.40 10*3/mm3    Basophils, Absolute 0.03 0.00 - 0.20 10*3/mm3    Immature Grans, Absolute 0.03 0.00 - 0.05 10*3/mm3    nRBC 0.0 0.0 - 0.2 /100 WBC   Urinalysis, Microscopic Only  "- Urine, Catheter    Specimen: Urine, Catheter   Result Value Ref Range    RBC, UA 0-2 (A) None Seen /HPF    WBC, UA 6-12 (A) None Seen /HPF    Bacteria, UA 1+ (A) None Seen /HPF    Squamous Epithelial Cells, UA None Seen None Seen, 0-2 /HPF    Hyaline Casts, UA 0-2 None Seen /LPF    Methodology Automated Microscopy        Assessment and Plan    Diagnoses and all orders for this visit:    1. Urinary retention (Primary)  -     POC Urinalysis Dipstick, Multipro  -     Case Request; Standing  -     Case Request    2. History of urethral stricture  -     Case Request; Standing  -     Case Request    3. Dysuria  -     phenazopyridine (PYRIDIUM) 100 MG tablet; Take 1 tablet by mouth 3 (Three) Times a Day As Needed for Bladder Spasms.  Dispense: 20 tablet; Refill: 0    4. Painful bladder spasm  -     phenazopyridine (PYRIDIUM) 100 MG tablet; Take 1 tablet by mouth 3 (Three) Times a Day As Needed for Bladder Spasms.  Dispense: 20 tablet; Refill: 0    Other orders  -     Follow Anesthesia Guidelines / Standing Orders; Future  -     Obtain Informed Consent; Future  -     Provide NPO Instructions to Patient; Future  -     Chlorhexidine Skin Prep; Future      82-year-old female established patient in for complaint of \"just dribbling\" patient reports this has been present for approximately 1 week.  Patient states she had been doing fine until about 1 week ago.  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  She has had multiple external surgeries.  This patient has required multiple urethral dilations over the years.  Was last seen by Dr. Conway 5/2021 for urethral dilation for which at that time Dr. Conway states he was only able to dilate her up to a 12 French he was unable to complete the 14 French and he recommended that she continue to follow-up with her [place] physician as he stated that we are no longer equipped to take care of her needs properly however patient presents at this time stating that " she is unable to make frequent trips to [place] and this happened within the last week and she is needing urethral dilation as soon as possible.  Patient reports that she is able to insert her size 10 [place]ish female cath however even with this catheter she is only able to insert it a short distance and she is meeting a lot of resistance      At this time after lengthy discussion with patient patient would like to go ahead and let Dr. Conawy attempt to dilate the urethra under anesthesia in the operating room.  We will go ahead and schedule this at this time.  Patient was educated on the risks and benefits of the procedure as well as the possibility of still needing to follow-up with her physician at [place] for further intervention.  Patient voiced understanding and is in agreement to proceed.

## 2022-09-13 NOTE — PROGRESS NOTES
"Subjective    Ms. Nunez is 82 y.o. female    Chief Complaint: \"Just dribbling, history of urethral stricture\"    History of Present Illness     82-year-old female established patient in for complaint of \"just dribbling\" patient reports this has been present for approximately 1 week.  Patient states she had been doing fine until about 1 week ago.  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  She has had multiple external surgeries.  This patient has required multiple urethral dilations over the years.  Was last seen by Dr. Conway 5/2021 for urethral dilation for which at that time Dr. Conway states he was only able to dilate her up to a 12 Nigerien he was unable to complete the 14 Nigerien and he recommended that she continue to follow-up with her [place] physician as he stated that we are no longer equipped to take care of her needs properly however patient presents at this time stating that she is unable to make frequent trips to [place] and this happened within the last week and she is needing urethral dilation as soon as possible.  Patient reports that she is able to insert her size 10 Nigerien female cath however even with this catheter she is only able to insert it a short distance and she is meeting a lot of resistance which is causing her increased pain and she is experiencing a lot of urgency.    While in office patient was able to insert the 10 Nigerien female cath in my presence and appeared to fully drain her bladder approximately 375 mL however patient did complain of resistance and pain with cath insertion.  Patient reports she had been only having to use the In-N-Out catheterization 2 times daily however states now for the past week has had to use in and out catheterization with each void.    The following portions of the patient's history were reviewed and updated as appropriate: allergies, current medications, past family history, past medical history, past social history, past " surgical history and problem list.    Review of Systems   Constitutional: Negative for chills, fatigue and fever.   Gastrointestinal: Negative for nausea and vomiting.   Genitourinary: Positive for decreased urine volume, difficulty urinating, dysuria, frequency and urgency. Negative for flank pain, hematuria and pelvic pain.         Current Outpatient Medications:   •  Acetaminophen (TYLENOL ARTHRITIS PAIN PO), Take 1 tablet by mouth Daily As Needed (BACK PAIN)., Disp: , Rfl:   •  azithromycin (ZITHROMAX) 250 MG tablet, Take 2 by mouth today then 1 daily for 4 days, Disp: 6 tablet, Rfl: 0  •  bisoprolol-hydrochlorothiazide (ZIAC) 5-6.25 MG per tablet, TAKE 1 TABLET DAILY, Disp: 90 tablet, Rfl: 1  •  calcium carbonate (OS-REAGAN) 600 MG tablet, Take 600 mg by mouth Daily., Disp: , Rfl:   •  cefdinir (OMNICEF) 300 MG capsule, Take 1 capsule by mouth Daily., Disp: 7 capsule, Rfl: 0  •  cetirizine (zyrTEC) 10 MG tablet, Take 10 mg by mouth Daily., Disp: , Rfl:   •  citalopram (CeleXA) 20 MG tablet, TAKE 1 TABLET BY MOUTH EVERY DAY, Disp: 90 tablet, Rfl: 1  •  cyanocobalamin 1000 MCG/ML injection, INJECT 1 ML INTO THE MUSCLE EVERY 4 WEEKS., Disp: 3 mL, Rfl: 0  •  diphenhydrAMINE (BENADRYL) 25 mg capsule, Take 25 mg by mouth Every 6 (Six) Hours As Needed for Allergies., Disp: , Rfl:   •  diphenoxylate-atropine (LOMOTIL) 2.5-0.025 MG per tablet, TAKE 2 TABLETS BY MOUTH FOUR TIMES A DAY AS NEEDED FOR DIARRHEA, Disp: 90 tablet, Rfl: 2  •  docusate sodium (Colace) 100 MG capsule, Take 1 capsule by mouth 2 (Two) Times a Day., Disp: 60 capsule, Rfl: 0  •  DULERA 100-5 MCG/ACT inhaler, Inhale 2 puffs 2 (Two) Times a Day. Rinse and spit after using., Disp: 6 inhaler, Rfl: 0  •  esomeprazole (nexIUM) 40 MG capsule, Take 1 capsule by mouth 2 (Two) Times a Day., Disp: 180 capsule, Rfl: 3  •  furosemide (LASIX) 40 MG tablet, TAKE 1 AND 1/2 TABLETS ONCEDAILY, Disp: 135 tablet, Rfl: 3  •  gabapentin (NEURONTIN) 300 MG capsule, TAKE TWO  "CAPSULES BY MOUTH THREE TIMES A DAY, Disp: 180 capsule, Rfl: 2  •  Homeopathic Products (LEG CRAMPS) tablet, Take 1 tablet by mouth Daily., Disp: , Rfl:   •  HYDROcodone-acetaminophen (NORCO)  MG per tablet, Take 1 tablet by mouth Every 4 (Four) Hours As Needed for Moderate Pain or Severe Pain., Disp: 60 tablet, Rfl: 0  •  Hydrocortisone (britany's amazing butt) cream, Apply 1 application topically to the appropriate area as directed As Needed (irritation)., Disp: 120 g, Rfl: 0  •  letrozole (FEMARA) 2.5 MG tablet, TAKE 1 TABLET BY MOUTH 1 TIME DAILY, Disp: 90 tablet, Rfl: 2  •  lidocaine (XYLOCAINE) 5 % ointment, Apply  topically to the appropriate area as directed Every 4 (Four) Hours As Needed for Mild Pain  (pain secondary to radiation)., Disp: 240 g, Rfl: 1  •  Lidocaine Viscous HCl (XYLOCAINE) 2 % solution, Take 5 mL by mouth 3 (Three) Times a Day With Meals., Disp: 100 mL, Rfl: 0  •  metOLazone (ZAROXOLYN) 2.5 MG tablet, TAKE 1 TABLET BY MOUTH THREE TIMES A WEEK, Disp: , Rfl:   •  nitrofurantoin (MACRODANTIN) 25 MG capsule, Take 1 capsule by mouth Every Night. To help reduce pain with urination, Disp: 7 capsule, Rfl: 0  •  ondansetron (ZOFRAN) 8 MG tablet, TAKE 1 TABLET BY MOUTH EVERY 8 HOURS AS NEEDED FOR NAUSEA AND VOMITING, Disp: 60 tablet, Rfl: 2  •  potassium chloride (K-DUR,KLOR-CON) 20 MEQ CR tablet, TAKE 2 TABLETS BY MOUTH EVERY DAY, Disp: 60 tablet, Rfl: 5  •  pravastatin (PRAVACHOL) 40 MG tablet, Take 1 tablet by mouth Every Night., Disp: 90 tablet, Rfl: 3  •  Syringe 25G X 1\" 3 ML misc, 1 each Daily., Disp: 25 each, Rfl: 1  •  vitamin D (ERGOCALCIFEROL) 1.25 MG (89114 UT) capsule capsule, Take 1 capsule by mouth 1 (One) Time Per Week., Disp: 12 capsule, Rfl: 3  •  phenazopyridine (PYRIDIUM) 100 MG tablet, Take 1 tablet by mouth 3 (Three) Times a Day As Needed for Bladder Spasms., Disp: 20 tablet, Rfl: 0    Current Facility-Administered Medications:   •  lidocaine (XYLOCAINE) 1 % injection 10 " mL, 10 mL, Infiltration, Once, Shaheen Akbar DO  •  lidocaine 1% - EPINEPHrine 1:144303 (XYLOCAINE W/EPI) 1 %-1:418564 injection 10 mL, 10 mL, Infiltration, Once, Shaheen Akbar DO  •  sodium bicarbonate injection 1 mEq, 2 mL, Injection, Once, Shaheen Akbar DO    Facility-Administered Medications Ordered in Other Visits:   •  heparin injection 500 Units, 500 Units, Intravenous, Rivera DAMON Winston, MD, 500 Units at 07/01/21 1354  •  heparin injection 500 Units, 500 Units, Intravenous, HARDIKNRivera Winston, MD, 500 Units at 01/11/22 1134  •  sodium chloride 0.9 % flush 10 mL, 10 mL, IntravenousMARISOL Chua, Winston, MD, 10 mL at 07/01/21 1354  •  sodium chloride 0.9 % flush 10 mL, 10 mL, IntravenousMARISOL Chua, Winston, MD, 10 mL at 01/11/22 1134    Past Medical History:   Diagnosis Date   • Anemia in stage 3 chronic kidney disease (HCC) 11/11/2019   • Arthritis    • Breast cancer ([facility])    • Bronchitis    • Cellulitis     ROSA LEGS   • GERD (gastroesophageal reflux disease)    • Hyperlipidemia    • Hypertension    • Kyphosis    • Osteoporosis    • Scoliosis    • Self-catheterizes urinary bladder     10FR SIZED CATH   • Stage 3 chronic kidney disease (HCC) 11/11/2019   • Vulva cancer ([facility])    • Vulvar intraepithelial neoplasia (MOODY) grade 3        Past Surgical History:   Procedure Laterality Date   • APPENDECTOMY     • BENJAMIN PROCEDURE      No evidence of reflux disease while on Nexium 40mg daily-See report   • BREAST CYST ASPIRATION Left    • BREAST LUMPECTOMY     • COLONOSCOPY  01/12/2011    Diverticulosis sigmoid colon; The examination was otherwise normal; Repeat 10 years   • COLONOSCOPY  11/03/2003    Dr. Laguerre-Normal colonoscopy; Normal terminal ileum; Repeat 5 years   • COLONOSCOPY N/A 11/05/2021    Procedure: COLONOSCOPY WITH ANESTHESIA;  Surgeon: Annita Loaiza MD;  Location: [facility] ENDOSCOPY;  Service: Gastroenterology;  Laterality: N/A;  pre abnormal imaging  post  Shaheen Akbar DO   •  ENDOSCOPY  07/01/2014    Normal esophagus; Normal stomach; Normal examined duodenum; BRAVO pH capsule deployed;    • ENDOSCOPY  08/14/2013    Mild gastritis-biopsies for H.Pylor obtained   • ENDOSCOPY  02/16/2005    Dr. LaguerreMkjfd-Hlldobdkx-sinlftph   • ENDOSCOPY  10/21/2003    Dr. Laguerre-Stage 1 reflux esophagitis   • ENDOSCOPY N/A 11/05/2021    Procedure: ESOPHAGOGASTRODUODENOSCOPY WITH ANESTHESIA;  Surgeon: Annita Loaiza MD;  Location:  [facility] ENDOSCOPY;  Service: Gastroenterology;  Laterality: N/A;  pre abnormal imaging  post AVM; hiatal hernia; esophageal polyp  Shaheen Akbar, DO   • HYSTERECTOMY     • MASTECTOMY W/ SENTINEL NODE BIOPSY Left 09/14/2021    Procedure: LEFT PARTIAL MASTECTOMY WITH MAGSEED AND LEFT SENTINEL LYMPH NODE BIOPSY MAGTRACE;  Surgeon: Quynh Rosario MD;  Location: [facility] OR;  Service: General;  Laterality: Left;   • TONSILLECTOMY     • VAGINA SURGERY      Laser surgery X 2   • VENOUS ACCESS DEVICE (PORT) INSERTION N/A 09/29/2020    Procedure: SINGLE LUMEN PORT - A- CATH PLACEMENT WITH FLUOROSCOPY;  Surgeon: Quynh Rsoario MD;  Location: [facility] OR;  Service: General;  Laterality: N/A;       Social History     Socioeconomic History   • Marital status:    Tobacco Use   • Smoking status: Former Smoker     Packs/day: 0.25     Years: 3.00     Pack years: 0.75   • Smokeless tobacco: Never Used   • Tobacco comment: social smoker in college   Vaping Use   • Vaping Use: Never used   Substance and Sexual Activity   • Alcohol use: Not Currently     Comment: Occasional glass of wine   • Drug use: No   • Sexual activity: Never       Family History   Problem Relation Age of Onset   • Cancer Mother    • Hypertension Mother    • Osteoporosis Mother    • Dementia Mother    • Uterine cancer Mother    • Heart disease Father    • Parkinsonism Father    • Cancer Sister    • Breast cancer Sister    • Kidney cancer Sister    • Diabetes Brother    • Heart disease Paternal Grandfather    • No  Known Problems Maternal Grandmother    • No Known Problems Maternal Grandfather    • No Known Problems Paternal Grandmother    • Colon cancer Neg Hx    • Colon polyps Neg Hx    • Esophageal cancer Neg Hx    • Liver cancer Neg Hx    • Liver disease Neg Hx    • Rectal cancer Neg Hx    • Stomach cancer Neg Hx        Objective    There were no vitals taken for this visit.    Physical Exam  Nursing note reviewed.   Constitutional:       General: She is not in acute distress.     Appearance: Normal appearance. She is not ill-appearing.   HENT:      Nose: No congestion.   Abdominal:      Tenderness: There is no right CVA tenderness or left CVA tenderness.      Hernia: No hernia is present.   Skin:     General: Skin is warm and dry.   Neurological:      Mental Status: She is alert and oriented to person, place, and time.   Psychiatric:         Mood and Affect: Mood normal.         Behavior: Behavior normal.             Results for orders placed or performed during the hospital encounter of 09/13/22   Urine Culture - Urine, Urine, Catheter    Specimen: Urine, Catheter   Result Value Ref Range    Urine Culture >100,000 CFU/mL Escherichia coli (A)        Susceptibility    Escherichia coli - CIERA     Ampicillin  Susceptible ug/ml     Ampicillin + Sulbactam  Susceptible ug/ml     Cefazolin  Susceptible ug/ml     Cefepime  Susceptible ug/ml     Gentamicin  Resistant ug/ml     Levofloxacin  Susceptible ug/ml     Nitrofurantoin  Susceptible ug/ml     Piperacillin + Tazobactam  Susceptible ug/ml     Trimethoprim + Sulfamethoxazole  Susceptible ug/ml   Urinalysis With Culture If Indicated - Urine, Catheter    Specimen: Urine, Catheter   Result Value Ref Range    Color, UA Yellow Yellow, Straw    Appearance, UA Clear Clear    pH, UA <=5.0 5.0 - 8.0    Specific Gravity, UA 1.007 1.005 - 1.030    Glucose, UA Negative Negative    Ketones, UA Negative Negative    Bilirubin, UA Negative Negative    Blood, UA Negative Negative    Protein, UA  Negative Negative    Leuk Esterase, UA Small (1+) (A) Negative    Nitrite, UA Positive (A) Negative    Urobilinogen, UA 0.2 E.U./dL 0.2 - 1.0 E.U./dL   Comprehensive Metabolic Panel    Specimen: Blood   Result Value Ref Range    Glucose 98 65 - 99 mg/dL    BUN 18 8 - 23 mg/dL    Creatinine 1.43 (H) 0.57 - 1.00 mg/dL    Sodium 136 136 - 145 mmol/L    Potassium 4.0 3.5 - 5.2 mmol/L    Chloride 99 98 - 107 mmol/L    CO2 25.0 22.0 - 29.0 mmol/L    Calcium 9.4 8.6 - 10.5 mg/dL    Total Protein 7.3 6.0 - 8.5 g/dL    Albumin 4.20 3.50 - 5.20 g/dL    ALT (SGPT) 10 1 - 33 U/L    AST (SGOT) 17 1 - 32 U/L    Alkaline Phosphatase 56 39 - 117 U/L    Total Bilirubin 0.3 0.0 - 1.2 mg/dL    Globulin 3.1 gm/dL    A/G Ratio 1.4 g/dL    BUN/Creatinine Ratio 12.6 7.0 - 25.0    Anion Gap 12.0 5.0 - 15.0 mmol/L    eGFR 37.2 (L) >60.0 mL/min/1.73   Protime-INR    Specimen: Blood   Result Value Ref Range    Protime 14.4 11.9 - 14.6 Seconds    INR 1.16 (H) 0.91 - 1.09   CBC Auto Differential    Specimen: Blood   Result Value Ref Range    WBC 7.03 3.40 - 10.80 10*3/mm3    RBC 2.62 (L) 3.77 - 5.28 10*6/mm3    Hemoglobin 9.4 (L) 12.0 - 15.9 g/dL    Hematocrit 27.3 (L) 34.0 - 46.6 %    .2 (H) 79.0 - 97.0 fL    MCH 35.9 (H) 26.6 - 33.0 pg    MCHC 34.4 31.5 - 35.7 g/dL    RDW 12.9 12.3 - 15.4 %    RDW-SD 48.4 37.0 - 54.0 fl    MPV 10.4 6.0 - 12.0 fL    Platelets 145 140 - 450 10*3/mm3    Neutrophil % 66.4 42.7 - 76.0 %    Lymphocyte % 21.2 19.6 - 45.3 %    Monocyte % 7.8 5.0 - 12.0 %    Eosinophil % 3.8 0.3 - 6.2 %    Basophil % 0.4 0.0 - 1.5 %    Immature Grans % 0.4 0.0 - 0.5 %    Neutrophils, Absolute 4.66 1.70 - 7.00 10*3/mm3    Lymphocytes, Absolute 1.49 0.70 - 3.10 10*3/mm3    Monocytes, Absolute 0.55 0.10 - 0.90 10*3/mm3    Eosinophils, Absolute 0.27 0.00 - 0.40 10*3/mm3    Basophils, Absolute 0.03 0.00 - 0.20 10*3/mm3    Immature Grans, Absolute 0.03 0.00 - 0.05 10*3/mm3    nRBC 0.0 0.0 - 0.2 /100 WBC   Urinalysis, Microscopic Only  "- Urine, Catheter    Specimen: Urine, Catheter   Result Value Ref Range    RBC, UA 0-2 (A) None Seen /HPF    WBC, UA 6-12 (A) None Seen /HPF    Bacteria, UA 1+ (A) None Seen /HPF    Squamous Epithelial Cells, UA None Seen None Seen, 0-2 /HPF    Hyaline Casts, UA 0-2 None Seen /LPF    Methodology Automated Microscopy        Assessment and Plan    Diagnoses and all orders for this visit:    1. Urinary retention (Primary)  -     POC Urinalysis Dipstick, Multipro  -     Case Request; Standing  -     Case Request    2. History of urethral stricture  -     Case Request; Standing  -     Case Request    3. Dysuria  -     phenazopyridine (PYRIDIUM) 100 MG tablet; Take 1 tablet by mouth 3 (Three) Times a Day As Needed for Bladder Spasms.  Dispense: 20 tablet; Refill: 0    4. Painful bladder spasm  -     phenazopyridine (PYRIDIUM) 100 MG tablet; Take 1 tablet by mouth 3 (Three) Times a Day As Needed for Bladder Spasms.  Dispense: 20 tablet; Refill: 0    Other orders  -     Follow Anesthesia Guidelines / Standing Orders; Future  -     Obtain Informed Consent; Future  -     Provide NPO Instructions to Patient; Future  -     Chlorhexidine Skin Prep; Future      82-year-old female established patient in for complaint of \"just dribbling\" patient reports this has been present for approximately 1 week.  Patient states she had been doing fine until about 1 week ago.  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  She has had multiple external surgeries.  This patient has required multiple urethral dilations over the years.  Was last seen by Dr. Conway 5/2021 for urethral dilation for which at that time Dr. Conway states he was only able to dilate her up to a 12 French he was unable to complete the 14 French and he recommended that she continue to follow-up with her [place] physician as he stated that we are no longer equipped to take care of her needs properly however patient presents at this time stating that " she is unable to make frequent trips to [place] and this happened within the last week and she is needing urethral dilation as soon as possible.  Patient reports that she is able to insert her size 10 Pashto female cath however even with this catheter she is only able to insert it a short distance and she is meeting a lot of resistance      At this time after lengthy discussion with patient patient would like to go ahead and let Dr. Conway attempt to dilate the urethra under anesthesia in the operating room.  We will go ahead and schedule this at this time.  Patient was educated on the risks and benefits of the procedure as well as the possibility of still needing to follow-up with her physician at [place] for further intervention.  Patient voiced understanding and is in agreement to proceed.

## 2022-09-14 ENCOUNTER — TELEPHONE (OUTPATIENT)
Dept: FAMILY MEDICINE CLINIC | Facility: CLINIC | Age: 82
End: 2022-09-14

## 2022-09-14 DIAGNOSIS — C51.9 VULVAR CANCER, CARCINOMA: ICD-10-CM

## 2022-09-14 DIAGNOSIS — G89.3 CANCER RELATED PAIN: ICD-10-CM

## 2022-09-14 RX ORDER — HYDROCODONE BITARTRATE AND ACETAMINOPHEN 10; 325 MG/1; MG/1
1 TABLET ORAL EVERY 4 HOURS PRN
Qty: 60 TABLET | Refills: 0 | Status: SHIPPED | OUTPATIENT
Start: 2022-09-14

## 2022-09-14 RX ORDER — LIDOCAINE HYDROCHLORIDE 20 MG/ML
5 SOLUTION OROPHARYNGEAL
Qty: 100 ML | Refills: 0 | Status: SHIPPED | OUTPATIENT
Start: 2022-09-14

## 2022-09-14 NOTE — TELEPHONE ENCOUNTER
Rx Refill Note  Requested Prescriptions     Pending Prescriptions Disp Refills   • HYDROcodone-acetaminophen (NORCO)  MG per tablet 60 tablet 0     Sig: Take 1 tablet by mouth Every 4 (Four) Hours As Needed for Moderate Pain or Severe Pain.   • Lidocaine Viscous HCl (XYLOCAINE) 2 % solution 100 mL 0     Sig: Take 5 mL by mouth 3 (Three) Times a Day With Meals.      Last office visit with prescribing clinician: 4/21/2022      Next office visit with prescribing clinician: 10/24/2022            ADAMA Rodriguez  09/14/22, 16:40 CDT

## 2022-09-14 NOTE — ED NOTES
Patient drained using home supplies, our hospital supplies were too limber to insert. 550 ml drained

## 2022-09-14 NOTE — ED PROVIDER NOTES
Subjective   PIT    82-year-old female presents to the ER with complaint of difficulty urinating.  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 Conway, and has been using 10 Prydeinig cath twice daily to prevent worsening stricture.  Patient states he has not had any difficulty urinating and has been self cathing twice daily as instructed.  2 weeks ago, she developed urinary urgency, hesitancy, frequency.  She states she had the sensation of inability to fully empty her bladder.  Contacted her urology office and has an appointment in 3 days.  For the past several days, patient states that she is not been able to urinate at all without self cathing.  Maintains ability to pass the catheter but states she cannot urinate in between self caths as per usual.  She presents to the ER today due to worsening discomfort and sensation of urinary retention.  No fever, no back pain, no nausea or vomiting.          Review of Systems   All other systems reviewed and are negative.      Past Medical History:   Diagnosis Date   • Anemia in stage 3 chronic kidney disease (HCC) 11/11/2019   • Arthritis    • Breast cancer ([facility])    • Bronchitis    • Cellulitis     ROSA LEGS   • GERD (gastroesophageal reflux disease)    • Hyperlipidemia    • Hypertension    • Kyphosis    • Osteoporosis    • Scoliosis    • Self-catheterizes urinary bladder     10FR SIZED CATH   • Stage 3 chronic kidney disease ([facility]) 11/11/2019   • Vulva cancer ([facility])    • Vulvar intraepithelial neoplasia (MOODY) grade 3        Allergies   Allergen Reactions   • Scopolamine Swelling     Other reaction(s): ANGIOEDEMA  Other reaction(s): ANGIOEDEMA     • Amoxicillin-Pot Clavulanate Rash   • Keflex [Cephalexin] Rash   • Septra [Sulfamethoxazole-Trimethoprim] Rash   • Tequin [Gatifloxacin] Other (See Comments)     Doesn't remember   • Trovan [Alatrofloxacin]  Dizziness       Past Surgical History:   Procedure Laterality Date   • APPENDECTOMY     • BENJAMIN PROCEDURE      No evidence of reflux disease while on Nexium 40mg daily-See report   • BREAST CYST ASPIRATION Left    • BREAST LUMPECTOMY     • COLONOSCOPY  01/12/2011    Diverticulosis sigmoid colon; The examination was otherwise normal; Repeat 10 years   • COLONOSCOPY  11/03/2003    Dr. Laguerre-Normal colonoscopy; Normal terminal ileum; Repeat 5 years   • COLONOSCOPY N/A 11/05/2021    Procedure: COLONOSCOPY WITH ANESTHESIA;  Surgeon: Annita Loaiza MD;  Location: [facility] ENDOSCOPY;  Service: Gastroenterology;  Laterality: N/A;  pre abnormal imaging  post  Shaheen Akbar DO   • ENDOSCOPY  07/01/2014    Normal esophagus; Normal stomach; Normal examined duodenum; BRAVO pH capsule deployed;    • ENDOSCOPY  08/14/2013    Mild gastritis-biopsies for H.Pylor obtained   • ENDOSCOPY  02/16/2005    Dr. LaguerreFvmsx-Lhfjoxizo-jwkwywbj   • ENDOSCOPY  10/21/2003    Dr. Laguerre-Stage 1 reflux esophagitis   • ENDOSCOPY N/A 11/05/2021    Procedure: ESOPHAGOGASTRODUODENOSCOPY WITH ANESTHESIA;  Surgeon: Annita Loaiza MD;  Location: [facility] ENDOSCOPY;  Service: Gastroenterology;  Laterality: N/A;  pre abnormal imaging  post AVM; hiatal hernia; esophageal polyp  Shaheen Akbar DO   • HYSTERECTOMY     • MASTECTOMY W/ SENTINEL NODE BIOPSY Left 09/14/2021    Procedure: LEFT PARTIAL MASTECTOMY WITH MAGSEED AND LEFT SENTINEL LYMPH NODE BIOPSY MAGTRACE;  Surgeon: Quynh Rosario MD;  Location: [facility] OR;  Service: General;  Laterality: Left;   • TONSILLECTOMY     • VAGINA SURGERY      Laser surgery X 2   • VENOUS ACCESS DEVICE (PORT) INSERTION N/A 09/29/2020    Procedure: SINGLE LUMEN PORT - A- CATH PLACEMENT WITH FLUOROSCOPY;  Surgeon: Quynh Rosario MD;  Location: [facility] OR;  Service: General;  Laterality: N/A;       Family History   Problem Relation Age of Onset   • Cancer Mother    • Hypertension Mother    • Osteoporosis Mother    •  Dementia Mother    • Uterine cancer Mother    • Heart disease Father    • Parkinsonism Father    • Cancer Sister    • Breast cancer Sister    • Kidney cancer Sister    • Diabetes Brother    • Heart disease Paternal Grandfather    • No Known Problems Maternal Grandmother    • No Known Problems Maternal Grandfather    • No Known Problems Paternal Grandmother    • Colon cancer Neg Hx    • Colon polyps Neg Hx    • Esophageal cancer Neg Hx    • Liver cancer Neg Hx    • Liver disease Neg Hx    • Rectal cancer Neg Hx    • Stomach cancer Neg Hx        Social History     Socioeconomic History   • Marital status:    Tobacco Use   • Smoking status: Former Smoker     Packs/day: 0.25     Years: 3.00     Pack years: 0.75   • Smokeless tobacco: Never Used   • Tobacco comment: social smoker in college   Vaping Use   • Vaping Use: Never used   Substance and Sexual Activity   • Alcohol use: Not Currently     Comment: Occasional glass of wine   • Drug use: No   • Sexual activity: Never           Objective   Physical Exam  Vitals and nursing note reviewed.   Constitutional:       Appearance: Normal appearance. She is normal weight.   HENT:      Head: Normocephalic and atraumatic.      Nose: Nose normal. No congestion or rhinorrhea.   Eyes:      Extraocular Movements: Extraocular movements intact.      Conjunctiva/sclera: Conjunctivae normal.      Pupils: Pupils are equal, round, and reactive to light.   Cardiovascular:      Rate and Rhythm: Normal rate and regular rhythm.      Pulses: Normal pulses.      Heart sounds: Normal heart sounds.   Pulmonary:      Effort: Pulmonary effort is normal.      Breath sounds: Normal breath sounds.   Abdominal:      General: Abdomen is flat. Bowel sounds are normal.      Palpations: Abdomen is soft.   Musculoskeletal:         General: No swelling or tenderness.      Cervical back: Normal range of motion and neck supple.   Skin:     General: Skin is warm and dry.   Neurological:      General: No  focal deficit present.      Mental Status: She is alert and oriented to person, place, and time. Mental status is at baseline.         Procedures       Lab Results (last 24 hours)     Procedure Component Value Units Date/Time    CBC & Differential [[id number]]  (Abnormal) Collected: 09/13/22 1926    Specimen: Blood Updated: 09/1942    Narrative:      The following orders were created for panel order CBC & Differential.  Procedure                               Abnormality         Status                     ---------                               -----------         ------                     CBC Auto Differential[[id number]]        Abnormal            Final result                 Please view results for these tests on the individual orders.    Comprehensive Metabolic Panel [[id number]]  (Abnormal) Collected: 09/13/22 1926    Specimen: Blood Updated: 09/13/22 2002     Glucose 98 mg/dL      BUN 18 mg/dL      Creatinine 1.43 mg/dL      Sodium 136 mmol/L      Potassium 4.0 mmol/L      Chloride 99 mmol/L      CO2 25.0 mmol/L      Calcium 9.4 mg/dL      Total Protein 7.3 g/dL      Albumin 4.20 g/dL      ALT (SGPT) 10 U/L      AST (SGOT) 17 U/L      Alkaline Phosphatase 56 U/L      Total Bilirubin 0.3 mg/dL      Globulin 3.1 gm/dL      A/G Ratio 1.4 g/dL      BUN/Creatinine Ratio 12.6     Anion Gap 12.0 mmol/L      eGFR 37.2 mL/min/1.73      Comment: National Kidney Foundation and American Society of Nephrology (ASN) Task Force recommended calculation based on the Chronic Kidney Disease Epidemiology Collaboration (CKD-EPI) equation refit without adjustment for race.       Narrative:      GFR Normal >60  Chronic Kidney Disease <60  Kidney Failure <15      Protime-INR [[id number]]  (Abnormal) Collected: 09/13/22 1926    Specimen: Blood Updated: 09/13/22 1952     Protime 14.4 Seconds      INR 1.16    CBC Auto Differential [[id number]]  (Abnormal) Collected: 09/13/22 1926    Specimen: Blood Updated: 09/1942     WBC 7.03  10*3/mm3      RBC 2.62 10*6/mm3      Hemoglobin 9.4 g/dL      Hematocrit 27.3 %      .2 fL      MCH 35.9 pg      MCHC 34.4 g/dL      RDW 12.9 %      RDW-SD 48.4 fl      MPV 10.4 fL      Platelets 145 10*3/mm3      Neutrophil % 66.4 %      Lymphocyte % 21.2 %      Monocyte % 7.8 %      Eosinophil % 3.8 %      Basophil % 0.4 %      Immature Grans % 0.4 %      Neutrophils, Absolute 4.66 10*3/mm3      Lymphocytes, Absolute 1.49 10*3/mm3      Monocytes, Absolute 0.55 10*3/mm3      Eosinophils, Absolute 0.27 10*3/mm3      Basophils, Absolute 0.03 10*3/mm3      Immature Grans, Absolute 0.03 10*3/mm3      nRBC 0.0 /100 WBC     Urinalysis With Culture If Indicated - Urine, Catheter [[id number]]  (Abnormal) Collected: 09/13/22 2044    Specimen: Urine, Catheter Updated: 09/13/22 2055     Color, UA Yellow     Appearance, UA Clear     pH, UA <=5.0     Specific Gravity, UA 1.007     Glucose, UA Negative     Ketones, UA Negative     Bilirubin, UA Negative     Blood, UA Negative     Protein, UA Negative     Leuk Esterase, UA Small (1+)     Nitrite, UA Positive     Urobilinogen, UA 0.2 E.U./dL    Narrative:      In absence of clinical symptoms, the presence of pyuria, bacteria, and/or nitrites on the urinalysis result does not correlate with infection.    Urinalysis, Microscopic Only - Urine, Catheter [[id number]]  (Abnormal) Collected: 09/13/22 2044    Specimen: Urine, Catheter Updated: 09/13/22 2055     RBC, UA 0-2 /HPF      WBC, UA 6-12 /HPF      Bacteria, UA 1+ /HPF      Squamous Epithelial Cells, UA None Seen /HPF      Hyaline Casts, UA 0-2 /LPF      Methodology Automated Microscopy    Urine Culture - Urine, Urine, Catheter [[id number]] Collected: 09/13/22 2044    Specimen: Urine, Catheter Updated: 09/13/22 2055      No Radiology Exams Resulted Within Past 24 Hours    ED Course  ED Course as of 09/13/22 2159   Tue Sep 13, 2022   2155 82-year-old female with history of urethral stricture requiring dilation in the past  presents to the ED with complaint of urinary retention.  She states she is able to empty her bladder with self cathing but has been having DUSA 3-4 times per day.  Spoke to Dr. Conway with urology to see if patient would benefit from Freeman catheter, he felt patient would be better served self cathing at every 4 to 6-hour intervals until urology follow-up in a few days.    Patient did have 1+ bacteria and 6-12 WBCs in her urine, positive for nitrites.    As multiple medication allergies but states she is never received Rocephin in the past.  Give her dose of Rocephin in the ED and observe her for any adverse reactions, if she does well will give trial of cefdinir at home.  Her previous reaction to Keflex was low severity, developed a rash.  Feel this is safer than the Macrobid given history of CKD. [AW]      ED Course User Index  [AW] Robert Scott MD                                           [facility]    Final diagnoses:   Urinary retention   Acute cystitis without hematuria       ED Disposition  ED Disposition     ED Disposition   Discharge    Condition   Stable    Comment   --             Shaheen Conway MD  [address] [facility] [address], [place] [id number] [phone]    Go in 3 days           Medication List      New Prescriptions    cefdinir 300 MG capsule  Commonly known as: OMNICEF  Take 1 capsule by mouth Daily.           Where to Get Your Medications      These medications were sent to [facility] - [place], [place] - [address] - [phone]  - [phone]   [address] [facility] [address]    Phone: [phone]   · cefdinir 300 MG capsule          Robert Scott MD  09/13/22 1923

## 2022-09-14 NOTE — TELEPHONE ENCOUNTER
Caller: Dago Nunez    Relationship: Self    Best call back number: [phone]    Requested Prescriptions:   Requested Prescriptions     Pending Prescriptions Disp Refills   • HYDROcodone-acetaminophen (NORCO)  MG per tablet 60 tablet 0     Sig: Take 1 tablet by mouth Every 4 (Four) Hours As Needed for Moderate Pain or Severe Pain.   • Lidocaine Viscous HCl (XYLOCAINE) 2 % solution 100 mL 0     Sig: Take 5 mL by mouth 3 (Three) Times a Day With Meals.      PATIENT NEEDS CATHADOR JELLY AS SHE IS RUNNING OUT. PATIENT IS AHVING TO CATHERIZE HERSELF EVERY 3 OR 4 HOURS.    Pharmacy where request should be sent: [facility] - [place], [place] - [address] - [phone]  - [phone] FX     Please advise when and if medication can be called in. If unable to be filled, please advise with callback at the phone number listed above.    Thank you,  Randell Gordon, PCT

## 2022-09-16 ENCOUNTER — OFFICE VISIT (OUTPATIENT)
Dept: UROLOGY | Facility: CLINIC | Age: 82
End: 2022-09-16

## 2022-09-16 DIAGNOSIS — Z87.448 HISTORY OF URETHRAL STRICTURE: ICD-10-CM

## 2022-09-16 DIAGNOSIS — R30.0 DYSURIA: ICD-10-CM

## 2022-09-16 DIAGNOSIS — R33.9 URINARY RETENTION: Primary | ICD-10-CM

## 2022-09-16 DIAGNOSIS — R30.1 PAINFUL BLADDER SPASM: ICD-10-CM

## 2022-09-16 PROCEDURE — 99214 OFFICE O/P EST MOD 30 MIN: CPT

## 2022-09-16 RX ORDER — PHENAZOPYRIDINE HYDROCHLORIDE 100 MG/1
100 TABLET, FILM COATED ORAL 3 TIMES DAILY PRN
Qty: 20 TABLET | Refills: 0 | Status: SHIPPED | OUTPATIENT
Start: 2022-09-16

## 2022-09-17 LAB — BACTERIA SPEC AEROBE CULT: ABNORMAL

## 2022-09-18 NOTE — PROGRESS NOTES
[facility] ON[facility] HEMATOLOGY & ONCOLOGY [address]  [facility] [address] [phone]    Patient Name: Dago Nunez  Encounter Date: 09/28/2022  YOB: 1940  Patient Number: [id number]      REASON FOR FOLLOW-UP: Dago Nunez is a pleasant 82-year-old  female who is seen on followup for stage IA receptor positive left breast cancer.  She is on adjuvant letrozole for the past 11.5 months.  She declined adjuvant radiation.  She is also seen for macrocytic anemia from chronic kidney disease Stage IIIa, GFR 51 mL/minute 03/05/2018, iron deficiency, and thrombocytopenia.  She is intolerant to oral iron (nausea, stomach cramps, and constipation).  She had ferric carboxymaltose 4 months ago. She is also seen for vulvar cancer. She is seen 21.5 months post C1D1 Mitomycin C and 5FU with radiation. Her D29 to 32 chemo was cancelled due to hospitalization, poor tolerance, and poor performance status.  She is seen with spouse, Joseph.  History is obtained from the patient. History is considered reliable.      .    Oncology/Hematology History Overview Note   DIAGNOSTIC ABNORMALITIES: Breast cancer.  Screening mammogram 08/18/2021.New dense 7 mm partially obscured nodule in the upper outer quadrant of the left breast, with associated architectural distortion.  Diagnostic mammogram 08/20/2021.  Small subcentimeter suspicious spiculated mass at 12:00 in the left breast, approximately 3 cm to 4 cm deep from the nipple. This is suspicious for breast carcinoma, ultrasound-guided biopsy is recommended.  Sonogram 08/20/2021.  Small suspicious solid mass at 12:00 in the left breast. 5.5 x 5.1 x 4.7 mm, suspicious for breast carcinoma. This corresponds with the finding on mammograms.  Successful ultrasound-guided left breast biopsy and clip on 08/23/2021.  Pathology report 09/01/2021.  Left breast at 12:00, core needle biopsies: Invasive  carcinoma no special type (ductal).  Histologic grade (Abdullahi histologic score).   Glandular (acinar)/tubular differentiation: Score 1.   Nuclear pleomorphism: Score 2.   Mitotic rate: Score 1.  Overall grade: Grade 1. Maximum tumor diameter is at least 0.8 cm.  Estrogen 87%, progesterone 47% and negative HER-2/gabriela.  Genetic testing was sent.  Patient was seen by Dr. Quynh Rosario 2021.  She is .  She had first delivery at 20.  She took birth control for 1 month.  She took hormone replacement therapy for approximately 5 years.  Family history positive for breast cancer, sister at 78. No ovarian cancer  Chest x-ray 2021.  No acute cardiopulmonary process.  Pathology report 2021.  Left sentinel lymph nodes: Four of 4 lymph nodes are negative for metastatic mammary carcinoma.Comment: Absence of micrometastases is confirmed utilizing immunohistochemical stains for pankeratin.  Left breast, partial mastectomy:  A.  Invasive carcinoma of no special type (ductal), grade 1 (1.1 cm in greatest dimension).  B.  Minor associated low-grade ductal carcinoma in situ component.  C.  The inked surgical margins and skin are negative for malignancy.  D.  Prior biopsy site changes including fat necrosis.  Comment: Microscopically, the tumor is approximately 7 mm from the closest inked (superior) surgical margin.  AJCC stage: pT1c snpN0.      PREVIOUS INTERVENTIONS:  She had undergone left partial mastectomy with left sentinel lymph node biopsy 2021 by Dr. Rosario.  Declined adjuvant radiation.  Adjuvant Femara 10/01/2021 through present.        DIAGNOSTIC ABNORMALITIES: Vulvar cancer  She had developed recurrent vulvar cancer left inguinal node that is PET positive measuring 2.1 cm.  The patient was seen by Dr. Marks in favor definitive chemo radiation.  Pathology report 07/10/2020 periurethral biopsy, poorly differentiated carcinoma with squamous and papillary features, focally invasive in the  "subepithelial connective tissue.  PET 07/31/2020 showed intense FDG avid left inguinal node is highly suspicious for local regional metastasis from known vulvar squamous cell carcinoma.  No additional sites of suspicious FDG activity.  MRI 07/31/2020 showed redemonstration of mildly T2 hyperintense mass involving the urethral meatus, similar dimensions to prior exam on 02/18/2020 however there is now a polypoid lesion seen along the anterior aspect of the perineum, possibly contiguous with the urethral mass, concerning for disease progression.  Market interval enlargement of the left inguinal node almost certainly representing disease involvement.  Patient was notified by Dr. Siddiqui 08/19/2020.  Consensus for chemoradiation.  Patient reluctant to take chemotherapy.  Follow-up with Dr. Siddiqui in 4 to 6 weeks after radiation is completed.         PREVIOUS INTERVENTIONS:  Mitomycin C and 5-FU 10/05/2020 through 10/08/2020 with radiation completed 12/10/2020 at [facility].  D29 to d32 of chemo discontinued due to poor performance status.       DIAGNOSTIC ABNORMALITIES:   The patient was seen by Dr. Greg Alberts 01/29/2018 complaining of coughing and wheezing. The patient was given Tussionex. Blood work was ordered. CBC 01/29/2018 revealed a WBC of 7.8, hemoglobin 11.2, hematocrit 35.1, MCV 96.2, platelets 43,000, and ANC 4.47. CMP remarkable for of glucose of 177 and GFR 59 mL/minute.  The patient was seen by VINCENT Jones, 02/02/2018. She was coughing and wheezing. Tussionex did not help. The patient was given prednisone.  The patient was seen by VINCENT Jones, 02/15/2018. She is followed for anemia from iron deficiency. CBC 02/15/2018 revealed a WBC of 12.9, hemoglobin 11.4, hematocrit 34.5, MCV 95, and platelets 68,000.       PREVIOUS INTERVENTIONS:   Ferrous sulfate 325 mg 03/07/2018 through 05/06/2018.  Not resumed 06/08/2018. \"I misunderstood.\"   Resume 09/05/2018 through " 10/03/2018, stopped due to intolerance.  Injectafer 750 mg 10/20/2018 at the [place] office.  Retacrit 10/27/2020 through present.  Injectafer 750 mg 05/18/2021, 01/26/2022 and 05/25/2022 at [facility]           Vulvar cancer, carcinoma (HCC)    Initial Diagnosis    Vulvar cancer, carcinoma (CMS/HCC)     3/19/2001 Biopsy    Clinical Features: red vaginal lesion with bleeding since 1995. TX with  Carafate douche and Premarin vaginal cream with intermittent improvement.    DIAGNOSIS:    VAGINA, BIOPSY: FOCAL ULCERATION, MARKED ACUTE AND CHRONIC INFLAMMATION,  SPONGIOSIS AND REACTIVE ATYPIA; NEGATIVE FOR DYSPLASIA.     8/17/2001 Procedure    Pap Smear:  DIAGNOSIS:            Atypical keratinized squamous cells, suspicious                           for squamous cell carcinoma.         COMMENTS:             There are numerous atypical keratinized cells                           present in an inflammatory background.  These are                           suspicious for squamous cell carcinoma.  Biopsy                           confirmation is recommended.      10/16/2001 Procedure    Pap Smear:  DIAGNOSIS:            Mild dysplasia       ADDITIONAL FINDINGS:  Marked inflammation                           Excessive hyperkeratosis         BETHESDA SYSTEM:      Low grade squamous intraepithelial lesion    DIAGNOSIS:            Negative, no abnormal cells seen           BETHESDA SYSTEM:      Within normal limits.       ADEQUACY:             Specimen satisfactory for evaluation       SOURCE:               Vagina, diagnostic thin prep PAP     11/7/2001 Biopsy      DIAGNOSIS:    VAGINA AND VULVA, RIGHT POSTERIOR INTROITUS, WIDE LOCAL EXCISION:  VAGINAL INTRAEPITHELIAL NEOPLASIA (VAIN) II-III, FOCALLY EXTENDING TO  VAGINAL MARGIN AT 12-4:00; ALL OTHER MARGINS NEGATIVE FOR  INTRAEPITHELIAL NEOPLASIA. (SEE COMMENT)    COMMENT: The lesion involves vaginal type epithelium with associated  chronic inflammation and erosion.  The adjacent vulvar epithelium is  hyperkeratotic.     3/15/2002 Procedure    Pap Smear:  BETHESDA SYSTEM:      High grade squamous intraepithelial lesion       DESCRIPTOR:           Moderate dysplasia           ADEQUACY:             Specimen satisfactory for evaluation       SOURCE:               Vagina, Thin Prep Pap, Diagnostic     6/13/2003 Procedure         BETHESDA SYSTEM:      High grade squamous intraepithelial lesion       DESCRIPTOR:           Moderate dysplasia       ADDITIONAL FINDINGS:  Inflammation         ADEQUACY:             Specimen satisfactory for evaluation       SOURCE:               Vagina, Thin Prep Pap, Diagnostic    HUMAN PAPILLOMAVIRUS         CASE ACCESSION #:    [id number]        Category                  HPV Types                Patient Results         High/Intermed Risk    16,18,31,33,35,39              Negative                            45,51,52,56,58,59,68      Specimen Source        Cervical / Vaginal Specimen     12/5/2003 Procedure    Gynecological Cytology        CASE ACCESSION #:     [id number]       BETHESDA SYSTEM:      Low grade squamous intraepithelial lesion       DESCRIPTOR:           Mild dysplasia           ADEQUACY:             Specimen satisfactory for evaluation       SOURCE:               Vagina, Thin Prep Pap, Diagnostic     11/29/2011 Biopsy    ADDENDUM FINDINGS:    The high grade MOODY in the right labia majora biopsy was of the usual type.  There was no evidence of differentiated MOODY.      DIAGNOSIS:    1) PERINEUM, BIOPSY: SQUAMOUS EPITHELIUM WITH FLORID HYPERKERATOSIS,  CONSISTENT WITH CHRONIC IRRITATION; NO EVIDENCE OF DYSPLASIA OR MALIGNANCY  (SEE COMMENT).    Comment: Immunohistochemical studies (p16, p53, MIB-1) confirm the rendered  interpretations.    2) VULVA, RIGHT LABIUM MAJORUM, BIOPSY: VULVAR INTRAEPITHELIAL NEOPLASIA II  (MODERATE DYSPLASIA); (SEE COMMENT).    Comment: Immunohistochemical studies for p16 (nuclear and cytoplasmic  positivity in lower  epithelial half) and MIB-1 (nuclear positivity in lower  epithelial half) confirms the diagnosis; p53 is positive only in rare  cells. A GMS histochemical study for fungal forms is negative.  Nevertheless, parakeratosis associated with neutrophils, as was seen  herein, is commonly associated with fungal vulvitis.     7/3/2012 Procedure    Gynecological Cytology    CASE ACCESSION #:                     [id number]  BETHESDA SYSTEM:                      High grade squamous intraepithelial                                        lesion  DESCRIPTOR:                           Mild to moderate dysplasia  ADEQUACY:                             Specimen satisfactory for evaluation  SOURCE:                               Vagina, Thin Prep Pap, Diagnostic  # [id number] REVIEWED:                    1     1/29/2013 Biopsy    DIAGNOSIS:    1) VULVA, RIGHT, ANTERIOR, BIOPSY:  SPONGIOTIC DERMATITIS WITH EXTENSIVE  DERMAL GRANULATION TISSUE, MIXED INFLAMMATION AND FIBROSIS (SEE COMENT).    Comment: Sections show spongiosis, basement membrane thickening, dermal  fibrosis, and extensive dermal granulation tissue with a predominantly  chronic inflammatory infiltrate. There is no evidence of dysplasia or  malignancy. The basement membrane thickening and dermal fibrosis suggests  that there may be component of lichen sclerosus. However, the underlying  cause of the ongoing inflammation and repair (granulation tissue) is not  clear from this sample. A tissue gram stain fails to reveal any large  bacterial aggregates.  GMS and AFB studies for fungal forms and acid fast  bacilli were negative respectively     11/27/2013 Surgery      Diagnosis    1) VULVA, LEFT ANTERIOR, BIOPSY: HIGH GRADE SQUAMOUS INTRAEPITHELIAL LESION (SEVERE DYSPLASIA, MOODY III).    2) VAGINAL WALL, ANTERIOR, BIOPSY: HIGH GRADE SQUAMOUS INTRAEPITHELIAL LESION (SEVERE DYSPLASIA, VaIN III).    3) VULVA, VULVECTOMY: FOCUS OF MICROINVASIVE SQUAMOUS CELL CARCINOMA,  WELL-DIFFERENTIATED, DEPTH OF STROMAL INVASION 0.4 MM,  8 MM FROM CLOSEST DEEP MARGINS, 4 MM FROM RIGHT ANTERIOR VAGINAL MARGINS AT 8 - 9 O'CLOCK (PROXIMAL TIP OF SPECIMEN DESIGNATED   12 O'CLOCK); NEGATIVE FOR LYMPHOVASCULAR INVASION; ARISING IN A BACKGROUND OF EXTENSIVE VULVAR INTRAEPITHELIAL NEOPLASIA (SEVERE DYSPLASIA, MOODY III, CLASSICAL AND DIFFERENTIATED TYPES); MOODY III IS PRESENT AT RIGHT LATERAL SURGICAL MARGIN (7 - 9 O'CLOCK),   LEFT LATERAL SURGICAL MARGIN (3 - 5 O'CLOCK) AND RIGHT AND LEFT VAGINAL MARGINS; TOTAL LESIONAL AREA (INVASIVE CARCINOMA AND MOODY III) MEASURES 8.2 CM IN GREATEST DIMENSION (GROSS MEASUREMENT); BACKGROUND SEVERE INTERFACE DERMATITIS AND LICHEN SCLEROSUS.        **Electronically signed out by Dimas Cardenas M.D.**on 12/2/2013  HAYLEY/YAZMIN/franky  Case reviewed by Attending Pathologist      Synoptic Diagnosis  3)  VULVA:     Specimen:                        Vulva  Procedure:                       Other: Vulvectomy  Lymph Node Sampling:             Not applicable  Specimen Size:                   Greatest dimension: 13.5 cm                                   Additional dimension: 9.5 cm                                   Additional dimension: 1.2 cm  Tumor Site:                      Right vulva, labium minus  Tumor Size:                      Greatest dimension: 0.04 cm  Tumor Focality:                  Unifocal  Histologic Type:                 Squamous cell carcinoma  Histologic Grade:                G1: Well differentiated  Microscopic Tumor Extension:     Depth of invasion: 0.4 mm  Margins:                         Uninvolved by invasive carcinoma                                   Distance of invasive carcinoma from closest margin: 4 mm  Lymph-Vascular Invasion:         Not identified  Lymph Nodes:                     No nodes submitted or found  Extranodal Extension:            Cannot be determined (explain):    Fixed or Ulcerated Femoral-Inguinal Lymph Nodes:                                     Not identified  Laterality of Involved Lymph Nodes:                                    Cannot be determined:    Primary Tumor (pT):              pT1a [FIGO IA]: Lesions 2 cm or less in size, confined to the vulva or perineum, and with stromal invasion 1.0 mm or less  Regional Lymph Nodes (pN):       pNX:  Regional lymph nodes cannot be assessed  Distant Metastasis (pM):         Not applicable  Additional Pathologic Findings:  Vulvar intraepithelial neoplasia (MOODY) 3 (severe dysplasia/carcinoma in situ)  --------------------------------------------------------     5/20/2014 Procedure    Gynecologic Cytology  [place] Diagnosis:  High grade squamous intraepithelial lesion.    Descriptor/Additional Findings:  Moderate dysplasia.    Adequacy:  Specimen satisfactory for evaluation.    Source:  Vagina, Thin Prep Pap, Imaged Diagnostic     11/11/2014 Biopsy    Diagnosis    ANTERIOR VAGINAL WALL, BIOPSY:  HIGH GRADE SQUAMOUS INTRAEPITHELIAL LESION (VaIN 3, SEVERE DYSPLASIA)       12/19/2014 Surgery    Diagnosis  1)  ANTERIOR VAGINAL WALL, PARTIAL VAGINECTOMY: HIGH-GRADE SQUAMOUS INTRAEPITHELIAL LESION (VaIN 3, SEVERE DYSPLASIA), 2.7 CM; HIGH GRADE DYSPLASIA EXTENDS TO THE 2 AND 8 O'CLOCK TIP MARGINS, THE 5-8 O'CLOCK LATERAL MARGIN, AND THE 11-2 O'CLOCK LATERAL   MARGIN.          2)  JOYA-CLITORAL AREA, EXCISION: HIGH-GRADE SQUAMOUS INTRAEPITHELIAL LESION (VaIN 3, SEVERE DYSPLASIA), EXTENDING TO A LATERAL MARGIN.         2/9/2015 Surgery    Diagnosis  1.          VULVA, LEFT PERIURETHRAL PORTION, EXCISION: FOCAL HIGH-GRADE SQUAMOUS INTRAEPITHELIAL LESION (MOODY 2, MODERATE DYSPLASIA); MARGINS ARE NEGATIVE FOR DYSPLASIA.    2.          VULVA, RIGHT PERIURETHRAL PORTION, EXCISION: BENIGN SQUAMOUS MUCOSA WITH ACUTE AND CHRONIC INFLAMMATION AND REACTIVE CHANGES.         3.          VULVA, LEFT, LOWER PORTION, EXCISION: BENIGN SQUAMOUS MUCOSA WITH ACUTE AND CHRONIC INFLAMMATION, REACTIVE CHANGES  "AND FEATURES CONSISTENT WITH PREVIOUS SURGICAL PROCEDURE.         4.          VULVA, RIGHT PORTION, EXCISION: BENIGN SQUAMOUS MUCOSA WITH CHRONIC INFLAMMATION AND DERMAL FIBROSIS.        9/6/2017 Procedure    Diagnosis  \"PAP SMEAR FROM THE URETHRA\":  HIGH GRADE SQUAMOUS INTRAEPITHELIAL LESION.          10/26/2017 Biopsy    Urethral Meatus Biopsy:  Urothelial mucosa with ulceration, chronic inflammation and focal high grade squamous intraepithelial lesion, moderate dysplasia     7/10/2018 Imaging    MRI Pelvis:  Impression:    1.  There is a 2.3 x 1.8 cm urethral lesion at the external urethral   meatus demonstrating enhancement and T2 hyperintensity. The lesion is   located approximately 8 mm from the bladder neck/internal urethral   orifice.  There is no extension of the lesion into the para vaginal   fat or ischiorectal fossa. There is some edema of the bilateral   ischiocavernosus muscles without invasion by the mass. No pelvic or   inguinal adenopathy is identified.     2.  The patient has a 2.5 cm cystocele and the urethra is almost   horizontal. There is pelvic floor laxity with loss of upper convexity   of the left iliococcygeus muscle.     7/20/2018 Biopsy    Diagnosis  URETHRA, BIOPSY:  SQUAMOUS CELL CARCINOMA IN SITU; SEE COMMENT.    Comments  P16 immunostain and HPV RNA in situ hybridization are strongly and diffusely positive within the lesion, consistent with HPV-related pathogenesis.     1/8/2019 Imaging    MRI Pelvis:  1.  Stable size and appearance of a nonspecific enhancing nodular   lesion at the caudal margin of the vaginal introitus adjacent to   extensive postsurgical changes related to prior vulvectomy and   vaginectomy. This lesion does not appear to conform to the expected   course of the urethra which is somewhat poorly defined, and this felt   more likely be located immediately caudal to the urethra. It is   possible this may reflect postsurgical scarring as opposed to a true   lesion. " Continued follow-up is suggested to ensure stability.  2.  No lymphadenopathy or other evidence of metastatic disease in the   pelvis.  3.  Evidence of pelvic floor laxity with cystocele, not significantly   changed.     8/6/2019 Imaging    MRI Pelvis:  1. No significant change from the prior exam on this limited   noncontrast study.  2. Stable indeterminate nodule anterior to the external urethral   meatus which may represent focal scarring; however,   residual/recurrent disease is not entirely excluded.  Recommend continued attention on follow-up. 3. Extensive pelvic   postsurgical changes with no evidence of local recurrence.  4. Pelvic floor laxity with cystocele unchanged from prior exam.     1/13/2020 Procedure    Urethral stricture dilation     2/18/2020 Imaging    MRI Pelvis:  Impression:  1.  No significant interval change in 1.7 x 1.7 cm T2 hyperintense   ovoid lesion at the urethral meatus.  2.  Numerous postoperative findings status post vaginectomy and   hysterectomy.  3.  Pelvic floor laxity with persistent cystocele.  4.  No pelvic adenopathy or bony lesion identified.     5/13/2020 Procedure    Urethral stricture dilation      7/10/2020 Biopsy    Diagnosis  PERIURETHRA, BIOPSY: POORLY DIFFERENTIATED CARCINOMA WITH SQUAMOUS AND PAPILLARY FEATURES, FOCALLY INVASIVE IN THE SUBEPITHELIAL CONNECTIVE TISSUE; SEE COMMENT.    Comments  The patient's history of vulvar microinvasive squamous cell carcinoma is noted. The current biopsy shows a poorly differentiated carcinoma with papillary formation. P16 immunostain and HPV RNA in situ hybridization are strongly and diffusely positive within the lesion, consistent with HPV-related pathogenesis. A KERRI-3 immunostain shows patchy, weak positivity in the lesional cells. The patient's prior biopsy ([id number]) is reviewed and shows similar morphologic and immunophenotypic features but the papillary features were not present in the prior material.  Dr. Odom  Bev reviewed this case and concurs with the diagnosis.      7/31/2020 Imaging    MRI Pelvis:  1.  Redemonstration of a mildly T2 hyperintense mass involving the   urethral meatus, similar dimensions to prior exam on 2/18/2020,   however there is now a polypoid lesion seen along the anterior aspect   of the perineum, possibly contiguous with the urethral mass,   concerning for disease progression. This could potentially represent   the recently biopsied lesion.  2.  Marked interval enlargement of a left inguinal lymph node, almost   certainly representing disease involvement. This would be amenable to   ultrasound-guided biopsy if warranted.  3.  Findings related to pelvic floor laxity, with cystocele.    PET/CT:  1.  Intensely FDG avid left inguinal lymph node is highly suspicious   for locoregional metastasis from known vulvar squamous cell   carcinoma. There are no additional sites of suspicious FDG activity.  2.   Intense physiologic FDG activity within the urine obscures   visualization of the periurethral mass. Findings are better   characterized on same day pelvic MRI.     9/21/2020 - 12/10/2020 Radiation    Radiation OncologyTreatment Course:  Dago Nunez received 6940 cGy in 38 fractions to vulva via external beam radiation therapy.     10/5/2020 -  Chemotherapy    OP Vulvar MitoMYcin / Fluorouracil CIV + XRT       10/9/2020 - 9/7/2021 Chemotherapy    OP CENTRAL VENOUS ACCESS DEVICE ACCESS, CARE, AND MAINTENANCE ([facility]AD)     10/19/2020 Imaging    CT Head:  Impression:    1. No acute intracranial process.     10/21/2021 -  Chemotherapy    OP CENTRAL VENOUS ACCESS DEVICE ACCESS, CARE, AND MAINTENANCE ([facility])     Secondary malignancy of inguinal lymph nodes (HCC)   8/31/2020 Initial Diagnosis    Secondary malignancy of inguinal lymph nodes (CMS/HCC)     10/9/2020 - 9/7/2021 Chemotherapy    OP CENTRAL VENOUS ACCESS DEVICE ACCESS, CARE, AND MAINTENANCE ([facility])     10/21/2021 -  Chemotherapy    OP CENTRAL  VENOUS ACCESS DEVICE ACCESS, CARE, AND MAINTENANCE ([facility])         PAST MEDICAL HISTORY:  ALLERGIES:  Allergies   Allergen Reactions   • Scopolamine Swelling     Other reaction(s): ANGIOEDEMA       • Amoxicillin-Pot Clavulanate Rash   • Keflex [Cephalexin] Rash   • Septra [Sulfamethoxazole-Trimethoprim] Rash   • Tequin [Gatifloxacin] Other (See Comments)     Doesn't remember   • Trovan [Alatrofloxacin] Dizziness     CURRENT MEDICATIONS:  Outpatient Encounter Medications as of 9/28/2022   Medication Sig Dispense Refill   • Acetaminophen (TYLENOL ARTHRITIS PAIN PO) Take 1 tablet by mouth Daily As Needed (BACK PAIN).     • bisoprolol-hydrochlorothiazide (ZIAC) 5-6.25 MG per tablet TAKE 1 TABLET DAILY 90 tablet 1   • calcium carbonate (OS-REAGAN) 600 MG tablet Take 600 mg by mouth Daily.     • cefdinir (OMNICEF) 300 MG capsule Take 1 capsule by mouth Daily. 7 capsule 0   • cetirizine (zyrTEC) 10 MG tablet Take 10 mg by mouth Daily.     • citalopram (CeleXA) 20 MG tablet TAKE 1 TABLET BY MOUTH EVERY DAY 90 tablet 1   • cyanocobalamin 1000 MCG/ML injection INJECT 1 ML INTO THE MUSCLE EVERY 4 WEEKS. (Patient taking differently: Inject 1,000 mcg into the appropriate muscle as directed by prescriber Every 28 (Twenty-Eight) Days. - VITAMIN B12 -       INJECT 1 ML INTO THE MUSCLE EVERY 4 WEEKS.) 3 mL 0   • diphenhydrAMINE (BENADRYL) 25 mg capsule Take 25 mg by mouth Every 6 (Six) Hours As Needed for Allergies.     • diphenoxylate-atropine (LOMOTIL) 2.5-0.025 MG per tablet TAKE 2 TABLETS BY MOUTH FOUR TIMES A DAY AS NEEDED FOR DIARRHEA 90 tablet 2   • docusate sodium (Colace) 100 MG capsule Take 1 capsule by mouth 2 (Two) Times a Day. 60 capsule 0   • DULERA 100-5 MCG/ACT inhaler Inhale 2 puffs 2 (Two) Times a Day. Rinse and spit after using. 6 inhaler 0   • esomeprazole (nexIUM) 40 MG capsule Take 1 capsule by mouth 2 (Two) Times a Day. 180 capsule 3   • furosemide (LASIX) 40 MG tablet TAKE 1 AND 1/2 TABLETS ONCEDAILY 135 tablet 3  "  • gabapentin (NEURONTIN) 300 MG capsule TAKE TWO CAPSULES BY MOUTH THREE TIMES A  capsule 2   • Homeopathic Products (LEG CRAMPS) tablet Take 1 tablet by mouth Daily.     • HYDROcodone-acetaminophen (NORCO)  MG per tablet Take 1 tablet by mouth Every 4 (Four) Hours As Needed for Moderate Pain or Severe Pain. 60 tablet 0   • Hydrocortisone (britany's amazing butt) cream Apply 1 application topically to the appropriate area as directed As Needed (irritation). 120 g 0   • letrozole (FEMARA) 2.5 MG tablet TAKE 1 TABLET BY MOUTH 1 TIME DAILY 90 tablet 2   • lidocaine (XYLOCAINE) 5 % ointment Apply  topically to the appropriate area as directed Every 4 (Four) Hours As Needed for Mild Pain  (pain secondary to radiation). 240 g 1   • Lidocaine Viscous HCl (XYLOCAINE) 2 % solution Take 5 mL by mouth 3 (Three) Times a Day With Meals. 100 mL 0   • metOLazone (ZAROXOLYN) 2.5 MG tablet TAKE 1 TABLET BY MOUTH THREE TIMES A WEEK     • nitrofurantoin (MACRODANTIN) 25 MG capsule Take 1 capsule by mouth Every Night. To help reduce pain with urination 7 capsule 0   • ondansetron (ZOFRAN) 8 MG tablet TAKE 1 TABLET BY MOUTH EVERY 8 HOURS AS NEEDED FOR NAUSEA AND VOMITING 60 tablet 2   • phenazopyridine (PYRIDIUM) 100 MG tablet Take 1 tablet by mouth 3 (Three) Times a Day As Needed for Bladder Spasms. 20 tablet 0   • potassium chloride (K-DUR,KLOR-CON) 20 MEQ CR tablet TAKE 2 TABLETS BY MOUTH EVERY DAY 60 tablet 5   • pravastatin (PRAVACHOL) 40 MG tablet Take 1 tablet by mouth Every Night. 90 tablet 3   • Syringe 25G X 1\" 3 ML misc 1 each Daily. 25 each 1   • vitamin D (ERGOCALCIFEROL) 1.25 MG (39895 UT) capsule capsule Take 1 capsule by mouth 1 (One) Time Per Week. 12 capsule 3   • [DISCONTINUED] azithromycin (ZITHROMAX) 250 MG tablet Take 2 by mouth today then 1 daily for 4 days 6 tablet 0     Facility-Administered Encounter Medications as of 9/28/2022   Medication Dose Route Frequency Provider Last Rate Last Admin   • " heparin injection 500 Units  500 Units Intravenous PRN Drake Stafford MD   500 Units at 07/01/21 1354   • heparin injection 500 Units  500 Units Intravenous PRN Drake Stafford MD   500 Units at 01/11/22 1134   • lidocaine (XYLOCAINE) 1 % injection 10 mL  10 mL Infiltration Once Shaheen Akbar DO       • lidocaine 1% - EPINEPHrine 1:823531 (XYLOCAINE W/EPI) 1 %-1:182692 injection 10 mL  10 mL Infiltration Once Shaheen Akbar DO       • sodium chloride 0.9 % flush 10 mL  10 mL Intravenous PRN Drake Stafford MD   10 mL at 07/01/21 1354   • sodium chloride 0.9 % flush 10 mL  10 mL Intravenous PRN Drake Stafford MD   10 mL at 01/11/22 1134   • [DISCONTINUED] sodium bicarbonate injection 1 mEq  2 mL Injection Once Shaheen Akbar DO         ADULT ILLNESSES:  Patient Active Problem List   Diagnosis Code   • Meatal stenosis [id number]   • Retention of urine R33.9   • Type 2 diabetes mellitus, without long-term current use of insulin (HCC) E11.9   • Essential hypertension I10   • Grief reaction F43.21   • 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   • Chronic midline low back pain without sciatica M54.50, G89.29   • Bilateral lower extremity edema R60.0   • History of urethral stricture Z87.448   • Epidermal cyst of neck L72.0   • Normocytic anemia D64.9   • Neck abscess L02.11   • Mixed hyperlipidemia E78.2   • GERD (gastroesophageal reflux disease) K21.9   • Anxiety F41.9   • Iron deficiency and chemotherapy induced anemia D50.9   • Stage 3 chronic kidney disease (HCC) N18.30   • Anemia in stage 3 chronic kidney disease (HCC) N18.30, D63.1   • Screening for breast cancer Z12.39   • Lower extremity edema R60.0   • Vulvar cancer, carcinoma (HCC) C51.9   • Former smoker Z87.891   • 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   • Febrile illness R50.9   • Chemotherapy-induced thrombocytopenia D69.59, T45.1X5A   • Hyponatremia E87.1   • Hypokalemia E87.6   • Neutropenic fever (HCC) D70.9, R50.81    • Moderate malnutrition (HCC) E44.0   • Antineoplastic chemotherapy induced anemia D64.81, T45.1X5A   • History of radiation therapy Z92.3   • Encounter for care related to Port-a-Cath Z45.2   • Malignant neoplasm of upper-outer quadrant of left breast in female, estrogen receptor positive (HCC) C50.412, Z17.0   • Encounter for care related to vascular access port Z45.2   • Abnormal finding on GI tract imaging R93.3   • Bronchitis J40   • Obesity (BMI 30-39.9) E66.9     SURGERIES:  Past Surgical History:   Procedure Laterality Date   • APPENDECTOMY     • BENJAMIN PROCEDURE      No evidence of reflux disease while on Nexium 40mg daily-See report   • BREAST CYST ASPIRATION Left    • BREAST LUMPECTOMY     • COLONOSCOPY  01/12/2011    Diverticulosis sigmoid colon; The examination was otherwise normal; Repeat 10 years   • COLONOSCOPY  11/03/2003    Dr. Laguerre-Normal colonoscopy; Normal terminal ileum; Repeat 5 years   • COLONOSCOPY N/A 11/05/2021    Procedure: COLONOSCOPY WITH ANESTHESIA;  Surgeon: Annita Loaiza MD;  Location: [facility]COPY;  Service: Gastroenterology;  Laterality: N/A;  pre abnormal imaging  post  Shaheen Akbar DO   • CYSTOSCOPY N/A 9/20/2022    Procedure: CYSTOSCOPY WITH URETHRAL DILATATION;  Surgeon: Shaheen Conway MD;  Location: [facility] OR;  Service: Urology;  Laterality: N/A;   • ENDOSCOPY  07/01/2014    Normal esophagus; Normal stomach; Normal examined duodenum; BRAVO pH capsule deployed;    • ENDOSCOPY  08/14/2013    Mild gastritis-biopsies for H.Pylor obtained   • ENDOSCOPY  02/16/2005    Dr. LaguerreMdaus-Kgyrgqssq-pcszkcec   • ENDOSCOPY  10/21/2003    Dr. Laguerre-Stage 1 reflux esophagitis   • ENDOSCOPY N/A 11/05/2021    Procedure: ESOPHAGOGASTRODUODENOSCOPY WITH ANESTHESIA;  Surgeon: Annita Loaiza MD;  Location: [facility] ENDOSCOPY;  Service: Gastroenterology;  Laterality: N/A;  pre abnormal imaging  post AVM; hiatal hernia; esophageal polyp  Shaheen Akbar DO   • HYSTERECTOMY     •  MASTECTOMY W/ SENTINEL NODE BIOPSY Left 09/14/2021    Procedure: LEFT PARTIAL MASTECTOMY WITH MAGSEED AND LEFT SENTINEL LYMPH NODE BIOPSY MAGTRACE;  Surgeon: Quynh Rosario MD;  Location:  [facility] OR;  Service: General;  Laterality: Left;   • TONSILLECTOMY     • VAGINA SURGERY      Laser surgery X 2   • VENOUS ACCESS DEVICE (PORT) INSERTION N/A 09/29/2020    Procedure: SINGLE LUMEN PORT - A- CATH PLACEMENT WITH FLUOROSCOPY;  Surgeon: Quynh Rosario MD;  Location:  [facility] OR;  Service: General;  Laterality: N/A;     HEALTH MAINTENANCE ITEMS:  Health Maintenance Due   Topic Date Due   • ZOSTER VACCINE (2 of 3) 11/26/2015   • Pneumococcal Vaccine 65+ (2 - PCV) 10/01/2018   • DIABETIC EYE EXAM  11/25/2020   • COVID-19 Vaccine (3 - Moderna risk series) 01/25/2022   • LIPID PANEL  05/07/2022       <no information>  Last Completed Colonoscopy          COLORECTAL CANCER SCREENING (COLONOSCOPY - Every 10 Years) Next due on 11/5/2031 11/05/2021  Surgical Procedure: COLONOSCOPY    11/05/2021  COLONOSCOPY    01/29/2014  Outside Claim: NM FECAL BLOOD SCRN IMMUNOASSAY    02/01/2013  Outside Claim: CHG BLOOD,OCCULT,FECAL HGB,FECES,1-3 SIMULT    01/12/2011  SCANNED - COLONOSCOPY    Only the first 5 history entries have been loaded, but more history exists.              Immunization History   Administered Date(s) Administered   • COVID-19 (MODERNA) 1st, 2nd, 3rd Dose Only 03/26/2021, 04/23/2021   • COVID-19 (MODERNA) BOOSTER 12/28/2021   • FLUAD TRI 65YR+ 10/22/2019   • Flu Vaccine Split Quad 10/12/2018   • Fluad Quad 65+ 11/09/2020   • Fluzone High Dose =>65 Years ([employer] ONLY) 10/01/2018, 11/12/2021   • Fluzone High-Dose 65+yrs 11/12/2021   • Pneumococcal Polysaccharide (PPSV23) 10/16/2013   • Pneumococcal, Unspecified 10/01/2017   • Tdap 05/01/2019   • Zostavax 01/14/2010, 10/01/2015     Last Completed Mammogram          Scheduled - MAMMOGRAM (Yearly) Scheduled for 10/10/2022    03/07/2022  Mammo Diagnostic Digital  "Tomosynthesis Left With CAD    08/20/2021  Mammo Diagnostic Digital Tomosynthesis Left With CAD    08/18/2021  Mammo Screening Digital Tomosynthesis Bilateral With CAD    05/28/2020  Mammo Screening Digital Tomosynthesis Bilateral With CAD    05/20/2019  Mammo Screening Digital Tomosynthesis Bilateral With CAD    Only the first 5 history entries have been loaded, but more history exists.                  FAMILY HISTORY:  Family History   Problem Relation Age of Onset   • Cancer Mother    • Hypertension Mother    • Osteoporosis Mother    • Dementia Mother    • Uterine cancer Mother    • Heart disease Father    • Parkinsonism Father    • Cancer Sister    • Breast cancer Sister    • Kidney cancer Sister    • Diabetes Brother    • Heart disease Paternal Grandfather    • No Known Problems Maternal Grandmother    • No Known Problems Maternal Grandfather    • No Known Problems Paternal Grandmother    • Colon cancer Neg Hx    • Colon polyps Neg Hx    • Esophageal cancer Neg Hx    • Liver cancer Neg Hx    • Liver disease Neg Hx    • Rectal cancer Neg Hx    • Stomach cancer Neg Hx      SOCIAL HISTORY:  Social History     Socioeconomic History   • Marital status:    Tobacco Use   • Smoking status: Former Smoker     Packs/day: 0.25     Years: 3.00     Pack years: 0.75   • Smokeless tobacco: Never Used   • Tobacco comment: social smoker in college   Vaping Use   • Vaping Use: Never used   Substance and Sexual Activity   • Alcohol use: Not Currently     Comment: Occasional glass of wine   • Drug use: No   • Sexual activity: Defer       REVIEW OF SYSTEMS:    Review of Systems   Constitutional: Positive for fatigue. Negative for chills and fever.        \"I am okay. Just chronic back ache.\"   HENT: Negative for congestion, mouth sores and trouble swallowing.    Eyes: Negative for redness and visual disturbance.   Respiratory: Negative for cough, shortness of breath and wheezing.    Cardiovascular: Positive for leg swelling. " "Negative for chest pain.   Gastrointestinal: Negative for abdominal pain, nausea and vomiting.   Endocrine: Negative for polydipsia and polyphagia.   Genitourinary: Negative for difficulty urinating, dysuria and flank pain.   Musculoskeletal: Negative for neck stiffness.   Skin: Positive for pallor.   Allergic/Immunologic: Negative for food allergies.   Neurological: Negative for dizziness, speech difficulty and weakness.   Hematological: Negative for adenopathy. Does not bruise/bleed easily.   Psychiatric/Behavioral: Negative for agitation, confusion and hallucinations.       VITAL SIGNS: /60   Pulse 56   Temp 97 °F (36.1 °C)   Resp 18   Ht 150 cm (59.06\")   Wt 75.4 kg (166 lb 3.2 oz)   SpO2 97%   Breastfeeding No   BMI 33.50 kg/m²  Gained 4 pounds. \"Fluids in my legs.\"  Pain Score    09/28/22 1354   PainSc: 2  Comment: always in pain with my back.       PHYSICAL EXAMINATION:     Physical Exam  Vitals reviewed.   Constitutional:       General: She is not in acute distress.     Comments: \"She arrived in the exam in a wheelchair.\"   HENT:      Head: Normocephalic and atraumatic.   Eyes:      General: No scleral icterus.  Cardiovascular:      Rate and Rhythm: Normal rate.   Pulmonary:      Effort: No respiratory distress.      Breath sounds: No wheezing or rales.   Abdominal:      Palpations: Abdomen is soft.      Tenderness: There is no abdominal tenderness.   Musculoskeletal:         General: Swelling present.      Cervical back: Neck supple.   Skin:     General: Skin is warm.      Coloration: Skin is pale.   Neurological:      Mental Status: She is alert and oriented to person, place, and time.   Psychiatric:         Mood and Affect: Mood normal.         Behavior: Behavior normal.         Thought Content: Thought content normal.         Judgment: Judgment normal.         LABS    Lab Results - Last 18 Months   Lab Units 09/28/22  1331 09/13/22  1926 09/01/22  1609 07/20/22  1351 05/03/22  1352 " 01/11/22  1046 09/30/21  1841 09/30/21  1053 09/09/21  1459 06/24/21  1118 05/07/21  1117   HEMOGLOBIN g/dL 10.0* 9.4* 9.5* 9.9* 9.9* 9.2* 9.7* 10.0*   < > 11.3* 9.6*   HEMATOCRIT % 30.8* 27.3* 29.4* 30.9* 30.1* 29.6* 29.9* 31.3*   < > 34.3 29.3*   MCV fL 108.5* 104.2* 110.1* 107.7* 103.1* 102.4* 103.5* 105.7*   < > 100.6* 97.3*   WBC 10*3/mm3 6.01 7.03 6.0 8.24 5.73 4.15 5.79 5.10   < > 6.28 5.53   RDW % 13.1 12.9 12.9 14.2 12.9 13.3 14.0 13.8   < > 13.8 14.1   MPV fL 10.3 10.4 11.2 10.2 10.7 10.9 10.1 9.1   < > 9.3 10.0   PLATELETS 10*3/mm3 170 145 74* 177 176 153 197 179   < > 203 184   IMM GRAN % %  --  0.4  --   --  0.3  --  0.3 0.2  --  0.2 0.4   NEUTROS ABS 10*3/mm3 4.02 4.66 3.8 6.91 3.71 2.30 3.88 3.39   < > 4.60 3.82   LYMPHS ABS 10*3/mm3 1.11 1.49 1.2  --  1.20 0.92 1.12 1.04   < > 0.92 0.96   MONOS ABS 10*3/mm3 0.44 0.55 0.60  --  0.46 0.48 0.51 0.37   < > 0.58 0.49   EOS ABS 10*3/mm3 0.39 0.27 0.30 0.16 0.31 0.40 0.24 0.27   < > 0.14 0.21   BASOS ABS 10*3/mm3 0.04 0.03 0.00  --  0.03 0.03 0.02 0.02   < > 0.03 0.03   IMMATURE GRANS (ABS) 10*3/mm3  --  0.03 0.0  --  0.02  --  0.02 0.01  --  0.01 0.02   NRBC /100 WBC  --  0.0  --   --  0.0  --  0.0  --   --   --  0.0   NEUTROPHIL % %  --   --   --  77.8*  --   --   --   --   --   --   --    MONOCYTES % %  --   --   --  2.0*  --   --   --   --   --   --   --    ATYP LYMPH % %  --   --   --  1.0  --   --   --   --   --   --   --    ANISOCYTOSIS   --   --   --  Slight/1+  --   --   --   --   --   --   --     < > = values in this interval not displayed.       Lab Results - Last 18 Months   Lab Units 09/28/22  1331 09/13/22  1926 09/01/22  1609 05/03/22  1352 01/11/22  1046 09/30/21  1841 09/30/21  1053 09/14/21  0702 09/09/21  1459   GLUCOSE mg/dL 165* 98 95 129* 92 123* 129* 120* 85   SODIUM mmol/L 135* 136 132* 135* 138 136 160* 136 134*   POTASSIUM mmol/L 4.7 4.0 5.7* 4.3 4.3 3.9 4.9 4.9 5.4*   TOTAL CO2 mmol/L  --   --  23  --   --   --   --   --   --     CO2 mmol/L 26.0 25.0  --  23.0 24.0 26.0 25.0 24.0 24.0   CHLORIDE mmol/L 98 99 98 100 103 100 122* 101 102   ANION GAP mmol/L 11.0 12.0 11 12.0 11.0 10.0 13.0 11.0 8.0   CREATININE mg/dL 1.48* 1.43* 1.4* 1.35* 1.41* 1.44* 1.21* 1.58* 1.56*   BUN mg/dL 28* 18 23 17 21 20 19 38* 45*   BUN / CREAT RATIO  18.9 12.6  --  12.6 14.9 13.9 15.7 24.1 28.8*   CALCIUM mg/dL 9.3 9.4 9.7 9.6 9.5 9.1 9.4 9.6 9.7   EGFR IF NONAFRICN AM   --   --  36*  --  36* 35* 43* 32* 32*   ALK PHOS U/L 59 56 64 80 79 82 78 78 79   TOTAL PROTEIN g/dL 7.6 7.3 7.5 7.4 7.4 7.3 7.5 8.0 7.8   ALT (SGPT) U/L 10 10 10 6 5 8 5 <5 6   AST (SGOT) U/L 16 17 18 12 13 15 14 16 11   BILIRUBIN mg/dL 0.3 0.3 <0.2 0.3 0.2 0.2 0.3 0.2 0.3   ALBUMIN g/dL 4.30 4.20 4.3 4.00 4.00 4.30 4.20 4.50 4.20   GLOBULIN gm/dL 3.3 3.1  --  3.4 3.4 3.0 3.3 3.5 3.6       No results for input(s): MSPIKE, KAPPALAMB, IGLFLC, URICACID, FREEKAPPAL, CEA, LDH, REFLABREPO in the last 69257 hours.    Lab Results - Last 18 Months   Lab Units 09/28/22  1331 07/20/22  1351 05/03/22  1352 01/11/22  1046 09/30/21  1053 06/24/21  1118 05/07/21  1117   IRON mcg/dL 104 75 53 52 85 74 50   TIBC mcg/dL 331 317 302 328 314 320 331   IRON SATURATION % 31 24 18* 16* 27 23 15*   FERRITIN ng/mL  --  1,063.00* 785.90* 469.40* 493.00* 712.20* 245.90*       Dago Nunez reports a pain score of 2.  Given her pain assessment as noted, treatment options were discussed and the following options were decided upon as a follow-up plan to address the patient's pain: continuation of current treatment plan for pain.      ASSESSMENT:  1.  Left breast cancer.  Tumor size 1.1 cm.  Negative genetic testing.  AJCC stage: 1A (pT1c, snpN0, cM0)  Receptor status: Estrogen 87%, progesterone 47% and negative HER-2/gabriela.  Treatment status: Post left lumpectomy and sentinel lymph node biopsy.  Declined adjuvant radiation. On adjuvant letrozole.  2.  Macrocytic anemia from iron deficiency, B12 deficiency and history of  chronic kidney disease Stage III, GFR 56 mL/min on 09/03/2020.  3.   Iron deficiency. Intolerant to oral iron.   4.   Chronic kidney disease Stage IIIa, GFR 56 ml/min on 09/03/2020.  5.   Poor performance status of 3.     6.   Osteoporosis.  Taking calcium and vitamin D.  7.   Squamous cell cancer in situ, urethra.  Followed by Dr. Callahan  8.   Recurrent squamous cell carcinoma, vulva.  AJCC stage IIIA (T2, Nib, M0, G3).  Treatment status.  Had Mitomycin C and 5 FU D1 to D4 with radiation.  D29 - 32 chemo was cancelled due to poor performance status.          PLAN:  1.    Re:  Heme status.  Hemoglobin 10, hematocrit 30.8, and .5.    2.    Re:  Pre-office CMP.  GFR 35.7 from 37.2 from 40.1 from 36 from 35 ml/min.  3.    Re:  Ferritin 1026 from 785.9 from 469.4 and saturation 31 from 24 from 18 from 16%.    4.    Re:  Keep appointment with Dr Marks 10/2022.  Patient diagnosed with urinary retention 09/13/2022.  Freeman catheter placed.  She was given Macrobid for urinary tract infection.  5.    Re:   ER visit 09/30/2022 for difficulty urinating.  Post cystoscopy by Dr. Conway 09/20/2022. Severely strictured urethra with 2 areas of stenosis at the meatus as well as at the bladder neck Cystoscopy revealed no bladder lesions Urethra appeared to be free of malignancy however there was pale scar tissue throughout.  Seen by Dr. Leach 09/08/2022 for chronic kidney disease.   6.   CBC with differential, ferritin and iron panel in 2 months.  7.   Epoetin alpha 40,000 units SQ wekly if hemoglobin below 10 and hematocrit below 30 to target a hemoglobin of 11 and hematocrit 33. Move CBC weekly if she starts Retacrit.   Monitor for potential hypertension.  8.  Transfuse 1 unit packed RBCs if hemoglobin less than 6.9.  Premed Tylenol 500 mg p.o. and Benadryl 12.5 mg IV.  Lasix 20 mg IV push after a unit of blood.  Monitor for transfusion reactions.  9.   Intolerant to oral iron (nausea,  "cramps, and constipation).  IV iron as needed.   10.  Advance Care Planning   ACP discussion was held with the patient during this visit. Patient has an advance directive in EMR which is still valid.   11.  eRx Zofran 8 mg po every 8 hours as needed for nausea/vomiting, # 60, 2 refills if needed.  12.  Flush port every 6 weeks.   13.  Cervical/vaginal cytology as indicated.  14.  Vitamin B12 1000 mcg IM monthly by [facility].  Continue to monitor for local reaction.  15.  Continue ongoing management per primary care physician and other specialists.  16.  Will not obtain breast tumor markers or Oncotype DX.  Patient is not a candidate for adjuvant chemotherapy.  17.  eRx Femara 2.5 mg p.o. daily #90 with 3 refills if needed.  \"I take it at night.\" Monitor for hot flashes and bone loss.  18.Plan of care discussed with patient and spouse.  Understanding expressed.  Patient agreeable to proceed.  19.  Order bone density 10/2023.  20.  Mammogram order per surgery.  21.  Return to the office in 4 months with CBC with differential, ferritin, iron panel, and CMP.          I have reviewed the assessment and plan and verified the accuracy of it. No changes to assessment and plan since the information was documented. Drake Stafford MD 09/28/22        I spent 31 total minutes, face-to-face, caring for Syndal today.  Greater than 50% of this time involved counseling and/or coordination of care as documented within this note regarding the patient's illness(es), pros and cons of various treatment options, instructions and/or risk reduction.              cc: Shaheen Akbar MD         (Annita Loaiza MD)        (Ulises Roche MD)        (Trini Rosario MD)        (Shaheen Conway MD)        Gilberto Mills MD        (Moustapha Callahan MD)        (Radha Marks MD)    "

## 2022-09-19 ENCOUNTER — TELEPHONE (OUTPATIENT)
Dept: ONCOLOGY | Facility: CLINIC | Age: 82
End: 2022-09-19

## 2022-09-19 NOTE — TELEPHONE ENCOUNTER
Caller: SYNDAL    Relationship to patient: SELF    Best call back number: [phone]    Patient is needing: TO KNOW IF SHE CAN R/S HER 9-21-22 LAB APPT OR GET LABS WHILE SHE IS IN THE HOSPITAL FOR HER 9- DILATION PROCEDURE.

## 2022-09-19 NOTE — TELEPHONE ENCOUNTER
Caller: Dago Nunez    Relationship to patient: Self    Best call back number: [phone]    Chief complaint: PATIENT IS HAVING A PROCEDURE ON 9-20-22 AND NEEDS TO RESCHEDULE    Type of visit:LAB AND PORT FLUSH    Requested date: 9-23-22     If rescheduling, when is the original appointment: 9-21-22     Additional notes:PLEASE CALL TO ADVISE

## 2022-09-20 ENCOUNTER — HOSPITAL ENCOUNTER (OUTPATIENT)
Facility: HOSPITAL | Age: 82
Setting detail: HOSPITAL OUTPATIENT SURGERY
Discharge: HOME | End: 2022-09-20
Attending: UROLOGY | Admitting: UROLOGY

## 2022-09-20 ENCOUNTER — ANESTHESIA (OUTPATIENT)
Dept: PERIOP | Facility: HOSPITAL | Age: 82
End: 2022-09-20

## 2022-09-20 ENCOUNTER — ANESTHESIA EVENT (OUTPATIENT)
Dept: PERIOP | Facility: HOSPITAL | Age: 82
End: 2022-09-20

## 2022-09-20 VITALS
TEMPERATURE: 97.1 F | SYSTOLIC BLOOD PRESSURE: 167 MMHG | DIASTOLIC BLOOD PRESSURE: 53 MMHG | RESPIRATION RATE: 18 BRPM | HEART RATE: 56 BPM | WEIGHT: 169.75 LBS | OXYGEN SATURATION: 96 % | HEIGHT: 59 IN | BODY MASS INDEX: 34.22 KG/M2

## 2022-09-20 DIAGNOSIS — Z87.448 HISTORY OF URETHRAL STRICTURE: ICD-10-CM

## 2022-09-20 DIAGNOSIS — R33.9 URINARY RETENTION: ICD-10-CM

## 2022-09-20 LAB — GLUCOSE BLDC GLUCOMTR-MCNC: 115 MG/DL (ref 70–130)

## 2022-09-20 PROCEDURE — 25010000002 FENTANYL CITRATE (PF) 100 MCG/2ML SOLUTION: Performed by: NURSE ANESTHETIST, CERTIFIED REGISTERED

## 2022-09-20 PROCEDURE — 52281 CYSTOSCOPY AND TREATMENT: CPT | Performed by: UROLOGY

## 2022-09-20 PROCEDURE — 82962 GLUCOSE BLOOD TEST: CPT

## 2022-09-20 PROCEDURE — 25010000002 ONDANSETRON PER 1 MG: Performed by: NURSE ANESTHETIST, CERTIFIED REGISTERED

## 2022-09-20 PROCEDURE — 25010000002 PROPOFOL 10 MG/ML EMULSION: Performed by: NURSE ANESTHETIST, CERTIFIED REGISTERED

## 2022-09-20 RX ORDER — LABETALOL HYDROCHLORIDE 5 MG/ML
5 INJECTION, SOLUTION INTRAVENOUS
Status: DISCONTINUED | OUTPATIENT
Start: 2022-09-20 | End: 2022-09-20 | Stop reason: HOSPADM

## 2022-09-20 RX ORDER — DEXTROSE MONOHYDRATE 25 G/50ML
12.5 INJECTION, SOLUTION INTRAVENOUS AS NEEDED
Status: DISCONTINUED | OUTPATIENT
Start: 2022-09-20 | End: 2022-09-20 | Stop reason: HOSPADM

## 2022-09-20 RX ORDER — BUPIVACAINE HCL/0.9 % NACL/PF 0.125 %
PLASTIC BAG, INJECTION (ML) EPIDURAL AS NEEDED
Status: DISCONTINUED | OUTPATIENT
Start: 2022-09-20 | End: 2022-09-20 | Stop reason: SURG

## 2022-09-20 RX ORDER — FENTANYL CITRATE 50 UG/ML
INJECTION, SOLUTION INTRAMUSCULAR; INTRAVENOUS AS NEEDED
Status: DISCONTINUED | OUTPATIENT
Start: 2022-09-20 | End: 2022-09-20 | Stop reason: SURG

## 2022-09-20 RX ORDER — LIDOCAINE HYDROCHLORIDE 20 MG/ML
INJECTION, SOLUTION EPIDURAL; INFILTRATION; INTRACAUDAL; PERINEURAL AS NEEDED
Status: DISCONTINUED | OUTPATIENT
Start: 2022-09-20 | End: 2022-09-20 | Stop reason: SURG

## 2022-09-20 RX ORDER — SODIUM CHLORIDE 0.9 % (FLUSH) 0.9 %
10 SYRINGE (ML) INJECTION EVERY 12 HOURS SCHEDULED
Status: DISCONTINUED | OUTPATIENT
Start: 2022-09-20 | End: 2022-09-20 | Stop reason: HOSPADM

## 2022-09-20 RX ORDER — HYDROMORPHONE HYDROCHLORIDE 1 MG/ML
0.5 INJECTION, SOLUTION INTRAMUSCULAR; INTRAVENOUS; SUBCUTANEOUS
Status: DISCONTINUED | OUTPATIENT
Start: 2022-09-20 | End: 2022-09-20 | Stop reason: HOSPADM

## 2022-09-20 RX ORDER — DROPERIDOL 2.5 MG/ML
0.62 INJECTION, SOLUTION INTRAMUSCULAR; INTRAVENOUS ONCE AS NEEDED
Status: DISCONTINUED | OUTPATIENT
Start: 2022-09-20 | End: 2022-09-20 | Stop reason: HOSPADM

## 2022-09-20 RX ORDER — CLINDAMYCIN PHOSPHATE 900 MG/50ML
900 INJECTION INTRAVENOUS ONCE
Status: COMPLETED | OUTPATIENT
Start: 2022-09-20 | End: 2022-09-20

## 2022-09-20 RX ORDER — SODIUM CHLORIDE, SODIUM LACTATE, POTASSIUM CHLORIDE, CALCIUM CHLORIDE 600; 310; 30; 20 MG/100ML; MG/100ML; MG/100ML; MG/100ML
9 INJECTION, SOLUTION INTRAVENOUS CONTINUOUS
Status: DISCONTINUED | OUTPATIENT
Start: 2022-09-20 | End: 2022-09-20 | Stop reason: HOSPADM

## 2022-09-20 RX ORDER — FENTANYL CITRATE 50 UG/ML
25 INJECTION, SOLUTION INTRAMUSCULAR; INTRAVENOUS
Status: DISCONTINUED | OUTPATIENT
Start: 2022-09-20 | End: 2022-09-20 | Stop reason: HOSPADM

## 2022-09-20 RX ORDER — NALOXONE HCL 0.4 MG/ML
0.4 VIAL (ML) INJECTION AS NEEDED
Status: DISCONTINUED | OUTPATIENT
Start: 2022-09-20 | End: 2022-09-20 | Stop reason: HOSPADM

## 2022-09-20 RX ORDER — IBUPROFEN 600 MG/1
600 TABLET ORAL ONCE AS NEEDED
Status: DISCONTINUED | OUTPATIENT
Start: 2022-09-20 | End: 2022-09-20 | Stop reason: HOSPADM

## 2022-09-20 RX ORDER — ONDANSETRON 2 MG/ML
4 INJECTION INTRAMUSCULAR; INTRAVENOUS ONCE AS NEEDED
Status: DISCONTINUED | OUTPATIENT
Start: 2022-09-20 | End: 2022-09-20 | Stop reason: HOSPADM

## 2022-09-20 RX ORDER — PROPOFOL 10 MG/ML
VIAL (ML) INTRAVENOUS AS NEEDED
Status: DISCONTINUED | OUTPATIENT
Start: 2022-09-20 | End: 2022-09-20 | Stop reason: SURG

## 2022-09-20 RX ORDER — HYDROCODONE BITARTRATE AND ACETAMINOPHEN 7.5; 325 MG/1; MG/1
1 TABLET ORAL ONCE AS NEEDED
Status: DISCONTINUED | OUTPATIENT
Start: 2022-09-20 | End: 2022-09-20 | Stop reason: HOSPADM

## 2022-09-20 RX ORDER — OXYCODONE AND ACETAMINOPHEN 7.5; 325 MG/1; MG/1
2 TABLET ORAL EVERY 4 HOURS PRN
Status: DISCONTINUED | OUTPATIENT
Start: 2022-09-20 | End: 2022-09-20 | Stop reason: HOSPADM

## 2022-09-20 RX ORDER — SODIUM CHLORIDE 0.9 % (FLUSH) 0.9 %
3 SYRINGE (ML) INJECTION AS NEEDED
Status: DISCONTINUED | OUTPATIENT
Start: 2022-09-20 | End: 2022-09-20 | Stop reason: HOSPADM

## 2022-09-20 RX ORDER — SODIUM CHLORIDE 0.9 % (FLUSH) 0.9 %
10 SYRINGE (ML) INJECTION AS NEEDED
Status: DISCONTINUED | OUTPATIENT
Start: 2022-09-20 | End: 2022-09-20 | Stop reason: HOSPADM

## 2022-09-20 RX ORDER — LIDOCAINE HYDROCHLORIDE 10 MG/ML
0.5 INJECTION, SOLUTION EPIDURAL; INFILTRATION; INTRACAUDAL; PERINEURAL ONCE AS NEEDED
Status: DISCONTINUED | OUTPATIENT
Start: 2022-09-20 | End: 2022-09-20 | Stop reason: SDUPTHER

## 2022-09-20 RX ORDER — ONDANSETRON 2 MG/ML
INJECTION INTRAMUSCULAR; INTRAVENOUS AS NEEDED
Status: DISCONTINUED | OUTPATIENT
Start: 2022-09-20 | End: 2022-09-20 | Stop reason: SURG

## 2022-09-20 RX ORDER — LIDOCAINE HYDROCHLORIDE 10 MG/ML
0.5 INJECTION, SOLUTION EPIDURAL; INFILTRATION; INTRACAUDAL; PERINEURAL ONCE AS NEEDED
Status: DISCONTINUED | OUTPATIENT
Start: 2022-09-20 | End: 2022-09-20 | Stop reason: HOSPADM

## 2022-09-20 RX ORDER — SODIUM CHLORIDE, SODIUM LACTATE, POTASSIUM CHLORIDE, CALCIUM CHLORIDE 600; 310; 30; 20 MG/100ML; MG/100ML; MG/100ML; MG/100ML
1000 INJECTION, SOLUTION INTRAVENOUS CONTINUOUS
Status: DISCONTINUED | OUTPATIENT
Start: 2022-09-20 | End: 2022-09-20 | Stop reason: HOSPADM

## 2022-09-20 RX ORDER — MAGNESIUM HYDROXIDE 1200 MG/15ML
LIQUID ORAL AS NEEDED
Status: DISCONTINUED | OUTPATIENT
Start: 2022-09-20 | End: 2022-09-20 | Stop reason: HOSPADM

## 2022-09-20 RX ORDER — OXYCODONE AND ACETAMINOPHEN 10; 325 MG/1; MG/1
1 TABLET ORAL ONCE AS NEEDED
Status: DISCONTINUED | OUTPATIENT
Start: 2022-09-20 | End: 2022-09-20 | Stop reason: HOSPADM

## 2022-09-20 RX ORDER — FLUMAZENIL 0.1 MG/ML
0.2 INJECTION INTRAVENOUS AS NEEDED
Status: DISCONTINUED | OUTPATIENT
Start: 2022-09-20 | End: 2022-09-20 | Stop reason: HOSPADM

## 2022-09-20 RX ADMIN — OXYCODONE HYDROCHLORIDE AND ACETAMINOPHEN 2 TABLET: 7.5; 325 TABLET ORAL at 11:56

## 2022-09-20 RX ADMIN — CLINDAMYCIN IN 5 PERCENT DEXTROSE 900 MG: 18 INJECTION, SOLUTION INTRAVENOUS at 11:06

## 2022-09-20 RX ADMIN — FENTANYL CITRATE 100 MCG: 50 INJECTION INTRAMUSCULAR; INTRAVENOUS at 11:03

## 2022-09-20 RX ADMIN — ONDANSETRON 4 MG: 2 INJECTION INTRAMUSCULAR; INTRAVENOUS at 11:22

## 2022-09-20 RX ADMIN — LIDOCAINE HYDROCHLORIDE 80 MG: 20 INJECTION, SOLUTION EPIDURAL; INFILTRATION; INTRACAUDAL; PERINEURAL at 11:03

## 2022-09-20 RX ADMIN — Medication 200 MCG: at 11:18

## 2022-09-20 RX ADMIN — PROPOFOL 100 MG: 10 INJECTION, EMULSION INTRAVENOUS at 11:03

## 2022-09-20 RX ADMIN — Medication 100 MCG: at 11:12

## 2022-09-20 RX ADMIN — SODIUM CHLORIDE, POTASSIUM CHLORIDE, SODIUM LACTATE AND CALCIUM CHLORIDE 1000 ML: 600; 310; 30; 20 INJECTION, SOLUTION INTRAVENOUS at 10:43

## 2022-09-20 NOTE — OP NOTE
CYSTOSCOPY  Procedure Note    Dago Nunez  9/20/2022    Pre-op Diagnosis:   Urinary retention [R33.9]  History of urethral stricture [Z87.448]    Post-op Diagnosis:     Post-Op Diagnosis Codes:     * Urinary retention [R33.9]     * History of urethral stricture [Z87.448]    Procedure/CPT® Codes:      Procedure(s):  CYSTOSCOPY WITH URETHRAL DILATATION    Surgeon(s):  Shaheen Conway MD    Anesthesia: General    Staff:   Circulator: Nohelia Baez RN  Scrub Person: Azam Garzon    Estimated Blood Loss: minimal    Specimens:                None      Drains: * No LDAs found *    Findings: Severely strictured urethra with 2 areas of stenosis at the meatus as well as at the bladder neck  Cystoscopy revealed no bladder lesions  Urethra appeared to be free of malignancy however there was pale scar tissue throughout    Complications: None    Indications: 82-year-old female with a extremely complicated gynecologic and urologic history please refer to previous notes regarding this.  She has had urethral stenosis for greater than 20 years.  She has had difficulty catheterizing her urethra with 10 Chilean.  Was felt that she would be unable to tolerate a dilation in the office and she is brought the operating room.    Description of Procedure:   After informed consent was obtained, patient was brought to the operating room where patient underwent general endotracheal anesthesia in the supine position.  Patient was converted to the dorsolithotomy position.  Prepped and draped in the usual sterile fashion.  Timeout was done to ensure the correct patient, procedure, and site.  Patient did receive preoperative antibiotics in the holding area.    I attempted to initially place a 10 Chilean female urethral dilator and was unable to do so.  I placed a 0.38 sensor tip wire into the urethra.  I then went alongside the wire with the 10 Chilean dilator and got return of the urine from the bladder.  I sequentially  dilated all the way up to 24 Occ[place].  I then placed a 16 Occitan flexible cystoscope into the urethra and bladder.  The bladder was carefully methodically inspected there was no lesions noted.  There was no evidence of bladder tumor lesion.  I then carefully inspected the urethra there was some areas of scar I did not see any fungating masses.  There was no evidence of perforation.  I then removed the scope.  I placed a 16 Occitan dilator and drained the bladder completely.  Patient was awakened from anesthesia and transferred recovery in satisfactory condition.    Shaheen Conway MD     Date: 9/20/2022  Time: 11:48 CDT

## 2022-09-20 NOTE — TELEPHONE ENCOUNTER
Called and left message for patient on mobile number. Home number listed would ring and connect then disconnect after few seconds.

## 2022-09-20 NOTE — ANESTHESIA PREPROCEDURE EVALUATION
Anesthesia Evaluation     Patient summary reviewed   history of anesthetic complications:  NPO Solid Status: > 8 hours             Airway   Mallampati: II  TM distance: >3 FB  Neck ROM: full  Dental      Pulmonary    (-) COPD, asthma, sleep apnea, not a smoker  Cardiovascular     (+) hypertension, hyperlipidemia,   (-) pacemaker, past MI, angina, cardiac stents      Neuro/Psych  (-) seizures, TIA, CVA  GI/Hepatic/Renal/Endo    (+) obesity,  GERD,  renal disease CRI, diabetes mellitus,   (-) liver disease    Musculoskeletal     Abdominal    Substance History      OB/GYN          Other   blood dyscrasia anemia,                     Anesthesia Plan    ASA 3     general     intravenous induction     Anesthetic plan, risks, benefits, and alternatives have been provided, discussed and informed consent has been obtained with: patient.        CODE STATUS:

## 2022-09-20 NOTE — ANESTHESIA POSTPROCEDURE EVALUATION
"Patient: Dago Nunez    Procedure Summary     Date: 09/20/22 Room / Location:  [facility]  /  [facility]    Anesthesia Start: 1100 Anesthesia Stop: 1130    Procedure: CYSTOSCOPY WITH URETHRAL DILATATION (N/A Urethra) Diagnosis:       Urinary retention      History of urethral stricture      (Urinary retention [R33.9])      (History of urethral stricture [Z87.448])    Surgeons: Shaheen Conway MD Provider: Adonay Florence CRNA    Anesthesia Type: general ASA Status: 3          Anesthesia Type: general    Vitals  Vitals Value Taken Time   /68 09/20/22 1155   Temp 97.1 °F (36.2 °C) 09/20/22 1155   Pulse 58 09/20/22 1200   Resp 16 09/20/22 1155   SpO2 97 % 09/20/22 1200   Vitals shown include unvalidated device data.        Post Anesthesia Care and Evaluation    Patient location during evaluation: PACU  Patient participation: complete - patient participated  Level of consciousness: awake and alert  Pain management: adequate    Airway patency: patent  Anesthetic complications: No anesthetic complications    Cardiovascular status: acceptable  Respiratory status: acceptable  Hydration status: acceptable    Comments: Blood pressure 162/68, pulse 56, temperature 97.1 °F (36.2 °C), temperature source Temporal, resp. rate 16, height 150 cm (59.06\"), weight 77 kg (169 lb 12.1 oz), SpO2 97 %, not currently breastfeeding.    Pt discharged from PACU based on mikki score >8      "

## 2022-09-20 NOTE — ANESTHESIA PROCEDURE NOTES
Airway  Urgency: elective    Date/Time: 9/20/2022 11:03 AM  Airway not difficult    General Information and Staff    Patient location during procedure: OR  CRNA/CAA: Adonay Florence CRNA    Indications and Patient Condition    Preoxygenated: yes  Mask difficulty assessment: 0 - not attempted    Final Airway Details  Final airway type: supraglottic airway      Successful airway: classic  Size 3    Number of attempts at approach: 1  Assessment: lips, teeth, and gum same as pre-op

## 2022-09-21 ENCOUNTER — APPOINTMENT (OUTPATIENT)
Dept: LAB | Facility: HOSPITAL | Age: 82
End: 2022-09-21

## 2022-09-28 ENCOUNTER — OFFICE VISIT (OUTPATIENT)
Dept: ONCOLOGY | Facility: CLINIC | Age: 82
End: 2022-09-28

## 2022-09-28 ENCOUNTER — LAB (OUTPATIENT)
Dept: LAB | Facility: HOSPITAL | Age: 82
End: 2022-09-28

## 2022-09-28 VITALS
SYSTOLIC BLOOD PRESSURE: 148 MMHG | HEIGHT: 59 IN | TEMPERATURE: 97 F | RESPIRATION RATE: 18 BRPM | WEIGHT: 166.2 LBS | HEART RATE: 56 BPM | DIASTOLIC BLOOD PRESSURE: 60 MMHG | OXYGEN SATURATION: 97 % | BODY MASS INDEX: 33.51 KG/M2

## 2022-09-28 DIAGNOSIS — Z17.0 MALIGNANT NEOPLASM OF UPPER-OUTER QUADRANT OF LEFT BREAST IN FEMALE, ESTROGEN RECEPTOR POSITIVE: ICD-10-CM

## 2022-09-28 DIAGNOSIS — C77.4 SECONDARY MALIGNANCY OF INGUINAL LYMPH NODES: Primary | ICD-10-CM

## 2022-09-28 DIAGNOSIS — D63.1 ANEMIA DUE TO STAGE 3A CHRONIC KIDNEY DISEASE: ICD-10-CM

## 2022-09-28 DIAGNOSIS — N18.31 ANEMIA DUE TO STAGE 3A CHRONIC KIDNEY DISEASE: ICD-10-CM

## 2022-09-28 DIAGNOSIS — N18.31 STAGE 3A CHRONIC KIDNEY DISEASE: Primary | ICD-10-CM

## 2022-09-28 DIAGNOSIS — C50.412 MALIGNANT NEOPLASM OF UPPER-OUTER QUADRANT OF LEFT BREAST IN FEMALE, ESTROGEN RECEPTOR POSITIVE: ICD-10-CM

## 2022-09-28 DIAGNOSIS — N18.32 STAGE 3B CHRONIC KIDNEY DISEASE: ICD-10-CM

## 2022-09-28 DIAGNOSIS — C51.9 VULVAR CANCER, CARCINOMA: ICD-10-CM

## 2022-09-28 DIAGNOSIS — Z45.2 ENCOUNTER FOR CARE RELATED TO VASCULAR ACCESS PORT: ICD-10-CM

## 2022-09-28 LAB
ALBUMIN SERPL-MCNC: 4.3 G/DL (ref 3.5–5.2)
ALBUMIN/GLOB SERPL: 1.3 G/DL
ALP SERPL-CCNC: 59 U/L (ref 39–117)
ALT SERPL W P-5'-P-CCNC: 10 U/L (ref 1–33)
ANION GAP SERPL CALCULATED.3IONS-SCNC: 11 MMOL/L (ref 5–15)
AST SERPL-CCNC: 16 U/L (ref 1–32)
BASOPHILS # BLD AUTO: 0.04 10*3/MM3 (ref 0–0.2)
BASOPHILS NFR BLD AUTO: 0.7 % (ref 0–1.5)
BILIRUB SERPL-MCNC: 0.3 MG/DL (ref 0–1.2)
BUN SERPL-MCNC: 28 MG/DL (ref 8–23)
BUN/CREAT SERPL: 18.9 (ref 7–25)
CALCIUM SPEC-SCNC: 9.3 MG/DL (ref 8.6–10.5)
CHLORIDE SERPL-SCNC: 98 MMOL/L (ref 98–107)
CO2 SERPL-SCNC: 26 MMOL/L (ref 22–29)
CREAT SERPL-MCNC: 1.48 MG/DL (ref 0.57–1)
DEPRECATED RDW RBC AUTO: 51.7 FL (ref 37–54)
EGFRCR SERPLBLD CKD-EPI 2021: 35.7 ML/MIN/1.73
EOSINOPHIL # BLD AUTO: 0.39 10*3/MM3 (ref 0–0.4)
EOSINOPHIL NFR BLD AUTO: 6.5 % (ref 0.3–6.2)
ERYTHROCYTE [DISTWIDTH] IN BLOOD BY AUTOMATED COUNT: 13.1 % (ref 12.3–15.4)
FERRITIN SERPL-MCNC: 1026 NG/ML (ref 13–150)
GLOBULIN UR ELPH-MCNC: 3.3 GM/DL
GLUCOSE SERPL-MCNC: 165 MG/DL (ref 65–99)
HCT VFR BLD AUTO: 30.8 % (ref 34–46.6)
HGB BLD-MCNC: 10 G/DL (ref 12–15.9)
HOLD SPECIMEN: NORMAL
IRON 24H UR-MRATE: 104 MCG/DL (ref 37–145)
IRON SATN MFR SERPL: 31 % (ref 20–50)
LYMPHOCYTES # BLD AUTO: 1.11 10*3/MM3 (ref 0.7–3.1)
LYMPHOCYTES NFR BLD AUTO: 18.5 % (ref 19.6–45.3)
MCH RBC QN AUTO: 35.2 PG (ref 26.6–33)
MCHC RBC AUTO-ENTMCNC: 32.5 G/DL (ref 31.5–35.7)
MCV RBC AUTO: 108.5 FL (ref 79–97)
MONOCYTES # BLD AUTO: 0.44 10*3/MM3 (ref 0.1–0.9)
MONOCYTES NFR BLD AUTO: 7.3 % (ref 5–12)
NEUTROPHILS NFR BLD AUTO: 4.02 10*3/MM3 (ref 1.7–7)
NEUTROPHILS NFR BLD AUTO: 66.8 % (ref 42.7–76)
PLATELET # BLD AUTO: 170 10*3/MM3 (ref 140–450)
PMV BLD AUTO: 10.3 FL (ref 6–12)
POTASSIUM SERPL-SCNC: 4.7 MMOL/L (ref 3.5–5.2)
PROT SERPL-MCNC: 7.6 G/DL (ref 6–8.5)
RBC # BLD AUTO: 2.84 10*6/MM3 (ref 3.77–5.28)
SODIUM SERPL-SCNC: 135 MMOL/L (ref 136–145)
TIBC SERPL-MCNC: 331 MCG/DL (ref 298–536)
TRANSFERRIN SERPL-MCNC: 222 MG/DL (ref 200–360)
WBC NRBC COR # BLD: 6.01 10*3/MM3 (ref 3.4–10.8)

## 2022-09-28 PROCEDURE — 83540 ASSAY OF IRON: CPT

## 2022-09-28 PROCEDURE — 82728 ASSAY OF FERRITIN: CPT

## 2022-09-28 PROCEDURE — 36415 COLL VENOUS BLD VENIPUNCTURE: CPT

## 2022-09-28 PROCEDURE — 84466 ASSAY OF TRANSFERRIN: CPT

## 2022-09-28 PROCEDURE — 80053 COMPREHEN METABOLIC PANEL: CPT

## 2022-09-28 PROCEDURE — 99214 OFFICE O/P EST MOD 30 MIN: CPT | Performed by: INTERNAL MEDICINE

## 2022-09-28 PROCEDURE — 85025 COMPLETE CBC W/AUTO DIFF WBC: CPT

## 2022-09-28 RX ORDER — SODIUM CHLORIDE 0.9 % (FLUSH) 0.9 %
10 SYRINGE (ML) INJECTION AS NEEDED
Status: CANCELLED | OUTPATIENT
Start: 2022-09-28

## 2022-09-28 RX ORDER — SODIUM CHLORIDE 0.9 % (FLUSH) 0.9 %
10 SYRINGE (ML) INJECTION AS NEEDED
Status: SHIPPED | OUTPATIENT
Start: 2022-09-28

## 2022-09-28 RX ORDER — HEPARIN SODIUM (PORCINE) LOCK FLUSH IV SOLN 100 UNIT/ML 100 UNIT/ML
500 SOLUTION INTRAVENOUS AS NEEDED
Status: CANCELLED | OUTPATIENT
Start: 2022-09-28

## 2022-09-28 RX ORDER — HEPARIN SODIUM (PORCINE) LOCK FLUSH IV SOLN 100 UNIT/ML 100 UNIT/ML
500 SOLUTION INTRAVENOUS AS NEEDED
Status: SHIPPED | OUTPATIENT
Start: 2022-09-28

## 2022-09-28 RX ADMIN — Medication 10 ML: at 14:18

## 2022-09-28 RX ADMIN — HEPARIN SODIUM (PORCINE) LOCK FLUSH IV SOLN 100 UNIT/ML 500 UNITS: 100 SOLUTION at 14:18

## 2022-10-10 ENCOUNTER — HOSPITAL ENCOUNTER (OUTPATIENT)
Dept: MAMMOGRAPHY | Facility: HOSPITAL | Age: 82
Discharge: HOME | End: 2022-10-10

## 2022-10-10 ENCOUNTER — HOSPITAL ENCOUNTER (OUTPATIENT)
Dept: ULTRASOUND IMAGING | Facility: HOSPITAL | Age: 82
Discharge: HOME | End: 2022-10-10

## 2022-10-10 DIAGNOSIS — Z17.0 MALIGNANT NEOPLASM OF UPPER-OUTER QUADRANT OF LEFT BREAST IN FEMALE, ESTROGEN RECEPTOR POSITIVE: ICD-10-CM

## 2022-10-10 DIAGNOSIS — C50.412 MALIGNANT NEOPLASM OF UPPER-OUTER QUADRANT OF LEFT BREAST IN FEMALE, ESTROGEN RECEPTOR POSITIVE: ICD-10-CM

## 2022-10-10 PROCEDURE — G0279 TOMOSYNTHESIS, MAMMO: HCPCS

## 2022-10-10 PROCEDURE — 77066 DX MAMMO INCL CAD BI: CPT

## 2022-10-18 DIAGNOSIS — R30.0 DYSURIA: ICD-10-CM

## 2022-10-18 DIAGNOSIS — R30.1 PAINFUL BLADDER SPASM: ICD-10-CM

## 2022-10-19 RX ORDER — PHENAZOPYRIDINE HYDROCHLORIDE 200 MG/1
TABLET, FILM COATED ORAL
Qty: 15 TABLET | Refills: 0 | Status: SHIPPED | OUTPATIENT
Start: 2022-10-19

## 2022-10-19 NOTE — TELEPHONE ENCOUNTER
Rx Refill Note  Requested Prescriptions     Pending Prescriptions Disp Refills   • phenazopyridine (PYRIDIUM) 200 MG tablet [Pharmacy Med Name: PHENAZOPYRIDINE 200 MG  Tablet] 15 tablet 0     Sig: TAKE 1 TABLET BY MOUTH THREE TIMES A DAY AS NEEDED FOR BLADDER SPASMS      Last office visit with prescribing clinician: 4/21/2022      Next office visit with prescribing clinician: 10/24/2022            ADAMA Rodriguez  10/19/22, 09:38 CDT

## 2022-10-20 ENCOUNTER — OFFICE VISIT (OUTPATIENT)
Dept: SURGERY | Facility: CLINIC | Age: 82
End: 2022-10-20

## 2022-10-20 VITALS
WEIGHT: 166.23 LBS | HEART RATE: 66 BPM | BODY MASS INDEX: 33.51 KG/M2 | SYSTOLIC BLOOD PRESSURE: 151 MMHG | HEIGHT: 59 IN | DIASTOLIC BLOOD PRESSURE: 67 MMHG

## 2022-10-20 DIAGNOSIS — E66.09 CLASS 1 OBESITY DUE TO EXCESS CALORIES WITH BODY MASS INDEX (BMI) OF 33.0 TO 33.9 IN ADULT, UNSPECIFIED WHETHER SERIOUS COMORBIDITY PRESENT: ICD-10-CM

## 2022-10-20 DIAGNOSIS — R59.0 LYMPHADENOPATHY, AXILLARY: ICD-10-CM

## 2022-10-20 DIAGNOSIS — Z85.3 PERSONAL HISTORY OF BREAST CANCER: Primary | ICD-10-CM

## 2022-10-20 PROCEDURE — 99214 OFFICE O/P EST MOD 30 MIN: CPT | Performed by: STUDENT IN AN ORGANIZED HEALTH CARE EDUCATION/TRAINING PROGRAM

## 2022-10-20 NOTE — PROGRESS NOTES
Office Established Patient Note:     Referring Provider: No ref. provider found    Chief Complaint   Patient presents with   • Follow-up       Subjective .     History of present illness:  Dago Nunez is a 82 y.o. female s/p left lumpectomy with sentinel lymph node biopsy on 9/14/21 for ER+ID+Her2- invsaive ductal carcinoma with final pathology showing stage IA breast cancer. She declined post op radiation. She is on adjuvant letrozole and is tolerating it well. She denies any breast masses, nipple discharge, skin changes on her breasts.     She now reports a knot under her left arm after her mammogram. It is painful with movement. It first popped up 4-5 days after her mammogram. She denies fevers or chills.     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 Laguerre-Normal colonoscopy; Normal terminal ileum; Repeat 5 years   • COLONOSCOPY N/A 11/05/2021    Procedure: COLONOSCOPY WITH ANESTHESIA;  Surgeon: Annita Loaiza MD;  Location: [facility]  ENDOSCOPY;  Service: Gastroenterology;  Laterality: N/A;  pre abnormal imaging  post  Shaheen Akbar DO   • CYSTOSCOPY N/A 9/20/2022    Procedure: CYSTOSCOPY WITH URETHRAL DILATATION;  Surgeon: Shaheen Conway MD;  Location: [facility] OR;  Service: Urology;  Laterality: N/A;   • ENDOSCOPY  07/01/2014    Normal esophagus; Normal stomach; Normal examined duodenum; BRAVO pH capsule deployed;    • ENDOSCOPY  08/14/2013    Mild gastritis-biopsies for H.Pylor obtained   • ENDOSCOPY  02/16/2005    Dr. LaguerreTzhqs-Zqhhulkxk-slqgekho   • ENDOSCOPY  10/21/2003    Dr. Laguerre-Stage 1 reflux esophagitis   • ENDOSCOPY N/A 11/05/2021    Procedure: ESOPHAGOGASTRODUODENOSCOPY WITH ANESTHESIA;  Surgeon: Annita Loaiza MD;  Location: [facility] ENDOSCOPY;  Service: Gastroenterology;  Laterality: N/A;  pre abnormal imaging  post AVM; hiatal hernia; esophageal polyp  Shaheen Akbar DO   • HYSTERECTOMY     • MASTECTOMY W/ SENTINEL NODE BIOPSY Left 09/14/2021    Procedure: LEFT PARTIAL MASTECTOMY WITH MAGSEED AND LEFT SENTINEL LYMPH NODE BIOPSY MAGTRACE;  Surgeon: Quynh Rosario MD;  Location: [facility] OR;  Service: General;  Laterality: Left;   • TONSILLECTOMY     • VAGINA SURGERY      Laser surgery X 2   • VENOUS ACCESS DEVICE (PORT) INSERTION N/A 09/29/2020    Procedure: SINGLE LUMEN PORT - A- CATH PLACEMENT WITH FLUOROSCOPY;  Surgeon: Quynh Rosario MD;  Location: [facility] OR;  Service: General;  Laterality: N/A;   ,   Family History   Problem Relation Age of Onset   • Cancer Mother    • Hypertension Mother    • Osteoporosis Mother    • Dementia Mother    • Uterine cancer Mother    • Heart disease Father    • Parkinsonism Father    • Cancer Sister    • Breast cancer Sister    • Kidney cancer Sister    • Diabetes Brother    • Heart disease Paternal Grandfather    • No Known Problems Maternal Grandmother    • No Known Problems Maternal Grandfather    • No Known Problems Paternal Grandmother    • Colon cancer Neg Hx    • Colon  polyps Neg Hx    • Esophageal cancer Neg Hx    • Liver cancer Neg Hx    • Liver disease Neg Hx    • Rectal cancer Neg Hx    • Stomach cancer Neg Hx    ,   Social History     Tobacco Use   • Smoking status: Former     Packs/day: 0.25     Years: 3.00     Pack years: 0.75     Types: Cigarettes   • Smokeless tobacco: Never   • Tobacco comments:     social smoker in college   Vaping Use   • Vaping Use: Never used   Substance Use Topics   • Alcohol use: Not Currently     Comment: Occasional glass of wine   • Drug use: No   , (Not in a hospital admission)   and Allergies:  Scopolamine, Amoxicillin-pot clavulanate, Keflex [cephalexin], Septra [sulfamethoxazole-trimethoprim], Tequin [gatifloxacin], and Trovan [alatrofloxacin]    Current Outpatient Medications:   •  Acetaminophen (TYLENOL ARTHRITIS PAIN PO), Take 1 tablet by mouth Daily As Needed (BACK PAIN)., Disp: , Rfl:   •  bisoprolol-hydrochlorothiazide (ZIAC) 5-6.25 MG per tablet, TAKE 1 TABLET DAILY, Disp: 90 tablet, Rfl: 1  •  calcium carbonate (OS-REAGAN) 600 MG tablet, Take 600 mg by mouth Daily., Disp: , Rfl:   •  cefdinir (OMNICEF) 300 MG capsule, Take 1 capsule by mouth Daily., Disp: 7 capsule, Rfl: 0  •  cetirizine (zyrTEC) 10 MG tablet, Take 10 mg by mouth Daily., Disp: , Rfl:   •  citalopram (CeleXA) 20 MG tablet, TAKE 1 TABLET BY MOUTH EVERY DAY, Disp: 90 tablet, Rfl: 1  •  cyanocobalamin 1000 MCG/ML injection, INJECT 1 ML INTO THE MUSCLE EVERY 4 WEEKS. (Patient taking differently: Inject 1 mL into the appropriate muscle as directed by prescriber Every 28 (Twenty-Eight) Days. - VITAMIN B12 -      INJECT 1 ML INTO THE MUSCLE EVERY 4 WEEKS.), Disp: 3 mL, Rfl: 0  •  diphenhydrAMINE (BENADRYL) 25 mg capsule, Take 25 mg by mouth Every 6 (Six) Hours As Needed for Allergies., Disp: , Rfl:   •  diphenoxylate-atropine (LOMOTIL) 2.5-0.025 MG per tablet, TAKE 2 TABLETS BY MOUTH FOUR TIMES A DAY AS NEEDED FOR DIARRHEA, Disp: 90 tablet, Rfl: 2  •  docusate sodium (Colace)  100 MG capsule, Take 1 capsule by mouth 2 (Two) Times a Day., Disp: 60 capsule, Rfl: 0  •  DULERA 100-5 MCG/ACT inhaler, Inhale 2 puffs 2 (Two) Times a Day. Rinse and spit after using., Disp: 6 inhaler, Rfl: 0  •  esomeprazole (nexIUM) 40 MG capsule, Take 1 capsule by mouth 2 (Two) Times a Day., Disp: 180 capsule, Rfl: 3  •  furosemide (LASIX) 40 MG tablet, TAKE 1 AND 1/2 TABLETS ONCEDAILY, Disp: 135 tablet, Rfl: 3  •  gabapentin (NEURONTIN) 300 MG capsule, TAKE TWO CAPSULES BY MOUTH THREE TIMES A DAY, Disp: 180 capsule, Rfl: 2  •  Homeopathic Products (LEG CRAMPS) tablet, Take 1 tablet by mouth Daily., Disp: , Rfl:   •  HYDROcodone-acetaminophen (NORCO)  MG per tablet, Take 1 tablet by mouth Every 4 (Four) Hours As Needed for Moderate Pain or Severe Pain., Disp: 60 tablet, Rfl: 0  •  Hydrocortisone (britany's amazing butt) cream, Apply 1 application topically to the appropriate area as directed As Needed (irritation)., Disp: 120 g, Rfl: 0  •  letrozole (FEMARA) 2.5 MG tablet, TAKE 1 TABLET BY MOUTH 1 TIME DAILY, Disp: 90 tablet, Rfl: 2  •  lidocaine (XYLOCAINE) 5 % ointment, Apply  topically to the appropriate area as directed Every 4 (Four) Hours As Needed for Mild Pain  (pain secondary to radiation)., Disp: 240 g, Rfl: 1  •  Lidocaine Viscous HCl (XYLOCAINE) 2 % solution, Take 5 mL by mouth 3 (Three) Times a Day With Meals., Disp: 100 mL, Rfl: 0  •  metOLazone (ZAROXOLYN) 2.5 MG tablet, TAKE 1 TABLET BY MOUTH THREE TIMES A WEEK, Disp: , Rfl:   •  nitrofurantoin (MACRODANTIN) 25 MG capsule, Take 1 capsule by mouth Every Night. To help reduce pain with urination, Disp: 7 capsule, Rfl: 0  •  ondansetron (ZOFRAN) 8 MG tablet, TAKE 1 TABLET BY MOUTH EVERY 8 HOURS AS NEEDED FOR NAUSEA AND VOMITING, Disp: 60 tablet, Rfl: 2  •  phenazopyridine (PYRIDIUM) 100 MG tablet, Take 1 tablet by mouth 3 (Three) Times a Day As Needed for Bladder Spasms., Disp: 20 tablet, Rfl: 0  •  phenazopyridine (PYRIDIUM) 200 MG tablet, TAKE  "1 TABLET BY MOUTH THREE TIMES A DAY AS NEEDED FOR BLADDER SPASMS, Disp: 15 tablet, Rfl: 0  •  potassium chloride (K-DUR,KLOR-CON) 20 MEQ CR tablet, TAKE 2 TABLETS BY MOUTH EVERY DAY, Disp: 60 tablet, Rfl: 5  •  pravastatin (PRAVACHOL) 40 MG tablet, Take 1 tablet by mouth Every Night., Disp: 90 tablet, Rfl: 3  •  Syringe 25G X 1\" 3 ML misc, 1 each Daily., Disp: 25 each, Rfl: 1  •  vitamin D (ERGOCALCIFEROL) 1.25 MG (81250 UT) capsule capsule, Take 1 capsule by mouth 1 (One) Time Per Week., Disp: 12 capsule, Rfl: 3    Current Facility-Administered Medications:   •  lidocaine (XYLOCAINE) 1 % injection 10 mL, 10 mL, Infiltration, Once, Shaheen Akabr DO  •  lidocaine 1% - EPINEPHrine 1:681638 (XYLOCAINE W/EPI) 1 %-1:550264 injection 10 mL, 10 mL, Infiltration, Once, Shaheen Akbar DO    Facility-Administered Medications Ordered in Other Visits:   •  heparin injection 500 Units, 500 Units, Intravenous, PRRivera BRUNO Winston, MD, 500 Units at 07/01/21 1354  •  heparin injection 500 Units, 500 Units, Intravenous, PRRivera BRUNO Winston, MD, 500 Units at 01/11/22 1134  •  heparin injection 500 Units, 500 Units, Intravenous, Rivera DAMON Winston, MD, 500 Units at 09/28/22 1418  •  sodium chloride 0.9 % flush 10 mL, 10 mL, Intravenous, Rivera DAMON Winston, MD, 10 mL at 07/01/21 1354  •  sodium chloride 0.9 % flush 10 mL, 10 mL, Intravenous, Rivera DAMON Winston, MD, 10 mL at 01/11/22 1134  •  sodium chloride 0.9 % flush 10 mL, 10 mL, Intravenous, Rivera DAMON Winston, MD, 10 mL at 09/28/22 1418    Objective     Vital Signs   /67 (BP Location: Left arm, Patient Position: Sitting, Cuff Size: Adult)   Pulse 66   Ht 150 cm (59.06\")   Wt 75.4 kg (166 lb 3.6 oz)   BMI 33.51 kg/m²      Physical Exam:  General appearance - alert, well appearing, and in no distress  Mental status - alert, oriented to person, place, and time  Eyes - pupils equal and reactive, extraocular eye movements intact  Neck - supple, no significant " adenopathy  Chest - no tachypnea, retractions or cyanosis  Heart - regular rate   Breast Exam: Bilateral breasts without obvious deformities. Bilateral nipples everted. Patient examined in the supine position with the ipsilateral arm above the head. No palpable masses bilaterally. No nipple discharge bilaterally. Left breast scar well healed. Right chest wall port in place. There is a hard 2cm palpable lymph node in her left axilla that is very tender to palpation. No other palpable axillary nor supraclavicular adenopathy bilaterally.     Results Review:    The following data was reviewed by: Quynh Rosario MD on 10/20/2022:    Progress Notes by Drake Stafford MD (09/28/2022 13:30)  Mammo Diagnostic Digital Tomosynthesis Bilateral With CAD (10/10/2022 13:04)  1. Benign mammogram. BI-RADS 2.  2. Screening mammography recommended in one year.  3. Computer aided detection was utilized. 3-D Tomosynthesis was  performed.    Assessment & Plan       Diagnoses and all orders for this visit:    1. Personal history of breast cancer (Primary)    2. Class 1 obesity due to excess calories with body mass index (BMI) of 33.0 to 33.9 in adult, unspecified whether serious comorbidity present    3. Lymphadenopathy, axillary  -     US Axilla Left; Future       Mammogram reviewed. Clinical exam and mammo with no evidence of recurrence in the breasts. However she has since developed (since her mammogram) a left axillary enlarged lymph node. I have recommended a Left axillary US which she is amenable to. Follow up in 2 weeks to review the results.      BMI is >= 30 and <35. (Class 1 Obesity). The following options were offered after discussion;: weight loss educational material (shared in after visit summary)      Quynh Rosario MD  10/20/22  13:41 CDT

## 2022-10-20 NOTE — PATIENT INSTRUCTIONS

## 2022-10-24 ENCOUNTER — OFFICE VISIT (OUTPATIENT)
Dept: FAMILY MEDICINE CLINIC | Facility: CLINIC | Age: 82
End: 2022-10-24

## 2022-10-24 ENCOUNTER — HOSPITAL ENCOUNTER (OUTPATIENT)
Dept: ULTRASOUND IMAGING | Facility: HOSPITAL | Age: 82
Discharge: HOME | End: 2022-10-24
Admitting: STUDENT IN AN ORGANIZED HEALTH CARE EDUCATION/TRAINING PROGRAM

## 2022-10-24 VITALS
HEART RATE: 68 BPM | WEIGHT: 171.2 LBS | TEMPERATURE: 98.1 F | HEIGHT: 59 IN | OXYGEN SATURATION: 96 % | SYSTOLIC BLOOD PRESSURE: 144 MMHG | DIASTOLIC BLOOD PRESSURE: 60 MMHG | BODY MASS INDEX: 34.51 KG/M2

## 2022-10-24 DIAGNOSIS — Z23 FLU VACCINE NEED: ICD-10-CM

## 2022-10-24 DIAGNOSIS — G89.29 CHRONIC PAIN OF BOTH KNEES: Primary | ICD-10-CM

## 2022-10-24 DIAGNOSIS — R59.0 LYMPHADENOPATHY, AXILLARY: ICD-10-CM

## 2022-10-24 DIAGNOSIS — M25.561 CHRONIC PAIN OF BOTH KNEES: Primary | ICD-10-CM

## 2022-10-24 DIAGNOSIS — E55.9 VITAMIN D DEFICIENCY: ICD-10-CM

## 2022-10-24 DIAGNOSIS — Z23 NEED FOR PNEUMOCOCCAL VACCINATION: ICD-10-CM

## 2022-10-24 DIAGNOSIS — M25.562 CHRONIC PAIN OF BOTH KNEES: Primary | ICD-10-CM

## 2022-10-24 PROCEDURE — 76882 US LMTD JT/FCL EVL NVASC XTR: CPT

## 2022-10-24 PROCEDURE — 90662 IIV NO PRSV INCREASED AG IM: CPT | Performed by: FAMILY MEDICINE

## 2022-10-24 PROCEDURE — G0008 ADMIN INFLUENZA VIRUS VAC: HCPCS | Performed by: FAMILY MEDICINE

## 2022-10-24 PROCEDURE — 99214 OFFICE O/P EST MOD 30 MIN: CPT | Performed by: FAMILY MEDICINE

## 2022-10-24 PROCEDURE — 90677 PCV20 VACCINE IM: CPT | Performed by: FAMILY MEDICINE

## 2022-10-24 RX ORDER — OLMESARTAN MEDOXOMIL 20 MG/1
TABLET ORAL
COMMUNITY
Start: 2022-09-10

## 2022-10-24 RX ORDER — ERGOCALCIFEROL 1.25 MG/1
50000 CAPSULE ORAL WEEKLY
Qty: 12 CAPSULE | Refills: 3 | Status: SHIPPED | OUTPATIENT
Start: 2022-10-24

## 2022-10-24 RX ORDER — GABAPENTIN 300 MG/1
600 CAPSULE ORAL 3 TIMES DAILY
Qty: 180 CAPSULE | Refills: 5 | Status: SHIPPED | OUTPATIENT
Start: 2022-10-24 | End: 2022-12-14

## 2022-10-24 RX ORDER — CLOTRIMAZOLE AND BETAMETHASONE DIPROPIONATE 10; .64 MG/G; MG/G
CREAM TOPICAL
COMMUNITY
Start: 2022-10-18

## 2022-11-01 ENCOUNTER — DOCUMENTATION (OUTPATIENT)
Dept: SURGERY | Facility: CLINIC | Age: 82
End: 2022-11-01

## 2022-11-01 ENCOUNTER — HOSPITAL ENCOUNTER (OUTPATIENT)
Dept: ULTRASOUND IMAGING | Facility: HOSPITAL | Age: 82
Discharge: HOME | End: 2022-11-01

## 2022-11-01 NOTE — PROGRESS NOTES
Patient called in today stating she didn't need to come in for the aspiration due to it being gone today. Dr Rosario alerted and called radiology and cx procedure

## 2022-11-04 RX ORDER — CITALOPRAM 20 MG/1
TABLET ORAL
Qty: 90 TABLET | Refills: 1 | Status: SHIPPED | OUTPATIENT
Start: 2022-11-04

## 2022-11-07 DIAGNOSIS — E53.8 LOW SERUM VITAMIN B12: ICD-10-CM

## 2022-11-07 NOTE — TELEPHONE ENCOUNTER
Caller: Dago Nunez Terrence    Relationship: Self    Best call back number: [phone]    Requested Prescriptions:   Requested Prescriptions     Pending Prescriptions Disp Refills   • cyanocobalamin 1000 MCG/ML injection 3 mL 0     Sig: INJECT 1 ML INTO THE MUSCLE EVERY 4 WEEKS.        Pharmacy where request should be sent: [facility] [place] - [address] - [phone] [facility] [phone] FX         Does the patient have less than a 3 day supply:  [x] Yes  [] No    Marlen Huffman Rep   11/07/22 14:59 CST

## 2022-11-08 ENCOUNTER — OFFICE VISIT (OUTPATIENT)
Dept: CARDIOLOGY CLINIC | Age: 82
End: 2022-11-08
Payer: MEDICARE

## 2022-11-08 VITALS
DIASTOLIC BLOOD PRESSURE: 54 MMHG | BODY MASS INDEX: 33.18 KG/M2 | WEIGHT: 169 LBS | HEIGHT: 60 IN | HEART RATE: 55 BPM | SYSTOLIC BLOOD PRESSURE: 122 MMHG

## 2022-11-08 DIAGNOSIS — R60.0 EDEMA OF BOTH LOWER EXTREMITIES: ICD-10-CM

## 2022-11-08 DIAGNOSIS — R06.02 SHORTNESS OF BREATH: Primary | ICD-10-CM

## 2022-11-08 DIAGNOSIS — I10 ESSENTIAL HYPERTENSION: ICD-10-CM

## 2022-11-08 DIAGNOSIS — I87.2 CHRONIC VENOUS INSUFFICIENCY: ICD-10-CM

## 2022-11-08 DIAGNOSIS — N18.30 STAGE 3 CHRONIC KIDNEY DISEASE, UNSPECIFIED WHETHER STAGE 3A OR 3B CKD (HCC): ICD-10-CM

## 2022-11-08 DIAGNOSIS — I51.89 DIASTOLIC DYSFUNCTION: ICD-10-CM

## 2022-11-08 DIAGNOSIS — E66.09 CLASS 1 OBESITY DUE TO EXCESS CALORIES WITHOUT SERIOUS COMORBIDITY WITH BODY MASS INDEX (BMI) OF 33.0 TO 33.9 IN ADULT: ICD-10-CM

## 2022-11-08 PROCEDURE — 93000 ELECTROCARDIOGRAM COMPLETE: CPT | Performed by: CLINICAL NURSE SPECIALIST

## 2022-11-08 PROCEDURE — 1090F PRES/ABSN URINE INCON ASSESS: CPT | Performed by: CLINICAL NURSE SPECIALIST

## 2022-11-08 PROCEDURE — G8417 CALC BMI ABV UP PARAM F/U: HCPCS | Performed by: CLINICAL NURSE SPECIALIST

## 2022-11-08 PROCEDURE — G8484 FLU IMMUNIZE NO ADMIN: HCPCS | Performed by: CLINICAL NURSE SPECIALIST

## 2022-11-08 PROCEDURE — 1036F TOBACCO NON-USER: CPT | Performed by: CLINICAL NURSE SPECIALIST

## 2022-11-08 PROCEDURE — G8400 PT W/DXA NO RESULTS DOC: HCPCS | Performed by: CLINICAL NURSE SPECIALIST

## 2022-11-08 PROCEDURE — 99214 OFFICE O/P EST MOD 30 MIN: CPT | Performed by: CLINICAL NURSE SPECIALIST

## 2022-11-08 PROCEDURE — 3078F DIAST BP <80 MM HG: CPT | Performed by: CLINICAL NURSE SPECIALIST

## 2022-11-08 PROCEDURE — 3074F SYST BP LT 130 MM HG: CPT | Performed by: CLINICAL NURSE SPECIALIST

## 2022-11-08 PROCEDURE — 1123F ACP DISCUSS/DSCN MKR DOCD: CPT | Performed by: CLINICAL NURSE SPECIALIST

## 2022-11-08 PROCEDURE — G8427 DOCREV CUR MEDS BY ELIG CLIN: HCPCS | Performed by: CLINICAL NURSE SPECIALIST

## 2022-11-08 ASSESSMENT — ENCOUNTER SYMPTOMS
COUGH: 0
VOMITING: 0
SHORTNESS OF BREATH: 1
FACIAL SWELLING: 0
ABDOMINAL PAIN: 0
WHEEZING: 0
BACK PAIN: 1
CHEST TIGHTNESS: 0
NAUSEA: 0
EYE REDNESS: 0

## 2022-11-08 NOTE — TELEPHONE ENCOUNTER
Rx Refill Note  Requested Prescriptions     Pending Prescriptions Disp Refills   • cyanocobalamin 1000 MCG/ML injection 3 mL 0     Sig: INJECT 1 ML INTO THE MUSCLE EVERY 4 WEEKS.      Last office visit with prescribing clinician: 10/24/2022      Next office visit with prescribing clinician: 4/24/2023            ADAMA Rodriguez  11/08/22, 09:45 CST

## 2022-11-08 NOTE — PATIENT INSTRUCTIONS
Return in about 9 months (around 8/8/2023) for APRN. Compression stockings, leg elevation when sitting  Low sodium diet    Metolazone for weight gain over 3lbs in 24 hours or 5lbs over a week. If weight is not improving by the next day, call the office.    - Weigh daily and report weight gain of 3lbs or more in 24hrs or 5lbs in one week. - Call for increasing shortness of breath or increasing swelling in feet and legs.     (This could mean you are retaining too much fluid)  - 2000mg low sodium diet

## 2022-11-08 NOTE — PROGRESS NOTES
[facility], [address] [place] [address], [id number]  Phone: [phone]  Fax: [phone]    OFFICE VISIT:  2022    Dru Coreas YOB: 1940    Reason For Visit:  Zach Rebollar is a [de-identified] y.o. female who is here for Follow-up and Shortness of Breath       Diagnosis Orders   1. Shortness of breath        2. Edema of both lower extremities        3. Chronic venous insufficiency        4. Diastolic dysfunction        5. Essential hypertension        6. Stage 3 chronic kidney disease, unspecified whether stage 3a or 3b CKD (HCC)        7. Class 1 obesity due to excess calories without serious comorbidity with body mass index (BMI) of 33.0 to 33.9 in adult            HPI  Patient was referred to our office on 2021 for concerns of congestive heart failure with persistent lower extremity edema despite three diuretics. She was referred by her nephrologist, Dr. Kiley Antoine, who sees her for chronic kidney disease stage IIIb. Other history includes urethral carcinoma, cellulitis of extremities, CKD. Cardiac work-up with a DSE was negative. Echocardiogram showed mild LVH, diastolic dysfunction, mild tricuspid and pulmonary insufficiency. Heart cath in  showed no significant issues per patient report    Dyspnea and edema are likely multifactorial related to chronic venous insufficiency, obesity, diastolic dysfunction, CKD    She denies unusual dyspnea, orthopnea, PND, or sudden weight gain. She is currently working with physical therapy to increase her mobility and help her back pain    Moses Cagle DO, DO is PCP.   Rg Guerrero has the following history as recorded in [facility]:    Patient Active Problem List    Diagnosis Date Noted    Chronic venous insufficiency 2021    Essential hypertension 2021    Morbid obesity ([address] 2021    Fibrocystic breast disease 01/10/2012     Past Medical History:   Diagnosis Date    Cancer [facility])     Cervical dysplasia Chronic back pain     Fibrocystic disease of breast     GERD (gastroesophageal reflux disease)     Hyperlipidemia     Osteoarthritis     Type II or unspecified type diabetes mellitus without mention of complication, not stated as uncontrolled      Past Surgical History:   Procedure Laterality Date    APPENDECTOMY      BREAST CYST ASPIRATION      left    CARDIAC CATHETERIZATION      no disease per pt report    CERVIX LESION DESTRUCTION      HYSTERECTOMY (CERVIX STATUS UNKNOWN)  1976    PORT SURGERY      TONSILLECTOMY       Family History   Problem Relation Age of Onset    Cancer Mother         uterine    Heart Disease Father     Cancer Sister         kidney    Heart Disease Brother      Social History     Tobacco Use    Smoking status: Former     Types: Cigarettes     Quit date: 1970     Years since quittin.8    Smokeless tobacco: Never   Substance Use Topics    Alcohol use: Yes     Comment: occ glass of wine       Current Outpatient Medications   Medication Sig Dispense Refill    olmesartan (BENICAR) 20 MG tablet TAKE 1 TABLET BY MOUTH 1 TIME DAILY 90 tablet 1    calcium carbonate 1500 (600 Ca) MG TABS tablet Take 600 mg by mouth daily      cetirizine (ZYRTEC) 10 MG tablet Take 10 mg by mouth daily      citalopram (CELEXA) 20 MG tablet TAKE 1 TABLET BY MOUTH EVERY DAY      diphenoxylate-atropine (LOMOTIL) 2.5-0.025 MG per tablet TAKE 2 TABLETS BY MOUTH FOUR TIMES A DAY AS NEEDED FOR DIARRHEA      mometasone-formoterol (DULERA) 100-5 MCG/ACT inhaler Inhale 2 puffs into the lungs 2 times daily as needed       pravastatin (PRAVACHOL) 40 MG tablet Take 40 mg by mouth daily      cyanocobalamin 1000 MCG/ML injection INJECT 1ML INTO THE MUSCLE EVERY DAY FOR 5 DAYS, THEN ON THE SAME DAY EACH WEEK FOR 4 WEEKS, THEN 1 TIME EACH MONTH.      furosemide (LASIX) 40 MG tablet Take 40 mg by mouth daily       potassium chloride SA (K-DUR;KLOR-CON) 20 MEQ tablet Take 20 mEq by mouth 2 times daily       gabapentin (NEURONTIN) 300 MG capsule Take 300 mg by mouth 3 times daily. NEXIUM 40 MG capsule Take 40 mg by mouth 2 times daily       bisoprolol-hydrochlorothiazide (ZIAC) 5-6.25 MG per tablet Take 1 tablet by mouth daily       HYDROcodone-acetaminophen (NORCO)  MG per tablet Take 1 tablet by mouth every 6 hours as needed. metOLazone (ZAROXOLYN) 2.5 MG tablet Take 1 tablet by mouth daily as needed (for weight gain of 3lbs or more in 24 hrs) 30 tablet 5     No current facility-administered medications for this visit. Allergies: Alatrofloxacin, Gatifloxacin, Scopolamine, Amoxicillin-pot clavulanate, Cephalexin, and Sulfamethoxazole-trimethoprim    Review of Systems  Review of Systems   Constitutional:  Positive for fatigue. Negative for activity change, diaphoresis, fever and unexpected weight change. HENT:  Negative for facial swelling and nosebleeds. Eyes:  Negative for redness and visual disturbance. Respiratory:  Positive for shortness of breath. Negative for cough, chest tightness and wheezing. Cardiovascular:  Positive for leg swelling. Negative for chest pain and palpitations. Gastrointestinal:  Negative for abdominal pain, nausea and vomiting. Endocrine: Negative for cold intolerance and heat intolerance. Genitourinary:  Negative for dysuria and hematuria. Musculoskeletal:  Positive for back pain. Negative for arthralgias and myalgias. Skin:  Negative for pallor and rash. Neurological:  Negative for dizziness, seizures, syncope, weakness and light-headedness. Hematological:  Does not bruise/bleed easily. Psychiatric/Behavioral:  Negative for agitation. The patient is not nervous/anxious.       Objective  Vital Signs - BP (!) 122/54   Pulse 55   Ht 5' (1.524 m)   Wt 169 lb (76.7 kg)   BMI 33.01 kg/m²    Wt Readings from Last 3 Encounters:   11/08/22 169 lb (76.7 kg)   05/04/22 165 lb (74.8 kg)   11/01/21 158 lb (71.7 kg)      Physical Exam  Vitals and nursing note reviewed. Constitutional:       General: She is not in acute distress. Appearance: She is well-developed. She is not diaphoretic. Comments: Deconditioned   HENT:      Head: Normocephalic and atraumatic. Right Ear: Hearing and external ear normal.      Left Ear: Hearing and external ear normal.      Nose: Nose normal.   Eyes:      General:         Right eye: No discharge. Left eye: No discharge. Pupils: Pupils are equal, round, and reactive to light. Neck:      Thyroid: No thyromegaly. Vascular: No carotid bruit or JVD. Trachea: No tracheal deviation. Cardiovascular:      Rate and Rhythm: Regular rhythm. Bradycardia present. Heart sounds: Normal heart sounds. No murmur heard. No friction rub. No gallop. Pulmonary:      Effort: Pulmonary effort is normal. No respiratory distress. Breath sounds: Normal breath sounds. No wheezing or rales. Abdominal:      Palpations: Abdomen is soft. Tenderness: There is no abdominal tenderness. Musculoskeletal:         General: No swelling or deformity. Cervical back: Neck supple. No muscular tenderness. Right lower leg: Edema (3+) present. Left lower leg: Edema (2+) present. Comments: In wheelchair   Skin:     General: Skin is warm and dry. Findings: No rash. Neurological:      General: No focal deficit present. Mental Status: She is alert and oriented to person, place, and time. Cranial Nerves: No cranial nerve deficit.    Psychiatric:         Mood and Affect: Mood normal.         Behavior: Behavior normal.         Judgment: Judgment normal.       Data:    Echo 7/22/21  Conclusions      Summary   Normal left ventricular size and systolic function EF 69-18%   Mild concentric left ventricular hypertrophy with mild [grade 1] diastolic   dysfunction   Mild left atrial enlargement   Tricuspid aortic valve with adequate cusp separation and neither   significant stenosis or insufficiency Mitral annular calcification with mild thickening of a normally mobile   mitral valve with mild regurgitation   Mild pulmonic insufficiency   Normal right-sided chambers with preserved RV systolic function   Mild tricuspid regurgitation with RVSP estimate 34 mmHg   IVC dimension and is throwing motion normal consistent with normal right   atrial filling pressures   Aortic root and ascending segments measured within normal limits   No significant pericardial effusion    DSE 7/22/21  Conclusions      Summary   Dobutamine stress echocardiogram without clinical, electrocardiographic,   or echocardiographic evidence of myocardial ischemia. EKG shows sinus bradycardia rate 55 with first-degree AV block    Assessment:     Diagnosis Orders   1. Shortness of breath        2. Edema of both lower extremities        3. Chronic venous insufficiency        4. Diastolic dysfunction        5. Essential hypertension        6. Stage 3 chronic kidney disease, unspecified whether stage 3a or 3b CKD ([facility])        7. Class 1 obesity due to excess calories without serious comorbidity with body mass index (BMI) of 33.0 to 33.9 in adult            Shortness of breath/ lower extremity edema-  symptoms are likely multifactorial related to diastolic dysfunction, CKD, obesity, chronic venous insufficiency, deconditioning. BNP has not been elevated previously. She is currently on bisoprolol and  olmesartan. She continues on HCTZ, Lasix. She uses metolazone every third day. She has been counseled to follow a low-sodium diet. Continue to use compression stockings and elevate feet when sitting, low-sodium diet    Diastolic dysfunction-high risk for heart failure due to other comorbid conditions, but currently euvolemic. Edema multifactorial as discussed above    CKD-follows with nephrology and likely a contributing factor to her edema    Hypertension-stable on current regimen with bisoprolol and olmesartan    Stable cardiovascular status. Plan    Return in about 9 months (around 8/8/2023) for Dr Shabana Nance. Compression stockings, leg elevation when sitting  Low sodium diet    Metolazone for weight gain over 3lbs in 24 hours or 5lbs over a week. If weight is not improving by the next day, call the office.    - Weigh daily and report weight gain of 3lbs or more in 24hrs or 5lbs in one week. - Call for increasing shortness of breath or increasing swelling in feet and legs. (This could mean you are retaining too much fluid)  - 2000mg low sodium diet        Call with any questionsor concerns  Follow up with Juli Epley, DO, DO for non cardiac problems  Report any new problems  Cardiovascular Fitness-Exercise as tolerated. Strive for 15 minutes of exercise most days of the week. Cardiac / HealthyDiet  Continue current medications as directed  Continue plan of treatment  It is always recommended that you bring your medicationsbottles with you to each visit - this is for your safety!        Mati Rasmussen, APRN

## 2022-11-09 RX ORDER — CYANOCOBALAMIN 1000 UG/ML
INJECTION, SOLUTION INTRAMUSCULAR; SUBCUTANEOUS
Qty: 3 ML | Refills: 0 | Status: SHIPPED | OUTPATIENT
Start: 2022-11-09 | End: 2023-02-09

## 2022-11-10 ENCOUNTER — OFFICE VISIT (OUTPATIENT)
Dept: SURGERY | Facility: CLINIC | Age: 82
End: 2022-11-10

## 2022-11-10 VITALS
TEMPERATURE: 98.4 F | HEART RATE: 68 BPM | SYSTOLIC BLOOD PRESSURE: 140 MMHG | WEIGHT: 171.3 LBS | HEIGHT: 59 IN | BODY MASS INDEX: 34.53 KG/M2 | DIASTOLIC BLOOD PRESSURE: 79 MMHG

## 2022-11-10 DIAGNOSIS — Z00.00 ENCOUNTER FOR SCREENING AND PREVENTATIVE CARE: Primary | ICD-10-CM

## 2022-11-10 DIAGNOSIS — E66.09 CLASS 1 OBESITY DUE TO EXCESS CALORIES WITH BODY MASS INDEX (BMI) OF 33.0 TO 33.9 IN ADULT, UNSPECIFIED WHETHER SERIOUS COMORBIDITY PRESENT: ICD-10-CM

## 2022-11-10 DIAGNOSIS — Z12.31 ENCOUNTER FOR SCREENING MAMMOGRAM FOR MALIGNANT NEOPLASM OF BREAST: ICD-10-CM

## 2022-11-10 PROCEDURE — 99213 OFFICE O/P EST LOW 20 MIN: CPT | Performed by: STUDENT IN AN ORGANIZED HEALTH CARE EDUCATION/TRAINING PROGRAM

## 2022-11-10 NOTE — PROGRESS NOTES
"Chief Complaint  Pain (6 month follow up, back pain worse today, current pain 6/10.  Requesting refill on gabapentin )    Subjective        Syndal Terrence Nunez presents to [facility]  History of Present Illness  Pain is a little worse today due to increased activity  Currently well controlled on current medications  Needs refill of gabapentin  Only takes 1/2 tab of hydrocodone if needed  ROS otherwise neg    Objective   Vital Signs:  /60 (BP Location: Left arm, Patient Position: Sitting, Cuff Size: Adult)   Pulse 68   Temp 98.1 °F (36.7 °C) (Temporal)   Ht 150 cm (59.06\")   Wt 77.7 kg (171 lb 3.2 oz)   SpO2 96%   BMI 34.51 kg/m²   Estimated body mass index is 34.51 kg/m² as calculated from the following:    Height as of this encounter: 150 cm (59.06\").    Weight as of this encounter: 77.7 kg (171 lb 3.2 oz).          Physical Exam  Vitals and nursing note reviewed.   Constitutional:       General: She is not in acute distress.     Appearance: She is not diaphoretic.   HENT:      Head: Normocephalic and atraumatic.      Nose: Nose normal.   Eyes:      General: No scleral icterus.        Right eye: No discharge.         Left eye: No discharge.      Conjunctiva/sclera: Conjunctivae normal.   Neck:      Trachea: No tracheal deviation.   Cardiovascular:      Rate and Rhythm: Normal rate and regular rhythm.      Heart sounds: Normal heart sounds. No murmur heard.    No friction rub. No gallop.   Pulmonary:      Effort: Pulmonary effort is normal. No respiratory distress.      Breath sounds: Normal breath sounds. No wheezing or rales.   Skin:     General: Skin is warm and dry.      Coloration: Skin is not pale.   Neurological:      Mental Status: She is alert and oriented to person, place, and time.   Psychiatric:         Behavior: Behavior normal.         Thought Content: Thought content normal.         Judgment: Judgment normal.        Result Review :                Assessment " and Plan   Diagnoses and all orders for this visit:    1. Chronic pain of both knees (Primary)  -     gabapentin (NEURONTIN) 300 MG capsule; Take 2 capsules by mouth 3 (Three) Times a Day.  Dispense: 180 capsule; Refill: 5    2. Vitamin D deficiency  -     vitamin D (ERGOCALCIFEROL) 1.25 MG (49347 UT) capsule capsule; Take 1 capsule by mouth 1 (One) Time Per Week.  Dispense: 12 capsule; Refill: 3    3. Flu vaccine need  -     Fluzone High-Dose 65+yrs (5940-7733)    4. Need for pneumococcal vaccination  -     Pneumococcal Conjugate Vaccine 20-Valent (PCV20)    refills and immunizations above  F/u 6 months

## 2022-11-11 NOTE — PATIENT INSTRUCTIONS

## 2022-11-11 NOTE — PROGRESS NOTES
Office Established Patient Note:     Referring Provider: No ref. provider found    Chief Complaint   Patient presents with   • Follow-up       Subjective .     History of present illness:  Dago Nunez is a 82 y.o. female s/p left lumpectomy with sentinel lymph node biopsy on 9/14/21 for ER+PA+Her2- invsaive ductal carcinoma with final pathology showing stage IA breast cancer. She declined post op radiation. She is on adjuvant letrozole and is tolerating it well. She denies any breast masses, nipple discharge, skin changes on her breasts. She had a fluid collection pop up under her left arm after her mammogram. US was done that showed a fluid collection. She was set up to have this aspirated by radiology with US guidance, but it spontaneously resolved. Today she reports no recurrence.     History  Past Medical History:   Diagnosis Date   • Anemia in stage 3 chronic kidney disease (Formerly [facility]) 11/11/2019   • Arthritis    • Breast cancer (Formerly [facility]) 09/14/2021    Left Mastectomy w/ sentinel node Bx   • Bronchitis    • Cellulitis     ROSA LEGS   • GERD (gastroesophageal reflux disease)    • Hyperlipidemia    • Hypertension    • Kyphosis    • Osteoporosis    • Personal history of COVID-19 05/2022   • Scoliosis    • Self-catheterizes urinary bladder     10FR SIZED CATH   • Stage 3 chronic kidney disease (Formerly [facility]) 11/11/2019   • 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 Laguerre-Normal colonoscopy; Normal terminal ileum; Repeat 5 years   • COLONOSCOPY N/A 11/05/2021    Procedure:  COLONOSCOPY WITH ANESTHESIA;  Surgeon: Annita Loaiza MD;  Location: [facility] ENDOSCOPY;  Service: Gastroenterology;  Laterality: N/A;  pre abnormal imaging  post  Shaheen Akbar DO   • CYSTOSCOPY N/A 9/20/2022    Procedure: CYSTOSCOPY WITH URETHRAL DILATATION;  Surgeon: Shaheen Conway MD;  Location: [facility] OR;  Service: Urology;  Laterality: N/A;   • ENDOSCOPY  07/01/2014    Normal esophagus; Normal stomach; Normal examined duodenum; BRAVO pH capsule deployed;    • ENDOSCOPY  08/14/2013    Mild gastritis-biopsies for H.Pylor obtained   • ENDOSCOPY  02/16/2005    Dr. LaguerrePjksg-Ucymuewxi-lezsapuw   • ENDOSCOPY  10/21/2003    Dr. Laguerre-Stage 1 reflux esophagitis   • ENDOSCOPY N/A 11/05/2021    Procedure: ESOPHAGOGASTRODUODENOSCOPY WITH ANESTHESIA;  Surgeon: Annita Loaiza MD;  Location: [facility] ENDOSCOPY;  Service: Gastroenterology;  Laterality: N/A;  pre abnormal imaging  post AVM; hiatal hernia; esophageal polyp  Shaheen Akbar DO   • HYSTERECTOMY     • MASTECTOMY W/ SENTINEL NODE BIOPSY Left 09/14/2021    Procedure: LEFT PARTIAL MASTECTOMY WITH MAGSEED AND LEFT SENTINEL LYMPH NODE BIOPSY MAGTRACE;  Surgeon: Quynh Rosario MD;  Location: [facility] OR;  Service: General;  Laterality: Left;   • TONSILLECTOMY     • VAGINA SURGERY      Laser surgery X 2   • VENOUS ACCESS DEVICE (PORT) INSERTION N/A 09/29/2020    Procedure: SINGLE LUMEN PORT - A- CATH PLACEMENT WITH FLUOROSCOPY;  Surgeon: Quynh Rosario MD;  Location: [facility] OR;  Service: General;  Laterality: N/A;   ,   Family History   Problem Relation Age of Onset   • Cancer Mother    • Hypertension Mother    • Osteoporosis Mother    • Dementia Mother    • Uterine cancer Mother    • Heart disease Father    • Parkinsonism Father    • Cancer Sister    • Breast cancer Sister    • Kidney cancer Sister    • Diabetes Brother    • Heart disease Paternal Grandfather    • No Known Problems Maternal Grandmother    • No Known Problems Maternal Grandfather     • No Known Problems Paternal Grandmother    • Colon cancer Neg Hx    • Colon polyps Neg Hx    • Esophageal cancer Neg Hx    • Liver cancer Neg Hx    • Liver disease Neg Hx    • Rectal cancer Neg Hx    • Stomach cancer Neg Hx    ,   Social History     Tobacco Use   • Smoking status: Former     Packs/day: 0.25     Years: 3.00     Pack years: 0.75     Types: Cigarettes   • Smokeless tobacco: Never   • Tobacco comments:     social smoker in college   Vaping Use   • Vaping Use: Never used   Substance Use Topics   • Alcohol use: Not Currently     Comment: Occasional glass of wine   • Drug use: No   , (Not in a hospital admission)   and Allergies:  Scopolamine, Amoxicillin-pot clavulanate, Keflex [cephalexin], Septra [sulfamethoxazole-trimethoprim], Tequin [gatifloxacin], and Trovan [alatrofloxacin]    Current Outpatient Medications:   •  Acetaminophen (TYLENOL ARTHRITIS PAIN PO), Take 1 tablet by mouth Daily As Needed (BACK PAIN)., Disp: , Rfl:   •  bisoprolol-hydrochlorothiazide (ZIAC) 5-6.25 MG per tablet, TAKE 1 TABLET DAILY, Disp: 90 tablet, Rfl: 1  •  calcium carbonate (OS-REAGAN) 600 MG tablet, Take 600 mg by mouth Daily., Disp: , Rfl:   •  cefdinir (OMNICEF) 300 MG capsule, Take 1 capsule by mouth Daily., Disp: 7 capsule, Rfl: 0  •  cetirizine (zyrTEC) 10 MG tablet, Take 10 mg by mouth Daily., Disp: , Rfl:   •  citalopram (CeleXA) 20 MG tablet, TAKE 1 TABLET BY MOUTH EVERY DAY, Disp: 90 tablet, Rfl: 1  •  clotrimazole-betamethasone (LOTRISONE) 1-0.05 % cream, , Disp: , Rfl:   •  cyanocobalamin 1000 MCG/ML injection, INJECT 1 ML INTO THE MUSCLE EVERY 4 WEEKS., Disp: 3 mL, Rfl: 0  •  diphenhydrAMINE (BENADRYL) 25 mg capsule, Take 25 mg by mouth Every 6 (Six) Hours As Needed for Allergies., Disp: , Rfl:   •  diphenoxylate-atropine (LOMOTIL) 2.5-0.025 MG per tablet, TAKE 2 TABLETS BY MOUTH FOUR TIMES A DAY AS NEEDED FOR DIARRHEA, Disp: 90 tablet, Rfl: 2  •  docusate sodium (Colace) 100 MG capsule, Take 1 capsule by  mouth 2 (Two) Times a Day., Disp: 60 capsule, Rfl: 0  •  DULERA 100-5 MCG/ACT inhaler, Inhale 2 puffs 2 (Two) Times a Day. Rinse and spit after using., Disp: 6 inhaler, Rfl: 0  •  esomeprazole (nexIUM) 40 MG capsule, Take 1 capsule by mouth 2 (Two) Times a Day., Disp: 180 capsule, Rfl: 3  •  furosemide (LASIX) 40 MG tablet, TAKE 1 AND 1/2 TABLETS ONCEDAILY, Disp: 135 tablet, Rfl: 3  •  gabapentin (NEURONTIN) 300 MG capsule, Take 2 capsules by mouth 3 (Three) Times a Day., Disp: 180 capsule, Rfl: 5  •  Homeopathic Products (LEG CRAMPS) tablet, Take 1 tablet by mouth Daily., Disp: , Rfl:   •  HYDROcodone-acetaminophen (NORCO)  MG per tablet, Take 1 tablet by mouth Every 4 (Four) Hours As Needed for Moderate Pain or Severe Pain., Disp: 60 tablet, Rfl: 0  •  Hydrocortisone (britany's amazing butt) cream, Apply 1 application topically to the appropriate area as directed As Needed (irritation)., Disp: 120 g, Rfl: 0  •  letrozole (FEMARA) 2.5 MG tablet, TAKE 1 TABLET BY MOUTH 1 TIME DAILY, Disp: 90 tablet, Rfl: 2  •  lidocaine (XYLOCAINE) 5 % ointment, Apply  topically to the appropriate area as directed Every 4 (Four) Hours As Needed for Mild Pain  (pain secondary to radiation)., Disp: 240 g, Rfl: 1  •  Lidocaine Viscous HCl (XYLOCAINE) 2 % solution, Take 5 mL by mouth 3 (Three) Times a Day With Meals., Disp: 100 mL, Rfl: 0  •  metOLazone (ZAROXOLYN) 2.5 MG tablet, TAKE 1 TABLET BY MOUTH THREE TIMES A WEEK, Disp: , Rfl:   •  nitrofurantoin (MACRODANTIN) 25 MG capsule, Take 1 capsule by mouth Every Night. To help reduce pain with urination, Disp: 7 capsule, Rfl: 0  •  olmesartan (BENICAR) 20 MG tablet, , Disp: , Rfl:   •  ondansetron (ZOFRAN) 8 MG tablet, TAKE 1 TABLET BY MOUTH EVERY 8 HOURS AS NEEDED FOR NAUSEA AND VOMITING, Disp: 60 tablet, Rfl: 2  •  phenazopyridine (PYRIDIUM) 100 MG tablet, Take 1 tablet by mouth 3 (Three) Times a Day As Needed for Bladder Spasms., Disp: 20 tablet, Rfl: 0  •  phenazopyridine  "(PYRIDIUM) 200 MG tablet, TAKE 1 TABLET BY MOUTH THREE TIMES A DAY AS NEEDED FOR BLADDER SPASMS, Disp: 15 tablet, Rfl: 0  •  potassium chloride (K-DUR,KLOR-CON) 20 MEQ CR tablet, TAKE 2 TABLETS BY MOUTH EVERY DAY, Disp: 60 tablet, Rfl: 5  •  pravastatin (PRAVACHOL) 40 MG tablet, Take 1 tablet by mouth Every Night., Disp: 90 tablet, Rfl: 3  •  Syringe 25G X 1\" 3 ML misc, 1 each Daily., Disp: 25 each, Rfl: 1  •  vitamin D (ERGOCALCIFEROL) 1.25 MG (64204 UT) capsule capsule, Take 1 capsule by mouth 1 (One) Time Per Week., Disp: 12 capsule, Rfl: 3    Current Facility-Administered Medications:   •  lidocaine (XYLOCAINE) 1 % injection 10 mL, 10 mL, Infiltration, Once, Shaheen Akbar DO  •  lidocaine 1% - EPINEPHrine 1:306207 (XYLOCAINE W/EPI) 1 %-1:428052 injection 10 mL, 10 mL, Infiltration, Once, Shaheen Akbar DO    Facility-Administered Medications Ordered in Other Visits:   •  heparin injection 500 Units, 500 Units, Intravenous, Rivera DAMON Winston, MD, 500 Units at 07/01/21 1354  •  heparin injection 500 Units, 500 Units, Intravenous, PRRivera BRUNO Winston, MD, 500 Units at 01/11/22 1134  •  heparin injection 500 Units, 500 Units, Intravenous, Rivera DAMON Winston, MD, 500 Units at 09/28/22 1418  •  sodium chloride 0.9 % flush 10 mL, 10 mL, Intravenous, Rivera DAMON Winston, MD, 10 mL at 07/01/21 1354  •  sodium chloride 0.9 % flush 10 mL, 10 mL, Intravenous, Rivera DAMON Winston, MD, 10 mL at 01/11/22 1134  •  sodium chloride 0.9 % flush 10 mL, 10 mL, Intravenous, Rivera DAMON Winston, MD, 10 mL at 09/28/22 1418    Objective     Vital Signs   /79 (BP Location: Left arm, Patient Position: Sitting, Cuff Size: Adult)   Pulse 68   Temp 98.4 °F (36.9 °C)   Ht 150 cm (59.06\")   Wt 77.7 kg (171 lb 4.8 oz)   BMI 34.53 kg/m²      Physical Exam:  General appearance - alert, well appearing, and in no distress  Mental status - alert, oriented to person, place, and time  Eyes - pupils equal and reactive, extraocular eye " movements intact  Neck - supple, no significant adenopathy  Chest - no tachypnea, retractions or cyanosis  Heart - regular rate   Breast Exam: Bilateral breasts without obvious deformities. Bilateral nipples everted. Patient examined in the supine position with the ipsilateral arm above the head. No palpable masses bilaterally. No nipple discharge bilaterally. Left breast scar well healed. Right chest wall port in place. No palpable masses nor fluid collections in the axilla.     Results Review:    The following data was reviewed by: Quynh Rosario MD on 11/10/2022:  US Axilla Left (10/24/2022 13:53)  1. Approximately 3 cm round fluid echogenicity collection within the  left axilla. The patient states that this is new in the past 2 weeks  though this has the appearance of a seroma with a postoperative cavity.      Assessment & Plan       Diagnoses and all orders for this visit:    1. Encounter for screening and preventative care (Primary)  -     Mammo Screening Bilateral With CAD; Future    2. Encounter for screening mammogram for malignant neoplasm of breast  -     Mammo Screening Bilateral With CAD; Future     Dago Nunez is a 82 y.o. female s/p left lumpectomy with sentinel lymph node biopsy on 9/14/21 for ER+TX+Her2- invsaive ductal carcinoma with final pathology showing stage IA breast cancer. She is doing well. I have ordered her mammogram for one year and she will follow up with me after. If the area in her axilla recurs, she is to call my office.     BMI is >= 30 and <35. (Class 1 Obesity). The following options were offered after discussion;: weight loss educational material (shared in after visit summary)      Quynh Rosario MD  11/11/22  14:24 CST

## 2022-11-29 ENCOUNTER — TELEPHONE (OUTPATIENT)
Dept: FAMILY MEDICINE CLINIC | Facility: CLINIC | Age: 82
End: 2022-11-29

## 2022-11-29 DIAGNOSIS — R05.9 COUGH, UNSPECIFIED TYPE: Primary | ICD-10-CM

## 2022-11-30 ENCOUNTER — LAB (OUTPATIENT)
Dept: LAB | Facility: HOSPITAL | Age: 82
End: 2022-11-30

## 2022-11-30 ENCOUNTER — INFUSION (OUTPATIENT)
Dept: ONCOLOGY | Facility: CLINIC | Age: 82
End: 2022-11-30

## 2022-11-30 DIAGNOSIS — Z45.2 ENCOUNTER FOR CARE RELATED TO VASCULAR ACCESS PORT: Primary | ICD-10-CM

## 2022-11-30 DIAGNOSIS — C50.412 MALIGNANT NEOPLASM OF UPPER-OUTER QUADRANT OF LEFT BREAST IN FEMALE, ESTROGEN RECEPTOR POSITIVE: ICD-10-CM

## 2022-11-30 DIAGNOSIS — C77.4 SECONDARY MALIGNANCY OF INGUINAL LYMPH NODES: ICD-10-CM

## 2022-11-30 DIAGNOSIS — C51.9 VULVAR CANCER, CARCINOMA: ICD-10-CM

## 2022-11-30 DIAGNOSIS — Z17.0 MALIGNANT NEOPLASM OF UPPER-OUTER QUADRANT OF LEFT BREAST IN FEMALE, ESTROGEN RECEPTOR POSITIVE: ICD-10-CM

## 2022-11-30 DIAGNOSIS — N18.32 STAGE 3B CHRONIC KIDNEY DISEASE: ICD-10-CM

## 2022-11-30 LAB
ANION GAP SERPL CALCULATED.3IONS-SCNC: 11 MMOL/L (ref 5–15)
BASOPHILS # BLD AUTO: 0.05 10*3/MM3 (ref 0–0.2)
BASOPHILS NFR BLD AUTO: 0.8 % (ref 0–1.5)
BUN SERPL-MCNC: 25 MG/DL (ref 8–23)
BUN/CREAT SERPL: 17.5 (ref 7–25)
CALCIUM SPEC-SCNC: 9.6 MG/DL (ref 8.6–10.5)
CHLORIDE SERPL-SCNC: 105 MMOL/L (ref 98–107)
CO2 SERPL-SCNC: 22 MMOL/L (ref 22–29)
CREAT SERPL-MCNC: 1.43 MG/DL (ref 0.57–1)
DEPRECATED RDW RBC AUTO: 51.3 FL (ref 37–54)
EGFRCR SERPLBLD CKD-EPI 2021: 37.2 ML/MIN/1.73
EOSINOPHIL # BLD AUTO: 0.32 10*3/MM3 (ref 0–0.4)
EOSINOPHIL NFR BLD AUTO: 5.4 % (ref 0.3–6.2)
ERYTHROCYTE [DISTWIDTH] IN BLOOD BY AUTOMATED COUNT: 12.9 % (ref 12.3–15.4)
FERRITIN SERPL-MCNC: 941.5 NG/ML (ref 13–150)
GLUCOSE SERPL-MCNC: 158 MG/DL (ref 65–99)
HCT VFR BLD AUTO: 33.4 % (ref 34–46.6)
HGB BLD-MCNC: 10.3 G/DL (ref 12–15.9)
IRON 24H UR-MRATE: 101 MCG/DL (ref 37–145)
IRON SATN MFR SERPL: 28 % (ref 20–50)
LYMPHOCYTES # BLD AUTO: 1.27 10*3/MM3 (ref 0.7–3.1)
LYMPHOCYTES NFR BLD AUTO: 21.3 % (ref 19.6–45.3)
MCH RBC QN AUTO: 33.7 PG (ref 26.6–33)
MCHC RBC AUTO-ENTMCNC: 30.8 G/DL (ref 31.5–35.7)
MCV RBC AUTO: 109.2 FL (ref 79–97)
MONOCYTES # BLD AUTO: 0.38 10*3/MM3 (ref 0.1–0.9)
MONOCYTES NFR BLD AUTO: 6.4 % (ref 5–12)
NEUTROPHILS NFR BLD AUTO: 3.91 10*3/MM3 (ref 1.7–7)
NEUTROPHILS NFR BLD AUTO: 65.8 % (ref 42.7–76)
PLATELET # BLD AUTO: 142 10*3/MM3 (ref 140–450)
PMV BLD AUTO: 10.7 FL (ref 6–12)
POTASSIUM SERPL-SCNC: 4.9 MMOL/L (ref 3.5–5.2)
RBC # BLD AUTO: 3.06 10*6/MM3 (ref 3.77–5.28)
SODIUM SERPL-SCNC: 138 MMOL/L (ref 136–145)
TIBC SERPL-MCNC: 361 MCG/DL (ref 298–536)
TRANSFERRIN SERPL-MCNC: 242 MG/DL (ref 200–360)
WBC NRBC COR # BLD: 5.95 10*3/MM3 (ref 3.4–10.8)

## 2022-11-30 PROCEDURE — 83540 ASSAY OF IRON: CPT

## 2022-11-30 PROCEDURE — 82728 ASSAY OF FERRITIN: CPT

## 2022-11-30 PROCEDURE — 84466 ASSAY OF TRANSFERRIN: CPT

## 2022-11-30 PROCEDURE — 85025 COMPLETE CBC W/AUTO DIFF WBC: CPT

## 2022-11-30 PROCEDURE — 80048 BASIC METABOLIC PNL TOTAL CA: CPT

## 2022-11-30 PROCEDURE — 36415 COLL VENOUS BLD VENIPUNCTURE: CPT

## 2022-11-30 RX ORDER — SODIUM CHLORIDE 0.9 % (FLUSH) 0.9 %
10 SYRINGE (ML) INJECTION AS NEEDED
Status: CANCELLED | OUTPATIENT
Start: 2022-11-30

## 2022-11-30 RX ORDER — HEPARIN SODIUM (PORCINE) LOCK FLUSH IV SOLN 100 UNIT/ML 100 UNIT/ML
500 SOLUTION INTRAVENOUS AS NEEDED
Status: DISCONTINUED | OUTPATIENT
Start: 2022-11-30 | End: 2022-11-30 | Stop reason: HOSPADM

## 2022-11-30 RX ORDER — HEPARIN SODIUM (PORCINE) LOCK FLUSH IV SOLN 100 UNIT/ML 100 UNIT/ML
500 SOLUTION INTRAVENOUS AS NEEDED
Status: CANCELLED | OUTPATIENT
Start: 2022-11-30

## 2022-11-30 RX ORDER — SODIUM CHLORIDE 0.9 % (FLUSH) 0.9 %
10 SYRINGE (ML) INJECTION AS NEEDED
Status: DISCONTINUED | OUTPATIENT
Start: 2022-11-30 | End: 2022-11-30 | Stop reason: HOSPADM

## 2022-11-30 RX ADMIN — HEPARIN SODIUM (PORCINE) LOCK FLUSH IV SOLN 100 UNIT/ML 500 UNITS: 100 SOLUTION at 15:13

## 2022-11-30 RX ADMIN — Medication 10 ML: at 15:12

## 2022-12-02 NOTE — TELEPHONE ENCOUNTER
Caller: [facility] - [place], [place] - [address] - [phone] [facility] [phone] FX    Relationship: Pharmacy    Best call back number: [phone]    What is the best time to reach you: ANYTIME    Who are you requesting to speak with (clinical staff, provider,  specific staff member): CLINICAL    What was the call regarding: PATIENT ASKED PHARMACY TO REACH OUT REGARDING THE ALBUTEROL PRESCRIPTION SHE CALLED ABOUT ON 11.29.22. THE PHARMACY DOES NOT HAVE IT YET. I TOLD THEM DR SHAHID STILL NEEDS TO SIGN OFF BUT HE SHOULD BE DOING THAT SOON. PHARMACY UNDERSTOOD AND SAID NO PROBLEM.    Do you require a callback: NO UNLESS QUESTIONS.

## 2022-12-05 RX ORDER — ALBUTEROL SULFATE 90 UG/1
2 AEROSOL, METERED RESPIRATORY (INHALATION) EVERY 4 HOURS PRN
Qty: 18 G | Refills: 11 | Status: SHIPPED | OUTPATIENT
Start: 2022-12-05

## 2022-12-13 DIAGNOSIS — G89.29 CHRONIC PAIN OF BOTH KNEES: ICD-10-CM

## 2022-12-13 DIAGNOSIS — M25.561 CHRONIC PAIN OF BOTH KNEES: ICD-10-CM

## 2022-12-13 DIAGNOSIS — I10 ESSENTIAL HYPERTENSION: ICD-10-CM

## 2022-12-13 DIAGNOSIS — M25.562 CHRONIC PAIN OF BOTH KNEES: ICD-10-CM

## 2022-12-14 RX ORDER — PRAVASTATIN SODIUM 40 MG
TABLET ORAL
Qty: 90 TABLET | Refills: 3 | Status: SHIPPED | OUTPATIENT
Start: 2022-12-14

## 2022-12-14 RX ORDER — BISOPROLOL FUMARATE AND HYDROCHLOROTHIAZIDE 5; 6.25 MG/1; MG/1
TABLET ORAL
Qty: 90 TABLET | Refills: 1 | Status: SHIPPED | OUTPATIENT
Start: 2022-12-14

## 2022-12-14 RX ORDER — POTASSIUM CHLORIDE 20 MEQ/1
TABLET, EXTENDED RELEASE ORAL
Qty: 60 TABLET | Refills: 5 | Status: SHIPPED | OUTPATIENT
Start: 2022-12-14

## 2022-12-14 RX ORDER — GABAPENTIN 300 MG/1
CAPSULE ORAL
Qty: 180 CAPSULE | Refills: 2 | Status: SHIPPED | OUTPATIENT
Start: 2022-12-14

## 2022-12-14 NOTE — TELEPHONE ENCOUNTER
Rx Refill Note  Requested Prescriptions     Pending Prescriptions Disp Refills   • bisoprolol-hydrochlorothiazide (ZIAC) 5-6.25 MG per tablet [Pharmacy Med Name: BISOPRL/HCTZ TAB 5-6.25MG] 90 tablet 1     Sig: TAKE 1 TABLET DAILY      Last office visit with prescribing clinician: 1/12/2022   Last telemedicine visit with prescribing clinician: 4/24/2023   Next office visit with prescribing clinician: Visit date not found                         Would you like a call back once the refill request has been completed: [] Yes [] No    If the office needs to give you a call back, can they leave a voicemail: [] Yes [] No    ADAMA Rodriguez  12/14/22, 14:00 CST

## 2022-12-14 NOTE — TELEPHONE ENCOUNTER
Rx Refill Note  Requested Prescriptions     Pending Prescriptions Disp Refills   • potassium chloride (K-DUR,KLOR-CON) 20 MEQ CR tablet [Pharmacy Med Name: POTASSIUM CL ER 20MEQ TAB 20 Tablet] 60 tablet 5     Sig: TAKE 2 TABLETS BY MOUTH ONCE EVERY DAY   • gabapentin (NEURONTIN) 300 MG capsule [Pharmacy Med Name: GABAPENTIN 300 MG CAPS 300 Capsule] 180 capsule 2     Sig: TAKE 2 CAPSULES BY MOUTH 3 TIMES DAILY      Last office visit with prescribing clinician: 10/24/2022   Last telemedicine visit with prescribing clinician: 12/13/2022   Next office visit with prescribing clinician: 12/13/2022                         Would you like a call back once the refill request has been completed: [] Yes [] No    If the office needs to give you a call back, can they leave a voicemail: [] Yes [] No    ADAMA Rodriguez  12/14/22, 14:00 CST

## 2022-12-19 ENCOUNTER — TELEPHONE (OUTPATIENT)
Dept: FAMILY MEDICINE CLINIC | Facility: CLINIC | Age: 82
End: 2022-12-19

## 2022-12-19 NOTE — TELEPHONE ENCOUNTER
"    Caller: Dago Nunez    Relationship: Self    Best call back number: [phone]    What medication are you requesting:   ANTI BIOTIC OR PCP RECOMMENDATIONS    What are your current symptoms:   PATIENT HAS \"BOIL\" ON OUTER LIP (LABIA) OF VAGINA    How long have you been experiencing symptoms:   ABOUT 3 WEEKS    Have you had these symptoms before:    [x] Yes  [] No    Have you been treated for these symptoms before:   [x] Yes  [] No    If a prescription is needed, what is your preferred pharmacy and phone number: [facility] - [place], [place] - [address] - [phone] [facility] [phone] FX     Additional notes:  N/A    "

## 2022-12-21 ENCOUNTER — OFFICE VISIT (OUTPATIENT)
Dept: SURGERY | Facility: CLINIC | Age: 82
End: 2022-12-21

## 2022-12-21 VITALS
OXYGEN SATURATION: 97 % | SYSTOLIC BLOOD PRESSURE: 155 MMHG | DIASTOLIC BLOOD PRESSURE: 54 MMHG | HEART RATE: 58 BPM | BODY MASS INDEX: 34.53 KG/M2 | WEIGHT: 171.3 LBS | HEIGHT: 59 IN

## 2022-12-21 DIAGNOSIS — N90.89: Primary | ICD-10-CM

## 2022-12-21 PROCEDURE — 99213 OFFICE O/P EST LOW 20 MIN: CPT | Performed by: STUDENT IN AN ORGANIZED HEALTH CARE EDUCATION/TRAINING PROGRAM

## 2022-12-21 NOTE — PROGRESS NOTES
Office Established Patient Note:     Referring Provider: No ref. provider found    Chief Complaint   Patient presents with   • Follow-up       Subjective .     History of present illness:  Dago Nunez is a 82 y.o. female s/p left lumpectomy with sentinel lymph node biopsy on 9/14/21 for ER+IL+Her2- invsaive ductal carcinoma with final pathology showing stage IA breast cancer. She declined post op radiation. She is on adjuvant letrozole and is tolerating it well. She did have a fluid collection that developed one year post op in her axilla but this resolved on its own. She has no issues with her axilla.     Today she presents for a new problem. She reports a bump in her perineum that is very painful to sit on. She denies drainage. She denies fevers. She is very distressed by this pain.  This has been going on for 3 weeks.     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 Laguerre-Normal colonoscopy; Normal terminal ileum; Repeat 5 years   •  COLONOSCOPY N/A 11/05/2021    Procedure: COLONOSCOPY WITH ANESTHESIA;  Surgeon: Annita Loaiza MD;  Location: [facility] ENDOSCOPY;  Service: Gastroenterology;  Laterality: N/A;  pre abnormal imaging  post  Shaheen Akbar DO   • CYSTOSCOPY N/A 9/20/2022    Procedure: CYSTOSCOPY WITH URETHRAL DILATATION;  Surgeon: Shaheen Conway MD;  Location: [facility] OR;  Service: Urology;  Laterality: N/A;   • ENDOSCOPY  07/01/2014    Normal esophagus; Normal stomach; Normal examined duodenum; BRAVO pH capsule deployed;    • ENDOSCOPY  08/14/2013    Mild gastritis-biopsies for H.Pylor obtained   • ENDOSCOPY  02/16/2005    Dr. LaguerreKobwn-Qbylcqdge-zcfukioo   • ENDOSCOPY  10/21/2003    Dr. Laguerre-Stage 1 reflux esophagitis   • ENDOSCOPY N/A 11/05/2021    Procedure: ESOPHAGOGASTRODUODENOSCOPY WITH ANESTHESIA;  Surgeon: Annita Loaiza MD;  Location: [facility] ENDOSCOPY;  Service: Gastroenterology;  Laterality: N/A;  pre abnormal imaging  post AVM; hiatal hernia; esophageal polyp  Shaheen Akbar DO   • HYSTERECTOMY     • MASTECTOMY W/ SENTINEL NODE BIOPSY Left 09/14/2021    Procedure: LEFT PARTIAL MASTECTOMY WITH MAGSEED AND LEFT SENTINEL LYMPH NODE BIOPSY MAGTRACE;  Surgeon: Quynh Rosario MD;  Location: [facility] OR;  Service: General;  Laterality: Left;   • TONSILLECTOMY     • VAGINA SURGERY      Laser surgery X 2   • VENOUS ACCESS DEVICE (PORT) INSERTION N/A 09/29/2020    Procedure: SINGLE LUMEN PORT - A- CATH PLACEMENT WITH FLUOROSCOPY;  Surgeon: Quynh Rosario MD;  Location: [facility] OR;  Service: General;  Laterality: N/A;   ,   Family History   Problem Relation Age of Onset   • Cancer Mother    • Hypertension Mother    • Osteoporosis Mother    • Dementia Mother    • Uterine cancer Mother    • Heart disease Father    • Parkinsonism Father    • Cancer Sister    • Breast cancer Sister    • Kidney cancer Sister    • Diabetes Brother    • Heart disease Paternal Grandfather    • No Known Problems Maternal Grandmother    •  No Known Problems Maternal Grandfather    • No Known Problems Paternal Grandmother    • Colon cancer Neg Hx    • Colon polyps Neg Hx    • Esophageal cancer Neg Hx    • Liver cancer Neg Hx    • Liver disease Neg Hx    • Rectal cancer Neg Hx    • Stomach cancer Neg Hx    ,   Social History     Tobacco Use   • Smoking status: Former     Packs/day: 0.25     Years: 3.00     Pack years: 0.75     Types: Cigarettes   • Smokeless tobacco: Never   • Tobacco comments:     social smoker in college   Vaping Use   • Vaping Use: Never used   Substance Use Topics   • Alcohol use: Not Currently     Comment: Occasional glass of wine   • Drug use: No   , (Not in a hospital admission)   and Allergies:  Scopolamine, Amoxicillin-pot clavulanate, Keflex [cephalexin], Septra [sulfamethoxazole-trimethoprim], Tequin [gatifloxacin], and Trovan [alatrofloxacin]    Current Outpatient Medications:   •  Acetaminophen (TYLENOL ARTHRITIS PAIN PO), Take 1 tablet by mouth Daily As Needed (BACK PAIN)., Disp: , Rfl:   •  albuterol sulfate  (90 Base) MCG/ACT inhaler, Inhale 2 puffs Every 4 (Four) Hours As Needed for Wheezing or Shortness of Air., Disp: 18 g, Rfl: 11  •  bisoprolol-hydrochlorothiazide (ZIAC) 5-6.25 MG per tablet, TAKE 1 TABLET DAILY, Disp: 90 tablet, Rfl: 1  •  calcium carbonate (OS-REAGAN) 600 MG tablet, Take 600 mg by mouth Daily., Disp: , Rfl:   •  cefdinir (OMNICEF) 300 MG capsule, Take 1 capsule by mouth Daily., Disp: 7 capsule, Rfl: 0  •  cetirizine (zyrTEC) 10 MG tablet, Take 10 mg by mouth Daily., Disp: , Rfl:   •  citalopram (CeleXA) 20 MG tablet, TAKE 1 TABLET BY MOUTH EVERY DAY, Disp: 90 tablet, Rfl: 1  •  clotrimazole-betamethasone (LOTRISONE) 1-0.05 % cream, , Disp: , Rfl:   •  cyanocobalamin 1000 MCG/ML injection, INJECT 1 ML INTO THE MUSCLE EVERY 4 WEEKS., Disp: 3 mL, Rfl: 0  •  diphenhydrAMINE (BENADRYL) 25 mg capsule, Take 25 mg by mouth Every 6 (Six) Hours As Needed for Allergies., Disp: , Rfl:   •   diphenoxylate-atropine (LOMOTIL) 2.5-0.025 MG per tablet, TAKE 2 TABLETS BY MOUTH FOUR TIMES A DAY AS NEEDED FOR DIARRHEA, Disp: 90 tablet, Rfl: 2  •  docusate sodium (Colace) 100 MG capsule, Take 1 capsule by mouth 2 (Two) Times a Day., Disp: 60 capsule, Rfl: 0  •  DULERA 100-5 MCG/ACT inhaler, Inhale 2 puffs 2 (Two) Times a Day. Rinse and spit after using., Disp: 6 inhaler, Rfl: 0  •  esomeprazole (nexIUM) 40 MG capsule, Take 1 capsule by mouth 2 (Two) Times a Day., Disp: 180 capsule, Rfl: 3  •  furosemide (LASIX) 40 MG tablet, TAKE 1 AND 1/2 TABLETS ONCEDAILY, Disp: 135 tablet, Rfl: 3  •  gabapentin (NEURONTIN) 300 MG capsule, TAKE 2 CAPSULES BY MOUTH 3 TIMES DAILY, Disp: 180 capsule, Rfl: 2  •  Homeopathic Products (LEG CRAMPS) tablet, Take 1 tablet by mouth Daily., Disp: , Rfl:   •  HYDROcodone-acetaminophen (NORCO)  MG per tablet, Take 1 tablet by mouth Every 4 (Four) Hours As Needed for Moderate Pain or Severe Pain., Disp: 60 tablet, Rfl: 0  •  Hydrocortisone (britany's amazing butt) cream, Apply 1 application topically to the appropriate area as directed As Needed (irritation)., Disp: 120 g, Rfl: 0  •  letrozole (FEMARA) 2.5 MG tablet, TAKE 1 TABLET BY MOUTH 1 TIME DAILY, Disp: 90 tablet, Rfl: 2  •  lidocaine (XYLOCAINE) 5 % ointment, Apply  topically to the appropriate area as directed Every 4 (Four) Hours As Needed for Mild Pain  (pain secondary to radiation)., Disp: 240 g, Rfl: 1  •  Lidocaine Viscous HCl (XYLOCAINE) 2 % solution, Take 5 mL by mouth 3 (Three) Times a Day With Meals., Disp: 100 mL, Rfl: 0  •  metOLazone (ZAROXOLYN) 2.5 MG tablet, TAKE 1 TABLET BY MOUTH THREE TIMES A WEEK, Disp: , Rfl:   •  nitrofurantoin (MACRODANTIN) 25 MG capsule, Take 1 capsule by mouth Every Night. To help reduce pain with urination, Disp: 7 capsule, Rfl: 0  •  olmesartan (BENICAR) 20 MG tablet, , Disp: , Rfl:   •  ondansetron (ZOFRAN) 8 MG tablet, TAKE 1 TABLET BY MOUTH EVERY 8 HOURS AS NEEDED FOR NAUSEA AND  "VOMITING, Disp: 60 tablet, Rfl: 2  •  phenazopyridine (PYRIDIUM) 100 MG tablet, Take 1 tablet by mouth 3 (Three) Times a Day As Needed for Bladder Spasms., Disp: 20 tablet, Rfl: 0  •  phenazopyridine (PYRIDIUM) 200 MG tablet, TAKE 1 TABLET BY MOUTH THREE TIMES A DAY AS NEEDED FOR BLADDER SPASMS, Disp: 15 tablet, Rfl: 0  •  potassium chloride (K-DUR,KLOR-CON) 20 MEQ CR tablet, TAKE 2 TABLETS BY MOUTH ONCE EVERY DAY, Disp: 60 tablet, Rfl: 5  •  pravastatin (PRAVACHOL) 40 MG tablet, TAKE 1 TABLET EVERY NIGHT, Disp: 90 tablet, Rfl: 3  •  Syringe 25G X 1\" 3 ML misc, 1 each Daily., Disp: 25 each, Rfl: 1  •  vitamin D (ERGOCALCIFEROL) 1.25 MG (95890 UT) capsule capsule, Take 1 capsule by mouth 1 (One) Time Per Week., Disp: 12 capsule, Rfl: 3    Current Facility-Administered Medications:   •  lidocaine (XYLOCAINE) 1 % injection 10 mL, 10 mL, Infiltration, Once, Shaheen Akbar DO  •  lidocaine 1% - EPINEPHrine 1:395659 (XYLOCAINE W/EPI) 1 %-1:343102 injection 10 mL, 10 mL, Infiltration, Once, Shaheen Akbar DO    Facility-Administered Medications Ordered in Other Visits:   •  heparin injection 500 Units, 500 Units, Intravenous, PRRivera BRUNO Winston, MD, 500 Units at 07/01/21 1354  •  heparin injection 500 Units, 500 Units, Intravenous, PRRivera BRUNO Winston, MD, 500 Units at 01/11/22 1134  •  heparin injection 500 Units, 500 Units, Intravenous, HARDIKNRivera Winston, MD, 500 Units at 09/28/22 1418  •  sodium chloride 0.9 % flush 10 mL, 10 mL, IntravenousMARISOL Chua, Winston, MD, 10 mL at 07/01/21 1354  •  sodium chloride 0.9 % flush 10 mL, 10 mL, Intravenous, Rivera DAMON Winston, MD, 10 mL at 01/11/22 1134  •  sodium chloride 0.9 % flush 10 mL, 10 mL, Intravenous, Rivera DAMON Winston, MD, 10 mL at 09/28/22 1418    Objective     Vital Signs   /54 (BP Location: Left arm, Patient Position: Sitting, Cuff Size: Adult)   Pulse 58   Ht 150 cm (59.06\")   Wt 77.7 kg (171 lb 4.8 oz)   SpO2 97%   BMI 34.53 kg/m²      Physical " Exam:  General appearance - alert, well appearing, and in no distress  Mental status - alert, oriented to person, place, and time  Eyes - pupils equal and reactive, extraocular eye movements intact  Neck - supple, no significant adenopathy  Chest - no tachypnea, retractions or cyanosis  Heart - normal rate and regular rhythm  Abdomen - soft, nontender, nondistended, no masses or organomegaly  Neurological - alert, oriented, normal speech, no focal findings or movement disorder noted  Skin - Right reconstructed vulva with induration. No overt erythema or heat to the touch.     Results Review:    The following data was reviewed by: Quynh Rosario MD on 12/21/2022:    MRI pelvis without contrast (11/21/2022 15:26 EST)  1.  No discrete periurethral mass is identified with the previous mass last seen on 7/31/2020 no longer conspicuous.   2.  Findings suggestive of mild pelvic floor laxity.   3.  Unchanged nonspecific edema of the bilateral hip musculature .     Assessment & Plan       Diagnoses and all orders for this visit:    1. Acquired deformity of vulva (Primary)    Ms. Nunez is an 82 year old female well known to me s/p left lumpectomy with sentinel lymph node biopsy for breast cancer. She has a history of vulvar cancer s/p resection and reconstruction. She presents to my office today with induration and pain of the right reconstructed vulva. This needs evaluation by a gynecologic oncologist. My office will reach out her her oncologist at [facility], Dr. Benavides, today to try to get her set up with Gyn Onc. I recommended that if she develops worsening pain, fevers, or redness she should go to the ER.    BMI is >= 30 and <35. (Class 1 Obesity). The following options were offered after discussion;: weight loss educational material (shared in after visit summary)      Quynh Rosario MD  12/21/22  12:54 CST

## 2022-12-21 NOTE — PATIENT INSTRUCTIONS

## 2023-01-04 ENCOUNTER — TELEPHONE (OUTPATIENT)
Dept: FAMILY MEDICINE CLINIC | Facility: CLINIC | Age: 83
End: 2023-01-04
Payer: MEDICARE

## 2023-01-06 ENCOUNTER — TELEPHONE (OUTPATIENT)
Dept: FAMILY MEDICINE CLINIC | Facility: CLINIC | Age: 83
End: 2023-01-06
Payer: MEDICARE

## 2023-01-06 DIAGNOSIS — B37.9 YEAST INFECTION: Primary | ICD-10-CM

## 2023-01-06 RX ORDER — NYSTATIN 100000 [USP'U]/G
POWDER TOPICAL 4 TIMES DAILY
Qty: 60 G | Refills: 11 | Status: SHIPPED | OUTPATIENT
Start: 2023-01-06

## 2023-01-06 NOTE — TELEPHONE ENCOUNTER
Caller: [facility] - [address] - [phone] [facility] [phone] FX    Relationship: Pharmacy    Best call back number: [phone]    What medication are you requesting: NYSTATON    What are your current symptoms: RAW UNDER BREAST    How long have you been experiencing symptoms:     Have you had these symptoms before:    [] Yes  [] No    Have you been treated for these symptoms before:   [] Yes  [] No    If a prescription is needed, what is your preferred pharmacy and phone number: [facility] - [address] - [phone]  - [phone] FX     Additional notes: PHARMACY HAS NOT RECEIVED THE PRESCRIPTION YET. CAN SEND ANY CREAM DR SHAHID THINKS WOULD HELP. [facility] CALLED AND REQUESTED THIS ON 01.03.23.

## 2023-01-17 NOTE — PROGRESS NOTES
[facility] ON[facility] HEMATOLOGY & ONCOLOGY [address]  [facility] [address] [phone]    Patient Name: Dago Nunez  Encounter Date: 01/25/2023  YOB: 1940  Patient Number: [id number]      REASON FOR FOLLOW-UP: Dago Nunez is a pleasant 82-year-old  female who is seen on followup for stage IA receptor positive left breast cancer.  She is on adjuvant letrozole for the past 15.5 months.  She declined adjuvant radiation.  She is also seen for macrocytic anemia from chronic kidney disease Stage IIIa, GFR 51 mL/minute 03/05/2018, iron deficiency, and thrombocytopenia.  She is intolerant to oral iron (nausea, stomach cramps, and constipation).  She had ferric carboxymaltose 8 months ago. She is also seen for vulvar cancer. She is seen 25.5 months post C1D1 Mitomycin C and 5FU with radiation. Her D29 to 32 chemo was cancelled due to hospitalization, poor tolerance, and poor performance status.  She is seen with spouse, Joseph.  History is obtained from the patient.  She is a reliable historian.           Oncology/Hematology History Overview Note   DIAGNOSTIC ABNORMALITIES: Breast cancer.  Screening mammogram 08/18/2021.New dense 7 mm partially obscured nodule in the upper outer quadrant of the left breast, with associated architectural distortion.  Diagnostic mammogram 08/20/2021.  Small subcentimeter suspicious spiculated mass at 12:00 in the left breast, approximately 3 cm to 4 cm deep from the nipple. This is suspicious for breast carcinoma, ultrasound-guided biopsy is recommended.  Sonogram 08/20/2021.  Small suspicious solid mass at 12:00 in the left breast. 5.5 x 5.1 x 4.7 mm, suspicious for breast carcinoma. This corresponds with the finding on mammograms.  Successful ultrasound-guided left breast biopsy and clip on 08/23/2021.  Pathology report 09/01/2021.  Left breast at 12:00, core needle biopsies: Invasive  carcinoma no special type (ductal).  Histologic grade (Abdullahi histologic score).   Glandular (acinar)/tubular differentiation: Score 1.   Nuclear pleomorphism: Score 2.   Mitotic rate: Score 1.  Overall grade: Grade 1. Maximum tumor diameter is at least 0.8 cm.  Estrogen 87%, progesterone 47% and negative HER-2/gabriela.  Genetic testing was sent.  Patient was seen by Dr. Quynh Rosario 2021.  She is .  She had first delivery at 20.  She took birth control for 1 month.  She took hormone replacement therapy for approximately 5 years.  Family history positive for breast cancer, sister at 78. No ovarian cancer  Chest x-ray 2021.  No acute cardiopulmonary process.  Pathology report 2021.  Left sentinel lymph nodes: Four of 4 lymph nodes are negative for metastatic mammary carcinoma.Comment: Absence of micrometastases is confirmed utilizing immunohistochemical stains for pankeratin.  Left breast, partial mastectomy:  A.  Invasive carcinoma of no special type (ductal), grade 1 (1.1 cm in greatest dimension).  B.  Minor associated low-grade ductal carcinoma in situ component.  C.  The inked surgical margins and skin are negative for malignancy.  D.  Prior biopsy site changes including fat necrosis.  Comment: Microscopically, the tumor is approximately 7 mm from the closest inked (superior) surgical margin.  AJCC stage: pT1c snpN0.      PREVIOUS INTERVENTIONS:  She had undergone left partial mastectomy with left sentinel lymph node biopsy 2021 by Dr. Rosario.  Declined adjuvant radiation.  Adjuvant Femara 10/01/2021 through present.        DIAGNOSTIC ABNORMALITIES: Vulvar cancer  She had developed recurrent vulvar cancer left inguinal node that is PET positive measuring 2.1 cm.  The patient was seen by Dr. Marks in favor definitive chemo radiation.  Pathology report 07/10/2020 periurethral biopsy, poorly differentiated carcinoma with squamous and papillary features, focally invasive in the  "subepithelial connective tissue.  PET 07/31/2020 showed intense FDG avid left inguinal node is highly suspicious for local regional metastasis from known vulvar squamous cell carcinoma.  No additional sites of suspicious FDG activity.  MRI 07/31/2020 showed redemonstration of mildly T2 hyperintense mass involving the urethral meatus, similar dimensions to prior exam on 02/18/2020 however there is now a polypoid lesion seen along the anterior aspect of the perineum, possibly contiguous with the urethral mass, concerning for disease progression.  Market interval enlargement of the left inguinal node almost certainly representing disease involvement.  Patient was notified by Dr. Sdidiqui 08/19/2020.  Consensus for chemoradiation.  Patient reluctant to take chemotherapy.  Follow-up with Dr. Siddiqui in 4 to 6 weeks after radiation is completed.         PREVIOUS INTERVENTIONS:  Mitomycin C and 5-FU 10/05/2020 through 10/08/2020 with radiation completed 12/10/2020 at [facility].  D29 to d32 of chemo discontinued due to poor performance status.       DIAGNOSTIC ABNORMALITIES:   The patient was seen by Dr. Greg Alberts 01/29/2018 complaining of coughing and wheezing. The patient was given Tussionex. Blood work was ordered. CBC 01/29/2018 revealed a WBC of 7.8, hemoglobin 11.2, hematocrit 35.1, MCV 96.2, platelets 43,000, and ANC 4.47. CMP remarkable for of glucose of 177 and GFR 59 mL/minute.  The patient was seen by VINCENT Jones, 02/02/2018. She was coughing and wheezing. Tussionex did not help. The patient was given prednisone.  The patient was seen by VINCENT Jones, 02/15/2018. She is followed for anemia from iron deficiency. CBC 02/15/2018 revealed a WBC of 12.9, hemoglobin 11.4, hematocrit 34.5, MCV 95, and platelets 68,000.       PREVIOUS INTERVENTIONS:   Ferrous sulfate 325 mg 03/07/2018 through 05/06/2018.  Not resumed 06/08/2018. \"I misunderstood.\"   Resume 09/05/2018 through " 10/03/2018, stopped due to intolerance.  Injectafer 750 mg 10/20/2018 at the [place] office.  Retacrit 10/27/2020 through present.  Injectafer 750 mg 05/18/2021, 01/26/2022 and 05/25/2022 at [facility]           Vulvar cancer, carcinoma (HCC)    Initial Diagnosis    Vulvar cancer, carcinoma (CMS/HCC)     3/19/2001 Biopsy    Clinical Features: red vaginal lesion with bleeding since 1995. TX with  Carafate douche and Premarin vaginal cream with intermittent improvement.    DIAGNOSIS:    VAGINA, BIOPSY: FOCAL ULCERATION, MARKED ACUTE AND CHRONIC INFLAMMATION,  SPONGIOSIS AND REACTIVE ATYPIA; NEGATIVE FOR DYSPLASIA.     8/17/2001 Procedure    Pap Smear:  DIAGNOSIS:            Atypical keratinized squamous cells, suspicious                           for squamous cell carcinoma.         COMMENTS:             There are numerous atypical keratinized cells                           present in an inflammatory background.  These are                           suspicious for squamous cell carcinoma.  Biopsy                           confirmation is recommended.      10/16/2001 Procedure    Pap Smear:  DIAGNOSIS:            Mild dysplasia       ADDITIONAL FINDINGS:  Marked inflammation                           Excessive hyperkeratosis         BETHESDA SYSTEM:      Low grade squamous intraepithelial lesion    DIAGNOSIS:            Negative, no abnormal cells seen           BETHESDA SYSTEM:      Within normal limits.       ADEQUACY:             Specimen satisfactory for evaluation       SOURCE:               Vagina, diagnostic thin prep PAP     11/7/2001 Biopsy      DIAGNOSIS:    VAGINA AND VULVA, RIGHT POSTERIOR INTROITUS, WIDE LOCAL EXCISION:  VAGINAL INTRAEPITHELIAL NEOPLASIA (VAIN) II-III, FOCALLY EXTENDING TO  VAGINAL MARGIN AT 12-4:00; ALL OTHER MARGINS NEGATIVE FOR  INTRAEPITHELIAL NEOPLASIA. (SEE COMMENT)    COMMENT: The lesion involves vaginal type epithelium with associated  chronic inflammation and erosion.  The adjacent vulvar epithelium is  hyperkeratotic.     3/15/2002 Procedure    Pap Smear:  BETHESDA SYSTEM:      High grade squamous intraepithelial lesion       DESCRIPTOR:           Moderate dysplasia           ADEQUACY:             Specimen satisfactory for evaluation       SOURCE:               Vagina, Thin Prep Pap, Diagnostic     6/13/2003 Procedure         BETHESDA SYSTEM:      High grade squamous intraepithelial lesion       DESCRIPTOR:           Moderate dysplasia       ADDITIONAL FINDINGS:  Inflammation         ADEQUACY:             Specimen satisfactory for evaluation       SOURCE:               Vagina, Thin Prep Pap, Diagnostic    HUMAN PAPILLOMAVIRUS         CASE ACCESSION #:    [id number]        Category                  HPV Types                Patient Results         High/Intermed Risk    16,18,31,33,35,39              Negative                            45,51,52,56,58,59,68      Specimen Source        Cervical / Vaginal Specimen     12/5/2003 Procedure    Gynecological Cytology        CASE ACCESSION #:     [id number]       BETHESDA SYSTEM:      Low grade squamous intraepithelial lesion       DESCRIPTOR:           Mild dysplasia           ADEQUACY:             Specimen satisfactory for evaluation       SOURCE:               Vagina, Thin Prep Pap, Diagnostic     11/29/2011 Biopsy    ADDENDUM FINDINGS:    The high grade MOODY in the right labia majora biopsy was of the usual type.  There was no evidence of differentiated MOODY.      DIAGNOSIS:    1) PERINEUM, BIOPSY: SQUAMOUS EPITHELIUM WITH FLORID HYPERKERATOSIS,  CONSISTENT WITH CHRONIC IRRITATION; NO EVIDENCE OF DYSPLASIA OR MALIGNANCY  (SEE COMMENT).    Comment: Immunohistochemical studies (p16, p53, MIB-1) confirm the rendered  interpretations.    2) VULVA, RIGHT LABIUM MAJORUM, BIOPSY: VULVAR INTRAEPITHELIAL NEOPLASIA II  (MODERATE DYSPLASIA); (SEE COMMENT).    Comment: Immunohistochemical studies for p16 (nuclear and cytoplasmic  positivity in lower  epithelial half) and MIB-1 (nuclear positivity in lower  epithelial half) confirms the diagnosis; p53 is positive only in rare  cells. A GMS histochemical study for fungal forms is negative.  Nevertheless, parakeratosis associated with neutrophils, as was seen  herein, is commonly associated with fungal vulvitis.     7/3/2012 Procedure    Gynecological Cytology    CASE ACCESSION #:                     [id number]  BETHESDA SYSTEM:                      High grade squamous intraepithelial                                        lesion  DESCRIPTOR:                           Mild to moderate dysplasia  ADEQUACY:                             Specimen satisfactory for evaluation  SOURCE:                               Vagina, Thin Prep Pap, Diagnostic  # SLIDES REVIEWED:                    1     1/29/2013 Biopsy    DIAGNOSIS:    1) VULVA, RIGHT, ANTERIOR, BIOPSY:  SPONGIOTIC DERMATITIS WITH EXTENSIVE  DERMAL GRANULATION TISSUE, MIXED INFLAMMATION AND FIBROSIS (SEE COMENT).    Comment: Sections show spongiosis, basement membrane thickening, dermal  fibrosis, and extensive dermal granulation tissue with a predominantly  chronic inflammatory infiltrate. There is no evidence of dysplasia or  malignancy. The basement membrane thickening and dermal fibrosis suggests  that there may be component of lichen sclerosus. However, the underlying  cause of the ongoing inflammation and repair (granulation tissue) is not  clear from this sample. A tissue gram stain fails to reveal any large  bacterial aggregates.  GMS and AFB studies for fungal forms and acid fast  bacilli were negative respectively     11/27/2013 Surgery      Diagnosis    1) VULVA, LEFT ANTERIOR, BIOPSY: HIGH GRADE SQUAMOUS INTRAEPITHELIAL LESION (SEVERE DYSPLASIA, MOODY III).    2) VAGINAL WALL, ANTERIOR, BIOPSY: HIGH GRADE SQUAMOUS INTRAEPITHELIAL LESION (SEVERE DYSPLASIA, VaIN III).    3) VULVA, VULVECTOMY: FOCUS OF MICROINVASIVE SQUAMOUS CELL CARCINOMA,  WELL-DIFFERENTIATED, DEPTH OF STROMAL INVASION 0.4 MM,  8 MM FROM CLOSEST DEEP MARGINS, 4 MM FROM RIGHT ANTERIOR VAGINAL MARGINS AT 8 - 9 O'CLOCK (PROXIMAL TIP OF SPECIMEN DESIGNATED   12 O'CLOCK); NEGATIVE FOR LYMPHOVASCULAR INVASION; ARISING IN A BACKGROUND OF EXTENSIVE VULVAR INTRAEPITHELIAL NEOPLASIA (SEVERE DYSPLASIA, MOODY III, CLASSICAL AND DIFFERENTIATED TYPES); MOODY III IS PRESENT AT RIGHT LATERAL SURGICAL MARGIN (7 - 9 O'CLOCK),   LEFT LATERAL SURGICAL MARGIN (3 - 5 O'CLOCK) AND RIGHT AND LEFT VAGINAL MARGINS; TOTAL LESIONAL AREA (INVASIVE CARCINOMA AND MOODY III) MEASURES 8.2 CM IN GREATEST DIMENSION (GROSS MEASUREMENT); BACKGROUND SEVERE INTERFACE DERMATITIS AND LICHEN SCLEROSUS.        **Electronically signed out by Dimas Cardenas M.D.**on 12/2/2013  HAYLEY/YAZMIN/franky  Case reviewed by Attending Pathologist      Synoptic Diagnosis  3)  VULVA:     Specimen:                        Vulva  Procedure:                       Other: Vulvectomy  Lymph Node Sampling:             Not applicable  Specimen Size:                   Greatest dimension: 13.5 cm                                   Additional dimension: 9.5 cm                                   Additional dimension: 1.2 cm  Tumor Site:                      Right vulva, labium minus  Tumor Size:                      Greatest dimension: 0.04 cm  Tumor Focality:                  Unifocal  Histologic Type:                 Squamous cell carcinoma  Histologic Grade:                G1: Well differentiated  Microscopic Tumor Extension:     Depth of invasion: 0.4 mm  Margins:                         Uninvolved by invasive carcinoma                                   Distance of invasive carcinoma from closest margin: 4 mm  Lymph-Vascular Invasion:         Not identified  Lymph Nodes:                     No nodes submitted or found  Extranodal Extension:            Cannot be determined (explain):    Fixed or Ulcerated Femoral-Inguinal Lymph Nodes:                                     Not identified  Laterality of Involved Lymph Nodes:                                    Cannot be determined:    Primary Tumor (pT):              pT1a [FIGO IA]: Lesions 2 cm or less in size, confined to the vulva or perineum, and with stromal invasion 1.0 mm or less  Regional Lymph Nodes (pN):       pNX:  Regional lymph nodes cannot be assessed  Distant Metastasis (pM):         Not applicable  Additional Pathologic Findings:  Vulvar intraepithelial neoplasia (MOODY) 3 (severe dysplasia/carcinoma in situ)  --------------------------------------------------------     5/20/2014 Procedure    Gynecologic Cytology  [place] Diagnosis:  High grade squamous intraepithelial lesion.    Descriptor/Additional Findings:  Moderate dysplasia.    Adequacy:  Specimen satisfactory for evaluation.    Source:  Vagina, Thin Prep Pap, Imaged Diagnostic     11/11/2014 Biopsy    Diagnosis    ANTERIOR VAGINAL WALL, BIOPSY:  HIGH GRADE SQUAMOUS INTRAEPITHELIAL LESION (VaIN 3, SEVERE DYSPLASIA)       12/19/2014 Surgery    Diagnosis  1)  ANTERIOR VAGINAL WALL, PARTIAL VAGINECTOMY: HIGH-GRADE SQUAMOUS INTRAEPITHELIAL LESION (VaIN 3, SEVERE DYSPLASIA), 2.7 CM; HIGH GRADE DYSPLASIA EXTENDS TO THE 2 AND 8 O'CLOCK TIP MARGINS, THE 5-8 O'CLOCK LATERAL MARGIN, AND THE 11-2 O'CLOCK LATERAL   MARGIN.          2)  JOYA-CLITORAL AREA, EXCISION: HIGH-GRADE SQUAMOUS INTRAEPITHELIAL LESION (VaIN 3, SEVERE DYSPLASIA), EXTENDING TO A LATERAL MARGIN.         2/9/2015 Surgery    Diagnosis  1.          VULVA, LEFT PERIURETHRAL PORTION, EXCISION: FOCAL HIGH-GRADE SQUAMOUS INTRAEPITHELIAL LESION (MOODY 2, MODERATE DYSPLASIA); MARGINS ARE NEGATIVE FOR DYSPLASIA.    2.          VULVA, RIGHT PERIURETHRAL PORTION, EXCISION: BENIGN SQUAMOUS MUCOSA WITH ACUTE AND CHRONIC INFLAMMATION AND REACTIVE CHANGES.         3.          VULVA, LEFT, LOWER PORTION, EXCISION: BENIGN SQUAMOUS MUCOSA WITH ACUTE AND CHRONIC INFLAMMATION, REACTIVE CHANGES  "AND FEATURES CONSISTENT WITH PREVIOUS SURGICAL PROCEDURE.         4.          VULVA, RIGHT PORTION, EXCISION: BENIGN SQUAMOUS MUCOSA WITH CHRONIC INFLAMMATION AND DERMAL FIBROSIS.        9/6/2017 Procedure    Diagnosis  \"PAP SMEAR FROM THE URETHRA\":  HIGH GRADE SQUAMOUS INTRAEPITHELIAL LESION.          10/26/2017 Biopsy    Urethral Meatus Biopsy:  Urothelial mucosa with ulceration, chronic inflammation and focal high grade squamous intraepithelial lesion, moderate dysplasia     7/10/2018 Imaging    MRI Pelvis:  Impression:    1.  There is a 2.3 x 1.8 cm urethral lesion at the external urethral   meatus demonstrating enhancement and T2 hyperintensity. The lesion is   located approximately 8 mm from the bladder neck/internal urethral   orifice.  There is no extension of the lesion into the para vaginal   fat or ischiorectal fossa. There is some edema of the bilateral   ischiocavernosus muscles without invasion by the mass. No pelvic or   inguinal adenopathy is identified.     2.  The patient has a 2.5 cm cystocele and the urethra is almost   horizontal. There is pelvic floor laxity with loss of upper convexity   of the left iliococcygeus muscle.     7/20/2018 Biopsy    Diagnosis  URETHRA, BIOPSY:  SQUAMOUS CELL CARCINOMA IN SITU; SEE COMMENT.    Comments  P16 immunostain and HPV RNA in situ hybridization are strongly and diffusely positive within the lesion, consistent with HPV-related pathogenesis.     1/8/2019 Imaging    MRI Pelvis:  1.  Stable size and appearance of a nonspecific enhancing nodular   lesion at the caudal margin of the vaginal introitus adjacent to   extensive postsurgical changes related to prior vulvectomy and   vaginectomy. This lesion does not appear to conform to the expected   course of the urethra which is somewhat poorly defined, and this felt   more likely be located immediately caudal to the urethra. It is   possible this may reflect postsurgical scarring as opposed to a true   lesion. " Continued follow-up is suggested to ensure stability.  2.  No lymphadenopathy or other evidence of metastatic disease in the   pelvis.  3.  Evidence of pelvic floor laxity with cystocele, not significantly   changed.     8/6/2019 Imaging    MRI Pelvis:  1. No significant change from the prior exam on this limited   noncontrast study.  2. Stable indeterminate nodule anterior to the external urethral   meatus which may represent focal scarring; however,   residual/recurrent disease is not entirely excluded.  Recommend continued attention on follow-up. 3. Extensive pelvic   postsurgical changes with no evidence of local recurrence.  4. Pelvic floor laxity with cystocele unchanged from prior exam.     1/13/2020 Procedure    Urethral stricture dilation     2/18/2020 Imaging    MRI Pelvis:  Impression:  1.  No significant interval change in 1.7 x 1.7 cm T2 hyperintense   ovoid lesion at the urethral meatus.  2.  Numerous postoperative findings status post vaginectomy and   hysterectomy.  3.  Pelvic floor laxity with persistent cystocele.  4.  No pelvic adenopathy or bony lesion identified.     5/13/2020 Procedure    Urethral stricture dilation      7/10/2020 Biopsy    Diagnosis  PERIURETHRA, BIOPSY: POORLY DIFFERENTIATED CARCINOMA WITH SQUAMOUS AND PAPILLARY FEATURES, FOCALLY INVASIVE IN THE SUBEPITHELIAL CONNECTIVE TISSUE; SEE COMMENT.    Comments  The patient's history of vulvar microinvasive squamous cell carcinoma is noted. The current biopsy shows a poorly differentiated carcinoma with papillary formation. P16 immunostain and HPV RNA in situ hybridization are strongly and diffusely positive within the lesion, consistent with HPV-related pathogenesis. A KERRI-3 immunostain shows patchy, weak positivity in the lesional cells. The patient's prior biopsy ([id number]) is reviewed and shows similar morphologic and immunophenotypic features but the papillary features were not present in the prior material.  Dr. Odom  Bev reviewed this case and concurs with the diagnosis.      7/31/2020 Imaging    MRI Pelvis:  1.  Redemonstration of a mildly T2 hyperintense mass involving the   urethral meatus, similar dimensions to prior exam on 2/18/2020,   however there is now a polypoid lesion seen along the anterior aspect   of the perineum, possibly contiguous with the urethral mass,   concerning for disease progression. This could potentially represent   the recently biopsied lesion.  2.  Marked interval enlargement of a left inguinal lymph node, almost   certainly representing disease involvement. This would be amenable to   ultrasound-guided biopsy if warranted.  3.  Findings related to pelvic floor laxity, with cystocele.    PET/CT:  1.  Intensely FDG avid left inguinal lymph node is highly suspicious   for locoregional metastasis from known vulvar squamous cell   carcinoma. There are no additional sites of suspicious FDG activity.  2.   Intense physiologic FDG activity within the urine obscures   visualization of the periurethral mass. Findings are better   characterized on same day pelvic MRI.     9/21/2020 - 12/10/2020 Radiation    Radiation OncologyTreatment Course:  Dago Nunez received 6940 cGy in 38 fractions to vulva via external beam radiation therapy.     10/5/2020 -  Chemotherapy    OP Vulvar MitoMYcin / Fluorouracil CIV + XRT       10/9/2020 - 9/7/2021 Chemotherapy    OP CENTRAL VENOUS ACCESS DEVICE ACCESS, CARE, AND MAINTENANCE (CVAD)     10/19/2020 Imaging    CT Head:  Impression:    1. No acute intracranial process.     10/21/2021 -  Chemotherapy    OP CENTRAL VENOUS ACCESS DEVICE ACCESS, CARE, AND MAINTENANCE ([facility])     Secondary malignancy of inguinal lymph nodes (HCC)   8/31/2020 Initial Diagnosis    Secondary malignancy of inguinal lymph nodes (CMS/HCC)     10/9/2020 - 9/7/2021 Chemotherapy    OP CENTRAL VENOUS ACCESS DEVICE ACCESS, CARE, AND MAINTENANCE ([facility])     10/21/2021 -  Chemotherapy    OP CENTRAL  VENOUS ACCESS DEVICE ACCESS, CARE, AND MAINTENANCE ([facility])         PAST MEDICAL HISTORY:  ALLERGIES:  Allergies   Allergen Reactions   • Scopolamine Swelling     Other reaction(s): ANGIOEDEMA       • Amoxicillin-Pot Clavulanate Rash   • Keflex [Cephalexin] Rash   • Septra [Sulfamethoxazole-Trimethoprim] Rash   • Tequin [Gatifloxacin] Other (See Comments)     Doesn't remember   • Trovan [Alatrofloxacin] Dizziness     CURRENT MEDICATIONS:  Outpatient Encounter Medications as of 1/25/2023   Medication Sig Dispense Refill   • Acetaminophen (TYLENOL ARTHRITIS PAIN PO) Take 1 tablet by mouth Daily As Needed (BACK PAIN).     • albuterol sulfate  (90 Base) MCG/ACT inhaler Inhale 2 puffs Every 4 (Four) Hours As Needed for Wheezing or Shortness of Air. 18 g 11   • bisoprolol-hydrochlorothiazide (ZIAC) 5-6.25 MG per tablet TAKE 1 TABLET DAILY 90 tablet 1   • calcium carbonate (OS-REAGAN) 600 MG tablet Take 600 mg by mouth Daily.     • cefdinir (OMNICEF) 300 MG capsule Take 1 capsule by mouth Daily. 7 capsule 0   • cetirizine (zyrTEC) 10 MG tablet Take 10 mg by mouth Daily.     • citalopram (CeleXA) 20 MG tablet TAKE 1 TABLET BY MOUTH EVERY DAY 90 tablet 1   • clotrimazole-betamethasone (LOTRISONE) 1-0.05 % cream      • cyanocobalamin 1000 MCG/ML injection INJECT 1 ML INTO THE MUSCLE EVERY 4 WEEKS. 3 mL 0   • diphenhydrAMINE (BENADRYL) 25 mg capsule Take 25 mg by mouth Every 6 (Six) Hours As Needed for Allergies.     • diphenoxylate-atropine (LOMOTIL) 2.5-0.025 MG per tablet TAKE 2 TABLETS BY MOUTH FOUR TIMES A DAY AS NEEDED FOR DIARRHEA 90 tablet 2   • docusate sodium (Colace) 100 MG capsule Take 1 capsule by mouth 2 (Two) Times a Day. 60 capsule 0   • DULERA 100-5 MCG/ACT inhaler Inhale 2 puffs 2 (Two) Times a Day. Rinse and spit after using. 6 inhaler 0   • esomeprazole (nexIUM) 40 MG capsule Take 1 capsule by mouth 2 (Two) Times a Day. 180 capsule 3   • furosemide (LASIX) 40 MG tablet TAKE 1 AND 1/2 TABLETS ONCEDAILY 135  "tablet 3   • gabapentin (NEURONTIN) 300 MG capsule TAKE 2 CAPSULES BY MOUTH 3 TIMES DAILY 180 capsule 2   • Homeopathic Products (LEG CRAMPS) tablet Take 1 tablet by mouth Daily.     • HYDROcodone-acetaminophen (NORCO)  MG per tablet Take 1 tablet by mouth Every 4 (Four) Hours As Needed for Moderate Pain or Severe Pain. 60 tablet 0   • Hydrocortisone (britany's amazing butt) cream Apply 1 application topically to the appropriate area as directed As Needed (irritation). 120 g 0   • letrozole (FEMARA) 2.5 MG tablet TAKE 1 TABLET BY MOUTH 1 TIME DAILY 90 tablet 2   • lidocaine (XYLOCAINE) 5 % ointment Apply  topically to the appropriate area as directed Every 4 (Four) Hours As Needed for Mild Pain  (pain secondary to radiation). 240 g 1   • Lidocaine Viscous HCl (XYLOCAINE) 2 % solution Take 5 mL by mouth 3 (Three) Times a Day With Meals. 100 mL 0   • metOLazone (ZAROXOLYN) 2.5 MG tablet TAKE 1 TABLET BY MOUTH THREE TIMES A WEEK     • nitrofurantoin (MACRODANTIN) 25 MG capsule Take 1 capsule by mouth Every Night. To help reduce pain with urination 7 capsule 0   • nystatin (MYCOSTATIN) 817553 UNIT/GM powder Apply  topically to the appropriate area as directed 4 (Four) Times a Day. 60 g 11   • olmesartan (BENICAR) 20 MG tablet      • ondansetron (ZOFRAN) 8 MG tablet TAKE 1 TABLET BY MOUTH EVERY 8 HOURS AS NEEDED FOR NAUSEA AND VOMITING 60 tablet 2   • phenazopyridine (PYRIDIUM) 100 MG tablet Take 1 tablet by mouth 3 (Three) Times a Day As Needed for Bladder Spasms. 20 tablet 0   • phenazopyridine (PYRIDIUM) 200 MG tablet TAKE 1 TABLET BY MOUTH THREE TIMES A DAY AS NEEDED FOR BLADDER SPASMS 15 tablet 0   • potassium chloride (K-DUR,KLOR-CON) 20 MEQ CR tablet TAKE 2 TABLETS BY MOUTH ONCE EVERY DAY 60 tablet 5   • pravastatin (PRAVACHOL) 40 MG tablet TAKE 1 TABLET EVERY NIGHT 90 tablet 3   • Syringe 25G X 1\" 3 ML misc 1 each Daily. 25 each 1   • vitamin D (ERGOCALCIFEROL) 1.25 MG (75247 UT) capsule capsule Take 1 " capsule by mouth 1 (One) Time Per Week. 12 capsule 3     Facility-Administered Encounter Medications as of 1/25/2023   Medication Dose Route Frequency Provider Last Rate Last Admin   • heparin injection 500 Units  500 Units Intravenous PRN Drake Stafford MD   500 Units at 07/01/21 1354   • heparin injection 500 Units  500 Units Intravenous PRN Drake Stafford MD   500 Units at 01/11/22 1134   • heparin injection 500 Units  500 Units Intravenous PRN Drake Stafford MD   500 Units at 09/28/22 1418   • lidocaine (XYLOCAINE) 1 % injection 10 mL  10 mL Infiltration Once Shaheen Akbar DO       • lidocaine 1% - EPINEPHrine 1:201413 (XYLOCAINE W/EPI) 1 %-1:000340 injection 10 mL  10 mL Infiltration Once Shaheen Akbar DO       • sodium chloride 0.9 % flush 10 mL  10 mL Intravenous PRN Drake Stafford MD   10 mL at 07/01/21 1354   • sodium chloride 0.9 % flush 10 mL  10 mL Intravenous PRN Drake Stafford MD   10 mL at 01/11/22 1134   • sodium chloride 0.9 % flush 10 mL  10 mL Intravenous PRN Drake Stafford MD   10 mL at 09/28/22 1418     ADULT ILLNESSES:  Patient Active Problem List   Diagnosis Code   • Meatal stenosis [id number]   • Retention of urine R33.9   • Type 2 diabetes mellitus, without long-term current use of insulin (HCC) E11.9   • Essential hypertension I10   • Grief reaction F43.21   • 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   • Chronic midline low back pain without sciatica M54.50, G89.29   • Bilateral lower extremity edema R60.0   • History of urethral stricture Z87.448   • Epidermal cyst of neck L72.0   • Normocytic anemia D64.9   • Neck abscess L02.11   • Mixed hyperlipidemia E78.2   • GERD (gastroesophageal reflux disease) K21.9   • Anxiety F41.9   • Iron deficiency and chemotherapy induced anemia D50.9   • Stage 3 chronic kidney disease (HCC) N18.30   • Anemia in stage 3 chronic kidney disease (HCC) N18.30, D63.1   • Screening for breast cancer Z12.39   • Lower extremity edema R60.0   •  Vulvar cancer, carcinoma (HCC) C51.9   • Former smoker Z87.891   • 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   • Febrile illness R50.9   • Chemotherapy-induced thrombocytopenia D69.59, T45.1X5A   • Hyponatremia E87.1   • Hypokalemia E87.6   • Neutropenic fever (HCC) D70.9, R50.81   • Moderate malnutrition (HCC) E44.0   • Antineoplastic chemotherapy induced anemia D64.81, T45.1X5A   • History of radiation therapy Z92.3   • Encounter for care related to Port-a-Cath Z45.2   • Malignant neoplasm of upper-outer quadrant of left breast in female, estrogen receptor positive (HCC) C50.412, Z17.0   • Encounter for care related to vascular access port Z45.2   • Abnormal finding on GI tract imaging R93.3   • Bronchitis J40   • Obesity (BMI 30-39.9) E66.9     SURGERIES:  Past Surgical History:   Procedure Laterality Date   • APPENDECTOMY     • BENJAMIN PROCEDURE      No evidence of reflux disease while on Nexium 40mg daily-See report   • BREAST CYST ASPIRATION Left    • BREAST LUMPECTOMY     • COLONOSCOPY  01/12/2011    Diverticulosis sigmoid colon; The examination was otherwise normal; Repeat 10 years   • COLONOSCOPY  11/03/2003    Dr. Laguerre-Normal colonoscopy; Normal terminal ileum; Repeat 5 years   • COLONOSCOPY N/A 11/05/2021    Procedure: COLONOSCOPY WITH ANESTHESIA;  Surgeon: Annita Loaiza MD;  Location: [facility];  Service: Gastroenterology;  Laterality: N/A;  pre abnormal imaging  post  Shaheen Akbar DO   • CYSTOSCOPY N/A 9/20/2022    Procedure: CYSTOSCOPY WITH URETHRAL DILATATION;  Surgeon: Shaheen Conway MD;  Location: [facility] OR;  Service: Urology;  Laterality: N/A;   • ENDOSCOPY  07/01/2014    Normal esophagus; Normal stomach; Normal examined duodenum; BRAVO pH capsule deployed;    • ENDOSCOPY  08/14/2013    Mild gastritis-biopsies for H.Pylor obtained   • ENDOSCOPY  02/16/2005    Dr. LaguerreYrrfe-Ywmnxjpct-luxxugrb   • ENDOSCOPY  10/21/2003    Dr. Laguerre-Stage 1 reflux esophagitis   •  ENDOSCOPY N/A 11/05/2021    Procedure: ESOPHAGOGASTRODUODENOSCOPY WITH ANESTHESIA;  Surgeon: Annita Loaiza MD;  Location:  [facility];  Service: Gastroenterology;  Laterality: N/A;  pre abnormal imaging  post AVM; hiatal hernia; esophageal polyp  Shaheen Akbar, DO   • HYSTERECTOMY     • MASTECTOMY W/ SENTINEL NODE BIOPSY Left 09/14/2021    Procedure: LEFT PARTIAL MASTECTOMY WITH MAGSEED AND LEFT SENTINEL LYMPH NODE BIOPSY MAGTRACE;  Surgeon: Quynh Rosario MD;  Location:  [facility] OR;  Service: General;  Laterality: Left;   • TONSILLECTOMY     • VAGINA SURGERY      Laser surgery X 2   • VENOUS ACCESS DEVICE (PORT) INSERTION N/A 09/29/2020    Procedure: SINGLE LUMEN PORT - A- CATH PLACEMENT WITH FLUOROSCOPY;  Surgeon: Quynh Rosario MD;  Location:  [facility] OR;  Service: General;  Laterality: N/A;     HEALTH MAINTENANCE ITEMS:  Health Maintenance Due   Topic Date Due   • ZOSTER VACCINE (1 of 2) 11/26/2015   • DIABETIC EYE EXAM  11/25/2020   • COVID-19 Vaccine (3 - Moderna risk series) 01/25/2022   • LIPID PANEL  05/07/2022   • HEMOGLOBIN A1C  10/26/2022       <no information>  Last Completed Colonoscopy          COLORECTAL CANCER SCREENING (COLONOSCOPY - Every 10 Years) Next due on 11/5/2031 11/05/2021  Surgical Procedure: COLONOSCOPY    11/05/2021  COLONOSCOPY    01/29/2014  Outside Claim: HI FECAL BLOOD SCRN IMMUNOASSAY    02/01/2013  Outside Claim: CHG BLOOD,OCCULT,FECAL HGB,FECES,1-3 SIMULT    01/12/2011  SCANNED - COLONOSCOPY    Only the first 5 history entries have been loaded, but more history exists.              Immunization History   Administered Date(s) Administered   • COVID-19 (MODERNA) 1st, 2nd, 3rd Dose Only 03/26/2021, 04/23/2021   • COVID-19 (MODERNA) BOOSTER 12/28/2021   • FLUAD TRI 65YR+ 10/22/2019   • Flu Vaccine Split Quad 10/12/2018   • Fluad Quad 65+ 11/09/2020   • Fluzone High Dose =>65 Years ([employer] ONLY) 10/01/2018, 11/12/2021   • Fluzone High-Dose 65+yrs 11/12/2021,  10/24/2022   • Pneumococcal Conjugate 20-Valent (PCV20) 10/24/2022   • Pneumococcal Polysaccharide (PPSV23) 10/16/2013   • Pneumococcal, Unspecified 10/01/2017   • Tdap 05/01/2019   • Zostavax 01/14/2010, 10/01/2015     Last Completed Mammogram          Scheduled - MAMMOGRAM (Yearly) Scheduled for 10/30/2023    10/10/2022  Mammo Diagnostic Digital Tomosynthesis Bilateral With CAD    03/07/2022  Mammo Diagnostic Digital Tomosynthesis Left With CAD    08/20/2021  Mammo Diagnostic Digital Tomosynthesis Left With CAD    08/18/2021  Mammo Screening Digital Tomosynthesis Bilateral With CAD    05/28/2020  Mammo Screening Digital Tomosynthesis Bilateral With CAD    Only the first 5 history entries have been loaded, but more history exists.                  FAMILY HISTORY:  Family History   Problem Relation Age of Onset   • Cancer Mother    • Hypertension Mother    • Osteoporosis Mother    • Dementia Mother    • Uterine cancer Mother    • Heart disease Father    • Parkinsonism Father    • Cancer Sister    • Breast cancer Sister    • Kidney cancer Sister    • Diabetes Brother    • Heart disease Paternal Grandfather    • No Known Problems Maternal Grandmother    • No Known Problems Maternal Grandfather    • No Known Problems Paternal Grandmother    • Colon cancer Neg Hx    • Colon polyps Neg Hx    • Esophageal cancer Neg Hx    • Liver cancer Neg Hx    • Liver disease Neg Hx    • Rectal cancer Neg Hx    • Stomach cancer Neg Hx      SOCIAL HISTORY:  Social History     Socioeconomic History   • Marital status:    Tobacco Use   • Smoking status: Former     Packs/day: 0.25     Years: 3.00     Pack years: 0.75     Types: Cigarettes   • Smokeless tobacco: Never   • Tobacco comments:     social smoker in college   Vaping Use   • Vaping Use: Never used   Substance and Sexual Activity   • Alcohol use: Not Currently     Comment: Occasional glass of wine   • Drug use: No   • Sexual activity: Defer       REVIEW OF  "SYSTEMS:    Review of Systems   Constitutional: Positive for fatigue. Negative for chills and fever.        \"Sore in my vagina.  I saw Ms Faina at [facility].  I also have cyst in my left breast.  Sometimes it goes away.\" To see surgery. \"Dr. Rosario said to drain it but it disappeared.\"   HENT: Negative for congestion, hearing loss and trouble swallowing.    Eyes: Negative for redness and visual disturbance.   Respiratory: Negative for cough and shortness of breath.    Cardiovascular: Positive for leg swelling. Negative for chest pain.   Gastrointestinal: Negative for nausea and vomiting.   Endocrine: Negative for polydipsia and polyphagia.   Genitourinary: Negative for flank pain and hematuria.   Musculoskeletal: Negative for myalgias and neck stiffness.   Skin: Positive for pallor.   Allergic/Immunologic: Negative for food allergies.   Neurological: Negative for dizziness, speech difficulty and weakness.   Hematological: Negative for adenopathy. Does not bruise/bleed easily.   Psychiatric/Behavioral: Negative for agitation, confusion and hallucinations.       VITAL SIGNS: /64   Pulse 75   Temp 97.5 °F (36.4 °C)   Resp 18   Ht 150 cm (59.06\")   Wt 76.1 kg (167 lb 11.2 oz)   SpO2 92%   Breastfeeding No   BMI 33.80 kg/m²   Pain Score    01/25/23 1451   PainSc: 2  Comment: back pain       PHYSICAL EXAMINATION:     Physical Exam  Vitals reviewed.   Constitutional:       Appearance: She is ill-appearing.      Comments: She arrived in thee exam room in a wheelchair.   HENT:      Head: Normocephalic and atraumatic.   Eyes:      General: No scleral icterus.  Cardiovascular:      Rate and Rhythm: Normal rate.   Pulmonary:      Effort: No respiratory distress.      Breath sounds: No wheezing or rales.      Comments: Port, right. No erythema.  Abdominal:      General: Bowel sounds are normal.      Palpations: Abdomen is soft.   Musculoskeletal:         General: Swelling present.      Cervical back: Neck " supple.   Skin:     General: Skin is warm.      Coloration: Skin is pale.   Neurological:      Mental Status: She is oriented to person, place, and time.   Psychiatric:         Mood and Affect: Mood normal.         Behavior: Behavior normal.         Thought Content: Thought content normal.         Judgment: Judgment normal.         LABS    Lab Results - Last 18 Months   Lab Units 01/25/23  1421 11/30/22  1438 09/28/22  1331 09/13/22  1926 09/01/22  1609 07/20/22  1351 05/03/22  1352 01/11/22  1046 09/30/21  1841 09/30/21  1053   HEMOGLOBIN g/dL 9.3* 10.3* 10.0* 9.4* 9.5* 9.9* 9.9*   < > 9.7* 10.0*   HEMATOCRIT % 30.1* 33.4* 30.8* 27.3* 29.4* 30.9* 30.1*   < > 29.9* 31.3*   MCV fL 106.0* 109.2* 108.5* 104.2* 110.1* 107.7* 103.1*   < > 103.5* 105.7*   WBC 10*3/mm3 5.85 5.95 6.01 7.03 6.0 8.24 5.73   < > 5.79 5.10   RDW % 12.8 12.9 13.1 12.9 12.9 14.2 12.9   < > 14.0 13.8   MPV fL 10.3 10.7 10.3 10.4 11.2 10.2 10.7   < > 10.1 9.1   PLATELETS 10*3/mm3 164 142 170 145 74* 177 176   < > 197 179   IMM GRAN % %  --   --   --  0.4  --   --  0.3  --  0.3 0.2   NEUTROS ABS 10*3/mm3 4.06 3.91 4.02 4.66 3.8 6.91 3.71   < > 3.88 3.39   LYMPHS ABS 10*3/mm3 1.02 1.27 1.11 1.49 1.2  --  1.20   < > 1.12 1.04   MONOS ABS 10*3/mm3 0.38 0.38 0.44 0.55 0.60  --  0.46   < > 0.51 0.37   EOS ABS 10*3/mm3 0.33 0.32 0.39 0.27 0.30 0.16 0.31   < > 0.24 0.27   BASOS ABS 10*3/mm3 0.04 0.05 0.04 0.03 0.00  --  0.03   < > 0.02 0.02   IMMATURE GRANS (ABS) 10*3/mm3  --   --   --  0.03 0.0  --  0.02  --  0.02 0.01   NRBC /100 WBC  --   --   --  0.0  --   --  0.0  --  0.0  --    NEUTROPHIL % %  --   --   --   --   --  77.8*  --   --   --   --    MONOCYTES % %  --   --   --   --   --  2.0*  --   --   --   --    ATYP LYMPH % %  --   --   --   --   --  1.0  --   --   --   --    ANISOCYTOSIS   --   --   --   --   --  Slight/1+  --   --   --   --     < > = values in this interval not displayed.       Lab Results - Last 18 Months   Lab Units 01/25/23  9876  11/30/22  1438 09/28/22  1331 09/13/22  1926 09/01/22  1609 05/03/22  1352 01/11/22  1046 09/30/21  1841 09/30/21  1053 09/14/21  0702 09/09/21  1459   GLUCOSE mg/dL 141* 158* 165* 98 95 129* 92 123* 129* 120* 85   SODIUM mmol/L 137 138 135* 136 132* 135* 138 136 160* 136 134*   POTASSIUM mmol/L 4.9 4.9 4.7 4.0 5.7* 4.3 4.3 3.9 4.9 4.9 5.4*   TOTAL CO2 mmol/L  --   --   --   --  23  --   --   --   --   --   --    CO2 mmol/L 24.0 22.0 26.0 25.0  --  23.0 24.0 26.0 25.0 24.0 24.0   CHLORIDE mmol/L 101 105 98 99 98 100 103 100 122* 101 102   ANION GAP mmol/L 12.0 11.0 11.0 12.0 11 12.0 11.0 10.0 13.0 11.0 8.0   CREATININE mg/dL 1.76* 1.43* 1.48* 1.43* 1.4* 1.35* 1.41* 1.44* 1.21* 1.58* 1.56*   BUN mg/dL 35* 25* 28* 18 23 17 21 20 19 38* 45*   BUN / CREAT RATIO  19.9 17.5 18.9 12.6  --  12.6 14.9 13.9 15.7 24.1 28.8*   CALCIUM mg/dL 9.5 9.6 9.3 9.4 9.7 9.6 9.5 9.1 9.4 9.6 9.7   EGFR IF NONAFRICN AM   --   --   --   --  36*  --  36* 35* 43* 32* 32*   ALK PHOS U/L 64  --  59 56 64 80 79 82 78 78 79   TOTAL PROTEIN g/dL 7.9  --  7.6 7.3 7.5 7.4 7.4 7.3 7.5 8.0 7.8   ALT (SGPT) U/L 6  --  10 10 10 6 5 8 5 <5 6   AST (SGOT) U/L 12  --  16 17 18 12 13 15 14 16 11   BILIRUBIN mg/dL 0.2  --  0.3 0.3 <0.2 0.3 0.2 0.2 0.3 0.2 0.3   ALBUMIN g/dL 4.0  --  4.30 4.20 4.3 4.00 4.00 4.30 4.20 4.50 4.20   GLOBULIN gm/dL 3.9  --  3.3 3.1  --  3.4 3.4 3.0 3.3 3.5 3.6       No results for input(s): MSPIKE, KAPPALAMB, IGLFLC, URICACID, FREEKAPPAL, CEA, LDH, REFLABREPO in the last 82042 hours.    Lab Results - Last 18 Months   Lab Units 01/25/23  1421 11/30/22  1438 09/28/22  1331 07/20/22  1351 05/03/22  1352 01/11/22  1046   IRON mcg/dL 81 101 104 75 53 52   TIBC mcg/dL 299 361 331 317 302 328   IRON SATURATION % 27 28 31 24 18* 16*   FERRITIN ng/mL 1,030.00* 941.50* 1,026.00* 1,063.00* 785.90* 469.40*       Syndal Terrence Nunez reports a pain score of 2.  Given her pain assessment as noted, treatment options were discussed and the following  options were decided upon as a follow-up plan to address the patient's pain: continuation of current treatment plan for pain.        ASSESSMENT:  1.  Left breast cancer.  Tumor size 1.1 cm.  Negative genetic testing.  AJCC stage: 1A (pT1c, snpN0, cM0)  Receptor status: Estrogen 87%, progesterone 47% and negative HER-2/gabriela.  Treatment status: Post left lumpectomy and sentinel lymph node biopsy.  Declined adjuvant radiation. On adjuvant letrozole.  2.  Macrocytic anemia from iron deficiency, B12 deficiency and history of chronic kidney disease Stage III, GFR 56 mL/min on 09/03/2020.  3.   Iron deficiency. Intolerant to oral iron.   4.   Chronic kidney disease Stage IIIa, GFR 56 ml/min on 09/03/2020.  5.   Performance status of 3.    6.   Osteoporosis.  On calcium and vitamin D.  7.   Squamous cell cancer in situ, urethra.  Followed by Dr. Callahan  8.   Recurrent squamous cell carcinoma, vulva.  AJCC stage IIIA (T2, Nib, M0, G3).  Treatment status.  Had Mitomycin C and 5 FU D1 to D4 with radiation.  D29 - 32 chemo was cancelled due to poor performance status.          PLAN:  1.    Re:  Heme status.  Hemoglobin 9.3, hematocrit 30.1, and .    2.    Re:  Pre-office CMP.  GFR 29 from  35.7 from 37.2 from 40.1 from 36 from 35 ml/min.  3.    Re:  Ferritin 1030 from 941.5 from 1026 from 785.9 from 469.4 and saturation 27 from 28 from 31 from 24 from 18 from 16%.    4.    Re: Note from Dr Marks on 10/14/2022.  Vulvar cancer, no evidence of disease.  Return in 1 year.  Periurethral mass.  MRI pelvis ordered and referred back to Dr. Callahan. Note from VINCENT Jolley for vulvar pain.  . There is an approximately 2cm nodular area of point tenderness anteriorly just left of midline on perineum. There is no visible change.  5.    Re:  MRI pelvis 11/21/2022 at [facility]. Noncontrast MRI:. No discrete periurethral mass is identified with the previous mass last seen on 7/31/2020 no  "longer conspicuous.  Findings suggestive of mild pelvic floor laxity.  Unchanged nonspecific edema of the bilateral hip musculature.   6.   CBC with differential, ferritin and iron panel in 2 months.  7.   Epoetin alpha 40,000 units SQ wekly if hemoglobin below 10 and hematocrit below 30 to target a hemoglobin of 11 and hematocrit 33. Move CBC weekly if she starts Retacrit.   Monitor for potential hypertension.  8.  Transfuse 1 unit packed RBCs if hemoglobin less than 6.9.  Premed Tylenol 500 mg p.o. and Benadryl 12.5 mg IV.  Lasix 20 mg IV push after a unit of blood.  Monitor for transfusion reactions.  9.   Intolerant to oral iron (nausea, cramps, and constipation).  IV iron as needed.   10.  Advance Care Planning   ACP discussion was held with the patient during this visit. Patient has an advance directive in EMR which is still valid.    11.  eRx Zofran 8 mg po every 8 hours as needed for nausea/vomiting, # 60, 2 refills if needed.  12.  Flush port every 6 weeks.   13.  Cervical/vaginal cytology per gynecology.  14.  Vitamin B12 1000 mcg IM monthly by [facility].  Continue to monitor for local reaction.  15.  Continue ongoing management per primary care physician and other specialists.  16.  Will not obtain breast tumor markers or Oncotype DX.  Patient is not a candidate for adjuvant chemotherapy.  17.  eRx Femara 2.5 mg p.o. daily #90 with 3 refills if needed.  \"I am taking it at night.\" Monitor for hot flashes and bone loss.  18.Plan of care discussed with patient and spouse.  Understanding expressed.  Patient agreeable to proceed.  19.  Order bone density 10/2023.  20.  Mammogram order per surgery.  21.  Return to the office in 4 months with CBC with differential, ferritin, iron panel, and CMP.          I have reviewed the assessment and plan and verified the accuracy of it. No changes to assessment and plan since the information was documented. Drake Stafford MD 01/25/23         I spent 32 total minutes, " face-to-face, caring for Syndal today.  Greater than 50% of this time involved counseling and/or coordination of care as documented within this note regarding the patient's illness(es), pros and cons of various treatment options, instructions and/or risk reduction.            cc: Shaheen Akbar MD         (Annita Loaiza MD)        (Ulises Roche MD)        (Trini Rosario MD)        (Shaheen Conway MD)        Gilberto Mills MD        (Moustapha Callahan MD)        (Radha Marks MD)

## 2023-01-24 RX ORDER — ONDANSETRON HYDROCHLORIDE 8 MG/1
8 TABLET, FILM COATED ORAL EVERY 8 HOURS PRN
Qty: 60 TABLET | Refills: 2 | Status: CANCELLED | OUTPATIENT
Start: 2023-01-24

## 2023-01-25 ENCOUNTER — LAB (OUTPATIENT)
Dept: LAB | Facility: HOSPITAL | Age: 83
End: 2023-01-25
Payer: MEDICARE

## 2023-01-25 ENCOUNTER — OFFICE VISIT (OUTPATIENT)
Dept: ONCOLOGY | Facility: CLINIC | Age: 83
End: 2023-01-25
Payer: MEDICARE

## 2023-01-25 VITALS
RESPIRATION RATE: 18 BRPM | SYSTOLIC BLOOD PRESSURE: 132 MMHG | BODY MASS INDEX: 33.81 KG/M2 | WEIGHT: 167.7 LBS | DIASTOLIC BLOOD PRESSURE: 64 MMHG | TEMPERATURE: 97.5 F | HEIGHT: 59 IN | HEART RATE: 75 BPM | OXYGEN SATURATION: 92 %

## 2023-01-25 DIAGNOSIS — C50.412 MALIGNANT NEOPLASM OF UPPER-OUTER QUADRANT OF LEFT BREAST IN FEMALE, ESTROGEN RECEPTOR POSITIVE: ICD-10-CM

## 2023-01-25 DIAGNOSIS — Z17.0 MALIGNANT NEOPLASM OF UPPER-OUTER QUADRANT OF LEFT BREAST IN FEMALE, ESTROGEN RECEPTOR POSITIVE: ICD-10-CM

## 2023-01-25 DIAGNOSIS — C77.4 SECONDARY MALIGNANCY OF INGUINAL LYMPH NODES: ICD-10-CM

## 2023-01-25 DIAGNOSIS — N18.32 STAGE 3B CHRONIC KIDNEY DISEASE: Primary | ICD-10-CM

## 2023-01-25 DIAGNOSIS — Z45.2 ENCOUNTER FOR CARE RELATED TO VASCULAR ACCESS PORT: ICD-10-CM

## 2023-01-25 DIAGNOSIS — N18.32 ANEMIA DUE TO STAGE 3B CHRONIC KIDNEY DISEASE: Primary | ICD-10-CM

## 2023-01-25 DIAGNOSIS — D63.1 ANEMIA DUE TO STAGE 3B CHRONIC KIDNEY DISEASE: Primary | ICD-10-CM

## 2023-01-25 DIAGNOSIS — C51.9 VULVAR CANCER, CARCINOMA: ICD-10-CM

## 2023-01-25 LAB
ALBUMIN SERPL-MCNC: 4 G/DL (ref 3.5–5.2)
ALBUMIN/GLOB SERPL: 1 G/DL
ALP SERPL-CCNC: 64 U/L (ref 39–117)
ALT SERPL W P-5'-P-CCNC: 6 U/L (ref 1–33)
ANION GAP SERPL CALCULATED.3IONS-SCNC: 12 MMOL/L (ref 5–15)
AST SERPL-CCNC: 12 U/L (ref 1–32)
BASOPHILS # BLD AUTO: 0.04 10*3/MM3 (ref 0–0.2)
BASOPHILS NFR BLD AUTO: 0.7 % (ref 0–1.5)
BILIRUB SERPL-MCNC: 0.2 MG/DL (ref 0–1.2)
BUN SERPL-MCNC: 35 MG/DL (ref 8–23)
BUN/CREAT SERPL: 19.9 (ref 7–25)
CALCIUM SPEC-SCNC: 9.5 MG/DL (ref 8.6–10.5)
CHLORIDE SERPL-SCNC: 101 MMOL/L (ref 98–107)
CO2 SERPL-SCNC: 24 MMOL/L (ref 22–29)
CREAT SERPL-MCNC: 1.76 MG/DL (ref 0.57–1)
DEPRECATED RDW RBC AUTO: 49.8 FL (ref 37–54)
EGFRCR SERPLBLD CKD-EPI 2021: 29 ML/MIN/1.73
EOSINOPHIL # BLD AUTO: 0.33 10*3/MM3 (ref 0–0.4)
EOSINOPHIL NFR BLD AUTO: 5.6 % (ref 0.3–6.2)
ERYTHROCYTE [DISTWIDTH] IN BLOOD BY AUTOMATED COUNT: 12.8 % (ref 12.3–15.4)
FERRITIN SERPL-MCNC: 1030 NG/ML (ref 13–150)
GLOBULIN UR ELPH-MCNC: 3.9 GM/DL
GLUCOSE SERPL-MCNC: 141 MG/DL (ref 65–99)
HCT VFR BLD AUTO: 30.1 % (ref 34–46.6)
HGB BLD-MCNC: 9.3 G/DL (ref 12–15.9)
HOLD SPECIMEN: NORMAL
IRON 24H UR-MRATE: 81 MCG/DL (ref 37–145)
IRON SATN MFR SERPL: 27 % (ref 20–50)
LYMPHOCYTES # BLD AUTO: 1.02 10*3/MM3 (ref 0.7–3.1)
LYMPHOCYTES NFR BLD AUTO: 17.4 % (ref 19.6–45.3)
MCH RBC QN AUTO: 32.7 PG (ref 26.6–33)
MCHC RBC AUTO-ENTMCNC: 30.9 G/DL (ref 31.5–35.7)
MCV RBC AUTO: 106 FL (ref 79–97)
MONOCYTES # BLD AUTO: 0.38 10*3/MM3 (ref 0.1–0.9)
MONOCYTES NFR BLD AUTO: 6.5 % (ref 5–12)
NEUTROPHILS NFR BLD AUTO: 4.06 10*3/MM3 (ref 1.7–7)
NEUTROPHILS NFR BLD AUTO: 69.5 % (ref 42.7–76)
PLATELET # BLD AUTO: 164 10*3/MM3 (ref 140–450)
PMV BLD AUTO: 10.3 FL (ref 6–12)
POTASSIUM SERPL-SCNC: 4.9 MMOL/L (ref 3.5–5.2)
PROT SERPL-MCNC: 7.9 G/DL (ref 6–8.5)
RBC # BLD AUTO: 2.84 10*6/MM3 (ref 3.77–5.28)
SODIUM SERPL-SCNC: 137 MMOL/L (ref 136–145)
TIBC SERPL-MCNC: 299 MCG/DL (ref 298–536)
TRANSFERRIN SERPL-MCNC: 201 MG/DL (ref 200–360)
WBC NRBC COR # BLD: 5.85 10*3/MM3 (ref 3.4–10.8)

## 2023-01-25 PROCEDURE — 85025 COMPLETE CBC W/AUTO DIFF WBC: CPT

## 2023-01-25 PROCEDURE — 83540 ASSAY OF IRON: CPT

## 2023-01-25 PROCEDURE — 36415 COLL VENOUS BLD VENIPUNCTURE: CPT

## 2023-01-25 PROCEDURE — 82728 ASSAY OF FERRITIN: CPT

## 2023-01-25 PROCEDURE — 80053 COMPREHEN METABOLIC PANEL: CPT

## 2023-01-25 PROCEDURE — 84466 ASSAY OF TRANSFERRIN: CPT

## 2023-01-25 PROCEDURE — 99214 OFFICE O/P EST MOD 30 MIN: CPT | Performed by: INTERNAL MEDICINE

## 2023-01-25 RX ORDER — SODIUM CHLORIDE 0.9 % (FLUSH) 0.9 %
10 SYRINGE (ML) INJECTION AS NEEDED
Status: SHIPPED | OUTPATIENT
Start: 2023-01-25

## 2023-01-25 RX ORDER — HEPARIN SODIUM (PORCINE) LOCK FLUSH IV SOLN 100 UNIT/ML 100 UNIT/ML
500 SOLUTION INTRAVENOUS AS NEEDED
OUTPATIENT
Start: 2023-01-25

## 2023-01-25 RX ORDER — SODIUM CHLORIDE 0.9 % (FLUSH) 0.9 %
10 SYRINGE (ML) INJECTION AS NEEDED
OUTPATIENT
Start: 2023-01-25

## 2023-01-25 RX ORDER — HEPARIN SODIUM (PORCINE) LOCK FLUSH IV SOLN 100 UNIT/ML 100 UNIT/ML
500 SOLUTION INTRAVENOUS AS NEEDED
Status: SHIPPED | OUTPATIENT
Start: 2023-01-25

## 2023-01-25 RX ADMIN — Medication 10 ML: at 15:37

## 2023-01-25 RX ADMIN — HEPARIN SODIUM (PORCINE) LOCK FLUSH IV SOLN 100 UNIT/ML 500 UNITS: 100 SOLUTION at 15:37

## 2023-01-26 DIAGNOSIS — K52.1 DIARRHEA DUE TO DRUG: ICD-10-CM

## 2023-01-26 DIAGNOSIS — C51.9 VULVAR CANCER, CARCINOMA: ICD-10-CM

## 2023-01-26 RX ORDER — DIPHENOXYLATE HYDROCHLORIDE AND ATROPINE SULFATE 2.5; .025 MG/1; MG/1
TABLET ORAL
Qty: 90 TABLET | Refills: 2 | Status: SHIPPED | OUTPATIENT
Start: 2023-01-26

## 2023-01-27 ENCOUNTER — TELEPHONE (OUTPATIENT)
Dept: ONCOLOGY | Facility: CLINIC | Age: 83
End: 2023-01-27
Payer: MEDICARE

## 2023-01-27 NOTE — TELEPHONE ENCOUNTER
Caller: PATRICIA    Relationship: [facility]    Best call back number: [phone]    What is the best time to reach you: ANYTIME     Who are you requesting to speak with (clinical staff, provider,  specific staff member): CLINICAL    What was the call regarding: PATRICIA WITH [facility] NEEDS THE OFFICE NOTE FROM 1/25 F/U WITH DR GROSS FAXED [phone]    Do you require a callback: ONLY IF QUESTIONS

## 2023-02-09 DIAGNOSIS — E53.8 LOW SERUM VITAMIN B12: ICD-10-CM

## 2023-02-09 RX ORDER — CYANOCOBALAMIN 1000 UG/ML
INJECTION, SOLUTION INTRAMUSCULAR; SUBCUTANEOUS
Qty: 3 ML | Refills: 10 | Status: SHIPPED | OUTPATIENT
Start: 2023-02-09

## 2023-03-21 RX ORDER — OLMESARTAN MEDOXOMIL 20 MG/1
TABLET ORAL
Qty: 90 TABLET | Refills: 3 | Status: SHIPPED | OUTPATIENT
Start: 2023-03-21

## 2023-04-19 ENCOUNTER — OFFICE VISIT (OUTPATIENT)
Dept: PULMONOLOGY | Facility: CLINIC | Age: 83
End: 2023-04-19
Payer: MEDICARE

## 2023-04-19 VITALS
SYSTOLIC BLOOD PRESSURE: 130 MMHG | OXYGEN SATURATION: 98 % | BODY MASS INDEX: 33.26 KG/M2 | HEIGHT: 59 IN | RESPIRATION RATE: 16 BRPM | HEART RATE: 60 BPM | WEIGHT: 165 LBS | DIASTOLIC BLOOD PRESSURE: 60 MMHG

## 2023-04-19 DIAGNOSIS — E66.9 OBESITY (BMI 30-39.9): Chronic | ICD-10-CM

## 2023-04-19 DIAGNOSIS — R05.9 COUGH, UNSPECIFIED TYPE: Chronic | ICD-10-CM

## 2023-04-19 DIAGNOSIS — R06.2 WHEEZING: Primary | Chronic | ICD-10-CM

## 2023-04-19 DIAGNOSIS — U09.9 POST-COVID-19 CONDITION: Chronic | ICD-10-CM

## 2023-04-19 RX ORDER — ALBUTEROL SULFATE 90 UG/1
2 AEROSOL, METERED RESPIRATORY (INHALATION) EVERY 4 HOURS PRN
Qty: 18 G | Refills: 11 | Status: SHIPPED | OUTPATIENT
Start: 2023-04-19 | End: 2023-04-19

## 2023-04-19 RX ORDER — ALBUTEROL SULFATE 90 UG/1
2 AEROSOL, METERED RESPIRATORY (INHALATION)
Qty: 54 G | Refills: 3 | Status: SHIPPED | OUTPATIENT
Start: 2023-04-19

## 2023-04-19 NOTE — PATIENT INSTRUCTIONS
The patient has had more issues with wheezing recently likely related to allergies.  She needs a refill of her albuterol HFA inhaler and I sent that in with a 3-month supply with 3 refills.  If she cannot get the albuterol on a 3-month supply or if it is too much out-of-pocket cost to refill in that fashion she can just get 1 a month with refills for a year.  I will see her back in 1 year otherwise.

## 2023-04-19 NOTE — PROGRESS NOTES
Chief Complaint  Wheezing    Subjective    History of Present Illness     Dago Nunez presents to [facility] for wheezing.    History of Present Illness   The patient is accompanied by her  today.  She states she has intermittent wheezing has been worse recently with a lot of pollen in the air.  Her lungs are clear today but her  does relate that she does wheeze periodically.  This is helped by her albuterol HFA inhaler and she needs a refill and I did E prescribe a 3-month supply with 3 refills but that is too much out-of-pocket cost or she is not eligible for 3-month supply she can still fill it monthly with 11 refills.  I would hold off on pulmonary functions at this time and again she will be treated with the albuterol HFA as needed.  She did have COVID this past summer but has recovered well.  She had the COVID-19 vaccine including a booster in the form of the Moderna vaccine.  She did have the flu shot this past flu season and is had both a Prevnar 20 and Pneumovax in the past.  Prior to Admission medications    Medication Sig Start Date End Date Taking? Authorizing Provider   Acetaminophen (TYLENOL ARTHRITIS PAIN PO) Take 1 tablet by mouth Daily As Needed (BACK PAIN).   Yes Homer Ahumada MD   albuterol sulfate  (90 Base) MCG/ACT inhaler Inhale 2 puffs 4 (Four) Times a Day. 4/19/23  Yes Jesus Guzman MD   bisoprolol-hydrochlorothiazide (ZIAC) 5-6.25 MG per tablet TAKE 1 TABLET DAILY 12/14/22  Yes Shaheen Akbar, DO   calcium carbonate (OS-REAGAN) 600 MG tablet Take 1 tablet by mouth Daily.   Yes Homer Ahumada MD   cetirizine (zyrTEC) 10 MG tablet Take 1 tablet by mouth Daily.   Yes Homer Ahumada MD   citalopram (CeleXA) 20 MG tablet TAKE 1 TABLET BY MOUTH EVERY DAY 11/4/22  Yes Shaheen Akbar, DO   clotrimazole-betamethasone (LOTRISONE) 1-0.05 % cream  10/18/22  Yes Homer Ahumada MD    cyanocobalamin 1000 MCG/ML injection INJECT 1 ML INTO THE MUSCLE EVERY 4 WEEKS. 2/9/23  Yes Shaheen Akbar DO   diphenhydrAMINE (BENADRYL) 25 mg capsule Take 1 capsule by mouth Every 6 (Six) Hours As Needed for Allergies.   Yes Homer Ahumada MD   diphenoxylate-atropine (LOMOTIL) 2.5-0.025 MG per tablet TAKE 2 TABLETS BY MOUTH FOUR TIMES A DAY AS NEEDED FOR DIARRHEA 1/26/23  Yes Shaheen Akbar DO   docusate sodium (Colace) 100 MG capsule Take 1 capsule by mouth 2 (Two) Times a Day. 8/31/22  Yes Shaheen Akbar DO   DULERA 100-5 MCG/ACT inhaler Inhale 2 puffs 2 (Two) Times a Day. Rinse and spit after using. 10/14/19  Yes Jesus Guzman MD   esomeprazole (nexIUM) 40 MG capsule Take 1 capsule by mouth 2 (Two) Times a Day. 6/16/22  Yes Shaheen Akbar DO   furosemide (LASIX) 40 MG tablet TAKE 1 AND 1/2 TABLETS ONCEDAILY 6/14/22  Yes Shaheen Akbar DO   gabapentin (NEURONTIN) 300 MG capsule TAKE 2 CAPSULES BY MOUTH 3 TIMES DAILY 12/14/22  Yes Shaheen Akbar DO   Homeopathic Products (LEG CRAMPS) tablet Take 1 tablet by mouth Daily.   Yes Homer Ahumada MD   HYDROcodone-acetaminophen (NORCO)  MG per tablet Take 1 tablet by mouth Every 4 (Four) Hours As Needed for Moderate Pain or Severe Pain. 9/14/22  Yes Shaheen Akbar DO   Hydrocortisone (britany's amazing butt) cream Apply 1 application topically to the appropriate area as directed As Needed (irritation). 10/27/20  Yes Ulises Roche III, MD   letrozole (FEMARA) 2.5 MG tablet TAKE 1 TABLET BY MOUTH 1 TIME DAILY 4/4/22  Yes Drake Stafford MD   lidocaine (XYLOCAINE) 5 % ointment Apply  topically to the appropriate area as directed Every 4 (Four) Hours As Needed for Mild Pain  (pain secondary to radiation). 12/1/20  Yes Sussy Kwon, NIKKO   metOLazone (ZAROXOLYN) 2.5 MG tablet TAKE 1 TABLET BY MOUTH THREE TIMES A WEEK 5/24/21  Yes Provider, MD Homer   nystatin (MYCOSTATIN) 733945 UNIT/GM powder  "Apply  topically to the appropriate area as directed 4 (Four) Times a Day. 1/6/23  Yes Shaheen Akbar DO   olmesartan (BENICAR) 20 MG tablet  9/10/22  Yes Homer Ahumada MD   ondansetron (ZOFRAN) 8 MG tablet TAKE 1 TABLET BY MOUTH EVERY 8 HOURS AS NEEDED FOR NAUSEA AND VOMITING 7/8/22  Yes Shaheen Akbar DO   potassium chloride (K-DUR,KLOR-CON) 20 MEQ CR tablet TAKE 2 TABLETS BY MOUTH ONCE EVERY DAY 12/14/22  Yes Shaheen Akbar DO   pravastatin (PRAVACHOL) 40 MG tablet TAKE 1 TABLET EVERY NIGHT 12/14/22  Yes Shaheen Akbar DO   Syringe 25G X 1\" 3 ML misc 1 each Daily. 8/5/21  Yes Drake Stafford MD   vitamin D (ERGOCALCIFEROL) 1.25 MG (63622 UT) capsule capsule Take 1 capsule by mouth 1 (One) Time Per Week. 10/24/22  Yes Shaheen Akbar DO   albuterol sulfate  (90 Base) MCG/ACT inhaler Inhale 2 puffs Every 4 (Four) Hours As Needed for Wheezing or Shortness of Air. 12/5/22 4/19/23 Yes Shaheen Akbar DO   albuterol sulfate  (90 Base) MCG/ACT inhaler Inhale 2 puffs Every 4 (Four) Hours As Needed for Wheezing or Shortness of Air. 4/19/23 4/19/23 Yes Jesus Guzman MD   cefdinir (OMNICEF) 300 MG capsule Take 1 capsule by mouth Daily.  Patient not taking: Reported on 4/19/2023 9/13/22   Robert Scott MD   Lidocaine Viscous HCl (XYLOCAINE) 2 % solution Take 5 mL by mouth 3 (Three) Times a Day With Meals. 9/14/22   Shaheen Akbar DO   nitrofurantoin (MACRODANTIN) 25 MG capsule Take 1 capsule by mouth Every Night. To help reduce pain with urination  Patient not taking: Reported on 4/19/2023 1/13/22   Jaida Younger MD   phenazopyridine (PYRIDIUM) 100 MG tablet Take 1 tablet by mouth 3 (Three) Times a Day As Needed for Bladder Spasms.  Patient not taking: Reported on 4/19/2023 9/16/22   Gloria Martines APRN   phenazopyridine (PYRIDIUM) 200 MG tablet TAKE 1 TABLET BY MOUTH THREE TIMES A DAY AS NEEDED FOR BLADDER SPASMS  Patient not taking: Reported on " "4/19/2023 10/19/22   Shaheen Akbar, DO       Social History     Socioeconomic History   • Marital status:    Tobacco Use   • Smoking status: Former     Packs/day: 0.25     Years: 3.00     Pack years: 0.75     Types: Cigarettes     Passive exposure: Past   • Smokeless tobacco: Never   • Tobacco comments:     social smoker in college   Vaping Use   • Vaping Use: Never used   Substance and Sexual Activity   • Alcohol use: Not Currently     Comment: Occasional glass of wine   • Drug use: No   • Sexual activity: Defer       Objective   Vital Signs:   /60   Pulse 60   Resp 16   Ht 149.9 cm (59\")   Wt 74.8 kg (165 lb)   SpO2 98% Comment: RA  BMI 33.33 kg/m²     Physical Exam  Vitals and nursing note reviewed.   Constitutional:       Appearance: She is obese.      Comments: She does ambulate with a walker.   HENT:      Head: Normocephalic.   Eyes:      Extraocular Movements: Extraocular movements intact.      Pupils: Pupils are equal, round, and reactive to light.   Cardiovascular:      Rate and Rhythm: Normal rate and regular rhythm.   Pulmonary:      Effort: Pulmonary effort is normal.      Comments: Lung fields are clear with reasonable air movement bilaterally and no adventitious sounds heard.  Musculoskeletal:      Comments: Ambulates with a walker.  She does have some kyphosis of the thoracic spine.   Skin:     General: Skin is warm and dry.   Neurological:      General: No focal deficit present.      Mental Status: She is alert and oriented to person, place, and time.      Gait: Gait abnormal.   Psychiatric:         Mood and Affect: Mood normal.         Behavior: Behavior normal.        Result Review :          No results found for this or any previous visit.                     My interpretation of labs:   COVID-19,[facility] IN-HOUSE,Nasal Swab No Transport Media 3-4 HR TAT - Swab, Nasal Cavity (08/05/2022 14:32)      Assessment and Plan     Diagnoses and all orders for this visit:    1. " Wheezing (Primary)  Assessment & Plan:  She likely has some component of asthma and will continue her Dulera and albuterol HFA as needed.      2. Cough, unspecified type  Assessment & Plan:  I think this is associate with some allergies and likely some underlying asthma.  She may continue her albuterol HFA as needed and I did E prescribe refills as well as her Dulera twice daily.    Orders:  -     Discontinue: albuterol sulfate  (90 Base) MCG/ACT inhaler; Inhale 2 puffs Every 4 (Four) Hours As Needed for Wheezing or Shortness of Air.  Dispense: 18 g; Refill: 11  -     albuterol sulfate  (90 Base) MCG/ACT inhaler; Inhale 2 puffs 4 (Four) Times a Day.  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      4. Obesity (BMI 30-39.9)  Assessment & Plan:  Patient's (Body mass index is 33.33 kg/m².) indicates that they are obese (BMI >30) with health conditions that include hypertension . Weight is improving with lifestyle modifications.  Her exercise capabilities will be quite limited by her mobility issues.  Diet is encouraged and she will follow-up with her primary care physician regarding her elevated BMI otherwise.          Jesus Guzman MD  4/19/2023  12:16 CDT    Follow Up   Return in about 1 year (around 4/19/2024) for To see me specifically.    Patient was given instructions and counseling regarding her condition or for health maintenance advice. Please see specific information pulled into the AVS if appropriate.

## 2023-04-19 NOTE — ASSESSMENT & PLAN NOTE
Patient's (Body mass index is 33.33 kg/m².) indicates that they are obese (BMI >30) with health conditions that include hypertension . Weight is improving with lifestyle modifications.  Her exercise capabilities will be quite limited by her mobility issues.  Diet is encouraged and she will follow-up with her primary care physician regarding her elevated BMI otherwise.

## 2023-04-19 NOTE — ASSESSMENT & PLAN NOTE
I think this is associate with some allergies and likely some underlying asthma.  She may continue her albuterol HFA as needed and I did E prescribe refills as well as her Dulera twice daily.

## 2023-04-19 NOTE — ASSESSMENT & PLAN NOTE
She contracted COVID in August of last year but appears to have recovered well with no significant sequela.

## 2023-04-24 ENCOUNTER — OFFICE VISIT (OUTPATIENT)
Dept: FAMILY MEDICINE CLINIC | Facility: CLINIC | Age: 83
End: 2023-04-24
Payer: MEDICARE

## 2023-04-24 ENCOUNTER — LAB REQUISITION (OUTPATIENT)
Dept: LAB | Facility: HOSPITAL | Age: 83
End: 2023-04-24
Payer: MEDICARE

## 2023-04-24 VITALS
BODY MASS INDEX: 33.56 KG/M2 | SYSTOLIC BLOOD PRESSURE: 124 MMHG | DIASTOLIC BLOOD PRESSURE: 68 MMHG | HEART RATE: 60 BPM | OXYGEN SATURATION: 98 % | HEIGHT: 59 IN | TEMPERATURE: 98.5 F | WEIGHT: 166.5 LBS

## 2023-04-24 DIAGNOSIS — G89.3 CANCER RELATED PAIN: ICD-10-CM

## 2023-04-24 DIAGNOSIS — Z00.00 MEDICARE ANNUAL WELLNESS VISIT, SUBSEQUENT: Primary | ICD-10-CM

## 2023-04-24 DIAGNOSIS — Z23 ENCOUNTER FOR VACCINATION: ICD-10-CM

## 2023-04-24 DIAGNOSIS — E11.42 TYPE 2 DIABETES MELLITUS WITH DIABETIC POLYNEUROPATHY, WITHOUT LONG-TERM CURRENT USE OF INSULIN: ICD-10-CM

## 2023-04-24 DIAGNOSIS — E78.2 MIXED HYPERLIPIDEMIA: ICD-10-CM

## 2023-04-24 DIAGNOSIS — C51.9 VULVAR CANCER, CARCINOMA: ICD-10-CM

## 2023-04-24 LAB
EXPIRATION DATE: NORMAL
HBA1C MFR BLD: 5.5 %
Lab: NORMAL

## 2023-04-24 PROCEDURE — 82043 UR ALBUMIN QUANTITATIVE: CPT | Performed by: FAMILY MEDICINE

## 2023-04-24 RX ORDER — SODIUM BICARBONATE 650 MG/1
TABLET ORAL EVERY 12 HOURS SCHEDULED
COMMUNITY
Start: 2023-03-08

## 2023-04-24 RX ORDER — HYDROCODONE BITARTRATE AND ACETAMINOPHEN 10; 325 MG/1; MG/1
0.5 TABLET ORAL EVERY 4 HOURS PRN
Qty: 60 TABLET | Refills: 0 | Status: SHIPPED | OUTPATIENT
Start: 2023-04-24

## 2023-04-24 NOTE — PROGRESS NOTES
The ABCs of the Annual Wellness Visit  Subsequent Medicare Wellness Visit    Subjective      Dago Nunez is a 82 y.o. female who presents for a Subsequent Medicare Wellness Visit.    The following portions of the patient's history were reviewed and   updated as appropriate: allergies, current medications, past family history, past medical history, past social history, past surgical history and problem list.    Compared to one year ago, the patient feels her physical   health is worse.    Compared to one year ago, the patient feels her mental   health is the same.    Recent Hospitalizations:  She was not admitted to the hospital during the last year.       Current Medical Providers:  Patient Care Team:  Shaheen Akbar DO as PCP - General (Family Medicine)  Trae Boggs MD as Consulting Physician (Urology)  Jesus Guzman MD as Consulting Physician (Pulmonary Disease)    Outpatient Medications Prior to Visit   Medication Sig Dispense Refill   • Acetaminophen (TYLENOL ARTHRITIS PAIN PO) Take 1 tablet by mouth Daily As Needed (BACK PAIN).     • albuterol sulfate  (90 Base) MCG/ACT inhaler Inhale 2 puffs 4 (Four) Times a Day. 54 g 3   • bisoprolol-hydrochlorothiazide (ZIAC) 5-6.25 MG per tablet TAKE 1 TABLET DAILY 90 tablet 1   • calcium carbonate (OS-REAGAN) 600 MG tablet Take 1 tablet by mouth Daily.     • cetirizine (zyrTEC) 10 MG tablet Take 1 tablet by mouth Daily.     • citalopram (CeleXA) 20 MG tablet TAKE 1 TABLET BY MOUTH EVERY DAY 90 tablet 1   • clotrimazole-betamethasone (LOTRISONE) 1-0.05 % cream      • cyanocobalamin 1000 MCG/ML injection INJECT 1 ML INTO THE MUSCLE EVERY 4 WEEKS. 3 mL 10   • diphenhydrAMINE (BENADRYL) 25 mg capsule Take 1 capsule by mouth Every 6 (Six) Hours As Needed for Allergies.     • diphenoxylate-atropine (LOMOTIL) 2.5-0.025 MG per tablet TAKE 2 TABLETS BY MOUTH FOUR TIMES A DAY AS NEEDED FOR DIARRHEA 90 tablet 2   • docusate sodium (Colace) 100 MG capsule  Take 1 capsule by mouth 2 (Two) Times a Day. 60 capsule 0   • DULERA 100-5 MCG/ACT inhaler Inhale 2 puffs 2 (Two) Times a Day. Rinse and spit after using. 6 inhaler 0   • esomeprazole (nexIUM) 40 MG capsule Take 1 capsule by mouth 2 (Two) Times a Day. 180 capsule 3   • furosemide (LASIX) 40 MG tablet TAKE 1 AND 1/2 TABLETS ONCEDAILY 135 tablet 3   • gabapentin (NEURONTIN) 300 MG capsule TAKE 2 CAPSULES BY MOUTH 3 TIMES DAILY 180 capsule 2   • Homeopathic Products (LEG CRAMPS) tablet Take 1 tablet by mouth Daily.     • Hydrocortisone (britany's amazing butt) cream Apply 1 application topically to the appropriate area as directed As Needed (irritation). 120 g 0   • letrozole (FEMARA) 2.5 MG tablet TAKE 1 TABLET BY MOUTH 1 TIME DAILY 90 tablet 2   • lidocaine (XYLOCAINE) 5 % ointment Apply  topically to the appropriate area as directed Every 4 (Four) Hours As Needed for Mild Pain  (pain secondary to radiation). 240 g 1   • Lidocaine Viscous HCl (XYLOCAINE) 2 % solution Take 5 mL by mouth 3 (Three) Times a Day With Meals. 100 mL 0   • metOLazone (ZAROXOLYN) 2.5 MG tablet TAKE 1 TABLET BY MOUTH THREE TIMES A WEEK     • nitrofurantoin (MACRODANTIN) 25 MG capsule Take 1 capsule by mouth Every Night. To help reduce pain with urination 7 capsule 0   • nystatin (MYCOSTATIN) 572099 UNIT/GM powder Apply  topically to the appropriate area as directed 4 (Four) Times a Day. 60 g 11   • olmesartan (BENICAR) 20 MG tablet      • ondansetron (ZOFRAN) 8 MG tablet TAKE 1 TABLET BY MOUTH EVERY 8 HOURS AS NEEDED FOR NAUSEA AND VOMITING 60 tablet 2   • phenazopyridine (PYRIDIUM) 100 MG tablet Take 1 tablet by mouth 3 (Three) Times a Day As Needed for Bladder Spasms. 20 tablet 0   • phenazopyridine (PYRIDIUM) 200 MG tablet TAKE 1 TABLET BY MOUTH THREE TIMES A DAY AS NEEDED FOR BLADDER SPASMS 15 tablet 0   • potassium chloride (K-DUR,KLOR-CON) 20 MEQ CR tablet TAKE 2 TABLETS BY MOUTH ONCE EVERY DAY 60 tablet 5   • pravastatin (PRAVACHOL) 40 MG  "tablet TAKE 1 TABLET EVERY NIGHT 90 tablet 3   • sodium bicarbonate 650 MG tablet Every 12 (Twelve) Hours.     • Syringe 25G X 1\" 3 ML misc 1 each Daily. 25 each 1   • vitamin D (ERGOCALCIFEROL) 1.25 MG (77240 UT) capsule capsule Take 1 capsule by mouth 1 (One) Time Per Week. 12 capsule 3   • HYDROcodone-acetaminophen (NORCO)  MG per tablet Take 1 tablet by mouth Every 4 (Four) Hours As Needed for Moderate Pain or Severe Pain. 60 tablet 0   • cefdinir (OMNICEF) 300 MG capsule Take 1 capsule by mouth Daily. (Patient not taking: Reported on 4/19/2023) 7 capsule 0     Facility-Administered Medications Prior to Visit   Medication Dose Route Frequency Provider Last Rate Last Admin   • heparin injection 500 Units  500 Units Intravenous PRN Drake Stafford MD   500 Units at 07/01/21 1354   • heparin injection 500 Units  500 Units Intravenous PRN Drake Stafford MD   500 Units at 01/11/22 1134   • heparin injection 500 Units  500 Units Intravenous PRN Drake Stafford MD   500 Units at 09/28/22 1418   • heparin injection 500 Units  500 Units Intravenous PRN Drake Stafford MD   500 Units at 01/25/23 1537   • lidocaine (XYLOCAINE) 1 % injection 10 mL  10 mL Infiltration Once Shaheen Akbar DO       • lidocaine 1% - EPINEPHrine 1:135837 (XYLOCAINE W/EPI) 1 %-1:018741 injection 10 mL  10 mL Infiltration Once Shaheen Akbar DO       • sodium chloride 0.9 % flush 10 mL  10 mL Intravenous PRN Drake Stafford MD   10 mL at 07/01/21 1354   • sodium chloride 0.9 % flush 10 mL  10 mL Intravenous PRN Drake Stafford MD   10 mL at 01/11/22 1134   • sodium chloride 0.9 % flush 10 mL  10 mL Intravenous PRN Drake Stafford MD   10 mL at 09/28/22 1418   • sodium chloride 0.9 % flush 10 mL  10 mL Intravenous PRN Drake Stafford MD   10 mL at 01/25/23 1537       Opioid medication/s are on active medication list.  and I have evaluated her active treatment plan and pain score trends (see table).  Vitals:    04/24/23 1141   PainSc: 0-No " pain     I have reviewed the chart for potential of high risk medication and harmful drug interactions in the elderly.            Aspirin is not on active medication list.  Aspirin use is not indicated based on review of current medical condition/s. Risk of harm outweighs potential benefits.  .    Patient Active Problem List   Diagnosis   • Meatal stenosis   • Retention of urine   • Type 2 diabetes mellitus, without long-term current use of insulin   • Essential hypertension   • Grief reaction   • Vulvar intraepithelial neoplasia (MOODY) grade 3   • Chronic midline low back pain without sciatica   • Bilateral lower extremity edema   • History of urethral stricture   • Epidermal cyst of neck   • Normocytic anemia   • Neck abscess   • Mixed hyperlipidemia   • GERD (gastroesophageal reflux disease)   • Anxiety   • Iron deficiency and chemotherapy induced anemia   • Stage 3 chronic kidney disease   • Anemia in stage 3 chronic kidney disease   • Screening for breast cancer   • Lower extremity edema   • Vulvar cancer, carcinoma   • Former smoker   • Secondary malignancy of inguinal lymph nodes   • Febrile illness   • Chemotherapy-induced thrombocytopenia   • Hyponatremia   • Hypokalemia   • Neutropenic fever   • Moderate malnutrition   • Antineoplastic chemotherapy induced anemia   • History of radiation therapy   • Encounter for care related to Port-a-Cath   • Malignant neoplasm of upper-outer quadrant of left breast in female, estrogen receptor positive   • Encounter for care related to vascular access port   • Abnormal finding on GI tract imaging   • Bronchitis   • Obesity (BMI 30-39.9)   • Wheezing   • Cough   • Post-COVID-19 condition     Advance Care Planning   Advance Care Planning     Advance Directive is on file.  ACP discussion was held with the patient during this visit. Patient has an advance directive in EMR which is still valid.      Objective    Vitals:    04/24/23 1141   BP: 124/68   BP Location: Left arm  "  Patient Position: Sitting   Cuff Size: Adult   Pulse: 60   Temp: 98.5 °F (36.9 °C)   TempSrc: Temporal   SpO2: 98%   Weight: 75.5 kg (166 lb 8 oz)   Height: 149.9 cm (59\")   PainSc: 0-No pain     Estimated body mass index is 33.63 kg/m² as calculated from the following:    Height as of this encounter: 149.9 cm (59\").    Weight as of this encounter: 75.5 kg (166 lb 8 oz).    BMI is >= 30 and <35. (Class 1 Obesity). The following options were offered after discussion;: nutrition counseling/recommendations      Does the patient have evidence of cognitive impairment?   No    Lab Results   Component Value Date    HGBA1C 5.5 2023          HEALTH RISK ASSESSMENT    Smoking Status:  Social History     Tobacco Use   Smoking Status Former   • Packs/day: 0.25   • Years: 3.00   • Pack years: 0.75   • Types: Cigarettes   • Passive exposure: Past   Smokeless Tobacco Never   Tobacco Comments    social smoker in college     Alcohol Consumption:  Social History     Substance and Sexual Activity   Alcohol Use Not Currently    Comment: Occasional glass of wine     Fall Risk Screen:    STEADI Fall Risk Assessment was completed, and patient is at MODERATE risk for falls. Assessment completed on:2023    Depression Screenin/24/2023    11:48 AM   PHQ-2/PHQ-9 Depression Screening   Little Interest or Pleasure in Doing Things 0-->not at all   Feeling Down, Depressed or Hopeless 1-->several days   PHQ-9: Brief Depression Severity Measure Score 1       Health Habits and Functional and Cognitive Screenin/24/2023    11:45 AM   Functional & Cognitive Status   Do you have difficulty preparing food and eating? Yes   Do you have difficulty bathing yourself, getting dressed or grooming yourself? Yes   Do you have difficulty using the toilet? Yes   Do you have difficulty moving around from place to place? Yes   Do you have trouble with steps or getting out of a bed or a chair? Yes   Current Diet Well Balanced Diet "   Dental Exam Up to date   Eye Exam Up to date   Exercise (times per week) 0 times per week   Current Exercises Include No Regular Exercise   Do you need help using the phone?  No   Are you deaf or do you have serious difficulty hearing?  No   Do you need help with transportation? Yes   Do you need help shopping? Yes   Do you need help preparing meals?  Yes   Do you need help with housework?  Yes   Do you need help with laundry? Yes   Do you need help taking your medications? No   Do you need help managing money? No   Do you ever drive or ride in a car without wearing a seat belt? No   Have you felt unusual stress, anger or loneliness in the last month? No   Who do you live with? Spouse   If you need help, do you have trouble finding someone available to you? No   Have you been bothered in the last four weeks by sexual problems? No   Do you have difficulty concentrating, remembering or making decisions? Yes       Age-appropriate Screening Schedule:  Refer to the list below for future screening recommendations based on patient's age, sex and/or medical conditions. Orders for these recommended tests are listed in the plan section. The patient has been provided with a written plan.    Health Maintenance   Topic Date Due   • ZOSTER VACCINE (1 of 2) 11/26/2015   • DIABETIC EYE EXAM  11/25/2020   • COVID-19 Vaccine (3 - Moderna risk series) 01/25/2022   • LIPID PANEL  05/07/2022   • URINE MICROALBUMIN  07/20/2023   • INFLUENZA VACCINE  08/01/2023   • DXA SCAN  10/05/2023   • MAMMOGRAM  10/10/2023   • HEMOGLOBIN A1C  10/24/2023   • ANNUAL WELLNESS VISIT  04/24/2024   • TDAP/TD VACCINES (2 - Td or Tdap) 05/01/2029   • COLORECTAL CANCER SCREENING  11/05/2031   • Pneumococcal Vaccine 65+  Completed                  CMS Preventative Services Quick Reference  Risk Factors Identified During Encounter:    Immunizations Discussed/Encouraged: Shingrix    The above risks/problems have been discussed with the patient.  Pertinent  information has been shared with the patient in the After Visit Summary.    Diagnoses and all orders for this visit:    1. Medicare annual wellness visit, subsequent (Primary)    2. Type 2 diabetes mellitus with diabetic polyneuropathy, without long-term current use of insulin  -     POC Glycosylated Hemoglobin (Hb A1C)  -     MicroAlbumin, Urine, Random - Urine, Clean Catch; Future    3. Mixed hyperlipidemia  -     Lipid Panel; Future    4. Encounter for vaccination  -     Zoster Vac Recomb Adjuvanted 50 MCG/0.5ML reconstituted suspension; Inject 0.5 mL into the appropriate muscle as directed by prescriber 1 (One) Time for 1 dose.  Dispense: 1 each; Refill: 0    5. Vulvar cancer, carcinoma  -     HYDROcodone-acetaminophen (NORCO)  MG per tablet; Take 0.5 tablets by mouth Every 4 (Four) Hours As Needed for Moderate Pain or Severe Pain.  Dispense: 60 tablet; Refill: 0    6. Cancer related pain  -     HYDROcodone-acetaminophen (NORCO)  MG per tablet; Take 0.5 tablets by mouth Every 4 (Four) Hours As Needed for Moderate Pain or Severe Pain.  Dispense: 60 tablet; Refill: 0        Follow Up:   Next Medicare Wellness visit to be scheduled in 1 year.      An After Visit Summary and PPPS were made available to the patient.

## 2023-04-25 LAB — ALBUMIN UR-MCNC: <1.2 MG/DL

## 2023-04-28 DIAGNOSIS — I10 ESSENTIAL HYPERTENSION: ICD-10-CM

## 2023-04-28 RX ORDER — BISOPROLOL FUMARATE AND HYDROCHLOROTHIAZIDE 5; 6.25 MG/1; MG/1
1 TABLET ORAL DAILY
Qty: 90 TABLET | Refills: 1 | Status: SHIPPED | OUTPATIENT
Start: 2023-04-28

## 2023-04-28 NOTE — TELEPHONE ENCOUNTER
Rx Refill Note  Requested Prescriptions     Pending Prescriptions Disp Refills   • bisoprolol-hydrochlorothiazide (ZIAC) 5-6.25 MG per tablet 90 tablet 1     Sig: Take 1 tablet by mouth Daily.      Last office visit with prescribing clinician: 4/24/2023   Last telemedicine visit with prescribing clinician: 10/24/2023   Next office visit with prescribing clinician: 10/24/2023                         Would you like a call back once the refill request has been completed: [] Yes [] No    If the office needs to give you a call back, can they leave a voicemail: [] Yes [] No    Lula Mcpherson MA  04/28/23, 10:24 CDT

## 2023-05-11 RX ORDER — CITALOPRAM 20 MG/1
TABLET ORAL
Qty: 90 TABLET | Refills: 1 | Status: SHIPPED | OUTPATIENT
Start: 2023-05-11

## 2023-05-12 PROBLEM — K62.5 RECTAL BLEEDING: Status: ACTIVE | Noted: 2023-05-12

## 2023-05-12 PROBLEM — K55.20 ANGIODYSPLASIA: Status: ACTIVE | Noted: 2021-10-22

## 2023-05-12 PROBLEM — K62.7 RADIATION INDUCED PROCTITIS: Status: ACTIVE | Noted: 2023-05-12

## 2023-05-12 NOTE — PROGRESS NOTES
Primary Physician: Shaheen Akbar DO    Chief Complaint   Patient presents with    GI Problem     Rectal bleeding       Subjective     Syndal Terrence Nunez is a 82 y.o. female.    HPI   Rectal bleeding  Colonoscopy 11/2021 shows radiation change to the rectum and distal sigmoid.  She has had XRT for vulvar carcinoma.  She self caths on a regular basis due to urethral stenosis as a result.  She reports intermittent rectal bleeding for the last 2 months (since march 2023).  + BRB on the toilet paper--not large volume blood.  + occas diarrhea.    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    GERD  Has known small hiatal hernia.  On Nexium 40 mg daily.  CT imaging 2021 suggestive of proximal wall thickening. No esophagitis or Lopez's esophagus on endoscopy 11/2021.  Had a hyperplastic polyp at the GE junction which was completely resected.  No evidence of dysplasia.       Past Medical History:   Diagnosis Date    Anemia in stage 3 chronic kidney disease 11/11/2019    Arthritis     Breast cancer 09/14/2021    Left Mastectomy w/ sentinel node Bx    Bronchitis     Cellulitis     ROSA LEGS    GERD (gastroesophageal reflux disease)     Hyperlipidemia     Hypertension     Kyphosis     Osteoporosis     Personal history of COVID-19 05/2022    Scoliosis     Self-catheterizes urinary bladder     10FR SIZED CATH    Stage 3 chronic kidney disease 11/11/2019    Type 2 diabetes mellitus     Urethral meatal stenosis 09/2022    w/ urine retention    Vulva cancer     Vulvar intraepithelial neoplasia (MOODY) grade 3        Past Surgical History:   Procedure Laterality Date    APPENDECTOMY      BENJAMIN PROCEDURE      No evidence of reflux disease while on Nexium 40mg daily-See report    BREAST CYST ASPIRATION Left     BREAST LUMPECTOMY      COLONOSCOPY  01/12/2011    Diverticulosis sigmoid colon; The examination was otherwise normal; Repeat 10 years    COLONOSCOPY  11/03/2003    Dr. Laguerre-Normal colonoscopy; Normal  terminal ileum; Repeat 5 years    COLONOSCOPY N/A 11/05/2021    Petechia(e) in the rectum, in the recto-sigmoid colon and in the distal sigmoid colon; No specimens collected; No plans to repeat colonoscopy due to advance age and/or medical problems    CYSTOSCOPY N/A 09/20/2022    Procedure: CYSTOSCOPY WITH URETHRAL DILATATION;  Surgeon: Shaheen Conway MD;  Location: [facility] OR;  Service: Urology;  Laterality: N/A;    ENDOSCOPY  07/01/2014    Normal esophagus; Normal stomach; Normal examined duodenum; BRAVO pH capsule deployed;     ENDOSCOPY  08/14/2013    Mild gastritis-biopsies for H.Pylor obtained    ENDOSCOPY  02/16/2005    Dr. LaguerreFsfcy-[id number]    ENDOSCOPY  10/21/2003    Dr. Laguerre-Stage 1 reflux esophagitis    ENDOSCOPY N/A 11/05/2021    Small HH; A single gastroesophageal junction polyp; Normal stomach; A single non-bleeding angiodysplastic lesion in the duodenum    HYSTERECTOMY      MASTECTOMY W/ SENTINEL NODE BIOPSY Left 09/14/2021    Procedure: LEFT PARTIAL MASTECTOMY WITH MAGSEED AND LEFT SENTINEL LYMPH NODE BIOPSY MAGTRACE;  Surgeon: Quynh Rosario MD;  Location: [facility] OR;  Service: General;  Laterality: Left;    TONSILLECTOMY      VAGINA SURGERY      Laser surgery X 2    VENOUS ACCESS DEVICE (PORT) INSERTION N/A 09/29/2020    Procedure: SINGLE LUMEN PORT - A- CATH PLACEMENT WITH FLUOROSCOPY;  Surgeon: Quynh Rosario MD;  Location: [facility] OR;  Service: General;  Laterality: N/A;        Current Outpatient Medications:     Acetaminophen (TYLENOL ARTHRITIS PAIN PO), Take 1 tablet by mouth Daily As Needed (BACK PAIN)., Disp: , Rfl:     albuterol sulfate  (90 Base) MCG/ACT inhaler, Inhale 2 puffs 4 (Four) Times a Day., Disp: 54 g, Rfl: 3    bisoprolol-hydrochlorothiazide (ZIAC) 5-6.25 MG per tablet, Take 1 tablet by mouth Daily., Disp: 90 tablet, Rfl: 1    calcium carbonate (OS-REAGAN) 600 MG tablet, Take 1 tablet by mouth Daily., Disp: , Rfl:     cetirizine (zyrTEC) 10 MG  tablet, Take 1 tablet by mouth Daily., Disp: , Rfl:     citalopram (CeleXA) 20 MG tablet, TAKE 1 TABLET BY MOUTH EVERY DAY (Patient taking differently: 10 mg.), Disp: 90 tablet, Rfl: 1    cyanocobalamin 1000 MCG/ML injection, INJECT 1 ML INTO THE MUSCLE EVERY 4 WEEKS., Disp: 3 mL, Rfl: 10    diphenhydrAMINE (BENADRYL) 25 mg capsule, Take 1 capsule by mouth Every 6 (Six) Hours As Needed for Allergies., Disp: , Rfl:     diphenoxylate-atropine (LOMOTIL) 2.5-0.025 MG per tablet, TAKE 2 TABLETS BY MOUTH FOUR TIMES A DAY AS NEEDED FOR DIARRHEA, Disp: 90 tablet, Rfl: 2    DULERA 100-5 MCG/ACT inhaler, Inhale 2 puffs 2 (Two) Times a Day. Rinse and spit after using., Disp: 6 inhaler, Rfl: 0    esomeprazole (nexIUM) 40 MG capsule, Take 1 capsule by mouth 2 (Two) Times a Day., Disp: 180 capsule, Rfl: 3    furosemide (LASIX) 40 MG tablet, TAKE 1 AND 1/2 TABLETS ONCEDAILY, Disp: 135 tablet, Rfl: 3    gabapentin (NEURONTIN) 300 MG capsule, TAKE 2 CAPSULES BY MOUTH 3 TIMES DAILY, Disp: 180 capsule, Rfl: 2    Homeopathic Products (LEG CRAMPS) tablet, Take 1 tablet by mouth Daily., Disp: , Rfl:     HYDROcodone-acetaminophen (NORCO)  MG per tablet, Take 0.5 tablets by mouth Every 4 (Four) Hours As Needed for Moderate Pain or Severe Pain., Disp: 60 tablet, Rfl: 0    letrozole (FEMARA) 2.5 MG tablet, TAKE 1 TABLET BY MOUTH 1 TIME DAILY, Disp: 90 tablet, Rfl: 2    lidocaine (XYLOCAINE) 5 % ointment, Apply  topically to the appropriate area as directed Every 4 (Four) Hours As Needed for Mild Pain  (pain secondary to radiation)., Disp: 240 g, Rfl: 1    Lidocaine Viscous HCl (XYLOCAINE) 2 % solution, Take 5 mL by mouth 3 (Three) Times a Day With Meals., Disp: 100 mL, Rfl: 0    metOLazone (ZAROXOLYN) 2.5 MG tablet, TAKE 1 TABLET BY MOUTH THREE TIMES A WEEK, Disp: , Rfl:     nitrofurantoin (MACRODANTIN) 25 MG capsule, Take 1 capsule by mouth Every Night. To help reduce pain with urination, Disp: 7 capsule, Rfl: 0    ondansetron  "(ZOFRAN) 8 MG tablet, TAKE 1 TABLET BY MOUTH EVERY 8 HOURS AS NEEDED FOR NAUSEA AND VOMITING, Disp: 60 tablet, Rfl: 2    potassium chloride (K-DUR,KLOR-CON) 20 MEQ CR tablet, TAKE 2 TABLETS BY MOUTH ONCE EVERY DAY, Disp: 60 tablet, Rfl: 5    pravastatin (PRAVACHOL) 40 MG tablet, TAKE 1 TABLET EVERY NIGHT, Disp: 90 tablet, Rfl: 3    Syringe 25G X 1\" 3 ML misc, 1 each Daily., Disp: 25 each, Rfl: 1    vitamin D (ERGOCALCIFEROL) 1.25 MG (41934 UT) capsule capsule, Take 1 capsule by mouth 1 (One) Time Per Week., Disp: 12 capsule, Rfl: 3    Current Facility-Administered Medications:     lidocaine (XYLOCAINE) 1 % injection 10 mL, 10 mL, Infiltration, Once, Shaheen Akbar,     lidocaine 1% - EPINEPHrine 1:662126 (XYLOCAINE W/EPI) 1 %-1:883657 injection 10 mL, 10 mL, Infiltration, Once, Shaheen Akbar DO    Facility-Administered Medications Ordered in Other Visits:     heparin injection 500 Units, 500 Units, Intravenous, Rivera DAMON Winston, MD, 500 Units at 07/01/21 1354    heparin injection 500 Units, 500 Units, IntravenousMARISOL Chua, Winston, MD, 500 Units at 01/11/22 1134    heparin injection 500 Units, 500 Units, Intravenous, Rivera DAMON Winston, MD, 500 Units at 09/28/22 1418    heparin injection 500 Units, 500 Units, IntravenousMARISOL Chua, Winston, MD, 500 Units at 01/25/23 1537    sodium chloride 0.9 % flush 10 mL, 10 mL, IntravenousMARISOL Chua, Winston, MD, 10 mL at 07/01/21 1354    sodium chloride 0.9 % flush 10 mL, 10 mL, IntravenousMARISOL Chua, Winston, MD, 10 mL at 01/11/22 1134    sodium chloride 0.9 % flush 10 mL, 10 mL, IntravenousMARISOL Chua, Winston, MD, 10 mL at 09/28/22 1418    sodium chloride 0.9 % flush 10 mL, 10 mL, IntravenousMARISOL Chua, Winston, MD, 10 mL at 01/25/23 1537    Allergies   Allergen Reactions    Scopolamine Swelling     Other reaction(s): ANGIOEDEMA        Amoxicillin-Pot Clavulanate Rash    Keflex [Cephalexin] Rash    Septra [Sulfamethoxazole-Trimethoprim] Rash    Tequin " "[Gatifloxacin] Other (See Comments)     Doesn't remember    Trovan [Alatrofloxacin] Dizziness       Social History     Socioeconomic History    Marital status:    Tobacco Use    Smoking status: Former     Packs/day: 0.25     Years: 3.00     Pack years: 0.75     Types: Cigarettes     Passive exposure: Past    Smokeless tobacco: Never    Tobacco comments:     social smoker in college   Vaping Use    Vaping Use: Never used   Substance and Sexual Activity    Alcohol use: Not Currently     Comment: Occasional glass of wine    Drug use: No    Sexual activity: Defer       Family History   Problem Relation Age of Onset    Cancer Mother     Hypertension Mother     Osteoporosis Mother     Dementia Mother     Uterine cancer Mother     Heart disease Father     Parkinsonism Father     Cancer Sister     Breast cancer Sister     Kidney cancer Sister     Diabetes Brother     Heart disease Paternal Grandfather     No Known Problems Maternal Grandmother     No Known Problems Maternal Grandfather     No Known Problems Paternal Grandmother     Colon cancer Neg Hx     Colon polyps Neg Hx     Esophageal cancer Neg Hx     Liver cancer Neg Hx     Liver disease Neg Hx     Rectal cancer Neg Hx     Stomach cancer Neg Hx        Review of Systems   Respiratory:  Negative for shortness of breath.    Cardiovascular:  Negative for chest pain.     Objective     /56   Pulse 54   Temp 97.3 °F (36.3 °C)   Ht 157.5 cm (62\")   Wt 73 kg (161 lb)   SpO2 98%   Breastfeeding No   BMI 29.45 kg/m²     Physical Exam  Constitutional:       Appearance: She is well-developed.      Comments: In a wheelchair   Pulmonary:      Effort: Pulmonary effort is normal.   Neurological:      Mental Status: She is alert and oriented to person, place, and time.   Psychiatric:         Behavior: Behavior normal.       Lab Results - Last 18 Months   Lab Units 01/25/23  1421 11/30/22  1438 09/28/22  1331 09/13/22  1926 09/01/22  1609 05/03/22  1352 " 01/11/22  1046   GLUCOSE mg/dL 141* 158* 165* 98 95 129* 92   BUN mg/dL 35* 25* 28* 18 23 17 21   CREATININE mg/dL 1.76* 1.43* 1.48* 1.43* 1.4* 1.35* 1.41*   SODIUM mmol/L 137 138 135* 136 132* 135* 138   POTASSIUM mmol/L 4.9 4.9 4.7 4.0 5.7* 4.3 4.3   CHLORIDE mmol/L 101 105 98 99 98 100 103   TOTAL CO2 mmol/L  --   --   --   --  23  --   --    CO2 mmol/L 24.0 22.0 26.0 25.0  --  23.0 24.0   TOTAL PROTEIN g/dL 7.9  --  7.6 7.3 7.5 7.4 7.4   ALBUMIN g/dL 4.0  --  4.30 4.20 4.3 4.00 4.00   ALT (SGPT) U/L 6  --  10 10 10 6 5   AST (SGOT) U/L 12  --  16 17 18 12 13   ALK PHOS U/L 64  --  59 56 64 80 79   BILIRUBIN mg/dL 0.2  --  0.3 0.3 <0.2 0.3 0.2   GLOBULIN gm/dL 3.9  --  3.3 3.1  --  3.4 3.4       Lab Results - Last 18 Months   Lab Units 01/25/23  1421 11/30/22  1438 09/28/22  1331 09/13/22  1926 09/01/22  1609 07/20/22  1351   HEMOGLOBIN g/dL 9.3* 10.3* 10.0* 9.4* 9.5* 9.9*   HEMATOCRIT % 30.1* 33.4* 30.8* 27.3* 29.4* 30.9*   MCV fL 106.0* 109.2* 108.5* 104.2* 110.1* 107.7*   WBC 10*3/mm3 5.85 5.95 6.01 7.03 6.0 8.24   RDW % 12.8 12.9 13.1 12.9 12.9 14.2   MPV fL 10.3 10.7 10.3 10.4 11.2 10.2   PLATELETS 10*3/mm3 164 142 170 145 74* 177   INR   --   --   --  1.16*  --   --        Lab Results - Last 18 Months   Lab Units 01/25/23  1421 11/30/22  1438 09/28/22  1331 09/01/22  1609 07/20/22  1351 05/03/22  1352 04/26/22  1130 01/11/22  1046   IRON mcg/dL 81 101 104  --  75 53  --  52   TIBC mcg/dL 299 361 331  --  317 302  --  328   IRON SATURATION % 27 28 31  --  24 18*  --  16*   FERRITIN ng/mL 1,030.00* 941.50* 1,026.00*  --  1,063.00* 785.90*  --  469.40*   VIT D 25 HYDROXY ng/mL  --   --   --  51.3  --   --    < >  --     < > = values in this interval not displayed.        Lab Results - Last 18 Months   Lab Units 01/25/23  1421   FERRITIN ng/mL 1,030.00*           IMPRESSION/PLAN:    Assessment & Plan      Problem List Items Addressed This Visit          Gastrointestinal Abdominal     Gastroesophageal reflux  "disease without esophagitis    Overview     No esophagitis or Lopez's esophagus 11/2021.  On Nexium 40 mg daily.    Anti-reflux measures were reviewed and discussed with patient.  Pt advised to refrain from chocolate, alcohol, smoking, peppermint and caffeine.  Also advised to limit fatty foods, large meals, and eating late at nighttime.  Advised to reach an ideal body mass index.           Angiodysplasia    Overview     AVM seen in second portion of duodenum 11/2021.         Radiation induced proctitis    Overview     Seen on colonoscopy 11/2021.  Patient has had radiation treatment for vulvar carcinoma.  Needs to do self-catheterization due to urethral stenosis.    I reviewed the role of flex sig with argon treatment to the known XRT proctitis.  She doesn't feel that the bleeding is bad enough at this point to proceed with flex sig/argon.  \"I've had 8 laser treatments and they burned me inside and out\".  She doesn't want to proceed.    Encourage fiber (she takes gummies) and fluids.  She will call if bleeding excerbates.  She understands that if bleeding increases, the plan would be flex sig with argon.         Rectal bleeding - Primary    Overview     Colonoscopy 11/2021 shows XRT changes to the rectum.  Also has known duodenal AVM.           Relevant Orders    CBC Auto Differential    Vitamin B12       Hematology and Neoplasia    History of radiation therapy    Relevant Orders    CBC Auto Differential    Vitamin B12     MEDICAL COMPLEXITY must have 2 out of 3   Moderate Complexity Level 4                                                                                                              1 of the following medical problems:                                                                                               [x]One chronic illness with mild exacerbation                                                                                []Two or more stable chronic illness                             "                                                                  []One new problem  []One acute illness with systemic symptoms     Complexity of Data  Reviewed (1 out of the 3 following categories)                                             Category 1 tests, documents, historian (must have 3 points)                                                      []Review of prior external records  []Review of results of unique tests  []Ordering unique tests   []Assessment requires an independent historian   Category 2 Interpretation of tests     []Independent interpretation of test read by another doc   Category 3 Discuss Management/tests  []Discussion with external physician     Risk of complications and/or morbidity                                                                                           []Prescription Drug Management     [x]Decision for elective endoscopic procedure--reviewed flex sig with argon.  She declines.              RTC prn      Annita Loaiza MD  05/17/23  15:18 CDT    Part of this note may be an electronic transcription/translation of spoken language to printed text.

## 2023-05-17 ENCOUNTER — OFFICE VISIT (OUTPATIENT)
Dept: GASTROENTEROLOGY | Facility: CLINIC | Age: 83
End: 2023-05-17
Payer: MEDICARE

## 2023-05-17 VITALS
HEIGHT: 62 IN | SYSTOLIC BLOOD PRESSURE: 120 MMHG | BODY MASS INDEX: 29.63 KG/M2 | TEMPERATURE: 97.3 F | OXYGEN SATURATION: 98 % | WEIGHT: 161 LBS | DIASTOLIC BLOOD PRESSURE: 56 MMHG | HEART RATE: 54 BPM

## 2023-05-17 DIAGNOSIS — Z92.3 HISTORY OF RADIATION THERAPY: ICD-10-CM

## 2023-05-17 DIAGNOSIS — K55.20 ANGIODYSPLASIA: ICD-10-CM

## 2023-05-17 DIAGNOSIS — K62.7 RADIATION INDUCED PROCTITIS: ICD-10-CM

## 2023-05-17 DIAGNOSIS — K21.9 GASTROESOPHAGEAL REFLUX DISEASE WITHOUT ESOPHAGITIS: ICD-10-CM

## 2023-05-17 DIAGNOSIS — K62.5 RECTAL BLEEDING: Primary | ICD-10-CM

## 2023-05-17 PROCEDURE — 3074F SYST BP LT 130 MM HG: CPT | Performed by: INTERNAL MEDICINE

## 2023-05-17 PROCEDURE — 1159F MED LIST DOCD IN RCRD: CPT | Performed by: INTERNAL MEDICINE

## 2023-05-17 PROCEDURE — 99214 OFFICE O/P EST MOD 30 MIN: CPT | Performed by: INTERNAL MEDICINE

## 2023-05-17 PROCEDURE — 3078F DIAST BP <80 MM HG: CPT | Performed by: INTERNAL MEDICINE

## 2023-05-17 PROCEDURE — 1160F RVW MEDS BY RX/DR IN RCRD: CPT | Performed by: INTERNAL MEDICINE

## 2023-05-17 NOTE — LETTER
May 17, 2023       No Recipients    Patient: Dago Nunez   YOB: 1940   Date of Visit: 5/17/2023       Dear Dr. Mei Recipients:    Thank you for referring Dago Nunez to me for evaluation. Below are the relevant portions of my assessment and plan of care.    If you have questions, please do not hesitate to call me. I look forward to following Dago along with you.         Sincerely,        Annita Loaiza MD        CC:   No Recipients    Annita Loaiza MD  05/17/23 1520  Sign when Signing Visit  Primary Physician: Shaheen Akbar DO    Chief Complaint   Patient presents with   • GI Problem     Rectal bleeding       Subjective     Dago Nunez is a 82 y.o. female.    HPI   Rectal bleeding  Colonoscopy 11/2021 shows radiation change to the rectum and distal sigmoid.  She has had XRT for vulvar carcinoma.  She self caths on a regular basis due to urethral stenosis as a result.  She reports intermittent rectal bleeding for the last 2 months (since march 2023).  + BRB on the toilet paper--not large volume blood.  + occas diarrhea.    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    GERD  Has known small hiatal hernia.  On Nexium 40 mg daily.  CT imaging 2021 suggestive of proximal wall thickening. No esophagitis or Lopez's esophagus on endoscopy 11/2021.  Had a hyperplastic polyp at the GE junction which was completely resected.  No evidence of dysplasia.       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 Laguerre-Normal colonoscopy; Normal terminal ileum; Repeat 5 years   • COLONOSCOPY N/A 11/05/2021    Petechia(e) in the rectum, in the recto-sigmoid colon and in the distal sigmoid colon; No specimens collected; No plans to repeat colonoscopy due to advance age and/or medical problems   • CYSTOSCOPY N/A 09/20/2022    Procedure: CYSTOSCOPY WITH URETHRAL DILATATION;  Surgeon: Shaheen Conway MD;  Location: [facility];  Service: Urology;  Laterality: N/A;   • ENDOSCOPY  07/01/2014    Normal esophagus; Normal stomach; Normal examined duodenum; BRAVO pH capsule deployed;    • ENDOSCOPY  08/14/2013    Mild gastritis-biopsies for H.Pylor obtained   • ENDOSCOPY  02/16/2005    Dr. LaguerreGwpvl-Fjiihzfca-wlayeebw   • ENDOSCOPY  10/21/2003    Dr. Laguerre-Stage 1 reflux esophagitis   • ENDOSCOPY N/A 11/05/2021    Small HH; A single gastroesophageal junction polyp; Normal stomach; A single non-bleeding angiodysplastic lesion in the duodenum   • HYSTERECTOMY     • MASTECTOMY W/ SENTINEL NODE BIOPSY Left 09/14/2021    Procedure: LEFT PARTIAL MASTECTOMY WITH MAGSEED AND LEFT SENTINEL LYMPH NODE BIOPSY MAGTRACE;  Surgeon: Quynh Rosario MD;  Location: [facility];  Service: General;  Laterality: Left;   • TONSILLECTOMY     • VAGINA SURGERY      Laser surgery X 2   • VENOUS ACCESS DEVICE (PORT) INSERTION N/A 09/29/2020    Procedure: SINGLE LUMEN PORT - A- CATH PLACEMENT WITH FLUOROSCOPY;  Surgeon: Quynh Rosario MD;   Location: [facility];  Service: General;  Laterality: N/A;        Current Outpatient Medications:   •  Acetaminophen (TYLENOL ARTHRITIS PAIN PO), Take 1 tablet by mouth Daily As Needed (BACK PAIN)., Disp: , Rfl:   •  albuterol sulfate  (90 Base) MCG/ACT inhaler, Inhale 2 puffs 4 (Four) Times a Day., Disp: 54 g, Rfl: 3  •  bisoprolol-hydrochlorothiazide (ZIAC) 5-6.25 MG per tablet, Take 1 tablet by mouth Daily., Disp: 90 tablet, Rfl: 1  •  calcium carbonate (OS-REAGAN) 600 MG tablet, Take 1 tablet by mouth Daily., Disp: , Rfl:   •  cetirizine (zyrTEC) 10 MG tablet, Take 1 tablet by mouth Daily., Disp: , Rfl:   •  citalopram (CeleXA) 20 MG tablet, TAKE 1 TABLET BY MOUTH EVERY DAY (Patient taking differently: 10 mg.), Disp: 90 tablet, Rfl: 1  •  cyanocobalamin 1000 MCG/ML injection, INJECT 1 ML INTO THE MUSCLE EVERY 4 WEEKS., Disp: 3 mL, Rfl: 10  •  diphenhydrAMINE (BENADRYL) 25 mg capsule, Take 1 capsule by mouth Every 6 (Six) Hours As Needed for Allergies., Disp: , Rfl:   •  diphenoxylate-atropine (LOMOTIL) 2.5-0.025 MG per tablet, TAKE 2 TABLETS BY MOUTH FOUR TIMES A DAY AS NEEDED FOR DIARRHEA, Disp: 90 tablet, Rfl: 2  •  DULERA 100-5 MCG/ACT inhaler, Inhale 2 puffs 2 (Two) Times a Day. Rinse and spit after using., Disp: 6 inhaler, Rfl: 0  •  esomeprazole (nexIUM) 40 MG capsule, Take 1 capsule by mouth 2 (Two) Times a Day., Disp: 180 capsule, Rfl: 3  •  furosemide (LASIX) 40 MG tablet, TAKE 1 AND 1/2 TABLETS ONCEDAILY, Disp: 135 tablet, Rfl: 3  •  gabapentin (NEURONTIN) 300 MG capsule, TAKE 2 CAPSULES BY MOUTH 3 TIMES DAILY, Disp: 180 capsule, Rfl: 2  •  Homeopathic Products (LEG CRAMPS) tablet, Take 1 tablet by mouth Daily., Disp: , Rfl:   •  HYDROcodone-acetaminophen (NORCO)  MG per tablet, Take 0.5 tablets by mouth Every 4 (Four) Hours As Needed for Moderate Pain or Severe Pain., Disp: 60 tablet, Rfl: 0  •  letrozole (FEMARA) 2.5 MG tablet, TAKE 1 TABLET BY MOUTH 1 TIME DAILY, Disp: 90 tablet, Rfl: 2  •   "lidocaine (XYLOCAINE) 5 % ointment, Apply  topically to the appropriate area as directed Every 4 (Four) Hours As Needed for Mild Pain  (pain secondary to radiation)., Disp: 240 g, Rfl: 1  •  Lidocaine Viscous HCl (XYLOCAINE) 2 % solution, Take 5 mL by mouth 3 (Three) Times a Day With Meals., Disp: 100 mL, Rfl: 0  •  metOLazone (ZAROXOLYN) 2.5 MG tablet, TAKE 1 TABLET BY MOUTH THREE TIMES A WEEK, Disp: , Rfl:   •  nitrofurantoin (MACRODANTIN) 25 MG capsule, Take 1 capsule by mouth Every Night. To help reduce pain with urination, Disp: 7 capsule, Rfl: 0  •  ondansetron (ZOFRAN) 8 MG tablet, TAKE 1 TABLET BY MOUTH EVERY 8 HOURS AS NEEDED FOR NAUSEA AND VOMITING, Disp: 60 tablet, Rfl: 2  •  potassium chloride (K-DUR,KLOR-CON) 20 MEQ CR tablet, TAKE 2 TABLETS BY MOUTH ONCE EVERY DAY, Disp: 60 tablet, Rfl: 5  •  pravastatin (PRAVACHOL) 40 MG tablet, TAKE 1 TABLET EVERY NIGHT, Disp: 90 tablet, Rfl: 3  •  Syringe 25G X 1\" 3 ML misc, 1 each Daily., Disp: 25 each, Rfl: 1  •  vitamin D (ERGOCALCIFEROL) 1.25 MG (93830 UT) capsule capsule, Take 1 capsule by mouth 1 (One) Time Per Week., Disp: 12 capsule, Rfl: 3    Current Facility-Administered Medications:   •  lidocaine (XYLOCAINE) 1 % injection 10 mL, 10 mL, Infiltration, Once, Shaheen Akbar,   •  lidocaine 1% - EPINEPHrine 1:348828 (XYLOCAINE W/EPI) 1 %-1:529262 injection 10 mL, 10 mL, Infiltration, Once, Shaheen Akbar,     Facility-Administered Medications Ordered in Other Visits:   •  heparin injection 500 Units, 500 Units, Intravenous, PRNRivera Winston, MD, 500 Units at 07/01/21 1354  •  heparin injection 500 Units, 500 Units, Intravenous, PRNRivera Winston, MD, 500 Units at 01/11/22 1134  •  heparin injection 500 Units, 500 Units, Intravenous, PRNRivera Winston, MD, 500 Units at 09/28/22 1418  •  heparin injection 500 Units, 500 Units, Intravenous, PRN, Drake Stafford MD, 500 Units at 01/25/23 1537  •  sodium chloride 0.9 % flush 10 mL, 10 mL, Intravenous, " PRNRivera Winston, MD, 10 mL at 07/01/21 1354  •  sodium chloride 0.9 % flush 10 mL, 10 mL, Intravenous, Rivera DAMON Winston, MD, 10 mL at 01/11/22 1134  •  sodium chloride 0.9 % flush 10 mL, 10 mL, Intravenous, Rivera DAMON Winston, MD, 10 mL at 09/28/22 1418  •  sodium chloride 0.9 % flush 10 mL, 10 mL, Intravenous, PRRivera BRUNO Winston, MD, 10 mL at 01/25/23 1537    Allergies   Allergen Reactions   • Scopolamine Swelling     Other reaction(s): ANGIOEDEMA       • Amoxicillin-Pot Clavulanate Rash   • Keflex [Cephalexin] Rash   • Septra [Sulfamethoxazole-Trimethoprim] Rash   • Tequin [Gatifloxacin] Other (See Comments)     Doesn't remember   • Trovan [Alatrofloxacin] Dizziness       Social History     Socioeconomic History   • Marital status:    Tobacco Use   • Smoking status: Former     Packs/day: 0.25     Years: 3.00     Pack years: 0.75     Types: Cigarettes     Passive exposure: Past   • Smokeless tobacco: Never   • Tobacco comments:     social smoker in [place]   Vaping Use   • Vaping Use: Never used   Substance and Sexual Activity   • Alcohol use: Not Currently     Comment: Occasional glass of wine   • Drug use: No   • Sexual activity: Defer       Family History   Problem Relation Age of Onset   • Cancer Mother    • Hypertension Mother    • Osteoporosis Mother    • Dementia Mother    • Uterine cancer Mother    • Heart disease Father    • Parkinsonism Father    • Cancer Sister    • Breast cancer Sister    • Kidney cancer Sister    • Diabetes Brother    • Heart disease Paternal Grandfather    • No Known Problems Maternal Grandmother    • No Known Problems Maternal Grandfather    • No Known Problems Paternal Grandmother    • Colon cancer Neg Hx    • Colon polyps Neg Hx    • Esophageal cancer Neg Hx    • Liver cancer Neg Hx    • Liver disease Neg Hx    • Rectal cancer Neg Hx    • Stomach cancer Neg Hx        Review of Systems   Respiratory:  Negative for shortness of breath.    Cardiovascular:  Negative for  "chest pain.     Objective     /56   Pulse 54   Temp 97.3 °F (36.3 °C)   Ht 157.5 cm (62\")   Wt 73 kg (161 lb)   SpO2 98%   Breastfeeding No   BMI 29.45 kg/m²     Physical Exam  Constitutional:       Appearance: She is well-developed.      Comments: In a wheelchair   Pulmonary:      Effort: Pulmonary effort is normal.   Neurological:      Mental Status: She is alert and oriented to person, place, and time.   Psychiatric:         Behavior: Behavior normal.       Lab Results - Last 18 Months   Lab Units 01/25/23 1421 11/30/22 1438 09/28/22 1331 09/13/22 1926 09/01/22  1609 05/03/22  1352 01/11/22  1046   GLUCOSE mg/dL 141* 158* 165* 98 95 129* 92   BUN mg/dL 35* 25* 28* 18 23 17 21   CREATININE mg/dL 1.76* 1.43* 1.48* 1.43* 1.4* 1.35* 1.41*   SODIUM mmol/L 137 138 135* 136 132* 135* 138   POTASSIUM mmol/L 4.9 4.9 4.7 4.0 5.7* 4.3 4.3   CHLORIDE mmol/L 101 105 98 99 98 100 103   TOTAL CO2 mmol/L  --   --   --   --  23  --   --    CO2 mmol/L 24.0 22.0 26.0 25.0  --  23.0 24.0   TOTAL PROTEIN g/dL 7.9  --  7.6 7.3 7.5 7.4 7.4   ALBUMIN g/dL 4.0  --  4.30 4.20 4.3 4.00 4.00   ALT (SGPT) U/L 6  --  10 10 10 6 5   AST (SGOT) U/L 12  --  16 17 18 12 13   ALK PHOS U/L 64  --  59 56 64 80 79   BILIRUBIN mg/dL 0.2  --  0.3 0.3 <0.2 0.3 0.2   GLOBULIN gm/dL 3.9  --  3.3 3.1  --  3.4 3.4       Lab Results - Last 18 Months   Lab Units 01/25/23  1421 11/30/22  1438 09/28/22 1331 09/13/22 1926 09/01/22  1609 07/20/22  1351   HEMOGLOBIN g/dL 9.3* 10.3* 10.0* 9.4* 9.5* 9.9*   HEMATOCRIT % 30.1* 33.4* 30.8* 27.3* 29.4* 30.9*   MCV fL 106.0* 109.2* 108.5* 104.2* 110.1* 107.7*   WBC 10*3/mm3 5.85 5.95 6.01 7.03 6.0 8.24   RDW % 12.8 12.9 13.1 12.9 12.9 14.2   MPV fL 10.3 10.7 10.3 10.4 11.2 10.2   PLATELETS 10*3/mm3 164 142 170 145 74* 177   INR   --   --   --  1.16*  --   --        Lab Results - Last 18 Months   Lab Units 01/25/23  1421 11/30/22  1438 09/28/22  1331 09/01/22  1609 07/20/22  1351 05/03/22  1352 " "04/26/22  1130 01/11/22  1046   IRON mcg/dL 81 101 104  --  75 53  --  52   TIBC mcg/dL 299 361 331  --  317 302  --  328   IRON SATURATION % 27 28 31  --  24 18*  --  16*   FERRITIN ng/mL 1,030.00* 941.50* 1,026.00*  --  1,063.00* 785.90*  --  469.40*   VIT D 25 HYDROXY ng/mL  --   --   --  51.3  --   --    < >  --     < > = values in this interval not displayed.        Lab Results - Last 18 Months   Lab Units 01/25/23  1421   FERRITIN ng/mL 1,030.00*           IMPRESSION/PLAN:    Assessment & Plan      Problem List Items Addressed This Visit          Gastrointestinal Abdominal     Gastroesophageal reflux disease without esophagitis    Overview     No esophagitis or Lopez's esophagus 11/2021.  On Nexium 40 mg daily.    Anti-reflux measures were reviewed and discussed with patient.  Pt advised to refrain from chocolate, alcohol, smoking, peppermint and caffeine.  Also advised to limit fatty foods, large meals, and eating late at nighttime.  Advised to reach an ideal body mass index.           Angiodysplasia    Overview     AVM seen in second portion of duodenum 11/2021.         Radiation induced proctitis    Overview     Seen on colonoscopy 11/2021.  Patient has had radiation treatment for vulvar carcinoma.  Needs to do self-catheterization due to urethral stenosis.    I reviewed the role of flex sig with argon treatment to the known XRT proctitis.  She doesn't feel that the bleeding is bad enough at this point to proceed with flex sig/argon.  \"I've had 8 laser treatments and they burned me inside and out\".  She doesn't want to proceed.    Encourage fiber (she takes gummies) and fluids.  She will call if bleeding excerbates.  She understands that if bleeding increases, the plan would be flex sig with argon.         Rectal bleeding - Primary    Overview     Colonoscopy 11/2021 shows XRT changes to the rectum.  Also has known duodenal AVM.           Relevant Orders    CBC Auto Differential    Vitamin B12       " Hematology and Neoplasia    History of radiation therapy    Relevant Orders    CBC Auto Differential    Vitamin B12     MEDICAL COMPLEXITY must have 2 out of 3   Moderate Complexity Level 4                                                                                                              1 of the following medical problems:                                                                                               [x]One chronic illness with mild exacerbation                                                                                []Two or more stable chronic illness                                                                                              []One new problem  []One acute illness with systemic symptoms     Complexity of Data  Reviewed (1 out of the 3 following categories)                                             Category 1 tests, documents, historian (must have 3 points)                                                      []Review of prior external records  []Review of results of unique tests  []Ordering unique tests   []Assessment requires an independent historian   Category 2 Interpretation of tests     []Independent interpretation of test read by another doc   Category 3 Discuss Management/tests  []Discussion with external physician     Risk of complications and/or morbidity                                                                                           []Prescription Drug Management     [x]Decision for elective endoscopic procedure--reviewed flex sig with argon.  She declines.              RTC prn      Annita Loaiza MD  05/17/23  15:18 CDT    Part of this note may be an electronic transcription/translation of spoken language to printed text.

## 2023-06-07 ENCOUNTER — TELEPHONE (OUTPATIENT)
Dept: ONCOLOGY | Facility: CLINIC | Age: 83
End: 2023-06-07

## 2023-06-07 DIAGNOSIS — Z78.0 MENOPAUSE: ICD-10-CM

## 2023-06-07 RX ORDER — LETROZOLE 2.5 MG/1
2.5 TABLET, FILM COATED ORAL DAILY
Qty: 90 TABLET | Refills: 2 | Status: SHIPPED | OUTPATIENT
Start: 2023-06-07

## 2023-06-07 NOTE — TELEPHONE ENCOUNTER
Caller: Dago Nunez    Relationship to patient: Self    Best call back number: [phone]    Chief complaint: PATIENT NEEDS TO RESCHEDULE    Type of visit: LAB, FOLLOW UP, AND PORT FLUSH        If rescheduling, when is the original appointment: 5-25-23

## 2023-06-07 NOTE — TELEPHONE ENCOUNTER
Caller: Dago Nunez Terrence    Relationship: Self    Best call back number: [phone]    Requested Prescriptions:   Requested Prescriptions     Pending Prescriptions Disp Refills    letrozole (FEMARA) 2.5 MG tablet 90 tablet 2     Sig: Take 1 tablet by mouth Daily.        Pharmacy where request should be sent: [facility] - [place], [place] - [address] - [phone]  - [phone] FX     Last office visit with prescribing clinician: 1/25/2023   Last telemedicine visit with prescribing clinician: Visit date not found   Next office visit with prescribing clinician: Visit date not found         Does the patient have less than a 3 day supply:  [] Yes  [x] No    Marlen Sotelo Rep   06/07/23 15:27 CDT

## 2023-06-12 ENCOUNTER — TELEPHONE (OUTPATIENT)
Dept: FAMILY MEDICINE CLINIC | Facility: CLINIC | Age: 83
End: 2023-06-12
Payer: MEDICARE

## 2023-06-12 NOTE — TELEPHONE ENCOUNTER
Caller: Dago Nunez    Relationship: Self    Best call back number: [phone]    What form or medical record are you requesting: PRIOR AUTHORIZATION FOR ESOMEPRAZOLE 40 MG     Who is requesting this form or medical record from you: SELF AND INSURANCE    How would you like to receive the form or medical records (pick-up, mail, fax): FAX    Timeframe paperwork needed: AS SOON AS POSSIBLE    Additional notes: PATIENT RECEIVED A LETTER FROM HER INSURANCE STATING HER PA FOR THIS MEDICATION WILL  ON 23. PATIENT IS NEEDING US TO SEND IN ANOTHER ONE SO SHE CAN CONTINUE TO GET THIS MEDICATION. PLEASE CALL BACK WHEN COMPLETED.

## 2023-06-14 NOTE — TELEPHONE ENCOUNTER
Caller: PATRICIA    Relationship:     Best call back number:[phone]     What medications are you currently taking:   Current Outpatient Medications on File Prior to Visit   Medication Sig Dispense Refill   • Acetaminophen (TYLENOL ARTHRITIS PAIN PO) Take 1 tablet by mouth Daily.     • amLODIPine (NORVASC) 5 MG tablet Take 1 tablet by mouth Daily. 90 tablet 1   • B Complex Vitamins (VITAMIN B COMPLEX) capsule capsule Take 1 capsule by mouth Daily.     • bisoprolol-hydrochlorothiazide (ZIAC) 5-6.25 MG per tablet      • calcium carbonate (OS-REAGAN) 600 MG tablet Take 600 mg by mouth Daily.     • cetirizine (zyrTEC) 10 MG tablet Take 10 mg by mouth Daily.     • citalopram (CeleXA) 20 MG tablet Take 1 tablet by mouth Daily. 90 tablet 1   • diphenoxylate-atropine (LOMOTIL) 2.5-0.025 MG per tablet Take 2 tablets by mouth 4 (Four) Times a Day As Needed for Diarrhea. 90 tablet 0   • doxycycline (VIBRAMYICN) 100 MG tablet TAKE 1 TABLET BY MOUTH TWO TIMES A DAY FOR 5 DAYS 10 tablet 0   • DULERA 100-5 MCG/ACT inhaler Inhale 2 puffs 2 (Two) Times a Day. Rinse and spit after using. 6 inhaler 0   • esomeprazole (nexIUM) 40 MG capsule Take 1 capsule by mouth 2 (Two) Times a Day. 180 capsule 3   • furosemide (LASIX) 40 MG tablet Take 1.5 tablets by mouth Daily. Patient only takes once a day 135 tablet 3   • gabapentin (NEURONTIN) 300 MG capsule Take 2 capsules by mouth 3 (Three) Times a Day. 180 capsule 2   • Homeopathic Products (LEG CRAMPS) tablet Take 1 tablet by mouth Daily.     • HYDROcodone-acetaminophen (NORCO)  MG per tablet Take 1 tablet by mouth Every 4 (Four) Hours As Needed for Moderate Pain  or Severe Pain . 60 tablet 0   • Hydrocortisone (britany's amazing butt) cream Apply 1 application topically to the appropriate area as directed As Needed (irritation). 120 g 0   • lidocaine (XYLOCAINE) 5 % ointment Apply  topically to the appropriate area as directed Every 4 (Four) Hours As Needed for Mild Pain  (pain  secondary to radiation). 240 g 1   • Lidocaine Viscous HCl (XYLOCAINE) 2 % solution Take 5 mL by mouth 3 (Three) Times a Day With Meals. 100 mL 0   • ondansetron (Zofran) 8 MG tablet Take 1 tablet by mouth Every 8 (Eight) Hours As Needed for Nausea or Vomiting. 60 tablet 2   • potassium chloride (K-DUR,KLOR-CON) 20 MEQ CR tablet Take 2 tablets by mouth Daily. Patient only takes once a day 60 tablet 11   • pravastatin (PRAVACHOL) 40 MG tablet Take 1 tablet by mouth Daily. For cholesterol and heart 90 tablet 0     No current facility-administered medications on file prior to visit.        Which medication are you concerned about: gabapentin (NEURONTIN) 300 MG capsule      What are your concerns: SWELLING IN FEET WENT FROM 2 TWICE A DAY TO ONE CAPSUL TWICE A DAY                     Eye Clamp Note Details: An eye clamp was used during the procedure.

## 2023-06-19 DIAGNOSIS — K21.9 GASTROESOPHAGEAL REFLUX DISEASE WITHOUT ESOPHAGITIS: Primary | ICD-10-CM

## 2023-06-19 RX ORDER — ESOMEPRAZOLE MAGNESIUM 40 MG/1
40 CAPSULE, DELAYED RELEASE ORAL 2 TIMES DAILY
Qty: 180 CAPSULE | Refills: 3 | Status: SHIPPED | OUTPATIENT
Start: 2023-06-19

## 2023-06-19 NOTE — TELEPHONE ENCOUNTER
Rx Refill Note  Requested Prescriptions     Pending Prescriptions Disp Refills    esomeprazole (nexIUM) 40 MG capsule 180 capsule 3     Sig: Take 1 capsule by mouth 2 (Two) Times a Day.      Last office visit with prescribing clinician: 4/24/2023   Last telemedicine visit with prescribing clinician: Visit date not found   Next office visit with prescribing clinician: 10/24/2023                         Would you like a call back once the refill request has been completed: [] Yes [] No    If the office needs to give you a call back, can they leave a voicemail: [] Yes [] No    ADAMA Rodriguez  06/19/23, 12:34 CDT

## 2023-06-26 PROBLEM — T38.7X5A OSTEOPOROSIS DUE TO ANDROGEN THERAPY: Status: ACTIVE | Noted: 2023-06-26

## 2023-06-26 PROBLEM — M81.8 OSTEOPOROSIS DUE TO ANDROGEN THERAPY: Status: ACTIVE | Noted: 2023-06-26

## 2023-07-05 RX ORDER — METOLAZONE 2.5 MG/1
TABLET ORAL
Qty: 30 TABLET | Refills: 5 | Status: SHIPPED | OUTPATIENT
Start: 2023-07-05

## 2023-09-06 ENCOUNTER — TELEPHONE (OUTPATIENT)
Dept: FAMILY MEDICINE CLINIC | Facility: CLINIC | Age: 83
End: 2023-09-06
Payer: MEDICARE

## 2023-09-06 DIAGNOSIS — C51.9 VULVAR CANCER, CARCINOMA: ICD-10-CM

## 2023-09-06 DIAGNOSIS — K52.1 DIARRHEA DUE TO DRUG: ICD-10-CM

## 2023-09-06 NOTE — TELEPHONE ENCOUNTER
Caller: Dago Nunez    Relationship: Self    Best call back number:     [phone] (Home)       What medication are you requesting:   Phenazopyridine  Drug    What are your current symptoms: BLADDER SPASMS    Have you had these symptoms before:    [x] Yes  [] No    Have you been treated for these symptoms before:   [x] Yes  [] No    If a prescription is needed, what is your preferred pharmacy and phone number: [facility] - [place] - [address] - [phone] [facility] [phone] FX

## 2023-09-06 NOTE — TELEPHONE ENCOUNTER
Rx Refill Note  Requested Prescriptions      No prescriptions requested or ordered in this encounter      Last office visit with prescribing clinician: 4/24/2023   Last telemedicine visit with prescribing clinician: Visit date not found   Next office visit with prescribing clinician: 9/6/2023                         Would you like a call back once the refill request has been completed: [] Yes [] No    If the office needs to give you a call back, can they leave a voicemail: [] Yes [] No    Ana Maria Nicole MA  09/06/23, 12:33 CDT

## 2023-09-07 NOTE — TELEPHONE ENCOUNTER
Rx Refill Note  Requested Prescriptions     Pending Prescriptions Disp Refills    diphenoxylate-atropine (LOMOTIL) 2.5-0.025 MG per tablet [Pharmacy Med Name: DIPHENOXYLATE-ATROPINE 2.5- 2.5-0.025 Tablet] 90 tablet 2     Sig: TAKE 2 TABLETS BY MOUTH 4 TIMES DAILY AS NEEDED FOR DIARRHEA      Last office visit with prescribing clinician: 4/24/2023   Last telemedicine visit with prescribing clinician: Visit date not found   Next office visit with prescribing clinician: 10/24/2023                         Would you like a call back once the refill request has been completed: [] Yes [] No    If the office needs to give you a call back, can they leave a voicemail: [] Yes [] No    Ana Maria Nicole MA  09/07/23, 10:55 CDT

## 2023-09-08 RX ORDER — DIPHENOXYLATE HYDROCHLORIDE AND ATROPINE SULFATE 2.5; .025 MG/1; MG/1
TABLET ORAL
Qty: 90 TABLET | Refills: 2 | Status: SHIPPED | OUTPATIENT
Start: 2023-09-08

## 2023-09-15 DIAGNOSIS — R30.1 PAINFUL BLADDER SPASM: ICD-10-CM

## 2023-09-15 DIAGNOSIS — R30.0 DYSURIA: ICD-10-CM

## 2023-09-20 RX ORDER — PHENAZOPYRIDINE HYDROCHLORIDE 100 MG/1
TABLET, FILM COATED ORAL
Qty: 20 TABLET | Refills: 0 | OUTPATIENT
Start: 2023-09-20

## 2023-09-21 NOTE — ED PROVIDER NOTES
Subjective   Patient states that she had blood work done earlier and was told that her sodium was 160 and that she would need to come to the ER.  She states that she is not sure why but she thinks it is due to a slow cooker that she poured a bunch of spices into last night.  States that she is also been having diarrhea for the past few days.  States that she is also having some mild dysuria.  Denies any fevers or chills.  States that she recently had surgery for possible breast cancer.  She is also undergoing treatment for vulvar cancer as well.  Denies any other complaints including chest pain, shortness of breath, numbness, or tingling.          Review of Systems   All other systems reviewed and are negative.      Past Medical History:   Diagnosis Date   • Anemia in stage 3 chronic kidney disease (CMS/[facility]) 11/11/2019   • Arthritis    • Bronchitis    • Cellulitis     ROSA LEGS   • Diabetes mellitus (CMS/[facility])    • GERD (gastroesophageal reflux disease)    • Hyperlipidemia    • Hypertension    • Kyphosis    • Osteoporosis    • Scoliosis    • Self-catheterizes urinary bladder     10FR SIZED CATH   • Stage 3 chronic kidney disease (CMS/[facility]) 11/11/2019   • Vulva cancer (CMS/[facility])    • Vulvar intraepithelial neoplasia (MOODY) grade 3        Allergies   Allergen Reactions   • Scopolamine Swelling     Other reaction(s): ANGIOEDEMA  Other reaction(s): ANGIOEDEMA     • Amoxicillin-Pot Clavulanate Rash   • Keflex [Cephalexin] Rash   • Septra [Sulfamethoxazole-Trimethoprim] Rash   • Tequin [Gatifloxacin] Other (See Comments)     Doesn't remember   • Trovan [Alatrofloxacin] Dizziness       Past Surgical History:   Procedure Laterality Date   • APPENDECTOMY     • BREAST CYST ASPIRATION Left    • COLONOSCOPY  01/12/2011   • ENDOSCOPY  07/01/2014   • HYSTERECTOMY     • MASTECTOMY W/ SENTINEL NODE BIOPSY Left 9/14/2021    Procedure: LEFT PARTIAL MASTECTOMY WITH MAGSEED AND LEFT SENTINEL LYMPH NODE BIOPSY TRATIMMY;  Surgeon: Abraham  Quynh BRUNO MD;  Location:  [facility] OR;  Service: General;  Laterality: Left;   • TONSILLECTOMY     • VAGINA SURGERY     • VENOUS ACCESS DEVICE (PORT) INSERTION N/A 9/29/2020    Procedure: SINGLE LUMEN PORT - A- CATH PLACEMENT WITH FLUOROSCOPY;  Surgeon: Quynh Rosario MD;  Location:  [facility] OR;  Service: General;  Laterality: N/A;       Family History   Problem Relation Age of Onset   • Cancer Mother    • Hypertension Mother    • Osteoporosis Mother    • Dementia Mother    • Uterine cancer Mother    • Heart disease Father    • Parkinsonism Father    • Cancer Sister    • Breast cancer Sister    • Kidney cancer Sister    • Diabetes Brother    • Heart disease Paternal Grandfather    • No Known Problems Maternal Grandmother    • No Known Problems Maternal Grandfather    • No Known Problems Paternal Grandmother        Social History     Socioeconomic History   • Marital status:      Spouse name: Not on file   • Number of children: Not on file   • Years of education: Not on file   • Highest education level: Not on file   Tobacco Use   • Smoking status: Former Smoker   • Smokeless tobacco: Never Used   Vaping Use   • Vaping Use: Never used   Substance and Sexual Activity   • Alcohol use: Yes     Comment: OCCASIONAL GLASS OF WINE   • Drug use: No   • Sexual activity: Never           Objective   Physical Exam  Vitals and nursing note reviewed.   Constitutional:       General: She is not in acute distress.     Appearance: She is not toxic-appearing or diaphoretic.   HENT:      Head: Normocephalic and atraumatic.      Nose: Nose normal.   Eyes:      General:         Right eye: No discharge.         Left eye: No discharge.      Extraocular Movements: Extraocular movements intact.      Conjunctiva/sclera: Conjunctivae normal.   Cardiovascular:      Rate and Rhythm: Normal rate and regular rhythm.      Pulses: Normal pulses.      Heart sounds: No murmur heard.   No friction rub.   Pulmonary:      Effort: Pulmonary  effort is normal. No respiratory distress.      Breath sounds: No stridor. No rhonchi.   Abdominal:      General: Abdomen is flat.      Palpations: Abdomen is soft.      Tenderness: There is abdominal tenderness.   Musculoskeletal:         General: Normal range of motion.      Cervical back: No rigidity.   Skin:     General: Skin is warm.      Capillary Refill: Capillary refill takes less than 2 seconds.      Findings: Bruising (Right shoulder) present.   Neurological:      General: No focal deficit present.      Mental Status: She is alert and oriented to person, place, and time.      Cranial Nerves: No cranial nerve deficit.      Sensory: No sensory deficit.      Motor: No weakness.   Psychiatric:         Mood and Affect: Mood normal.         Behavior: Behavior normal.         Thought Content: Thought content normal.         Judgment: Judgment normal.         Procedures           ED Course                                           MDM  Number of Diagnoses or Management Options  Cystitis  Gastroenteritis  Diagnosis management comments: This is a 81yoF presenting with abdominal pain and possible hypernatremia at outside lab. Patient arrived hemodynamically stable and was afebrile. Abdominal exam without peritoneal signs. No evidence of acute abdomen at this time. Differentials include, but are not limited to constipation, pancreatitis, bowel obstruction, diverticulitis, vascular catastrophe. Presentation not consistent with other acute, emergent causes of abdominal pain at this time.     Plan to obtain cbc, cmp, ua, CT abdomen/pelvis with contrast, administer IV fluids, and reassess.     The imaging and other workup was reviewed and found to have evidence of no hypernatremia but evidence of a UTI and possible gastroenteritis causing her diarrhea. I reassessed the patient and discussed the findings of the work up so far.  I told the patient that her sodium here was normal and that it could have been a lab error at  the outside hospital.  She was relieved to hear this.  She is also relieved to hear that it is just gastroenteritis and UTI.  She would like medication for the diarrhea.  She was feeling better. I explained my impression of the workup to her and addressed all of her questions regarding the emergency department evaluation, diagnosis, and treatment plan in plain and simple language that she was able to understand.     She voiced agreement with the plan of care so far and had no further questions. I told her that there is always some diagnostic uncertainty in the ER and that her work up, physical exam, and even her current presentation may not always reveal other underlying conditions. I also went over the fact that her condition may change or show itself after being discharged. She expressed understanding and agreed that there are reasonable limitations with the practice of emergency medicine.    I gave her return precautions and told her to return to the emergency department within 24 - 48hrs if she has any new, worsening, or concerning symptoms. I told her that it is VERY IMPORTANT that she follows up (by calling to set up an appointment) with her primary care doctor within the next few days or as soon as reasonably possible so that she can be re-evaluated for improvement in her symptoms or for any other questions. She verbalized understanding of these instructions.     She was discharged in stable condition and was observed ambulating out of the ER.         Amount and/or Complexity of Data Reviewed  Clinical lab tests: reviewed and ordered  Tests in the radiology section of CPT®: reviewed and ordered  Decide to obtain previous medical records or to obtain history from someone other than the patient: yes    Risk of Complications, Morbidity, and/or Mortality  Presenting problems: moderate  Diagnostic procedures: moderate  Management options: moderate        Final diagnoses:   Cystitis   Gastroenteritis       ED  Disposition  ED Disposition     ED Disposition Condition Comment    Discharge Stable           Shaheen Akbar, DO  [address]    [facility] [address]  [phone]    Call in 1 day  As needed, If symptoms worsen         Medication List      New Prescriptions    cefdinir 300 MG capsule  Commonly known as: OMNICEF  Take 1 capsule by mouth Daily for 7 days.     loperamide 2 MG capsule  Commonly known as: IMODIUM  Take 1 capsule by mouth 4 (Four) Times a Day As Needed for Diarrhea for up to 5 days.        Changed    pravastatin 40 MG tablet  Commonly known as: PRAVACHOL  TAKE 1 TABLET DAILY FOR    CHOLESTEROL AND HEART  What changed: See the new instructions.           Where to Get Your Medications      These medications were sent to [facility] - [place], [place] - [address] [place] [address] - [phone]  - [phone] [address] [address]    Phone: [phone]   · cefdinir 300 MG capsule  · loperamide 2 MG capsule          Aydin Hay MD  10/01/21 0310       Aydin Hay MD  10/01/21 0311     37

## 2023-09-28 DIAGNOSIS — K21.9 GASTROESOPHAGEAL REFLUX DISEASE WITHOUT ESOPHAGITIS: ICD-10-CM

## 2023-09-28 RX ORDER — ESOMEPRAZOLE MAGNESIUM 40 MG/1
40 CAPSULE, DELAYED RELEASE ORAL 2 TIMES DAILY
Qty: 180 CAPSULE | Refills: 3 | Status: SHIPPED | OUTPATIENT
Start: 2023-09-28

## 2023-10-24 ENCOUNTER — OFFICE VISIT (OUTPATIENT)
Dept: FAMILY MEDICINE CLINIC | Facility: CLINIC | Age: 83
End: 2023-10-24
Payer: MEDICARE

## 2023-10-24 VITALS
TEMPERATURE: 97.9 F | HEART RATE: 57 BPM | WEIGHT: 172.8 LBS | SYSTOLIC BLOOD PRESSURE: 139 MMHG | BODY MASS INDEX: 31.8 KG/M2 | OXYGEN SATURATION: 97 % | DIASTOLIC BLOOD PRESSURE: 56 MMHG | HEIGHT: 62 IN

## 2023-10-24 DIAGNOSIS — R60.0 LOWER EXTREMITY EDEMA: ICD-10-CM

## 2023-10-24 DIAGNOSIS — R82.90 FOUL SMELLING URINE: Primary | ICD-10-CM

## 2023-10-24 DIAGNOSIS — I10 ESSENTIAL HYPERTENSION: ICD-10-CM

## 2023-10-24 DIAGNOSIS — K21.9 GASTROESOPHAGEAL REFLUX DISEASE WITHOUT ESOPHAGITIS: ICD-10-CM

## 2023-10-24 DIAGNOSIS — C51.9 VULVAR CANCER, CARCINOMA: ICD-10-CM

## 2023-10-24 DIAGNOSIS — E11.42 TYPE 2 DIABETES MELLITUS WITH DIABETIC POLYNEUROPATHY, WITHOUT LONG-TERM CURRENT USE OF INSULIN: ICD-10-CM

## 2023-10-24 DIAGNOSIS — Z23 FLU VACCINE NEED: ICD-10-CM

## 2023-10-24 DIAGNOSIS — G89.3 CANCER RELATED PAIN: ICD-10-CM

## 2023-10-24 DIAGNOSIS — M25.562 CHRONIC PAIN OF BOTH KNEES: ICD-10-CM

## 2023-10-24 DIAGNOSIS — M25.561 CHRONIC PAIN OF BOTH KNEES: ICD-10-CM

## 2023-10-24 DIAGNOSIS — G89.29 CHRONIC PAIN OF BOTH KNEES: ICD-10-CM

## 2023-10-24 LAB
EXPIRATION DATE: NORMAL
HBA1C MFR BLD: 5.6 % (ref 4.5–5.7)
Lab: NORMAL

## 2023-10-24 RX ORDER — GABAPENTIN 600 MG/1
600 TABLET ORAL 3 TIMES DAILY
Qty: 90 TABLET | Refills: 2 | Status: SHIPPED | OUTPATIENT
Start: 2023-10-24

## 2023-10-24 RX ORDER — HYDROCODONE BITARTRATE AND ACETAMINOPHEN 10; 325 MG/1; MG/1
0.5 TABLET ORAL EVERY 4 HOURS PRN
Qty: 60 TABLET | Refills: 0 | Status: SHIPPED | OUTPATIENT
Start: 2023-10-24

## 2023-10-30 ENCOUNTER — HOSPITAL ENCOUNTER (OUTPATIENT)
Dept: MAMMOGRAPHY | Facility: HOSPITAL | Age: 83
Discharge: HOME | End: 2023-10-30
Payer: MEDICARE

## 2023-10-30 ENCOUNTER — HOSPITAL ENCOUNTER (OUTPATIENT)
Dept: BONE DENSITY | Facility: HOSPITAL | Age: 83
Discharge: HOME | End: 2023-10-30
Payer: MEDICARE

## 2023-10-30 DIAGNOSIS — Z12.31 ENCOUNTER FOR SCREENING MAMMOGRAM FOR MALIGNANT NEOPLASM OF BREAST: ICD-10-CM

## 2023-10-30 DIAGNOSIS — C50.412 MALIGNANT NEOPLASM OF UPPER-OUTER QUADRANT OF LEFT BREAST IN FEMALE, ESTROGEN RECEPTOR POSITIVE: ICD-10-CM

## 2023-10-30 DIAGNOSIS — D63.1 ANEMIA DUE TO STAGE 3A CHRONIC KIDNEY DISEASE: ICD-10-CM

## 2023-10-30 DIAGNOSIS — M81.8 OSTEOPOROSIS DUE TO ANDROGEN THERAPY: ICD-10-CM

## 2023-10-30 DIAGNOSIS — T38.7X5A OSTEOPOROSIS DUE TO ANDROGEN THERAPY: ICD-10-CM

## 2023-10-30 DIAGNOSIS — Z00.00 ENCOUNTER FOR SCREENING AND PREVENTATIVE CARE: ICD-10-CM

## 2023-10-30 DIAGNOSIS — Z85.3 HX OF BREAST CANCER: ICD-10-CM

## 2023-10-30 DIAGNOSIS — N18.31 ANEMIA DUE TO STAGE 3A CHRONIC KIDNEY DISEASE: ICD-10-CM

## 2023-10-30 DIAGNOSIS — Z17.0 MALIGNANT NEOPLASM OF UPPER-OUTER QUADRANT OF LEFT BREAST IN FEMALE, ESTROGEN RECEPTOR POSITIVE: ICD-10-CM

## 2023-10-30 PROCEDURE — 77066 DX MAMMO INCL CAD BI: CPT

## 2023-10-30 PROCEDURE — 77080 DXA BONE DENSITY AXIAL: CPT

## 2023-10-30 PROCEDURE — G0279 TOMOSYNTHESIS, MAMMO: HCPCS

## 2023-11-01 ENCOUNTER — TELEPHONE (OUTPATIENT)
Dept: ONCOLOGY | Facility: CLINIC | Age: 83
End: 2023-11-01
Payer: MEDICARE

## 2023-11-01 NOTE — TELEPHONE ENCOUNTER
----- Message from Drake Stafford MD sent at 10/30/2023 12:33 PM CDT -----   The osteoporosis and high fracture risk.   When compared with the previous study, there are mixed changes in  bone mineral density. The bone density of the left femoral neck has  decreased.    On calcium and D.  Denosumab was ordered.

## 2023-11-01 NOTE — TELEPHONE ENCOUNTER
I have attempted to call home number several times, I rings one time and line disconnects.  Patient has follow up appt 11/14/23, will discuss having the Prolia shot she was a NS for on the same day as well as the increased fracture risk shown on scan.

## 2023-11-01 NOTE — PROGRESS NOTES
[facility] ON[facility] GROUP HEMATOLOGY & ONCOLOGY  [address]  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  She is on adjuvant letrozole for the past 25.5 months.  She had declined adjuvant radiation.  She is also seen for macrocytic anemia from chronic kidney disease Stage IIIa, GFR 51 mL/minute 03/05/2018, iron deficiency, and thrombocytopenia.  She is intolerant to oral iron (nausea, stomach cramps, and constipation).  She had ferric carboxymaltose 17.75 months ago. She is also seen for vulvar cancer. She is seen 37.25 months post C1D1 Mitomycin C and 5FU with radiation. Her D29 to 32 chemo was cancelled due to hospitalization, poor tolerance, and poor performance status.  She is seen with spouse, Joseph. History is obtained from the patient.  History is considered reliable.         .    Oncology/Hematology History Overview Note   DIAGNOSTIC ABNORMALITIES: Breast cancer.  Screening mammogram 08/18/2021.New dense 7 mm partially obscured nodule in the upper outer quadrant of the left breast, with associated architectural distortion.  Diagnostic mammogram 08/20/2021.  Small subcentimeter suspicious spiculated mass at 12:00 in the left breast, approximately 3 cm to 4 cm deep from the nipple. This is suspicious for breast carcinoma, ultrasound-guided biopsy is recommended.  Sonogram 08/20/2021.  Small suspicious solid mass at 12:00 in the left breast. 5.5 x 5.1 x 4.7 mm, suspicious for breast carcinoma. This corresponds with the finding on mammograms.  Successful ultrasound-guided left breast biopsy and clip on 08/23/2021.  Pathology report 09/01/2021.  Left breast at 12:00, core  needle biopsies: Invasive carcinoma no special type (ductal).  Histologic grade (Abdullahi histologic score).   Glandular (acinar)/tubular differentiation: Score 1.   Nuclear pleomorphism: Score 2.   Mitotic rate: Score 1.  Overall grade: Grade 1. Maximum tumor diameter is at least 0.8 cm.  Estrogen 87%, progesterone 47% and negative HER-2/gabriela.  Genetic testing was sent.  Patient was seen by Dr. Quynh Rosario 2021.  She is .  She had first delivery at 20.  She took birth control for 1 month.  She took hormone replacement therapy for approximately 5 years.  Family history positive for breast cancer, sister at 78. No ovarian cancer  Chest x-ray 2021.  No acute cardiopulmonary process.  Pathology report 2021.  Left sentinel lymph nodes: Four of 4 lymph nodes are negative for metastatic mammary carcinoma.Comment: Absence of micrometastases is confirmed utilizing immunohistochemical stains for pankeratin.  Left breast, partial mastectomy:  A.  Invasive carcinoma of no special type (ductal), grade 1 (1.1 cm in greatest dimension).  B.  Minor associated low-grade ductal carcinoma in situ component.  C.  The inked surgical margins and skin are negative for malignancy.  D.  Prior biopsy site changes including fat necrosis.  Comment: Microscopically, the tumor is approximately 7 mm from the closest inked (superior) surgical margin.  AJCC stage: pT1c snpN0.      PREVIOUS INTERVENTIONS:  She had undergone left partial mastectomy with left sentinel lymph node biopsy 2021 by Dr. Rosario.  Declined adjuvant radiation.  Adjuvant Femara 10/01/2021 through present.        DIAGNOSTIC ABNORMALITIES: Vulvar cancer  She had developed recurrent vulvar cancer left inguinal node that is PET positive measuring 2.1 cm.  The patient was seen by Dr. Marks in favor definitive chemo radiation.  Pathology report 07/10/2020 periurethral biopsy, poorly differentiated carcinoma with squamous and papillary features,  "focally invasive in the subepithelial connective tissue.  PET 07/31/2020 showed intense FDG avid left inguinal node is highly suspicious for local regional metastasis from known vulvar squamous cell carcinoma.  No additional sites of suspicious FDG activity.  MRI 07/31/2020 showed redemonstration of mildly T2 hyperintense mass involving the urethral meatus, similar dimensions to prior exam on 02/18/2020 however there is now a polypoid lesion seen along the anterior aspect of the perineum, possibly contiguous with the urethral mass, concerning for disease progression.  Market interval enlargement of the left inguinal node almost certainly representing disease involvement.  Patient was notified by Dr. Siddiqui 08/19/2020.  Consensus for chemoradiation.  Patient reluctant to take chemotherapy.  Follow-up with Dr. Siddiqui in 4 to 6 weeks after radiation is completed.         PREVIOUS INTERVENTIONS:  Mitomycin C and 5-FU 10/05/2020 through 10/08/2020 with radiation completed 12/10/2020 [facility].  D29 to d32 of chemo discontinued due to poor performance status.       DIAGNOSTIC ABNORMALITIES:   The patient was seen by Dr. Greg Alberts 01/29/2018 complaining of coughing and wheezing. The patient was given Tussionex. Blood work was ordered. CBC 01/29/2018 revealed a WBC of 7.8, hemoglobin 11.2, hematocrit 35.1, MCV 96.2, platelets 43,000, and ANC 4.47. CMP remarkable for of glucose of 177 and GFR 59 mL/minute.  The patient was seen by VINCENT Jones, 02/02/2018. She was coughing and wheezing. Tussionex did not help. The patient was given prednisone.  The patient was seen by VINCENT Jones, 02/15/2018. She is followed for anemia from iron deficiency. CBC 02/15/2018 revealed a WBC of 12.9, hemoglobin 11.4, hematocrit 34.5, MCV 95, and platelets 68,000.       PREVIOUS INTERVENTIONS:   Ferrous sulfate 325 mg 03/07/2018 through 05/06/2018.  Not resumed 06/08/2018. \"I misunderstood.\"   Resume " 09/05/2018 through 10/03/2018, stopped due to intolerance.  Injectafer 750 mg 10/20/2018 at the [place] office.  Retacrit 10/27/2020 through present.  Injectafer 750 mg 05/18/2021, 01/26/2022 and 05/25/2022 at [facility]           Vulvar cancer, carcinoma    Initial Diagnosis    Vulvar cancer, carcinoma (CMS/HCC)     3/19/2001 Biopsy    Clinical Features: red vaginal lesion with bleeding since 1995. TX with  Carafate douche and Premarin vaginal cream with intermittent improvement.    DIAGNOSIS:    VAGINA, BIOPSY: FOCAL ULCERATION, MARKED ACUTE AND CHRONIC INFLAMMATION,  SPONGIOSIS AND REACTIVE ATYPIA; NEGATIVE FOR DYSPLASIA.     8/17/2001 Procedure    Pap Smear:  DIAGNOSIS:            Atypical keratinized squamous cells, suspicious                           for squamous cell carcinoma.         COMMENTS:             There are numerous atypical keratinized cells                           present in an inflammatory background.  These are                           suspicious for squamous cell carcinoma.  Biopsy                           confirmation is recommended.      10/16/2001 Procedure    Pap Smear:  DIAGNOSIS:            Mild dysplasia       ADDITIONAL FINDINGS:  Marked inflammation                           Excessive hyperkeratosis         BETHESDA SYSTEM:      Low grade squamous intraepithelial lesion    DIAGNOSIS:            Negative, no abnormal cells seen           BETHESDA SYSTEM:      Within normal limits.       ADEQUACY:             Specimen satisfactory for evaluation       SOURCE:               Vagina, diagnostic thin prep PAP     11/7/2001 Biopsy      DIAGNOSIS:    VAGINA AND VULVA, RIGHT POSTERIOR INTROITUS, WIDE LOCAL EXCISION:  VAGINAL INTRAEPITHELIAL NEOPLASIA (VAIN) II-III, FOCALLY EXTENDING TO  VAGINAL MARGIN AT 12-4:00; ALL OTHER MARGINS NEGATIVE FOR  INTRAEPITHELIAL NEOPLASIA. (SEE COMMENT)    COMMENT: The lesion involves vaginal type epithelium with associated  chronic inflammation  and erosion. The adjacent vulvar epithelium is  hyperkeratotic.     3/15/2002 Procedure    Pap Smear:  BETHESDA SYSTEM:      High grade squamous intraepithelial lesion       DESCRIPTOR:           Moderate dysplasia           ADEQUACY:             Specimen satisfactory for evaluation       SOURCE:               Vagina, Thin Prep Pap, Diagnostic     6/13/2003 Procedure         BETHESDA SYSTEM:      High grade squamous intraepithelial lesion       DESCRIPTOR:           Moderate dysplasia       ADDITIONAL FINDINGS:  Inflammation         ADEQUACY:             Specimen satisfactory for evaluation       SOURCE:               Vagina, Thin Prep Pap, Diagnostic    HUMAN PAPILLOMAVIRUS         CASE ACCESSION #:    [id number]        Category                  HPV Types                Patient Results         High/Intermed Risk    16,18,31,33,35,39              Negative                            45,51,52,56,58,59,68      Specimen Source        Cervical / Vaginal Specimen     12/5/2003 Procedure    Gynecological Cytology        CASE ACCESSION #:     [id number]       BETHESDA SYSTEM:      Low grade squamous intraepithelial lesion       DESCRIPTOR:           Mild dysplasia           ADEQUACY:             Specimen satisfactory for evaluation       SOURCE:               Vagina, Thin Prep Pap, Diagnostic     11/29/2011 Biopsy    ADDENDUM FINDINGS:    The high grade MOODY in the right labia majora biopsy was of the usual type.  There was no evidence of differentiated MOODY.      DIAGNOSIS:    1) PERINEUM, BIOPSY: SQUAMOUS EPITHELIUM WITH FLORID HYPERKERATOSIS,  CONSISTENT WITH CHRONIC IRRITATION; NO EVIDENCE OF DYSPLASIA OR MALIGNANCY  (SEE COMMENT).    Comment: Immunohistochemical studies (p16, p53, MIB-1) confirm the rendered  interpretations.    2) VULVA, RIGHT LABIUM MAJORUM, BIOPSY: VULVAR INTRAEPITHELIAL NEOPLASIA II  (MODERATE DYSPLASIA); (SEE COMMENT).    Comment: Immunohistochemical studies for p16 (nuclear and  cytoplasmic  positivity in lower epithelial half) and MIB-1 (nuclear positivity in lower  epithelial half) confirms the diagnosis; p53 is positive only in rare  cells. A GMS histochemical study for fungal forms is negative.  Nevertheless, parakeratosis associated with neutrophils, as was seen  herein, is commonly associated with fungal vulvitis.     7/3/2012 Procedure    Gynecological Cytology    CASE ACCESSION #:                     [id number]  BETHESDA SYSTEM:                      High grade squamous intraepithelial                                        lesion  DESCRIPTOR:                           Mild to moderate dysplasia  ADEQUACY:                             Specimen satisfactory for evaluation  SOURCE:                               Vagina, Thin Prep Pap, Diagnostic  # [id number] REVIEWED:                    1     1/29/2013 Biopsy    DIAGNOSIS:    1) VULVA, RIGHT, ANTERIOR, BIOPSY:  SPONGIOTIC DERMATITIS WITH EXTENSIVE  DERMAL GRANULATION TISSUE, MIXED INFLAMMATION AND FIBROSIS (SEE COMENT).    Comment: Sections show spongiosis, basement membrane thickening, dermal  fibrosis, and extensive dermal granulation tissue with a predominantly  chronic inflammatory infiltrate. There is no evidence of dysplasia or  malignancy. The basement membrane thickening and dermal fibrosis suggests  that there may be component of lichen sclerosus. However, the underlying  cause of the ongoing inflammation and repair (granulation tissue) is not  clear from this sample. A tissue gram stain fails to reveal any large  bacterial aggregates.  GMS and AFB studies for fungal forms and acid fast  bacilli were negative respectively     11/27/2013 Surgery      Diagnosis    1) VULVA, LEFT ANTERIOR, BIOPSY: HIGH GRADE SQUAMOUS INTRAEPITHELIAL LESION (SEVERE DYSPLASIA, MOODY III).    2) VAGINAL WALL, ANTERIOR, BIOPSY: HIGH GRADE SQUAMOUS INTRAEPITHELIAL LESION (SEVERE DYSPLASIA, VaIN III).    3) VULVA, VULVECTOMY: FOCUS OF MICROINVASIVE SQUAMOUS  CELL CARCINOMA, WELL-DIFFERENTIATED, DEPTH OF STROMAL INVASION 0.4 MM,  8 MM FROM CLOSEST DEEP MARGINS, 4 MM FROM RIGHT ANTERIOR VAGINAL MARGINS AT 8 - 9 O'CLOCK (PROXIMAL TIP OF SPECIMEN DESIGNATED   12 O'CLOCK); NEGATIVE FOR LYMPHOVASCULAR INVASION; ARISING IN A BACKGROUND OF EXTENSIVE VULVAR INTRAEPITHELIAL NEOPLASIA (SEVERE DYSPLASIA, MOODY III, CLASSICAL AND DIFFERENTIATED TYPES); MOODY III IS PRESENT AT RIGHT LATERAL SURGICAL MARGIN (7 - 9 O'CLOCK),   LEFT LATERAL SURGICAL MARGIN (3 - 5 O'CLOCK) AND RIGHT AND LEFT VAGINAL MARGINS; TOTAL LESIONAL AREA (INVASIVE CARCINOMA AND MOODY III) MEASURES 8.2 CM IN GREATEST DIMENSION (GROSS MEASUREMENT); BACKGROUND SEVERE INTERFACE DERMATITIS AND LICHEN SCLEROSUS.        **Electronically signed out by Dimas Cardenas M.D.**on 12/2/2013  HAYLEY/YAZMIN/franky  Case reviewed by Attending Pathologist      Synoptic Diagnosis  3)  VULVA:     Specimen:                        Vulva  Procedure:                       Other: Vulvectomy  Lymph Node Sampling:             Not applicable  Specimen Size:                   Greatest dimension: 13.5 cm                                   Additional dimension: 9.5 cm                                   Additional dimension: 1.2 cm  Tumor Site:                      Right vulva, labium minus  Tumor Size:                      Greatest dimension: 0.04 cm  Tumor Focality:                  Unifocal  Histologic Type:                 Squamous cell carcinoma  Histologic Grade:                G1: Well differentiated  Microscopic Tumor Extension:     Depth of invasion: 0.4 mm  Margins:                         Uninvolved by invasive carcinoma                                   Distance of invasive carcinoma from closest margin: 4 mm  Lymph-Vascular Invasion:         Not identified  Lymph Nodes:                     No nodes submitted or found  Extranodal Extension:            Cannot be determined (explain):    Fixed or Ulcerated Femoral-Inguinal Lymph Nodes:                                     Not identified  Laterality of Involved Lymph Nodes:                                    Cannot be determined:    Primary Tumor (pT):              pT1a [FIGO IA]: Lesions 2 cm or less in size, confined to the vulva or perineum, and with stromal invasion 1.0 mm or less  Regional Lymph Nodes (pN):       pNX:  Regional lymph nodes cannot be assessed  Distant Metastasis (pM):         Not applicable  Additional Pathologic Findings:  Vulvar intraepithelial neoplasia (MOODY) 3 (severe dysplasia/carcinoma in situ)  --------------------------------------------------------     5/20/2014 Procedure    Gynecologic Cytology  [place] Diagnosis:  High grade squamous intraepithelial lesion.    Descriptor/Additional Findings:  Moderate dysplasia.    Adequacy:  Specimen satisfactory for evaluation.    Source:  Vagina, Thin Prep Pap, Imaged Diagnostic     11/11/2014 Biopsy    Diagnosis    ANTERIOR VAGINAL WALL, BIOPSY:  HIGH GRADE SQUAMOUS INTRAEPITHELIAL LESION (VaIN 3, SEVERE DYSPLASIA)       12/19/2014 Surgery    Diagnosis  1)  ANTERIOR VAGINAL WALL, PARTIAL VAGINECTOMY: HIGH-GRADE SQUAMOUS INTRAEPITHELIAL LESION (VaIN 3, SEVERE DYSPLASIA), 2.7 CM; HIGH GRADE DYSPLASIA EXTENDS TO THE 2 AND 8 O'CLOCK TIP MARGINS, THE 5-8 O'CLOCK LATERAL MARGIN, AND THE 11-2 O'CLOCK LATERAL   MARGIN.          2)  JOYA-CLITORAL AREA, EXCISION: HIGH-GRADE SQUAMOUS INTRAEPITHELIAL LESION (VaIN 3, SEVERE DYSPLASIA), EXTENDING TO A LATERAL MARGIN.         2/9/2015 Surgery    Diagnosis  1.          VULVA, LEFT PERIURETHRAL PORTION, EXCISION: FOCAL HIGH-GRADE SQUAMOUS INTRAEPITHELIAL LESION (MOODY 2, MODERATE DYSPLASIA); MARGINS ARE NEGATIVE FOR DYSPLASIA.    2.          VULVA, RIGHT PERIURETHRAL PORTION, EXCISION: BENIGN SQUAMOUS MUCOSA WITH ACUTE AND CHRONIC INFLAMMATION AND REACTIVE CHANGES.         3.          VULVA, LEFT, LOWER PORTION, EXCISION: BENIGN SQUAMOUS MUCOSA WITH ACUTE AND CHRONIC INFLAMMATION, REACTIVE CHANGES AND FEATURES  "CONSISTENT WITH PREVIOUS SURGICAL PROCEDURE.         4.          VULVA, RIGHT PORTION, EXCISION: BENIGN SQUAMOUS MUCOSA WITH CHRONIC INFLAMMATION AND DERMAL FIBROSIS.        9/6/2017 Procedure    Diagnosis  \"PAP SMEAR FROM THE URETHRA\":  HIGH GRADE SQUAMOUS INTRAEPITHELIAL LESION.          10/26/2017 Biopsy    Urethral Meatus Biopsy:  Urothelial mucosa with ulceration, chronic inflammation and focal high grade squamous intraepithelial lesion, moderate dysplasia     7/10/2018 Imaging    MRI Pelvis:  Impression:    1.  There is a 2.3 x 1.8 cm urethral lesion at the external urethral   meatus demonstrating enhancement and T2 hyperintensity. The lesion is   located approximately 8 mm from the bladder neck/internal urethral   orifice.  There is no extension of the lesion into the para vaginal   fat or ischiorectal fossa. There is some edema of the bilateral   ischiocavernosus muscles without invasion by the mass. No pelvic or   inguinal adenopathy is identified.     2.  The patient has a 2.5 cm cystocele and the urethra is almost   horizontal. There is pelvic floor laxity with loss of upper convexity   of the left iliococcygeus muscle.     7/20/2018 Biopsy    Diagnosis  URETHRA, BIOPSY:  SQUAMOUS CELL CARCINOMA IN SITU; SEE COMMENT.    Comments  P16 immunostain and HPV RNA in situ hybridization are strongly and diffusely positive within the lesion, consistent with HPV-related pathogenesis.     1/8/2019 Imaging    MRI Pelvis:  1.  Stable size and appearance of a nonspecific enhancing nodular   lesion at the caudal margin of the vaginal introitus adjacent to   extensive postsurgical changes related to prior vulvectomy and   vaginectomy. This lesion does not appear to conform to the expected   course of the urethra which is somewhat poorly defined, and this felt   more likely be located immediately caudal to the urethra. It is   possible this may reflect postsurgical scarring as opposed to a true   lesion. Continued " follow-up is suggested to ensure stability.  2.  No lymphadenopathy or other evidence of metastatic disease in the   pelvis.  3.  Evidence of pelvic floor laxity with cystocele, not significantly   changed.     8/6/2019 Imaging    MRI Pelvis:  1. No significant change from the prior exam on this limited   noncontrast study.  2. Stable indeterminate nodule anterior to the external urethral   meatus which may represent focal scarring; however,   residual/recurrent disease is not entirely excluded.  Recommend continued attention on follow-up. 3. Extensive pelvic   postsurgical changes with no evidence of local recurrence.  4. Pelvic floor laxity with cystocele unchanged from prior exam.     1/13/2020 Procedure    Urethral stricture dilation     2/18/2020 Imaging    MRI Pelvis:  Impression:  1.  No significant interval change in 1.7 x 1.7 cm T2 hyperintense   ovoid lesion at the urethral meatus.  2.  Numerous postoperative findings status post vaginectomy and   hysterectomy.  3.  Pelvic floor laxity with persistent cystocele.  4.  No pelvic adenopathy or bony lesion identified.     5/13/2020 Procedure    Urethral stricture dilation      7/10/2020 Biopsy    Diagnosis  PERIURETHRA, BIOPSY: POORLY DIFFERENTIATED CARCINOMA WITH SQUAMOUS AND PAPILLARY FEATURES, FOCALLY INVASIVE IN THE SUBEPITHELIAL CONNECTIVE TISSUE; SEE COMMENT.    Comments  The patient's history of vulvar microinvasive squamous cell carcinoma is noted. The current biopsy shows a poorly differentiated carcinoma with papillary formation. P16 immunostain and HPV RNA in situ hybridization are strongly and diffusely positive within the lesion, consistent with HPV-related pathogenesis. A KERRI-3 immunostain shows patchy, weak positivity in the lesional cells. The patient's prior biopsy ([id number]) is reviewed and shows similar morphologic and immunophenotypic features but the papillary features were not present in the prior material.  Dr. Ilene Pablo  reviewed this case and concurs with the diagnosis.      7/31/2020 Imaging    MRI Pelvis:  1.  Redemonstration of a mildly T2 hyperintense mass involving the   urethral meatus, similar dimensions to prior exam on 2/18/2020,   however there is now a polypoid lesion seen along the anterior aspect   of the perineum, possibly contiguous with the urethral mass,   concerning for disease progression. This could potentially represent   the recently biopsied lesion.  2.  Marked interval enlargement of a left inguinal lymph node, almost   certainly representing disease involvement. This would be amenable to   ultrasound-guided biopsy if warranted.  3.  Findings related to pelvic floor laxity, with cystocele.    PET/CT:  1.  Intensely FDG avid left inguinal lymph node is highly suspicious   for locoregional metastasis from known vulvar squamous cell   carcinoma. There are no additional sites of suspicious FDG activity.  2.   Intense physiologic FDG activity within the urine obscures   visualization of the periurethral mass. Findings are better   characterized on same day pelvic MRI.     9/21/2020 - 12/10/2020 Radiation    Radiation OncologyTreatment Course:  Dago Nunez received 6940 cGy in 38 fractions to vulva via external beam radiation therapy.     10/5/2020 -  Chemotherapy    OP Vulvar MitoMYcin / Fluorouracil CIV + XRT       10/9/2020 - 9/7/2021 Chemotherapy    OP CENTRAL VENOUS ACCESS DEVICE ACCESS, CARE, AND MAINTENANCE (CVAD)     10/19/2020 Imaging    CT Head:  Impression:    1. No acute intracranial process.     10/21/2021 -  Chemotherapy    OP CENTRAL VENOUS ACCESS DEVICE ACCESS, CARE, AND MAINTENANCE ([facility])     Secondary malignancy of inguinal lymph nodes   8/31/2020 Initial Diagnosis    Secondary malignancy of inguinal lymph nodes (CMS/HCC)     10/9/2020 - 9/7/2021 Chemotherapy    OP CENTRAL VENOUS ACCESS DEVICE ACCESS, CARE, AND MAINTENANCE ([facility])     10/21/2021 -  Chemotherapy    OP CENTRAL VENOUS ACCESS  DEVICE ACCESS, CARE, AND MAINTENANCE ([facility])         PAST MEDICAL HISTORY:  ALLERGIES:  Allergies   Allergen Reactions    Scopolamine Swelling     Other reaction(s): ANGIOEDEMA        Amoxicillin-Pot Clavulanate Rash    Keflex [Cephalexin] Rash    Septra [Sulfamethoxazole-Trimethoprim] Rash    Tequin [Gatifloxacin] Other (See Comments)     Doesn't remember    Trovan [Alatrofloxacin] Dizziness     CURRENT MEDICATIONS:  Outpatient Encounter Medications as of 11/14/2023   Medication Sig Dispense Refill    Acetaminophen (TYLENOL ARTHRITIS PAIN PO) Take 1 tablet by mouth Daily As Needed (BACK PAIN).      albuterol sulfate  (90 Base) MCG/ACT inhaler Inhale 2 puffs 4 (Four) Times a Day. 54 g 3    bisoprolol-hydrochlorothiazide (ZIAC) 5-6.25 MG per tablet Take 1 tablet by mouth Daily. 90 tablet 1    calcium carbonate (OS-REAGAN) 600 MG tablet Take 1 tablet by mouth Daily.      cetirizine (zyrTEC) 10 MG tablet Take 1 tablet by mouth Daily.      citalopram (CeleXA) 20 MG tablet TAKE 1 TABLET BY MOUTH EVERY DAY (Patient taking differently: 0.5 tablets.) 90 tablet 1    cyanocobalamin 1000 MCG/ML injection INJECT 1 ML INTO THE MUSCLE EVERY 4 WEEKS. 3 mL 10    diphenhydrAMINE (BENADRYL) 25 mg capsule Take 1 capsule by mouth Every 6 (Six) Hours As Needed for Allergies.      diphenoxylate-atropine (LOMOTIL) 2.5-0.025 MG per tablet TAKE 2 TABLETS BY MOUTH 4 TIMES DAILY AS NEEDED FOR DIARRHEA 90 tablet 2    DULERA 100-5 MCG/ACT inhaler Inhale 2 puffs 2 (Two) Times a Day. Rinse and spit after using. 6 inhaler 0    esomeprazole (nexIUM) 40 MG capsule Take 1 capsule by mouth 2 (Two) Times a Day. 180 capsule 3    furosemide (LASIX) 40 MG tablet Take 1 tablet by mouth 2 (Two) Times a Day. 180 tablet 3    gabapentin (NEURONTIN) 600 MG tablet Take 1 tablet by mouth 3 (Three) Times a Day. 90 tablet 2    Homeopathic Products (LEG CRAMPS) tablet Take 1 tablet by mouth Daily.      HYDROcodone-acetaminophen (NORCO)  MG per tablet Take  "0.5 tablets by mouth Every 4 (Four) Hours As Needed for Moderate Pain or Severe Pain. 60 tablet 0    letrozole (FEMARA) 2.5 MG tablet Take 1 tablet by mouth Daily. 90 tablet 2    lidocaine (XYLOCAINE) 5 % ointment Apply  topically to the appropriate area as directed Every 4 (Four) Hours As Needed for Mild Pain  (pain secondary to radiation). 240 g 1    Lidocaine Viscous HCl (XYLOCAINE) 2 % solution Take 5 mL by mouth 3 (Three) Times a Day With Meals. 100 mL 0    metOLazone (ZAROXOLYN) 2.5 MG tablet TAKE 1 TABLET BY MOUTH THREE TIMES A WEEK 30 tablet 2    nitrofurantoin (MACRODANTIN) 25 MG capsule Take 1 capsule by mouth Every Night. To help reduce pain with urination 7 capsule 0    ondansetron (ZOFRAN) 8 MG tablet TAKE 1 TABLET BY MOUTH EVERY 8 HOURS AS NEEDED FOR NAUSEA AND VOMITING 60 tablet 2    potassium chloride (K-DUR,KLOR-CON) 20 MEQ CR tablet TAKE 2 TABLETS BY MOUTH ONCE EVERY DAY 60 tablet 5    pravastatin (PRAVACHOL) 40 MG tablet TAKE 1 TABLET EVERY NIGHT 90 tablet 3    Syringe 25G X 1\" 3 ML misc 1 each Daily. 25 each 1    vitamin D (ERGOCALCIFEROL) 1.25 MG (00998 UT) capsule capsule TAKE 1 CAPSULE BY MOUTH ON THE SAME DAY EACH WEEK. 12 capsule 3     Facility-Administered Encounter Medications as of 11/14/2023   Medication Dose Route Frequency Provider Last Rate Last Admin    heparin injection 500 Units  500 Units Intravenous PRN Drake Stafford MD   500 Units at 07/01/21 1354    heparin injection 500 Units  500 Units Intravenous PRN Drake Stafford MD   500 Units at 01/11/22 1134    heparin injection 500 Units  500 Units Intravenous PRN Drake Stafford MD   500 Units at 09/28/22 1418    heparin injection 500 Units  500 Units Intravenous PRN Drake Stafford MD   500 Units at 01/25/23 1537    heparin injection 500 Units  500 Units Intravenous PRN Drake Stafford MD   500 Units at 06/26/23 1426    lidocaine (XYLOCAINE) 1 % injection 10 mL  10 mL Infiltration Once Shaheen Akbar, DO        lidocaine 1% - " EPINEPHrine 1:979233 (XYLOCAINE W/EPI) 1 %-1:506840 injection 10 mL  10 mL Infiltration Once Shaheen Akbar DO        sodium chloride 0.9 % flush 10 mL  10 mL Intravenous PRN Drake Stafford MD   10 mL at 07/01/21 1354    sodium chloride 0.9 % flush 10 mL  10 mL Intravenous PRN Drake Stafford MD   10 mL at 01/11/22 1134    sodium chloride 0.9 % flush 10 mL  10 mL Intravenous PRN Drake Stafford MD   10 mL at 09/28/22 1418    sodium chloride 0.9 % flush 10 mL  10 mL Intravenous PRN Drake Stafford MD   10 mL at 01/25/23 1537    sodium chloride 0.9 % flush 10 mL  10 mL Intravenous PRN Drake Stafford MD   10 mL at 06/26/23 1426     ADULT ILLNESSES:  Patient Active Problem List   Diagnosis Code    Meatal stenosis [id number]    Retention of urine R33.9    Type 2 diabetes mellitus, without long-term current use of insulin E11.9    Essential hypertension I10    Grief reaction F43.21    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    Chronic midline low back pain without sciatica M54.50, G89.29    Bilateral lower extremity edema R60.0    History of urethral stricture Z87.448    Epidermal cyst of neck L72.0    Normocytic anemia D64.9    Neck abscess L02.11    Mixed hyperlipidemia E78.2    Gastroesophageal reflux disease without esophagitis K21.9    Anxiety F41.9    Iron deficiency and chemotherapy induced anemia D50.9    Stage 3 chronic kidney disease N18.30    Anemia in stage 3 chronic kidney disease N18.30, D63.1    Screening for breast cancer Z12.39    Lower extremity edema R60.0    Vulvar cancer, carcinoma C51.9    Former smoker Z87.891    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    Febrile illness R50.9    Chemotherapy-induced thrombocytopenia D69.59, T45.1X5A    Hyponatremia E87.1    Hypokalemia E87.6    Neutropenic fever D70.9, R50.81    Moderate malnutrition E44.0    Antineoplastic chemotherapy induced anemia D64.81, T45.1X5A    History of radiation therapy Z92.3    Encounter for care related to  Port-a-Cath Z45.2    Malignant neoplasm of upper-outer quadrant of left breast in female, estrogen receptor positive C50.412, Z17.0    Encounter for care related to vascular access port Z45.2    Angiodysplasia K55.20    Bronchitis J40    Obesity (BMI 30-39.9) E66.9    Wheezing R06.2    Cough R05.9    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 Laguerre-Normal colonoscopy; Normal terminal ileum; Repeat 5 years    COLONOSCOPY N/A 11/05/2021    Petechia(e) in the rectum, in the recto-sigmoid colon and in the distal sigmoid colon; No specimens collected; No plans to repeat colonoscopy due to advance age and/or medical problems    CYSTOSCOPY N/A 09/20/2022    Procedure: CYSTOSCOPY WITH URETHRAL DILATATION;  Surgeon: Shaheen Conway MD;  Location: [facility];  Service: Urology;  Laterality: N/A;    ENDOSCOPY  07/01/2014    Normal esophagus; Normal stomach; Normal examined duodenum; BRAVO pH capsule deployed;     ENDOSCOPY  08/14/2013    Mild gastritis-biopsies for H.Pylor obtained    ENDOSCOPY  02/16/2005    Dr. LaguerreWdfbo-Oajhutihh-fhsxqufl    ENDOSCOPY  10/21/2003    Dr. Laguerre-Stage 1 reflux esophagitis    ENDOSCOPY N/A 11/05/2021    Small HH; A single gastroesophageal junction polyp; Normal stomach; A single non-bleeding angiodysplastic lesion in the duodenum    HYSTERECTOMY      MASTECTOMY W/ SENTINEL NODE BIOPSY Left 09/14/2021    Procedure: LEFT PARTIAL MASTECTOMY WITH MAGSEED AND LEFT SENTINEL LYMPH NODE BIOPSY MAGTRACE;   Surgeon: Quynh Rosario MD;  Location:  [facility];  Service: General;  Laterality: Left;    TONSILLECTOMY      VAGINA SURGERY      Laser surgery X 2    VENOUS ACCESS DEVICE (PORT) INSERTION N/A 09/29/2020    Procedure: SINGLE LUMEN PORT - A- CATH PLACEMENT WITH FLUOROSCOPY;  Surgeon: Quynh Rosario MD;  Location:  [facility];  Service: General;  Laterality: N/A;     HEALTH MAINTENANCE ITEMS:  Health Maintenance Due   Topic Date Due    DIABETIC EYE EXAM  11/25/2020    COVID-19 Vaccine (3 - Moderna risk series) 01/25/2022    LIPID PANEL  05/07/2022    ZOSTER VACCINE (2 of 2) 08/10/2023       <no information>  Last Completed Colonoscopy            COLORECTAL CANCER SCREENING (COLONOSCOPY - Every 10 Years) Next due on 11/5/2031 11/05/2021  Surgical Procedure: COLONOSCOPY    11/05/2021  COLONOSCOPY    01/29/2014  Outside Claim: [place] FECAL BLOOD SCRN IMMUNOASSAY    02/01/2013  Outside Claim: CHG BLOOD,OCCULT,FECAL HGB,FECES,1-3 SIMULT    01/12/2011  SCANNED - COLONOSCOPY    Only the first 5 history entries have been loaded, but more history exists.                  Immunization History   Administered Date(s) Administered    COVID-19 (MODERNA) 1st,2nd,3rd Dose Monovalent 03/26/2021, 04/23/2021    COVID-19 (MODERNA) Monovalent Original Booster 12/28/2021    FLUAD TRI 65YR+ 10/22/2019    Flu Vaccine Split Quad 10/12/2018    Fluad Quad 65+ 11/09/2020    Fluzone High Dose =>65 Years ([employer] ONLY) 10/01/2018, 11/12/2021    Fluzone High-Dose 65+yrs 11/12/2021, 10/24/2022, 10/24/2023    Pneumococcal Conjugate 20-Valent (PCV20) 10/24/2022    Pneumococcal Polysaccharide (PPSV23) 10/16/2013    Pneumococcal, Unspecified 10/01/2017    Shingrix 06/15/2023    Tdap 05/01/2019    Zostavax 01/14/2010, 10/01/2015     Last Completed Mammogram            Ordered - MAMMOGRAM (Yearly) Ordered on 11/13/2023      10/30/2023  Mammo Diagnostic Digital Tomosynthesis Bilateral With CAD    10/10/2022  Mammo Diagnostic Digital  "Tomosynthesis Bilateral With CAD    03/07/2022  Mammo Diagnostic Digital Tomosynthesis Left With CAD    08/20/2021  Mammo Diagnostic Digital Tomosynthesis Left With CAD    08/18/2021  Mammo Screening Digital Tomosynthesis Bilateral With CAD    Only the first 5 history entries have been loaded, but more history exists.                      FAMILY HISTORY:  Family History   Problem Relation Age of Onset    Cancer Mother     Hypertension Mother     Osteoporosis Mother     Dementia Mother     Uterine cancer Mother     Heart disease Father     Parkinsonism Father     Cancer Sister     Breast cancer Sister     Kidney cancer Sister     Diabetes Brother     Heart disease Paternal Grandfather     No Known Problems Maternal Grandmother     No Known Problems Maternal Grandfather     No Known Problems Paternal Grandmother     Colon cancer Neg Hx     Colon polyps Neg Hx     Esophageal cancer Neg Hx     Liver cancer Neg Hx     Liver disease Neg Hx     Rectal cancer Neg Hx     Stomach cancer Neg Hx      SOCIAL HISTORY:  Social History     Socioeconomic History    Marital status:    Tobacco Use    Smoking status: Former     Packs/day: 0.25     Years: 3.00     Additional pack years: 0.00     Total pack years: 0.75     Types: Cigarettes     Passive exposure: Past    Smokeless tobacco: Never    Tobacco comments:     social smoker in college   Vaping Use    Vaping Use: Never used   Substance and Sexual Activity    Alcohol use: Not Currently     Comment: Occasional glass of wine    Drug use: No    Sexual activity: Defer       REVIEW OF SYSTEMS:    Review of Systems   Constitutional:  Positive for fatigue. Negative for fever.        \"I feel as heavy as I can be.\"   HENT:  Negative for congestion and mouth sores.    Eyes:  Negative for redness and visual disturbance.   Respiratory:  Negative for shortness of breath and wheezing.    Cardiovascular:  Positive for leg swelling. Negative for chest pain.   Gastrointestinal:  Negative " "for abdominal pain, nausea and vomiting.   Endocrine: Negative for polydipsia and polyphagia.   Genitourinary:  Negative for dysuria and hematuria.   Musculoskeletal:  Negative for myalgias and neck stiffness.   Skin:  Positive for pallor.   Allergic/Immunologic: Positive for food allergies.   Neurological:  Negative for dizziness and speech difficulty.   Hematological:  Negative for adenopathy. Does not bruise/bleed easily.   Psychiatric/Behavioral:  Negative for agitation and confusion.        VITAL SIGNS: /60   Pulse 58   Temp 98.1 °F (36.7 °C)   Resp 18   Ht 157.5 cm (62\")   Wt 79.4 kg (175 lb 1.6 oz)   SpO2 94%   Breastfeeding No   BMI 32.03 kg/m²  Gained 2 pounds.   Pain Score    11/14/23 1403   PainSc: 0-No pain       PHYSICAL EXAMINATION:     Physical Exam  Vitals reviewed.   Constitutional:       Appearance: She is ill-appearing.      Comments: She arrived in the exam room in a wheelchair. \"To keep me from falling.\"   HENT:      Head: Normocephalic and atraumatic.   Eyes:      General: No scleral icterus.  Cardiovascular:      Rate and Rhythm: Normal rate.   Pulmonary:      Effort: No respiratory distress.      Breath sounds: No wheezing.      Comments: Port, right. No erythema.  Abdominal:      General: Bowel sounds are normal.      Palpations: Abdomen is soft.   Musculoskeletal:         General: Swelling present.      Cervical back: Neck supple.   Skin:     Coloration: Skin is pale.   Neurological:      Mental Status: She is oriented to person, place, and time.   Psychiatric:         Mood and Affect: Mood normal.         Behavior: Behavior normal.         Thought Content: Thought content normal.         Judgment: Judgment normal.         LABS    Lab Results - Last 18 Months   Lab Units 11/14/23  1343 06/26/23  1327 01/25/23  1421 11/30/22  1438 09/28/22  1331 09/13/22  1926 09/01/22  1609 07/20/22  1351   HEMOGLOBIN g/dL 8.5* 9.6* 9.3* 10.3* 10.0* 9.4* 9.5* 9.9*   HEMATOCRIT % 27.0* 31.0* " 30.1* 33.4* 30.8* 27.3* 29.4* 30.9*   MCV fL 107.1* 108.0* 106.0* 109.2* 108.5* 104.2* 110.1* 107.7*   WBC 10*3/mm3 5.93 5.92 5.85 5.95 6.01 7.03 6.0 8.24   RDW % 13.4 13.3 12.8 12.9 13.1 12.9 12.9 14.2   MPV fL 10.7 11.0 10.3 10.7 10.3 10.4 11.2 10.2   PLATELETS 10*3/mm3 156 141 164 142 170 145 74* 177   IMM GRAN % %  --   --   --   --   --  0.4  --   --    NEUTROS ABS 10*3/mm3 3.90 4.04 4.06 3.91 4.02 4.66 3.8 6.91   LYMPHS ABS 10*3/mm3 1.30 1.07 1.02 1.27 1.11 1.49 1.2  --    MONOS ABS 10*3/mm3 0.42 0.47 0.38 0.38 0.44 0.55 0.60  --    EOS ABS 10*3/mm3 0.25 0.29 0.33 0.32 0.39 0.27 0.30 0.16   BASOS ABS 10*3/mm3 0.03 0.02 0.04 0.05 0.04 0.03 0.00  --    IMMATURE GRANS (ABS) 10*3/mm3  --   --   --   --   --  0.03 0.0  --    NRBC /100 WBC  --   --   --   --   --  0.0  --   --    NEUTROPHIL % %  --   --   --   --   --   --   --  77.8*   MONOCYTES % %  --   --   --   --   --   --   --  2.0*   ATYP LYMPH % %  --   --   --   --   --   --   --  1.0   ANISOCYTOSIS   --   --   --   --   --   --   --  Slight/1+       Lab Results - Last 18 Months   Lab Units 11/14/23  1343 06/26/23  1327 01/25/23  1421 11/30/22  1438 09/28/22  1331 09/13/22  1926 09/01/22  1609   GLUCOSE mg/dL 155* 117* 141* 158* 165* 98 95   SODIUM mmol/L 138 137 137 138 135* 136 132*   POTASSIUM mmol/L 4.6 4.8 4.9 4.9 4.7 4.0 5.7*   TOTAL CO2 mmol/L  --   --   --   --   --   --  23   CO2 mmol/L 28.0 22.0 24.0 22.0 26.0 25.0  --    CHLORIDE mmol/L 101 101 101 105 98 99 98   ANION GAP mmol/L 9.0 14.0 12.0 11.0 11.0 12.0 11   CREATININE mg/dL 1.81* 1.78* 1.76* 1.43* 1.48* 1.43* 1.4*   BUN mg/dL 32* 34* 35* 25* 28* 18 23   BUN / CREAT RATIO  17.7 19.1 19.9 17.5 18.9 12.6  --    CALCIUM mg/dL 9.6 9.2 9.5 9.6 9.3 9.4 9.7   EGFR IF NONAFRICN AM   --   --   --   --   --   --  36*   ALK PHOS U/L 61 74 64  --  59 56 64   TOTAL PROTEIN g/dL 7.0 7.4 7.9  --  7.6 7.3 7.5   ALT (SGPT) U/L 9 8 6  --  10 10 10   AST (SGOT) U/L 17 17 12  --  16 17 18   BILIRUBIN mg/dL 0.2  "0.2 0.2  --  0.3 0.3 <0.2   ALBUMIN g/dL 4.0 4.0 4.0  --  4.30 4.20 4.3   GLOBULIN gm/dL 3.0 3.4 3.9  --  3.3 3.1  --        No results for input(s): \"MSPIKE\", \"KAPPALAMB\", \"IGLFLC\", \"URICACID\", \"FREEKAPPAL\", \"CEA\", \"LDH\", \"REFLABREPO\" in the last 85090 hours.    Lab Results - Last 18 Months   Lab Units 06/26/23  1327 01/25/23  1421 11/30/22  1438 09/28/22  1331 07/20/22  1351   IRON mcg/dL 74 81 101 104 75   TIBC mcg/dL 307 299 361 331 317   IRON SATURATION (TSAT) % 24 27 28 31 24   FERRITIN ng/mL 982.70* 1,030.00* 941.50* 1,026.00* 1,063.00*       Jameelal Waqar Nunez reports a pain score of 0.          ASSESSMENT:  1.  Left breast cancer, upper outer quadrant.  Tumor size 1.1 cm.  Negative genetic testing.  AJCC stage: 1A (pT1c, snpN0, cM0)  Receptor status: Estrogen 87%, progesterone 47% and negative HER-2/gabriela.  Treatment status: Post left lumpectomy and sentinel lymph node biopsy.  Declined adjuvant radiation. She is on adjuvant letrozole.  2.  Macrocytic anemia from iron deficiency, B12 deficiency and history of chronic kidney disease Stage IIIa, GFR 56 mL/min on 09/03/2020.  3.   Iron deficiency. Intolerant to oral iron. IV iron as needed.  4.   Chronic kidney disease Stage IIIa, GFR 56 ml/min on 09/03/2020.  5.   Performance status of 3.    6.   Osteoporosis.  Taking calcium and vitamin D. Start denosumab.  7.   Squamous cell cancer in situ, urethra.  Followed by Dr. Callahan  8.   Recurrent squamous cell carcinoma, vulva.  AJCC stage IIIA (T2, Nib, M0, G3).  Treatment status.  Had Mitomycin C and 5 FU D1 to D4 with radiation.  D29 - 32 chemo was cancelled due to poor performance status.          PLAN:  1.    Re:  Heme status.  Hemoglobin 8.5, hematocrit 27, and .1.    2.    Re:  Pre-office CMP.  GFR 27.8 from 28.6 from 29 from  35.7 from 37.2 from 40.1 from 36 from 35 ml/min.Magnesium 1.9. Phosphorus 3.  3.    Re:  Ferritin 913.5 from 982.7 from 1030 from 941.5 from 1026 from 785.9 " "from 469.4 and saturation 23 from 24 from 27 from 28 from 31 from 24 from 18 from 16%.    4.    Re: Note from Dr. Gladis Young on 9/7/2023. Seen for stricture of the urethra. Slightly less than 10Fr urethral stricture, just at the distal urethra/meatus. Completely obliterated vaginal introitus due to labial adhesions from radiation. Normal bladder with mild trabeculations on cystoscopy. Stricture dilated to 24Fr, 18Fr Crow tip catheter placed over a wire.   5.    Re:  Note from Dr. Mills 9/12/2023. Seen for chronic kidney disease stage IIIb. Sen by Dr. Rosario 11/13/2023. .  6.   CBC with differential, ferritin and iron panel in 2 months.  7.   Epoetin alpha 40,000 units SQ wekly if hemoglobin below 10 and hematocrit below 30 to target a hemoglobin of 11 and hematocrit 33. Move CBC weekly if she starts epoetin alpha.  Monitor for elevated blood pressure  8.  Transfuse 1 unit packed RBCs if hemoglobin less than 7.  Premed Tylenol 500 mg p.o. and Benadryl 12.5 mg IV.  Lasix 20 mg IV push after a unit of blood.  Observe for transfusion reactions.  9.   Intolerant to oral iron (nausea, cramps, and constipation).  IV iron as needed.   10.  Advance Care Planning  ACP discussion was held with the patient during this visit. Patient has an advance directive in EMR which is still valid.   11.  eRx 8 mg po every 8 hours as needed for nausea/vomiting, # 60, 2 refills if needed.  12.  eRx letrozole 2.5 mg p.o. daily #90 with 3 refills if needed.  \"[facility] take that at [facility].\"  Observe for hot flashes or worsening bone loss.  13.  Cervical/vaginal cytology per gynecology.  14.  Vitamin B12 1000 mcg IM monthly by [facility].  Continue to monitor for local irritation.  15.  Continue ongoing management per primary care physician and other specialists.  16.  Will not obtain breast tumor markers or Oncotype DX.  Patient is not a candidate for adjuvant chemotherapy.  17.  Flush port every 6 weeks  18.Plan of care discussed " with patient and  spouse, Joseph.  Understanding expressed.  Patient agreeable to proceed.  19.  Next bone density 10/2025.  20.  Order denosumab 60 mg SQ every 6 months for osteoporosis. She is on letrozole. Observe for osteonecrosis of the jaw.   21.  Mammogram order per surgery.  22.  Return to the office in 4 months with CBC with differential, ferritin, iron panel, and CMP.           I have reviewed the assessment and plan and verified the accuracy of it. No changes to assessment and plan since the information was documented. Drake Stafford MD 11/14/23          I spent 31 total minutes, face-to-face, caring for Syndal today. Greater than 50% of this time involved counseling and/or coordination of care as documented within this note.              cc: (Shaheen Akbar MD )        (Annita Loaiza MD)        (Ulises Roche MD)        (Guillermina Rosario MD)        (Octavio Fernando MD)        Gilberto Mills MD        (Moustapha Callahan MD)        (Radha Marks MD)

## 2023-11-13 ENCOUNTER — OFFICE VISIT (OUTPATIENT)
Dept: SURGERY | Facility: CLINIC | Age: 83
End: 2023-11-13
Payer: MEDICARE

## 2023-11-13 VITALS
SYSTOLIC BLOOD PRESSURE: 107 MMHG | WEIGHT: 172 LBS | BODY MASS INDEX: 31.65 KG/M2 | OXYGEN SATURATION: 93 % | HEART RATE: 52 BPM | DIASTOLIC BLOOD PRESSURE: 56 MMHG | HEIGHT: 62 IN

## 2023-11-13 DIAGNOSIS — Z12.31 ENCOUNTER FOR SCREENING MAMMOGRAM FOR MALIGNANT NEOPLASM OF BREAST: ICD-10-CM

## 2023-11-13 DIAGNOSIS — Z85.3 PERSONAL HISTORY OF BREAST CANCER: Primary | ICD-10-CM

## 2023-11-13 NOTE — PROGRESS NOTES
Office Established Patient Note:     Referring Provider: No ref. provider found    No chief complaint on file.      Subjective .     History of present illness:  Dago Nunez is a 83 y.o. female s/p left lumpectomy with sentinel lymph node biopsy on 9/14/21 for ER+ID+Her2- invsaive ductal carcinoma with final pathology showing stage IA breast cancer. She declined post op radiation. She is on adjuvant letrozole and is tolerating it well.  she denies any new masses, changes to her skin or nipple discharge.     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 Laguerre-Normal colonoscopy; Normal terminal ileum; Repeat 5 years    COLONOSCOPY N/A 11/05/2021    Petechia(e) in the rectum, in the recto-sigmoid colon and in the distal sigmoid colon; No specimens collected; No plans to repeat colonoscopy due to advance age and/or medical problems    CYSTOSCOPY N/A 09/20/2022    Procedure: CYSTOSCOPY WITH URETHRAL DILATATION;  Surgeon: Shaheen Conway,  MD;  Location:  [facility];  Service: Urology;  Laterality: N/A;    ENDOSCOPY  07/01/2014    Normal esophagus; Normal stomach; Normal examined duodenum; BRAVO pH capsule deployed;     ENDOSCOPY  08/14/2013    Mild gastritis-biopsies for H.Pylor obtained    ENDOSCOPY  02/16/2005    Dr. LaguerreXdsel-Xndbiouew-okhlzvgs    ENDOSCOPY  10/21/2003    Dr. Laguerre-Stage 1 reflux esophagitis    ENDOSCOPY N/A 11/05/2021    Small HH; A single gastroesophageal junction polyp; Normal stomach; A single non-bleeding angiodysplastic lesion in the duodenum    HYSTERECTOMY      MASTECTOMY W/ SENTINEL NODE BIOPSY Left 09/14/2021    Procedure: LEFT PARTIAL MASTECTOMY WITH MAGSEED AND LEFT SENTINEL LYMPH NODE BIOPSY MAGTRACE;  Surgeon: Quynh Rosario MD;  Location:  [facility];  Service: General;  Laterality: Left;    TONSILLECTOMY      VAGINA SURGERY      Laser surgery X 2    VENOUS ACCESS DEVICE (PORT) INSERTION N/A 09/29/2020    Procedure: SINGLE LUMEN PORT - A- CATH PLACEMENT WITH FLUOROSCOPY;  Surgeon: Quynh Rosario MD;  Location:  [facility];  Service: General;  Laterality: N/A;   ,   Family History   Problem Relation Age of Onset    Cancer Mother     Hypertension Mother     Osteoporosis Mother     Dementia Mother     Uterine cancer Mother     Heart disease Father     Parkinsonism Father     Cancer Sister     Breast cancer Sister     Kidney cancer Sister     Diabetes Brother     Heart disease Paternal Grandfather     No Known Problems Maternal Grandmother     No Known Problems Maternal Grandfather     No Known Problems Paternal Grandmother     Colon cancer Neg Hx     Colon polyps Neg Hx     Esophageal cancer Neg Hx     Liver cancer Neg Hx     Liver disease Neg Hx     Rectal cancer Neg Hx     Stomach cancer Neg Hx    ,   Social History     Tobacco Use    Smoking status: Former     Packs/day: 0.25     Years: 3.00     Additional pack years: 0.00     Total pack years: 0.75     Types: Cigarettes     Passive exposure: Past    Smokeless  tobacco: Never    Tobacco comments:     social smoker in college   Vaping Use    Vaping Use: Never used   Substance Use Topics    Alcohol use: Not Currently     Comment: Occasional glass of wine    Drug use: No   , (Not in a hospital admission)   and Allergies:  Scopolamine, Amoxicillin-pot clavulanate, Keflex [cephalexin], Septra [sulfamethoxazole-trimethoprim], Tequin [gatifloxacin], and Trovan [alatrofloxacin]    Current Outpatient Medications:     Acetaminophen (TYLENOL ARTHRITIS PAIN PO), Take 1 tablet by mouth Daily As Needed (BACK PAIN)., Disp: , Rfl:     albuterol sulfate  (90 Base) MCG/ACT inhaler, Inhale 2 puffs 4 (Four) Times a Day., Disp: 54 g, Rfl: 3    bisoprolol-hydrochlorothiazide (ZIAC) 5-6.25 MG per tablet, Take 1 tablet by mouth Daily., Disp: 90 tablet, Rfl: 1    calcium carbonate (OS-REAGAN) 600 MG tablet, Take 1 tablet by mouth Daily., Disp: , Rfl:     cetirizine (zyrTEC) 10 MG tablet, Take 1 tablet by mouth Daily., Disp: , Rfl:     citalopram (CeleXA) 20 MG tablet, TAKE 1 TABLET BY MOUTH EVERY DAY (Patient taking differently: 0.5 tablets.), Disp: 90 tablet, Rfl: 1    cyanocobalamin 1000 MCG/ML injection, INJECT 1 ML INTO THE MUSCLE EVERY 4 WEEKS., Disp: 3 mL, Rfl: 10    diphenhydrAMINE (BENADRYL) 25 mg capsule, Take 1 capsule by mouth Every 6 (Six) Hours As Needed for Allergies., Disp: , Rfl:     diphenoxylate-atropine (LOMOTIL) 2.5-0.025 MG per tablet, TAKE 2 TABLETS BY MOUTH 4 TIMES DAILY AS NEEDED FOR DIARRHEA, Disp: 90 tablet, Rfl: 2    DULERA 100-5 MCG/ACT inhaler, Inhale 2 puffs 2 (Two) Times a Day. Rinse and spit after using., Disp: 6 inhaler, Rfl: 0    esomeprazole (nexIUM) 40 MG capsule, Take 1 capsule by mouth 2 (Two) Times a Day., Disp: 180 capsule, Rfl: 3    furosemide (LASIX) 40 MG tablet, Take 1 tablet by mouth 2 (Two) Times a Day., Disp: 180 tablet, Rfl: 3    gabapentin (NEURONTIN) 600 MG tablet, Take 1 tablet by mouth 3 (Three) Times a Day., Disp: 90 tablet, Rfl: 2     "Homeopathic Products (LEG CRAMPS) tablet, Take 1 tablet by mouth Daily., Disp: , Rfl:     HYDROcodone-acetaminophen (NORCO)  MG per tablet, Take 0.5 tablets by mouth Every 4 (Four) Hours As Needed for Moderate Pain or Severe Pain., Disp: 60 tablet, Rfl: 0    letrozole (FEMARA) 2.5 MG tablet, Take 1 tablet by mouth Daily., Disp: 90 tablet, Rfl: 2    lidocaine (XYLOCAINE) 5 % ointment, Apply  topically to the appropriate area as directed Every 4 (Four) Hours As Needed for Mild Pain  (pain secondary to radiation)., Disp: 240 g, Rfl: 1    Lidocaine Viscous HCl (XYLOCAINE) 2 % solution, Take 5 mL by mouth 3 (Three) Times a Day With Meals., Disp: 100 mL, Rfl: 0    metOLazone (ZAROXOLYN) 2.5 MG tablet, TAKE 1 TABLET BY MOUTH THREE TIMES A WEEK, Disp: 30 tablet, Rfl: 2    nitrofurantoin (MACRODANTIN) 25 MG capsule, Take 1 capsule by mouth Every Night. To help reduce pain with urination, Disp: 7 capsule, Rfl: 0    ondansetron (ZOFRAN) 8 MG tablet, TAKE 1 TABLET BY MOUTH EVERY 8 HOURS AS NEEDED FOR NAUSEA AND VOMITING, Disp: 60 tablet, Rfl: 2    potassium chloride (K-DUR,KLOR-CON) 20 MEQ CR tablet, TAKE 2 TABLETS BY MOUTH ONCE EVERY DAY, Disp: 60 tablet, Rfl: 5    pravastatin (PRAVACHOL) 40 MG tablet, TAKE 1 TABLET EVERY NIGHT, Disp: 90 tablet, Rfl: 3    Syringe 25G X 1\" 3 ML misc, 1 each Daily., Disp: 25 each, Rfl: 1    vitamin D (ERGOCALCIFEROL) 1.25 MG (32993 UT) capsule capsule, TAKE 1 CAPSULE BY MOUTH ON THE SAME DAY EACH WEEK., Disp: 12 capsule, Rfl: 3    Current Facility-Administered Medications:     lidocaine (XYLOCAINE) 1 % injection 10 mL, 10 mL, Infiltration, Once, Shaheen Akbar, DO    lidocaine 1% - EPINEPHrine 1:197726 (XYLOCAINE W/EPI) 1 %-1:506879 injection 10 mL, 10 mL, Infiltration, Once, Shaheen Akbar,     Facility-Administered Medications Ordered in Other Visits:     heparin injection 500 Units, 500 Units, Intravenous, PRN, Rivera, Drake, MD, 500 Units at 07/01/21 1354    heparin injection " "500 Units, 500 Units, Intravenous, Rivera DAMON Winston, MD, 500 Units at 01/11/22 1134    heparin injection 500 Units, 500 Units, IntravenousMARISOL Chua, Winston, MD, 500 Units at 09/28/22 1418    heparin injection 500 Units, 500 Units, Intravenous, Rivera DAMON Winston, MD, 500 Units at 01/25/23 1537    heparin injection 500 Units, 500 Units, IntravenousMARISOL Chua, Winston, MD, 500 Units at 06/26/23 1426    sodium chloride 0.9 % flush 10 mL, 10 mL, IntravenousMARISOL Chua, Winston, MD, 10 mL at 07/01/21 1354    sodium chloride 0.9 % flush 10 mL, 10 mL, Intravenous, Rivera DAMON Winston, MD, 10 mL at 01/11/22 1134    sodium chloride 0.9 % flush 10 mL, 10 mL, IntravenousMARISOL Chua, Winston, MD, 10 mL at 09/28/22 1418    sodium chloride 0.9 % flush 10 mL, 10 mL, Intravenous, Rivera DAMON Winston, MD, 10 mL at 01/25/23 1537    sodium chloride 0.9 % flush 10 mL, 10 mL, Intravenous, Rivera DAMON Winston, MD, 10 mL at 06/26/23 1426    Objective     Vital Signs   /56 (BP Location: Right arm, Patient Position: Sitting, Cuff Size: Adult)   Pulse 52   Ht 157.5 cm (62\")   Wt 78 kg (172 lb)   SpO2 93%   BMI 31.46 kg/m²      Physical Exam:  General appearance - alert, well appearing, and in no distress  Mental status - alert, oriented to person, place, and time  Eyes - pupils equal and reactive, extraocular eye movements intact  Neck - supple, no significant adenopathy  Chest - no tachypnea, retractions or cyanosis  Heart - normal rate and regular rhythm  Abdomen - soft, nontender, nondistended, no masses or organomegaly  Neurological - alert, oriented, normal speech, no focal findings or movement disorder noted  Musculoskeletal - no joint tenderness, deformity or swelling  Breast Exam: Bilateral breasts without obvious deformities. Previous incisions well healed. Bilateral nipples everted. Patient examined in the supine position with the ipsilateral arm above the head. No palpable masses bilaterally. No nipple discharge " bilaterally. No palpable axillary nor supraclavicular adenopathy bilaterally.       Results Review:     The following data was reviewed by: Quynh Rosario MD on 11/13/2023:  Mammo Diagnostic Digital Tomosynthesis Bilateral With CAD (10/30/2023 11:40)   Posttreatment changes left breast. Port implanted in the superior right  chest wall. No mammographic evidence of malignancy. Recommendation is  for the patient to return for routine mammography in one year or sooner  if clinically indicated. Diagnostic mammogram is requested the first 5  years following treatment of breast cancer.     BIRADS Category 2 - Benign findings    Assessment & Plan       Diagnoses and all orders for this visit:    1. Personal history of breast cancer (Primary)  -     Mammo Screening Digital Tomosynthesis Bilateral With CAD; Future    2. Encounter for screening mammogram for malignant neoplasm of breast  -     Mammo Screening Digital Tomosynthesis Bilateral With CAD; Future       Dago Nunez is a 82 y.o. female s/p left lumpectomy with sentinel lymph node biopsy on 9/14/21 for ER+CT+Her2- invsaive ductal carcinoma with final pathology showing stage IA breast cancer. She declined post op radiation. She is on adjuvant letrozole and is tolerating it well. Mammogram reviewed - no concerns on imaging or exam. Mammogram ordered for next year. Follow up in one year.          Quynh Rosario MD  11/13/23  12:28 CST

## 2023-11-14 ENCOUNTER — TELEPHONE (OUTPATIENT)
Dept: ONCOLOGY | Facility: CLINIC | Age: 83
End: 2023-11-14

## 2023-11-14 ENCOUNTER — INFUSION (OUTPATIENT)
Dept: ONCOLOGY | Facility: HOSPITAL | Age: 83
End: 2023-11-14
Payer: MEDICARE

## 2023-11-14 ENCOUNTER — OFFICE VISIT (OUTPATIENT)
Dept: ONCOLOGY | Facility: CLINIC | Age: 83
End: 2023-11-14
Payer: MEDICARE

## 2023-11-14 ENCOUNTER — LAB (OUTPATIENT)
Dept: LAB | Facility: HOSPITAL | Age: 83
End: 2023-11-14
Payer: MEDICARE

## 2023-11-14 VITALS
DIASTOLIC BLOOD PRESSURE: 60 MMHG | WEIGHT: 175.1 LBS | HEIGHT: 62 IN | SYSTOLIC BLOOD PRESSURE: 124 MMHG | BODY MASS INDEX: 32.22 KG/M2 | RESPIRATION RATE: 18 BRPM | HEART RATE: 58 BPM | TEMPERATURE: 98.1 F | OXYGEN SATURATION: 94 %

## 2023-11-14 VITALS
BODY MASS INDEX: 35.28 KG/M2 | TEMPERATURE: 97.8 F | SYSTOLIC BLOOD PRESSURE: 135 MMHG | HEIGHT: 59 IN | RESPIRATION RATE: 16 BRPM | HEART RATE: 50 BPM | OXYGEN SATURATION: 98 % | WEIGHT: 175 LBS | DIASTOLIC BLOOD PRESSURE: 69 MMHG

## 2023-11-14 DIAGNOSIS — Z17.1 CARCINOMA OF UPPER-OUTER QUADRANT OF LEFT BREAST IN FEMALE, ESTROGEN RECEPTOR NEGATIVE: Primary | ICD-10-CM

## 2023-11-14 DIAGNOSIS — C50.412 CARCINOMA OF UPPER-OUTER QUADRANT OF LEFT BREAST IN FEMALE, ESTROGEN RECEPTOR NEGATIVE: Primary | ICD-10-CM

## 2023-11-14 DIAGNOSIS — N18.32 ANEMIA IN STAGE 3B CHRONIC KIDNEY DISEASE: ICD-10-CM

## 2023-11-14 DIAGNOSIS — T38.7X5A OSTEOPOROSIS DUE TO ANDROGEN THERAPY: ICD-10-CM

## 2023-11-14 DIAGNOSIS — M81.8 OSTEOPOROSIS DUE TO ANDROGEN THERAPY: Primary | ICD-10-CM

## 2023-11-14 DIAGNOSIS — D63.1 ANEMIA DUE TO STAGE 3A CHRONIC KIDNEY DISEASE: ICD-10-CM

## 2023-11-14 DIAGNOSIS — T38.7X5A OSTEOPOROSIS DUE TO ANDROGEN THERAPY: Primary | ICD-10-CM

## 2023-11-14 DIAGNOSIS — N18.31 ANEMIA DUE TO STAGE 3A CHRONIC KIDNEY DISEASE: ICD-10-CM

## 2023-11-14 DIAGNOSIS — Z45.2 ENCOUNTER FOR CARE RELATED TO VASCULAR ACCESS PORT: ICD-10-CM

## 2023-11-14 DIAGNOSIS — N18.31 ANEMIA DUE TO STAGE 3A CHRONIC KIDNEY DISEASE: Primary | ICD-10-CM

## 2023-11-14 DIAGNOSIS — C51.9 VULVAR CANCER, CARCINOMA: ICD-10-CM

## 2023-11-14 DIAGNOSIS — M81.8 OSTEOPOROSIS DUE TO ANDROGEN THERAPY: ICD-10-CM

## 2023-11-14 DIAGNOSIS — Z17.0 MALIGNANT NEOPLASM OF UPPER-OUTER QUADRANT OF LEFT BREAST IN FEMALE, ESTROGEN RECEPTOR POSITIVE: ICD-10-CM

## 2023-11-14 DIAGNOSIS — C77.4 SECONDARY MALIGNANCY OF INGUINAL LYMPH NODES: ICD-10-CM

## 2023-11-14 DIAGNOSIS — D63.1 ANEMIA DUE TO STAGE 3A CHRONIC KIDNEY DISEASE: Primary | ICD-10-CM

## 2023-11-14 DIAGNOSIS — D63.1 ANEMIA IN STAGE 3B CHRONIC KIDNEY DISEASE: ICD-10-CM

## 2023-11-14 DIAGNOSIS — N18.32 STAGE 3B CHRONIC KIDNEY DISEASE: ICD-10-CM

## 2023-11-14 DIAGNOSIS — C50.412 MALIGNANT NEOPLASM OF UPPER-OUTER QUADRANT OF LEFT BREAST IN FEMALE, ESTROGEN RECEPTOR POSITIVE: ICD-10-CM

## 2023-11-14 LAB
ALBUMIN SERPL-MCNC: 4 G/DL (ref 3.5–5.2)
ALBUMIN/GLOB SERPL: 1.3 G/DL
ALP SERPL-CCNC: 61 U/L (ref 39–117)
ALT SERPL W P-5'-P-CCNC: 9 U/L (ref 1–33)
ANION GAP SERPL CALCULATED.3IONS-SCNC: 9 MMOL/L (ref 5–15)
AST SERPL-CCNC: 17 U/L (ref 1–32)
BASOPHILS # BLD AUTO: 0.03 10*3/MM3 (ref 0–0.2)
BASOPHILS NFR BLD AUTO: 0.5 % (ref 0–1.5)
BILIRUB SERPL-MCNC: 0.2 MG/DL (ref 0–1.2)
BUN SERPL-MCNC: 32 MG/DL (ref 8–23)
BUN/CREAT SERPL: 17.7 (ref 7–25)
CALCIUM SPEC-SCNC: 9.6 MG/DL (ref 8.6–10.5)
CHLORIDE SERPL-SCNC: 101 MMOL/L (ref 98–107)
CO2 SERPL-SCNC: 28 MMOL/L (ref 22–29)
CREAT SERPL-MCNC: 1.81 MG/DL (ref 0.57–1)
DEPRECATED RDW RBC AUTO: 52.1 FL (ref 37–54)
EGFRCR SERPLBLD CKD-EPI 2021: 27.8 ML/MIN/1.73
EOSINOPHIL # BLD AUTO: 0.25 10*3/MM3 (ref 0–0.4)
EOSINOPHIL NFR BLD AUTO: 4.2 % (ref 0.3–6.2)
ERYTHROCYTE [DISTWIDTH] IN BLOOD BY AUTOMATED COUNT: 13.4 % (ref 12.3–15.4)
FERRITIN SERPL-MCNC: 913.5 NG/ML (ref 13–150)
GLOBULIN UR ELPH-MCNC: 3 GM/DL
GLUCOSE SERPL-MCNC: 155 MG/DL (ref 65–99)
HCT VFR BLD AUTO: 27 % (ref 34–46.6)
HGB BLD-MCNC: 8.5 G/DL (ref 12–15.9)
HOLD SPECIMEN: NORMAL
IRON 24H UR-MRATE: 67 MCG/DL (ref 37–145)
IRON SATN MFR SERPL: 23 % (ref 20–50)
LYMPHOCYTES # BLD AUTO: 1.3 10*3/MM3 (ref 0.7–3.1)
LYMPHOCYTES NFR BLD AUTO: 21.9 % (ref 19.6–45.3)
MAGNESIUM SERPL-MCNC: 1.9 MG/DL (ref 1.6–2.4)
MCH RBC QN AUTO: 33.7 PG (ref 26.6–33)
MCHC RBC AUTO-ENTMCNC: 31.5 G/DL (ref 31.5–35.7)
MCV RBC AUTO: 107.1 FL (ref 79–97)
MONOCYTES # BLD AUTO: 0.42 10*3/MM3 (ref 0.1–0.9)
MONOCYTES NFR BLD AUTO: 7.1 % (ref 5–12)
NEUTROPHILS NFR BLD AUTO: 3.9 10*3/MM3 (ref 1.7–7)
NEUTROPHILS NFR BLD AUTO: 65.8 % (ref 42.7–76)
PHOSPHATE SERPL-MCNC: 3 MG/DL (ref 2.5–4.5)
PLATELET # BLD AUTO: 156 10*3/MM3 (ref 140–450)
PMV BLD AUTO: 10.7 FL (ref 6–12)
POTASSIUM SERPL-SCNC: 4.6 MMOL/L (ref 3.5–5.2)
PROT SERPL-MCNC: 7 G/DL (ref 6–8.5)
RBC # BLD AUTO: 2.52 10*6/MM3 (ref 3.77–5.28)
SODIUM SERPL-SCNC: 138 MMOL/L (ref 136–145)
TIBC SERPL-MCNC: 289 MCG/DL (ref 298–536)
TRANSFERRIN SERPL-MCNC: 194 MG/DL (ref 200–360)
WBC NRBC COR # BLD: 5.93 10*3/MM3 (ref 3.4–10.8)

## 2023-11-14 PROCEDURE — 84100 ASSAY OF PHOSPHORUS: CPT

## 2023-11-14 PROCEDURE — 25010000002 HEPARIN LOCK FLUSH PER 10 UNITS: Performed by: INTERNAL MEDICINE

## 2023-11-14 PROCEDURE — 83735 ASSAY OF MAGNESIUM: CPT

## 2023-11-14 PROCEDURE — 36415 COLL VENOUS BLD VENIPUNCTURE: CPT

## 2023-11-14 PROCEDURE — 85025 COMPLETE CBC W/AUTO DIFF WBC: CPT

## 2023-11-14 PROCEDURE — 84466 ASSAY OF TRANSFERRIN: CPT

## 2023-11-14 PROCEDURE — 82728 ASSAY OF FERRITIN: CPT

## 2023-11-14 PROCEDURE — 96372 THER/PROPH/DIAG INJ SC/IM: CPT

## 2023-11-14 PROCEDURE — 83540 ASSAY OF IRON: CPT

## 2023-11-14 PROCEDURE — 96523 IRRIG DRUG DELIVERY DEVICE: CPT

## 2023-11-14 PROCEDURE — 80053 COMPREHEN METABOLIC PANEL: CPT

## 2023-11-14 PROCEDURE — 25010000002 DENOSUMAB 60 MG/ML SOLUTION PREFILLED SYRINGE: Performed by: INTERNAL MEDICINE

## 2023-11-14 PROCEDURE — 25010000002 EPOETIN ALFA PER 1000 UNITS: Performed by: INTERNAL MEDICINE

## 2023-11-14 RX ORDER — SODIUM CHLORIDE 0.9 % (FLUSH) 0.9 %
10 SYRINGE (ML) INJECTION AS NEEDED
Status: DISCONTINUED | OUTPATIENT
Start: 2023-11-14 | End: 2023-11-14 | Stop reason: HOSPADM

## 2023-11-14 RX ORDER — HEPARIN SODIUM (PORCINE) LOCK FLUSH IV SOLN 100 UNIT/ML 100 UNIT/ML
500 SOLUTION INTRAVENOUS AS NEEDED
Status: DISCONTINUED | OUTPATIENT
Start: 2023-11-14 | End: 2023-11-14 | Stop reason: HOSPADM

## 2023-11-14 RX ORDER — SODIUM CHLORIDE 0.9 % (FLUSH) 0.9 %
10 SYRINGE (ML) INJECTION AS NEEDED
OUTPATIENT
Start: 2023-11-14

## 2023-11-14 RX ORDER — HEPARIN SODIUM (PORCINE) LOCK FLUSH IV SOLN 100 UNIT/ML 100 UNIT/ML
500 SOLUTION INTRAVENOUS AS NEEDED
OUTPATIENT
Start: 2023-11-14

## 2023-11-14 RX ADMIN — Medication 10 ML: at 15:12

## 2023-11-14 RX ADMIN — DENOSUMAB 60 MG: 60 INJECTION SUBCUTANEOUS at 15:05

## 2023-11-14 RX ADMIN — HEPARIN SODIUM (PORCINE) LOCK FLUSH IV SOLN 100 UNIT/ML 500 UNITS: 100 SOLUTION at 15:12

## 2023-11-14 RX ADMIN — ERYTHROPOIETIN 40000 UNITS: 40000 INJECTION, SOLUTION INTRAVENOUS; SUBCUTANEOUS at 15:04

## 2023-11-14 NOTE — TELEPHONE ENCOUNTER
Call placed to patient daughter Clara, message was left explaining that Dr Stafford has decided that he can manage her care monitoring her lab results and ordering the Procrit Injections Q 4 weeks from his original plan of weekly.   Message was left requesting a call back to discuss more in detail Dr Stafford plans, and update her TXT dates and times  Informed current TXT plan is not correct and to contact the office for updated version.     Next melanie apt 12/12/23 @ 1:55 pm

## 2023-11-14 NOTE — TELEPHONE ENCOUNTER
Received call back from patient daughter Clara, she calls for update regarding TXT regimen change. She was very thankful that Dr Stafford was able to change mother/patient Procrit/lab check to once monthly instead of Q week. Explained that Dr Stafford felt he was able to manage he care and monitor her blood values monthly, due to where patient lives as well as transportation difficulties and managing patient to and from her visits.  Daughter was very appreciative, due to both her brothers jobs as well as her job requirements, this will make transportation easier as well as asking for time off to assist her parents.   Daughter informed next apt for lab and eval is melanie:  Dec 12 @ 1:55 pm  Daughter was encouraged to call if they have any questions or concerns. She v/u.   Daughter was informed that Milagros/Nurse in the [facility] was able to discuss the use of Procrit with her mother and father with explanation why it is used and benefits of use.

## 2023-11-15 NOTE — PATIENT INSTRUCTIONS

## 2023-11-18 NOTE — PROGRESS NOTES
Chief Complaint  Follow-up (6 month follow up pt stated thinks b12 shots are helping, has a foul urine smell has a history of UTIs )    Subjective        Syndal Waqar Nunez presents to [facility]  History of Present Illness  Reports foul smelling urine without fever or chills  Chronic pain at baseline, does need refills  Mood is stable  A1C 5.6  ROS otherwise neg    Current Outpatient Medications   Medication Instructions    Acetaminophen (TYLENOL ARTHRITIS PAIN PO) 1 tablet, Oral, Daily PRN    albuterol sulfate  (90 Base) MCG/ACT inhaler 2 puffs, Inhalation, 4 Times Daily - RT    bisoprolol-hydrochlorothiazide (ZIAC) 5-6.25 MG per tablet 1 tablet, Oral, Daily    calcium carbonate (OS-REAGAN) 600 mg, Oral, Daily    cetirizine (ZYRTEC) 10 mg, Oral, Daily    citalopram (CeleXA) 20 MG tablet TAKE 1 TABLET BY MOUTH EVERY DAY    cyanocobalamin 1000 MCG/ML injection INJECT 1 ML INTO THE MUSCLE EVERY 4 WEEKS.    diphenhydrAMINE (BENADRYL) 25 mg, Oral, Every 6 Hours PRN    diphenoxylate-atropine (LOMOTIL) 2.5-0.025 MG per tablet TAKE 2 TABLETS BY MOUTH 4 TIMES DAILY AS NEEDED FOR DIARRHEA    DULERA 100-5 MCG/ACT inhaler 2 puffs, Inhalation, 2 Times Daily - RT, Rinse and spit after using.    esomeprazole (NEXIUM) 40 mg, Oral, 2 Times Daily    furosemide (LASIX) 40 mg, Oral, 2 Times Daily    gabapentin (NEURONTIN) 600 mg, Oral, 3 Times Daily    Homeopathic Products (LEG CRAMPS) tablet 1 tablet, Oral, Daily    HYDROcodone-acetaminophen (NORCO)  MG per tablet 0.5 tablets, Oral, Every 4 Hours PRN    letrozole (FEMARA) 2.5 mg, Oral, Daily    lidocaine (XYLOCAINE) 5 % ointment Topical, Every 4 Hours PRN    Lidocaine Viscous HCl (XYLOCAINE) 2 % solution 5 mL, Oral, 3 Times Daily With Meals    metOLazone (ZAROXOLYN) 2.5 MG tablet TAKE 1 TABLET BY MOUTH THREE TIMES A WEEK    nitrofurantoin (MACRODANTIN) 25 mg, Oral, Nightly, To help reduce pain with urination    ondansetron (ZOFRAN) 8 MG  "tablet TAKE 1 TABLET BY MOUTH EVERY 8 HOURS AS NEEDED FOR NAUSEA AND VOMITING    potassium chloride (K-DUR,KLOR-CON) 20 MEQ CR tablet TAKE 2 TABLETS BY MOUTH ONCE EVERY DAY    pravastatin (PRAVACHOL) 40 MG tablet TAKE 1 TABLET EVERY NIGHT    Syringe 25G X 1\" 3 ML misc 1 each, Does not apply, Daily    vitamin D (ERGOCALCIFEROL) 1.25 MG (00369 UT) capsule capsule TAKE 1 CAPSULE BY MOUTH ON THE SAME DAY EACH WEEK.         Objective   Vital Signs:  /56   Pulse 57   Temp 97.9 °F (36.6 °C)   Ht 157.5 cm (62\")   Wt 78.4 kg (172 lb 12.8 oz)   SpO2 97%   BMI 31.61 kg/m²   Estimated body mass index is 31.61 kg/m² as calculated from the following:    Height as of this encounter: 157.5 cm (62\").    Weight as of this encounter: 78.4 kg (172 lb 12.8 oz).               Physical Exam  Vitals and nursing note reviewed.   Constitutional:       General: She is not in acute distress.     Appearance: She is not diaphoretic.   HENT:      Head: Normocephalic and atraumatic.      Nose: Nose normal.   Eyes:      General: No scleral icterus.        Right eye: No discharge.         Left eye: No discharge.      Conjunctiva/sclera: Conjunctivae normal.   Neck:      Trachea: No tracheal deviation.   Pulmonary:      Effort: Pulmonary effort is normal.   Musculoskeletal:      Right lower leg: Right lower leg edema: baseline.      Left lower leg: Left lower leg edema: baseline.   Skin:     General: Skin is warm and dry.      Coloration: Skin is not pale.   Neurological:      Mental Status: She is alert and oriented to person, place, and time.   Psychiatric:         Behavior: Behavior normal.         Thought Content: Thought content normal.         Judgment: Judgment normal.        Result Review :                   Assessment and Plan   Diagnoses and all orders for this visit:    1. Foul smelling urine (Primary)  -     Urinalysis With Culture If Indicated - Urine, Clean Catch; Future    2. Flu vaccine need  -     Fluzone High-Dose 65+yrs " (6493-1795)    3. Type 2 diabetes mellitus with diabetic polyneuropathy, without long-term current use of insulin  -     POC Glycosylated Hemoglobin (Hb A1C)    4. Essential hypertension    5. Lower extremity edema    6. Gastroesophageal reflux disease without esophagitis    7. Chronic pain of both knees  -     gabapentin (NEURONTIN) 600 MG tablet; Take 1 tablet by mouth 3 (Three) Times a Day.  Dispense: 90 tablet; Refill: 2    8. Vulvar cancer, carcinoma  -     HYDROcodone-acetaminophen (NORCO)  MG per tablet; Take 0.5 tablets by mouth Every 4 (Four) Hours As Needed for Moderate Pain or Severe Pain.  Dispense: 60 tablet; Refill: 0    9. Cancer related pain  -     HYDROcodone-acetaminophen (NORCO)  MG per tablet; Take 0.5 tablets by mouth Every 4 (Four) Hours As Needed for Moderate Pain or Severe Pain.  Dispense: 60 tablet; Refill: 0    Stable  Refills above  F/u 6 months

## 2023-12-12 ENCOUNTER — LAB (OUTPATIENT)
Dept: LAB | Facility: HOSPITAL | Age: 83
End: 2023-12-12
Payer: MEDICARE

## 2023-12-12 ENCOUNTER — INFUSION (OUTPATIENT)
Dept: ONCOLOGY | Facility: HOSPITAL | Age: 83
End: 2023-12-12
Payer: MEDICARE

## 2023-12-12 VITALS
RESPIRATION RATE: 16 BRPM | HEART RATE: 53 BPM | OXYGEN SATURATION: 97 % | DIASTOLIC BLOOD PRESSURE: 42 MMHG | TEMPERATURE: 97.1 F | SYSTOLIC BLOOD PRESSURE: 135 MMHG

## 2023-12-12 DIAGNOSIS — N18.31 ANEMIA DUE TO STAGE 3A CHRONIC KIDNEY DISEASE: ICD-10-CM

## 2023-12-12 DIAGNOSIS — D63.1 ANEMIA DUE TO STAGE 3A CHRONIC KIDNEY DISEASE: ICD-10-CM

## 2023-12-12 DIAGNOSIS — M81.8 OSTEOPOROSIS DUE TO ANDROGEN THERAPY: ICD-10-CM

## 2023-12-12 DIAGNOSIS — E11.42 TYPE 2 DIABETES MELLITUS WITH DIABETIC POLYNEUROPATHY, WITHOUT LONG-TERM CURRENT USE OF INSULIN: Primary | ICD-10-CM

## 2023-12-12 DIAGNOSIS — T38.7X5A OSTEOPOROSIS DUE TO ANDROGEN THERAPY: ICD-10-CM

## 2023-12-12 DIAGNOSIS — Z17.0 MALIGNANT NEOPLASM OF UPPER-OUTER QUADRANT OF LEFT BREAST IN FEMALE, ESTROGEN RECEPTOR POSITIVE: ICD-10-CM

## 2023-12-12 DIAGNOSIS — C50.412 MALIGNANT NEOPLASM OF UPPER-OUTER QUADRANT OF LEFT BREAST IN FEMALE, ESTROGEN RECEPTOR POSITIVE: ICD-10-CM

## 2023-12-12 LAB
ALBUMIN SERPL-MCNC: 4.1 G/DL (ref 3.5–5.2)
ALBUMIN/GLOB SERPL: 1.2 G/DL
ALP SERPL-CCNC: 61 U/L (ref 39–117)
ALT SERPL W P-5'-P-CCNC: 8 U/L (ref 1–33)
ANION GAP SERPL CALCULATED.3IONS-SCNC: 13 MMOL/L (ref 5–15)
AST SERPL-CCNC: 17 U/L (ref 1–32)
BASOPHILS # BLD AUTO: 0.04 10*3/MM3 (ref 0–0.2)
BASOPHILS NFR BLD AUTO: 0.7 % (ref 0–1.5)
BILIRUB SERPL-MCNC: 0.3 MG/DL (ref 0–1.2)
BUN SERPL-MCNC: 33 MG/DL (ref 8–23)
BUN/CREAT SERPL: 19.6 (ref 7–25)
CALCIUM SPEC-SCNC: 8.6 MG/DL (ref 8.6–10.5)
CHLORIDE SERPL-SCNC: 101 MMOL/L (ref 98–107)
CO2 SERPL-SCNC: 23 MMOL/L (ref 22–29)
CREAT SERPL-MCNC: 1.68 MG/DL (ref 0.57–1)
DEPRECATED RDW RBC AUTO: 56 FL (ref 37–54)
EGFRCR SERPLBLD CKD-EPI 2021: 30.4 ML/MIN/1.73
EOSINOPHIL # BLD AUTO: 0.27 10*3/MM3 (ref 0–0.4)
EOSINOPHIL NFR BLD AUTO: 4.6 % (ref 0.3–6.2)
ERYTHROCYTE [DISTWIDTH] IN BLOOD BY AUTOMATED COUNT: 14.3 % (ref 12.3–15.4)
FERRITIN SERPL-MCNC: 780.2 NG/ML (ref 13–150)
GLOBULIN UR ELPH-MCNC: 3.3 GM/DL
GLUCOSE SERPL-MCNC: 156 MG/DL (ref 65–99)
HCT VFR BLD AUTO: 33 % (ref 34–46.6)
HGB BLD-MCNC: 10.2 G/DL (ref 12–15.9)
LYMPHOCYTES # BLD AUTO: 1.01 10*3/MM3 (ref 0.7–3.1)
LYMPHOCYTES NFR BLD AUTO: 17.4 % (ref 19.6–45.3)
MCH RBC QN AUTO: 33.4 PG (ref 26.6–33)
MCHC RBC AUTO-ENTMCNC: 30.9 G/DL (ref 31.5–35.7)
MCV RBC AUTO: 108.2 FL (ref 79–97)
MONOCYTES # BLD AUTO: 0.39 10*3/MM3 (ref 0.1–0.9)
MONOCYTES NFR BLD AUTO: 6.7 % (ref 5–12)
NEUTROPHILS NFR BLD AUTO: 4.08 10*3/MM3 (ref 1.7–7)
NEUTROPHILS NFR BLD AUTO: 70.1 % (ref 42.7–76)
PLATELET # BLD AUTO: 157 10*3/MM3 (ref 140–450)
PMV BLD AUTO: 11 FL (ref 6–12)
POTASSIUM SERPL-SCNC: 4.4 MMOL/L (ref 3.5–5.2)
PROT SERPL-MCNC: 7.4 G/DL (ref 6–8.5)
RBC # BLD AUTO: 3.05 10*6/MM3 (ref 3.77–5.28)
SODIUM SERPL-SCNC: 137 MMOL/L (ref 136–145)
WBC NRBC COR # BLD AUTO: 5.82 10*3/MM3 (ref 3.4–10.8)

## 2023-12-12 PROCEDURE — 80053 COMPREHEN METABOLIC PANEL: CPT

## 2023-12-12 PROCEDURE — 85025 COMPLETE CBC W/AUTO DIFF WBC: CPT

## 2023-12-12 PROCEDURE — 36415 COLL VENOUS BLD VENIPUNCTURE: CPT

## 2023-12-12 PROCEDURE — 82728 ASSAY OF FERRITIN: CPT

## 2023-12-12 PROCEDURE — G0463 HOSPITAL OUTPT CLINIC VISIT: HCPCS

## 2023-12-22 DIAGNOSIS — Z78.0 MENOPAUSE: ICD-10-CM

## 2023-12-26 RX ORDER — LETROZOLE 2.5 MG/1
2.5 TABLET, FILM COATED ORAL DAILY
Qty: 90 TABLET | Refills: 3 | Status: SHIPPED | OUTPATIENT
Start: 2023-12-26

## 2024-01-16 DIAGNOSIS — C51.9 VULVAR CANCER, CARCINOMA: ICD-10-CM

## 2024-01-16 DIAGNOSIS — G89.3 CANCER RELATED PAIN: ICD-10-CM

## 2024-01-16 NOTE — TELEPHONE ENCOUNTER
Caller: Dago Nunez    Relationship: Self    Best call back number: [phone]     Requested Prescriptions:   Requested Prescriptions     Pending Prescriptions Disp Refills    HYDROcodone-acetaminophen (NORCO)  MG per tablet 60 tablet 0     Sig: Take 0.5 tablets by mouth Every 4 (Four) Hours As Needed for Moderate Pain or Severe Pain.        Pharmacy where request should be sent: [facility] [place], [place] - [address] - [phone]  - [phone]      Last office visit with prescribing clinician: 10/24/2023   Last telemedicine visit with prescribing clinician: Visit date not found   Next office visit with prescribing clinician: 1/24/2024     Additional details provided by patient: HAS 3 DAYS LEFT    Does the patient have less than a 3 day supply:  [] Yes  [x] No    Would you like a call back once the refill request has been completed: [] Yes [x] No    If the office needs to give you a call back, can they leave a voicemail: [] Yes [x] No    Marlen Welch Rep   01/16/24 15:21 CST

## 2024-01-20 RX ORDER — HYDROCODONE BITARTRATE AND ACETAMINOPHEN 10; 325 MG/1; MG/1
0.5 TABLET ORAL EVERY 4 HOURS PRN
Qty: 60 TABLET | Refills: 0 | Status: SHIPPED | OUTPATIENT
Start: 2024-01-20

## 2024-01-22 RX ORDER — POTASSIUM CHLORIDE 20 MEQ/1
40 TABLET, EXTENDED RELEASE ORAL DAILY
Qty: 60 TABLET | Refills: 5 | Status: SHIPPED | OUTPATIENT
Start: 2024-01-22

## 2024-01-24 ENCOUNTER — INFUSION (OUTPATIENT)
Dept: ONCOLOGY | Facility: CLINIC | Age: 84
End: 2024-01-24
Payer: MEDICARE

## 2024-01-24 ENCOUNTER — OFFICE VISIT (OUTPATIENT)
Dept: FAMILY MEDICINE CLINIC | Facility: CLINIC | Age: 84
End: 2024-01-24
Payer: MEDICARE

## 2024-01-24 VITALS
WEIGHT: 172 LBS | TEMPERATURE: 97.1 F | HEIGHT: 59 IN | OXYGEN SATURATION: 99 % | HEART RATE: 59 BPM | BODY MASS INDEX: 34.68 KG/M2 | SYSTOLIC BLOOD PRESSURE: 148 MMHG | RESPIRATION RATE: 18 BRPM | DIASTOLIC BLOOD PRESSURE: 60 MMHG

## 2024-01-24 DIAGNOSIS — C77.4 SECONDARY MALIGNANCY OF INGUINAL LYMPH NODES: ICD-10-CM

## 2024-01-24 DIAGNOSIS — G89.29 CHRONIC PAIN OF BOTH KNEES: ICD-10-CM

## 2024-01-24 DIAGNOSIS — M79.10 MYALGIA: ICD-10-CM

## 2024-01-24 DIAGNOSIS — K59.00 CONSTIPATION, UNSPECIFIED CONSTIPATION TYPE: ICD-10-CM

## 2024-01-24 DIAGNOSIS — G89.29 CHRONIC MIDLINE LOW BACK PAIN WITHOUT SCIATICA: Primary | ICD-10-CM

## 2024-01-24 DIAGNOSIS — E11.42 TYPE 2 DIABETES MELLITUS WITH DIABETIC POLYNEUROPATHY, WITHOUT LONG-TERM CURRENT USE OF INSULIN: ICD-10-CM

## 2024-01-24 DIAGNOSIS — N18.32 STAGE 3B CHRONIC KIDNEY DISEASE: ICD-10-CM

## 2024-01-24 DIAGNOSIS — M25.562 CHRONIC PAIN OF BOTH KNEES: ICD-10-CM

## 2024-01-24 DIAGNOSIS — M54.50 CHRONIC MIDLINE LOW BACK PAIN WITHOUT SCIATICA: Primary | ICD-10-CM

## 2024-01-24 DIAGNOSIS — F41.9 ANXIETY: ICD-10-CM

## 2024-01-24 DIAGNOSIS — M25.561 CHRONIC PAIN OF BOTH KNEES: ICD-10-CM

## 2024-01-24 DIAGNOSIS — Z45.2 ENCOUNTER FOR CARE RELATED TO VASCULAR ACCESS PORT: ICD-10-CM

## 2024-01-24 DIAGNOSIS — I10 ESSENTIAL HYPERTENSION: ICD-10-CM

## 2024-01-24 DIAGNOSIS — C51.9 VULVAR CANCER, CARCINOMA: Primary | ICD-10-CM

## 2024-01-24 RX ORDER — SODIUM CHLORIDE 0.9 % (FLUSH) 0.9 %
10 SYRINGE (ML) INJECTION AS NEEDED
OUTPATIENT
Start: 2024-01-24

## 2024-01-24 RX ORDER — HEPARIN SODIUM (PORCINE) LOCK FLUSH IV SOLN 100 UNIT/ML 100 UNIT/ML
500 SOLUTION INTRAVENOUS AS NEEDED
OUTPATIENT
Start: 2024-01-24

## 2024-01-24 RX ORDER — SODIUM CHLORIDE 0.9 % (FLUSH) 0.9 %
10 SYRINGE (ML) INJECTION AS NEEDED
Status: DISCONTINUED | OUTPATIENT
Start: 2024-01-24 | End: 2024-01-24 | Stop reason: HOSPADM

## 2024-01-24 RX ORDER — GABAPENTIN 300 MG/1
300 CAPSULE ORAL 2 TIMES DAILY
Qty: 180 CAPSULE | Refills: 0 | Status: SHIPPED | OUTPATIENT
Start: 2024-01-24

## 2024-01-24 RX ORDER — HEPARIN SODIUM (PORCINE) LOCK FLUSH IV SOLN 100 UNIT/ML 100 UNIT/ML
500 SOLUTION INTRAVENOUS AS NEEDED
Status: DISCONTINUED | OUTPATIENT
Start: 2024-01-24 | End: 2024-01-24 | Stop reason: HOSPADM

## 2024-01-24 RX ADMIN — Medication 10 ML: at 14:40

## 2024-01-24 RX ADMIN — HEPARIN SODIUM (PORCINE) LOCK FLUSH IV SOLN 100 UNIT/ML 500 UNITS: 100 SOLUTION at 14:40

## 2024-01-24 NOTE — PROGRESS NOTES
Chief Complaint  Follow-up (3 MONTH FOLLOW UP )    Subjective        Syndal Waqar Nunez presents to [facility]  History of Present Illness  Here for follow up, says that she has been struggling with diarrhea, not sure what med she is taking at home. Also been having more myalgias in her legs and back. Last GFR 30.4, Cr clearance low 30s based on last assessment.     Current Outpatient Medications   Medication Instructions    Acetaminophen (TYLENOL ARTHRITIS PAIN PO) 1 tablet, Oral, Daily PRN    albuterol sulfate  (90 Base) MCG/ACT inhaler 2 puffs, Inhalation, 4 Times Daily - RT    bisoprolol-hydrochlorothiazide (ZIAC) 5-6.25 MG per tablet 1 tablet, Oral, Daily    calcium carbonate (OS-REAGAN) 600 mg, Oral, Daily    cetirizine (ZYRTEC) 10 mg, Oral, Daily    citalopram (CeleXA) 20 MG tablet TAKE 1 TABLET BY MOUTH EVERY DAY    cyanocobalamin 1000 MCG/ML injection INJECT 1 ML INTO THE MUSCLE EVERY 4 WEEKS.    diphenhydrAMINE (BENADRYL) 25 mg, Oral, Every 6 Hours PRN    diphenoxylate-atropine (LOMOTIL) 2.5-0.025 MG per tablet TAKE 2 TABLETS BY MOUTH 4 TIMES DAILY AS NEEDED FOR DIARRHEA    DULERA 100-5 MCG/ACT inhaler 2 puffs, Inhalation, 2 Times Daily - RT, Rinse and spit after using.    esomeprazole (NEXIUM) 40 mg, Oral, 2 Times Daily    furosemide (LASIX) 40 mg, Oral, 2 Times Daily    gabapentin (NEURONTIN) 300 mg, Oral, 2 Times Daily    Homeopathic Products (LEG CRAMPS) tablet 1 tablet, Oral, Daily    HYDROcodone-acetaminophen (NORCO)  MG per tablet 0.5 tablets, Oral, Every 4 Hours PRN    letrozole (FEMARA) 2.5 mg, Oral, Daily    lidocaine (XYLOCAINE) 5 % ointment Topical, Every 4 Hours PRN    Lidocaine Viscous HCl (XYLOCAINE) 2 % solution 5 mL, Oral, 3 Times Daily With Meals    metOLazone (ZAROXOLYN) 2.5 MG tablet TAKE 1 TABLET BY MOUTH THREE TIMES A WEEK    nitrofurantoin (MACRODANTIN) 25 mg, Oral, Nightly, To help reduce pain with urination    ondansetron (ZOFRAN) 8 MG  "tablet TAKE 1 TABLET BY MOUTH EVERY 8 HOURS AS NEEDED FOR NAUSEA AND VOMITING    potassium chloride (K-DUR,KLOR-CON) 20 MEQ CR tablet 40 mEq, Oral, Daily    pravastatin (PRAVACHOL) 40 MG tablet TAKE 1 TABLET EVERY NIGHT    Syringe 25G X 1\" 3 ML misc 1 each, Does not apply, Daily    vitamin D (ERGOCALCIFEROL) 1.25 MG (00200 UT) capsule capsule TAKE 1 CAPSULE BY MOUTH ON THE SAME DAY EACH WEEK.         Objective   Vital Signs:  /60   Pulse 59   Temp 97.1 °F (36.2 °C)   Resp 18   Ht 149.9 cm (59\")   Wt 78 kg (172 lb)   SpO2 99%   BMI 34.74 kg/m²   Estimated body mass index is 34.74 kg/m² as calculated from the following:    Height as of this encounter: 149.9 cm (59\").    Weight as of this encounter: 78 kg (172 lb).               Physical Exam  Vitals and nursing note reviewed.   Constitutional:       General: She is not in acute distress.     Appearance: She is not diaphoretic.   HENT:      Head: Normocephalic and atraumatic.      Nose: Nose normal.   Eyes:      General: No scleral icterus.        Right eye: No discharge.         Left eye: No discharge.      Conjunctiva/sclera: Conjunctivae normal.   Neck:      Trachea: No tracheal deviation.   Pulmonary:      Effort: Pulmonary effort is normal.   Skin:     General: Skin is warm and dry.      Coloration: Skin is not pale.   Neurological:      Mental Status: She is alert and oriented to person, place, and time.   Psychiatric:         Behavior: Behavior normal.         Thought Content: Thought content normal.         Judgment: Judgment normal.        Result Review :                     Assessment and Plan     Diagnoses and all orders for this visit:    1. Chronic midline low back pain without sciatica (Primary)    2. Anxiety    3. Stage 3b chronic kidney disease  -     gabapentin (NEURONTIN) 300 MG capsule; Take 1 capsule by mouth 2 (Two) Times a Day.  Dispense: 180 capsule; Refill: 0    4. Essential hypertension    5. Type 2 diabetes mellitus with diabetic " polyneuropathy, without long-term current use of insulin    6. Chronic pain of both knees    7. Myalgia    8. Constipation, unspecified constipation type    Stable except for concern for side effect of gabapentin given renal dosing and CKD. Lowered gabapentin today, continue regimen above otherwise and f/u 3 months. Pt will call with update on constipation treatment.          Follow Up     Return in about 3 months (around 4/24/2024).  Patient was given instructions and counseling regarding her condition or for health maintenance advice. Please see specific information pulled into the AVS if appropriate.

## 2024-01-26 ENCOUNTER — TELEPHONE (OUTPATIENT)
Dept: FAMILY MEDICINE CLINIC | Facility: CLINIC | Age: 84
End: 2024-01-26

## 2024-01-26 NOTE — LETTER
January 30, 2024     Patient: Dago Nunez   YOB: 1940   Date of Visit: 1/26/2024       To Whom It May Concern:    It is my medical opinion that Dago Nunez has enough back pain and muscular deconditioning that she has to have a walker for ambulation that is severely limited.  Many times her function is compromised to the point where she cannot cross the road to check her mail.  She required a wheelchair at our last office visit.           Sincerely,        Shaheen Akbar DO    CC: No Recipients

## 2024-01-26 NOTE — TELEPHONE ENCOUNTER
Caller: Dago Nunez    Relationship: Self    Best call back number: [phone]     What is the best time to reach you: ANYTIME    Who are you requesting to speak with (clinical staff, provider,  specific staff member): CLINICAL    What was the call regarding: THE ANTI DIARRHEA MEDICATION DIPHENOXYLATE ATROPINE DOES HELP SLOW IT DOWN BUT DOES NOT CURE IT. SHE ALSO WANTED TO LET HIM KNOW SHE WILL START THE LOW DOSE OF THE GABAPENTIN TOMORROW AND WILL CALL BACK WITH HOW THAT IS WORKING.     SHE IS ALSO NEEDING A LETTER FROM HER DOCTOR ABOUT HER BEING ON A WALKER AND THAT SHE CANNOT CROSS THE STREET TO GET TO HER MAILBOX. HER MAILBOX IS ACROSS THE [address]RE[address] FROM HER HOUSE AND SHE IS SCARED TO CROSS THE ROAD BECAUSE IT IS SO BUSY. SHE WANTS THIS MAILED TO HER AS SOON AS POSSIBLE.  PLEASE CALL BACK WHEN THIS HAS BEEN SENT.     Is it okay if the provider responds through [employer]: NO

## 2024-02-02 ENCOUNTER — LAB (OUTPATIENT)
Dept: LAB | Facility: HOSPITAL | Age: 84
End: 2024-02-02
Payer: MEDICARE

## 2024-02-02 ENCOUNTER — OFFICE VISIT (OUTPATIENT)
Dept: FAMILY MEDICINE CLINIC | Facility: CLINIC | Age: 84
End: 2024-02-02
Payer: MEDICARE

## 2024-02-02 VITALS
HEART RATE: 54 BPM | TEMPERATURE: 98.3 F | BODY MASS INDEX: 33.26 KG/M2 | SYSTOLIC BLOOD PRESSURE: 124 MMHG | HEIGHT: 59 IN | RESPIRATION RATE: 18 BRPM | WEIGHT: 165 LBS | OXYGEN SATURATION: 97 % | DIASTOLIC BLOOD PRESSURE: 80 MMHG

## 2024-02-02 DIAGNOSIS — M79.605 PAIN OF LEFT LOWER EXTREMITY: ICD-10-CM

## 2024-02-02 DIAGNOSIS — E78.2 MIXED HYPERLIPIDEMIA: ICD-10-CM

## 2024-02-02 DIAGNOSIS — L03.116 CELLULITIS OF LEFT LOWER EXTREMITY: Primary | ICD-10-CM

## 2024-02-02 LAB
ALBUMIN SERPL-MCNC: 4.4 G/DL (ref 3.5–5.2)
ALBUMIN/GLOB SERPL: 1.1 G/DL
ALP SERPL-CCNC: 57 U/L (ref 39–117)
ALT SERPL W P-5'-P-CCNC: 11 U/L (ref 1–33)
ANION GAP SERPL CALCULATED.3IONS-SCNC: 16 MMOL/L (ref 5–15)
ANISOCYTOSIS BLD QL: ABNORMAL
AST SERPL-CCNC: 19 U/L (ref 1–32)
BILIRUB SERPL-MCNC: 0.3 MG/DL (ref 0–1.2)
BUN SERPL-MCNC: 23 MG/DL (ref 8–23)
BUN/CREAT SERPL: 11.1 (ref 7–25)
CALCIUM SPEC-SCNC: 8.7 MG/DL (ref 8.6–10.5)
CHLORIDE SERPL-SCNC: 96 MMOL/L (ref 98–107)
CHOLEST SERPL-MCNC: 200 MG/DL (ref 0–200)
CO2 SERPL-SCNC: 23 MMOL/L (ref 22–29)
CREAT SERPL-MCNC: 2.07 MG/DL (ref 0.57–1)
CRP SERPL-MCNC: 5.52 MG/DL (ref 0–0.5)
DEPRECATED RDW RBC AUTO: 54 FL (ref 37–54)
EGFRCR SERPLBLD CKD-EPI 2021: 23.7 ML/MIN/1.73
ERYTHROCYTE [DISTWIDTH] IN BLOOD BY AUTOMATED COUNT: 14.1 % (ref 12.3–15.4)
ERYTHROCYTE [SEDIMENTATION RATE] IN BLOOD: 74 MM/HR (ref 0–30)
GIANT PLATELETS: ABNORMAL
GLOBULIN UR ELPH-MCNC: 3.9 GM/DL
GLUCOSE SERPL-MCNC: 100 MG/DL (ref 65–99)
HCT VFR BLD AUTO: 33 % (ref 34–46.6)
HDLC SERPL-MCNC: 59 MG/DL (ref 40–60)
HGB BLD-MCNC: 10.8 G/DL (ref 12–15.9)
LDLC SERPL CALC-MCNC: 118 MG/DL (ref 0–100)
LDLC/HDLC SERPL: 1.94 {RATIO}
LYMPHOCYTES # BLD MANUAL: 1.09 10*3/MM3 (ref 0.7–3.1)
LYMPHOCYTES NFR BLD MANUAL: 2 % (ref 5–12)
MACROCYTES BLD QL SMEAR: ABNORMAL
MCH RBC QN AUTO: 34.3 PG (ref 26.6–33)
MCHC RBC AUTO-ENTMCNC: 32.7 G/DL (ref 31.5–35.7)
MCV RBC AUTO: 104.8 FL (ref 79–97)
MONOCYTES # BLD: 0.2 10*3/MM3 (ref 0.1–0.9)
NEUTROPHILS # BLD AUTO: 8.62 10*3/MM3 (ref 1.7–7)
NEUTROPHILS NFR BLD MANUAL: 84 % (ref 42.7–76)
NEUTS BAND NFR BLD MANUAL: 3 % (ref 0–5)
PLATELET # BLD AUTO: 120 10*3/MM3 (ref 140–450)
PMV BLD AUTO: 12.5 FL (ref 6–12)
POIKILOCYTOSIS BLD QL SMEAR: ABNORMAL
POTASSIUM SERPL-SCNC: 4 MMOL/L (ref 3.5–5.2)
PROT SERPL-MCNC: 8.3 G/DL (ref 6–8.5)
RBC # BLD AUTO: 3.15 10*6/MM3 (ref 3.77–5.28)
SODIUM SERPL-SCNC: 135 MMOL/L (ref 136–145)
TRIGL SERPL-MCNC: 133 MG/DL (ref 0–150)
VARIANT LYMPHS NFR BLD MANUAL: 3 % (ref 0–5)
VARIANT LYMPHS NFR BLD MANUAL: 8 % (ref 19.6–45.3)
VLDLC SERPL-MCNC: 23 MG/DL (ref 5–40)
WBC MORPH BLD: NORMAL
WBC NRBC COR # BLD AUTO: 9.91 10*3/MM3 (ref 3.4–10.8)

## 2024-02-02 PROCEDURE — 85007 BL SMEAR W/DIFF WBC COUNT: CPT

## 2024-02-02 PROCEDURE — 85652 RBC SED RATE AUTOMATED: CPT

## 2024-02-02 PROCEDURE — 86140 C-REACTIVE PROTEIN: CPT

## 2024-02-02 PROCEDURE — 85027 COMPLETE CBC AUTOMATED: CPT

## 2024-02-02 PROCEDURE — 36415 COLL VENOUS BLD VENIPUNCTURE: CPT

## 2024-02-02 PROCEDURE — 80061 LIPID PANEL: CPT

## 2024-02-02 PROCEDURE — 80053 COMPREHEN METABOLIC PANEL: CPT

## 2024-02-02 RX ORDER — DOXYCYCLINE HYCLATE 100 MG/1
100 CAPSULE ORAL 2 TIMES DAILY
Qty: 14 CAPSULE | Refills: 0 | Status: SHIPPED | OUTPATIENT
Start: 2024-02-02 | End: 2024-02-09

## 2024-02-02 RX ORDER — NYSTATIN AND TRIAMCINOLONE ACETONIDE 100000; 1 [USP'U]/G; MG/G
1 OINTMENT TOPICAL 2 TIMES DAILY
Qty: 60 G | Refills: 0 | Status: SHIPPED | OUTPATIENT
Start: 2024-02-02

## 2024-02-02 NOTE — PROGRESS NOTES
"Chief Complaint  Rash and Leg Swelling    Subjective        Dago Nunez presents to [facility]  Rash    Leg Swelling  Associated symptoms include a rash.     Over the past couple of days patient has been having left leg pain located along the distal and posterior portion of her leg with associated warmth and tenderness to palpation with reported fever last night.  Similar to previous episode of cellulitis.    Objective   Vital Signs:  /80   Pulse 54   Temp 98.3 °F (36.8 °C)   Resp 18   Ht 149.9 cm (59\")   Wt 74.8 kg (165 lb)   SpO2 97%   BMI 33.33 kg/m²   Estimated body mass index is 33.33 kg/m² as calculated from the following:    Height as of this encounter: 149.9 cm (59\").    Weight as of this encounter: 74.8 kg (165 lb).           Physical Exam  Vitals and nursing note reviewed.   Constitutional:       General: She is not in acute distress.     Appearance: She is not diaphoretic.   HENT:      Head: Normocephalic and atraumatic.      Nose: Nose normal.   Eyes:      General: No scleral icterus.        Right eye: No discharge.         Left eye: No discharge.      Conjunctiva/sclera: Conjunctivae normal.   Neck:      Trachea: No tracheal deviation.   Pulmonary:      Effort: Pulmonary effort is normal.   Skin:     General: Skin is warm and dry.      Coloration: Skin is not pale.      Comments: Well-demarcated erythemic rash with associated swelling along posterior aspect of leg up to distal thigh, warm with tenderness to palpation   Neurological:      Mental Status: She is alert and oriented to person, place, and time.   Psychiatric:         Behavior: Behavior normal.         Thought Content: Thought content normal.         Judgment: Judgment normal.        Result Review :    The following data was reviewed by: Shaheen Akbar DO on 02/02/2024:  CMP          11/14/2023    13:43 12/12/2023    13:27 2/2/2024    12:30   CMP   Glucose 155  156  100    BUN 32  33  23 "    Creatinine 1.81  1.68  2.07    EGFR 27.8  30.4  23.7    Sodium 138  137  135    Potassium 4.6  4.4  4.0    Chloride 101  101  96    Calcium 9.6  8.6  8.7    Total Protein 7.0  7.4  8.3    Albumin 4.0  4.1  4.4    Globulin 3.0  3.3  3.9    Total Bilirubin 0.2  0.3  0.3    Alkaline Phosphatase 61  61  57    AST (SGOT) 17  17  19    ALT (SGPT) 9  8  11    Albumin/Globulin Ratio 1.3  1.2  1.1    BUN/Creatinine Ratio 17.7  19.6  11.1    Anion Gap 9.0  13.0  16.0      CBC w/diff          11/14/2023    13:43 12/12/2023    13:27 2/2/2024    12:30   CBC w/Diff   WBC 5.93  5.82  9.91    RBC 2.52  3.05  3.15    Hemoglobin 8.5  10.2  10.8    Hematocrit 27.0  33.0  33.0    .1  108.2  104.8    MCH 33.7  33.4  34.3    MCHC 31.5  30.9  32.7    RDW 13.4  14.3  14.1    Platelets 156  157  120    Neutrophil Rel % 65.8  70.1     Lymphocyte Rel % 21.9  17.4     Monocyte Rel % 7.1  6.7     Eosinophil Rel % 4.2  4.6     Basophil Rel % 0.5  0.7                    Assessment and Plan     Diagnoses and all orders for this visit:    1. Cellulitis of left lower extremity (Primary)  -     Sedimentation rate, automated; Future  -     C-reactive protein; Future  -     Comprehensive Metabolic Panel; Future  -     CBC w MANUAL Differential; Future  -     doxycycline (VIBRAMYCIN) 100 MG capsule; Take 1 capsule by mouth 2 (Two) Times a Day for 7 days.  Dispense: 14 capsule; Refill: 0    CRP 5.52, ESR 74, sending doxycycline for cellultis, ED if symptoms worsen         Follow Up     Return if symptoms worsen or fail to improve.  Patient was given instructions and counseling regarding her condition or for health maintenance advice. Please see specific information pulled into the AVS if appropriate.

## 2024-02-06 ENCOUNTER — INFUSION (OUTPATIENT)
Dept: ONCOLOGY | Facility: HOSPITAL | Age: 84
End: 2024-02-06
Payer: MEDICARE

## 2024-02-06 ENCOUNTER — LAB (OUTPATIENT)
Dept: LAB | Facility: HOSPITAL | Age: 84
End: 2024-02-06
Payer: MEDICARE

## 2024-02-06 VITALS
HEIGHT: 59 IN | DIASTOLIC BLOOD PRESSURE: 44 MMHG | WEIGHT: 166.8 LBS | BODY MASS INDEX: 33.63 KG/M2 | OXYGEN SATURATION: 100 % | TEMPERATURE: 96.3 F | HEART RATE: 53 BPM | SYSTOLIC BLOOD PRESSURE: 149 MMHG | RESPIRATION RATE: 14 BRPM

## 2024-02-06 DIAGNOSIS — D63.1 ANEMIA IN STAGE 3B CHRONIC KIDNEY DISEASE: ICD-10-CM

## 2024-02-06 DIAGNOSIS — C50.412 CARCINOMA OF UPPER-OUTER QUADRANT OF LEFT BREAST IN FEMALE, ESTROGEN RECEPTOR NEGATIVE: ICD-10-CM

## 2024-02-06 DIAGNOSIS — C50.412 MALIGNANT NEOPLASM OF UPPER-OUTER QUADRANT OF LEFT BREAST IN FEMALE, ESTROGEN RECEPTOR POSITIVE: ICD-10-CM

## 2024-02-06 DIAGNOSIS — N18.32 STAGE 3B CHRONIC KIDNEY DISEASE: Primary | ICD-10-CM

## 2024-02-06 DIAGNOSIS — Z17.1 CARCINOMA OF UPPER-OUTER QUADRANT OF LEFT BREAST IN FEMALE, ESTROGEN RECEPTOR NEGATIVE: ICD-10-CM

## 2024-02-06 DIAGNOSIS — M81.8 OSTEOPOROSIS DUE TO ANDROGEN THERAPY: ICD-10-CM

## 2024-02-06 DIAGNOSIS — N18.31 ANEMIA DUE TO STAGE 3A CHRONIC KIDNEY DISEASE: ICD-10-CM

## 2024-02-06 DIAGNOSIS — D63.1 ANEMIA DUE TO STAGE 3A CHRONIC KIDNEY DISEASE: ICD-10-CM

## 2024-02-06 DIAGNOSIS — N18.32 ANEMIA IN STAGE 3B CHRONIC KIDNEY DISEASE: ICD-10-CM

## 2024-02-06 DIAGNOSIS — Z17.0 MALIGNANT NEOPLASM OF UPPER-OUTER QUADRANT OF LEFT BREAST IN FEMALE, ESTROGEN RECEPTOR POSITIVE: ICD-10-CM

## 2024-02-06 DIAGNOSIS — T38.7X5A OSTEOPOROSIS DUE TO ANDROGEN THERAPY: ICD-10-CM

## 2024-02-06 LAB
ALBUMIN SERPL-MCNC: 4.3 G/DL (ref 3.5–5.2)
ALBUMIN/GLOB SERPL: 1.1 G/DL
ALP SERPL-CCNC: 52 U/L (ref 39–117)
ALT SERPL W P-5'-P-CCNC: 9 U/L (ref 1–33)
ANION GAP SERPL CALCULATED.3IONS-SCNC: 15 MMOL/L (ref 5–15)
AST SERPL-CCNC: 17 U/L (ref 1–32)
BASOPHILS # BLD AUTO: 0.04 10*3/MM3 (ref 0–0.2)
BASOPHILS NFR BLD AUTO: 0.6 % (ref 0–1.5)
BILIRUB SERPL-MCNC: 0.3 MG/DL (ref 0–1.2)
BUN SERPL-MCNC: 27 MG/DL (ref 8–23)
BUN/CREAT SERPL: 16.1 (ref 7–25)
CALCIUM SPEC-SCNC: 9.6 MG/DL (ref 8.6–10.5)
CHLORIDE SERPL-SCNC: 96 MMOL/L (ref 98–107)
CO2 SERPL-SCNC: 23 MMOL/L (ref 22–29)
CREAT SERPL-MCNC: 1.68 MG/DL (ref 0.57–1)
DEPRECATED RDW RBC AUTO: 52.2 FL (ref 37–54)
EGFRCR SERPLBLD CKD-EPI 2021: 30.4 ML/MIN/1.73
EOSINOPHIL # BLD AUTO: 0.12 10*3/MM3 (ref 0–0.4)
EOSINOPHIL NFR BLD AUTO: 1.7 % (ref 0.3–6.2)
ERYTHROCYTE [DISTWIDTH] IN BLOOD BY AUTOMATED COUNT: 14 % (ref 12.3–15.4)
FERRITIN SERPL-MCNC: 1262 NG/ML (ref 13–150)
GLOBULIN UR ELPH-MCNC: 3.8 GM/DL
GLUCOSE SERPL-MCNC: 197 MG/DL (ref 65–99)
HCT VFR BLD AUTO: 31.4 % (ref 34–46.6)
HGB BLD-MCNC: 10.2 G/DL (ref 12–15.9)
IMM GRANULOCYTES # BLD AUTO: 0.1 10*3/MM3 (ref 0–0.05)
IMM GRANULOCYTES NFR BLD AUTO: 1.4 % (ref 0–0.5)
IRON 24H UR-MRATE: 69 MCG/DL (ref 37–145)
IRON SATN MFR SERPL: 24 % (ref 20–50)
LYMPHOCYTES # BLD AUTO: 0.88 10*3/MM3 (ref 0.7–3.1)
LYMPHOCYTES NFR BLD AUTO: 12.5 % (ref 19.6–45.3)
MCH RBC QN AUTO: 33.3 PG (ref 26.6–33)
MCHC RBC AUTO-ENTMCNC: 32.5 G/DL (ref 31.5–35.7)
MCV RBC AUTO: 102.6 FL (ref 79–97)
MONOCYTES # BLD AUTO: 0.43 10*3/MM3 (ref 0.1–0.9)
MONOCYTES NFR BLD AUTO: 6.1 % (ref 5–12)
NEUTROPHILS NFR BLD AUTO: 5.48 10*3/MM3 (ref 1.7–7)
NEUTROPHILS NFR BLD AUTO: 77.7 % (ref 42.7–76)
NRBC BLD AUTO-RTO: 0 /100 WBC (ref 0–0.2)
PLATELET # BLD AUTO: 169 10*3/MM3 (ref 140–450)
PMV BLD AUTO: 11.2 FL (ref 6–12)
POTASSIUM SERPL-SCNC: 3.6 MMOL/L (ref 3.5–5.2)
PROT SERPL-MCNC: 8.1 G/DL (ref 6–8.5)
RBC # BLD AUTO: 3.06 10*6/MM3 (ref 3.77–5.28)
SODIUM SERPL-SCNC: 134 MMOL/L (ref 136–145)
TIBC SERPL-MCNC: 285 MCG/DL (ref 298–536)
TRANSFERRIN SERPL-MCNC: 191 MG/DL (ref 200–360)
WBC NRBC COR # BLD AUTO: 7.05 10*3/MM3 (ref 3.4–10.8)

## 2024-02-06 PROCEDURE — 25010000002 EPOETIN ALFA PER 1000 UNITS: Performed by: INTERNAL MEDICINE

## 2024-02-06 PROCEDURE — 84466 ASSAY OF TRANSFERRIN: CPT

## 2024-02-06 PROCEDURE — 96372 THER/PROPH/DIAG INJ SC/IM: CPT

## 2024-02-06 PROCEDURE — 83540 ASSAY OF IRON: CPT

## 2024-02-06 PROCEDURE — 80053 COMPREHEN METABOLIC PANEL: CPT

## 2024-02-06 PROCEDURE — 36415 COLL VENOUS BLD VENIPUNCTURE: CPT

## 2024-02-06 PROCEDURE — 85025 COMPLETE CBC W/AUTO DIFF WBC: CPT

## 2024-02-06 PROCEDURE — 82728 ASSAY OF FERRITIN: CPT

## 2024-02-06 RX ADMIN — ERYTHROPOIETIN 40000 UNITS: 40000 INJECTION, SOLUTION INTRAVENOUS; SUBCUTANEOUS at 15:16

## 2024-02-06 NOTE — PROGRESS NOTES
Message to office: Dago Nunez [birth year] here for poss procrit Dr. stafford. Note and tx plan parameters are different. can you verify with MD what he would like. note is if <10 and <30 but tx parameters are if <11 and <33. she hasn't gotten it since November. she is hgb 10.2 and hct 31.4, iron sat 24 ferritin still in process. thanks   Per ILEANA Pozo per Dr. Stafford since pt has gotten procrit in past her parameters are if <11 and <33. Pt to get procrit today.   Inquired with office if pt needs to be weekly or continue monthly injections.   Per ILEANA Pozo/Dr. Stafford: Weekly. But that is his recommendation if she has trouble with that then her choice is okay too  Educated pt on response from office. Pt verbalized understanding and agreed to plan of care but wants to stay monthly, does not want weekly possible injections. Notified office.

## 2024-02-12 ENCOUNTER — TELEPHONE (OUTPATIENT)
Dept: FAMILY MEDICINE CLINIC | Facility: CLINIC | Age: 84
End: 2024-02-12

## 2024-02-12 NOTE — TELEPHONE ENCOUNTER
Caller: Dago Nunez    Relationship: Self    Best call back number:  [phone]     What is the best time to reach you: ANY     FOR CLINICAL STAFF:     STILL HAVING PAIN IN LEFT ANKLE, ALMOST FINISHED WITH ANTIBOTIC.  STILL FEEL THE 'NODULES' IS THAT PART OF IT?  DOES SHE NEED TO COME IN?  12. CHECKING ON STATUS OF MOVING HER MAILBOX?  WE WERE SENDING A LETTER TO POST OFFICE.

## 2024-02-15 RX ORDER — CITALOPRAM 20 MG/1
20 TABLET ORAL DAILY
Qty: 90 TABLET | Refills: 1 | Status: SHIPPED | OUTPATIENT
Start: 2024-02-15 | End: 2024-02-15 | Stop reason: SDUPTHER

## 2024-02-15 RX ORDER — CITALOPRAM 20 MG/1
20 TABLET ORAL DAILY
Qty: 90 TABLET | Refills: 1 | Status: SHIPPED | OUTPATIENT
Start: 2024-02-15

## 2024-02-20 NOTE — PROGRESS NOTES
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  She is on adjuvant letrozole for the past 29 months.  She had declined adjuvant radiation.  She is also seen for macrocytic anemia secondary to chronic kidney disease Stage IIIa, GFR 51 mL/minute 03/05/2018, iron deficiency, and thrombocytopenia.  She is intolerant to oral iron (nausea, stomach cramps, and constipation).  She had ferric carboxymaltose 21.5 months ago. She is also seen for vulvar cancer. She is seen 41 months post C1D1 Mitomycin C and 5FU with radiation. Her D29 to 32 chemo was cancelled due to hospitalization, poor tolerance, and poor performance status.  She is seen with Joseph, spouse. History is obtained from the patient.  She is a reliable historian.            Oncology/Hematology History Overview Note   DIAGNOSTIC ABNORMALITIES: Breast cancer.  Screening mammogram 08/18/2021.New dense 7 mm partially obscured nodule in the upper outer quadrant of the left breast, with associated architectural distortion.  Diagnostic mammogram 08/20/2021.  Small subcentimeter suspicious spiculated mass at 12:00 in the left breast, approximately 3 cm to 4 cm deep from the nipple. This is suspicious for breast carcinoma, ultrasound-guided biopsy is recommended.  Sonogram 08/20/2021.  Small suspicious solid mass at 12:00 in the left breast. 5.5 x 5.1 x 4.7 mm, suspicious for breast carcinoma. This corresponds with the finding on mammograms.  Successful ultrasound-guided left breast biopsy and clip on 08/23/2021.  Pathology report 09/01/2021.  Left breast at 12:00, core needle  biopsies: Invasive carcinoma no special type (ductal).  Histologic grade ([place] histologic score).   Glandular (acinar)/tubular differentiation: Score 1.   Nuclear pleomorphism: Score 2.   Mitotic rate: Score 1.  Overall grade: Grade 1. Maximum tumor diameter is at least 0.8 cm.  Estrogen 87%, progesterone 47% and negative HER-2/gabriela.  Genetic testing was sent.  Patient was seen by Dr. Quynh Rosario 2021.  She is .  She had first delivery at 20.  She took birth control for 1 month.  She took hormone replacement therapy for approximately 5 years.  Family history positive for breast cancer, sister at 78. No ovarian cancer  Chest x-ray 2021.  No acute cardiopulmonary process.  Pathology report 2021.  Left sentinel lymph nodes: Four of 4 lymph nodes are negative for metastatic mammary carcinoma.Comment: Absence of micrometastases is confirmed utilizing immunohistochemical stains for pankeratin.  Left breast, partial mastectomy:  A.  Invasive carcinoma of no special type (ductal), grade 1 (1.1 cm in greatest dimension).  B.  Minor associated low-grade ductal carcinoma in situ component.  C.  The inked surgical margins and skin are negative for malignancy.  D.  Prior biopsy site changes including fat necrosis.  Comment: Microscopically, the tumor is approximately 7 mm from the closest inked (superior) surgical margin.  AJCC stage: pT1c snpN0.      PREVIOUS INTERVENTIONS:  She had undergone left partial mastectomy with left sentinel lymph node biopsy 2021 by Dr. Rosario.  Declined adjuvant radiation.  Adjuvant Femara 10/01/2021 through present.        DIAGNOSTIC ABNORMALITIES: Vulvar cancer  She had developed recurrent vulvar cancer left inguinal node that is PET positive measuring 2.1 cm.  The patient was seen by Dr. Marks in favor definitive chemo radiation.  Pathology report 07/10/2020 periurethral biopsy, poorly differentiated carcinoma with squamous and papillary features,  "focally invasive in the subepithelial connective tissue.  PET 07/31/2020 showed intense FDG avid left inguinal node is highly suspicious for local regional metastasis from known vulvar squamous cell carcinoma.  No additional sites of suspicious FDG activity.  MRI 07/31/2020 showed redemonstration of mildly T2 hyperintense mass involving the urethral meatus, similar dimensions to prior exam on 02/18/2020 however there is now a polypoid lesion seen along the anterior aspect of the perineum, possibly contiguous with the urethral mass, concerning for disease progression.  Market interval enlargement of the left inguinal node almost certainly representing disease involvement.  Patient was notified by Dr. Siddiqui 08/19/2020.  Consensus for chemoradiation.  Patient reluctant to take chemotherapy.  Follow-up with Dr. Siddiqui in 4 to 6 weeks after radiation is completed.         PREVIOUS INTERVENTIONS:  Mitomycin C and 5-FU 10/05/2020 through 10/08/2020 with radiation completed 12/10/2020 at [facility].  D29 to d32 of chemo discontinued due to poor performance status.       DIAGNOSTIC ABNORMALITIES:   The patient was seen by Dr. Greg Alberts 01/29/2018 complaining of coughing and wheezing. The patient was given Tussionex. Blood work was ordered. CBC 01/29/2018 revealed a WBC of 7.8, hemoglobin 11.2, hematocrit 35.1, MCV 96.2, platelets 43,000, and ANC 4.47. CMP remarkable for of glucose of 177 and GFR 59 mL/minute.  The patient was seen by VINCENT Jones, 02/02/2018. She was coughing and wheezing. Tussionex did not help. The patient was given prednisone.  The patient was seen by VINCENT Jones, 02/15/2018. She is followed for anemia from iron deficiency. CBC 02/15/2018 revealed a WBC of 12.9, hemoglobin 11.4, hematocrit 34.5, MCV 95, and platelets 68,000.       PREVIOUS INTERVENTIONS:   Ferrous sulfate 325 mg 03/07/2018 through 05/06/2018.  Not resumed 06/08/2018. \"I misunderstood.\"   Resume " 09/05/2018 through 10/03/2018, stopped due to intolerance.  Injectafer 750 mg 10/20/2018 at the [place] office.  Retacrit 10/27/2020 through present.  Injectafer 750 mg 05/18/2021, 01/26/2022 and 05/25/2022 at [facility]           Vulvar cancer, carcinoma    Initial Diagnosis    Vulvar cancer, carcinoma (CMS/HCC)     3/19/2001 Biopsy    Clinical Features: red vaginal lesion with bleeding since 1995. TX with  Carafate douche and Premarin vaginal cream with intermittent improvement.    DIAGNOSIS:    VAGINA, BIOPSY: FOCAL ULCERATION, MARKED ACUTE AND CHRONIC INFLAMMATION,  SPONGIOSIS AND REACTIVE ATYPIA; NEGATIVE FOR DYSPLASIA.     8/17/2001 Procedure    Pap Smear:  DIAGNOSIS:            Atypical keratinized squamous cells, suspicious                           for squamous cell carcinoma.         COMMENTS:             There are numerous atypical keratinized cells                           present in an inflammatory background.  These are                           suspicious for squamous cell carcinoma.  Biopsy                           confirmation is recommended.      10/16/2001 Procedure    Pap Smear:  DIAGNOSIS:            Mild dysplasia       ADDITIONAL FINDINGS:  Marked inflammation                           Excessive hyperkeratosis         BETHESDA SYSTEM:      Low grade squamous intraepithelial lesion    DIAGNOSIS:            Negative, no abnormal cells seen           BETHESDA SYSTEM:      Within normal limits.       ADEQUACY:             Specimen satisfactory for evaluation       SOURCE:               Vagina, diagnostic thin prep PAP     11/7/2001 Biopsy      DIAGNOSIS:    VAGINA AND VULVA, RIGHT POSTERIOR INTROITUS, WIDE LOCAL EXCISION:  VAGINAL INTRAEPITHELIAL NEOPLASIA (VAIN) II-III, FOCALLY EXTENDING TO  VAGINAL MARGIN AT 12-4:00; ALL OTHER MARGINS NEGATIVE FOR  INTRAEPITHELIAL NEOPLASIA. (SEE COMMENT)    COMMENT: The lesion involves vaginal type epithelium with associated  chronic inflammation  and erosion. The adjacent vulvar epithelium is  hyperkeratotic.     3/15/2002 Procedure    Pap Smear:  BETHESDA SYSTEM:      High grade squamous intraepithelial lesion       DESCRIPTOR:           Moderate dysplasia           ADEQUACY:             Specimen satisfactory for evaluation       SOURCE:               Vagina, Thin Prep Pap, Diagnostic     6/13/2003 Procedure         BETHESDA SYSTEM:      High grade squamous intraepithelial lesion       DESCRIPTOR:           Moderate dysplasia       ADDITIONAL FINDINGS:  Inflammation         ADEQUACY:             Specimen satisfactory for evaluation       SOURCE:               Vagina, Thin Prep Pap, Diagnostic    HUMAN PAPILLOMAVIRUS         CASE ACCESSION #:    [id number]        Category                  HPV Types                Patient Results         High/Intermed Risk    16,18,31,33,35,39              Negative                            45,51,52,56,58,59,68      Specimen Source        Cervical / Vaginal Specimen     12/5/2003 Procedure    Gynecological Cytology        CASE ACCESSION #:     [id number]       BETHESDA SYSTEM:      Low grade squamous intraepithelial lesion       DESCRIPTOR:           Mild dysplasia           ADEQUACY:             Specimen satisfactory for evaluation       SOURCE:               Vagina, Thin Prep Pap, Diagnostic     11/29/2011 Biopsy    ADDENDUM FINDINGS:    The high grade MOODY in the right labia majora biopsy was of the usual type.  There was no evidence of differentiated MOODY.      DIAGNOSIS:    1) PERINEUM, BIOPSY: SQUAMOUS EPITHELIUM WITH FLORID HYPERKERATOSIS,  CONSISTENT WITH CHRONIC IRRITATION; NO EVIDENCE OF DYSPLASIA OR MALIGNANCY  (SEE COMMENT).    Comment: Immunohistochemical studies (p16, p53, MIB-1) confirm the rendered  interpretations.    2) VULVA, RIGHT LABIUM MAJORUM, BIOPSY: VULVAR INTRAEPITHELIAL NEOPLASIA II  (MODERATE DYSPLASIA); (SEE COMMENT).    Comment: Immunohistochemical studies for p16 (nuclear and  cytoplasmic  positivity in lower epithelial half) and MIB-1 (nuclear positivity in lower  epithelial half) confirms the diagnosis; p53 is positive only in rare  cells. A GMS histochemical study for fungal forms is negative.  Nevertheless, parakeratosis associated with neutrophils, as was seen  herein, is commonly associated with fungal vulvitis.     7/3/2012 Procedure    Gynecological Cytology    CASE ACCESSION #:                     [id number]  BETHESDA SYSTEM:                      High grade squamous intraepithelial                                        lesion  DESCRIPTOR:                           Mild to moderate dysplasia  ADEQUACY:                             Specimen satisfactory for evaluation  SOURCE:                               Vagina, Thin Prep Pap, Diagnostic  # [id number] REVIEWED:                    1     1/29/2013 Biopsy    DIAGNOSIS:    1) VULVA, RIGHT, ANTERIOR, BIOPSY:  SPONGIOTIC DERMATITIS WITH EXTENSIVE  DERMAL GRANULATION TISSUE, MIXED INFLAMMATION AND FIBROSIS (SEE COMENT).    Comment: Sections show spongiosis, basement membrane thickening, dermal  fibrosis, and extensive dermal granulation tissue with a predominantly  chronic inflammatory infiltrate. There is no evidence of dysplasia or  malignancy. The basement membrane thickening and dermal fibrosis suggests  that there may be component of lichen sclerosus. However, the underlying  cause of the ongoing inflammation and repair (granulation tissue) is not  clear from this sample. A tissue gram stain fails to reveal any large  bacterial aggregates.  GMS and AFB studies for fungal forms and acid fast  bacilli were negative respectively     11/27/2013 Surgery      Diagnosis    1) VULVA, LEFT ANTERIOR, BIOPSY: HIGH GRADE SQUAMOUS INTRAEPITHELIAL LESION (SEVERE DYSPLASIA, MOODY III).    2) VAGINAL WALL, ANTERIOR, BIOPSY: HIGH GRADE SQUAMOUS INTRAEPITHELIAL LESION (SEVERE DYSPLASIA, VaIN III).    3) VULVA, VULVECTOMY: FOCUS OF MICROINVASIVE SQUAMOUS  CELL CARCINOMA, WELL-DIFFERENTIATED, DEPTH OF STROMAL INVASION 0.4 MM,  8 MM FROM CLOSEST DEEP MARGINS, 4 MM FROM RIGHT ANTERIOR VAGINAL MARGINS AT 8 - 9 O'CLOCK (PROXIMAL TIP OF SPECIMEN DESIGNATED   12 O'CLOCK); NEGATIVE FOR LYMPHOVASCULAR INVASION; ARISING IN A BACKGROUND OF EXTENSIVE VULVAR INTRAEPITHELIAL NEOPLASIA (SEVERE DYSPLASIA, MOODY III, CLASSICAL AND DIFFERENTIATED TYPES); MOODY III IS PRESENT AT RIGHT LATERAL SURGICAL MARGIN (7 - 9 O'CLOCK),   LEFT LATERAL SURGICAL MARGIN (3 - 5 O'CLOCK) AND RIGHT AND LEFT VAGINAL MARGINS; TOTAL LESIONAL AREA (INVASIVE CARCINOMA AND MOODY III) MEASURES 8.2 CM IN GREATEST DIMENSION (GROSS MEASUREMENT); BACKGROUND SEVERE INTERFACE DERMATITIS AND LICHEN SCLEROSUS.        **Electronically signed out by Dimas Cardenas M.D.**on 12/2/2013  HAYLEY/YAZMIN/franky  Case reviewed by Attending Pathologist      Synoptic Diagnosis  3)  VULVA:     Specimen:                        Vulva  Procedure:                       Other: Vulvectomy  Lymph Node Sampling:             Not applicable  Specimen Size:                   Greatest dimension: 13.5 cm                                   Additional dimension: 9.5 cm                                   Additional dimension: 1.2 cm  Tumor Site:                      Right vulva, labium minus  Tumor Size:                      Greatest dimension: 0.04 cm  Tumor Focality:                  Unifocal  Histologic Type:                 Squamous cell carcinoma  Histologic Grade:                G1: Well differentiated  Microscopic Tumor Extension:     Depth of invasion: 0.4 mm  Margins:                         Uninvolved by invasive carcinoma                                   Distance of invasive carcinoma from closest margin: 4 mm  Lymph-Vascular Invasion:         Not identified  Lymph Nodes:                     No nodes submitted or found  Extranodal Extension:            Cannot be determined (explain):    Fixed or Ulcerated Femoral-Inguinal Lymph Nodes:                                     Not identified  Laterality of Involved Lymph Nodes:                                    Cannot be determined:    Primary Tumor (pT):              pT1a [FIGO IA]: Lesions 2 cm or less in size, confined to the vulva or perineum, and with stromal invasion 1.0 mm or less  Regional Lymph Nodes (pN):       pNX:  Regional lymph nodes cannot be assessed  Distant Metastasis (pM):         Not applicable  Additional Pathologic Findings:  Vulvar intraepithelial neoplasia (MOODY) 3 (severe dysplasia/carcinoma in situ)  --------------------------------------------------------     5/20/2014 Procedure    Gynecologic Cytology  [place] Diagnosis:  High grade squamous intraepithelial lesion.    Descriptor/Additional Findings:  Moderate dysplasia.    Adequacy:  Specimen satisfactory for evaluation.    Source:  Vagina, Thin Prep Pap, Imaged Diagnostic     11/11/2014 Biopsy    Diagnosis    ANTERIOR VAGINAL WALL, BIOPSY:  HIGH GRADE SQUAMOUS INTRAEPITHELIAL LESION (VaIN 3, SEVERE DYSPLASIA)       12/19/2014 Surgery    Diagnosis  1)  ANTERIOR VAGINAL WALL, PARTIAL VAGINECTOMY: HIGH-GRADE SQUAMOUS INTRAEPITHELIAL LESION (VaIN 3, SEVERE DYSPLASIA), 2.7 CM; HIGH GRADE DYSPLASIA EXTENDS TO THE 2 AND 8 O'CLOCK TIP MARGINS, THE 5-8 O'CLOCK LATERAL MARGIN, AND THE 11-2 O'CLOCK LATERAL   MARGIN.          2)  JOYA-CLITORAL AREA, EXCISION: HIGH-GRADE SQUAMOUS INTRAEPITHELIAL LESION (VaIN 3, SEVERE DYSPLASIA), EXTENDING TO A LATERAL MARGIN.         2/9/2015 Surgery    Diagnosis  1.          VULVA, LEFT PERIURETHRAL PORTION, EXCISION: FOCAL HIGH-GRADE SQUAMOUS INTRAEPITHELIAL LESION (MOODY 2, MODERATE DYSPLASIA); MARGINS ARE NEGATIVE FOR DYSPLASIA.    2.          VULVA, RIGHT PERIURETHRAL PORTION, EXCISION: BENIGN SQUAMOUS MUCOSA WITH ACUTE AND CHRONIC INFLAMMATION AND REACTIVE CHANGES.         3.          VULVA, LEFT, LOWER PORTION, EXCISION: BENIGN SQUAMOUS MUCOSA WITH ACUTE AND CHRONIC INFLAMMATION, REACTIVE CHANGES AND FEATURES  "CONSISTENT WITH PREVIOUS SURGICAL PROCEDURE.         4.          VULVA, RIGHT PORTION, EXCISION: BENIGN SQUAMOUS MUCOSA WITH CHRONIC INFLAMMATION AND DERMAL FIBROSIS.        9/6/2017 Procedure    Diagnosis  \"PAP SMEAR FROM THE URETHRA\":  HIGH GRADE SQUAMOUS INTRAEPITHELIAL LESION.          10/26/2017 Biopsy    Urethral Meatus Biopsy:  Urothelial mucosa with ulceration, chronic inflammation and focal high grade squamous intraepithelial lesion, moderate dysplasia     7/10/2018 Imaging    MRI Pelvis:  Impression:    1.  There is a 2.3 x 1.8 cm urethral lesion at the external urethral   meatus demonstrating enhancement and T2 hyperintensity. The lesion is   located approximately 8 mm from the bladder neck/internal urethral   orifice.  There is no extension of the lesion into the para vaginal   fat or ischiorectal fossa. There is some edema of the bilateral   ischiocavernosus muscles without invasion by the mass. No pelvic or   inguinal adenopathy is identified.     2.  The patient has a 2.5 cm cystocele and the urethra is almost   horizontal. There is pelvic floor laxity with loss of upper convexity   of the left iliococcygeus muscle.     7/20/2018 Biopsy    Diagnosis  URETHRA, BIOPSY:  SQUAMOUS CELL CARCINOMA IN SITU; SEE COMMENT.    Comments  P16 immunostain and HPV RNA in situ hybridization are strongly and diffusely positive within the lesion, consistent with HPV-related pathogenesis.     1/8/2019 Imaging    MRI Pelvis:  1.  Stable size and appearance of a nonspecific enhancing nodular   lesion at the caudal margin of the vaginal introitus adjacent to   extensive postsurgical changes related to prior vulvectomy and   vaginectomy. This lesion does not appear to conform to the expected   course of the urethra which is somewhat poorly defined, and this felt   more likely be located immediately caudal to the urethra. It is   possible this may reflect postsurgical scarring as opposed to a true   lesion. Continued " follow-up is suggested to ensure stability.  2.  No lymphadenopathy or other evidence of metastatic disease in the   pelvis.  3.  Evidence of pelvic floor laxity with cystocele, not significantly   changed.     8/6/2019 Imaging    MRI Pelvis:  1. No significant change from the prior exam on this limited   noncontrast study.  2. Stable indeterminate nodule anterior to the external urethral   meatus which may represent focal scarring; however,   residual/recurrent disease is not entirely excluded.  Recommend continued attention on follow-up. 3. Extensive pelvic   postsurgical changes with no evidence of local recurrence.  4. Pelvic floor laxity with cystocele unchanged from prior exam.     1/13/2020 Procedure    Urethral stricture dilation     2/18/2020 Imaging    MRI Pelvis:  Impression:  1.  No significant interval change in 1.7 x 1.7 cm T2 hyperintense   ovoid lesion at the urethral meatus.  2.  Numerous postoperative findings status post vaginectomy and   hysterectomy.  3.  Pelvic floor laxity with persistent cystocele.  4.  No pelvic adenopathy or bony lesion identified.     5/13/2020 Procedure    Urethral stricture dilation      7/10/2020 Biopsy    Diagnosis  PERIURETHRA, BIOPSY: POORLY DIFFERENTIATED CARCINOMA WITH SQUAMOUS AND PAPILLARY FEATURES, FOCALLY INVASIVE IN THE SUBEPITHELIAL CONNECTIVE TISSUE; SEE COMMENT.    Comments  The patient's history of vulvar microinvasive squamous cell carcinoma is noted. The current biopsy shows a poorly differentiated carcinoma with papillary formation. P16 immunostain and HPV RNA in situ hybridization are strongly and diffusely positive within the lesion, consistent with HPV-related pathogenesis. A KERRI-3 immunostain shows patchy, weak positivity in the lesional cells. The patient's prior biopsy ([id number]) is reviewed and shows similar morphologic and immunophenotypic features but the papillary features were not present in the prior material.  Dr. Ilene Pablo  reviewed this case and concurs with the diagnosis.      7/31/2020 Imaging    MRI Pelvis:  1.  Redemonstration of a mildly T2 hyperintense mass involving the   urethral meatus, similar dimensions to prior exam on 2/18/2020,   however there is now a polypoid lesion seen along the anterior aspect   of the perineum, possibly contiguous with the urethral mass,   concerning for disease progression. This could potentially represent   the recently biopsied lesion.  2.  Marked interval enlargement of a left inguinal lymph node, almost   certainly representing disease involvement. This would be amenable to   ultrasound-guided biopsy if warranted.  3.  Findings related to pelvic floor laxity, with cystocele.    PET/CT:  1.  Intensely FDG avid left inguinal lymph node is highly suspicious   for locoregional metastasis from known vulvar squamous cell   carcinoma. There are no additional sites of suspicious FDG activity.  2.   Intense physiologic FDG activity within the urine obscures   visualization of the periurethral mass. Findings are better   characterized on same day pelvic MRI.     9/21/2020 - 12/10/2020 Radiation    Radiation OncologyTreatment Course:  Dago Nunez received 6940 cGy in 38 fractions to vulva via external beam radiation therapy.     10/5/2020 - 10/12/2020 Chemotherapy    OP Vulvar MitoMYcin / Fluorouracil CIV + XRT       10/9/2020 - 9/7/2021 Chemotherapy    OP CENTRAL VENOUS ACCESS DEVICE ACCESS, CARE, AND MAINTENANCE (CVAD)     10/19/2020 Imaging    CT Head:  Impression:    1. No acute intracranial process.     10/21/2021 -  Chemotherapy    OP CENTRAL VENOUS ACCESS DEVICE ACCESS, CARE, AND MAINTENANCE ([facility])     Secondary malignancy of inguinal lymph nodes   8/31/2020 Initial Diagnosis    Secondary malignancy of inguinal lymph nodes (CMS/HCC)     10/9/2020 - 9/7/2021 Chemotherapy    OP CENTRAL VENOUS ACCESS DEVICE ACCESS, CARE, AND MAINTENANCE ([facility])     10/21/2021 -  Chemotherapy    OP CENTRAL VENOUS  ACCESS DEVICE ACCESS, CARE, AND MAINTENANCE ([facility])         PAST MEDICAL HISTORY:  ALLERGIES:  Allergies   Allergen Reactions    Scopolamine Swelling     Other reaction(s): ANGIOEDEMA        Amoxicillin-Pot Clavulanate Rash    Keflex [Cephalexin] Rash    Septra [Sulfamethoxazole-Trimethoprim] Rash    Tequin [Gatifloxacin] Other (See Comments)     Doesn't remember    Trovan [Alatrofloxacin] Dizziness     CURRENT MEDICATIONS:  Outpatient Encounter Medications as of 3/5/2024   Medication Sig Dispense Refill    Acetaminophen (TYLENOL ARTHRITIS PAIN PO) Take 1 tablet by mouth Daily As Needed (BACK PAIN).      albuterol sulfate  (90 Base) MCG/ACT inhaler Inhale 2 puffs 4 (Four) Times a Day. 54 g 3    bisoprolol-hydrochlorothiazide (ZIAC) 5-6.25 MG per tablet Take 1 tablet by mouth Daily. 90 tablet 1    cetirizine (zyrTEC) 10 MG tablet Take 1 tablet by mouth Daily.      citalopram (CeleXA) 20 MG tablet Take 1 tablet by mouth Daily. 90 tablet 1    cyanocobalamin 1000 MCG/ML injection INJECT 1 ML INTO THE MUSCLE EVERY 4 WEEKS. 3 mL 10    diphenhydrAMINE (BENADRYL) 25 mg capsule Take 1 capsule by mouth Every 6 (Six) Hours As Needed for Allergies.      diphenoxylate-atropine (LOMOTIL) 2.5-0.025 MG per tablet TAKE 2 TABLETS BY MOUTH 4 TIMES DAILY AS NEEDED FOR DIARRHEA 90 tablet 2    DULERA 100-5 MCG/ACT inhaler Inhale 2 puffs 2 (Two) Times a Day. Rinse and spit after using. 6 inhaler 0    esomeprazole (nexIUM) 40 MG capsule Take 1 capsule by mouth 2 (Two) Times a Day. 180 capsule 3    furosemide (LASIX) 40 MG tablet Take 1 tablet by mouth 2 (Two) Times a Day. 180 tablet 3    gabapentin (NEURONTIN) 300 MG capsule Take 1 capsule by mouth 2 (Two) Times a Day. 180 capsule 0    Homeopathic Products (LEG CRAMPS) tablet Take 1 tablet by mouth Daily.      HYDROcodone-acetaminophen (NORCO)  MG per tablet Take 0.5 tablets by mouth Every 4 (Four) Hours As Needed for Moderate Pain or Severe Pain. 60 tablet 0    letrozole  "(FEMARA) 2.5 MG tablet TAKE 1 TABLET BY MOUTH 1 TIME DAILY 90 tablet 3    lidocaine (XYLOCAINE) 5 % ointment Apply  topically to the appropriate area as directed Every 4 (Four) Hours As Needed for Mild Pain  (pain secondary to radiation). 240 g 1    Lidocaine Viscous HCl (XYLOCAINE) 2 % solution Take 5 mL by mouth 3 (Three) Times a Day With Meals. 100 mL 0    metOLazone (ZAROXOLYN) 2.5 MG tablet TAKE 1 TABLET BY MOUTH THREE TIMES A WEEK 30 tablet 2    nitrofurantoin (MACRODANTIN) 25 MG capsule Take 1 capsule by mouth Every Night. To help reduce pain with urination 7 capsule 0    nystatin-triamcinolone (MYCOLOG) 039492-1.1 UNIT/GM-% ointment Apply 1 Application topically to the appropriate area as directed 2 (Two) Times a Day. 60 g 0    ondansetron (ZOFRAN) 8 MG tablet TAKE 1 TABLET BY MOUTH EVERY 8 HOURS AS NEEDED FOR NAUSEA AND VOMITING 60 tablet 2    potassium chloride (K-DUR,KLOR-CON) 20 MEQ CR tablet TAKE 2 TABLETS BY MOUTH EVERY DAY 60 tablet 5    pravastatin (PRAVACHOL) 40 MG tablet TAKE 1 TABLET EVERY NIGHT 90 tablet 3    Syringe 25G X 1\" 3 ML misc 1 each Daily. 25 each 1    vitamin D (ERGOCALCIFEROL) 1.25 MG (08961 UT) capsule capsule TAKE 1 CAPSULE BY MOUTH ON THE SAME DAY EACH WEEK. 12 capsule 3     Facility-Administered Encounter Medications as of 3/5/2024   Medication Dose Route Frequency Provider Last Rate Last Admin    heparin injection 500 Units  500 Units Intravenous PRN Drake Stafford MD   500 Units at 07/01/21 1354    heparin injection 500 Units  500 Units Intravenous PRN Drake Stafford MD   500 Units at 01/11/22 1134    heparin injection 500 Units  500 Units Intravenous PRN Drake Stafford MD   500 Units at 09/28/22 1418    heparin injection 500 Units  500 Units Intravenous PRN Drake Stafford MD   500 Units at 01/25/23 1537    heparin injection 500 Units  500 Units Intravenous PRN Drake Stafford MD   500 Units at 06/26/23 1426    lidocaine (XYLOCAINE) 1 % injection 10 mL  10 mL Infiltration Once " Shaheen kAbar DO        lidocaine 1% - EPINEPHrine 1:567552 (XYLOCAINE W/EPI) 1 %-1:977915 injection 10 mL  10 mL Infiltration Once Shaheen Akbar,         sodium chloride 0.9 % flush 10 mL  10 mL Intravenous PRN Drake Stafford MD   10 mL at 07/01/21 1354    sodium chloride 0.9 % flush 10 mL  10 mL Intravenous PRN Drake Stafford MD   10 mL at 01/11/22 1134    sodium chloride 0.9 % flush 10 mL  10 mL Intravenous PRN Drake Stafford MD   10 mL at 09/28/22 1418    sodium chloride 0.9 % flush 10 mL  10 mL Intravenous PRN Drake Stafford MD   10 mL at 01/25/23 1537    sodium chloride 0.9 % flush 10 mL  10 mL Intravenous PRN Drake Stafford MD   10 mL at 06/26/23 1426     ADULT ILLNESSES:  Patient Active Problem List   Diagnosis Code    Meatal stenosis [id number]    Retention of urine R33.9    Type 2 diabetes mellitus, without long-term current use of insulin E11.9    Essential hypertension I10    Grief reaction F43.21    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    Chronic midline low back pain without sciatica M54.50, G89.29    Bilateral lower extremity edema R60.0    History of urethral stricture Z87.448    Epidermal cyst of neck L72.0    Normocytic anemia D64.9    Neck abscess L02.11    Mixed hyperlipidemia E78.2    Gastroesophageal reflux disease without esophagitis K21.9    Anxiety F41.9    Iron deficiency and chemotherapy induced anemia D50.9    Stage 3 chronic kidney disease N18.30    Anemia in stage 3 chronic kidney disease N18.30, D63.1    Screening for breast cancer Z12.39    Lower extremity edema R60.0    Vulvar cancer, carcinoma C51.9    Former smoker Z87.891    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    Febrile illness R50.9    Chemotherapy-induced thrombocytopenia D69.59, T45.1X5A    Hyponatremia E87.1    Hypokalemia E87.6    Neutropenic fever D70.9, R50.81    Moderate malnutrition E44.0    Antineoplastic chemotherapy induced anemia D64.81, T45.1X5A    History of radiation therapy  Z92.3    Encounter for care related to Port-a-Cath Z45.2    Malignant neoplasm of upper-outer quadrant of left breast in female, estrogen receptor positive C50.412, Z17.0    Encounter for care related to vascular access port Z45.2    Angiodysplasia K55.20    Bronchitis J40    Obesity (BMI 30-39.9) E66.9    Wheezing R06.2    Cough R05.9    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 Laguerre-Normal colonoscopy; Normal terminal ileum; Repeat 5 years    COLONOSCOPY N/A 11/05/2021    Petechia(e) in the rectum, in the recto-sigmoid colon and in the distal sigmoid colon; No specimens collected; No plans to repeat colonoscopy due to advance age and/or medical problems    CYSTOSCOPY N/A 09/20/2022    Procedure: CYSTOSCOPY WITH URETHRAL DILATATION;  Surgeon: Shaheen Conway MD;  Location: [facility];  Service: Urology;  Laterality: N/A;    ENDOSCOPY  07/01/2014    Normal esophagus; Normal stomach; Normal examined duodenum; BRAVO pH capsule deployed;     ENDOSCOPY  08/14/2013    Mild gastritis-biopsies for H.Pylor obtained    ENDOSCOPY  02/16/2005    Dr. LaguerreYyhhp-Sptbsufob-fwxxorin    ENDOSCOPY  10/21/2003    Dr. Laguerre-Stage 1 reflux esophagitis    ENDOSCOPY N/A 11/05/2021    Small HH; A single gastroesophageal junction polyp; Normal stomach; A single non-bleeding angiodysplastic lesion in the duodenum    HYSTERECTOMY      MASTECTOMY W/ SENTINEL NODE BIOPSY Left 09/14/2021    Procedure: LEFT PARTIAL MASTECTOMY WITH MAGSEED AND LEFT  SENTINEL LYMPH NODE BIOPSY MAGTRACE;  Surgeon: Quynh Rosario MD;  Location:  [facility];  Service: General;  Laterality: Left;    TONSILLECTOMY      VAGINA SURGERY      Laser surgery X 2    VENOUS ACCESS DEVICE (PORT) INSERTION N/A 09/29/2020    Procedure: SINGLE LUMEN PORT - A- CATH PLACEMENT WITH FLUOROSCOPY;  Surgeon: Quynh Rosario MD;  Location:  [facility];  Service: General;  Laterality: N/A;     HEALTH MAINTENANCE ITEMS:  Health Maintenance Due   Topic Date Due    RSV Vaccine - Adults (1 - 1-dose 60+ series) Never done    DIABETIC EYE EXAM  11/25/2020    COVID-19 Vaccine (3 - Moderna risk series) 01/25/2022    ZOSTER VACCINE (2 of 2) 08/10/2023       <no information>  Last Completed Colonoscopy            COLORECTAL CANCER SCREENING (COLONOSCOPY - Every 10 Years) Next due on 11/5/2031 11/05/2021  Surgical Procedure: COLONOSCOPY    11/05/2021  COLONOSCOPY    01/29/2014  Outside Claim: [place] FECAL BLOOD SCRN IMMUNOASSAY    02/01/2013  Outside Claim: CHG BLOOD,OCCULT,FECAL HGB,FECES,1-3 SIMULT    01/12/2011  SCANNED - COLONOSCOPY    Only the first 5 history entries have been loaded, but more history exists.                  Immunization History   Administered Date(s) Administered    COVID-19 (MODERNA) 1st,2nd,3rd Dose Monovalent 03/26/2021, 04/23/2021    COVID-19 (MODERNA) Monovalent Original Booster 12/28/2021    FLUAD TRI 65YR+ 10/22/2019    Flu Vaccine Split Quad 10/12/2018    Fluad Quad 65+ 11/09/2020    Fluzone High Dose =>65 Years ([employer] ONLY) 10/01/2018, 11/12/2021    Fluzone High-Dose 65+yrs 11/12/2021, 10/24/2022, 10/24/2023    Pneumococcal Conjugate 20-Valent (PCV20) 10/24/2022    Pneumococcal Polysaccharide (PPSV23) 10/16/2013    Pneumococcal, Unspecified 10/01/2017    Shingrix 06/15/2023    Tdap 05/01/2019    Zostavax 01/14/2010, 10/01/2015     Last Completed Mammogram            Scheduled - MAMMOGRAM (Yearly) Scheduled for 10/31/2024      10/30/2023  Mammo Diagnostic Digital  Tomosynthesis Bilateral With CAD    10/10/2022  Mammo Diagnostic Digital Tomosynthesis Bilateral With CAD    03/07/2022  Mammo Diagnostic Digital Tomosynthesis Left With CAD    08/20/2021  Mammo Diagnostic Digital Tomosynthesis Left With CAD    08/18/2021  Mammo Screening Digital Tomosynthesis Bilateral With CAD    Only the first 5 history entries have been loaded, but more history exists.                      FAMILY HISTORY:  Family History   Problem Relation Age of Onset    Cancer Mother     Hypertension Mother     Osteoporosis Mother     Dementia Mother     Uterine cancer Mother     Heart disease Father     Parkinsonism Father     Cancer Sister     Breast cancer Sister     Kidney cancer Sister     Diabetes Brother     Heart disease Paternal Grandfather     No Known Problems Maternal Grandmother     No Known Problems Maternal Grandfather     No Known Problems Paternal Grandmother     Colon cancer Neg Hx     Colon polyps Neg Hx     Esophageal cancer Neg Hx     Liver cancer Neg Hx     Liver disease Neg Hx     Rectal cancer Neg Hx     Stomach cancer Neg Hx      SOCIAL HISTORY:  Social History     Socioeconomic History    Marital status:    Tobacco Use    Smoking status: Former     Current packs/day: 0.25     Average packs/day: 0.3 packs/day for 3.0 years (0.8 ttl pk-yrs)     Types: Cigarettes     Passive exposure: Past    Smokeless tobacco: Never    Tobacco comments:     social smoker in [place]   Vaping Use    Vaping status: Never Used   Substance and Sexual Activity    Alcohol use: Not Currently     Comment: Occasional glass of wine    Drug use: No    Sexual activity: Defer       REVIEW OF SYSTEMS:    Review of Systems   Constitutional:  Positive for fatigue. Negative for fever.   HENT:  Negative for congestion and mouth sores.    Eyes:  Negative for discharge and redness.   Respiratory:  Negative for shortness of breath and wheezing.    Cardiovascular:  Positive for leg swelling. Negative for chest pain.  "  Gastrointestinal:  Negative for abdominal pain, nausea and vomiting.   Endocrine: Negative for polydipsia and polyphagia.   Genitourinary:  Negative for dysuria and hematuria.   Musculoskeletal:  Negative for myalgias and neck stiffness.   Skin:  Positive for pallor.   Allergic/Immunologic: Negative for food allergies.   Neurological:  Negative for dizziness, facial asymmetry and speech difficulty.   Hematological:  Negative for adenopathy. Does not bruise/bleed easily.   Psychiatric/Behavioral:  Negative for agitation, confusion and hallucinations.        VITAL SIGNS: /60   Pulse 55   Temp 97.6 °F (36.4 °C)   Resp 18   Ht 149.9 cm (59\")   Wt 72.6 kg (160 lb)   SpO2 97%   Breastfeeding No   BMI 32.32 kg/m² Lost 15 pounds.   Pain Score    03/05/24 1316   PainSc:   2       PHYSICAL EXAMINATION:     Physical Exam  Vitals reviewed.   Constitutional:       Appearance: She is ill-appearing.      Comments: She arrived in the exam room in a wheelchair.   HENT:      Head: Normocephalic and atraumatic.   Eyes:      General: No scleral icterus.  Pulmonary:      Effort: No respiratory distress.      Breath sounds: No wheezing.      Comments: Port, right. No erythema.  Abdominal:      General: Bowel sounds are normal. There is no distension.      Palpations: Abdomen is soft.   Musculoskeletal:         General: Swelling present.      Cervical back: Neck supple.      Comments: Support hose, bilateral lower extremities. \"Cellulitis, left leg.\"   Skin:     Coloration: Skin is pale.   Neurological:      Mental Status: She is oriented to person, place, and time.   Psychiatric:         Mood and Affect: Mood normal.         Behavior: Behavior normal.         Thought Content: Thought content normal.         Judgment: Judgment normal.         LABS    Lab Results - Last 18 Months   Lab Units 03/05/24  1257 02/06/24  1335 02/02/24  1230 12/12/23  1327 11/14/23  1343 06/26/23  1327 01/25/23  1421 09/28/22  1331 09/13/22  1926 "   HEMOGLOBIN g/dL 10.2* 10.2* 10.8* 10.2* 8.5* 9.6* 9.3*   < > 9.4*   HEMATOCRIT % 31.4* 31.4* 33.0* 33.0* 27.0* 31.0* 30.1*   < > 27.3*   MCV fL 105.4* 102.6* 104.8* 108.2* 107.1* 108.0* 106.0*   < > 104.2*   WBC 10*3/mm3 6.85 7.05 9.91 5.82 5.93 5.92 5.85   < > 7.03   RDW % 14.6 14.0 14.1 14.3 13.4 13.3 12.8   < > 12.9   MPV fL 10.5 11.2 12.5* 11.0 10.7 11.0 10.3   < > 10.4   PLATELETS 10*3/mm3 141 169 120* 157 156 141 164   < > 145   IMM GRAN % %  --  1.4*  --   --   --   --   --   --  0.4   NEUTROS ABS 10*3/mm3 4.93 5.48 8.62* 4.08 3.90 4.04 4.06   < > 4.66   LYMPHS ABS 10*3/mm3 1.23 0.88  --  1.01 1.30 1.07 1.02   < > 1.49   MONOS ABS 10*3/mm3 0.51 0.43  --  0.39 0.42 0.47 0.38   < > 0.55   EOS ABS 10*3/mm3 0.13 0.12  --  0.27 0.25 0.29 0.33   < > 0.27   BASOS ABS 10*3/mm3 0.02 0.04  --  0.04 0.03 0.02 0.04   < > 0.03   IMMATURE GRANS (ABS) 10*3/mm3  --  0.10*  --   --   --   --   --   --  0.03   NRBC /100 WBC  --  0.0  --   --   --   --   --   --  0.0   NEUTROPHIL % %  --   --  84.0*  --   --   --   --   --   --    MONOCYTES % %  --   --  2.0*  --   --   --   --   --   --    ATYP LYMPH % %  --   --  3.0  --   --   --   --   --   --    ANISOCYTOSIS   --   --  Slight/1+  --   --   --   --   --   --    GIANT PLT   --   --  Slight/1+  --   --   --   --   --   --     < > = values in this interval not displayed.       Lab Results - Last 18 Months   Lab Units 02/06/24  1335 02/02/24  1230 12/12/23  1327 11/14/23  1343 06/26/23  1327 01/25/23  1421   GLUCOSE mg/dL 197* 100* 156* 155* 117* 141*   SODIUM mmol/L 134* 135* 137 138 137 137   POTASSIUM mmol/L 3.6 4.0 4.4 4.6 4.8 4.9   CO2 mmol/L 23.0 23.0 23.0 28.0 22.0 24.0   CHLORIDE mmol/L 96* 96* 101 101 101 101   ANION GAP mmol/L 15.0 16.0* 13.0 9.0 14.0 12.0   CREATININE mg/dL 1.68* 2.07* 1.68* 1.81* 1.78* 1.76*   BUN mg/dL 27* 23 33* 32* 34* 35*   BUN / CREAT RATIO  16.1 11.1 19.6 17.7 19.1 19.9   CALCIUM mg/dL 9.6 8.7 8.6 9.6 9.2 9.5   ALK PHOS U/L 52 57 61 61 74 64  "  TOTAL PROTEIN g/dL 8.1 8.3 7.4 7.0 7.4 7.9   ALT (SGPT) U/L 9 11 8 9 8 6   AST (SGOT) U/L 17 19 17 17 17 12   BILIRUBIN mg/dL 0.3 0.3 0.3 0.2 0.2 0.2   ALBUMIN g/dL 4.3 4.4 4.1 4.0 4.0 4.0   GLOBULIN gm/dL 3.8 3.9 3.3 3.0 3.4 3.9       No results for input(s): \"MSPIKE\", \"KAPPALAMB\", \"IGLFLC\", \"URICACID\", \"FREEKAPPAL\", \"CEA\", \"LDH\", \"REFLABREPO\" in the last 70874 hours.    Lab Results - Last 18 Months   Lab Units 03/05/24  1257 02/06/24  1335 12/12/23  1327 11/14/23  1343 06/26/23  1327 01/25/23  1421 11/30/22  1438   IRON mcg/dL 93 69  --  67 74 81 101   TIBC mcg/dL 285* 285*  --  289* 307 299 361   IRON SATURATION (TSAT) % 33 24  --  23 24 27 28   FERRITIN ng/mL  --  1,262.00* 780.20* 913.50* 982.70* 1,030.00* 941.50*       Syndal Waqar Nunez reports a pain score of 2.  Given her pain assessment as noted, treatment options were discussed and the following options were decided upon as a follow-up plan to address the patient's pain: continuation of current treatment plan for pain.      ASSESSMENT:  1.  Left breast cancer, upper outer quadrant.  Tumor size 1.1 cm.  Negative genetic testing.  AJCC stage: 1A (pT1c, snpN0, cM0)  Receptor status: Estrogen 87%, progesterone 47% and negative HER-2/gabriela.  Treatment status: Post left lumpectomy and sentinel lymph node biopsy.  Declined adjuvant radiation. She is on adjuvant letrozole.  2.  Macrocytic anemia from iron deficiency, B12 deficiency and history of chronic kidney disease stage IIIa, GFR 56 mL/min on 09/03/2020.  3.   Iron deficiency. Intolerant to oral iron.  Intravenous iron as indicated.  4.   Chronic kidney disease Stage IIIa, GFR 56 ml/min on 09/03/2020.  Contributing factor to anemia.  5.   Performance status of 3.    6.   Grade 1 nausea from letrozole. On Zofran.   7.   Osteoporosis.  Taking calcium and vitamin D.  On subcutaneous denosumab.  8.   Squamous cell cancer in situ, urethra.  Followed by Dr. Callahan  9.   Recurrent squamous cell carcinoma, " "vulva.  AJCC stage IIIA (T2, Nib, M0, G3).  Treatment status.  Had Mitomycin C and 5 FU D1 to D4 with radiation.  D29 - 32 chemo was cancelled due to poor performance status.          PLAN:  1.    Re:  Heme status.  Hemoglobin 10.2, hematocrit 31.4, and .4.    2.    Re:  Pre-office CMP.  GFR 30.4 from 23.7 from 30.4 from 27.8 from 28.6 from 29 from  35.7 from 37.2 from 40.1 from 36 from 35 ml/min.    3.    Re:  Ferritin 1188 and saturation 33%.    4.    Re: Tolerance to subcutaneous denosumab. \"No problems.\"  5.    Re:  Continue Zofran for nausea as needed.  6.   CBC with differential, ferritin and iron panel in 2 months.  7.   Epoetin alpha 40,000 units SQ every 8 weeks if hemoglobin below 11 and hematocrit below 33. Observe for elevated blood pressure. \"I am looking forward to that. I can get out of the house.\"  8.  Transfuse 1 unit packed RBCs if hemoglobin less than 7.  Premed Tylenol 500 mg p.o. and Benadryl 12.5 mg IV.  Lasix 20 mg IV push after a unit of blood.  Observe for potential transfusion reactions.  9.   Intolerant to oral iron (nausea, cramps, and constipation).  Intravenous iron as needed.  10.  Advance Care Planning   ACP discussion was held with the patient during this visit. Patient has an advance directive in EMR which is still valid.    11.  eRx ondansetron 8 mg po every 8 hours as needed for nausea/vomiting, # 60, 2 refills if needed.  12.  eRx letrozole 2.5 mg p.o. daily #90 with 3 refills if needed.  \"I take that at [facility].\"  Observe for hot flashes or worsening bone loss.  13.  Cervical/vaginal cytology per gynecology.  14.  Vitamin B12 1000 mcg IM monthly by her granddaughter.  Continue to monitor for local irritation.  15.  Continue ongoing management per primary care physician and other specialists.  16.  Will not obtain breast tumor markers or Oncotype DX.  Patient is not a candidate for adjuvant chemotherapy.  17.  Flush port every 6 " weeks  18.Plan of care discussed with patient and Joseph, spouse  Understanding expressed.  She is agreeable to proceed.  19.  Next bone density 10/2025.  20.  Order denosumab 60 mg SQ every 6 months for osteoporosis. She is on letrozole.  Monitor for potential osteonecrosis of jaw.  21.  Mammogram order per surgery.  22.  Return to the office in 4 months with CBC with differential, ferritin, iron panel, and CMP.               I have reviewed the assessment and plan and verified the accuracy of it. No changes to assessment and plan since the information was documented. Drake Stafford MD 03/05/24         I spent 33 total minutes, face-to-face, caring for Syndal today. Greater than 50% of this time involved counseling and/or coordination of care as documented within this note.               cc: (Shaheen Akbar MD )        (Annita Loaiza MD)        (Ulises Roche MD)        (Guillermina oRsario MD)        (Octavio Fernando MD)        Gilberto Mills MD        (Moustapha Callahan MD)        (Radha Marks MD)

## 2024-03-05 ENCOUNTER — INFUSION (OUTPATIENT)
Dept: ONCOLOGY | Facility: HOSPITAL | Age: 84
End: 2024-03-05
Payer: MEDICARE

## 2024-03-05 ENCOUNTER — OFFICE VISIT (OUTPATIENT)
Dept: ONCOLOGY | Facility: CLINIC | Age: 84
End: 2024-03-05
Payer: MEDICARE

## 2024-03-05 ENCOUNTER — LAB (OUTPATIENT)
Dept: LAB | Facility: HOSPITAL | Age: 84
End: 2024-03-05
Payer: MEDICARE

## 2024-03-05 VITALS
SYSTOLIC BLOOD PRESSURE: 144 MMHG | OXYGEN SATURATION: 95 % | DIASTOLIC BLOOD PRESSURE: 42 MMHG | HEART RATE: 56 BPM | HEIGHT: 59 IN | RESPIRATION RATE: 16 BRPM | WEIGHT: 160 LBS | BODY MASS INDEX: 32.25 KG/M2 | TEMPERATURE: 97 F

## 2024-03-05 VITALS
BODY MASS INDEX: 32.25 KG/M2 | HEART RATE: 55 BPM | DIASTOLIC BLOOD PRESSURE: 60 MMHG | SYSTOLIC BLOOD PRESSURE: 120 MMHG | RESPIRATION RATE: 18 BRPM | TEMPERATURE: 97.6 F | WEIGHT: 160 LBS | HEIGHT: 59 IN | OXYGEN SATURATION: 97 %

## 2024-03-05 DIAGNOSIS — C50.412 MALIGNANT NEOPLASM OF UPPER-OUTER QUADRANT OF LEFT BREAST IN FEMALE, ESTROGEN RECEPTOR POSITIVE: Primary | ICD-10-CM

## 2024-03-05 DIAGNOSIS — C50.412 CARCINOMA OF UPPER-OUTER QUADRANT OF LEFT BREAST IN FEMALE, ESTROGEN RECEPTOR NEGATIVE: ICD-10-CM

## 2024-03-05 DIAGNOSIS — Z17.1 CARCINOMA OF UPPER-OUTER QUADRANT OF LEFT BREAST IN FEMALE, ESTROGEN RECEPTOR NEGATIVE: ICD-10-CM

## 2024-03-05 DIAGNOSIS — N18.32 ANEMIA IN STAGE 3B CHRONIC KIDNEY DISEASE: ICD-10-CM

## 2024-03-05 DIAGNOSIS — D50.8 IRON DEFICIENCY ANEMIA SECONDARY TO INADEQUATE DIETARY IRON INTAKE: ICD-10-CM

## 2024-03-05 DIAGNOSIS — Z17.0 MALIGNANT NEOPLASM OF UPPER-OUTER QUADRANT OF LEFT BREAST IN FEMALE, ESTROGEN RECEPTOR POSITIVE: Primary | ICD-10-CM

## 2024-03-05 DIAGNOSIS — N18.31 ANEMIA DUE TO STAGE 3A CHRONIC KIDNEY DISEASE: ICD-10-CM

## 2024-03-05 DIAGNOSIS — C77.4 SECONDARY MALIGNANCY OF INGUINAL LYMPH NODES: ICD-10-CM

## 2024-03-05 DIAGNOSIS — Z45.2 ENCOUNTER FOR CARE RELATED TO VASCULAR ACCESS PORT: ICD-10-CM

## 2024-03-05 DIAGNOSIS — C51.9 VULVAR CANCER, CARCINOMA: ICD-10-CM

## 2024-03-05 DIAGNOSIS — N18.32 STAGE 3B CHRONIC KIDNEY DISEASE: Primary | ICD-10-CM

## 2024-03-05 DIAGNOSIS — D63.1 ANEMIA DUE TO STAGE 3A CHRONIC KIDNEY DISEASE: ICD-10-CM

## 2024-03-05 DIAGNOSIS — Z45.2 ENCOUNTER FOR CARE RELATED TO PORT-A-CATH: ICD-10-CM

## 2024-03-05 DIAGNOSIS — D63.1 ANEMIA IN STAGE 3B CHRONIC KIDNEY DISEASE: ICD-10-CM

## 2024-03-05 LAB
BASOPHILS # BLD AUTO: 0.02 10*3/MM3 (ref 0–0.2)
BASOPHILS NFR BLD AUTO: 0.3 % (ref 0–1.5)
DEPRECATED RDW RBC AUTO: 56.4 FL (ref 37–54)
EOSINOPHIL # BLD AUTO: 0.13 10*3/MM3 (ref 0–0.4)
EOSINOPHIL NFR BLD AUTO: 1.9 % (ref 0.3–6.2)
ERYTHROCYTE [DISTWIDTH] IN BLOOD BY AUTOMATED COUNT: 14.6 % (ref 12.3–15.4)
FERRITIN SERPL-MCNC: 1188 NG/ML (ref 13–150)
HCT VFR BLD AUTO: 31.4 % (ref 34–46.6)
HGB BLD-MCNC: 10.2 G/DL (ref 12–15.9)
IRON 24H UR-MRATE: 93 MCG/DL (ref 37–145)
IRON SATN MFR SERPL: 33 % (ref 20–50)
LYMPHOCYTES # BLD AUTO: 1.23 10*3/MM3 (ref 0.7–3.1)
LYMPHOCYTES NFR BLD AUTO: 18 % (ref 19.6–45.3)
MCH RBC QN AUTO: 34.2 PG (ref 26.6–33)
MCHC RBC AUTO-ENTMCNC: 32.5 G/DL (ref 31.5–35.7)
MCV RBC AUTO: 105.4 FL (ref 79–97)
MONOCYTES # BLD AUTO: 0.51 10*3/MM3 (ref 0.1–0.9)
MONOCYTES NFR BLD AUTO: 7.4 % (ref 5–12)
NEUTROPHILS NFR BLD AUTO: 4.93 10*3/MM3 (ref 1.7–7)
NEUTROPHILS NFR BLD AUTO: 72 % (ref 42.7–76)
PLATELET # BLD AUTO: 141 10*3/MM3 (ref 140–450)
PMV BLD AUTO: 10.5 FL (ref 6–12)
RBC # BLD AUTO: 2.98 10*6/MM3 (ref 3.77–5.28)
TIBC SERPL-MCNC: 285 MCG/DL (ref 298–536)
TRANSFERRIN SERPL-MCNC: 191 MG/DL (ref 200–360)
WBC NRBC COR # BLD AUTO: 6.85 10*3/MM3 (ref 3.4–10.8)

## 2024-03-05 PROCEDURE — 25010000002 EPOETIN ALFA PER 1000 UNITS: Performed by: INTERNAL MEDICINE

## 2024-03-05 PROCEDURE — 85025 COMPLETE CBC W/AUTO DIFF WBC: CPT

## 2024-03-05 PROCEDURE — 3074F SYST BP LT 130 MM HG: CPT | Performed by: INTERNAL MEDICINE

## 2024-03-05 PROCEDURE — 83540 ASSAY OF IRON: CPT

## 2024-03-05 PROCEDURE — 84466 ASSAY OF TRANSFERRIN: CPT

## 2024-03-05 PROCEDURE — 1159F MED LIST DOCD IN RCRD: CPT | Performed by: INTERNAL MEDICINE

## 2024-03-05 PROCEDURE — 96372 THER/PROPH/DIAG INJ SC/IM: CPT

## 2024-03-05 PROCEDURE — 82728 ASSAY OF FERRITIN: CPT

## 2024-03-05 PROCEDURE — 3078F DIAST BP <80 MM HG: CPT | Performed by: INTERNAL MEDICINE

## 2024-03-05 PROCEDURE — 36415 COLL VENOUS BLD VENIPUNCTURE: CPT

## 2024-03-05 PROCEDURE — 1125F AMNT PAIN NOTED PAIN PRSNT: CPT | Performed by: INTERNAL MEDICINE

## 2024-03-05 PROCEDURE — 99214 OFFICE O/P EST MOD 30 MIN: CPT | Performed by: INTERNAL MEDICINE

## 2024-03-05 RX ORDER — SODIUM CHLORIDE 0.9 % (FLUSH) 0.9 %
10 SYRINGE (ML) INJECTION AS NEEDED
Status: SHIPPED | OUTPATIENT
Start: 2024-03-05

## 2024-03-05 RX ORDER — HEPARIN SODIUM (PORCINE) LOCK FLUSH IV SOLN 100 UNIT/ML 100 UNIT/ML
500 SOLUTION INTRAVENOUS AS NEEDED
Status: SHIPPED | OUTPATIENT
Start: 2024-03-05

## 2024-03-05 RX ORDER — SODIUM CHLORIDE 0.9 % (FLUSH) 0.9 %
10 SYRINGE (ML) INJECTION AS NEEDED
OUTPATIENT
Start: 2024-03-05

## 2024-03-05 RX ORDER — HEPARIN SODIUM (PORCINE) LOCK FLUSH IV SOLN 100 UNIT/ML 100 UNIT/ML
500 SOLUTION INTRAVENOUS AS NEEDED
OUTPATIENT
Start: 2024-03-05

## 2024-03-05 RX ADMIN — ERYTHROPOIETIN 40000 UNITS: 40000 INJECTION, SOLUTION INTRAVENOUS; SUBCUTANEOUS at 14:26

## 2024-03-05 RX ADMIN — Medication 10 ML: at 13:50

## 2024-03-05 RX ADMIN — HEPARIN SODIUM (PORCINE) LOCK FLUSH IV SOLN 100 UNIT/ML 500 UNITS: 100 SOLUTION at 13:50

## 2024-03-06 ENCOUNTER — OFFICE VISIT (OUTPATIENT)
Dept: FAMILY MEDICINE CLINIC | Facility: CLINIC | Age: 84
End: 2024-03-06
Payer: MEDICARE

## 2024-03-06 VITALS
TEMPERATURE: 98.6 F | SYSTOLIC BLOOD PRESSURE: 150 MMHG | DIASTOLIC BLOOD PRESSURE: 60 MMHG | OXYGEN SATURATION: 97 % | RESPIRATION RATE: 18 BRPM | HEART RATE: 51 BPM | WEIGHT: 160.8 LBS | HEIGHT: 59 IN | BODY MASS INDEX: 32.42 KG/M2

## 2024-03-06 DIAGNOSIS — E11.42 TYPE 2 DIABETES MELLITUS WITH DIABETIC POLYNEUROPATHY, WITHOUT LONG-TERM CURRENT USE OF INSULIN: ICD-10-CM

## 2024-03-06 DIAGNOSIS — L03.116 CELLULITIS OF LEFT LOWER EXTREMITY: Primary | ICD-10-CM

## 2024-03-06 LAB
EXPIRATION DATE: NORMAL
HBA1C MFR BLD: 5.6 % (ref 4.5–5.7)
Lab: NORMAL

## 2024-03-06 RX ORDER — SACCHAROMYCES BOULARDII 250 MG
250 CAPSULE ORAL 2 TIMES DAILY
Qty: 60 CAPSULE | Refills: 0 | Status: SHIPPED | OUTPATIENT
Start: 2024-03-06

## 2024-03-06 RX ORDER — CLINDAMYCIN HYDROCHLORIDE 300 MG/1
300 CAPSULE ORAL 3 TIMES DAILY
Qty: 15 CAPSULE | Refills: 0 | Status: SHIPPED | OUTPATIENT
Start: 2024-03-06 | End: 2024-03-11

## 2024-03-07 DIAGNOSIS — C51.9 VULVAR CANCER, CARCINOMA: ICD-10-CM

## 2024-03-07 DIAGNOSIS — K52.1 DIARRHEA DUE TO DRUG: ICD-10-CM

## 2024-03-07 RX ORDER — DIPHENOXYLATE HYDROCHLORIDE AND ATROPINE SULFATE 2.5; .025 MG/1; MG/1
TABLET ORAL
Qty: 90 TABLET | Refills: 2 | Status: SHIPPED | OUTPATIENT
Start: 2024-03-07

## 2024-03-07 NOTE — PROGRESS NOTES
"Chief Complaint  discuss medications    Subjective        Syndal Waqar Nunez presents to [facility]  History of Present Illness  Stable A1c today of 5.6  Patient is here for follow-up of continued left lower extremity leg pain after being seen on 2/2/2024 for cellulitis of the left lower leg, she was given 7 days of doxycycline and the patient felt like her symptoms never fully went away.    Allergies   Allergen Reactions    Scopolamine Swelling     Other reaction(s): ANGIOEDEMA        Amoxicillin-Pot Clavulanate Rash    Keflex [Cephalexin] Rash    Septra [Sulfamethoxazole-Trimethoprim] Rash    Tequin [Gatifloxacin] Other (See Comments)     Doesn't remember    Trovan [Alatrofloxacin] Dizziness         Objective   Vital Signs:  /60   Pulse 51   Temp 98.6 °F (37 °C) (Temporal)   Resp 18   Ht 149.9 cm (59\")   Wt 72.9 kg (160 lb 12.8 oz)   SpO2 97%   BMI 32.48 kg/m²   Estimated body mass index is 32.48 kg/m² as calculated from the following:    Height as of this encounter: 149.9 cm (59\").    Weight as of this encounter: 72.9 kg (160 lb 12.8 oz).               Physical Exam  Vitals and nursing note reviewed.   Constitutional:       General: She is not in acute distress.     Appearance: She is not diaphoretic.   HENT:      Head: Normocephalic and atraumatic.      Nose: Nose normal.   Eyes:      General: No scleral icterus.        Right eye: No discharge.         Left eye: No discharge.      Conjunctiva/sclera: Conjunctivae normal.   Neck:      Trachea: No tracheal deviation.   Pulmonary:      Effort: Pulmonary effort is normal.   Skin:     General: Skin is warm and dry.      Coloration: Skin is not pale.      Comments: There is less redness to her left lower extremity, still has about 2-3+ pitting edema.  There is a small area of warmth and tenderness in the distal posterior leg, though this area is much smaller than previous presentation where she had cellulitis "   Neurological:      Mental Status: She is alert and oriented to person, place, and time.   Psychiatric:         Behavior: Behavior normal.         Thought Content: Thought content normal.         Judgment: Judgment normal.        Result Review :                     Assessment and Plan     Diagnoses and all orders for this visit:    1. Cellulitis of left lower extremity (Primary)  -     clindamycin (Cleocin) 300 MG capsule; Take 1 capsule by mouth 3 (Three) Times a Day for 5 days.  Dispense: 15 capsule; Refill: 0  -     saccharomyces boulardii (Florastor) 250 MG capsule; Take 1 capsule by mouth 2 (Two) Times a Day.  Dispense: 60 capsule; Refill: 0    2. Type 2 diabetes mellitus with diabetic polyneuropathy, without long-term current use of insulin  -     POC Glycosylated Hemoglobin (Hb A1C)    Diabetes is stable on current regimen, currently diet controlled and will continue pravastatin.  Suspicious of residual or new onset cellulitis, patient has multiple antibiotic allergies listed above, given lack of resolution with doxycycline we will send clindamycin.  Informed the patient to take probiotic while taking this medicine and I did caution her on worsening diarrhea that may need evaluation for C. difficile.         Follow Up     Return if symptoms worsen or fail to improve.  Patient was given instructions and counseling regarding her condition or for health maintenance advice. Please see specific information pulled into the AVS if appropriate.

## 2024-03-20 ENCOUNTER — TELEPHONE (OUTPATIENT)
Dept: FAMILY MEDICINE CLINIC | Facility: CLINIC | Age: 84
End: 2024-03-20
Payer: MEDICARE

## 2024-03-20 DIAGNOSIS — R21 SKIN RASH: Primary | ICD-10-CM

## 2024-03-20 NOTE — TELEPHONE ENCOUNTER
Caller: Dago Nunez Waqar    Relationship: Self    Best call back number: [phone]     What is the best time to reach you: ANYTIME    Who are you requesting to speak with (clinical staff, provider,  specific staff member): CLINICAL    What was the call regarding: PATIENT STATES HER SKIN RASH ON HER FEET AND ANKLES THAT'S BEEN GOING ON SINCE JANUARY 1ST IS STILL PAINFUL AND NOT GOING AWAY. THE Z-PACK AND STEROIDS THAT WAS PROSCRIBE FOR THE RASH IS NOT GETTING BETTER, PATIENT STATES SHE WOULD LIKE A NEW MEDICATION OR A REFERRAL TO SEE A DERMATOLOGY.

## 2024-03-21 ENCOUNTER — TELEPHONE (OUTPATIENT)
Dept: FAMILY MEDICINE CLINIC | Facility: CLINIC | Age: 84
End: 2024-03-21
Payer: MEDICARE

## 2024-03-21 NOTE — TELEPHONE ENCOUNTER
PT CALLED IN REGARDS TO RASH ON LEGS, PT STATED SHE IS FINISHED WITH SECOND ROUND OF ANTIBIOTICS DID NOT HELP, WANTING POSSIBLE REFERRAL TO DERMATOLOGY

## 2024-03-21 NOTE — TELEPHONE ENCOUNTER
Spoke with pt regarding going to ED eval and pt verbally understood but was unsure about going to ed

## 2024-03-27 DIAGNOSIS — G89.3 CANCER RELATED PAIN: ICD-10-CM

## 2024-03-27 DIAGNOSIS — C51.9 VULVAR CANCER, CARCINOMA: ICD-10-CM

## 2024-03-27 RX ORDER — HYDROCODONE BITARTRATE AND ACETAMINOPHEN 10; 325 MG/1; MG/1
0.5 TABLET ORAL EVERY 4 HOURS PRN
Qty: 60 TABLET | Refills: 0 | Status: SHIPPED | OUTPATIENT
Start: 2024-03-27

## 2024-03-27 NOTE — TELEPHONE ENCOUNTER
Caller: Dago Nunez    Relationship: Self    Best call back number: [phone]    Requested Prescriptions:   Requested Prescriptions     Pending Prescriptions Disp Refills    HYDROcodone-acetaminophen (NORCO)  MG per tablet 60 tablet 0     Sig: Take 0.5 tablets by mouth Every 4 (Four) Hours As Needed for Moderate Pain or Severe Pain.        Pharmacy where request should be sent: [facility] [place] - [address] - [phone]  - [phone]      Last office visit with prescribing clinician: 3/6/2024   Last telemedicine visit with prescribing clinician: Visit date not found   Next office visit with prescribing clinician: 4/24/2024     Does the patient have less than a 3 day supply:  [x] Yes  [] No    Would you like a call back once the refill request has been completed: [] Yes [x] No    If the office needs to give you a call back, can they leave a voicemail: [] Yes [x] No    Marlen Mason Rep   03/27/24 15:09 CDT

## 2024-04-04 ENCOUNTER — APPOINTMENT (OUTPATIENT)
Dept: ULTRASOUND IMAGING | Facility: HOSPITAL | Age: 84
End: 2024-04-04
Payer: MEDICARE

## 2024-04-04 ENCOUNTER — HOSPITAL ENCOUNTER (EMERGENCY)
Facility: HOSPITAL | Age: 84
Discharge: HOME | End: 2024-04-04
Admitting: EMERGENCY MEDICINE
Payer: MEDICARE

## 2024-04-04 VITALS
HEIGHT: 59 IN | WEIGHT: 160 LBS | HEART RATE: 62 BPM | DIASTOLIC BLOOD PRESSURE: 54 MMHG | TEMPERATURE: 98.5 F | BODY MASS INDEX: 32.25 KG/M2 | OXYGEN SATURATION: 98 % | RESPIRATION RATE: 18 BRPM | SYSTOLIC BLOOD PRESSURE: 132 MMHG

## 2024-04-04 DIAGNOSIS — N18.9 CHRONIC KIDNEY DISEASE, UNSPECIFIED CKD STAGE: ICD-10-CM

## 2024-04-04 DIAGNOSIS — L03.116 CELLULITIS OF LEFT LOWER LEG: Primary | ICD-10-CM

## 2024-04-04 DIAGNOSIS — R22.42 NODULE OF SKIN OF LEFT LOWER EXTREMITY: ICD-10-CM

## 2024-04-04 LAB
ALBUMIN SERPL-MCNC: 4.7 G/DL (ref 3.5–5.2)
ALBUMIN/GLOB SERPL: 1.3 G/DL
ALP SERPL-CCNC: 44 U/L (ref 39–117)
ALT SERPL W P-5'-P-CCNC: 11 U/L (ref 1–33)
ANION GAP SERPL CALCULATED.3IONS-SCNC: 13 MMOL/L (ref 5–15)
APTT PPP: 26.5 SECONDS (ref 24.5–36)
AST SERPL-CCNC: 21 U/L (ref 1–32)
BASOPHILS # BLD AUTO: 0.04 10*3/MM3 (ref 0–0.2)
BASOPHILS NFR BLD AUTO: 0.6 % (ref 0–1.5)
BILIRUB SERPL-MCNC: 0.3 MG/DL (ref 0–1.2)
BUN SERPL-MCNC: 28 MG/DL (ref 8–23)
BUN/CREAT SERPL: 15.7 (ref 7–25)
CALCIUM SPEC-SCNC: 10.1 MG/DL (ref 8.6–10.5)
CHLORIDE SERPL-SCNC: 100 MMOL/L (ref 98–107)
CO2 SERPL-SCNC: 25 MMOL/L (ref 22–29)
CREAT SERPL-MCNC: 1.78 MG/DL (ref 0.57–1)
D-LACTATE SERPL-SCNC: 2.1 MMOL/L (ref 0.5–2)
DEPRECATED RDW RBC AUTO: 57 FL (ref 37–54)
EGFRCR SERPLBLD CKD-EPI 2021: 28.4 ML/MIN/1.73
EOSINOPHIL # BLD AUTO: 0.11 10*3/MM3 (ref 0–0.4)
EOSINOPHIL NFR BLD AUTO: 1.6 % (ref 0.3–6.2)
ERYTHROCYTE [DISTWIDTH] IN BLOOD BY AUTOMATED COUNT: 14.5 % (ref 12.3–15.4)
GLOBULIN UR ELPH-MCNC: 3.6 GM/DL
GLUCOSE SERPL-MCNC: 126 MG/DL (ref 65–99)
HCT VFR BLD AUTO: 35.1 % (ref 34–46.6)
HGB BLD-MCNC: 11.5 G/DL (ref 12–15.9)
INR PPP: 0.99 (ref 0.91–1.09)
LYMPHOCYTES # BLD AUTO: 1.22 10*3/MM3 (ref 0.7–3.1)
LYMPHOCYTES NFR BLD AUTO: 18 % (ref 19.6–45.3)
MCH RBC QN AUTO: 34.8 PG (ref 26.6–33)
MCHC RBC AUTO-ENTMCNC: 32.8 G/DL (ref 31.5–35.7)
MCV RBC AUTO: 106.4 FL (ref 79–97)
MONOCYTES # BLD AUTO: 0.45 10*3/MM3 (ref 0.1–0.9)
MONOCYTES NFR BLD AUTO: 6.6 % (ref 5–12)
NEUTROPHILS NFR BLD AUTO: 4.95 10*3/MM3 (ref 1.7–7)
NEUTROPHILS NFR BLD AUTO: 72.9 % (ref 42.7–76)
PLATELET # BLD AUTO: 138 10*3/MM3 (ref 140–450)
PMV BLD AUTO: 11.6 FL (ref 6–12)
POTASSIUM SERPL-SCNC: 4.3 MMOL/L (ref 3.5–5.2)
PROCALCITONIN SERPL-MCNC: 0.06 NG/ML (ref 0–0.25)
PROT SERPL-MCNC: 8.3 G/DL (ref 6–8.5)
PROTHROMBIN TIME: 13.5 SECONDS (ref 11.8–14.8)
RBC # BLD AUTO: 3.3 10*6/MM3 (ref 3.77–5.28)
SODIUM SERPL-SCNC: 138 MMOL/L (ref 136–145)
WBC NRBC COR # BLD AUTO: 6.79 10*3/MM3 (ref 3.4–10.8)

## 2024-04-04 PROCEDURE — 36415 COLL VENOUS BLD VENIPUNCTURE: CPT

## 2024-04-04 PROCEDURE — 85610 PROTHROMBIN TIME: CPT | Performed by: PHYSICIAN ASSISTANT

## 2024-04-04 PROCEDURE — 85730 THROMBOPLASTIN TIME PARTIAL: CPT | Performed by: PHYSICIAN ASSISTANT

## 2024-04-04 PROCEDURE — 83605 ASSAY OF LACTIC ACID: CPT | Performed by: PHYSICIAN ASSISTANT

## 2024-04-04 PROCEDURE — 85025 COMPLETE CBC W/AUTO DIFF WBC: CPT | Performed by: PHYSICIAN ASSISTANT

## 2024-04-04 PROCEDURE — 84145 PROCALCITONIN (PCT): CPT | Performed by: PHYSICIAN ASSISTANT

## 2024-04-04 PROCEDURE — 99284 EMERGENCY DEPT VISIT MOD MDM: CPT

## 2024-04-04 PROCEDURE — 80053 COMPREHEN METABOLIC PANEL: CPT | Performed by: PHYSICIAN ASSISTANT

## 2024-04-04 PROCEDURE — 93970 EXTREMITY STUDY: CPT

## 2024-04-04 RX ORDER — SODIUM CHLORIDE 0.9 % (FLUSH) 0.9 %
10 SYRINGE (ML) INJECTION AS NEEDED
Status: DISCONTINUED | OUTPATIENT
Start: 2024-04-04 | End: 2024-04-04 | Stop reason: HOSPADM

## 2024-04-04 RX ORDER — LINEZOLID 600 MG/1
600 TABLET, FILM COATED ORAL 2 TIMES DAILY
Qty: 14 TABLET | Refills: 0 | Status: SHIPPED | OUTPATIENT
Start: 2024-04-04 | End: 2024-04-11

## 2024-04-04 NOTE — ED PROVIDER NOTES
Subjective   History of Present Illness    Patient is an 83-year-old female presenting to ED with concern for cellulitis.  PMH significant for CKD, history of breast cancer, GERD, hypertension, osteoporosis, kyphosis, scoliosis, non-insulin-dependent diabetes.  Patient states that for many months now she has been having problems with little bumps that show up on the back of her left calf for which she has completed 2 rounds of antibiotics, initially doxycycline followed by clindamycin, through her primary care provider.  Patient states that despite taking these antibiotics she is concerned they have not been a strong enough dose as she is still having redness and bumps to the back of her left calf as well as sometimes a bump that appears in her left inguinal region.  Patient states that she was sent by her primary care provider for an ultrasound as these are not resolving and she is concerned that she now has a blood clot.  Patient denies fevers, chills, diaphoresis, nausea, vomiting, arthralgias, myalgias.  Patient denies any known trauma to the left lower extremity.  Patient states that she is very good at wearing her compression stockings and elevating her legs as needed.  Patient denies any history of blood clots, any use of blood thinners, and presents at this time for further evaluation.  Patient denies any known trauma to the lower extremity, shortness of breath, chest pain/pressure/heaviness/tightness or palpitations.    Records reviewed show patient was most recently seen outpatient at the nephrology office on 3/12/2024 for general examination,, vitamin D deficiency, management of diabetes with CKD, hypertension.    Patient was last seen in the ED on 9/13/2022 for urinary retention, acute cystitis.    Patient last seen for cellulitis at the PCP office on 3/6/2024 for cellulitis of the left lower extremity, type 2 diabetes with diabetic polyneuropathy.  At that time patient was given a course of clindamycin  as well as Florastor after she had previously completed a course of 7 days doxycycline.    Review of Systems   Constitutional: Negative.    HENT: Negative.     Eyes: Negative.    Respiratory: Negative.  Negative for chest tightness and shortness of breath.    Cardiovascular:  Positive for leg swelling (Bilateral lower extremities). Negative for chest pain and palpitations.   Gastrointestinal: Negative.    Genitourinary: Negative.    Musculoskeletal: Negative.  Negative for arthralgias and myalgias.   Skin:  Positive for color change (Redness, back of left calf). Negative for wound.   Neurological: Negative.    Psychiatric/Behavioral: Negative.     All other systems reviewed and are negative.      Past Medical History:   Diagnosis Date    Anemia in stage 3 chronic kidney disease 11/11/2019    Arthritis     Breast cancer 09/14/2021    Left Mastectomy w/ sentinel node Bx    Bronchitis     Cellulitis     ROSA LEGS    GERD (gastroesophageal reflux disease)     Hyperlipidemia     Hypertension     Kyphosis     Osteoporosis     Personal history of COVID-19 05/2022    Scoliosis     Self-catheterizes urinary bladder     10FR SIZED CATH    Stage 3 chronic kidney disease 11/11/2019    Type 2 diabetes mellitus     Urethral meatal stenosis 09/2022    w/ urine retention    Vulva cancer     Vulvar intraepithelial neoplasia (MOODY) grade 3        Allergies   Allergen Reactions    Scopolamine Swelling     Other reaction(s): ANGIOEDEMA        Amoxicillin-Pot Clavulanate Rash    Keflex [Cephalexin] Rash    Septra [Sulfamethoxazole-Trimethoprim] Rash    Tequin [Gatifloxacin] Other (See Comments)     Doesn't remember    Trovan [Alatrofloxacin] Dizziness       Past Surgical History:   Procedure Laterality Date    APPENDECTOMY      BENJAMIN PROCEDURE      No evidence of reflux disease while on Nexium 40mg daily-See report    BREAST BIOPSY      BREAST CYST ASPIRATION Left     BREAST LUMPECTOMY      COLONOSCOPY  01/12/2011    Diverticulosis  sigmoid colon; The examination was otherwise normal; Repeat 10 years    COLONOSCOPY  11/03/2003    Dr. Laguerre-Normal colonoscopy; Normal terminal ileum; Repeat 5 years    COLONOSCOPY N/A 11/05/2021    Petechia(e) in the rectum, in the recto-sigmoid colon and in the distal sigmoid colon; No specimens collected; No plans to repeat colonoscopy due to advance age and/or medical problems    CYSTOSCOPY N/A 09/20/2022    Procedure: CYSTOSCOPY WITH URETHRAL DILATATION;  Surgeon: Shaheen Conway MD;  Location: [facility] OR;  Service: Urology;  Laterality: N/A;    ENDOSCOPY  07/01/2014    Normal esophagus; Normal stomach; Normal examined duodenum; BRAVO pH capsule deployed;     ENDOSCOPY  08/14/2013    Mild gastritis-biopsies for H.Pylor obtained    ENDOSCOPY  02/16/2005    Dr. LaguerreVslai-Zymfwaogw-nsqyegzn    ENDOSCOPY  10/21/2003    Dr. Laguerre-Stage 1 reflux esophagitis    ENDOSCOPY N/A 11/05/2021    Small HH; A single gastroesophageal junction polyp; Normal stomach; A single non-bleeding angiodysplastic lesion in the duodenum    HYSTERECTOMY      MASTECTOMY W/ SENTINEL NODE BIOPSY Left 09/14/2021    Procedure: LEFT PARTIAL MASTECTOMY WITH MAGSEED AND LEFT SENTINEL LYMPH NODE BIOPSY MAGTRACE;  Surgeon: Quynh Rosario MD;  Location: [facility] OR;  Service: General;  Laterality: Left;    TONSILLECTOMY      VAGINA SURGERY      Laser surgery X 2    VENOUS ACCESS DEVICE (PORT) INSERTION N/A 09/29/2020    Procedure: SINGLE LUMEN PORT - A- CATH PLACEMENT WITH FLUOROSCOPY;  Surgeon: Quynh Rosario MD;  Location:  [facility] OR;  Service: General;  Laterality: N/A;       Family History   Problem Relation Age of Onset    Cancer Mother     Hypertension Mother     Osteoporosis Mother     Dementia Mother     Uterine cancer Mother     Heart disease Father     Parkinsonism Father     Cancer Sister     Breast cancer Sister     Kidney cancer Sister     Diabetes Brother     Heart disease Paternal Grandfather     No Known Problems Maternal  Grandmother     No Known Problems Maternal Grandfather     No Known Problems Paternal Grandmother     Colon cancer Neg Hx     Colon polyps Neg Hx     Esophageal cancer Neg Hx     Liver cancer Neg Hx     Liver disease Neg Hx     Rectal cancer Neg Hx     Stomach cancer Neg Hx        Social History     Socioeconomic History    Marital status:    Tobacco Use    Smoking status: Former     Current packs/day: 0.25     Average packs/day: 0.3 packs/day for 3.0 years (0.8 ttl pk-yrs)     Types: Cigarettes     Passive exposure: Past    Smokeless tobacco: Never    Tobacco comments:     social smoker in [place]   Vaping Use    Vaping status: Never Used   Substance and Sexual Activity    Alcohol use: Not Currently     Comment: Occasional glass of wine    Drug use: No    Sexual activity: Defer           Objective   Physical Exam  Vitals and nursing note reviewed.   Constitutional:       General: She is not in acute distress.     Appearance: Normal appearance. She is obese. She is not ill-appearing, toxic-appearing or diaphoretic.   HENT:      Head: Normocephalic.      Mouth/Throat:      Mouth: Mucous membranes are moist.      Pharynx: Oropharynx is clear.   Eyes:      Conjunctiva/sclera: Conjunctivae normal.      Pupils: Pupils are equal, round, and reactive to light.   Cardiovascular:      Rate and Rhythm: Normal rate.      Comments: Tenderness to palpitation of the left posterior calf.  No right calf tenderness.  Palpable nodules to the right posterior calf with no further palpable cords to the bilateral lower extremities.  Pulmonary:      Effort: Pulmonary effort is normal.      Breath sounds: Normal breath sounds.   Abdominal:      Palpations: Abdomen is soft.   Musculoskeletal:         General: Normal range of motion.      Cervical back: Neck supple.      Right lower le+ Pitting Edema present.      Left lower le+ Pitting Edema present.   Skin:     General: Skin is warm and dry.      Findings: Erythema (Diffuse  erythema to the posterior left calf with no evidence of trauma or skin injuries) present.   Neurological:      Mental Status: She is alert and oriented to person, place, and time.   Psychiatric:         Mood and Affect: Mood normal.         Behavior: Behavior normal.         Procedures           ED Course                                             Medical Decision Making  Problems Addressed:  Cellulitis of left lower leg: complicated acute illness or injury  Chronic kidney disease, unspecified CKD stage: complicated acute illness or injury  Nodule of skin of left lower extremity: complicated acute illness or injury    Amount and/or Complexity of Data Reviewed  Independent Historian: spouse     Details:   External Data Reviewed: labs, radiology and notes.  Labs: ordered. Decision-making details documented in ED Course.  Radiology: ordered. Decision-making details documented in ED Course.    Risk  Prescription drug management.        Patient is an 83-year-old female presenting to ED with concern for cellulitis.  PMH significant for CKD, history of breast cancer, GERD, hypertension, osteoporosis, kyphosis, scoliosis, non-insulin-dependent diabetes.  Upon initial evaluation patient is resting comfortably in the chair in no acute distress, nontoxic-appearing, non-ill-appearing with stable vital signs but examination reveals evidence of erythema to the left posterior calf with no evidence of skin defects or wounds, no further rashes, vesicles.  There is palpable nodules along this area with no further palpable cords or lymph nodes felt to the left lower extremity including within the inguinal region.  Right lower extremity has 1+ pitting edema which is also present on the left but no further right lower extremity abnormalities.  Normal inspection of the patient's left foot including the plantar aspect with no wounds or ulcers.  Discussed with patient need for workup to include ultrasound to assess for blood clots  as well as lab work to assess for infection for which she is amenable with no further questions, concerns, or needs.    Differential diagnosis: DVT, SVT, cellulitis, Baker's cyst, lymphadenopathy, systemic infection, cellulitis, ulcer, wound, diabetic wound other    Workup revealed Renal functions similar to patient's baseline with BUN 28, creatinine 1.78, GFR 28.4.  CMP otherwise with no electrolyte disturbances, normal hepatic function.  CBC with H&H 11.5/35.1, normal blood cell count,  thrombocytopenia 130 and no further acute findings.  PT/INR WNL.  Low concern for systemic bacterial process with normal procalcitonin 0.06.  Lactic acid slightly elevated at 2.1.  Venous Doppler studies of the bilateral lower extremities showed no evidence of DVT within either lower extremity, right posterior tibial and peroneal veins were not well-visualized.  Discussed with patient concern for cellulitis.  After reviewing patient's extensive antibiotic allergies discussed with her why she was likely placed on doxycycline and clindamycin.  Offered antibiotic treatment with linezolid as well as need for continued very close outpatient follow-up.  Discussed need for follow-up with primary care provider within the next 48 hours.  Patient states this upcoming Monday 4/8/24 she follows with her oncologist in [place] for monitoring of a history of vulvar cancer for which she was advised they will typically check her labs.  Discussed that if they do not it is still important that they follow-up with a primary care provider for continued outpatient testing.  Patient voices understanding and is appreciative.  Discussed tricked return precautions and need for immediate return to ED should she develop any new or worsening symptoms.  Patient with no further questions, concerns, or needs at this time, is hemodynamically stable, ambulating at baseline without difficulty and is stable for discharge.    Final diagnoses:   Chronic kidney  disease, unspecified CKD stage   Cellulitis of left lower leg   Nodule of skin of left lower extremity       ED Disposition  ED Disposition       ED Disposition   Discharge    Condition   Stable    Comment   --               Shaheen Akbar, DO  [address]  [address]  [place] [id number] [phone]    Schedule an appointment as soon as possible for a visit in 2 days      [facility] EMERGENCY DEPARTMENT  [address] [address] [phone]    As needed         Medication List        New Prescriptions      linezolid 600 MG tablet  Commonly known as: ZYVOX  Take 1 tablet by mouth 2 (Two) Times a Day for 7 days.               Where to Get Your Medications        These medications were sent to [facility] - [place] - [address] - [phone]  - [phone] [place] [address] [address], [facility] [address]      Phone: [phone]   linezolid 600 MG tablet            Wesley Wharton PA-C  04/04/24 1914

## 2024-04-04 NOTE — DISCHARGE INSTRUCTIONS
Please complete the new antibiotic in its entirety for the infection on your skin.  Today you have no evidence of blood clots however it is very important you follow with your primary care provider within the next 48 hours for continued outpatient testing and monitoring.  Today your kidney function is still showing evidence of CKD but slightly improved from your normal.  Should you develop any new or worsening symptoms please return to the ER for further evaluation.

## 2024-04-15 RX ORDER — OLMESARTAN MEDOXOMIL 20 MG/1
20 TABLET ORAL DAILY
COMMUNITY
End: 2024-04-22

## 2024-04-15 RX ORDER — SODIUM BICARBONATE 650 MG/1
650 TABLET ORAL 2 TIMES DAILY
COMMUNITY

## 2024-04-15 RX ORDER — DIAZEPAM 10 MG/1
10 TABLET ORAL EVERY 6 HOURS PRN
COMMUNITY

## 2024-04-15 RX ORDER — NYSTATIN 100000 [USP'U]/G
POWDER TOPICAL 2 TIMES DAILY
COMMUNITY

## 2024-04-15 RX ORDER — PHENAZOPYRIDINE HYDROCHLORIDE 200 MG/1
200 TABLET, FILM COATED ORAL 3 TIMES DAILY PRN
COMMUNITY
End: 2024-04-22 | Stop reason: SDUPTHER

## 2024-04-22 DIAGNOSIS — R33.9 RETENTION OF URINE: Primary | ICD-10-CM

## 2024-04-22 DIAGNOSIS — I10 ESSENTIAL HYPERTENSION: Primary | ICD-10-CM

## 2024-04-22 DIAGNOSIS — I10 ESSENTIAL HYPERTENSION: ICD-10-CM

## 2024-04-22 RX ORDER — OLMESARTAN MEDOXOMIL 20 MG/1
20 TABLET ORAL DAILY
Qty: 90 TABLET | Refills: 2 | Status: SHIPPED | OUTPATIENT
Start: 2024-04-22 | End: 2024-04-24 | Stop reason: SDUPTHER

## 2024-04-22 RX ORDER — OLMESARTAN MEDOXOMIL 20 MG/1
TABLET ORAL
Qty: 90 TABLET | Refills: 3 | OUTPATIENT
Start: 2024-04-22

## 2024-04-22 NOTE — TELEPHONE ENCOUNTER
Rx Refill Note  Requested Prescriptions     Pending Prescriptions Disp Refills    phenazopyridine (PYRIDIUM) 200 MG tablet 15 tablet 0     Sig: Take 1 tablet by mouth 3 (Three) Times a Day As Needed for Dysuria.    bisoprolol-hydrochlorothiazide (ZIAC) 5-6.25 MG per tablet 90 tablet 1     Sig: Take 1 tablet by mouth Daily.      Last office visit with prescribing clinician: 3/6/2024   Last telemedicine visit with prescribing clinician: Visit date not found   Next office visit with prescribing clinician: 4/24/2024                         Would you like a call back once the refill request has been completed: [] Yes [] No    If the office needs to give you a call back, can they leave a voicemail: [] Yes [] No    Lula Mcpherson MA  04/22/24, 09:02 CDT

## 2024-04-23 RX ORDER — PHENAZOPYRIDINE HYDROCHLORIDE 200 MG/1
200 TABLET, FILM COATED ORAL 3 TIMES DAILY PRN
Qty: 15 TABLET | Refills: 0 | Status: SHIPPED | OUTPATIENT
Start: 2024-04-23

## 2024-04-23 RX ORDER — BISOPROLOL FUMARATE AND HYDROCHLOROTHIAZIDE 5; 6.25 MG/1; MG/1
1 TABLET ORAL DAILY
Qty: 90 TABLET | Refills: 1 | Status: SHIPPED | OUTPATIENT
Start: 2024-04-23

## 2024-04-24 ENCOUNTER — OFFICE VISIT (OUTPATIENT)
Dept: PULMONOLOGY | Facility: CLINIC | Age: 84
End: 2024-04-24
Payer: MEDICARE

## 2024-04-24 ENCOUNTER — OFFICE VISIT (OUTPATIENT)
Dept: FAMILY MEDICINE CLINIC | Facility: CLINIC | Age: 84
End: 2024-04-24
Payer: MEDICARE

## 2024-04-24 VITALS
WEIGHT: 159.6 LBS | HEIGHT: 59 IN | SYSTOLIC BLOOD PRESSURE: 132 MMHG | DIASTOLIC BLOOD PRESSURE: 66 MMHG | HEART RATE: 56 BPM | OXYGEN SATURATION: 97 % | BODY MASS INDEX: 32.17 KG/M2

## 2024-04-24 VITALS
TEMPERATURE: 97.6 F | HEIGHT: 59 IN | RESPIRATION RATE: 18 BRPM | DIASTOLIC BLOOD PRESSURE: 68 MMHG | BODY MASS INDEX: 32.05 KG/M2 | OXYGEN SATURATION: 97 % | SYSTOLIC BLOOD PRESSURE: 115 MMHG | HEART RATE: 57 BPM | WEIGHT: 159 LBS

## 2024-04-24 DIAGNOSIS — E66.9 OBESITY (BMI 30-39.9): Chronic | ICD-10-CM

## 2024-04-24 DIAGNOSIS — J40 BRONCHITIS: Primary | Chronic | ICD-10-CM

## 2024-04-24 DIAGNOSIS — U09.9 POST-COVID-19 CONDITION: Chronic | ICD-10-CM

## 2024-04-24 DIAGNOSIS — I10 ESSENTIAL HYPERTENSION: ICD-10-CM

## 2024-04-24 DIAGNOSIS — R21 SKIN RASH: Primary | ICD-10-CM

## 2024-04-24 DIAGNOSIS — R60.0 LOWER EXTREMITY EDEMA: Chronic | ICD-10-CM

## 2024-04-24 DIAGNOSIS — E55.9 VITAMIN D DEFICIENCY: ICD-10-CM

## 2024-04-24 PROCEDURE — 3075F SYST BP GE 130 - 139MM HG: CPT | Performed by: INTERNAL MEDICINE

## 2024-04-24 PROCEDURE — 1159F MED LIST DOCD IN RCRD: CPT | Performed by: INTERNAL MEDICINE

## 2024-04-24 PROCEDURE — 1160F RVW MEDS BY RX/DR IN RCRD: CPT | Performed by: INTERNAL MEDICINE

## 2024-04-24 PROCEDURE — 3074F SYST BP LT 130 MM HG: CPT | Performed by: FAMILY MEDICINE

## 2024-04-24 PROCEDURE — 99214 OFFICE O/P EST MOD 30 MIN: CPT | Performed by: FAMILY MEDICINE

## 2024-04-24 PROCEDURE — 1159F MED LIST DOCD IN RCRD: CPT | Performed by: FAMILY MEDICINE

## 2024-04-24 PROCEDURE — 99214 OFFICE O/P EST MOD 30 MIN: CPT | Performed by: INTERNAL MEDICINE

## 2024-04-24 PROCEDURE — 1160F RVW MEDS BY RX/DR IN RCRD: CPT | Performed by: FAMILY MEDICINE

## 2024-04-24 PROCEDURE — 3078F DIAST BP <80 MM HG: CPT | Performed by: INTERNAL MEDICINE

## 2024-04-24 PROCEDURE — 3078F DIAST BP <80 MM HG: CPT | Performed by: FAMILY MEDICINE

## 2024-04-24 RX ORDER — PHENAZOPYRIDINE HYDROCHLORIDE 200 MG/1
200 TABLET, FILM COATED ORAL 3 TIMES DAILY PRN
Qty: 15 TABLET | Refills: 0 | Status: CANCELLED | OUTPATIENT
Start: 2024-04-24

## 2024-04-24 RX ORDER — OLMESARTAN MEDOXOMIL 20 MG/1
20 TABLET ORAL DAILY
Qty: 90 TABLET | Refills: 2 | Status: SHIPPED | OUTPATIENT
Start: 2024-04-24

## 2024-04-24 RX ORDER — ERGOCALCIFEROL 1.25 MG/1
50000 CAPSULE ORAL
Qty: 12 CAPSULE | Refills: 3 | Status: SHIPPED | OUTPATIENT
Start: 2024-04-24

## 2024-04-24 NOTE — PROGRESS NOTES
Chief Complaint  Bronchitis    Subjective    History of Present Illness {  Problem List  Visit Diagnosis   Encounters  Notes  Medications  Labs  Result Review Imaging  Media: 23}    Dago Nunez presents to [facility] for bronchitis.    History of Present Illness   The patient's had a lot of problems with peripheral edema and has been seen in the emergency room for this.  It is felt this may represent some component of lymphedema.  From a pulmonary standpoint she has some allergy symptoms and associated postnasal drip and cough and I advised her to use over-the-counter steroid nasal sprays for this and if she develops any associated lower respiratory tract symptoms she can use her albuterol HFA inhaler as needed and certainly can continue her Dulera as well.   She has had the COVID-19 vaccine including a booster in the form of the Moderna vaccine.  She had the flu shot this past fall and has had a Prevnar 20 and a Pneumovax in the past as well.  Prior to Admission medications    Medication Sig Start Date End Date Taking? Authorizing Provider   Acetaminophen (TYLENOL ARTHRITIS PAIN PO) Take 1 tablet by mouth Daily As Needed (BACK PAIN).   Yes Homer Ahumada MD   albuterol sulfate  (90 Base) MCG/ACT inhaler Inhale 2 puffs 4 (Four) Times a Day. 4/19/23  Yes Jesus Guzman MD   bisoprolol-hydrochlorothiazide (ZIAC) 5-6.25 MG per tablet Take 1 tablet by mouth Daily. 4/23/24  Yes Shaheen Akbar, DO   cetirizine (zyrTEC) 10 MG tablet Take 1 tablet by mouth Daily.   Yes Homer Ahumada MD   citalopram (CeleXA) 20 MG tablet Take 1 tablet by mouth Daily. 2/15/24  Yes Shaheen Akbar, DO   cyanocobalamin 1000 MCG/ML injection INJECT 1 ML INTO THE MUSCLE EVERY 4 WEEKS. 7/17/23  Yes Shaheen Akbar DO   diazePAM (VALIUM) 10 MG tablet Take 1 tablet by mouth Every 6 (Six) Hours As Needed.   Yes Homer Ahumada MD    diphenhydrAMINE (BENADRYL) 25 mg capsule Take 1 capsule by mouth Every 6 (Six) Hours As Needed for Allergies.   Yes Homer Ahumada MD   diphenoxylate-atropine (LOMOTIL) 2.5-0.025 MG per tablet TAKE 2 TABLETS BY MOUTH 4 TIMES DAILY AS NEEDED FOR DIARRHEA 3/7/24  Yes Shaheen Akbar DO   DULERA 100-5 MCG/ACT inhaler Inhale 2 puffs 2 (Two) Times a Day. Rinse and spit after using. 10/14/19  Yes Jesus Guzman MD   esomeprazole (nexIUM) 40 MG capsule Take 1 capsule by mouth 2 (Two) Times a Day. 9/28/23  Yes Shaheen Akbar DO   furosemide (LASIX) 40 MG tablet Take 1 tablet by mouth 2 (Two) Times a Day. 7/18/23  Yes Shaheen Akbar DO   gabapentin (NEURONTIN) 300 MG capsule Take 1 capsule by mouth 2 (Two) Times a Day. 1/24/24  Yes Shaheen Akbar DO   Homeopathic Products (LEG CRAMPS) tablet Take 1 tablet by mouth Daily.   Yes Homer Ahumada MD   HYDROcodone-acetaminophen (NORCO)  MG per tablet Take 0.5 tablets by mouth Every 4 (Four) Hours As Needed for Moderate Pain or Severe Pain. 3/27/24  Yes Shaheen Akbar DO   letrozole (FEMARA) 2.5 MG tablet TAKE 1 TABLET BY MOUTH 1 TIME DAILY 12/26/23  Yes Brennan Bowles MD   lidocaine (XYLOCAINE) 5 % ointment Apply  topically to the appropriate area as directed Every 4 (Four) Hours As Needed for Mild Pain  (pain secondary to radiation). 12/1/20  Yes Sussy Kwon PA-C   Lidocaine Viscous HCl (XYLOCAINE) 2 % solution Take 5 mL by mouth 3 (Three) Times a Day With Meals. 9/14/22  Yes Shaheen Akbar DO   metOLazone (ZAROXOLYN) 2.5 MG tablet TAKE 1 TABLET BY MOUTH THREE TIMES A WEEK 7/4/23  Yes Shaheen Akbar DO   nitrofurantoin (MACRODANTIN) 25 MG capsule Take 1 capsule by mouth Every Night. To help reduce pain with urination 1/13/22  Yes Jaida Younger MD   nystatin (MYCOSTATIN) 959770 UNIT/GM powder Apply  topically to the appropriate area as directed 2 (Two) Times a Day.   Yes Provider, MD Homer  "  nystatin-triamcinolone (MYCOLOG) 814543-0.1 UNIT/GM-% ointment Apply 1 Application topically to the appropriate area as directed 2 (Two) Times a Day. 2/2/24  Yes Shaheen Akbar DO   olmesartan (BENICAR) 20 MG tablet TAKE 1 TABLET BY MOUTH 1 TIME DAILY 4/22/24  Yes Shaheen Akbar DO   ondansetron (ZOFRAN) 8 MG tablet TAKE 1 TABLET BY MOUTH EVERY 8 HOURS AS NEEDED FOR NAUSEA AND VOMITING 7/8/22  Yes Shaheen Akbar DO   phenazopyridine (PYRIDIUM) 200 MG tablet Take 1 tablet by mouth 3 (Three) Times a Day As Needed for Dysuria. 4/23/24  Yes Shaheen Akbar DO   potassium chloride (K-DUR,KLOR-CON) 20 MEQ CR tablet TAKE 2 TABLETS BY MOUTH EVERY DAY 1/22/24  Yes Shaheen Akbar DO   pravastatin (PRAVACHOL) 40 MG tablet TAKE 1 TABLET EVERY NIGHT 12/14/22  Yes Shaheen Akbar DO   saccharomyces boulardii (Florastor) 250 MG capsule Take 1 capsule by mouth 2 (Two) Times a Day. 3/6/24  Yes Shaheen Akbar DO   sodium bicarbonate 650 MG tablet Take 1 tablet by mouth 2 (Two) Times a Day.   Yes Homer Ahumada MD   Syringe 25G X 1\" 3 ML misc 1 each Daily. 8/5/21  Yes Drake Stafford MD   vitamin D (ERGOCALCIFEROL) 1.25 MG (48461 UT) capsule capsule TAKE 1 CAPSULE BY MOUTH ON THE SAME DAY EACH WEEK. 7/5/23  Yes Shaheen Akbar DO       Social History     Socioeconomic History    Marital status:    Tobacco Use    Smoking status: Former     Current packs/day: 0.25     Average packs/day: 0.3 packs/day for 3.0 years (0.8 ttl pk-yrs)     Types: Cigarettes     Passive exposure: Past    Smokeless tobacco: Never    Tobacco comments:     social smoker in college   Vaping Use    Vaping status: Never Used   Substance and Sexual Activity    Alcohol use: Not Currently     Comment: Occasional glass of wine    Drug use: No    Sexual activity: Defer       Objective   Vital Signs:   /66   Pulse 56   Ht 149.9 cm (59.02\")   Wt 72.4 kg (159 lb 9.6 oz)   SpO2 97% Comment: RA  BMI 32.22 kg/m²   "   Physical Exam  Vitals and nursing note reviewed.   Constitutional:       Appearance: She is obese.      Comments: She is mildly bradycardic with a pulse of 56.  She ambulates with a walker.   HENT:      Head: Normocephalic.   Eyes:      Extraocular Movements: Extraocular movements intact.      Pupils: Pupils are equal, round, and reactive to light.   Cardiovascular:      Rate and Rhythm: Regular rhythm. Bradycardia present.      Comments: Again she was mildly bradycardic with a pulse of 56.  Pulmonary:      Effort: Pulmonary effort is normal.      Breath sounds: Normal breath sounds.   Musculoskeletal:      Right lower leg: Edema present.      Left lower leg: Edema present.      Comments: She ambulates with a walker.  She has edema of both lower extremities.   Skin:     Comments: She has edema both lower extremities but also has some slightly nodular areas in the posterior left calf as well.   Neurological:      General: No focal deficit present.      Mental Status: She is alert and oriented to person, place, and time.   Psychiatric:         Mood and Affect: Mood normal.         Behavior: Behavior normal.        Result Review :          No results found for this or any previous visit.                 My interpretation of imaging:    US Venous Doppler Lower Extremity Bilateral (duplex) (04/04/2024 14:45)         Assessment and Plan {CC Problem List  Visit Diagnosis  ROS  Review (Popup)  Health Maintenance  Quality  BestPractice  Medications  SmartSets  SnapShot Encounters  Media : 23}    Diagnoses and all orders for this visit:    1. Bronchitis (Primary)  Assessment & Plan:  Her bronchitic symptoms at present are associated with allergy symptoms.  She may continue her Dulera and as needed albuterol HFA.      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      3. Lower extremity edema  Assessment & Plan:  A recent venous  study was negative for any evidence of DVT.      4. Obesity (BMI 30-39.9)  Assessment & Plan:  Patient's (Body mass index is 32.22 kg/m².) indicates that they are obese (BMI >30) with health conditions that include hypertension . Weight is unchanged.  Diet is encouraged.  Her exercise capabilities to be limited by her mobility issues.  She will follow-up with her primary care physician regarding her elevated BMI otherwise.            Jesus Guzman MD  4/24/2024  11:48 CDT    Follow Up   Return in about 1 year (around 4/24/2025) for To see me specifically.    Patient was given instructions and counseling regarding her condition or for health maintenance advice. Please see specific information pulled into the AVS if appropriate.

## 2024-04-24 NOTE — ASSESSMENT & PLAN NOTE
Patient's (Body mass index is 32.22 kg/m².) indicates that they are obese (BMI >30) with health conditions that include hypertension . Weight is unchanged.  Diet is encouraged.  Her exercise capabilities to be limited by her mobility issues.  She will follow-up with her primary care physician regarding her elevated BMI otherwise.

## 2024-04-24 NOTE — ASSESSMENT & PLAN NOTE
Her bronchitic symptoms at present are associated with allergy symptoms.  She may continue her Dulera and as needed albuterol HFA.

## 2024-04-24 NOTE — PATIENT INSTRUCTIONS
The patient has had a lot of issues with peripheral edema recently.  She has had some allergy symptoms as well but otherwise has done well from a pulmonary standpoint.  I will see her back in 1 year.

## 2024-04-24 NOTE — PROGRESS NOTES
"Chief Complaint  Follow-up    Subjective        Syndal Waqar Nunez presents to [facility]  History of Present Illness  Topical clindamycin and tacrolimus has been working well for L leg pain previously treated for cellulitis  BP stable, needs refill  Needs refill of vitamin D  Feels well today, ROS neg    Objective   Vital Signs:  /68   Pulse 57   Temp 97.6 °F (36.4 °C)   Resp 18   Ht 149.9 cm (59.02\")   Wt 72.1 kg (159 lb)   SpO2 97%   BMI 32.09 kg/m²   Estimated body mass index is 32.09 kg/m² as calculated from the following:    Height as of this encounter: 149.9 cm (59.02\").    Weight as of this encounter: 72.1 kg (159 lb).             Physical Exam  Vitals and nursing note reviewed.   Constitutional:       General: She is not in acute distress.     Appearance: She is not diaphoretic.   HENT:      Head: Normocephalic and atraumatic.      Nose: Nose normal.   Eyes:      General: No scleral icterus.        Right eye: No discharge.         Left eye: No discharge.      Conjunctiva/sclera: Conjunctivae normal.   Neck:      Trachea: No tracheal deviation.   Pulmonary:      Effort: Pulmonary effort is normal.   Skin:     General: Skin is warm and dry.      Coloration: Skin is not pale.      Comments: Pain and swelling of L posterior leg where cellulitis was previously is vastly better with no tenderness to palpation. No warmth appreciated   Neurological:      Mental Status: She is alert and oriented to person, place, and time.   Psychiatric:         Behavior: Behavior normal.         Thought Content: Thought content normal.         Judgment: Judgment normal.        Result Review :                     Assessment and Plan     Diagnoses and all orders for this visit:    1. Skin rash (Primary)    2. Essential hypertension  -     olmesartan (BENICAR) 20 MG tablet; Take 1 tablet by mouth Daily.  Dispense: 90 tablet; Refill: 2    3. Vitamin D deficiency  -     vitamin D " (ERGOCALCIFEROL) 1.25 MG (91072 UT) capsule capsule; Take 1 capsule by mouth Every 7 (Seven) Days.  Dispense: 12 capsule; Refill: 3    Continue topical abx therapy         Follow Up     Return in about 3 months (around 7/24/2024).  Patient was given instructions and counseling regarding her condition or for health maintenance advice. Please see specific information pulled into the AVS if appropriate.

## 2024-04-30 ENCOUNTER — INFUSION (OUTPATIENT)
Dept: ONCOLOGY | Facility: CLINIC | Age: 84
End: 2024-04-30
Payer: MEDICARE

## 2024-04-30 DIAGNOSIS — Z45.2 ENCOUNTER FOR CARE RELATED TO VASCULAR ACCESS PORT: ICD-10-CM

## 2024-04-30 DIAGNOSIS — C77.4 SECONDARY MALIGNANCY OF INGUINAL LYMPH NODES: ICD-10-CM

## 2024-04-30 DIAGNOSIS — C51.9 VULVAR CANCER, CARCINOMA: Primary | ICD-10-CM

## 2024-04-30 RX ORDER — SODIUM CHLORIDE 0.9 % (FLUSH) 0.9 %
10 SYRINGE (ML) INJECTION AS NEEDED
OUTPATIENT
Start: 2024-04-30

## 2024-04-30 RX ORDER — HEPARIN SODIUM (PORCINE) LOCK FLUSH IV SOLN 100 UNIT/ML 100 UNIT/ML
500 SOLUTION INTRAVENOUS AS NEEDED
OUTPATIENT
Start: 2024-04-30

## 2024-04-30 RX ORDER — SODIUM CHLORIDE 0.9 % (FLUSH) 0.9 %
10 SYRINGE (ML) INJECTION AS NEEDED
Status: DISCONTINUED | OUTPATIENT
Start: 2024-04-30 | End: 2024-04-30 | Stop reason: HOSPADM

## 2024-04-30 RX ORDER — HEPARIN SODIUM (PORCINE) LOCK FLUSH IV SOLN 100 UNIT/ML 100 UNIT/ML
500 SOLUTION INTRAVENOUS AS NEEDED
Status: DISCONTINUED | OUTPATIENT
Start: 2024-04-30 | End: 2024-04-30 | Stop reason: HOSPADM

## 2024-04-30 RX ADMIN — Medication 10 ML: at 13:26

## 2024-04-30 RX ADMIN — HEPARIN SODIUM (PORCINE) LOCK FLUSH IV SOLN 100 UNIT/ML 500 UNITS: 100 SOLUTION at 13:26

## 2024-05-09 RX ORDER — CITALOPRAM 20 MG/1
20 TABLET ORAL DAILY
Qty: 90 TABLET | Refills: 1 | OUTPATIENT
Start: 2024-05-09

## 2024-05-09 RX ORDER — CITALOPRAM 20 MG/1
20 TABLET ORAL DAILY
Qty: 90 TABLET | Refills: 1 | Status: SHIPPED | OUTPATIENT
Start: 2024-05-09

## 2024-05-15 ENCOUNTER — INFUSION (OUTPATIENT)
Dept: ONCOLOGY | Facility: HOSPITAL | Age: 84
End: 2024-05-15
Payer: MEDICARE

## 2024-05-15 ENCOUNTER — LAB (OUTPATIENT)
Dept: LAB | Facility: HOSPITAL | Age: 84
End: 2024-05-15
Payer: MEDICARE

## 2024-05-15 VITALS
BODY MASS INDEX: 32.25 KG/M2 | HEIGHT: 59 IN | SYSTOLIC BLOOD PRESSURE: 150 MMHG | DIASTOLIC BLOOD PRESSURE: 52 MMHG | TEMPERATURE: 98 F | OXYGEN SATURATION: 97 % | HEART RATE: 52 BPM | WEIGHT: 160 LBS | RESPIRATION RATE: 18 BRPM

## 2024-05-15 DIAGNOSIS — D50.8 IRON DEFICIENCY ANEMIA SECONDARY TO INADEQUATE DIETARY IRON INTAKE: ICD-10-CM

## 2024-05-15 DIAGNOSIS — T38.7X5A OSTEOPOROSIS DUE TO ANDROGEN THERAPY: ICD-10-CM

## 2024-05-15 DIAGNOSIS — N18.32 ANEMIA IN STAGE 3B CHRONIC KIDNEY DISEASE: ICD-10-CM

## 2024-05-15 DIAGNOSIS — D63.1 ANEMIA DUE TO STAGE 3A CHRONIC KIDNEY DISEASE: ICD-10-CM

## 2024-05-15 DIAGNOSIS — N18.31 ANEMIA DUE TO STAGE 3A CHRONIC KIDNEY DISEASE: ICD-10-CM

## 2024-05-15 DIAGNOSIS — Z17.1 CARCINOMA OF UPPER-OUTER QUADRANT OF LEFT BREAST IN FEMALE, ESTROGEN RECEPTOR NEGATIVE: ICD-10-CM

## 2024-05-15 DIAGNOSIS — T38.7X5A OSTEOPOROSIS DUE TO ANDROGEN THERAPY: Primary | ICD-10-CM

## 2024-05-15 DIAGNOSIS — N18.32 STAGE 3B CHRONIC KIDNEY DISEASE: ICD-10-CM

## 2024-05-15 DIAGNOSIS — M81.8 OSTEOPOROSIS DUE TO ANDROGEN THERAPY: ICD-10-CM

## 2024-05-15 DIAGNOSIS — M81.8 OSTEOPOROSIS DUE TO ANDROGEN THERAPY: Primary | ICD-10-CM

## 2024-05-15 DIAGNOSIS — C50.412 CARCINOMA OF UPPER-OUTER QUADRANT OF LEFT BREAST IN FEMALE, ESTROGEN RECEPTOR NEGATIVE: ICD-10-CM

## 2024-05-15 DIAGNOSIS — Z17.0 MALIGNANT NEOPLASM OF UPPER-OUTER QUADRANT OF LEFT BREAST IN FEMALE, ESTROGEN RECEPTOR POSITIVE: ICD-10-CM

## 2024-05-15 DIAGNOSIS — C50.412 MALIGNANT NEOPLASM OF UPPER-OUTER QUADRANT OF LEFT BREAST IN FEMALE, ESTROGEN RECEPTOR POSITIVE: ICD-10-CM

## 2024-05-15 DIAGNOSIS — D63.1 ANEMIA IN STAGE 3B CHRONIC KIDNEY DISEASE: ICD-10-CM

## 2024-05-15 LAB
ALBUMIN SERPL-MCNC: 4 G/DL (ref 3.5–5.2)
ALBUMIN/GLOB SERPL: 1.3 G/DL
ALP SERPL-CCNC: 46 U/L (ref 39–117)
ALT SERPL W P-5'-P-CCNC: 11 U/L (ref 1–33)
ANION GAP SERPL CALCULATED.3IONS-SCNC: 8 MMOL/L (ref 5–15)
ANISOCYTOSIS BLD QL: ABNORMAL
AST SERPL-CCNC: 17 U/L (ref 1–32)
BASOPHILS # BLD MANUAL: 0.26 10*3/MM3 (ref 0–0.2)
BASOPHILS NFR BLD MANUAL: 4.2 % (ref 0–1.5)
BILIRUB SERPL-MCNC: 0.2 MG/DL (ref 0–1.2)
BUN SERPL-MCNC: 29 MG/DL (ref 8–23)
BUN/CREAT SERPL: 17.3 (ref 7–25)
CALCIUM SPEC-SCNC: 9.7 MG/DL (ref 8.6–10.5)
CHLORIDE SERPL-SCNC: 105 MMOL/L (ref 98–107)
CO2 SERPL-SCNC: 27 MMOL/L (ref 22–29)
CREAT SERPL-MCNC: 1.68 MG/DL (ref 0.57–1)
DEPRECATED RDW RBC AUTO: 59.8 FL (ref 37–54)
EGFRCR SERPLBLD CKD-EPI 2021: 30.4 ML/MIN/1.73
EOSINOPHIL # BLD MANUAL: 0.44 10*3/MM3 (ref 0–0.4)
EOSINOPHIL NFR BLD MANUAL: 7 % (ref 0.3–6.2)
ERYTHROCYTE [DISTWIDTH] IN BLOOD BY AUTOMATED COUNT: 14.9 % (ref 12.3–15.4)
FERRITIN SERPL-MCNC: 624.6 NG/ML (ref 13–150)
GLOBULIN UR ELPH-MCNC: 3 GM/DL
GLUCOSE SERPL-MCNC: 100 MG/DL (ref 65–99)
HCT VFR BLD AUTO: 28 % (ref 34–46.6)
HGB BLD-MCNC: 8.7 G/DL (ref 12–15.9)
HYPOCHROMIA BLD QL: ABNORMAL
IRON 24H UR-MRATE: 57 MCG/DL (ref 37–145)
IRON SATN MFR SERPL: 19 % (ref 20–50)
LYMPHOCYTES # BLD MANUAL: 0.71 10*3/MM3 (ref 0.7–3.1)
LYMPHOCYTES NFR BLD MANUAL: 5.6 % (ref 5–12)
MACROCYTES BLD QL SMEAR: ABNORMAL
MAGNESIUM SERPL-MCNC: 1.8 MG/DL (ref 1.6–2.4)
MCH RBC QN AUTO: 34.3 PG (ref 26.6–33)
MCHC RBC AUTO-ENTMCNC: 31.1 G/DL (ref 31.5–35.7)
MCV RBC AUTO: 110.2 FL (ref 79–97)
MONOCYTES # BLD: 0.35 10*3/MM3 (ref 0.1–0.9)
NEUTROPHILS # BLD AUTO: 4.52 10*3/MM3 (ref 1.7–7)
NEUTROPHILS NFR BLD MANUAL: 67.6 % (ref 42.7–76)
NEUTS BAND NFR BLD MANUAL: 4.2 % (ref 0–5)
PHOSPHATE SERPL-MCNC: 2.8 MG/DL (ref 2.5–4.5)
PLAT MORPH BLD: NORMAL
PLATELET # BLD AUTO: 155 10*3/MM3 (ref 140–450)
PMV BLD AUTO: 10.4 FL (ref 6–12)
POLYCHROMASIA BLD QL SMEAR: ABNORMAL
POTASSIUM SERPL-SCNC: 4.4 MMOL/L (ref 3.5–5.2)
PROT SERPL-MCNC: 7 G/DL (ref 6–8.5)
RBC # BLD AUTO: 2.54 10*6/MM3 (ref 3.77–5.28)
SODIUM SERPL-SCNC: 140 MMOL/L (ref 136–145)
TIBC SERPL-MCNC: 298 MCG/DL (ref 298–536)
TRANSFERRIN SERPL-MCNC: 200 MG/DL (ref 200–360)
VARIANT LYMPHS NFR BLD MANUAL: 11.3 % (ref 19.6–45.3)
WBC MORPH BLD: NORMAL
WBC NRBC COR # BLD AUTO: 6.29 10*3/MM3 (ref 3.4–10.8)

## 2024-05-15 PROCEDURE — 84466 ASSAY OF TRANSFERRIN: CPT

## 2024-05-15 PROCEDURE — 83540 ASSAY OF IRON: CPT

## 2024-05-15 PROCEDURE — 96372 THER/PROPH/DIAG INJ SC/IM: CPT

## 2024-05-15 PROCEDURE — 85025 COMPLETE CBC W/AUTO DIFF WBC: CPT

## 2024-05-15 PROCEDURE — 82728 ASSAY OF FERRITIN: CPT

## 2024-05-15 PROCEDURE — 25010000002 EPOETIN ALFA-EPBX 40000 UNIT/ML SOLUTION: Performed by: INTERNAL MEDICINE

## 2024-05-15 PROCEDURE — 83735 ASSAY OF MAGNESIUM: CPT

## 2024-05-15 PROCEDURE — 36415 COLL VENOUS BLD VENIPUNCTURE: CPT

## 2024-05-15 PROCEDURE — 84100 ASSAY OF PHOSPHORUS: CPT

## 2024-05-15 PROCEDURE — 25010000002 DENOSUMAB 60 MG/ML SOLUTION PREFILLED SYRINGE: Performed by: INTERNAL MEDICINE

## 2024-05-15 PROCEDURE — 85007 BL SMEAR W/DIFF WBC COUNT: CPT

## 2024-05-15 PROCEDURE — 80053 COMPREHEN METABOLIC PANEL: CPT

## 2024-05-15 RX ADMIN — DENOSUMAB 60 MG: 60 INJECTION SUBCUTANEOUS at 14:38

## 2024-05-15 RX ADMIN — EPOETIN ALFA-EPBX 40000 UNITS: 40000 INJECTION, SOLUTION INTRAVENOUS; SUBCUTANEOUS at 14:35

## 2024-05-17 ENCOUNTER — TELEPHONE (OUTPATIENT)
Dept: ONCOLOGY | Facility: CLINIC | Age: 84
End: 2024-05-17
Payer: MEDICARE

## 2024-05-17 ENCOUNTER — TELEPHONE (OUTPATIENT)
Dept: ONCOLOGY | Facility: CLINIC | Age: 84
End: 2024-05-17

## 2024-05-17 RX ORDER — DIPHENHYDRAMINE HYDROCHLORIDE 50 MG/ML
50 INJECTION INTRAMUSCULAR; INTRAVENOUS AS NEEDED
OUTPATIENT
Start: 2024-05-24

## 2024-05-17 RX ORDER — FAMOTIDINE 10 MG/ML
20 INJECTION, SOLUTION INTRAVENOUS AS NEEDED
OUTPATIENT
Start: 2024-05-24

## 2024-05-17 RX ORDER — SODIUM CHLORIDE 9 MG/ML
250 INJECTION, SOLUTION INTRAVENOUS ONCE
OUTPATIENT
Start: 2024-05-24

## 2024-05-17 RX ORDER — ACETAMINOPHEN 325 MG/1
650 TABLET ORAL ONCE
OUTPATIENT
Start: 2024-05-24

## 2024-05-17 NOTE — TELEPHONE ENCOUNTER
Return call from patient Dago Nunez, reviewed her recent lab results  HGB: 8.7  HCT: 28  Iron Sat: 19%  Patient informed Dr Stafford recommends 1 txt of IV iron txt Injectafer 750 mg with PO Tylenol. Patient v/u    Apt melanie: 5/24/24 @ 10 am  Patient v/u of time and date of apt.

## 2024-05-17 NOTE — TELEPHONE ENCOUNTER
Hub staff attempted to follow warm transfer process and was unsuccessful     Caller: Dago Nunez    Relationship to patient: Self    Best call back number: [phone]    ASK TO LEAVE A MESSAGE IF UNABLE TO REACH     Patient is needing: TO RETURN CALL TO [facility]     SEE SIGNED ENCOUNTER DATED TODAY FOR DETAILS.       PATIENT STATED DID NOT GET VM.

## 2024-05-17 NOTE — TELEPHONE ENCOUNTER
3rd attempt to contact patient, message was left on hr phone requesting a call back so we can discuss her recent lab results and scheduled her txt of IV Iron txt, waiting on call back to patient/ld

## 2024-05-19 ENCOUNTER — HOSPITAL ENCOUNTER (INPATIENT)
Facility: HOSPITAL | Age: 84
LOS: 3 days | Discharge: HOME HEALTH | End: 2024-05-22
Attending: EMERGENCY MEDICINE | Admitting: INTERNAL MEDICINE
Payer: MEDICARE

## 2024-05-19 ENCOUNTER — APPOINTMENT (OUTPATIENT)
Dept: CT IMAGING | Facility: HOSPITAL | Age: 84
End: 2024-05-19
Payer: MEDICARE

## 2024-05-19 ENCOUNTER — APPOINTMENT (OUTPATIENT)
Dept: GENERAL RADIOLOGY | Facility: HOSPITAL | Age: 84
End: 2024-05-19
Payer: MEDICARE

## 2024-05-19 DIAGNOSIS — R13.10 DYSPHAGIA, UNSPECIFIED TYPE: ICD-10-CM

## 2024-05-19 DIAGNOSIS — G45.9 TRANSIENT ISCHEMIC ATTACK (TIA): ICD-10-CM

## 2024-05-19 DIAGNOSIS — N39.0 ACUTE UTI (URINARY TRACT INFECTION): ICD-10-CM

## 2024-05-19 DIAGNOSIS — N17.9 ACUTE RENAL FAILURE SUPERIMPOSED ON CHRONIC KIDNEY DISEASE, UNSPECIFIED ACUTE RENAL FAILURE TYPE, UNSPECIFIED CKD STAGE: ICD-10-CM

## 2024-05-19 DIAGNOSIS — Z74.09 IMPAIRED MOBILITY: ICD-10-CM

## 2024-05-19 DIAGNOSIS — R41.89 COGNITIVE AND BEHAVIORAL CHANGES: ICD-10-CM

## 2024-05-19 DIAGNOSIS — R46.89 COGNITIVE AND BEHAVIORAL CHANGES: ICD-10-CM

## 2024-05-19 DIAGNOSIS — N18.9 ACUTE RENAL FAILURE SUPERIMPOSED ON CHRONIC KIDNEY DISEASE, UNSPECIFIED ACUTE RENAL FAILURE TYPE, UNSPECIFIED CKD STAGE: ICD-10-CM

## 2024-05-19 DIAGNOSIS — I63.9 CEREBROVASCULAR ACCIDENT (CVA), UNSPECIFIED MECHANISM: Primary | ICD-10-CM

## 2024-05-19 LAB
ABO GROUP BLD: NORMAL
ALBUMIN SERPL-MCNC: 3.9 G/DL (ref 3.5–5.2)
ALBUMIN/GLOB SERPL: 1.2 G/DL
ALP SERPL-CCNC: 53 U/L (ref 39–117)
ALT SERPL W P-5'-P-CCNC: 10 U/L (ref 1–33)
ANION GAP SERPL CALCULATED.3IONS-SCNC: 12 MMOL/L (ref 5–15)
AST SERPL-CCNC: 23 U/L (ref 1–32)
BACTERIA UR QL AUTO: ABNORMAL /HPF
BASOPHILS # BLD AUTO: 0.05 10*3/MM3 (ref 0–0.2)
BASOPHILS NFR BLD AUTO: 0.5 % (ref 0–1.5)
BILIRUB SERPL-MCNC: 0.2 MG/DL (ref 0–1.2)
BILIRUB UR QL STRIP: NEGATIVE
BLD GP AB SCN SERPL QL: NEGATIVE
BUN SERPL-MCNC: 32 MG/DL (ref 8–23)
BUN/CREAT SERPL: 14 (ref 7–25)
CALCIUM SPEC-SCNC: 10.3 MG/DL (ref 8.6–10.5)
CHLORIDE SERPL-SCNC: 101 MMOL/L (ref 98–107)
CLARITY UR: ABNORMAL
CO2 SERPL-SCNC: 27 MMOL/L (ref 22–29)
COLOR UR: YELLOW
CREAT SERPL-MCNC: 2.29 MG/DL (ref 0.57–1)
DEPRECATED RDW RBC AUTO: 61.4 FL (ref 37–54)
EGFRCR SERPLBLD CKD-EPI 2021: 21 ML/MIN/1.73
EOSINOPHIL # BLD AUTO: 0.28 10*3/MM3 (ref 0–0.4)
EOSINOPHIL NFR BLD AUTO: 2.9 % (ref 0.3–6.2)
ERYTHROCYTE [DISTWIDTH] IN BLOOD BY AUTOMATED COUNT: 15.2 % (ref 12.3–15.4)
GLOBULIN UR ELPH-MCNC: 3.3 GM/DL
GLUCOSE BLDC GLUCOMTR-MCNC: 128 MG/DL (ref 70–130)
GLUCOSE SERPL-MCNC: 152 MG/DL (ref 65–99)
GLUCOSE UR STRIP-MCNC: NEGATIVE MG/DL
HCT VFR BLD AUTO: 30.2 % (ref 34–46.6)
HGB BLD-MCNC: 9.4 G/DL (ref 12–15.9)
HGB UR QL STRIP.AUTO: NEGATIVE
HOLD SPECIMEN: NORMAL
HOLD SPECIMEN: NORMAL
HYALINE CASTS UR QL AUTO: ABNORMAL /LPF
IMM GRANULOCYTES # BLD AUTO: 0.05 10*3/MM3 (ref 0–0.05)
IMM GRANULOCYTES NFR BLD AUTO: 0.5 % (ref 0–0.5)
INR PPP: 1.04 (ref 0.91–1.09)
KETONES UR QL STRIP: NEGATIVE
LEUKOCYTE ESTERASE UR QL STRIP.AUTO: ABNORMAL
LYMPHOCYTES # BLD AUTO: 1.19 10*3/MM3 (ref 0.7–3.1)
LYMPHOCYTES NFR BLD AUTO: 12.2 % (ref 19.6–45.3)
MCH RBC QN AUTO: 34.6 PG (ref 26.6–33)
MCHC RBC AUTO-ENTMCNC: 31.1 G/DL (ref 31.5–35.7)
MCV RBC AUTO: 111 FL (ref 79–97)
MONOCYTES # BLD AUTO: 0.56 10*3/MM3 (ref 0.1–0.9)
MONOCYTES NFR BLD AUTO: 5.7 % (ref 5–12)
NEUTROPHILS NFR BLD AUTO: 7.62 10*3/MM3 (ref 1.7–7)
NEUTROPHILS NFR BLD AUTO: 78.2 % (ref 42.7–76)
NITRITE UR QL STRIP: NEGATIVE
NRBC BLD AUTO-RTO: 0 /100 WBC (ref 0–0.2)
PH UR STRIP.AUTO: 5.5 [PH] (ref 5–8)
PLATELET # BLD AUTO: 170 10*3/MM3 (ref 140–450)
PMV BLD AUTO: 11.4 FL (ref 6–12)
POTASSIUM SERPL-SCNC: 4.4 MMOL/L (ref 3.5–5.2)
PROT SERPL-MCNC: 7.2 G/DL (ref 6–8.5)
PROT UR QL STRIP: NEGATIVE
PROTHROMBIN TIME: 14 SECONDS (ref 11.8–14.8)
RBC # BLD AUTO: 2.72 10*6/MM3 (ref 3.77–5.28)
RBC # UR STRIP: ABNORMAL /HPF
REF LAB TEST METHOD: ABNORMAL
RH BLD: NEGATIVE
SODIUM SERPL-SCNC: 140 MMOL/L (ref 136–145)
SP GR UR STRIP: 1.01 (ref 1–1.03)
SQUAMOUS #/AREA URNS HPF: ABNORMAL /HPF
T&S EXPIRATION DATE: NORMAL
UROBILINOGEN UR QL STRIP: ABNORMAL
WBC # UR STRIP: ABNORMAL /HPF
WBC NRBC COR # BLD AUTO: 9.75 10*3/MM3 (ref 3.4–10.8)
WHOLE BLOOD HOLD COAG: NORMAL
WHOLE BLOOD HOLD SPECIMEN: NORMAL

## 2024-05-19 PROCEDURE — 25810000003 SODIUM CHLORIDE 0.9 % SOLUTION: Performed by: EMERGENCY MEDICINE

## 2024-05-19 PROCEDURE — 87086 URINE CULTURE/COLONY COUNT: CPT | Performed by: EMERGENCY MEDICINE

## 2024-05-19 PROCEDURE — 82948 REAGENT STRIP/BLOOD GLUCOSE: CPT

## 2024-05-19 PROCEDURE — 93010 ELECTROCARDIOGRAM REPORT: CPT | Performed by: INTERNAL MEDICINE

## 2024-05-19 PROCEDURE — 87077 CULTURE AEROBIC IDENTIFY: CPT | Performed by: EMERGENCY MEDICINE

## 2024-05-19 PROCEDURE — 87186 SC STD MICRODIL/AGAR DIL: CPT | Performed by: EMERGENCY MEDICINE

## 2024-05-19 PROCEDURE — 71045 X-RAY EXAM CHEST 1 VIEW: CPT

## 2024-05-19 PROCEDURE — 80307 DRUG TEST PRSMV CHEM ANLYZR: CPT | Performed by: PSYCHIATRY & NEUROLOGY

## 2024-05-19 PROCEDURE — 86850 RBC ANTIBODY SCREEN: CPT | Performed by: EMERGENCY MEDICINE

## 2024-05-19 PROCEDURE — 86900 BLOOD TYPING SEROLOGIC ABO: CPT | Performed by: EMERGENCY MEDICINE

## 2024-05-19 PROCEDURE — 85610 PROTHROMBIN TIME: CPT | Performed by: EMERGENCY MEDICINE

## 2024-05-19 PROCEDURE — 82607 VITAMIN B-12: CPT

## 2024-05-19 PROCEDURE — 80053 COMPREHEN METABOLIC PANEL: CPT | Performed by: EMERGENCY MEDICINE

## 2024-05-19 PROCEDURE — 81001 URINALYSIS AUTO W/SCOPE: CPT | Performed by: EMERGENCY MEDICINE

## 2024-05-19 PROCEDURE — 70450 CT HEAD/BRAIN W/O DYE: CPT

## 2024-05-19 PROCEDURE — 86901 BLOOD TYPING SEROLOGIC RH(D): CPT | Performed by: EMERGENCY MEDICINE

## 2024-05-19 PROCEDURE — 99285 EMERGENCY DEPT VISIT HI MDM: CPT

## 2024-05-19 PROCEDURE — 93005 ELECTROCARDIOGRAM TRACING: CPT | Performed by: EMERGENCY MEDICINE

## 2024-05-19 PROCEDURE — 85025 COMPLETE CBC W/AUTO DIFF WBC: CPT | Performed by: EMERGENCY MEDICINE

## 2024-05-19 PROCEDURE — 82746 ASSAY OF FOLIC ACID SERUM: CPT

## 2024-05-19 RX ORDER — ASPIRIN 81 MG/1
324 TABLET, CHEWABLE ORAL ONCE
Status: COMPLETED | OUTPATIENT
Start: 2024-05-19 | End: 2024-05-19

## 2024-05-19 RX ORDER — NITROFURANTOIN 25; 75 MG/1; MG/1
100 CAPSULE ORAL ONCE
Status: COMPLETED | OUTPATIENT
Start: 2024-05-19 | End: 2024-05-19

## 2024-05-19 RX ORDER — SODIUM CHLORIDE 0.9 % (FLUSH) 0.9 %
10 SYRINGE (ML) INJECTION AS NEEDED
Status: DISCONTINUED | OUTPATIENT
Start: 2024-05-19 | End: 2024-05-22 | Stop reason: HOSPADM

## 2024-05-19 RX ADMIN — SODIUM CHLORIDE 1000 ML: 9 INJECTION, SOLUTION INTRAVENOUS at 22:23

## 2024-05-19 RX ADMIN — NITROFURANTOIN MONOHYDRATE/MACROCRYSTALLINE 100 MG: 25; 75 CAPSULE ORAL at 22:23

## 2024-05-19 RX ADMIN — ASPIRIN 324 MG: 81 TABLET, CHEWABLE ORAL at 22:43

## 2024-05-19 NOTE — Clinical Note
Level of Care: Telemetry [5]   Diagnosis: CVA (cerebral vascular accident) [[id number]]   Admitting Physician: TORRI SUAREZ [[id number]]   Attending Physician: TORRI SUAREZ [[id number]]   Certification: I Certify That Inpatient Hospital Services Are Medically Necessary For Greater Than 2 Midnights

## 2024-05-20 ENCOUNTER — APPOINTMENT (OUTPATIENT)
Dept: MRI IMAGING | Facility: HOSPITAL | Age: 84
End: 2024-05-20
Payer: MEDICARE

## 2024-05-20 ENCOUNTER — APPOINTMENT (OUTPATIENT)
Dept: CARDIOLOGY | Facility: HOSPITAL | Age: 84
End: 2024-05-20
Payer: MEDICARE

## 2024-05-20 ENCOUNTER — APPOINTMENT (OUTPATIENT)
Dept: ULTRASOUND IMAGING | Facility: HOSPITAL | Age: 84
End: 2024-05-20
Payer: MEDICARE

## 2024-05-20 PROBLEM — D64.9 ANEMIA: Status: ACTIVE | Noted: 2019-11-11

## 2024-05-20 LAB
AMPHET+METHAMPHET UR QL: NEGATIVE
AMPHETAMINES UR QL: NEGATIVE
ANION GAP SERPL CALCULATED.3IONS-SCNC: 9 MMOL/L (ref 5–15)
BARBITURATES UR QL SCN: NEGATIVE
BENZODIAZ UR QL SCN: NEGATIVE
BH CV ECHO MEAS - AO MAX PG: 10.4 MMHG
BH CV ECHO MEAS - AO MEAN PG: 6 MMHG
BH CV ECHO MEAS - AO ROOT DIAM: 2.7 CM
BH CV ECHO MEAS - AO V2 MAX: 161 CM/SEC
BH CV ECHO MEAS - AO V2 VTI: 44.6 CM
BH CV ECHO MEAS - AVA(I,D): 2.8 CM2
BH CV ECHO MEAS - EDV(CUBED): 35.9 ML
BH CV ECHO MEAS - EDV(MOD-SP2): 113 ML
BH CV ECHO MEAS - EDV(MOD-SP4): 85.4 ML
BH CV ECHO MEAS - EF(MOD-BP): 69.1 %
BH CV ECHO MEAS - EF(MOD-SP2): 75.2 %
BH CV ECHO MEAS - EF(MOD-SP4): 62.4 %
BH CV ECHO MEAS - ESV(CUBED): 6.9 ML
BH CV ECHO MEAS - ESV(MOD-SP2): 28 ML
BH CV ECHO MEAS - ESV(MOD-SP4): 32.1 ML
BH CV ECHO MEAS - FS: 42.4 %
BH CV ECHO MEAS - IVS/LVPW: 0.92 CM
BH CV ECHO MEAS - IVSD: 1.2 CM
BH CV ECHO MEAS - LA DIMENSION: 4.5 CM
BH CV ECHO MEAS - LAT PEAK E' VEL: 11.4 CM/SEC
BH CV ECHO MEAS - LV DIASTOLIC VOL/BSA (35-75): 52.9 CM2
BH CV ECHO MEAS - LV MASS(C)D: 133 GRAMS
BH CV ECHO MEAS - LV MAX PG: 5.8 MMHG
BH CV ECHO MEAS - LV MEAN PG: 3 MMHG
BH CV ECHO MEAS - LV SYSTOLIC VOL/BSA (12-30): 19.9 CM2
BH CV ECHO MEAS - LV V1 MAX: 120 CM/SEC
BH CV ECHO MEAS - LV V1 VTI: 36.3 CM
BH CV ECHO MEAS - LVIDD: 3.3 CM
BH CV ECHO MEAS - LVIDS: 1.9 CM
BH CV ECHO MEAS - LVOT AREA: 3.5 CM2
BH CV ECHO MEAS - LVOT DIAM: 2.1 CM
BH CV ECHO MEAS - LVPWD: 1.3 CM
BH CV ECHO MEAS - MED PEAK E' VEL: 6.5 CM/SEC
BH CV ECHO MEAS - MV A MAX VEL: 139 CM/SEC
BH CV ECHO MEAS - MV DEC SLOPE: 477 CM/SEC2
BH CV ECHO MEAS - MV DEC TIME: 0.25 SEC
BH CV ECHO MEAS - MV E MAX VEL: 121 CM/SEC
BH CV ECHO MEAS - MV E/A: 0.87
BH CV ECHO MEAS - PA V2 MAX: 126 CM/SEC
BH CV ECHO MEAS - RAP SYSTOLE: 10 MMHG
BH CV ECHO MEAS - RVSP: 39.8 MMHG
BH CV ECHO MEAS - SV(LVOT): 125.7 ML
BH CV ECHO MEAS - SV(MOD-SP2): 85 ML
BH CV ECHO MEAS - SV(MOD-SP4): 53.3 ML
BH CV ECHO MEAS - SVI(LVOT): 77.9 ML/M2
BH CV ECHO MEAS - SVI(MOD-SP2): 52.6 ML/M2
BH CV ECHO MEAS - SVI(MOD-SP4): 33 ML/M2
BH CV ECHO MEAS - TAPSE (>1.6): 2.48 CM
BH CV ECHO MEAS - TR MAX PG: 29.8 MMHG
BH CV ECHO MEAS - TR MAX VEL: 273 CM/SEC
BH CV ECHO MEASUREMENTS AVERAGE E/E' RATIO: 13.52
BH CV XLRA - TDI S': 11.2 CM/SEC
BUN SERPL-MCNC: 31 MG/DL (ref 8–23)
BUN/CREAT SERPL: 15.4 (ref 7–25)
BUPRENORPHINE SERPL-MCNC: NEGATIVE NG/ML
CALCIUM SPEC-SCNC: 9 MG/DL (ref 8.6–10.5)
CANNABINOIDS SERPL QL: NEGATIVE
CHLORIDE SERPL-SCNC: 105 MMOL/L (ref 98–107)
CHOLEST SERPL-MCNC: 226 MG/DL (ref 0–200)
CO2 SERPL-SCNC: 27 MMOL/L (ref 22–29)
COCAINE UR QL: NEGATIVE
CREAT SERPL-MCNC: 2.01 MG/DL (ref 0.57–1)
DEPRECATED RDW RBC AUTO: 60.1 FL (ref 37–54)
EGFRCR SERPLBLD CKD-EPI 2021: 24.5 ML/MIN/1.73
ERYTHROCYTE [DISTWIDTH] IN BLOOD BY AUTOMATED COUNT: 15.2 % (ref 12.3–15.4)
FENTANYL UR-MCNC: NEGATIVE NG/ML
GLUCOSE BLDC GLUCOMTR-MCNC: 104 MG/DL (ref 70–130)
GLUCOSE SERPL-MCNC: 82 MG/DL (ref 65–99)
HBA1C MFR BLD: 5.8 % (ref 4.8–5.6)
HCT VFR BLD AUTO: 25.3 % (ref 34–46.6)
HDLC SERPL-MCNC: 47 MG/DL (ref 40–60)
HGB BLD-MCNC: 7.7 G/DL (ref 12–15.9)
LDLC SERPL CALC-MCNC: 151 MG/DL (ref 0–100)
LDLC/HDLC SERPL: 3.16 {RATIO}
LEFT ATRIUM VOLUME INDEX: 42.4 ML/M2
MCH RBC QN AUTO: 34.1 PG (ref 26.6–33)
MCHC RBC AUTO-ENTMCNC: 30.4 G/DL (ref 31.5–35.7)
MCV RBC AUTO: 111.9 FL (ref 79–97)
METHADONE UR QL SCN: NEGATIVE
OPIATES UR QL: POSITIVE
OXYCODONE UR QL SCN: NEGATIVE
PCP UR QL SCN: NEGATIVE
PLATELET # BLD AUTO: 143 10*3/MM3 (ref 140–450)
PMV BLD AUTO: 11 FL (ref 6–12)
POTASSIUM SERPL-SCNC: 3.9 MMOL/L (ref 3.5–5.2)
RBC # BLD AUTO: 2.26 10*6/MM3 (ref 3.77–5.28)
SODIUM SERPL-SCNC: 141 MMOL/L (ref 136–145)
TRICYCLICS UR QL SCN: NEGATIVE
TRIGL SERPL-MCNC: 153 MG/DL (ref 0–150)
VLDLC SERPL-MCNC: 28 MG/DL (ref 5–40)
WBC NRBC COR # BLD AUTO: 5.71 10*3/MM3 (ref 3.4–10.8)

## 2024-05-20 PROCEDURE — 97161 PT EVAL LOW COMPLEX 20 MIN: CPT | Performed by: PHYSICAL THERAPIST

## 2024-05-20 PROCEDURE — 80048 BASIC METABOLIC PNL TOTAL CA: CPT | Performed by: INTERNAL MEDICINE

## 2024-05-20 PROCEDURE — 94799 UNLISTED PULMONARY SVC/PX: CPT

## 2024-05-20 PROCEDURE — 97116 GAIT TRAINING THERAPY: CPT

## 2024-05-20 PROCEDURE — 83036 HEMOGLOBIN GLYCOSYLATED A1C: CPT | Performed by: INTERNAL MEDICINE

## 2024-05-20 PROCEDURE — 82948 REAGENT STRIP/BLOOD GLUCOSE: CPT

## 2024-05-20 PROCEDURE — 93970 EXTREMITY STUDY: CPT | Performed by: SURGERY

## 2024-05-20 PROCEDURE — 93306 TTE W/DOPPLER COMPLETE: CPT | Performed by: EMERGENCY MEDICINE

## 2024-05-20 PROCEDURE — 25510000001 PERFLUTREN 6.52 MG/ML SUSPENSION: Performed by: INTERNAL MEDICINE

## 2024-05-20 PROCEDURE — 94664 DEMO&/EVAL PT USE INHALER: CPT

## 2024-05-20 PROCEDURE — 85027 COMPLETE CBC AUTOMATED: CPT | Performed by: INTERNAL MEDICINE

## 2024-05-20 PROCEDURE — 99223 1ST HOSP IP/OBS HIGH 75: CPT | Performed by: PSYCHIATRY & NEUROLOGY

## 2024-05-20 PROCEDURE — 92610 EVALUATE SWALLOWING FUNCTION: CPT | Performed by: SPEECH-LANGUAGE PATHOLOGIST

## 2024-05-20 PROCEDURE — 97530 THERAPEUTIC ACTIVITIES: CPT

## 2024-05-20 PROCEDURE — 93880 EXTRACRANIAL BILAT STUDY: CPT | Performed by: SURGERY

## 2024-05-20 PROCEDURE — 97166 OT EVAL MOD COMPLEX 45 MIN: CPT

## 2024-05-20 PROCEDURE — 93306 TTE W/DOPPLER COMPLETE: CPT

## 2024-05-20 PROCEDURE — 83721 ASSAY OF BLOOD LIPOPROTEIN: CPT | Performed by: PSYCHIATRY & NEUROLOGY

## 2024-05-20 PROCEDURE — 70544 MR ANGIOGRAPHY HEAD W/O DYE: CPT

## 2024-05-20 PROCEDURE — 80061 LIPID PANEL: CPT | Performed by: INTERNAL MEDICINE

## 2024-05-20 PROCEDURE — 93880 EXTRACRANIAL BILAT STUDY: CPT

## 2024-05-20 PROCEDURE — 70551 MRI BRAIN STEM W/O DYE: CPT

## 2024-05-20 PROCEDURE — 93970 EXTREMITY STUDY: CPT

## 2024-05-20 RX ORDER — PANTOPRAZOLE SODIUM 40 MG/1
40 TABLET, DELAYED RELEASE ORAL
Status: DISCONTINUED | OUTPATIENT
Start: 2024-05-20 | End: 2024-05-22 | Stop reason: HOSPADM

## 2024-05-20 RX ORDER — ATORVASTATIN CALCIUM 40 MG/1
80 TABLET, FILM COATED ORAL NIGHTLY
Status: DISCONTINUED | OUTPATIENT
Start: 2024-05-20 | End: 2024-05-22 | Stop reason: HOSPADM

## 2024-05-20 RX ORDER — ERGOCALCIFEROL 1.25 MG/1
50000 CAPSULE ORAL WEEKLY
COMMUNITY

## 2024-05-20 RX ORDER — FUROSEMIDE 40 MG/1
40 TABLET ORAL
Status: DISCONTINUED | OUTPATIENT
Start: 2024-05-20 | End: 2024-05-22 | Stop reason: HOSPADM

## 2024-05-20 RX ORDER — PRAVASTATIN SODIUM 40 MG
40 TABLET ORAL NIGHTLY
COMMUNITY
End: 2024-05-22 | Stop reason: SDUPTHER

## 2024-05-20 RX ORDER — DIPHENOXYLATE HYDROCHLORIDE AND ATROPINE SULFATE 2.5; .025 MG/1; MG/1
2 TABLET ORAL 4 TIMES DAILY PRN
COMMUNITY

## 2024-05-20 RX ORDER — ALPRAZOLAM 0.5 MG/1
0.5 TABLET ORAL ONCE
Status: COMPLETED | OUTPATIENT
Start: 2024-05-20 | End: 2024-05-20

## 2024-05-20 RX ORDER — LETROZOLE 2.5 MG/1
2.5 TABLET, FILM COATED ORAL DAILY
Status: DISCONTINUED | OUTPATIENT
Start: 2024-05-20 | End: 2024-05-22 | Stop reason: HOSPADM

## 2024-05-20 RX ORDER — CITALOPRAM 20 MG/1
20 TABLET ORAL DAILY
Status: DISCONTINUED | OUTPATIENT
Start: 2024-05-20 | End: 2024-05-22 | Stop reason: HOSPADM

## 2024-05-20 RX ORDER — POTASSIUM CHLORIDE 1500 MG/1
40 TABLET, EXTENDED RELEASE ORAL DAILY
COMMUNITY

## 2024-05-20 RX ORDER — CYANOCOBALAMIN 1000 UG/ML
1000 INJECTION, SOLUTION INTRAMUSCULAR; SUBCUTANEOUS
COMMUNITY

## 2024-05-20 RX ORDER — ASPIRIN 81 MG/1
81 TABLET, CHEWABLE ORAL DAILY
Status: DISCONTINUED | OUTPATIENT
Start: 2024-05-20 | End: 2024-05-22 | Stop reason: HOSPADM

## 2024-05-20 RX ORDER — LETROZOLE 2.5 MG/1
2.5 TABLET, FILM COATED ORAL DAILY
COMMUNITY

## 2024-05-20 RX ORDER — HYDROCODONE BITARTRATE AND ACETAMINOPHEN 10; 325 MG/1; MG/1
0.5 TABLET ORAL EVERY 4 HOURS PRN
Status: DISCONTINUED | OUTPATIENT
Start: 2024-05-20 | End: 2024-05-22 | Stop reason: HOSPADM

## 2024-05-20 RX ORDER — ALBUTEROL SULFATE 90 UG/1
2 AEROSOL, METERED RESPIRATORY (INHALATION)
Status: DISCONTINUED | OUTPATIENT
Start: 2024-05-20 | End: 2024-05-20 | Stop reason: CLARIF

## 2024-05-20 RX ORDER — BUDESONIDE AND FORMOTEROL FUMARATE DIHYDRATE 160; 4.5 UG/1; UG/1
2 AEROSOL RESPIRATORY (INHALATION)
Status: DISCONTINUED | OUTPATIENT
Start: 2024-05-20 | End: 2024-05-22

## 2024-05-20 RX ORDER — SODIUM CHLORIDE 0.9 % (FLUSH) 0.9 %
10 SYRINGE (ML) INJECTION EVERY 12 HOURS SCHEDULED
Status: DISCONTINUED | OUTPATIENT
Start: 2024-05-20 | End: 2024-05-22 | Stop reason: HOSPADM

## 2024-05-20 RX ORDER — CETIRIZINE HYDROCHLORIDE 10 MG/1
10 TABLET ORAL DAILY
Status: DISCONTINUED | OUTPATIENT
Start: 2024-05-20 | End: 2024-05-22 | Stop reason: HOSPADM

## 2024-05-20 RX ORDER — GABAPENTIN 300 MG/1
300 CAPSULE ORAL 2 TIMES DAILY
Status: DISCONTINUED | OUTPATIENT
Start: 2024-05-20 | End: 2024-05-22 | Stop reason: HOSPADM

## 2024-05-20 RX ORDER — SODIUM CHLORIDE 0.9 % (FLUSH) 0.9 %
10 SYRINGE (ML) INJECTION AS NEEDED
Status: DISCONTINUED | OUTPATIENT
Start: 2024-05-20 | End: 2024-05-22 | Stop reason: HOSPADM

## 2024-05-20 RX ORDER — ASPIRIN 300 MG/1
300 SUPPOSITORY RECTAL DAILY
Status: DISCONTINUED | OUTPATIENT
Start: 2024-05-20 | End: 2024-05-22 | Stop reason: HOSPADM

## 2024-05-20 RX ORDER — CLOPIDOGREL BISULFATE 75 MG/1
75 TABLET ORAL DAILY
Status: DISCONTINUED | OUTPATIENT
Start: 2024-05-20 | End: 2024-05-22 | Stop reason: HOSPADM

## 2024-05-20 RX ORDER — METOLAZONE 2.5 MG/1
2.5 TABLET ORAL 3 TIMES WEEKLY
Status: DISCONTINUED | OUTPATIENT
Start: 2024-05-20 | End: 2024-05-22 | Stop reason: HOSPADM

## 2024-05-20 RX ORDER — ALBUTEROL SULFATE 2.5 MG/3ML
2.5 SOLUTION RESPIRATORY (INHALATION)
Status: DISCONTINUED | OUTPATIENT
Start: 2024-05-20 | End: 2024-05-22 | Stop reason: HOSPADM

## 2024-05-20 RX ORDER — SACCHAROMYCES BOULARDII 250 MG
250 CAPSULE ORAL 2 TIMES DAILY
Status: DISCONTINUED | OUTPATIENT
Start: 2024-05-20 | End: 2024-05-22 | Stop reason: HOSPADM

## 2024-05-20 RX ORDER — SODIUM CHLORIDE 9 MG/ML
40 INJECTION, SOLUTION INTRAVENOUS AS NEEDED
Status: DISCONTINUED | OUTPATIENT
Start: 2024-05-20 | End: 2024-05-22 | Stop reason: HOSPADM

## 2024-05-20 RX ADMIN — CITALOPRAM 20 MG: 20 TABLET, FILM COATED ORAL at 09:14

## 2024-05-20 RX ADMIN — ALBUTEROL SULFATE 2.5 MG: 2.5 SOLUTION RESPIRATORY (INHALATION) at 14:03

## 2024-05-20 RX ADMIN — Medication 250 MG: at 22:00

## 2024-05-20 RX ADMIN — PERFLUTREN 6.52 MG: 6.52 INJECTION, SUSPENSION INTRAVENOUS at 12:48

## 2024-05-20 RX ADMIN — CETIRIZINE HYDROCHLORIDE 10 MG: 10 TABLET ORAL at 09:14

## 2024-05-20 RX ADMIN — ATORVASTATIN CALCIUM 80 MG: 40 TABLET ORAL at 22:00

## 2024-05-20 RX ADMIN — Medication 250 MG: at 09:14

## 2024-05-20 RX ADMIN — ALBUTEROL SULFATE 2.5 MG: 2.5 SOLUTION RESPIRATORY (INHALATION) at 19:20

## 2024-05-20 RX ADMIN — GABAPENTIN 300 MG: 300 CAPSULE ORAL at 09:14

## 2024-05-20 RX ADMIN — PANTOPRAZOLE SODIUM 40 MG: 40 TABLET, DELAYED RELEASE ORAL at 17:38

## 2024-05-20 RX ADMIN — ALPRAZOLAM 0.5 MG: 0.5 TABLET ORAL at 16:01

## 2024-05-20 RX ADMIN — ASPIRIN 81 MG CHEWABLE TABLET 81 MG: 81 TABLET CHEWABLE at 09:14

## 2024-05-20 RX ADMIN — Medication 10 ML: at 02:13

## 2024-05-20 RX ADMIN — ALBUTEROL SULFATE 2.5 MG: 2.5 SOLUTION RESPIRATORY (INHALATION) at 05:44

## 2024-05-20 RX ADMIN — FUROSEMIDE 40 MG: 40 TABLET ORAL at 09:14

## 2024-05-20 RX ADMIN — CLOPIDOGREL BISULFATE 75 MG: 75 TABLET, FILM COATED ORAL at 13:08

## 2024-05-20 RX ADMIN — METOLAZONE 2.5 MG: 2.5 TABLET ORAL at 09:14

## 2024-05-20 RX ADMIN — Medication 10 ML: at 22:00

## 2024-05-20 RX ADMIN — LETROZOLE 2.5 MG: 2.5 TABLET ORAL at 09:14

## 2024-05-20 RX ADMIN — Medication 10 ML: at 09:15

## 2024-05-20 RX ADMIN — FUROSEMIDE 40 MG: 40 TABLET ORAL at 17:38

## 2024-05-20 NOTE — PLAN OF CARE
Goal Outcome Evaluation:      Pt is !&Ox4 and afebrile. She went for multiple tests today and some are still pending. ECHO showed and EF of 66-70%. MRI and ultrasounds pending. No complaints of pain. Up x2 assist to bathroom. Tele on.

## 2024-05-20 NOTE — THERAPY EVALUATION
Patient Name: Dago Nunez  : 1940    MRN: [id number]                              Today's Date: 2024       Admit Date: 2024    Visit Dx:     ICD-10-CM ICD-9-CM   1. Cerebrovascular accident (CVA), unspecified mechanism  I63.9 434.91   2. Acute UTI (urinary tract infection)  N39.0 599.0   3. Acute renal failure superimposed on chronic kidney disease, unspecified acute renal failure type, unspecified CKD stage  N17.9 584.9    N18.9 585.9   4. Dysphagia, unspecified type  R13.10 787.20     Patient Active Problem List   Diagnosis    Meatal stenosis    Retention of urine    Type 2 diabetes mellitus, without long-term current use of insulin    Essential hypertension    Grief reaction    Vulvar intraepithelial neoplasia (MOODY) grade 3    Chronic midline low back pain without sciatica    Bilateral lower extremity edema    History of urethral stricture    Epidermal cyst of neck    Normocytic anemia    Neck abscess    Mixed hyperlipidemia    Gastroesophageal reflux disease without esophagitis    Anxiety    Iron deficiency and chemotherapy induced anemia    Stage 3 chronic kidney disease    Anemia in stage 3 chronic kidney disease    Screening for breast cancer    Lower extremity edema    Vulvar cancer, carcinoma    Former smoker    Secondary malignancy of inguinal lymph nodes    Febrile illness    Chemotherapy-induced thrombocytopenia    Hyponatremia    Hypokalemia    Neutropenic fever    Moderate malnutrition    Antineoplastic chemotherapy induced anemia    History of radiation therapy    Encounter for care related to Port-a-Cath    Malignant neoplasm of upper-outer quadrant of left breast in female, estrogen receptor positive    Encounter for care related to vascular access port    Angiodysplasia    Bronchitis    Obesity (BMI 30-39.9)    Wheezing    Cough    Post-COVID-19 condition    Radiation induced proctitis    Rectal bleeding    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    CVA (cerebral vascular  accident)     Past Medical History:   Diagnosis Date    Anemia in stage 3 chronic kidney disease 11/11/2019    Arthritis     Breast cancer 09/14/2021    Left Mastectomy w/ sentinel node Bx    Bronchitis     Cellulitis     ROSA LEGS    GERD (gastroesophageal reflux disease)     Hyperlipidemia     Hypertension     Kyphosis     Osteoporosis     Personal history of COVID-19 05/2022    Scoliosis     Self-catheterizes urinary bladder     10FR SIZED CATH    Stage 3 chronic kidney disease 11/11/2019    Type 2 diabetes mellitus     Urethral meatal stenosis 09/2022    w/ urine retention    Vulva cancer     Vulvar intraepithelial neoplasia (MOODY) grade 3      Past Surgical History:   Procedure Laterality Date    APPENDECTOMY      BENJAMIN PROCEDURE      No evidence of reflux disease while on Nexium 40mg daily-See report    BREAST BIOPSY      BREAST CYST ASPIRATION Left     BREAST LUMPECTOMY      COLONOSCOPY  01/12/2011    Diverticulosis sigmoid colon; The examination was otherwise normal; Repeat 10 years    COLONOSCOPY  11/03/2003    Dr. Laguerre-Normal colonoscopy; Normal terminal ileum; Repeat 5 years    COLONOSCOPY N/A 11/05/2021    Petechia(e) in the rectum, in the recto-sigmoid colon and in the distal sigmoid colon; No specimens collected; No plans to repeat colonoscopy due to advance age and/or medical problems    CYSTOSCOPY N/A 09/20/2022    Procedure: CYSTOSCOPY WITH URETHRAL DILATATION;  Surgeon: Shaheen Conway MD;  Location: [facility] OR;  Service: Urology;  Laterality: N/A;    ENDOSCOPY  07/01/2014    Normal esophagus; Normal stomach; Normal examined duodenum; BRAVO pH capsule deployed;     ENDOSCOPY  08/14/2013    Mild gastritis-biopsies for H.Pylor obtained    ENDOSCOPY  02/16/2005    Dr. LaguerreIpgry-Fqwggprat-wmhlldmf    ENDOSCOPY  10/21/2003    Dr. Laguerre-Stage 1 reflux esophagitis    ENDOSCOPY N/A 11/05/2021    Small HH; A single gastroesophageal junction polyp; Normal stomach; A single non-bleeding angiodysplastic  lesion in the duodenum    HYSTERECTOMY      MASTECTOMY W/ SENTINEL NODE BIOPSY Left 09/14/2021    Procedure: LEFT PARTIAL MASTECTOMY WITH MAGSEED AND LEFT SENTINEL LYMPH NODE BIOPSY MAGTRACE;  Surgeon: Quynh Rosario MD;  Location:  [facility] OR;  Service: General;  Laterality: Left;    TONSILLECTOMY      VAGINA SURGERY      Laser surgery X 2    VENOUS ACCESS DEVICE (PORT) INSERTION N/A 09/29/2020    Procedure: SINGLE LUMEN PORT - A- CATH PLACEMENT WITH FLUOROSCOPY;  Surgeon: Quynh Rosario MD;  Location:  [facility] OR;  Service: General;  Laterality: N/A;      General Information       Row Name 05/20/24 0800          OT Time and Intention    Document Type evaluation  Pt. presents TIA with LUE and LLE weakness with dysarthria and UTI. Pt. hx of scoliosis, CKD, GERD, and L mastectomy.  -CS (r) HH (t) CS (c)     Mode of Treatment occupational therapy  -CS (r) HH (t) CS (c)       Row Name 05/20/24 0800          General Information    Patient Profile Reviewed yes  -CS (r) HH (t) CS (c)     Prior Level of Function min assist:;dressing;bathing;independent:;all household mobility  -CS (r) HH (t) CS (c)     Existing Precautions/Restrictions fall  -CS (r) HH (t) CS (c)     Barriers to Rehab cognitive status;medically complex  Pt. presents with UTI  -CS (r) HH (t) CS (c)       Row Name 05/20/24 0800          Occupational Profile    Occupational History/Life Experiences (Occupational Profile) Pt. lives with spouse  -CS (r) HH (t) CS (c)     Environmental Supports and Barriers (Occupational Profile) bi-level home, ramp to enter, cane/FWW, tub bench, grab bars  Pt. reports staying on main level of home  -CS (r) HH (t) CS (c)       Row Name 05/20/24 0800          Living Environment    People in Home spouse  -CS (r) HH (t) CS (c)       Row Name 05/20/24 00          Home Main Entrance    Number of Stairs, Main Entrance none  -CS (r) HH (t) CS (c)     Stair Railings, Main Entrance none  -CS (r) HH (t) CS (c)       Row Name  05/20/24 0800          Cognition    Orientation Status (Cognition) oriented x 3  -CS (r) HH (t) CS (c)       Row Name 05/20/24 0800          Safety Issues, Functional Mobility    Safety Issues Affecting Function (Mobility) impulsivity;judgment;awareness of need for assistance;ability to follow commands  -CS (r) HH (t) CS (c)     Impairments Affecting Function (Mobility) balance;cognition;endurance/activity tolerance;pain;strength;postural/trunk control  -CS (r) HH (t) CS (c)     Cognitive Impairments, Mobility Safety/Performance impulsivity;judgment;attention  -CS (r) HH (t) CS (c)               User Key  (r) = Recorded By, (t) = Taken By, (c) = Cosigned By      Initials Name Provider Type    CS Michelle Ortega, OTR/L, CNT Occupational Therapist     Narcisa Baca, OT Student OT Student                     Mobility/ADL's       Row Name 05/20/24 0800          Bed Mobility    Bed Mobility supine-sit  -CS (r) HH (t) CS (c)     Supine-Sit [place] (Bed Mobility) contact guard;verbal cues  -CS (r) HH (t) CS (c)     Assistive Device (Bed Mobility) bed rails;head of bed elevated  -CS (r) HH (t) CS (c)       Row Name 05/20/24 0800          Transfers    Transfers sit-stand transfer;stand-sit transfer;toilet transfer  -CS (r) HH (t) CS (c)       Row Name 05/20/24 0800          Sit-Stand Transfer    Sit-Stand [place] (Transfers) contact guard  -CS (r) HH (t) CS (c)     Assistive Device (Sit-Stand Transfers) walker, front-wheeled  -CS (r) HH (t) CS (c)       Row Name 05/20/24 0800          Stand-Sit Transfer    Stand-Sit [place] (Transfers) contact guard  -CS (r) HH (t) CS (c)     Assistive Device (Stand-Sit Transfers) walker, front-wheeled  -CS (r) HH (t) CS (c)       Row Name 05/20/24 0800          Toilet Transfer    Type (Toilet Transfer) sit-stand;stand-sit  -CS (r) HH (t) CS (c)     [place] Level (Toilet Transfer) verbal cues;minimum assist (75% patient effort)  -CS (r) HH (t) CS (c)     Assistive  Device (Toilet Transfer) walker, front-wheeled  -CS (r) HH (t) CS (c)       Row Name 05/20/24 0800          Functional Mobility    Functional Mobility- Ind. Level verbal cues required;contact guard assist;nonverbal cues required (demo/gesture)  -CS (r) HH (t) CS (c)     Functional Mobility- Device walker, front-wheeled  -CS (r) HH (t) CS (c)     Functional Mobility- Safety Issues balance decreased during turns;sequencing ability decreased  -CS (r) HH (t) CS (c)       Row Name 05/20/24 0800          Activities of Daily Living    BADL Assessment/Intervention toileting  -CS (r) HH (t) CS (c)       Row Name 05/20/24 0800          Toileting Assessment/Training    [place] Level (Toileting) adjust/manage clothing;change pad/brief;moderate assist (50% patient effort);perform perineal hygiene;contact guard assist  -CS (r) HH (t) CS (c)               User Key  (r) = Recorded By, (t) = Taken By, (c) = Cosigned By      Initials Name Provider Type    CS Michelle Ortega S, OTR/L, CNT Occupational Therapist    HH Narcisa Baca, OT Student OT Student                   Obj/Interventions       Row Name 05/20/24 0800          Sensory Assessment (Somatosensory)    Sensory Assessment (Somatosensory) sensation intact  -CS (r) HH (t) CS (c)       Row Name 05/20/24 0800          Vision Assessment/Intervention    Visual Impairment/Limitations other (see comments)  Cataracts present  -CS (r) HH (t) CS (c)       Row Name 05/20/24 0800          Range of Motion Comprehensive    General Range of Motion bilateral upper extremity ROM WFL  -CS (r) HH (t) CS (c)       Row Name 05/20/24 0800          Strength Comprehensive (MMT)    General Manual Muscle Testing (MMT) Assessment upper extremity strength deficits identified  -CS (r) HH (t) CS (c)     Comment, General Manual Muscle Testing (MMT) Assessment LUE 3+/5 and RUE 3/5. Pt. effort limited this date due to cognitive status.  -CS (r) HH (t) CS (c)       Row Name 05/20/24 0800          Balance     Balance Assessment sitting static balance;sitting dynamic balance;standing static balance;standing dynamic balance;sit to stand dynamic balance  -CS (r) HH (t) CS (c)     Static Sitting Balance independent  -CS (r) HH (t) CS (c)     Dynamic Sitting Balance contact guard  -CS (r) HH (t) CS (c)     Static Standing Balance contact guard  -CS (r) HH (t) CS (c)     Dynamic Standing Balance minimal assist;verbal cues  -CS (r) HH (t) CS (c)     Position/Device Used, Standing Balance walker, front-wheeled  -CS (r) HH (t) CS (c)     Balance Interventions sitting;standing;sit to stand;static;dynamic;occupation based/functional task  -CS (r) HH (t) CS (c)               User Key  (r) = Recorded By, (t) = Taken By, (c) = Cosigned By      Initials Name Provider Type    CS Michelle Ortega, OTR/L, CNT Occupational Therapist     Narcisa Baca, OT Student OT Student                   Goals/Plan       Row Name 05/20/24 0800          Transfer Goal 1 (OT)    Activity/Assistive Device (Transfer Goal 1, OT) toilet  -CS (r) HH (t) CS (c)     [place] Level/Cues Needed (Transfer Goal 1, OT) standby assist  -CS (r) HH (t) CS (c)     Time Frame (Transfer Goal 1, OT) long term goal (LTG)  -CS (r) HH (t) CS (c)     Progress/Outcome (Transfer Goal 1, OT) new goal  -CS (r) HH (t) CS (c)       Row Name 05/20/24 0800          Dressing Goal 1 (OT)    Activity/Device (Dressing Goal 1, OT) lower body dressing  -CS (r) HH (t) CS (c)     [place]/Cues Needed (Dressing Goal 1, OT) minimum assist (75% or more patient effort)  -CS (r) HH (t) CS (c)     Time Frame (Dressing Goal 1, OT) long term goal (LTG)  -CS (r) HH (t) CS (c)     Progress/Outcome (Dressing Goal 1, OT) new goal  -CS (r) HH (t) CS (c)       Row Name 05/20/24 0800          Toileting Goal 1 (OT)    Activity/Device (Toileting Goal 1, OT) toileting skills, all  -CS (r) HH (t) CS (c)     [place] Level/Cues Needed (Toileting Goal 1, OT) standby assist  -CS (r) HH (t) CS (c)      Time Frame (Toileting Goal 1, OT) long term goal (LTG)  -CS (r) HH (t) CS (c)     Progress/Outcome (Toileting Goal 1, OT) new goal  -CS (r) HH (t) CS (c)       Row Name 05/20/24 0800          Therapy Assessment/Plan (OT)    Planned Therapy Interventions (OT) activity tolerance training;BADL retraining;functional balance retraining;occupation/activity based interventions;patient/caregiver education/training;strengthening exercise;transfer/mobility retraining;adaptive equipment training  -CS (r) HH (t) CS (c)               User Key  (r) = Recorded By, (t) = Taken By, (c) = Cosigned By      Initials Name Provider Type    CS Michelle Ortega, OTR/L, CNT Occupational Therapist    Narcisa Bergman, OT Student OT Student                   Clinical Impression       Row Name 05/20/24 0800          Pain Assessment    Pretreatment Pain Rating 5/10  -CS (r) HH (t) CS (c)     Pain Location - Side/Orientation Right  -CS (r) HH (t) CS (c)     Pain Location - extremity  -CS (r) HH (t) CS (c)     Pain Intervention(s) Medication (See MAR);Repositioned;Ambulation/increased activity  -CS (r) HH (t) CS (c)       Row Name 05/20/24 0800          Plan of Care Review    Plan of Care Reviewed With patient;daughter  -CS (r) HH (t) CS (c)     Progress no change  -CS (r) HH (t) CS (c)     Outcome Evaluation OT eval completed. Pt. oriented A&Ox3. Pt. required Min A and verbal cues for toilet transfer and CGA for perineal hygiene. Pt. required Mod A for LB dressing. Pt. requires CGA-Min A for functional mobility due to decreased endurance and LLE weakness. Pt. required mod verbal cues during functional tasks to attend to task. Pt. UTI diagnosis potential factor in increased verbal cues during functional tasks. Pt. ROM WFL but BUE 3/5. Pt. lives with spouse and reports using back brace occasionally due to scoliosis. Daughter reports pt. requires assist at home with ADLs due to decreased endurance. Pt. presents with balance, strength, posture,  and endurance deficits that limit her ability to complete ADLs independently and safely. At this time, pt. would benefit from discharge to acute rehabilitation facility prior to return home. OT to continue POC.  -CS (r) HH (t) CS (c)       Row Name 05/20/24 0800          Therapy Assessment/Plan (OT)    Patient/Family Therapy Goal Statement (OT) to return home  -CS (r) HH (t) CS (c)     Rehab Potential (OT) good, to achieve stated therapy goals  -CS (r) HH (t) CS (c)     Therapy Frequency (OT) 5 times/wk  -CS (r) HH (t) CS (c)     Predicted Duration of Therapy Intervention (OT) until therapy discharge  -CS (r) HH (t) CS (c)       Row Name 05/20/24 0800          Therapy Plan Review/Discharge Plan (OT)    Anticipated Discharge Disposition (OT) inpatient rehabilitation facility  -CS (r) HH (t) CS (c)       Row Name 05/20/24 0800          Positioning and Restraints    Pre-Treatment Position in bed  -CS (r) HH (t) CS (c)     Post Treatment Position chair  -CS (r) HH (t) CS (c)     In Chair reclined;legs elevated;call light within reach;encouraged to call for assist;with family/caregiver  -CS (r) HH (t) CS (c)               User Key  (r) = Recorded By, (t) = Taken By, (c) = Cosigned By      Initials Name Provider Type    CS Michelle Ortega, OTR/L, CNT Occupational Therapist    Narcisa Bergman, OT Student OT Student                   Outcome Measures       Row Name 05/20/24 0800          How much help from another is currently needed...    Putting on and taking off regular lower body clothing? 2  -CS (r) HH (t) CS (c)     Bathing (including washing, rinsing, and drying) 2  -CS (r) HH (t) CS (c)     Toileting (which includes using toilet bed pan or urinal) 3  -CS (r) HH (t) CS (c)     Putting on and taking off regular upper body clothing 3  -CS (r) HH (t) CS (c)     Taking care of personal grooming (such as brushing teeth) 4  -CS (r) HH (t) CS (c)     Eating meals 4  -CS (r) HH (t) CS (c)     AM-PAC 6 Clicks Score (OT) 18   -CS (r)  (t)       Row Name 05/20/24 0025          How much help from another person do you currently need...    Turning from your back to your side while in flat bed without using bedrails? 3  -AD     Moving from lying on back to sitting on the side of a flat bed without bedrails? 3  -AD     Moving to and from a bed to a chair (including a wheelchair)? 3  -AD     Standing up from a chair using your arms (e.g., wheelchair, bedside chair)? 3  -AD     Climbing 3-5 steps with a railing? 3  -AD     To walk in hospital room? 3  -AD     AM-PAC 6 Clicks Score (PT) 18  -AD     Highest Level of Mobility Goal 6 --> Walk 10 steps or more  -AD       Row Name 05/20/24 0800          Functional Assessment    Outcome Measure Options AM-PAC 6 Clicks Daily Activity (OT)  -CS (r)  (t) CS (c)               User Key  (r) = Recorded By, (t) = Taken By, (c) = Cosigned By      Initials Name Provider Type    CS Michelle Ortega, OTR/L, CNT Occupational Therapist    Nohelia Wolf, RN Registered Nurse    Narcisa Bergman, OT Student OT Student                    Occupational Therapy Education       Title: PT OT SLP Therapies (In Progress)       Topic: Occupational Therapy (In Progress)       Point: ADL training (Done)       Description:   Instruct learner(s) on proper safety adaptation and remediation techniques during self care or transfers.   Instruct in proper use of assistive devices.                  Learning Progress Summary             Patient Acceptance, E, VU by  at 5/20/2024 0913                         Point: Home exercise program (Not Started)       Description:   Instruct learner(s) on appropriate technique for monitoring, assisting and/or progressing therapeutic exercises/activities.                  Learner Progress:  Not documented in this visit.              Point: Precautions (Done)       Description:   Instruct learner(s) on prescribed precautions during self-care and functional transfers.                   Learning Progress Summary             Patient Acceptance, E, VU by  at 5/20/2024 0913                         Point: Body mechanics (Done)       Description:   Instruct learner(s) on proper positioning and spine alignment during self-care, functional mobility activities and/or exercises.                  Learning Progress Summary             Patient Acceptance, E, VU by  at 5/20/2024 0913                                         User Key       Initials Effective Dates Name Provider Type Discipline     04/15/24 -  Narcisa Baca OT Student OT Student OT                  OT Recommendation and Plan  Planned Therapy Interventions (OT): activity tolerance training, BADL retraining, functional balance retraining, occupation/activity based interventions, patient/caregiver education/training, strengthening exercise, transfer/mobility retraining, adaptive equipment training  Therapy Frequency (OT): 5 times/wk  Plan of Care Review  Plan of Care Reviewed With: patient, daughter  Progress: no change  Outcome Evaluation: OT eval completed. Pt. oriented A&Ox3. Pt. required Min A and verbal cues for toilet transfer and CGA for perineal hygiene. Pt. required Mod A for LB dressing. Pt. requires CGA-Min A for functional mobility due to decreased endurance and LLE weakness. Pt. required mod verbal cues during functional tasks to attend to task. Pt. UTI diagnosis potential factor in increased verbal cues during functional tasks. Pt. ROM WFL but BUE 3/5. Pt. lives with spouse and reports using back brace occasionally due to scoliosis. Daughter reports pt. requires assist at home with ADLs due to decreased endurance. Pt. presents with balance, strength, posture, and endurance deficits that limit her ability to complete ADLs independently and safely. At this time, pt. would benefit from discharge to acute rehabilitation facility prior to return home. OT to continue POC.     Time Calculation:         Time Calculation- OT       Row  Name 05/20/24 0913             Time Calculation- OT    OT Start Time 0800  +15 min chart review  -CS (r) HH (t) CS (c)      OT Stop Time 0829  -CS (r) HH (t) CS (c)      OT Time Calculation (min) 29 min  -CS (r) HH (t)      OT Received On 05/20/24  -CS (r) HH (t) CS (c)      OT Goal Re-Cert Due Date 05/30/24  -CS (r) HH (t) CS (c)         Untimed Charges    OT Eval/Re-eval Minutes 44  -CS (r) HH (t) CS (c)         Total Minutes    Untimed Charges Total Minutes 44  -CS (r) HH (t)       Total Minutes 44  -CS (r) HH (t)                User Key  (r) = Recorded By, (t) = Taken By, (c) = Cosigned By      Initials Name Provider Type    CS Michelle Ortega S, OTR/L, CNT Occupational Therapist    Narcisa Bergman, OT Student OT Student                           Narcisa Baca, OT Student  5/20/2024

## 2024-05-20 NOTE — PROGRESS NOTES
"    [facility] Medicine Services  INPATIENT PROGRESS NOTE    Patient Name: Dago Nunez  Date of Admission: 5/19/2024  Today's Date: 05/20/24  Length of Stay: 1  Primary Care Physician: Shaheen Akbar DO    Subjective   Chief Complaint: Left sided weakness  HPI   Patient presented to [facility] emergency department on 5/19/2024 with complaints of left leg \"jumping\", left leg weakness, left arm weakness.  She has past medical history of scoliosis, CKD, breast cancer status post left mastectomy, GERD, hypertension, type 2 diabetes.  CT scan on arrival negative for acute process.  She did not receive TKA and secondary to symptom onset being outside window.  Urinalysis showed 2+ bacteria but patient denied dysuria or frequency, no leukocytosis or fever.  Neurology was consulted and patient was admitted for further monitoring and management.    Today:  Patient examined sitting up in bed with multiple family members at bedside.  She is in no acute distress and denies chest pain, abdominal pain, dysuria or shortness of breath.  She is eating fried chicken strips and French fries and is pleasant at this time.  She continues with left-sided weakness.  Further CVA workup including MRI brain, echocardiogram and carotid ultrasound remains pending.  Neurology is following and appreciate their assistance.  I did have lengthy conversation with patient and family regarding potential need for inpatient acute rehab, potentially at [facility].  They are going to speak about this and make a decision moving forward.  Placement will also depend on confirmation of CVA and further neurological workup.  Continue PT, OT, ST.    Review of Systems   All pertinent negatives and positives are as above. All other systems have been reviewed and are negative unless otherwise stated.     Objective    Temp:  [97.7 °F (36.5 °C)-98.8 °F (37.1 °C)] 97.8 °F (36.6 °C)  Heart Rate:  [43-69] 48  Resp:  " [16-18] 16  BP: (118-176)/(43-58) 154/43  Physical Exam  Vitals and nursing note reviewed.   Constitutional:       Comments: Up in bed, no acute distress, room air, family at bedside   HENT:      Head: Normocephalic and atraumatic.   Cardiovascular:      Rate and Rhythm: Normal rate and regular rhythm.      Pulses: Normal pulses.      Heart sounds: Normal heart sounds. No murmur heard.     No friction rub.   Pulmonary:      Effort: Pulmonary effort is normal. No respiratory distress.      Breath sounds: Normal breath sounds. No stridor. No wheezing or rhonchi.   Abdominal:      General: Bowel sounds are normal. There is no distension.      Palpations: Abdomen is soft.      Tenderness: There is no abdominal tenderness. There is no guarding.   Musculoskeletal:         General: No swelling or deformity. Normal range of motion.      Cervical back: Normal range of motion and neck supple. No rigidity or tenderness.      Right lower leg: Edema present.      Left lower leg: Edema present.   Skin:     General: Skin is warm and dry.      Capillary Refill: Capillary refill takes less than 2 seconds.      Coloration: Skin is not pale.      Findings: No bruising or erythema.   Neurological:      Mental Status: She is alert and oriented to person, place, and time.      Gait: Gait normal.      Comments: Left-sided facial droop, left lower extremity weakness   Psychiatric:         Mood and Affect: Mood normal.         Behavior: Behavior normal.         Thought Content: Thought content normal.       Results Review:  I have reviewed the labs, radiology results, and diagnostic studies.    Laboratory Data:   Results from last 7 days   Lab Units 05/20/24  0438 05/19/24  2057 05/15/24  1339   WBC 10*3/mm3 5.71 9.75 6.29   HEMOGLOBIN g/dL 7.7* 9.4* 8.7*   HEMATOCRIT % 25.3* 30.2* 28.0*   PLATELETS 10*3/mm3 143 170 155        Results from last 7 days   Lab Units 05/20/24  0438 05/19/24  2057 05/15/24  1339   SODIUM mmol/L 141 140 140  "  POTASSIUM mmol/L 3.9 4.4 4.4   CHLORIDE mmol/L 105 101 105   CO2 mmol/L 27.0 27.0 27.0   BUN mg/dL 31* 32* 29*   CREATININE mg/dL 2.01* 2.29* 1.68*   CALCIUM mg/dL 9.0 10.3 9.7   BILIRUBIN mg/dL  --  0.2 0.2   ALK PHOS U/L  --  53 46   ALT (SGPT) U/L  --  10 11   AST (SGOT) U/L  --  23 17   GLUCOSE mg/dL 82 152* 100*       Culture Data:   No results found for: \"BLOODCX\", \"URINECX\", \"WOUNDCX\", \"MRSACX\", \"RESPCX\", \"STOOLCX\"    Radiology Data:   Imaging Results (Last 24 Hours)       Procedure Component Value Units Date/Time    US Carotid Bilateral [[id number]] Resulted: 05/20/24 1107     Updated: 05/20/24 1127    US Venous Doppler Lower Extremity Bilateral (duplex) [[id number]] Resulted: 05/20/24 1107     Updated: 05/20/24 1127    CT Head Without Contrast [[id number]] Collected: 05/19/24 2208     Updated: 05/19/24 2212    Narrative:      EXAMINATION: CT HEAD WO CONTRAST-      5/19/2024 8:52 PM     HISTORY: Acute stroke symptoms, follow-up. Difficulty walking.     In order to have a CT radiation dose as low as reasonably achievable  Automated Exposure Control was utilized for adjustment of the mA and/or  KV according to patient size.     CT Dose DLP = 748.8 mGy.cm.  (If there are multiple studies performed at the same time this  represents the total dose).     Comparison:  10/19/2020.     Axial, sagittal, and coronal noncontrast CT imaging of the head.     The visualized paranasal sinuses are clear.     The brain and ventricles have an age appropriate appearance.   Moderate cortical atrophy and mild small vessel disease.  There is no hemorrhage or mass-effect.   No acute infarction is seen.     No calvarial abnormality.       Impression:      1. No acute intracranial abnormality is seen.           This report was signed and finalized on 5/19/2024 10:09 PM by Dr. Miah Pineda MD.       XR Chest 1 View [[id number]] Collected: 05/19/24 2121     Updated: 05/19/24 2125    Narrative:      EXAMINATION: XR CHEST 1 VW-   "   5/19/2024 8:04 PM     HISTORY: Acute stroke symptoms. Left-sided weakness. Dysarthria.     1 view chest x-ray.     COMPARISON:  9/9/2021.     Heart size is magnified.  The mediastinum is within normal limits.     The lungs are normally expanded with no pneumonia or pneumothorax.     No congestive failure changes.     Unchanged RIGHT chest wall port.       Impression:      1. No acute disease.                 This report was signed and finalized on 5/19/2024 9:22 PM by Dr. Miah Pineda MD.               I have reviewed the patient's current medications.     Assessment/Plan   Assessment  Active Hospital Problems    Diagnosis     **CVA (cerebral vascular accident)     Lower extremity edema     Stage 3 chronic kidney disease     Anemia     Type 2 diabetes mellitus, without long-term current use of insulin     Essential hypertension        Treatment Plan  CVA-  CT head negative for acute process  Bilateral carotid ultrasound pending  MRI brain pending  MRI angiogram head pending  Echocardiogram pending  PT, ST, OT  Neurology consulted and following  Continue aspirin, Lipitor, Plavix  Potential need for rehab placement postdischarge    Bilateral lower extremity edema-  Bilateral venous Doppler negative for DVT  Continue PTA 40 mg twice daily Lasix    Stage III CKD-  Baseline creatinine appears to be around 1.7  Creatinine 2.01 today, continue to closely monitor  BMP in a.m.    Potential UTI?-  Asymptomatic, patient denies fever, dysuria, frequency or flank pain  Hold antibiotics for now, follow urine culture    Anemia-  Baseline hemoglobin appears to be in the high 9 range although was 8.7 on 5/15/2024.  Hemoglobin 7.7 this morning  Potentially of chronic disease, although macrocytic  Iron panel shows normal serum iron and high ferritin level.  Vitamin B12 and folate level ordered and pending  Fecal occult blood stool x 1  No obvious signs or symptoms of bleeding, patient denies red, maroon or black stool  CBC in  a.m.    Type 2 diabetes-  Hemoglobin A1c 5.8  Normal blood glucose trend so far, continue to monitor and initiate low-dose sliding scale insulin if indicated.    GERD-  Continue Protonix    SCDs for DVT prophylaxis    Medical Decision Making  Number and Complexity of problems: 1 acute problem of high complexity in CVA.  1 chronic problem of moderate complexity and bilateral lower extremity edema.  1 chronic problem of moderate complexity in stage III CKD.  1 acute on chronic problem of moderate complexity in anemia.  1 chronic problem of moderate complexity in type 2 diabetes.  Differential Diagnosis: None    Conditions and Status        Status stable     MDM Data  External documents reviewed: None  Cardiac tracing (EKG, telemetry) interpretation: EKG reviewed  Radiology interpretation: CT head, chest x-ray reviewed per Radiologist interpretation  Labs reviewed: BMP, hemoglobin A1c, iron profile, lipid profile, PT, INR, CBC with differential, urinalysis, UDS, urine culture  Any tests that were considered but not ordered: None     Decision rules/scores evaluated (example [id number], Wells, etc): None     Discussed with: Patient, patient's , Dr. Rodriguez     Care Planning  Shared decision making: Discussed with above, patient and her  are agreeable to her plan of care  Code status and discussions: Full    Disposition  Social Determinants of Health that impact treatment or disposition: None  I expect the patient to be discharged to acute rehab in 2 days.     Electronically signed by VINCENT Lawson, 05/20/24, 13:42 CDT.

## 2024-05-20 NOTE — H&P
[facility]  HISTORY AND PHYSICAL    Date of Admission: 5/19/2024  Primary Care Physician: Shaheen Akbar DO    Subjective   Primary Historian: Patient    Chief Complaint: Left arm and leg weakness, left facial droop    History of Present Illness  The patient is an 83-year-old woman with a past medical history of scoliosis, CKD, breast cancer s/p left mastectomy, GERD, essential hypertension, and type 2 diabetes mellitus.  Patient presents with complaints of left arm and leg weakness of 1 day duration and left facial droop with dysarthria today.    Ms. Nunez was noticed to have left arm weakness along with left leg weakness yesterday.  Her left leg was noted to be jumping when she woke up this morning and she was finding very difficult to ambulate due to this problem.  Her family also noted her to have some mild left facial droop with mild dysarthria today.  She denied headaches, visual changes, chest pain or shortness of breath.  On account of her symptoms, she presented to the emergency room.  Her urinalysis was suggestive of a urinary tract infection.  CT scan of the brain did not show any acute stroke.  She was recommended for admission.    Review of Systems   Otherwise complete ROS reviewed and negative except as mentioned in the HPI.    Past Medical History:   Past Medical History:   Diagnosis Date    Anemia in stage 3 chronic kidney disease 11/11/2019    Arthritis     Breast cancer 09/14/2021    Left Mastectomy w/ sentinel node Bx    Bronchitis     Cellulitis     ROSA LEGS    GERD (gastroesophageal reflux disease)     Hyperlipidemia     Hypertension     Kyphosis     Osteoporosis     Personal history of COVID-19 05/2022    Scoliosis     Self-catheterizes urinary bladder     10FR SIZED CATH    Stage 3 chronic kidney disease 11/11/2019    Type 2 diabetes mellitus     Urethral meatal stenosis 09/2022    w/ urine retention    Vulva cancer     Vulvar  intraepithelial neoplasia (MOODY) grade 3      Past Surgical History:  Past Surgical History:   Procedure Laterality Date    APPENDECTOMY      BENJAMIN PROCEDURE      No evidence of reflux disease while on Nexium 40mg daily-See report    BREAST BIOPSY      BREAST CYST ASPIRATION Left     BREAST LUMPECTOMY      COLONOSCOPY  01/12/2011    Diverticulosis sigmoid colon; The examination was otherwise normal; Repeat 10 years    COLONOSCOPY  11/03/2003    Dr. Laguerre-Normal colonoscopy; Normal terminal ileum; Repeat 5 years    COLONOSCOPY N/A 11/05/2021    Petechia(e) in the rectum, in the recto-sigmoid colon and in the distal sigmoid colon; No specimens collected; No plans to repeat colonoscopy due to advance age and/or medical problems    CYSTOSCOPY N/A 09/20/2022    Procedure: CYSTOSCOPY WITH URETHRAL DILATATION;  Surgeon: Shaheen Conway MD;  Location: [facility] OR;  Service: Urology;  Laterality: N/A;    ENDOSCOPY  07/01/2014    Normal esophagus; Normal stomach; Normal examined duodenum; BRAVO pH capsule deployed;     ENDOSCOPY  08/14/2013    Mild gastritis-biopsies for H.Pylor obtained    ENDOSCOPY  02/16/2005    Dr. LaguerreWuaqw-Hvitrvmdm-hbubxywj    ENDOSCOPY  10/21/2003    Dr. Laguerre-Stage 1 reflux esophagitis    ENDOSCOPY N/A 11/05/2021    Small HH; A single gastroesophageal junction polyp; Normal stomach; A single non-bleeding angiodysplastic lesion in the duodenum    HYSTERECTOMY      MASTECTOMY W/ SENTINEL NODE BIOPSY Left 09/14/2021    Procedure: LEFT PARTIAL MASTECTOMY WITH MAGSEED AND LEFT SENTINEL LYMPH NODE BIOPSY MAGTRACE;  Surgeon: Quynh Rosario MD;  Location: [facility] OR;  Service: General;  Laterality: Left;    TONSILLECTOMY      VAGINA SURGERY      Laser surgery X 2    VENOUS ACCESS DEVICE (PORT) INSERTION N/A 09/29/2020    Procedure: SINGLE LUMEN PORT - A- CATH PLACEMENT WITH FLUOROSCOPY;  Surgeon: Quynh Rosario MD;  Location:  [facility] OR;  Service: General;  Laterality: N/A;     Social History:   reports that she has quit smoking. Her smoking use included cigarettes. She has a 0.8 pack-year smoking history. She has been exposed to tobacco smoke. She has never used smokeless tobacco. She reports that she does not currently use alcohol. She reports that she does not use drugs.    Family History: family history includes Breast cancer in her sister; Cancer in her mother and sister; Dementia in her mother; Diabetes in her brother; Heart disease in her father and paternal grandfather; Hypertension in her mother; Kidney cancer in her sister; No Known Problems in her maternal grandfather, maternal grandmother, and paternal grandmother; Osteoporosis in her mother; Parkinsonism in her father; Uterine cancer in her mother.       Allergies:  Allergies   Allergen Reactions    Scopolamine Swelling     Other reaction(s): ANGIOEDEMA        Amoxicillin-Pot Clavulanate Rash    Keflex [Cephalexin] Rash    Septra [Sulfamethoxazole-Trimethoprim] Rash    Tequin [Gatifloxacin] Other (See Comments)     Doesn't remember    Trovan [Alatrofloxacin] Dizziness       Medications:  Prior to Admission medications    Medication Sig Start Date End Date Taking? Authorizing Provider   Acetaminophen (TYLENOL ARTHRITIS PAIN PO) Take 1 tablet by mouth Daily As Needed (BACK PAIN).    Provider, MD Homer   albuterol sulfate  (90 Base) MCG/ACT inhaler Inhale 2 puffs 4 (Four) Times a Day. 4/19/23   Jesus Guzman MD   bisoprolol-hydrochlorothiazide (ZIAC) 5-6.25 MG per tablet Take 1 tablet by mouth Daily. 4/23/24   Shaheen Akbar DO   cetirizine (zyrTEC) 10 MG tablet Take 1 tablet by mouth Daily.    ProviderHomer MD   citalopram (CeleXA) 20 MG tablet Take 1 tablet by mouth Daily. 5/9/24   Shaheen Akbar DO   cyanocobalamin 1000 MCG/ML injection INJECT 1 ML INTO THE MUSCLE EVERY 4 WEEKS. 7/17/23   Shaheen Akbar DO   diazePAM (VALIUM) 10 MG tablet Take 1 tablet by mouth Every 6 (Six) Hours As Needed.     Homer Ahumada MD   diphenhydrAMINE (BENADRYL) 25 mg capsule Take 1 capsule by mouth Every 6 (Six) Hours As Needed for Allergies.    Homer Ahumada MD   diphenoxylate-atropine (LOMOTIL) 2.5-0.025 MG per tablet TAKE 2 TABLETS BY MOUTH 4 TIMES DAILY AS NEEDED FOR DIARRHEA 3/7/24   Shaheen Akbar DO   DULERA 100-5 MCG/ACT inhaler Inhale 2 puffs 2 (Two) Times a Day. Rinse and spit after using. 10/14/19   Jesus Guzman MD   esomeprazole (nexIUM) 40 MG capsule Take 1 capsule by mouth 2 (Two) Times a Day. 9/28/23   Shaheen Akbar DO   furosemide (LASIX) 40 MG tablet Take 1 tablet by mouth 2 (Two) Times a Day. 7/18/23   Shaheen Akbar DO   gabapentin (NEURONTIN) 300 MG capsule Take 1 capsule by mouth 2 (Two) Times a Day. 1/24/24   Shaheen Akbar DO   Homeopathic Products (LEG CRAMPS) tablet Take 1 tablet by mouth Daily.    Homer Ahumada MD   HYDROcodone-acetaminophen (NORCO)  MG per tablet Take 0.5 tablets by mouth Every 4 (Four) Hours As Needed for Moderate Pain or Severe Pain. 3/27/24   Shaheen Akbar DO   letrozole (FEMARA) 2.5 MG tablet TAKE 1 TABLET BY MOUTH 1 TIME DAILY 12/26/23   Brennan Bowles MD   lidocaine (XYLOCAINE) 5 % ointment Apply  topically to the appropriate area as directed Every 4 (Four) Hours As Needed for Mild Pain  (pain secondary to radiation). 12/1/20   Sussy Kwon PA-C   Lidocaine Viscous HCl (XYLOCAINE) 2 % solution Take 5 mL by mouth 3 (Three) Times a Day With Meals. 9/14/22   Shaheen Akbar DO   metOLazone (ZAROXOLYN) 2.5 MG tablet TAKE 1 TABLET BY MOUTH THREE TIMES A WEEK 7/4/23   Shaheen Akbar DO   nitrofurantoin (MACRODANTIN) 25 MG capsule Take 1 capsule by mouth Every Night. To help reduce pain with urination 1/13/22   Jaida Younger MD   nystatin (MYCOSTATIN) 128441 UNIT/GM powder Apply  topically to the appropriate area as directed 2 (Two) Times a Day.    Provider, MD Homer  "  nystatin-triamcinolone (MYCOLOG) 963584-9.1 UNIT/GM-% ointment Apply 1 Application topically to the appropriate area as directed 2 (Two) Times a Day. 2/2/24   Shaheen Akbar DO   olmesartan (BENICAR) 20 MG tablet Take 1 tablet by mouth Daily. 4/24/24   Shaheen Akbar DO   ondansetron (ZOFRAN) 8 MG tablet TAKE 1 TABLET BY MOUTH EVERY 8 HOURS AS NEEDED FOR NAUSEA AND VOMITING 7/8/22   Shaheen Akbar DO   phenazopyridine (PYRIDIUM) 200 MG tablet Take 1 tablet by mouth 3 (Three) Times a Day As Needed for Dysuria. 4/23/24   Shaheen Akbar DO   potassium chloride (K-DUR,KLOR-CON) 20 MEQ CR tablet TAKE 2 TABLETS BY MOUTH EVERY DAY 1/22/24   Shaheen Akbar DO   pravastatin (PRAVACHOL) 40 MG tablet TAKE 1 TABLET EVERY NIGHT 12/14/22   Shaheen Akbar DO   saccharomyces boulardii (Florastor) 250 MG capsule Take 1 capsule by mouth 2 (Two) Times a Day. 3/6/24   Shaheen Akbar DO   sodium bicarbonate 650 MG tablet Take 1 tablet by mouth 2 (Two) Times a Day.    Provider, MD Homer   Syringe 25G X 1\" 3 ML misc 1 each Daily. 8/5/21   Drake Stafford MD   vitamin D (ERGOCALCIFEROL) 1.25 MG (14280 UT) capsule capsule Take 1 capsule by mouth Every 7 (Seven) Days. 4/24/24   Shaheen Akbar DO     I have utilized all available immediate resources to obtain, update, or review the patient's current medications (including all prescriptions, over-the-counter products, herbals, cannabis/cannabidiol products, and vitamin/mineral/dietary (nutritional) supplements).    Objective     Vital Signs: /44   Pulse 63   Temp 98.8 °F (37.1 °C) (Oral)   Resp 16   Ht 144.8 cm (57\")   Wt 76.2 kg (168 lb)   SpO2 100%   BMI 36.35 kg/m²   Physical Exam  Constitutional:       General: She is not in acute distress.     Appearance: She is well-developed. She is obese. She is not diaphoretic.   HENT:      Head: Normocephalic.   Eyes:      General: No scleral icterus.     Conjunctiva/sclera: Conjunctivae normal.     "  Pupils: Pupils are equal, round, and reactive to light.   Neck:      Thyroid: No thyromegaly.      Vascular: No JVD.      Trachea: No tracheal deviation.   Cardiovascular:      Rate and Rhythm: Normal rate and regular rhythm.      Heart sounds: Normal heart sounds. No murmur heard.     No friction rub. No gallop.   Pulmonary:      Effort: Pulmonary effort is normal. No respiratory distress.      Breath sounds: Normal breath sounds. No stridor. No wheezing or rales.   Chest:      Chest wall: No tenderness.   Abdominal:      General: Bowel sounds are normal. There is no distension.      Palpations: Abdomen is soft. There is no mass.      Tenderness: There is no abdominal tenderness. There is no guarding or rebound.      Hernia: No hernia is present.   Musculoskeletal:         General: Tenderness present. No deformity. Normal range of motion.      Cervical back: Normal range of motion and neck supple.      Right lower leg: Edema present.      Left lower leg: Edema present.   Lymphadenopathy:      Cervical: No cervical adenopathy.   Skin:     General: Skin is warm and dry.      Capillary Refill: Capillary refill takes less than 2 seconds.      Coloration: Skin is not pale.      Findings: No erythema or rash.   Neurological:      Mental Status: She is alert and oriented to person, place, and time.      Cranial Nerves: Cranial nerve deficit present.      Sensory: No sensory deficit.      Motor: No abnormal muscle tone.      Coordination: Coordination normal.      Comments: Slight left facial droop noted with loss of left nasal labial fold present.  Normal power in left and right upper limbs noted.  Reduced power grade 3/5 in right and left lower extremities   Psychiatric:         Mood and Affect: Mood normal.         Behavior: Behavior normal.         Thought Content: Thought content normal.        Results Reviewed:  Lab Results (last 24 hours)       Procedure Component Value Units Date/Time    Urine Culture - Urine,  Urine, Clean Catch [[id number]] Collected: 05/19/24 2129    Specimen: Urine, Clean Catch Updated: 05/19/24 2202    POC Glucose Once [[id number]]  (Normal) Collected: 05/19/24 2145    Specimen: Blood Updated: 05/19/24 2156     Glucose 128 mg/dL      Comment: : [id number] Nnamdi Parada ID: [id number]       CBC & Differential [[id number]]  (Abnormal) Collected: 05/19/24 2057    Specimen: Blood Updated: 05/19/24 2147    Narrative:      The following orders were created for panel order CBC & Differential.  Procedure                               Abnormality         Status                     ---------                               -----------         ------                     CBC Auto Differential[[id number]]        Abnormal            Final result                 Please view results for these tests on the individual orders.    CBC Auto Differential [[id number]]  (Abnormal) Collected: 05/19/24 2057    Specimen: Blood Updated: 05/19/24 2147     WBC 9.75 10*3/mm3      RBC 2.72 10*6/mm3      Hemoglobin 9.4 g/dL      Hematocrit 30.2 %      .0 fL      MCH 34.6 pg      MCHC 31.1 g/dL      RDW 15.2 %      RDW-SD 61.4 fl      MPV 11.4 fL      Platelets 170 10*3/mm3      Neutrophil % 78.2 %      Lymphocyte % 12.2 %      Monocyte % 5.7 %      Eosinophil % 2.9 %      Basophil % 0.5 %      Immature Grans % 0.5 %      Neutrophils, Absolute 7.62 10*3/mm3      Lymphocytes, Absolute 1.19 10*3/mm3      Monocytes, Absolute 0.56 10*3/mm3      Eosinophils, Absolute 0.28 10*3/mm3      Basophils, Absolute 0.05 10*3/mm3      Immature Grans, Absolute 0.05 10*3/mm3      nRBC 0.0 /100 WBC     Urinalysis With Microscopic If Indicated (No Culture) - Urine, Clean Catch [[id number]]  (Abnormal) Collected: 05/19/24 2129    Specimen: Urine, Clean Catch Updated: 05/19/24 2142     Color, UA Yellow     Appearance, UA Cloudy     pH, UA 5.5     Specific Gravity, UA 1.009     Glucose, UA Negative     Ketones, UA Negative     Bilirubin, UA  Negative     Blood, UA Negative     Protein, UA Negative     Leuk Esterase, UA Large (3+)     Nitrite, UA Negative     Urobilinogen, UA 0.2 E.U./dL    Urinalysis, Microscopic Only - Urine, Clean Catch [[id number]]  (Abnormal) Collected: 05/19/24 2129    Specimen: Urine, Clean Catch Updated: 05/19/24 2142     RBC, UA 0-2 /HPF      WBC, UA Too Numerous to Count /HPF      Bacteria, UA 2+ /HPF      Squamous Epithelial Cells, UA None Seen /HPF      Hyaline Casts, UA 0-2 /LPF      Methodology Automated Microscopy    Comprehensive Metabolic Panel [[id number]]  (Abnormal) Collected: 05/19/24 2057    Specimen: Blood Updated: 05/19/24 2138     Glucose 152 mg/dL      BUN 32 mg/dL      Creatinine 2.29 mg/dL      Sodium 140 mmol/L      Potassium 4.4 mmol/L      Comment: Slight hemolysis detected by analyzer. Result may be falsely elevated.        Chloride 101 mmol/L      CO2 27.0 mmol/L      Calcium 10.3 mg/dL      Total Protein 7.2 g/dL      Albumin 3.9 g/dL      ALT (SGPT) 10 U/L      AST (SGOT) 23 U/L      Comment: Slight hemolysis detected by analyzer. Result may be falsely elevated.        Alkaline Phosphatase 53 U/L      Total Bilirubin 0.2 mg/dL      Globulin 3.3 gm/dL      A/G Ratio 1.2 g/dL      BUN/Creatinine Ratio 14.0     Anion Gap 12.0 mmol/L      eGFR 21.0 mL/min/1.73     Narrative:      GFR Normal >60  Chronic Kidney Disease <60  Kidney Failure <15    The GFR formula is only valid for adults with stable renal function between ages 18 and 70.    [place] Draw [[id number]] Collected: 05/19/24 2057    Specimen: Blood Updated: 05/19/24 2132    Narrative:      The following orders were created for panel order [place] Draw.  Procedure                               Abnormality         Status                     ---------                               -----------         ------                     Green Top (Gel)[[id number]]                                  Final result               Lavender Top[[id number]]                                      Final result               Red Top[[id number]]                                          Final result               Light Blue Top[[id number]]                                   Final result                 Please view results for these tests on the individual orders.    Green Top (Gel) [[id number]] Collected: 05/19/24 2057    Specimen: Blood Updated: 05/19/24 2132     Extra Tube Hold for add-ons.     Comment: Auto resulted.       Protime-INR [[id number]]  (Normal) Collected: 05/19/24 2057    Specimen: Blood Updated: 05/19/24 2125     Protime 14.0 Seconds      INR 1.04    Lavender Top [[id number]] Collected: 05/19/24 2057    Specimen: Blood Updated: 05/19/24 2115     Extra Tube hold for add-on     Comment: Auto resulted       Red Top [[id number]] Collected: 05/19/24 2057    Specimen: Blood Updated: 05/19/24 2115     Extra Tube Hold for add-ons.     Comment: Auto resulted.       Light Blue Top [[id number]] Collected: 05/19/24 2057    Specimen: Blood Updated: 05/19/24 2115     Extra Tube Hold for add-ons.     Comment: Auto resulted             Imaging Results (Last 24 Hours)       Procedure Component Value Units Date/Time    CT Head Without Contrast [[id number]] Collected: 05/19/24 2208     Updated: 05/19/24 2212    Narrative:      EXAMINATION: CT HEAD WO CONTRAST-      5/19/2024 8:52 PM     HISTORY: Acute stroke symptoms, follow-up. Difficulty walking.     In order to have a CT radiation dose as low as reasonably achievable  Automated Exposure Control was utilized for adjustment of the mA and/or  KV according to patient size.     CT Dose DLP = 748.8 mGy.cm.  (If there are multiple studies performed at the same time this  represents the total dose).     Comparison:  10/19/2020.     Axial, sagittal, and coronal noncontrast CT imaging of the head.     The visualized paranasal sinuses are clear.     The brain and ventricles have an age appropriate appearance.   Moderate cortical atrophy and mild small vessel  disease.  There is no hemorrhage or mass-effect.   No acute infarction is seen.     No calvarial abnormality.       Impression:      1. No acute intracranial abnormality is seen.           This report was signed and finalized on 5/19/2024 10:09 PM by Dr. Miah Pineda MD.       XR Chest 1 View [[id number]] Collected: 05/19/24 2121     Updated: 05/19/24 2125    Narrative:      EXAMINATION: XR CHEST 1 VW-     5/19/2024 8:04 PM     HISTORY: Acute stroke symptoms. Left-sided weakness. Dysarthria.     1 view chest x-ray.     COMPARISON:  9/9/2021.     Heart size is magnified.  The mediastinum is within normal limits.     The lungs are normally expanded with no pneumonia or pneumothorax.     No congestive failure changes.     Unchanged RIGHT chest wall port.       Impression:      1. No acute disease.                 This report was signed and finalized on 5/19/2024 9:22 PM by Dr. Miah Pineda MD.             I have personally reviewed and interpreted the radiology studies and ECG obtained at time of admission.     Assessment / Plan   Assessment:   Active Hospital Problems    Diagnosis     **CVA (cerebral vascular accident)    Acute cystitis without hematuria    Treatment Plan  The patient will be admitted to my service here at [facility].  Will monitor on telemetry and check an MRI of the brain in the morning.  Frequent neurochecks.  Check carotid ultrasound and echocardiogram.  Check hemoglobin A1c and lipid panel.  Will obtain neurology consultation.    Continue Lasix with diuresis.  Continue other home medications    PT/OT/speech therapy    DVT prophylaxis with SCDs    CODE STATUS is DNR/DNI    Medical Decision Making  Number and Complexity of problems: 2 acute, moderate severity, moderate complexity medical problems  Differential Diagnosis: None    Conditions and Status        Condition is unchanged.     [facility] Data  External documents reviewed: None  Cardiac tracing (EKG, telemetry) interpretation: EKG  reviewed by me.  Sinus bradycardia noted.  Radiology interpretation: CT of the brain reviewed by me.  No acute abnormality.  Labs reviewed: CBC, BMP reviewed by me  Any tests that were considered but not ordered: None     Decision rules/scores evaluated (example [id number], [place], etc): None     Discussed with: Patient     Care Planning  Shared decision making: Patient and her spouse Joseph Nunez  Code status and discussions: CODE STATUS is DNR/DNI    Disposition  Social Determinants of Health that impact treatment or disposition: None  Estimated length of stay is 1 to 3 days    I confirmed that the patient's advanced care plan is present, code status is documented, and a surrogate decision maker is listed in the patient's medical record.     The patient's surrogate decision maker is patient's  Joseph Nunez    The patient was seen and examined by me on 5/19/2024 at 22:50 PM    Electronically signed by Oleksandr Oviedo MD, 05/19/24, 22:34 CDT.

## 2024-05-20 NOTE — PLAN OF CARE
Goal Outcome Evaluation:  Plan of Care Reviewed With: patient, daughter, son        Progress: no change  Outcome Evaluation: The patient presents alert and oriented to self lying in bed. She demonstrates mild R side weakness. Her R LE is red, swollen, and warm to the touch. She has been seeing her physican about this on an outpt basis. At baseline she is independent for ambulation in the home with use of RW and requires assist for her ADL's. Today she requires assist for bed mobility and for mobility in the room. Her R LE does start to shake after ambulating a short distance and she requires to is sit to rest on the toliet. She will benefit from continued PT to work on strengthening, balance, and gait training. Recommend discharge to acute rehab.      Anticipated Discharge Disposition (PT): inpatient rehabilitation facility

## 2024-05-20 NOTE — THERAPY EVALUATION
Patient Name: Dago Nunez  : 1940    MRN: [id number]                              Today's Date: 2024       Admit Date: 2024    Visit Dx:     ICD-10-CM ICD-9-CM   1. Cerebrovascular accident (CVA), unspecified mechanism  I63.9 434.91   2. Acute UTI (urinary tract infection)  N39.0 599.0   3. Acute renal failure superimposed on chronic kidney disease, unspecified acute renal failure type, unspecified CKD stage  N17.9 584.9    N18.9 585.9   4. Dysphagia, unspecified type  R13.10 787.20   5. Impaired mobility [Z74.09]  Z74.09 799.89     Patient Active Problem List   Diagnosis    Meatal stenosis    Retention of urine    Type 2 diabetes mellitus, without long-term current use of insulin    Essential hypertension    Grief reaction    Vulvar intraepithelial neoplasia (MOODY) grade 3    Chronic midline low back pain without sciatica    Bilateral lower extremity edema    History of urethral stricture    Epidermal cyst of neck    Normocytic anemia    Neck abscess    Mixed hyperlipidemia    Gastroesophageal reflux disease without esophagitis    Anxiety    Iron deficiency and chemotherapy induced anemia    Stage 3 chronic kidney disease    Anemia in stage 3 chronic kidney disease    Screening for breast cancer    Lower extremity edema    Vulvar cancer, carcinoma    Former smoker    Secondary malignancy of inguinal lymph nodes    Febrile illness    Chemotherapy-induced thrombocytopenia    Hyponatremia    Hypokalemia    Neutropenic fever    Moderate malnutrition    Antineoplastic chemotherapy induced anemia    History of radiation therapy    Encounter for care related to Port-a-Cath    Malignant neoplasm of upper-outer quadrant of left breast in female, estrogen receptor positive    Encounter for care related to vascular access port    Angiodysplasia    Bronchitis    Obesity (BMI 30-39.9)    Wheezing    Cough    Post-COVID-19 condition    Radiation induced proctitis    Rectal bleeding    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    CVA (cerebral vascular accident)     Past Medical History:   Diagnosis Date    Anemia in stage 3 chronic kidney disease 11/11/2019    Arthritis     Breast cancer 09/14/2021    Left Mastectomy w/ sentinel node Bx    Bronchitis     Cellulitis     ROSA LEGS    GERD (gastroesophageal reflux disease)     Hyperlipidemia     Hypertension     Kyphosis     Osteoporosis     Personal history of COVID-19 05/2022    Scoliosis     Self-catheterizes urinary bladder     10FR SIZED CATH    Stage 3 chronic kidney disease 11/11/2019    Type 2 diabetes mellitus     Urethral meatal stenosis 09/2022    w/ urine retention    Vulva cancer     Vulvar intraepithelial neoplasia (MOODY) grade 3      Past Surgical History:   Procedure Laterality Date    APPENDECTOMY      BENJAMIN PROCEDURE      No evidence of reflux disease while on Nexium 40mg daily-See report    BREAST BIOPSY      BREAST CYST ASPIRATION Left     BREAST LUMPECTOMY      COLONOSCOPY  01/12/2011    Diverticulosis sigmoid colon; The examination was otherwise normal; Repeat 10 years    COLONOSCOPY  11/03/2003    Dr. Laguerre-Normal colonoscopy; Normal terminal ileum; Repeat 5 years    COLONOSCOPY N/A 11/05/2021    Petechia(e) in the rectum, in the recto-sigmoid colon and in the distal sigmoid colon; No specimens collected; No plans to repeat colonoscopy due to advance age and/or medical problems    CYSTOSCOPY N/A 09/20/2022    Procedure: CYSTOSCOPY WITH URETHRAL DILATATION;  Surgeon: Shaheen Conway MD;  Location: [facility];  Service: Urology;  Laterality: N/A;    ENDOSCOPY  07/01/2014    Normal esophagus; Normal stomach; Normal examined duodenum; BRAVO pH capsule deployed;     ENDOSCOPY  08/14/2013    Mild gastritis-biopsies for H.Pylor obtained    ENDOSCOPY  02/16/2005    Dr. LaguerreUpxir-Oestditox-vovmxgrs    ENDOSCOPY  10/21/2003    Dr. Laguerre-Stage 1 reflux esophagitis    ENDOSCOPY N/A 11/05/2021    Small HH; A single gastroesophageal junction polyp; Normal stomach;  A single non-bleeding angiodysplastic lesion in the duodenum    HYSTERECTOMY      MASTECTOMY W/ SENTINEL NODE BIOPSY Left 09/14/2021    Procedure: LEFT PARTIAL MASTECTOMY WITH MAGSEED AND LEFT SENTINEL LYMPH NODE BIOPSY MAGTRACE;  Surgeon: Quynh Rosario MD;  Location:  [facility];  Service: General;  Laterality: Left;    TONSILLECTOMY      VAGINA SURGERY      Laser surgery X 2    VENOUS ACCESS DEVICE (PORT) INSERTION N/A 09/29/2020    Procedure: SINGLE LUMEN PORT - A- CATH PLACEMENT WITH FLUOROSCOPY;  Surgeon: Quynh Rosario MD;  Location:  [facility];  Service: General;  Laterality: N/A;      General Information       Row Name 05/20/24 0735          Physical Therapy Time and Intention    Document Type evaluation  presents with L UE and LE weakness, L facial droop, dysarthria, UTI, hx breast ca with L mastectomy  -MS     Mode of Treatment physical therapy;co-treatment  -MS       Row Name 05/20/24 [facility]          General Information    Patient Profile Reviewed yes  -MS     Prior Level of Function independent:;all household mobility;min assist:;ADL's;bathing;dressing  ambulated with RW  -MS     Existing Precautions/Restrictions fall  -MS     Barriers to Rehab previous functional deficit;cognitive status;physical barrier  -MS       Row Name 05/20/24 0780          Living Environment    People in Home spouse  -MS       Row Name 05/20/24 [facility]          Home Main Entrance    Number of Stairs, Main Entrance other (see comments)  ramp  -MS       Row Name 05/20/24 0735          Stairs Within Home, Primary    Number of Stairs, Within Home, Primary none  -MS       Row Name 05/20/24 07          Cognition    Orientation Status (Cognition) oriented to;person;disoriented to;situation;place;time  -MS       Row Name 05/20/24 0718          Safety Issues, Functional Mobility    Safety Issues Affecting Function (Mobility) friction/shear risk;impulsivity;insight into deficits/self-awareness;judgment;positioning of assistive  device;problem-solving;sequencing abilities  -MS     Impairments Affecting Function (Mobility) balance;cognition;endurance/activity tolerance;strength  -MS     Cognitive Impairments, Mobility Safety/Performance attention;awareness, need for assistance;insight into deficits/self-awareness;judgment;problem-solving/reasoning;sequencing abilities  -MS               User Key  (r) = Recorded By, (t) = Taken By, (c) = Cosigned By      Initials Name Provider Type    Avril Jay DIDI, PT, DPT, NCS Physical Therapist                   Mobility       Row Name 05/20/24 0735          Bed Mobility    Bed Mobility supine-sit  -MS     Supine-Sit [place] (Bed Mobility) minimum assist (75% patient effort)  -MS     Assistive Device (Bed Mobility) head of bed elevated;bed rails  -MS       Row Name 05/20/24 0735          Sit-Stand Transfer    Sit-Stand [place] (Transfers) minimum assist (75% patient effort);verbal cues;nonverbal cues (demo/gesture)  -MS     Assistive Device (Sit-Stand Transfers) walker, front-wheeled  -MS       Row Name 05/20/24 0735          Gait/Stairs (Locomotion)    [place] Level (Gait) minimum assist (75% patient effort);verbal cues;nonverbal cues (demo/gesture)  -MS     Assistive Device (Gait) walker, front-wheeled  -MS     Distance in Feet (Gait) 20  25 ft  -MS     Comment, (Gait/Stairs) decreased step length on R side, decreased foot clearance B  -MS               User Key  (r) = Recorded By, (t) = Taken By, (c) = Cosigned By      Initials Name Provider Type    Avril Jay DIDI, PT, DPT, NCS Physical Therapist                   Obj/Interventions       Row Name 05/20/24 0735          Range of Motion Comprehensive    General Range of Motion bilateral lower extremity ROM WFL  -MS       Row Name 05/20/24 0735          Strength Comprehensive (MMT)    Comment, General Manual Muscle Testing (MMT) Assessment R LE grossly 4/5, L LE grossly 4+/5  -MS       Row Name 05/20/24 0735          Balance     Balance Assessment sitting static balance;sitting dynamic balance;standing static balance;standing dynamic balance  -MS     Static Sitting Balance standby assist  -MS     Dynamic Sitting Balance standby assist  -MS     Position, Sitting Balance supported  -MS     Static Standing Balance minimal assist  -MS     Dynamic Standing Balance minimal assist  -MS     Position/Device Used, Standing Balance walker, rolling  -MS       Row Name 05/20/24 0735          Sensory Assessment (Somatosensory)    Sensory Assessment (Somatosensory) unable/difficult to assess  difficult to test due to cognition  -MS               User Key  (r) = Recorded By, (t) = Taken By, (c) = Cosigned By      Initials Name Provider Type    Avril Jay R, PT, DPT, NCS Physical Therapist                   Goals/Plan       Row Name 05/20/24 0735          Bed Mobility Goal 1 (PT)    Activity/Assistive Device (Bed Mobility Goal 1, PT) bed mobility activities, all  -MS     [place] Level/Cues Needed (Bed Mobility Goal 1, PT) modified independence  -MS     Time Frame (Bed Mobility Goal 1, PT) long term goal (LTG);by discharge  -MS     Progress/Outcomes (Bed Mobility Goal 1, PT) new goal  -MS       Row Name 05/20/24 0735          Transfer Goal 1 (PT)    Activity/Assistive Device (Transfer Goal 1, PT) sit-to-stand/stand-to-sit;bed-to-chair/chair-to-bed;walker, rolling  -MS     [place] Level/Cues Needed (Transfer Goal 1, PT) modified independence  -MS     Time Frame (Transfer Goal 1, PT) long term goal (LTG);by discharge  -MS     Progress/Outcome (Transfer Goal 1, PT) new goal  -MS       Row Name 05/20/24 0735          Gait Training Goal 1 (PT)    Activity/Assistive Device (Gait Training Goal 1, PT) gait (walking locomotion);assistive device use;decrease fall risk;increase endurance/gait distance;improve balance and speed;walker, rolling  -MS     [place] Level (Gait Training Goal 1, PT) contact guard required  -MS     Distance (Gait Training  Goal 1, PT) 50ft  -MS     Time Frame (Gait Training Goal 1, PT) long term goal (LTG);by discharge  -MS     Progress/Outcome (Gait Training Goal 1, PT) new goal  -MS       Row Name 05/20/24 3284          Therapy Assessment/Plan (PT)    Planned Therapy Interventions (PT) balance training;bed mobility training;gait training;patient/family education;strengthening;transfer training  -MS               User Key  (r) = Recorded By, (t) = Taken By, (c) = Cosigned By      Initials Name Provider Type    MS Avril Johnson R, PT, DPT, NCS Physical Therapist                   Clinical Impression       Row Name 05/20/24 0799          Pain    Pretreatment Pain Rating 5/10  -MS     Posttreatment Pain Rating 5/10  -MS     Pain Location - Side/Orientation Right  -MS     Pain Location lower  -MS     Pain Location - extremity  -MS     Pain Intervention(s) Repositioned;Ambulation/increased activity  -MS       Row Name 05/20/24 0750          Plan of Care Review    Plan of Care Reviewed With patient;daughter;son  -MS     Progress no change  -MS     Outcome Evaluation The patient presents alert and oriented to self lying in bed. She demonstrates mild R side weakness. Her R LE is red, swollen, and warm to the touch. She has been seeing her physican about this on an outpt basis. At baseline she is independent for ambulation in the home with use of RW and requires assist for her ADL's. Today she requires assist for bed mobility and for mobility in the room. Her R LE does start to shake after ambulating a short distance and she requires to is sit to rest on the toliet. She will benefit from continued PT to work on strengthening, balance, and gait training. Recommend discharge to acute rehab.  -MS       Row Name 05/20/24 0750          Therapy Assessment/Plan (PT)    Patient/Family Therapy Goals Statement (PT) return to PLOF  -MS     Rehab Potential (PT) good, to achieve stated therapy goals  -MS     Criteria for Skilled Interventions Met (PT)  yes;meets criteria;skilled treatment is necessary  -MS     Therapy Frequency (PT) 2 times/day  -MS     Predicted Duration of Therapy Intervention (PT) until discharge  -MS       Row Name 05/20/24 0735          Positioning and Restraints    Post Treatment Position chair  -MS     In Chair reclined;call light within reach;encouraged to call for assist;with family/caregiver  -MS               User Key  (r) = Recorded By, (t) = Taken By, (c) = Cosigned By      Initials Name Provider Type    MS Alex Avril TOLBERT, PT, DPT, NCS Physical Therapist                   Outcome Measures       Row Name 05/20/24 0735 05/20/24 0025       How much help from another person do you currently need...    Turning from your back to your side while in flat bed without using bedrails? 3  -MS 3  -AD    Moving from lying on back to sitting on the side of a flat bed without bedrails? 3  -MS 3  -AD    Moving to and from a bed to a chair (including a wheelchair)? 3  -MS 3  -AD    Standing up from a chair using your arms (e.g., wheelchair, bedside chair)? 3  -MS 3  -AD    Climbing 3-5 steps with a railing? 1  -MS 3  -AD    To walk in hospital room? 3  -MS 3  -AD    AM-PAC 6 Clicks Score (PT) 16  -MS 18  -AD    Highest Level of Mobility Goal 5 --> Static standing  -MS 6 --> Walk 10 steps or more  -AD      Row Name 05/20/24 0735          Modified Pasha Scale    Modified Bronaugh Scale 4 - Moderately severe disability.  Unable to walk without assistance, and unable to attend to own bodily needs without assistance.  -MS       Row Name 05/20/24 0800 05/20/24 0735       Functional Assessment    Outcome Measure Options AM-PAC 6 Clicks Daily Activity (OT)  -CS (r) HH (t) CS (c) AM-PAC 6 Clicks Basic Mobility (PT);Modified Bronaugh  -MS              User Key  (r) = Recorded By, (t) = Taken By, (c) = Cosigned By      Initials Name Provider Type    Avril Jay, PT, DPT, NCS Physical Therapist    Michelle Johnston, OTR/L, CNT Occupational Therapist    AD  Nohelia Chamberlain, RN Registered Nurse     Narcisa Baca, OT Student OT Student                                 Physical Therapy Education       Title: PT OT SLP Therapies (In Progress)       Topic: Physical Therapy (In Progress)       Point: Mobility training (In Progress)       Learning Progress Summary             Patient Acceptance, E, NR by MS at 5/20/2024 1039    Comment: role of PT in her care                         Point: Home exercise program (Not Started)       Learner Progress:  Not documented in this visit.              Point: Body mechanics (Not Started)       Learner Progress:  Not documented in this visit.              Point: Precautions (Not Started)       Learner Progress:  Not documented in this visit.                              User Key       Initials Effective Dates Name Provider Type Discipline    MS 07/11/23 -  Avril Johnson, PT, DPT, NCS Physical Therapist PT                  PT Recommendation and Plan  Planned Therapy Interventions (PT): balance training, bed mobility training, gait training, patient/family education, strengthening, transfer training  Plan of Care Reviewed With: patient, daughter, son  Progress: no change  Outcome Evaluation: The patient presents alert and oriented to self lying in bed. She demonstrates mild R side weakness. Her R LE is red, swollen, and warm to the touch. She has been seeing her physican about this on an outpt basis. At baseline she is independent for ambulation in the home with use of RW and requires assist for her ADL's. Today she requires assist for bed mobility and for mobility in the room. Her R LE does start to shake after ambulating a short distance and she requires to is sit to rest on the toliet. She will benefit from continued PT to work on strengthening, balance, and gait training. Recommend discharge to acute rehab.     Time Calculation:         PT Charges       Row Name 05/20/24 4622             Time Calculation    Start Time 9033  -MS       Stop Time 0820  -MS      Time Calculation (min) 45 min  -MS      PT Received On 05/20/24  -MS      PT Goal Re-Cert Due Date 05/30/24  -MS         Untimed Charges    PT Eval/Re-eval Minutes 45  -MS         Total Minutes    Untimed Charges Total Minutes 45  -MS       Total Minutes 45  -MS                User Key  (r) = Recorded By, (t) = Taken By, (c) = Cosigned By      Initials Name Provider Type    Avril Jay, PT, DPT, NCS Physical Therapist                      PT G-Codes  Outcome Measure Options: AM-PAC 6 Clicks Daily Activity (OT)  AM-PAC 6 Clicks Score (PT): 16  AM-PAC 6 Clicks Score (OT): 18  Modified Pasha Scale: 4 - Moderately severe disability.  Unable to walk without assistance, and unable to attend to own bodily needs without assistance.  PT Discharge Summary  Anticipated Discharge Disposition (PT): inpatient rehabilitation facility    Avril Johnson, PT, DPT, NCS  5/20/2024

## 2024-05-20 NOTE — CONSULTS
Neurology Consult Note    Consult Date: 2024  Referring MD: Oleksandr Oviedo MD      Patient: Dago Nunez (83 y.o. female)  MRN: [id number]  : 1940    History of Present Illness:   Dago Nunez is a 83 y.o. female with a history of hypertension, hyperlipidemia, diabetes, CKD, breast cancer status post left mastectomy who admitted to the hospital with 1 day of left facial droop and dysarthria.  She has not given thrombolytics due to time.  CT brain shows no acute findings.  She denies chest pain, shortness of breath, palpitations or vertigo.  She ambulates with a walker at home and has difficulty due to severe lower extremity edema.      Medical History:   Past Medical/Surgical Hx:  Past Medical History:   Diagnosis Date    Anemia in stage 3 chronic kidney disease 2019    Arthritis     Breast cancer 2021    Left Mastectomy w/ sentinel node Bx    Bronchitis     Cellulitis     ROSA LEGS    GERD (gastroesophageal reflux disease)     Hyperlipidemia     Hypertension     Kyphosis     Osteoporosis     Personal history of COVID-19 2022    Scoliosis     Self-catheterizes urinary bladder     10FR SIZED CATH    Stage 3 chronic kidney disease 2019    Type 2 diabetes mellitus     Urethral meatal stenosis 2022    w/ urine retention    Vulva cancer     Vulvar intraepithelial neoplasia (MOODY) grade 3      Past Surgical History:   Procedure Laterality Date    APPENDECTOMY      BENJAMIN PROCEDURE      No evidence of reflux disease while on Nexium 40mg daily-See report    BREAST BIOPSY      BREAST CYST ASPIRATION Left     BREAST LUMPECTOMY      COLONOSCOPY  2011    Diverticulosis sigmoid colon; The examination was otherwise normal; Repeat 10 years    COLONOSCOPY  2003    Dr. Laguerre-Normal colonoscopy; Normal terminal ileum; Repeat 5 years    COLONOSCOPY N/A 2021    Petechia(e) in the rectum, in the recto-sigmoid colon and in the distal sigmoid colon; No specimens  collected; No plans to repeat colonoscopy due to advance age and/or medical problems    CYSTOSCOPY N/A 09/20/2022    Procedure: CYSTOSCOPY WITH URETHRAL DILATATION;  Surgeon: Shaheen Conway MD;  Location:  [facility];  Service: Urology;  Laterality: N/A;    ENDOSCOPY  07/01/2014    Normal esophagus; Normal stomach; Normal examined duodenum; BRAVO pH capsule deployed;     ENDOSCOPY  08/14/2013    Mild gastritis-biopsies for H.Pylor obtained    ENDOSCOPY  02/16/2005    Dr. LaguerreSqjra-Tdkxritem-lozdcszz    ENDOSCOPY  10/21/2003    Dr. Laguerre-Stage 1 reflux esophagitis    ENDOSCOPY N/A 11/05/2021    Small HH; A single gastroesophageal junction polyp; Normal stomach; A single non-bleeding angiodysplastic lesion in the duodenum    HYSTERECTOMY      MASTECTOMY W/ SENTINEL NODE BIOPSY Left 09/14/2021    Procedure: LEFT PARTIAL MASTECTOMY WITH MAGSEED AND LEFT SENTINEL LYMPH NODE BIOPSY MAGTRACE;  Surgeon: Quynh Rosario MD;  Location:  [facility];  Service: General;  Laterality: Left;    TONSILLECTOMY      VAGINA SURGERY      Laser surgery X 2    VENOUS ACCESS DEVICE (PORT) INSERTION N/A 09/29/2020    Procedure: SINGLE LUMEN PORT - A- CATH PLACEMENT WITH FLUOROSCOPY;  Surgeon: Quynh Rosario MD;  Location:  [facility];  Service: General;  Laterality: N/A;       Medications On Admission:  Facility-Administered Medications Prior to Admission   Medication Dose Route Frequency Provider Last Rate Last Admin    lidocaine (XYLOCAINE) 1 % injection 10 mL  10 mL Infiltration Once Shaheen Akbar,         lidocaine 1% - EPINEPHrine 1:179364 (XYLOCAINE W/EPI) 1 %-1:201240 injection 10 mL  10 mL Infiltration Once Shaheen Akbar, DO         Medications Prior to Admission   Medication Sig Dispense Refill Last Dose    Acetaminophen (TYLENOL ARTHRITIS PAIN PO) Take 1 tablet by mouth Daily As Needed (BACK PAIN).       albuterol sulfate  (90 Base) MCG/ACT inhaler Inhale 2 puffs 4 (Four) Times a Day. 54 g 3      bisoprolol-hydrochlorothiazide (ZIAC) 5-6.25 MG per tablet Take 1 tablet by mouth Daily. 90 tablet 1     cetirizine (zyrTEC) 10 MG tablet Take 1 tablet by mouth Daily.       citalopram (CeleXA) 20 MG tablet Take 1 tablet by mouth Daily. 90 tablet 1     cyanocobalamin 1000 MCG/ML injection INJECT 1 ML INTO THE MUSCLE EVERY 4 WEEKS. 3 mL 10     diazePAM (VALIUM) 10 MG tablet Take 1 tablet by mouth Every 6 (Six) Hours As Needed.       diphenhydrAMINE (BENADRYL) 25 mg capsule Take 1 capsule by mouth Every 6 (Six) Hours As Needed for Allergies.       diphenoxylate-atropine (LOMOTIL) 2.5-0.025 MG per tablet TAKE 2 TABLETS BY MOUTH 4 TIMES DAILY AS NEEDED FOR DIARRHEA 90 tablet 2     DULERA 100-5 MCG/ACT inhaler Inhale 2 puffs 2 (Two) Times a Day. Rinse and spit after using. 6 inhaler 0     esomeprazole (nexIUM) 40 MG capsule Take 1 capsule by mouth 2 (Two) Times a Day. 180 capsule 3     furosemide (LASIX) 40 MG tablet Take 1 tablet by mouth 2 (Two) Times a Day. 180 tablet 3     gabapentin (NEURONTIN) 300 MG capsule Take 1 capsule by mouth 2 (Two) Times a Day. 180 capsule 0     Homeopathic Products (LEG CRAMPS) tablet Take 1 tablet by mouth Daily.       HYDROcodone-acetaminophen (NORCO)  MG per tablet Take 0.5 tablets by mouth Every 4 (Four) Hours As Needed for Moderate Pain or Severe Pain. 60 tablet 0     letrozole (FEMARA) 2.5 MG tablet TAKE 1 TABLET BY MOUTH 1 TIME DAILY 90 tablet 3     lidocaine (XYLOCAINE) 5 % ointment Apply  topically to the appropriate area as directed Every 4 (Four) Hours As Needed for Mild Pain  (pain secondary to radiation). 240 g 1     Lidocaine Viscous HCl (XYLOCAINE) 2 % solution Take 5 mL by mouth 3 (Three) Times a Day With Meals. 100 mL 0     metOLazone (ZAROXOLYN) 2.5 MG tablet TAKE 1 TABLET BY MOUTH THREE TIMES A WEEK 30 tablet 2     nitrofurantoin (MACRODANTIN) 25 MG capsule Take 1 capsule by mouth Every Night. To help reduce pain with urination 7 capsule 0     nystatin (MYCOSTATIN)  "734574 UNIT/GM powder Apply  topically to the appropriate area as directed 2 (Two) Times a Day.       nystatin-triamcinolone (MYCOLOG) 393572-9.1 UNIT/GM-% ointment Apply 1 Application topically to the appropriate area as directed 2 (Two) Times a Day. 60 g 0     olmesartan (BENICAR) 20 MG tablet Take 1 tablet by mouth Daily. 90 tablet 2     ondansetron (ZOFRAN) 8 MG tablet TAKE 1 TABLET BY MOUTH EVERY 8 HOURS AS NEEDED FOR NAUSEA AND VOMITING 60 tablet 2     phenazopyridine (PYRIDIUM) 200 MG tablet Take 1 tablet by mouth 3 (Three) Times a Day As Needed for Dysuria. 15 tablet 0     potassium chloride (K-DUR,KLOR-CON) 20 MEQ CR tablet TAKE 2 TABLETS BY MOUTH EVERY DAY 60 tablet 5     pravastatin (PRAVACHOL) 40 MG tablet TAKE 1 TABLET EVERY NIGHT 90 tablet 3     saccharomyces boulardii (Florastor) 250 MG capsule Take 1 capsule by mouth 2 (Two) Times a Day. 60 capsule 0     sodium bicarbonate 650 MG tablet Take 1 tablet by mouth 2 (Two) Times a Day.       Syringe 25G X 1\" 3 ML misc 1 each Daily. 25 each 1     vitamin D (ERGOCALCIFEROL) 1.25 MG (07102 UT) capsule capsule Take 1 capsule by mouth Every 7 (Seven) Days. 12 capsule 3        Current Medications:    Current Facility-Administered Medications:     albuterol (PROVENTIL) nebulizer solution 0.083% 2.5 mg/3mL, 2.5 mg, Nebulization, 4x Daily - RT, Oleksandr Oviedo MD, 2.5 mg at 05/20/24 0544    aspirin chewable tablet 81 mg, 81 mg, Oral, Daily, 81 mg at 05/20/24 0914 **OR** aspirin suppository 300 mg, 300 mg, Rectal, Daily, Oleksandr Oviedo MD    atorvastatin (LIPITOR) tablet 80 mg, 80 mg, Oral, Nightly, Oleksandr Oviedo MD    budesonide-formoterol (SYMBICORT) 160-4.5 MCG/ACT inhaler 2 puff, 2 puff, Inhalation, BID - RT, Oleksandr Oviedo MD    cetirizine (zyrTEC) tablet 10 mg, 10 mg, Oral, Daily, Oleksandr Oviedo MD, 10 mg at 05/20/24 0914    citalopram (CeleXA) tablet 20 mg, 20 mg, Oral, Daily, Oleksandr Oviedo MD, 20 mg at 05/20/24 0914    " furosemide (LASIX) tablet 40 mg, 40 mg, Oral, BID, Oleksandr Oviedo MD, 40 mg at 05/20/24 0914    gabapentin (NEURONTIN) capsule 300 mg, 300 mg, Oral, BID, Oleksandr Oviedo MD, 300 mg at 05/20/24 0914    HYDROcodone-acetaminophen (NORCO)  MG per tablet 0.5 tablet, 0.5 tablet, Oral, Q4H PRN, Oleksandr Oviedo MD    letrozole (FEMARA) tablet 2.5 mg, 2.5 mg, Oral, Daily, Oleksandr Oviedo MD, 2.5 mg at 05/20/24 0914    metOLazone (ZAROXOLYN) tablet 2.5 mg, 2.5 mg, Oral, Once per day on Monday Wednesday Friday, Oleksandr Oviedo MD, 2.5 mg at 05/20/24 0914    pantoprazole (PROTONIX) EC tablet 40 mg, 40 mg, Oral, BID AC, Oleksandr Oviedo MD    saccharomyces boulardii (FLORASTOR) capsule 250 mg, 250 mg, Oral, BID, Oleksandr Oviedo MD, 250 mg at 05/20/24 0914    sodium chloride 0.9 % flush 10 mL, 10 mL, Intravenous, PRN, Oleksandr Oviedo MD    sodium chloride 0.9 % flush 10 mL, 10 mL, Intravenous, Q12H, Oleksandr Oviedo MD, 10 mL at 05/20/24 0915    sodium chloride 0.9 % flush 10 mL, 10 mL, Intravenous, PRN, Oleksandr Oviedo MD    sodium chloride 0.9 % infusion 40 mL, 40 mL, Intravenous, PRN, Oleksandr Oviedo MD    Facility-Administered Medications Ordered in Other Encounters:     heparin injection 500 Units, 500 Units, Intravenous, Rivera DAMON Winston, MD, 500 Units at 07/01/21 1354    heparin injection 500 Units, 500 Units, Intravenous, Rivera DAMON Winston, MD, 500 Units at 01/11/22 1134    heparin injection 500 Units, 500 Units, Intravenous, Rivera DAMON Winston, MD, 500 Units at 09/28/22 1418    heparin injection 500 Units, 500 Units, Intravenous, Rivera DAMON Winston, MD, 500 Units at 01/25/23 1537    heparin injection 500 Units, 500 Units, Intravenous, Rivera DAMON Winston, MD, 500 Units at 06/26/23 1426    heparin injection 500 Units, 500 Units, IntravenousMARISOL Chua, Winston, MD, 500 Units at 03/05/24 1350    sodium chloride 0.9 % flush 10 mL, 10 mL, IntravenousMARISOL Chua, Winston, MD,  10 mL at 07/01/21 1354    sodium chloride 0.9 % flush 10 mL, 10 mL, Intravenous, PRRivera BRUNO Winston, MD, 10 mL at 01/11/22 1134    sodium chloride 0.9 % flush 10 mL, 10 mL, Intravenous, HARDIKNRivera Winston, MD, 10 mL at 09/28/22 1418    sodium chloride 0.9 % flush 10 mL, 10 mL, Intravenous, PRNRivera Winston, MD, 10 mL at 01/25/23 1537    sodium chloride 0.9 % flush 10 mL, 10 mL, Intravenous, PRNRivera Winston, MD, 10 mL at 06/26/23 1426    sodium chloride 0.9 % flush 10 mL, 10 mL, Intravenous, PRRivera BRUNO Winston, MD, 10 mL at 03/05/24 1350     Allergies:  Allergies   Allergen Reactions    Scopolamine Swelling     Other reaction(s): ANGIOEDEMA        Amoxicillin-Pot Clavulanate Rash    Keflex [Cephalexin] Rash    Septra [Sulfamethoxazole-Trimethoprim] Rash    Tequin [Gatifloxacin] Other (See Comments)     Doesn't remember    Trovan [Alatrofloxacin] Dizziness       Social Hx:  Social History     Socioeconomic History    Marital status:    Tobacco Use    Smoking status: Former     Current packs/day: 0.25     Average packs/day: 0.3 packs/day for 3.0 years (0.8 ttl pk-yrs)     Types: Cigarettes     Passive exposure: Past    Smokeless tobacco: Never    Tobacco comments:     social smoker in college   Vaping Use    Vaping status: Never Used   Substance and Sexual Activity    Alcohol use: Not Currently     Comment: Occasional glass of wine    Drug use: No    Sexual activity: Defer       Family Hx:  Family History   Problem Relation Age of Onset    Cancer Mother     Hypertension Mother     Osteoporosis Mother     Dementia Mother     Uterine cancer Mother     Heart disease Father     Parkinsonism Father     Cancer Sister     Breast cancer Sister     Kidney cancer Sister     Diabetes Brother     Heart disease Paternal Grandfather     No Known Problems Maternal Grandmother     No Known Problems Maternal Grandfather     No Known Problems Paternal Grandmother     Colon cancer Neg Hx     Colon polyps Neg Hx     Esophageal  cancer Neg Hx     Liver cancer Neg Hx     Liver disease Neg Hx     Rectal cancer Neg Hx     Stomach cancer Neg Hx      Physical Examination:   Vital Signs:  Vitals:    05/20/24 0544 05/20/24 0553 05/20/24 0758 05/20/24 0917   BP:    154/43   BP Location:       Patient Position:       Pulse: (!) 45 (!) 43 (!) 48    Resp: 18 16 16    Temp:   97.8 °F (36.6 °C)    TempSrc:   Oral    SpO2: 92%  96%    Weight:       Height:           General Exam:  Head:  Normocephalic, atraumatic  HEENT:  Neck supple  Fundoscopic Exam:  No signs of disc edema  CVS:  Regular rate and rhythm.  No murmurs  Carotid Examination:  No bruits  Lungs:  Clear to auscultation  Abdomen:  Nontender, Nondistended  Extremities:  No signs of peripheral edema  Skin: + Cellulitis in the right lower extremity    Neurologic Exam:    Mental Status:    -Awake, Alert, Oriented X 3  -No word finding difficulties  -No aphasia  -No dysarthria  -Follows simple and complex commands    CN II:  Visual fields full.  Pupils equally reactive to light  CN III, IV, VI:  Extraocular Muscles full with no signs of nystagmus  CN V:  Facial sensory is symmetric with no asymmetries.  CN VII: Left facial droop  CN VIII:  Gross hearing intact bilaterally  CN IX:  Palate elevates symmetrically  CN X:  Palate elevates symmetrically  CN XI:  Shoulder shrug symmetric  CN XII:  Tongue is midline on protrusion    Motor:   Left upper extremity 4+ to 5 -, left lower extremity 3 - proximally and 2+ distally, right side on the upper extremity is 5 out of 5, at least antigravity in right lower extremity    DTR:  Diminished bilaterally with mute toes  Sensory:  -Intact to light touch    Coordination:  -Finger to nose intact      Gait  Not tested  Recent Diagnostics:   Laboratory Results:   - Reviewed in EMR  Lab Results   Component Value Date    GLUCOSE 82 05/20/2024    CALCIUM 9.0 05/20/2024     05/20/2024    K 3.9 05/20/2024    CO2 27.0 05/20/2024     05/20/2024    BUN 31 (H)  05/20/2024    CREATININE 2.01 (H) 05/20/2024    EGFRIFAFRI 44 (L) 09/01/2022    EGFRIFNONA 36 (A) 09/01/2022    BCR 15.4 05/20/2024    ANIONGAP 9.0 05/20/2024     Lab Results   Component Value Date    WBC 5.71 05/20/2024    HGB 7.7 (L) 05/20/2024    HCT 25.3 (L) 05/20/2024    .9 (H) 05/20/2024     05/20/2024     Lab Results   Component Value Date    PTT 26.5 04/04/2024    INR 1.04 05/19/2024     Lab Results   Component Value Date    TRIG 153 (H) 05/20/2024    HDL 47 05/20/2024     (H) 05/20/2024     Lab Results   Component Value Date    HGBA1C 5.80 (H) 05/20/2024       Imaging Results:  Imaging Results (Last 24 Hours)       Procedure Component Value Units Date/Time    CT Head Without Contrast [[id number]] Collected: 05/19/24 2208     Updated: 05/19/24 2212    Narrative:      EXAMINATION: CT HEAD WO CONTRAST-      5/19/2024 8:52 PM     HISTORY: Acute stroke symptoms, follow-up. Difficulty walking.     In order to have a CT radiation dose as low as reasonably achievable  Automated Exposure Control was utilized for adjustment of the mA and/or  KV according to patient size.     CT Dose DLP = 748.8 mGy.cm.  (If there are multiple studies performed at the same time this  represents the total dose).     Comparison:  10/19/2020.     Axial, sagittal, and coronal noncontrast CT imaging of the head.     The visualized paranasal sinuses are clear.     The brain and ventricles have an age appropriate appearance.   Moderate cortical atrophy and mild small vessel disease.  There is no hemorrhage or mass-effect.   No acute infarction is seen.     No calvarial abnormality.       Impression:      1. No acute intracranial abnormality is seen.           This report was signed and finalized on 5/19/2024 10:09 PM by Dr. Miah Pineda MD.       XR Chest 1 View [[id number]] Collected: 05/19/24 2121     Updated: 05/19/24 2125    Narrative:      EXAMINATION: XR CHEST 1 VW-     5/19/2024 8:04 PM     HISTORY: Acute stroke  symptoms. Left-sided weakness. Dysarthria.     1 view chest x-ray.     COMPARISON:  9/9/2021.     Heart size is magnified.  The mediastinum is within normal limits.     The lungs are normally expanded with no pneumonia or pneumothorax.     No congestive failure changes.     Unchanged RIGHT chest wall port.       Impression:      1. No acute disease.                 This report was signed and finalized on 5/19/2024 9:22 PM by Dr. Miah Pineda MD.                    Assessment & Plan:   83-year-old female with a history of hypertension, hyperlipidemia, diabetes, CKD was admitted to the hospital with a likely acute stroke.  She now has left-sided weakness.  No thrombolytics due to time.  She states that she previously took aspirin however stopped because of nosebleeds.  Will add on LDL, A1c, urine drug screen.  Reinitiate aspirin 81 mg daily as well as Plavix 75 mg daily x 3 weeks.  Will transition to aspirin 81 mg daily as monotherapy.  MRI brain without contrast with MR angiogram head and neck.  Echocardiogram with bubble study.  Maintain euglycemia normothermia.  PT OT and speech evaluation.  Okay just maintain normotension at this time.  Will follow-up.    Frank Cash MD  05/20/24  10:29 CDT

## 2024-05-20 NOTE — PLAN OF CARE
Goal Outcome Evaluation:  Plan of Care Reviewed With: patient, daughter        Progress: no change  Outcome Evaluation: OT eval completed. Pt. oriented A&Ox3. Pt. required Min A and verbal cues for toilet transfer and CGA for perineal hygiene. Pt. required Mod A for LB dressing. Pt. requires CGA-Min A for functional mobility due to decreased endurance and LLE weakness. Pt. required mod verbal cues during functional tasks to attend to task. Pt. UTI diagnosis potential factor in increased verbal cues during functional tasks. Pt. ROM WFL but BUE 3/5. Pt. lives with spouse and reports using back brace occasionally due to scoliosis. Daughter reports pt. requires assist at home with ADLs due to decreased endurance. Pt. presents with balance, strength, posture, and endurance deficits that limit her ability to complete ADLs independently and safely. At this time, pt. would benefit from discharge to acute rehabilitation facility prior to return home. OT to continue POC.      Anticipated Discharge Disposition (OT): inpatient rehabilitation facility

## 2024-05-20 NOTE — THERAPY TREATMENT NOTE
Acute Care - Physical Therapy Treatment Note   [facility]     Patient Name: Dago Nunez  : 1940  MRN: [id number]  Today's Date: 2024      Visit Dx:     ICD-10-CM ICD-9-CM   1. Cerebrovascular accident (CVA), unspecified mechanism  I63.9 434.91   2. Acute UTI (urinary tract infection)  N39.0 599.0   3. Acute renal failure superimposed on chronic kidney disease, unspecified acute renal failure type, unspecified CKD stage  N17.9 584.9    N18.9 585.9   4. Dysphagia, unspecified type  R13.10 787.20   5. Impaired mobility [Z74.09]  Z74.09 799.89     Patient Active Problem List   Diagnosis    Meatal stenosis    Retention of urine    Type 2 diabetes mellitus, without long-term current use of insulin    Essential hypertension    Grief reaction    Vulvar intraepithelial neoplasia (MOODY) grade 3    Chronic midline low back pain without sciatica    Bilateral lower extremity edema    History of urethral stricture    Epidermal cyst of neck    Normocytic anemia    Neck abscess    Mixed hyperlipidemia    Gastroesophageal reflux disease without esophagitis    Anxiety    Iron deficiency and chemotherapy induced anemia    Stage 3 chronic kidney disease    Anemia    Screening for breast cancer    Lower extremity edema    Vulvar cancer, carcinoma    Former smoker    Secondary malignancy of inguinal lymph nodes    Febrile illness    Chemotherapy-induced thrombocytopenia    Hyponatremia    Hypokalemia    Neutropenic fever    Moderate malnutrition    Antineoplastic chemotherapy induced anemia    History of radiation therapy    Encounter for care related to Port-a-Cath    Malignant neoplasm of upper-outer quadrant of left breast in female, estrogen receptor positive    Encounter for care related to vascular access port    Angiodysplasia    Bronchitis    Obesity (BMI 30-39.9)    Wheezing    Cough    Post-COVID-19 condition    Radiation induced proctitis    Rectal bleeding    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    CVA  (cerebral vascular accident)     Past Medical History:   Diagnosis Date    Anemia in stage 3 chronic kidney disease 11/11/2019    Arthritis     Breast cancer 09/14/2021    Left Mastectomy w/ sentinel node Bx    Bronchitis     Cellulitis     ROSA LEGS    GERD (gastroesophageal reflux disease)     Hyperlipidemia     Hypertension     Kyphosis     Osteoporosis     Personal history of COVID-19 05/2022    Scoliosis     Self-catheterizes urinary bladder     10FR SIZED CATH    Stage 3 chronic kidney disease 11/11/2019    Type 2 diabetes mellitus     Urethral meatal stenosis 09/2022    w/ urine retention    Vulva cancer     Vulvar intraepithelial neoplasia (MOODY) grade 3      Past Surgical History:   Procedure Laterality Date    APPENDECTOMY      BENJAMIN PROCEDURE      No evidence of reflux disease while on Nexium 40mg daily-See report    BREAST BIOPSY      BREAST CYST ASPIRATION Left     BREAST LUMPECTOMY      COLONOSCOPY  01/12/2011    Diverticulosis sigmoid colon; The examination was otherwise normal; Repeat 10 years    COLONOSCOPY  11/03/2003    Dr. Laguerre-Normal colonoscopy; Normal terminal ileum; Repeat 5 years    COLONOSCOPY N/A 11/05/2021    Petechia(e) in the rectum, in the recto-sigmoid colon and in the distal sigmoid colon; No specimens collected; No plans to repeat colonoscopy due to advance age and/or medical problems    CYSTOSCOPY N/A 09/20/2022    Procedure: CYSTOSCOPY WITH URETHRAL DILATATION;  Surgeon: Shaheen Conway MD;  Location: [facility] OR;  Service: Urology;  Laterality: N/A;    ENDOSCOPY  07/01/2014    Normal esophagus; Normal stomach; Normal examined duodenum; BRAVO pH capsule deployed;     ENDOSCOPY  08/14/2013    Mild gastritis-biopsies for H.Pylor obtained    ENDOSCOPY  02/16/2005    Dr. LaguerreWmzed-Oxnmuvklu-dtwjieym    ENDOSCOPY  10/21/2003    Dr. Laguerre-Stage 1 reflux esophagitis    ENDOSCOPY N/A 11/05/2021    Small HH; A single gastroesophageal junction polyp; Normal stomach; A single non-bleeding  angiodysplastic lesion in the duodenum    HYSTERECTOMY      MASTECTOMY W/ SENTINEL NODE BIOPSY Left 09/14/2021    Procedure: LEFT PARTIAL MASTECTOMY WITH MAGSEED AND LEFT SENTINEL LYMPH NODE BIOPSY MAGTRACE;  Surgeon: Quynh Rosario MD;  Location:  [facility] OR;  Service: General;  Laterality: Left;    TONSILLECTOMY      VAGINA SURGERY      Laser surgery X 2    VENOUS ACCESS DEVICE (PORT) INSERTION N/A 09/29/2020    Procedure: SINGLE LUMEN PORT - A- CATH PLACEMENT WITH FLUOROSCOPY;  Surgeon: Quynh Rosario MD;  Location:  [facility] OR;  Service: General;  Laterality: N/A;     PT Assessment (Last 12 Hours)       PT Evaluation and Treatment       Row Name 05/20/24 1440 05/20/24 1330       Physical Therapy Time and Intention    Subjective Information complains of;weakness;pain  - --    Document Type therapy note (daily note)  - therapy note (daily note)  -    Mode of Treatment physical therapy  - --    Session Not Performed -- other (see comments)  -    Comment, Session Not Performed -- pt eating lunch. Will check back.  -    Comment Pt. states she is wanting to return home. ENcouraged pt and spouse to talk this over because she would benefit from further rehab prior to returning home to lessen burden on spouse.  - --      Row Name 05/20/24 1440          General Information    Existing Precautions/Restrictions fall  -       Row Name 05/20/24 1440          Pain    Pretreatment Pain Rating 5/10  -     Posttreatment Pain Rating 5/10  -     Pain Location - Side/Orientation Bilateral  -     Pain Location - hip  -     Pain Intervention(s) Repositioned;Ambulation/increased activity  -       Row Name 05/20/24 1445          Bed Mobility    Supine-Sit [place] (Bed Mobility) verbal cues;minimum assist (75% patient effort)  -     Sit-Supine [place] (Bed Mobility) verbal cues;minimum assist (75% patient effort);moderate assist (50% patient effort)  -     Assistive Device (Bed Mobility)  head of bed elevated;bed rails;draw sheet  -       Row Name 05/20/24 1440          Transfers    Comment, (Transfers) pt incontinent of urine. Assisted with changing gown, brief and linens.  -MF       Row Name 05/20/24 1440          Sit-Stand Transfer    Sit-Stand [place] (Transfers) verbal cues;contact guard  -MF       Row Name 05/20/24 1440          Stand-Sit Transfer    Stand-Sit [place] (Transfers) verbal cues;contact guard  -MF       Row Name 05/20/24 1440          Toilet Transfer    Type (Toilet Transfer) sit-stand;stand-sit  -     [place] Level (Toilet Transfer) verbal cues;minimum assist (75% patient effort)  -     Assistive Device (Toilet Transfer) commode  -       Row Name 05/20/24 1440          Gait/Stairs (Locomotion)    [place] Level (Gait) verbal cues;minimum assist (75% patient effort)  -     Assistive Device (Gait) walker, front-wheeled  -     Distance in Feet (Gait) 15  x 2 with sitting rest on commode  -     Deviations/Abnormal Patterns (Gait) rodger decreased;stride length decreased  -     Bilateral Gait Deviations forward flexed posture;heel strike decreased  -     Comment, (Gait/Stairs) shaking of legs after sitting back onto bed.  -       Row Name             [REMOVED] Wound 09/20/22 1130 urethral meatus    Wound - Properties Group Placement Date: 09/20/22  -AC Placement Time: 1130 -AC Present on Original Admission: N  -AC Location: urethral meatus  -AC Additional Comments: dilated urethra  -AC Removal Date: 05/20/24  -LA Removal Time: 0926 -LA Wound Outcome: Healed  -LA    Retired Wound - Properties Group Placement Date: 09/20/22  -AC Placement Time: 1130 -AC Present on Original Admission: N  -AC Location: urethral meatus  -AC Additional Comments: dilated urethra  -AC Removal Date: 05/20/24  -LA Removal Time: 0926 -LA Wound Outcome: Healed  -LA    Retired Wound - Properties Group Date first assessed: 09/20/22  -AC Time first assessed: 1130 -AC  Present on Original Admission: N  -AC Location: urethral meatus  -AC Additional Comments: dilated urethra  -AC Resolution Date: 05/20/24  -LA Resolution Time: 0926 -LA Wound Outcome: Healed  -LA      Row Name 05/20/24 1440          Positioning and Restraints    Pre-Treatment Position in bed  -     Post Treatment Position bed  -MF     In Bed fowlers;call light within reach;encouraged to call for assist;with family/caregiver;side rails up x2  -MF               User Key  (r) = Recorded By, (t) = Taken By, (c) = Cosigned By      Initials Name Provider Type    AC Nohelia Baez, RN Registered Nurse    Nell Blankenship, PTA Physical Therapist Assistant    Ronda Evans RN Registered Nurse                    Physical Therapy Education       Title: PT OT SLP Therapies (In Progress)       Topic: Physical Therapy (In Progress)       Point: Mobility training (In Progress)       Learning Progress Summary             Patient Acceptance, E, NR by MS at 5/20/2024 1039    Comment: role of PT in her care                         Point: Home exercise program (Not Started)       Learner Progress:  Not documented in this visit.              Point: Body mechanics (Not Started)       Learner Progress:  Not documented in this visit.              Point: Precautions (Not Started)       Learner Progress:  Not documented in this visit.                              User Key       Initials Effective Dates Name Provider Type Discipline    MS 07/11/23 -  Avril Johnson, PT, DPT, NCS Physical Therapist PT                  PT Recommendation and Plan             Time Calculation:    PT Charges       Row Name 05/20/24 1521 05/20/24 0735          Time Calculation    Start Time 1440  -MF 0735  -MS     Stop Time 1520  -MF 0820  -MS     Time Calculation (min) 40 min  -MF 45 min  -MS     PT Received On 05/20/24  - 05/20/24  -MS     PT Goal Re-Cert Due Date -- 05/30/24  -MS        Time Calculation- PT    Total Timed Code Minutes- PT  40 minute(s)  -MF --        Timed Charges    61878 - Gait Training Minutes  25  -MF --     51064 - PT Therapeutic Activity Minutes 15  -MF --        Untimed Charges    PT Eval/Re-eval Minutes -- 45  -MS        Total Minutes    Timed Charges Total Minutes 40  -MF --     Untimed Charges Total Minutes -- 45  -MS      Total Minutes 40  -MF 45  -MS               User Key  (r) = Recorded By, (t) = Taken By, (c) = Cosigned By      Initials Name Provider Type    MS Alex Avril R, PT, DPT, NCS Physical Therapist    Nell Blankenship, SONIDO Physical Therapist Assistant                  Therapy Charges for Today       Code Description Service Date Service Provider Modifiers Qty    42524719195 HC GAIT TRAINING EA 15 MIN 5/20/2024 Nell Layne PTA GP 2    35112701106 HC PT THERAPEUTIC ACT EA 15 MIN 5/20/2024 Nell Layne PTA GP 1            PT G-Codes  Outcome Measure Options: AM-PAC 6 Clicks Daily Activity (OT)  AM-PAC 6 Clicks Score (PT): 16  AM-PAC 6 Clicks Score (OT): 18  Modified [place] Scale: 4 - Moderately severe disability.  Unable to walk without assistance, and unable to attend to own bodily needs without assistance.    Nell Layne PTA  5/20/2024

## 2024-05-20 NOTE — ED NOTES
Nursing report ED to floor  Dago Nunez  83 y.o.  female    HPI:   Chief Complaint   Patient presents with    Neuro Deficit(s)       Admitting doctor:   Oleksandr Oviedo MD    Consulting provider(s):  Consults       No orders found for last 30 day(s).             Admitting diagnosis:   There were no encounter diagnoses.    Code status:   Current Code Status       Date Active Code Status Order ID Comments User Context       5/19/2024 2347 CPR (Attempt to Resuscitate) [id number]  Oleksandr Oviedo MD ED        Question Answer    Code Status (Patient has no pulse and is not breathing) CPR (Attempt to Resuscitate)    Medical Interventions (Patient has pulse or is breathing) Full Support    Level Of Support Discussed With Patient                    Allergies:   Scopolamine, Amoxicillin-pot clavulanate, Keflex [cephalexin], Septra [sulfamethoxazole-trimethoprim], Tequin [gatifloxacin], and Trovan [alatrofloxacin]    Intake and Output  No intake or output data in the 24 hours ending 05/20/24 0001    Weight:       05/19/24 2016   Weight: 76.2 kg (168 lb)       Most recent vitals:   Vitals:    05/19/24 2029 05/19/24 2223 05/19/24 2231 05/19/24 2246   BP: 146/44 153/54 175/58 176/51   BP Location:       Patient Position:       Pulse: 63 55 57 69   Resp:       Temp:       TempSrc:       SpO2: 100% 97% 98% 97%   Weight:       Height:         Oxygen Therapy: .    Active LDAs/IV Access:   Lines, Drains & Airways       Active LDAs       Name Placement date Placement time Site Days    Peripheral IV 05/19/24 2058 Right;Posterior Hand 05/19/24 2058  Hand  less than 1    Single Lumen Implantable Port 10/01/20 Right Chest 10/01/20  1000  Chest  1326                    Labs (abnormal labs have a star):   Labs Reviewed   COMPREHENSIVE METABOLIC PANEL - Abnormal; Notable for the following components:       Result Value    Glucose 152 (*)     BUN 32 (*)     Creatinine 2.29 (*)     eGFR 21.0 (*)     All other components within  normal limits    Narrative:     GFR Normal >60  Chronic Kidney Disease <60  Kidney Failure <15    The GFR formula is only valid for adults with stable renal function between ages 18 and 70.   URINALYSIS W/ MICROSCOPIC IF INDICATED (NO CULTURE) - Abnormal; Notable for the following components:    Appearance, UA Cloudy (*)     Leuk Esterase, UA Large (3+) (*)     All other components within normal limits   CBC WITH AUTO DIFFERENTIAL - Abnormal; Notable for the following components:    RBC 2.72 (*)     Hemoglobin 9.4 (*)     Hematocrit 30.2 (*)     .0 (*)     MCH 34.6 (*)     MCHC 31.1 (*)     RDW-SD 61.4 (*)     Neutrophil % 78.2 (*)     Lymphocyte % 12.2 (*)     Neutrophils, Absolute 7.62 (*)     All other components within normal limits   URINALYSIS, MICROSCOPIC ONLY - Abnormal; Notable for the following components:    WBC, UA Too Numerous to Count (*)     Bacteria, UA 2+ (*)     All other components within normal limits   PROTIME-INR - Normal   POCT GLUCOSE FINGERSTICK - Normal   URINE CULTURE   RAINBOW DRAW    Narrative:     The following orders were created for panel order [place] Draw.  Procedure                               Abnormality         Status                     ---------                               -----------         ------                     Green Top (Gel)[[id number]]                                  Final result               Lavender Top[[id number]]                                     Final result               Red Top[[id number]]                                          Final result               Light Blue Top[[id number]]                                   Final result                 Please view results for these tests on the individual orders.   POCT GLUCOSE FINGERSTICK   TYPE AND SCREEN   CBC AND DIFFERENTIAL    Narrative:     The following orders were created for panel order CBC & Differential.  Procedure                               Abnormality         Status                      ---------                               -----------         ------                     CBC Auto Differential[[id number]]        Abnormal            Final result                 Please view results for these tests on the individual orders.   GREEN TOP   LAVENDER TOP   RED TOP   LIGHT BLUE TOP       Meds given in ED:   Medications   sodium chloride 0.9 % flush 10 mL (has no administration in time range)   sodium chloride 0.9 % bolus 1,000 mL (1,000 mL Intravenous New Bag 5/19/24 2223)   nitrofurantoin (macrocrystal-monohydrate) (MACROBID) capsule 100 mg (100 mg Oral Given 5/19/24 2223)   aspirin chewable tablet 324 mg (324 mg Oral Given 5/19/24 2243)           NIH Stroke Scale:  Interval: baseline    Isolation/Infection(s):  No active isolations   No active infections     COVID Testing  Collected .  Resulted .    Nursing report ED to floor:  Mental status: .  Ambulatory status: .  Precautions: .    ED nurse phone extentsion- ..  DE Nunez

## 2024-05-20 NOTE — PLAN OF CARE
Goal Outcome Evaluation:  Plan of Care Reviewed With: patient        Progress: no change  Outcome Evaluation: Pt arrived from ER at approximately 0015. Up x2 assist to bathroom. Tele monitor in place, sinus ja rhythm. RA. NPO until evaluation by speech therapist. Daughter at bedside. Safety maintained. Call light in reach.                                0 = swallows foods/liquids without difficulty

## 2024-05-20 NOTE — CONSULTS
Inpatient Nutrition Services  Patient Name:  Dago Nunez  YOB: 1940  MRN: [id number]  Admit Date:  5/19/2024  Assessment Date:  5/20/2024   Reason for Assessment       Row Name 05/20/24 1417          Reason for Assessment    Reason For Assessment identified at risk by screening criteria;physician consult;other (see comments)  Diabetes Educator Consult per Stroke Protocol     Diagnosis cardiac disease;diabetes diagnosis/complications;neurologic conditions     Identified At Risk by Screening Criteria MST SCORE 2+;unintentional loss of 10 lbs or more in the past 2 mos;other (see comments)  MST 2 (unsure)                    Nutrition/Diet History       Row Name 05/20/24 1418          Nutrition/Diet History    Typical Intake (Food/Fluid/EN/PN) Pt and visitors x 3 in room, pt eating lunch and feeding self. No food allergies. BM today. No chewing/swallowing difficulty. Pt denied N/V/D, constipation. Pt took MVI, calcium, and probiotic prior to admission. A1c 5.8% this admission, review of labs shows A1cs have been in the 5% range in the last year. Pt and RD reviewed lipid panel results.     Food Intolerance(s) None                    Labs/Tests/Procedures/Meds       Row Name 05/20/24 1419          Labs/Procedures/Meds    Lab Results Reviewed reviewed     Lab Results Comments A1cs: 5.8% 5/20/24, 5.6% 3/6/24, 5.6% 1/24/23, 5.5% 4/24/23;         Diagnostic Tests/Procedures    Diagnostic Test/Procedure Reviewed reviewed        Medications    Pertinent Medications Reviewed reviewed     Pertinent Medications Comments See MAR                    Physical Findings       Row Name 05/20/24 1419          Physical Findings    Overall Physical Appearance Missing teeth, room air, 3+ edema right leg and left knee, edema to feet, BM 5/20 per pt, Kalpesh Score 18.                    Estimated/Assessed Needs - Anthropometrics       Row Name 05/20/24 1420          Anthropometrics    Weight for Calculation 78.5 kg  (173 lb)     Additional Documentation Usual Body Weight (UBW) (Group)        Usual Body Weight (UBW)    Usual Body Weight 78 kg (172 lb)        Estimated/Assessed Needs    Additional Documentation Fluid Requirements (Group);KCAL/KG (Group);Protein Requirements (Group)        KCAL/KG    KCAL/KG 20 Kcal/Kg (kcal);15 Kcal/Kg (kcal)     15 Kcal/Kg (kcal) 1177.08     20 Kcal/Kg (kcal) 1569.44        Protein Requirements    Weight Used For Protein Calculations 40.8 kg (90 lb)  IBW     Est Protein Requirement Amount (gms/kg) 1.2 gm protein     Estimated Protein Requirements (gms/day) 48.99        Fluid Requirements    Fluid Requirements (mL/day) 1569     RDA Method (mL) 1569                    Nutrition Prescription Ordered       Row Name 05/20/24 1421          Nutrition Prescription PO    Current PO Diet Regular     Fluid Consistency Thin                    Evaluation of Received Nutrient/Fluid Intake       Row Name 05/20/24 1421          Nutrient/Fluid Evaluation    Number of Days Evaluated Other (comment)  admission < 24 hr        Fluid Intake Evaluation    Oral Fluid (mL) --  insufficient data        PO Evaluation    Number of Days PO Intake Evaluated Insufficient Data                       Problem/Interventions:   Problem 1       Row Name 05/20/24 1421          Nutrition Diagnoses Problem 1    Problem 1 No Nutrition Diagnosis at this Time                          Intervention Goal       Row Name 05/20/24 1421          Intervention Goal    General Disease management/therapy;Meet nutritional needs for age/condition     PO Tolerate PO;Meet estimated needs     Weight Maintain weight                    Nutrition Intervention       Row Name 05/20/24 1421          Nutrition Intervention    RD/Tech Action Follow Tx progress;Care plan reviewd;Encourage intake                    Nutrition Prescription       Row Name 05/20/24 1421          Nutrition Prescription PO    PO Prescription Other (comment)  observe SLP recommendations  for food/fluid texture/consistency                    Education/Evaluation       Row Name 05/20/24 1421          Education    Education No discharge needs identified at this time        Monitor/Evaluation    Monitor Per protocol                     Electronically signed by:  Luz Ivy RDN, LD  05/20/24 14:22 CDT

## 2024-05-20 NOTE — CASE MANAGEMENT/SOCIAL WORK
Discharge Planning Assessment   [place]     Patient Name: Dago Nunez  MRN: [id number]  Today's Date: 5/20/2024    Admit Date: 5/19/2024        Discharge Needs Assessment       Row Name 05/20/24 1633       Living Environment    People in Home spouse    Current Living Arrangements home    Potentially Unsafe Housing Conditions none    In the past 12 months has the electric, gas, oil, or water company threatened to shut off services in your home? No    Primary Care Provided by self    Provides Primary Care For no one    Family Caregiver if Needed spouse    Quality of Family Relationships helpful;involved    Able to Return to Prior Arrangements other (see comments)       Resource/Environmental Concerns    Resource/Environmental Concerns none    Transportation Concerns none       Transportation Needs    In the past 12 months, has lack of transportation kept you from medical appointments or from getting medications? no    In the past 12 months, has lack of transportation kept you from meetings, work, or from getting things needed for daily living? No       Food Insecurity    Within the past 12 months, you worried that your food would run out before you got the money to buy more. Never true    Within the past 12 months, the food you bought just didn't last and you didn't have money to get more. Never true       Transition Planning    Patient/Family Anticipates Transition to home with family;home with help/services;inpatient rehabilitation facility    Patient/Family Anticipated Services at Transition home health care;rehabilitation services;outpatient care    Transportation Anticipated family or friend will provide       Discharge Needs Assessment    Readmission Within the Last 30 Days no previous admission in last 30 days    Equipment Currently Used at Home walker, rolling;cane, straight;hospital bed    Concerns to be Addressed adjustment to diagnosis/illness;discharge planning    Anticipated Changes Related to  Illness none    Equipment Needed After Discharge none    Outpatient/Agency/Support Group Needs homecare agency;outpatient therapy;inpatient rehabilitation facility    Discharge Facility/Level of Care Needs home with home health;outpatient therapy;rehabilitation facility                   Discharge Plan       Row Name 05/20/24 2663       Plan    Plan Comments Pt lives at home with spouse and prefers to return home when medically stable. Therapy has recommended inpt rehab but not sure if pt is agreeable. Pt is supposed to talk to family about this and SW will follow up tomorrow for decision.                  Continued Care and Services - Admitted Since 5/19/2024    No active coordination exists for this encounter.          Demographic Summary    No documentation.                  Functional Status    No documentation.                  Psychosocial    No documentation.                  Abuse/Neglect    No documentation.                  Legal    No documentation.                  Substance Abuse    No documentation.                  Patient Forms    No documentation.                     MACIEJ Diallo

## 2024-05-20 NOTE — PLAN OF CARE
Goal Outcome Evaluation:  Plan of Care Reviewed With: patient, caregiver, daughter, son (DE Cassidy)        Progress: no change (Initial Evaluation)         Anticipated Discharge Disposition (SLP): unknown          SLP Swallowing Diagnosis: swallow WFL/no suspected pharyngeal impairment (05/20/24 7873)             SPEECH-LANGUAGE PATHOLOGY EVALUATION - SWALLOW  Subjective: The patient was seen on this date for a Clinical Swallow evaluation.  Patient was alert and cooperative. Son and daughter present.   Significant history: Presented with LUE and LLE weakness with dysarthria and UTI. Pt. hx of scoliosis, CKD, GERD, and L mastectomy. SLP consulted due to failed dysphagia screening.   Objective: Oral motor examination results: left facial asymmetry .  Textures given during assessment of swallow function included thin liquid and regular consistency.  Assessment: Difficulties were noted with none of the above consistencies.  Observations: No overt s/s of aspiration observed. Functional rotary chew given regular solids. Able to self feed with no difficulties.   SLP Findings:  Patient presents with functional swallow, without esophageal component.   Recommendations: Diet Textures: Regular diet with thin liquids.  Medications should be taken whole with thin liquids.   Recommended Strategies: upright for PO, small bites and sips, and alternate liquids and solids. Oral care 2x a day.  Other Recommended Evaluations: Speech-Language Evaluation if not back to baseline.   Dysphagia therapy is not recommended.      Avril Palacios MS CCC-SLP 5/20/2024 09:06 CDT

## 2024-05-20 NOTE — THERAPY EVALUATION
Acute Care - Speech Language Pathology   Swallow Initial Evaluation [facility]     Patient Name: Dago Nunez  : 1940  MRN: [id number]  Today's Date: 2024               Admit Date: 2024    SPEECH-LANGUAGE PATHOLOGY EVALUATION - SWALLOW  Subjective: The patient was seen on this date for a Clinical Swallow evaluation.  Patient was alert and cooperative. Son and daughter present.   Significant history: Presented with LUE and LLE weakness with dysarthria and UTI. Pt. hx of scoliosis, CKD, GERD, and L mastectomy. SLP consulted due to failed dysphagia screening.   Objective: Oral motor examination results: left facial asymmetry .  Textures given during assessment of swallow function included thin liquid and regular consistency.  Assessment: Difficulties were noted with none of the above consistencies.  Observations: No overt s/s of aspiration observed. Functional rotary chew given regular solids. Able to self feed with no difficulties.   SLP Findings:  Patient presents with functional swallow, without esophageal component.   Recommendations: Diet Textures: Regular diet with thin liquids.  Medications should be taken whole with thin liquids.   Recommended Strategies: upright for PO, small bites and sips, and alternate liquids and solids. Oral care 2x a day.  Other Recommended Evaluations: Speech-Language Evaluation if not back to baseline.   Dysphagia therapy is not recommended.      Avril Palacios MS CCC-SLP 2024 09:07 CDT    Visit Dx:     ICD-10-CM ICD-9-CM   1. Cerebrovascular accident (CVA), unspecified mechanism  I63.9 434.91   2. Acute UTI (urinary tract infection)  N39.0 599.0   3. Acute renal failure superimposed on chronic kidney disease, unspecified acute renal failure type, unspecified CKD stage  N17.9 584.9    N18.9 585.9   4. Dysphagia, unspecified type  R13.10 787.20     Patient Active Problem List   Diagnosis    Meatal stenosis    Retention of urine    Type 2 diabetes  mellitus, without long-term current use of insulin    Essential hypertension    Grief reaction    Vulvar intraepithelial neoplasia (MOODY) grade 3    Chronic midline low back pain without sciatica    Bilateral lower extremity edema    History of urethral stricture    Epidermal cyst of neck    Normocytic anemia    Neck abscess    Mixed hyperlipidemia    Gastroesophageal reflux disease without esophagitis    Anxiety    Iron deficiency and chemotherapy induced anemia    Stage 3 chronic kidney disease    Anemia in stage 3 chronic kidney disease    Screening for breast cancer    Lower extremity edema    Vulvar cancer, carcinoma    Former smoker    Secondary malignancy of inguinal lymph nodes    Febrile illness    Chemotherapy-induced thrombocytopenia    Hyponatremia    Hypokalemia    Neutropenic fever    Moderate malnutrition    Antineoplastic chemotherapy induced anemia    History of radiation therapy    Encounter for care related to Port-a-Cath    Malignant neoplasm of upper-outer quadrant of left breast in female, estrogen receptor positive    Encounter for care related to vascular access port    Angiodysplasia    Bronchitis    Obesity (BMI 30-39.9)    Wheezing    Cough    Post-COVID-19 condition    Radiation induced proctitis    Rectal bleeding    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    CVA (cerebral vascular accident)     Past Medical History:   Diagnosis Date    Anemia in stage 3 chronic kidney disease 11/11/2019    Arthritis     Breast cancer 09/14/2021    Left Mastectomy w/ sentinel node Bx    Bronchitis     Cellulitis     ROSA LEGS    GERD (gastroesophageal reflux disease)     Hyperlipidemia     Hypertension     Kyphosis     Osteoporosis     Personal history of COVID-19 05/2022    Scoliosis     Self-catheterizes urinary bladder     10FR SIZED CATH    Stage 3 chronic kidney disease 11/11/2019    Type 2 diabetes mellitus     Urethral meatal stenosis 09/2022    w/ urine retention    Vulva cancer     Vulvar  intraepithelial neoplasia (MOODY) grade 3      Past Surgical History:   Procedure Laterality Date    APPENDECTOMY      BENJAMIN PROCEDURE      No evidence of reflux disease while on Nexium 40mg daily-See report    BREAST BIOPSY      BREAST CYST ASPIRATION Left     BREAST LUMPECTOMY      COLONOSCOPY  01/12/2011    Diverticulosis sigmoid colon; The examination was otherwise normal; Repeat 10 years    COLONOSCOPY  11/03/2003    Dr. Laguerre-Normal colonoscopy; Normal terminal ileum; Repeat 5 years    COLONOSCOPY N/A 11/05/2021    Petechia(e) in the rectum, in the recto-sigmoid colon and in the distal sigmoid colon; No specimens collected; No plans to repeat colonoscopy due to advance age and/or medical problems    CYSTOSCOPY N/A 09/20/2022    Procedure: CYSTOSCOPY WITH URETHRAL DILATATION;  Surgeon: Shaheen Conway MD;  Location: [facility] OR;  Service: Urology;  Laterality: N/A;    ENDOSCOPY  07/01/2014    Normal esophagus; Normal stomach; Normal examined duodenum; BRAVO pH capsule deployed;     ENDOSCOPY  08/14/2013    Mild gastritis-biopsies for H.Pylor obtained    ENDOSCOPY  02/16/2005    Dr. LaguerreGusph-Wifqtujiw-drvuecrw    ENDOSCOPY  10/21/2003    Dr. Laguerre-Stage 1 reflux esophagitis    ENDOSCOPY N/A 11/05/2021    Small HH; A single gastroesophageal junction polyp; Normal stomach; A single non-bleeding angiodysplastic lesion in the duodenum    HYSTERECTOMY      MASTECTOMY W/ SENTINEL NODE BIOPSY Left 09/14/2021    Procedure: LEFT PARTIAL MASTECTOMY WITH MAGSEED AND LEFT SENTINEL LYMPH NODE BIOPSY MAGTRACE;  Surgeon: Quynh Rosario MD;  Location:  [facility] OR;  Service: General;  Laterality: Left;    TONSILLECTOMY      VAGINA SURGERY      Laser surgery X 2    VENOUS ACCESS DEVICE (PORT) INSERTION N/A 09/29/2020    Procedure: SINGLE LUMEN PORT - A- CATH PLACEMENT WITH FLUOROSCOPY;  Surgeon: Quynh Rosario MD;  Location:  [facility] OR;  Service: General;  Laterality: N/A;       SLP Recommendation and Plan  SLP  Swallowing Diagnosis: swallow WFL/no suspected pharyngeal impairment (05/20/24 0835)  SLP Diet Recommendation: regular textures, thin liquids (05/20/24 0835)  Recommended Precautions and Strategies: upright posture during/after eating, small bites of food and sips of liquid, general aspiration precautions (05/20/24 0835)  SLP Rec. for Method of Medication Administration: meds whole, with thin liquids, as tolerated (05/20/24 0835)     Monitor for Signs of Aspiration: yes, notify SLP if any concerns (05/20/24 0835)  Recommended Diagnostics: SLE/Cog/Motor Speech Evaluation (05/20/24 0835)  Swallow Criteria for Skilled Therapeutic Interventions Met: no problems identified which require skilled intervention (05/20/24 0835)  Anticipated Discharge Disposition (SLP): unknown (05/20/24 0835)     Therapy Frequency (Swallow): evaluation only (05/20/24 0835)  Predicted Duration Therapy Intervention (Days): until discharge (05/20/24 0835)  Oral Care Recommendations: Oral Care BID/PRN, Toothbrush (05/20/24 0835)                                        Plan of Care Reviewed With: patient, caregiver, daughter, son (DE Cassidy)  Progress: no change (Initial Evaluation)      SWALLOW EVALUATION (Last 72 Hours)       SLP Adult Swallow Evaluation       Row Name 05/20/24 0835                   Rehab Evaluation    Document Type evaluation  -MG        Subjective Information no complaints  -MG        Patient Observations alert;cooperative;agree to therapy  -MG        Patient/Family/Caregiver Comments/Observations Son and daughter present.  -MG        Patient Effort good  -MG        Symptoms Noted During/After Treatment none  -MG           General Information    Patient Profile Reviewed yes  -MG        Pertinent History Of Current Problem Presented with LUE and LLE weakness with dysarthria and UTI. Pt. hx of scoliosis, CKD, GERD, and L mastectomy. SLP consulted due to failed dysphagia screening.  -MG        Current Method of Nutrition NPO  -MG         Precautions/Limitations, Vision WFL;for purposes of eval  -MG        Precautions/Limitations, Hearing WFL;for purposes of eval  -MG        Prior Level of Function-Communication WFL  -MG        Prior Level of Function-Swallowing no diet consistency restrictions  -MG        Plans/Goals Discussed with patient and family  -MG        Barriers to Rehab none identified  -MG        Patient's Goals for Discharge return home  -MG        Family Goals for Discharge patient able to return to all previous activities/roles  -MG           Pain    Additional Documentation Pain Scale: FACES Pre/Post-Treatment (Group)  -MG           Pain Scale: FACES Pre/Post-Treatment    Pain: FACES Scale, Pretreatment 0-->no hurt  -MG        Posttreatment Pain Rating 0-->no hurt  -MG           Oral Motor Structure and Function    Dentition Assessment natural, present and adequate  -MG        Secretion Management WNL/WFL  -MG        Mucosal Quality moist, healthy  -MG        Volitional Swallow WFL  -MG        Volitional Cough WFL  -MG           Oral Musculature and Cranial Nerve Assessment    Oral Motor General Assessment oral labial or buccal impairment  -MG        Oral Labial or Buccal Impairment, Detail, Cranial Nerve VII (Facial): CN7: Motor Impairment;left labial droop  slight  -MG           General Eating/Swallowing Observations    Respiratory Support Currently in Use room air  -MG        Eating/Swallowing Skills self-fed  -MG        Positioning During Eating upright in chair  -MG        Utensils Used straw  -MG        Consistencies Trialed regular textures;thin liquids  -MG           Clinical Swallow Eval    Oral Prep Phase WFL  -MG        Oral Transit WFL  -MG        Oral Residue WFL  -MG        Pharyngeal Phase no overt signs/symptoms of pharyngeal impairment  -MG        Esophageal Phase unremarkable  -MG        Clinical Swallow Evaluation Summary See note  -MG           SLP Evaluation Clinical Impression    SLP Swallowing Diagnosis  swallow WFL/no suspected pharyngeal impairment  -MG        Functional Impact no impact on function  -MG        Swallow Criteria for Skilled Therapeutic Interventions Met no problems identified which require skilled intervention  -MG           Recommendations    Therapy Frequency (Swallow) evaluation only  -MG        Predicted Duration Therapy Intervention (Days) until discharge  -MG        SLP Diet Recommendation regular textures;thin liquids  -MG        Recommended Diagnostics SLE/Cog/Motor Speech Evaluation  -MG        Recommended Precautions and Strategies upright posture during/after eating;small bites of food and sips of liquid;general aspiration precautions  -MG        Oral Care Recommendations Oral Care BID/PRN;Toothbrush  -MG        SLP Rec. for Method of Medication Administration meds whole;with thin liquids;as tolerated  -MG        Monitor for Signs of Aspiration yes;notify SLP if any concerns  -MG        Anticipated Discharge Disposition (SLP) unknown  -MG                  User Key  (r) = Recorded By, (t) = Taken By, (c) = Cosigned By      Initials Name Effective Dates    Avril Bradley MS CCC-SLP 07/11/23 -                     EDUCATION  The patient has been educated in the following areas:   Dysphagia (Swallowing Impairment) Oral Care/Hydration.                Time Calculation:    Time Calculation- SLP       Row Name 05/20/24 0906             Time Calculation- SLP    SLP Start Time 0810  -MG      SLP Stop Time 0906  -MG      SLP Time Calculation (min) 56 min  -MG      SLP Received On 05/20/24  -MG         Untimed Charges    SLP Eval/Re-eval  ST Eval Oral Pharyng Swallow - 78069  -MG      63070-OL Eval Oral Pharyng Swallow Minutes 56  -MG         Total Minutes    Untimed Charges Total Minutes 56  -MG       Total Minutes 56  -MG                User Key  (r) = Recorded By, (t) = Taken By, (c) = Cosigned By      Initials Name Provider Type    Avril Bradley MS CCC-SLP Speech and Language  Pathologist                    Therapy Charges for Today       Code Description Service Date Service Provider Modifiers Qty    [id number]  ST EVAL ORAL PHARYNG SWALLOW 4 5/20/2024 Avril Palacios, MS CCC-SLP GN 1                 Avril Palacios MS CCC-SLP  5/20/2024

## 2024-05-20 NOTE — ED PROVIDER NOTES
"Subjective   History of Present Illness  Pt presents to the  with report of L sided weakness and leg \"jumping\" when she woke this morning - last known well time was around 10pm last night.  Has had some L  weakness and L facial droop with mild dysarthria and occasionally having trouble findings words today per family.  Pt reports she has had L hand weakness for some time.  No CP/Sob.  No new vision changes.  No n/v/f/c. No HA.  Has some back pain which is chronic for her and unchanged.        Review of Systems   Constitutional:  Negative for fever.   HENT:  Negative for congestion, trouble swallowing and voice change.    Eyes:  Negative for photophobia and visual disturbance.   Respiratory:  Negative for cough and shortness of breath.    Cardiovascular:  Positive for leg swelling. Negative for chest pain.   Gastrointestinal:  Negative for abdominal pain, nausea and vomiting.   Genitourinary:  Negative for dysuria.   Musculoskeletal:  Positive for back pain.   Skin:  Negative for rash and wound.   Neurological:  Positive for speech difficulty and weakness. Negative for syncope, light-headedness and numbness.   Psychiatric/Behavioral:  Negative for confusion.    All other systems reviewed and are negative.      Past Medical History:   Diagnosis Date    Anemia in stage 3 chronic kidney disease 11/11/2019    Arthritis     Breast cancer 09/14/2021    Left Mastectomy w/ sentinel node Bx    Bronchitis     Cellulitis     ROSA LEGS    GERD (gastroesophageal reflux disease)     Hyperlipidemia     Hypertension     Kyphosis     Osteoporosis     Personal history of COVID-19 05/2022    Scoliosis     Self-catheterizes urinary bladder     10FR SIZED CATH    Stage 3 chronic kidney disease 11/11/2019    Type 2 diabetes mellitus     Urethral meatal stenosis 09/2022    w/ urine retention    Vulva cancer     Vulvar intraepithelial neoplasia (MOODY) grade 3        Allergies   Allergen Reactions    Scopolamine Swelling     Other " reaction(s): ANGIOEDEMA        Amoxicillin-Pot Clavulanate Rash    Keflex [Cephalexin] Rash    Septra [Sulfamethoxazole-Trimethoprim] Rash    Tequin [Gatifloxacin] Other (See Comments)     Doesn't remember    Trovan [Alatrofloxacin] Dizziness       Past Surgical History:   Procedure Laterality Date    APPENDECTOMY      BENJAMIN PROCEDURE      No evidence of reflux disease while on Nexium 40mg daily-See report    BREAST BIOPSY      BREAST CYST ASPIRATION Left     BREAST LUMPECTOMY      COLONOSCOPY  01/12/2011    Diverticulosis sigmoid colon; The examination was otherwise normal; Repeat 10 years    COLONOSCOPY  11/03/2003    Dr. Laguerre-Normal colonoscopy; Normal terminal ileum; Repeat 5 years    COLONOSCOPY N/A 11/05/2021    Petechia(e) in the rectum, in the recto-sigmoid colon and in the distal sigmoid colon; No specimens collected; No plans to repeat colonoscopy due to advance age and/or medical problems    CYSTOSCOPY N/A 09/20/2022    Procedure: CYSTOSCOPY WITH URETHRAL DILATATION;  Surgeon: Shaheen Conway MD;  Location: [facility] OR;  Service: Urology;  Laterality: N/A;    ENDOSCOPY  07/01/2014    Normal esophagus; Normal stomach; Normal examined duodenum; BRAVO pH capsule deployed;     ENDOSCOPY  08/14/2013    Mild gastritis-biopsies for H.Pylor obtained    ENDOSCOPY  02/16/2005    Dr. LaguerreXlmmg-Edeyphpit-hvuensee    ENDOSCOPY  10/21/2003    Dr. Laguerre-Stage 1 reflux esophagitis    ENDOSCOPY N/A 11/05/2021    Small HH; A single gastroesophageal junction polyp; Normal stomach; A single non-bleeding angiodysplastic lesion in the duodenum    HYSTERECTOMY      MASTECTOMY W/ SENTINEL NODE BIOPSY Left 09/14/2021    Procedure: LEFT PARTIAL MASTECTOMY WITH MAGSEED AND LEFT SENTINEL LYMPH NODE BIOPSY MAGTRACE;  Surgeon: Quynh Rosario MD;  Location: [facility] OR;  Service: General;  Laterality: Left;    TONSILLECTOMY      VAGINA SURGERY      Laser surgery X 2    VENOUS ACCESS DEVICE (PORT) INSERTION N/A 09/29/2020     Procedure: SINGLE LUMEN PORT - A- CATH PLACEMENT WITH FLUOROSCOPY;  Surgeon: Quynh Rosario MD;  Location: [facility] OR;  Service: General;  Laterality: N/A;       Family History   Problem Relation Age of Onset    Cancer Mother     Hypertension Mother     Osteoporosis Mother     Dementia Mother     Uterine cancer Mother     Heart disease Father     Parkinsonism Father     Cancer Sister     Breast cancer Sister     Kidney cancer Sister     Diabetes Brother     Heart disease Paternal Grandfather     No Known Problems Maternal Grandmother     No Known Problems Maternal Grandfather     No Known Problems Paternal Grandmother     Colon cancer Neg Hx     Colon polyps Neg Hx     Esophageal cancer Neg Hx     Liver cancer Neg Hx     Liver disease Neg Hx     Rectal cancer Neg Hx     Stomach cancer Neg Hx        Social History     Socioeconomic History    Marital status:    Tobacco Use    Smoking status: Former     Current packs/day: 0.25     Average packs/day: 0.3 packs/day for 3.0 years (0.8 ttl pk-yrs)     Types: Cigarettes     Passive exposure: Past    Smokeless tobacco: Never    Tobacco comments:     social smoker in [employer]   Vaping Use    Vaping status: Never Used   Substance and Sexual Activity    Alcohol use: Not Currently     Comment: Occasional glass of wine    Drug use: No    Sexual activity: Defer           Objective   Physical Exam  Vitals and nursing note reviewed.   Constitutional:       General: She is not in acute distress.  HENT:      Head: Normocephalic.      Nose: Nose normal.      Mouth/Throat:      Mouth: Mucous membranes are moist.   Eyes:      Extraocular Movements: Extraocular movements intact.      Conjunctiva/sclera: Conjunctivae normal.      Pupils: Pupils are equal, round, and reactive to light.   Cardiovascular:      Rate and Rhythm: Normal rate and regular rhythm.      Pulses: Normal pulses.      Heart sounds: Normal heart sounds.   Pulmonary:      Effort: Pulmonary effort is normal.       Breath sounds: Normal breath sounds.   Abdominal:      General: Abdomen is flat. Bowel sounds are normal.      Palpations: Abdomen is soft.   Musculoskeletal:      Cervical back: Normal range of motion and neck supple.      Right lower leg: Edema present.      Left lower leg: Edema present.   Skin:     General: Skin is warm and dry.      Capillary Refill: Capillary refill takes less than 2 seconds.   Neurological:      Mental Status: She is alert.      Comments: + mild L facial droop/flattening of nasolabial fold.  + mild dysarthria.  No aphasia noted.  + L  weakness compared to R. No pronator drift.  + bilateral leg weakness - L > R slightly.  + mild tremor         Procedures           ED Course      Labs Reviewed   COMPREHENSIVE METABOLIC PANEL - Abnormal; Notable for the following components:       Result Value    Glucose 152 (*)     BUN 32 (*)     Creatinine 2.29 (*)     eGFR 21.0 (*)     All other components within normal limits    Narrative:     GFR Normal >60  Chronic Kidney Disease <60  Kidney Failure <15    The GFR formula is only valid for adults with stable renal function between ages 18 and 70.   URINALYSIS W/ MICROSCOPIC IF INDICATED (NO CULTURE) - Abnormal; Notable for the following components:    Appearance, UA Cloudy (*)     Leuk Esterase, UA Large (3+) (*)     All other components within normal limits   CBC WITH AUTO DIFFERENTIAL - Abnormal; Notable for the following components:    RBC 2.72 (*)     Hemoglobin 9.4 (*)     Hematocrit 30.2 (*)     .0 (*)     MCH 34.6 (*)     MCHC 31.1 (*)     RDW-SD 61.4 (*)     Neutrophil % 78.2 (*)     Lymphocyte % 12.2 (*)     Neutrophils, Absolute 7.62 (*)     All other components within normal limits   URINALYSIS, MICROSCOPIC ONLY - Abnormal; Notable for the following components:    WBC, UA Too Numerous to Count (*)     Bacteria, UA 2+ (*)     All other components within normal limits   PROTIME-INR - Normal   POCT GLUCOSE FINGERSTICK - Normal   URINE  CULTURE   RAINBOW DRAW    Narrative:     The following orders were created for panel order [place] Draw.  Procedure                               Abnormality         Status                     ---------                               -----------         ------                     Green Top (Gel)[[id number]]                                  Final result               Lavender Top[[id number]]                                     Final result               Red Top[[id number]]                                          Final result               Light Blue Top[[id number]]                                   Final result                 Please view results for these tests on the individual orders.   POCT GLUCOSE FINGERSTICK   TYPE AND SCREEN   CBC AND DIFFERENTIAL    Narrative:     The following orders were created for panel order CBC & Differential.  Procedure                               Abnormality         Status                     ---------                               -----------         ------                     CBC Auto Differential[[id number]]        Abnormal            Final result                 Please view results for these tests on the individual orders.   GREEN TOP   LAVENDER TOP   RED TOP   LIGHT BLUE TOP     CT Head Without Contrast   Final Result   1. No acute intracranial abnormality is seen.               This report was signed and finalized on 5/19/2024 10:09 PM by Dr. Miah Pineda MD.          XR Chest 1 View   Final Result   1. No acute disease.                       This report was signed and finalized on 5/19/2024 9:22 PM by Dr. Miah Pineda MD.                                  Total (NIH Stroke Scale): 3                  Medical Decision Making  Pt stable in EC - hx and findings c/w stroke pathology.  She does have a UTI and was started on macrobid as she has several allergies.  CT unremarkable att.  NIHSS 3.  Pt not candidate for lytics as she was 22hrs post last known well time on presentation.   She has no findings s/o large vessel occlusion however did not get CTA d/t CKD.  Has some acute on chronic kidney injury.  Have d/w Dr. Oviedo for admit/further mgmt. Pt/family aware    Amount and/or Complexity of Data Reviewed  Labs: ordered.  Radiology: ordered.  ECG/medicine tests: ordered.    Risk  OTC drugs.  Prescription drug management.  Decision regarding hospitalization.        Final diagnoses:   Cerebrovascular accident (CVA), unspecified mechanism   Acute UTI (urinary tract infection)   Acute renal failure superimposed on chronic kidney disease, unspecified acute renal failure type, unspecified CKD stage       ED Disposition  ED Disposition       ED Disposition   Decision to Admit    Condition   --    Comment   Level of Care: Remote Telemetry [26]   Diagnosis: CVA (cerebral vascular accident) [263757]   Admitting Physician: TORRI OVIEDO [[id number]]   Attending Physician: TORRI OVIEDO [[id number]]   Certification: I Certify That Inpatient Hospital Services Are Medically Necessary For Greater Than 2 Midnights                 No follow-up provider specified.       Medication List      No changes were made to your prescriptions during this visit.            Andre Jones,   05/19/24 2043       Andre Jones,   05/19/24 2051       Andre Jones, DO  05/20/24 0006

## 2024-05-21 LAB
ANION GAP SERPL CALCULATED.3IONS-SCNC: 9 MMOL/L (ref 5–15)
ARTICHOKE IGE QN: 144 MG/DL (ref 0–100)
BUN SERPL-MCNC: 31 MG/DL (ref 8–23)
BUN/CREAT SERPL: 15 (ref 7–25)
CALCIUM SPEC-SCNC: 9.1 MG/DL (ref 8.6–10.5)
CHLORIDE SERPL-SCNC: 103 MMOL/L (ref 98–107)
CO2 SERPL-SCNC: 29 MMOL/L (ref 22–29)
CREAT SERPL-MCNC: 2.07 MG/DL (ref 0.57–1)
DEPRECATED RDW RBC AUTO: 60.7 FL (ref 37–54)
EGFRCR SERPLBLD CKD-EPI 2021: 23.7 ML/MIN/1.73
ERYTHROCYTE [DISTWIDTH] IN BLOOD BY AUTOMATED COUNT: 15.8 % (ref 12.3–15.4)
FOLATE SERPL-MCNC: >20 NG/ML (ref 4.78–24.2)
GLUCOSE SERPL-MCNC: 112 MG/DL (ref 65–99)
HCT VFR BLD AUTO: 25.4 % (ref 34–46.6)
HGB BLD-MCNC: 7.9 G/DL (ref 12–15.9)
MCH RBC QN AUTO: 34.5 PG (ref 26.6–33)
MCHC RBC AUTO-ENTMCNC: 31.1 G/DL (ref 31.5–35.7)
MCV RBC AUTO: 110.9 FL (ref 79–97)
PLATELET # BLD AUTO: 151 10*3/MM3 (ref 140–450)
PMV BLD AUTO: 11.1 FL (ref 6–12)
POTASSIUM SERPL-SCNC: 4.3 MMOL/L (ref 3.5–5.2)
RBC # BLD AUTO: 2.29 10*6/MM3 (ref 3.77–5.28)
SODIUM SERPL-SCNC: 141 MMOL/L (ref 136–145)
VIT B12 BLD-MCNC: 878 PG/ML (ref 211–946)
WBC NRBC COR # BLD AUTO: 6.9 10*3/MM3 (ref 3.4–10.8)

## 2024-05-21 PROCEDURE — 94799 UNLISTED PULMONARY SVC/PX: CPT

## 2024-05-21 PROCEDURE — 25810000003 SODIUM CHLORIDE 0.9 % SOLUTION 500 ML FLEX CONT: Performed by: INTERNAL MEDICINE

## 2024-05-21 PROCEDURE — 25010000002 CEFTRIAXONE PER 250 MG

## 2024-05-21 PROCEDURE — 94664 DEMO&/EVAL PT USE INHALER: CPT

## 2024-05-21 PROCEDURE — 97116 GAIT TRAINING THERAPY: CPT

## 2024-05-21 PROCEDURE — 94640 AIRWAY INHALATION TREATMENT: CPT

## 2024-05-21 PROCEDURE — 94761 N-INVAS EAR/PLS OXIMETRY MLT: CPT

## 2024-05-21 PROCEDURE — 99232 SBSQ HOSP IP/OBS MODERATE 35: CPT | Performed by: PSYCHIATRY & NEUROLOGY

## 2024-05-21 PROCEDURE — 97110 THERAPEUTIC EXERCISES: CPT

## 2024-05-21 PROCEDURE — 80048 BASIC METABOLIC PNL TOTAL CA: CPT | Performed by: INTERNAL MEDICINE

## 2024-05-21 PROCEDURE — 92523 SPEECH SOUND LANG COMPREHEN: CPT

## 2024-05-21 PROCEDURE — 97535 SELF CARE MNGMENT TRAINING: CPT

## 2024-05-21 PROCEDURE — 85027 COMPLETE CBC AUTOMATED: CPT | Performed by: INTERNAL MEDICINE

## 2024-05-21 RX ORDER — ALPRAZOLAM 0.5 MG/1
0.5 TABLET ORAL ONCE
Status: COMPLETED | OUTPATIENT
Start: 2024-05-21 | End: 2024-05-21

## 2024-05-21 RX ADMIN — ALPRAZOLAM 0.5 MG: 0.5 TABLET ORAL at 06:52

## 2024-05-21 RX ADMIN — CETIRIZINE HYDROCHLORIDE 10 MG: 10 TABLET ORAL at 09:12

## 2024-05-21 RX ADMIN — SODIUM CHLORIDE 1000 MG: 900 INJECTION INTRAVENOUS at 16:04

## 2024-05-21 RX ADMIN — ALBUTEROL SULFATE 2.5 MG: 2.5 SOLUTION RESPIRATORY (INHALATION) at 14:19

## 2024-05-21 RX ADMIN — Medication 250 MG: at 20:19

## 2024-05-21 RX ADMIN — ALBUTEROL SULFATE 2.5 MG: 2.5 SOLUTION RESPIRATORY (INHALATION) at 10:08

## 2024-05-21 RX ADMIN — LETROZOLE 2.5 MG: 2.5 TABLET ORAL at 09:12

## 2024-05-21 RX ADMIN — Medication 10 ML: at 09:12

## 2024-05-21 RX ADMIN — Medication 10 ML: at 20:19

## 2024-05-21 RX ADMIN — CITALOPRAM 20 MG: 20 TABLET, FILM COATED ORAL at 09:12

## 2024-05-21 RX ADMIN — CLOPIDOGREL BISULFATE 75 MG: 75 TABLET, FILM COATED ORAL at 09:12

## 2024-05-21 RX ADMIN — SODIUM CHLORIDE 40 ML: 9 INJECTION, SOLUTION INTRAVENOUS at 16:09

## 2024-05-21 RX ADMIN — HYDROCODONE BITARTRATE AND ACETAMINOPHEN 0.5 TABLET: 10; 325 TABLET ORAL at 20:34

## 2024-05-21 RX ADMIN — FUROSEMIDE 40 MG: 40 TABLET ORAL at 09:12

## 2024-05-21 RX ADMIN — ASPIRIN 81 MG CHEWABLE TABLET 81 MG: 81 TABLET CHEWABLE at 09:12

## 2024-05-21 RX ADMIN — ALBUTEROL SULFATE 2.5 MG: 2.5 SOLUTION RESPIRATORY (INHALATION) at 19:55

## 2024-05-21 RX ADMIN — PANTOPRAZOLE SODIUM 40 MG: 40 TABLET, DELAYED RELEASE ORAL at 18:27

## 2024-05-21 RX ADMIN — ALBUTEROL SULFATE 2.5 MG: 2.5 SOLUTION RESPIRATORY (INHALATION) at 06:09

## 2024-05-21 RX ADMIN — Medication 250 MG: at 09:12

## 2024-05-21 RX ADMIN — ATORVASTATIN CALCIUM 80 MG: 40 TABLET ORAL at 20:19

## 2024-05-21 RX ADMIN — PANTOPRAZOLE SODIUM 40 MG: 40 TABLET, DELAYED RELEASE ORAL at 06:52

## 2024-05-21 NOTE — THERAPY EVALUATION
"Acute Care - Speech Language Pathology Initial Evaluation  [facility]     Patient Name: Dago Nunez  : 1940  MRN: [id number]  Today's Date: 2024               Admit Date: 2024  Pt seen to evaluate dysarthria and cognition. Upon arrival, pt was sitting upright on edge of bed with  at bedside. She reported that she was feeling much better this morning but felt that speech was still \"dragging\". Mild dysarthria noted throughout assessment. The SLUMS was administered to assess cognition and results are as listed below.  SLUMS:  The [facility] Mental Status Examination (SLUMS) is designed as an alternative to the Mini-Mental Status Examination. Both tools are sensitive to detecting dementia, but the SLUMS has been shown to better identify mild cognitive impairment. It consists of 11 items and measures orientation, short-term memory, calculations, naming of animals, clock drawing, and recognition of geometric figures.  [facility] Mental Status Examination (SLUMS) findings:      Score Score Range Rating   Results 20 1-20 Indicative of Dementia   Comments: Pt completed orientation, immediate and delayed recall, confrontational and divergent naming, clock drawing, functional math, following directions, and mental manipulation tasks. She demonstrated most difficulty with immediate and delayed recall tasks. Pt reported that she still currently manages all finances and medication at home. She stated that her daughter gets worried at times as she feels that pt gets things \"mixed up\" and they have had conversations about daughter taking over medication management tasks for her. Pt also reported to have had some difficulty with finance management tasks as well which was also reflected in assessment as she demonstrated difficulty with functional math tasks. ST will continue to follow and tx for dysarthria and cognition.     Rowena Briceno MS CCC-SLP 2024 11:42 CDT       Visit Dx:   "  ICD-10-CM ICD-9-CM   1. Cerebrovascular accident (CVA), unspecified mechanism  I63.9 434.91   2. Acute UTI (urinary tract infection)  N39.0 599.0   3. Acute renal failure superimposed on chronic kidney disease, unspecified acute renal failure type, unspecified CKD stage  N17.9 584.9    N18.9 585.9   4. Dysphagia, unspecified type  R13.10 787.20   5. Impaired mobility [Z74.09]  Z74.09 799.89   6. Cognitive and behavioral changes [R41.89, R46.89]  R41.89 799.59    R46.89 312.9     Patient Active Problem List   Diagnosis    Meatal stenosis    Retention of urine    Type 2 diabetes mellitus, without long-term current use of insulin    Essential hypertension    Grief reaction    Vulvar intraepithelial neoplasia (MOODY) grade 3    Chronic midline low back pain without sciatica    Bilateral lower extremity edema    History of urethral stricture    Epidermal cyst of neck    Normocytic anemia    Neck abscess    Mixed hyperlipidemia    Gastroesophageal reflux disease without esophagitis    Anxiety    Iron deficiency and chemotherapy induced anemia    Stage 3 chronic kidney disease    Anemia    Screening for breast cancer    Lower extremity edema    Vulvar cancer, carcinoma    Former smoker    Secondary malignancy of inguinal lymph nodes    Febrile illness    Chemotherapy-induced thrombocytopenia    Hyponatremia    Hypokalemia    Neutropenic fever    Moderate malnutrition    Antineoplastic chemotherapy induced anemia    History of radiation therapy    Encounter for care related to Port-a-Cath    Malignant neoplasm of upper-outer quadrant of left breast in female, estrogen receptor positive    Encounter for care related to vascular access port    Angiodysplasia    Bronchitis    Obesity (BMI 30-39.9)    Wheezing    Cough    Post-COVID-19 condition    Radiation induced proctitis    Rectal bleeding    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    CVA (cerebral vascular accident)     Past Medical History:   Diagnosis Date    Anemia in  stage 3 chronic kidney disease 11/11/2019    Arthritis     Breast cancer 09/14/2021    Left Mastectomy w/ sentinel node Bx    Bronchitis     Cellulitis     ROSA LEGS    GERD (gastroesophageal reflux disease)     Hyperlipidemia     Hypertension     Kyphosis     Osteoporosis     Personal history of COVID-19 05/2022    Scoliosis     Self-catheterizes urinary bladder     10FR SIZED CATH    Stage 3 chronic kidney disease 11/11/2019    Type 2 diabetes mellitus     Urethral meatal stenosis 09/2022    w/ urine retention    Vulva cancer     Vulvar intraepithelial neoplasia (MOODY) grade 3      Past Surgical History:   Procedure Laterality Date    APPENDECTOMY      BENJAMIN PROCEDURE      No evidence of reflux disease while on Nexium 40mg daily-See report    BREAST BIOPSY      BREAST CYST ASPIRATION Left     BREAST LUMPECTOMY      COLONOSCOPY  01/12/2011    Diverticulosis sigmoid colon; The examination was otherwise normal; Repeat 10 years    COLONOSCOPY  11/03/2003    Dr. Laguerre-Normal colonoscopy; Normal terminal ileum; Repeat 5 years    COLONOSCOPY N/A 11/05/2021    Petechia(e) in the rectum, in the recto-sigmoid colon and in the distal sigmoid colon; No specimens collected; No plans to repeat colonoscopy due to advance age and/or medical problems    CYSTOSCOPY N/A 09/20/2022    Procedure: CYSTOSCOPY WITH URETHRAL DILATATION;  Surgeon: Shaheen Conway MD;  Location[facility];  Service: Urology;  Laterality: N/A;    ENDOSCOPY  07/01/2014    Normal esophagus; Normal stomach; Normal examined duodenum; BRAVO pH capsule deployed;     ENDOSCOPY  08/14/2013    Mild gastritis-biopsies for H.Pylor obtained    ENDOSCOPY  02/16/2005    Dr. LaguerreHniec-Kdikpxcri-muvlttnc    ENDOSCOPY  10/21/2003    Dr. Laguerre-Stage 1 reflux esophagitis    ENDOSCOPY N/A 11/05/2021    Small HH; A single gastroesophageal junction polyp; Normal stomach; A single non-bleeding angiodysplastic lesion in the duodenum    HYSTERECTOMY      MASTECTOMY W/ SENTINEL NODE  BIOPSY Left 09/14/2021    Procedure: LEFT PARTIAL MASTECTOMY WITH MAGSEED AND LEFT SENTINEL LYMPH NODE BIOPSY MAGTRACE;  Surgeon: Quynh Rosario MD;  Location:  [facility];  Service: General;  Laterality: Left;    TONSILLECTOMY      VAGINA SURGERY      Laser surgery X 2    VENOUS ACCESS DEVICE (PORT) INSERTION N/A 09/29/2020    Procedure: SINGLE LUMEN PORT - A- CATH PLACEMENT WITH FLUOROSCOPY;  Surgeon: Quynh Rosario MD;  Location:  [facility];  Service: General;  Laterality: N/A;       SLP Recommendation and Plan  SLP Diagnosis: moderate, cognitive-linguistic disorder (05/21/24 1020)              SLC Criteria for Skilled Therapy Interventions Met: yes (05/21/24 1020)  Anticipated Discharge Disposition (SLP): unknown (05/21/24 1020)        Therapy Frequency (SLP SLC): at least, 3 days per week (05/21/24 1020)  Predicted Duration Therapy Intervention (Days): until discharge (05/21/24 1020)                             Outcome Evaluation: see note (05/21/24 1120)      SLP EVALUATION (Last 72 Hours)       SLP SLC Evaluation       Row Name 05/21/24 1020                   Communication Assessment/Intervention    Document Type evaluation  -JR        Subjective Information no complaints  -JR        Patient Observations alert;cooperative;agree to therapy  -JR        Patient/Family/Caregiver Comments/Observations  present  -JR        Patient Effort good  -JR        Symptoms Noted During/After Treatment none  -JR           General Information    Patient Profile Reviewed yes  -JR        Pertinent History Of Current Problem Presented with LUE and LLE weakness with dysarthria and UTI. Pt. hx of scoliosis, CKD, GERD, and L mastectomy.  -JR        Precautions/Limitations, Vision WFL;for purposes of eval  -JR        Precautions/Limitations, Hearing WFL;for purposes of eval  -JR        Prior Level of Function-Communication unknown  -JR        Plans/Goals Discussed with patient and family  -JR        Barriers to Rehab  none identified  -JR        Patient's Goals for Discharge return to all previous roles/activities  -JR        Family Goals for Discharge patient able to return to previous activities/roles  -JR        Standardized Assessment Used SLUMS  -JR           Pain    Additional Documentation Pain Scale: FACES Pre/Post-Treatment (Group)  -JR           Pain Scale: Numbers Pre/Post-Treatment    Pretreatment Pain Rating 0/10 - no pain  -JR        Posttreatment Pain Rating 0/10 - no pain  -JR           Comprehension Assessment/Intervention    Comprehension Assessment/Intervention Auditory Comprehension  -JR           Auditory Comprehension Assessment/Intervention    Auditory Comprehension (Communication) WNL  -JR        Able to Identify Objects/Pictures (Communication) WNL  -JR        Answers Questions (Communication) WFL  -JR        Able to Follow Commands (Communication) WFL  -JR        Narrative Discourse WFL  -JR        Successful Auditory Strategies (Communication) decrease environmental distractions;repetition  -JR           Expression Assessment/Intervention    Expression Assessment/Intervention verbal expression  -JR           Verbal Expression Assessment/Intervention    Verbal Expression WNL  -JR        Automatic Speech (Communication) WNL  -JR        Repetition WNL  -JR        Phrase Completion WNL  -JR        Responsive Naming WNL  -JR        Confrontational Naming WNL  -JR        Spontaneous/Functional Words WNL  -JR        Sentence Formulation WNL  -JR        Conversational Discourse/Fluency WNL  -JR           Oral Motor Structure and Function    Oral Motor Structure and Function WNL  -JR        Dentition Assessment natural, present and adequate  -JR        Mucosal Quality moist, healthy  -JR           Oral Musculature and Cranial Nerve Assessment    Oral Motor General Assessment oral labial or buccal impairment;generalized oral motor weakness  -JR           Motor Speech Assessment/Intervention    Motor Speech  Function mild impairment  -JR        Characteristics Consistent with Dysarthria slow rate;decreased intensity;decreased articulation  -JR        Automatic Speech (Communication) WNL  -JR        Verbal Repetition (Communication) WNL  -JR        Phase Completion WNL  -JR        Conversational Speech (Communication) mild impairment  -JR        Speech intelligibility 80%;90%;in quiet environment;in connected speech;with unfamiliar listener  -JR           Cognitive Assessment Intervention- SLP    Cognitive Function (Cognition) moderate impairment  -JR        Orientation Status (Cognition) awareness of basic personal information;person;place;time;situation;WFL  -JR        Memory (Cognitive) simple;WNL;immediate;delayed;moderate impairment  -JR        Attention (Cognitive) WFL  -JR        Thought Organization (Cognitive) L  -JR        Reasoning (Cognitive) mental flexibility;mild impairment  -JR        Problem Solving (Cognitive) WFL  -JR        Functional Math (Cognitive) moderate impairment  -JR        Executive Function (Cognition) deficit awareness;planning;home management activities;complex organization;moderate impairment  -JR        Pragmatics (Communication) WFL  -JR           Standardized Tests    Cognitive/Memory Tests SLUMS: [facility] Mental Status Examination  -           SLUMS: [facility] Mental Status Examination    SLUMS Score 20  -        SLUMS Range 1-20: Dementia (High school education or higher)  -JR           SLP Evaluation Clinical Impressions    SLP Diagnosis moderate;cognitive-linguistic disorder  -JR        Rehab Potential/Prognosis good  -JR        SLC Criteria for Skilled Therapy Interventions Met yes  -JR        Functional Impact functional impact in ADLs;difficulty in expressing complex messages;difficulty completing home management task  -JR           Recommendations    Therapy Frequency (SLP SLC) at least;3 days per week  -JR        Predicted Duration Therapy  Intervention (Days) until discharge  -JR        Anticipated Discharge Disposition (SLP) unknown  -JR           Communication Treatment Objective and Progress Goals (SLP)    SLC LTGs Patient will demonstrate functional cognitive-linguistic skills for return to discharge environment  -JR        Motor Speech/Dysarthria Treatment Objectives Motor Speech/Dysarthria Treatment Objectives (Group)  -JR        Cognitive Linguistic Treatment Objectives Cognitive Linguistic Treatment Objectives (Group)  -JR           Patient will demonstrate functional cognitive-linguistic skills for return to discharge environment    [place] Independently  -JR        Time frame by discharge  -JR        Progress/Outcomes new goal  -JR           Motor Speech/Dysarthria Treatment Objectives    Articulation Selection articulation, SLP goal 1  -JR           Articulation Goal 1 (SLP)    Improve Articulation Goal 1 (SLP) by over-articulating at word level;by over-articulating at phrase level;by over-articulating in connected speech;with minimal cues (75-90%)  -JR        Time Frame (Articulation Goal 1, SLP) by discharge  -JR        Progress/Outcomes (Articulation Goal 1, SLP) new goal  -JR           Cognitive Linguistic Treatment Objectives    Memory Skills Selection memory skills, SLP goal 1  -JR        Organizational Skills Selection organizational skills, SLP goal 1  -JR        Reasoning Selection reasoning, SLP goal 1  -JR        Executive Function Skills Selection executive function skills, SLP goal 1  -JR           Memory Skills Goal 1 (SLP)    Improve Memory Skills Through Goal 1 (SLP) recalling related word lists immediately;recalling related word lists with an imposed delay;recalling unrelated word lists immediately;recalling unrelated word lists with an imposed delay;listen to a paragraph and answer questions;use external memory aid;use memory strategies;with minimal cues (75-90%)  -JR        Time Frame (Memory Skills Goal 1, SLP) by  discharge  -JR        Progress/Outcomes (Memory Skills Goal 1, SLP) new goal  -JR           Reasoning Goal 1 (SLP)    Improve Reasoning Through Goal 1 (SLP) complete mental flexibility task;with minimal cues (75-90%)  -JR        Time Frame (Reasoning Goal 1, SLP) by discharge  -JR        Progress/Outcomes (Reasoning Goal 1, SLP) new goal  -JR           Executive Functional Skills Goal 1 (SLP)    Improve Executive Function Skills Goal 1 (SLP) organization/planning activity;home management activity;with minimal cues (75-90%);other (see comments)  med and finance management  -JR        Time Frame (Executive Function Skills Goal 1, SLP) by discharge  -JR        Progress/Outcomes (Executive Function Skills Goal 1, SLP) new goal  -JR                  User Key  (r) = Recorded By, (t) = Taken By, (c) = Cosigned By      Initials Name Effective Dates    Rowena Singleton, MS CCC-SLP 08/22/23 -                        EDUCATION  The patient has been educated in the following areas:     Cognitive Impairment Communication Impairment.           SLP GOALS       Row Name 05/21/24 1020             Patient will demonstrate functional cognitive-linguistic skills for return to discharge environment    [place] Independently  -JR      Time frame by discharge  -JR      Progress/Outcomes new goal  -JR         Articulation Goal 1 (SLP)    Improve Articulation Goal 1 (SLP) by over-articulating at word level;by over-articulating at phrase level;by over-articulating in connected speech;with minimal cues (75-90%)  -JR      Time Frame (Articulation Goal 1, SLP) by discharge  -JR      Progress/Outcomes (Articulation Goal 1, SLP) new goal  -JR         Memory Skills Goal 1 (SLP)    Improve Memory Skills Through Goal 1 (SLP) recalling related word lists immediately;recalling related word lists with an imposed delay;recalling unrelated word lists immediately;recalling unrelated word lists with an imposed delay;listen to a paragraph and answer  questions;use external memory aid;use memory strategies;with minimal cues (75-90%)  -JR      Time Frame (Memory Skills Goal 1, SLP) by discharge  -JR      Progress/Outcomes (Memory Skills Goal 1, SLP) new goal  -JR         Reasoning Goal 1 (SLP)    Improve Reasoning Through Goal 1 (SLP) complete mental flexibility task;with minimal cues (75-90%)  -JR      Time Frame (Reasoning Goal 1, SLP) by discharge  -JR      Progress/Outcomes (Reasoning Goal 1, SLP) new goal  -JR         Executive Functional Skills Goal 1 (SLP)    Improve Executive Function Skills Goal 1 (SLP) organization/planning activity;home management activity;with minimal cues (75-90%);other (see comments)  med and finance management  -JR      Time Frame (Executive Function Skills Goal 1, SLP) by discharge  -JR      Progress/Outcomes (Executive Function Skills Goal 1, SLP) new goal  -JR                User Key  (r) = Recorded By, (t) = Taken By, (c) = Cosigned By      Initials Name Provider Type    Rowena Singleton MS CCC-SLP Speech and Language Pathologist                              Time Calculation:      Time Calculation- SLP       Row Name 05/21/24 1140             Time Calculation- SLP    SLP Start Time 1020  -JR      SLP Stop Time 1122  -JR      SLP Time Calculation (min) 62 min  -JR      SLP Received On 05/21/24  -JR         Untimed Charges    04483-GN Eval Speech and Production w/ Language Minutes 62  -JR         Total Minutes    Untimed Charges Total Minutes 62  -JR       Total Minutes 62  -JR                User Key  (r) = Recorded By, (t) = Taken By, (c) = Cosigned By      Initials Name Provider Type    Rowena Singleton MS CCC-SLP Speech and Language Pathologist                    Therapy Charges for Today       Code Description Service Date Service Provider Modifiers Qty    [id number]  ST EVAL SPEECH AND PROD W LANG  4 5/21/2024 Rowena Briceno MS CCC-SLP GN 1                       Rowena Briceno MS CCC-SLP  5/21/2024

## 2024-05-21 NOTE — PROGRESS NOTES
Neurology Progress Note        Length of Stay:  2   Subjective     Subjective:    SHALONDA    Medications:  Current Facility-Administered Medications   Medication Dose Route Frequency Provider Last Rate Last Admin    albuterol (PROVENTIL) nebulizer solution 0.083% 2.5 mg/3mL  2.5 mg Nebulization 4x Daily - RT Oleksandr Oviedo MD   2.5 mg at 05/21/24 1008    aspirin chewable tablet 81 mg  81 mg Oral Daily Oleksandr Oviedo MD   81 mg at 05/21/24 0912    Or    aspirin suppository 300 mg  300 mg Rectal Daily Oleksandr Oviedo MD        atorvastatin (LIPITOR) tablet 80 mg  80 mg Oral Nightly Oleksandr Oviedo MD   80 mg at 05/20/24 2200    budesonide-formoterol (SYMBICORT) 160-4.5 MCG/ACT inhaler 2 puff  2 puff Inhalation BID - RT Oleksandr Oviedo MD        cefTRIAXone (ROCEPHIN) 1,000 mg in sodium chloride 0.9 % 100 mL MBP  1,000 mg Intravenous Q24H Nik Up APRN        cetirizine (zyrTEC) tablet 10 mg  10 mg Oral Daily Oleksandr Oviedo MD   10 mg at 05/21/24 0912    citalopram (CeleXA) tablet 20 mg  20 mg Oral Daily Oleksandr Oviedo MD   20 mg at 05/21/24 0912    clopidogrel (PLAVIX) tablet 75 mg  75 mg Oral Daily Frank Cash MD   75 mg at 05/21/24 0912    furosemide (LASIX) tablet 40 mg  40 mg Oral BID Oleksandr Oviedo MD   40 mg at 05/21/24 0912    gabapentin (NEURONTIN) capsule 300 mg  300 mg Oral BID Oleksandr Oviedo MD   300 mg at 05/20/24 0914    HYDROcodone-acetaminophen (NORCO)  MG per tablet 0.5 tablet  0.5 tablet Oral Q4H PRN Oleksandr Oviedo MD        letrozole (FEMARA) tablet 2.5 mg  2.5 mg Oral Daily Oleksandr Oviedo MD   2.5 mg at 05/21/24 0912    metOLazone (ZAROXOLYN) tablet 2.5 mg  2.5 mg Oral Once per day on Monday Wednesday Friday Oleksandr Oviedo MD   2.5 mg at 05/20/24 0914    pantoprazole (PROTONIX) EC tablet 40 mg  40 mg Oral BID AC Oleksandr Oviedo MD   40 mg at 05/21/24 0652    saccharomyces boulardii (FLORASTOR) capsule 250 mg  250 mg Oral  BID Oleksandr Oviedo MD   250 mg at 05/21/24 0912    sodium chloride 0.9 % flush 10 mL  10 mL Intravenous PRN Oleksandr Oviedo MD        sodium chloride 0.9 % flush 10 mL  10 mL Intravenous Q12H Oleksandr Oviedo MD   10 mL at 05/21/24 0912    sodium chloride 0.9 % flush 10 mL  10 mL Intravenous PRN Oleksandr Oviedo MD        sodium chloride 0.9 % infusion 40 mL  40 mL Intravenous PROleksandr Fuentes MD         Facility-Administered Medications Ordered in Other Encounters   Medication Dose Route Frequency Provider Last Rate Last Admin    heparin injection 500 Units  500 Units Intravenous PRN Drake Stafford MD   500 Units at 07/01/21 1354    heparin injection 500 Units  500 Units Intravenous PRN Drake Stafford MD   500 Units at 01/11/22 1134    heparin injection 500 Units  500 Units Intravenous PRN Drake Stafford MD   500 Units at 09/28/22 1418    heparin injection 500 Units  500 Units Intravenous PRN Drake Stafford MD   500 Units at 01/25/23 1537    heparin injection 500 Units  500 Units Intravenous PRN Drake Stafford MD   500 Units at 06/26/23 1426    heparin injection 500 Units  500 Units Intravenous Drake Helms MD   500 Units at 03/05/24 1350    sodium chloride 0.9 % flush 10 mL  10 mL Intravenous PRN Drake Stafford MD   10 mL at 07/01/21 1354    sodium chloride 0.9 % flush 10 mL  10 mL Intravenous PRDrake Amos MD   10 mL at 01/11/22 1134    sodium chloride 0.9 % flush 10 mL  10 mL Intravenous PRDrake Amos MD   10 mL at 09/28/22 1418    sodium chloride 0.9 % flush 10 mL  10 mL Intravenous PRN Drake Stafford MD   10 mL at 01/25/23 1537    sodium chloride 0.9 % flush 10 mL  10 mL Intravenous Drake Helms MD   10 mL at 06/26/23 1426    sodium chloride 0.9 % flush 10 mL  10 mL Intravenous PRN Drake Stafford MD   10 mL at 03/05/24 1350             Objective      Vital Signs  Temp:  [97.9 °F (36.6 °C)-98.7 °F (37.1 °C)] 97.9 °F (36.6 °C)  Heart Rate:  [54-80] 72  Resp:  [16-20]  18  BP: (109-150)/(37-71) 130/46    Physical Exam:    HEENT:  neck is supple  CVS:  RRR  Lungs:  CTA - B/L  Abd:  NT/ND  Ext:  no edema  Skin:  no rashes    Pertinent Neuro Exam:    Last nurse assessment:  Interval:  (handoff Mayra KC)  1a. Level of Consciousness: 0-->Alert, keenly responsive  1b. LOC Questions: 0-->Answers both questions correctly  1c. LOC Commands: 0-->Performs both tasks correctly  2. Best Gaze: 0-->Normal  3. Visual: 0-->No visual loss  4. Facial Palsy: 1-->Minor paralysis (flattened nasolabial fold, asymmetry on smiling)  5a. Motor Arm, Left: 0-->No drift, limb holds 90 (or 45) degrees for full 10 secs  5b. Motor Arm, Right: 0-->No drift, limb holds 90 (or 45) degrees for full 10 secs  6a. Motor Leg, Left: 0-->No drift, leg holds 30 degree position for full 5 secs  6b. Motor Leg, Right: 0-->No drift, leg holds 30 degree position for full 5 secs  7. Limb Ataxia: 1-->Present in one limb  8. Sensory: 1-->Mild-to-moderate sensory loss, patient feels pinprick is less sharp or is dull on the affected side, or there is a loss of superficial pain with pinprick, but patient is aware of being touched  9. Best Language: 0-->No aphasia, normal  10. Dysarthria: 0-->Normal  11. Extinction and Inattention (formerly Neglect): 0-->No abnormality    Total (NIH Stroke Scale): 3        Assessment/Plan     Hospital Problem List      CVA (cerebral vascular accident)    Type 2 diabetes mellitus, without long-term current use of insulin    Essential hypertension    Stage 3 chronic kidney disease    Anemia    Lower extremity edema    82-year-old female with a history of hypertension, hyperkalemia, diabetes and CKD was admitted to the hospital with left-sided weakness.  She had an MRI brain performed which was limited due to motion artifact.  Previously she was on aspirin however stopped because of nosebleeds.  We were initiated aspirin 1 mg and Plavix for 3 weeks and Aspir 81 milligrams daily as monotherapy.    angiogram of the head and neck without significant stenosis.  She has  Bilateral carotid artery aneurysms in the cavernous regions.  This can be dealt with outpatient by vascular neurosurgery.  EF 70% without PFO or WMA or thrombus.  Neurologically she appears to be unchanged.  PT OT and speech evaluation.  Continue calcium and normothermia.  Okay to discharge with plans to follow-up with neurology in 4 to 6 weeks.      Frank Cash MD  05/21/24  13:27 CDT

## 2024-05-21 NOTE — THERAPY TREATMENT NOTE
Patient Name: Dago Nunez  : 1940    MRN: [id number]                              Today's Date: 2024       Admit Date: 2024    Visit Dx:     ICD-10-CM ICD-9-CM   1. Cerebrovascular accident (CVA), unspecified mechanism  I63.9 434.91   2. Acute UTI (urinary tract infection)  N39.0 599.0   3. Acute renal failure superimposed on chronic kidney disease, unspecified acute renal failure type, unspecified CKD stage  N17.9 584.9    N18.9 585.9   4. Dysphagia, unspecified type  R13.10 787.20   5. Impaired mobility [Z74.09]  Z74.09 799.89   6. Cognitive and behavioral changes [R41.89, R46.89]  R41.89 799.59    R46.89 312.9     Patient Active Problem List   Diagnosis    Meatal stenosis    Retention of urine    Type 2 diabetes mellitus, without long-term current use of insulin    Essential hypertension    Grief reaction    Vulvar intraepithelial neoplasia (MOODY) grade 3    Chronic midline low back pain without sciatica    Bilateral lower extremity edema    History of urethral stricture    Epidermal cyst of neck    Normocytic anemia    Neck abscess    Mixed hyperlipidemia    Gastroesophageal reflux disease without esophagitis    Anxiety    Iron deficiency and chemotherapy induced anemia    Stage 3 chronic kidney disease    Anemia    Screening for breast cancer    Lower extremity edema    Vulvar cancer, carcinoma    Former smoker    Secondary malignancy of inguinal lymph nodes    Febrile illness    Chemotherapy-induced thrombocytopenia    Hyponatremia    Hypokalemia    Neutropenic fever    Moderate malnutrition    Antineoplastic chemotherapy induced anemia    History of radiation therapy    Encounter for care related to Port-a-Cath    Malignant neoplasm of upper-outer quadrant of left breast in female, estrogen receptor positive    Encounter for care related to vascular access port    Angiodysplasia    Bronchitis    Obesity (BMI 30-39.9)    Wheezing    Cough    Post-COVID-19 condition    Radiation induced  proctitis    Rectal bleeding    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    CVA (cerebral vascular accident)     Past Medical History:   Diagnosis Date    Anemia in stage 3 chronic kidney disease 11/11/2019    Arthritis     Breast cancer 09/14/2021    Left Mastectomy w/ sentinel node Bx    Bronchitis     Cellulitis     ROSA LEGS    GERD (gastroesophageal reflux disease)     Hyperlipidemia     Hypertension     Kyphosis     Osteoporosis     Personal history of COVID-19 05/2022    Scoliosis     Self-catheterizes urinary bladder     10FR SIZED CATH    Stage 3 chronic kidney disease 11/11/2019    Type 2 diabetes mellitus     Urethral meatal stenosis 09/2022    w/ urine retention    Vulva cancer     Vulvar intraepithelial neoplasia (MOODY) grade 3      Past Surgical History:   Procedure Laterality Date    APPENDECTOMY      BENJAMIN PROCEDURE      No evidence of reflux disease while on Nexium 40mg daily-See report    BREAST BIOPSY      BREAST CYST ASPIRATION Left     BREAST LUMPECTOMY      COLONOSCOPY  01/12/2011    Diverticulosis sigmoid colon; The examination was otherwise normal; Repeat 10 years    COLONOSCOPY  11/03/2003    Dr. Laguerre-Normal colonoscopy; Normal terminal ileum; Repeat 5 years    COLONOSCOPY N/A 11/05/2021    Petechia(e) in the rectum, in the recto-sigmoid colon and in the distal sigmoid colon; No specimens collected; No plans to repeat colonoscopy due to advance age and/or medical problems    CYSTOSCOPY N/A 09/20/2022    Procedure: CYSTOSCOPY WITH URETHRAL DILATATION;  Surgeon: Shaheen Conway MD;  Location: [facility];  Service: Urology;  Laterality: N/A;    ENDOSCOPY  07/01/2014    Normal esophagus; Normal stomach; Normal examined duodenum; BRAVO pH capsule deployed;     ENDOSCOPY  08/14/2013    Mild gastritis-biopsies for H.Pylor obtained    ENDOSCOPY  02/16/2005    Dr. LaguerreJhcvm-Zcvblhjih-sgryvsex    ENDOSCOPY  10/21/2003    Dr. Laguerre-Stage 1 reflux esophagitis    ENDOSCOPY N/A 11/05/2021    Small HH; A  single gastroesophageal junction polyp; Normal stomach; A single non-bleeding angiodysplastic lesion in the duodenum    HYSTERECTOMY      MASTECTOMY W/ SENTINEL NODE BIOPSY Left 09/14/2021    Procedure: LEFT PARTIAL MASTECTOMY WITH MAGSEED AND LEFT SENTINEL LYMPH NODE BIOPSY MAGTRACE;  Surgeon: Quynh Rosario MD;  Location:  [facility];  Service: General;  Laterality: Left;    TONSILLECTOMY      VAGINA SURGERY      Laser surgery X 2    VENOUS ACCESS DEVICE (PORT) INSERTION N/A 09/29/2020    Procedure: SINGLE LUMEN PORT - A- CATH PLACEMENT WITH FLUOROSCOPY;  Surgeon: Quynh Rosario MD;  Location:  [facility];  Service: General;  Laterality: N/A;      General Information       Row Name 05/21/24 1047          OT Time and Intention    Document Type therapy note (daily note)  -CS (r) HH (t) CS (c)     Mode of Treatment occupational therapy  -CS (r) HH (t) CS (c)       Row Name 05/21/24 1047          General Information    Patient Profile Reviewed yes  -CS (r) HH (t) CS (c)     Existing Precautions/Restrictions fall  -CS (r) HH (t) CS (c)       Row Name 05/21/24 1047          Living Environment    People in Home spouse  -CS (r) HH (t) CS (c)       Row Name 05/21/24 1047          Cognition    Orientation Status (Cognition) oriented x 4  -CS (r) HH (t) CS (c)       Row Name 05/21/24 1047          Safety Issues, Functional Mobility    Safety Issues Affecting Function (Mobility) awareness of need for assistance;impulsivity;positioning of assistive device  -CS (r) HH (t) CS (c)     Impairments Affecting Function (Mobility) balance;endurance/activity tolerance;pain;strength  -CS (r) HH (t) CS (c)               User Key  (r) = Recorded By, (t) = Taken By, (c) = Cosigned By      Initials Name Provider Type    CS Michelle Ortega, OTR/L, CNT Occupational Therapist    Narcisa Bergman, OT Student OT Student                     Mobility/ADL's       Row Name 05/21/24 1047          Transfers    Transfers sit-stand transfer   -CS (r) HH (t) CS (c)       Row Name 05/21/24 1047          Sit-Stand Transfer    Sit-Stand [place] (Transfers) verbal cues;contact guard  -CS (r) HH (t) CS (c)     Assistive Device (Sit-Stand Transfers) walker, front-wheeled  -CS (r) HH (t) CS (c)       Row Name 05/21/24 1047          Stand-Sit Transfer    Stand-Sit [place] (Transfers) contact guard;verbal cues  -CS (r) HH (t) CS (c)     Assistive Device (Stand-Sit Transfers) walker, front-wheeled  -CS (r) HH (t) CS (c)       Row Name 05/21/24 1047          Toilet Transfer    Type (Toilet Transfer) sit-stand;stand-sit  -CS (r) HH (t) CS (c)     [place] Level (Toilet Transfer) verbal cues;minimum assist (75% patient effort)  -CS (r) HH (t) CS (c)     Assistive Device (Toilet Transfer) commode  -CS (r) HH (t) CS (c)       Row Name 05/21/24 1047          Functional Mobility    Functional Mobility- Ind. Level verbal cues required;contact guard assist;nonverbal cues required (demo/gesture)  verbal cues required for walker management  -CS (r) HH (t) CS (c)     Functional Mobility- Device walker, front-wheeled  -CS (r) HH (t) CS (c)     Functional Mobility- Safety Issues sequencing ability decreased;balance decreased during turns  -CS (r) HH (t) CS (c)       Row Name 05/21/24 1047          Activities of Daily Living    BADL Assessment/Intervention grooming;toileting;lower body dressing  -CS (r) HH (t) CS (c)       Row Name 05/21/24 1047          Toileting Assessment/Training    [place] Level (Toileting) adjust/manage clothing;change pad/brief;moderate assist (50% patient effort);perform perineal hygiene;contact guard assist  -CS (r) HH (t) CS (c)     Assistive Devices (Toileting) commode  -CS (r) HH (t) CS (c)       Row Name 05/21/24 1047          Grooming Assessment/Training    [place] Level (Grooming) hair care, combing/brushing;oral care regimen;wash face, hands;contact guard assist  -CS (r) HH (t) CS (c)     Oral Care teeth brushed - regular  toothbrush  -CS (r) HH (t) CS (c)     Position (Grooming) sink side;supported standing  -CS (r) HH (t) CS (c)       Row Name 05/21/24 1047          Lower Body Dressing Assessment/Training    [place] Level (Lower Body Dressing) don;socks;maximum assist (25% patient effort)  -CS (r) HH (t) CS (c)     Position (Lower Body Dressing) edge of bed sitting;unsupported sitting  -CS (r) HH (t) CS (c)               User Key  (r) = Recorded By, (t) = Taken By, (c) = Cosigned By      Initials Name Provider Type    CS Michelle Ortega, OTR/L, CNT Occupational Therapist    Narcisa Bergman, OT Student OT Student                   Obj/Interventions       Row Name 05/21/24 1047          Balance    Balance Assessment sitting static balance;sitting dynamic balance;sit to stand dynamic balance;standing static balance;standing dynamic balance  -CS (r) HH (t) CS (c)     Static Sitting Balance independent  -CS (r) HH (t) CS (c)     Dynamic Sitting Balance contact guard  -CS (r) HH (t) CS (c)     Static Standing Balance contact guard  -CS (r) HH (t) CS (c)     Dynamic Standing Balance contact guard;verbal cues  -CS (r) HH (t) CS (c)     Position/Device Used, Standing Balance walker, front-wheeled  -CS (r) HH (t) CS (c)     Balance Interventions sitting;standing;sit to stand;static;dynamic;occupation based/functional task  -CS (r) HH (t) CS (c)               User Key  (r) = Recorded By, (t) = Taken By, (c) = Cosigned By      Initials Name Provider Type    CS Michelle Ortega, OTR/L, CNT Occupational Therapist    Narcisa Bergman, OT Student OT Student                   Goals/Plan    No documentation.                  Clinical Impression       Row Name 05/21/24 1047          Pain Scale: FACES Pre/Post-Treatment    Pain: FACES Scale, Pretreatment 0-->no hurt  -CS (r) HH (t) CS (c)     Posttreatment Pain Rating 0-->no hurt  -CS (r) HH (t) CS (c)       Row Name 05/21/24 1047          Plan of Care Review    Plan of Care Reviewed With  patient  -CS (r) HH (t) CS (c)     Progress improving  -CS (r) HH (t) CS (c)     Outcome Evaluation OT treatment completed. Pt. A&Ox4 and agreeable to therapy. Pt. sitting EOB upon OT arrival. Pt. completed LB dressing with Max A while in unsupported seating. Pt. required CGA and verbal cues for sit-to-stand transfer from EOB. No LOB noted during functional mobility from bedside to toilet. Pt. completed toilet transfer with Min A and verbal cues for use of grab bars and walker management. Pt. completed grooming/oral hygiene task sink side with FWW with CGA. Pt. educated on energy conservation techniques and adaptive equipment for ADLs to increase safety and decrease fatigue. OT to continue POC.  -CS (r) HH (t) CS (c)       Row Name 05/21/24 1047          Positioning and Restraints    Pre-Treatment Position in bed  -CS (r) HH (t) CS (c)     Post Treatment Position chair  -CS (r) HH (t) CS (c)     In Chair reclined;call light within reach;encouraged to call for assist;with family/caregiver;legs elevated  -CS (r) HH (t) CS (c)               User Key  (r) = Recorded By, (t) = Taken By, (c) = Cosigned By      Initials Name Provider Type    CS Michelle Ortega, OTR/L, CNT Occupational Therapist    Narcisa Bergman, OT Student OT Student                   Outcome Measures       Row Name 05/21/24 1047          How much help from another is currently needed...    Putting on and taking off regular lower body clothing? 2  -CS (r) HH (t) CS (c)     Bathing (including washing, rinsing, and drying) 2  -CS (r) HH (t) CS (c)     Toileting (which includes using toilet bed pan or urinal) 3  -CS (r) HH (t) CS (c)     Putting on and taking off regular upper body clothing 3  -CS (r) HH (t) CS (c)     Taking care of personal grooming (such as brushing teeth) 4  -CS (r) HH (t) CS (c)     Eating meals 4  -CS (r) HH (t) CS (c)     AM-PAC 6 Clicks Score (OT) 18  -CS (r) HH (t)       Row Name 05/21/24 0900          How much help from another  person do you currently need...    Turning from your back to your side while in flat bed without using bedrails? 3  -CB     Moving from lying on back to sitting on the side of a flat bed without bedrails? 3  -CB     Moving to and from a bed to a chair (including a wheelchair)? 3  -CB     Standing up from a chair using your arms (e.g., wheelchair, bedside chair)? 3  -CB     Climbing 3-5 steps with a railing? 1  -CB     To walk in hospital room? 3  -CB     AM-PAC 6 Clicks Score (PT) 16  -CB     Highest Level of Mobility Goal 5 --> Static standing  -CB       Row Name 05/21/24 1047          Functional Assessment    Outcome Measure Options AM-PAC 6 Clicks Daily Activity (OT)  -CS (r) HH (t) CS (c)               User Key  (r) = Recorded By, (t) = Taken By, (c) = Cosigned By      Initials Name Provider Type    CS Michelle Ortega, OTR/L, CNT Occupational Therapist    Cici Gonzalez LPN Licensed Nurse     Narcisa Baca, OT Student OT Student                    Occupational Therapy Education       Title: PT OT SLP Therapies (In Progress)       Topic: Occupational Therapy (Done)       Point: ADL training (Done)       Description:   Instruct learner(s) on proper safety adaptation and remediation techniques during self care or transfers.   Instruct in proper use of assistive devices.                  Learning Progress Summary             Patient Acceptance, E, VU by  at 5/21/2024 1142    Acceptance, E, VU by  at 5/20/2024 0913                         Point: Home exercise program (Done)       Description:   Instruct learner(s) on appropriate technique for monitoring, assisting and/or progressing therapeutic exercises/activities.                  Learning Progress Summary             Patient Acceptance, E, VU by  at 5/21/2024 1142                         Point: Precautions (Done)       Description:   Instruct learner(s) on prescribed precautions during self-care and functional transfers.                  Learning  Progress Summary             Patient Acceptance, E, VU by  at 5/21/2024 1142    Acceptance, E, VU by  at 5/20/2024 0913                         Point: Body mechanics (Done)       Description:   Instruct learner(s) on proper positioning and spine alignment during self-care, functional mobility activities and/or exercises.                  Learning Progress Summary             Patient Acceptance, E, VU by  at 5/21/2024 1142    Acceptance, E, VU by  at 5/20/2024 0913                                         User Key       Initials Effective Dates Name Provider Type Discipline     04/15/24 -  Narcisa Baca, OT Student OT Student OT                  OT Recommendation and Plan  Planned Therapy Interventions (OT): activity tolerance training, BADL retraining, functional balance retraining, occupation/activity based interventions, patient/caregiver education/training, strengthening exercise, transfer/mobility retraining, adaptive equipment training  Therapy Frequency (OT): 5 times/wk  Plan of Care Review  Plan of Care Reviewed With: patient  Progress: improving  Outcome Evaluation: OT treatment completed. Pt. A&Ox4 and agreeable to therapy. Pt. sitting EOB upon OT arrival. Pt. completed LB dressing with Max A while in unsupported seating. Pt. required CGA and verbal cues for sit-to-stand transfer from EOB. No LOB noted during functional mobility from bedside to toilet. Pt. completed toilet transfer with Min A and verbal cues for use of grab bars and walker management. Pt. completed grooming/oral hygiene task sink side with FWW with CGA. Pt. educated on energy conservation techniques and adaptive equipment for ADLs to increase safety and decrease fatigue. OT to continue POC.     Time Calculation:         Time Calculation- OT       Row Name 05/21/24 1124             Time Calculation- OT    OT Start Time 1047  -CS (r) HH (t) CS (c)      OT Stop Time 1121  -CS (r) HH (t) CS (c)      OT Time Calculation (min) 34 min   -CS (r) HH (t)      Total Timed Code Minutes- OT 34 minute(s)  -CS (r) HH (t) CS (c)      OT Received On 05/21/24  -CS (r) HH (t) CS (c)         Timed Charges    90384 - OT Self Care/Mgmt Minutes 34  -CS (r) HH (t) CS (c)         Total Minutes    Timed Charges Total Minutes 34  -CS (r) HH (t)       Total Minutes 34  -CS (r) HH (t)                User Key  (r) = Recorded By, (t) = Taken By, (c) = Cosigned By      Initials Name Provider Type    CS Michelle Ortega, OTR/L, CNT Occupational Therapist    Narcisa Bergman, OT Student OT Student                           Narcisa Baca, OT Student  5/21/2024

## 2024-05-21 NOTE — PLAN OF CARE
Goal Outcome Evaluation:  Plan of Care Reviewed With: patient        Progress: improving  Outcome Evaluation: OT treatment completed. Pt. A&Ox4 and agreeable to therapy. Pt. sitting EOB upon OT arrival. Pt. completed LB dressing with Max A while in unsupported seating. Pt. required CGA and verbal cues for sit-to-stand transfer from EOB. No LOB noted during functional mobility from bedside to toilet. Pt. completed toilet transfer with Min A and verbal cues for use of grab bars and walker management. Pt. completed grooming/oral hygiene task sink side with FWW with CGA. Pt. educated on energy conservation techniques and adaptive equipment for ADLs to increase safety and decrease fatigue. OT to continue POC.      Anticipated Discharge Disposition (OT): inpatient rehabilitation facility

## 2024-05-21 NOTE — PLAN OF CARE
Goal Outcome Evaluation:  Plan of Care Reviewed With: patient        Progress: improving  Outcome Evaluation: Pt A&Ox4, pleasant and cooperative. Up x1 assist with walker to bathroom. Tele monitor in place. VSS. RA. NIH 3. Safety maintained. Call light in reach.

## 2024-05-21 NOTE — PROGRESS NOTES
"    [facility] Medicine Services  INPATIENT PROGRESS NOTE    Patient Name: Dago Nunez  Date of Admission: 5/19/2024  Today's Date: 05/21/24  Length of Stay: 2  Primary Care Physician: Shaheen Akbar DO    Subjective   Chief Complaint: Left sided weakness  HPI   Patient presented to [facility] emergency department on 5/19/2024 with complaints of left leg \"jumping\", left leg weakness, left arm weakness.  She has past medical history of scoliosis, CKD, breast cancer status post left mastectomy, GERD, hypertension, type 2 diabetes.  CT scan on arrival negative for acute process.  She did not receive TKA and secondary to symptom onset being outside window.  Urinalysis showed 2+ bacteria but patient denied dysuria or frequency, no leukocytosis or fever.  Neurology was consulted and patient was admitted for further monitoring and management.    Today:  Patient examined sitting up in recliner on room air in no acute distress.  She is much more alert, oriented x 4 and appears to be neurologically improved today.  MRI brain negative for acute infarct.  Of note, bilateral aneurysms identified on MRI angiogram.  Discussed this with Dr. Cash, referral to vascular neurosurgery on an outpatient basis.  Plan to continue Plavix and aspirin per his recommendations.  Patient could continue working with therapy today, she does wish to return home at time of discharge rather than rehab.  Continue Rocephin at this time and follow urine culture to finality, hopefully will result tomorrow and patient can discharge home on oral antibiotics depending on clinical course.  This was discussed with her and she is agreeable.    Review of Systems   All pertinent negatives and positives are as above. All other systems have been reviewed and are negative unless otherwise stated.     Objective    Temp:  [97.9 °F (36.6 °C)-98.7 °F (37.1 °C)] 97.9 °F (36.6 °C)  Heart Rate:  [54-80] 72  Resp: "  [16-20] 18  BP: (109-150)/(37-71) 130/46  Physical Exam  Vitals and nursing note reviewed.   Constitutional:       Comments: Up in bed, no acute distress, room air, family at bedside   HENT:      Head: Normocephalic and atraumatic.   Cardiovascular:      Rate and Rhythm: Normal rate and regular rhythm.      Pulses: Normal pulses.      Heart sounds: Normal heart sounds. No murmur heard.     No friction rub.   Pulmonary:      Effort: Pulmonary effort is normal. No respiratory distress.      Breath sounds: Normal breath sounds. No stridor. No wheezing or rhonchi.   Abdominal:      General: Bowel sounds are normal. There is no distension.      Palpations: Abdomen is soft.      Tenderness: There is no abdominal tenderness. There is no guarding.   Musculoskeletal:         General: No swelling or deformity. Normal range of motion.      Cervical back: Normal range of motion and neck supple. No rigidity or tenderness.      Right lower leg: Edema present.      Left lower leg: Edema present.   Skin:     General: Skin is warm and dry.      Capillary Refill: Capillary refill takes less than 2 seconds.      Coloration: Skin is not pale.      Findings: No bruising or erythema.   Neurological:      Mental Status: She is alert and oriented to person, place, and time.      Gait: Gait normal.   Psychiatric:         Mood and Affect: Mood normal.         Behavior: Behavior normal.         Thought Content: Thought content normal.       Results Review:  I have reviewed the labs, radiology results, and diagnostic studies.    Laboratory Data:   Results from last 7 days   Lab Units 05/21/24  0400 05/20/24 0438 05/19/24 2057   WBC 10*3/mm3 6.90 5.71 9.75   HEMOGLOBIN g/dL 7.9* 7.7* 9.4*   HEMATOCRIT % 25.4* 25.3* 30.2*   PLATELETS 10*3/mm3 151 143 170        Results from last 7 days   Lab Units 05/21/24  0400 05/20/24  0438 05/19/24  2057 05/15/24  1339   SODIUM mmol/L 141 141 140 140   POTASSIUM mmol/L 4.3 3.9 4.4 4.4   CHLORIDE mmol/L  "103 105 101 105   CO2 mmol/L 29.0 27.0 27.0 27.0   BUN mg/dL 31* 31* 32* 29*   CREATININE mg/dL 2.07* 2.01* 2.29* 1.68*   CALCIUM mg/dL 9.1 9.0 10.3 9.7   BILIRUBIN mg/dL  --   --  0.2 0.2   ALK PHOS U/L  --   --  53 46   ALT (SGPT) U/L  --   --  10 11   AST (SGOT) U/L  --   --  23 17   GLUCOSE mg/dL 112* 82 152* 100*       Culture Data:   No results found for: \"BLOODCX\", \"URINECX\", \"WOUNDCX\", \"MRSACX\", \"RESPCX\", \"STOOLCX\"    Radiology Data:   Imaging Results (Last 24 Hours)       Procedure Component Value Units Date/Time    US Carotid Bilateral [[id number]] Collected: 05/21/24 1309     Updated: 05/21/24 1315    Narrative:      History: Carotid occlusive disease       Impression:      Impression:  1. There is 50 to 69% stenosis of the right internal carotid artery.  2. There is less than 50% stenosis of the left internal carotid artery.  3. Antegrade flow is demonstrated in bilateral vertebral arteries.  4. There is heavy plaque burden at the right bifurcation. Further  imaging/evaluation may be warranted with a CTA of the neck.     Comments: Bilateral carotid vertebral arterial duplex scan was  performed.     Grayscale imaging shows intimal thickening and calcified elements at the  carotid bifurcation. The right internal carotid artery peak systolic  velocity is 187 cm/sec. The end-diastolic velocity is 21.4 cm/sec. The  right ICA/CCA ratio is approximately 1.8. These findings correlate with  50 to 69% stenosis of the right internal carotid artery.     Grayscale imaging shows intimal thickening and calcified elements at the  carotid bifurcation. The left internal carotid artery peak systolic  velocity is 110.6 cm/sec. The end-diastolic velocity is 20.7 cm/sec. The  left ICA/CCA ratio is approximately 0.7. These findings correlate with  less than 50% stenosis of the left internal carotid artery.     Antegrade flow is demonstrated in bilateral vertebral arteries.  There is greater than 50% stenosis in the left common " carotid artery and  bilateral external carotid arteries.     This report was signed and finalized on 5/21/2024 1:12 PM by Dr. Junior Meyers MD.       MRI Brain Without Contrast [[id number]] Collected: 05/21/24 1008     Updated: 05/21/24 1018    Narrative:      EXAMINATION: MRI BRAIN WO CONTRAST-  5/21/2024 10:09 AM     HISTORY: Stroke follow-up.     FINDINGS: Multiplanar fast spin echo sequences were obtained of the  brain on a high-field magnet without gadolinium enhancement.     On initial review of the study there was noted to be an abnormal  acquisition of the diffusion component of the study. There was  associated artifact. Therefore, the patient was brought back for repeat  imaging this morning for repeat diffusion imaging as a portion of the  initial exam.     Repeat diffusion imaging demonstrates no evidence of acute ischemia or  infarct. No discrete areas of diffusion restriction are present.     There is diffuse atrophy of the brain with prominence of the  subarachnoid spaces and ventricular enlargement. Moderate small vessel  ischemic changes are noted involving the periventricular and higher  white matter tracts. There is nonspecific gliosis within the sloane. There  is no mass, mass effect or shift of the midline. No acute infarct or  hemorrhage. There are normal flow voids within the [place].     The orbits are unremarkable.     The visualized paranasal sinuses and mastoid air cells are normally  aerated.       Impression:      1. Atrophy. Moderate small vessel ischemic changes are noted involve the  periventricular and higher white matter tracts. No mass effect or shift  of the midline. 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  2. No evidence of diffusion restriction to suggest acute ischemia or  infarct. No acute or chronic hemorrhage.     This report was signed and finalized on 5/21/2024 10:14 AM by Dr. El Callahan MD.       MRI Angiogram Head Without Contrast  [[id number]] Collected: 05/20/24 1938     Updated: 05/21/24 0831    Narrative:      EXAMINATION:  MRI ANGIOGRAM HEAD WO CONTRAST-  5/20/2024 4:10 PM     HISTORY: I63.9-Cerebral infarction, unspecified; N39.0-Urinary tract  infection, site not specified; N17.9-Acute kidney failure, unspecified;  N18.9-Chronic kidney disease, unspecified; R13.10-Dysphagia,  unspecified; Z74.09-Other reduced mobility.     TECHNIQUE: 3-D MR angiography was performed of the cerebral vasculature.     COMPARISON: No comparison study.     FINDINGS: The vertebral and basilar arteries are patent. There is  question of a slightly beaded appearance of the superior cerebellar  artery on the right side. The internal carotid arteries are patent  bilaterally. There are aneurysms arising from the superior aspect of the  cavernous carotid arteries bilaterally. The aneurysm on the right  measures 4-5 mm and the aneurysm on the left measures 2-3 mm. The  anterior, middle and posterior cerebral arteries are patent. No  aneurysms are appreciated..          Impression:      1. 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  2. Question of a slightly beaded appearance of the right superior  cerebellar artery. This raises the possibility of fibromuscular  dysplasia.  3. No large vessel occlusion is seen.           This report was signed and finalized on 5/20/2024 7:43 PM by Dr. Zander Alberts MD.       US Venous Doppler Lower Extremity Bilateral (duplex) [[id number]] Collected: 05/20/24 1648     Updated: 05/20/24 1651    Narrative:      History: Swelling       Impression:      Impression: There is no evidence of deep venous thrombosis or  superficial thrombophlebitis of right or left lower extremities.     Comments: Bilateral lower extremity venous duplex exam was performed  using color Doppler flow, Doppler waveform analysis, and grayscale  imaging, with and  without compression. There is no evidence of deep  venous thrombosis in the common femoral, superficial femoral, popliteal,  peroneal, anterior tibial, and posterior tibial veins bilaterally. No  thrombus is identified in the saphenofemoral junctions and greater  saphenous veins bilaterally.            This report was signed and finalized on 5/20/2024 4:48 PM by Dr. Junior Meyers MD.               I have reviewed the patient's current medications.     Assessment/Plan   Assessment  Active Hospital Problems    Diagnosis     **CVA (cerebral vascular accident)     Lower extremity edema     Stage 3 chronic kidney disease     Anemia     Type 2 diabetes mellitus, without long-term current use of insulin     Essential hypertension        Treatment Plan  CVA-  CT head negative for acute process  Bilateral carotid ultrasound shows less than 50% stenosis  MRI brain negative for acute infarct  MRI angiogram head shows bilateral aneurysms from the superior aspect of the cavernous carotid arteries.  Neurology recommending outpatient follow-up for these, no inpatient intervention required.  Echocardiogram shows normal EF, negative for PFO.  PT, ST, OT  Neurology following  Continue aspirin, Lipitor, Plavix  Continue therapy for now, patient declines rehab placement.    Bilateral lower extremity edema-  Bilateral venous Doppler negative for DVT    Stage III CKD-  Baseline creatinine appears to be around 1.7  Creatinine 2.07 today.  Hold evening dose of Lasix  BMP in a.m.    UTI-  Urine culture showing gram-negative bacilli  Discussion with pharmacy, patient has tolerated Rocephin in the past, initiate Rocephin.    Anemia-  Baseline hemoglobin appears to be in the high 9 range although was 8.7 on 5/15/2024.  Hemoglobin is trended from 7.7-7.9  Potentially of chronic disease, although macrocytic  Iron panel shows normal serum iron and high ferritin level.  Vitamin B12 and folate level normal  Fecal occult blood stool x 1  No  obvious signs or symptoms of bleeding, patient denies red, maroon or black stool  CBC in a.m.    Type 2 diabetes-  Hemoglobin A1c 5.8  Normal blood glucose trend so far, continue to monitor and initiate low-dose sliding scale insulin if indicated.    GERD-  Continue Protonix    SCDs for DVT prophylaxis    Medical Decision Making  Number and Complexity of problems: 1 acute problem of high complexity in CVA.  1 chronic problem of moderate complexity and bilateral lower extremity edema.  1 chronic problem of moderate complexity in stage III CKD.  1 acute on chronic problem of moderate complexity in anemia.  1 chronic problem of moderate complexity in type 2 diabetes.  Differential Diagnosis: None    Conditions and Status        Status stable     MDM Data  External documents reviewed: None  Cardiac tracing (EKG, telemetry) interpretation: EKG reviewed  Radiology interpretation: CT head, chest x-ray reviewed per Radiologist interpretation  Labs reviewed: BMP, hemoglobin A1c, iron profile, lipid profile, PT, INR, CBC with differential, urinalysis, UDS, urine culture  Any tests that were considered but not ordered: None     Decision rules/scores evaluated (example [id number], [place], etc): None     Discussed with: Patient, patient's , Dr. Rodriguez     Care Planning  Shared decision making: Discussed with above, patient and her  are agreeable to her plan of care  Code status and discussions: Full    Disposition  Social Determinants of Health that impact treatment or disposition: None  I expect the patient to be discharged to home with outpatient therapy, hopefully tomorrow.    Electronically signed by VINCENT Lawson, 05/21/24, 13:27 CDT.

## 2024-05-21 NOTE — PLAN OF CARE
Goal Outcome Evaluation:  Plan of Care Reviewed With: patient        Progress: improving  Outcome Evaluation: Pt performed sit to stand min A. Pt attempted without assist but was unable to perform. Pt amb 25' at a times min A  RW with cues to stay inside RW. Pt has decrease foot clearance and decrease gait speed. Educated on POC and safety.

## 2024-05-21 NOTE — THERAPY TREATMENT NOTE
Acute Care - Physical Therapy Treatment Note   [place]     Patient Name: Dago Nunez  : 1940  MRN: [id number]  Today's Date: 2024      Visit Dx:     ICD-10-CM ICD-9-CM   1. Cerebrovascular accident (CVA), unspecified mechanism  I63.9 434.91   2. Acute UTI (urinary tract infection)  N39.0 599.0   3. Acute renal failure superimposed on chronic kidney disease, unspecified acute renal failure type, unspecified CKD stage  N17.9 584.9    N18.9 585.9   4. Dysphagia, unspecified type  R13.10 787.20   5. Impaired mobility [Z74.09]  Z74.09 799.89   6. Cognitive and behavioral changes [R41.89, R46.89]  R41.89 799.59    R46.89 312.9     Patient Active Problem List   Diagnosis    Meatal stenosis    Retention of urine    Type 2 diabetes mellitus, without long-term current use of insulin    Essential hypertension    Grief reaction    Vulvar intraepithelial neoplasia (MOODY) grade 3    Chronic midline low back pain without sciatica    Bilateral lower extremity edema    History of urethral stricture    Epidermal cyst of neck    Normocytic anemia    Neck abscess    Mixed hyperlipidemia    Gastroesophageal reflux disease without esophagitis    Anxiety    Iron deficiency and chemotherapy induced anemia    Stage 3 chronic kidney disease    Anemia    Screening for breast cancer    Lower extremity edema    Vulvar cancer, carcinoma    Former smoker    Secondary malignancy of inguinal lymph nodes    Febrile illness    Chemotherapy-induced thrombocytopenia    Hyponatremia    Hypokalemia    Neutropenic fever    Moderate malnutrition    Antineoplastic chemotherapy induced anemia    History of radiation therapy    Encounter for care related to Port-a-Cath    Malignant neoplasm of upper-outer quadrant of left breast in female, estrogen receptor positive    Encounter for care related to vascular access port    Angiodysplasia    Bronchitis    Obesity (BMI 30-39.9)    Wheezing    Cough    Post-COVID-19 condition    Radiation  induced proctitis    Rectal bleeding    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    CVA (cerebral vascular accident)     Past Medical History:   Diagnosis Date    Anemia in stage 3 chronic kidney disease 11/11/2019    Arthritis     Breast cancer 09/14/2021    Left Mastectomy w/ sentinel node Bx    Bronchitis     Cellulitis     ROSA LEGS    GERD (gastroesophageal reflux disease)     Hyperlipidemia     Hypertension     Kyphosis     Osteoporosis     Personal history of COVID-19 05/2022    Scoliosis     Self-catheterizes urinary bladder     10FR SIZED CATH    Stage 3 chronic kidney disease 11/11/2019    Type 2 diabetes mellitus     Urethral meatal stenosis 09/2022    w/ urine retention    Vulva cancer     Vulvar intraepithelial neoplasia (MOODY) grade 3      Past Surgical History:   Procedure Laterality Date    APPENDECTOMY      BENJAMIN PROCEDURE      No evidence of reflux disease while on Nexium 40mg daily-See report    BREAST BIOPSY      BREAST CYST ASPIRATION Left     BREAST LUMPECTOMY      COLONOSCOPY  01/12/2011    Diverticulosis sigmoid colon; The examination was otherwise normal; Repeat 10 years    COLONOSCOPY  11/03/2003    Dr. Laguerre-Normal colonoscopy; Normal terminal ileum; Repeat 5 years    COLONOSCOPY N/A 11/05/2021    Petechia(e) in the rectum, in the recto-sigmoid colon and in the distal sigmoid colon; No specimens collected; No plans to repeat colonoscopy due to advance age and/or medical problems    CYSTOSCOPY N/A 09/20/2022    Procedure: CYSTOSCOPY WITH URETHRAL DILATATION;  Surgeon: Shaheen Conway MD;  Location: [facility];  Service: Urology;  Laterality: N/A;    ENDOSCOPY  07/01/2014    Normal esophagus; Normal stomach; Normal examined duodenum; BRAVO pH capsule deployed;     ENDOSCOPY  08/14/2013    Mild gastritis-biopsies for H.Pylor obtained    ENDOSCOPY  02/16/2005    Dr. LaguerreGicja-Oofmbikle-viyrknjb    ENDOSCOPY  10/21/2003    Dr. Laguerre-Stage 1 reflux esophagitis    ENDOSCOPY N/A 11/05/2021    Small  HH; A single gastroesophageal junction polyp; Normal stomach; A single non-bleeding angiodysplastic lesion in the duodenum    HYSTERECTOMY      MASTECTOMY W/ SENTINEL NODE BIOPSY Left 09/14/2021    Procedure: LEFT PARTIAL MASTECTOMY WITH MAGSEED AND LEFT SENTINEL LYMPH NODE BIOPSY MAGTRACE;  Surgeon: Quynh Rosario MD;  Location:  [facility];  Service: General;  Laterality: Left;    TONSILLECTOMY      VAGINA SURGERY      Laser surgery X 2    VENOUS ACCESS DEVICE (PORT) INSERTION N/A 09/29/2020    Procedure: SINGLE LUMEN PORT - A- CATH PLACEMENT WITH FLUOROSCOPY;  Surgeon: Quynh Rosario MD;  Location:  [facility];  Service: General;  Laterality: N/A;     PT Assessment (Last 12 Hours)       PT Evaluation and Treatment       Row Name 05/21/24 1324 05/21/24 1045       Physical Therapy Time and Intention    Subjective Information complains of;weakness  -WK --    Document Type therapy note (daily note)  -WK --    Mode of Treatment physical therapy  -WK --    Session Not Performed -- other (see comments)  -WK    Comment -- working with OT  -WK      Row Name 05/21/24 0834          Physical Therapy Time and Intention    Session Not Performed other (see comments)  -WK     Comment Pt just came back from MRI and couldn't keep eyes open to participate.  -WK       Row Name 05/21/24 1324          General Information    Existing Precautions/Restrictions fall  -WK       Row Name 05/21/24 1324          Pain    Pretreatment Pain Rating 0/10 - no pain  -WK     Posttreatment Pain Rating 0/10 - no pain  -WK       Row Name 05/21/24 1324          Bed Mobility    Comment, (Bed Mobility) in chair  -WK       Row Name 05/21/24 1324          Sit-Stand Transfer    Sit-Stand [place] (Transfers) verbal cues;minimum assist (75% patient effort)  attempted without assist but was unable to perform  -WK       Row Name 05/21/24 1324          Stand-Sit Transfer    Stand-Sit [place] (Transfers) verbal cues;contact guard  -WK       Row  Name 05/21/24 1324          Gait/Stairs (Locomotion)    Breaks Level (Gait) verbal cues;minimum assist (75% patient effort)  -WK     Assistive Device (Gait) walker, front-wheeled  -WK     Distance in Feet (Gait) 25  x 2  -WK     Deviations/Abnormal Patterns (Gait) gait speed decreased;stride length decreased  very slow gait  -WK     Bilateral Gait Deviations forward flexed posture  -WK     Comment, (Gait/Stairs) cues to stay inside RW  -WK       Row Name 05/21/24 1324          Motor Skills    Comments, Therapeutic Exercise AROM-AAROM 15 reps  -WK     Additional Documentation Comments, Therapeutic Exercise (Row)  -WK       Row Name 05/21/24 1324          Plan of Care Review    Plan of Care Reviewed With patient  -WK     Progress improving  -WK     Outcome Evaluation Pt performed sit to stand min A. Pt attempted without assist but was unable to perform. Pt amb 25' at a times min A  RW with cues to stay inside RW. Pt has decrease foot clearance and decrease gait speed. Educated on POC and safety.  -WK       Row Name 05/21/24 1324          Positioning and Restraints    Pre-Treatment Position sitting in chair/recliner  -WK     Post Treatment Position chair  -WK     In Chair reclined;call light within reach;encouraged to call for assist;with family/caregiver  -WK               User Key  (r) = Recorded By, (t) = Taken By, (c) = Cosigned By      Initials Name Provider Type    WK Marielos Briseno PTA Physical Therapist Assistant                    Physical Therapy Education       Title: PT OT SLP Therapies (In Progress)       Topic: Physical Therapy (In Progress)       Point: Mobility training (In Progress)       Learning Progress Summary             Patient Acceptance, E, NR by MS at 5/20/2024 1039    Comment: role of PT in her care                         Point: Home exercise program (Not Started)       Learner Progress:  Not documented in this visit.              Point: Body mechanics (Not Started)       Learner  Progress:  Not documented in this visit.              Point: Precautions (Not Started)       Learner Progress:  Not documented in this visit.                              User Key       Initials Effective Dates Name Provider Type Discipline    MS 07/11/23 -  Avril Johnson, PT, DPT, NCS Physical Therapist PT                  PT Recommendation and Plan     Plan of Care Reviewed With: patient  Progress: improving  Outcome Evaluation: Pt performed sit to stand min A. Pt attempted without assist but was unable to perform. Pt amb 25' at a times min A  RW with cues to stay inside RW. Pt has decrease foot clearance and decrease gait speed. Educated on POC and safety.   Outcome Measures       Row Name 05/21/24 1400             How much help from another person do you currently need...    Turning from your back to your side while in flat bed without using bedrails? 3  -WK      Moving from lying on back to sitting on the side of a flat bed without bedrails? 3  -WK      Moving to and from a bed to a chair (including a wheelchair)? 3  -WK      Standing up from a chair using your arms (e.g., wheelchair, bedside chair)? 3  -WK      Climbing 3-5 steps with a railing? 1  -WK      To walk in hospital room? 3  -WK      AM-PAC 6 Clicks Score (PT) 16  -WK      Highest Level of Mobility Goal 5 --> Static standing  -WK         Functional Assessment    Outcome Measure Options AM-PAC 6 Clicks Basic Mobility (PT)  -WK                User Key  (r) = Recorded By, (t) = Taken By, (c) = Cosigned By      Initials Name Provider Type    WK Marielos Briseno PTA Physical Therapist Assistant                     Time Calculation:    PT Charges       Row Name 05/21/24 1406             Time Calculation    Start Time 1324  -WK      Stop Time 1403  -WK      Time Calculation (min) 39 min  -WK         Time Calculation- PT    Total Timed Code Minutes- PT 39 minute(s)  -WK         Timed Charges    61126 - PT Therapeutic Exercise Minutes 10  -WK      93828  - Gait Training Minutes  29  -WK         Total Minutes    Timed Charges Total Minutes 39  -WK       Total Minutes 39  -WK                User Key  (r) = Recorded By, (t) = Taken By, (c) = Cosigned By      Initials Name Provider Type    WK Marielos Briseno PTA Physical Therapist Assistant                  Therapy Charges for Today       Code Description Service Date Service Provider Modifiers Qty    28893367287 HC PT THER PROC EA 15 MIN 5/21/2024 Marielos Briseno PTA GP 1    58655086899 HC GAIT TRAINING EA 15 MIN 5/21/2024 Marielos Briseno PTA GP 2            PT G-Codes  Outcome Measure Options: AM-PAC 6 Clicks Basic Mobility (PT)  AM-PAC 6 Clicks Score (PT): 16  AM-PAC 6 Clicks Score (OT): 18  Modified Pasha Scale: 4 - Moderately severe disability.  Unable to walk without assistance, and unable to attend to own bodily needs without assistance.    Marielos Briseno PTA  5/21/2024

## 2024-05-21 NOTE — PLAN OF CARE
Goal Outcome Evaluation:              Outcome Evaluation: see note      Anticipated Discharge Disposition (SLP): unknown    SLP Diagnosis: moderate, cognitive-linguistic disorder (05/21/24 1020)            Rowena Briceno MS CCC-SLP 5/21/2024 11:20 CDT

## 2024-05-22 ENCOUNTER — TELEPHONE (OUTPATIENT)
Dept: FAMILY MEDICINE CLINIC | Facility: CLINIC | Age: 84
End: 2024-05-22
Payer: MEDICARE

## 2024-05-22 ENCOUNTER — READMISSION MANAGEMENT (OUTPATIENT)
Dept: CALL CENTER | Facility: HOSPITAL | Age: 84
End: 2024-05-22
Payer: MEDICARE

## 2024-05-22 VITALS
WEIGHT: 173 LBS | TEMPERATURE: 98.2 F | RESPIRATION RATE: 16 BRPM | HEIGHT: 56 IN | HEART RATE: 84 BPM | DIASTOLIC BLOOD PRESSURE: 65 MMHG | SYSTOLIC BLOOD PRESSURE: 157 MMHG | BODY MASS INDEX: 38.92 KG/M2 | OXYGEN SATURATION: 97 %

## 2024-05-22 PROBLEM — G45.9 TRANSIENT ISCHEMIC ATTACK (TIA): Status: ACTIVE | Noted: 2024-05-22

## 2024-05-22 LAB
ANION GAP SERPL CALCULATED.3IONS-SCNC: 11 MMOL/L (ref 5–15)
BACTERIA SPEC AEROBE CULT: ABNORMAL
BUN SERPL-MCNC: 32 MG/DL (ref 8–23)
BUN/CREAT SERPL: 17.2 (ref 7–25)
CALCIUM SPEC-SCNC: 8.6 MG/DL (ref 8.6–10.5)
CHLORIDE SERPL-SCNC: 101 MMOL/L (ref 98–107)
CO2 SERPL-SCNC: 28 MMOL/L (ref 22–29)
CREAT SERPL-MCNC: 1.86 MG/DL (ref 0.57–1)
DEPRECATED RDW RBC AUTO: 61.1 FL (ref 37–54)
EGFRCR SERPLBLD CKD-EPI 2021: 26.9 ML/MIN/1.73
ERYTHROCYTE [DISTWIDTH] IN BLOOD BY AUTOMATED COUNT: 16.3 % (ref 12.3–15.4)
GLUCOSE SERPL-MCNC: 120 MG/DL (ref 65–99)
HCT VFR BLD AUTO: 24.8 % (ref 34–46.6)
HGB BLD-MCNC: 7.7 G/DL (ref 12–15.9)
MCH RBC QN AUTO: 34.2 PG (ref 26.6–33)
MCHC RBC AUTO-ENTMCNC: 31 G/DL (ref 31.5–35.7)
MCV RBC AUTO: 110.2 FL (ref 79–97)
PLATELET # BLD AUTO: 147 10*3/MM3 (ref 140–450)
PMV BLD AUTO: 11 FL (ref 6–12)
POTASSIUM SERPL-SCNC: 4.1 MMOL/L (ref 3.5–5.2)
RBC # BLD AUTO: 2.25 10*6/MM3 (ref 3.77–5.28)
SODIUM SERPL-SCNC: 140 MMOL/L (ref 136–145)
WBC NRBC COR # BLD AUTO: 6.34 10*3/MM3 (ref 3.4–10.8)

## 2024-05-22 PROCEDURE — 94799 UNLISTED PULMONARY SVC/PX: CPT

## 2024-05-22 PROCEDURE — 97116 GAIT TRAINING THERAPY: CPT

## 2024-05-22 PROCEDURE — 94664 DEMO&/EVAL PT USE INHALER: CPT

## 2024-05-22 PROCEDURE — 80048 BASIC METABOLIC PNL TOTAL CA: CPT | Performed by: INTERNAL MEDICINE

## 2024-05-22 PROCEDURE — 85027 COMPLETE CBC AUTOMATED: CPT | Performed by: INTERNAL MEDICINE

## 2024-05-22 PROCEDURE — 97535 SELF CARE MNGMENT TRAINING: CPT

## 2024-05-22 RX ORDER — CLOPIDOGREL BISULFATE 75 MG/1
75 TABLET ORAL DAILY
Qty: 20 TABLET | Refills: 0 | Status: SHIPPED | OUTPATIENT
Start: 2024-05-22

## 2024-05-22 RX ORDER — PRAVASTATIN SODIUM 40 MG
40 TABLET ORAL NIGHTLY
Qty: 90 TABLET | Refills: 0 | Status: SHIPPED | OUTPATIENT
Start: 2024-05-22

## 2024-05-22 RX ORDER — ASPIRIN 81 MG/1
81 TABLET, CHEWABLE ORAL DAILY
Qty: 30 TABLET | Refills: 0 | Status: SHIPPED | OUTPATIENT
Start: 2024-05-22

## 2024-05-22 RX ORDER — CEFDINIR 300 MG/1
300 CAPSULE ORAL DAILY
Qty: 3 CAPSULE | Refills: 0 | Status: SHIPPED | OUTPATIENT
Start: 2024-05-22 | End: 2024-05-25

## 2024-05-22 RX ADMIN — PANTOPRAZOLE SODIUM 40 MG: 40 TABLET, DELAYED RELEASE ORAL at 05:21

## 2024-05-22 RX ADMIN — GABAPENTIN 300 MG: 300 CAPSULE ORAL at 10:20

## 2024-05-22 RX ADMIN — CETIRIZINE HYDROCHLORIDE 10 MG: 10 TABLET ORAL at 10:21

## 2024-05-22 RX ADMIN — Medication 250 MG: at 10:21

## 2024-05-22 RX ADMIN — ASPIRIN 81 MG CHEWABLE TABLET 81 MG: 81 TABLET CHEWABLE at 10:20

## 2024-05-22 RX ADMIN — CLOPIDOGREL BISULFATE 75 MG: 75 TABLET, FILM COATED ORAL at 10:21

## 2024-05-22 RX ADMIN — CITALOPRAM 20 MG: 20 TABLET, FILM COATED ORAL at 10:21

## 2024-05-22 RX ADMIN — LETROZOLE 2.5 MG: 2.5 TABLET ORAL at 10:20

## 2024-05-22 RX ADMIN — METOLAZONE 2.5 MG: 2.5 TABLET ORAL at 10:21

## 2024-05-22 RX ADMIN — ALBUTEROL SULFATE 2.5 MG: 2.5 SOLUTION RESPIRATORY (INHALATION) at 06:12

## 2024-05-22 RX ADMIN — Medication 10 ML: at 10:21

## 2024-05-22 RX ADMIN — ALBUTEROL SULFATE 2.5 MG: 2.5 SOLUTION RESPIRATORY (INHALATION) at 10:29

## 2024-05-22 NOTE — PLAN OF CARE
Goal Outcome Evaluation:  Plan of Care Reviewed With: patient        Progress: no change     Pt alert and oriented x4. VSS. No c/o pain. BUNCH.PPP. SCDs and ASA for VTE. , satting at 97% on room air. Tolerating regular diet. Skin intact. Voiding via bathroom. Up x 1 with walker.  Plans to d/c home. Bed alarm set. Call light within reach. Safety maintained. Plan of care ongoing. No changes this shift. DC education completed with pt and spouse.

## 2024-05-22 NOTE — THERAPY TREATMENT NOTE
Acute Care - Occupational Therapy Treatment Note   [place]     Patient Name: Dago Nunez  : 1940  MRN: [id number]  Today's Date: 2024             Admit Date: 2024       ICD-10-CM ICD-9-CM   1. Cerebrovascular accident (CVA), unspecified mechanism  I63.9 434.91   2. Acute UTI (urinary tract infection)  N39.0 599.0   3. Acute renal failure superimposed on chronic kidney disease, unspecified acute renal failure type, unspecified CKD stage  N17.9 584.9    N18.9 585.9   4. Dysphagia, unspecified type  R13.10 787.20   5. Impaired mobility [Z74.09]  Z74.09 799.89   6. Cognitive and behavioral changes [R41.89, R46.89]  R41.89 799.59    R46.89 312.9   7. Transient ischemic attack (TIA)  G45.9 435.9     Patient Active Problem List   Diagnosis    Meatal stenosis    Retention of urine    Type 2 diabetes mellitus, without long-term current use of insulin    Essential hypertension    Grief reaction    Vulvar intraepithelial neoplasia (MOODY) grade 3    Chronic midline low back pain without sciatica    Bilateral lower extremity edema    History of urethral stricture    Epidermal cyst of neck    Normocytic anemia    Neck abscess    Mixed hyperlipidemia    Gastroesophageal reflux disease without esophagitis    Anxiety    Iron deficiency and chemotherapy induced anemia    Stage 3 chronic kidney disease    Anemia    Screening for breast cancer    Lower extremity edema    Vulvar cancer, carcinoma    Former smoker    Secondary malignancy of inguinal lymph nodes    Febrile illness    Chemotherapy-induced thrombocytopenia    Hyponatremia    Hypokalemia    Neutropenic fever    Moderate malnutrition    Antineoplastic chemotherapy induced anemia    History of radiation therapy    Encounter for care related to Port-a-Cath    Malignant neoplasm of upper-outer quadrant of left breast in female, estrogen receptor positive    Encounter for care related to vascular access port    Angiodysplasia    Bronchitis    Obesity  (BMI 30-39.9)    Wheezing    Cough    Post-COVID-19 condition    Radiation induced proctitis    Rectal bleeding    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    CVA (cerebral vascular accident)    Transient ischemic attack (TIA)     Past Medical History:   Diagnosis Date    Anemia in stage 3 chronic kidney disease 11/11/2019    Arthritis     Breast cancer 09/14/2021    Left Mastectomy w/ sentinel node Bx    Bronchitis     Cellulitis     ROSA LEGS    GERD (gastroesophageal reflux disease)     Hyperlipidemia     Hypertension     Kyphosis     Osteoporosis     Personal history of COVID-19 05/2022    Scoliosis     Self-catheterizes urinary bladder     10FR SIZED CATH    Stage 3 chronic kidney disease 11/11/2019    Type 2 diabetes mellitus     Urethral meatal stenosis 09/2022    w/ urine retention    Vulva cancer     Vulvar intraepithelial neoplasia (MOODY) grade 3      Past Surgical History:   Procedure Laterality Date    APPENDECTOMY      BENJAMIN PROCEDURE      No evidence of reflux disease while on Nexium 40mg daily-See report    BREAST BIOPSY      BREAST CYST ASPIRATION Left     BREAST LUMPECTOMY      COLONOSCOPY  01/12/2011    Diverticulosis sigmoid colon; The examination was otherwise normal; Repeat 10 years    COLONOSCOPY  11/03/2003    Dr. Laguerre-Normal colonoscopy; Normal terminal ileum; Repeat 5 years    COLONOSCOPY N/A 11/05/2021    Petechia(e) in the rectum, in the recto-sigmoid colon and in the distal sigmoid colon; No specimens collected; No plans to repeat colonoscopy due to advance age and/or medical problems    CYSTOSCOPY N/A 09/20/2022    Procedure: CYSTOSCOPY WITH URETHRAL DILATATION;  Surgeon: Shaheen Conway MD;  Location: [facility];  Service: Urology;  Laterality: N/A;    ENDOSCOPY  07/01/2014    Normal esophagus; Normal stomach; Normal examined duodenum; BRAVO pH capsule deployed;     ENDOSCOPY  08/14/2013    Mild gastritis-biopsies for H.Pylor obtained    ENDOSCOPY  02/16/2005      Tsgxo-Lhivkhqvb-mzpyhoqa    ENDOSCOPY  10/21/2003    Dr. Laguerre-Stage 1 reflux esophagitis    ENDOSCOPY N/A 11/05/2021    Small HH; A single gastroesophageal junction polyp; Normal stomach; A single non-bleeding angiodysplastic lesion in the duodenum    HYSTERECTOMY      MASTECTOMY W/ SENTINEL NODE BIOPSY Left 09/14/2021    Procedure: LEFT PARTIAL MASTECTOMY WITH MAGSEED AND LEFT SENTINEL LYMPH NODE BIOPSY MAGTRACE;  Surgeon: Quynh oRsario MD;  Location:  [facility];  Service: General;  Laterality: Left;    TONSILLECTOMY      VAGINA SURGERY      Laser surgery X 2    VENOUS ACCESS DEVICE (PORT) INSERTION N/A 09/29/2020    Procedure: SINGLE LUMEN PORT - A- CATH PLACEMENT WITH FLUOROSCOPY;  Surgeon: Quynh Rosario MD;  Location:  [facility];  Service: General;  Laterality: N/A;         OT ASSESSMENT FLOWSHEET (Last 12 Hours)       OT Evaluation and Treatment       Row Name 05/22/24 0900                   OT Time and Intention    Subjective Information no complaints  -TS        Document Type therapy note (daily note)  -TS        Mode of Treatment occupational therapy  -TS        Patient Effort good  -TS           General Information    Existing Precautions/Restrictions fall  -TS           Pain Assessment    Pretreatment Pain Rating 0/10 - no pain  -TS        Posttreatment Pain Rating 0/10 - no pain  -TS           Cognition    Orientation Status (Cognition) oriented x 4  -TS           Activities of Daily Living    BADL Assessment/Intervention upper body dressing;lower body dressing;toileting;grooming  -TS           Upper Body Dressing Assessment/Training    [place] Level (Upper Body Dressing) don;doff;front opening garment;set up  -TS        Position (Upper Body Dressing) unsupported sitting  -TS           Lower Body Dressing Assessment/Training    [place] Level (Lower Body Dressing) don;doff;pants/bottoms;socks;set up;minimum assist (75% patient effort)  -TS        Position (Lower Body Dressing)  unsupported sitting;supported standing  -TS           Grooming Assessment/Training    [place] Level (Grooming) grooming skills;set up  -TS        Position (Grooming) sink side;unsupported sitting  -TS           Toileting Assessment/Training    [place] Level (Toileting) toileting skills;adjust/manage clothing;perform perineal hygiene;minimum assist (75% patient effort)  -TS        Assistive Devices (Toileting) commode;grab bar/safety frame  -TS        Position (Toileting) unsupported sitting;supported standing  -TS           Functional Mobility    Functional Mobility- Ind. Level contact guard assist  -TS        Functional Mobility- Device walker, front-wheeled  -TS           Transfer Assessment/Treatment    Transfers sit-stand transfer;stand-sit transfer;toilet transfer  -TS           Sit-Stand Transfer    Sit-Stand [place] (Transfers) standby assist  -TS        Assistive Device (Sit-Stand Transfers) walker, front-wheeled  -TS           Stand-Sit Transfer    Stand-Sit [place] (Transfers) standby assist  -TS        Assistive Device (Stand-Sit Transfers) walker, front-wheeled  -TS           Toilet Transfer    Type (Toilet Transfer) sit-stand;stand-sit  -TS        [place] Level (Toilet Transfer) standby assist  -TS        Assistive Device (Toilet Transfer) commode;grab bars/safety frame  -TS           Plan of Care Review    Plan of Care Reviewed With patient  -TS        Progress improving  -TS           Positioning and Restraints    Pre-Treatment Position sitting in chair/recliner  -TS        Post Treatment Position chair  -TS        In Chair reclined;call light within reach;encouraged to call for assist  -TS                  User Key  (r) = Recorded By, (t) = Taken By, (c) = Cosigned By      Initials Name Effective Dates    TS Lizzeth Lerma COTA 02/03/23 -                      Occupational Therapy Education       Title: PT OT SLP Therapies (In Progress)       Topic: Occupational Therapy  (Done)       Point: ADL training (Done)       Description:   Instruct learner(s) on proper safety adaptation and remediation techniques during self care or transfers.   Instruct in proper use of assistive devices.                  Learning Progress Summary             Patient Acceptance, E, VU by  at 5/21/2024 1142    Acceptance, E, VU by  at 5/20/2024 0913                         Point: Home exercise program (Done)       Description:   Instruct learner(s) on appropriate technique for monitoring, assisting and/or progressing therapeutic exercises/activities.                  Learning Progress Summary             Patient Acceptance, E, VU by  at 5/21/2024 1142                         Point: Precautions (Done)       Description:   Instruct learner(s) on prescribed precautions during self-care and functional transfers.                  Learning Progress Summary             Patient Acceptance, E, VU by  at 5/21/2024 1142    Acceptance, E, VU by  at 5/20/2024 0913                         Point: Body mechanics (Done)       Description:   Instruct learner(s) on proper positioning and spine alignment during self-care, functional mobility activities and/or exercises.                  Learning Progress Summary             Patient Acceptance, E, VU by  at 5/21/2024 1142    Acceptance, E, VU by  at 5/20/2024 0913                                         User Key       Initials Effective Dates Name Provider Type Discipline     04/15/24 -  Narcisa Baca OT Student OT Student OT                      OT Recommendation and Plan     Plan of Care Review  Plan of Care Reviewed With: patient  Progress: improving  Plan of Care Reviewed With: patient     Outcome Measures       Row Name 05/22/24 1100 05/22/24 1053 05/21/24 1400       How much help from another person do you currently need...    Turning from your back to your side while in flat bed without using bedrails? -- 3  -WK 3  -WK    Moving from lying on back to  sitting on the side of a flat bed without bedrails? -- 3  -WK 3  -WK    Moving to and from a bed to a chair (including a wheelchair)? -- 3  -WK 3  -WK    Standing up from a chair using your arms (e.g., wheelchair, bedside chair)? -- 3  -WK 3  -WK    Climbing 3-5 steps with a railing? -- 1  -WK 1  -WK    To walk in hospital room? -- 3  -WK 3  -WK    AM-PAC 6 Clicks Score (PT) -- 16  -WK 16  -WK    Highest Level of Mobility Goal -- 5 --> Static standing  -WK 5 --> Static standing  -WK       How much help from another is currently needed...    Putting on and taking off regular lower body clothing? 3  -TS -- --    Bathing (including washing, rinsing, and drying) 3  -TS -- --    Toileting (which includes using toilet bed pan or urinal) 3  -TS -- --    Putting on and taking off regular upper body clothing 3  -TS -- --    Taking care of personal grooming (such as brushing teeth) 4  -TS -- --    Eating meals 4  -TS -- --    AM-PAC 6 Clicks Score (OT) 20  -TS -- --       Functional Assessment    Outcome Measure Options -- AM-PAC 6 Clicks Basic Mobility (PT)  -WK AM-PAC 6 Clicks Basic Mobility (PT)  -WK              User Key  (r) = Recorded By, (t) = Taken By, (c) = Cosigned By      Initials Name Provider Type    TS Lizzeth Lerma COTA Occupational Therapist Assistant    WK Marielos Briseno PTA Physical Therapist Assistant                    Time Calculation:    Time Calculation- OT       Row Name 05/22/24 1149 05/22/24 1135          Time Calculation- OT    OT Start Time 0900  -TS --     OT Stop Time 0945  -TS --     OT Time Calculation (min) 45 min  -TS --     Total Timed Code Minutes- OT 45 minute(s)  -TS --     OT Received On 05/22/24  -TS --        Timed Charges    98295 - Gait Training Minutes  -- 26  -WK     41854 - OT Self Care/Mgmt Minutes 45  -TS --        Total Minutes    Timed Charges Total Minutes 45  -TS 26  -WK      Total Minutes 45  -TS 26  -WK               User Key  (r) = Recorded By, (t) = Taken By,  (c) = Cosigned By      Initials Name Provider Type    TS Lizzeth Lerma COTA Occupational Therapist Assistant    WK Marielos Briseno, SONIDO Physical Therapist Assistant                  Therapy Charges for Today       Code Description Service Date Service Provider Modifiers Qty    14764528366 HC OT SELF CARE/MGMT/TRAIN EA 15 MIN 5/22/2024 Lizzeth Lerma COTA GO 3                 Lizzeth BRUNO. CINTIA Lerma  5/22/2024

## 2024-05-22 NOTE — THERAPY DISCHARGE NOTE
Acute Care - Occupational Therapy Discharge Summary  [facility]     Patient Name: Dago Nunez  : 1940  MRN: [id number]    Today's Date: 2024                 Admit Date: 2024        OT Recommendation and Plan    Visit Dx:    ICD-10-CM ICD-9-CM   1. Cerebrovascular accident (CVA), unspecified mechanism  I63.9 434.91   2. Acute UTI (urinary tract infection)  N39.0 599.0   3. Acute renal failure superimposed on chronic kidney disease, unspecified acute renal failure type, unspecified CKD stage  N17.9 584.9    N18.9 585.9   4. Dysphagia, unspecified type  R13.10 787.20   5. Impaired mobility [Z74.09]  Z74.09 799.89   6. Cognitive and behavioral changes [R41.89, R46.89]  R41.89 799.59    R46.89 312.9   7. Transient ischemic attack (TIA)  G45.9 435.9          Time Calculation- OT       Row Name 24 1149 24 1135          Time Calculation- OT    OT Start Time 0900  -TS --     OT Stop Time 0945  -TS --     OT Time Calculation (min) 45 min  -TS --     Total Timed Code Minutes- OT 45 minute(s)  -TS --     OT Received On 24  -TS --        Timed Charges    39390 - Gait Training Minutes  -- 26  -WK     37779 - OT Self Care/Mgmt Minutes 45  -TS --        Total Minutes    Timed Charges Total Minutes 45  -TS 26  -WK      Total Minutes 45  -TS 26  -WK               User Key  (r) = Recorded By, (t) = Taken By, (c) = Cosigned By      Initials Name Provider Type    TS Lizzeth Lerma COTA Occupational Therapist Assistant    WK Marielos Briseno PTA Physical Therapist Assistant                       OT Rehab Goals       Row Name 24 1500             Transfer Goal 1 (OT)    Activity/Assistive Device (Transfer Goal 1, OT) toilet  -CS      [place] Level/Cues Needed (Transfer Goal 1, OT) standby assist  -CS      Time Frame (Transfer Goal 1, OT) long term goal (LTG)  -CS      Progress/Outcome (Transfer Goal 1, OT) goal not met  -CS         Dressing Goal 1 (OT)    Activity/Device  (Dressing Goal 1, OT) lower body dressing  -CS      [place]/Cues Needed (Dressing Goal 1, OT) minimum assist (75% or more patient effort)  -CS      Time Frame (Dressing Goal 1, OT) long term goal (LTG)  -CS      Progress/Outcome (Dressing Goal 1, OT) goal not met  -CS         Toileting Goal 1 (OT)    Activity/Device (Toileting Goal 1, OT) toileting skills, all  -CS      [place] Level/Cues Needed (Toileting Goal 1, OT) standby assist  -CS      Time Frame (Toileting Goal 1, OT) long term goal (LTG)  -CS      Progress/Outcome (Toileting Goal 1, OT) goal not met  -CS                User Key  (r) = Recorded By, (t) = Taken By, (c) = Cosigned By      Initials Name Provider Type Discipline    CS Michelle Ortega, OTR/L, CNT Occupational Therapist OT                     Outcome Measures       Row Name 05/22/24 1100 05/22/24 1053 05/21/24 1400       How much help from another person do you currently need...    Turning from your back to your side while in flat bed without using bedrails? -- 3  -WK 3  -WK    Moving from lying on back to sitting on the side of a flat bed without bedrails? -- 3  -WK 3  -WK    Moving to and from a bed to a chair (including a wheelchair)? -- 3  -WK 3  -WK    Standing up from a chair using your arms (e.g., wheelchair, bedside chair)? -- 3  -WK 3  -WK    Climbing 3-5 steps with a railing? -- 1  -WK 1  -WK    To walk in hospital room? -- 3  -WK 3  -WK    AM-PAC 6 Clicks Score (PT) -- 16  -WK 16  -WK    Highest Level of Mobility Goal -- 5 --> Static standing  -WK 5 --> Static standing  -WK       How much help from another is currently needed...    Putting on and taking off regular lower body clothing? 3  -TS -- --    Bathing (including washing, rinsing, and drying) 3  -TS -- --    Toileting (which includes using toilet bed pan or urinal) 3  -TS -- --    Putting on and taking off regular upper body clothing 3  -TS -- --    Taking care of personal grooming (such as brushing teeth) 4  -TS -- --     Eating meals 4  -TS -- --    AM-PAC 6 Clicks Score (OT) 20  -TS -- --       Functional Assessment    Outcome Measure Options -- AM-PAC 6 Clicks Basic Mobility (PT)  -WK AM-PAC 6 Clicks Basic Mobility (PT)  -WK              User Key  (r) = Recorded By, (t) = Taken By, (c) = Cosigned By      Initials Name Provider Type    TS Lizzeth Lerma COTA Occupational Therapist Assistant    WK Marielos Briseno, SONIDO Physical Therapist Assistant                    Timed Therapy Charges  Total Units: 3      Suggested Charges  Total Units: 3      Procedure Name Documented Minutes Units Code    HC OT SELF CARE/MGMT/TRAIN EA 15 MIN 45 3   41033 (CPT®)                 Documented Minutes  Total Minutes: 45      Therapy Provided Minutes    89109 - OT Self Care/Mgmt Minutes 45                    Therapy Charges for Today       Code Description Service Date Service Provider Modifiers Qty    [id number] HC OT SELF CARE/MGMT/TRAIN EA 15 MIN 5/21/2024 Michelle Ortega OTR/L, CNT GO 2            OT Discharge Summary  Anticipated Discharge Disposition (OT): inpatient rehabilitation facility  Reason for Discharge: Discharge from facility  Outcomes Achieved: Refer to plan of care for updates on goals achieved  Discharge Destination: Home with assist, Home with home health      PIERCE Campos/L, CNT  5/22/2024

## 2024-05-22 NOTE — THERAPY DISCHARGE NOTE
Acute Care - Speech Language Pathology Discharge Summary  [facility]       Patient Name: Dago Nunez  : 1940  MRN: [id number]    Today's Date: 2024                   Admit Date: 2024      SLP Recommendation and Plan  Regular solids and thin liquids    Visit Dx:    ICD-10-CM ICD-9-CM   1. Cerebrovascular accident (CVA), unspecified mechanism  I63.9 434.91   2. Acute UTI (urinary tract infection)  N39.0 599.0   3. Acute renal failure superimposed on chronic kidney disease, unspecified acute renal failure type, unspecified CKD stage  N17.9 584.9    N18.9 585.9   4. Dysphagia, unspecified type  R13.10 787.20   5. Impaired mobility [Z74.09]  Z74.09 799.89   6. Cognitive and behavioral changes [R41.89, R46.89]  R41.89 799.59    R46.89 312.9   7. Transient ischemic attack (TIA)  G45.9 435.9                SLP GOALS       Row Name 24 1500 24 1020          Patient will demonstrate functional cognitive-linguistic skills for return to discharge environment    [place] Independently  -MB Independently  -JR     Time frame by discharge  -MB by discharge  -JR     Progress/Outcomes goal not met  -MB new goal  -JR        Articulation Goal 1 (SLP)    Improve Articulation Goal 1 (SLP) by over-articulating at word level;by over-articulating at phrase level;by over-articulating in connected speech;with minimal cues (75-90%)  -MB by over-articulating at word level;by over-articulating at phrase level;by over-articulating in connected speech;with minimal cues (75-90%)  -JR     Time Frame (Articulation Goal 1, SLP) by discharge  -MB by discharge  -JR     Progress/Outcomes (Articulation Goal 1, SLP) goal not met  -MB new goal  -JR        Memory Skills Goal 1 (SLP)    Improve Memory Skills Through Goal 1 (SLP) recalling related word lists immediately;recalling related word lists with an imposed delay;recalling unrelated word lists immediately;recalling unrelated word lists with an imposed delay;listen  to a paragraph and answer questions;use external memory aid;use memory strategies;with minimal cues (75-90%)  -MB recalling related word lists immediately;recalling related word lists with an imposed delay;recalling unrelated word lists immediately;recalling unrelated word lists with an imposed delay;listen to a paragraph and answer questions;use external memory aid;use memory strategies;with minimal cues (75-90%)  -JR     Time Frame (Memory Skills Goal 1, SLP) by discharge  -MB by discharge  -JR     Progress/Outcomes (Memory Skills Goal 1, SLP) goal not met  -MB new goal  -JR        Reasoning Goal 1 (SLP)    Improve Reasoning Through Goal 1 (SLP) complete mental flexibility task;with minimal cues (75-90%)  -MB complete mental flexibility task;with minimal cues (75-90%)  -JR     Time Frame (Reasoning Goal 1, SLP) by discharge  -MB by discharge  -JR     Progress/Outcomes (Reasoning Goal 1, SLP) goal not met  -MB new goal  -JR        Executive Functional Skills Goal 1 (SLP)    Improve Executive Function Skills Goal 1 (SLP) organization/planning activity;home management activity;with minimal cues (75-90%);other (see comments)  -MB organization/planning activity;home management activity;with minimal cues (75-90%);other (see comments)  med and finance management  -JR     Time Frame (Executive Function Skills Goal 1, SLP) by discharge  -MB by discharge  -JR     Progress/Outcomes (Executive Function Skills Goal 1, SLP) goal not met  -MB new goal  -JR               User Key  (r) = Recorded By, (t) = Taken By, (c) = Cosigned By      Initials Name Provider Type    Jojo Mcrae CCC-SLP Speech and Language Pathologist    Rowena Singleton MS CCC-SLP Speech and Language Pathologist                    [email or web address]      SLP Discharge Summary  Anticipated Discharge Disposition (SLP): unknown  Reason for Discharge: discharge from this facility  Progress Toward Achieving Short/long Term Goals: goals not met within established  timelines  Discharge Destination: home w/ home health      Jojo Whitten CCC-SLP  5/22/2024

## 2024-05-22 NOTE — DISCHARGE SUMMARY
"      [facility] Medicine Services  DISCHARGE SUMMARY       Date of Admission: 5/19/2024  Date of Discharge:  5/22/2024  Primary Care Physician: Shaheen Akbar DO    Presenting Problem/History of Present Illness:  Left leg \"jumping\", left-sided weakness, left facial droop    Final Discharge Diagnoses:  Active Hospital Problems    Diagnosis     Transient ischemic attack (TIA)     Lower extremity edema     Stage 3 chronic kidney disease     Anemia     Type 2 diabetes mellitus, without long-term current use of insulin     Essential hypertension        Consults: Neurology, Dr. Cash    Procedures Performed: None    Pertinent Test Results:   Results for orders placed during the hospital encounter of 05/19/24    Adult Transthoracic Echo Complete W/ Cont if Necessary Per Protocol (With Agitated Saline)    Interpretation Summary    Left ventricular systolic function is normal. Left ventricular ejection fraction appears to be 66 - 70%.    Left ventricular wall thickness is consistent with mild concentric hypertrophy.    Left ventricular diastolic function is consistent with (grade II w/high LAP) pseudonormalization.    Normal right ventricular cavity size and systolic function noted.    Saline test results are negative for right to left atrial level shunt.    Severe mitral annular calcification is present. There is moderate calcification of the mitral valve. Mild mitral valve regurgitation is present. No significant mitral valve stenosis is present.    Mild pulmonary hypertension is present.      Imaging Results (All)       Procedure Component Value Units Date/Time    US Carotid Bilateral [[id number]] Collected: 05/21/24 1309     Updated: 05/21/24 1315    Narrative:      History: Carotid occlusive disease       Impression:      Impression:  1. There is 50 to 69% stenosis of the right internal carotid artery.  2. There is less than 50% stenosis of the left internal carotid artery.  3. " Antegrade flow is demonstrated in bilateral vertebral arteries.  4. There is heavy plaque burden at the right bifurcation. Further  imaging/evaluation may be warranted with a CTA of the neck.     Comments: Bilateral carotid vertebral arterial duplex scan was  performed.     Grayscale imaging shows intimal thickening and calcified elements at the  carotid bifurcation. The right internal carotid artery peak systolic  velocity is 187 cm/sec. The end-diastolic velocity is 21.4 cm/sec. The  right ICA/CCA ratio is approximately 1.8. These findings correlate with  50 to 69% stenosis of the right internal carotid artery.     Grayscale imaging shows intimal thickening and calcified elements at the  carotid bifurcation. The left internal carotid artery peak systolic  velocity is 110.6 cm/sec. The end-diastolic velocity is 20.7 cm/sec. The  left ICA/CCA ratio is approximately 0.7. These findings correlate with  less than 50% stenosis of the left internal carotid artery.     Antegrade flow is demonstrated in bilateral vertebral arteries.  There is greater than 50% stenosis in the left common carotid artery and  bilateral external carotid arteries.     This report was signed and finalized on 5/21/2024 1:12 PM by Dr. Junior Meyers MD.       MRI Brain Without Contrast [[id number]] Collected: 05/21/24 1008     Updated: 05/21/24 1018    Narrative:      EXAMINATION: MRI BRAIN WO CONTRAST-  5/21/2024 10:09 AM     HISTORY: Stroke follow-up.     FINDINGS: Multiplanar fast spin echo sequences were obtained of the  brain on a high-field magnet without gadolinium enhancement.     On initial review of the study there was noted to be an abnormal  acquisition of the diffusion component of the study. There was  associated artifact. Therefore, the patient was brought back for repeat  imaging this morning for repeat diffusion imaging as a portion of the  initial exam.     Repeat diffusion imaging demonstrates no evidence of acute ischemia  or  infarct. No discrete areas of diffusion restriction are present.     There is diffuse atrophy of the brain with prominence of the  subarachnoid spaces and ventricular enlargement. Moderate small vessel  ischemic changes are noted involving the periventricular and higher  white matter tracts. There is nonspecific gliosis within the sloane. There  is no mass, mass effect or shift of the midline. No acute infarct or  hemorrhage. There are normal flow voids within the [place].     The orbits are unremarkable.     The visualized paranasal sinuses and mastoid air cells are normally  aerated.       Impression:      1. Atrophy. Moderate small vessel ischemic changes are noted involve the  periventricular and higher white matter tracts. No mass effect or shift  of the midline. 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  2. No evidence of diffusion restriction to suggest acute ischemia or  infarct. No acute or chronic hemorrhage.     This report was signed and finalized on 5/21/2024 10:14 AM by Dr. El Callahan MD.       MRI Angiogram Head Without Contrast [[id number]] Collected: 05/20/24 1938     Updated: 05/21/24 0831    Narrative:      EXAMINATION:  MRI ANGIOGRAM HEAD WO CONTRAST-  5/20/2024 4:10 PM     HISTORY: I63.9-Cerebral infarction, unspecified; N39.0-Urinary tract  infection, site not specified; N17.9-Acute kidney failure, unspecified;  N18.9-Chronic kidney disease, unspecified; R13.10-Dysphagia,  unspecified; Z74.09-Other reduced mobility.     TECHNIQUE: 3-D MR angiography was performed of the cerebral vasculature.     COMPARISON: No comparison study.     FINDINGS: The vertebral and basilar arteries are patent. There is  question of a slightly beaded appearance of the superior cerebellar  artery on the right side. The internal carotid arteries are patent  bilaterally. There are aneurysms arising from the superior aspect of the  cavernous carotid arteries bilaterally. The aneurysm  on the right  measures 4-5 mm and the aneurysm on the left measures 2-3 mm. The  anterior, middle and posterior cerebral arteries are patent. No  aneurysms are appreciated..          Impression:      1. 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  2. Question of a slightly beaded appearance of the right superior  cerebellar artery. This raises the possibility of fibromuscular  dysplasia.  3. No large vessel occlusion is seen.           This report was signed and finalized on 5/20/2024 7:43 PM by Dr. Zander Alberts MD.       US Venous Doppler Lower Extremity Bilateral (duplex) [[id number]] Collected: 05/20/24 1648     Updated: 05/20/24 1651    Narrative:      History: Swelling       Impression:      Impression: There is no evidence of deep venous thrombosis or  superficial thrombophlebitis of right or left lower extremities.     Comments: Bilateral lower extremity venous duplex exam was performed  using color Doppler flow, Doppler waveform analysis, and grayscale  imaging, with and without compression. There is no evidence of deep  venous thrombosis in the common femoral, superficial femoral, popliteal,  peroneal, anterior tibial, and posterior tibial veins bilaterally. No  thrombus is identified in the saphenofemoral junctions and greater  saphenous veins bilaterally.            This report was signed and finalized on 5/20/2024 4:48 PM by Dr. Junior Meyers MD.       CT Head Without Contrast [[id number]] Collected: 05/19/24 2208     Updated: 05/19/24 2212    Narrative:      EXAMINATION: CT HEAD WO CONTRAST-      5/19/2024 8:52 PM     HISTORY: Acute stroke symptoms, follow-up. Difficulty walking.     In order to have a CT radiation dose as low as reasonably achievable  Automated Exposure Control was utilized for adjustment of the mA and/or  KV according to patient size.     CT Dose DLP = 748.8 mGy.cm.  (If there are  multiple studies performed at the same time this  represents the total dose).     Comparison:  10/19/2020.     Axial, sagittal, and coronal noncontrast CT imaging of the head.     The visualized paranasal sinuses are clear.     The brain and ventricles have an age appropriate appearance.   Moderate cortical atrophy and mild small vessel disease.  There is no hemorrhage or mass-effect.   No acute infarction is seen.     No calvarial abnormality.       Impression:      1. No acute intracranial abnormality is seen.           This report was signed and finalized on 5/19/2024 10:09 PM by Dr. Miah Pineda MD.       XR Chest 1 View [[id number]] Collected: 05/19/24 2121     Updated: 05/19/24 2125    Narrative:      EXAMINATION: XR CHEST 1 VW-     5/19/2024 8:04 PM     HISTORY: Acute stroke symptoms. Left-sided weakness. Dysarthria.     1 view chest x-ray.     COMPARISON:  9/9/2021.     Heart size is magnified.  The mediastinum is within normal limits.     The lungs are normally expanded with no pneumonia or pneumothorax.     No congestive failure changes.     Unchanged RIGHT chest wall port.       Impression:      1. No acute disease.                 This report was signed and finalized on 5/19/2024 9:22 PM by Dr. Miah Pineda MD.             LAB RESULTS:      Lab 05/22/24  0409 05/21/24  0400 05/20/24  0438 05/19/24  2057 05/15/24  1339   WBC 6.34 6.90 5.71 9.75 6.29   HEMOGLOBIN 7.7* 7.9* 7.7* 9.4* 8.7*   HEMATOCRIT 24.8* 25.4* 25.3* 30.2* 28.0*   PLATELETS 147 151 143 170 155   NEUTROS ABS  --   --   --  7.62* 4.52   IMMATURE GRANS (ABS)  --   --   --  0.05  --    LYMPHS ABS  --   --   --  1.19  --    MONOS ABS  --   --   --  0.56  --    EOS ABS  --   --   --  0.28 0.44*   .2* 110.9* 111.9* 111.0* 110.2*   PROTIME  --   --   --  14.0  --          Lab 05/22/24 0409 05/21/24 0400 05/20/24 0438 05/19/24  2057 05/15/24  1339   SODIUM 140 141 141 140 140   POTASSIUM 4.1 4.3 3.9 4.4 4.4   CHLORIDE 101 103 105 101  105   CO2 28.0 29.0 27.0 27.0 27.0   ANION GAP 11.0 9.0 9.0 12.0 8.0   BUN 32* 31* 31* 32* 29*   CREATININE 1.86* 2.07* 2.01* 2.29* 1.68*   EGFR 26.9* 23.7* 24.5* 21.0* 30.4*   GLUCOSE 120* 112* 82 152* 100*   CALCIUM 8.6 9.1 9.0 10.3 9.7   MAGNESIUM  --   --   --   --  1.8   PHOSPHORUS  --   --   --   --  2.8   HEMOGLOBIN A1C  --   --  5.80*  --   --          Lab 05/19/24  2057 05/15/24  1339   TOTAL PROTEIN 7.2 7.0   ALBUMIN 3.9 4.0   GLOBULIN 3.3 3.0   ALT (SGPT) 10 11   AST (SGOT) 23 17   BILIRUBIN 0.2 0.2   ALK PHOS 53 46         Lab 05/19/24 2057   PROTIME 14.0   INR 1.04         Lab 05/20/24  0438   CHOLESTEROL 226*   LDL CHOL 151*  144*   HDL CHOL 47   TRIGLYCERIDES 153*         Lab 05/19/24  2057 05/15/24  1339   IRON  --  57   IRON SATURATION (TSAT)  --  19*   TIBC  --  298   TRANSFERRIN  --  200   FERRITIN  --  624.60*   FOLATE >20.00  --    VITAMIN B 12 878  --    ABO TYPING A  --    RH TYPING Negative  --    ANTIBODY SCREEN Negative  --          Brief Urine Lab Results  (Last result in the past 365 days)        Color   Clarity   Blood   Leuk Est   Nitrite   Protein   CREAT   Urine HCG        05/19/24 2129 Yellow   Cloudy   Negative   Large (3+)   Negative   Negative                 Microbiology Results (last 10 days)       Procedure Component Value - Date/Time    Urine Culture - Urine, Urine, Clean Catch [[id number]]  (Abnormal)  (Susceptibility) Collected: 05/19/24 2129    Lab Status: Final result Specimen: Urine, Clean Catch Updated: 05/22/24 1012     Urine Culture >100,000 CFU/mL Klebsiella oxytoca    Narrative:      Colonization of the urinary tract without infection is common. Treatment is discouraged unless the patient is symptomatic, pregnant, or undergoing an invasive urologic procedure.    Susceptibility        Klebsiella oxytoca      CIERA      Amoxicillin + Clavulanate Susceptible      Ampicillin Resistant      Ampicillin + Sulbactam Intermediate      Cefazolin Resistant      Cefepime  "Susceptible      Ceftazidime Susceptible      Ceftriaxone Susceptible      Gentamicin Susceptible      Levofloxacin Susceptible      Nitrofurantoin Intermediate      Piperacillin + Tazobactam Susceptible      Trimethoprim + Sulfamethoxazole Susceptible                                   Hospital Course:   Patient presented to [facility] emergency department on 5/19/2024 with complaints of left leg \"jumping\", left leg weakness, left arm weakness. She has past medical history of scoliosis, CKD, breast cancer status post left mastectomy, GERD, hypertension, type 2 diabetes. CT scan on arrival negative for acute process. She did not receive TKA and secondary to symptom onset being outside window. Urinalysis showed 2+ bacteria but patient denied dysuria or frequency, no leukocytosis or fever. Neurology was consulted and patient was admitted for further monitoring and management.     She was evaluated by neurology and underwent CT head which was negative for acute process.  Bilateral carotid ultrasound shows less than 50% stenosis.  MRI brain negative for acute infarct.  MRI angiogram head shows bilateral aneurysms from the superior aspect of the cavernous carotid arteries without evidence of dissection or hemorrhage.  Echocardiogram performed showing normal EF, negative for PFO.  Patient was evaluated by physical, occupational and speech therapy.  Aspirin, Lipitor and Plavix were implemented.  Patient declined inpatient rehab placement postdischarge.  After full neurological evaluation, neurology recommends continuing aspirin and Plavix for 3 weeks and then transitioning to aspirin monotherapy.  Continue statin at discharge.  Patient will be given an ambulatory referral for further physical and Occupational Therapy.    Patient has bilateral lower extremity edema, venous Doppler performed bilaterally negative for DVT.    Stage III CKD stable compared to baseline    UTI treated with Rocephin, will transition to " "cefdinir at discharge, continue to follow urine culture to completion to assure proper treatment.    Patient was found to be anemic, hemoglobin typically runs in the 9 range, hemoglobin was 8.7 on 5/15 prior to arrival.  During inpatient stay, hemoglobin trended down to 7.7, 7.9.  No obvious signs or symptoms of bleeding.  It appears to be macrocytic although vitamin B12 and folate levels are normal.  Iron panel shows normal serum iron and high ferritin level.  Patient denies black, red or maroon stool.  Continue to monitor anemia on outpatient basis, CBC at soonest outpatient follow-up.    In terms of bilateral aneurysms found, neurology recommends outpatient follow-up with vascular neurosurgery if patient desires.  Lengthy conversation with patient as well as her daughter, Clara day of discharge.  They are unsure if they will pursue further treatment for this as they are unsure if patient would wish to undergo further intervention if it were to be indicated.  They are going to consider this further on an outpatient basis and follow-up with PCP for further coordination.    Day of discharge, patient is on room air in no acute distress.  Her neurological status has returned to baseline she believes.  No further left-sided weakness or \"jumping\" of lower extremities has been noted.  She is very eager for discharge at this time and comfortable with her discharge plan.  She and her daughter have no further concerns or questions.    Follow-up with PCP in 1 week, CBC at that time  Outpatient referral and follow-up with vascular neurosurgery, likely Dr. Rene Rivero at [place], if patient and family desire    Physical Exam on Discharge:  /65 (BP Location: Right arm, Patient Position: Sitting)   Pulse 88   Temp 98.2 °F (36.8 °C) (Oral)   Resp 18   Ht 142.2 cm (56\")   Wt 78.5 kg (173 lb)   SpO2 95%   BMI 38.79 kg/m²   Physical Exam  Vitals and nursing note reviewed.   Constitutional:       Comments: Up in " bed, no acute distress, room air, family at bedside   HENT:      Head: Normocephalic and atraumatic.   Cardiovascular:      Rate and Rhythm: Normal rate and regular rhythm.      Pulses: Normal pulses.      Heart sounds: Normal heart sounds. No murmur heard.     No friction rub.   Pulmonary:      Effort: Pulmonary effort is normal. No respiratory distress.      Breath sounds: Normal breath sounds. No stridor. No wheezing or rhonchi.   Abdominal:      General: Bowel sounds are normal. There is no distension.      Palpations: Abdomen is soft.      Tenderness: There is no abdominal tenderness. There is no guarding.   Musculoskeletal:         General: No swelling or deformity. Normal range of motion.      Cervical back: Normal range of motion and neck supple. No rigidity or tenderness.      Right lower leg: Edema present.      Left lower leg: Edema present.   Skin:     General: Skin is warm and dry.      Capillary Refill: Capillary refill takes less than 2 seconds.      Coloration: Skin is not pale.      Findings: No bruising or erythema.   Neurological:      Mental Status: She is alert and oriented to person, place, and time.      Gait: Gait normal.   Psychiatric:         Mood and Affect: Mood normal.         Behavior: Behavior normal.         Thought Content: Thought content normal.     Condition on Discharge: Stable    Discharge Disposition:  Home-Health Care [facility] [place]    Discharge Medications:     Discharge Medications        New Medications        Instructions Start Date   aspirin 81 MG chewable tablet   81 mg, Oral, Daily      cefdinir 300 MG capsule  Commonly known as: OMNICEF   300 mg, Oral, Daily      clopidogrel 75 MG tablet  Commonly known as: PLAVIX   75 mg, Oral, Daily             Continue These Medications        Instructions Start Date   albuterol sulfate  (90 Base) MCG/ACT inhaler  Commonly known as: PROVENTIL HFA;VENTOLIN HFA;PROAIR HFA   2 puffs, Inhalation, 4 Times Daily - RT     "  bisoprolol-hydrochlorothiazide 5-6.25 MG per tablet  Commonly known as: ZIAC   1 tablet, Oral, Daily      cetirizine 10 MG tablet  Commonly known as: zyrTEC   10 mg, Oral, Daily      citalopram 20 MG tablet  Commonly known as: CeleXA   20 mg, Oral, Daily      cyanocobalamin 1000 MCG/ML injection   1,000 mcg, Intramuscular, Every 30 Days      diphenoxylate-atropine 2.5-0.025 MG per tablet  Commonly known as: LOMOTIL   2 tablets, Oral, 4 Times Daily PRN      Dulera 100-5 MCG/ACT inhaler  Generic drug: mometasone-formoterol   2 puffs, Inhalation, 2 Times Daily - RT, Rinse and spit after using.      esomeprazole 40 MG capsule  Commonly known as: nexIUM   40 mg, Oral, 2 Times Daily      furosemide 40 MG tablet  Commonly known as: LASIX   40 mg, Oral, 2 Times Daily      HYDROcodone-acetaminophen  MG per tablet  Commonly known as: NORCO   0.5 tablets, Oral, Every 4 Hours PRN      Leg Cramps tablet   1 tablet, Oral, Daily      letrozole 2.5 MG tablet  Commonly known as: FEMARA   2.5 mg, Oral, Daily      olmesartan 20 MG tablet  Commonly known as: BENICAR   20 mg, Oral, Daily      phenazopyridine 200 MG tablet  Commonly known as: PYRIDIUM   200 mg, Oral, 3 Times Daily PRN      potassium chloride ER 20 MEQ tablet controlled-release ER tablet  Commonly known as: K-TAB   40 mEq, Oral, Daily      pravastatin 40 MG tablet  Commonly known as: PRAVACHOL   40 mg, Oral, Nightly      saccharomyces boulardii 250 MG capsule  Commonly known as: Florastor   250 mg, Oral, 2 Times Daily      sodium bicarbonate 650 MG tablet   650 mg, Oral, 2 Times Daily      Syringe 25G X 1\" 3 ML misc   1 each, Does not apply, Daily      TYLENOL ARTHRITIS PAIN PO   1 tablet, Oral, Daily PRN      vitamin D 1.25 MG (89129 UT) capsule capsule  Commonly known as: ERGOCALCIFEROL   50,000 Units, Oral, Weekly, Thursdays             Stop These Medications      diphenhydrAMINE 25 mg capsule  Commonly known as: BENADRYL          Discharge Diet:   Diet " Instructions       Diet: Cardiac Diets; Healthy Heart (2-3 Na+); Regular (IDDSI 7); Thin (IDDSI 0)      Discharge Diet: Cardiac Diets    Cardiac Diet: Healthy Heart (2-3 Na+)    Texture: Regular (IDDSI 7)    Fluid Consistency: Thin (IDDSI 0)            Activity at Discharge:   Activity Instructions       Up WIth Assist              Follow-up Appointments:   Future Appointments   Date Time Provider Department Center   5/24/2024 10:00 AM CHAIR 19  [facility] OP INFU ONC  [facility] OIONC PAD   7/2/2024  1:30 PM  [facility] CANCER CTR LAB  [facility] CCLAB [facility]   7/2/2024  2:00 PM Drake Stafford MD [facility] ONC [facility]   7/3/2024  1:30 PM Milady Garnica APRN [facility] N [facility]   7/24/2024  2:00 PM Shaheen Akbar DO [facility] FM [facility] P[facility]   10/31/2024 10:30 AM PAD BIC MAMM 2  [facility] MA BI PAD   11/11/2024 11:30 AM Quynh Rosario MD [facility] GSUR [facility] P[facility]   4/25/2025 10:45 AM Jesus Guzman MD [facility] PAD       Test Results Pending at Discharge: Urine culture pending finality, follow to completion    Electronically signed by VINCENT Lawson, 05/22/24, 10:17 CDT.    Time: 48 minutes.

## 2024-05-22 NOTE — THERAPY TREATMENT NOTE
Acute Care - Physical Therapy Treatment Note   [place]     Patient Name: Dago Nunez  : 1940  MRN: [id number]  Today's Date: 2024      Visit Dx:     ICD-10-CM ICD-9-CM   1. Cerebrovascular accident (CVA), unspecified mechanism  I63.9 434.91   2. Acute UTI (urinary tract infection)  N39.0 599.0   3. Acute renal failure superimposed on chronic kidney disease, unspecified acute renal failure type, unspecified CKD stage  N17.9 584.9    N18.9 585.9   4. Dysphagia, unspecified type  R13.10 787.20   5. Impaired mobility [Z74.09]  Z74.09 799.89   6. Cognitive and behavioral changes [R41.89, R46.89]  R41.89 799.59    R46.89 312.9   7. Transient ischemic attack (TIA)  G45.9 435.9     Patient Active Problem List   Diagnosis    Meatal stenosis    Retention of urine    Type 2 diabetes mellitus, without long-term current use of insulin    Essential hypertension    Grief reaction    Vulvar intraepithelial neoplasia (MOODY) grade 3    Chronic midline low back pain without sciatica    Bilateral lower extremity edema    History of urethral stricture    Epidermal cyst of neck    Normocytic anemia    Neck abscess    Mixed hyperlipidemia    Gastroesophageal reflux disease without esophagitis    Anxiety    Iron deficiency and chemotherapy induced anemia    Stage 3 chronic kidney disease    Anemia    Screening for breast cancer    Lower extremity edema    Vulvar cancer, carcinoma    Former smoker    Secondary malignancy of inguinal lymph nodes    Febrile illness    Chemotherapy-induced thrombocytopenia    Hyponatremia    Hypokalemia    Neutropenic fever    Moderate malnutrition    Antineoplastic chemotherapy induced anemia    History of radiation therapy    Encounter for care related to Port-a-Cath    Malignant neoplasm of upper-outer quadrant of left breast in female, estrogen receptor positive    Encounter for care related to vascular access port    Angiodysplasia    Bronchitis    Obesity (BMI 30-39.9)    Wheezing     Cough    Post-COVID-19 condition    Radiation induced proctitis    Rectal bleeding    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    CVA (cerebral vascular accident)    Transient ischemic attack (TIA)     Past Medical History:   Diagnosis Date    Anemia in stage 3 chronic kidney disease 11/11/2019    Arthritis     Breast cancer 09/14/2021    Left Mastectomy w/ sentinel node Bx    Bronchitis     Cellulitis     ROSA LEGS    GERD (gastroesophageal reflux disease)     Hyperlipidemia     Hypertension     Kyphosis     Osteoporosis     Personal history of COVID-19 05/2022    Scoliosis     Self-catheterizes urinary bladder     10FR SIZED CATH    Stage 3 chronic kidney disease 11/11/2019    Type 2 diabetes mellitus     Urethral meatal stenosis 09/2022    w/ urine retention    Vulva cancer     Vulvar intraepithelial neoplasia (MOODY) grade 3      Past Surgical History:   Procedure Laterality Date    APPENDECTOMY      BENJAMIN PROCEDURE      No evidence of reflux disease while on Nexium 40mg daily-See report    BREAST BIOPSY      BREAST CYST ASPIRATION Left     BREAST LUMPECTOMY      COLONOSCOPY  01/12/2011    Diverticulosis sigmoid colon; The examination was otherwise normal; Repeat 10 years    COLONOSCOPY  11/03/2003    Dr. Laguerre-Normal colonoscopy; Normal terminal ileum; Repeat 5 years    COLONOSCOPY N/A 11/05/2021    Petechia(e) in the rectum, in the recto-sigmoid colon and in the distal sigmoid colon; No specimens collected; No plans to repeat colonoscopy due to advance age and/or medical problems    CYSTOSCOPY N/A 09/20/2022    Procedure: CYSTOSCOPY WITH URETHRAL DILATATION;  Surgeon: Shaheen Conway MD;  Location: [facility];  Service: Urology;  Laterality: N/A;    ENDOSCOPY  07/01/2014    Normal esophagus; Normal stomach; Normal examined duodenum; BRAVO pH capsule deployed;     ENDOSCOPY  08/14/2013    Mild gastritis-biopsies for H.Pylor obtained    ENDOSCOPY  02/16/2005    Dr. LaguerreQiqmq-Pqtnxzeou-tbvwrcak    ENDOSCOPY   10/21/2003    Dr. Laguerre-Stage 1 reflux esophagitis    ENDOSCOPY N/A 11/05/2021    Small HH; A single gastroesophageal junction polyp; Normal stomach; A single non-bleeding angiodysplastic lesion in the duodenum    HYSTERECTOMY      MASTECTOMY W/ SENTINEL NODE BIOPSY Left 09/14/2021    Procedure: LEFT PARTIAL MASTECTOMY WITH MAGSEED AND LEFT SENTINEL LYMPH NODE BIOPSY MAGTRACE;  Surgeon: Quynh Rosario MD;  Location:  [facility];  Service: General;  Laterality: Left;    TONSILLECTOMY      VAGINA SURGERY      Laser surgery X 2    VENOUS ACCESS DEVICE (PORT) INSERTION N/A 09/29/2020    Procedure: SINGLE LUMEN PORT - A- CATH PLACEMENT WITH FLUOROSCOPY;  Surgeon: Quynh Rosario MD;  Location:  [facility];  Service: General;  Laterality: N/A;     PT Assessment (Last 12 Hours)       PT Evaluation and Treatment       Row Name 05/22/24 1053 05/22/24 0922       Physical Therapy Time and Intention    Subjective Information no complaints  -WK --    Document Type therapy note (daily note)  -WK --    Mode of Treatment physical therapy  -WK --    Session Not Performed -- patient/family declined treatment  -WK    Comment -- Pt working with [facility] and stated she doesn't want a treatment this morning. Check back this afternoon if she hasn't discharged.  -WK      Row Name 05/22/24 1053          General Information    Existing Precautions/Restrictions fall  -WK       Row Name 05/22/24 1053          Bed Mobility    Comment, (Bed Mobility) in chair  -WK       Row Name 05/22/24 1053          Sit-Stand Transfer    Sit-Stand [place] (Transfers) verbal cues;contact guard  -WK     Assistive Device (Sit-Stand Transfers) walker, front-wheeled  -WK       Row Name 05/22/24 1053          Stand-Sit Transfer    Stand-Sit [place] (Transfers) verbal cues;contact guard  -WK     Assistive Device (Stand-Sit Transfers) walker, front-wheeled  -WK       Row Name 05/22/24 1053          Toilet Transfer    [place] Level (Toilet Transfer)  verbal cues;contact guard  -WK       Row Name 05/22/24 1053          Gait/Stairs (Locomotion)    [place] Level (Gait) verbal cues;contact guard  -WK     Assistive Device (Gait) walker, front-wheeled  -WK     Distance in Feet (Gait) 80  -WK     Deviations/Abnormal Patterns (Gait) gait speed decreased  -WK     Bilateral Gait Deviations forward flexed posture  -WK     Comment, (Gait/Stairs) cues for posture and to stay inside RW.  -WK       Row Name 05/22/24 1053          Plan of Care Review    Plan of Care Reviewed With patient  -WK     Progress improving  -WK     Outcome Evaluation Sit to stand CGA. Amb total of 80' CGA with RW. Pt required cues for posture and to stay inside RW. Pt educated on safety.  -WK       Row Name 05/22/24 1053          Positioning and Restraints    Pre-Treatment Position sitting in chair/recliner  -WK     Post Treatment Position chair  -WK     In Chair sitting;call light within reach;encouraged to call for assist;with family/caregiver;with nsg  -WK               User Key  (r) = Recorded By, (t) = Taken By, (c) = Cosigned By      Initials Name Provider Type    WK Marielos Briseno, PTA Physical Therapist Assistant                    Physical Therapy Education       Title: PT OT SLP Therapies (In Progress)       Topic: Physical Therapy (In Progress)       Point: Mobility training (In Progress)       Learning Progress Summary             Patient Acceptance, E, NR by MS at 5/20/2024 1039    Comment: role of PT in her care                         Point: Home exercise program (Not Started)       Learner Progress:  Not documented in this visit.              Point: Body mechanics (Not Started)       Learner Progress:  Not documented in this visit.              Point: Precautions (Not Started)       Learner Progress:  Not documented in this visit.                              User Key       Initials Effective Dates Name Provider Type Discipline    MS 07/11/23 -  Avril Johnson, PT, DPT, NCS  Physical Therapist PT                  PT Recommendation and Plan     Plan of Care Reviewed With: patient  Progress: improving  Outcome Evaluation: Sit to stand CGA. Amb total of 80' CGA with RW. Pt required cues for posture and to stay inside RW. Pt educated on safety.   Outcome Measures       Row Name 05/22/24 1053 05/21/24 1400          How much help from another person do you currently need...    Turning from your back to your side while in flat bed without using bedrails? 3  -WK 3  -WK     Moving from lying on back to sitting on the side of a flat bed without bedrails? 3  -WK 3  -WK     Moving to and from a bed to a chair (including a wheelchair)? 3  -WK 3  -WK     Standing up from a chair using your arms (e.g., wheelchair, bedside chair)? 3  -WK 3  -WK     Climbing 3-5 steps with a railing? 1  -WK 1  -WK     To walk in hospital room? 3  -WK 3  -WK     AM-PAC 6 Clicks Score (PT) 16  -WK 16  -WK     Highest Level of Mobility Goal 5 --> Static standing  -WK 5 --> Static standing  -WK        Functional Assessment    Outcome Measure Options AM-PAC 6 Clicks Basic Mobility (PT)  -WK AM-PAC 6 Clicks Basic Mobility (PT)  -WK               User Key  (r) = Recorded By, (t) = Taken By, (c) = Cosigned By      Initials Name Provider Type    WK Marielos Briseno PTA Physical Therapist Assistant                     Time Calculation:    PT Charges       Row Name 05/22/24 1135             Time Calculation    Start Time 1053  -WK      Stop Time 1119  -WK      Time Calculation (min) 26 min  -WK      PT Received On 05/22/24  -WK         Time Calculation- PT    Total Timed Code Minutes- PT 26 minute(s)  -WK         Timed Charges    54914 - Gait Training Minutes  26  -WK         Total Minutes    Timed Charges Total Minutes 26  -WK       Total Minutes 26  -WK                User Key  (r) = Recorded By, (t) = Taken By, (c) = Cosigned By      Initials Name Provider Type    WK Marielos Briseno PTA Physical Therapist Assistant                   Therapy Charges for Today       Code Description Service Date Service Provider Modifiers Qty    61258335298 HC PT THER PROC EA 15 MIN 5/21/2024 Marielos Briseno, PTA GP 1    69320743833 HC GAIT TRAINING EA 15 MIN 5/21/2024 Marielos Briseno, SONIDO GP 2    46416095719 HC GAIT TRAINING EA 15 MIN 5/22/2024 Marielos Briseno, PTA GP 2            PT G-Codes  Outcome Measure Options: AM-PAC 6 Clicks Basic Mobility (PT)  AM-PAC 6 Clicks Score (PT): 16  AM-PAC 6 Clicks Score (OT): 18  Modified Pasha Scale: 4 - Moderately severe disability.  Unable to walk without assistance, and unable to attend to own bodily needs without assistance.    Marielos Briseno PTA  5/22/2024

## 2024-05-22 NOTE — PLAN OF CARE
Goal Outcome Evaluation:  Plan of Care Reviewed With: patient        Progress: improving  Outcome Evaluation: A&Ox4. VSS. NSR on tele. RA. NIH 2. No neuro changes. Medicated for c/o chronic back pain with relief noted. Con't IV abx per order. Voiding without difficulty. Up x1 with walker. Call light within reach.

## 2024-05-22 NOTE — THERAPY DISCHARGE NOTE
Acute Care - Physical Therapy Discharge Summary  [facility]       Patient Name: Dago Nunez  : 1940  MRN: [id number]    Today's Date: 2024                 Admit Date: 2024      PT Recommendation and Plan    Visit Dx:    ICD-10-CM ICD-9-CM   1. Cerebrovascular accident (CVA), unspecified mechanism  I63.9 434.91   2. Acute UTI (urinary tract infection)  N39.0 599.0   3. Acute renal failure superimposed on chronic kidney disease, unspecified acute renal failure type, unspecified CKD stage  N17.9 584.9    N18.9 585.9   4. Dysphagia, unspecified type  R13.10 787.20   5. Impaired mobility [Z74.09]  Z74.09 799.89   6. Cognitive and behavioral changes [R41.89, R46.89]  R41.89 799.59    R46.89 312.9   7. Transient ischemic attack (TIA)  G45.9 435.9        Outcome Measures       Row Name 24 1100 24 1053 24 1400       How much help from another person do you currently need...    Turning from your back to your side while in flat bed without using bedrails? -- 3  -WK 3  -WK    Moving from lying on back to sitting on the side of a flat bed without bedrails? -- 3  -WK 3  -WK    Moving to and from a bed to a chair (including a wheelchair)? -- 3  -WK 3  -WK    Standing up from a chair using your arms (e.g., wheelchair, bedside chair)? -- 3  -WK 3  -WK    Climbing 3-5 steps with a railing? -- 1  -WK 1  -WK    To walk in hospital room? -- 3  -WK 3  -WK    AM-PAC 6 Clicks Score (PT) -- 16  -WK 16  -WK    Highest Level of Mobility Goal -- 5 --> Static standing  -WK 5 --> Static standing  -WK       How much help from another is currently needed...    Putting on and taking off regular lower body clothing? 3  -TS -- --    Bathing (including washing, rinsing, and drying) 3  -TS -- --    Toileting (which includes using toilet bed pan or urinal) 3  -TS -- --    Putting on and taking off regular upper body clothing 3  -TS -- --    Taking care of personal grooming (such as brushing teeth) 4  -TS -- --     Eating meals 4  -TS -- --    AM-PAC 6 Clicks Score (OT) 20  -TS -- --       Functional Assessment    Outcome Measure Options -- AM-PAC 6 Clicks Basic Mobility (PT)  -WK AM-PAC 6 Clicks Basic Mobility (PT)  -WK              User Key  (r) = Recorded By, (t) = Taken By, (c) = Cosigned By      Initials Name Provider Type     Lizzeth Lerma COTA Occupational Therapist Assistant    WK Marielos Briseno PTA Physical Therapist Assistant                     PT Charges       Row Name 05/22/24 1135             Time Calculation    Start Time 1053  -WK      Stop Time 1119  -WK      Time Calculation (min) 26 min  -WK      PT Received On 05/22/24  -WK         Time Calculation- PT    Total Timed Code Minutes- PT 26 minute(s)  -WK         Timed Charges    56372 - Gait Training Minutes  26  -WK         Total Minutes    Timed Charges Total Minutes 26  -WK       Total Minutes 26  -WK                User Key  (r) = Recorded By, (t) = Taken By, (c) = Cosigned By      Initials Name Provider Type    WK Marielos Briseno PTA Physical Therapist Assistant                     PT Rehab Goals       Row Name 05/22/24 1300 05/22/24 1144          Bed Mobility Goal 1 (PT)    Activity/Assistive Device (Bed Mobility Goal 1, PT) bed mobility activities, all  -AB bed mobility activities, all  -WK     [place] Level/Cues Needed (Bed Mobility Goal 1, PT) modified independence  -AB modified independence  -WK     Time Frame (Bed Mobility Goal 1, PT) long term goal (LTG);by discharge  -AB long term goal (LTG);by discharge  -WK     Progress/Outcomes (Bed Mobility Goal 1, PT) goal not met  -AB goal not met  -WK        Transfer Goal 1 (PT)    Activity/Assistive Device (Transfer Goal 1, PT) sit-to-stand/stand-to-sit;bed-to-chair/chair-to-bed;walker, rolling  -AB sit-to-stand/stand-to-sit;bed-to-chair/chair-to-bed;walker, rolling  -WK     [place] Level/Cues Needed (Transfer Goal 1, PT) modified independence  -AB modified independence  -WK      Time Frame (Transfer Goal 1, PT) long term goal (LTG);by discharge  -AB long term goal (LTG);by discharge  -WK     Progress/Outcome (Transfer Goal 1, PT) goal not met  -AB goal not met  -WK        Gait Training Goal 1 (PT)    Activity/Assistive Device (Gait Training Goal 1, PT) gait (walking locomotion);assistive device use;decrease fall risk;increase endurance/gait distance;improve balance and speed;walker, rolling  -AB gait (walking locomotion);assistive device use;decrease fall risk;increase endurance/gait distance;improve balance and speed;walker, rolling  -WK     [place] Level (Gait Training Goal 1, PT) contact guard required  -AB contact guard required  -WK     Distance (Gait Training Goal 1, PT) 50ft  -AB 50ft  -WK     Time Frame (Gait Training Goal 1, PT) long term goal (LTG);by discharge  -AB long term goal (LTG);by discharge  -WK     Progress/Outcome (Gait Training Goal 1, PT) goal met  -AB goal met  -WK               User Key  (r) = Recorded By, (t) = Taken By, (c) = Cosigned By      Initials Name Provider Type Discipline    Kaylee Patel, PTA Physical Therapist Assistant PT    Marielos Victoria PTA Physical Therapist Assistant PT                        PT Discharge Summary  Anticipated Discharge Disposition (PT): inpatient rehabilitation facility  Reason for Discharge: Discharge from facility  Outcomes Achieved: Refer to plan of care for updates on goals achieved  Discharge Destination: Home with home health      Kaylee Edmond PTA   5/22/2024

## 2024-05-22 NOTE — PLAN OF CARE
Goal Outcome Evaluation:  Plan of Care Reviewed With: patient        Progress: no change     Pt alert and oriented x4. VSS. No c/o pain. BUNCH.PPP. SCDs and ASA for VTE. , satting at 97% on room air. Tolerating prescribed diet. Skin intact. Voiding via bathroom. Up x 1 with walker. Bed alarm set. Call light within reach. Safety maintained. Plan of care ongoing. No neuro changes this shift.

## 2024-05-22 NOTE — TELEPHONE ENCOUNTER
Caller: Dago Nunez    Relationship: Self    Best call back number: [phone]     Requested Prescriptions:   Requested Prescriptions     Pending Prescriptions Disp Refills    pravastatin (PRAVACHOL) 40 MG tablet 90 tablet      Sig: Take 1 tablet by mouth Every Night.        Pharmacy where request should be sent: [facility] - [place], [place] - [address] - [phone]  - [phone] FX     Last office visit with prescribing clinician: 4/24/2024   Last telemedicine visit with prescribing clinician: Visit date not found   Next office visit with prescribing clinician: 7/24/2024     Additional details provided by patient: COMPLETELY OUT     Does the patient have less than a 3 day supply:  [x] Yes  [] No    Would you like a call back once the refill request has been completed: [x] Yes [] No    If the office needs to give you a call back, can they leave a voicemail: [x] Yes [] No    Michael Bacon III, RegSched Rep   05/22/24 14:13 CDT

## 2024-05-22 NOTE — OUTREACH NOTE
Prep Survey      Flowsheet Row Responses   [facility] facility patient discharged from? [place]   Is LACE score < 7 ? No   Eligibility [facility]   Date of Admission 05/19/24   Date of Discharge 05/22/24   Discharge Disposition Home-Health Care S[facility]   Discharge diagnosis TIA   Does the patient have one of the following disease processes/diagnoses(primary or secondary)? Stroke   Does the patient have Home health ordered? No   Is there a DME ordered? No   Prep survey completed? Yes            GRANT A - Registered Nurse

## 2024-05-22 NOTE — PLAN OF CARE
Goal Outcome Evaluation:  Plan of Care Reviewed With: patient        Progress: improving  Outcome Evaluation: Sit to stand CGA. Amb total of 80' CGA with RW. Pt required cues for posture and to stay inside RW. Pt educated on safety.

## 2024-05-23 ENCOUNTER — TRANSITIONAL CARE MANAGEMENT TELEPHONE ENCOUNTER (OUTPATIENT)
Dept: CALL CENTER | Facility: HOSPITAL | Age: 84
End: 2024-05-23
Payer: MEDICARE

## 2024-05-23 ENCOUNTER — OFFICE VISIT (OUTPATIENT)
Dept: FAMILY MEDICINE CLINIC | Facility: CLINIC | Age: 84
End: 2024-05-23
Payer: MEDICARE

## 2024-05-23 VITALS
HEART RATE: 78 BPM | RESPIRATION RATE: 20 BRPM | SYSTOLIC BLOOD PRESSURE: 122 MMHG | HEIGHT: 56 IN | DIASTOLIC BLOOD PRESSURE: 78 MMHG | BODY MASS INDEX: 37.75 KG/M2 | OXYGEN SATURATION: 97 % | TEMPERATURE: 97.7 F | WEIGHT: 167.8 LBS

## 2024-05-23 DIAGNOSIS — I72.0 CAROTID ARTERY ANEURYSM: ICD-10-CM

## 2024-05-23 DIAGNOSIS — Z09 HOSPITAL DISCHARGE FOLLOW-UP: Primary | ICD-10-CM

## 2024-05-23 DIAGNOSIS — D50.9 IRON DEFICIENCY ANEMIA, UNSPECIFIED IRON DEFICIENCY ANEMIA TYPE: ICD-10-CM

## 2024-05-23 LAB
QT INTERVAL: 410 MS
QTC INTERVAL: 402 MS

## 2024-05-23 NOTE — OUTREACH NOTE
Call Center TCM Note      Flowsheet Row Responses   [facility] patient discharged from? [place]   Does the patient have one of the following disease processes/diagnoses(primary or secondary)? Stroke   TCM attempt successful? No   Unsuccessful attempts Attempt 1            Bibi Pantoja RN    5/23/2024, 14:00 CDT

## 2024-05-23 NOTE — OUTREACH NOTE
Call Center TCM Note      Flowsheet Row Responses   [facility] patient discharged from? [place]   Does the patient have one of the following disease processes/diagnoses(primary or secondary)? Stroke   TCM attempt successful? No   Unsuccessful attempts Attempt 2   Call Status Left message            Bibi Pantoja RN    5/23/2024, 15:20 CDT

## 2024-05-23 NOTE — PROGRESS NOTES
VINCENT Cool  [facility]   Family Medicine  [address]  [place] [address]  Phone: [phone]  Fax: [phone]         Chief Complaint:  Chief Complaint   Patient presents with    Hospital Follow Up Visit        History:  Dago Nunez is a 83 y.o. female that is an established patient. She  is here for evaluation of the above complaint.    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    She is here with her son and granddaughter. She was discharged from the hospital yesterday. She was discharged on cefdinir for UTI. She is also taking Plavix for 3 weeks, will continue on ASA. It was recommenced she see a neuro vascular surgeon at [facility] for consult (Dr. Rene Rivero).    CBC on discharge showed Hgb of 7.7,down from 9.4 on admit. She denies melena, black tarry stools, BRBPR, hematemesis. She has iron infusion scheduled tomorrow. She has f/u with hematology, neurology, pulmonology.       Transitional Care Management Certification  I certify that the following are true:  Communication was made within 2 business days of discharge.  Complexity of Medical Decision Making is moderate.  Face to face visit occurred within 2 days.    *Note: 46333 is for high complexity patients with a face to face visit within 7 days of discharge.  74398 is for high complexity patients with a face to face on days 8-14 post discharge or medium complexity with face to face visit within 14 days post discharge.            ROS:  Review of Systems      reports that she has quit smoking. Her smoking use included cigarettes. She has a 0.8 pack-year smoking history. She has been exposed to tobacco smoke. She has never used smokeless tobacco. She  "reports that she does not currently use alcohol. She reports that she does not use drugs.    Current Outpatient Medications   Medication Instructions    Acetaminophen (TYLENOL ARTHRITIS PAIN PO) 1 tablet, Oral, Daily PRN    albuterol sulfate  (90 Base) MCG/ACT inhaler 2 puffs, Inhalation, 4 Times Daily - RT    aspirin 81 mg, Oral, Daily    bisoprolol-hydrochlorothiazide (ZIAC) 5-6.25 MG per tablet 1 tablet, Oral, Daily    cefdinir (OMNICEF) 300 mg, Oral, Daily    cetirizine (ZYRTEC) 10 mg, Oral, Daily    citalopram (CELEXA) 20 mg, Oral, Daily    clopidogrel (PLAVIX) 75 mg, Oral, Daily    cyanocobalamin 1,000 mcg, Intramuscular, Every 30 Days    diphenoxylate-atropine (LOMOTIL) 2.5-0.025 MG per tablet 2 tablets, Oral, 4 Times Daily PRN    DULERA 100-5 MCG/ACT inhaler 2 puffs, Inhalation, 2 Times Daily - RT, Rinse and spit after using.    esomeprazole (NEXIUM) 40 mg, Oral, 2 Times Daily    furosemide (LASIX) 40 mg, Oral, 2 Times Daily    Homeopathic Products (LEG CRAMPS) tablet 1 tablet, Oral, Daily    HYDROcodone-acetaminophen (NORCO)  MG per tablet 0.5 tablets, Oral, Every 4 Hours PRN    letrozole (FEMARA) 2.5 mg, Oral, Daily    olmesartan (BENICAR) 20 mg, Oral, Daily    phenazopyridine (PYRIDIUM) 200 mg, Oral, 3 Times Daily PRN    potassium chloride ER (K-TAB) 20 MEQ tablet controlled-release ER tablet 40 mEq, Oral, Daily    pravastatin (PRAVACHOL) 40 mg, Oral, Nightly    saccharomyces boulardii (FLORASTOR) 250 mg, Oral, 2 Times Daily    sodium bicarbonate 650 mg, Oral, 2 Times Daily    Syringe 25G X 1\" 3 ML misc 1 each, Does not apply, Daily    vitamin D (ERGOCALCIFEROL) 50,000 Units, Oral, Weekly, Thursdays       OBJECTIVE:  /78   Pulse 78   Temp 97.7 °F (36.5 °C) (Temporal)   Resp 20   Ht 142.2 cm (56\")   Wt 76.1 kg (167 lb 12.8 oz)   SpO2 97%   BMI 37.62 kg/m²    Physical Exam    Procedures    Assessment/Plan:     Diagnoses and all orders for this visit:    1. Hospital discharge " follow-up (Primary)    2. Carotid artery aneurysm  -     Ambulatory Referral to Vascular Surgery    3. Iron deficiency anemia, unspecified iron deficiency anemia type             An After Visit Summary was printed and given to the patient at discharge.  No follow-ups on file.       Patient Instructions   Stay on Plavix until June 13, continue on Aspirin 81 mg  Iron infusion tomorrow  Cefdinir as directed      Discussion:     Follow up with Dr. Akbar as scheduled    I spent 32 minutes caring for Syndal on this date of service. This time includes time spent by me in the following activities: preparing for the visit, reviewing tests, obtaining and/or reviewing a separately obtained history, performing a medically appropriate examination and/or evaluation, counseling and educating the patient/family/caregiver, ordering medications, tests, or procedures, documenting information in the medical record, and independently interpreting results and communicating that information with the patient/family/caregiver     Ashley KAUR 5/23/2024   Electronically signed.

## 2024-05-23 NOTE — PATIENT INSTRUCTIONS
Stay on Plavix until June 13, continue on Aspirin 81 mg  Iron infusion tomorrow  Cefdinir as directed

## 2024-05-24 ENCOUNTER — INFUSION (OUTPATIENT)
Dept: ONCOLOGY | Facility: HOSPITAL | Age: 84
End: 2024-05-24
Payer: MEDICARE

## 2024-05-24 ENCOUNTER — TRANSITIONAL CARE MANAGEMENT TELEPHONE ENCOUNTER (OUTPATIENT)
Dept: CALL CENTER | Facility: HOSPITAL | Age: 84
End: 2024-05-24
Payer: MEDICARE

## 2024-05-24 VITALS
RESPIRATION RATE: 18 BRPM | DIASTOLIC BLOOD PRESSURE: 48 MMHG | BODY MASS INDEX: 38.24 KG/M2 | SYSTOLIC BLOOD PRESSURE: 141 MMHG | WEIGHT: 170 LBS | OXYGEN SATURATION: 97 % | HEART RATE: 62 BPM | HEIGHT: 56 IN | TEMPERATURE: 97.2 F

## 2024-05-24 DIAGNOSIS — D50.9 IRON DEFICIENCY ANEMIA, UNSPECIFIED IRON DEFICIENCY ANEMIA TYPE: Primary | ICD-10-CM

## 2024-05-24 PROCEDURE — G0463 HOSPITAL OUTPT CLINIC VISIT: HCPCS

## 2024-05-24 RX ORDER — FAMOTIDINE 10 MG/ML
20 INJECTION, SOLUTION INTRAVENOUS AS NEEDED
Status: DISCONTINUED | OUTPATIENT
Start: 2024-05-24 | End: 2024-05-24 | Stop reason: HOSPADM

## 2024-05-24 RX ORDER — SODIUM CHLORIDE 9 MG/ML
250 INJECTION, SOLUTION INTRAVENOUS ONCE
Status: DISCONTINUED | OUTPATIENT
Start: 2024-05-24 | End: 2024-05-24 | Stop reason: HOSPADM

## 2024-05-24 RX ORDER — DIPHENHYDRAMINE HYDROCHLORIDE 50 MG/ML
50 INJECTION INTRAMUSCULAR; INTRAVENOUS AS NEEDED
Status: DISCONTINUED | OUTPATIENT
Start: 2024-05-24 | End: 2024-05-24 | Stop reason: HOSPADM

## 2024-05-24 RX ORDER — ACETAMINOPHEN 325 MG/1
650 TABLET ORAL ONCE
Status: DISCONTINUED | OUTPATIENT
Start: 2024-05-24 | End: 2024-05-24 | Stop reason: HOSPADM

## 2024-05-24 NOTE — OUTREACH NOTE
Call Center TCM Note      Flowsheet Row Responses   [facility] patient discharged from? [facility]   Does the patient have one of the following disease processes/diagnoses(primary or secondary)? Stroke   TCM attempt successful? No   Unsuccessful attempts Attempt 3            Suzanna Mak RN    5/24/2024, 11:52 CDT

## 2024-05-24 NOTE — PROGRESS NOTES
Message sent to Dr. Stafford office regarding pt ferritin 624. Per Dr. Stafford hold infusion and follow up with pcp regarding edema in [facility].

## 2024-06-04 RX ORDER — CLOPIDOGREL BISULFATE 75 MG/1
75 TABLET ORAL DAILY
Qty: 20 TABLET | Refills: 0 | Status: SHIPPED | OUTPATIENT
Start: 2024-06-04

## 2024-06-13 ENCOUNTER — TELEPHONE (OUTPATIENT)
Dept: VASCULAR SURGERY | Facility: CLINIC | Age: 84
End: 2024-06-13
Payer: MEDICARE

## 2024-06-13 DIAGNOSIS — I65.23 BILATERAL CAROTID ARTERY STENOSIS: Primary | ICD-10-CM

## 2024-06-13 NOTE — TELEPHONE ENCOUNTER
Spoke with patient's  to inform of new testing and appointment with [facility]. Testing is set for 6/21/24 0745 @ [facility], then 6/25/24 w/ [facility]. Told  that patient should be NPO after midnight for testing on 6/21/24.  voiced understanding.

## 2024-06-14 ENCOUNTER — TELEPHONE (OUTPATIENT)
Dept: ONCOLOGY | Facility: CLINIC | Age: 84
End: 2024-06-14
Payer: MEDICARE

## 2024-06-14 ENCOUNTER — INFUSION (OUTPATIENT)
Dept: ONCOLOGY | Facility: HOSPITAL | Age: 84
End: 2024-06-14
Payer: MEDICARE

## 2024-06-14 ENCOUNTER — LAB (OUTPATIENT)
Dept: LAB | Facility: HOSPITAL | Age: 84
End: 2024-06-14
Payer: MEDICARE

## 2024-06-14 VITALS
OXYGEN SATURATION: 97 % | DIASTOLIC BLOOD PRESSURE: 49 MMHG | HEART RATE: 55 BPM | TEMPERATURE: 97.1 F | RESPIRATION RATE: 18 BRPM | SYSTOLIC BLOOD PRESSURE: 132 MMHG

## 2024-06-14 DIAGNOSIS — D64.81 ANTINEOPLASTIC CHEMOTHERAPY INDUCED ANEMIA: ICD-10-CM

## 2024-06-14 DIAGNOSIS — C51.9 VULVAR CANCER, CARCINOMA: Primary | ICD-10-CM

## 2024-06-14 DIAGNOSIS — D64.9 ANEMIA, UNSPECIFIED TYPE: ICD-10-CM

## 2024-06-14 DIAGNOSIS — T38.7X5A OSTEOPOROSIS DUE TO ANDROGEN THERAPY: ICD-10-CM

## 2024-06-14 DIAGNOSIS — Z45.2 ENCOUNTER FOR CARE RELATED TO VASCULAR ACCESS PORT: ICD-10-CM

## 2024-06-14 DIAGNOSIS — T45.1X5A ANTINEOPLASTIC CHEMOTHERAPY INDUCED ANEMIA: ICD-10-CM

## 2024-06-14 DIAGNOSIS — C77.4 SECONDARY MALIGNANCY OF INGUINAL LYMPH NODES: ICD-10-CM

## 2024-06-14 DIAGNOSIS — M81.8 OSTEOPOROSIS DUE TO ANDROGEN THERAPY: ICD-10-CM

## 2024-06-14 DIAGNOSIS — N18.32 ANEMIA DUE TO STAGE 3B CHRONIC KIDNEY DISEASE: Primary | ICD-10-CM

## 2024-06-14 DIAGNOSIS — D63.1 ANEMIA DUE TO STAGE 3B CHRONIC KIDNEY DISEASE: Primary | ICD-10-CM

## 2024-06-14 DIAGNOSIS — D64.9 ANEMIA, UNSPECIFIED TYPE: Primary | ICD-10-CM

## 2024-06-14 LAB
ABO GROUP BLD: NORMAL
ALBUMIN SERPL-MCNC: 4 G/DL (ref 3.5–5.2)
ALBUMIN/GLOB SERPL: 1.3 G/DL
ALP SERPL-CCNC: 55 U/L (ref 39–117)
ALT SERPL W P-5'-P-CCNC: 9 U/L (ref 1–33)
ANION GAP SERPL CALCULATED.3IONS-SCNC: 15 MMOL/L (ref 5–15)
ANISOCYTOSIS BLD QL: ABNORMAL
AST SERPL-CCNC: 21 U/L (ref 1–32)
BASOPHILS # BLD MANUAL: 0.09 10*3/MM3 (ref 0–0.2)
BASOPHILS NFR BLD MANUAL: 1 % (ref 0–1.5)
BILIRUB SERPL-MCNC: 0.4 MG/DL (ref 0–1.2)
BLD GP AB SCN SERPL QL: NEGATIVE
BUN SERPL-MCNC: 51 MG/DL (ref 8–23)
BUN/CREAT SERPL: 25.1 (ref 7–25)
CALCIUM SPEC-SCNC: 10.2 MG/DL (ref 8.6–10.5)
CHLORIDE SERPL-SCNC: 95 MMOL/L (ref 98–107)
CO2 SERPL-SCNC: 29 MMOL/L (ref 22–29)
CREAT SERPL-MCNC: 2.03 MG/DL (ref 0.57–1)
DEPRECATED RDW RBC AUTO: 66.4 FL (ref 37–54)
EGFRCR SERPLBLD CKD-EPI 2021: 24.2 ML/MIN/1.73
EOSINOPHIL # BLD MANUAL: 0.35 10*3/MM3 (ref 0–0.4)
EOSINOPHIL NFR BLD MANUAL: 4 % (ref 0.3–6.2)
ERYTHROCYTE [DISTWIDTH] IN BLOOD BY AUTOMATED COUNT: 16.5 % (ref 12.3–15.4)
FERRITIN SERPL-MCNC: 567.3 NG/ML (ref 13–150)
GLOBULIN UR ELPH-MCNC: 3.2 GM/DL
GLUCOSE SERPL-MCNC: 184 MG/DL (ref 65–99)
HCT VFR BLD AUTO: 21.6 % (ref 34–46.6)
HGB BLD-MCNC: 6.6 G/DL (ref 12–15.9)
HYPOCHROMIA BLD QL: ABNORMAL
IRON 24H UR-MRATE: 46 MCG/DL (ref 37–145)
IRON SATN MFR SERPL: 14 % (ref 20–50)
LYMPHOCYTES # BLD MANUAL: 0.61 10*3/MM3 (ref 0.7–3.1)
LYMPHOCYTES NFR BLD MANUAL: 1 % (ref 5–12)
MACROCYTES BLD QL SMEAR: ABNORMAL
MCH RBC QN AUTO: 33.8 PG (ref 26.6–33)
MCHC RBC AUTO-ENTMCNC: 30.6 G/DL (ref 31.5–35.7)
MCV RBC AUTO: 110.8 FL (ref 79–97)
MONOCYTES # BLD: 0.09 10*3/MM3 (ref 0.1–0.9)
NEUTROPHILS # BLD AUTO: 7.53 10*3/MM3 (ref 1.7–7)
NEUTROPHILS NFR BLD MANUAL: 83 % (ref 42.7–76)
NEUTS BAND NFR BLD MANUAL: 4 % (ref 0–5)
PLAT MORPH BLD: NORMAL
PLATELET # BLD AUTO: 175 10*3/MM3 (ref 140–450)
PMV BLD AUTO: 10.5 FL (ref 6–12)
POIKILOCYTOSIS BLD QL SMEAR: ABNORMAL
POLYCHROMASIA BLD QL SMEAR: ABNORMAL
POTASSIUM SERPL-SCNC: 3.6 MMOL/L (ref 3.5–5.2)
PROT SERPL-MCNC: 7.2 G/DL (ref 6–8.5)
RBC # BLD AUTO: 1.95 10*6/MM3 (ref 3.77–5.28)
RH BLD: NEGATIVE
SODIUM SERPL-SCNC: 139 MMOL/L (ref 136–145)
T&S EXPIRATION DATE: NORMAL
TIBC SERPL-MCNC: 325 MCG/DL (ref 298–536)
TRANSFERRIN SERPL-MCNC: 218 MG/DL (ref 200–360)
VARIANT LYMPHS NFR BLD MANUAL: 7 % (ref 19.6–45.3)
WBC MORPH BLD: NORMAL
WBC NRBC COR # BLD AUTO: 8.66 10*3/MM3 (ref 3.4–10.8)

## 2024-06-14 PROCEDURE — 82728 ASSAY OF FERRITIN: CPT | Performed by: INTERNAL MEDICINE

## 2024-06-14 PROCEDURE — 86901 BLOOD TYPING SEROLOGIC RH(D): CPT

## 2024-06-14 PROCEDURE — 25010000002 HEPARIN LOCK FLUSH PER 10 UNITS: Performed by: INTERNAL MEDICINE

## 2024-06-14 PROCEDURE — 85007 BL SMEAR W/DIFF WBC COUNT: CPT | Performed by: INTERNAL MEDICINE

## 2024-06-14 PROCEDURE — 96375 TX/PRO/DX INJ NEW DRUG ADDON: CPT

## 2024-06-14 PROCEDURE — 86900 BLOOD TYPING SEROLOGIC ABO: CPT

## 2024-06-14 PROCEDURE — 63710000001 ACETAMINOPHEN 325 MG TABLET: Performed by: INTERNAL MEDICINE

## 2024-06-14 PROCEDURE — 83540 ASSAY OF IRON: CPT | Performed by: INTERNAL MEDICINE

## 2024-06-14 PROCEDURE — 84466 ASSAY OF TRANSFERRIN: CPT | Performed by: INTERNAL MEDICINE

## 2024-06-14 PROCEDURE — 86923 COMPATIBILITY TEST ELECTRIC: CPT

## 2024-06-14 PROCEDURE — 85025 COMPLETE CBC W/AUTO DIFF WBC: CPT | Performed by: INTERNAL MEDICINE

## 2024-06-14 PROCEDURE — P9016 RBC LEUKOCYTES REDUCED: HCPCS

## 2024-06-14 PROCEDURE — 86850 RBC ANTIBODY SCREEN: CPT

## 2024-06-14 PROCEDURE — 25010000002 FUROSEMIDE PER 20 MG: Performed by: INTERNAL MEDICINE

## 2024-06-14 PROCEDURE — 36430 TRANSFUSION BLD/BLD COMPNT: CPT

## 2024-06-14 PROCEDURE — 36415 COLL VENOUS BLD VENIPUNCTURE: CPT | Performed by: INTERNAL MEDICINE

## 2024-06-14 PROCEDURE — 80053 COMPREHEN METABOLIC PANEL: CPT | Performed by: INTERNAL MEDICINE

## 2024-06-14 PROCEDURE — A9270 NON-COVERED ITEM OR SERVICE: HCPCS | Performed by: INTERNAL MEDICINE

## 2024-06-14 RX ORDER — SODIUM CHLORIDE 9 MG/ML
250 INJECTION, SOLUTION INTRAVENOUS AS NEEDED
Status: DISCONTINUED | OUTPATIENT
Start: 2024-06-14 | End: 2024-06-14 | Stop reason: HOSPADM

## 2024-06-14 RX ORDER — ACETAMINOPHEN 325 MG/1
650 TABLET ORAL ONCE
Status: CANCELLED | OUTPATIENT
Start: 2024-06-14 | End: 2024-06-14

## 2024-06-14 RX ORDER — HEPARIN SODIUM (PORCINE) LOCK FLUSH IV SOLN 100 UNIT/ML 100 UNIT/ML
500 SOLUTION INTRAVENOUS AS NEEDED
OUTPATIENT
Start: 2024-06-14

## 2024-06-14 RX ORDER — ACETAMINOPHEN 325 MG/1
650 TABLET ORAL ONCE
Status: COMPLETED | OUTPATIENT
Start: 2024-06-14 | End: 2024-06-14

## 2024-06-14 RX ORDER — FUROSEMIDE 10 MG/ML
20 INJECTION INTRAMUSCULAR; INTRAVENOUS ONCE
Status: COMPLETED | OUTPATIENT
Start: 2024-06-14 | End: 2024-06-14

## 2024-06-14 RX ORDER — HEPARIN SODIUM (PORCINE) LOCK FLUSH IV SOLN 100 UNIT/ML 100 UNIT/ML
500 SOLUTION INTRAVENOUS AS NEEDED
Status: DISCONTINUED | OUTPATIENT
Start: 2024-06-14 | End: 2024-06-14 | Stop reason: HOSPADM

## 2024-06-14 RX ORDER — SODIUM CHLORIDE 9 MG/ML
250 INJECTION, SOLUTION INTRAVENOUS AS NEEDED
Status: CANCELLED | OUTPATIENT
Start: 2024-06-14

## 2024-06-14 RX ORDER — SODIUM CHLORIDE 0.9 % (FLUSH) 0.9 %
10 SYRINGE (ML) INJECTION AS NEEDED
Status: DISCONTINUED | OUTPATIENT
Start: 2024-06-14 | End: 2024-06-14 | Stop reason: HOSPADM

## 2024-06-14 RX ORDER — FUROSEMIDE 10 MG/ML
20 INJECTION INTRAMUSCULAR; INTRAVENOUS ONCE
Status: CANCELLED | OUTPATIENT
Start: 2024-06-14 | End: 2024-06-14

## 2024-06-14 RX ORDER — SODIUM CHLORIDE 0.9 % (FLUSH) 0.9 %
10 SYRINGE (ML) INJECTION AS NEEDED
OUTPATIENT
Start: 2024-06-14

## 2024-06-14 RX ADMIN — HEPARIN 500 UNITS: 100 SYRINGE at 16:03

## 2024-06-14 RX ADMIN — FUROSEMIDE 20 MG: 10 INJECTION, SOLUTION INTRAVENOUS at 15:58

## 2024-06-14 RX ADMIN — Medication 10 ML: at 16:03

## 2024-06-14 RX ADMIN — ACETAMINOPHEN 650 MG: 325 TABLET, FILM COATED ORAL at 13:17

## 2024-06-14 NOTE — TELEPHONE ENCOUNTER
Received call from patient daughter Clara, she calls with concerns that her mother/patient Dago is very weak, having difficulties with ambulation, c/o no energy.  Daughter reports mother was in the hospital recently and scheduled for a Iron Infusion but for unknown reason she did not end of receiving the infusion.     Relayed all information to Dr Stafford, he will order labs to be drawn today and schedule accordingly.     Daughter informed, order placed, apt [facility] 6/14/24

## 2024-06-14 NOTE — TELEPHONE ENCOUNTER
----- Message from Drake Stafford sent at 6/14/2024 12:26 PM CDT -----  Appointment on 6/19/2024 at [facility].  Need to see before IV iron infusion.

## 2024-06-14 NOTE — PROGRESS NOTES
[facility] ON[facility] GROUP HEMATOLOGY & ONCOLOGY  [address]  [facility] [address] [phone]    Patient Name: Dago Nunez  Encounter Date: 06/19/2024  YOB: 1940  Patient Number: [id number]      REASON FOR FOLLOW-UP: Dago Nunez is a pleasant 83-year-old  female who is seen on followup for stage IA receptor positive breast cancer, left, upper outer quadrant.  She is on adjuvant letrozole for the past 32.5 months.  Patient had declined adjuvant radiation.  She is also seen for macrocytic anemia secondary to chronic kidney disease Stage IV, GFR 24.2 mL/minute on 6/14/2024, iron deficiency, and thrombocytopenia. She had PRBC 6/14/202.  She is intolerant to oral iron (nausea, stomach cramps, and constipation).  She had ferric carboxymaltose 25 months ago. She is also seen for vulvar cancer. She is seen 44.5 months post C1D1 Mitomycin C and 5FU with radiation. Her D29 to 32 chemo was cancelled due to hospitalization, poor tolerance, and poor performance status.  She is seen with Joseph, spouse and granddaughter Stephania. History is obtained from the patient. Patient is a reliable historian.            Oncology/Hematology History Overview Note   DIAGNOSTIC ABNORMALITIES: Breast cancer.  Screening mammogram 08/18/2021.New dense 7 mm partially obscured nodule in the upper outer quadrant of the left breast, with associated architectural distortion.  Diagnostic mammogram 08/20/2021.  Small subcentimeter suspicious spiculated mass at 12:00 in the left breast, approximately 3 cm to 4 cm deep from the nipple. This is suspicious for breast carcinoma, ultrasound-guided biopsy is recommended.  Sonogram 08/20/2021.  Small suspicious solid mass at 12:00 in the left breast. 5.5 x 5.1 x 4.7 mm, suspicious for breast carcinoma. This corresponds with the finding on mammograms.  Successful ultrasound-guided left breast biopsy and clip on  2021.  Pathology report 2021.  Left breast at 12:00, core needle biopsies: Invasive carcinoma no special type (ductal).  Histologic grade ([place] histologic score).   Glandular (acinar)/tubular differentiation: Score 1.   Nuclear pleomorphism: Score 2.   Mitotic rate: Score 1.  Overall grade: Grade 1. Maximum tumor diameter is at least 0.8 cm.  Estrogen 87%, progesterone 47% and negative HER-2/gabriela.  Genetic testing was sent.  Patient was seen by Dr. Quynh Rosario 2021.  She is .  She had first delivery at 20.  She took birth control for 1 month.  She took hormone replacement therapy for approximately 5 years.  Family history positive for breast cancer, sister at 78. No ovarian cancer  Chest x-ray 2021.  No acute cardiopulmonary process.  Pathology report 2021.  Left sentinel lymph nodes: Four of 4 lymph nodes are negative for metastatic mammary carcinoma.Comment: Absence of micrometastases is confirmed utilizing immunohistochemical stains for pankeratin.  Left breast, partial mastectomy:  A.  Invasive carcinoma of no special type (ductal), grade 1 (1.1 cm in greatest dimension).  B.  Minor associated low-grade ductal carcinoma in situ component.  C.  The inked surgical margins and skin are negative for malignancy.  D.  Prior biopsy site changes including fat necrosis.  Comment: Microscopically, the tumor is approximately 7 mm from the closest inked (superior) surgical margin.  AJCC stage: pT1c snpN0.      PREVIOUS INTERVENTIONS:  She had undergone left partial mastectomy with left sentinel lymph node biopsy 2021 by Dr. Rosario.  Declined adjuvant radiation.  Adjuvant Femara 10/01/2021 through present.        DIAGNOSTIC ABNORMALITIES: Vulvar cancer  She had developed recurrent vulvar cancer left inguinal node that is PET positive measuring 2.1 cm.  The patient was seen by Dr. Marks in favor definitive chemo radiation.  Pathology report 07/10/2020 periurethral biopsy,  poorly differentiated carcinoma with squamous and papillary features, focally invasive in the subepithelial connective tissue.  PET 07/31/2020 showed intense FDG avid left inguinal node is highly suspicious for local regional metastasis from known vulvar squamous cell carcinoma.  No additional sites of suspicious FDG activity.  MRI 07/31/2020 showed redemonstration of mildly T2 hyperintense mass involving the urethral meatus, similar dimensions to prior exam on 02/18/2020 however there is now a polypoid lesion seen along the anterior aspect of the perineum, possibly contiguous with the urethral mass, concerning for disease progression.  Market interval enlargement of the left inguinal node almost certainly representing disease involvement.  Patient was notified by Dr. Siddiqui 08/19/2020.  Consensus for chemoradiation.  Patient reluctant to take chemotherapy.  Follow-up with Dr. Siddiqui in 4 to 6 weeks after radiation is completed.         PREVIOUS INTERVENTIONS:  Mitomycin C and 5-FU 10/05/2020 through 10/08/2020 with radiation completed 12/10/2020 at [facility].  D29 to d32 of chemo discontinued due to poor performance status.       DIAGNOSTIC ABNORMALITIES:   The patient was seen by Dr. Greg Alberts 01/29/2018 complaining of coughing and wheezing. The patient was given Tussionex. Blood work was ordered. CBC 01/29/2018 revealed a WBC of 7.8, hemoglobin 11.2, hematocrit 35.1, MCV 96.2, platelets 43,000, and ANC 4.47. CMP remarkable for of glucose of 177 and GFR 59 mL/minute.  The patient was seen by VINCENT Jones, 02/02/2018. She was coughing and wheezing. Tussionex did not help. The patient was given prednisone.  The patient was seen by VINCENT Jones, 02/15/2018. She is followed for anemia from iron deficiency. CBC 02/15/2018 revealed a WBC of 12.9, hemoglobin 11.4, hematocrit 34.5, MCV 95, and platelets 68,000.       PREVIOUS INTERVENTIONS:   Ferrous sulfate 325 mg 03/07/2018  "through 05/06/2018.  Not resumed 06/08/2018. \"I misunderstood.\"   Resume 09/05/2018 through 10/03/2018, stopped due to intolerance.  Injectafer 750 mg 10/20/2018 at the [place] office.  Retacrit 10/27/2020 through present.  1 unit packed RBC 6/14/2024.  Injectafer 750 mg 05/18/2021, 01/26/2022 and 05/25/2022 at [facility]           Vulvar cancer, carcinoma    Initial Diagnosis    Vulvar cancer, carcinoma (CMS/HCC)     3/19/2001 Biopsy    Clinical Features: red vaginal lesion with bleeding since 1995. TX with  Carafate douche and Premarin vaginal cream with intermittent improvement.    DIAGNOSIS:    VAGINA, BIOPSY: FOCAL ULCERATION, MARKED ACUTE AND CHRONIC INFLAMMATION,  SPONGIOSIS AND REACTIVE ATYPIA; NEGATIVE FOR DYSPLASIA.     8/17/2001 Procedure    Pap Smear:  DIAGNOSIS:            Atypical keratinized squamous cells, suspicious                           for squamous cell carcinoma.         COMMENTS:             There are numerous atypical keratinized cells                           present in an inflammatory background.  These are                           suspicious for squamous cell carcinoma.  Biopsy                           confirmation is recommended.      10/16/2001 Procedure    Pap Smear:  DIAGNOSIS:            Mild dysplasia       ADDITIONAL FINDINGS:  Marked inflammation                           Excessive hyperkeratosis         BETHESDA SYSTEM:      Low grade squamous intraepithelial lesion    DIAGNOSIS:            Negative, no abnormal cells seen           BETHESDA SYSTEM:      Within normal limits.       ADEQUACY:             Specimen satisfactory for evaluation       SOURCE:               Vagina, diagnostic thin prep PAP     11/7/2001 Biopsy      DIAGNOSIS:    VAGINA AND VULVA, RIGHT POSTERIOR INTROITUS, WIDE LOCAL EXCISION:  VAGINAL INTRAEPITHELIAL NEOPLASIA (VAIN) II-III, FOCALLY EXTENDING TO  VAGINAL MARGIN AT 12-4:00; ALL OTHER MARGINS NEGATIVE FOR  INTRAEPITHELIAL NEOPLASIA. (SEE " COMMENT)    COMMENT: The lesion involves vaginal type epithelium with associated  chronic inflammation and erosion. The adjacent vulvar epithelium is  hyperkeratotic.     3/15/2002 Procedure    Pap Smear:  BETHESDA SYSTEM:      High grade squamous intraepithelial lesion       DESCRIPTOR:           Moderate dysplasia           ADEQUACY:             Specimen satisfactory for evaluation       SOURCE:               Vagina, Thin Prep Pap, Diagnostic     6/13/2003 Procedure         BETHESDA SYSTEM:      High grade squamous intraepithelial lesion       DESCRIPTOR:           Moderate dysplasia       ADDITIONAL FINDINGS:  Inflammation         ADEQUACY:             Specimen satisfactory for evaluation       SOURCE:               Vagina, Thin Prep Pap, Diagnostic    HUMAN PAPILLOMAVIRUS         CASE ACCESSION #:    [id number]        Category                  HPV Types                Patient Results         High/Intermed Risk    16,18,31,33,35,39              Negative                            45,51,52,56,58,59,68      Specimen Source        Cervical / Vaginal Specimen     12/5/2003 Procedure    Gynecological Cytology        CASE ACCESSION #:     [id number]       BETHESDA SYSTEM:      Low grade squamous intraepithelial lesion       DESCRIPTOR:           Mild dysplasia           ADEQUACY:             Specimen satisfactory for evaluation       SOURCE:               Vagina, Thin Prep Pap, Diagnostic     11/29/2011 Biopsy    ADDENDUM FINDINGS:    The high grade MOODY in the right labia majora biopsy was of the usual type.  There was no evidence of differentiated MOODY.      DIAGNOSIS:    1) PERINEUM, BIOPSY: SQUAMOUS EPITHELIUM WITH FLORID HYPERKERATOSIS,  CONSISTENT WITH CHRONIC IRRITATION; NO EVIDENCE OF DYSPLASIA OR MALIGNANCY  (SEE COMMENT).    Comment: Immunohistochemical studies (p16, p53, MIB-1) confirm the rendered  interpretations.    2) VULVA, RIGHT LABIUM MAJORUM, BIOPSY: VULVAR INTRAEPITHELIAL NEOPLASIA II  (MODERATE  DYSPLASIA); (SEE COMMENT).    Comment: Immunohistochemical studies for p16 (nuclear and cytoplasmic  positivity in lower epithelial half) and MIB-1 (nuclear positivity in lower  epithelial half) confirms the diagnosis; p53 is positive only in rare  cells. A GMS histochemical study for fungal forms is negative.  Nevertheless, parakeratosis associated with neutrophils, as was seen  herein, is commonly associated with fungal vulvitis.     7/3/2012 Procedure    Gynecological Cytology    CASE ACCESSION #:                     [id number]  BETHESDA SYSTEM:                      High grade squamous intraepithelial                                        lesion  DESCRIPTOR:                           Mild to moderate dysplasia  ADEQUACY:                             Specimen satisfactory for evaluation  SOURCE:                               Vagina, Thin Prep Pap, Diagnostic  # [id number] REVIEWED:                    1     1/29/2013 Biopsy    DIAGNOSIS:    1) VULVA, RIGHT, ANTERIOR, BIOPSY:  SPONGIOTIC DERMATITIS WITH EXTENSIVE  DERMAL GRANULATION TISSUE, MIXED INFLAMMATION AND FIBROSIS (SEE COMENT).    Comment: Sections show spongiosis, basement membrane thickening, dermal  fibrosis, and extensive dermal granulation tissue with a predominantly  chronic inflammatory infiltrate. There is no evidence of dysplasia or  malignancy. The basement membrane thickening and dermal fibrosis suggests  that there may be component of lichen sclerosus. However, the underlying  cause of the ongoing inflammation and repair (granulation tissue) is not  clear from this sample. A tissue gram stain fails to reveal any large  bacterial aggregates.  GMS and AFB studies for fungal forms and acid fast  bacilli were negative respectively     11/27/2013 Surgery      Diagnosis    1) VULVA, LEFT ANTERIOR, BIOPSY: HIGH GRADE SQUAMOUS INTRAEPITHELIAL LESION (SEVERE DYSPLASIA, MOODY III).    2) VAGINAL WALL, ANTERIOR, BIOPSY: HIGH GRADE SQUAMOUS INTRAEPITHELIAL LESION  (SEVERE DYSPLASIA, VaIN III).    3) VULVA, VULVECTOMY: FOCUS OF MICROINVASIVE SQUAMOUS CELL CARCINOMA, WELL-DIFFERENTIATED, DEPTH OF STROMAL INVASION 0.4 MM,  8 MM FROM CLOSEST DEEP MARGINS, 4 MM FROM RIGHT ANTERIOR VAGINAL MARGINS AT 8 - 9 O'CLOCK (PROXIMAL TIP OF SPECIMEN DESIGNATED   12 O'CLOCK); NEGATIVE FOR LYMPHOVASCULAR INVASION; ARISING IN A BACKGROUND OF EXTENSIVE VULVAR INTRAEPITHELIAL NEOPLASIA (SEVERE DYSPLASIA, MOODY III, CLASSICAL AND DIFFERENTIATED TYPES); MOODY III IS PRESENT AT RIGHT LATERAL SURGICAL MARGIN (7 - 9 O'CLOCK),   LEFT LATERAL SURGICAL MARGIN (3 - 5 O'CLOCK) AND RIGHT AND LEFT VAGINAL MARGINS; TOTAL LESIONAL AREA (INVASIVE CARCINOMA AND MOODY III) MEASURES 8.2 CM IN GREATEST DIMENSION (GROSS MEASUREMENT); BACKGROUND SEVERE INTERFACE DERMATITIS AND LICHEN SCLEROSUS.        **Electronically signed out by Dimas Cardenas M.D.**on 12/2/2013  HAYLEY/YAZMIN/franky  Case reviewed by Attending Pathologist      Synoptic Diagnosis  3)  VULVA:     Specimen:                        Vulva  Procedure:                       Other: Vulvectomy  Lymph Node Sampling:             Not applicable  Specimen Size:                   Greatest dimension: 13.5 cm                                   Additional dimension: 9.5 cm                                   Additional dimension: 1.2 cm  Tumor Site:                      Right vulva, labium minus  Tumor Size:                      Greatest dimension: 0.04 cm  Tumor Focality:                  Unifocal  Histologic Type:                 Squamous cell carcinoma  Histologic Grade:                G1: Well differentiated  Microscopic Tumor Extension:     Depth of invasion: 0.4 mm  Margins:                         Uninvolved by invasive carcinoma                                   Distance of invasive carcinoma from closest margin: 4 mm  Lymph-Vascular Invasion:         Not identified  Lymph Nodes:                     No nodes submitted or found  Extranodal Extension:            Cannot  be determined (explain):    Fixed or Ulcerated Femoral-Inguinal Lymph Nodes:                                    Not identified  Laterality of Involved Lymph Nodes:                                    Cannot be determined:    Primary Tumor (pT):              pT1a [FIGO IA]: Lesions 2 cm or less in size, confined to the vulva or perineum, and with stromal invasion 1.0 mm or less  Regional Lymph Nodes (pN):       pNX:  Regional lymph nodes cannot be assessed  Distant Metastasis (pM):         Not applicable  Additional Pathologic Findings:  Vulvar intraepithelial neoplasia (MOODY) 3 (severe dysplasia/carcinoma in situ)  --------------------------------------------------------     5/20/2014 Procedure    Gynecologic Cytology  [place] Diagnosis:  High grade squamous intraepithelial lesion.    Descriptor/Additional Findings:  Moderate dysplasia.    Adequacy:  Specimen satisfactory for evaluation.    Source:  Vagina, Thin Prep Pap, Imaged Diagnostic     11/11/2014 Biopsy    Diagnosis    ANTERIOR VAGINAL WALL, BIOPSY:  HIGH GRADE SQUAMOUS INTRAEPITHELIAL LESION (VaIN 3, SEVERE DYSPLASIA)       12/19/2014 Surgery    Diagnosis  1)  ANTERIOR VAGINAL WALL, PARTIAL VAGINECTOMY: HIGH-GRADE SQUAMOUS INTRAEPITHELIAL LESION (VaIN 3, SEVERE DYSPLASIA), 2.7 CM; HIGH GRADE DYSPLASIA EXTENDS TO THE 2 AND 8 O'CLOCK TIP MARGINS, THE 5-8 O'CLOCK LATERAL MARGIN, AND THE 11-2 O'CLOCK LATERAL   MARGIN.          2)  JOYA-CLITORAL AREA, EXCISION: HIGH-GRADE SQUAMOUS INTRAEPITHELIAL LESION (VaIN 3, SEVERE DYSPLASIA), EXTENDING TO A LATERAL MARGIN.         2/9/2015 Surgery    Diagnosis  1.          VULVA, LEFT PERIURETHRAL PORTION, EXCISION: FOCAL HIGH-GRADE SQUAMOUS INTRAEPITHELIAL LESION (MOODY 2, MODERATE DYSPLASIA); MARGINS ARE NEGATIVE FOR DYSPLASIA.    2.          VULVA, RIGHT PERIURETHRAL PORTION, EXCISION: BENIGN SQUAMOUS MUCOSA WITH ACUTE AND CHRONIC INFLAMMATION AND REACTIVE CHANGES.         3.          VULVA, LEFT, LOWER PORTION, EXCISION:  "BENIGN SQUAMOUS MUCOSA WITH ACUTE AND CHRONIC INFLAMMATION, REACTIVE CHANGES AND FEATURES CONSISTENT WITH PREVIOUS SURGICAL PROCEDURE.         4.          VULVA, RIGHT PORTION, EXCISION: BENIGN SQUAMOUS MUCOSA WITH CHRONIC INFLAMMATION AND DERMAL FIBROSIS.        9/6/2017 Procedure    Diagnosis  \"PAP SMEAR FROM THE URETHRA\":  HIGH GRADE SQUAMOUS INTRAEPITHELIAL LESION.          10/26/2017 Biopsy    Urethral Meatus Biopsy:  Urothelial mucosa with ulceration, chronic inflammation and focal high grade squamous intraepithelial lesion, moderate dysplasia     7/10/2018 Imaging    MRI Pelvis:  Impression:    1.  There is a 2.3 x 1.8 cm urethral lesion at the external urethral   meatus demonstrating enhancement and T2 hyperintensity. The lesion is   located approximately 8 mm from the bladder neck/internal urethral   orifice.  There is no extension of the lesion into the para vaginal   fat or ischiorectal fossa. There is some edema of the bilateral   ischiocavernosus muscles without invasion by the mass. No pelvic or   inguinal adenopathy is identified.     2.  The patient has a 2.5 cm cystocele and the urethra is almost   horizontal. There is pelvic floor laxity with loss of upper convexity   of the left iliococcygeus muscle.     7/20/2018 Biopsy    Diagnosis  URETHRA, BIOPSY:  SQUAMOUS CELL CARCINOMA IN SITU; SEE COMMENT.    Comments  P16 immunostain and HPV RNA in situ hybridization are strongly and diffusely positive within the lesion, consistent with HPV-related pathogenesis.     1/8/2019 Imaging    MRI Pelvis:  1.  Stable size and appearance of a nonspecific enhancing nodular   lesion at the caudal margin of the vaginal introitus adjacent to   extensive postsurgical changes related to prior vulvectomy and   vaginectomy. This lesion does not appear to conform to the expected   course of the urethra which is somewhat poorly defined, and this felt   more likely be located immediately caudal to the urethra. It is "   possible this may reflect postsurgical scarring as opposed to a true   lesion. Continued follow-up is suggested to ensure stability.  2.  No lymphadenopathy or other evidence of metastatic disease in the   pelvis.  3.  Evidence of pelvic floor laxity with cystocele, not significantly   changed.     8/6/2019 Imaging    MRI Pelvis:  1. No significant change from the prior exam on this limited   noncontrast study.  2. Stable indeterminate nodule anterior to the external urethral   meatus which may represent focal scarring; however,   residual/recurrent disease is not entirely excluded.  Recommend continued attention on follow-up. 3. Extensive pelvic   postsurgical changes with no evidence of local recurrence.  4. Pelvic floor laxity with cystocele unchanged from prior exam.     1/13/2020 Procedure    Urethral stricture dilation     2/18/2020 Imaging    MRI Pelvis:  Impression:  1.  No significant interval change in 1.7 x 1.7 cm T2 hyperintense   ovoid lesion at the urethral meatus.  2.  Numerous postoperative findings status post vaginectomy and   hysterectomy.  3.  Pelvic floor laxity with persistent cystocele.  4.  No pelvic adenopathy or bony lesion identified.     5/13/2020 Procedure    Urethral stricture dilation      7/10/2020 Biopsy    Diagnosis  PERIURETHRA, BIOPSY: POORLY DIFFERENTIATED CARCINOMA WITH SQUAMOUS AND PAPILLARY FEATURES, FOCALLY INVASIVE IN THE SUBEPITHELIAL CONNECTIVE TISSUE; SEE COMMENT.    Comments  The patient's history of vulvar microinvasive squamous cell carcinoma is noted. The current biopsy shows a poorly differentiated carcinoma with papillary formation. P16 immunostain and HPV RNA in situ hybridization are strongly and diffusely positive within the lesion, consistent with HPV-related pathogenesis. A KERRI-3 immunostain shows patchy, weak positivity in the lesional cells. The patient's prior biopsy ([id number]) is reviewed and shows similar morphologic and immunophenotypic features but  the papillary features were not present in the prior material.  Dr. Ilene Pablo reviewed this case and concurs with the diagnosis.      7/31/2020 Imaging    MRI Pelvis:  1.  Redemonstration of a mildly T2 hyperintense mass involving the   urethral meatus, similar dimensions to prior exam on 2/18/2020,   however there is now a polypoid lesion seen along the anterior aspect   of the perineum, possibly contiguous with the urethral mass,   concerning for disease progression. This could potentially represent   the recently biopsied lesion.  2.  Marked interval enlargement of a left inguinal lymph node, almost   certainly representing disease involvement. This would be amenable to   ultrasound-guided biopsy if warranted.  3.  Findings related to pelvic floor laxity, with cystocele.    PET/CT:  1.  Intensely FDG avid left inguinal lymph node is highly suspicious   for locoregional metastasis from known vulvar squamous cell   carcinoma. There are no additional sites of suspicious FDG activity.  2.   Intense physiologic FDG activity within the urine obscures   visualization of the periurethral mass. Findings are better   characterized on same day pelvic MRI.     9/21/2020 - 12/10/2020 Radiation    Radiation OncologyTreatment Course:  Dago Nunez received 6940 cGy in 38 fractions to vulva via external beam radiation therapy.     10/5/2020 - 10/12/2020 Chemotherapy    OP Vulvar MitoMYcin / Fluorouracil CIV + XRT       10/9/2020 - 9/7/2021 Chemotherapy    OP CENTRAL VENOUS ACCESS DEVICE ACCESS, CARE, AND MAINTENANCE (CVAD)     10/19/2020 Imaging    CT Head:  Impression:    1. No acute intracranial process.     10/21/2021 -  Chemotherapy    OP CENTRAL VENOUS ACCESS DEVICE ACCESS, CARE, AND MAINTENANCE ([facility]AD)     Secondary malignancy of inguinal lymph nodes   8/31/2020 Initial Diagnosis    Secondary malignancy of inguinal lymph nodes (CMS/HCC)     10/9/2020 - 9/7/2021 Chemotherapy    OP CENTRAL VENOUS ACCESS DEVICE  ACCESS, CARE, AND MAINTENANCE ([facility])     10/21/2021 -  Chemotherapy    OP CENTRAL VENOUS ACCESS DEVICE ACCESS, CARE, AND MAINTENANCE ([facility]AD)     Malignant neoplasm of upper-outer quadrant of left breast in female, estrogen receptor positive   9/14/2021 Initial Diagnosis    Malignant neoplasm of upper-outer quadrant of left breast in female, estrogen receptor positive         PAST MEDICAL HISTORY:  ALLERGIES:  Allergies   Allergen Reactions    Scopolamine Swelling     Other reaction(s): ANGIOEDEMA        Amoxicillin-Pot Clavulanate Rash    Keflex [Cephalexin] Rash    Septra [Sulfamethoxazole-Trimethoprim] Rash    Tequin [Gatifloxacin] Other (See Comments)     Doesn't remember    Trovan [Alatrofloxacin] Dizziness     CURRENT MEDICATIONS:  Outpatient Encounter Medications as of 6/19/2024   Medication Sig Dispense Refill    Acetaminophen (TYLENOL ARTHRITIS PAIN PO) Take 1 tablet by mouth Daily As Needed (BACK PAIN).      albuterol sulfate  (90 Base) MCG/ACT inhaler Inhale 2 puffs 4 (Four) Times a Day. 54 g 3    aspirin 81 MG chewable tablet Chew 1 tablet Daily. 30 tablet 0    bisoprolol-hydrochlorothiazide (ZIAC) 5-6.25 MG per tablet Take 1 tablet by mouth Daily. 90 tablet 1    cetirizine (zyrTEC) 10 MG tablet Take 1 tablet by mouth Daily.      citalopram (CeleXA) 20 MG tablet Take 1 tablet by mouth Daily. 90 tablet 1    clopidogrel (PLAVIX) 75 MG tablet TAKE 1 TABLET BY MOUTH 1 TIME DAILY 20 tablet 0    cyanocobalamin 1000 MCG/ML injection Inject 1 mL into the appropriate muscle as directed by prescriber Every 30 (Thirty) Days.      diphenoxylate-atropine (LOMOTIL) 2.5-0.025 MG per tablet Take 2 tablets by mouth 4 (Four) Times a Day As Needed for Diarrhea.      DULERA 100-5 MCG/ACT inhaler Inhale 2 puffs 2 (Two) Times a Day. Rinse and spit after using. 6 inhaler 0    esomeprazole (nexIUM) 40 MG capsule Take 1 capsule by mouth 2 (Two) Times a Day. 180 capsule 3    furosemide (LASIX) 40 MG tablet Take 1 tablet by  "mouth 2 (Two) Times a Day. 180 tablet 3    Homeopathic Products (LEG CRAMPS) tablet Take 1 tablet by mouth Daily.      HYDROcodone-acetaminophen (NORCO)  MG per tablet Take 0.5 tablets by mouth Every 4 (Four) Hours As Needed for Moderate Pain or Severe Pain. 60 tablet 0    letrozole (FEMARA) 2.5 MG tablet Take 1 tablet by mouth Daily.      olmesartan (BENICAR) 20 MG tablet Take 1 tablet by mouth Daily. 90 tablet 2    phenazopyridine (PYRIDIUM) 200 MG tablet Take 1 tablet by mouth 3 (Three) Times a Day As Needed for Dysuria. 15 tablet 0    potassium chloride ER (K-TAB) 20 MEQ tablet controlled-release ER tablet Take 2 tablets by mouth Daily.      pravastatin (PRAVACHOL) 40 MG tablet Take 1 tablet by mouth Every Night. 90 tablet 0    saccharomyces boulardii (Florastor) 250 MG capsule Take 1 capsule by mouth 2 (Two) Times a Day. 60 capsule 0    sodium bicarbonate 650 MG tablet Take 1 tablet by mouth 2 (Two) Times a Day.      Syringe 25G X 1\" 3 ML misc 1 each Daily. 25 each 1    vitamin D (ERGOCALCIFEROL) 1.25 MG (44616 UT) capsule capsule Take 1 capsule by mouth 1 (One) Time Per Week. Thursdays       Facility-Administered Encounter Medications as of 6/19/2024   Medication Dose Route Frequency Provider Last Rate Last Admin    [COMPLETED] acetaminophen (TYLENOL) tablet 650 mg  650 mg Oral Once Drake Stafford MD   650 mg at 06/14/24 1317    [COMPLETED] furosemide (LASIX) injection 20 mg  20 mg Intravenous Once Drake Stafford MD   20 mg at 06/14/24 1558    heparin injection 500 Units  500 Units Intravenous PRN Drake Stafford MD   500 Units at 07/01/21 1354    heparin injection 500 Units  500 Units Intravenous PRN Drake Stafford MD   500 Units at 01/11/22 1134    heparin injection 500 Units  500 Units Intravenous PRN Drake Stafford MD   500 Units at 09/28/22 1418    heparin injection 500 Units  500 Units Intravenous PRN Drake Stafford MD   500 Units at 01/25/23 1537    heparin injection 500 Units  500 Units " Intravenous PRN Drake Stafford MD   500 Units at 06/26/23 1426    heparin injection 500 Units  500 Units Intravenous Drake Helms MD   500 Units at 03/05/24 1350    sodium chloride 0.9 % flush 10 mL  10 mL Intravenous PRN Drake Stafford MD   10 mL at 07/01/21 1354    sodium chloride 0.9 % flush 10 mL  10 mL Intravenous Drake Helms MD   10 mL at 01/11/22 1134    sodium chloride 0.9 % flush 10 mL  10 mL Intravenous PRN Drake Stafford MD   10 mL at 09/28/22 1418    sodium chloride 0.9 % flush 10 mL  10 mL Intravenous PRN Drake Stafford MD   10 mL at 01/25/23 1537    sodium chloride 0.9 % flush 10 mL  10 mL Intravenous Drake Helms MD   10 mL at 06/26/23 1426    sodium chloride 0.9 % flush 10 mL  10 mL Intravenous PRDrake Amos MD   10 mL at 03/05/24 1350    [DISCONTINUED] sodium chloride 0.9 % infusion 250 mL  250 mL Intravenous Drake Helms MD         ADULT ILLNESSES:  Patient Active Problem List   Diagnosis Code    Meatal stenosis [id number]    Retention of urine R33.9    Type 2 diabetes mellitus, without long-term current use of insulin E11.9    Essential hypertension I10    Grief reaction F43.21    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    Chronic midline low back pain without sciatica M54.50, G89.29    Bilateral lower extremity edema R60.0    History of urethral stricture Z87.448    Epidermal cyst of neck L72.0    Normocytic anemia D64.9    Neck abscess L02.11    Mixed hyperlipidemia E78.2    Gastroesophageal reflux disease without esophagitis K21.9    Anxiety F41.9    Iron deficiency and chemotherapy induced anemia D50.9    Stage 3 chronic kidney disease N18.30    Anemia D64.9    Screening for breast cancer Z12.39    Lower extremity edema R60.0    Vulvar cancer, carcinoma C51.9    Former smoker Z87.891    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    Febrile illness R50.9    Chemotherapy-induced thrombocytopenia D69.59, T45.1X5A    Hyponatremia E87.1    Hypokalemia  E87.6    Neutropenic fever D70.9, R50.81    Moderate malnutrition E44.0    Antineoplastic chemotherapy induced anemia D64.81, T45.1X5A    History of radiation therapy Z92.3    Encounter for care related to Port-a-Cath Z45.2    Malignant neoplasm of upper-outer quadrant of left breast in female, estrogen receptor positive C50.412, Z17.0    Encounter for care related to vascular access port Z45.2    Angiodysplasia K55.20    Bronchitis J40    Obesity (BMI 30-39.9) E66.9    Wheezing R06.2    Cough R05.9    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 Laguerre-Normal colonoscopy; Normal terminal ileum; Repeat 5 years    COLONOSCOPY N/A 11/05/2021    Petechia(e) in the rectum, in the recto-sigmoid colon and in the distal sigmoid colon; No specimens collected; No plans to repeat colonoscopy due to advance age and/or medical problems    CYSTOSCOPY N/A 09/20/2022    Procedure: CYSTOSCOPY WITH URETHRAL DILATATION;  Surgeon: Shaheen Conway MD;  Location: [facility];  Service: Urology;  Laterality: N/A;    ENDOSCOPY  07/01/2014    Normal esophagus; Normal stomach; Normal examined duodenum; BRAVO pH capsule deployed;     ENDOSCOPY  08/14/2013    Mild gastritis-biopsies for H.Pylor obtained    ENDOSCOPY  02/16/2005    Dr. LaguerreLaykl-Oblfewxur-uflfuncu    ENDOSCOPY  10/21/2003    Dr. Laguerre-Stage 1 reflux esophagitis    ENDOSCOPY N/A 11/05/2021    Small HH; A  single gastroesophageal junction polyp; Normal stomach; A single non-bleeding angiodysplastic lesion in the duodenum    HYSTERECTOMY      MASTECTOMY W/ SENTINEL NODE BIOPSY Left 09/14/2021    Procedure: LEFT PARTIAL MASTECTOMY WITH MAGSEED AND LEFT SENTINEL LYMPH NODE BIOPSY MAGTRACE;  Surgeon: Quynh Rosario MD;  Location:  [facility];  Service: General;  Laterality: Left;    TONSILLECTOMY      VAGINA SURGERY      Laser surgery X 2    VENOUS ACCESS DEVICE (PORT) INSERTION N/A 09/29/2020    Procedure: SINGLE LUMEN PORT - A- CATH PLACEMENT WITH FLUOROSCOPY;  Surgeon: Quynh Rosario MD;  Location:  [facility];  Service: General;  Laterality: N/A;     HEALTH MAINTENANCE ITEMS:  Health Maintenance Due   Topic Date Due    RSV Vaccine - Adults (1 - 1-dose 60+ series) Never done    DIABETIC EYE EXAM  11/25/2020    COVID-19 Vaccine (4 - 2023-24 season) 09/01/2023    ANNUAL WELLNESS VISIT  04/24/2024    URINE MICROALBUMIN  04/24/2024       <no information>  Last Completed Colonoscopy            COLORECTAL CANCER SCREENING (COLONOSCOPY - Every 10 Years) Next due on 11/5/2031 11/05/2021  Surgical Procedure: COLONOSCOPY    11/05/2021  COLONOSCOPY    01/29/2014  Outside Claim: [place] FECAL BLOOD SCRN IMMUNOASSAY    02/01/2013  Outside Claim: CHG BLOOD,OCCULT,FECAL HGB,FECES,1-3 SIMULT    01/12/2011  SCANNED - COLONOSCOPY    Only the first 5 history entries have been loaded, but more history exists.                  Immunization History   Administered Date(s) Administered    COVID-19 (MODERNA) 1st,2nd,3rd Dose Monovalent 03/26/2021, 04/23/2021    COVID-19 (MODERNA) BIVALENT 12+YRS 01/03/2023    COVID-19 (MODERNA) Monovalent Original Booster 12/28/2021    FLUAD TRI 65YR+ 10/22/2019    Flu Vaccine Split Quad 10/12/2018    Fluad Quad 65+ 11/09/2020    Fluzone High Dose =>65 Years ([employer] ONLY) 10/01/2018, 11/12/2021    Fluzone High-Dose 65+yrs 11/12/2021, 10/24/2022, 10/24/2023    Pneumococcal Conjugate 20-Valent (PCV20)  10/24/2022    Pneumococcal Polysaccharide (PPSV23) 10/16/2013    Pneumococcal, Unspecified 10/01/2017    Shingrix 06/15/2023, 05/03/2024    Tdap 05/01/2019    Zostavax 01/14/2010, 10/01/2015     Last Completed Mammogram            Scheduled - MAMMOGRAM (Yearly) Scheduled for 10/31/2024      10/30/2023  Mammo Diagnostic Digital Tomosynthesis Bilateral With CAD    10/10/2022  Mammo Diagnostic Digital Tomosynthesis Bilateral With CAD    03/07/2022  Mammo Diagnostic Digital Tomosynthesis Left With CAD    08/20/2021  Mammo Diagnostic Digital Tomosynthesis Left With CAD    08/18/2021  Mammo Screening Digital Tomosynthesis Bilateral With CAD    Only the first 5 history entries have been loaded, but more history exists.                      FAMILY HISTORY:  Family History   Problem Relation Age of Onset    Cancer Mother     Hypertension Mother     Osteoporosis Mother     Dementia Mother     Uterine cancer Mother     Heart disease Father     Parkinsonism Father     Cancer Sister     Breast cancer Sister     Kidney cancer Sister     Diabetes Brother     Heart disease Paternal Grandfather     No Known Problems Maternal Grandmother     No Known Problems Maternal Grandfather     No Known Problems Paternal Grandmother     Colon cancer Neg Hx     Colon polyps Neg Hx     Esophageal cancer Neg Hx     Liver cancer Neg Hx     Liver disease Neg Hx     Rectal cancer Neg Hx     Stomach cancer Neg Hx      SOCIAL HISTORY:  Social History     Socioeconomic History    Marital status:    Tobacco Use    Smoking status: Former     Current packs/day: 0.25     Average packs/day: 0.3 packs/day for 3.0 years (0.8 ttl pk-yrs)     Types: Cigarettes     Passive exposure: Past    Smokeless tobacco: Never    Tobacco comments:     social smoker in college   Vaping Use    Vaping status: Never Used   Substance and Sexual Activity    Alcohol use: Not Currently     Comment: Occasional glass of wine    Drug use: No    Sexual activity: Defer  "      REVIEW OF SYSTEMS:    Review of Systems   Constitutional:  Positive for fatigue. Negative for chills and fever.        \"I am tired. I get winded with anything I do.\"   HENT:  Negative for congestion and nosebleeds.    Eyes:  Negative for discharge and redness.   Respiratory:  Negative for shortness of breath and wheezing.    Cardiovascular:  Positive for leg swelling. Negative for chest pain.   Gastrointestinal:  Negative for blood in stool, nausea and vomiting.   Endocrine: Negative for cold intolerance and heat intolerance.   Genitourinary:  Negative for flank pain and hematuria.   Musculoskeletal:  Positive for gait problem. Negative for neck stiffness.   Skin:  Positive for pallor.   Allergic/Immunologic: Negative for food allergies.   Neurological:  Negative for dizziness and speech difficulty.   Hematological:  Negative for adenopathy. Does not bruise/bleed easily.   Psychiatric/Behavioral:  Negative for agitation and confusion. The patient is not nervous/anxious.        VITAL SIGNS: /66   Pulse 50   Temp 98.1 °F (36.7 °C)   Resp 18   Ht 142.2 cm (56\")   Wt 75.4 kg (166 lb 4.8 oz)   SpO2 94%   Breastfeeding No   BMI 37.28 kg/m²  Gained 6 pounds.   Pain Score    06/19/24 1433   PainSc: 2  Comment: my back hurts.       PHYSICAL EXAMINATION:     Physical Exam  Vitals reviewed.   Constitutional:       Appearance: She is ill-appearing.      Comments: She arrived in the exam room in wheelchair.    HENT:      Head: Normocephalic and atraumatic.   Eyes:      General: No scleral icterus.  Cardiovascular:      Rate and Rhythm: Normal rate.   Pulmonary:      Effort: No respiratory distress.      Breath sounds: No wheezing.      Comments: Port, right. No erythema.   Abdominal:      General: Bowel sounds are normal. There is no distension.      Palpations: Abdomen is soft.   Musculoskeletal:         General: Swelling present.      Cervical back: Neck supple.   Skin:     Coloration: Skin is pale. "   Neurological:      Mental Status: She is oriented to person, place, and time.   Psychiatric:         Mood and Affect: Mood normal.         Behavior: Behavior normal.         LABS    Lab Results - Last 18 Months   Lab Units 06/14/24  1116 05/22/24  0409 05/21/24  0400 05/20/24  0438 05/19/24 2057 05/15/24  1339 04/04/24  1412 03/05/24  1257 02/06/24  1335 02/02/24  1230 12/12/23  1327 11/14/23  1343   HEMOGLOBIN g/dL 6.6* 7.7* 7.9* 7.7* 9.4* 8.7* 11.5* 10.2* 10.2* 10.8* 10.2* 8.5*   HEMATOCRIT % 21.6* 24.8* 25.4* 25.3* 30.2* 28.0* 35.1 31.4* 31.4* 33.0* 33.0* 27.0*   MCV fL 110.8* 110.2* 110.9* 111.9* 111.0* 110.2* 106.4* 105.4* 102.6* 104.8* 108.2* 107.1*   WBC 10*3/mm3 8.66 6.34 6.90 5.71 9.75 6.29 6.79 6.85 7.05 9.91 5.82 5.93   RDW % 16.5* 16.3* 15.8* 15.2 15.2 14.9 14.5 14.6 14.0 14.1 14.3 13.4   MPV fL 10.5 11.0 11.1 11.0 11.4 10.4 11.6 10.5 11.2 12.5* 11.0 10.7   PLATELETS 10*3/mm3 175 147 151 143 170 155 138* 141 169 120* 157 156   IMM GRAN % %  --   --   --   --  0.5  --   --   --  1.4*  --   --   --    NEUTROS ABS 10*3/mm3 7.53*  --   --   --  7.62* 4.52 4.95 4.93 5.48 8.62* 4.08 3.90   LYMPHS ABS 10*3/mm3  --   --   --   --  1.19  --  1.22 1.23 0.88  --  1.01 1.30   MONOS ABS 10*3/mm3  --   --   --   --  0.56  --  0.45 0.51 0.43  --  0.39 0.42   EOS ABS 10*3/mm3 0.35  --   --   --  0.28 0.44* 0.11 0.13 0.12  --  0.27 0.25   BASOS ABS 10*3/mm3 0.09  --   --   --  0.05 0.26* 0.04 0.02 0.04  --  0.04 0.03   IMMATURE GRANS (ABS) 10*3/mm3  --   --   --   --  0.05  --   --   --  0.10*  --   --   --    NRBC /100 WBC  --   --   --   --  0.0  --   --   --  0.0  --   --   --    NEUTROPHIL % % 83.0*  --   --   --   --  67.6  --   --   --  84.0*  --   --    MONOCYTES % % 1.0*  --   --   --   --  5.6  --   --   --  2.0*  --   --    BASOPHIL % % 1.0  --   --   --   --  4.2*  --   --   --   --   --   --    ATYP LYMPH % %  --   --   --   --   --   --   --   --   --  3.0  --   --    ANISOCYTOSIS  Slight/1+  --   --   --  "  --  Slight/1+  --   --   --  Slight/1+  --   --    GIANT PLT   --   --   --   --   --   --   --   --   --  Slight/1+  --   --        Lab Results - Last 18 Months   Lab Units 06/14/24  1116 05/22/24  0409 05/21/24  0400 05/20/24  0438 05/19/24  2057 05/15/24  1339 04/04/24  1412 02/06/24  1335 02/02/24  1230   GLUCOSE mg/dL 184* 120* 112* 82 152* 100* 126* 197* 100*   SODIUM mmol/L 139 140 141 141 140 140 138 134* 135*   POTASSIUM mmol/L 3.6 4.1 4.3 3.9 4.4 4.4 4.3 3.6 4.0   CO2 mmol/L 29.0 28.0 29.0 27.0 27.0 27.0 25.0 23.0 23.0   CHLORIDE mmol/L 95* 101 103 105 101 105 100 96* 96*   ANION GAP mmol/L 15.0 11.0 9.0 9.0 12.0 8.0 13.0 15.0 16.0*   CREATININE mg/dL 2.03* 1.86* 2.07* 2.01* 2.29* 1.68* 1.78* 1.68* 2.07*   BUN mg/dL 51* 32* 31* 31* 32* 29* 28* 27* 23   BUN / CREAT RATIO  25.1* 17.2 15.0 15.4 14.0 17.3 15.7 16.1 11.1   CALCIUM mg/dL 10.2 8.6 9.1 9.0 10.3 9.7 10.1 9.6 8.7   ALK PHOS U/L 55  --   --   --  53 46 44 52 57   TOTAL PROTEIN g/dL 7.2  --   --   --  7.2 7.0 8.3 8.1 8.3   ALT (SGPT) U/L 9  --   --   --  10 11 11 9 11   AST (SGOT) U/L 21  --   --   --  23 17 21 17 19   BILIRUBIN mg/dL 0.4  --   --   --  0.2 0.2 0.3 0.3 0.3   ALBUMIN g/dL 4.0  --   --   --  3.9 4.0 4.7 4.3 4.4   GLOBULIN gm/dL 3.2  --   --   --  3.3 3.0 3.6 3.8 3.9       No results for input(s): \"MSPIKE\", \"KAPPALAMB\", \"IGLFLC\", \"URICACID\", \"FREEKAPPAL\", \"CEA\", \"LDH\", \"REFLABREPO\" in the last 44013 hours.    Lab Results - Last 18 Months   Lab Units 06/14/24  1116 05/19/24  2057 05/15/24  1339 03/05/24  1257 02/06/24  1335 12/12/23  1327 11/14/23  1343 06/26/23  1327   IRON mcg/dL 46  --  57 93 69  --  67 74   TIBC mcg/dL 325  --  298 285* 285*  --  289* 307   IRON SATURATION (TSAT) % 14*  --  19* 33 24  --  23 24   FERRITIN ng/mL 567.30*  --  624.60* 1,188.00* 1,262.00* 780.20* 913.50* 982.70*   FOLATE ng/mL  --  >20.00  --   --   --   --   --   --        Dago Waqar Nunez reports a pain score of 2.  Given her pain assessment as " "noted, treatment options were discussed and the following options were decided upon as a follow-up plan to address the patient's pain: continuation of current treatment plan for pain.      ASSESSMENT:  1.  Left breast cancer, upper outer quadrant.  Tumor size 1.1 cm.  Negative genetic testing.  AJCC stage: 1A (pT1c, snpN0, cM0)  Receptor status: Estrogen 87%, progesterone 47% and negative HER-2/gabriela.  Treatment status: Post left lumpectomy and sentinel lymph node biopsy.  Declined adjuvant radiation. She is on adjuvant letrozole.  2.  Macrocytic anemia from iron deficiency, B12 deficiency and history of chronic kidney disease stage IV, GFR 24.2 mL/min on 6/14/2024.  On epoetin alpha.  3.   Iron deficiency. Intolerant to oral iron.  Treated with intravenous iron as needed.  4.   Chronic kidney disease Stage IV.  GFR 24.2 ml/min on 6/14/2024.  Contributing factor to anemia.  5.   Poor performance status of 3.    6.   Grade 1 nausea from letrozole. On Zofran.   7.   Osteoporosis.  Taking calcium and vitamin D.  On subcutaneous denosumab.  8.   Squamous cell cancer in situ, urethra.  Followed by Dr. Callahan  9.   Recurrent squamous cell carcinoma, vulva.  AJCC stage IIIA (T2, Nib, M0, G3).  Treatment status.  Had Mitomycin C and 5 FU D1 to D4 with radiation.  D29 - 32 chemo was cancelled due to poor performance status.            PLAN:  1.    Re:  Heme status.  Hemoglobin 6.6, hematocrit 21.6, and .8.    2.    Re:  Pre-office CMP.  GFR 24.2 from 26.9 ml/min.    3.    Re:  Ferritin 567.3 and saturation 14%.  Injectafer ordered.   4.    Re: Tolerance to subcutaneous denosumab. \"It's alright.\"  5.    Re: She was admitted 5/19/2024 and discharged 5/22/2024 for transient ischemic attack.  Patient found to have Klebsiella urinary tract infection.  CT head negative.  6.   CBC with differential, ferritin and iron panel every 4 weeks.  7.   Epoetin alpha 40,000 units SQ every 4 weeks if " "hemoglobin below 11 and hematocrit below 33. Observe for elevated blood pressure.  8.  Transfuse 1 unit packed RBCs if hemoglobin less than 7.  Premed Tylenol 500 mg p.o. and Benadryl 12.5 mg IV.  Lasix 20 mg IV push after a unit of blood.  Observe for potential transfusion reactions.  9.   Schedule Injectafer 750 mg 1 dose 6/21/2024.  Premed Tylenol 500 mg p.o.  Observe for potential infusion reactions or anaphylaxis.  10.  Advance Care Planning  ACP discussion was held with the patient during this visit. Patient has an advance directive in EMR which is still valid.    11.  eRx ondansetron 8 mg po every 8 hours as needed for nausea/vomiting, # 60, 2 refills if needed.  12.  eRx letrozole 2.5 mg p.o. daily #90 with 3 refills if needed.  \"I take it at night.\"  Monitor for progressive bone loss or hot flashes.\"  13.  Cervical/vaginal cytology per gynecology.  14.  Vitamin B12 1000 mcg IM monthly by her granddaughter.  Continue to monitor for local irritation.  15.  Continue ongoing management per primary care physician and other specialists.  16.  Will not obtain breast tumor markers or Oncotype DX.  Patient is not a candidate for adjuvant chemotherapy.  17.  Flush port every 6 weeks  18.Plan of care discussed with patient and Joseph, her spouse  Understanding expressed.  She is agreeable to proceed.  19.  Next bone density 10/2025.  20.  Order denosumab 60 mg SQ every 6 months for osteoporosis. She is on letrozole.  Observe for osteonecrosis of the jaw.  21.  Mammogram order per surgery.  22.  Return to the office in 3 months with CBC with differential, ferritin, iron panel, and CMP.    23.  Hemoglobin and hematocrit today.           I have reviewed the assessment and plan and verified the accuracy of it. No changes to assessment and plan since the information was documented. Drake Stafford MD 06/19/24         I spent 32 total minutes, face-to-face, caring for Syndal today. Greater than 50% of this time involved " counseling and/or coordination of care as documented within this note.              cc: (Shaheen Akbar MD )        (Annita Loaiza MD)        (Ulises Roche MD)        (Guillermina Rosario MD)        (Octavio Fernando MD)        Gilberto Mills MD        (Moustapha Callahan MD)        (Radha Marks MD)

## 2024-06-15 LAB
BH BB BLOOD EXPIRATION DATE: NORMAL
BH BB BLOOD TYPE BARCODE: 600
BH BB DISPENSE STATUS: NORMAL
BH BB PRODUCT CODE: NORMAL
BH BB UNIT NUMBER: NORMAL
CROSSMATCH INTERPRETATION: NORMAL
UNIT  ABO: NORMAL
UNIT  RH: NORMAL

## 2024-06-19 ENCOUNTER — OFFICE VISIT (OUTPATIENT)
Dept: ONCOLOGY | Facility: CLINIC | Age: 84
End: 2024-06-19
Payer: MEDICARE

## 2024-06-19 ENCOUNTER — LAB (OUTPATIENT)
Dept: LAB | Facility: HOSPITAL | Age: 84
End: 2024-06-19
Payer: MEDICARE

## 2024-06-19 ENCOUNTER — TELEPHONE (OUTPATIENT)
Dept: FAMILY MEDICINE CLINIC | Facility: CLINIC | Age: 84
End: 2024-06-19
Payer: MEDICARE

## 2024-06-19 VITALS
RESPIRATION RATE: 18 BRPM | SYSTOLIC BLOOD PRESSURE: 128 MMHG | WEIGHT: 166.3 LBS | HEIGHT: 56 IN | TEMPERATURE: 98.1 F | HEART RATE: 50 BPM | BODY MASS INDEX: 37.41 KG/M2 | OXYGEN SATURATION: 94 % | DIASTOLIC BLOOD PRESSURE: 66 MMHG

## 2024-06-19 DIAGNOSIS — D50.8 IRON DEFICIENCY ANEMIA SECONDARY TO INADEQUATE DIETARY IRON INTAKE: ICD-10-CM

## 2024-06-19 DIAGNOSIS — C51.9 VULVAR CANCER, CARCINOMA: ICD-10-CM

## 2024-06-19 DIAGNOSIS — G89.3 CANCER RELATED PAIN: ICD-10-CM

## 2024-06-19 DIAGNOSIS — C51.9 VULVAR CANCER, CARCINOMA: Primary | ICD-10-CM

## 2024-06-19 LAB
HCT VFR BLD AUTO: 29.3 % (ref 34–46.6)
HGB BLD-MCNC: 9.2 G/DL (ref 12–15.9)

## 2024-06-19 PROCEDURE — 85018 HEMOGLOBIN: CPT

## 2024-06-19 PROCEDURE — 36415 COLL VENOUS BLD VENIPUNCTURE: CPT

## 2024-06-19 PROCEDURE — 85014 HEMATOCRIT: CPT

## 2024-06-19 RX ORDER — DIPHENHYDRAMINE HYDROCHLORIDE 50 MG/ML
50 INJECTION INTRAMUSCULAR; INTRAVENOUS AS NEEDED
Status: CANCELLED | OUTPATIENT
Start: 2024-06-21

## 2024-06-19 RX ORDER — SODIUM CHLORIDE 9 MG/ML
250 INJECTION, SOLUTION INTRAVENOUS ONCE
Status: CANCELLED | OUTPATIENT
Start: 2024-06-21

## 2024-06-19 RX ORDER — FAMOTIDINE 10 MG/ML
20 INJECTION, SOLUTION INTRAVENOUS AS NEEDED
Status: CANCELLED | OUTPATIENT
Start: 2024-06-21

## 2024-06-19 RX ORDER — ACETAMINOPHEN 325 MG/1
650 TABLET ORAL ONCE
Status: CANCELLED | OUTPATIENT
Start: 2024-06-21

## 2024-06-19 NOTE — TELEPHONE ENCOUNTER
Caller: Dago Nunez Waqar    Relationship: Self    Best call back number: [phone]     What medication are you requesting: MEDICATION TO TREAT SYMPTOMS     What are your current symptoms: BACK PAIN, PAINFUL URINATION     How long have you been experiencing symptoms: A FEW DAYS     Have you had these symptoms before:    [] Yes  [x] No    Have you been treated for these symptoms before:   [] Yes  [x] No    If a prescription is needed, what is your preferred pharmacy and phone number: [facility] [place] - [address] - [phone] [facility] [phone] [facility]     Additional notes:    PLEASE FOLLOW-UP WITH PATIENT REGARDING THIS REQUEST.

## 2024-06-19 NOTE — TELEPHONE ENCOUNTER
Caller: Dago Nunez    Relationship: Self    Best call back number: [phone]     Requested Prescriptions:   Requested Prescriptions     Pending Prescriptions Disp Refills    HYDROcodone-acetaminophen (NORCO)  MG per tablet 60 tablet 0     Sig: Take 0.5 tablets by mouth Every 4 (Four) Hours As Needed for Moderate Pain or Severe Pain.        Pharmacy where request should be sent: [facility] - [place] - [address] - [phone]  - [phone]      Last office visit with prescribing clinician: 4/24/2024   Last telemedicine visit with prescribing clinician: Visit date not found   Next office visit with prescribing clinician: 7/24/2024     Additional details provided by patient: TWO TABLETS LEFT    Does the patient have less than a 3 day supply:  [x] Yes  [] No    Would you like a call back once the refill request has been completed: [] Yes [] No    If the office needs to give you a call back, can they leave a voicemail: [] Yes [] No    Marlen Michel Rep   06/19/24 15:48 CDT

## 2024-06-20 ENCOUNTER — OFFICE VISIT (OUTPATIENT)
Dept: FAMILY MEDICINE CLINIC | Facility: CLINIC | Age: 84
End: 2024-06-20
Payer: MEDICARE

## 2024-06-20 VITALS
RESPIRATION RATE: 18 BRPM | HEIGHT: 56 IN | TEMPERATURE: 97.2 F | BODY MASS INDEX: 37.34 KG/M2 | HEART RATE: 95 BPM | WEIGHT: 166 LBS | SYSTOLIC BLOOD PRESSURE: 128 MMHG | DIASTOLIC BLOOD PRESSURE: 80 MMHG | OXYGEN SATURATION: 97 %

## 2024-06-20 DIAGNOSIS — D64.9 ANEMIA, UNSPECIFIED TYPE: ICD-10-CM

## 2024-06-20 DIAGNOSIS — E11.42 TYPE 2 DIABETES MELLITUS WITH DIABETIC POLYNEUROPATHY, WITHOUT LONG-TERM CURRENT USE OF INSULIN: ICD-10-CM

## 2024-06-20 DIAGNOSIS — M62.830 BACK SPASM: Primary | ICD-10-CM

## 2024-06-20 DIAGNOSIS — R60.0 BILATERAL LOWER EXTREMITY EDEMA: ICD-10-CM

## 2024-06-20 PROCEDURE — 1125F AMNT PAIN NOTED PAIN PRSNT: CPT | Performed by: FAMILY MEDICINE

## 2024-06-20 PROCEDURE — 3079F DIAST BP 80-89 MM HG: CPT | Performed by: FAMILY MEDICINE

## 2024-06-20 PROCEDURE — 99214 OFFICE O/P EST MOD 30 MIN: CPT | Performed by: FAMILY MEDICINE

## 2024-06-20 PROCEDURE — 1160F RVW MEDS BY RX/DR IN RCRD: CPT | Performed by: FAMILY MEDICINE

## 2024-06-20 PROCEDURE — 3074F SYST BP LT 130 MM HG: CPT | Performed by: FAMILY MEDICINE

## 2024-06-20 PROCEDURE — 1159F MED LIST DOCD IN RCRD: CPT | Performed by: FAMILY MEDICINE

## 2024-06-20 RX ORDER — HYDROCODONE BITARTRATE AND ACETAMINOPHEN 10; 325 MG/1; MG/1
0.5 TABLET ORAL EVERY 4 HOURS PRN
Qty: 60 TABLET | Refills: 0 | Status: SHIPPED | OUTPATIENT
Start: 2024-06-20

## 2024-06-20 RX ORDER — BUMETANIDE 1 MG/1
1 TABLET ORAL 2 TIMES DAILY
Qty: 180 TABLET | Refills: 1 | Status: SHIPPED | OUTPATIENT
Start: 2024-06-20

## 2024-06-20 RX ORDER — METAXALONE 800 MG/1
800 TABLET ORAL 3 TIMES DAILY PRN
Qty: 30 TABLET | Refills: 0 | Status: SHIPPED | OUTPATIENT
Start: 2024-06-20

## 2024-06-20 NOTE — PROGRESS NOTES
"Chief Complaint  Follow-up, Back Pain (Lower back pain, constant pain ), Insomnia (Pt states having trouble staying asleep due to pain ), and Leg Swelling    Subjective        Syndal Waqar Nunez presents to [facility]  History of Present Illness  Patient is here for follow-up after hospital admission on 5/19/2024 discharged on 5/22/2024 for transient ischemic attack and anemia in the setting of stage III chronic kidney disease.  Hemoglobin 6 days ago was 6.6, status post transfusion hemoglobin was 9.2 one day ago.     Her greatest complaint is bilateral SI joint pain with associated back spasms that she is felt previously, and pain worsens with motion and repositioning.  She has previously tolerated Skelaxin.  Her legs are continually swollen, usually takes Lasix once daily and will occasionally take it twice daily despite scheduled twice daily dosing    Objective   Vital Signs:  /80   Pulse 95   Temp 97.2 °F (36.2 °C)   Resp 18   Ht 142.2 cm (56\")   Wt 75.3 kg (166 lb)   SpO2 97%   BMI 37.22 kg/m²   Estimated body mass index is 37.22 kg/m² as calculated from the following:    Height as of this encounter: 142.2 cm (56\").    Weight as of this encounter: 75.3 kg (166 lb).               Physical Exam  Vitals and nursing note reviewed.   Constitutional:       General: She is not in acute distress.     Appearance: She is not diaphoretic.   HENT:      Head: Normocephalic and atraumatic.      Nose: Nose normal.   Eyes:      General: No scleral icterus.        Right eye: No discharge.         Left eye: No discharge.      Conjunctiva/sclera: Conjunctivae normal.   Neck:      Trachea: No tracheal deviation.   Pulmonary:      Effort: Pulmonary effort is normal.   Skin:     General: Skin is warm and dry.      Coloration: Skin is not pale.   Neurological:      Mental Status: She is alert and oriented to person, place, and time.   Psychiatric:         Behavior: Behavior normal.       "   Thought Content: Thought content normal.         Judgment: Judgment normal.        Result Review :                     Assessment and Plan     Diagnoses and all orders for this visit:    1. Back spasm (Primary)  -     metaxalone (Skelaxin) 800 MG tablet; Take 1 tablet by mouth 3 (Three) Times a Day As Needed for Muscle Spasms.  Dispense: 30 tablet; Refill: 0    2. Bilateral lower extremity edema  -     bumetanide (BUMEX) 1 MG tablet; Take 1 tablet by mouth 2 (Two) Times a Day.  Dispense: 180 tablet; Refill: 1    3. Anemia, unspecified type    4. Type 2 diabetes mellitus with diabetic polyneuropathy, without long-term current use of insulin  -     MicroAlbumin, Urine, Random - Urine, Clean Catch; Future    Continue follow-up with oncology for anemia  Transition Lasix to Bumex         Follow Up     Return in about 3 months (around 9/20/2024).  Patient was given instructions and counseling regarding her condition or for health maintenance advice. Please see specific information pulled into the AVS if appropriate.

## 2024-06-21 ENCOUNTER — INFUSION (OUTPATIENT)
Dept: ONCOLOGY | Facility: HOSPITAL | Age: 84
End: 2024-06-21
Payer: MEDICARE

## 2024-06-21 ENCOUNTER — LAB (OUTPATIENT)
Dept: LAB | Facility: HOSPITAL | Age: 84
End: 2024-06-21
Payer: MEDICARE

## 2024-06-21 ENCOUNTER — HOSPITAL ENCOUNTER (OUTPATIENT)
Dept: MRI IMAGING | Facility: HOSPITAL | Age: 84
Discharge: HOME | End: 2024-06-21
Payer: MEDICARE

## 2024-06-21 VITALS
WEIGHT: 166 LBS | SYSTOLIC BLOOD PRESSURE: 160 MMHG | TEMPERATURE: 97.3 F | RESPIRATION RATE: 16 BRPM | HEIGHT: 56 IN | HEART RATE: 62 BPM | DIASTOLIC BLOOD PRESSURE: 46 MMHG | OXYGEN SATURATION: 98 % | BODY MASS INDEX: 37.34 KG/M2

## 2024-06-21 DIAGNOSIS — I65.23 BILATERAL CAROTID ARTERY STENOSIS: ICD-10-CM

## 2024-06-21 DIAGNOSIS — E11.42 TYPE 2 DIABETES MELLITUS WITH DIABETIC POLYNEUROPATHY, WITHOUT LONG-TERM CURRENT USE OF INSULIN: ICD-10-CM

## 2024-06-21 DIAGNOSIS — R82.90 FOUL SMELLING URINE: ICD-10-CM

## 2024-06-21 DIAGNOSIS — Z45.2 ENCOUNTER FOR CARE RELATED TO VASCULAR ACCESS PORT: ICD-10-CM

## 2024-06-21 DIAGNOSIS — C77.4 SECONDARY MALIGNANCY OF INGUINAL LYMPH NODES: ICD-10-CM

## 2024-06-21 DIAGNOSIS — D50.9 IRON DEFICIENCY ANEMIA, UNSPECIFIED IRON DEFICIENCY ANEMIA TYPE: Primary | ICD-10-CM

## 2024-06-21 DIAGNOSIS — C51.9 VULVAR CANCER, CARCINOMA: ICD-10-CM

## 2024-06-21 LAB
ALBUMIN UR-MCNC: 2.8 MG/DL
BACTERIA UR QL AUTO: ABNORMAL /HPF
BILIRUB UR QL STRIP: NEGATIVE
CLARITY UR: CLEAR
COLOR UR: YELLOW
GLUCOSE UR STRIP-MCNC: NEGATIVE MG/DL
HGB UR QL STRIP.AUTO: NEGATIVE
HYALINE CASTS UR QL AUTO: ABNORMAL /LPF
KETONES UR QL STRIP: NEGATIVE
LEUKOCYTE ESTERASE UR QL STRIP.AUTO: ABNORMAL
NITRITE UR QL STRIP: POSITIVE
PH UR STRIP.AUTO: 6 [PH] (ref 5–8)
PROT UR QL STRIP: NEGATIVE
RBC # UR STRIP: ABNORMAL /HPF
REF LAB TEST METHOD: ABNORMAL
SP GR UR STRIP: 1.01 (ref 1–1.03)
SQUAMOUS #/AREA URNS HPF: ABNORMAL /HPF
UROBILINOGEN UR QL STRIP: ABNORMAL
WBC # UR STRIP: ABNORMAL /HPF

## 2024-06-21 PROCEDURE — 25810000003 SODIUM CHLORIDE 0.9 % SOLUTION: Performed by: INTERNAL MEDICINE

## 2024-06-21 PROCEDURE — 87077 CULTURE AEROBIC IDENTIFY: CPT

## 2024-06-21 PROCEDURE — 25010000002 FERRIC CARBOXYMALTOSE 750 MG/15ML SOLUTION 15 ML VIAL: Performed by: INTERNAL MEDICINE

## 2024-06-21 PROCEDURE — 96365 THER/PROPH/DIAG IV INF INIT: CPT

## 2024-06-21 PROCEDURE — 25010000002 HEPARIN LOCK FLUSH PER 10 UNITS: Performed by: INTERNAL MEDICINE

## 2024-06-21 PROCEDURE — 87086 URINE CULTURE/COLONY COUNT: CPT

## 2024-06-21 PROCEDURE — 70547 MR ANGIOGRAPHY NECK W/O DYE: CPT

## 2024-06-21 PROCEDURE — 82043 UR ALBUMIN QUANTITATIVE: CPT

## 2024-06-21 PROCEDURE — 81001 URINALYSIS AUTO W/SCOPE: CPT

## 2024-06-21 PROCEDURE — 87186 SC STD MICRODIL/AGAR DIL: CPT

## 2024-06-21 RX ORDER — HEPARIN SODIUM (PORCINE) LOCK FLUSH IV SOLN 100 UNIT/ML 100 UNIT/ML
500 SOLUTION INTRAVENOUS AS NEEDED
Status: DISCONTINUED | OUTPATIENT
Start: 2024-06-21 | End: 2024-06-21 | Stop reason: HOSPADM

## 2024-06-21 RX ORDER — FAMOTIDINE 10 MG/ML
20 INJECTION, SOLUTION INTRAVENOUS AS NEEDED
Status: DISCONTINUED | OUTPATIENT
Start: 2024-06-21 | End: 2024-06-21 | Stop reason: HOSPADM

## 2024-06-21 RX ORDER — SODIUM CHLORIDE 9 MG/ML
250 INJECTION, SOLUTION INTRAVENOUS ONCE
Status: COMPLETED | OUTPATIENT
Start: 2024-06-21 | End: 2024-06-21

## 2024-06-21 RX ORDER — DIPHENHYDRAMINE HYDROCHLORIDE 50 MG/ML
50 INJECTION INTRAMUSCULAR; INTRAVENOUS AS NEEDED
Status: DISCONTINUED | OUTPATIENT
Start: 2024-06-21 | End: 2024-06-21 | Stop reason: HOSPADM

## 2024-06-21 RX ORDER — ACETAMINOPHEN 325 MG/1
650 TABLET ORAL ONCE
Status: DISCONTINUED | OUTPATIENT
Start: 2024-06-21 | End: 2024-06-21 | Stop reason: HOSPADM

## 2024-06-21 RX ORDER — SODIUM CHLORIDE 0.9 % (FLUSH) 0.9 %
10 SYRINGE (ML) INJECTION AS NEEDED
Status: DISCONTINUED | OUTPATIENT
Start: 2024-06-21 | End: 2024-06-21 | Stop reason: HOSPADM

## 2024-06-21 RX ORDER — SODIUM CHLORIDE 0.9 % (FLUSH) 0.9 %
10 SYRINGE (ML) INJECTION AS NEEDED
OUTPATIENT
Start: 2024-06-21

## 2024-06-21 RX ORDER — HEPARIN SODIUM (PORCINE) LOCK FLUSH IV SOLN 100 UNIT/ML 100 UNIT/ML
500 SOLUTION INTRAVENOUS AS NEEDED
OUTPATIENT
Start: 2024-06-21

## 2024-06-21 RX ADMIN — SODIUM CHLORIDE 250 ML: 9 INJECTION, SOLUTION INTRAVENOUS at 10:10

## 2024-06-21 RX ADMIN — Medication 10 ML: at 10:57

## 2024-06-21 RX ADMIN — FERRIC CARBOXYMALTOSE INJECTION 750 MG: 50 INJECTION, SOLUTION INTRAVENOUS at 10:10

## 2024-06-21 RX ADMIN — Medication 500 UNITS: at 10:58

## 2024-06-23 LAB — BACTERIA SPEC AEROBE CULT: ABNORMAL

## 2024-06-24 ENCOUNTER — TELEPHONE (OUTPATIENT)
Dept: VASCULAR SURGERY | Facility: CLINIC | Age: 84
End: 2024-06-24
Payer: MEDICARE

## 2024-06-24 NOTE — TELEPHONE ENCOUNTER
Call placed to patient with no answer, voicemail left reminding of appointment at 1400 on 6/25/2024

## 2024-06-25 ENCOUNTER — OFFICE VISIT (OUTPATIENT)
Dept: VASCULAR SURGERY | Facility: CLINIC | Age: 84
End: 2024-06-25
Payer: MEDICARE

## 2024-06-25 VITALS
DIASTOLIC BLOOD PRESSURE: 72 MMHG | HEART RATE: 64 BPM | BODY MASS INDEX: 34.63 KG/M2 | SYSTOLIC BLOOD PRESSURE: 118 MMHG | WEIGHT: 165 LBS | HEIGHT: 58 IN | OXYGEN SATURATION: 94 %

## 2024-06-25 DIAGNOSIS — E11.42 TYPE 2 DIABETES MELLITUS WITH DIABETIC POLYNEUROPATHY, WITHOUT LONG-TERM CURRENT USE OF INSULIN: ICD-10-CM

## 2024-06-25 DIAGNOSIS — I67.1 ANEURYSM OF CAVERNOUS PORTION OF RIGHT INTERNAL CAROTID ARTERY: ICD-10-CM

## 2024-06-25 DIAGNOSIS — I65.21 STENOSIS OF RIGHT CAROTID ARTERY: Primary | ICD-10-CM

## 2024-06-25 DIAGNOSIS — I10 ESSENTIAL HYPERTENSION: ICD-10-CM

## 2024-06-25 DIAGNOSIS — I67.1 LEFT CAVERNOUS CAROTID ANEURYSM: ICD-10-CM

## 2024-06-25 PROCEDURE — 3078F DIAST BP <80 MM HG: CPT | Performed by: SURGERY

## 2024-06-25 PROCEDURE — 99204 OFFICE O/P NEW MOD 45 MIN: CPT | Performed by: SURGERY

## 2024-06-25 PROCEDURE — 3074F SYST BP LT 130 MM HG: CPT | Performed by: SURGERY

## 2024-06-25 PROCEDURE — 1159F MED LIST DOCD IN RCRD: CPT | Performed by: SURGERY

## 2024-06-25 PROCEDURE — 1160F RVW MEDS BY RX/DR IN RCRD: CPT | Performed by: SURGERY

## 2024-06-25 NOTE — PROGRESS NOTES
06/25/2024      Ashley Rodriguez, VINCENT  [address] [address], [place] [address]    Dago Nunez  1940    Chief Complaint   Patient presents with    NEW PATIENT     Referred from Ashley Rodriguez for bilateral carotid artery aneurysms. Testing done 6/21/24. Patient states that she had a TIA mid may. Daughter states that she has cognitive delays since TIA. Former smoker of 20 years, quit 30 years ago.        Dear VINCENT Cool    HPI  I had the pleasure of seeing your patient Dago Nunez in the office today.  Thank you kindly for this consultation.  As you recall, Dago Nunez is a 83 y.o.  female who you are currently following for routine health maintenance.  She was recently hospitalized for left sided weakness and some cognitive decline.  She was sent home on aspirin and Plavix however hemoglobin dropped to 6.6.  She received iron infusion and one unit of PRBCs, most recent 9.2.  She is now maintaine don aspirin and Pravachol.  She is now palliative with regards to chemotherapy.  She had vulva cancer and breast cancer.  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 She did have noninvasive testing performed which I did review in office.       Past Medical History:   Diagnosis Date    Anemia in stage 3 chronic kidney disease 11/11/2019    Arthritis     Breast cancer 09/14/2021    Left Mastectomy w/ sentinel node Bx    Bronchitis     Cellulitis     ROSA LEGS    GERD (gastroesophageal reflux disease)     Hyperlipidemia     Hypertension     Kyphosis     Osteoporosis     Personal history of COVID-19 05/2022    Scoliosis     Self-catheterizes urinary bladder     10FR SIZED CATH    Stage 3 chronic kidney disease 11/11/2019    Type 2 diabetes mellitus     Urethral meatal stenosis 09/2022    w/ urine retention    Vulva cancer     Vulvar intraepithelial neoplasia (MOODY) grade 3        Past Surgical History:   Procedure Laterality Date    APPENDECTOMY      BENJAMIN  PROCEDURE      No evidence of reflux disease while on Nexium 40mg daily-See report    BREAST BIOPSY      BREAST CYST ASPIRATION Left     BREAST LUMPECTOMY      COLONOSCOPY  01/12/2011    Diverticulosis sigmoid colon; The examination was otherwise normal; Repeat 10 years    COLONOSCOPY  11/03/2003    Dr. Laguerre-Normal colonoscopy; Normal terminal ileum; Repeat 5 years    COLONOSCOPY N/A 11/05/2021    Petechia(e) in the rectum, in the recto-sigmoid colon and in the distal sigmoid colon; No specimens collected; No plans to repeat colonoscopy due to advance age and/or medical problems    CYSTOSCOPY N/A 09/20/2022    Procedure: CYSTOSCOPY WITH URETHRAL DILATATION;  Surgeon: Shaheen Conway MD;  Location: [facility] OR;  Service: Urology;  Laterality: N/A;    ENDOSCOPY  07/01/2014    Normal esophagus; Normal stomach; Normal examined duodenum; BRAVO pH capsule deployed;     ENDOSCOPY  08/14/2013    Mild gastritis-biopsies for H.Pylor obtained    ENDOSCOPY  02/16/2005    Dr. LaguerreJeead-[id number]    ENDOSCOPY  10/21/2003    Dr. Laguerre-Stage 1 reflux esophagitis    ENDOSCOPY N/A 11/05/2021    Small HH; A single gastroesophageal junction polyp; Normal stomach; A single non-bleeding angiodysplastic lesion in the duodenum    HYSTERECTOMY      MASTECTOMY W/ SENTINEL NODE BIOPSY Left 09/14/2021    Procedure: LEFT PARTIAL MASTECTOMY WITH MAGSEED AND LEFT SENTINEL LYMPH NODE BIOPSY MAGTRACE;  Surgeon: Quynh Rosario MD;  Location: [facility] OR;  Service: General;  Laterality: Left;    TONSILLECTOMY      VAGINA SURGERY      Laser surgery X 2    VENOUS ACCESS DEVICE (PORT) INSERTION N/A 09/29/2020    Procedure: SINGLE LUMEN PORT - A- CATH PLACEMENT WITH FLUOROSCOPY;  Surgeon: Quynh Rosario MD;  Location: [facility] OR;  Service: General;  Laterality: N/A;       Family History   Problem Relation Age of Onset    Cancer Mother     Hypertension Mother     Osteoporosis Mother     Dementia Mother     Uterine cancer Mother      Heart disease Father     Parkinsonism Father     Cancer Sister     Breast cancer Sister     Kidney cancer Sister     Diabetes Brother     Heart disease Paternal Grandfather     No Known Problems Maternal Grandmother     No Known Problems Maternal Grandfather     No Known Problems Paternal Grandmother     Colon cancer Neg Hx     Colon polyps Neg Hx     Esophageal cancer Neg Hx     Liver cancer Neg Hx     Liver disease Neg Hx     Rectal cancer Neg Hx     Stomach cancer Neg Hx        Social History     Socioeconomic History    Marital status:    Tobacco Use    Smoking status: Former     Current packs/day: 0.25     Average packs/day: 0.3 packs/day for 3.0 years (0.8 ttl pk-yrs)     Types: Cigarettes     Passive exposure: Past    Smokeless tobacco: Never    Tobacco comments:     social smoker in [employer]   Vaping Use    Vaping status: Never Used   Substance and Sexual Activity    Alcohol use: Not Currently     Comment: Occasional glass of wine    Drug use: No    Sexual activity: Defer       Allergies   Allergen Reactions    Scopolamine Swelling     Other reaction(s): ANGIOEDEMA        Amoxicillin-Pot Clavulanate Rash    Keflex [Cephalexin] Rash    Septra [Sulfamethoxazole-Trimethoprim] Rash    Tequin [Gatifloxacin] Other (See Comments)     Doesn't remember    Trovan [Alatrofloxacin] Dizziness         Current Outpatient Medications:     Acetaminophen (TYLENOL ARTHRITIS PAIN PO), Take 1 tablet by mouth Daily As Needed (BACK PAIN)., Disp: , Rfl:     albuterol sulfate  (90 Base) MCG/ACT inhaler, Inhale 2 puffs 4 (Four) Times a Day., Disp: 54 g, Rfl: 3    aspirin 81 MG chewable tablet, Chew 1 tablet Daily., Disp: 30 tablet, Rfl: 0    bisoprolol-hydrochlorothiazide (ZIAC) 5-6.25 MG per tablet, Take 1 tablet by mouth Daily., Disp: 90 tablet, Rfl: 1    bumetanide (BUMEX) 1 MG tablet, Take 1 tablet by mouth 2 (Two) Times a Day., Disp: 180 tablet, Rfl: 1    cetirizine (zyrTEC) 10 MG tablet, Take 1 tablet by mouth  "Daily., Disp: , Rfl:     citalopram (CeleXA) 20 MG tablet, Take 1 tablet by mouth Daily., Disp: 90 tablet, Rfl: 1    clopidogrel (PLAVIX) 75 MG tablet, TAKE 1 TABLET BY MOUTH 1 TIME DAILY, Disp: 20 tablet, Rfl: 0    cyanocobalamin 1000 MCG/ML injection, Inject 1 mL into the appropriate muscle as directed by prescriber Every 30 (Thirty) Days., Disp: , Rfl:     diphenoxylate-atropine (LOMOTIL) 2.5-0.025 MG per tablet, Take 2 tablets by mouth 4 (Four) Times a Day As Needed for Diarrhea., Disp: , Rfl:     DULERA 100-5 MCG/ACT inhaler, Inhale 2 puffs 2 (Two) Times a Day. Rinse and spit after using., Disp: 6 inhaler, Rfl: 0    esomeprazole (nexIUM) 40 MG capsule, Take 1 capsule by mouth 2 (Two) Times a Day., Disp: 180 capsule, Rfl: 3    Homeopathic Products (LEG CRAMPS) tablet, Take 1 tablet by mouth Daily., Disp: , Rfl:     HYDROcodone-acetaminophen (NORCO)  MG per tablet, Take 0.5 tablets by mouth Every 4 (Four) Hours As Needed for Moderate Pain or Severe Pain., Disp: 60 tablet, Rfl: 0    letrozole (FEMARA) 2.5 MG tablet, Take 1 tablet by mouth Daily., Disp: , Rfl:     metaxalone (Skelaxin) 800 MG tablet, Take 1 tablet by mouth 3 (Three) Times a Day As Needed for Muscle Spasms., Disp: 30 tablet, Rfl: 0    olmesartan (BENICAR) 20 MG tablet, Take 1 tablet by mouth Daily., Disp: 90 tablet, Rfl: 2    phenazopyridine (PYRIDIUM) 200 MG tablet, Take 1 tablet by mouth 3 (Three) Times a Day As Needed for Dysuria., Disp: 15 tablet, Rfl: 0    potassium chloride ER (K-TAB) 20 MEQ tablet controlled-release ER tablet, Take 2 tablets by mouth Daily., Disp: , Rfl:     pravastatin (PRAVACHOL) 40 MG tablet, Take 1 tablet by mouth Every Night., Disp: 90 tablet, Rfl: 0    saccharomyces boulardii (Florastor) 250 MG capsule, Take 1 capsule by mouth 2 (Two) Times a Day., Disp: 60 capsule, Rfl: 0    sodium bicarbonate 650 MG tablet, Take 1 tablet by mouth 2 (Two) Times a Day., Disp: , Rfl:     Syringe 25G X 1\" 3 ML misc, 1 each Daily., " Disp: 25 each, Rfl: 1    vitamin D (ERGOCALCIFEROL) 1.25 MG (60000 UT) capsule capsule, Take 1 capsule by mouth 1 (One) Time Per Week. Thursdays, Disp: , Rfl:   No current facility-administered medications for this visit.    Facility-Administered Medications Ordered in Other Visits:     heparin injection 500 Units, 500 Units, Intravenous, Rivera DAMON Winston, MD, 500 Units at 07/01/21 1354    heparin injection 500 Units, 500 Units, Intravenous, Rivera DAMON Winston, MD, 500 Units at 01/11/22 1134    heparin injection 500 Units, 500 Units, Intravenous, Rivera DAMON Winston, MD, 500 Units at 09/28/22 1418    heparin injection 500 Units, 500 Units, Intravenous, Rivera DAMON Winston, MD, 500 Units at 01/25/23 1537    heparin injection 500 Units, 500 Units, Intravenous, Rivera DAMON Winston, MD, 500 Units at 06/26/23 1426    heparin injection 500 Units, 500 Units, Intravenous, Rivera DAMON Winston, MD, 500 Units at 03/05/24 1350    sodium chloride 0.9 % flush 10 mL, 10 mL, Intravenous, Rivera DAMON Winston, MD, 10 mL at 07/01/21 1354    sodium chloride 0.9 % flush 10 mL, 10 mL, Intravenous, Rivera DAMON Winston, MD, 10 mL at 01/11/22 1134    sodium chloride 0.9 % flush 10 mL, 10 mL, IntravenousMARISOL Chua, Winston, MD, 10 mL at 09/28/22 1418    sodium chloride 0.9 % flush 10 mL, 10 mL, Intravenous, Rivera DAMON Winston, MD, 10 mL at 01/25/23 1537    sodium chloride 0.9 % flush 10 mL, 10 mL, IntravenousMARISOL Chua, Winston, MD, 10 mL at 06/26/23 1426    sodium chloride 0.9 % flush 10 mL, 10 mL, IntravenousMARISOL Chua, Winston, MD, 10 mL at 03/05/24 1350    Review of Systems   Constitutional: Negative.    HENT: Negative.     Eyes: Negative.    Respiratory: Negative.     Cardiovascular: Negative.    Gastrointestinal: Negative.    Endocrine: Negative.    Genitourinary: Negative.    Musculoskeletal:  Positive for arthralgias, back pain, gait problem and myalgias.   Skin: Negative.    Allergic/Immunologic: Negative.    Hematological: Negative.  "   Psychiatric/Behavioral: Negative.     All other systems reviewed and are negative.    /72   Pulse 64   Ht 147.3 cm (58\")   Wt 74.8 kg (165 lb)   SpO2 94%   BMI 34.49 kg/m²     Physical Exam  Vitals and nursing note reviewed.   Constitutional:       Appearance: Normal appearance. She is well-developed. She is obese.   HENT:      Head: Normocephalic and atraumatic.   Eyes:      General: No scleral icterus.     Pupils: Pupils are equal, round, and reactive to light.   Neck:      Thyroid: No thyromegaly.      Vascular: No carotid bruit or JVD.   Cardiovascular:      Rate and Rhythm: Normal rate and regular rhythm.      Heart sounds: Normal heart sounds.   Pulmonary:      Effort: Pulmonary effort is normal.      Breath sounds: Normal breath sounds.   Abdominal:      General: Bowel sounds are normal. There is no distension or abdominal bruit.      Palpations: Abdomen is soft. There is no mass.      Tenderness: There is no abdominal tenderness.   Musculoskeletal:      Cervical back: Neck supple.      Right lower leg: Edema present.      Left lower leg: Edema present.      Comments: wheelchair   Lymphadenopathy:      Cervical: No cervical adenopathy.   Skin:     General: Skin is warm and dry.   Neurological:      Mental Status: She is alert and oriented to person, place, and time.      Cranial Nerves: No cranial nerve deficit.      Sensory: No sensory deficit.      Comments: Intermittent confusion   Psychiatric:         Mood and Affect: Mood normal.         Behavior: Behavior normal.         Judgment: Judgment normal.       Diagnostic Data:  MRI Angiogram Neck Without Contrast    Result Date: 6/21/2024  Narrative: EXAMINATION: MRI ANGIOGRAM NECK WO CONTRAST- 6/21/2024 9:20 AM  HISTORY: Carotid artery stenosis.  REPORT: 3D time-of-flight MR a of the neck was performed without intravenous contrast to evaluate the carotid and vertebral arteries.  COMPARISON: Ultrasound of the carotid arteries 5/20/2024.  There is " mild motion artifact. On the right, the common carotid artery is patent, there is limited visualization of the vessel origin. There is narrowing of the proximal right ICA with estimated 2D diameter of 2.8 mm, centered approximately 1.3 cm from its origin. Compared with the more distal caliber measurements this stenosis is less than 50%. The right external carotid artery is patent. The distal right ICA appears normally patent.  On the left, the common carotid artery is patent, with limited visualization at its origin and proximal segment due to tortuosity. There is no significant stenosis involving the left ICA. There is moderate focal stenosis of the left external carotid artery approximately 9 mm distal to its origin, without occlusion. The distal left ICA appears normally patent.  Both vertebral arteries are patent, the left vertebral artery appears dominant compared to the right. No evidence of vertebral dissection is identified.      Impression: 1. No flow-limiting stenosis involving the right or left ICA. 2. Patency of the vertebral arteries with no stenosis or dissection. 3. Moderate short segment stenosis of the proximal left external carotid artery.  This report was signed and finalized on 6/21/2024 9:26 AM by Dr. Gabino Capone MD.        Right carotid: upon personal review, there is about 70% right carotid stenosis      History: Carotid occlusive disease     IMPRESSION:  Impression:  1. There is 50 to 69% stenosis of the right internal carotid artery.  2. There is less than 50% stenosis of the left internal carotid artery.  3. Antegrade flow is demonstrated in bilateral vertebral arteries.  4. There is heavy plaque burden at the right bifurcation. Further  imaging/evaluation may be warranted with a CTA of the neck.     Comments: Bilateral carotid vertebral arterial duplex scan was  performed.     Grayscale imaging shows intimal thickening and calcified elements at the  carotid bifurcation. The right  internal carotid artery peak systolic  velocity is 187 cm/sec. The end-diastolic velocity is 21.4 cm/sec. The  right ICA/CCA ratio is approximately 1.8. These findings correlate with  50 to 69% stenosis of the right internal carotid artery.     Grayscale imaging shows intimal thickening and calcified elements at the  carotid bifurcation. The left internal carotid artery peak systolic  velocity is 110.6 cm/sec. The end-diastolic velocity is 20.7 cm/sec. The  left ICA/CCA ratio is approximately 0.7. These findings correlate with  less than 50% stenosis of the left internal carotid artery.     Antegrade flow is demonstrated in bilateral vertebral arteries.  There is greater than 50% stenosis in the left common carotid artery and  bilateral external carotid arteries.     This report was signed and finalized on 5/21/2024 1:12 PM by Dr. Junior Meyers MD.    Patient Active Problem List   Diagnosis    Meatal stenosis    Retention of urine    Type 2 diabetes mellitus, without long-term current use of insulin    Essential hypertension    Grief reaction    Vulvar intraepithelial neoplasia (MOODY) grade 3    Chronic midline low back pain without sciatica    Bilateral lower extremity edema    History of urethral stricture    Epidermal cyst of neck    Normocytic anemia    Neck abscess    Mixed hyperlipidemia    Gastroesophageal reflux disease without esophagitis    Anxiety    Iron deficiency and chemotherapy induced anemia    Stage 3 chronic kidney disease    Anemia    Screening for breast cancer    Lower extremity edema    Vulvar cancer, carcinoma    Former smoker    Secondary malignancy of inguinal lymph nodes    Febrile illness    Chemotherapy-induced thrombocytopenia    Hyponatremia    Hypokalemia    Neutropenic fever    Moderate malnutrition    Antineoplastic chemotherapy induced anemia    History of radiation therapy    Encounter for care related to Port-a-Cath    Malignant neoplasm of upper-outer quadrant of left breast  in female, estrogen receptor positive    Encounter for care related to vascular access port    Angiodysplasia    Bronchitis    Obesity (BMI 30-39.9)    Wheezing    Cough    Post-COVID-19 condition    Radiation induced proctitis    Rectal bleeding    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 Stenosis of right carotid artery        2. Essential hypertension        3. Type 2 diabetes mellitus with diabetic polyneuropathy, without long-term current use of insulin        4. Left cavernous carotid aneurysm        5. Aneurysm of cavernous portion of right internal carotid artery            Plan: After thoroughly evaluating Dago Nunez, I believe the best course of action is to remain conservative at this time.  I did review her testing and offered right TCAR as she was symptomatic and had left-sided symptoms.  Testing reviewed and noted about 70% right carotid stenosis, not consistent with radiologist reporting but please see imaging above for my personal review.  She is going to discuss further with her family but does not wish to proceed at this time with TCAR.  She does have extensive history of vulva and breast cancer.  She also was found to have 2 small carotid artery aneurysms which they were referring to tertiary care center to manage.  She has been losing blood and had dropped hemoglobin to 6.6 and received a unit of packed red blood cells as well as iron infusion.  She is now only taking aspirin and patient and daughter are concerned with need for Plavix for 1 month following surgery.  We did discuss risks and benefits of the procedure versus not proceeding.  They will call our office if they would like to proceed.  She would need to be referred to cardiology.  Many of her complaints are due to pain with mobility and low back and hips. I did discuss vascular risk factors as they pertain to the progression of  vascular disease including controlling her hypertension and diabetes.  These risk factors are stable.  The patient is to continue taking their medications as previously discussed.   This was all discussed in full with complete understanding.  Thank you for allowing me to participate in the care of your patient.  Please do not hesitate to call with any questions or concerns.  We will keep you aware of any further encounters with Dago Waqarjenelle Nunez.        Sincerely yours,         Junior Meyers, Shaheen Santiago, DO

## 2024-06-25 NOTE — LETTER
June 25, 2024     VINCENT Cool  [address] [facility] [address], [place] [id number]    Patient: Dago Nunez   YOB: 1940   Date of Visit: 6/25/2024     Dear VINCENT Cool:       Thank you for referring Dago Nunez to me for evaluation. Below are the relevant portions of my assessment and plan of care.    If you have questions, please do not hesitate to call me. I look forward to following Dago along with you.         Sincerely,        Junior Meyers,         CC: Shaheen Akbar, Junior Parham DO  06/25/24 1608  Sign when Signing Visit  06/25/2024      Ashely Rodriguez APRN  [address] [facility] [address], [address],  [place] [address]    Dago Nunez  1940    Chief Complaint   Patient presents with   • NEW PATIENT     Referred from Ashley Rodriguez for bilateral carotid artery aneurysms. Testing done 6/21/24. Patient states that she had a TIA mid may. Daughter states that she has cognitive delays since TIA. Former smoker of 20 years, quit 30 years ago.        Dear VINCENT Cool    HPI  I had the pleasure of seeing your patient Dago Nunez in the office today.  Thank you kindly for this consultation.  As you recall, Dago Nunez is a 83 y.o.  female who you are currently following for routine health maintenance.  She was recently hospitalized for left sided weakness and some cognitive decline.  She was sent home on aspirin and Plavix however hemoglobin dropped to 6.6.  She received iron infusion and one unit of PRBCs, most recent 9.2.  She is now maintaine don aspirin and Pravachol.  She is now palliative with regards to chemotherapy.  She had vulva cancer and breast cancer.  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 She did have noninvasive testing performed which I did review in office.       Past Medical History:   Diagnosis Date   • Anemia in stage 3 chronic kidney disease 11/11/2019   • Arthritis    •  Breast cancer 09/14/2021    Left Mastectomy w/ sentinel node Bx   • Bronchitis    • Cellulitis     ROSA LEGS   • GERD (gastroesophageal reflux disease)    • Hyperlipidemia    • Hypertension    • Kyphosis    • Osteoporosis    • Personal history of COVID-19 05/2022   • Scoliosis    • Self-catheterizes urinary bladder     10FR SIZED CATH   • Stage 3 chronic kidney disease 11/11/2019   • Type 2 diabetes mellitus    • Urethral meatal stenosis 09/2022    w/ urine retention   • Vulva cancer    • Vulvar intraepithelial neoplasia (MOODY) grade 3        Past Surgical History:   Procedure Laterality Date   • APPENDECTOMY     • BENJAMIN PROCEDURE      No evidence of reflux disease while on Nexium 40mg daily-See report   • BREAST BIOPSY     • BREAST CYST ASPIRATION Left    • BREAST LUMPECTOMY     • COLONOSCOPY  01/12/2011    Diverticulosis sigmoid colon; The examination was otherwise normal; Repeat 10 years   • COLONOSCOPY  11/03/2003    Dr. Laguerre-Normal colonoscopy; Normal terminal ileum; Repeat 5 years   • COLONOSCOPY N/A 11/05/2021    Petechia(e) in the rectum, in the recto-sigmoid colon and in the distal sigmoid colon; No specimens collected; No plans to repeat colonoscopy due to advance age and/or medical problems   • CYSTOSCOPY N/A 09/20/2022    Procedure: CYSTOSCOPY WITH URETHRAL DILATATION;  Surgeon: Shaheen Conway MD;  Location: [facility];  Service: Urology;  Laterality: N/A;   • ENDOSCOPY  07/01/2014    Normal esophagus; Normal stomach; Normal examined duodenum; BRAVO pH capsule deployed;    • ENDOSCOPY  08/14/2013    Mild gastritis-biopsies for H.Pylor obtained   • ENDOSCOPY  02/16/2005    Dr. LaguerreAhafx-Dfgtrqgfi-[id number]   • ENDOSCOPY  10/21/2003    Dr. Laguerre-Stage 1 reflux esophagitis   • ENDOSCOPY N/A 11/05/2021    Small HH; A single gastroesophageal junction polyp; Normal stomach; A single non-bleeding angiodysplastic lesion in the duodenum   • HYSTERECTOMY     • MASTECTOMY W/ SENTINEL NODE BIOPSY Left 09/14/2021     Procedure: LEFT PARTIAL MASTECTOMY WITH MAGSEED AND LEFT SENTINEL LYMPH NODE BIOPSY MAGTRACE;  Surgeon: Quynh Rosario MD;  Location:  [facility];  Service: General;  Laterality: Left;   • TONSILLECTOMY     • VAGINA SURGERY      Laser surgery X 2   • VENOUS ACCESS DEVICE (PORT) INSERTION N/A 09/29/2020    Procedure: SINGLE LUMEN PORT - A- CATH PLACEMENT WITH FLUOROSCOPY;  Surgeon: Quynh Rosario MD;  Location:  [facility];  Service: General;  Laterality: N/A;       Family History   Problem Relation Age of Onset   • Cancer Mother    • Hypertension Mother    • Osteoporosis Mother    • Dementia Mother    • Uterine cancer Mother    • Heart disease Father    • Parkinsonism Father    • Cancer Sister    • Breast cancer Sister    • Kidney cancer Sister    • Diabetes Brother    • Heart disease Paternal Grandfather    • No Known Problems Maternal Grandmother    • No Known Problems Maternal Grandfather    • No Known Problems Paternal Grandmother    • Colon cancer Neg Hx    • Colon polyps Neg Hx    • Esophageal cancer Neg Hx    • Liver cancer Neg Hx    • Liver disease Neg Hx    • Rectal cancer Neg Hx    • Stomach cancer Neg Hx        Social History     Socioeconomic History   • Marital status:    Tobacco Use   • Smoking status: Former     Current packs/day: 0.25     Average packs/day: 0.3 packs/day for 3.0 years (0.8 ttl pk-yrs)     Types: Cigarettes     Passive exposure: Past   • Smokeless tobacco: Never   • Tobacco comments:     social smoker in college   Vaping Use   • Vaping status: Never Used   Substance and Sexual Activity   • Alcohol use: Not Currently     Comment: Occasional glass of wine   • Drug use: No   • Sexual activity: Defer       Allergies   Allergen Reactions   • Scopolamine Swelling     Other reaction(s): ANGIOEDEMA       • Amoxicillin-Pot Clavulanate Rash   • Keflex [Cephalexin] Rash   • Septra [Sulfamethoxazole-Trimethoprim] Rash   • Tequin [Gatifloxacin] Other (See Comments)     Doesn't  remember   • Trovan [Alatrofloxacin] Dizziness         Current Outpatient Medications:   •  Acetaminophen (TYLENOL ARTHRITIS PAIN PO), Take 1 tablet by mouth Daily As Needed (BACK PAIN)., Disp: , Rfl:   •  albuterol sulfate  (90 Base) MCG/ACT inhaler, Inhale 2 puffs 4 (Four) Times a Day., Disp: 54 g, Rfl: 3  •  aspirin 81 MG chewable tablet, Chew 1 tablet Daily., Disp: 30 tablet, Rfl: 0  •  bisoprolol-hydrochlorothiazide (ZIAC) 5-6.25 MG per tablet, Take 1 tablet by mouth Daily., Disp: 90 tablet, Rfl: 1  •  bumetanide (BUMEX) 1 MG tablet, Take 1 tablet by mouth 2 (Two) Times a Day., Disp: 180 tablet, Rfl: 1  •  cetirizine (zyrTEC) 10 MG tablet, Take 1 tablet by mouth Daily., Disp: , Rfl:   •  citalopram (CeleXA) 20 MG tablet, Take 1 tablet by mouth Daily., Disp: 90 tablet, Rfl: 1  •  clopidogrel (PLAVIX) 75 MG tablet, TAKE 1 TABLET BY MOUTH 1 TIME DAILY, Disp: 20 tablet, Rfl: 0  •  cyanocobalamin 1000 MCG/ML injection, Inject 1 mL into the appropriate muscle as directed by prescriber Every 30 (Thirty) Days., Disp: , Rfl:   •  diphenoxylate-atropine (LOMOTIL) 2.5-0.025 MG per tablet, Take 2 tablets by mouth 4 (Four) Times a Day As Needed for Diarrhea., Disp: , Rfl:   •  DULERA 100-5 MCG/ACT inhaler, Inhale 2 puffs 2 (Two) Times a Day. Rinse and spit after using., Disp: 6 inhaler, Rfl: 0  •  esomeprazole (nexIUM) 40 MG capsule, Take 1 capsule by mouth 2 (Two) Times a Day., Disp: 180 capsule, Rfl: 3  •  Homeopathic Products (LEG CRAMPS) tablet, Take 1 tablet by mouth Daily., Disp: , Rfl:   •  HYDROcodone-acetaminophen (NORCO)  MG per tablet, Take 0.5 tablets by mouth Every 4 (Four) Hours As Needed for Moderate Pain or Severe Pain., Disp: 60 tablet, Rfl: 0  •  letrozole (FEMARA) 2.5 MG tablet, Take 1 tablet by mouth Daily., Disp: , Rfl:   •  metaxalone (Skelaxin) 800 MG tablet, Take 1 tablet by mouth 3 (Three) Times a Day As Needed for Muscle Spasms., Disp: 30 tablet, Rfl: 0  •  olmesartan (BENICAR) 20 MG  "tablet, Take 1 tablet by mouth Daily., Disp: 90 tablet, Rfl: 2  •  phenazopyridine (PYRIDIUM) 200 MG tablet, Take 1 tablet by mouth 3 (Three) Times a Day As Needed for Dysuria., Disp: 15 tablet, Rfl: 0  •  potassium chloride ER (K-TAB) 20 MEQ tablet controlled-release ER tablet, Take 2 tablets by mouth Daily., Disp: , Rfl:   •  pravastatin (PRAVACHOL) 40 MG tablet, Take 1 tablet by mouth Every Night., Disp: 90 tablet, Rfl: 0  •  saccharomyces boulardii (Florastor) 250 MG capsule, Take 1 capsule by mouth 2 (Two) Times a Day., Disp: 60 capsule, Rfl: 0  •  sodium bicarbonate 650 MG tablet, Take 1 tablet by mouth 2 (Two) Times a Day., Disp: , Rfl:   •  Syringe 25G X 1\" 3 ML misc, 1 each Daily., Disp: 25 each, Rfl: 1  •  vitamin D (ERGOCALCIFEROL) 1.25 MG (51571 UT) capsule capsule, Take 1 capsule by mouth 1 (One) Time Per Week. Thursdays, Disp: , Rfl:   No current facility-administered medications for this visit.    Facility-Administered Medications Ordered in Other Visits:   •  heparin injection 500 Units, 500 Units, Intravenous, Rivera DAMON Winston, MD, 500 Units at 07/01/21 1354  •  heparin injection 500 Units, 500 Units, Intravenous, Rivera DAMON Winston, MD, 500 Units at 01/11/22 1134  •  heparin injection 500 Units, 500 Units, IntravenousMARISOL Chua, Winston, MD, 500 Units at 09/28/22 1418  •  heparin injection 500 Units, 500 Units, Intravenous, Rivera DAMON Winston, MD, 500 Units at 01/25/23 1537  •  heparin injection 500 Units, 500 Units, IntravenousMARISOL Chua, Winston, MD, 500 Units at 06/26/23 1426  •  heparin injection 500 Units, 500 Units, Intravenous, Rivera DAMON Winston, MD, 500 Units at 03/05/24 1350  •  sodium chloride 0.9 % flush 10 mL, 10 mL, MARISOL Fulton Chua, Winston, MD, 10 mL at 07/01/21 1354  •  sodium chloride 0.9 % flush 10 mL, 10 mL, Intravenous, Rivera DAMON Winston, MD, 10 mL at 01/11/22 1134  •  sodium chloride 0.9 % flush 10 mL, 10 mL, Intravenous, Rivera DAMON Winston, MD, 10 mL at 09/28/22 " "1418  •  sodium chloride 0.9 % flush 10 mL, 10 mL, Intravenous, Rivera DAMON Winston, MD, 10 mL at 01/25/23 1537  •  sodium chloride 0.9 % flush 10 mL, 10 mL, Intravenous, Rivera DAMON Winston, MD, 10 mL at 06/26/23 1426  •  sodium chloride 0.9 % flush 10 mL, 10 mL, Intravenous, Rivera DAMON Winston, MD, 10 mL at 03/05/24 1350    Review of Systems   Constitutional: Negative.    HENT: Negative.     Eyes: Negative.    Respiratory: Negative.     Cardiovascular: Negative.    Gastrointestinal: Negative.    Endocrine: Negative.    Genitourinary: Negative.    Musculoskeletal:  Positive for arthralgias, back pain, gait problem and myalgias.   Skin: Negative.    Allergic/Immunologic: Negative.    Hematological: Negative.    Psychiatric/Behavioral: Negative.     All other systems reviewed and are negative.    /72   Pulse 64   Ht 147.3 cm (58\")   Wt 74.8 kg (165 lb)   SpO2 94%   BMI 34.49 kg/m²     Physical Exam  Vitals and nursing note reviewed.   Constitutional:       Appearance: Normal appearance. She is well-developed. She is obese.   HENT:      Head: Normocephalic and atraumatic.   Eyes:      General: No scleral icterus.     Pupils: Pupils are equal, round, and reactive to light.   Neck:      Thyroid: No thyromegaly.      Vascular: No carotid bruit or JVD.   Cardiovascular:      Rate and Rhythm: Normal rate and regular rhythm.      Heart sounds: Normal heart sounds.   Pulmonary:      Effort: Pulmonary effort is normal.      Breath sounds: Normal breath sounds.   Abdominal:      General: Bowel sounds are normal. There is no distension or abdominal bruit.      Palpations: Abdomen is soft. There is no mass.      Tenderness: There is no abdominal tenderness.   Musculoskeletal:      Cervical back: Neck supple.      Right lower leg: Edema present.      Left lower leg: Edema present.      Comments: wheelchair   Lymphadenopathy:      Cervical: No cervical adenopathy.   Skin:     General: Skin is warm and dry.   Neurological: "      Mental Status: She is alert and oriented to person, place, and time.      Cranial Nerves: No cranial nerve deficit.      Sensory: No sensory deficit.      Comments: Intermittent confusion   Psychiatric:         Mood and Affect: Mood normal.         Behavior: Behavior normal.         Judgment: Judgment normal.       Diagnostic Data:  MRI Angiogram Neck Without Contrast    Result Date: 6/21/2024  Narrative: EXAMINATION: MRI ANGIOGRAM NECK WO CONTRAST- 6/21/2024 9:20 AM  HISTORY: Carotid artery stenosis.  REPORT: 3D time-of-flight MR a of the neck was performed without intravenous contrast to evaluate the carotid and vertebral arteries.  COMPARISON: Ultrasound of the carotid arteries 5/20/2024.  There is mild motion artifact. On the right, the common carotid artery is patent, there is limited visualization of the vessel origin. There is narrowing of the proximal right ICA with estimated 2D diameter of 2.8 mm, centered approximately 1.3 cm from its origin. Compared with the more distal caliber measurements this stenosis is less than 50%. The right external carotid artery is patent. The distal right ICA appears normally patent.  On the left, the common carotid artery is patent, with limited visualization at its origin and proximal segment due to tortuosity. There is no significant stenosis involving the left ICA. There is moderate focal stenosis of the left external carotid artery approximately 9 mm distal to its origin, without occlusion. The distal left ICA appears normally patent.  Both vertebral arteries are patent, the left vertebral artery appears dominant compared to the right. No evidence of vertebral dissection is identified.      Impression: 1. No flow-limiting stenosis involving the right or left ICA. 2. Patency of the vertebral arteries with no stenosis or dissection. 3. Moderate short segment stenosis of the proximal left external carotid artery.  This report was signed and finalized on 6/21/2024 9:26  AM by Dr. Gabino Capone MD.        Right carotid: upon personal review, there is about 70% right carotid stenosis      History: Carotid occlusive disease     IMPRESSION:  Impression:  1. There is 50 to 69% stenosis of the right internal carotid artery.  2. There is less than 50% stenosis of the left internal carotid artery.  3. Antegrade flow is demonstrated in bilateral vertebral arteries.  4. There is heavy plaque burden at the right bifurcation. Further  imaging/evaluation may be warranted with a CTA of the neck.     Comments: Bilateral carotid vertebral arterial duplex scan was  performed.     Grayscale imaging shows intimal thickening and calcified elements at the  carotid bifurcation. The right internal carotid artery peak systolic  velocity is 187 cm/sec. The end-diastolic velocity is 21.4 cm/sec. The  right ICA/CCA ratio is approximately 1.8. These findings correlate with  50 to 69% stenosis of the right internal carotid artery.     Grayscale imaging shows intimal thickening and calcified elements at the  carotid bifurcation. The left internal carotid artery peak systolic  velocity is 110.6 cm/sec. The end-diastolic velocity is 20.7 cm/sec. The  left ICA/CCA ratio is approximately 0.7. These findings correlate with  less than 50% stenosis of the left internal carotid artery.     Antegrade flow is demonstrated in bilateral vertebral arteries.  There is greater than 50% stenosis in the left common carotid artery and  bilateral external carotid arteries.     This report was signed and finalized on 5/21/2024 1:12 PM by Dr. Junior Meyers MD.    Patient Active Problem List   Diagnosis   • Meatal stenosis   • Retention of urine   • Type 2 diabetes mellitus, without long-term current use of insulin   • Essential hypertension   • Grief reaction   • Vulvar intraepithelial neoplasia (MOODY) grade 3   • Chronic midline low back pain without sciatica   • Bilateral lower extremity edema   • History of urethral  stricture   • Epidermal cyst of neck   • Normocytic anemia   • Neck abscess   • Mixed hyperlipidemia   • Gastroesophageal reflux disease without esophagitis   • Anxiety   • Iron deficiency and chemotherapy induced anemia   • Stage 3 chronic kidney disease   • Anemia   • Screening for breast cancer   • Lower extremity edema   • Vulvar cancer, carcinoma   • Former smoker   • Secondary malignancy of inguinal lymph nodes   • Febrile illness   • Chemotherapy-induced thrombocytopenia   • Hyponatremia   • Hypokalemia   • Neutropenic fever   • Moderate malnutrition   • Antineoplastic chemotherapy induced anemia   • History of radiation therapy   • Encounter for care related to Port-a-Cath   • Malignant neoplasm of upper-outer quadrant of left breast in female, estrogen receptor positive   • Encounter for care related to vascular access port   • Angiodysplasia   • Bronchitis   • Obesity (BMI 30-39.9)   • Wheezing   • Cough   • Post-COVID-19 condition   • Radiation induced proctitis   • Rectal bleeding   • 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 Stenosis of right carotid artery        2. Essential hypertension        3. Type 2 diabetes mellitus with diabetic polyneuropathy, without long-term current use of insulin        4. Left cavernous carotid aneurysm        5. Aneurysm of cavernous portion of right internal carotid artery            Plan: After thoroughly evaluating Dago Nunez, I believe the best course of action is to remain conservative at this time.  I did review her testing and offered right TCAR as she was symptomatic and had left-sided symptoms.  Testing reviewed and noted about 70% right carotid stenosis, not consistent with radiologist reporting but please see imaging above for my personal review.  She is going to discuss further with her family but does not wish to proceed at this time with TCAR.  She does  have extensive history of vulva and breast cancer.  She also was found to have 2 small carotid artery aneurysms which they were referring to tertiary care center to manage.  She has been losing blood and had dropped hemoglobin to 6.6 and received a unit of packed red blood cells as well as iron infusion.  She is now only taking aspirin and patient and daughter are concerned with need for Plavix for 1 month following surgery.  We did discuss risks and benefits of the procedure versus not proceeding.  They will call our office if they would like to proceed.  She would need to be referred to cardiology.  Many of her complaints are due to pain with mobility and low back and hips. I did discuss vascular risk factors as they pertain to the progression of vascular disease including controlling her hypertension and diabetes.  These risk factors are stable.  The patient is to continue taking their medications as previously discussed.   This was all discussed in full with complete understanding.  Thank you for allowing me to participate in the care of your patient.  Please do not hesitate to call with any questions or concerns.  We will keep you aware of any further encounters with Dago Waqar Nunez.        Sincerely yours,         Junior Meyers, Shaheen Santiago, DO

## 2024-06-26 ENCOUNTER — TELEPHONE (OUTPATIENT)
Dept: ONCOLOGY | Facility: CLINIC | Age: 84
End: 2024-06-26
Payer: MEDICARE

## 2024-06-26 NOTE — TELEPHONE ENCOUNTER
Received call from patient daughter Clara, she calls to report patient/mother Dago Nunez was seen by Dr Meyers yesterday 6/25/24 regarding issues that she was experiencing.   NOTE:  Patient was hospitalized end of May 2024 and was discovered to have a 70% occulusion of her Carotid Artery, started on Plavix at that time but patient then started to experience issues of dizziness, and weakness, blood loss, and abnormal bleeding episodes. It was then determined she should d/c use of the Plavix and patient was ordered blood transfusions to help stabilize her counts.  Per findings Dr Meyers feels the Carotid Artery occlusion needs to be addressed and recommends that patient re-start the Plavix 1 week prior to the surgical procedure along with evaluation by Cardiology prior to procedure for clearance and remain on the Plavix 1 month post procedure.  Patient and family are concerned that patient will once again develop bleeding issues with blood loss requiring transfusion and have related issues, and who will monitor her lab counts?

## 2024-06-27 ENCOUNTER — TELEPHONE (OUTPATIENT)
Dept: ONCOLOGY | Facility: CLINIC | Age: 84
End: 2024-06-27
Payer: MEDICARE

## 2024-06-27 NOTE — TELEPHONE ENCOUNTER
Caller: Dago Nunez    Relationship: Self    Best call back number: [phone]     What is the best time to reach you: ASAP    Who are you requesting to speak with (clinical staff, provider,  specific staff member): SCHEDULING        What was the call regarding: PLEASE CALL PATIENT TO ADVISE ON UPCOMING APPTS, SHE THINKS THE ONE ON 7/2 MAY HAVE BEEN CANCELED BY MISTAKE.  SHE MISSED A CALL YESTERDAY AND IS NOT SURE WHAT IT WAS ABOUT.  BUT SHE NEEDS TO SPEAK TO SOMEONE ABOUT HER APPTS.

## 2024-06-28 DIAGNOSIS — I65.21 STENOSIS OF RIGHT CAROTID ARTERY: Primary | ICD-10-CM

## 2024-06-28 NOTE — TELEPHONE ENCOUNTER
Notified patient daughter Clara Diez, per Dr Stafford recommendations are patient lab can be managed per her PCP once she goes back on the Plavix for her her pending surgery. Daughter v/u and will make apt with mother's PCP regarding restarting Plavix   Quality 226: Preventive Care And Screening: Tobacco Use: Screening And Cessation Intervention: Patient screened for tobacco use and is an ex/non-smoker Quality 431: Preventive Care And Screening: Unhealthy Alcohol Use - Screening: Patient screened for unhealthy alcohol use using a single question and scores less than 2 times per year Detail Level: Generalized

## 2024-06-28 NOTE — TELEPHONE ENCOUNTER
Tried calling this number and it rang for a while then call cut off. 7/2 was an old appt since pt was just seen 6/19. Dr. Stafford wanted to see her back in 3 mths so she is due to see him in Sept.

## 2024-07-02 ENCOUNTER — OFFICE VISIT (OUTPATIENT)
Dept: CARDIOLOGY | Facility: CLINIC | Age: 84
End: 2024-07-02
Payer: MEDICARE

## 2024-07-02 ENCOUNTER — HOSPITAL ENCOUNTER (OUTPATIENT)
Dept: CARDIOLOGY | Facility: HOSPITAL | Age: 84
Discharge: HOME | End: 2024-07-02
Admitting: INTERNAL MEDICINE
Payer: MEDICARE

## 2024-07-02 VITALS
WEIGHT: 166 LBS | DIASTOLIC BLOOD PRESSURE: 67 MMHG | BODY MASS INDEX: 34.85 KG/M2 | HEIGHT: 58 IN | SYSTOLIC BLOOD PRESSURE: 148 MMHG | HEART RATE: 63 BPM

## 2024-07-02 VITALS
WEIGHT: 166 LBS | HEART RATE: 62 BPM | DIASTOLIC BLOOD PRESSURE: 71 MMHG | SYSTOLIC BLOOD PRESSURE: 141 MMHG | HEIGHT: 58 IN | BODY MASS INDEX: 34.85 KG/M2

## 2024-07-02 DIAGNOSIS — R94.31 ABNORMAL ECG: ICD-10-CM

## 2024-07-02 DIAGNOSIS — E78.2 MIXED HYPERLIPIDEMIA: Primary | ICD-10-CM

## 2024-07-02 DIAGNOSIS — Z01.810 PREOP CARDIOVASCULAR EXAM: ICD-10-CM

## 2024-07-02 DIAGNOSIS — C77.4 SECONDARY MALIGNANCY OF INGUINAL LYMPH NODES: ICD-10-CM

## 2024-07-02 DIAGNOSIS — I10 ESSENTIAL HYPERTENSION: ICD-10-CM

## 2024-07-02 DIAGNOSIS — R06.09 DOE (DYSPNEA ON EXERTION): ICD-10-CM

## 2024-07-02 PROCEDURE — 93017 CV STRESS TEST TRACING ONLY: CPT

## 2024-07-02 PROCEDURE — 25510000001 PERFLUTREN 6.52 MG/ML SUSPENSION: Performed by: INTERNAL MEDICINE

## 2024-07-02 PROCEDURE — 93350 STRESS TTE ONLY: CPT

## 2024-07-02 PROCEDURE — 25010000002 DOBUTAMINE PER 250 MG: Performed by: INTERNAL MEDICINE

## 2024-07-02 RX ORDER — DOBUTAMINE HYDROCHLORIDE 100 MG/100ML
10-50 INJECTION INTRAVENOUS CONTINUOUS
Status: DISCONTINUED | OUTPATIENT
Start: 2024-07-02 | End: 2024-07-03 | Stop reason: HOSPADM

## 2024-07-02 RX ORDER — METOPROLOL TARTRATE 1 MG/ML
5 INJECTION, SOLUTION INTRAVENOUS ONCE
Status: DISCONTINUED | OUTPATIENT
Start: 2024-07-02 | End: 2024-07-03 | Stop reason: HOSPADM

## 2024-07-02 RX ADMIN — DOBUTAMINE HYDROCHLORIDE 10 MCG/KG/MIN: 100 INJECTION INTRAVENOUS at 11:29

## 2024-07-02 RX ADMIN — PERFLUTREN 8.48 MG: 6.52 INJECTION, SUSPENSION INTRAVENOUS at 11:18

## 2024-07-02 NOTE — PROGRESS NOTES
Dago Nunez  [id number]  1940  83 y.o.  female    Referring Provider: Shaheen Akbar DO    Reason for  Visit:  Initial visit       Subjective    Mild chronic exertional shortness of breath on exertion relieved with rest  No significant cough or wheezing    No palpitations  No associated chest pain  No significant pedal edema    No fever or chills  No significant expectoration    No hemoptysis  No presyncope or syncope    Tolerating current medications well with no untoward side effects   Compliant with prescribed medication regimen. Tries to adhere to cardiac diet.     Carotid artery stenosis under follow up Dr Meyers for carotid stenosis   Ex smoker     Now in wheel chair         History of present illness:  Dago Nunez is a 83 y.o. yo female with carotid stenosis who presents today for   Chief Complaint   Patient presents with    Establish Care   .    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 Laguerre-Normal  colonoscopy; Normal terminal ileum; Repeat 5 years    COLONOSCOPY N/A 11/05/2021    Petechia(e) in the rectum, in the recto-sigmoid colon and in the distal sigmoid colon; No specimens collected; No plans to repeat colonoscopy due to advance age and/or medical problems    CYSTOSCOPY N/A 09/20/2022    Procedure: CYSTOSCOPY WITH URETHRAL DILATATION;  Surgeon: Shahene Conway MD;  Location:  [facility];  Service: Urology;  Laterality: N/A;    ENDOSCOPY  07/01/2014    Normal esophagus; Normal stomach; Normal examined duodenum; BRAVO pH capsule deployed;     ENDOSCOPY  08/14/2013    Mild gastritis-biopsies for H.Pylor obtained    ENDOSCOPY  02/16/2005    Dr. LaguerreZcwsb-[id number]    ENDOSCOPY  10/21/2003    Dr. Laguerre-Stage 1 reflux esophagitis    ENDOSCOPY N/A 11/05/2021    Small HH; A single gastroesophageal junction polyp; Normal stomach; A single non-bleeding angiodysplastic lesion in the duodenum    HYSTERECTOMY      MASTECTOMY W/ SENTINEL NODE BIOPSY Left 09/14/2021    Procedure: LEFT PARTIAL MASTECTOMY WITH MAGSEED AND LEFT SENTINEL LYMPH NODE BIOPSY MAGTRACE;  Surgeon: Quynh Rosario MD;  Location:  [facility];  Service: General;  Laterality: Left;    TONSILLECTOMY      VAGINA SURGERY      Laser surgery X 2    VENOUS ACCESS DEVICE (PORT) INSERTION N/A 09/29/2020    Procedure: SINGLE LUMEN PORT - A- CATH PLACEMENT WITH FLUOROSCOPY;  Surgeon: Quynh Rosario MD;  Location:  [facility];  Service: General;  Laterality: N/A;   ,   Family History   Problem Relation Age of Onset    Cancer Mother     Hypertension Mother     Osteoporosis Mother     Dementia Mother     Uterine cancer Mother     Heart disease Father     Parkinsonism Father     Cancer Sister     Breast cancer Sister     Kidney cancer Sister     Diabetes Brother     Heart disease Paternal Grandfather     No Known Problems Maternal Grandmother     No Known Problems Maternal Grandfather     No Known Problems Paternal Grandmother     Colon cancer  Neg Hx     Colon polyps Neg Hx     Esophageal cancer Neg Hx     Liver cancer Neg Hx     Liver disease Neg Hx     Rectal cancer Neg Hx     Stomach cancer Neg Hx    ,   Social History     Tobacco Use    Smoking status: Former     Current packs/day: 0.25     Average packs/day: 0.3 packs/day for 3.0 years (0.8 ttl pk-yrs)     Types: Cigarettes     Passive exposure: Past    Smokeless tobacco: Never    Tobacco comments:     social smoker in college   Vaping Use    Vaping status: Never Used   Substance Use Topics    Alcohol use: Not Currently     Comment: Occasional glass of wine    Drug use: No   ,     Medications  Current Outpatient Medications   Medication Sig Dispense Refill    Acetaminophen (TYLENOL ARTHRITIS PAIN PO) Take 1 tablet by mouth Daily As Needed (BACK PAIN).      albuterol sulfate  (90 Base) MCG/ACT inhaler Inhale 2 puffs 4 (Four) Times a Day. 54 g 3    aspirin 81 MG chewable tablet Chew 1 tablet Daily. 30 tablet 0    bisoprolol-hydrochlorothiazide (ZIAC) 5-6.25 MG per tablet Take 1 tablet by mouth Daily. 90 tablet 1    bumetanide (BUMEX) 1 MG tablet Take 1 tablet by mouth 2 (Two) Times a Day. 180 tablet 1    cetirizine (zyrTEC) 10 MG tablet Take 1 tablet by mouth Daily.      citalopram (CeleXA) 20 MG tablet Take 1 tablet by mouth Daily. 90 tablet 1    cyanocobalamin 1000 MCG/ML injection Inject 1 mL into the appropriate muscle as directed by prescriber Every 30 (Thirty) Days.      diphenoxylate-atropine (LOMOTIL) 2.5-0.025 MG per tablet Take 2 tablets by mouth 4 (Four) Times a Day As Needed for Diarrhea.      DULERA 100-5 MCG/ACT inhaler Inhale 2 puffs 2 (Two) Times a Day. Rinse and spit after using. 6 inhaler 0    esomeprazole (nexIUM) 40 MG capsule Take 1 capsule by mouth 2 (Two) Times a Day. 180 capsule 3    Homeopathic Products (LEG CRAMPS) tablet Take 1 tablet by mouth Daily.      HYDROcodone-acetaminophen (NORCO)  MG per tablet Take 0.5 tablets by mouth Every 4 (Four) Hours As Needed for  "Moderate Pain or Severe Pain. 60 tablet 0    letrozole (FEMARA) 2.5 MG tablet Take 1 tablet by mouth Daily.      metaxalone (Skelaxin) 800 MG tablet Take 1 tablet by mouth 3 (Three) Times a Day As Needed for Muscle Spasms. 30 tablet 0    olmesartan (BENICAR) 20 MG tablet Take 1 tablet by mouth Daily. 90 tablet 2    phenazopyridine (PYRIDIUM) 200 MG tablet Take 1 tablet by mouth 3 (Three) Times a Day As Needed for Dysuria. 15 tablet 0    potassium chloride ER (K-TAB) 20 MEQ tablet controlled-release ER tablet Take 2 tablets by mouth Daily.      pravastatin (PRAVACHOL) 40 MG tablet Take 1 tablet by mouth Every Night. 90 tablet 0    saccharomyces boulardii (Florastor) 250 MG capsule Take 1 capsule by mouth 2 (Two) Times a Day. 60 capsule 0    sodium bicarbonate 650 MG tablet Take 1 tablet by mouth 2 (Two) Times a Day.      Syringe 25G X 1\" 3 ML misc 1 each Daily. 25 each 1    vitamin D (ERGOCALCIFEROL) 1.25 MG (62508 UT) capsule capsule Take 1 capsule by mouth 1 (One) Time Per Week. Thursdays      clopidogrel (PLAVIX) 75 MG tablet TAKE 1 TABLET BY MOUTH 1 TIME DAILY (Patient not taking: Reported on 7/2/2024) 20 tablet 0     No current facility-administered medications for this visit.     Facility-Administered Medications Ordered in Other Visits   Medication Dose Route Frequency Provider Last Rate Last Admin    heparin injection 500 Units  500 Units Intravenous PRN Drake Stafford MD   500 Units at 07/01/21 1354    heparin injection 500 Units  500 Units Intravenous PRN Drake Stafford MD   500 Units at 01/11/22 1134    heparin injection 500 Units  500 Units Intravenous PRN Drake Stafford MD   500 Units at 09/28/22 1418    heparin injection 500 Units  500 Units Intravenous PRN Drake Stafford MD   500 Units at 01/25/23 1537    heparin injection 500 Units  500 Units Intravenous PRN Drake Stafford MD   500 Units at 06/26/23 1426    heparin injection 500 Units  500 Units Intravenous PRDrake Amos MD   500 Units at " "03/05/24 1350    sodium chloride 0.9 % flush 10 mL  10 mL Intravenous PRN Drake Stafford MD   10 mL at 07/01/21 1354    sodium chloride 0.9 % flush 10 mL  10 mL Intravenous PRN Drake Stafford MD   10 mL at 01/11/22 1134    sodium chloride 0.9 % flush 10 mL  10 mL Intravenous PRN Drake Stafford MD   10 mL at 09/28/22 1418    sodium chloride 0.9 % flush 10 mL  10 mL Intravenous PRN Drake Stafford MD   10 mL at 01/25/23 1537    sodium chloride 0.9 % flush 10 mL  10 mL Intravenous PRN Drake Stafford MD   10 mL at 06/26/23 1426    sodium chloride 0.9 % flush 10 mL  10 mL Intravenous PRN Drake Stafford MD   10 mL at 03/05/24 1350       Allergies:  Scopolamine, Amoxicillin-pot clavulanate, Keflex [cephalexin], Septra [sulfamethoxazole-trimethoprim], Tequin [gatifloxacin], and Trovan [alatrofloxacin]    Review of Systems  Review of Systems   Constitutional: Positive for malaise/fatigue.   HENT: Negative.     Eyes: Negative.    Cardiovascular:  Positive for dyspnea on exertion. Negative for chest pain, claudication, cyanosis, irregular heartbeat, leg swelling, near-syncope, orthopnea, palpitations, paroxysmal nocturnal dyspnea and syncope.   Respiratory: Negative.     Endocrine: Negative.    Hematologic/Lymphatic: Negative.    Skin: Negative.    Musculoskeletal:  Positive for arthritis and joint pain.   Gastrointestinal:  Negative for anorexia.   Genitourinary: Negative.    Neurological: Negative.    Psychiatric/Behavioral: Negative.         Objective     Physical Exam:  /67   Pulse 63   Ht 147.3 cm (58\")   Wt 75.3 kg (166 lb)   BMI 34.69 kg/m²     Physical Exam  Constitutional:       Appearance: She is well-developed.   HENT:      Head: Normocephalic.   Neck:      Vascular: Normal carotid pulses. No carotid bruit or JVD.      Trachea: No tracheal tenderness or tracheal deviation.   Cardiovascular:      Rate and Rhythm: Regular rhythm.      Pulses: Normal pulses.      Heart sounds: Normal heart sounds.       with a " grade of 2/6.   Pulmonary:      Effort: Pulmonary effort is normal.      Breath sounds: No stridor.   Abdominal:      Palpations: Abdomen is soft.   Musculoskeletal:      Cervical back: No edema.   Skin:     General: Skin is warm.   Neurological:      Mental Status: She is alert.      Cranial Nerves: No cranial nerve deficit.      Sensory: No sensory deficit.   Psychiatric:         Speech: Speech normal.         Behavior: Behavior normal.         Results Review:    Diagnostic Data:  MRI Angiogram Neck Without Contrast     Result Date: 6/21/2024  Narrative: EXAMINATION: MRI ANGIOGRAM NECK WO CONTRAST- 6/21/2024 9:20 AM  HISTORY: Carotid artery stenosis.  REPORT: 3D time-of-flight MR a of the neck was performed without intravenous contrast to evaluate the carotid and vertebral arteries.  COMPARISON: Ultrasound of the carotid arteries 5/20/2024.  There is mild motion artifact. On the right, the common carotid artery is patent, there is limited visualization of the vessel origin. There is narrowing of the proximal right ICA with estimated 2D diameter of 2.8 mm, centered approximately 1.3 cm from its origin. Compared with the more distal caliber measurements this stenosis is less than 50%. The right external carotid artery is patent. The distal right ICA appears normally patent.  On the left, the common carotid artery is patent, with limited visualization at its origin and proximal segment due to tortuosity. There is no significant stenosis involving the left ICA. There is moderate focal stenosis of the left external carotid artery approximately 9 mm distal to its origin, without occlusion. The distal left ICA appears normally patent.  Both vertebral arteries are patent, the left vertebral artery appears dominant compared to the right. No evidence of vertebral dissection is identified.       Impression: 1. No flow-limiting stenosis involving the right or left ICA. 2. Patency of the vertebral arteries with no stenosis or  dissection. 3. Moderate short segment stenosis of the proximal left external carotid artery.  This report was signed and finalized on 6/21/2024 9:26 AM by Dr. Gabino Capone MD.         Right carotid: upon personal review, there is about 70% right carotid stenosis       History: Carotid occlusive disease     IMPRESSION:  Impression:  1. There is 50 to 69% stenosis of the right internal carotid artery.  2. There is less than 50% stenosis of the left internal carotid artery.  3. Antegrade flow is demonstrated in bilateral vertebral arteries.  4. There is heavy plaque burden at the right bifurcation. Further  imaging/evaluation may be warranted with a CTA of the neck.     Comments: Bilateral carotid vertebral arterial duplex scan was  performed.     Grayscale imaging shows intimal thickening and calcified elements at the  carotid bifurcation. The right internal carotid artery peak systolic  velocity is 187 cm/sec. The end-diastolic velocity is 21.4 cm/sec. The  right ICA/CCA ratio is approximately 1.8. These findings correlate with  50 to 69% stenosis of the right internal carotid artery.     Grayscale imaging shows intimal thickening and calcified elements at the  carotid bifurcation. The left internal carotid artery peak systolic  velocity is 110.6 cm/sec. The end-diastolic velocity is 20.7 cm/sec. The  left ICA/CCA ratio is approximately 0.7. These findings correlate with  less than 50% stenosis of the left internal carotid artery.     Antegrade flow is demonstrated in bilateral vertebral arteries.  There is greater than 50% stenosis in the left common carotid artery and  bilateral external carotid arteries.     This report was signed and finalized on 5/21/2024 1:12 PM by Dr. Junior Meyers MD.       Narrative & Impression   EXAMINATION: MRI ANGIOGRAM NECK WO CONTRAST- 6/21/2024 9:20 AM     HISTORY: Carotid artery stenosis.     REPORT: 3D time-of-flight MR a of the neck was performed without  intravenous  contrast to evaluate the carotid and vertebral arteries.     COMPARISON: Ultrasound of the carotid arteries 5/20/2024.     There is mild motion artifact. On the right, the common carotid artery  is patent, there is limited visualization of the vessel origin. There is  narrowing of the proximal right ICA with estimated 2D diameter of 2.8  mm, centered approximately 1.3 cm from its origin. Compared with the  more distal caliber measurements this stenosis is less than 50%. The  right external carotid artery is patent. The distal right ICA appears  normally patent.     On the left, the common carotid artery is patent, with limited  visualization at its origin and proximal segment due to tortuosity.  There is no significant stenosis involving the left ICA. There is  moderate focal stenosis of the left external carotid artery  approximately 9 mm distal to its origin, without occlusion. The distal  left ICA appears normally patent.     Both vertebral arteries are patent, the left vertebral artery appears  dominant compared to the right. No evidence of vertebral dissection is  identified.     IMPRESSION:  1. No flow-limiting stenosis involving the right or left ICA.  2. Patency of the vertebral arteries with no stenosis or dissection.  3. Moderate short segment stenosis of the proximal left external carotid  artery.     This report was signed and finalized on 6/21/2024 9:26 AM by Dr. Gabino Capone MD.        US Carotid Bilateral [[id number]] (Order [id number])  Order  Status: Final result     Appointment Information    PACS Images     Radiology Images  Study Result    Narrative & Impression   History: Carotid occlusive disease     IMPRESSION:  Impression:  1. There is 50 to 69% stenosis of the right internal carotid artery.  2. There is less than 50% stenosis of the left internal carotid artery.  3. Antegrade flow is demonstrated in bilateral vertebral arteries.  4. There is heavy plaque burden at the right bifurcation.  Further  imaging/evaluation may be warranted with a CTA of the neck.       Results for orders placed during the hospital encounter of 05/19/24    Adult Transthoracic Echo Complete W/ Cont if Necessary Per Protocol (With Agitated Saline)    Interpretation Summary    Left ventricular systolic function is normal. Left ventricular ejection fraction appears to be 66 - 70%.    Left ventricular wall thickness is consistent with mild concentric hypertrophy.    Left ventricular diastolic function is consistent with (grade II w/high LAP) pseudonormalization.    Normal right ventricular cavity size and systolic function noted.    Saline test results are negative for right to left atrial level shunt.    Severe mitral annular calcification is present. There is moderate calcification of the mitral valve. Mild mitral valve regurgitation is present. No significant mitral valve stenosis is present.    Mild pulmonary hypertension is present.          ____________________________________________________________________________________________________________________________________________  Health maintenance and recommendations    Low salt/ HTN/ Heart healthy carbohydrate restricted cardiac diet   The patient is advised to reduce or avoid caffeine or other cardiac stimulants.   Minimize or avoid  NSAID-type medications      Monitor for any signs of bleeding including red or dark stools. Fall precautions.   Advised staying uptodate with immunizations per established standard guidelines.    Offered to give patient  a copy of my notes     Questions were encouraged, asked and answered to the patient's  understanding and satisfaction. Questions if any regarding current medications and side effects, need for refills and importance of compliance to medications stressed.    Reviewed available prior notes, consults, prior visits, laboratory findings, radiology and cardiology relevant reports. Updated chart as applicable. I have reviewed the  patient's medical history in detail and updated the computerized patient record as relevant.      Updated patient regarding any new or relevant abnormalities on review of records or any new findings on physical exam. Mentioned to patient about purpose of visit and desirable health short and long term goals and objectives.    Primary to monitor CBC CMP Lipid panel and TSH as applicable    ___________________________________________________________________________________________________________________________________________   Procedures    Assessment & Plan   Diagnoses and all orders for this visit:    1. Mixed hyperlipidemia (Primary)    2. Essential hypertension    3. 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 SMITH (dyspnea on exertion)  -     Adult Stress Echo W/ Cont or Stress Agent if Necessary Per Protocol; Future    6. Abnormal ECG  -     Adult Stress Echo W/ Cont or Stress Agent if Necessary Per Protocol; Future          Plan    Patient expressed understanding  Encouraged and answered all questions   Discussed with the patient and all questioned fully answered. She will call me if any problems arise.   Discussed results of prior testing with patient : echo   as well electrocardiogram      Orders Placed This Encounter   Procedures    Adult Stress Echo W/ Cont or Stress Agent if Necessary Per Protocol     Standing Status:   Future     Standing Expiration Date:   7/2/2025     Order Specific Question:   What stress agent will be used?     Answer:   Dobutamine     Order Specific Question:   Difficulty walking criteria?     Answer:   Musculoskeletal (hips, knees, feet, back, amputee)     Order Specific Question:   Reason for exam?     Answer:   Dyspnea     Order Specific Question:   Release to patient     Answer:   Routine Release [[id number]]        Further workup and treatment pending results of above testing   Patient will call for results for surgical clearance     She  will call primary for possible antibiotics for redness right shin   Keep LDL below 70 mg/dl. Monitor liver and renal functions.   Monitor CBC, CMP, TSH (as indicated) and Lipid Panel by primary     Check BP and heart rates twice daily initially till blood pressures and heart rates under good control and then at least 3x / week,   If blood pressures continue to be well-controlled then can check week a month  at home and bring a recording for review next visit  If BP >130/85 or < 100/60 persistently over 3 reading 30 mins apart or if heart rates persistently above 100 bpm or less than 55 bpm call sooner for evaluation and advise               Return in about 6 weeks (around 8/13/2024).

## 2024-07-03 ENCOUNTER — OFFICE VISIT (OUTPATIENT)
Dept: NEUROLOGY | Facility: CLINIC | Age: 84
End: 2024-07-03
Payer: MEDICARE

## 2024-07-03 VITALS
HEIGHT: 58 IN | RESPIRATION RATE: 17 BRPM | BODY MASS INDEX: 34.85 KG/M2 | HEART RATE: 64 BPM | SYSTOLIC BLOOD PRESSURE: 124 MMHG | DIASTOLIC BLOOD PRESSURE: 80 MMHG | WEIGHT: 166 LBS

## 2024-07-03 DIAGNOSIS — E66.09 CLASS 1 OBESITY DUE TO EXCESS CALORIES WITH SERIOUS COMORBIDITY AND BODY MASS INDEX (BMI) OF 34.0 TO 34.9 IN ADULT: ICD-10-CM

## 2024-07-03 DIAGNOSIS — N18.9 CHRONIC KIDNEY DISEASE, UNSPECIFIED CKD STAGE: ICD-10-CM

## 2024-07-03 DIAGNOSIS — Z85.3 HISTORY OF BREAST CANCER: ICD-10-CM

## 2024-07-03 DIAGNOSIS — Z91.81 AT HIGH RISK FOR FALLS: ICD-10-CM

## 2024-07-03 DIAGNOSIS — I67.1 ANEURYSM OF CAVERNOUS PORTION OF INTERNAL CAROTID ARTERY: ICD-10-CM

## 2024-07-03 DIAGNOSIS — I65.21 SYMPTOMATIC STENOSIS OF RIGHT CAROTID ARTERY: ICD-10-CM

## 2024-07-03 DIAGNOSIS — I10 ESSENTIAL HYPERTENSION: ICD-10-CM

## 2024-07-03 DIAGNOSIS — Z85.44 HISTORY OF CANCER OF VULVA: ICD-10-CM

## 2024-07-03 DIAGNOSIS — Z86.73 HISTORY OF TIA (TRANSIENT ISCHEMIC ATTACK): Primary | ICD-10-CM

## 2024-07-03 DIAGNOSIS — E78.5 HYPERLIPIDEMIA LDL GOAL <70: ICD-10-CM

## 2024-07-03 PROCEDURE — 3074F SYST BP LT 130 MM HG: CPT

## 2024-07-03 PROCEDURE — G2212 PROLONG OUTPT/OFFICE VIS: HCPCS

## 2024-07-03 PROCEDURE — 99215 OFFICE O/P EST HI 40 MIN: CPT

## 2024-07-03 PROCEDURE — 3079F DIAST BP 80-89 MM HG: CPT

## 2024-07-03 PROCEDURE — 1160F RVW MEDS BY RX/DR IN RCRD: CPT

## 2024-07-03 PROCEDURE — 1159F MED LIST DOCD IN RCRD: CPT

## 2024-07-03 NOTE — PROGRESS NOTES
Neurology Consult Note    [facility]  [address], [address]  [place] [address]  Phone: [phone]  Fax: [phone]    Referring Provider:   No ref. provider found  Primary Care Provider:  Shaheen Akbar DO    Reason for Consult:  Hospital followup    Subjective      Dago Nunez presents to [facility]    History of Present Illness  83-year-old female seen today for hospital follow-up.  Patient presented to our emergency room on 5/19/2024 with complaints of left-sided weakness.  Last known well was the day prior at 10 PM.  Additionally ER physician noted left facial droop, dysarthria, some aphasia and left hand weakness.  Code stroke not activated due to last known well greater than 4.5 hours as well as NIH score of only 3.  She was not a thrombolytic candidate due to presenting outside the treatment window.  Additionally not a thrombectomy candidate due to low NIH.  Additionally CTA was not performed due to CKD.  Patient was seen by neurology on 5/20/2024.    Review of this shows patient was admitted for left facial droop and dysarthria.  CT brain was unremarkable.  Patient was not on aspirin at the time of her event due to history of nosebleeds.  Stroke workup was ordered.MRA head and neck did reveal bilateral aneurysms which arose from the superior aspect of the cavernous carotid arteries.  Right measured 4 to 5 mm in the left measure 2 to 3 mm.  There is also a questionable beaded appearance of the right superior cerebellar artery.  Concern of fibromuscular dysplasia per radiology.      Patient was ultimately discharged home with home health.  She was started on cefdinir due to urinary tract infection.  Additionally she was started on dual antiplatelet therapy for recommended 21 days with plans to continue aspirin monotherapy.  Her pravastatin 40 mg was also continued.    Patient does follow with Dr. Stafford of our oncology department.  She did see  him on 6/19/2024.  Review that record shows patient has a stage Ia receptor positive breast cancer.  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  Patient is known to be intolerant to oral iron.  She did receive chemotherapy and radiation for both treatments.    Patient did follow with Dr. Meyers on 6/25/2024.  Patient was referred by PCPs office for bilateral carotid artery aneurysms.  Per review the record shows patient was sent home on aspirin and Plavix however hemoglobin dropped to 6.6.  She was maintained on aspirin and Pravachol.  Dr. Meyers did review the MRA neck.  He felt there is approximately 70% stenosis of the right carotid artery.  He did feel patient would be a candidate for a right transcarotid artery revascularization procedure due to being symptomatic.  Patient and daughter wanted to discuss treatment options.    Today patient presents with her spouse and son.  Reports no recurrent stroke symptoms since last being seen.  Facial droop and dysarthria resolved within approximately 24 hours.  She tells me she has stopped both aspirin and Plavix due to bleeding.  Patient's son and spouse also note patient is no longer taking aspirin.  She is still compliant with Pravachol.  She has not been seen by [facility] as discussed in her discharge summary.  We did have an extensive discussion about this today in clinic.  I am not entirely sure patient nor her spouse understands current medical situation regards to TIA, aneurysms and stroke prevention.  Patient's main complaints today include right leg being swollen from known cellulitis.  Patient Active Problem List   Diagnosis    Meatal stenosis    Retention of urine    Type 2 diabetes mellitus, without long-term current use of insulin    Essential hypertension    Grief reaction    Vulvar intraepithelial neoplasia (MOODY) grade 3    Chronic midline low  back pain without sciatica    Bilateral lower extremity edema    History of urethral stricture    Epidermal cyst of neck    Normocytic anemia    Neck abscess    Mixed hyperlipidemia    Gastroesophageal reflux disease without esophagitis    Anxiety    Iron deficiency and chemotherapy induced anemia    Stage 3 chronic kidney disease    Anemia    Preop cardiovascular exam    Lower extremity edema    Vulvar cancer, carcinoma    Former smoker    Secondary malignancy of inguinal lymph nodes    Febrile illness    Chemotherapy-induced thrombocytopenia    Hyponatremia    Hypokalemia    Neutropenic fever    Moderate malnutrition    Antineoplastic chemotherapy induced anemia    History of radiation therapy    Encounter for care related to Port-a-Cath    Malignant neoplasm of upper-outer quadrant of left breast in female, estrogen receptor positive    Encounter for care related to vascular access port    Angiodysplasia    Bronchitis    Obesity (BMI 30-39.9)    Wheezing    Cough    Post-COVID-19 condition    Radiation induced proctitis    Rectal bleeding    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    CVA (cerebral vascular accident)    Transient ischemic attack (TIA)        Past Medical History:   Diagnosis Date    Anemia in stage 3 chronic kidney disease 11/11/2019    Arthritis     Breast cancer 09/14/2021    Left Mastectomy w/ sentinel node Bx    Bronchitis     Cellulitis     ROSA LEGS    GERD (gastroesophageal reflux disease)     Hyperlipidemia     Hypertension     Kyphosis     Osteoporosis     Personal history of COVID-19 05/2022    Scoliosis     Self-catheterizes urinary bladder     10FR SIZED CATH    Stage 3 chronic kidney disease 11/11/2019    Type 2 diabetes mellitus     Urethral meatal stenosis 09/2022    w/ urine retention    Vulva cancer     Vulvar intraepithelial neoplasia (MOODY) grade 3         Social History     Socioeconomic History    Marital status:    Tobacco Use    Smoking status: Former     Current  packs/day: 0.25     Average packs/day: 0.3 packs/day for 3.0 years (0.8 ttl pk-yrs)     Types: Cigarettes     Passive exposure: Past    Smokeless tobacco: Never    Tobacco comments:     social smoker in college   Vaping Use    Vaping status: Never Used   Substance and Sexual Activity    Alcohol use: Not Currently     Comment: Occasional glass of wine    Drug use: No    Sexual activity: Defer        Allergies   Allergen Reactions    Scopolamine Swelling     Other reaction(s): ANGIOEDEMA        Amoxicillin-Pot Clavulanate Rash    Keflex [Cephalexin] Rash    Septra [Sulfamethoxazole-Trimethoprim] Rash    Tequin [Gatifloxacin] Other (See Comments)     Doesn't remember    Trovan [Alatrofloxacin] Dizziness          Current Outpatient Medications:     Acetaminophen (TYLENOL ARTHRITIS PAIN PO), Take 1 tablet by mouth Daily As Needed (BACK PAIN)., Disp: , Rfl:     albuterol sulfate  (90 Base) MCG/ACT inhaler, Inhale 2 puffs 4 (Four) Times a Day., Disp: 54 g, Rfl: 3    bisoprolol-hydrochlorothiazide (ZIAC) 5-6.25 MG per tablet, Take 1 tablet by mouth Daily., Disp: 90 tablet, Rfl: 1    bumetanide (BUMEX) 1 MG tablet, Take 1 tablet by mouth 2 (Two) Times a Day., Disp: 180 tablet, Rfl: 1    cetirizine (zyrTEC) 10 MG tablet, Take 1 tablet by mouth Daily., Disp: , Rfl:     citalopram (CeleXA) 20 MG tablet, Take 1 tablet by mouth Daily., Disp: 90 tablet, Rfl: 1    cyanocobalamin 1000 MCG/ML injection, Inject 1 mL into the appropriate muscle as directed by prescriber Every 30 (Thirty) Days., Disp: , Rfl:     diphenoxylate-atropine (LOMOTIL) 2.5-0.025 MG per tablet, Take 2 tablets by mouth 4 (Four) Times a Day As Needed for Diarrhea., Disp: , Rfl:     esomeprazole (nexIUM) 40 MG capsule, Take 1 capsule by mouth 2 (Two) Times a Day., Disp: 180 capsule, Rfl: 3    Homeopathic Products (LEG CRAMPS) tablet, Take 1 tablet by mouth Daily., Disp: , Rfl:     HYDROcodone-acetaminophen (NORCO)  MG per tablet, Take 0.5 tablets by  "mouth Every 4 (Four) Hours As Needed for Moderate Pain or Severe Pain., Disp: 60 tablet, Rfl: 0    letrozole (FEMARA) 2.5 MG tablet, Take 1 tablet by mouth Daily., Disp: , Rfl:     metaxalone (Skelaxin) 800 MG tablet, Take 1 tablet by mouth 3 (Three) Times a Day As Needed for Muscle Spasms., Disp: 30 tablet, Rfl: 0    olmesartan (BENICAR) 20 MG tablet, Take 1 tablet by mouth Daily., Disp: 90 tablet, Rfl: 2    phenazopyridine (PYRIDIUM) 200 MG tablet, Take 1 tablet by mouth 3 (Three) Times a Day As Needed for Dysuria., Disp: 15 tablet, Rfl: 0    potassium chloride ER (K-TAB) 20 MEQ tablet controlled-release ER tablet, Take 2 tablets by mouth Daily., Disp: , Rfl:     pravastatin (PRAVACHOL) 40 MG tablet, Take 1 tablet by mouth Every Night., Disp: 90 tablet, Rfl: 0    saccharomyces boulardii (Florastor) 250 MG capsule, Take 1 capsule by mouth 2 (Two) Times a Day., Disp: 60 capsule, Rfl: 0    sodium bicarbonate 650 MG tablet, Take 1 tablet by mouth 2 (Two) Times a Day., Disp: , Rfl:     Syringe 25G X 1\" 3 ML misc, 1 each Daily., Disp: 25 each, Rfl: 1    vitamin D (ERGOCALCIFEROL) 1.25 MG (44535 UT) capsule capsule, Take 1 capsule by mouth 1 (One) Time Per Week. Thursdays, Disp: , Rfl:     aspirin 81 MG chewable tablet, Chew 1 tablet Daily. (Patient not taking: Reported on 7/3/2024), Disp: 30 tablet, Rfl: 0    clopidogrel (PLAVIX) 75 MG tablet, TAKE 1 TABLET BY MOUTH 1 TIME DAILY (Patient not taking: Reported on 7/2/2024), Disp: 20 tablet, Rfl: 0    DULERA 100-5 MCG/ACT inhaler, Inhale 2 puffs 2 (Two) Times a Day. Rinse and spit after using. (Patient not taking: Reported on 7/3/2024), Disp: 6 inhaler, Rfl: 0  No current facility-administered medications for this visit.    Facility-Administered Medications Ordered in Other Visits:     heparin injection 500 Units, 500 Units, Intravenous, PRN, Rivera, Drake, MD, 500 Units at 07/01/21 1354    heparin injection 500 Units, 500 Units, Intravenous, Rivera DAMON Winston, MD, 500 " "Units at 01/11/22 1134    heparin injection 500 Units, 500 Units, IntravenousMARISOL Chua, Winston, MD, 500 Units at 09/28/22 1418    heparin injection 500 Units, 500 Units, IntravenousMARISOL Chua, Winston, MD, 500 Units at 01/25/23 1537    heparin injection 500 Units, 500 Units, IntravenousMARISOL Chua, Winston, MD, 500 Units at 06/26/23 1426    heparin injection 500 Units, 500 Units, IntravenousMARISOL Chua, Winston, MD, 500 Units at 03/05/24 1350    sodium chloride 0.9 % flush 10 mL, 10 mL, IntravenousMARISOL Chua, Winston, MD, 10 mL at 07/01/21 1354    sodium chloride 0.9 % flush 10 mL, 10 mL, IntravenousMARISOL Chua, Winston, MD, 10 mL at 01/11/22 1134    sodium chloride 0.9 % flush 10 mL, 10 mL, IntravenousMARISOL Chua, Winston, MD, 10 mL at 09/28/22 1418    sodium chloride 0.9 % flush 10 mL, 10 mL, IntravenousMARISOL Chua, Winston, MD, 10 mL at 01/25/23 1537    sodium chloride 0.9 % flush 10 mL, 10 mL, IntravenousMARISOL Chua, Winston, MD, 10 mL at 06/26/23 1426    sodium chloride 0.9 % flush 10 mL, 10 mL, IntravenousMARISOL Chua, Winston, MD, 10 mL at 03/05/24 1350       Objective   Vital Signs:   /80 (BP Location: Right arm, Patient Position: Sitting)   Pulse 64   Resp 17   Ht 147.3 cm (58\")   Wt 75.3 kg (166 lb)   BMI 34.69 kg/m²       Physical Exam  Vitals and nursing note reviewed.   Constitutional:       Appearance: Normal appearance. She is obese.   HENT:      Head: Normocephalic.   Eyes:      General: Lids are normal.      Extraocular Movements: Extraocular movements intact.      Pupils: Pupils are equal, round, and reactive to light.   Neck:      Vascular: No carotid bruit.   Cardiovascular:      Rate and Rhythm: Normal rate and regular rhythm.      Heart sounds: Normal heart sounds.   Musculoskeletal:      Right lower leg: Tenderness present. 3+ Edema present.      Comments: Redness to shin   Skin:     General: Skin is warm and dry.   Neurological:      Mental Status: She is alert.      Motor: " Motor strength is normal.     Deep Tendon Reflexes: Reflexes are normal and symmetric.   Psychiatric:         Speech: Speech normal.        Neurological Exam  Mental Status  Alert. Oriented to person, place, time and situation. Speech is normal. Language is fluent with no aphasia.    Cranial Nerves  CN II: Visual fields full to confrontation.  CN III, IV, VI: Extraocular movements intact bilaterally. Normal lids and orbits bilaterally. Pupils equal round and reactive to light bilaterally.  CN V: Facial sensation is normal.  CN VII: Full and symmetric facial movement.  CN IX, X: Palate elevates symmetrically. Normal gag reflex.  CN XI: Shoulder shrug strength is normal.  CN XII: Tongue midline without atrophy or fasciculations.    Motor  Normal muscle bulk throughout. No fasciculations present. Normal muscle tone. No abnormal involuntary movements. Strength is 5/5 throughout all four extremities.    Sensory  Light touch is normal in upper and lower extremities.     Reflexes  Deep tendon reflexes are 2+ and symmetric in all four extremities.    Gait    Not assessed.      Result Review :   The following data was reviewed by: VINCENT Johnson on 07/03/2024:  CMP          5/21/2024    04:00 5/22/2024    04:09 6/14/2024    11:16   CMP   Glucose 112  120  184    BUN 31  32  51    Creatinine 2.07  1.86  2.03    EGFR 23.7  26.9  24.2    Sodium 141  140  139    Potassium 4.3  4.1  3.6    Chloride 103  101  95    Calcium 9.1  8.6  10.2    Total Protein   7.2    Albumin   4.0    Globulin   3.2    Total Bilirubin   0.4    Alkaline Phosphatase   55    AST (SGOT)   21    ALT (SGPT)   9    Albumin/Globulin Ratio   1.3    BUN/Creatinine Ratio 15.0  17.2  25.1    Anion Gap 9.0  11.0  15.0      CBC w/diff          5/22/2024    04:09 6/14/2024    11:16 6/19/2024    15:33   CBC w/Diff   WBC 6.34  8.66     RBC 2.25  1.95     Hemoglobin 7.7  6.6  9.2    Hematocrit 24.8  21.6  29.3    .2  110.8     MCH 34.2  33.8     MCHC 31.0   30.6     RDW 16.3  16.5     Platelets 147  175       Lipid Panel          2/2/2024    12:30 5/20/2024    04:38   Lipid Panel   Total Cholesterol 200  226    Triglycerides 133  153    HDL Cholesterol 59  47    VLDL Cholesterol 23  28    LDL Cholesterol  118  151     144    LDL/HDL Ratio 1.94  3.16        Most Recent A1C          5/20/2024    04:38   HGBA1C Most Recent   Hemoglobin A1C 5.80      CT Head Without Contrast (05/19/2024 22:06)  Study Result    Narrative & Impression   EXAMINATION: CT HEAD WO CONTRAST-      5/19/2024 8:52 PM     HISTORY: Acute stroke symptoms, follow-up. Difficulty walking.     In order to have a CT radiation dose as low as reasonably achievable  Automated Exposure Control was utilized for adjustment of the mA and/or  KV according to patient size.     CT Dose DLP = 748.8 mGy.cm.  (If there are multiple studies performed at the same time this  represents the total dose).     Comparison:  10/19/2020.     Axial, sagittal, and coronal noncontrast CT imaging of the head.     The visualized paranasal sinuses are clear.     The brain and ventricles have an age appropriate appearance.   Moderate cortical atrophy and mild small vessel disease.  There is no hemorrhage or mass-effect.   No acute infarction is seen.     No calvarial abnormality.     IMPRESSION:  1. No acute intracranial abnormality is seen.           This report was signed and finalized on 5/19/2024 10:09 PM by Dr. Miah Pineda MD.     US Carotid Bilateral (05/20/2024 11:27)    Study Result    Narrative & Impression   History: Carotid occlusive disease     IMPRESSION:  Impression:  1. There is 50 to 69% stenosis of the right internal carotid artery.  2. There is less than 50% stenosis of the left internal carotid artery.  3. Antegrade flow is demonstrated in bilateral vertebral arteries.  4. There is heavy plaque burden at the right bifurcation. Further  imaging/evaluation may be warranted with a CTA of the neck.     Comments:  Bilateral carotid vertebral arterial duplex scan was  performed.     Grayscale imaging shows intimal thickening and calcified elements at the  carotid bifurcation. The right internal carotid artery peak systolic  velocity is 187 cm/sec. The end-diastolic velocity is 21.4 cm/sec. The  right ICA/CCA ratio is approximately 1.8. These findings correlate with  50 to 69% stenosis of the right internal carotid artery.     Grayscale imaging shows intimal thickening and calcified elements at the  carotid bifurcation. The left internal carotid artery peak systolic  velocity is 110.6 cm/sec. The end-diastolic velocity is 20.7 cm/sec. The  left ICA/CCA ratio is approximately 0.7. These findings correlate with  less than 50% stenosis of the left internal carotid artery.     Antegrade flow is demonstrated in bilateral vertebral arteries.  There is greater than 50% stenosis in the left common carotid artery and  bilateral external carotid arteries.     This report was signed and finalized on 5/21/2024 1:12 PM by Dr. Junior Meyers MD.     MRI Brain Without Contrast (05/20/2024 17:16)    Study Result    Narrative & Impression   EXAMINATION: MRI BRAIN WO CONTRAST-  5/21/2024 10:09 AM     HISTORY: Stroke follow-up.     FINDINGS: Multiplanar fast spin echo sequences were obtained of the  brain on a high-field magnet without gadolinium enhancement.     On initial review of the study there was noted to be an abnormal  acquisition of the diffusion component of the study. There was  associated artifact. Therefore, the patient was brought back for repeat  imaging this morning for repeat diffusion imaging as a portion of the  initial exam.     Repeat diffusion imaging demonstrates no evidence of acute ischemia or  infarct. No discrete areas of diffusion restriction are present.     There is diffuse atrophy of the brain with prominence of the  subarachnoid spaces and ventricular enlargement. Moderate small vessel  ischemic changes are  noted involving the periventricular and higher  white matter tracts. There is nonspecific gliosis within the sloane. There  is no mass, mass effect or shift of the midline. No acute infarct or  hemorrhage. There are normal flow voids within the [place].     The orbits are unremarkable.     The visualized paranasal sinuses and mastoid air cells are normally  aerated.     IMPRESSION:  1. Atrophy. Moderate small vessel ischemic changes are noted involve the  periventricular and higher white matter tracts. No mass effect or shift  of the midline. 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  2. No evidence of diffusion restriction to suggest acute ischemia or  infarct. No acute or chronic hemorrhage.     This report was signed and finalized on 5/21/2024 10:14 AM by Dr. El Callahan MD.     MRI brain that was performed on 5/21/2024 was independently reviewed by me today and unable to find evidence of diffusion restriction.  No signs of acute stroke.    MRI Angiogram Head Without Contrast (05/20/2024 17:41)  Study Result    Narrative & Impression   EXAMINATION:  MRI ANGIOGRAM HEAD WO CONTRAST-  5/20/2024 4:10 PM     HISTORY: I63.9-Cerebral infarction, unspecified; N39.0-Urinary tract  infection, site not specified; N17.9-Acute kidney failure, unspecified;  N18.9-Chronic kidney disease, unspecified; R13.10-Dysphagia,  unspecified; Z74.09-Other reduced mobility.     TECHNIQUE: 3-D MR angiography was performed of the cerebral vasculature.     COMPARISON: No comparison study.     FINDINGS: The vertebral and basilar arteries are patent. There is  question of a slightly beaded appearance of the superior cerebellar  artery on the right side. The internal carotid arteries are patent  bilaterally. There are aneurysms arising from the superior aspect of the  cavernous carotid arteries bilaterally. The aneurysm on the right  measures 4-5 mm and the aneurysm on the left measures 2-3 mm. The  anterior,  middle and posterior cerebral arteries are patent. No  aneurysms are appreciated..        IMPRESSION:  1. 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  2. Question of a slightly beaded appearance of the right superior  cerebellar artery. This raises the possibility of fibromuscular  dysplasia.  3. No large vessel occlusion is seen.           This report was signed and finalized on 5/20/2024 7:43 PM by Dr. Zander Alberts MD.     MRI Angiogram Neck Without Contrast (06/21/2024 08:48)    Study Result    Narrative & Impression   EXAMINATION: MRI ANGIOGRAM NECK WO CONTRAST- 6/21/2024 9:20 AM     HISTORY: Carotid artery stenosis.     REPORT: 3D time-of-flight MR a of the neck was performed without  intravenous contrast to evaluate the carotid and vertebral arteries.     COMPARISON: Ultrasound of the carotid arteries 5/20/2024.     There is mild motion artifact. On the right, the common carotid artery  is patent, there is limited visualization of the vessel origin. There is  narrowing of the proximal right ICA with estimated 2D diameter of 2.8  mm, centered approximately 1.3 cm from its origin. Compared with the  more distal caliber measurements this stenosis is less than 50%. The  right external carotid artery is patent. The distal right ICA appears  normally patent.     On the left, the common carotid artery is patent, with limited  visualization at its origin and proximal segment due to tortuosity.  There is no significant stenosis involving the left ICA. There is  moderate focal stenosis of the left external carotid artery  approximately 9 mm distal to its origin, without occlusion. The distal  left ICA appears normally patent.     Both vertebral arteries are patent, the left vertebral artery appears  dominant compared to the right. No evidence of vertebral dissection is  identified.     IMPRESSION:  1. No flow-limiting  stenosis involving the right or left ICA.  2. Patency of the vertebral arteries with no stenosis or dissection.  3. Moderate short segment stenosis of the proximal left external carotid  artery.     This report was signed and finalized on 6/21/2024 9:26 AM by Dr. Gabino Capone MD.                     Impression:  Dago Nunez is a 83 y.o. female who presents for hospital follow-up of TIA.  Patient's workup did reveal several findings to include bilateral cavernous carotid artery aneurysms, a right internal carotid artery stenosis of approximately 70%, hyperlipidemia with an LDL of 144 as well as signs of anemia.  In regards to being on aspirin, currently patient and family reports she is no longer taking aspirin.  I do think it be beneficial for patient to continue aspirin 81 mg.  However with her history of anemia and reported nosebleeds, it would be a risk versus benefit discussion.  I would like to obtain recommendations from oncology in regards to this.  They are treating her for macrocytic anemia.  Certainly the right ICA stenosis could have contributed to her TIA symptoms.  I would recommend further treatment by Dr. Meyers.  Regards to her aneurysms, they are small however with the concerns being bilateral as well as the beadlike appearance of the cerebellar artery, I would like for her to be seen by a endovascular neurosurgery through [facility].  She also had concerns of fibromuscular dysplasia based off these findings per radiology.  I do recommend continuing strict secondary stroke preventative strategies due to patient's concurrent comorbidities.    Diagnoses and all orders for this visit:    1. History of TIA (transient ischemic attack) (Primary)    2. Aneurysm of cavernous portion of internal carotid artery  -     Ambulatory Referral to Neurosurgery    3. Symptomatic stenosis of right carotid artery    4. Class 1 obesity due to excess calories with serious comorbidity and body mass index (BMI)  of 34.0 to 34.9 in adult    5. Hyperlipidemia LDL goal <70    6. Essential hypertension    7. Chronic kidney disease, unspecified CKD stage    8. History of breast cancer    9. History of cancer of vulva    10. At high risk for falls        Plan:  Continue to hold on aspirin until clearance from hematology.  If patient is on currently, she may continue at 81 mg daily.  Continue Pravachol 40 mg daily  LDL goal less than 70.  Currently 144.  A1c goal less than 6.5.  Currently at goal at 5.8.  BP goal less than 130/80.  Currently at goal of 124/80 in clinic.  Return to the emergency room for any new stroke symptoms  Recommend Mediterranean-style diet  Referral to [facility] Dr. Rene Rivero  Follow-up with vascular surgery as scheduled  Follow-up with hematology and oncology as scheduled  Follow-up with PCP as scheduled  Follow-up in our clinic in 6 months or sooner if needed    The patient and I have discussed the plan of care and she is in full agreement at this time.   I have spent a total of 68 minutes on this encounter.  This includes reviewing prior records, obtaining a thorough HPI, assessment of patient, developing a plan of care with the patient and her family, extensive patient and family discussion, extensive patient and family education as well as documentation.  Follow Up   Return in about 6 months (around 1/3/2025) for TIA.            VINCENT Johnson  07/03/24  16:27 CDT

## 2024-07-03 NOTE — PATIENT INSTRUCTIONS
Continue holding aspirin and plavix due to bleeding and low blood counts.   Continue pravastatin 40mg   I will send referral to [facility] for aneurysms (Kennedy Rivero)  Call to Followup with Dr. Meyers for carotid artery.   Return to the emergency department any new stroke symptoms  Mediterranean diet

## 2024-07-05 ENCOUNTER — TELEPHONE (OUTPATIENT)
Dept: CARDIOLOGY | Facility: CLINIC | Age: 84
End: 2024-07-05
Payer: MEDICARE

## 2024-07-05 LAB
BH CV STRESS BP STAGE 1: NORMAL
BH CV STRESS BP STAGE 2: NORMAL
BH CV STRESS BP STAGE 3: NORMAL
BH CV STRESS BP STAGE 4: NORMAL
BH CV STRESS BP STAGE 5: NORMAL
BH CV STRESS DOSE DOBUTAMINE STAGE 1: 10
BH CV STRESS DOSE DOBUTAMINE STAGE 2: 20
BH CV STRESS DOSE DOBUTAMINE STAGE 3: 30
BH CV STRESS DOSE DOBUTAMINE STAGE 4: 40
BH CV STRESS DOSE DOBUTAMINE STAGE 5: 50
BH CV STRESS DURATION MIN STAGE 1: 3
BH CV STRESS DURATION MIN STAGE 2: 3
BH CV STRESS DURATION MIN STAGE 3: 3
BH CV STRESS DURATION MIN STAGE 4: 3
BH CV STRESS DURATION MIN STAGE 5: 2
BH CV STRESS DURATION SEC STAGE 1: 0
BH CV STRESS DURATION SEC STAGE 2: 0
BH CV STRESS DURATION SEC STAGE 3: 0
BH CV STRESS DURATION SEC STAGE 4: 0
BH CV STRESS DURATION SEC STAGE 5: 4
BH CV STRESS ECHO POST STRESS EJECTION FRACTION EF: 65 %
BH CV STRESS HR STAGE 1: 65
BH CV STRESS HR STAGE 2: 72
BH CV STRESS HR STAGE 3: 90
BH CV STRESS HR STAGE 4: 103
BH CV STRESS HR STAGE 5: 106
BH CV STRESS PROTOCOL 1: NORMAL
BH CV STRESS RECOVERY BP: NORMAL MMHG
BH CV STRESS RECOVERY HR: 82 BPM
BH CV STRESS STAGE 1: 1
BH CV STRESS STAGE 2: 2
BH CV STRESS STAGE 3: 3
BH CV STRESS STAGE 4: 4
BH CV STRESS STAGE 5: 5
MAXIMAL PREDICTED HEART RATE: 139 BPM
PERCENT MAX PREDICTED HR: 76.26 %
STRESS BASELINE BP: NORMAL MMHG
STRESS BASELINE HR: 62 BPM
STRESS PERCENT HR: 90 %
STRESS POST EXERCISE DUR MIN: 14 MIN
STRESS POST EXERCISE DUR SEC: 4 SEC
STRESS POST PEAK BP: NORMAL MMHG
STRESS POST PEAK HR: 106 BPM
STRESS TARGET HR: 118 BPM

## 2024-07-05 NOTE — TELEPHONE ENCOUNTER
----- Message from Tam Maldonado sent at 7/5/2024 10:20 AM CDT -----  Regarding: Cleared for surgery  Equivocal stress echo for ischemia  Patient did not have any chest pain  Overall appears to be at low risk dobutamine stress test  Acceptable cardiovascular risk planned surgery  Keep follow-up appointment in the office

## 2024-07-05 NOTE — TELEPHONE ENCOUNTER
OKAY FOR HUB TO RELAY    CALL PLACED TO PT - RECEIVED DAUGHTERS VOICEMAIL. MSG LEFT NOTIFYING THEM OF LOW RISK STRESS TEST, FOLLOW UP APPOINTMENT & CLEARANCE TO PROCEED WITH SURGERY.

## 2024-07-08 ENCOUNTER — TELEPHONE (OUTPATIENT)
Dept: VASCULAR SURGERY | Facility: CLINIC | Age: 84
End: 2024-07-08
Payer: MEDICARE

## 2024-07-08 DIAGNOSIS — R21 SKIN RASH: Primary | ICD-10-CM

## 2024-07-08 DIAGNOSIS — M62.830 BACK SPASM: ICD-10-CM

## 2024-07-08 RX ORDER — CLINDAMYCIN PHOSPHATE 10 UG/ML
LOTION TOPICAL
Qty: 60 ML | Refills: 0 | Status: SHIPPED | OUTPATIENT
Start: 2024-07-08

## 2024-07-08 RX ORDER — METAXALONE 800 MG/1
TABLET ORAL
Qty: 30 TABLET | Refills: 0 | Status: SHIPPED | OUTPATIENT
Start: 2024-07-08

## 2024-07-08 NOTE — TELEPHONE ENCOUNTER
Caller: JÚNIOR RICHARDS    Relationship to patient: Emergency Contact    Best call back number:     [phone] (Home)       Chief complaint: SURGERY SCHEDULING    Type of visit: SURGERY    Requested date: FIRST AVAILABLE     If rescheduling, when is the original appointment: N/A     Additional notes:PT SAW DR NATH AND HE ASKED PT TO GET CARDIAC CLEARANCE FROM CARDIOLOGY AND HE WOULD WORK HER IN ON THE SURGERY SCHEDULE.     PT OBTAINED CARDIAC CLEARANCE FROM DR HUGGINS ON 7/2/24.     PLEASE CALL PT'S DAUGHTER WITH SURGERY DETAILS.

## 2024-07-09 NOTE — TELEPHONE ENCOUNTER
Returned call to patient's daughter.  Clearance was placed in the chart on 07/05/24.  Sent message for order to be placed in the system and then will contact to schedule procedure.  Pt daughter expressed understanding at this time.

## 2024-07-10 ENCOUNTER — PREP FOR SURGERY (OUTPATIENT)
Dept: OTHER | Facility: HOSPITAL | Age: 84
End: 2024-07-10
Payer: MEDICARE

## 2024-07-10 DIAGNOSIS — Z79.02 ENCOUNTER FOR MONITORING ANTIPLATELET THERAPY: ICD-10-CM

## 2024-07-10 DIAGNOSIS — I65.21 STENOSIS OF RIGHT CAROTID ARTERY: Primary | ICD-10-CM

## 2024-07-10 DIAGNOSIS — I65.21 SYMPTOMATIC STENOSIS OF RIGHT CAROTID ARTERY: ICD-10-CM

## 2024-07-10 DIAGNOSIS — Z01.818 PREOP TESTING: ICD-10-CM

## 2024-07-10 DIAGNOSIS — Z51.81 ENCOUNTER FOR MONITORING ANTIPLATELET THERAPY: ICD-10-CM

## 2024-07-10 RX ORDER — CLINDAMYCIN PHOSPHATE 900 MG/50ML
900 INJECTION, SOLUTION INTRAVENOUS ONCE
OUTPATIENT
Start: 2024-07-10 | End: 2024-07-10

## 2024-07-12 PROBLEM — I65.21 STENOSIS OF RIGHT CAROTID ARTERY: Status: ACTIVE | Noted: 2024-07-10

## 2024-07-15 ENCOUNTER — TELEPHONE (OUTPATIENT)
Dept: VASCULAR SURGERY | Facility: CLINIC | Age: 84
End: 2024-07-15
Payer: MEDICARE

## 2024-07-15 NOTE — TELEPHONE ENCOUNTER
Pt daughter expressed understanding of prework/surgery time and instruction for procedure scheduled 08/05/24 with Dr. Meyers.  NPO after midnight.  Pt to continue aspirin, plavix, and pravachol uninterrupted prior to procedure.  Pt will not take morning of procedure.

## 2024-07-17 ENCOUNTER — LAB (OUTPATIENT)
Dept: LAB | Facility: HOSPITAL | Age: 84
End: 2024-07-17
Payer: MEDICARE

## 2024-07-17 DIAGNOSIS — C51.9 VULVAR CANCER, CARCINOMA: ICD-10-CM

## 2024-07-17 DIAGNOSIS — D50.8 IRON DEFICIENCY ANEMIA SECONDARY TO INADEQUATE DIETARY IRON INTAKE: ICD-10-CM

## 2024-07-17 LAB
BASOPHILS # BLD AUTO: 0.03 10*3/MM3 (ref 0–0.2)
BASOPHILS NFR BLD AUTO: 0.3 % (ref 0–1.5)
DEPRECATED RDW RBC AUTO: 63.5 FL (ref 37–54)
EOSINOPHIL # BLD AUTO: 0.24 10*3/MM3 (ref 0–0.4)
EOSINOPHIL NFR BLD AUTO: 2.8 % (ref 0.3–6.2)
ERYTHROCYTE [DISTWIDTH] IN BLOOD BY AUTOMATED COUNT: 17.1 % (ref 12.3–15.4)
FERRITIN SERPL-MCNC: 1290 NG/ML (ref 13–150)
HCT VFR BLD AUTO: 28.1 % (ref 34–46.6)
HGB BLD-MCNC: 9 G/DL (ref 12–15.9)
IMM GRANULOCYTES # BLD AUTO: 0.04 10*3/MM3 (ref 0–0.05)
IMM GRANULOCYTES NFR BLD AUTO: 0.5 % (ref 0–0.5)
IRON 24H UR-MRATE: 54 MCG/DL (ref 37–145)
IRON SATN MFR SERPL: 17 % (ref 20–50)
LYMPHOCYTES # BLD AUTO: 0.99 10*3/MM3 (ref 0.7–3.1)
LYMPHOCYTES NFR BLD AUTO: 11.4 % (ref 19.6–45.3)
MCH RBC QN AUTO: 32.4 PG (ref 26.6–33)
MCHC RBC AUTO-ENTMCNC: 32 G/DL (ref 31.5–35.7)
MCV RBC AUTO: 101.1 FL (ref 79–97)
MONOCYTES # BLD AUTO: 0.62 10*3/MM3 (ref 0.1–0.9)
MONOCYTES NFR BLD AUTO: 7.2 % (ref 5–12)
NEUTROPHILS NFR BLD AUTO: 6.73 10*3/MM3 (ref 1.7–7)
NEUTROPHILS NFR BLD AUTO: 77.8 % (ref 42.7–76)
NRBC BLD AUTO-RTO: 0 /100 WBC (ref 0–0.2)
PLATELET # BLD AUTO: 190 10*3/MM3 (ref 140–450)
PMV BLD AUTO: 11 FL (ref 6–12)
RBC # BLD AUTO: 2.78 10*6/MM3 (ref 3.77–5.28)
TIBC SERPL-MCNC: 314 MCG/DL (ref 298–536)
TRANSFERRIN SERPL-MCNC: 211 MG/DL (ref 200–360)
WBC NRBC COR # BLD AUTO: 8.65 10*3/MM3 (ref 3.4–10.8)

## 2024-07-17 PROCEDURE — 36415 COLL VENOUS BLD VENIPUNCTURE: CPT

## 2024-07-17 PROCEDURE — 84466 ASSAY OF TRANSFERRIN: CPT

## 2024-07-17 PROCEDURE — 82728 ASSAY OF FERRITIN: CPT

## 2024-07-17 PROCEDURE — 85025 COMPLETE CBC W/AUTO DIFF WBC: CPT

## 2024-07-17 PROCEDURE — 83540 ASSAY OF IRON: CPT

## 2024-07-24 ENCOUNTER — OFFICE VISIT (OUTPATIENT)
Dept: FAMILY MEDICINE CLINIC | Facility: CLINIC | Age: 84
End: 2024-07-24
Payer: MEDICARE

## 2024-07-24 ENCOUNTER — PRE-ADMISSION TESTING (OUTPATIENT)
Dept: PREADMISSION TESTING | Facility: HOSPITAL | Age: 84
End: 2024-07-24
Payer: MEDICARE

## 2024-07-24 ENCOUNTER — HOSPITAL ENCOUNTER (OUTPATIENT)
Dept: GENERAL RADIOLOGY | Facility: HOSPITAL | Age: 84
Discharge: HOME | End: 2024-07-24
Payer: MEDICARE

## 2024-07-24 VITALS
OXYGEN SATURATION: 98 % | WEIGHT: 163.14 LBS | SYSTOLIC BLOOD PRESSURE: 154 MMHG | BODY MASS INDEX: 34.24 KG/M2 | HEART RATE: 85 BPM | HEIGHT: 58 IN | RESPIRATION RATE: 18 BRPM | DIASTOLIC BLOOD PRESSURE: 65 MMHG

## 2024-07-24 VITALS
TEMPERATURE: 97.4 F | BODY MASS INDEX: 33.8 KG/M2 | OXYGEN SATURATION: 99 % | DIASTOLIC BLOOD PRESSURE: 70 MMHG | SYSTOLIC BLOOD PRESSURE: 130 MMHG | HEIGHT: 58 IN | RESPIRATION RATE: 18 BRPM | WEIGHT: 161 LBS | HEART RATE: 90 BPM

## 2024-07-24 DIAGNOSIS — R68.89 COLD INTOLERANCE: ICD-10-CM

## 2024-07-24 DIAGNOSIS — R41.3 MEMORY LOSS: ICD-10-CM

## 2024-07-24 DIAGNOSIS — C51.9 VULVAR CANCER, CARCINOMA: ICD-10-CM

## 2024-07-24 DIAGNOSIS — G89.3 CANCER RELATED PAIN: ICD-10-CM

## 2024-07-24 DIAGNOSIS — I65.21 STENOSIS OF RIGHT CAROTID ARTERY: ICD-10-CM

## 2024-07-24 DIAGNOSIS — Z00.00 MEDICARE ANNUAL WELLNESS VISIT, SUBSEQUENT: Primary | ICD-10-CM

## 2024-07-24 LAB
ANION GAP SERPL CALCULATED.3IONS-SCNC: 10 MMOL/L (ref 5–15)
APTT PPP: 25.8 SECONDS (ref 24.5–36)
BASOPHILS # BLD AUTO: 0.05 10*3/MM3 (ref 0–0.2)
BASOPHILS NFR BLD AUTO: 0.6 % (ref 0–1.5)
BUN SERPL-MCNC: 20 MG/DL (ref 8–23)
BUN/CREAT SERPL: 13.6 (ref 7–25)
CALCIUM SPEC-SCNC: 9.8 MG/DL (ref 8.6–10.5)
CHLORIDE SERPL-SCNC: 96 MMOL/L (ref 98–107)
CO2 SERPL-SCNC: 33 MMOL/L (ref 22–29)
CREAT SERPL-MCNC: 1.47 MG/DL (ref 0.57–1)
DEPRECATED RDW RBC AUTO: 64.4 FL (ref 37–54)
EGFRCR SERPLBLD CKD-EPI 2021: 35.7 ML/MIN/1.73
EOSINOPHIL # BLD AUTO: 0.3 10*3/MM3 (ref 0–0.4)
EOSINOPHIL NFR BLD AUTO: 3.8 % (ref 0.3–6.2)
ERYTHROCYTE [DISTWIDTH] IN BLOOD BY AUTOMATED COUNT: 17.1 % (ref 12.3–15.4)
GLUCOSE SERPL-MCNC: 141 MG/DL (ref 65–99)
HCT VFR BLD AUTO: 30.3 % (ref 34–46.6)
HGB BLD-MCNC: 9.6 G/DL (ref 12–15.9)
IMM GRANULOCYTES # BLD AUTO: 0.04 10*3/MM3 (ref 0–0.05)
IMM GRANULOCYTES NFR BLD AUTO: 0.5 % (ref 0–0.5)
INR PPP: 1 (ref 0.91–1.09)
LYMPHOCYTES # BLD AUTO: 1.11 10*3/MM3 (ref 0.7–3.1)
LYMPHOCYTES NFR BLD AUTO: 14.2 % (ref 19.6–45.3)
MCH RBC QN AUTO: 32.5 PG (ref 26.6–33)
MCHC RBC AUTO-ENTMCNC: 31.7 G/DL (ref 31.5–35.7)
MCV RBC AUTO: 102.7 FL (ref 79–97)
MONOCYTES # BLD AUTO: 0.58 10*3/MM3 (ref 0.1–0.9)
MONOCYTES NFR BLD AUTO: 7.4 % (ref 5–12)
NEUTROPHILS NFR BLD AUTO: 5.75 10*3/MM3 (ref 1.7–7)
NEUTROPHILS NFR BLD AUTO: 73.5 % (ref 42.7–76)
NRBC BLD AUTO-RTO: 0 /100 WBC (ref 0–0.2)
PLATELET # BLD AUTO: 157 10*3/MM3 (ref 140–450)
PMV BLD AUTO: 11 FL (ref 6–12)
POTASSIUM SERPL-SCNC: 3.7 MMOL/L (ref 3.5–5.2)
PROTHROMBIN TIME: 13.6 SECONDS (ref 11.8–14.8)
RBC # BLD AUTO: 2.95 10*6/MM3 (ref 3.77–5.28)
SODIUM SERPL-SCNC: 139 MMOL/L (ref 136–145)
T4 FREE SERPL-MCNC: 0.48 NG/DL (ref 0.93–1.7)
TSH SERPL DL<=0.05 MIU/L-ACNC: 75.88 UIU/ML (ref 0.27–4.2)
WBC NRBC COR # BLD AUTO: 7.83 10*3/MM3 (ref 3.4–10.8)

## 2024-07-24 PROCEDURE — 84443 ASSAY THYROID STIM HORMONE: CPT

## 2024-07-24 PROCEDURE — 1159F MED LIST DOCD IN RCRD: CPT | Performed by: FAMILY MEDICINE

## 2024-07-24 PROCEDURE — 85025 COMPLETE CBC W/AUTO DIFF WBC: CPT

## 2024-07-24 PROCEDURE — G0439 PPPS, SUBSEQ VISIT: HCPCS | Performed by: FAMILY MEDICINE

## 2024-07-24 PROCEDURE — 3078F DIAST BP <80 MM HG: CPT | Performed by: FAMILY MEDICINE

## 2024-07-24 PROCEDURE — 99214 OFFICE O/P EST MOD 30 MIN: CPT | Performed by: FAMILY MEDICINE

## 2024-07-24 PROCEDURE — 36415 COLL VENOUS BLD VENIPUNCTURE: CPT

## 2024-07-24 PROCEDURE — 71046 X-RAY EXAM CHEST 2 VIEWS: CPT

## 2024-07-24 PROCEDURE — 1125F AMNT PAIN NOTED PAIN PRSNT: CPT | Performed by: FAMILY MEDICINE

## 2024-07-24 PROCEDURE — 3075F SYST BP GE 130 - 139MM HG: CPT | Performed by: FAMILY MEDICINE

## 2024-07-24 PROCEDURE — 80048 BASIC METABOLIC PNL TOTAL CA: CPT

## 2024-07-24 PROCEDURE — 1160F RVW MEDS BY RX/DR IN RCRD: CPT | Performed by: FAMILY MEDICINE

## 2024-07-24 PROCEDURE — 85610 PROTHROMBIN TIME: CPT

## 2024-07-24 PROCEDURE — 85730 THROMBOPLASTIN TIME PARTIAL: CPT

## 2024-07-24 PROCEDURE — 84439 ASSAY OF FREE THYROXINE: CPT

## 2024-07-24 RX ORDER — HYDROCODONE BITARTRATE AND ACETAMINOPHEN 10; 325 MG/1; MG/1
0.5 TABLET ORAL EVERY 4 HOURS PRN
Qty: 60 TABLET | Refills: 0 | Status: SHIPPED | OUTPATIENT
Start: 2024-07-24

## 2024-07-24 RX ORDER — MEMANTINE HYDROCHLORIDE 5 MG/1
5 TABLET ORAL 2 TIMES DAILY
Qty: 60 TABLET | Refills: 2 | Status: SHIPPED | OUTPATIENT
Start: 2024-07-24

## 2024-07-24 NOTE — PROGRESS NOTES
Subjective   The ABCs of the Annual Wellness Visit  Medicare Wellness Visit      Dago Nunez is a 84 y.o. patient who presents for a Medicare Wellness Visit.    The following portions of the patient's history were reviewed and   updated as appropriate: allergies, current medications, past family history, past medical history, past social history, past surgical history, and problem list.    Compared to one year ago, the patient's physical   health is worse.  Compared to one year ago, the patient's mental   health is the same.    Recent Hospitalizations:  This patient has had a [facility] admission record on file within the last 365 days.  Current Medical Providers:  Patient Care Team:  Shaheen Akbar DO as PCP - General (Family Medicine)  Trae Boggs MD as Consulting Physician (Urology)  Jesus Guzman MD as Consulting Physician (Pulmonary Disease)    Outpatient Medications Prior to Visit   Medication Sig Dispense Refill    Acetaminophen (TYLENOL ARTHRITIS PAIN PO) Take 1 tablet by mouth Daily As Needed (BACK PAIN).      bisoprolol-hydrochlorothiazide (ZIAC) 5-6.25 MG per tablet Take 1 tablet by mouth Daily. 90 tablet 1    bumetanide (BUMEX) 1 MG tablet Take 1 tablet by mouth 2 (Two) Times a Day. 180 tablet 1    cetirizine (zyrTEC) 10 MG tablet Take 1 tablet by mouth Daily As Needed for Allergies.      citalopram (CeleXA) 20 MG tablet Take 1 tablet by mouth Daily. 90 tablet 1    diphenoxylate-atropine (LOMOTIL) 2.5-0.025 MG per tablet Take 2 tablets by mouth 4 (Four) Times a Day As Needed for Diarrhea.      esomeprazole (nexIUM) 40 MG capsule Take 1 capsule by mouth 2 (Two) Times a Day. 180 capsule 3    Homeopathic Products (LEG CRAMPS) tablet Take 1 tablet by mouth Daily.      letrozole (FEMARA) 2.5 MG tablet Take 1 tablet by mouth Daily.      olmesartan (BENICAR) 20 MG tablet Take 1 tablet by mouth Daily. 90 tablet 2    phenazopyridine (PYRIDIUM) 200 MG tablet Take 1 tablet by  "mouth 3 (Three) Times a Day As Needed for Dysuria. 15 tablet 0    potassium chloride ER (K-TAB) 20 MEQ tablet controlled-release ER tablet Take 2 tablets by mouth Daily.      pravastatin (PRAVACHOL) 40 MG tablet Take 1 tablet by mouth Every Night. 90 tablet 0    sodium bicarbonate 650 MG tablet Take 1 tablet by mouth 2 (Two) Times a Day.      vitamin D (ERGOCALCIFEROL) 1.25 MG (63030 UT) capsule capsule Take 1 capsule by mouth 1 (One) Time Per Week. Thursdays      albuterol sulfate  (90 Base) MCG/ACT inhaler Inhale 2 puffs 4 (Four) Times a Day. (Patient taking differently: Inhale 2 puffs 4 (Four) Times a Day As Needed for Shortness of Air.) 54 g 3    aspirin 81 MG chewable tablet Chew 1 tablet Daily. (Patient not taking: Reported on 7/3/2024) 30 tablet 0    clindamycin (CLEOCIN T) 1 % lotion APPLY TO THE AFFECTED AREA ON LEGS EVERY MORNING. 60 mL 0    clopidogrel (PLAVIX) 75 MG tablet TAKE 1 TABLET BY MOUTH 1 TIME DAILY (Patient not taking: Reported on 7/2/2024) 20 tablet 0    cyanocobalamin 1000 MCG/ML injection Inject 1 mL into the appropriate muscle as directed by prescriber Every 30 (Thirty) Days.      DULERA 100-5 MCG/ACT inhaler Inhale 2 puffs 2 (Two) Times a Day. Rinse and spit after using. (Patient not taking: Reported on 7/3/2024) 6 inhaler 0    HYDROcodone-acetaminophen (NORCO)  MG per tablet Take 0.5 tablets by mouth Every 4 (Four) Hours As Needed for Moderate Pain or Severe Pain. 60 tablet 0    metaxalone (SKELAXIN) 800 MG tablet TAKE 1 TABLET BY MOUTH 3 TIMES DAILY AS NEEDED FOR MUSCLE SPASMS 30 tablet 0    saccharomyces boulardii (Florastor) 250 MG capsule Take 1 capsule by mouth 2 (Two) Times a Day. 60 capsule 0    Syringe 25G X 1\" 3 ML misc 1 each Daily. 25 each 1     Facility-Administered Medications Prior to Visit   Medication Dose Route Frequency Provider Last Rate Last Admin    heparin injection 500 Units  500 Units Intravenous PRN Drake Stafford MD   500 Units at 07/01/21 1354    " heparin injection 500 Units  500 Units Intravenous Drake Helms MD   500 Units at 01/11/22 1134    heparin injection 500 Units  500 Units Intravenous PRN Drake Stafford MD   500 Units at 09/28/22 1418    heparin injection 500 Units  500 Units Intravenous Drake Helms MD   500 Units at 01/25/23 1537    heparin injection 500 Units  500 Units Intravenous PRN Drake Stafford MD   500 Units at 06/26/23 1426    heparin injection 500 Units  500 Units Intravenous PRN Drake Stafford MD   500 Units at 03/05/24 1350    sodium chloride 0.9 % flush 10 mL  10 mL Intravenous Drake Helms MD   10 mL at 07/01/21 1354    sodium chloride 0.9 % flush 10 mL  10 mL Intravenous PRN Drake Stafford MD   10 mL at 01/11/22 1134    sodium chloride 0.9 % flush 10 mL  10 mL Intravenous Drake Helms MD   10 mL at 09/28/22 1418    sodium chloride 0.9 % flush 10 mL  10 mL Intravenous Drake Helms MD   10 mL at 01/25/23 1537    sodium chloride 0.9 % flush 10 mL  10 mL Intravenous Drake Hlems MD   10 mL at 06/26/23 1426    sodium chloride 0.9 % flush 10 mL  10 mL Intravenous PRDrake Amos MD   10 mL at 03/05/24 1350     Opioid medication/s are on active medication list.  and I have evaluated her active treatment plan and pain score trends (see table).  There were no vitals filed for this visit.  I have reviewed the chart for potential of high risk medication and harmful drug interactions in the elderly.        Aspirin is on active medication list. Aspirin use is not indicated based on review of current medical condition/s. Risk of harm outweighs potential benefits. Patient instructed to discontinue this medication.  .      Patient Active Problem List   Diagnosis    Meatal stenosis    Retention of urine    Type 2 diabetes mellitus, without long-term current use of insulin    Essential hypertension    Grief reaction    Vulvar intraepithelial neoplasia (MOODY) grade 3    Chronic midline low back pain without  "sciatica    Bilateral lower extremity edema    History of urethral stricture    Epidermal cyst of neck    Normocytic anemia    Neck abscess    Mixed hyperlipidemia    Gastroesophageal reflux disease without esophagitis    Anxiety    Iron deficiency and chemotherapy induced anemia    Stage 3 chronic kidney disease    Anemia    Preop cardiovascular exam    Lower extremity edema    Vulvar cancer, carcinoma    Former smoker    Secondary malignancy of inguinal lymph nodes    Febrile illness    Chemotherapy-induced thrombocytopenia    Hyponatremia    Hypokalemia    Neutropenic fever    Moderate malnutrition    Antineoplastic chemotherapy induced anemia    History of radiation therapy    Encounter for care related to Port-a-Cath    Malignant neoplasm of upper-outer quadrant of left breast in female, estrogen receptor positive    Encounter for care related to vascular access port    Angiodysplasia    Bronchitis    Obesity (BMI 30-39.9)    Wheezing    Cough    Post-COVID-19 condition    Radiation induced proctitis    Rectal bleeding    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  ACP discussion was held with the patient during this visit. Patient has an advance directive in EMR which is still valid.         Objective   Vitals:    07/24/24 1406   BP: 130/70   Pulse: 90   Resp: 18   Temp: 97.4 °F (36.3 °C)   SpO2: 99%   Weight: 73 kg (161 lb)   Height: 147.3 cm (58\")       Estimated body mass index is 33.65 kg/m² as calculated from the following:    Height as of this encounter: 147.3 cm (58\").    Weight as of this encounter: 73 kg (161 lb).             Does the patient have evidence of cognitive impairment? No  Lab Results   Component Value Date    TRIG 153 (H) 05/20/2024    HDL 47 " 2024     (H) 2024     (H) 2024    VLDL 28 2024    HGBA1C 5.80 (H) 2024                                                                                               Health  Risk Assessment    Smoking Status:  Social History     Tobacco Use   Smoking Status Former    Current packs/day: 0.25    Average packs/day: 0.3 packs/day for 3.0 years (0.8 ttl pk-yrs)    Types: Cigarettes    Passive exposure: Past   Smokeless Tobacco Never   Tobacco Comments    social smoker in college     Alcohol Consumption:  Social History     Substance and Sexual Activity   Alcohol Use Not Currently    Comment: Occasional glass of wine     Fall Risk Screen:  JAMAL Fall Risk Assessment was completed, and patient is at MODERATE risk for falls. Assessment completed on:2024    Depression Screenin/24/2024     2:07 PM   PHQ-2/PHQ-9 Depression Screening   Little Interest or Pleasure in Doing Things 0-->not at all   Feeling Down, Depressed or Hopeless 0-->not at all   PHQ-9: Brief Depression Severity Measure Score 0     Health Habits and Functional and Cognitive Screenin/24/2024     2:10 PM   Functional & Cognitive Status   Do you have difficulty preparing food and eating? No   Do you have difficulty bathing yourself, getting dressed or grooming yourself? Yes   Do you have difficulty using the toilet? No   Do you have difficulty moving around from place to place? Yes   Do you have trouble with steps or getting out of a bed or a chair? Yes   Current Diet Well Balanced Diet   Dental Exam Not up to date   Eye Exam Up to date   Exercise (times per week) 0 times per week   Current Exercises Include No Regular Exercise   Do you need help using the phone?  Yes   Are you deaf or do you have serious difficulty hearing?  No   Do you need help to go to places out of walking distance? Yes   Do you need help shopping? Yes   Do you need help preparing meals?  Yes   Do you need help with  housework?  Yes   Do you need help with laundry? Yes   Do you need help taking your medications? Yes   Do you need help managing money? No   Do you ever drive or ride in a car without wearing a seat belt? No   Have you felt unusual stress, anger or loneliness in the last month? No   Who do you live with? Spouse   If you need help, do you have trouble finding someone available to you? No   Have you been bothered in the last four weeks by sexual problems? No   Do you have difficulty concentrating, remembering or making decisions? No             Age-appropriate Screening Schedule:  Refer to the list below for future screening recommendations based on patient's age, sex and/or medical conditions. Orders for these recommended tests are listed in the plan section. The patient has been provided with a written plan.    Health Maintenance List  Health Maintenance   Topic Date Due    RSV Vaccine - Adults (1 - 1-dose 60+ series) Never done    DIABETIC EYE EXAM  11/25/2020    COVID-19 Vaccine (4 - 2023-24 season) 09/01/2023    INFLUENZA VACCINE  08/01/2024    MAMMOGRAM  10/30/2024    HEMOGLOBIN A1C  11/20/2024    LIPID PANEL  05/20/2025    BMI FOLLOWUP  05/22/2025    URINE MICROALBUMIN  06/21/2025    ANNUAL WELLNESS VISIT  07/24/2025    DXA SCAN  10/30/2025    TDAP/TD VACCINES (2 - Td or Tdap) 05/01/2029    COLORECTAL CANCER SCREENING  11/05/2031    Pneumococcal Vaccine 65+  Completed    ZOSTER VACCINE  Completed                                                                                                                                                CMS Preventative Services Quick Reference  Risk Factors Identified During Encounter  Memory loss    The above risks/problems have been discussed with the patient.  Pertinent information has been shared with the patient in the After Visit Summary.  An After Visit Summary and PPPS were made available to the patient.    Follow Up:   Next Medicare Wellness visit to be scheduled in 1  year.     Assessment & Plan  Medicare annual wellness visit, subsequent    Memory loss    Cold intolerance    Vulvar cancer, carcinoma    Cancer related pain      Orders Placed This Encounter   Procedures    TSH    T4, free     New Medications Ordered This Visit   Medications    memantine (Namenda) 5 MG tablet     Sig: Take 1 tablet by mouth 2 (Two) Times a Day.     Dispense:  60 tablet     Refill:  2    HYDROcodone-acetaminophen (NORCO)  MG per tablet     Sig: Take 0.5 tablets by mouth Every 4 (Four) Hours As Needed for Moderate Pain or Severe Pain.     Dispense:  60 tablet     Refill:  0          Follow Up:   No follow-ups on file.

## 2024-07-24 NOTE — DISCHARGE INSTRUCTIONS
Preparing for Surgery  Follow these instructions before the procedure:  Several days or weeks before your procedure  Ask your health care provider about:  Changing or stopping your regular medicines. This is especially important if you are taking diabetes medicines or blood thinners.  Taking medicines such as aspirin and ibuprofen. These medicines can thin your blood. Do not take these medicines unless your health care provider tells you to take them.  Taking over-the-counter medicines, vitamins, herbs, and supplements.    Contact your surgeon if you:  Develop a fever of more than 100.4°F (38°C) or other feelings of illness during the 48 hours before your surgery.  Have symptoms that get worse.  Have questions or concerns about your surgery.  If you are going home the same day of your surgery you will need to arrange for a responsible adult, age 18 years old or older, to drive you home from the hospital and stay with you for 24 hours. Verification of the  will be made prior to any procedure requiring sedation. You may not go home in a taxi or any form of public transportation by yourself.     Day before your procedure  Medication(s) you need to stop the day before your surgery:Olmesartan (Benicar)    24 hours before your procedure DO NOT drink alcoholic beverages or smoke.  24 hours before your procedure STOP taking Erectile Dysfunction medication (i.e.,Cialis, Viagra)   You may be asked to shower with a germ-killing soap.  Day of your procedure   You may take the following medication(s) the morning of surgery with a sip of water:  Bisoprolol- hydrochlorothiazide (Ziac); Esomeprazole (Nexium)      8 hours before your procedure STOP all food, any dairy products, and full liquids. This includes hard candy, chewing gum or mints. This is extremely important to prevent serious complications.     Up to 2 hours before your scheduled arrival time, you may have clear liquids no cream, powder, or pulp of any kind. Safe  options are water, black coffee, plain tea, soda, Gatorade/Powerade, clear broth, apple juice.    2 hours before your scheduled arrival time, STOP drinking clear liquids.    You may need to take another shower with a germ-killing soap before you leave home in the morning. Do not use perfumes, colognes, or body lotions.  Wear comfortable loose-fitting clothing.  Remove all jewelry including body piercing and rings, dark colored nail polish, and make up prior to arrival at the hospital. Leave all valuables at home.   Bring your hearing aids if you rely on them.  Do not wear contact lenses. If you wear eyeglasses remember to bring a case to store them in while you are in surgery.  Do not use denture adhesives since you will be asked to remove them during your surgery.    You do not need to bring your home medications into the hospital.   Bring your sleep apnea device with you on the day of your surgery (if this applies to you).  If you wear portable oxygen, bring it with you.   If you are staying overnight, you may bring a bag of items you may need such as slippers, robe and a change of clothes for your discharge. You may want to leave these items in the car until you are ready for them since your family will take your belongings when you leave the pre-operative area.  Arrive at the hospital as scheduled by the office. You will be asked to arrive 2 hours prior to your surgery time in order to prepare for your procedure.  When you arrive at the hospital  Go to the registration desk located at the main entrance of the hospital.  After registration is completed, you will be given a beeper and a sticker sheet. Take the stickers to Outpatient Surgery and place in the tray at the end of the desk to notify the staff that you have arrived and registered.   Return to the lobby to wait. You are not always called back according to the time of arrival but rather the time your doctor will be ready.  When your beeper lights up and  vibrates proceed through the double doors, under the stairs, and a member of the Outpatient Surgery staff will escort you to your preoperative room.       How to Use Chlorhexidine Before Surgery  Chlorhexidine gluconate (CHG) is a germ-killing (antiseptic) solution that is used to clean the skin. It can get rid of the bacteria that normally live on the skin and can keep them away for about 24 hours. To clean your skin with CHG, you may be given:  A CHG solution to use in the shower or as part of a sponge bath.  A prepackaged cloth that contains CHG.  Cleaning your skin with CHG may help lower the risk for infection:  While you are staying in the intensive care unit of the hospital.  If you have a vascular access, such as a central line, to provide short-term or long-term access to your veins.  If you have a catheter to drain urine from your bladder.  If you are on a ventilator. A ventilator is a machine that helps you breathe by moving air in and out of your lungs.  After surgery.  What are the risks?  Risks of using CHG include:  A skin reaction.  Hearing loss, if CHG gets in your ears and you have a perforated eardrum.  Eye injury, if CHG gets in your eyes and is not rinsed out.  The CHG product catching fire.  Make sure that you avoid smoking and flames after applying CHG to your skin.  Do not use CHG:  If you have a chlorhexidine allergy or have previously reacted to chlorhexidine.  On babies younger than 2 months of age.  How to use CHG solution  Use CHG only as told by your health care provider, and follow the instructions on the label.  Use the full amount of CHG as directed. Usually, this is one bottle.  During a shower    Follow these steps when using CHG solution during a shower (unless your health care provider gives you different instructions):  Start the shower.  Use your normal soap and shampoo to wash your face and hair.  Turn off the shower or move out of the shower stream.  Pour the CHG onto a clean  washcloth. Do not use any type of brush or rough-edged sponge.  Starting at your neck, lather your body down to your toes. Make sure you follow these instructions:  If you will be having surgery, pay special attention to the part of your body where you will be having surgery. Scrub this area for at least 1 minute.  Do not use CHG on your head or face. If the solution gets into your ears or eyes, rinse them well with water.  Avoid your genital area.  Avoid any areas of skin that have broken skin, cuts, or scrapes.  Scrub your back and under your arms. Make sure to wash skin folds.  Let the lather sit on your skin for 1-2 minutes or as long as told by your health care provider.  Thoroughly rinse your entire body in the shower. Make sure that all body creases and crevices are rinsed well.  Dry off with a clean towel. Do not put any substances on your body afterward--such as powder, lotion, or perfume--unless you are told to do so by your health care provider. Only use lotions that are recommended by the .  Put on clean clothes or pajamas.  If it is the night before your surgery, sleep in clean sheets.     During a sponge bath  Follow these steps when using CHG solution during a sponge bath (unless your health care provider gives you different instructions):  Use your normal soap and shampoo to wash your face and hair.  Pour the CHG onto a clean washcloth.  Starting at your neck, lather your body down to your toes. Make sure you follow these instructions:  If you will be having surgery, pay special attention to the part of your body where you will be having surgery. Scrub this area for at least 1 minute.  Do not use CHG on your head or face. If the solution gets into your ears or eyes, rinse them well with water.  Avoid your genital area.  Avoid any areas of skin that have broken skin, cuts, or scrapes.  Scrub your back and under your arms. Make sure to wash skin folds.  Let the lather sit on your skin for  1-2 minutes or as long as told by your health care provider.  Using a different clean, wet washcloth, thoroughly rinse your entire body. Make sure that all body creases and crevices are rinsed well.  Dry off with a clean towel. Do not put any substances on your body afterward--such as powder, lotion, or perfume--unless you are told to do so by your health care provider. Only use lotions that are recommended by the .  Put on clean clothes or pajamas.  If it is the night before your surgery, sleep in clean sheets.  How to use CHG prepackaged cloths  Only use CHG cloths as told by your health care provider, and follow the instructions on the label.  Use the CHG cloth on clean, dry skin.  Do not use the CHG cloth on your head or face unless your health care provider tells you to.  When washing with the CHG cloth:  Avoid your genital area.  Avoid any areas of skin that have broken skin, cuts, or scrapes.  Before surgery    Follow these steps when using a CHG cloth to clean before surgery (unless your health care provider gives you different instructions):  Using the CHG cloth, vigorously scrub the part of your body where you will be having surgery. Scrub using a back-and-forth motion for 3 minutes. The area on your body should be completely wet with CHG when you are done scrubbing.  Do not rinse. Discard the cloth and let the area air-dry. Do not put any substances on the area afterward, such as powder, lotion, or perfume.  Put on clean clothes or pajamas.  If it is the night before your surgery, sleep in clean sheets.     For general bathing  Follow these steps when using CHG cloths for general bathing (unless your health care provider gives you different instructions).  Use a separate CHG cloth for each area of your body. Make sure you wash between any folds of skin and between your fingers and toes. Wash your body in the following order, switching to a new cloth after each step:  The front of your neck,  shoulders, and chest.  Both of your arms, under your arms, and your hands.  Your stomach and groin area, avoiding the genitals.  Your right leg and foot.  Your left leg and foot.  The back of your neck, your back, and your buttocks.  Do not rinse. Discard the cloth and let the area air-dry. Do not put any substances on your body afterward--such as powder, lotion, or perfume--unless you are told to do so by your health care provider. Only use lotions that are recommended by the .  Put on clean clothes or pajamas.  Contact a health care provider if:  Your skin gets irritated after scrubbing.  You have questions about using your solution or cloth.  You swallow any chlorhexidine. Call your local poison control center (1-833.110.1709 in the [place]  Get help right away if:  Your eyes itch badly, or they become very red or swollen.  Your skin itches badly and is red or swollen.  Your hearing changes.  You have trouble seeing.  You have swelling or tingling in your mouth or throat.  You have trouble breathing.  These symptoms may represent a serious problem that is an emergency. Do not wait to see if the symptoms will go away. Get medical help right away. Call your local emergency services (933 in the [place] Do not drive yourself to the hospital.  Summary  Chlorhexidine gluconate (CHG) is a germ-killing (antiseptic) solution that is used to clean the skin. Cleaning your skin with CHG may help to lower your risk for infection.  You may be given CHG to use for bathing. It may be in a bottle or in a prepackaged cloth to use on your skin. Carefully follow your health care provider's instructions and the instructions on the product label.  Do not use CHG if you have a chlorhexidine allergy.  Contact your health care provider if your skin gets irritated after scrubbing.  This information is not intended to replace advice given to you by your health care provider. Make sure you discuss any questions you have with your  health care provider.  Document Revised: 04/17/2023 Document Reviewed: 02/28/2022  [employer] Patient Education © 2023 [employer]

## 2024-07-25 DIAGNOSIS — M62.830 BACK SPASM: ICD-10-CM

## 2024-07-25 RX ORDER — METAXALONE 800 MG/1
TABLET ORAL
Qty: 30 TABLET | Refills: 0 | Status: SHIPPED | OUTPATIENT
Start: 2024-07-25

## 2024-07-29 RX ORDER — CYANOCOBALAMIN 1000 UG/ML
INJECTION, SOLUTION INTRAMUSCULAR; SUBCUTANEOUS
Qty: 3 ML | Refills: 10 | Status: ON HOLD | OUTPATIENT
Start: 2024-07-29

## 2024-07-30 DIAGNOSIS — E03.9 ACQUIRED HYPOTHYROIDISM: Primary | ICD-10-CM

## 2024-07-30 RX ORDER — LEVOTHYROXINE SODIUM 0.07 MG/1
75 TABLET ORAL DAILY
Qty: 90 TABLET | Refills: 1 | Status: ON HOLD | OUTPATIENT
Start: 2024-07-30

## 2024-08-01 ENCOUNTER — TELEPHONE (OUTPATIENT)
Dept: FAMILY MEDICINE CLINIC | Facility: CLINIC | Age: 84
End: 2024-08-01
Payer: MEDICARE

## 2024-08-01 RX ORDER — METOLAZONE 2.5 MG/1
TABLET ORAL
Qty: 30 TABLET | Refills: 5 | OUTPATIENT
Start: 2024-08-01

## 2024-08-01 NOTE — TELEPHONE ENCOUNTER
Caller: Dago Nunez    Relationship: Self    Best call back number: [phone]     What medication are you requesting: METOLAZONE 2.5 G    What are your current symptoms: FLUID ON FEET, SWELLING    Have you had these symptoms before:    [x] Yes  [] No    Have you been treated for these symptoms before:   [x] Yes  [] No    If a prescription is needed, what is your preferred pharmacy and phone number: [facility] - [place], [place] - [address] - [phone]  - [phone] FX     Additional notes: PATIENT STATES SHE HAD THIS MEDICATION, SHE IS NOW OUT OF IT. PATIENT WOULD LIKE TO SPECK WITH PROVIDER AS WELL FOR UPDATE.

## 2024-08-04 NOTE — H&P
8/4/2024          Ashley Rodriguez APRN  [address] [address], [place] [address]     Dago Nunez  1940          Chief Complaint   Patient presents with    NEW PATIENT       Referred from Ashley Rodriguez for bilateral carotid artery aneurysms. Testing done 6/21/24. Patient states that she had a TIA mid may. Daughter states that she has cognitive delays since TIA. Former smoker of 20 years, quit 30 years ago.          Dear VINCENT Cool     HPI  I had the pleasure of seeing your patient Dago Nunez in the office today.  Thank you kindly for this consultation.  As you recall, Dago Nunez is a 83 y.o.  female who you are currently following for routine health maintenance.  She was recently hospitalized for left sided weakness and some cognitive decline.  She was sent home on aspirin and Plavix however hemoglobin dropped to 6.6.  She received iron infusion and one unit of PRBCs, most recent 9.2.  She is now maintained on aspirin and Pravachol.  She is now palliative with regards to chemotherapy.  She had vulva cancer and breast cancer.  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 She did have noninvasive testing performed which I did review in office.         Medical History        Past Medical History:   Diagnosis Date    Anemia in stage 3 chronic kidney disease 11/11/2019    Arthritis      Breast cancer 09/14/2021     Left Mastectomy w/ sentinel node Bx    Bronchitis      Cellulitis       ROSA LEGS    GERD (gastroesophageal reflux disease)      Hyperlipidemia      Hypertension      Kyphosis      Osteoporosis      Personal history of COVID-19 05/2022    Scoliosis      Self-catheterizes urinary bladder       10FR SIZED CATH    Stage 3 chronic kidney disease 11/11/2019    Type 2 diabetes mellitus      Urethral meatal stenosis 09/2022     w/ urine retention    Vulva cancer      Vulvar intraepithelial neoplasia (MOODY) grade 3              Surgical History          Past Surgical History:   Procedure Laterality Date    APPENDECTOMY        BENJAMIN PROCEDURE         No evidence of reflux disease while on Nexium 40mg daily-See report    BREAST BIOPSY        BREAST CYST ASPIRATION Left      BREAST LUMPECTOMY        COLONOSCOPY   01/12/2011     Diverticulosis sigmoid colon; The examination was otherwise normal; Repeat 10 years    COLONOSCOPY   11/03/2003     Dr. Laguerre-Normal colonoscopy; Normal terminal ileum; Repeat 5 years    COLONOSCOPY N/A 11/05/2021     Petechia(e) in the rectum, in the recto-sigmoid colon and in the distal sigmoid colon; No specimens collected; No plans to repeat colonoscopy due to advance age and/or medical problems    CYSTOSCOPY N/A 09/20/2022     Procedure: CYSTOSCOPY WITH URETHRAL DILATATION;  Surgeon: Shaheen Conway MD;  Location: [facility] OR;  Service: Urology;  Laterality: N/A;    ENDOSCOPY   07/01/2014     Normal esophagus; Normal stomach; Normal examined duodenum; BRAVO pH capsule deployed;     ENDOSCOPY   08/14/2013     Mild gastritis-biopsies for H.Pylor obtained    ENDOSCOPY   02/16/2005     Dr. LaguerreVufkd-Oxwyzlqnx-vwtgmuaq    ENDOSCOPY   10/21/2003     Dr. Laguerre-Stage 1 reflux esophagitis    ENDOSCOPY N/A 11/05/2021     Small HH; A single gastroesophageal junction polyp; Normal stomach; A single non-bleeding angiodysplastic lesion in the duodenum    HYSTERECTOMY        MASTECTOMY W/ SENTINEL NODE BIOPSY Left 09/14/2021     Procedure: LEFT PARTIAL MASTECTOMY WITH MAGSEED AND LEFT SENTINEL LYMPH NODE BIOPSY MAGTRACE;  Surgeon: Quynh Rosario MD;  Location: [facility] OR;  Service: General;  Laterality: Left;    TONSILLECTOMY        VAGINA SURGERY         Laser surgery X 2    VENOUS ACCESS DEVICE (PORT) INSERTION N/A 09/29/2020     Procedure: SINGLE LUMEN PORT - A- CATH PLACEMENT WITH FLUOROSCOPY;  Surgeon: Quynh Rosario MD;  Location: [facility] OR;  Service: General;  Laterality: N/A;                  Family History   Problem Relation Age of  Onset    Cancer Mother      Hypertension Mother      Osteoporosis Mother      Dementia Mother      Uterine cancer Mother      Heart disease Father      Parkinsonism Father      Cancer Sister      Breast cancer Sister      Kidney cancer Sister      Diabetes Brother      Heart disease Paternal Grandfather      No Known Problems Maternal Grandmother      No Known Problems Maternal Grandfather      No Known Problems Paternal Grandmother      Colon cancer Neg Hx      Colon polyps Neg Hx      Esophageal cancer Neg Hx      Liver cancer Neg Hx      Liver disease Neg Hx      Rectal cancer Neg Hx      Stomach cancer Neg Hx           Social History   Social History            Socioeconomic History    Marital status:    Tobacco Use    Smoking status: Former       Current packs/day: 0.25       Average packs/day: 0.3 packs/day for 3.0 years (0.8 ttl pk-yrs)       Types: Cigarettes       Passive exposure: Past    Smokeless tobacco: Never    Tobacco comments:       social smoker in [facility]   Vaping Use    Vaping status: Never Used   Substance and Sexual Activity    Alcohol use: Not Currently       Comment: Occasional glass of wine    Drug use: No    Sexual activity: Defer            Allergies         Allergies   Allergen Reactions    Scopolamine Swelling       Other reaction(s): ANGIOEDEMA          Amoxicillin-Pot Clavulanate Rash    Keflex [Cephalexin] Rash    Septra [Sulfamethoxazole-Trimethoprim] Rash    Tequin [Gatifloxacin] Other (See Comments)       Doesn't remember    Trovan [Alatrofloxacin] Dizziness              Current Outpatient Medications   Medication Instructions    Acetaminophen (TYLENOL ARTHRITIS PAIN PO) 1 tablet, Oral, Daily PRN    albuterol sulfate  (90 Base) MCG/ACT inhaler 2 puffs, Inhalation, 4 Times Daily - RT    bisoprolol-hydrochlorothiazide (ZIAC) 5-6.25 MG per tablet 1 tablet, Oral, Daily    bumetanide (BUMEX) 1 mg, Oral, 2 Times Daily    cetirizine (ZYRTEC) 10 mg, Oral, Daily    citalopram  "(CELEXA) 20 mg, Oral, Daily    clindamycin (CLEOCIN T) 1 % lotion APPLY TO THE AFFECTED AREA ON LEGS EVERY MORNING.    cyanocobalamin 1000 MCG/ML injection INJECT 1 ML INTO THE MUSCLE EVERY 4 WEEKS    diphenoxylate-atropine (LOMOTIL) 2.5-0.025 MG per tablet 2 tablets, Oral, 4 Times Daily PRN    esomeprazole (NEXIUM) 40 mg, Oral, 2 Times Daily    Homeopathic Products (LEG CRAMPS) tablet 1 tablet, Oral, Daily    HYDROcodone-acetaminophen (NORCO)  MG per tablet 0.5 tablets, Oral, Every 4 Hours PRN    letrozole (FEMARA) 2.5 mg, Oral, Daily    levothyroxine (SYNTHROID) 75 mcg, Oral, Daily    memantine (NAMENDA) 5 mg, Oral, 2 Times Daily    metaxalone (SKELAXIN) 800 MG tablet TAKE 1 TABLET BY MOUTH 3 TIMES DAILY AS NEEDED FOR MUSCLE SPASMS    olmesartan (BENICAR) 20 mg, Oral, Daily    phenazopyridine (PYRIDIUM) 200 mg, Oral, 3 Times Daily PRN    potassium chloride ER (K-TAB) 20 MEQ tablet controlled-release ER tablet 40 mEq, Oral, Daily    pravastatin (PRAVACHOL) 40 mg, Oral, Nightly    saccharomyces boulardii (FLORASTOR) 250 mg, Oral, 2 Times Daily    sodium bicarbonate 650 mg, Oral, 2 Times Daily    Syringe 25G X 1\" 3 ML misc 1 each, Does not apply, Daily    vitamin D (ERGOCALCIFEROL) 50,000 Units, Oral, Weekly, Thursdays          Review of Systems   Constitutional: Negative.    HENT: Negative.     Eyes: Negative.    Respiratory: Negative.     Cardiovascular: Negative.    Gastrointestinal: Negative.    Endocrine: Negative.    Genitourinary: Negative.    Musculoskeletal:  Positive for arthralgias, back pain, gait problem and myalgias.   Skin: Negative.    Allergic/Immunologic: Negative.    Hematological: Negative.    Psychiatric/Behavioral: Negative.     All other systems reviewed and are negative.     /72   Pulse 64   Ht 147.3 cm (58\")   Wt 74.8 kg (165 lb)   SpO2 94%   BMI 34.49 kg/m²      Physical Exam  Vitals and nursing note reviewed.   Constitutional:       Appearance: Normal appearance. She is " well-developed. She is obese.   HENT:      Head: Normocephalic and atraumatic.   Eyes:      General: No scleral icterus.     Pupils: Pupils are equal, round, and reactive to light.   Neck:      Thyroid: No thyromegaly.      Vascular: No carotid bruit or JVD.   Cardiovascular:      Rate and Rhythm: Normal rate and regular rhythm.      Heart sounds: Normal heart sounds.   Pulmonary:      Effort: Pulmonary effort is normal.      Breath sounds: Normal breath sounds.   Abdominal:      General: Bowel sounds are normal. There is no distension or abdominal bruit.      Palpations: Abdomen is soft. There is no mass.      Tenderness: There is no abdominal tenderness.   Musculoskeletal:      Cervical back: Neck supple.      Right lower leg: Edema present.      Left lower leg: Edema present.      Comments: wheelchair   Lymphadenopathy:      Cervical: No cervical adenopathy.   Skin:     General: Skin is warm and dry.   Neurological:      Mental Status: She is alert and oriented to person, place, and time.      Cranial Nerves: No cranial nerve deficit.      Sensory: No sensory deficit.      Comments: Intermittent confusion   Psychiatric:         Mood and Affect: Mood normal.         Behavior: Behavior normal.         Judgment: Judgment normal.         Diagnostic Data:  MRI Angiogram Neck Without Contrast     Result Date: 6/21/2024  Narrative: EXAMINATION: MRI ANGIOGRAM NECK WO CONTRAST- 6/21/2024 9:20 AM  HISTORY: Carotid artery stenosis.  REPORT: 3D time-of-flight MR a of the neck was performed without intravenous contrast to evaluate the carotid and vertebral arteries.  COMPARISON: Ultrasound of the carotid arteries 5/20/2024.  There is mild motion artifact. On the right, the common carotid artery is patent, there is limited visualization of the vessel origin. There is narrowing of the proximal right ICA with estimated 2D diameter of 2.8 mm, centered approximately 1.3 cm from its origin. Compared with the more distal caliber  measurements this stenosis is less than 50%. The right external carotid artery is patent. The distal right ICA appears normally patent.  On the left, the common carotid artery is patent, with limited visualization at its origin and proximal segment due to tortuosity. There is no significant stenosis involving the left ICA. There is moderate focal stenosis of the left external carotid artery approximately 9 mm distal to its origin, without occlusion. The distal left ICA appears normally patent.  Both vertebral arteries are patent, the left vertebral artery appears dominant compared to the right. No evidence of vertebral dissection is identified.       Impression: 1. No flow-limiting stenosis involving the right or left ICA. 2. Patency of the vertebral arteries with no stenosis or dissection. 3. Moderate short segment stenosis of the proximal left external carotid artery.  This report was signed and finalized on 6/21/2024 9:26 AM by Dr. Gabino Capone MD.         Right carotid: upon personal review, there is about 70% right carotid stenosis       History: Carotid occlusive disease     IMPRESSION:  Impression:  1. There is 50 to 69% stenosis of the right internal carotid artery.  2. There is less than 50% stenosis of the left internal carotid artery.  3. Antegrade flow is demonstrated in bilateral vertebral arteries.  4. There is heavy plaque burden at the right bifurcation. Further  imaging/evaluation may be warranted with a CTA of the neck.     Comments: Bilateral carotid vertebral arterial duplex scan was  performed.     Grayscale imaging shows intimal thickening and calcified elements at the  carotid bifurcation. The right internal carotid artery peak systolic  velocity is 187 cm/sec. The end-diastolic velocity is 21.4 cm/sec. The  right ICA/CCA ratio is approximately 1.8. These findings correlate with  50 to 69% stenosis of the right internal carotid artery.     Grayscale imaging shows intimal thickening and  calcified elements at the  carotid bifurcation. The left internal carotid artery peak systolic  velocity is 110.6 cm/sec. The end-diastolic velocity is 20.7 cm/sec. The  left ICA/CCA ratio is approximately 0.7. These findings correlate with  less than 50% stenosis of the left internal carotid artery.     Antegrade flow is demonstrated in bilateral vertebral arteries.  There is greater than 50% stenosis in the left common carotid artery and  bilateral external carotid arteries.     This report was signed and finalized on 5/21/2024 1:12 PM by Dr. Junior Meyers MD.     Problem List       Patient Active Problem List   Diagnosis    Meatal stenosis    Retention of urine    Type 2 diabetes mellitus, without long-term current use of insulin    Essential hypertension    Grief reaction    Vulvar intraepithelial neoplasia (MOODY) grade 3    Chronic midline low back pain without sciatica    Bilateral lower extremity edema    History of urethral stricture    Epidermal cyst of neck    Normocytic anemia    Neck abscess    Mixed hyperlipidemia    Gastroesophageal reflux disease without esophagitis    Anxiety    Iron deficiency and chemotherapy induced anemia    Stage 3 chronic kidney disease    Anemia    Screening for breast cancer    Lower extremity edema    Vulvar cancer, carcinoma    Former smoker    Secondary malignancy of inguinal lymph nodes    Febrile illness    Chemotherapy-induced thrombocytopenia    Hyponatremia    Hypokalemia    Neutropenic fever    Moderate malnutrition    Antineoplastic chemotherapy induced anemia    History of radiation therapy    Encounter for care related to Port-a-Cath    Malignant neoplasm of upper-outer quadrant of left breast in female, estrogen receptor positive    Encounter for care related to vascular access port    Angiodysplasia    Bronchitis    Obesity (BMI 30-39.9)    Wheezing    Cough    Post-COVID-19 condition    Radiation induced proctitis    Rectal bleeding    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    CVA (cerebral vascular accident)    Transient ischemic attack (TIA)              Diagnosis Plan   1. Stenosis of right carotid artery          2. Essential hypertension          3. Type 2 diabetes mellitus with diabetic polyneuropathy, without long-term current use of insulin          4. Left cavernous carotid aneurysm          5. Aneurysm of cavernous portion of right internal carotid artery                Plan: After thoroughly evaluating Dago Nunez, I believe the best course of action is to proceed with right TCAR.  A formal shared decision making interaction with the patient was had during this discussion.  The shared decision making interaction included discussion of all treatment options including carotid endarterectomy, carotid artery stenting which does include TCAR, and optimal medical therapy.  Explanation of the risks/benefits for each option specific to the patient's clinical situation was also performed.  Integration of clinical guidelines which include patient comorbidities and concomitant treatments were discussed.  Lastly, the discussion and incorporation of the patient's personal preferences and priorities in choosing the treatment plan were also discussed.  Risks of transcarotid artery revascularization include, but are not limited to, bleeding, infection, vessel damage, nerve damage, MI, stroke, and death.  The patient understands these risks and would like to proceed with the procedure. NIHSS=2.  A formal shared decision making interaction with the patient was had during this discussion.  The shared decision making interaction included discussion of all treatment options including carotid endarterectomy, carotid artery stenting which does include TCAR, and optimal medical therapy.  I did review her testing and offered right TCAR as she was symptomatic and had left-sided symptoms.  Testing reviewed and noted about 70% right carotid stenosis, not consistent with  radiologist reporting,  please see imaging above for review.  She is going to discuss further with her family but does not wish to proceed at this time with TCAR.  She does have extensive history of vulva and breast cancer.  She also was found to have 2 small carotid artery aneurysms which they were referring to tertiary care center to manage.  She has been losing blood and her hemoglobin had dropped  to 6.6 and received a unit of packed red blood cells as well as an iron infusion.  She is now only taking aspirin and patient and daughter are concerned with the need for Plavix for 1 month following surgery.  We did discuss risks and benefits of the procedure versus not proceeding.  Many of her complaints are due to pain with mobility and low back and hips. I did discuss vascular risk factors as they pertain to the progression of vascular disease including controlling her hypertension and diabetes.  These risk factors are stable.  The patient is to continue taking their medications as previously discussed.   This was all discussed in full with complete understanding.  Thank you for allowing me to participate in the care of your patient.  Please do not hesitate to call with any questions or concerns.  We will keep you aware of any further encounters with Dago Nunez.         Sincerely yours,           Junior Meyers, Shaheen Santiago, DO

## 2024-08-05 ENCOUNTER — ANESTHESIA EVENT (OUTPATIENT)
Dept: PERIOP | Facility: HOSPITAL | Age: 84
DRG: 036 | End: 2024-08-05
Payer: MEDICARE

## 2024-08-05 ENCOUNTER — HOSPITAL ENCOUNTER (INPATIENT)
Facility: HOSPITAL | Age: 84
LOS: 1 days | Discharge: HOME | DRG: 036 | End: 2024-08-06
Attending: SURGERY | Admitting: SURGERY
Payer: MEDICARE

## 2024-08-05 ENCOUNTER — ANESTHESIA (OUTPATIENT)
Dept: PERIOP | Facility: HOSPITAL | Age: 84
DRG: 036 | End: 2024-08-05
Payer: MEDICARE

## 2024-08-05 ENCOUNTER — APPOINTMENT (OUTPATIENT)
Dept: INTERVENTIONAL RADIOLOGY/VASCULAR | Facility: HOSPITAL | Age: 84
DRG: 036 | End: 2024-08-05
Payer: MEDICARE

## 2024-08-05 DIAGNOSIS — I65.21 STENOSIS OF RIGHT CAROTID ARTERY: ICD-10-CM

## 2024-08-05 PROBLEM — I65.29 CAROTID STENOSIS: Status: ACTIVE | Noted: 2024-08-05

## 2024-08-05 LAB
ABO GROUP BLD: NORMAL
ACT BLD: 238 SECONDS (ref 82–152)
BLD GP AB SCN SERPL QL: NEGATIVE
GLUCOSE BLDC GLUCOMTR-MCNC: 104 MG/DL (ref 70–130)
GLUCOSE BLDC GLUCOMTR-MCNC: 108 MG/DL (ref 70–130)
RH BLD: NEGATIVE
T&S EXPIRATION DATE: NORMAL

## 2024-08-05 PROCEDURE — 25010000002 HEPARIN (PORCINE) PER 1000 UNITS: Performed by: NURSE ANESTHETIST, CERTIFIED REGISTERED

## 2024-08-05 PROCEDURE — 25010000002 CLINDAMYCIN 900 MG/50ML SOLUTION: Performed by: NURSE PRACTITIONER

## 2024-08-05 PROCEDURE — 86900 BLOOD TYPING SEROLOGIC ABO: CPT | Performed by: NURSE PRACTITIONER

## 2024-08-05 PROCEDURE — C1725 CATH, TRANSLUMIN NON-LASER: HCPCS | Performed by: SURGERY

## 2024-08-05 PROCEDURE — 25010000002 ONDANSETRON PER 1 MG: Performed by: NURSE ANESTHETIST, CERTIFIED REGISTERED

## 2024-08-05 PROCEDURE — 25010000002 CLINDAMYCIN 900 MG/50ML SOLUTION: Performed by: SURGERY

## 2024-08-05 PROCEDURE — 25010000002 PHENYLEPHRINE 10 MG/ML SOLUTION 1 ML VIAL: Performed by: NURSE ANESTHETIST, CERTIFIED REGISTERED

## 2024-08-05 PROCEDURE — 25010000002 BUPIVACAINE 0.5 % SOLUTION: Performed by: SURGERY

## 2024-08-05 PROCEDURE — 0 IOVERSOL 74 % SOLUTION: Performed by: SURGERY

## 2024-08-05 PROCEDURE — 25010000002 PROTAMINE SULFATE PER 10 MG: Performed by: NURSE ANESTHETIST, CERTIFIED REGISTERED

## 2024-08-05 PROCEDURE — 25010000002 GLYCOPYRROLATE 0.4 MG/2ML SOLUTION: Performed by: NURSE ANESTHETIST, CERTIFIED REGISTERED

## 2024-08-05 PROCEDURE — 25010000002 HEPARIN (PORCINE) PER 1000 UNITS: Performed by: SURGERY

## 2024-08-05 PROCEDURE — 86901 BLOOD TYPING SEROLOGIC RH(D): CPT | Performed by: NURSE PRACTITIONER

## 2024-08-05 PROCEDURE — 25810000003 SODIUM CHLORIDE 0.9 % SOLUTION 250 ML FLEX CONT: Performed by: NURSE ANESTHETIST, CERTIFIED REGISTERED

## 2024-08-05 PROCEDURE — 25810000003 LACTATED RINGERS PER 1000 ML: Performed by: SURGERY

## 2024-08-05 PROCEDURE — X2AH336 CEREBRAL EMBOLIC FILTRATION, EXTRACORPOREAL FLOW REVERSAL CIRCUIT FROM RIGHT COMMON CAROTID ARTERY, PERCUTANEOUS APPROACH, NEW TECHNOLOGY GROUP 6: ICD-10-PCS | Performed by: SURGERY

## 2024-08-05 PROCEDURE — 85347 COAGULATION TIME ACTIVATED: CPT

## 2024-08-05 PROCEDURE — C1769 GUIDE WIRE: HCPCS | Performed by: SURGERY

## 2024-08-05 PROCEDURE — 86850 RBC ANTIBODY SCREEN: CPT | Performed by: NURSE PRACTITIONER

## 2024-08-05 PROCEDURE — 82948 REAGENT STRIP/BLOOD GLUCOSE: CPT

## 2024-08-05 PROCEDURE — 76000 FLUOROSCOPY <1 HR PHYS/QHP: CPT

## 2024-08-05 PROCEDURE — C1894 INTRO/SHEATH, NON-LASER: HCPCS | Performed by: SURGERY

## 2024-08-05 PROCEDURE — C1876 STENT, NON-COA/NON-COV W/DEL: HCPCS | Performed by: SURGERY

## 2024-08-05 PROCEDURE — 25010000002 DEXAMETHASONE PER 1 MG: Performed by: NURSE ANESTHETIST, CERTIFIED REGISTERED

## 2024-08-05 PROCEDURE — 37215 TRANSCATH STENT CCA W/EPS: CPT | Performed by: SURGERY

## 2024-08-05 PROCEDURE — 037K3DZ DILATION OF RIGHT INTERNAL CAROTID ARTERY WITH INTRALUMINAL DEVICE, PERCUTANEOUS APPROACH: ICD-10-PCS | Performed by: SURGERY

## 2024-08-05 DEVICE — STNT TRANSCAROTID ENROUTE TAPR W/DS 6F 9TO7X40MM: Type: IMPLANTABLE DEVICE | Site: CAROTID | Status: FUNCTIONAL

## 2024-08-05 RX ORDER — ASPIRIN 81 MG/1
81 TABLET ORAL DAILY
Status: DISCONTINUED | OUTPATIENT
Start: 2024-08-06 | End: 2024-08-06 | Stop reason: HOSPADM

## 2024-08-05 RX ORDER — ALBUTEROL SULFATE 2.5 MG/3ML
2.5 SOLUTION RESPIRATORY (INHALATION)
Status: DISCONTINUED | OUTPATIENT
Start: 2024-08-06 | End: 2024-08-06 | Stop reason: HOSPADM

## 2024-08-05 RX ORDER — LABETALOL HYDROCHLORIDE 5 MG/ML
5 INJECTION, SOLUTION INTRAVENOUS
Status: DISCONTINUED | OUTPATIENT
Start: 2024-08-05 | End: 2024-08-05 | Stop reason: HOSPADM

## 2024-08-05 RX ORDER — GLYCOPYRROLATE 0.2 MG/ML
INJECTION INTRAMUSCULAR; INTRAVENOUS AS NEEDED
Status: DISCONTINUED | OUTPATIENT
Start: 2024-08-05 | End: 2024-08-05 | Stop reason: SURG

## 2024-08-05 RX ORDER — ACETAMINOPHEN 160 MG/5ML
650 SOLUTION ORAL EVERY 8 HOURS
Status: DISCONTINUED | OUTPATIENT
Start: 2024-08-05 | End: 2024-08-06 | Stop reason: HOSPADM

## 2024-08-05 RX ORDER — CLINDAMYCIN PHOSPHATE 10 MG/G
GEL TOPICAL DAILY
Status: DISCONTINUED | OUTPATIENT
Start: 2024-08-06 | End: 2024-08-06 | Stop reason: HOSPADM

## 2024-08-05 RX ORDER — CITALOPRAM 20 MG/1
20 TABLET ORAL DAILY
Status: DISCONTINUED | OUTPATIENT
Start: 2024-08-06 | End: 2024-08-06 | Stop reason: HOSPADM

## 2024-08-05 RX ORDER — LEVOTHYROXINE SODIUM 0.07 MG/1
75 TABLET ORAL
Status: DISCONTINUED | OUTPATIENT
Start: 2024-08-06 | End: 2024-08-06 | Stop reason: HOSPADM

## 2024-08-05 RX ORDER — CLINDAMYCIN PHOSPHATE 900 MG/50ML
900 INJECTION, SOLUTION INTRAVENOUS ONCE
Status: COMPLETED | OUTPATIENT
Start: 2024-08-05 | End: 2024-08-05

## 2024-08-05 RX ORDER — SODIUM CHLORIDE 0.9 % (FLUSH) 0.9 %
3-10 SYRINGE (ML) INJECTION AS NEEDED
Status: DISCONTINUED | OUTPATIENT
Start: 2024-08-05 | End: 2024-08-05 | Stop reason: HOSPADM

## 2024-08-05 RX ORDER — PHENYLEPHRINE HCL IN 0.9% NACL 50MG/250ML
.5-3 PLASTIC BAG, INJECTION (ML) INTRAVENOUS
Status: DISCONTINUED | OUTPATIENT
Start: 2024-08-05 | End: 2024-08-05

## 2024-08-05 RX ORDER — SODIUM BICARBONATE 650 MG/1
650 TABLET ORAL 2 TIMES DAILY
Status: DISCONTINUED | OUTPATIENT
Start: 2024-08-05 | End: 2024-08-06 | Stop reason: HOSPADM

## 2024-08-05 RX ORDER — LABETALOL HYDROCHLORIDE 5 MG/ML
10 INJECTION, SOLUTION INTRAVENOUS
Status: DISCONTINUED | OUTPATIENT
Start: 2024-08-05 | End: 2024-08-06 | Stop reason: HOSPADM

## 2024-08-05 RX ORDER — SACCHAROMYCES BOULARDII 250 MG
250 CAPSULE ORAL 2 TIMES DAILY
Status: DISCONTINUED | OUTPATIENT
Start: 2024-08-05 | End: 2024-08-06 | Stop reason: HOSPADM

## 2024-08-05 RX ORDER — SODIUM CHLORIDE 0.9 % (FLUSH) 0.9 %
3 SYRINGE (ML) INJECTION EVERY 12 HOURS SCHEDULED
Status: DISCONTINUED | OUTPATIENT
Start: 2024-08-05 | End: 2024-08-05 | Stop reason: HOSPADM

## 2024-08-05 RX ORDER — ASPIRIN 81 MG/1
81 TABLET ORAL DAILY
COMMUNITY

## 2024-08-05 RX ORDER — PANTOPRAZOLE SODIUM 40 MG/1
40 TABLET, DELAYED RELEASE ORAL
Status: DISCONTINUED | OUTPATIENT
Start: 2024-08-06 | End: 2024-08-06 | Stop reason: HOSPADM

## 2024-08-05 RX ORDER — NALOXONE HCL 0.4 MG/ML
0.04 VIAL (ML) INJECTION AS NEEDED
Status: DISCONTINUED | OUTPATIENT
Start: 2024-08-05 | End: 2024-08-05 | Stop reason: HOSPADM

## 2024-08-05 RX ORDER — DIPHENOXYLATE HYDROCHLORIDE AND ATROPINE SULFATE 2.5; .025 MG/1; MG/1
2 TABLET ORAL 4 TIMES DAILY PRN
Status: DISCONTINUED | OUTPATIENT
Start: 2024-08-05 | End: 2024-08-06 | Stop reason: HOSPADM

## 2024-08-05 RX ORDER — SODIUM CHLORIDE, SODIUM LACTATE, POTASSIUM CHLORIDE, CALCIUM CHLORIDE 600; 310; 30; 20 MG/100ML; MG/100ML; MG/100ML; MG/100ML
1000 INJECTION, SOLUTION INTRAVENOUS CONTINUOUS
Status: DISCONTINUED | OUTPATIENT
Start: 2024-08-05 | End: 2024-08-05

## 2024-08-05 RX ORDER — OXYCODONE HYDROCHLORIDE AND ACETAMINOPHEN 5; 325 MG/1; MG/1
1 TABLET ORAL ONCE AS NEEDED
Status: DISCONTINUED | OUTPATIENT
Start: 2024-08-05 | End: 2024-08-05 | Stop reason: HOSPADM

## 2024-08-05 RX ORDER — HYDROMORPHONE HYDROCHLORIDE 1 MG/ML
0.25 INJECTION, SOLUTION INTRAMUSCULAR; INTRAVENOUS; SUBCUTANEOUS
Status: DISCONTINUED | OUTPATIENT
Start: 2024-08-05 | End: 2024-08-05 | Stop reason: HOSPADM

## 2024-08-05 RX ORDER — ACETAMINOPHEN 325 MG/1
650 TABLET ORAL EVERY 8 HOURS
Status: DISCONTINUED | OUTPATIENT
Start: 2024-08-05 | End: 2024-08-06 | Stop reason: HOSPADM

## 2024-08-05 RX ORDER — BISOPROLOL FUMARATE AND HYDROCHLOROTHIAZIDE 5; 6.25 MG/1; MG/1
1 TABLET ORAL DAILY
Status: DISCONTINUED | OUTPATIENT
Start: 2024-08-06 | End: 2024-08-06 | Stop reason: HOSPADM

## 2024-08-05 RX ORDER — HYDROCODONE BITARTRATE AND ACETAMINOPHEN 5; 325 MG/1; MG/1
1 TABLET ORAL EVERY 6 HOURS PRN
Status: DISCONTINUED | OUTPATIENT
Start: 2024-08-05 | End: 2024-08-06 | Stop reason: HOSPADM

## 2024-08-05 RX ORDER — LOSARTAN POTASSIUM 50 MG/1
50 TABLET ORAL
Status: DISCONTINUED | OUTPATIENT
Start: 2024-08-06 | End: 2024-08-06 | Stop reason: HOSPADM

## 2024-08-05 RX ORDER — ALBUTEROL SULFATE 90 UG/1
2 AEROSOL, METERED RESPIRATORY (INHALATION)
Status: DISCONTINUED | OUTPATIENT
Start: 2024-08-05 | End: 2024-08-05 | Stop reason: CLARIF

## 2024-08-05 RX ORDER — ONDANSETRON 2 MG/ML
4 INJECTION INTRAMUSCULAR; INTRAVENOUS EVERY 6 HOURS PRN
Status: DISCONTINUED | OUTPATIENT
Start: 2024-08-05 | End: 2024-08-06 | Stop reason: HOSPADM

## 2024-08-05 RX ORDER — METAXALONE 800 MG/1
800 TABLET ORAL 3 TIMES DAILY PRN
Status: DISCONTINUED | OUTPATIENT
Start: 2024-08-05 | End: 2024-08-06 | Stop reason: HOSPADM

## 2024-08-05 RX ORDER — SODIUM CHLORIDE 0.9 % (FLUSH) 0.9 %
3 SYRINGE (ML) INJECTION AS NEEDED
Status: DISCONTINUED | OUTPATIENT
Start: 2024-08-05 | End: 2024-08-05 | Stop reason: HOSPADM

## 2024-08-05 RX ORDER — POTASSIUM CHLORIDE 750 MG/1
20 CAPSULE, EXTENDED RELEASE ORAL DAILY
Status: DISCONTINUED | OUTPATIENT
Start: 2024-08-06 | End: 2024-08-06 | Stop reason: HOSPADM

## 2024-08-05 RX ORDER — SODIUM CHLORIDE 0.9 % (FLUSH) 0.9 %
10 SYRINGE (ML) INJECTION EVERY 12 HOURS SCHEDULED
Status: DISCONTINUED | OUTPATIENT
Start: 2024-08-05 | End: 2024-08-06 | Stop reason: HOSPADM

## 2024-08-05 RX ORDER — DEXAMETHASONE SODIUM PHOSPHATE 4 MG/ML
INJECTION, SOLUTION INTRA-ARTICULAR; INTRALESIONAL; INTRAMUSCULAR; INTRAVENOUS; SOFT TISSUE AS NEEDED
Status: DISCONTINUED | OUTPATIENT
Start: 2024-08-05 | End: 2024-08-05 | Stop reason: SURG

## 2024-08-05 RX ORDER — CETIRIZINE HYDROCHLORIDE 10 MG/1
10 TABLET ORAL DAILY
Status: DISCONTINUED | OUTPATIENT
Start: 2024-08-06 | End: 2024-08-06 | Stop reason: HOSPADM

## 2024-08-05 RX ORDER — HYDRALAZINE HYDROCHLORIDE 20 MG/ML
10 INJECTION INTRAMUSCULAR; INTRAVENOUS EVERY 4 HOURS PRN
Status: DISCONTINUED | OUTPATIENT
Start: 2024-08-05 | End: 2024-08-06 | Stop reason: HOSPADM

## 2024-08-05 RX ORDER — MORPHINE SULFATE 2 MG/ML
2 INJECTION, SOLUTION INTRAMUSCULAR; INTRAVENOUS
Status: DISCONTINUED | OUTPATIENT
Start: 2024-08-05 | End: 2024-08-06 | Stop reason: HOSPADM

## 2024-08-05 RX ORDER — NICARDIPINE HYDROCHLORIDE 2.5 MG/ML
INJECTION INTRAVENOUS AS NEEDED
Status: DISCONTINUED | OUTPATIENT
Start: 2024-08-05 | End: 2024-08-05 | Stop reason: SURG

## 2024-08-05 RX ORDER — LETROZOLE 2.5 MG/1
2.5 TABLET, FILM COATED ORAL DAILY
Status: DISCONTINUED | OUTPATIENT
Start: 2024-08-06 | End: 2024-08-06 | Stop reason: HOSPADM

## 2024-08-05 RX ORDER — SODIUM CHLORIDE 0.9 % (FLUSH) 0.9 %
10 SYRINGE (ML) INJECTION AS NEEDED
Status: DISCONTINUED | OUTPATIENT
Start: 2024-08-05 | End: 2024-08-06 | Stop reason: HOSPADM

## 2024-08-05 RX ORDER — BUPIVACAINE HCL/0.9 % NACL/PF 0.125 %
PLASTIC BAG, INJECTION (ML) EPIDURAL AS NEEDED
Status: DISCONTINUED | OUTPATIENT
Start: 2024-08-05 | End: 2024-08-05 | Stop reason: SURG

## 2024-08-05 RX ORDER — PSEUDOEPHEDRINE HCL 30 MG
60 TABLET ORAL EVERY 4 HOURS PRN
Status: DISCONTINUED | OUTPATIENT
Start: 2024-08-05 | End: 2024-08-06 | Stop reason: HOSPADM

## 2024-08-05 RX ORDER — ONDANSETRON 4 MG/1
4 TABLET, ORALLY DISINTEGRATING ORAL EVERY 6 HOURS PRN
Status: DISCONTINUED | OUTPATIENT
Start: 2024-08-05 | End: 2024-08-06 | Stop reason: HOSPADM

## 2024-08-05 RX ORDER — FLUMAZENIL 0.1 MG/ML
0.2 INJECTION INTRAVENOUS AS NEEDED
Status: DISCONTINUED | OUTPATIENT
Start: 2024-08-05 | End: 2024-08-05 | Stop reason: HOSPADM

## 2024-08-05 RX ORDER — DROPERIDOL 2.5 MG/ML
0.62 INJECTION, SOLUTION INTRAMUSCULAR; INTRAVENOUS ONCE AS NEEDED
Status: DISCONTINUED | OUTPATIENT
Start: 2024-08-05 | End: 2024-08-05 | Stop reason: HOSPADM

## 2024-08-05 RX ORDER — SODIUM CHLORIDE, SODIUM LACTATE, POTASSIUM CHLORIDE, CALCIUM CHLORIDE 600; 310; 30; 20 MG/100ML; MG/100ML; MG/100ML; MG/100ML
100 INJECTION, SOLUTION INTRAVENOUS CONTINUOUS
Status: DISCONTINUED | OUTPATIENT
Start: 2024-08-05 | End: 2024-08-05

## 2024-08-05 RX ORDER — HYDROCODONE BITARTRATE AND ACETAMINOPHEN 10; 325 MG/1; MG/1
0.5 TABLET ORAL EVERY 4 HOURS PRN
Status: DISCONTINUED | OUTPATIENT
Start: 2024-08-05 | End: 2024-08-06 | Stop reason: HOSPADM

## 2024-08-05 RX ORDER — ALBUTEROL SULFATE 2.5 MG/3ML
2.5 SOLUTION RESPIRATORY (INHALATION)
Status: DISCONTINUED | OUTPATIENT
Start: 2024-08-05 | End: 2024-08-05

## 2024-08-05 RX ORDER — SODIUM CHLORIDE 9 MG/ML
40 INJECTION, SOLUTION INTRAVENOUS AS NEEDED
Status: DISCONTINUED | OUTPATIENT
Start: 2024-08-05 | End: 2024-08-05 | Stop reason: HOSPADM

## 2024-08-05 RX ORDER — NALOXONE HCL 0.4 MG/ML
0.4 VIAL (ML) INJECTION
Status: DISCONTINUED | OUTPATIENT
Start: 2024-08-05 | End: 2024-08-06 | Stop reason: HOSPADM

## 2024-08-05 RX ORDER — SODIUM CHLORIDE 9 MG/ML
40 INJECTION, SOLUTION INTRAVENOUS AS NEEDED
Status: DISCONTINUED | OUTPATIENT
Start: 2024-08-05 | End: 2024-08-06 | Stop reason: HOSPADM

## 2024-08-05 RX ORDER — CLOPIDOGREL BISULFATE 75 MG/1
75 TABLET ORAL DAILY
Status: DISCONTINUED | OUTPATIENT
Start: 2024-08-05 | End: 2024-08-06 | Stop reason: HOSPADM

## 2024-08-05 RX ORDER — PRAVASTATIN SODIUM 20 MG
40 TABLET ORAL NIGHTLY
Status: DISCONTINUED | OUTPATIENT
Start: 2024-08-05 | End: 2024-08-06 | Stop reason: HOSPADM

## 2024-08-05 RX ORDER — LIDOCAINE HYDROCHLORIDE 10 MG/ML
0.5 INJECTION, SOLUTION EPIDURAL; INFILTRATION; INTRACAUDAL; PERINEURAL ONCE AS NEEDED
Status: DISCONTINUED | OUTPATIENT
Start: 2024-08-05 | End: 2024-08-05 | Stop reason: HOSPADM

## 2024-08-05 RX ORDER — HEPARIN SODIUM 1000 [USP'U]/ML
INJECTION, SOLUTION INTRAVENOUS; SUBCUTANEOUS AS NEEDED
Status: DISCONTINUED | OUTPATIENT
Start: 2024-08-05 | End: 2024-08-05 | Stop reason: SURG

## 2024-08-05 RX ORDER — FENTANYL CITRATE 50 UG/ML
25 INJECTION, SOLUTION INTRAMUSCULAR; INTRAVENOUS
Status: DISCONTINUED | OUTPATIENT
Start: 2024-08-05 | End: 2024-08-05 | Stop reason: HOSPADM

## 2024-08-05 RX ORDER — MEMANTINE HYDROCHLORIDE 5 MG/1
5 TABLET ORAL 2 TIMES DAILY
Status: DISCONTINUED | OUTPATIENT
Start: 2024-08-05 | End: 2024-08-06 | Stop reason: HOSPADM

## 2024-08-05 RX ORDER — ONDANSETRON 2 MG/ML
4 INJECTION INTRAMUSCULAR; INTRAVENOUS
Status: DISCONTINUED | OUTPATIENT
Start: 2024-08-05 | End: 2024-08-05 | Stop reason: HOSPADM

## 2024-08-05 RX ORDER — CLOPIDOGREL BISULFATE 75 MG/1
75 TABLET ORAL DAILY
COMMUNITY

## 2024-08-05 RX ORDER — ONDANSETRON 2 MG/ML
INJECTION INTRAMUSCULAR; INTRAVENOUS AS NEEDED
Status: DISCONTINUED | OUTPATIENT
Start: 2024-08-05 | End: 2024-08-05 | Stop reason: SURG

## 2024-08-05 RX ORDER — BUPIVACAINE HYDROCHLORIDE 5 MG/ML
INJECTION, SOLUTION PERINEURAL AS NEEDED
Status: DISCONTINUED | OUTPATIENT
Start: 2024-08-05 | End: 2024-08-05 | Stop reason: HOSPADM

## 2024-08-05 RX ORDER — SODIUM CHLORIDE 0.9 % (FLUSH) 0.9 %
10 SYRINGE (ML) INJECTION AS NEEDED
Status: DISCONTINUED | OUTPATIENT
Start: 2024-08-05 | End: 2024-08-05 | Stop reason: HOSPADM

## 2024-08-05 RX ORDER — CLINDAMYCIN PHOSPHATE 900 MG/50ML
900 INJECTION, SOLUTION INTRAVENOUS EVERY 8 HOURS
Status: COMPLETED | OUTPATIENT
Start: 2024-08-05 | End: 2024-08-05

## 2024-08-05 RX ORDER — BUMETANIDE 1 MG/1
1 TABLET ORAL 2 TIMES DAILY
Status: DISCONTINUED | OUTPATIENT
Start: 2024-08-05 | End: 2024-08-06 | Stop reason: HOSPADM

## 2024-08-05 RX ORDER — PROTAMINE SULFATE 10 MG/ML
INJECTION, SOLUTION INTRAVENOUS AS NEEDED
Status: DISCONTINUED | OUTPATIENT
Start: 2024-08-05 | End: 2024-08-05 | Stop reason: SURG

## 2024-08-05 RX ADMIN — Medication 200 MCG: at 12:28

## 2024-08-05 RX ADMIN — SODIUM CHLORIDE, POTASSIUM CHLORIDE, SODIUM LACTATE AND CALCIUM CHLORIDE 1000 ML: 600; 310; 30; 20 INJECTION, SOLUTION INTRAVENOUS at 20:12

## 2024-08-05 RX ADMIN — PSEUDOEPHEDRINE HCL 60 MG: 30 TABLET, FILM COATED ORAL at 19:09

## 2024-08-05 RX ADMIN — CLINDAMYCIN PHOSPHATE 900 MG: 900 INJECTION, SOLUTION INTRAVENOUS at 12:04

## 2024-08-05 RX ADMIN — SODIUM CHLORIDE, POTASSIUM CHLORIDE, SODIUM LACTATE AND CALCIUM CHLORIDE 1000 ML: 600; 310; 30; 20 INJECTION, SOLUTION INTRAVENOUS at 11:22

## 2024-08-05 RX ADMIN — CLOPIDOGREL BISULFATE 75 MG: 75 TABLET, FILM COATED ORAL at 17:42

## 2024-08-05 RX ADMIN — Medication 200 MCG: at 12:04

## 2024-08-05 RX ADMIN — HEPARIN SODIUM 7000 UNITS: 1000 INJECTION, SOLUTION INTRAVENOUS; SUBCUTANEOUS at 12:08

## 2024-08-05 RX ADMIN — GLYCOPYRROLATE 0.2 MG: 0.2 INJECTION INTRAMUSCULAR; INTRAVENOUS at 12:07

## 2024-08-05 RX ADMIN — HYDROCODONE BITARTRATE AND ACETAMINOPHEN 1 TABLET: 5; 325 TABLET ORAL at 22:19

## 2024-08-05 RX ADMIN — SODIUM BICARBONATE 650 MG: 650 TABLET ORAL at 22:19

## 2024-08-05 RX ADMIN — Medication 10 ML: at 22:19

## 2024-08-05 RX ADMIN — HEPARIN SODIUM 1000 UNITS: 1000 INJECTION, SOLUTION INTRAVENOUS; SUBCUTANEOUS at 12:18

## 2024-08-05 RX ADMIN — CLINDAMYCIN IN 5 PERCENT DEXTROSE 900 MG: 18 INJECTION, SOLUTION INTRAVENOUS at 22:18

## 2024-08-05 RX ADMIN — Medication 250 MG: at 22:19

## 2024-08-05 RX ADMIN — SODIUM CHLORIDE, POTASSIUM CHLORIDE, SODIUM LACTATE AND CALCIUM CHLORIDE 1000 ML: 600; 310; 30; 20 INJECTION, SOLUTION INTRAVENOUS at 11:34

## 2024-08-05 RX ADMIN — ONDANSETRON 4 MG: 2 INJECTION INTRAMUSCULAR; INTRAVENOUS at 12:32

## 2024-08-05 RX ADMIN — PRAVASTATIN SODIUM 40 MG: 20 TABLET ORAL at 22:19

## 2024-08-05 RX ADMIN — Medication 100 MCG: at 12:23

## 2024-08-05 RX ADMIN — DEXAMETHASONE SODIUM PHOSPHATE 4 MG: 4 INJECTION, SOLUTION INTRA-ARTICULAR; INTRALESIONAL; INTRAMUSCULAR; INTRAVENOUS; SOFT TISSUE at 12:32

## 2024-08-05 RX ADMIN — NICARDIPINE HYDROCHLORIDE 500 MCG: 2.5 INJECTION INTRAVENOUS at 12:44

## 2024-08-05 RX ADMIN — PHENYLEPHRINE HYDROCHLORIDE 1 MCG/KG/MIN: 10 INJECTION INTRAVENOUS at 12:02

## 2024-08-05 RX ADMIN — BUMETANIDE 1 MG: 1 TABLET ORAL at 22:19

## 2024-08-05 RX ADMIN — PROTAMINE SULFATE 30 MG: 10 INJECTION, SOLUTION INTRAVENOUS at 12:31

## 2024-08-05 NOTE — ANESTHESIA PROCEDURE NOTES
Arterial Line      Patient reassessed immediately prior to procedure    Patient location during procedure: pre-op  Start time: 8/5/2024 11:22 AM   Line placed for hemodynamic monitoring, ABGs/Labs/ISTAT and MD/Surgeon request.  Performed By   Anesthesiologist: Tona German MD   Preanesthetic Checklist  Completed: patient identified, IV checked, site marked, risks and benefits discussed, surgical consent, monitors and equipment checked, pre-op evaluation and timeout performed  Arterial Line Prep    Sterile Tech: gloves and sterile barriers  Prep: ChloraPrep  Patient monitoring: blood pressure monitoring, continuous pulse oximetry and EKG  Arterial Line Procedure   Laterality:right  Location:  radial artery  Catheter size: 20 G   Guidance: ultrasound guided  PROCEDURE NOTE/ULTRASOUND INTERPRETATION.  Using ultrasound guidance the potential vascular sites for insertion of the catheter were visualized to determine the patency of the vessel to be used for vascular access.  After selecting the appropriate site for insertion, the needle was visualized under ultrasound being inserted into the radial artery, followed by ultrasound confirmation of wire and catheter placement. There were no abnormalities seen on ultrasound; an image was taken; and the patient tolerated the procedure with no complications.   Number of attempts: 1  Successful placement: yes   Post Assessment   Dressing Type: occlusive dressing applied, secured with tape and wrist guard applied.   Complications no  Circ/Move/Sens Assessment: normal.   Patient Tolerance: patient tolerated the procedure well with no apparent complications

## 2024-08-05 NOTE — ANESTHESIA POSTPROCEDURE EVALUATION
Patient: Synddulce Nunez    Procedure Summary       Date: 08/05/24 Room / Location: [facility] OR  /  [facility] OR    Anesthesia Start: 1151 Anesthesia Stop: 1257    Procedure: RIGHT TRANSCAROTID ARTERY REVASCULARIZATION (Right: Neck) Diagnosis:       Stenosis of right carotid artery      (Stenosis of right carotid artery [I65.21])    Surgeons: Junior Myeers DO Provider: Drew Cadena CRNA    Anesthesia Type: generalGeno ASA Status: 3            Anesthesia Type: general, Geno    Vitals  Vitals Value Taken Time   /47 08/05/24 1546   Temp 98.3 °F (36.8 °C) 08/05/24 1252   Pulse 60 08/05/24 1549   Resp 14 08/05/24 1545   SpO2 100 % 08/05/24 1549   Vitals shown include unfiled device data.        Post Anesthesia Care and Evaluation    Patient location during evaluation: PACU  Patient participation: complete - patient participated  Level of consciousness: awake and awake and alert  Pain score: 0  Pain management: adequate    Airway patency: patent  Anesthetic complications: No anesthetic complications  PONV Status: none  Cardiovascular status: acceptable  Respiratory status: acceptable  Hydration status: acceptable    Comments: Patient discharged according to acceptable Mandy score per RN assessment. See nursing records for further information.     Blood pressure 126/47, pulse 57, temperature 98.3 °F (36.8 °C), temperature source Temporal, resp. rate 14, SpO2 99%, not currently breastfeeding.

## 2024-08-05 NOTE — ANESTHESIA PREPROCEDURE EVALUATION
Anesthesia Evaluation     Patient summary reviewed   history of anesthetic complications:   NPO Solid Status: > 8 hours  NPO Liquid Status: > 8 hours           Airway   Mallampati: II  TM distance: >3 FB  Neck ROM: full  Dental      Pulmonary    (-) COPD, asthma, sleep apnea, not a smoker  Cardiovascular     (+) hypertension, hyperlipidemia,  carotid artery disease  (-) pacemaker, past MI, angina, cardiac stents      Neuro/Psych  (+) TIA  (-) seizures  GI/Hepatic/Renal/Endo    (+) obesity, GERD, renal disease- CRI, thyroid problem   (-) liver disease    Musculoskeletal     Abdominal    Substance History      OB/GYN          Other   blood dyscrasia anemia,   history of cancer                  Anesthesia Plan    ASA 3     general and [place]     intravenous induction     Anesthetic plan, risks, benefits, and alternatives have been provided, discussed and informed consent has been obtained with: patient.    Use of blood products discussed with patient  Consented to blood products.        CODE STATUS:

## 2024-08-05 NOTE — ANESTHESIA PROCEDURE NOTES
Airway  Urgency: elective    Date/Time: 8/5/2024 11:57 AM  Airway not difficult    General Information and Staff    Patient location during procedure: OR  CRNA/CAA: Drew Cadena CRNA    Indications and Patient Condition  Indications for airway management: airway protection    Preoxygenated: yes  Mask difficulty assessment: 1 - vent by mask    Final Airway Details  Final airway type: endotracheal airway      Successful airway: ETT  Cuffed: yes   Successful intubation technique: direct laryngoscopy  Endotracheal tube insertion site: oral  Blade: Avrgas  Blade size: 2  ETT size (mm): 7.5  Cormack-Lehane Classification: grade IIa - partial view of glottis  Placement verified by: chest auscultation and capnometry   Cuff volume (mL): 8  Measured from: teeth  ETT/EBT  to teeth (cm): 22  Number of attempts at approach: 1  Assessment: lips, teeth, and gum same as pre-op and atraumatic intubation

## 2024-08-05 NOTE — OP NOTE
Dago Nunez  8/5/2024     PREOPERATIVE DIAGNOSIS: Stenosis of right carotid artery [I65.21]     POSTOPERATIVE DIAGNOSIS: Post-Op Diagnosis Codes:     * Stenosis of right carotid artery [I65.21]     PROCEDURE PERFORMED:   1.  Right internal carotid artery TCAR (transcarotid artery revascularization) with the ENROUTE transcarotid neuro protection and stent system     SURGEON: Junior Meyers DO      ANESTHESIA: General    PREPARATION: Routine.    STAFF: Circulator: Nohelia Baez, RN  Scrub Person: Roscoe Wilkes Marla J  Vendor Representative: Margareth Proctor  Vascular Radiology Technician: Michelle Hamm  Other: Ilene Zarco    Estimated Blood Loss: minimal    SPECIMENS: None    COMPLICATIONS: None    INDICATIONS: Dago Nunez is a 84 y.o. female was recently hospitalized for left sided weakness and some cognitive decline. She was sent home on aspirin and Plavix however hemoglobin dropped to 6.6. She received iron infusion and one unit of PRBCs, most recent 9.2. She is now maintained on aspirin and Pravachol. She is now palliative with regards to chemotherapy. She had vulva cancer and breast cancer. 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 She did have noninvasive testing performed which I did review in office. The indications, risks, and possible complications of the procedure were explained to the patient, who voiced understanding and wished to proceed with surgery.     PROCEDURE IN DETAIL: The patient was taken to the operating room and placed on the operating table in a supine position. After general anesthesia was obtained, the right neck and bilateral groins were prepped and draped in a sterile manner.   A 4 cm transverse incision was made between the sternal and clavicular heads of the sternocleidomastoid muscle below the omohyoid.  Following longitudinal division of the carotid sheath the jugular vein was partially dissected and retracted laterally.   Once 3 cm of common carotid artery were isolated, the vessel loop was placed around the proximal one third of the common carotid artery under direct visualization.  A 5-0 Prolene suture was preplaced in the anterior wall the common carotid artery, in a Z stitch configuration, close to the clavicle to facilitate hemostasis upon removal of the arterial sheath at completion of the procedure.  The contralateral left common femoral vein was accessed under ultrasound guidance, using standard Seldinger and micropuncture access technique.  The venous return sheath was advanced into the common femoral vein over the advantage Glidewire.  Blood was aspirated from the flow line followed by flushing of the venous sheath with heparinized saline.  The venous sheath was secured to the patient's skin with a 2-0 silk suture to maintain optimal position in the vessel.  8000 units of intravenous heparin was given to obtain a therapeutic ACT greater than 250 seconds prior to arterial access.  A 4 Lithuanian micropuncture set was used, puncturing the artery with a 21-gauge needle through the preplaced Z stitch while holding gentle traction on the vessel loop to stabilize and centralize the common carotid artery within the incision.  Careful attention was paid to the change in common carotid shape when using the vessel loop to control or lift the artery.  The micropuncture wire was then advanced into the external carotid artery and the 21-gauge needle was removed.  The micropuncture sheath was advanced into the external carotid artery and the wire and dilator were removed.  Pulsatile backflow indicated correct positioning.  The J-wire was then inserted into the external carotid artery.  After micro puncture sheath removal, the transcarotid arterial sheath was advanced to the 2.5 cm marker and the J-wire and dilator were removed.    The arterial sheath position was assessed under fluoroscopy in 2 projections to ensure that the sheath tip was  oriented coaxially in the common carotid artery.  The arterial sheath was sutured to the patient in 2 separate areas.  Blood was slowly aspirated followed by flushing with heparinized saline.  Next, the flow controller was connected to the transcarotid arterial sheath, prepared by passively allowing a column of arterial blood to fill the line and connected to the venous return sheath.  The common carotid inflow was occluded proximal to the arteriotomy with a profunda clamp to achieve active flow reversal.  To confirm flow reversal, a saline bolus was delivered in the venous flow line on both high and low flow settings of the flow controller.  Angiograms were performed with slow injections of a small amount of contrast filling just past the lesion to minimize antegrade transmission of microbubbles.  Prior to lesion manipulation, heart rate and blood pressure were managed upwards to optimize flow reversal and procedural neuro protection.  The lesion was crossed with an 014 guidewire and predilatation was performed with a 5 x 35 mm Enflate balloon.  Next, primary stenting was performed with the 9-7 x 40 mm ENROUTE transcarotid stent.  Imaging was performed in both in NEEL and SHIRLEY projections to ensure the stent was fully deployed.  Completion angiogram also showed the stent to be widely patent without any residual stenosis, dissection, or occlusion.  At TCAR case completion, antegrade flow was restored by releasing the clamp on the common carotid artery then closing the NPS stopcocks to the flow lines.  The transcarotid arterial sheath was removed and the preclosure suture was tied in place.  30 mg of protamine was given intravenously for reversal.  The venous return sheath from the left groin was removed and hemostasis was achieved with 10 to 15 minutes of manual compression.  The neck wound bed was irrigated with antibiotic saline and hemostasis was observed.  The 2 heads of the sternocleidomastoid were carefully  sutured back together with a 3-0 Vicryl in a running fashion.  The platysma muscle was reapproximated using a 3-0 Vicryl in a running fashion.  The skin was then reapproximated using a 4-0 Monocryl in a subcuticular fashion. Sterile dressings were applied. The patient tolerated the procedure well. Sponge and needle counts were correct. The patient was then awakened and extubated in the operating room and taken to the recovery room in good condition.    ICA stenosis: 90%  Stenosis after stent placement: 0%  Lesion length: 34 mm  Lesion calcification: Moderate  Lesion at level: C2  Neuro: Intact  ICA tortuosity: Mild  Arch type: Type 1  Flow reversal time: 5 minutes  Ford: 0  Contrast amount: 12 mL  ACT: 238+1000 units of additional heparin  NIH: 0    Junior Meyers,   Date: 8/5/2024 Time: 12:52 CDT    CC:VINCENT Cool

## 2024-08-06 ENCOUNTER — READMISSION MANAGEMENT (OUTPATIENT)
Dept: CALL CENTER | Facility: HOSPITAL | Age: 84
End: 2024-08-06
Payer: MEDICARE

## 2024-08-06 VITALS
OXYGEN SATURATION: 100 % | DIASTOLIC BLOOD PRESSURE: 38 MMHG | RESPIRATION RATE: 16 BRPM | HEIGHT: 58 IN | SYSTOLIC BLOOD PRESSURE: 124 MMHG | HEART RATE: 65 BPM | BODY MASS INDEX: 34.24 KG/M2 | WEIGHT: 163.14 LBS | TEMPERATURE: 97.8 F

## 2024-08-06 PROCEDURE — 94799 UNLISTED PULMONARY SVC/PX: CPT

## 2024-08-06 PROCEDURE — 94761 N-INVAS EAR/PLS OXIMETRY MLT: CPT

## 2024-08-06 PROCEDURE — 99024 POSTOP FOLLOW-UP VISIT: CPT | Performed by: SURGERY

## 2024-08-06 RX ORDER — CLINDAMYCIN PHOSPHATE 10 UG/ML
LOTION TOPICAL EVERY MORNING
Status: CANCELLED | OUTPATIENT
Start: 2024-08-06

## 2024-08-06 RX ORDER — METAXALONE 800 MG/1
800 TABLET ORAL 3 TIMES DAILY PRN
COMMUNITY

## 2024-08-06 RX ORDER — TACROLIMUS 1 MG/G
1 OINTMENT TOPICAL 2 TIMES DAILY PRN
COMMUNITY

## 2024-08-06 RX ORDER — CLINDAMYCIN PHOSPHATE 10 UG/ML
1 LOTION TOPICAL DAILY PRN
COMMUNITY

## 2024-08-06 RX ORDER — CYANOCOBALAMIN 1000 UG/ML
1000 INJECTION, SOLUTION INTRAMUSCULAR; SUBCUTANEOUS
COMMUNITY

## 2024-08-06 RX ORDER — ALBUTEROL SULFATE 90 UG/1
2 AEROSOL, METERED RESPIRATORY (INHALATION) EVERY 4 HOURS PRN
COMMUNITY

## 2024-08-06 RX ORDER — CLOPIDOGREL BISULFATE 75 MG/1
75 TABLET ORAL DAILY
Qty: 30 TABLET | Refills: 0 | Status: SHIPPED | OUTPATIENT
Start: 2024-08-06

## 2024-08-06 RX ADMIN — ALBUTEROL SULFATE 2.5 MG: 2.5 SOLUTION RESPIRATORY (INHALATION) at 10:21

## 2024-08-06 RX ADMIN — ASPIRIN 81 MG: 81 TABLET, COATED ORAL at 09:33

## 2024-08-06 RX ADMIN — LEVOTHYROXINE SODIUM 75 MCG: 75 TABLET ORAL at 06:07

## 2024-08-06 RX ADMIN — PANTOPRAZOLE SODIUM 40 MG: 40 TABLET, DELAYED RELEASE ORAL at 09:33

## 2024-08-06 RX ADMIN — CLOPIDOGREL BISULFATE 75 MG: 75 TABLET, FILM COATED ORAL at 09:33

## 2024-08-06 RX ADMIN — CETIRIZINE HYDROCHLORIDE 10 MG: 10 TABLET ORAL at 09:32

## 2024-08-06 RX ADMIN — Medication 250 MG: at 09:31

## 2024-08-06 RX ADMIN — SODIUM BICARBONATE 650 MG: 650 TABLET ORAL at 09:34

## 2024-08-06 RX ADMIN — Medication 10 ML: at 09:36

## 2024-08-06 RX ADMIN — ALBUTEROL SULFATE 2.5 MG: 2.5 SOLUTION RESPIRATORY (INHALATION) at 06:44

## 2024-08-06 RX ADMIN — ACETAMINOPHEN 650 MG: 325 TABLET ORAL at 06:07

## 2024-08-06 RX ADMIN — CITALOPRAM 20 MG: 20 TABLET, FILM COATED ORAL at 09:33

## 2024-08-06 RX ADMIN — POTASSIUM CHLORIDE 20 MEQ: 750 CAPSULE, EXTENDED RELEASE ORAL at 09:33

## 2024-08-06 NOTE — DISCHARGE SUMMARY
Date of Discharge:  8/6/2024    Discharge Diagnosis: Stenosis of right carotid artery [I65.21]  Carotid stenosis, right [I65.21]  Carotid stenosis [I65.29]    Presenting Problem/History of Present Illness  Stenosis of right carotid artery [I65.21]  Carotid stenosis, right [I65.21]  Carotid stenosis [I65.29]       Hospital Course  Patient is a 84 y.o. female presented with 70% right carotid stenosis.  The patient was transferred to  for continued care.  Overnight, the patient has done well. Dago Nunez  vitals have remained stable, her neck soft without hematoma, and without neurological deficit.  Groin soft without hematoma.  Dago Nunez  is stable and ready for discharge.  We will see her back in 2 weeks for post operative follow up.  Her okay well so medications will stay the same.  Written and verbal instructions were given.  This all was discussed in full with complete understanding.  Her    Procedures Performed  Procedure(s):  RIGHT TRANSCAROTID ARTERY REVASCULARIZATION       Consults:   Consults       No orders found for last 30 day(s).              Condition on Discharge: Stable    Discharge Medications     Discharge Medications        Continue These Medications        Instructions Start Date   albuterol sulfate  (90 Base) MCG/ACT inhaler  Commonly known as: PROVENTIL HFA;VENTOLIN HFA;PROAIR HFA   2 puffs, Inhalation, 4 Times Daily - RT      aspirin 81 MG EC tablet   81 mg, Oral, Daily      bisoprolol-hydrochlorothiazide 5-6.25 MG per tablet  Commonly known as: ZIAC   1 tablet, Oral, Daily      bumetanide 1 MG tablet  Commonly known as: BUMEX   1 mg, Oral, 2 Times Daily      cetirizine 10 MG tablet  Commonly known as: zyrTEC   10 mg, Oral, Daily      citalopram 20 MG tablet  Commonly known as: CeleXA   20 mg, Oral, Daily      clindamycin 1 % lotion  Commonly known as: CLEOCIN T   APPLY TO THE AFFECTED AREA ON LEGS EVERY MORNING.      clopidogrel 75 MG tablet  Commonly known as:  "PLAVIX   75 mg, Oral, Daily      cyanocobalamin 1000 MCG/ML injection   INJECT 1 ML INTO THE MUSCLE EVERY 4 WEEKS      diphenoxylate-atropine 2.5-0.025 MG per tablet  Commonly known as: LOMOTIL   2 tablets, Oral, 4 Times Daily PRN      esomeprazole 40 MG capsule  Commonly known as: nexIUM   40 mg, Oral, 2 Times Daily      HYDROcodone-acetaminophen  MG per tablet  Commonly known as: NORCO   0.5 tablets, Oral, Every 4 Hours PRN      Leg Cramps tablet   1 tablet, Oral, Daily      letrozole 2.5 MG tablet  Commonly known as: FEMARA   2.5 mg, Oral, Daily      levothyroxine 75 MCG tablet  Commonly known as: Synthroid   75 mcg, Oral, Daily      memantine 5 MG tablet  Commonly known as: Namenda   5 mg, Oral, 2 Times Daily      metaxalone 800 MG tablet  Commonly known as: SKELAXIN   TAKE 1 TABLET BY MOUTH 3 TIMES DAILY AS NEEDED FOR MUSCLE SPASMS      olmesartan 20 MG tablet  Commonly known as: BENICAR   20 mg, Oral, Daily      phenazopyridine 200 MG tablet  Commonly known as: PYRIDIUM   200 mg, Oral, 3 Times Daily PRN      potassium chloride ER 20 MEQ tablet controlled-release ER tablet  Commonly known as: K-TAB   40 mEq, Oral, Daily      pravastatin 40 MG tablet  Commonly known as: PRAVACHOL   40 mg, Oral, Nightly      saccharomyces boulardii 250 MG capsule  Commonly known as: Florastor   250 mg, Oral, 2 Times Daily      sodium bicarbonate 650 MG tablet   650 mg, Oral, 2 Times Daily      Syringe 25G X 1\" 3 ML misc   1 each, Does not apply, Daily      TYLENOL ARTHRITIS PAIN PO   1 tablet, Oral, Daily PRN      vitamin D 1.25 MG (83810 UT) capsule capsule  Commonly known as: ERGOCALCIFEROL   50,000 Units, Oral, Weekly, Thursdays               Discharge Diet:   Diet Instructions       Diet: Regular/House Diet; Regular (IDDSI 7); Thin (IDDSI 0)      Discharge Diet: Regular/House Diet    Texture: Regular (IDDSI 7)    Fluid Consistency: Thin (IDDSI 0)            Activity at Discharge:   Activity Instructions       Bathing " Restrictions      Type of Restriction: Bathing    Bathing Restrictions: Other    Explain Bathing Restrictions: May shower tomorrow    Driving Restrictions      Type of Restriction: Driving    Driving Restrictions: No Driving (Time Limited)    Length: 1 Week    Lifting Restrictions      Type of Restriction: Lifting    Lifting Restrictions: Lifting Restriction (Indicate Limit)    Weight Limit (Pounds): 10    Length of Lifting Restriction: 1 week    Other Activity Restrictions      Type of Restriction: Other    Explain Other Restrictions: No bending, stooping, or straining            Follow-up Appointments  Future Appointments   Date Time Provider Department Center   8/14/2024  2:00 PM  [facility]  [facility]   8/27/2024  9:00 AM Tam Maldonado MD [facility] CD  PAD   9/11/2024  2:00 PM  [facility]  [facility]   9/19/2024  2:15 PM Drake Stafford MD [facility] ONC [facility] PAD   10/31/2024 10:30 AM [facility] BIC MAMM 2  [facility] MA BI PAD   10/31/2024 11:30 AM Shaheen Akbar DO [facility] FM PAD PAD   11/11/2024 11:30 AM Quynh Rosario MD [facility] GSUR PAD PAD   1/8/2025  1:30 PM Milady Garnica APRN [facility] N PAD PAD   4/25/2025 10:45 AM Jesus Guzman MD [facility] RD PAD PAD     Additional Instructions for the Follow-ups that You Need to Schedule       Discharge Follow-up with Specialty: [place]; 2 Weeks   As directed      Specialty: [place]   Follow Up: 2 Weeks                 I did spend more than 30 minutes reviewing the chart, face to face encounter, and organizing discharge.    VINCENT Mendez  08/06/24  07:47 CDT

## 2024-08-06 NOTE — PLAN OF CARE
Goal Outcome Evaluation:              Outcome Evaluation: A&Ox4. VSS. R neck, L groin drsgs saturated; soft. C/o pain; see MAR. IV abx. No sticks L arm. Takes meds whole with thin liquids. BLE edema, legs elevated. Voiding via purewick. Daughter at bedside. SCDS for VTE. Call light within reach, safety maintained.

## 2024-08-06 NOTE — OUTREACH NOTE
Prep Survey      Flowsheet Row Responses   [facility] patient discharged from? [place]   Is LACE score < 7 ? No   Eligibility [facility]   Date of Admission 08/05/24   Date of Discharge 08/06/24   Discharge Disposition Home or Self Care   Discharge diagnosis RIGHT TRANSCAROTID ARTERY REVASCULARIZATION   for stenosis of right carotid artery   Does the patient have one of the following disease processes/diagnoses(primary or secondary)? Cardiothoracic surgery   Does the patient have Home health ordered? No   Is there a DME ordered? No   Prep survey completed? Yes            Keesha LIND - Registered Nurse

## 2024-08-07 ENCOUNTER — TRANSITIONAL CARE MANAGEMENT TELEPHONE ENCOUNTER (OUTPATIENT)
Dept: CALL CENTER | Facility: HOSPITAL | Age: 84
End: 2024-08-07
Payer: MEDICARE

## 2024-08-07 NOTE — PROGRESS NOTES
"Chief Complaint  Medicare Wellness-subsequent (Pt states having surgery on 08/05/2024 with dr gan )    Subjective        Syndal Waqar Nunez presents to [facility]  History of Present Illness  In addition to Medicare wellness patient reports that she needs a refill of hydrocodone for pain, family is also reported problems with short-term memory.  Review of systems positive for cold intolerance  Objective   Vital Signs:  /70   Pulse 90   Temp 97.4 °F (36.3 °C)   Resp 18   Ht 147.3 cm (58\")   Wt 73 kg (161 lb)   SpO2 99%   BMI 33.65 kg/m²   Estimated body mass index is 33.65 kg/m² as calculated from the following:    Height as of this encounter: 147.3 cm (58\").    Weight as of this encounter: 73 kg (161 lb).               Physical Exam  Vitals and nursing note reviewed.   Constitutional:       General: She is not in acute distress.     Appearance: She is not diaphoretic.   HENT:      Head: Normocephalic and atraumatic.      Nose: Nose normal.   Eyes:      General: No scleral icterus.        Right eye: No discharge.         Left eye: No discharge.      Conjunctiva/sclera: Conjunctivae normal.   Neck:      Trachea: No tracheal deviation.   Pulmonary:      Effort: Pulmonary effort is normal.   Skin:     General: Skin is warm and dry.      Coloration: Skin is not pale.   Neurological:      Mental Status: She is alert and oriented to person, place, and time.   Psychiatric:         Behavior: Behavior normal.         Thought Content: Thought content normal.         Judgment: Judgment normal.        Result Review :                     Assessment and Plan     Diagnoses and all orders for this visit:    1. Medicare annual wellness visit, subsequent (Primary)    2. Memory loss  -     memantine (Namenda) 5 MG tablet; Take 1 tablet by mouth 2 (Two) Times a Day.  Dispense: 60 tablet; Refill: 2    3. Cold intolerance  -     TSH; Future  -     T4, free; Future    4. Vulvar cancer, " carcinoma  -     HYDROcodone-acetaminophen (NORCO)  MG per tablet; Take 0.5 tablets by mouth Every 4 (Four) Hours As Needed for Moderate Pain or Severe Pain. (Patient taking differently: Take 0.5 tablets by mouth Every 4 (Four) Hours As Needed for Moderate Pain or Severe Pain. Patient taking 1 every other day as needed.)  Dispense: 60 tablet; Refill: 0    5. Cancer related pain  -     HYDROcodone-acetaminophen (NORCO)  MG per tablet; Take 0.5 tablets by mouth Every 4 (Four) Hours As Needed for Moderate Pain or Severe Pain. (Patient taking differently: Take 0.5 tablets by mouth Every 4 (Four) Hours As Needed for Moderate Pain or Severe Pain. Patient taking 1 every other day as needed.)  Dispense: 60 tablet; Refill: 0             Follow Up     Return in about 3 months (around 10/24/2024).  Patient was given instructions and counseling regarding her condition or for health maintenance advice. Please see specific information pulled into the AVS if appropriate.

## 2024-08-07 NOTE — OUTREACH NOTE
Call Center TCM Note      Flowsheet Row Responses   [facility] patient discharged from? [place]   Does the patient have one of the following disease processes/diagnoses(primary or secondary)? Cardiothoracic surgery   TCM attempt successful? Yes  [[facility]- Contacts]   Call start time 1316   Call end time 1318   Discharge diagnosis RIGHT TRANSCAROTID ARTERY REVASCULARIZATION   for stenosis of right carotid artery   Person spoke with today (if not patient) and relationship sister   Meds reviewed with patient/caregiver? Yes   Is the patient having any side effects they believe may be caused by any medication additions or changes? No   Does the patient have all medications related to this admission filled (includes all antibiotics, pain medications, cardiac medications, etc.) Yes   Is the patient taking all medications as directed (includes completed medication regime)? Yes   Comments HOSP DC FU appt 8/12/24 1030 am   Does the patient have an appointment with their PCP within 7-14 days of discharge? Yes   Has home health visited the patient within 72 hours of discharge? N/A   Psychosocial issues? No   Did the patient receive a copy of their discharge instructions? Yes   Nursing interventions Reviewed instructions with patient   What is the patient's perception of their health status since discharge? Improving   Nursing interventions Nurse provided patient education   Is the patient /caregiver able to teach back basic post-op care? Lifting as instructed by MD in discharge instructions, Drive as instructed by MD in discharge instructions, No tub bath, swimming, or hot tub until instructed by MD   Is the patient/caregiver able to teach back signs and symptoms of incisional infection? Increased redness, swelling or pain at the incisonal site, Increased drainage or bleeding, Incisional warmth, Pus or odor from incision, Fever   Is the patient/caregiver able to teach back steps to recovery at home? Rest and rebuild strength,  gradually increase activity, Eat a well-balance diet   If the patient is a current smoker, are they able to teach back resources for cessation? Not a smoker   Is the patient/caregiver able to teach back the hierarchy of who to call/visit for symptoms/problems? PCP, Specialist, Home health nurse, Urgent Care, ED, 911 Yes   TCM call completed? Yes   Wrap up additional comments Daughter reports Pt is doing well.   Call end time 1311            Amber Buckley RN    8/7/2024, 13:18 CDT

## 2024-08-12 ENCOUNTER — OFFICE VISIT (OUTPATIENT)
Dept: FAMILY MEDICINE CLINIC | Facility: CLINIC | Age: 84
End: 2024-08-12
Payer: MEDICARE

## 2024-08-12 VITALS
OXYGEN SATURATION: 98 % | WEIGHT: 152.8 LBS | HEIGHT: 58 IN | HEART RATE: 92 BPM | RESPIRATION RATE: 18 BRPM | TEMPERATURE: 97.4 F | BODY MASS INDEX: 32.07 KG/M2 | DIASTOLIC BLOOD PRESSURE: 70 MMHG | SYSTOLIC BLOOD PRESSURE: 134 MMHG

## 2024-08-12 DIAGNOSIS — E03.9 ACQUIRED HYPOTHYROIDISM: Primary | ICD-10-CM

## 2024-08-12 DIAGNOSIS — R41.3 MEMORY LOSS: ICD-10-CM

## 2024-08-12 DIAGNOSIS — I65.29 STENOSIS OF CAROTID ARTERY, UNSPECIFIED LATERALITY: ICD-10-CM

## 2024-08-12 NOTE — PROGRESS NOTES
Chief Complaint  Hospital Follow Up Visit    Subjective        Syndal Waqar Nunez presents to [facility]  History of Present Illness  Hospital f/u after admission on 8/5/24 discharged next day after R endarterectomy for carotid stenosis  Pt doing well, energy better after starting thyroid med, improved LE edema since last visit, memory better too    TCM call on 8/7/24 by Amber Buckley, [place]    Current Outpatient Medications   Medication Instructions    Acetaminophen (TYLENOL ARTHRITIS PAIN PO) 1 tablet, Oral, Daily PRN    albuterol sulfate  (90 Base) MCG/ACT inhaler 2 puffs, Inhalation, Every 4 Hours PRN    aspirin 81 mg, Oral, Daily    bisoprolol-hydrochlorothiazide (ZIAC) 5-6.25 MG per tablet 1 tablet, Oral, Daily    bumetanide (BUMEX) 1 mg, Oral, 2 Times Daily    cetirizine (ZYRTEC) 10 mg, Oral, Daily PRN    citalopram (CELEXA) 20 mg, Oral, Daily    clindamycin (CLEOCIN T) 1 % lotion 1 Application, Topical, Daily PRN    clopidogrel (PLAVIX) 75 mg, Oral, Daily    clopidogrel (PLAVIX) 75 mg, Oral, Daily    cyanocobalamin 1,000 mcg, Intramuscular, Every 30 Days    diphenoxylate-atropine (LOMOTIL) 2.5-0.025 MG per tablet 2 tablets, Oral, 4 Times Daily PRN    esomeprazole (NEXIUM) 40 mg, Oral, 2 Times Daily    Homeopathic Products (LEG CRAMPS) tablet 1 tablet, Oral, Daily    HYDROcodone-acetaminophen (NORCO)  MG per tablet 0.5 tablets, Oral, Every 4 Hours PRN    letrozole (FEMARA) 2.5 mg, Oral, Daily    levothyroxine (SYNTHROID) 75 mcg, Oral, Daily    memantine (NAMENDA) 5 mg, Oral, 2 Times Daily    metaxalone (SKELAXIN) 800 mg, Oral, 3 Times Daily PRN    nystatin-triamcinolone (MYCOLOG II) 496447-0.1 UNIT/GM-% cream 1 Application, Topical, 2 Times Daily PRN    olmesartan (BENICAR) 20 mg, Oral, Daily    phenazopyridine (PYRIDIUM) 200 mg, Oral, 3 Times Daily PRN    potassium chloride ER (K-TAB) 20 MEQ tablet controlled-release ER tablet 40 mEq, Oral, Daily    pravastatin  "(PRAVACHOL) 40 mg, Oral, Nightly    Probiotic Product (PROBIOTIC DAILY PO) 1 tablet, Oral, Daily    sodium bicarbonate 650 mg, Oral, 2 Times Daily    tacrolimus (PROTOPIC) 0.1 % ointment 1 Application, Topical, 2 Times Daily PRN    vitamin D (ERGOCALCIFEROL) 50,000 Units, Oral, Weekly, Thursdays     Current outpatient and discharge medications have been reconciled for the patient.  Reviewed by: Shaheen Akbar DO    Objective   Vital Signs:  /70   Pulse 92   Temp 97.4 °F (36.3 °C)   Resp 18   Ht 148 cm (58.27\")   Wt 69.3 kg (152 lb 12.8 oz)   SpO2 98%   BMI 31.64 kg/m²   Estimated body mass index is 31.64 kg/m² as calculated from the following:    Height as of this encounter: 148 cm (58.27\").    Weight as of this encounter: 69.3 kg (152 lb 12.8 oz).               Physical Exam  Vitals and nursing note reviewed.   Constitutional:       General: She is not in acute distress.     Appearance: She is not diaphoretic.   HENT:      Head: Normocephalic and atraumatic.      Nose: Nose normal.   Eyes:      General: No scleral icterus.        Right eye: No discharge.         Left eye: No discharge.      Conjunctiva/sclera: Conjunctivae normal.   Neck:      Trachea: No tracheal deviation.   Pulmonary:      Effort: Pulmonary effort is normal.   Musculoskeletal:      Right lower leg: Right lower leg edema: improved.      Left lower leg: Left lower leg edema: improved.   Skin:     General: Skin is warm and dry.      Coloration: Skin is not pale.   Neurological:      Mental Status: She is alert and oriented to person, place, and time.   Psychiatric:         Behavior: Behavior normal.         Thought Content: Thought content normal.         Judgment: Judgment normal.        Result Review :                     Assessment and Plan     Diagnoses and all orders for this visit:    1. Acquired hypothyroidism (Primary)    2. Memory loss    3. Stenosis of carotid artery, unspecified laterality    Continue vascular " f/u  Continue namenda and thyroid supplement  F/u 3 months         Follow Up     No follow-ups on file.  Patient was given instructions and counseling regarding her condition or for health maintenance advice. Please see specific information pulled into the AVS if appropriate.

## 2024-08-14 ENCOUNTER — LAB (OUTPATIENT)
Dept: LAB | Facility: HOSPITAL | Age: 84
End: 2024-08-14
Payer: MEDICARE

## 2024-08-14 ENCOUNTER — READMISSION MANAGEMENT (OUTPATIENT)
Dept: CALL CENTER | Facility: HOSPITAL | Age: 84
End: 2024-08-14
Payer: MEDICARE

## 2024-08-14 DIAGNOSIS — C50.412 CARCINOMA OF UPPER-OUTER QUADRANT OF LEFT BREAST IN FEMALE, ESTROGEN RECEPTOR NEGATIVE: ICD-10-CM

## 2024-08-14 DIAGNOSIS — N18.31 ANEMIA DUE TO STAGE 3A CHRONIC KIDNEY DISEASE: ICD-10-CM

## 2024-08-14 DIAGNOSIS — D50.8 IRON DEFICIENCY ANEMIA SECONDARY TO INADEQUATE DIETARY IRON INTAKE: ICD-10-CM

## 2024-08-14 DIAGNOSIS — Z17.1 CARCINOMA OF UPPER-OUTER QUADRANT OF LEFT BREAST IN FEMALE, ESTROGEN RECEPTOR NEGATIVE: ICD-10-CM

## 2024-08-14 DIAGNOSIS — C51.9 VULVAR CANCER, CARCINOMA: ICD-10-CM

## 2024-08-14 DIAGNOSIS — D63.1 ANEMIA DUE TO STAGE 3A CHRONIC KIDNEY DISEASE: ICD-10-CM

## 2024-08-14 DIAGNOSIS — Z17.0 MALIGNANT NEOPLASM OF UPPER-OUTER QUADRANT OF LEFT BREAST IN FEMALE, ESTROGEN RECEPTOR POSITIVE: ICD-10-CM

## 2024-08-14 DIAGNOSIS — C50.412 MALIGNANT NEOPLASM OF UPPER-OUTER QUADRANT OF LEFT BREAST IN FEMALE, ESTROGEN RECEPTOR POSITIVE: ICD-10-CM

## 2024-08-14 LAB
BASOPHILS # BLD AUTO: 0.04 10*3/MM3 (ref 0–0.2)
BASOPHILS NFR BLD AUTO: 0.5 % (ref 0–1.5)
DEPRECATED RDW RBC AUTO: 59.8 FL (ref 37–54)
EOSINOPHIL # BLD AUTO: 0.13 10*3/MM3 (ref 0–0.4)
EOSINOPHIL NFR BLD AUTO: 1.7 % (ref 0.3–6.2)
ERYTHROCYTE [DISTWIDTH] IN BLOOD BY AUTOMATED COUNT: 16.3 % (ref 12.3–15.4)
FERRITIN SERPL-MCNC: 1346 NG/ML (ref 13–150)
HCT VFR BLD AUTO: 28.2 % (ref 34–46.6)
HGB BLD-MCNC: 9.3 G/DL (ref 12–15.9)
IMM GRANULOCYTES # BLD AUTO: 0.04 10*3/MM3 (ref 0–0.05)
IMM GRANULOCYTES NFR BLD AUTO: 0.5 % (ref 0–0.5)
IRON 24H UR-MRATE: 53 MCG/DL (ref 37–145)
IRON SATN MFR SERPL: 16 % (ref 20–50)
LYMPHOCYTES # BLD AUTO: 1.14 10*3/MM3 (ref 0.7–3.1)
LYMPHOCYTES NFR BLD AUTO: 14.6 % (ref 19.6–45.3)
MCH RBC QN AUTO: 32.5 PG (ref 26.6–33)
MCHC RBC AUTO-ENTMCNC: 33 G/DL (ref 31.5–35.7)
MCV RBC AUTO: 98.6 FL (ref 79–97)
MONOCYTES # BLD AUTO: 0.67 10*3/MM3 (ref 0.1–0.9)
MONOCYTES NFR BLD AUTO: 8.6 % (ref 5–12)
NEUTROPHILS NFR BLD AUTO: 5.8 10*3/MM3 (ref 1.7–7)
NEUTROPHILS NFR BLD AUTO: 74.1 % (ref 42.7–76)
NRBC BLD AUTO-RTO: 0 /100 WBC (ref 0–0.2)
PLATELET # BLD AUTO: 171 10*3/MM3 (ref 140–450)
PMV BLD AUTO: 10.4 FL (ref 6–12)
RBC # BLD AUTO: 2.86 10*6/MM3 (ref 3.77–5.28)
TIBC SERPL-MCNC: 323 MCG/DL (ref 298–536)
TRANSFERRIN SERPL-MCNC: 217 MG/DL (ref 200–360)
WBC NRBC COR # BLD AUTO: 7.82 10*3/MM3 (ref 3.4–10.8)

## 2024-08-14 PROCEDURE — 85025 COMPLETE CBC W/AUTO DIFF WBC: CPT

## 2024-08-14 PROCEDURE — 83540 ASSAY OF IRON: CPT

## 2024-08-14 PROCEDURE — 82728 ASSAY OF FERRITIN: CPT

## 2024-08-14 PROCEDURE — 36415 COLL VENOUS BLD VENIPUNCTURE: CPT

## 2024-08-14 PROCEDURE — 84466 ASSAY OF TRANSFERRIN: CPT

## 2024-08-14 NOTE — OUTREACH NOTE
CT Surgery Week 2 Survey      Flowsheet Row Responses   [facility] facility patient discharged from? [place]   Does the patient have one of the following disease processes/diagnoses(primary or secondary)? Cardiothoracic surgery   Week 2 attempt successful? No   Unsuccessful attempts Attempt 1            Suzanna GREEN - Registered Nurse

## 2024-08-16 ENCOUNTER — TELEPHONE (OUTPATIENT)
Dept: VASCULAR SURGERY | Facility: CLINIC | Age: 84
End: 2024-08-16
Payer: MEDICARE

## 2024-08-16 NOTE — TELEPHONE ENCOUNTER
Patient confirme appointment with Charmaine on 8/19/24. Appointment moved to 10:30am at patient's request.

## 2024-08-19 ENCOUNTER — OFFICE VISIT (OUTPATIENT)
Dept: VASCULAR SURGERY | Facility: CLINIC | Age: 84
End: 2024-08-19
Payer: MEDICARE

## 2024-08-19 VITALS
OXYGEN SATURATION: 97 % | SYSTOLIC BLOOD PRESSURE: 112 MMHG | DIASTOLIC BLOOD PRESSURE: 60 MMHG | HEART RATE: 87 BPM | HEIGHT: 58 IN | WEIGHT: 148 LBS | BODY MASS INDEX: 31.07 KG/M2

## 2024-08-19 DIAGNOSIS — I65.21 CAROTID STENOSIS, RIGHT: Primary | ICD-10-CM

## 2024-08-19 DIAGNOSIS — I65.23 BILATERAL CAROTID ARTERY STENOSIS: Primary | ICD-10-CM

## 2024-08-19 DIAGNOSIS — I10 ESSENTIAL HYPERTENSION: ICD-10-CM

## 2024-08-19 DIAGNOSIS — E11.42 TYPE 2 DIABETES MELLITUS WITH DIABETIC POLYNEUROPATHY, WITHOUT LONG-TERM CURRENT USE OF INSULIN: ICD-10-CM

## 2024-08-19 DIAGNOSIS — I65.29 STENOSIS OF CAROTID ARTERY, UNSPECIFIED LATERALITY: ICD-10-CM

## 2024-08-19 DIAGNOSIS — E78.2 MIXED HYPERLIPIDEMIA: ICD-10-CM

## 2024-08-19 PROCEDURE — 3074F SYST BP LT 130 MM HG: CPT | Performed by: NURSE PRACTITIONER

## 2024-08-19 PROCEDURE — 3078F DIAST BP <80 MM HG: CPT | Performed by: NURSE PRACTITIONER

## 2024-08-19 PROCEDURE — 1160F RVW MEDS BY RX/DR IN RCRD: CPT | Performed by: NURSE PRACTITIONER

## 2024-08-19 PROCEDURE — 99024 POSTOP FOLLOW-UP VISIT: CPT | Performed by: NURSE PRACTITIONER

## 2024-08-19 PROCEDURE — 1159F MED LIST DOCD IN RCRD: CPT | Performed by: NURSE PRACTITIONER

## 2024-08-19 RX ORDER — PRAVASTATIN SODIUM 40 MG
40 TABLET ORAL
Qty: 90 TABLET | Refills: 0 | Status: SHIPPED | OUTPATIENT
Start: 2024-08-19

## 2024-08-19 NOTE — PROGRESS NOTES
" 8/19/2024        Shaheen Akbar, DO  [address] [facility] [address]  [facility] [address]      Dago Nunez  1940    Chief Complaint   Patient presents with    Post-op     2 week post op. Right TCAR done 8/5/24. No issues stated.        Dear Dr. Shaheen Akbar:    HPI  I had the pleasure of seeing your patient Dago Nunez in the office today.    As you recall, Dago Nunez is a 84 y.o.  female who you are currently following for routine health maintenance.  She is here today for 2-week postop after undergoing right transcarotid artery revascularization on 8/5/2024.  She was previously hospitalized with left-sided weakness and some cognitive decline. 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 She is palliative with regards to chemotherapy, she has a history of vulvar cancer and breast cancer.   She is maintained on aspirin, Plavix, and Pravachol.      Review of Systems   Constitutional: Negative.  Negative for diaphoresis and fever.   HENT: Negative.     Eyes: Negative.    Respiratory: Negative.  Negative for shortness of breath and wheezing.    Cardiovascular: Negative.  Negative for leg swelling.   Gastrointestinal: Negative.  Negative for abdominal pain.   Endocrine: Negative.    Genitourinary: Negative.    Musculoskeletal:  Positive for arthralgias, back pain, gait problem and myalgias.   Skin: Negative.    Allergic/Immunologic: Negative.    Neurological:  Negative for dizziness and numbness.   Hematological: Negative.    Psychiatric/Behavioral: Negative.       /60   Pulse 87   Ht 147.3 cm (58\")   Wt 67.1 kg (148 lb)   SpO2 97%   BMI 30.93 kg/m²     Physical Exam  Vitals and nursing note reviewed.   Constitutional:       General: She is not in acute distress.     Appearance: Normal appearance. She is obese. She is not diaphoretic.   HENT:      Head: Normocephalic. No right periorbital erythema or left periorbital erythema.      Nose: Nose normal. "   Eyes:      General: No scleral icterus.     Pupils: Pupils are equal.   Cardiovascular:      Rate and Rhythm: Normal rate and regular rhythm.      Pulses: Normal pulses.      Heart sounds: Normal heart sounds. No murmur heard.     Comments: Right neck incision healed.  Left groin incision healed.  Pulmonary:      Effort: Pulmonary effort is normal. No respiratory distress.      Breath sounds: Normal breath sounds.   Abdominal:      General: Bowel sounds are normal. There is no distension.      Palpations: Abdomen is soft.      Tenderness: There is no abdominal tenderness. There is no guarding.   Musculoskeletal:         General: No swelling or tenderness. Normal range of motion.      Cervical back: Normal range of motion and neck supple.      Right lower leg: No edema.      Left lower leg: No edema.   Feet:      Right foot:      Skin integrity: Skin integrity normal.      Left foot:      Skin integrity: Skin integrity normal.   Skin:     General: Skin is warm and dry.      Findings: No erythema or rash.   Neurological:      General: No focal deficit present.      Mental Status: She is alert and oriented to person, place, and time. Mental status is at baseline.      Cranial Nerves: No cranial nerve deficit.      Gait: Gait normal.   Psychiatric:         Attention and Perception: Attention normal.         Mood and Affect: Mood normal.         Behavior: Behavior normal.         Thought Content: Thought content normal.         Judgment: Judgment normal.       Diagnostic Data:    Patient Active Problem List   Diagnosis    Meatal stenosis    Retention of urine    Type 2 diabetes mellitus, without long-term current use of insulin    Essential hypertension    Grief reaction    Vulvar intraepithelial neoplasia (MOODY) grade 3    Chronic midline low back pain without sciatica    Bilateral lower extremity edema    History of urethral stricture    Epidermal cyst of neck    Normocytic anemia    Neck abscess    Mixed  hyperlipidemia    Gastroesophageal reflux disease without esophagitis    Anxiety    Iron deficiency and chemotherapy induced anemia    Stage 3 chronic kidney disease    Anemia    Preop cardiovascular exam    Lower extremity edema    Vulvar cancer, carcinoma    Former smoker    Secondary malignancy of inguinal lymph nodes    Febrile illness    Chemotherapy-induced thrombocytopenia    Hyponatremia    Hypokalemia    Neutropenic fever    Moderate malnutrition    Antineoplastic chemotherapy induced anemia    History of radiation therapy    Encounter for care related to Port-a-Cath    Malignant neoplasm of upper-outer quadrant of left breast in female, estrogen receptor positive    Encounter for care related to vascular access port    Angiodysplasia    Bronchitis    Obesity (BMI 30-39.9)    Wheezing    Cough    Post-COVID-19 condition    Radiation induced proctitis    Rectal bleeding    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 Bilateral carotid artery stenosis  US Carotid Bilateral      2. Essential hypertension        3. Type 2 diabetes mellitus with diabetic polyneuropathy, without long-term current use of insulin        4. Mixed hyperlipidemia              Plan: After thoroughly evaluating Syndal Waqar Nunez, I believe the best course of action is to remain conservative from vascular surgery standpoint.  Currently she is doing well and feels so much better after undergoing her surgery.  She states that her swelling in her legs have went down since she started a new thyroid medication.  She is very grateful for Dr. Meyers's work.  In regard to her 2 small carotid artery aneurysms, she did go to [facility], they are just going to monitor those on a yearly basis at this time.  Remain conservative at this time.  Her last hemoglobin was 9.2 so she  has not been called to receive any additional iron infusions, so she is pleased about that as well.  We will see her back in 6 months with noninvasive testing to include carotid duplex for continued surveillance.  She is maintained on aspirin 81 mg daily, Plavix 75 mg daily, and Pravachol 40 mg nightly. I did discuss vascular risk factors as they pertain to the progression of vascular disease including controlling her hypertension, hyperlipidemia, and diabetes.  Her blood pressure stable today in office.  Her last lipid panel shows total cholesterol of 226, triglycerides 153, and her LDL at 151, HDL is 47.  Her last hemoglobin A1c was 5.8%.  The patient is to continue taking their medications as previously discussed.   This was all discussed in full with complete understanding.  Thank you for allowing me to participate in the care of your patient.  Please do not hesitate to call with any questions or concerns.  We will keep you aware of any further encounters with Dago Nunez.        Sincerely yours,         Charmaine Norris, VINCENT Akbar, Sahheen PENG, DO

## 2024-08-29 DIAGNOSIS — M54.50 CHRONIC MIDLINE LOW BACK PAIN WITHOUT SCIATICA: ICD-10-CM

## 2024-08-29 DIAGNOSIS — R60.0 BILATERAL LOWER EXTREMITY EDEMA: Primary | ICD-10-CM

## 2024-08-29 DIAGNOSIS — G89.29 CHRONIC MIDLINE LOW BACK PAIN WITHOUT SCIATICA: ICD-10-CM

## 2024-08-29 RX ORDER — METAXALONE 800 MG/1
TABLET ORAL
Qty: 30 TABLET | Refills: 0 | Status: SHIPPED | OUTPATIENT
Start: 2024-08-29

## 2024-08-29 RX ORDER — METOLAZONE 2.5 MG/1
TABLET ORAL
Qty: 30 TABLET | Refills: 5 | OUTPATIENT
Start: 2024-08-29

## 2024-09-04 RX ORDER — METOLAZONE 2.5 MG/1
TABLET ORAL
Qty: 30 TABLET | Refills: 5 | Status: SHIPPED | OUTPATIENT
Start: 2024-09-04

## 2024-09-11 ENCOUNTER — LAB (OUTPATIENT)
Dept: LAB | Facility: HOSPITAL | Age: 84
End: 2024-09-11
Payer: MEDICARE

## 2024-09-11 DIAGNOSIS — C50.412 MALIGNANT NEOPLASM OF UPPER-OUTER QUADRANT OF LEFT BREAST IN FEMALE, ESTROGEN RECEPTOR POSITIVE: ICD-10-CM

## 2024-09-11 DIAGNOSIS — Z17.0 MALIGNANT NEOPLASM OF UPPER-OUTER QUADRANT OF LEFT BREAST IN FEMALE, ESTROGEN RECEPTOR POSITIVE: ICD-10-CM

## 2024-09-11 DIAGNOSIS — C51.9 VULVAR CANCER, CARCINOMA: ICD-10-CM

## 2024-09-11 DIAGNOSIS — D50.8 IRON DEFICIENCY ANEMIA SECONDARY TO INADEQUATE DIETARY IRON INTAKE: ICD-10-CM

## 2024-09-11 LAB
BASOPHILS # BLD AUTO: 0.02 10*3/MM3 (ref 0–0.2)
BASOPHILS NFR BLD AUTO: 0.3 % (ref 0–1.5)
DEPRECATED RDW RBC AUTO: 58.3 FL (ref 37–54)
EOSINOPHIL # BLD AUTO: 0.08 10*3/MM3 (ref 0–0.4)
EOSINOPHIL NFR BLD AUTO: 1.1 % (ref 0.3–6.2)
ERYTHROCYTE [DISTWIDTH] IN BLOOD BY AUTOMATED COUNT: 15.9 % (ref 12.3–15.4)
HCT VFR BLD AUTO: 25.9 % (ref 34–46.6)
HGB BLD-MCNC: 8.4 G/DL (ref 12–15.9)
IMM GRANULOCYTES # BLD AUTO: 0.03 10*3/MM3 (ref 0–0.05)
IMM GRANULOCYTES NFR BLD AUTO: 0.4 % (ref 0–0.5)
LYMPHOCYTES # BLD AUTO: 0.86 10*3/MM3 (ref 0.7–3.1)
LYMPHOCYTES NFR BLD AUTO: 12 % (ref 19.6–45.3)
MCH RBC QN AUTO: 32.4 PG (ref 26.6–33)
MCHC RBC AUTO-ENTMCNC: 32.4 G/DL (ref 31.5–35.7)
MCV RBC AUTO: 100 FL (ref 79–97)
MONOCYTES # BLD AUTO: 0.53 10*3/MM3 (ref 0.1–0.9)
MONOCYTES NFR BLD AUTO: 7.4 % (ref 5–12)
NEUTROPHILS NFR BLD AUTO: 5.65 10*3/MM3 (ref 1.7–7)
NEUTROPHILS NFR BLD AUTO: 78.8 % (ref 42.7–76)
NRBC BLD AUTO-RTO: 0 /100 WBC (ref 0–0.2)
PLATELET # BLD AUTO: 270 10*3/MM3 (ref 140–450)
PMV BLD AUTO: 10.6 FL (ref 6–12)
RBC # BLD AUTO: 2.59 10*6/MM3 (ref 3.77–5.28)
WBC NRBC COR # BLD AUTO: 7.17 10*3/MM3 (ref 3.4–10.8)

## 2024-09-11 PROCEDURE — 85025 COMPLETE CBC W/AUTO DIFF WBC: CPT

## 2024-09-11 PROCEDURE — 36415 COLL VENOUS BLD VENIPUNCTURE: CPT

## 2024-09-19 DIAGNOSIS — G89.29 CHRONIC MIDLINE LOW BACK PAIN WITHOUT SCIATICA: ICD-10-CM

## 2024-09-19 DIAGNOSIS — M54.50 CHRONIC MIDLINE LOW BACK PAIN WITHOUT SCIATICA: ICD-10-CM

## 2024-09-19 RX ORDER — METAXALONE 800 MG/1
TABLET ORAL
Qty: 30 TABLET | Refills: 0 | Status: SHIPPED | OUTPATIENT
Start: 2024-09-19

## 2024-09-23 ENCOUNTER — TELEPHONE (OUTPATIENT)
Dept: ONCOLOGY | Facility: CLINIC | Age: 84
End: 2024-09-23
Payer: MEDICARE

## 2024-09-24 NOTE — PROGRESS NOTES
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  She is on adjuvant letrozole for the past 36 months.  Patient had declined adjuvant radiation.  She is also seen for macrocytic anemia secondary to chronic kidney disease Stage IV, GFR 24.2 mL/minute on 6/14/2024, iron deficiency, and thrombocytopenia. She had PRBC 6/14/202.  She is intolerant to oral iron (nausea, stomach cramps, and constipation).  Patient given ferric carboxymaltose 3 months ago. She is also seen for vulvar cancer. She is seen 48 months post C1D1 Mitomycin C and 5FU with radiation. Her D29 to 32 chemo was cancelled due to hospitalization, poor tolerance, and poor performance status.  Patient is seen with Joseph spouse. History is obtained from spouse.  History is considered reliable.           Oncology/Hematology History Overview Note   DIAGNOSTIC ABNORMALITIES: Breast cancer.  Screening mammogram 08/18/2021.New dense 7 mm partially obscured nodule in the upper outer quadrant of the left breast, with associated architectural distortion.  Diagnostic mammogram 08/20/2021.  Small subcentimeter suspicious spiculated mass at 12:00 in the left breast, approximately 3 cm to 4 cm deep from the nipple. This is suspicious for breast carcinoma, ultrasound-guided biopsy is recommended.  Sonogram 08/20/2021.  Small suspicious solid mass at 12:00 in the left breast. 5.5 x 5.1 x 4.7 mm, suspicious for breast carcinoma. This corresponds with the finding on mammograms.  Successful ultrasound-guided left breast biopsy and clip on  2021.  Pathology report 2021.  Left breast at 12:00, core needle biopsies: Invasive carcinoma no special type (ductal).  Histologic grade ([place] histologic score).   Glandular (acinar)/tubular differentiation: Score 1.   Nuclear pleomorphism: Score 2.   Mitotic rate: Score 1.  Overall grade: Grade 1. Maximum tumor diameter is at least 0.8 cm.  Estrogen 87%, progesterone 47% and negative HER-2/gabriela.  Genetic testing was sent.  Patient was seen by Dr. Quynh Rosario 2021.  She is .  She had first delivery at 20.  She took birth control for 1 month.  She took hormone replacement therapy for approximately 5 years.  Family history positive for breast cancer, sister at 78. No ovarian cancer  Chest x-ray 2021.  No acute cardiopulmonary process.  Pathology report 2021.  Left sentinel lymph nodes: Four of 4 lymph nodes are negative for metastatic mammary carcinoma.Comment: Absence of micrometastases is confirmed utilizing immunohistochemical stains for pankeratin.  Left breast, partial mastectomy:  A.  Invasive carcinoma of no special type (ductal), grade 1 (1.1 cm in greatest dimension).  B.  Minor associated low-grade ductal carcinoma in situ component.  C.  The inked surgical margins and skin are negative for malignancy.  D.  Prior biopsy site changes including fat necrosis.  Comment: Microscopically, the tumor is approximately 7 mm from the closest inked (superior) surgical margin.  AJCC stage: pT1c snpN0.      PREVIOUS INTERVENTIONS:  She had undergone left partial mastectomy with left sentinel lymph node biopsy 2021 by Dr. Rosario.  Declined adjuvant radiation.  Adjuvant Femara 10/01/2021 through present.        DIAGNOSTIC ABNORMALITIES: Vulvar cancer  She had developed recurrent vulvar cancer left inguinal node that is PET positive measuring 2.1 cm.  The patient was seen by Dr. Marks in favor definitive chemo radiation.  Pathology report 07/10/2020 periurethral biopsy,  poorly differentiated carcinoma with squamous and papillary features, focally invasive in the subepithelial connective tissue.  PET 07/31/2020 showed intense FDG avid left inguinal node is highly suspicious for local regional metastasis from known vulvar squamous cell carcinoma.  No additional sites of suspicious FDG activity.  MRI 07/31/2020 showed redemonstration of mildly T2 hyperintense mass involving the urethral meatus, similar dimensions to prior exam on 02/18/2020 however there is now a polypoid lesion seen along the anterior aspect of the perineum, possibly contiguous with the urethral mass, concerning for disease progression.  Market interval enlargement of the left inguinal node almost certainly representing disease involvement.  Patient was notified by Dr. Siddiqui 08/19/2020.  Consensus for chemoradiation.  Patient reluctant to take chemotherapy.  Follow-up with Dr. Siddiqui in 4 to 6 weeks after radiation is completed.         PREVIOUS INTERVENTIONS:  Mitomycin C and 5-FU 10/05/2020 through 10/08/2020 with radiation completed 12/10/2020 at [facility].  D29 to d32 of chemo discontinued due to poor performance status.       DIAGNOSTIC ABNORMALITIES:   The patient was seen by Dr. Greg Alberts 01/29/2018 complaining of coughing and wheezing. The patient was given Tussionex. Blood work was ordered. CBC 01/29/2018 revealed a WBC of 7.8, hemoglobin 11.2, hematocrit 35.1, MCV 96.2, platelets 43,000, and ANC 4.47. CMP remarkable for of glucose of 177 and GFR 59 mL/minute.  The patient was seen by VINCENT Jones, 02/02/2018. She was coughing and wheezing. Tussionex did not help. The patient was given prednisone.  The patient was seen by VINCENT Jones, 02/15/2018. She is followed for anemia from iron deficiency. CBC 02/15/2018 revealed a WBC of 12.9, hemoglobin 11.4, hematocrit 34.5, MCV 95, and platelets 68,000.       PREVIOUS INTERVENTIONS:   Ferrous sulfate 325 mg 03/07/2018  "through 05/06/2018.  Not resumed 06/08/2018. \"I misunderstood.\"   Resume 09/05/2018 through 10/03/2018, stopped due to intolerance.  Injectafer 750 mg 10/20/2018 at the [place] office.  Retacrit 10/27/2020 through present.  1 unit packed RBC 6/14/2024.  Injectafer 750 mg 5/18/2021, 1/26/2022, 5/25/2022 and 6/21/2024 at [facility]           Vulvar cancer, carcinoma    Initial Diagnosis    Vulvar cancer, carcinoma (CMS/HCC)     3/19/2001 Biopsy    Clinical Features: red vaginal lesion with bleeding since 1995. TX with  Carafate douche and Premarin vaginal cream with intermittent improvement.    DIAGNOSIS:    VAGINA, BIOPSY: FOCAL ULCERATION, MARKED ACUTE AND CHRONIC INFLAMMATION,  SPONGIOSIS AND REACTIVE ATYPIA; NEGATIVE FOR DYSPLASIA.     8/17/2001 Procedure    Pap Smear:  DIAGNOSIS:            Atypical keratinized squamous cells, suspicious                           for squamous cell carcinoma.         COMMENTS:             There are numerous atypical keratinized cells                           present in an inflammatory background.  These are                           suspicious for squamous cell carcinoma.  Biopsy                           confirmation is recommended.      10/16/2001 Procedure    Pap Smear:  DIAGNOSIS:            Mild dysplasia       ADDITIONAL FINDINGS:  Marked inflammation                           Excessive hyperkeratosis         BETHESDA SYSTEM:      Low grade squamous intraepithelial lesion    DIAGNOSIS:            Negative, no abnormal cells seen           BETHESDA SYSTEM:      Within normal limits.       ADEQUACY:             Specimen satisfactory for evaluation       SOURCE:               Vagina, diagnostic thin prep PAP     11/7/2001 Biopsy      DIAGNOSIS:    VAGINA AND VULVA, RIGHT POSTERIOR INTROITUS, WIDE LOCAL EXCISION:  VAGINAL INTRAEPITHELIAL NEOPLASIA (VAIN) II-III, FOCALLY EXTENDING TO  VAGINAL MARGIN AT 12-4:00; ALL OTHER MARGINS NEGATIVE FOR  INTRAEPITHELIAL " NEOPLASIA. (SEE COMMENT)    COMMENT: The lesion involves vaginal type epithelium with associated  chronic inflammation and erosion. The adjacent vulvar epithelium is  hyperkeratotic.     3/15/2002 Procedure    Pap Smear:  BETHESDA SYSTEM:      High grade squamous intraepithelial lesion       DESCRIPTOR:           Moderate dysplasia           ADEQUACY:             Specimen satisfactory for evaluation       SOURCE:               Vagina, Thin Prep Pap, Diagnostic     6/13/2003 Procedure         BETHESDA SYSTEM:      High grade squamous intraepithelial lesion       DESCRIPTOR:           Moderate dysplasia       ADDITIONAL FINDINGS:  Inflammation         ADEQUACY:             Specimen satisfactory for evaluation       SOURCE:               Vagina, Thin Prep Pap, Diagnostic    HUMAN PAPILLOMAVIRUS         CASE ACCESSION #:    [id number]        Category                  HPV Types                Patient Results         High/Intermed Risk    16,18,31,33,35,39              Negative                            45,51,52,56,58,59,68      Specimen Source        Cervical / Vaginal Specimen     12/5/2003 Procedure    Gynecological Cytology        CASE ACCESSION #:     [id number]       BETHESDA SYSTEM:      Low grade squamous intraepithelial lesion       DESCRIPTOR:           Mild dysplasia           ADEQUACY:             Specimen satisfactory for evaluation       SOURCE:               Vagina, Thin Prep Pap, Diagnostic     11/29/2011 Biopsy    ADDENDUM FINDINGS:    The high grade MOODY in the right labia majora biopsy was of the usual type.  There was no evidence of differentiated MOODY.      DIAGNOSIS:    1) PERINEUM, BIOPSY: SQUAMOUS EPITHELIUM WITH FLORID HYPERKERATOSIS,  CONSISTENT WITH CHRONIC IRRITATION; NO EVIDENCE OF DYSPLASIA OR MALIGNANCY  (SEE COMMENT).    Comment: Immunohistochemical studies (p16, p53, MIB-1) confirm the rendered  interpretations.    2) VULVA, RIGHT LABIUM MAJORUM, BIOPSY: VULVAR INTRAEPITHELIAL NEOPLASIA  II  (MODERATE DYSPLASIA); (SEE COMMENT).    Comment: Immunohistochemical studies for p16 (nuclear and cytoplasmic  positivity in lower epithelial half) and MIB-1 (nuclear positivity in lower  epithelial half) confirms the diagnosis; p53 is positive only in rare  cells. A GMS histochemical study for fungal forms is negative.  Nevertheless, parakeratosis associated with neutrophils, as was seen  herein, is commonly associated with fungal vulvitis.     7/3/2012 Procedure    Gynecological Cytology    CASE ACCESSION #:                     [id number]  BETHESDA SYSTEM:                      High grade squamous intraepithelial                                        lesion  DESCRIPTOR:                           Mild to moderate dysplasia  ADEQUACY:                             Specimen satisfactory for evaluation  SOURCE:                               Vagina, Thin Prep Pap, Diagnostic  # [id number] REVIEWED:                    1     1/29/2013 Biopsy    DIAGNOSIS:    1) VULVA, RIGHT, ANTERIOR, BIOPSY:  SPONGIOTIC DERMATITIS WITH EXTENSIVE  DERMAL GRANULATION TISSUE, MIXED INFLAMMATION AND FIBROSIS (SEE COMENT).    Comment: Sections show spongiosis, basement membrane thickening, dermal  fibrosis, and extensive dermal granulation tissue with a predominantly  chronic inflammatory infiltrate. There is no evidence of dysplasia or  malignancy. The basement membrane thickening and dermal fibrosis suggests  that there may be component of lichen sclerosus. However, the underlying  cause of the ongoing inflammation and repair (granulation tissue) is not  clear from this sample. A tissue gram stain fails to reveal any large  bacterial aggregates.  GMS and AFB studies for fungal forms and acid fast  bacilli were negative respectively     11/27/2013 Surgery      Diagnosis    1) VULVA, LEFT ANTERIOR, BIOPSY: HIGH GRADE SQUAMOUS INTRAEPITHELIAL LESION (SEVERE DYSPLASIA, MOODY III).    2) VAGINAL WALL, ANTERIOR, BIOPSY: HIGH GRADE SQUAMOUS  INTRAEPITHELIAL LESION (SEVERE DYSPLASIA, VaIN III).    3) VULVA, VULVECTOMY: FOCUS OF MICROINVASIVE SQUAMOUS CELL CARCINOMA, WELL-DIFFERENTIATED, DEPTH OF STROMAL INVASION 0.4 MM,  8 MM FROM CLOSEST DEEP MARGINS, 4 MM FROM RIGHT ANTERIOR VAGINAL MARGINS AT 8 - 9 O'CLOCK (PROXIMAL TIP OF SPECIMEN DESIGNATED   12 O'CLOCK); NEGATIVE FOR LYMPHOVASCULAR INVASION; ARISING IN A BACKGROUND OF EXTENSIVE VULVAR INTRAEPITHELIAL NEOPLASIA (SEVERE DYSPLASIA, MOODY III, CLASSICAL AND DIFFERENTIATED TYPES); MOODY III IS PRESENT AT RIGHT LATERAL SURGICAL MARGIN (7 - 9 O'CLOCK),   LEFT LATERAL SURGICAL MARGIN (3 - 5 O'CLOCK) AND RIGHT AND LEFT VAGINAL MARGINS; TOTAL LESIONAL AREA (INVASIVE CARCINOMA AND MOODY III) MEASURES 8.2 CM IN GREATEST DIMENSION (GROSS MEASUREMENT); BACKGROUND SEVERE INTERFACE DERMATITIS AND LICHEN SCLEROSUS.        **Electronically signed out by Dimas Cardenas M.D.**on 12/2/2013  HAYLEY/YAZMIN/franky  Case reviewed by Attending Pathologist      Synoptic Diagnosis  3)  VULVA:     Specimen:                        Vulva  Procedure:                       Other: Vulvectomy  Lymph Node Sampling:             Not applicable  Specimen Size:                   Greatest dimension: 13.5 cm                                   Additional dimension: 9.5 cm                                   Additional dimension: 1.2 cm  Tumor Site:                      Right vulva, labium minus  Tumor Size:                      Greatest dimension: 0.04 cm  Tumor Focality:                  Unifocal  Histologic Type:                 Squamous cell carcinoma  Histologic Grade:                G1: Well differentiated  Microscopic Tumor Extension:     Depth of invasion: 0.4 mm  Margins:                         Uninvolved by invasive carcinoma                                   Distance of invasive carcinoma from closest margin: 4 mm  Lymph-Vascular Invasion:         Not identified  Lymph Nodes:                     No nodes submitted or found  Extranodal  Extension:            Cannot be determined (explain):    Fixed or Ulcerated Femoral-Inguinal Lymph Nodes:                                    Not identified  Laterality of Involved Lymph Nodes:                                    Cannot be determined:    Primary Tumor (pT):              pT1a [FIGO IA]: Lesions 2 cm or less in size, confined to the vulva or perineum, and with stromal invasion 1.0 mm or less  Regional Lymph Nodes (pN):       pNX:  Regional lymph nodes cannot be assessed  Distant Metastasis (pM):         Not applicable  Additional Pathologic Findings:  Vulvar intraepithelial neoplasia (MOODY) 3 (severe dysplasia/carcinoma in situ)  --------------------------------------------------------     5/20/2014 Procedure    Gynecologic Cytology  [place] Diagnosis:  High grade squamous intraepithelial lesion.    Descriptor/Additional Findings:  Moderate dysplasia.    Adequacy:  Specimen satisfactory for evaluation.    Source:  Vagina, Thin Prep Pap, Imaged Diagnostic     11/11/2014 Biopsy    Diagnosis    ANTERIOR VAGINAL WALL, BIOPSY:  HIGH GRADE SQUAMOUS INTRAEPITHELIAL LESION (VaIN 3, SEVERE DYSPLASIA)       12/19/2014 Surgery    Diagnosis  1)  ANTERIOR VAGINAL WALL, PARTIAL VAGINECTOMY: HIGH-GRADE SQUAMOUS INTRAEPITHELIAL LESION (VaIN 3, SEVERE DYSPLASIA), 2.7 CM; HIGH GRADE DYSPLASIA EXTENDS TO THE 2 AND 8 O'CLOCK TIP MARGINS, THE 5-8 O'CLOCK LATERAL MARGIN, AND THE 11-2 O'CLOCK LATERAL   MARGIN.          2)  JOYA-CLITORAL AREA, EXCISION: HIGH-GRADE SQUAMOUS INTRAEPITHELIAL LESION (VaIN 3, SEVERE DYSPLASIA), EXTENDING TO A LATERAL MARGIN.         2/9/2015 Surgery    Diagnosis  1.          VULVA, LEFT PERIURETHRAL PORTION, EXCISION: FOCAL HIGH-GRADE SQUAMOUS INTRAEPITHELIAL LESION (MOODY 2, MODERATE DYSPLASIA); MARGINS ARE NEGATIVE FOR DYSPLASIA.    2.          VULVA, RIGHT PERIURETHRAL PORTION, EXCISION: BENIGN SQUAMOUS MUCOSA WITH ACUTE AND CHRONIC INFLAMMATION AND REACTIVE CHANGES.         3.          VULVA,  "LEFT, LOWER PORTION, EXCISION: BENIGN SQUAMOUS MUCOSA WITH ACUTE AND CHRONIC INFLAMMATION, REACTIVE CHANGES AND FEATURES CONSISTENT WITH PREVIOUS SURGICAL PROCEDURE.         4.          VULVA, RIGHT PORTION, EXCISION: BENIGN SQUAMOUS MUCOSA WITH CHRONIC INFLAMMATION AND DERMAL FIBROSIS.        9/6/2017 Procedure    Diagnosis  \"PAP SMEAR FROM THE URETHRA\":  HIGH GRADE SQUAMOUS INTRAEPITHELIAL LESION.          10/26/2017 Biopsy    Urethral Meatus Biopsy:  Urothelial mucosa with ulceration, chronic inflammation and focal high grade squamous intraepithelial lesion, moderate dysplasia     7/10/2018 Imaging    MRI Pelvis:  Impression:    1.  There is a 2.3 x 1.8 cm urethral lesion at the external urethral   meatus demonstrating enhancement and T2 hyperintensity. The lesion is   located approximately 8 mm from the bladder neck/internal urethral   orifice.  There is no extension of the lesion into the para vaginal   fat or ischiorectal fossa. There is some edema of the bilateral   ischiocavernosus muscles without invasion by the mass. No pelvic or   inguinal adenopathy is identified.     2.  The patient has a 2.5 cm cystocele and the urethra is almost   horizontal. There is pelvic floor laxity with loss of upper convexity   of the left iliococcygeus muscle.     7/20/2018 Biopsy    Diagnosis  URETHRA, BIOPSY:  SQUAMOUS CELL CARCINOMA IN SITU; SEE COMMENT.    Comments  P16 immunostain and HPV RNA in situ hybridization are strongly and diffusely positive within the lesion, consistent with HPV-related pathogenesis.     1/8/2019 Imaging    MRI Pelvis:  1.  Stable size and appearance of a nonspecific enhancing nodular   lesion at the caudal margin of the vaginal introitus adjacent to   extensive postsurgical changes related to prior vulvectomy and   vaginectomy. This lesion does not appear to conform to the expected   course of the urethra which is somewhat poorly defined, and this felt   more likely be located immediately caudal " to the urethra. It is   possible this may reflect postsurgical scarring as opposed to a true   lesion. Continued follow-up is suggested to ensure stability.  2.  No lymphadenopathy or other evidence of metastatic disease in the   pelvis.  3.  Evidence of pelvic floor laxity with cystocele, not significantly   changed.     8/6/2019 Imaging    MRI Pelvis:  1. No significant change from the prior exam on this limited   noncontrast study.  2. Stable indeterminate nodule anterior to the external urethral   meatus which may represent focal scarring; however,   residual/recurrent disease is not entirely excluded.  Recommend continued attention on follow-up. 3. Extensive pelvic   postsurgical changes with no evidence of local recurrence.  4. Pelvic floor laxity with cystocele unchanged from prior exam.     1/13/2020 Procedure    Urethral stricture dilation     2/18/2020 Imaging    MRI Pelvis:  Impression:  1.  No significant interval change in 1.7 x 1.7 cm T2 hyperintense   ovoid lesion at the urethral meatus.  2.  Numerous postoperative findings status post vaginectomy and   hysterectomy.  3.  Pelvic floor laxity with persistent cystocele.  4.  No pelvic adenopathy or bony lesion identified.     5/13/2020 Procedure    Urethral stricture dilation      7/10/2020 Biopsy    Diagnosis  PERIURETHRA, BIOPSY: POORLY DIFFERENTIATED CARCINOMA WITH SQUAMOUS AND PAPILLARY FEATURES, FOCALLY INVASIVE IN THE SUBEPITHELIAL CONNECTIVE TISSUE; SEE COMMENT.    Comments  The patient's history of vulvar microinvasive squamous cell carcinoma is noted. The current biopsy shows a poorly differentiated carcinoma with papillary formation. P16 immunostain and HPV RNA in situ hybridization are strongly and diffusely positive within the lesion, consistent with HPV-related pathogenesis. A KERRI-3 immunostain shows patchy, weak positivity in the lesional cells. The patient's prior biopsy ([id number]) is reviewed and shows similar morphologic and  immunophenotypic features but the papillary features were not present in the prior material.  Dr. Ilene Pablo reviewed this case and concurs with the diagnosis.      7/31/2020 Imaging    MRI Pelvis:  1.  Redemonstration of a mildly T2 hyperintense mass involving the   urethral meatus, similar dimensions to prior exam on 2/18/2020,   however there is now a polypoid lesion seen along the anterior aspect   of the perineum, possibly contiguous with the urethral mass,   concerning for disease progression. This could potentially represent   the recently biopsied lesion.  2.  Marked interval enlargement of a left inguinal lymph node, almost   certainly representing disease involvement. This would be amenable to   ultrasound-guided biopsy if warranted.  3.  Findings related to pelvic floor laxity, with cystocele.    PET/CT:  1.  Intensely FDG avid left inguinal lymph node is highly suspicious   for locoregional metastasis from known vulvar squamous cell   carcinoma. There are no additional sites of suspicious FDG activity.  2.   Intense physiologic FDG activity within the urine obscures   visualization of the periurethral mass. Findings are better   characterized on same day pelvic MRI.     9/21/2020 - 12/10/2020 Radiation    Radiation OncologyTreatment Course:  Dago Nunez received 6940 cGy in 38 fractions to vulva via external beam radiation therapy.     10/5/2020 - 10/12/2020 Chemotherapy    OP Vulvar MitoMYcin / Fluorouracil CIV + XRT       10/9/2020 - 9/7/2021 Chemotherapy    OP CENTRAL VENOUS ACCESS DEVICE ACCESS, CARE, AND MAINTENANCE (CVAD)     10/19/2020 Imaging    CT Head:  Impression:    1. No acute intracranial process.     10/21/2021 -  Chemotherapy    OP CENTRAL VENOUS ACCESS DEVICE ACCESS, CARE, AND MAINTENANCE ([facility]AD)     Secondary malignancy of inguinal lymph nodes   8/31/2020 Initial Diagnosis    Secondary malignancy of inguinal lymph nodes (CMS/HCC)     10/9/2020 - 9/7/2021 Chemotherapy    OP  CENTRAL VENOUS ACCESS DEVICE ACCESS, CARE, AND MAINTENANCE ([facility])     10/21/2021 -  Chemotherapy    OP CENTRAL VENOUS ACCESS DEVICE ACCESS, CARE, AND MAINTENANCE (CVAD)     Malignant neoplasm of upper-outer quadrant of left breast in female, estrogen receptor positive   9/14/2021 Initial Diagnosis    Malignant neoplasm of upper-outer quadrant of left breast in female, estrogen receptor positive         PAST MEDICAL HISTORY:  ALLERGIES:  Allergies   Allergen Reactions    Scopolamine Swelling     Other reaction(s): ANGIOEDEMA        Amoxicillin-Pot Clavulanate Rash    Keflex [Cephalexin] Rash    Septra [Sulfamethoxazole-Trimethoprim] Rash    Tequin [Gatifloxacin] Other (See Comments)     Doesn't remember    Trovan [Alatrofloxacin] Dizziness     CURRENT MEDICATIONS:  Outpatient Encounter Medications as of 10/2/2024   Medication Sig Dispense Refill    Acetaminophen (TYLENOL ARTHRITIS PAIN PO) Take 1 tablet by mouth Daily As Needed (BACK PAIN).      albuterol sulfate  (90 Base) MCG/ACT inhaler Inhale 2 puffs Every 4 (Four) Hours As Needed for Shortness of Air.      aspirin 81 MG EC tablet Take 1 tablet by mouth Daily.      bisoprolol-hydrochlorothiazide (ZIAC) 5-6.25 MG per tablet Take 1 tablet by mouth Daily. 90 tablet 1    bumetanide (BUMEX) 1 MG tablet Take 1 tablet by mouth 2 (Two) Times a Day. 180 tablet 1    cetirizine (zyrTEC) 10 MG tablet Take 1 tablet by mouth Daily As Needed for Allergies.      citalopram (CeleXA) 20 MG tablet Take 1 tablet by mouth Daily. 90 tablet 1    clindamycin (CLEOCIN T) 1 % lotion Apply 1 Application topically to the appropriate area as directed Daily As Needed (Rash On Legs).      cyanocobalamin 1000 MCG/ML injection Inject 1 mL into the appropriate muscle as directed by prescriber Every 30 (Thirty) Days.      diphenoxylate-atropine (LOMOTIL) 2.5-0.025 MG per tablet Take 2 tablets by mouth 4 (Four) Times a Day As Needed for Diarrhea.      esomeprazole (nexIUM) 40 MG capsule Take  1 capsule by mouth 2 (Two) Times a Day. 180 capsule 3    Homeopathic Products (LEG CRAMPS) tablet Take 1 tablet by mouth Daily.      HYDROcodone-acetaminophen (NORCO)  MG per tablet Take 0.5 tablets by mouth Every 4 (Four) Hours As Needed for Moderate Pain or Severe Pain. (Patient taking differently: Take 0.5 tablets by mouth Every 4 (Four) Hours As Needed for Moderate Pain or Severe Pain. Patient taking 1 every other day as needed.) 60 tablet 0    letrozole (FEMARA) 2.5 MG tablet Take 1 tablet by mouth Daily.      levothyroxine (Synthroid) 75 MCG tablet Take 1 tablet by mouth Daily. 90 tablet 1    memantine (Namenda) 5 MG tablet Take 1 tablet by mouth 2 (Two) Times a Day. 60 tablet 2    metaxalone (SKELAXIN) 800 MG tablet TAKE 1 TABLET BY MOUTH 3 TIMES DAILY AS NEEDED FOR MUSCLE SPASMS 30 tablet 0    metOLazone (ZAROXOLYN) 2.5 MG tablet TAKE 1 TABLET BY MOUTH EVERY DAY AS NEEDED FOR WEIGHT GAIN OF 3 LBS OR MORE IN 24 HOURS. 30 tablet 5    nystatin-triamcinolone (MYCOLOG II) 803831-7.1 UNIT/GM-% cream Apply 1 Application topically to the appropriate area as directed 2 (Two) Times a Day As Needed (rash under breast).      olmesartan (BENICAR) 20 MG tablet Take 1 tablet by mouth Daily. 90 tablet 2    phenazopyridine (PYRIDIUM) 200 MG tablet Take 1 tablet by mouth 3 (Three) Times a Day As Needed for Dysuria. 15 tablet 0    potassium chloride ER (K-TAB) 20 MEQ tablet controlled-release ER tablet Take 2 tablets by mouth Daily.      pravastatin (PRAVACHOL) 40 MG tablet TAKE 1 TABLET BY MOUTH AT BEDTIME 90 tablet 0    Probiotic Product (PROBIOTIC DAILY PO) Take 1 tablet by mouth Daily.      sodium bicarbonate 650 MG tablet Take 1 tablet by mouth 2 (Two) Times a Day.      tacrolimus (PROTOPIC) 0.1 % ointment Apply 1 Application topically to the appropriate area as directed 2 (Two) Times a Day As Needed (dermatitis).      vitamin D (ERGOCALCIFEROL) 1.25 MG (27645 UT) capsule capsule Take 1 capsule by mouth 1 (One) Time  Per Week. Thursdays      [DISCONTINUED] clopidogrel (PLAVIX) 75 MG tablet Take 1 tablet by mouth Daily.      [DISCONTINUED] clopidogrel (PLAVIX) 75 MG tablet Take 1 tablet by mouth Daily. 30 tablet 0     Facility-Administered Encounter Medications as of 10/2/2024   Medication Dose Route Frequency Provider Last Rate Last Admin    heparin injection 500 Units  500 Units Intravenous Drake Helms MD   500 Units at 07/01/21 1354    heparin injection 500 Units  500 Units Intravenous Drake Helms MD   500 Units at 01/11/22 1134    heparin injection 500 Units  500 Units Intravenous PRN Drake Stafford MD   500 Units at 09/28/22 1418    heparin injection 500 Units  500 Units Intravenous PRN Drake Stafford MD   500 Units at 01/25/23 1537    heparin injection 500 Units  500 Units Intravenous Drake Helms MD   500 Units at 06/26/23 1426    heparin injection 500 Units  500 Units Intravenous Drake Helms MD   500 Units at 03/05/24 1350    sodium chloride 0.9 % flush 10 mL  10 mL Intravenous Drake Helms MD   10 mL at 07/01/21 1354    sodium chloride 0.9 % flush 10 mL  10 mL Intravenous Drake Helms MD   10 mL at 01/11/22 1134    sodium chloride 0.9 % flush 10 mL  10 mL Intravenous Drake Helms MD   10 mL at 09/28/22 1418    sodium chloride 0.9 % flush 10 mL  10 mL Intravenous Drake Helms MD   10 mL at 01/25/23 1537    sodium chloride 0.9 % flush 10 mL  10 mL Intravenous Drake Helms MD   10 mL at 06/26/23 1426    sodium chloride 0.9 % flush 10 mL  10 mL Intravenous PRDrake Amos MD   10 mL at 03/05/24 1350     ADULT ILLNESSES:  Patient Active Problem List   Diagnosis Code    Meatal stenosis [id number]    Retention of urine R33.9    Type 2 diabetes mellitus, without long-term current use of insulin E11.9    Essential hypertension I10    Grief reaction F43.21    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    Chronic midline low back pain without sciatica M54.50, G89.29     Bilateral lower extremity edema R60.0    History of urethral stricture Z87.448    Epidermal cyst of neck L72.0    Normocytic anemia D64.9    Neck abscess L02.11    Mixed hyperlipidemia E78.2    Gastroesophageal reflux disease without esophagitis K21.9    Anxiety F41.9    Iron deficiency and chemotherapy induced anemia D50.9    Stage 3 chronic kidney disease N18.30    Anemia D64.9    Preop cardiovascular exam Z01.810    Lower extremity edema R60.0    Vulvar cancer, carcinoma C51.9    Former smoker Z87.891    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    Febrile illness R50.9    Chemotherapy-induced thrombocytopenia D69.59, T45.1X5A    Hyponatremia E87.1    Hypokalemia E87.6    Neutropenic fever D70.9, R50.81    Moderate malnutrition E44.0    Antineoplastic chemotherapy induced anemia D64.81, T45.1X5A    History of radiation therapy Z92.3    Encounter for care related to Port-a-Cath Z45.2    Malignant neoplasm of upper-outer quadrant of left breast in female, estrogen receptor positive C50.412, Z17.0    Encounter for care related to vascular access port Z45.2    Angiodysplasia K55.20    Bronchitis J40    Obesity (BMI 30-39.9) E66.9    Wheezing R06.2    Cough R05.9    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    Rectal bleeding K62.5    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 M81.8, T38.7X5A    CVA (cerebral vascular accident) I63.9    Transient ischemic attack (TIA) G45.9    Stenosis of right carotid artery I65.21    Carotid stenosis, right I65.21    Carotid stenosis I65.29     SURGERIES:  Past Surgical History:   Procedure Laterality Date    APPENDECTOMY      BENJAMIN PROCEDURE      No evidence of reflux disease while on Nexium 40mg daily-See report    BREAST BIOPSY      BREAST CYST ASPIRATION Left     BREAST LUMPECTOMY      COLONOSCOPY  01/12/2011    Diverticulosis sigmoid colon; The examination was otherwise normal; Repeat 10 years    COLONOSCOPY  11/03/2003    Dr. Laguerre-Normal  colonoscopy; Normal terminal ileum; Repeat 5 years    COLONOSCOPY N/A 11/05/2021    Petechia(e) in the rectum, in the recto-sigmoid colon and in the distal sigmoid colon; No specimens collected; No plans to repeat colonoscopy due to advance age and/or medical problems    CYSTOSCOPY N/A 09/20/2022    Procedure: CYSTOSCOPY WITH URETHRAL DILATATION;  Surgeon: Shaheen Conway MD;  Location:  [facility] OR;  Service: Urology;  Laterality: N/A;    ENDOSCOPY  07/01/2014    Normal esophagus; Normal stomach; Normal examined duodenum; BRAVO pH capsule deployed;     ENDOSCOPY  08/14/2013    Mild gastritis-biopsies for H.Pylor obtained    ENDOSCOPY  02/16/2005    Dr. LaguerreVcgpg-Knpdynanj-vgrtqyhu    ENDOSCOPY  10/21/2003    Dr. Laguerre-Stage 1 reflux esophagitis    ENDOSCOPY N/A 11/05/2021    Small HH; A single gastroesophageal junction polyp; Normal stomach; A single non-bleeding angiodysplastic lesion in the duodenum    HYSTERECTOMY      MASTECTOMY W/ SENTINEL NODE BIOPSY Left 09/14/2021    Procedure: LEFT PARTIAL MASTECTOMY WITH MAGSEED AND LEFT SENTINEL LYMPH NODE BIOPSY MAGTRACE;  Surgeon: Quynh Rosario MD;  Location: [facility] OR;  Service: General;  Laterality: Left;    TONSILLECTOMY      VAGINA SURGERY      Laser surgery X 2    VENOUS ACCESS DEVICE (PORT) INSERTION N/A 09/29/2020    Procedure: SINGLE LUMEN PORT - A- CATH PLACEMENT WITH FLUOROSCOPY;  Surgeon: Quynh Rosario MD;  Location: [facility] OR;  Service: General;  Laterality: N/A;     HEALTH MAINTENANCE ITEMS:  Health Maintenance Due   Topic Date Due    RSV Vaccine - Adults (1 - 1-dose 60+ series) Never done    DIABETIC EYE EXAM  11/25/2020    INFLUENZA VACCINE  08/01/2024    COVID-19 Vaccine (4 - 2023-24 season) 09/01/2024    HEMOGLOBIN A1C  11/20/2024       <no information>  Last Completed Colonoscopy            COLORECTAL CANCER SCREENING (COLONOSCOPY - Every 10 Years) Next due on 11/5/2031 11/05/2021  Surgical Procedure: COLONOSCOPY    11/05/2021   COLONOSCOPY    01/29/2014  Outside Claim: [place] FECAL BLOOD SCRN IMMUNOASSAY    02/01/2013  Outside Claim: CHG BLOOD,OCCULT,FECAL HGB,FECES,1-3 SIMULT    01/12/2011  SCANNED - COLONOSCOPY    Only the first 5 history entries have been loaded, but more history exists.                  Immunization History   Administered Date(s) Administered    COVID-19 (MODERNA) 1st,2nd,3rd Dose Monovalent 03/26/2021, 04/23/2021    COVID-19 (MODERNA) BIVALENT 12+YRS 01/03/2023    COVID-19 (MODERNA) Monovalent Original Booster 12/28/2021    FLUAD TRI 65YR+ 10/22/2019    Flu Vaccine Split Quad 10/12/2018    Fluad Quad 65+ 11/09/2020    Fluzone High-Dose 65+YRS 10/01/2018, 11/12/2021    Fluzone High-Dose 65+yrs 11/12/2021, 10/24/2022, 10/24/2023    Pneumococcal Conjugate 20-Valent (PCV20) 10/24/2022    Pneumococcal Polysaccharide (PPSV23) 10/16/2013    Pneumococcal, Unspecified 10/01/2017    Shingrix 06/15/2023, 05/03/2024    Tdap 05/01/2019    Zostavax 01/14/2010, 10/01/2015     Last Completed Mammogram            Scheduled - MAMMOGRAM (Every 2 Years) Scheduled for 10/31/2024      10/30/2023  Mammo Diagnostic Digital Tomosynthesis Bilateral With CAD    10/10/2022  Mammo Diagnostic Digital Tomosynthesis Bilateral With CAD    03/07/2022  Mammo Diagnostic Digital Tomosynthesis Left With CAD    08/20/2021  Mammo Diagnostic Digital Tomosynthesis Left With CAD    08/18/2021  Mammo Screening Digital Tomosynthesis Bilateral With CAD    Only the first 5 history entries have been loaded, but more history exists.                      FAMILY HISTORY:  Family History   Problem Relation Age of Onset    Cancer Mother     Hypertension Mother     Osteoporosis Mother     Dementia Mother     Uterine cancer Mother     Heart disease Father     Parkinsonism Father     Cancer Sister     Breast cancer Sister     Kidney cancer Sister     Diabetes Brother     Heart disease Paternal Grandfather     No Known Problems Maternal Grandmother     No Known Problems  "Maternal Grandfather     No Known Problems Paternal Grandmother     Colon cancer Neg Hx     Colon polyps Neg Hx     Esophageal cancer Neg Hx     Liver cancer Neg Hx     Liver disease Neg Hx     Rectal cancer Neg Hx     Stomach cancer Neg Hx      SOCIAL HISTORY:  Social History     Socioeconomic History    Marital status:    Tobacco Use    Smoking status: Former     Current packs/day: 0.25     Average packs/day: 0.3 packs/day for 3.0 years (0.8 ttl pk-yrs)     Types: Cigarettes     Passive exposure: Past    Smokeless tobacco: Never    Tobacco comments:     social smoker in college   Vaping Use    Vaping status: Never Used   Substance and Sexual Activity    Alcohol use: Not Currently     Comment: Occasional glass of wine    Drug use: No    Sexual activity: Defer       REVIEW OF SYSTEMS:    Review of Systems   Constitutional:  Positive for fatigue. Negative for fever and unexpected weight change.        \"I feel better.  I can lift my legs.\"   HENT:  Negative for congestion and mouth sores.    Eyes:  Negative for discharge and redness.   Respiratory:  Negative for shortness of breath and wheezing.    Cardiovascular:  Positive for leg swelling. Negative for chest pain.   Gastrointestinal:  Negative for constipation, diarrhea, nausea and vomiting.   Endocrine: Negative for cold intolerance and heat intolerance.   Genitourinary:  Negative for difficulty urinating and dysuria.   Musculoskeletal:  Negative for myalgias.   Skin:  Positive for pallor.   Allergic/Immunologic: Negative for food allergies.   Neurological:  Negative for dizziness, speech difficulty and weakness.   Hematological:  Does not bruise/bleed easily.   Psychiatric/Behavioral:  Negative for agitation and confusion. The patient is not nervous/anxious.        VITAL SIGNS: /58   Pulse 84   Temp 98.5 °F (36.9 °C)   Resp 18   Ht 154.9 cm (61\")   Wt 65.8 kg (145 lb)   SpO2 99%   Breastfeeding No   BMI 27.40 kg/m²  Lost 21 pounds. \"Fluid. I " "can get my shoes on. My legs were so big  I am tickled.\"  Pain Score    10/02/24 1420   PainSc: 2  Comment: back       PHYSICAL EXAMINATION:     Physical Exam  Vitals reviewed.   Constitutional:       Appearance: She is ill-appearing.      Comments: She arrived in the exam room in a wheelchair.   HENT:      Head: Normocephalic and atraumatic.   Cardiovascular:      Rate and Rhythm: Normal rate.   Pulmonary:      Effort: No respiratory distress.      Breath sounds: No wheezing.      Comments: Port, right. No erythema.   Abdominal:      General: There is no distension.      Palpations: Abdomen is soft.   Musculoskeletal:         General: Swelling present.      Cervical back: Neck supple.   Skin:     Coloration: Skin is pale.   Neurological:      Mental Status: She is oriented to person, place, and time.   Psychiatric:         Mood and Affect: Mood normal.         Behavior: Behavior normal.         Thought Content: Thought content normal.         Judgment: Judgment normal.         LABS    Lab Results - Last 18 Months   Lab Units 09/11/24  1358 08/14/24  1357 07/24/24  1552 07/17/24  1416 06/19/24  1533 06/14/24  1116 05/22/24  0409 05/20/24  0438 05/19/24  2057 05/15/24  1339 04/04/24  1412 03/05/24  1257 02/06/24  1335 02/02/24  1230   HEMOGLOBIN g/dL 8.4* 9.3* 9.6* 9.0* 9.2* 6.6* 7.7*   < > 9.4* 8.7* 11.5*   < > 10.2* 10.8*   HEMATOCRIT % 25.9* 28.2* 30.3* 28.1* 29.3* 21.6* 24.8*   < > 30.2* 28.0* 35.1   < > 31.4* 33.0*   MCV fL 100.0* 98.6* 102.7* 101.1*  --  110.8* 110.2*   < > 111.0* 110.2* 106.4*   < > 102.6* 104.8*   WBC 10*3/mm3 7.17 7.82 7.83 8.65  --  8.66 6.34   < > 9.75 6.29 6.79   < > 7.05 9.91   RDW % 15.9* 16.3* 17.1* 17.1*  --  16.5* 16.3*   < > 15.2 14.9 14.5   < > 14.0 14.1   MPV fL 10.6 10.4 11.0 11.0  --  10.5 11.0   < > 11.4 10.4 11.6   < > 11.2 12.5*   PLATELETS 10*3/mm3 270 171 157 190  --  175 147   < > 170 155 138*   < > 169 120*   IMM GRAN % % 0.4 0.5 0.5 0.5  --   --   --   --  0.5  --   --   " --  1.4*  --    NEUTROS ABS 10*3/mm3 5.65 5.80 5.75 6.73  --  7.53*  --   --  7.62* 4.52 4.95   < > 5.48 8.62*   LYMPHS ABS 10*3/mm3 0.86 1.14 1.11 0.99  --   --   --   --  1.19  --  1.22   < > 0.88  --    MONOS ABS 10*3/mm3 0.53 0.67 0.58 0.62  --   --   --   --  0.56  --  0.45   < > 0.43  --    EOS ABS 10*3/mm3 0.08 0.13 0.30 0.24  --  0.35  --   --  0.28 0.44* 0.11   < > 0.12  --    BASOS ABS 10*3/mm3 0.02 0.04 0.05 0.03  --  0.09  --   --  0.05 0.26* 0.04   < > 0.04  --    IMMATURE GRANS (ABS) 10*3/mm3 0.03 0.04 0.04 0.04  --   --   --   --  0.05  --   --   --  0.10*  --    NRBC /100 WBC 0.0 0.0 0.0 0.0  --   --   --   --  0.0  --   --   --  0.0  --    NEUTROPHIL % %  --   --   --   --   --  83.0*  --   --   --  67.6  --   --   --  84.0*   MONOCYTES % %  --   --   --   --   --  1.0*  --   --   --  5.6  --   --   --  2.0*   BASOPHIL % %  --   --   --   --   --  1.0  --   --   --  4.2*  --   --   --   --    ATYP LYMPH % %  --   --   --   --   --   --   --   --   --   --   --   --   --  3.0   ANISOCYTOSIS   --   --   --   --   --  Slight/1+  --   --   --  Slight/1+  --   --   --  Slight/1+   GIANT PLT   --   --   --   --   --   --   --   --   --   --   --   --   --  Slight/1+    < > = values in this interval not displayed.       Lab Results - Last 18 Months   Lab Units 07/24/24  1552 06/14/24  1116 05/22/24  0409 05/21/24  0400 05/20/24  0438 05/19/24  2057 05/15/24  1339 04/04/24  1412 02/06/24  1335 02/02/24  1230   GLUCOSE mg/dL 141* 184* 120* 112* 82 152* 100* 126* 197* 100*   SODIUM mmol/L 139 139 140 141 141 140 140 138 134* 135*   POTASSIUM mmol/L 3.7 3.6 4.1 4.3 3.9 4.4 4.4 4.3 3.6 4.0   CO2 mmol/L 33.0* 29.0 28.0 29.0 27.0 27.0 27.0 25.0 23.0 23.0   CHLORIDE mmol/L 96* 95* 101 103 105 101 105 100 96* 96*   ANION GAP mmol/L 10.0 15.0 11.0 9.0 9.0 12.0 8.0 13.0 15.0 16.0*   CREATININE mg/dL 1.47* 2.03* 1.86* 2.07* 2.01* 2.29* 1.68* 1.78* 1.68* 2.07*   BUN mg/dL 20 51* 32* 31* 31* 32* 29* 28* 27* 23   BUN /  "CREAT RATIO  13.6 25.1* 17.2 15.0 15.4 14.0 17.3 15.7 16.1 11.1   CALCIUM mg/dL 9.8 10.2 8.6 9.1 9.0 10.3 9.7 10.1 9.6 8.7   ALK PHOS U/L  --  55  --   --   --  53 46 44 52 57   TOTAL PROTEIN g/dL  --  7.2  --   --   --  7.2 7.0 8.3 8.1 8.3   ALT (SGPT) U/L  --  9  --   --   --  10 11 11 9 11   AST (SGOT) U/L  --  21  --   --   --  23 17 21 17 19   BILIRUBIN mg/dL  --  0.4  --   --   --  0.2 0.2 0.3 0.3 0.3   ALBUMIN g/dL  --  4.0  --   --   --  3.9 4.0 4.7 4.3 4.4   GLOBULIN gm/dL  --  3.2  --   --   --  3.3 3.0 3.6 3.8 3.9       No results for input(s): \"MSPIKE\", \"KAPPALAMB\", \"IGLFLC\", \"URICACID\", \"FREEKAPPAL\", \"CEA\", \"LDH\", \"REFLABREPO\" in the last 05388 hours.    Lab Results - Last 18 Months   Lab Units 08/14/24  1357 07/24/24  1552 07/17/24  1416 06/14/24  1116 05/19/24  2057 05/15/24  1339 03/05/24  1257 02/06/24  1335   IRON mcg/dL 53  --  54 46  --  57 93 69   TIBC mcg/dL 323  --  314 325  --  298 285* 285*   IRON SATURATION (TSAT) % 16*  --  17* 14*  --  19* 33 24   FERRITIN ng/mL 1,346.00*  --  1,290.00* 567.30*  --  624.60* 1,188.00* 1,262.00*   TSH uIU/mL  --  75.880*  --   --   --   --   --   --    FOLATE ng/mL  --   --   --   --  >20.00  --   --   --        Dago Waqar Nunez reports a pain score of 2.  Given her pain assessment as noted, treatment options were discussed and the following options were decided upon as a follow-up plan to address the patient's pain: continuation of current treatment plan for pain.        ASSESSMENT:  1.  Left breast cancer, upper outer quadrant.  Tumor size 1.1 cm.  Negative genetic testing.  AJCC stage: 1A (pT1c, snpN0, cM0)  Receptor status: Estrogen 87%, progesterone 47% and negative HER-2/gabriela.  Treatment status: Post left lumpectomy and sentinel lymph node biopsy.  Declined adjuvant radiation. She is on adjuvant letrozole.  2.  Macrocytic anemia from iron deficiency, B12 deficiency and history of chronic kidney disease stage IV, GFR 24.2 mL/min on 6/14/2024.  On " "subcutaneous epoetin alpha as needed.  3.   Iron deficiency. Intolerant to oral iron.  Treated with intravenous iron as needed.  4.   Chronic kidney disease Stage IV.  GFR 24.2 ml/min on 6/14/2024.  Contributing factor to anemia.  5.   Performance status of 3.    6.   Grade 1 nausea from letrozole. On Zofran.   7.   Osteoporosis.  Taking calcium and vitamin D.  She is on subcutaneous denosumab.  8.   Squamous cell cancer in situ, urethra.  Followed by Dr. Callahan  9.   Recurrent squamous cell carcinoma, vulva.  AJCC stage IIIA (T2, Nib, M0, G3).  Treatment status.  Had Mitomycin C and 5 FU D1 to D4 with radiation.  D29 - 32 chemo was cancelled due to poor performance status.             PLAN:  1.    Re:  Heme status.  Hemoglobin 8.4, hematocrit 25.9, and .    2.    Re:  Pre-office CMP.  GFR 25.3 35.7 from 24.2 from 26.9 ml/min.    3.    Re:  Ferritin pending from 1346 from 1290 and saturation pending from 16 from 17 from 14 from 19 from 33 %.  Injectafer given 6/21/2024.   4.    Re: Tolerance to subcutaneous denosumab. \"Fine.\"  5.    Re: She was admitted on 8/4/2024 for left-sided weakness and cognitive decline.  She had undergone right internal carotid artery revascularization on 8/5/2024. \"I feel better.\"  6.   CBC with differential, ferritin and iron panel every 4 weeks.  7.   Epoetin alpha 40,000 units SQ every 4 weeks if hemoglobin below 11 and hematocrit below 33.  Order for hypertension.  8.  Transfuse 1 unit packed RBCs if hemoglobin less than 7.  Premed Tylenol 500 mg p.o. and Benadryl 12.5 mg IV.  Lasix 20 mg IV push after a unit of blood.  Monitor for transfusion reactions.  9.  Note from Dr. Munoz on 8/12/2024.  Patient seen for acquired hypothyroidism, memory loss and stenosis of carotid artery.  10.  Advance Care Planning  ACP discussion was held with the patient during this visit. Patient has an advance directive in EMR which is still valid.    11.  eRx " "ondansetron 8 mg po every 8 hours as needed for nausea/vomiting, # 60, 2 refills if needed.  12.  eRx letrozole 2.5 mg p.o. daily #90 with 3 refills if needed.  \"I take it at night.\"  Observe for worsening bone loss or arthralgias or hot flashes.\"  13.  Cervical/vaginal cytology per gynecology.  14.  Vitamin B12 1000 mcg IM monthly by her granddaughter.  Continue to monitor for local reaction.  15.  Continue ongoing management per primary care physician and other specialists.  16.  Will not obtain breast tumor markers or Oncotype DX.  Patient is not a candidate for adjuvant chemotherapy.  17.  Flush port every 6 weeks  18.Plan of care discussed with her spouse, Joseph.  Understanding expressed. Spouse is agreeable to proceed.  19.  Her next bone density 10/2025.  20.  Order denosumab 60 mg SQ every 6 months for osteoporosis. She is on letrozole.  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  21.  Mammogram order per surgery.  22.  Return to the office in 3 months with CBC with differential, ferritin, iron panel, and CMP.              I have reviewed the assessment and plan and verified the accuracy of it. No changes to assessment and plan since the information was documented. Drake Stafford MD 10/02/24       I spent 34 total minutes, face-to-face, caring for Syndal today. Greater than 50% of this time involved counseling and/or coordination of care as documented within this note.               cc: (Shaheen Akbar MD )        (Annita Loaiza MD)        (Ulises Roche MD)        (Guillermina Rosario MD)        (Octavio Fernando MD)        Gilberto Mills MD        (Moustapha Callahan MD)        (Radha Marks MD)    "

## 2024-09-30 ENCOUNTER — OFFICE VISIT (OUTPATIENT)
Dept: CARDIOLOGY | Facility: CLINIC | Age: 84
End: 2024-09-30
Payer: MEDICARE

## 2024-09-30 VITALS
OXYGEN SATURATION: 99 % | SYSTOLIC BLOOD PRESSURE: 112 MMHG | DIASTOLIC BLOOD PRESSURE: 50 MMHG | HEIGHT: 61 IN | WEIGHT: 145 LBS | BODY MASS INDEX: 27.38 KG/M2 | HEART RATE: 78 BPM

## 2024-09-30 DIAGNOSIS — I10 ESSENTIAL HYPERTENSION: Primary | ICD-10-CM

## 2024-09-30 DIAGNOSIS — I65.21 STENOSIS OF RIGHT CAROTID ARTERY: ICD-10-CM

## 2024-09-30 DIAGNOSIS — E78.2 MIXED HYPERLIPIDEMIA: ICD-10-CM

## 2024-09-30 DIAGNOSIS — R06.09 DOE (DYSPNEA ON EXERTION): ICD-10-CM

## 2024-09-30 PROCEDURE — 3078F DIAST BP <80 MM HG: CPT

## 2024-09-30 PROCEDURE — 3074F SYST BP LT 130 MM HG: CPT

## 2024-09-30 PROCEDURE — 1159F MED LIST DOCD IN RCRD: CPT

## 2024-09-30 PROCEDURE — 99214 OFFICE O/P EST MOD 30 MIN: CPT

## 2024-09-30 PROCEDURE — 1160F RVW MEDS BY RX/DR IN RCRD: CPT

## 2024-09-30 NOTE — PROGRESS NOTES
Reason For Visit:  Hypertension (6 week f/u since carotid surgery.  Patient states that since starting thyroid medication, her leg swelling has went away.  No complaints this visit. )     Subjective        Syndal Waqar Nunez is a 84 y.o. female with the below pertinent PMH who presents for follow-up of cardiac testing.    Patient was referred to and initially met Dr. Maldonado on 7/2/2024.  Referrals for mild chronic exertional shortness of breath.  No associated chest pain.  Dr. Maldonado ordered a stress echocardiogram at that time.    Since her [facility] appointment she has been doing well.  She denies any specific complaints.  She is able to ambulate with a walker at home with minimal symptoms.  Denies any chest pain.  Tolerating her medication regimen well without any dizziness or lightheadedness.   Of note, she does not remember having met Dr. Maldonado or undergoing a stress test.  She tells me that her generalized fatigue and lower extremity swelling started to improve when she was started on thyroid medication.      ROS: Pertinent findings as noted above    Pertinent PMH  -Carotid artery stenosis status post right TCAR in August 2024  - History of TIA  - Type 2 diabetes  - Hyperlipidemia  - Hypertension  - Chronic kidney disease  - History of breast cancer    Pertinent past medical, surgical, family, and social history were reviewed.      Current Outpatient Medications:     Acetaminophen (TYLENOL ARTHRITIS PAIN PO), Take 1 tablet by mouth Daily As Needed (BACK PAIN)., Disp: , Rfl:     albuterol sulfate  (90 Base) MCG/ACT inhaler, Inhale 2 puffs Every 4 (Four) Hours As Needed for Shortness of Air., Disp: , Rfl:     aspirin 81 MG EC tablet, Take 1 tablet by mouth Daily., Disp: , Rfl:     bisoprolol-hydrochlorothiazide (ZIAC) 5-6.25 MG per tablet, Take 1 tablet by mouth Daily., Disp: 90 tablet, Rfl: 1    bumetanide (BUMEX) 1 MG tablet, Take 1 tablet by mouth 2 (Two) Times a Day., Disp: 180 tablet, Rfl: 1    cetirizine  (zyrTEC) 10 MG tablet, Take 1 tablet by mouth Daily As Needed for Allergies., Disp: , Rfl:     citalopram (CeleXA) 20 MG tablet, Take 1 tablet by mouth Daily., Disp: 90 tablet, Rfl: 1    cyanocobalamin 1000 MCG/ML injection, Inject 1 mL into the appropriate muscle as directed by prescriber Every 30 (Thirty) Days., Disp: , Rfl:     diphenoxylate-atropine (LOMOTIL) 2.5-0.025 MG per tablet, Take 2 tablets by mouth 4 (Four) Times a Day As Needed for Diarrhea., Disp: , Rfl:     esomeprazole (nexIUM) 40 MG capsule, Take 1 capsule by mouth 2 (Two) Times a Day., Disp: 180 capsule, Rfl: 3    Homeopathic Products (LEG CRAMPS) tablet, Take 1 tablet by mouth Daily., Disp: , Rfl:     HYDROcodone-acetaminophen (NORCO)  MG per tablet, Take 0.5 tablets by mouth Every 4 (Four) Hours As Needed for Moderate Pain or Severe Pain. (Patient taking differently: Take 0.5 tablets by mouth Every 4 (Four) Hours As Needed for Moderate Pain or Severe Pain. Patient taking 1 every other day as needed.), Disp: 60 tablet, Rfl: 0    letrozole (FEMARA) 2.5 MG tablet, Take 1 tablet by mouth Daily., Disp: , Rfl:     levothyroxine (Synthroid) 75 MCG tablet, Take 1 tablet by mouth Daily., Disp: 90 tablet, Rfl: 1    memantine (Namenda) 5 MG tablet, Take 1 tablet by mouth 2 (Two) Times a Day., Disp: 60 tablet, Rfl: 2    metaxalone (SKELAXIN) 800 MG tablet, TAKE 1 TABLET BY MOUTH 3 TIMES DAILY AS NEEDED FOR MUSCLE SPASMS, Disp: 30 tablet, Rfl: 0    metOLazone (ZAROXOLYN) 2.5 MG tablet, TAKE 1 TABLET BY MOUTH EVERY DAY AS NEEDED FOR WEIGHT GAIN OF 3 LBS OR MORE IN 24 HOURS., Disp: 30 tablet, Rfl: 5    olmesartan (BENICAR) 20 MG tablet, Take 1 tablet by mouth Daily., Disp: 90 tablet, Rfl: 2    potassium chloride ER (K-TAB) 20 MEQ tablet controlled-release ER tablet, Take 2 tablets by mouth Daily., Disp: , Rfl:     pravastatin (PRAVACHOL) 40 MG tablet, TAKE 1 TABLET BY MOUTH AT BEDTIME, Disp: 90 tablet, Rfl: 0    Probiotic Product (PROBIOTIC DAILY PO),  Take 1 tablet by mouth Daily., Disp: , Rfl:     sodium bicarbonate 650 MG tablet, Take 1 tablet by mouth 2 (Two) Times a Day., Disp: , Rfl:     vitamin D (ERGOCALCIFEROL) 1.25 MG (24282 UT) capsule capsule, Take 1 capsule by mouth 1 (One) Time Per Week. Thursdays, Disp: , Rfl:     clindamycin (CLEOCIN T) 1 % lotion, Apply 1 Application topically to the appropriate area as directed Daily As Needed (Rash On Legs). (Patient not taking: Reported on 9/30/2024), Disp: , Rfl:     nystatin-triamcinolone (MYCOLOG II) 534493-9.1 UNIT/GM-% cream, Apply 1 Application topically to the appropriate area as directed 2 (Two) Times a Day As Needed (rash under breast). (Patient not taking: Reported on 9/30/2024), Disp: , Rfl:     phenazopyridine (PYRIDIUM) 200 MG tablet, Take 1 tablet by mouth 3 (Three) Times a Day As Needed for Dysuria. (Patient not taking: Reported on 9/30/2024), Disp: 15 tablet, Rfl: 0    tacrolimus (PROTOPIC) 0.1 % ointment, Apply 1 Application topically to the appropriate area as directed 2 (Two) Times a Day As Needed (dermatitis). (Patient not taking: Reported on 9/30/2024), Disp: , Rfl:   No current facility-administered medications for this visit.    Facility-Administered Medications Ordered in Other Visits:     heparin injection 500 Units, 500 Units, Intravenous, Rivera DAMON Winston, MD, 500 Units at 07/01/21 1354    heparin injection 500 Units, 500 Units, Intravenous, Rivera DAMON Winston, MD, 500 Units at 01/11/22 1134    heparin injection 500 Units, 500 Units, IntravenousMARISOL Chua, Winston, MD, 500 Units at 09/28/22 1418    heparin injection 500 Units, 500 Units, Intravenous, Rivera DAMON Winston, MD, 500 Units at 01/25/23 1537    heparin injection 500 Units, 500 Units, Intravenous, Rivera DAMON Winston, MD, 500 Units at 06/26/23 1426    heparin injection 500 Units, 500 Units, Intravenous, Rivera DAMON Winston, MD, 500 Units at 03/05/24 1350    sodium chloride 0.9 % flush 10 mL, 10 mL, Intravenous, PRN, Rivera,  "MD Drake, 10 mL at 07/01/21 1354    sodium chloride 0.9 % flush 10 mL, 10 mL, Intravenous, Rivera DAMON Winston, MD, 10 mL at 01/11/22 1134    sodium chloride 0.9 % flush 10 mL, 10 mL, Intravenous, HARDIKNRivera Winston, MD, 10 mL at 09/28/22 1418    sodium chloride 0.9 % flush 10 mL, 10 mL, Intravenous, Rivera DAMON Winston, MD, 10 mL at 01/25/23 1537    sodium chloride 0.9 % flush 10 mL, 10 mL, Intravenous, HARDIKNRivera Winston, MD, 10 mL at 06/26/23 1426    sodium chloride 0.9 % flush 10 mL, 10 mL, Intravenous, Rivera DAMON Winston, MD, 10 mL at 03/05/24 1350     Objective   Vital Signs:  /50 (BP Location: Right arm, Patient Position: Sitting, Cuff Size: Adult)   Pulse 78   Ht 154.9 cm (61\")   Wt 65.8 kg (145 lb)   SpO2 99%   BMI 27.40 kg/m²   Estimated body mass index is 27.4 kg/m² as calculated from the following:    Height as of this encounter: 154.9 cm (61\").    Weight as of this encounter: 65.8 kg (145 lb).      Constitutional:       Appearance: Healthy appearance. Not in distress.   Pulmonary:      Effort: Pulmonary effort is normal.      Breath sounds: Normal breath sounds.   Cardiovascular:      PMI at left midclavicular line. Normal rate. Regular rhythm.      Murmurs: There is no murmur.      No gallop.  No click. No rub.   Edema:     Peripheral edema present.     Pretibial: bilateral trace edema of the pretibial area.     Ankle: bilateral 1+ edema of the ankle.  Abdominal:      General: Bowel sounds are normal.   Musculoskeletal: Normal range of motion.      Cervical back: Normal range of motion and neck supple. Skin:     General: Skin is warm.   Neurological:      Mental Status: Alert and oriented to person, place and time.        Result Review :  The following data was reviewed by: VINCENT Thayer on 09/30/2024:  CMP   CMP          5/22/2024    04:09 6/14/2024    11:16 7/24/2024    15:52   CMP   Glucose 120  184  141    BUN 32  51  20    Creatinine 1.86  2.03  1.47    EGFR 26.9  24.2  35.7  "   Sodium 140  139  139    Potassium 4.1  3.6  3.7    Chloride 101  95  96    Calcium 8.6  10.2  9.8    Total Protein  7.2     Albumin  4.0     Globulin  3.2     Total Bilirubin  0.4     Alkaline Phosphatase  55     AST (SGOT)  21     ALT (SGPT)  9     Albumin/Globulin Ratio  1.3     BUN/Creatinine Ratio 17.2  25.1  13.6    Anion Gap 11.0  15.0  10.0      Lipid Panel   Lipid Panel          2/2/2024    12:30 5/20/2024    04:38   Lipid Panel   Total Cholesterol 200  226    Triglycerides 133  153    HDL Cholesterol 59  47    VLDL Cholesterol 23  28    LDL Cholesterol  118  151     144    LDL/HDL Ratio 1.94  3.16      BMP   BMP          5/22/2024    04:09 6/14/2024    11:16 7/24/2024    15:52   BMP   BUN 32  51  20    Creatinine 1.86  2.03  1.47    Sodium 140  139  139    Potassium 4.1  3.6  3.7    Chloride 101  95  96    CO2 28.0  29.0  33.0    Calcium 8.6  10.2  9.8      Data reviewed : Cardiology studies echo      Results for orders placed during the hospital encounter of 07/02/24    Adult Stress Echo W/ Cont or Stress Agent if Necessary Per Protocol    Interpretation Summary    Left ventricular ejection fraction appears to be 56 - 60%.    The patient denied chest pain.    Stress induced distal anterior wall hypokinesis noted in some views    Equivocal stress echo for ischemia but overall appears to be lower risk         Assessment and Plan   Diagnoses and all orders for this visit:    1. Essential hypertension (Primary)  2. Mixed hyperlipidemia  3. Stenosis of right carotid artery  4. SMITH (dyspnea on exertion)  -Patient does have equivocal stress test but likely low risk.  She does not have any anginal symptoms at this time and appears to be doing well  - Continue aspirin 81 mg daily  - Continue pravastatin 40 mg daily (could benefit from high intensity statin in the future)  - Continue antihypertensives of olmesartan 20 mg daily as well as Ziac  - Chronic lower extremity swelling that they report is improved,  continue Bumex 1 mg twice daily as well as as needed metolazone               Follow Up   Return in about 6 months (around 3/30/2025) for With Dr. Maldonado .  Patient was given instructions and counseling regarding her condition or for health maintenance advice. Please see specific information pulled into the AVS if appropriate.       Part of this note may be an electronic transcription/translation of spoken language to printed text using the Dragon Dictation System.

## 2024-10-02 ENCOUNTER — LAB (OUTPATIENT)
Dept: LAB | Facility: HOSPITAL | Age: 84
End: 2024-10-02
Payer: MEDICARE

## 2024-10-02 ENCOUNTER — OFFICE VISIT (OUTPATIENT)
Dept: ONCOLOGY | Facility: CLINIC | Age: 84
End: 2024-10-02
Payer: MEDICARE

## 2024-10-02 VITALS
RESPIRATION RATE: 18 BRPM | DIASTOLIC BLOOD PRESSURE: 58 MMHG | OXYGEN SATURATION: 99 % | WEIGHT: 145 LBS | BODY MASS INDEX: 27.38 KG/M2 | HEIGHT: 61 IN | TEMPERATURE: 98.5 F | HEART RATE: 84 BPM | SYSTOLIC BLOOD PRESSURE: 112 MMHG

## 2024-10-02 DIAGNOSIS — C51.9 VULVAR CANCER, CARCINOMA: ICD-10-CM

## 2024-10-02 DIAGNOSIS — Z17.0 MALIGNANT NEOPLASM OF UPPER-OUTER QUADRANT OF LEFT BREAST IN FEMALE, ESTROGEN RECEPTOR POSITIVE: Primary | ICD-10-CM

## 2024-10-02 DIAGNOSIS — Z45.2 ENCOUNTER FOR CARE RELATED TO VASCULAR ACCESS PORT: ICD-10-CM

## 2024-10-02 DIAGNOSIS — C50.412 MALIGNANT NEOPLASM OF UPPER-OUTER QUADRANT OF LEFT BREAST IN FEMALE, ESTROGEN RECEPTOR POSITIVE: Primary | ICD-10-CM

## 2024-10-02 DIAGNOSIS — D50.8 IRON DEFICIENCY ANEMIA SECONDARY TO INADEQUATE DIETARY IRON INTAKE: ICD-10-CM

## 2024-10-02 DIAGNOSIS — C77.4 SECONDARY MALIGNANCY OF INGUINAL LYMPH NODES: ICD-10-CM

## 2024-10-02 LAB
ALBUMIN SERPL-MCNC: 4 G/DL (ref 3.5–5.2)
ALBUMIN/GLOB SERPL: 1.2 G/DL
ALP SERPL-CCNC: 47 U/L (ref 39–117)
ALT SERPL W P-5'-P-CCNC: 11 U/L (ref 1–33)
ANION GAP SERPL CALCULATED.3IONS-SCNC: 11 MMOL/L (ref 5–15)
AST SERPL-CCNC: 15 U/L (ref 1–32)
BILIRUB SERPL-MCNC: 0.3 MG/DL (ref 0–1.2)
BUN SERPL-MCNC: 32 MG/DL (ref 8–23)
BUN/CREAT SERPL: 16.4 (ref 7–25)
CALCIUM SPEC-SCNC: 9.5 MG/DL (ref 8.6–10.5)
CHLORIDE SERPL-SCNC: 100 MMOL/L (ref 98–107)
CO2 SERPL-SCNC: 26 MMOL/L (ref 22–29)
CREAT SERPL-MCNC: 1.95 MG/DL (ref 0.57–1)
EGFRCR SERPLBLD CKD-EPI 2021: 25.3 ML/MIN/1.73
FERRITIN SERPL-MCNC: 1974 NG/ML (ref 13–150)
GLOBULIN UR ELPH-MCNC: 3.3 GM/DL
GLUCOSE SERPL-MCNC: 205 MG/DL (ref 65–99)
HOLD SPECIMEN: NORMAL
IRON 24H UR-MRATE: 56 MCG/DL (ref 37–145)
IRON SATN MFR SERPL: 20 % (ref 20–50)
POTASSIUM SERPL-SCNC: 4.5 MMOL/L (ref 3.5–5.2)
PROT SERPL-MCNC: 7.3 G/DL (ref 6–8.5)
SODIUM SERPL-SCNC: 137 MMOL/L (ref 136–145)
TIBC SERPL-MCNC: 274 MCG/DL (ref 298–536)
TRANSFERRIN SERPL-MCNC: 184 MG/DL (ref 200–360)
WHOLE BLOOD HOLD SPECIMEN: NORMAL

## 2024-10-02 PROCEDURE — 82728 ASSAY OF FERRITIN: CPT

## 2024-10-02 PROCEDURE — 83540 ASSAY OF IRON: CPT

## 2024-10-02 PROCEDURE — 80053 COMPREHEN METABOLIC PANEL: CPT

## 2024-10-02 PROCEDURE — 36415 COLL VENOUS BLD VENIPUNCTURE: CPT

## 2024-10-02 PROCEDURE — 84466 ASSAY OF TRANSFERRIN: CPT

## 2024-10-02 RX ORDER — SODIUM CHLORIDE 0.9 % (FLUSH) 0.9 %
10 SYRINGE (ML) INJECTION AS NEEDED
Status: SHIPPED | OUTPATIENT
Start: 2024-10-02

## 2024-10-02 RX ORDER — SODIUM CHLORIDE 0.9 % (FLUSH) 0.9 %
10 SYRINGE (ML) INJECTION AS NEEDED
OUTPATIENT
Start: 2024-10-02

## 2024-10-02 RX ORDER — HEPARIN SODIUM (PORCINE) LOCK FLUSH IV SOLN 100 UNIT/ML 100 UNIT/ML
500 SOLUTION INTRAVENOUS AS NEEDED
OUTPATIENT
Start: 2024-10-02

## 2024-10-02 RX ORDER — HEPARIN SODIUM (PORCINE) LOCK FLUSH IV SOLN 100 UNIT/ML 100 UNIT/ML
500 SOLUTION INTRAVENOUS AS NEEDED
Status: SHIPPED | OUTPATIENT
Start: 2024-10-02

## 2024-10-02 RX ADMIN — Medication 10 ML: at 15:08

## 2024-10-02 RX ADMIN — HEPARIN SODIUM (PORCINE) LOCK FLUSH IV SOLN 100 UNIT/ML 500 UNITS: 100 SOLUTION at 15:08

## 2024-10-08 ENCOUNTER — TELEPHONE (OUTPATIENT)
Dept: FAMILY MEDICINE CLINIC | Facility: CLINIC | Age: 84
End: 2024-10-08
Payer: MEDICARE

## 2024-10-08 DIAGNOSIS — K21.9 GASTROESOPHAGEAL REFLUX DISEASE WITHOUT ESOPHAGITIS: ICD-10-CM

## 2024-10-08 RX ORDER — ESOMEPRAZOLE MAGNESIUM 40 MG/1
40 CAPSULE, DELAYED RELEASE ORAL 2 TIMES DAILY
Qty: 180 CAPSULE | Refills: 3 | Status: SHIPPED | OUTPATIENT
Start: 2024-10-08

## 2024-10-08 NOTE — TELEPHONE ENCOUNTER
Caller: Dago Nunez    Relationship: Self    Best call back number:     What test/procedure requested:     PRIOR AUTHORIZATION    PRESCRIPTION      esomeprazole (nexIUM) 40 MG capsule     When is it needed: ASAP    Where is the test/procedure going to be performed:     STATES THAT PHARMACY FAXED IT OVER TODAY 10.08.24. MAKING SURE IT IS GOING TO BE WORKED AS PATIENT WILL NOT HAVE ENOUGH. ALSO STATES SHE NEEDS A NEW PRESCRIPTION WITH IT

## 2024-10-14 DIAGNOSIS — I10 ESSENTIAL HYPERTENSION: ICD-10-CM

## 2024-10-14 RX ORDER — OLMESARTAN MEDOXOMIL 20 MG/1
20 TABLET ORAL DAILY
Qty: 90 TABLET | Refills: 2 | Status: SHIPPED | OUTPATIENT
Start: 2024-10-14

## 2024-10-23 DIAGNOSIS — R60.0 BILATERAL LOWER EXTREMITY EDEMA: ICD-10-CM

## 2024-10-23 RX ORDER — POTASSIUM CHLORIDE 1500 MG/1
40 TABLET, EXTENDED RELEASE ORAL DAILY
Qty: 60 TABLET | Refills: 5 | Status: SHIPPED | OUTPATIENT
Start: 2024-10-23

## 2024-10-23 RX ORDER — BUMETANIDE 1 MG/1
1 TABLET ORAL 2 TIMES DAILY
Qty: 60 TABLET | Refills: 1 | Status: SHIPPED | OUTPATIENT
Start: 2024-10-23

## 2024-10-30 DIAGNOSIS — E03.9 ACQUIRED HYPOTHYROIDISM: ICD-10-CM

## 2024-10-30 RX ORDER — LEVOTHYROXINE SODIUM 75 UG/1
75 TABLET ORAL DAILY
Qty: 90 TABLET | Refills: 1 | Status: SHIPPED | OUTPATIENT
Start: 2024-10-30

## 2024-10-31 ENCOUNTER — LAB (OUTPATIENT)
Dept: LAB | Facility: HOSPITAL | Age: 84
End: 2024-10-31
Payer: MEDICARE

## 2024-10-31 ENCOUNTER — OFFICE VISIT (OUTPATIENT)
Dept: FAMILY MEDICINE CLINIC | Facility: CLINIC | Age: 84
End: 2024-10-31
Payer: MEDICARE

## 2024-10-31 ENCOUNTER — HOSPITAL ENCOUNTER (OUTPATIENT)
Dept: MAMMOGRAPHY | Facility: HOSPITAL | Age: 84
Discharge: HOME | End: 2024-10-31
Payer: MEDICARE

## 2024-10-31 VITALS
HEART RATE: 68 BPM | BODY MASS INDEX: 29.07 KG/M2 | SYSTOLIC BLOOD PRESSURE: 130 MMHG | OXYGEN SATURATION: 99 % | TEMPERATURE: 97.2 F | WEIGHT: 154 LBS | DIASTOLIC BLOOD PRESSURE: 74 MMHG | RESPIRATION RATE: 18 BRPM | HEIGHT: 61 IN

## 2024-10-31 DIAGNOSIS — G89.3 CANCER RELATED PAIN: ICD-10-CM

## 2024-10-31 DIAGNOSIS — E03.9 ACQUIRED HYPOTHYROIDISM: ICD-10-CM

## 2024-10-31 DIAGNOSIS — R60.0 BILATERAL LOWER EXTREMITY EDEMA: ICD-10-CM

## 2024-10-31 DIAGNOSIS — I10 ESSENTIAL HYPERTENSION: Primary | ICD-10-CM

## 2024-10-31 DIAGNOSIS — R30.0 DYSURIA: ICD-10-CM

## 2024-10-31 DIAGNOSIS — Z23 FLU VACCINE NEED: ICD-10-CM

## 2024-10-31 DIAGNOSIS — Z85.3 PERSONAL HISTORY OF BREAST CANCER: ICD-10-CM

## 2024-10-31 DIAGNOSIS — Z12.31 ENCOUNTER FOR SCREENING MAMMOGRAM FOR MALIGNANT NEOPLASM OF BREAST: ICD-10-CM

## 2024-10-31 DIAGNOSIS — C51.9 VULVAR CANCER, CARCINOMA: ICD-10-CM

## 2024-10-31 LAB
T4 FREE SERPL-MCNC: 1.11 NG/DL (ref 0.93–1.7)
TSH SERPL DL<=0.05 MIU/L-ACNC: 3.49 UIU/ML (ref 0.27–4.2)

## 2024-10-31 PROCEDURE — 84439 ASSAY OF FREE THYROXINE: CPT

## 2024-10-31 PROCEDURE — 77066 DX MAMMO INCL CAD BI: CPT

## 2024-10-31 PROCEDURE — G0279 TOMOSYNTHESIS, MAMMO: HCPCS

## 2024-10-31 PROCEDURE — 84443 ASSAY THYROID STIM HORMONE: CPT

## 2024-10-31 PROCEDURE — 36415 COLL VENOUS BLD VENIPUNCTURE: CPT

## 2024-10-31 RX ORDER — CIPROFLOXACIN 250 MG/1
250 TABLET, FILM COATED ORAL 2 TIMES DAILY
Qty: 10 TABLET | Refills: 0 | Status: SHIPPED | OUTPATIENT
Start: 2024-10-31 | End: 2024-11-05

## 2024-10-31 RX ORDER — HYDROCODONE BITARTRATE AND ACETAMINOPHEN 10; 325 MG/1; MG/1
0.5 TABLET ORAL EVERY 4 HOURS PRN
Qty: 60 TABLET | Refills: 0 | Status: SHIPPED | OUTPATIENT
Start: 2024-10-31

## 2024-10-31 NOTE — PROGRESS NOTES
"Chief Complaint  Follow-up (3 month follow up - pt states when urinating it burns and has been taking something for it called, phenazopyridine )    Subjective        Syndal Waqar Nunze presents to [facility]  History of Present Illness  For about 4 days the patient's been having new onset dysuria that is similar to previous UTIs without any fevers or chills.  Her blood pressure is stable, she does need a refill of as needed hydrocodone, leg swelling is stable on current dose of Bumex.  She has been taking Pyridium for dysuria    Objective   Vital Signs:  /74   Pulse 68   Temp 97.2 °F (36.2 °C)   Resp 18   Ht 154.9 cm (61\")   Wt 69.9 kg (154 lb)   SpO2 99%   BMI 29.10 kg/m²   Estimated body mass index is 29.1 kg/m² as calculated from the following:    Height as of this encounter: 154.9 cm (61\").    Weight as of this encounter: 69.9 kg (154 lb).            Physical Exam  Vitals and nursing note reviewed.   Constitutional:       General: She is not in acute distress.     Appearance: She is not diaphoretic.   HENT:      Head: Normocephalic and atraumatic.      Nose: Nose normal.   Eyes:      General: No scleral icterus.        Right eye: No discharge.         Left eye: No discharge.      Conjunctiva/sclera: Conjunctivae normal.   Neck:      Trachea: No tracheal deviation.   Pulmonary:      Effort: Pulmonary effort is normal.   Skin:     General: Skin is warm and dry.      Coloration: Skin is not pale.   Neurological:      Mental Status: She is alert and oriented to person, place, and time.   Psychiatric:         Behavior: Behavior normal.         Thought Content: Thought content normal.         Judgment: Judgment normal.        Result Review :                Assessment and Plan   Diagnoses and all orders for this visit:    1. Essential hypertension (Primary)    2. Bilateral lower extremity edema    3. Acquired hypothyroidism  -     TSH; Future  -     T4, free; Future    4. " Dysuria  -     ciprofloxacin (Cipro) 250 MG tablet; Take 1 tablet by mouth 2 (Two) Times a Day for 5 days.  Dispense: 10 tablet; Refill: 0    5. Flu vaccine need  -     Fluzone High-Dose 65+yrs (3826-2368)    6. Vulvar cancer, carcinoma  -     HYDROcodone-acetaminophen (NORCO)  MG per tablet; Take 0.5 tablets by mouth Every 4 (Four) Hours As Needed for Moderate Pain or Severe Pain.  Dispense: 60 tablet; Refill: 0    7. Cancer related pain  -     HYDROcodone-acetaminophen (NORCO)  MG per tablet; Take 0.5 tablets by mouth Every 4 (Four) Hours As Needed for Moderate Pain or Severe Pain.  Dispense: 60 tablet; Refill: 0    Patient will repeat labs for thyroid condition  Cipro above, discontinue Pyridium given poor creatinine clearance         Follow Up   Return in about 3 months (around 1/31/2025) for Recheck.  Patient was given instructions and counseling regarding her condition or for health maintenance advice. Please see specific information pulled into the AVS if appropriate.

## 2024-11-11 ENCOUNTER — OFFICE VISIT (OUTPATIENT)
Dept: SURGERY | Facility: CLINIC | Age: 84
End: 2024-11-11
Payer: MEDICARE

## 2024-11-11 VITALS
HEART RATE: 82 BPM | SYSTOLIC BLOOD PRESSURE: 130 MMHG | WEIGHT: 154 LBS | DIASTOLIC BLOOD PRESSURE: 70 MMHG | OXYGEN SATURATION: 97 % | HEIGHT: 61 IN | BODY MASS INDEX: 29.07 KG/M2

## 2024-11-11 DIAGNOSIS — Z85.3 PERSONAL HISTORY OF BREAST CANCER: Primary | ICD-10-CM

## 2024-11-11 DIAGNOSIS — I65.29 STENOSIS OF CAROTID ARTERY, UNSPECIFIED LATERALITY: ICD-10-CM

## 2024-11-11 DIAGNOSIS — M54.50 CHRONIC MIDLINE LOW BACK PAIN WITHOUT SCIATICA: ICD-10-CM

## 2024-11-11 DIAGNOSIS — G89.29 CHRONIC MIDLINE LOW BACK PAIN WITHOUT SCIATICA: ICD-10-CM

## 2024-11-11 DIAGNOSIS — Z12.31 ENCOUNTER FOR SCREENING MAMMOGRAM FOR MALIGNANT NEOPLASM OF BREAST: ICD-10-CM

## 2024-11-11 DIAGNOSIS — I65.21 CAROTID STENOSIS, RIGHT: ICD-10-CM

## 2024-11-11 RX ORDER — PRAVASTATIN SODIUM 40 MG
40 TABLET ORAL
Qty: 90 TABLET | Refills: 1 | Status: SHIPPED | OUTPATIENT
Start: 2024-11-11

## 2024-11-11 NOTE — PROGRESS NOTES
Office Established Patient Note:     Referring Provider: No ref. provider found    Chief Complaint   Patient presents with    Follow-up       Subjective .     History of present illness:  Dago Nunez is a 84 y.o. female s/p left lumpectomy with sentinel lymph node biopsy on 9/14/21 for ER+PA+Her2- invsaive ductal carcinoma with final pathology showing stage IA breast cancer. She declined post op radiation. She is on adjuvant letrozole and is tolerating it well.    History of Present Illness  She has undergone a mammogram, which showed no abnormalities. She reports no discharge from the nipple. She has a history of carotid artery surgery. She experiences fatigue more easily than before, often needing assistance intermediate through her meals. She also reports a decline in her memory. She has been dealing with a yeast infection, for which she was prescribed three pills by her doctor. However, she only took two of these pills.       History  Past Medical History:   Diagnosis Date    Anemia in stage 3 chronic kidney disease 11/11/2019    Aneurysm of carotid artery     Arthritis     Breast cancer 09/14/2021    Left Mastectomy w/ sentinel node Bx    Bronchitis     Carotid stenosis     Cellulitis     ROSA LEGS    CKD (chronic kidney disease)     Coronary artery disease     Disease of thyroid gland     hypothyroid    GERD (gastroesophageal reflux disease)     History of transfusion     Hyperlipidemia     Hypertension     Kyphosis     Macrocytic anemia     Osteoporosis     Personal history of COVID-19 05/2022    Scoliosis     Self-catheterizes urinary bladder     10FR SIZED CATH    Stage 3 chronic kidney disease 11/11/2019    TIA (transient ischemic attack)     Type 2 diabetes mellitus     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 Laguerre-Normal colonoscopy; Normal terminal ileum; Repeat 5 years    COLONOSCOPY N/A 11/05/2021    Petechia(e) in the rectum, in the recto-sigmoid colon and in the distal sigmoid colon; No specimens collected; No plans to repeat colonoscopy due to advance age and/or medical problems    CYSTOSCOPY N/A 09/20/2022    Procedure: CYSTOSCOPY WITH URETHRAL DILATATION;  Surgeon: Shaheen Conway MD;  Location: [facility] OR;  Service: Urology;  Laterality: N/A;    ENDOSCOPY  07/01/2014    Normal esophagus; Normal stomach; Normal examined duodenum; BRAVO pH capsule deployed;     ENDOSCOPY  08/14/2013    Mild gastritis-biopsies for H.Pylor obtained    ENDOSCOPY  02/16/2005    Dr. LaguerreXelxh-Ubcnxvkpu-svlulvwb    ENDOSCOPY  10/21/2003    Dr. Laguerre-Stage 1 reflux esophagitis    ENDOSCOPY N/A 11/05/2021    Small HH; A single gastroesophageal junction polyp; Normal stomach; A single non-bleeding angiodysplastic lesion in the duodenum    HYSTERECTOMY      MASTECTOMY W/ SENTINEL NODE BIOPSY Left 09/14/2021    Procedure: LEFT PARTIAL MASTECTOMY WITH MAGSEED AND LEFT SENTINEL LYMPH NODE BIOPSY MAGTRACE;  Surgeon: Quynh Rosario MD;  Location: [facility] OR;  Service: General;  Laterality: Left;    TONSILLECTOMY      VAGINA SURGERY      Laser surgery X 2    VENOUS ACCESS DEVICE (PORT) INSERTION N/A 09/29/2020    Procedure: SINGLE LUMEN PORT - A- CATH PLACEMENT WITH FLUOROSCOPY;  Surgeon: Quynh Rosario MD;  Location: [facility] OR;  Service: General;  Laterality: N/A;   ,   Family History   Problem Relation Age of Onset    Cancer Mother     Hypertension Mother     Osteoporosis Mother     Dementia Mother     Uterine cancer Mother     Heart disease Father      Parkinsonism Father     Cancer Sister     Breast cancer Sister     Kidney cancer Sister     Diabetes Brother     Heart disease Paternal Grandfather     No Known Problems Maternal Grandmother     No Known Problems Maternal Grandfather     No Known Problems Paternal Grandmother     Colon cancer Neg Hx     Colon polyps Neg Hx     Esophageal cancer Neg Hx     Liver cancer Neg Hx     Liver disease Neg Hx     Rectal cancer Neg Hx     Stomach cancer Neg Hx    ,   Social History     Tobacco Use    Smoking status: Former     Current packs/day: 0.25     Average packs/day: 0.3 packs/day for 3.0 years (0.8 ttl pk-yrs)     Types: Cigarettes     Passive exposure: Past    Smokeless tobacco: Never    Tobacco comments:     social smoker in [facility]   Vaping Use    Vaping status: Never Used   Substance Use Topics    Alcohol use: Not Currently     Comment: Occasional glass of wine    Drug use: No   , (Not in a hospital admission)   and Allergies:  Scopolamine, Amoxicillin-pot clavulanate, Keflex [cephalexin], Septra [sulfamethoxazole-trimethoprim], Tequin [gatifloxacin], and Trovan [alatrofloxacin]    Current Outpatient Medications:     Acetaminophen (TYLENOL ARTHRITIS PAIN PO), Take 1 tablet by mouth Daily As Needed (BACK PAIN)., Disp: , Rfl:     albuterol sulfate  (90 Base) MCG/ACT inhaler, Inhale 2 puffs Every 4 (Four) Hours As Needed for Shortness of Air., Disp: , Rfl:     aspirin 81 MG EC tablet, Take 1 tablet by mouth Daily., Disp: , Rfl:     bisoprolol-hydrochlorothiazide (ZIAC) 5-6.25 MG per tablet, Take 1 tablet by mouth Daily., Disp: 90 tablet, Rfl: 1    bumetanide (BUMEX) 1 MG tablet, TAKE 1 TABLET BY MOUTH 2 TIMES DAILY, Disp: 60 tablet, Rfl: 1    cetirizine (zyrTEC) 10 MG tablet, Take 1 tablet by mouth Daily As Needed for Allergies., Disp: , Rfl:     citalopram (CeleXA) 20 MG tablet, Take 1 tablet by mouth Daily., Disp: 90 tablet, Rfl: 1    clindamycin (CLEOCIN T) 1 % lotion, Apply 1 Application topically to the  appropriate area as directed Daily As Needed (Rash On Legs)., Disp: , Rfl:     cyanocobalamin 1000 MCG/ML injection, Inject 1 mL into the appropriate muscle as directed by prescriber Every 30 (Thirty) Days., Disp: , Rfl:     diphenoxylate-atropine (LOMOTIL) 2.5-0.025 MG per tablet, Take 2 tablets by mouth 4 (Four) Times a Day As Needed for Diarrhea., Disp: , Rfl:     esomeprazole (nexIUM) 40 MG capsule, TAKE 1 CAPSULE TWICE DAILY, Disp: 180 capsule, Rfl: 3    Homeopathic Products (LEG CRAMPS) tablet, Take 1 tablet by mouth Daily., Disp: , Rfl:     HYDROcodone-acetaminophen (NORCO)  MG per tablet, Take 0.5 tablets by mouth Every 4 (Four) Hours As Needed for Moderate Pain or Severe Pain., Disp: 60 tablet, Rfl: 0    letrozole (FEMARA) 2.5 MG tablet, Take 1 tablet by mouth Daily., Disp: , Rfl:     levothyroxine (SYNTHROID, LEVOTHROID) 75 MCG tablet, TAKE 1 TABLET BY MOUTH EVERY DAY, Disp: 90 tablet, Rfl: 1    memantine (Namenda) 5 MG tablet, Take 1 tablet by mouth 2 (Two) Times a Day., Disp: 60 tablet, Rfl: 2    metaxalone (SKELAXIN) 800 MG tablet, TAKE 1 TABLET BY MOUTH 3 TIMES DAILY AS NEEDED FOR MUSCLE SPASMS, Disp: 30 tablet, Rfl: 0    metOLazone (ZAROXOLYN) 2.5 MG tablet, TAKE 1 TABLET BY MOUTH EVERY DAY AS NEEDED FOR WEIGHT GAIN OF 3 LBS OR MORE IN 24 HOURS., Disp: 30 tablet, Rfl: 5    nystatin-triamcinolone (MYCOLOG II) 004376-2.1 UNIT/GM-% cream, Apply 1 Application topically to the appropriate area as directed 2 (Two) Times a Day As Needed (rash under breast)., Disp: , Rfl:     olmesartan (BENICAR) 20 MG tablet, TAKE 1 TABLET BY MOUTH 1 TIME DAILY, Disp: 90 tablet, Rfl: 2    phenazopyridine (PYRIDIUM) 200 MG tablet, Take 1 tablet by mouth 3 (Three) Times a Day As Needed for Dysuria., Disp: 15 tablet, Rfl: 0    potassium chloride (KLOR-CON M20) 20 MEQ CR tablet, TAKE 2 TABLETS BY MOUTH EVERY DAY, Disp: 60 tablet, Rfl: 5    potassium chloride ER (K-TAB) 20 MEQ tablet controlled-release ER tablet, Take 2  tablets by mouth Daily., Disp: , Rfl:     pravastatin (PRAVACHOL) 40 MG tablet, TAKE 1 TABLET BY MOUTH AT BEDTIME, Disp: 90 tablet, Rfl: 1    Probiotic Product (PROBIOTIC DAILY PO), Take 1 tablet by mouth Daily., Disp: , Rfl:     sodium bicarbonate 650 MG tablet, Take 1 tablet by mouth 2 (Two) Times a Day., Disp: , Rfl:     tacrolimus (PROTOPIC) 0.1 % ointment, Apply 1 Application topically to the appropriate area as directed 2 (Two) Times a Day As Needed (dermatitis)., Disp: , Rfl:     vitamin D (ERGOCALCIFEROL) 1.25 MG (72496 UT) capsule capsule, Take 1 capsule by mouth 1 (One) Time Per Week. Thursdays, Disp: , Rfl:   No current facility-administered medications for this visit.    Facility-Administered Medications Ordered in Other Visits:     heparin injection 500 Units, 500 Units, IntravenousMARISOL Chua, Winston, MD, 500 Units at 07/01/21 1354    heparin injection 500 Units, 500 Units, Intravenous, Rivera DAMON Winston, MD, 500 Units at 01/11/22 1134    heparin injection 500 Units, 500 Units, Intravenous, Rivera DAMON Winston, MD, 500 Units at 09/28/22 1418    heparin injection 500 Units, 500 Units, IntravenousMARISOL Chua, Winston, MD, 500 Units at 01/25/23 1537    heparin injection 500 Units, 500 Units, IntravenousMARISOL Chua, Winston, MD, 500 Units at 06/26/23 1426    heparin injection 500 Units, 500 Units, Intravenous, Rivera DAMON Winston, MD, 500 Units at 03/05/24 1350    heparin injection 500 Units, 500 Units, Intravenous, Rivera DAMON Winston, MD, 500 Units at 10/02/24 1508    sodium chloride 0.9 % flush 10 mL, 10 mL, IntravenousMARISOL Chua, Winston, MD, 10 mL at 07/01/21 1354    sodium chloride 0.9 % flush 10 mL, 10 mL, IntravenousMARISOL Chua, Winston, MD, 10 mL at 01/11/22 1134    sodium chloride 0.9 % flush 10 mL, 10 mL, IntravenousMARISOL Chua, Winston, MD, 10 mL at 09/28/22 1418    sodium chloride 0.9 % flush 10 mL, 10 mL, Intravenous, PRN, Drake Stafford MD, 10 mL at 01/25/23 1537    sodium chloride 0.9 %  "flush 10 mL, 10 mL, Intravenous, Rivera DAMON Winston, MD, 10 mL at 06/26/23 1426    sodium chloride 0.9 % flush 10 mL, 10 mL, Intravenous, Rivera DAMON Winston, MD, 10 mL at 03/05/24 1350    sodium chloride 0.9 % flush 10 mL, 10 mL, Intravenous, HARDIKNRivera Winston, MD, 10 mL at 10/02/24 1508    Objective     Vital Signs   /70   Pulse 82   Ht 154.9 cm (61\")   Wt 69.9 kg (154 lb)   SpO2 97%   BMI 29.10 kg/m²      Physical Exam:  General appearance - alert, well appearing, and in no distress  Mental status - alert, oriented to person, place, and time  Eyes - pupils equal and reactive, extraocular eye movements intact  Neck - supple, no significant adenopathy  Chest - no tachypnea, retractions or cyanosis  Heart - normal rate and regular rhythm  Abdomen - soft, nontender, nondistended, no masses or organomegaly  Neurological - alert, oriented, normal speech, no focal findings or movement disorder noted  Musculoskeletal - no joint tenderness, deformity or swelling  Breast Exam: Bilateral breasts without obvious deformities. Previous incisions well healed. Bilateral nipples everted. Patient examined in the supine position with the ipsilateral arm above the head. No palpable masses bilaterally. No nipple discharge bilaterally. No palpable axillary nor supraclavicular adenopathy bilaterally.   Physical Exam    Results Review:     The following data was reviewed by: Quynh Rosario MD on 11/11/2024:    Mammo Diagnostic Digital Tomosynthesis Bilateral With CAD (10/31/2024 11:24)   Progress Notes by Drake Stafford MD (10/02/2024 14:15)   Results  Imaging  Mammogram shows no abnormalities.       Assessment & Plan       Diagnoses and all orders for this visit:    1. Personal history of breast cancer (Primary)  -     Mammo Diagnostic Digital Tomosynthesis Bilateral With CAD; Future    2. Encounter for screening mammogram for malignant neoplasm of breast  -     Mammo Diagnostic Digital Tomosynthesis Bilateral With CAD; " Future       Assessment & Plan  1. Personal History of Breast Cancer:  Dago Nunez is a 84 y.o. female s/p left lumpectomy with sentinel lymph node biopsy on 9/14/21 for ER+VT+Her2- invsaive ductal carcinoma with final pathology showing stage IA breast cancer. She declined post op radiation. She is on adjuvant letrozole and is tolerating it well. Mammogram reviewed - no concerns on imaging or exam. Mammogram ordered for next year. Follow up in one year.        Quynh Rosario MD  11/11/24  15:21 CST    Patient or patient representative verbalized consent for the use of Ambient Listening during the visit with  Quynh Rosario MD for chart documentation. 11/12/2024  15:25 CST

## 2024-11-12 NOTE — PATIENT INSTRUCTIONS

## 2024-11-13 RX ORDER — METAXALONE 800 MG/1
TABLET ORAL
Qty: 30 TABLET | Refills: 0 | Status: SHIPPED | OUTPATIENT
Start: 2024-11-13

## 2024-11-22 ENCOUNTER — TRANSCRIBE ORDERS (OUTPATIENT)
Dept: ADMINISTRATIVE | Facility: HOSPITAL | Age: 84
End: 2024-11-22
Payer: MEDICARE

## 2024-11-22 DIAGNOSIS — Z92.3 HISTORY OF RADIATION THERAPY: Primary | ICD-10-CM

## 2024-11-22 DIAGNOSIS — C68.0: ICD-10-CM

## 2024-11-22 DIAGNOSIS — R10.2 VULVAR PAIN: ICD-10-CM

## 2024-11-25 ENCOUNTER — TELEPHONE (OUTPATIENT)
Dept: VASCULAR SURGERY | Facility: CLINIC | Age: 84
End: 2024-11-25

## 2024-11-25 NOTE — TELEPHONE ENCOUNTER
Caller: JÚNIOR RICHARDS    Relationship: Emergency Contact    Best call back number: [phone]    What is the best time to reach you: ANY    Who are you requesting to speak with (clinical staff, provider,  specific staff member): CLINICAL        What was the call regarding: DAUGHTER CALLED IN REQUESTING A CLEARANCE FOR [employer] PROCEDURE VANESA HOOKER     FAX NUMBER FOR VANESA HOOKER [phone]    Is it okay if the provider responds through [facility]: NO

## 2024-11-27 ENCOUNTER — OFFICE VISIT (OUTPATIENT)
Dept: FAMILY MEDICINE CLINIC | Facility: CLINIC | Age: 84
End: 2024-11-27
Payer: MEDICARE

## 2024-11-27 VITALS
DIASTOLIC BLOOD PRESSURE: 70 MMHG | HEIGHT: 61 IN | HEART RATE: 69 BPM | OXYGEN SATURATION: 99 % | WEIGHT: 160 LBS | SYSTOLIC BLOOD PRESSURE: 130 MMHG | TEMPERATURE: 97.7 F | RESPIRATION RATE: 18 BRPM | BODY MASS INDEX: 30.21 KG/M2

## 2024-11-27 DIAGNOSIS — L03.115 CELLULITIS OF RIGHT LOWER EXTREMITY: Primary | ICD-10-CM

## 2024-11-27 DIAGNOSIS — R60.0 BILATERAL LEG EDEMA: ICD-10-CM

## 2024-11-27 RX ORDER — CLINDAMYCIN HYDROCHLORIDE 300 MG/1
300 CAPSULE ORAL 3 TIMES DAILY
Qty: 15 CAPSULE | Refills: 0 | Status: SHIPPED | OUTPATIENT
Start: 2024-11-27 | End: 2024-12-02

## 2024-11-27 RX ORDER — SACCHAROMYCES BOULARDII 250 MG
250 CAPSULE ORAL 2 TIMES DAILY
Qty: 5 CAPSULE | Refills: 0 | Status: SHIPPED | OUTPATIENT
Start: 2024-11-27

## 2024-11-27 RX ORDER — BUMETANIDE 1 MG/1
1 TABLET ORAL 2 TIMES DAILY
Qty: 60 TABLET | Refills: 1 | Status: SHIPPED | OUTPATIENT
Start: 2024-11-27

## 2024-11-27 NOTE — PROGRESS NOTES
Martita Fung,   [facility]   Family Medicine  [address]  [place] [address]  Phone: [phone]  Fax: [phone]         Chief Complaint:  Chief Complaint   Patient presents with    Leg Swelling     Pt states left and right leg swelling     Difficulty Urinating     Pt states having pain when urinating         History:  Dago Nunez is a 84 y.o. female who presents acutely worsening leg swelling and redness.  Patient reports leg was initially red approximately 2 weeks ago.  Evaluated, thought it was yeast and given Diflucan.  No significant improvement, with worsening swelling.  Overall feels bad, did have low-grade fever yesterday.    When discussing medications, patient is prescribed Bumex 1 mg twice daily, but she reports she has been taking Lasix 40 mg once daily.           Current Outpatient Medications   Medication Instructions    Acetaminophen (TYLENOL ARTHRITIS PAIN PO) 1 tablet, Daily PRN    albuterol sulfate  (90 Base) MCG/ACT inhaler 2 puffs, Every 4 Hours PRN    aspirin 81 mg, Daily    bisoprolol-hydrochlorothiazide (ZIAC) 5-6.25 MG per tablet 1 tablet, Oral, Daily    bumetanide (BUMEX) 1 mg, Oral, 2 Times Daily    cetirizine (ZYRTEC) 10 mg, Daily PRN    citalopram (CELEXA) 20 mg, Oral, Daily    clindamycin (CLEOCIN T) 1 % lotion 1 Application, Daily PRN    clindamycin (CLEOCIN) 300 mg, Oral, 3 Times Daily    cyanocobalamin 1,000 mcg, Every 30 Days    diphenoxylate-atropine (LOMOTIL) 2.5-0.025 MG per tablet 2 tablets, 4 Times Daily PRN    esomeprazole (NEXIUM) 40 mg, Oral, 2 Times Daily    Homeopathic Products (LEG CRAMPS) tablet 1 tablet, Daily    HYDROcodone-acetaminophen (NORCO)  MG per tablet 0.5 tablets, Oral, Every 4 Hours PRN    letrozole (FEMARA) 2.5 mg, Daily    levothyroxine (SYNTHROID, LEVOTHROID) 75 mcg, Oral, Daily    memantine (NAMENDA) 5 mg, Oral, 2 Times Daily    metaxalone (SKELAXIN) 800 MG tablet TAKE 1 TABLET BY MOUTH 3 TIMES DAILY  "AS NEEDED FOR MUSCLE SPASMS    metOLazone (ZAROXOLYN) 2.5 MG tablet TAKE 1 TABLET BY MOUTH EVERY DAY AS NEEDED FOR WEIGHT GAIN OF 3 LBS OR MORE IN 24 HOURS.    nystatin-triamcinolone (MYCOLOG II) 778168-7.1 UNIT/GM-% cream 1 Application, 2 Times Daily PRN    olmesartan (BENICAR) 20 mg, Oral, Daily    phenazopyridine (PYRIDIUM) 200 mg, Oral, 3 Times Daily PRN    potassium chloride (KLOR-CON M20) 20 MEQ CR tablet 40 mEq, Oral, Daily    potassium chloride ER (K-TAB) 20 MEQ tablet controlled-release ER tablet 40 mEq, Daily    pravastatin (PRAVACHOL) 40 mg, Oral, Every Night at Bedtime    Probiotic Product (PROBIOTIC DAILY PO) 1 tablet, Daily    saccharomyces boulardii (FLORASTOR) 250 mg, Oral, 2 Times Daily    sodium bicarbonate 650 mg, 2 Times Daily    tacrolimus (PROTOPIC) 0.1 % ointment 1 Application, 2 Times Daily PRN    vitamin D (ERGOCALCIFEROL) 50,000 Units, Weekly       OBJECTIVE:  /70   Pulse 69   Temp 97.7 °F (36.5 °C)   Resp 18   Ht 154.9 cm (61\")   Wt 72.6 kg (160 lb)   SpO2 99%   BMI 30.23 kg/m²      Gen: Well-developed, well-nourished female in no acute distress.  HEENT: PERRLA,  Nares patent without discharge.  Oral mucosa is moist and pink.  TMs clear bilaterally. Posterior pharynx clear without erythema.  CV: Regular rate and rhythm without murmurs.  Lungs: Normal work of breathing.  Clear to auscultation bilaterally, no wheeze.  GI: Soft, nontender, nondistended.  Ext: 4plus edema bilaterally along with pain with significant redness to the right lower extremity. No obvious wound.  Psych: Alert, oriented x 3, thought content appropriate, speech normal.  Pleasant and cooperative.        Procedures    Assessment/Plan:     Diagnoses and all orders for this visit:    1. Cellulitis of right lower extremity (Primary)  Bilteral lower extremity edema  Similar to previous. Multiple antibiotic allergies. Will start with clindamycin. Has required linezolid previously.  Significant pitting edema " bilaterally.  Has only been using Lasix once daily, although was transition to Bumex a couple months ago.  New prescription sent to the pharmacy.   -     clindamycin (Cleocin) 300 MG capsule; Take 1 capsule by mouth 3 (Three) Times a Day for 5 days.  Dispense: 15 capsule; Refill: 0  -     saccharomyces boulardii (Florastor) 250 MG capsule; Take 1 capsule by mouth 2 (Two) Times a Day.  Dispense: 5 capsule; Refill: 0  -     bumetanide (Bumex) 1 MG tablet; Take 1 tablet by mouth 2 (Two) Times a Day.  Dispense: 60 tablet; Refill: 1      An After Visit Summary was printed and given to the patient at discharge.  Return in about 1 week (around 12/4/2024).           Martita Fung DO  11/27/2024   Electronically signed.

## 2024-12-02 NOTE — PROGRESS NOTES
Martita Fung,   [facility]   Family Medicine  [address]  [place] [address]  Phone: [phone]  Fax: [phone]         Chief Complaint:  Chief Complaint   Patient presents with    Follow-up     1 week follow up         History:  Dago Nunez is a 84 y.o. female who presents for cellulitis follow up. Placed on clindamycin last visit. Had significant leg swelling as well and placed on bumex.  Feels like she has had significant improvement.  Redness has decreased, as well as swelling bilaterally.    HPI           reports that she has quit smoking. Her smoking use included cigarettes. She has a 0.8 pack-year smoking history. She has been exposed to tobacco smoke. She has never used smokeless tobacco. She reports that she does not currently use alcohol. She reports that she does not use drugs.    Current Outpatient Medications   Medication Instructions    Acetaminophen (TYLENOL ARTHRITIS PAIN PO) 1 tablet, Daily PRN    albuterol sulfate  (90 Base) MCG/ACT inhaler 2 puffs, Every 4 Hours PRN    aspirin 81 mg, Daily    bisoprolol-hydrochlorothiazide (ZIAC) 5-6.25 MG per tablet 1 tablet, Oral, Daily    bumetanide (BUMEX) 1 mg, Oral, 2 Times Daily    cetirizine (ZYRTEC) 10 mg, Daily PRN    citalopram (CELEXA) 20 mg, Oral, Daily    clindamycin (CLEOCIN T) 1 % lotion 1 Application, Daily PRN    clindamycin (CLEOCIN) 300 mg, Oral, 3 Times Daily    cyanocobalamin 1,000 mcg, Every 30 Days    diphenoxylate-atropine (LOMOTIL) 2.5-0.025 MG per tablet 2 tablets, 4 Times Daily PRN    esomeprazole (NEXIUM) 40 mg, Oral, 2 Times Daily    Homeopathic Products (LEG CRAMPS) tablet 1 tablet, Daily    HYDROcodone-acetaminophen (NORCO)  MG per tablet 0.5 tablets, Oral, Every 4 Hours PRN    letrozole (FEMARA) 2.5 mg, Daily    levothyroxine (SYNTHROID, LEVOTHROID) 75 mcg, Oral, Daily    memantine (NAMENDA) 5 mg, Oral, 2 Times Daily    metaxalone (SKELAXIN) 800 MG tablet TAKE 1 TABLET BY  "MOUTH 3 TIMES DAILY AS NEEDED FOR MUSCLE SPASMS    metOLazone (ZAROXOLYN) 2.5 MG tablet TAKE 1 TABLET BY MOUTH EVERY DAY AS NEEDED FOR WEIGHT GAIN OF 3 LBS OR MORE IN 24 HOURS.    nystatin-triamcinolone (MYCOLOG II) 900943-3.1 UNIT/GM-% cream 1 Application, 2 Times Daily PRN    olmesartan (BENICAR) 20 mg, Oral, Daily    phenazopyridine (PYRIDIUM) 200 mg, Oral, 3 Times Daily PRN    potassium chloride (KLOR-CON M20) 20 MEQ CR tablet 40 mEq, Oral, Daily    potassium chloride ER (K-TAB) 20 MEQ tablet controlled-release ER tablet 40 mEq, Daily    pravastatin (PRAVACHOL) 40 mg, Oral, Every Night at Bedtime    Probiotic Product (PROBIOTIC DAILY PO) 1 tablet, Daily    saccharomyces boulardii (FLORASTOR) 250 mg, Oral, 2 Times Daily    sodium bicarbonate 650 mg, 2 Times Daily    tacrolimus (PROTOPIC) 0.1 % ointment 1 Application, 2 Times Daily PRN    vitamin D (ERGOCALCIFEROL) 50,000 Units, Weekly       OBJECTIVE:  /70   Pulse 67   Temp 96.8 °F (36 °C)   Resp 18   Ht 154.9 cm (61\")   Wt 72.6 kg (160 lb)   SpO2 98%   BMI 30.23 kg/m²      Gen: No acute distress.   Head and neck: Normocephalic, atraumatic.  HEENT: PERRLA, EOMI.  CV: Well perfused, no pallor.   Resp: Normal respiratory effort. No distress.   Musculoskeletal: No gross deformity.   Neuro: Alert and oriented.   Skin: LLE: 2 plus edema, significant improvement, no signs of infection.  RLE: Swelling improved, now 3+ with pitting, slight redness overlying.  No wound, drainage.  Psych: Appropriate.       Procedures    Assessment/Plan:     Diagnoses and all orders for this visit:    1. Cellulitis of right lower extremity (Primary)  2. Bilateral leg edema  Improvement in symptoms with 1 week of antibiotics and an increase in her diuretics.  Will extend length of antibiotics due to continued redness for another week.  Continue current Bumex dosing at 1 mg twice daily.  Left lower extremity significantly improved from swelling aspect.  Mild improvement with " right lower extremity, with no worsening of redness.  Will plan short-term follow-up.  -     clindamycin (Cleocin) 300 MG capsule; Take 1 capsule by mouth 3 (Three) Times a Day for 7 days.  Dispense: 21 capsule; Refill: 0      An After Visit Summary was printed and given to the patient at discharge.  Return in about 9 days (around 12/13/2024).         Martita Fung DO  12/4/2024   Electronically signed.

## 2024-12-04 ENCOUNTER — OFFICE VISIT (OUTPATIENT)
Dept: FAMILY MEDICINE CLINIC | Facility: CLINIC | Age: 84
End: 2024-12-04
Payer: MEDICARE

## 2024-12-04 VITALS
BODY MASS INDEX: 30.21 KG/M2 | WEIGHT: 160 LBS | HEART RATE: 67 BPM | HEIGHT: 61 IN | OXYGEN SATURATION: 98 % | SYSTOLIC BLOOD PRESSURE: 132 MMHG | DIASTOLIC BLOOD PRESSURE: 70 MMHG | RESPIRATION RATE: 18 BRPM | TEMPERATURE: 96.8 F

## 2024-12-04 DIAGNOSIS — L03.115 CELLULITIS OF RIGHT LOWER EXTREMITY: Primary | ICD-10-CM

## 2024-12-04 DIAGNOSIS — R60.0 BILATERAL LEG EDEMA: ICD-10-CM

## 2024-12-04 PROCEDURE — 3078F DIAST BP <80 MM HG: CPT

## 2024-12-04 PROCEDURE — 1160F RVW MEDS BY RX/DR IN RCRD: CPT

## 2024-12-04 PROCEDURE — 1159F MED LIST DOCD IN RCRD: CPT

## 2024-12-04 PROCEDURE — 99213 OFFICE O/P EST LOW 20 MIN: CPT

## 2024-12-04 PROCEDURE — 3075F SYST BP GE 130 - 139MM HG: CPT

## 2024-12-04 PROCEDURE — 1125F AMNT PAIN NOTED PAIN PRSNT: CPT

## 2024-12-04 RX ORDER — CLINDAMYCIN HYDROCHLORIDE 300 MG/1
300 CAPSULE ORAL 3 TIMES DAILY
Qty: 21 CAPSULE | Refills: 0 | Status: SHIPPED | OUTPATIENT
Start: 2024-12-04 | End: 2024-12-11

## 2024-12-06 DIAGNOSIS — G89.29 CHRONIC MIDLINE LOW BACK PAIN WITHOUT SCIATICA: ICD-10-CM

## 2024-12-06 DIAGNOSIS — M54.50 CHRONIC MIDLINE LOW BACK PAIN WITHOUT SCIATICA: ICD-10-CM

## 2024-12-06 RX ORDER — METAXALONE 800 MG/1
TABLET ORAL
Qty: 30 TABLET | Refills: 0 | OUTPATIENT
Start: 2024-12-06

## 2024-12-13 ENCOUNTER — OFFICE VISIT (OUTPATIENT)
Dept: FAMILY MEDICINE CLINIC | Facility: CLINIC | Age: 84
End: 2024-12-13
Payer: MEDICARE

## 2024-12-13 VITALS
TEMPERATURE: 98.9 F | WEIGHT: 154.8 LBS | HEART RATE: 59 BPM | HEIGHT: 61 IN | OXYGEN SATURATION: 94 % | BODY MASS INDEX: 29.23 KG/M2

## 2024-12-13 DIAGNOSIS — L03.115 CELLULITIS OF RIGHT LOWER EXTREMITY: ICD-10-CM

## 2024-12-13 DIAGNOSIS — R60.0 BILATERAL LEG EDEMA: Primary | ICD-10-CM

## 2024-12-13 DIAGNOSIS — G89.29 CHRONIC MIDLINE LOW BACK PAIN WITHOUT SCIATICA: ICD-10-CM

## 2024-12-13 DIAGNOSIS — E11.42 TYPE 2 DIABETES MELLITUS WITH DIABETIC POLYNEUROPATHY, WITHOUT LONG-TERM CURRENT USE OF INSULIN: ICD-10-CM

## 2024-12-13 DIAGNOSIS — M54.50 CHRONIC MIDLINE LOW BACK PAIN WITHOUT SCIATICA: ICD-10-CM

## 2024-12-13 LAB
EXPIRATION DATE: NORMAL
HBA1C MFR BLD: 5.3 % (ref 4.5–5.7)
Lab: NORMAL

## 2024-12-13 PROCEDURE — 1160F RVW MEDS BY RX/DR IN RCRD: CPT | Performed by: FAMILY MEDICINE

## 2024-12-13 PROCEDURE — 83036 HEMOGLOBIN GLYCOSYLATED A1C: CPT | Performed by: FAMILY MEDICINE

## 2024-12-13 PROCEDURE — G2211 COMPLEX E/M VISIT ADD ON: HCPCS | Performed by: FAMILY MEDICINE

## 2024-12-13 PROCEDURE — 99214 OFFICE O/P EST MOD 30 MIN: CPT | Performed by: FAMILY MEDICINE

## 2024-12-13 PROCEDURE — 3044F HG A1C LEVEL LT 7.0%: CPT | Performed by: FAMILY MEDICINE

## 2024-12-13 PROCEDURE — 1126F AMNT PAIN NOTED NONE PRSNT: CPT | Performed by: FAMILY MEDICINE

## 2024-12-13 PROCEDURE — 1159F MED LIST DOCD IN RCRD: CPT | Performed by: FAMILY MEDICINE

## 2024-12-13 RX ORDER — METAXALONE 800 MG/1
TABLET ORAL
Qty: 30 TABLET | Refills: 0 | OUTPATIENT
Start: 2024-12-13

## 2024-12-13 RX ORDER — DIPHENOXYLATE HYDROCHLORIDE AND ATROPINE SULFATE 2.5; .025 MG/1; MG/1
TABLET ORAL
Qty: 90 TABLET | Refills: 2 | OUTPATIENT
Start: 2024-12-13

## 2024-12-17 NOTE — PROGRESS NOTES
"Chief Complaint  Edema (Patient presents to clinic for follow up on bilat LE edema, R>L)    Subjective        Syndal Waqar Nunez presents to [facility]  History of Present Illness  Seen by Dr. Fung on 12/6/24 for cellulitis RLE and b/l LE edema  Cellulitis resolved  Swelling improved  A1c stable at 5.3    Objective   Vital Signs:  Pulse 59   Temp 98.9 °F (37.2 °C) (Temporal)   Ht 154.9 cm (61\")   Wt 70.2 kg (154 lb 12.8 oz)   SpO2 94%   BMI 29.25 kg/m²   Estimated body mass index is 29.25 kg/m² as calculated from the following:    Height as of this encounter: 154.9 cm (61\").    Weight as of this encounter: 70.2 kg (154 lb 12.8 oz).            Physical Exam  Vitals and nursing note reviewed.   Constitutional:       General: She is not in acute distress.     Appearance: She is not diaphoretic.   HENT:      Head: Normocephalic and atraumatic.      Nose: Nose normal.   Eyes:      General: No scleral icterus.        Right eye: No discharge.         Left eye: No discharge.      Conjunctiva/sclera: Conjunctivae normal.   Neck:      Trachea: No tracheal deviation.   Pulmonary:      Effort: Pulmonary effort is normal.   Musculoskeletal:      Comments: Swelling of lower extremities R > L similar to pt baseline  No tenderness to palpation, no warmth appreciated   Skin:     General: Skin is warm and dry.      Coloration: Skin is not pale.   Neurological:      Mental Status: She is alert and oriented to person, place, and time.   Psychiatric:         Behavior: Behavior normal.         Thought Content: Thought content normal.         Judgment: Judgment normal.        Result Review :                Assessment and Plan   Diagnoses and all orders for this visit:    1. Bilateral leg edema (Primary)    2. Type 2 diabetes mellitus with diabetic polyneuropathy, without long-term current use of insulin  -     ORDER: POC Glycosylated Hemoglobin (Hb A1C)    3. Cellulitis of right lower " extremity    Continue bumex  Consider topical clindamycin if pain ensues  F/u PRN  Stable DM, continue diet control

## 2024-12-31 DIAGNOSIS — G89.29 CHRONIC MIDLINE LOW BACK PAIN WITHOUT SCIATICA: ICD-10-CM

## 2024-12-31 DIAGNOSIS — M54.50 CHRONIC MIDLINE LOW BACK PAIN WITHOUT SCIATICA: ICD-10-CM

## 2024-12-31 RX ORDER — METAXALONE 800 MG/1
TABLET ORAL
Qty: 30 TABLET | Refills: 0 | Status: SHIPPED | OUTPATIENT
Start: 2024-12-31

## 2025-01-08 ENCOUNTER — HOSPITAL ENCOUNTER (OUTPATIENT)
Dept: MRI IMAGING | Facility: HOSPITAL | Age: 85
Discharge: HOME | End: 2025-01-08
Admitting: NURSE PRACTITIONER
Payer: MEDICARE

## 2025-01-08 DIAGNOSIS — R10.2 VULVAR PAIN: ICD-10-CM

## 2025-01-08 DIAGNOSIS — C68.0: ICD-10-CM

## 2025-01-08 DIAGNOSIS — Z92.3 HISTORY OF RADIATION THERAPY: ICD-10-CM

## 2025-01-08 PROCEDURE — 72195 MRI PELVIS W/O DYE: CPT

## 2025-01-08 RX ORDER — CITALOPRAM HYDROBROMIDE 20 MG/1
20 TABLET ORAL DAILY
Qty: 30 TABLET | Refills: 1 | Status: SHIPPED | OUTPATIENT
Start: 2025-01-08

## 2025-01-09 NOTE — PROGRESS NOTES
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  She is on adjuvant letrozole for the past 39.5 months.  Patient had declined adjuvant radiation.  She is also seen for macrocytic anemia secondary to chronic kidney disease Stage IV, GFR 24.2 mL/minute on 6/14/2024, iron deficiency, and thrombocytopenia. She had PRBC 6/14/202.  She is intolerant to oral iron (nausea, stomach cramps, and constipation).  Patient given ferric carboxymaltose 6.5 months ago. She is also seen for vulvar cancer. She is seen 51.5 months post C1D1 Mitomycin C and 5FU with radiation. Her D29 to 32 chemo was cancelled due to hospitalization, poor tolerance, and poor performance status.  The patient is seen with Joseph, spouse. History is obtained from spouse.  History is reliable.       Pertinent history.  Squamous cell cancer in situ, urethra.  Followed by Dr. Callahan.    Oncology/Hematology History Overview Note   DIAGNOSTIC ABNORMALITIES: Breast cancer.  Screening mammogram 08/18/2021.New dense 7 mm partially obscured nodule in the upper outer quadrant of the left breast, with associated architectural distortion.  Diagnostic mammogram 08/20/2021.  Small subcentimeter suspicious spiculated mass at 12:00 in the left breast, approximately 3 cm to 4 cm deep from the nipple. This is suspicious for breast carcinoma, ultrasound-guided biopsy is recommended.  Sonogram 08/20/2021.  Small suspicious solid mass at 12:00 in the left breast. 5.5 x 5.1 x 4.7 mm, suspicious for breast carcinoma. This corresponds with the finding on  mammograms.  Successful ultrasound-guided left breast biopsy and clip on 2021.  Pathology report 2021.  Left breast at 12:00, core needle biopsies: Invasive carcinoma no special type (ductal).  Histologic grade (Abdullahi histologic score).   Glandular (acinar)/tubular differentiation: Score 1.   Nuclear pleomorphism: Score 2.   Mitotic rate: Score 1.  Overall grade: Grade 1. Maximum tumor diameter is at least 0.8 cm.  Estrogen 87%, progesterone 47% and negative HER-2/gabriela.  Genetic testing was sent.  Patient was seen by Dr. Quynh Rosario 2021.  She is .  She had first delivery at 20.  She took birth control for 1 month.  She took hormone replacement therapy for approximately 5 years.  Family history positive for breast cancer, sister at 78. No ovarian cancer  Chest x-ray 2021.  No acute cardiopulmonary process.  Pathology report 2021.  Left sentinel lymph nodes: Four of 4 lymph nodes are negative for metastatic mammary carcinoma.Comment: Absence of micrometastases is confirmed utilizing immunohistochemical stains for pankeratin.  Left breast, partial mastectomy:  A.  Invasive carcinoma of no special type (ductal), grade 1 (1.1 cm in greatest dimension).  B.  Minor associated low-grade ductal carcinoma in situ component.  C.  The inked surgical margins and skin are negative for malignancy.  D.  Prior biopsy site changes including fat necrosis.  Comment: Microscopically, the tumor is approximately 7 mm from the closest inked (superior) surgical margin.  AJCC stage: pT1c snpN0.      PREVIOUS INTERVENTIONS:  She had undergone left partial mastectomy with left sentinel lymph node biopsy 2021 by Dr. Rosario.  Declined adjuvant radiation.  Adjuvant Femara 10/01/2021 through present.        DIAGNOSTIC ABNORMALITIES: Vulvar cancer  She had developed recurrent vulvar cancer left inguinal node that is PET positive measuring 2.1 cm.  The patient was seen by Dr. Marks in favor  definitive chemo radiation.  Pathology report 07/10/2020 periurethral biopsy, poorly differentiated carcinoma with squamous and papillary features, focally invasive in the subepithelial connective tissue.  PET 07/31/2020 showed intense FDG avid left inguinal node is highly suspicious for local regional metastasis from known vulvar squamous cell carcinoma.  No additional sites of suspicious FDG activity.  MRI 07/31/2020 showed redemonstration of mildly T2 hyperintense mass involving the urethral meatus, similar dimensions to prior exam on 02/18/2020 however there is now a polypoid lesion seen along the anterior aspect of the perineum, possibly contiguous with the urethral mass, concerning for disease progression.  Market interval enlargement of the left inguinal node almost certainly representing disease involvement.  Patient was notified by Dr. Siddiqui 08/19/2020.  Consensus for chemoradiation.  Patient reluctant to take chemotherapy.  Follow-up with Dr. Siddiqui in 4 to 6 weeks after radiation is completed.         PREVIOUS INTERVENTIONS:  Mitomycin C and 5-FU 10/05/2020 through 10/08/2020 with radiation completed 12/10/2020 at [facility].  D29 to d32 of chemo discontinued due to poor performance status.       DIAGNOSTIC ABNORMALITIES:   The patient was seen by Dr. Greg Alberts 01/29/2018 complaining of coughing and wheezing. The patient was given Tussionex. Blood work was ordered. CBC 01/29/2018 revealed a WBC of 7.8, hemoglobin 11.2, hematocrit 35.1, MCV 96.2, platelets 43,000, and ANC 4.47. CMP remarkable for of glucose of 177 and GFR 59 mL/minute.  The patient was seen by VINCENT Jones, 02/02/2018. She was coughing and wheezing. Tussionex did not help. The patient was given prednisone.  The patient was seen by VINCENT Jones, 02/15/2018. She is followed for anemia from iron deficiency. CBC 02/15/2018 revealed a WBC of 12.9, hemoglobin 11.4, hematocrit 34.5, MCV 95, and  "platelets 68,000.       PREVIOUS INTERVENTIONS:   Ferrous sulfate 325 mg 03/07/2018 through 05/06/2018.  Not resumed 06/08/2018. \"I misunderstood.\"   Resume 09/05/2018 through 10/03/2018, stopped due to intolerance.  Injectafer 750 mg 10/20/2018 at the [place] office.  Retacrit 10/27/2020 through present.  1 unit packed RBC 6/14/2024.  Injectafer 750 mg 5/18/2021, 1/26/2022, 5/25/2022 and 6/21/2024 at [facility]           Vulvar cancer, carcinoma    Initial Diagnosis    Vulvar cancer, carcinoma (CMS/HCC)     3/19/2001 Biopsy    Clinical Features: red vaginal lesion with bleeding since 1995. TX with  Carafate douche and Premarin vaginal cream with intermittent improvement.    DIAGNOSIS:    VAGINA, BIOPSY: FOCAL ULCERATION, MARKED ACUTE AND CHRONIC INFLAMMATION,  SPONGIOSIS AND REACTIVE ATYPIA; NEGATIVE FOR DYSPLASIA.     8/17/2001 Procedure    Pap Smear:  DIAGNOSIS:            Atypical keratinized squamous cells, suspicious                           for squamous cell carcinoma.         COMMENTS:             There are numerous atypical keratinized cells                           present in an inflammatory background.  These are                           suspicious for squamous cell carcinoma.  Biopsy                           confirmation is recommended.      10/16/2001 Procedure    Pap Smear:  DIAGNOSIS:            Mild dysplasia       ADDITIONAL FINDINGS:  Marked inflammation                           Excessive hyperkeratosis         BETHESDA SYSTEM:      Low grade squamous intraepithelial lesion    DIAGNOSIS:            Negative, no abnormal cells seen           BETHESDA SYSTEM:      Within normal limits.       ADEQUACY:             Specimen satisfactory for evaluation       SOURCE:               Vagina, diagnostic thin prep PAP     11/7/2001 Biopsy      DIAGNOSIS:    VAGINA AND VULVA, RIGHT POSTERIOR INTROITUS, WIDE LOCAL EXCISION:  VAGINAL INTRAEPITHELIAL NEOPLASIA (VAIN) II-III, FOCALLY EXTENDING " TO  VAGINAL MARGIN AT 12-4:00; ALL OTHER MARGINS NEGATIVE FOR  INTRAEPITHELIAL NEOPLASIA. (SEE COMMENT)    COMMENT: The lesion involves vaginal type epithelium with associated  chronic inflammation and erosion. The adjacent vulvar epithelium is  hyperkeratotic.     3/15/2002 Procedure    Pap Smear:  BETHESDA SYSTEM:      High grade squamous intraepithelial lesion       DESCRIPTOR:           Moderate dysplasia           ADEQUACY:             Specimen satisfactory for evaluation       SOURCE:               Vagina, Thin Prep Pap, Diagnostic     6/13/2003 Procedure         BETHESDA SYSTEM:      High grade squamous intraepithelial lesion       DESCRIPTOR:           Moderate dysplasia       ADDITIONAL FINDINGS:  Inflammation         ADEQUACY:             Specimen satisfactory for evaluation       SOURCE:               Vagina, Thin Prep Pap, Diagnostic    HUMAN PAPILLOMAVIRUS         CASE ACCESSION #:    [id number]        Category                  HPV Types                Patient Results         High/Intermed Risk    16,18,31,33,35,39              Negative                            45,51,52,56,58,59,68      Specimen Source        Cervical / Vaginal Specimen     12/5/2003 Procedure    Gynecological Cytology        CASE ACCESSION #:     [id number]       BETHESDA SYSTEM:      Low grade squamous intraepithelial lesion       DESCRIPTOR:           Mild dysplasia           ADEQUACY:             Specimen satisfactory for evaluation       SOURCE:               Vagina, Thin Prep Pap, Diagnostic     11/29/2011 Biopsy    ADDENDUM FINDINGS:    The high grade MOODY in the right labia majora biopsy was of the usual type.  There was no evidence of differentiated MOODY.      DIAGNOSIS:    1) PERINEUM, BIOPSY: SQUAMOUS EPITHELIUM WITH FLORID HYPERKERATOSIS,  CONSISTENT WITH CHRONIC IRRITATION; NO EVIDENCE OF DYSPLASIA OR MALIGNANCY  (SEE COMMENT).    Comment: Immunohistochemical studies (p16, p53, MIB-1) confirm the  rendered  interpretations.    2) VULVA, RIGHT LABIUM MAJORUM, BIOPSY: VULVAR INTRAEPITHELIAL NEOPLASIA II  (MODERATE DYSPLASIA); (SEE COMMENT).    Comment: Immunohistochemical studies for p16 (nuclear and cytoplasmic  positivity in lower epithelial half) and MIB-1 (nuclear positivity in lower  epithelial half) confirms the diagnosis; p53 is positive only in rare  cells. A GMS histochemical study for fungal forms is negative.  Nevertheless, parakeratosis associated with neutrophils, as was seen  herein, is commonly associated with fungal vulvitis.     7/3/2012 Procedure    Gynecological Cytology    CASE ACCESSION #:                     [id number]  BETHESDA SYSTEM:                      High grade squamous intraepithelial                                        lesion  DESCRIPTOR:                           Mild to moderate dysplasia  ADEQUACY:                             Specimen satisfactory for evaluation  SOURCE:                               Vagina, Thin Prep Pap, Diagnostic  # SLIDES REVIEWED:                    1     1/29/2013 Biopsy    DIAGNOSIS:    1) VULVA, RIGHT, ANTERIOR, BIOPSY:  SPONGIOTIC DERMATITIS WITH EXTENSIVE  DERMAL GRANULATION TISSUE, MIXED INFLAMMATION AND FIBROSIS (SEE COMENT).    Comment: Sections show spongiosis, basement membrane thickening, dermal  fibrosis, and extensive dermal granulation tissue with a predominantly  chronic inflammatory infiltrate. There is no evidence of dysplasia or  malignancy. The basement membrane thickening and dermal fibrosis suggests  that there may be component of lichen sclerosus. However, the underlying  cause of the ongoing inflammation and repair (granulation tissue) is not  clear from this sample. A tissue gram stain fails to reveal any large  bacterial aggregates.  GMS and AFB studies for fungal forms and acid fast  bacilli were negative respectively     11/27/2013 Surgery      Diagnosis    1) VULVA, LEFT ANTERIOR, BIOPSY: HIGH GRADE SQUAMOUS INTRAEPITHELIAL  LESION (SEVERE DYSPLASIA, MOODY III).    2) VAGINAL WALL, ANTERIOR, BIOPSY: HIGH GRADE SQUAMOUS INTRAEPITHELIAL LESION (SEVERE DYSPLASIA, VaIN III).    3) VULVA, VULVECTOMY: FOCUS OF MICROINVASIVE SQUAMOUS CELL CARCINOMA, WELL-DIFFERENTIATED, DEPTH OF STROMAL INVASION 0.4 MM,  8 MM FROM CLOSEST DEEP MARGINS, 4 MM FROM RIGHT ANTERIOR VAGINAL MARGINS AT 8 - 9 O'CLOCK (PROXIMAL TIP OF SPECIMEN DESIGNATED   12 O'CLOCK); NEGATIVE FOR LYMPHOVASCULAR INVASION; ARISING IN A BACKGROUND OF EXTENSIVE VULVAR INTRAEPITHELIAL NEOPLASIA (SEVERE DYSPLASIA, MOODY III, CLASSICAL AND DIFFERENTIATED TYPES); MOODY III IS PRESENT AT RIGHT LATERAL SURGICAL MARGIN (7 - 9 O'CLOCK),   LEFT LATERAL SURGICAL MARGIN (3 - 5 O'CLOCK) AND RIGHT AND LEFT VAGINAL MARGINS; TOTAL LESIONAL AREA (INVASIVE CARCINOMA AND MOODY III) MEASURES 8.2 CM IN GREATEST DIMENSION (GROSS MEASUREMENT); BACKGROUND SEVERE INTERFACE DERMATITIS AND LICHEN SCLEROSUS.        **Electronically signed out by Dimas Cardenas M.D.**on 12/2/2013  HAYLEY/YAZMIN/franky  Case reviewed by Attending Pathologist      Synoptic Diagnosis  3)  VULVA:     Specimen:                        Vulva  Procedure:                       Other: Vulvectomy  Lymph Node Sampling:             Not applicable  Specimen Size:                   Greatest dimension: 13.5 cm                                   Additional dimension: 9.5 cm                                   Additional dimension: 1.2 cm  Tumor Site:                      Right vulva, labium minus  Tumor Size:                      Greatest dimension: 0.04 cm  Tumor Focality:                  Unifocal  Histologic Type:                 Squamous cell carcinoma  Histologic Grade:                G1: Well differentiated  Microscopic Tumor Extension:     Depth of invasion: 0.4 mm  Margins:                         Uninvolved by invasive carcinoma                                   Distance of invasive carcinoma from closest margin: 4 mm  Lymph-Vascular Invasion:          Not identified  Lymph Nodes:                     No nodes submitted or found  Extranodal Extension:            Cannot be determined (explain):    Fixed or Ulcerated Femoral-Inguinal Lymph Nodes:                                    Not identified  Laterality of Involved Lymph Nodes:                                    Cannot be determined:    Primary Tumor (pT):              pT1a [FIGO IA]: Lesions 2 cm or less in size, confined to the vulva or perineum, and with stromal invasion 1.0 mm or less  Regional Lymph Nodes (pN):       pNX:  Regional lymph nodes cannot be assessed  Distant Metastasis (pM):         Not applicable  Additional Pathologic Findings:  Vulvar intraepithelial neoplasia (MOODY) 3 (severe dysplasia/carcinoma in situ)  --------------------------------------------------------     5/20/2014 Procedure    Gynecologic Cytology  [place] Diagnosis:  High grade squamous intraepithelial lesion.    Descriptor/Additional Findings:  Moderate dysplasia.    Adequacy:  Specimen satisfactory for evaluation.    Source:  Vagina, Thin Prep Pap, Imaged Diagnostic     11/11/2014 Biopsy    Diagnosis    ANTERIOR VAGINAL WALL, BIOPSY:  HIGH GRADE SQUAMOUS INTRAEPITHELIAL LESION (VaIN 3, SEVERE DYSPLASIA)       12/19/2014 Surgery    Diagnosis  1)  ANTERIOR VAGINAL WALL, PARTIAL VAGINECTOMY: HIGH-GRADE SQUAMOUS INTRAEPITHELIAL LESION (VaIN 3, SEVERE DYSPLASIA), 2.7 CM; HIGH GRADE DYSPLASIA EXTENDS TO THE 2 AND 8 O'CLOCK TIP MARGINS, THE 5-8 O'CLOCK LATERAL MARGIN, AND THE 11-2 O'CLOCK LATERAL   MARGIN.          2)  JOYA-CLITORAL AREA, EXCISION: HIGH-GRADE SQUAMOUS INTRAEPITHELIAL LESION (VaIN 3, SEVERE DYSPLASIA), EXTENDING TO A LATERAL MARGIN.         2/9/2015 Surgery    Diagnosis  1.          VULVA, LEFT PERIURETHRAL PORTION, EXCISION: FOCAL HIGH-GRADE SQUAMOUS INTRAEPITHELIAL LESION (MOODY 2, MODERATE DYSPLASIA); MARGINS ARE NEGATIVE FOR DYSPLASIA.    2.          VULVA, RIGHT PERIURETHRAL PORTION, EXCISION: BENIGN SQUAMOUS MUCOSA  "WITH ACUTE AND CHRONIC INFLAMMATION AND REACTIVE CHANGES.         3.          VULVA, LEFT, LOWER PORTION, EXCISION: BENIGN SQUAMOUS MUCOSA WITH ACUTE AND CHRONIC INFLAMMATION, REACTIVE CHANGES AND FEATURES CONSISTENT WITH PREVIOUS SURGICAL PROCEDURE.         4.          VULVA, RIGHT PORTION, EXCISION: BENIGN SQUAMOUS MUCOSA WITH CHRONIC INFLAMMATION AND DERMAL FIBROSIS.        9/6/2017 Procedure    Diagnosis  \"PAP SMEAR FROM THE URETHRA\":  HIGH GRADE SQUAMOUS INTRAEPITHELIAL LESION.          10/26/2017 Biopsy    Urethral Meatus Biopsy:  Urothelial mucosa with ulceration, chronic inflammation and focal high grade squamous intraepithelial lesion, moderate dysplasia     7/10/2018 Imaging    MRI Pelvis:  Impression:    1.  There is a 2.3 x 1.8 cm urethral lesion at the external urethral   meatus demonstrating enhancement and T2 hyperintensity. The lesion is   located approximately 8 mm from the bladder neck/internal urethral   orifice.  There is no extension of the lesion into the para vaginal   fat or ischiorectal fossa. There is some edema of the bilateral   ischiocavernosus muscles without invasion by the mass. No pelvic or   inguinal adenopathy is identified.     2.  The patient has a 2.5 cm cystocele and the urethra is almost   horizontal. There is pelvic floor laxity with loss of upper convexity   of the left iliococcygeus muscle.     7/20/2018 Biopsy    Diagnosis  URETHRA, BIOPSY:  SQUAMOUS CELL CARCINOMA IN SITU; SEE COMMENT.    Comments  P16 immunostain and HPV RNA in situ hybridization are strongly and diffusely positive within the lesion, consistent with HPV-related pathogenesis.     1/8/2019 Imaging    MRI Pelvis:  1.  Stable size and appearance of a nonspecific enhancing nodular   lesion at the caudal margin of the vaginal introitus adjacent to   extensive postsurgical changes related to prior vulvectomy and   vaginectomy. This lesion does not appear to conform to the expected   course of the urethra which " is somewhat poorly defined, and this felt   more likely be located immediately caudal to the urethra. It is   possible this may reflect postsurgical scarring as opposed to a true   lesion. Continued follow-up is suggested to ensure stability.  2.  No lymphadenopathy or other evidence of metastatic disease in the   pelvis.  3.  Evidence of pelvic floor laxity with cystocele, not significantly   changed.     8/6/2019 Imaging    MRI Pelvis:  1. No significant change from the prior exam on this limited   noncontrast study.  2. Stable indeterminate nodule anterior to the external urethral   meatus which may represent focal scarring; however,   residual/recurrent disease is not entirely excluded.  Recommend continued attention on follow-up. 3. Extensive pelvic   postsurgical changes with no evidence of local recurrence.  4. Pelvic floor laxity with cystocele unchanged from prior exam.     1/13/2020 Procedure    Urethral stricture dilation     2/18/2020 Imaging    MRI Pelvis:  Impression:  1.  No significant interval change in 1.7 x 1.7 cm T2 hyperintense   ovoid lesion at the urethral meatus.  2.  Numerous postoperative findings status post vaginectomy and   hysterectomy.  3.  Pelvic floor laxity with persistent cystocele.  4.  No pelvic adenopathy or bony lesion identified.     5/13/2020 Procedure    Urethral stricture dilation      7/10/2020 Biopsy    Diagnosis  PERIURETHRA, BIOPSY: POORLY DIFFERENTIATED CARCINOMA WITH SQUAMOUS AND PAPILLARY FEATURES, FOCALLY INVASIVE IN THE SUBEPITHELIAL CONNECTIVE TISSUE; SEE COMMENT.    Comments  The patient's history of vulvar microinvasive squamous cell carcinoma is noted. The current biopsy shows a poorly differentiated carcinoma with papillary formation. P16 immunostain and HPV RNA in situ hybridization are strongly and diffusely positive within the lesion, consistent with HPV-related pathogenesis. A KERRI-3 immunostain shows patchy, weak positivity in the lesional cells. The  patient's prior biopsy ([id number]) is reviewed and shows similar morphologic and immunophenotypic features but the papillary features were not present in the prior material.  Dr. Ilene Pablo reviewed this case and concurs with the diagnosis.      7/31/2020 Imaging    MRI Pelvis:  1.  Redemonstration of a mildly T2 hyperintense mass involving the   urethral meatus, similar dimensions to prior exam on 2/18/2020,   however there is now a polypoid lesion seen along the anterior aspect   of the perineum, possibly contiguous with the urethral mass,   concerning for disease progression. This could potentially represent   the recently biopsied lesion.  2.  Marked interval enlargement of a left inguinal lymph node, almost   certainly representing disease involvement. This would be amenable to   ultrasound-guided biopsy if warranted.  3.  Findings related to pelvic floor laxity, with cystocele.    PET/CT:  1.  Intensely FDG avid left inguinal lymph node is highly suspicious   for locoregional metastasis from known vulvar squamous cell   carcinoma. There are no additional sites of suspicious FDG activity.  2.   Intense physiologic FDG activity within the urine obscures   visualization of the periurethral mass. Findings are better   characterized on same day pelvic MRI.     9/21/2020 - 12/10/2020 Radiation    Radiation OncologyTreatment Course:  Dago Nunez received 6940 cGy in 38 fractions to vulva via external beam radiation therapy.     10/5/2020 - 10/12/2020 Chemotherapy    OP Vulvar MitoMYcin / Fluorouracil CIV + XRT       10/9/2020 - 9/7/2021 Chemotherapy    OP CENTRAL VENOUS ACCESS DEVICE ACCESS, CARE, AND MAINTENANCE (CVAD)     10/19/2020 Imaging    CT Head:  Impression:    1. No acute intracranial process.     10/21/2021 -  Chemotherapy    OP CENTRAL VENOUS ACCESS DEVICE ACCESS, CARE, AND MAINTENANCE ([facility]AD)     Secondary malignancy of inguinal lymph nodes   8/31/2020 Initial Diagnosis    Secondary malignancy  of inguinal lymph nodes (CMS/HCC)     10/9/2020 - 9/7/2021 Chemotherapy    OP CENTRAL VENOUS ACCESS DEVICE ACCESS, CARE, AND MAINTENANCE ([facility])     10/21/2021 -  Chemotherapy    OP CENTRAL VENOUS ACCESS DEVICE ACCESS, CARE, AND MAINTENANCE (CVAD)     Malignant neoplasm of upper-outer quadrant of left breast in female, estrogen receptor positive   9/14/2021 Initial Diagnosis    Malignant neoplasm of upper-outer quadrant of left breast in female, estrogen receptor positive         PAST MEDICAL HISTORY:  ALLERGIES:  Allergies   Allergen Reactions    Scopolamine Swelling     Other reaction(s): ANGIOEDEMA        Amoxicillin-Pot Clavulanate Rash    Keflex [Cephalexin] Rash    Septra [Sulfamethoxazole-Trimethoprim] Rash    Tequin [Gatifloxacin] Other (See Comments)     Doesn't remember    Trovan [Alatrofloxacin] Dizziness     CURRENT MEDICATIONS:  Outpatient Encounter Medications as of 1/15/2025   Medication Sig Dispense Refill    Acetaminophen (TYLENOL ARTHRITIS PAIN PO) Take 1 tablet by mouth Daily As Needed (BACK PAIN).      albuterol sulfate  (90 Base) MCG/ACT inhaler Inhale 2 puffs Every 4 (Four) Hours As Needed for Shortness of Air.      aspirin 81 MG EC tablet Take 1 tablet by mouth Daily.      bisoprolol-hydrochlorothiazide (ZIAC) 5-6.25 MG per tablet Take 1 tablet by mouth Daily. 90 tablet 1    bumetanide (Bumex) 1 MG tablet Take 1 tablet by mouth 2 (Two) Times a Day. 60 tablet 1    cetirizine (zyrTEC) 10 MG tablet Take 1 tablet by mouth Daily As Needed for Allergies.      citalopram (CeleXA) 20 MG tablet TAKE 1 TABLET BY MOUTH 1 TIME DAILY 30 tablet 1    clindamycin (CLEOCIN T) 1 % lotion Apply 1 Application topically to the appropriate area as directed Daily As Needed (Rash On Legs).      cyanocobalamin 1000 MCG/ML injection Inject 1 mL into the appropriate muscle as directed by prescriber Every 30 (Thirty) Days.      diphenoxylate-atropine (LOMOTIL) 2.5-0.025 MG per tablet Take 2 tablets by mouth 4  (Four) Times a Day As Needed for Diarrhea.      esomeprazole (nexIUM) 40 MG capsule TAKE 1 CAPSULE TWICE DAILY 180 capsule 3    Homeopathic Products (LEG CRAMPS) tablet Take 1 tablet by mouth Daily.      HYDROcodone-acetaminophen (NORCO)  MG per tablet Take 0.5 tablets by mouth Every 4 (Four) Hours As Needed for Moderate Pain or Severe Pain. 60 tablet 0    letrozole (FEMARA) 2.5 MG tablet Take 1 tablet by mouth Daily.      levothyroxine (SYNTHROID, LEVOTHROID) 75 MCG tablet TAKE 1 TABLET BY MOUTH EVERY DAY 90 tablet 1    memantine (Namenda) 5 MG tablet Take 1 tablet by mouth 2 (Two) Times a Day. 60 tablet 2    metaxalone (SKELAXIN) 800 MG tablet TAKE 1 TABLET BY MOUTH 3 TIMES DAILY AS NEEDED FOR MUSCLE SPASMS 30 tablet 0    metOLazone (ZAROXOLYN) 2.5 MG tablet TAKE 1 TABLET BY MOUTH EVERY DAY AS NEEDED FOR WEIGHT GAIN OF 3 LBS OR MORE IN 24 HOURS. 30 tablet 5    nystatin-triamcinolone (MYCOLOG II) 201928-7.1 UNIT/GM-% cream Apply 1 Application topically to the appropriate area as directed 2 (Two) Times a Day As Needed (rash under breast).      olmesartan (BENICAR) 20 MG tablet TAKE 1 TABLET BY MOUTH 1 TIME DAILY 90 tablet 2    phenazopyridine (PYRIDIUM) 200 MG tablet Take 1 tablet by mouth 3 (Three) Times a Day As Needed for Dysuria. 15 tablet 0    potassium chloride (KLOR-CON M20) 20 MEQ CR tablet TAKE 2 TABLETS BY MOUTH EVERY DAY 60 tablet 5    potassium chloride ER (K-TAB) 20 MEQ tablet controlled-release ER tablet Take 2 tablets by mouth Daily.      pravastatin (PRAVACHOL) 40 MG tablet TAKE 1 TABLET BY MOUTH AT BEDTIME 90 tablet 1    Probiotic Product (PROBIOTIC DAILY PO) Take 1 tablet by mouth Daily.      saccharomyces boulardii (Florastor) 250 MG capsule Take 1 capsule by mouth 2 (Two) Times a Day. 5 capsule 0    sodium bicarbonate 650 MG tablet Take 1 tablet by mouth 2 (Two) Times a Day.      tacrolimus (PROTOPIC) 0.1 % ointment Apply 1 Application topically to the appropriate area as directed 2 (Two)  Times a Day As Needed (dermatitis).      vitamin D (ERGOCALCIFEROL) 1.25 MG (39946 UT) capsule capsule Take 1 capsule by mouth 1 (One) Time Per Week. Thursdays       Facility-Administered Encounter Medications as of 1/15/2025   Medication Dose Route Frequency Provider Last Rate Last Admin    heparin injection 500 Units  500 Units Intravenous Drake Helms MD   500 Units at 07/01/21 1354    heparin injection 500 Units  500 Units Intravenous Drake Helms MD   500 Units at 01/11/22 1134    heparin injection 500 Units  500 Units Intravenous PRN Drake Stafford MD   500 Units at 09/28/22 1418    heparin injection 500 Units  500 Units Intravenous PRN Drake Stafford MD   500 Units at 01/25/23 1537    heparin injection 500 Units  500 Units Intravenous PRN Drake Stafford MD   500 Units at 06/26/23 1426    heparin injection 500 Units  500 Units Intravenous Drake Helms MD   500 Units at 03/05/24 1350    heparin injection 500 Units  500 Units Intravenous PRDrake Amos MD   500 Units at 10/02/24 1508    sodium chloride 0.9 % flush 10 mL  10 mL Intravenous PRN Drake Stafford MD   10 mL at 07/01/21 1354    sodium chloride 0.9 % flush 10 mL  10 mL Intravenous PRN Drake Stafford MD   10 mL at 01/11/22 1134    sodium chloride 0.9 % flush 10 mL  10 mL Intravenous Drake Helms MD   10 mL at 09/28/22 1418    sodium chloride 0.9 % flush 10 mL  10 mL Intravenous Drake Helms MD   10 mL at 01/25/23 1537    sodium chloride 0.9 % flush 10 mL  10 mL Intravenous Drake Helms MD   10 mL at 06/26/23 1426    sodium chloride 0.9 % flush 10 mL  10 mL Intravenous Drake Helms MD   10 mL at 03/05/24 1350    sodium chloride 0.9 % flush 10 mL  10 mL Intravenous PRDrake Amos MD   10 mL at 10/02/24 1508     ADULT ILLNESSES:  Patient Active Problem List   Diagnosis Code    Meatal stenosis [id number]    Retention of urine R33.9    Type 2 diabetes mellitus, without long-term current use of insulin E11.9     Essential hypertension I10    Grief reaction F43.21    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    Chronic midline low back pain without sciatica M54.50, G89.29    Bilateral lower extremity edema R60.0    History of urethral stricture Z87.448    Epidermal cyst of neck L72.0    Normocytic anemia D64.9    Neck abscess L02.11    Mixed hyperlipidemia E78.2    Gastroesophageal reflux disease without esophagitis K21.9    Anxiety F41.9    Iron deficiency and chemotherapy induced anemia D50.9    Stage 3 chronic kidney disease N18.30    Anemia D64.9    Preop cardiovascular exam Z01.810    Lower extremity edema R60.0    Vulvar cancer, carcinoma C51.9    Former smoker Z87.891    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    Febrile illness R50.9    Chemotherapy-induced thrombocytopenia D69.59, T45.1X5A    Hyponatremia E87.1    Hypokalemia E87.6    Neutropenic fever D70.9, R50.81    Moderate malnutrition E44.0    Antineoplastic chemotherapy induced anemia D64.81, T45.1X5A    History of radiation therapy Z92.3    Encounter for care related to Port-a-Cath Z45.2    Malignant neoplasm of upper-outer quadrant of left breast in female, estrogen receptor positive C50.412, Z17.0    Encounter for care related to vascular access port Z45.2    Angiodysplasia K55.20    Bronchitis J40    Obesity (BMI 30-39.9) E66.9    Wheezing R06.2    Cough R05.9    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    Rectal bleeding K62.5    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 M81.8, T38.7X5A    CVA (cerebral vascular accident) I63.9    Transient ischemic attack (TIA) G45.9    Stenosis of right carotid artery I65.21    Carotid stenosis, right I65.21    Carotid stenosis I65.29     SURGERIES:  Past Surgical History:   Procedure Laterality Date    APPENDECTOMY      BENJAMIN PROCEDURE      No evidence of reflux disease while on Nexium 40mg daily-See report    BREAST BIOPSY      BREAST CYST ASPIRATION Left     BREAST  LUMPECTOMY      COLONOSCOPY  01/12/2011    Diverticulosis sigmoid colon; The examination was otherwise normal; Repeat 10 years    COLONOSCOPY  11/03/2003    Dr. Laguerre-Normal colonoscopy; Normal terminal ileum; Repeat 5 years    COLONOSCOPY N/A 11/05/2021    Petechia(e) in the rectum, in the recto-sigmoid colon and in the distal sigmoid colon; No specimens collected; No plans to repeat colonoscopy due to advance age and/or medical problems    CYSTOSCOPY N/A 09/20/2022    Procedure: CYSTOSCOPY WITH URETHRAL DILATATION;  Surgeon: Shaheen Conway MD;  Location: [facility] OR;  Service: Urology;  Laterality: N/A;    ENDOSCOPY  07/01/2014    Normal esophagus; Normal stomach; Normal examined duodenum; BRAVO pH capsule deployed;     ENDOSCOPY  08/14/2013    Mild gastritis-biopsies for H.Pylor obtained    ENDOSCOPY  02/16/2005    Dr. LaguerreIdtxr-Wpxcmdryl-rbstqnoq    ENDOSCOPY  10/21/2003    Dr. Laguerre-Stage 1 reflux esophagitis    ENDOSCOPY N/A 11/05/2021    Small HH; A single gastroesophageal junction polyp; Normal stomach; A single non-bleeding angiodysplastic lesion in the duodenum    HYSTERECTOMY      MASTECTOMY W/ SENTINEL NODE BIOPSY Left 09/14/2021    Procedure: LEFT PARTIAL MASTECTOMY WITH MAGSEED AND LEFT SENTINEL LYMPH NODE BIOPSY MAGTRACE;  Surgeon: Quynh Rosario MD;  Location: [facility] OR;  Service: General;  Laterality: Left;    TONSILLECTOMY      VAGINA SURGERY      Laser surgery X 2    VENOUS ACCESS DEVICE (PORT) INSERTION N/A 09/29/2020    Procedure: SINGLE LUMEN PORT - A- CATH PLACEMENT WITH FLUOROSCOPY;  Surgeon: Quynh Rosario MD;  Location: [facility] OR;  Service: General;  Laterality: N/A;     HEALTH MAINTENANCE ITEMS:  Health Maintenance Due   Topic Date Due    RSV Vaccine - Adults (1 - 1-dose 75+ series) Never done       <no information>  Last Completed Colonoscopy            COLORECTAL CANCER SCREENING (COLONOSCOPY - Every 10 Years) Tentatively due on 11/5/2031 11/05/2021  Surgical  Procedure: COLONOSCOPY    11/05/2021  COLONOSCOPY    01/29/2014  Outside Claim: ID FECAL BLOOD SCRN [id number]    02/01/2013  Outside Claim: CHG BLOOD,OCCULT,FECAL HGB,FECES,1-3 SIMULT    01/12/2011  SCANNED - COLONOSCOPY    Only the first 5 history entries have been loaded, but more history exists.                  Immunization History   Administered Date(s) Administered    COVID-19 (MODERNA) 1st,2nd,3rd Dose Monovalent 03/26/2021, 04/23/2021    COVID-19 (MODERNA) BIVALENT 12+YRS 01/03/2023    COVID-19 (MODERNA) Monovalent Original Booster 12/28/2021    FLUAD TRI 65YR+ 10/22/2019    Flu Vaccine Split Quad 10/12/2018    Fluad Quad 65+ 11/09/2020    Fluzone High-Dose 65+YRS 10/01/2018, 11/12/2021, 10/31/2024    Fluzone High-Dose 65+yrs 11/12/2021, 10/24/2022, 10/24/2023    Pneumococcal Conjugate 20-Valent (PCV20) 10/24/2022    Pneumococcal Polysaccharide (PPSV23) 10/16/2013    Pneumococcal, Unspecified 10/01/2017    Shingrix 06/15/2023, 05/03/2024    Tdap 05/01/2019    Zostavax 01/14/2010, 10/01/2015     Last Completed Mammogram            Discontinued - MAMMOGRAM  Ordered on 11/11/2024        Frequency changed to Never automatically (Topic No Longer Applies)    10/31/2024  Mammo Diagnostic Digital Tomosynthesis Bilateral With CAD    10/30/2023  Mammo Diagnostic Digital Tomosynthesis Bilateral With CAD    10/10/2022  Mammo Diagnostic Digital Tomosynthesis Bilateral With CAD    03/07/2022  Mammo Diagnostic Digital Tomosynthesis Left With CAD    Only the first 5 history entries have been loaded, but more history exists.                      FAMILY HISTORY:  Family History   Problem Relation Age of Onset    Cancer Mother     Hypertension Mother     Osteoporosis Mother     Dementia Mother     Uterine cancer Mother     Heart disease Father     Parkinsonism Father     Cancer Sister     Breast cancer Sister     Kidney cancer Sister     Diabetes Brother     Heart disease Paternal Grandfather     No Known Problems Maternal  "Grandmother     No Known Problems Maternal Grandfather     No Known Problems Paternal Grandmother     Colon cancer Neg Hx     Colon polyps Neg Hx     Esophageal cancer Neg Hx     Liver cancer Neg Hx     Liver disease Neg Hx     Rectal cancer Neg Hx     Stomach cancer Neg Hx      SOCIAL HISTORY:  Social History     Socioeconomic History    Marital status:    Tobacco Use    Smoking status: Former     Current packs/day: 0.25     Average packs/day: 0.3 packs/day for 3.0 years (0.8 ttl pk-yrs)     Types: Cigarettes     Passive exposure: Past    Smokeless tobacco: Never    Tobacco comments:     social smoker in college   Vaping Use    Vaping status: Never Used   Substance and Sexual Activity    Alcohol use: Not Currently     Comment: Occasional glass of wine    Drug use: No    Sexual activity: Defer       REVIEW OF SYSTEMS:    Review of Systems   Constitutional:  Positive for fatigue. Negative for fever.        \"I am tired.\"   HENT:  Negative for congestion and hearing loss.    Eyes:  Negative for discharge and redness.   Respiratory:  Negative for shortness of breath and wheezing.    Cardiovascular:  Positive for leg swelling. Negative for chest pain.   Gastrointestinal:  Negative for abdominal pain and nausea.   Endocrine: Negative for cold intolerance and heat intolerance.   Genitourinary:  Negative for dysuria and hematuria.   Musculoskeletal:  Negative for myalgias.   Skin:  Positive for pallor.   Allergic/Immunologic: Negative for food allergies.   Neurological:  Negative for dizziness, speech difficulty and weakness.   Hematological:  Negative for adenopathy. Bruises/bleeds easily.   Psychiatric/Behavioral:  Negative for agitation and confusion. The patient is not nervous/anxious.        VITAL SIGNS: /76   Pulse 77   Temp 97.8 °F (36.6 °C) (Tympanic)   Resp 18   Ht 154.9 cm (61\")   Wt 68.3 kg (150 lb 8 oz)   SpO2 97%   BMI 28.44 kg/m²  Gained 5 pounds.   Pain Score    01/15/25 1315   PainSc: " 0-No pain       PHYSICAL EXAMINATION:     Physical Exam  Vitals reviewed.   Constitutional:       Comments: Frail looking.    HENT:      Head: Normocephalic and atraumatic.   Eyes:      General: No scleral icterus.  Cardiovascular:      Rate and Rhythm: Normal rate.   Pulmonary:      Effort: No respiratory distress.      Breath sounds: No wheezing.      Comments: Port, right. No erythema.   Abdominal:      General: Bowel sounds are normal.      Palpations: Abdomen is soft.      Tenderness: There is no abdominal tenderness.   Musculoskeletal:         General: Swelling present.      Cervical back: Neck supple.   Skin:     Coloration: Skin is pale.   Neurological:      Mental Status: She is oriented to person, place, and time.   Psychiatric:         Mood and Affect: Mood normal.         Behavior: Behavior normal.         Thought Content: Thought content normal.         LABS    Lab Results - Last 18 Months   Lab Units 01/15/25  1314 09/11/24  1358 08/14/24  1357 07/24/24  1552 07/17/24  1416 06/19/24  1533 06/14/24  1116 05/20/24  0438 05/19/24  2057 05/15/24  1339 02/06/24  1335 02/02/24  1230   HEMOGLOBIN g/dL 8.6* 8.4* 9.3* 9.6* 9.0* 9.2* 6.6*   < > 9.4* 8.7*   < > 10.8*   HEMATOCRIT % 27.1* 25.9* 28.2* 30.3* 28.1* 29.3* 21.6*   < > 30.2* 28.0*   < > 33.0*   MCV fL 103.0* 100.0* 98.6* 102.7* 101.1*  --  110.8*   < > 111.0* 110.2*   < > 104.8*   WBC 10*3/mm3 7.03 7.17 7.82 7.83 8.65  --  8.66   < > 9.75 6.29   < > 9.91   RDW % 13.2 15.9* 16.3* 17.1* 17.1*  --  16.5*   < > 15.2 14.9   < > 14.1   MPV fL 10.3 10.6 10.4 11.0 11.0  --  10.5   < > 11.4 10.4   < > 12.5*   PLATELETS 10*3/mm3 147 270 171 157 190  --  175   < > 170 155   < > 120*   IMM GRAN % % 0.3 0.4 0.5 0.5 0.5  --   --   --  0.5  --    < >  --    NEUTROS ABS 10*3/mm3 5.15 5.65 5.80 5.75 6.73  --  7.53*  --  7.62* 4.52   < > 8.62*   LYMPHS ABS 10*3/mm3 1.18 0.86 1.14 1.11 0.99  --   --   --  1.19  --    < >  --    MONOS ABS 10*3/mm3 0.45 0.53 0.67 0.58 0.62   --   --   --  0.56  --    < >  --    EOS ABS 10*3/mm3 0.20 0.08 0.13 0.30 0.24  --  0.35  --  0.28 0.44*   < >  --    BASOS ABS 10*3/mm3 0.03 0.02 0.04 0.05 0.03  --  0.09  --  0.05 0.26*   < >  --    IMMATURE GRANS (ABS) 10*3/mm3 0.02 0.03 0.04 0.04 0.04  --   --   --  0.05  --    < >  --    NRBC /100 WBC 0.0 0.0 0.0 0.0 0.0  --   --   --  0.0  --    < >  --    NEUTROPHIL % %  --   --   --   --   --   --  83.0*  --   --  67.6  --  84.0*   MONOCYTES % %  --   --   --   --   --   --  1.0*  --   --  5.6  --  2.0*   BASOPHIL % %  --   --   --   --   --   --  1.0  --   --  4.2*  --   --    ATYP LYMPH % %  --   --   --   --   --   --   --   --   --   --   --  3.0   ANISOCYTOSIS   --   --   --   --   --   --  Slight/1+  --   --  Slight/1+  --  Slight/1+   GIANT PLT   --   --   --   --   --   --   --   --   --   --   --  Slight/1+    < > = values in this interval not displayed.       Lab Results - Last 18 Months   Lab Units 10/02/24  1408 07/24/24  1552 06/14/24  1116 05/22/24  0409 05/21/24  0400 05/20/24  0438 05/19/24 2057 05/15/24  1339 04/04/24  1412 02/06/24  1335   GLUCOSE mg/dL 205* 141* 184* 120* 112* 82 152* 100* 126* 197*   SODIUM mmol/L 137 139 139 140 141 141 140 140 138 134*   POTASSIUM mmol/L 4.5 3.7 3.6 4.1 4.3 3.9 4.4 4.4 4.3 3.6   CO2 mmol/L 26.0 33.0* 29.0 28.0 29.0 27.0 27.0 27.0 25.0 23.0   CHLORIDE mmol/L 100 96* 95* 101 103 105 101 105 100 96*   ANION GAP mmol/L 11.0 10.0 15.0 11.0 9.0 9.0 12.0 8.0 13.0 15.0   CREATININE mg/dL 1.95* 1.47* 2.03* 1.86* 2.07* 2.01* 2.29* 1.68* 1.78* 1.68*   BUN mg/dL 32* 20 51* 32* 31* 31* 32* 29* 28* 27*   BUN / CREAT RATIO  16.4 13.6 25.1* 17.2 15.0 15.4 14.0 17.3 15.7 16.1   CALCIUM mg/dL 9.5 9.8 10.2 8.6 9.1 9.0 10.3 9.7 10.1 9.6   ALK PHOS U/L 47  --  55  --   --   --  53 46 44 52   TOTAL PROTEIN g/dL 7.3  --  7.2  --   --   --  7.2 7.0 8.3 8.1   ALT (SGPT) U/L 11  --  9  --   --   --  10 11 11 9   AST (SGOT) U/L 15  --  21  --   --   --  23 17 21 17   BILIRUBIN  "mg/dL 0.3  --  0.4  --   --   --  0.2 0.2 0.3 0.3   ALBUMIN g/dL 4.0  --  4.0  --   --   --  3.9 4.0 4.7 4.3   GLOBULIN gm/dL 3.3  --  3.2  --   --   --  3.3 3.0 3.6 3.8       No results for input(s): \"MSPIKE\", \"KAPPALAMB\", \"IGLFLC\", \"URICACID\", \"FREEKAPPAL\", \"CEA\", \"LDH\", \"REFLABREPO\" in the last 80877 hours.    Lab Results - Last 18 Months   Lab Units 10/31/24  1228 10/02/24  1408 08/14/24  1357 07/24/24  1552 07/17/24  1416 06/14/24  1116 05/19/24  2057 05/15/24  1339 03/05/24  1257   IRON mcg/dL  --  56 53  --  54 46  --  57 93   TIBC mcg/dL  --  274* 323  --  314 325  --  298 285*   IRON SATURATION (TSAT) %  --  20 16*  --  17* 14*  --  19* 33   FERRITIN ng/mL  --  1,974.00* 1,346.00*  --  1,290.00* 567.30*  --  624.60* 1,188.00*   TSH uIU/mL 3.490  --   --  75.880*  --   --   --   --   --    FOLATE ng/mL  --   --   --   --   --   --  >20.00  --   --        Jameeldulce Nunez reports a pain score of 0.        ASSESSMENT:  1.  Left breast cancer, upper outer quadrant.  Tumor size 1.1 cm.  Negative genetic testing.  AJCC stage: 1A (pT1c, snpN0, cM0)  Receptor status: Estrogen 87%, progesterone 47% and negative HER-2/gabriela.  Treatment status: Post left lumpectomy and sentinel lymph node biopsy.  Declined adjuvant radiation.  Patient on therapy with adjuvant letrozole.  2.  Macrocytic anemia from iron deficiency, B12 deficiency and history of chronic kidney disease stage IV, GFR 24.2 mL/min on 6/14/2024.   She is off epoetin alpha.  3.   Iron deficiency. Intolerant to oral iron.  Intravenous iron therapy as needed..  4.   Chronic kidney disease Stage IV.  GFR 24.2 ml/min on 6/14/2024.  Contributing to her anemia.  5.   Performance status of 3.    6.   Grade 1 nausea from letrozole.  Taking ondansetron.   7.   Osteoporosis.  Taking calcium and vitamin D.  The patient is on subcutaneous denosumab.  8.   Recurrent squamous cell carcinoma, vulva.  AJCC stage IIIA (T2, Nib, M0, G3).  Treatment status.  Had Mitomycin C and " "5 FU D1 to D4 with radiation.  D29 - 32 chemo was cancelled due to poor performance status.             PLAN:  1.    Re:  Heme status.  Hemoglobin 8.6, hematocrit 27.1, and .    2.    Re:  Pre-office CMP.  GFR 27.8 from 25.3 ml/min.    3.    Re:  Ferritin 861.7 from 1974 from 1346 from 1290 and saturation 15 from 20 from 16 from 17 from 14 from 19 from 33 %.  Injectafer was given 6/21/2024.   4.    Re: Note from Dr. Benavides 11/21/2024.  Seen for vulvar pain. MRI pelvis ordered.   5.    Re: Negative mammogram 10/31/2024.  6.   CBC with differential, ferritin and iron panel every 4 weeks.  7.   Epoetin alpha 40,000 units SQ every 4 weeks if hemoglobin below 11 and hematocrit below 33.  Order for hypertension.  8.  Transfuse 1 unit packed RBCs if hemoglobin less than 7.  Premed Tylenol 500 mg p.o. and Benadryl 12.5 mg IV.  Lasix 20 mg IV push after a unit of blood.  Monitor for transfusion reactions.  9.  Note from Dr. Munoz on 8/12/2024.  Patient seen for acquired hypothyroidism, memory loss and stenosis of carotid artery.  10.  Advance Care Planning   ACP discussion was held with the patient during this visit. Patient has an advance directive in EMR which is still valid.     11.  eRx ondansetron 8 mg po every 8 hours as needed for nausea/vomiting, # 60, 2 refills if needed.  12.  eRx letrozole 2.5 mg p.o. daily #90 with 3 refills if needed.  \"I take it at night.\"  Observe for worsening bone loss or arthralgias or hot flashes.\"  13.  Cervical/vaginal cytology per gynecology.  14.  Vitamin B12 1000 mcg IM monthly administered by her granddaughter.  Observe for local reaction.  15.  Continue ongoing management per primary care physician and other specialists.  16.  Will not obtain breast tumor markers or Oncotype DX.  Patient is not a candidate for adjuvant chemotherapy.  17.  Order port flushes every 6 weeks.  18.Plan of care discussed with her spouse, Joseph.  Understanding " expressed. Spouse is agreeable to proceed.  19.  Order next bone density 10/2025.  20.  Order denosumab 60 mg SQ every 6 months for osteoporosis. She is on letrozole.  Observe for osteonecrosis of the jaw.  21.  Mammogram order per surgery.  22.  Injectafer 750 mg  next week.  Premed Tylenol 500 mg p.o. Intolerant to oral iron.  Observe for anaphylaxis or infusion reactions.   23.  Return to the office in 3 months with CBC with differential, ferritin, iron panel, and CMP.                I have reviewed the assessment and plan and verified the accuracy of it. No changes to assessment and plan since the information was documented. Drake Stafford MD 01/15/25       I spent 33 total minutes, face-to-face, caring for Syndal today. Greater than 50% of this time involved counseling and/or coordination of care as documented within this note.                cc: (Shaheen Akbar MD )        (Annita Loaiza MD)        (Ulises Roche MD)        (Guillermina Rosario MD)        (Octavio Fernando MD)        Gilberto Mills MD        (Moustapha Callahan MD)        (Radha Marks MD)

## 2025-01-15 ENCOUNTER — OFFICE VISIT (OUTPATIENT)
Dept: ONCOLOGY | Facility: CLINIC | Age: 85
End: 2025-01-15
Payer: MEDICARE

## 2025-01-15 ENCOUNTER — LAB (OUTPATIENT)
Dept: LAB | Facility: HOSPITAL | Age: 85
End: 2025-01-15
Payer: MEDICARE

## 2025-01-15 ENCOUNTER — TELEPHONE (OUTPATIENT)
Dept: ONCOLOGY | Facility: CLINIC | Age: 85
End: 2025-01-15

## 2025-01-15 VITALS
RESPIRATION RATE: 18 BRPM | SYSTOLIC BLOOD PRESSURE: 130 MMHG | HEIGHT: 61 IN | TEMPERATURE: 97.8 F | BODY MASS INDEX: 28.42 KG/M2 | DIASTOLIC BLOOD PRESSURE: 76 MMHG | HEART RATE: 77 BPM | OXYGEN SATURATION: 97 % | WEIGHT: 150.5 LBS

## 2025-01-15 DIAGNOSIS — C50.412 MALIGNANT NEOPLASM OF UPPER-OUTER QUADRANT OF LEFT BREAST IN FEMALE, ESTROGEN RECEPTOR POSITIVE: Primary | ICD-10-CM

## 2025-01-15 DIAGNOSIS — C77.4 SECONDARY MALIGNANCY OF INGUINAL LYMPH NODES: ICD-10-CM

## 2025-01-15 DIAGNOSIS — C51.9 VULVAR CANCER, CARCINOMA: ICD-10-CM

## 2025-01-15 DIAGNOSIS — Z17.0 MALIGNANT NEOPLASM OF UPPER-OUTER QUADRANT OF LEFT BREAST IN FEMALE, ESTROGEN RECEPTOR POSITIVE: Primary | ICD-10-CM

## 2025-01-15 DIAGNOSIS — D50.8 IRON DEFICIENCY ANEMIA SECONDARY TO INADEQUATE DIETARY IRON INTAKE: ICD-10-CM

## 2025-01-15 DIAGNOSIS — Z45.2 ENCOUNTER FOR CARE RELATED TO VASCULAR ACCESS PORT: ICD-10-CM

## 2025-01-15 LAB
ALBUMIN SERPL-MCNC: 4 G/DL (ref 3.5–5.2)
ALBUMIN/GLOB SERPL: 1.3 G/DL
ALP SERPL-CCNC: 53 U/L (ref 39–117)
ALT SERPL W P-5'-P-CCNC: 9 U/L (ref 1–33)
ANION GAP SERPL CALCULATED.3IONS-SCNC: 12 MMOL/L (ref 5–15)
AST SERPL-CCNC: 15 U/L (ref 1–32)
BASOPHILS # BLD AUTO: 0.03 10*3/MM3 (ref 0–0.2)
BASOPHILS NFR BLD AUTO: 0.4 % (ref 0–1.5)
BILIRUB SERPL-MCNC: 0.3 MG/DL (ref 0–1.2)
BUN SERPL-MCNC: 39 MG/DL (ref 8–23)
BUN/CREAT SERPL: 21.7 (ref 7–25)
CALCIUM SPEC-SCNC: 9.6 MG/DL (ref 8.6–10.5)
CHLORIDE SERPL-SCNC: 97 MMOL/L (ref 98–107)
CO2 SERPL-SCNC: 27 MMOL/L (ref 22–29)
CREAT SERPL-MCNC: 1.8 MG/DL (ref 0.57–1)
DEPRECATED RDW RBC AUTO: 48.6 FL (ref 37–54)
EGFRCR SERPLBLD CKD-EPI 2021: 27.8 ML/MIN/1.73
EOSINOPHIL # BLD AUTO: 0.2 10*3/MM3 (ref 0–0.4)
EOSINOPHIL NFR BLD AUTO: 2.8 % (ref 0.3–6.2)
ERYTHROCYTE [DISTWIDTH] IN BLOOD BY AUTOMATED COUNT: 13.2 % (ref 12.3–15.4)
FERRITIN SERPL-MCNC: 861.7 NG/ML (ref 13–150)
GLOBULIN UR ELPH-MCNC: 3.1 GM/DL
GLUCOSE SERPL-MCNC: 166 MG/DL (ref 65–99)
HCT VFR BLD AUTO: 27.1 % (ref 34–46.6)
HGB BLD-MCNC: 8.6 G/DL (ref 12–15.9)
HOLD SPECIMEN: NORMAL
IMM GRANULOCYTES # BLD AUTO: 0.02 10*3/MM3 (ref 0–0.05)
IMM GRANULOCYTES NFR BLD AUTO: 0.3 % (ref 0–0.5)
IRON 24H UR-MRATE: 43 MCG/DL (ref 37–145)
IRON SATN MFR SERPL: 15 % (ref 20–50)
LYMPHOCYTES # BLD AUTO: 1.18 10*3/MM3 (ref 0.7–3.1)
LYMPHOCYTES NFR BLD AUTO: 16.8 % (ref 19.6–45.3)
MCH RBC QN AUTO: 32.7 PG (ref 26.6–33)
MCHC RBC AUTO-ENTMCNC: 31.7 G/DL (ref 31.5–35.7)
MCV RBC AUTO: 103 FL (ref 79–97)
MONOCYTES # BLD AUTO: 0.45 10*3/MM3 (ref 0.1–0.9)
MONOCYTES NFR BLD AUTO: 6.4 % (ref 5–12)
NEUTROPHILS NFR BLD AUTO: 5.15 10*3/MM3 (ref 1.7–7)
NEUTROPHILS NFR BLD AUTO: 73.3 % (ref 42.7–76)
NRBC BLD AUTO-RTO: 0 /100 WBC (ref 0–0.2)
PLATELET # BLD AUTO: 147 10*3/MM3 (ref 140–450)
PMV BLD AUTO: 10.3 FL (ref 6–12)
POTASSIUM SERPL-SCNC: 4.3 MMOL/L (ref 3.5–5.2)
PROT SERPL-MCNC: 7.1 G/DL (ref 6–8.5)
RBC # BLD AUTO: 2.63 10*6/MM3 (ref 3.77–5.28)
SODIUM SERPL-SCNC: 136 MMOL/L (ref 136–145)
TIBC SERPL-MCNC: 289 MCG/DL (ref 298–536)
TRANSFERRIN SERPL-MCNC: 194 MG/DL (ref 200–360)
WBC NRBC COR # BLD AUTO: 7.03 10*3/MM3 (ref 3.4–10.8)

## 2025-01-15 PROCEDURE — 82728 ASSAY OF FERRITIN: CPT

## 2025-01-15 PROCEDURE — 80053 COMPREHEN METABOLIC PANEL: CPT

## 2025-01-15 PROCEDURE — 83540 ASSAY OF IRON: CPT

## 2025-01-15 PROCEDURE — 84466 ASSAY OF TRANSFERRIN: CPT

## 2025-01-15 PROCEDURE — 36415 COLL VENOUS BLD VENIPUNCTURE: CPT

## 2025-01-15 PROCEDURE — 85025 COMPLETE CBC W/AUTO DIFF WBC: CPT

## 2025-01-15 RX ORDER — HEPARIN SODIUM (PORCINE) LOCK FLUSH IV SOLN 100 UNIT/ML 100 UNIT/ML
500 SOLUTION INTRAVENOUS AS NEEDED
OUTPATIENT
Start: 2025-01-15

## 2025-01-15 RX ORDER — SODIUM CHLORIDE 9 MG/ML
250 INJECTION, SOLUTION INTRAVENOUS ONCE
OUTPATIENT
Start: 2025-01-23

## 2025-01-15 RX ORDER — FAMOTIDINE 10 MG/ML
20 INJECTION, SOLUTION INTRAVENOUS AS NEEDED
OUTPATIENT
Start: 2025-01-23

## 2025-01-15 RX ORDER — SODIUM CHLORIDE 0.9 % (FLUSH) 0.9 %
10 SYRINGE (ML) INJECTION AS NEEDED
Status: SHIPPED | OUTPATIENT
Start: 2025-01-15

## 2025-01-15 RX ORDER — SODIUM CHLORIDE 0.9 % (FLUSH) 0.9 %
10 SYRINGE (ML) INJECTION AS NEEDED
OUTPATIENT
Start: 2025-01-15

## 2025-01-15 RX ORDER — ACETAMINOPHEN 325 MG/1
650 TABLET ORAL ONCE
OUTPATIENT
Start: 2025-01-23

## 2025-01-15 RX ORDER — DIPHENHYDRAMINE HYDROCHLORIDE 50 MG/ML
50 INJECTION INTRAMUSCULAR; INTRAVENOUS AS NEEDED
OUTPATIENT
Start: 2025-01-23

## 2025-01-15 RX ORDER — HEPARIN SODIUM (PORCINE) LOCK FLUSH IV SOLN 100 UNIT/ML 100 UNIT/ML
500 SOLUTION INTRAVENOUS AS NEEDED
Status: SHIPPED | OUTPATIENT
Start: 2025-01-15

## 2025-01-15 RX ORDER — HYDROCORTISONE SODIUM SUCCINATE 100 MG/2ML
100 INJECTION INTRAMUSCULAR; INTRAVENOUS AS NEEDED
OUTPATIENT
Start: 2025-01-23

## 2025-01-15 RX ADMIN — HEPARIN SODIUM (PORCINE) LOCK FLUSH IV SOLN 100 UNIT/ML 500 UNITS: 100 SOLUTION at 14:10

## 2025-01-15 RX ADMIN — Medication 10 ML: at 14:02

## 2025-01-15 NOTE — TELEPHONE ENCOUNTER
Patient notified of time and date of Iron Txt Injectafer 750 mg IV x 1    Apt Date: 1/23/25 @ 2: pm  Patient and  v/u of time and date of apt

## 2025-01-23 ENCOUNTER — INFUSION (OUTPATIENT)
Dept: ONCOLOGY | Facility: HOSPITAL | Age: 85
End: 2025-01-23
Payer: MEDICARE

## 2025-01-23 VITALS
OXYGEN SATURATION: 99 % | HEART RATE: 70 BPM | RESPIRATION RATE: 16 BRPM | SYSTOLIC BLOOD PRESSURE: 150 MMHG | TEMPERATURE: 97.1 F | WEIGHT: 149.8 LBS | BODY MASS INDEX: 28.3 KG/M2 | DIASTOLIC BLOOD PRESSURE: 41 MMHG

## 2025-01-23 DIAGNOSIS — C77.4 SECONDARY MALIGNANCY OF INGUINAL LYMPH NODES: ICD-10-CM

## 2025-01-23 DIAGNOSIS — C51.9 VULVAR CANCER, CARCINOMA: ICD-10-CM

## 2025-01-23 DIAGNOSIS — D50.9 IRON DEFICIENCY ANEMIA, UNSPECIFIED IRON DEFICIENCY ANEMIA TYPE: Primary | ICD-10-CM

## 2025-01-23 DIAGNOSIS — Z45.2 ENCOUNTER FOR CARE RELATED TO VASCULAR ACCESS PORT: ICD-10-CM

## 2025-01-23 PROCEDURE — 25010000002 FERRIC CARBOXYMALTOSE 750 MG/15ML SOLUTION 15 ML VIAL: Performed by: INTERNAL MEDICINE

## 2025-01-23 PROCEDURE — 25810000003 SODIUM CHLORIDE 0.9 % SOLUTION: Performed by: INTERNAL MEDICINE

## 2025-01-23 PROCEDURE — 25010000002 HEPARIN LOCK FLUSH PER 10 UNITS: Performed by: INTERNAL MEDICINE

## 2025-01-23 PROCEDURE — 63710000001 ACETAMINOPHEN 325 MG TABLET: Performed by: INTERNAL MEDICINE

## 2025-01-23 PROCEDURE — A9270 NON-COVERED ITEM OR SERVICE: HCPCS | Performed by: INTERNAL MEDICINE

## 2025-01-23 PROCEDURE — 96365 THER/PROPH/DIAG IV INF INIT: CPT

## 2025-01-23 RX ORDER — HEPARIN SODIUM (PORCINE) LOCK FLUSH IV SOLN 100 UNIT/ML 100 UNIT/ML
500 SOLUTION INTRAVENOUS AS NEEDED
OUTPATIENT
Start: 2025-01-23

## 2025-01-23 RX ORDER — HEPARIN SODIUM (PORCINE) LOCK FLUSH IV SOLN 100 UNIT/ML 100 UNIT/ML
500 SOLUTION INTRAVENOUS AS NEEDED
Status: DISCONTINUED | OUTPATIENT
Start: 2025-01-23 | End: 2025-01-23 | Stop reason: HOSPADM

## 2025-01-23 RX ORDER — ACETAMINOPHEN 325 MG/1
650 TABLET ORAL ONCE
Status: COMPLETED | OUTPATIENT
Start: 2025-01-23 | End: 2025-01-23

## 2025-01-23 RX ORDER — SODIUM CHLORIDE 9 MG/ML
250 INJECTION, SOLUTION INTRAVENOUS ONCE
Status: COMPLETED | OUTPATIENT
Start: 2025-01-23 | End: 2025-01-23

## 2025-01-23 RX ORDER — FAMOTIDINE 10 MG/ML
20 INJECTION, SOLUTION INTRAVENOUS AS NEEDED
Status: DISCONTINUED | OUTPATIENT
Start: 2025-01-23 | End: 2025-01-23 | Stop reason: HOSPADM

## 2025-01-23 RX ORDER — SODIUM CHLORIDE 0.9 % (FLUSH) 0.9 %
10 SYRINGE (ML) INJECTION AS NEEDED
OUTPATIENT
Start: 2025-01-23

## 2025-01-23 RX ORDER — SODIUM CHLORIDE 0.9 % (FLUSH) 0.9 %
10 SYRINGE (ML) INJECTION AS NEEDED
Status: DISCONTINUED | OUTPATIENT
Start: 2025-01-23 | End: 2025-01-23 | Stop reason: HOSPADM

## 2025-01-23 RX ORDER — HYDROCORTISONE SODIUM SUCCINATE 100 MG/2ML
100 INJECTION INTRAMUSCULAR; INTRAVENOUS AS NEEDED
Status: DISCONTINUED | OUTPATIENT
Start: 2025-01-23 | End: 2025-01-23 | Stop reason: HOSPADM

## 2025-01-23 RX ORDER — DIPHENHYDRAMINE HYDROCHLORIDE 50 MG/ML
50 INJECTION INTRAMUSCULAR; INTRAVENOUS AS NEEDED
Status: DISCONTINUED | OUTPATIENT
Start: 2025-01-23 | End: 2025-01-23 | Stop reason: HOSPADM

## 2025-01-23 RX ADMIN — Medication 10 ML: at 16:04

## 2025-01-23 RX ADMIN — ACETAMINOPHEN 650 MG: 325 TABLET ORAL at 15:03

## 2025-01-23 RX ADMIN — FERRIC CARBOXYMALTOSE INJECTION 750 MG: 50 INJECTION, SOLUTION INTRAVENOUS at 15:10

## 2025-01-23 RX ADMIN — Medication 500 UNITS: at 16:04

## 2025-01-23 RX ADMIN — SODIUM CHLORIDE 250 ML: 9 INJECTION, SOLUTION INTRAVENOUS at 14:51

## 2025-01-28 RX ORDER — DIPHENOXYLATE HYDROCHLORIDE AND ATROPINE SULFATE 2.5; .025 MG/1; MG/1
TABLET ORAL
Qty: 90 TABLET | Refills: 2 | OUTPATIENT
Start: 2025-01-28

## 2025-01-30 ENCOUNTER — OFFICE VISIT (OUTPATIENT)
Dept: FAMILY MEDICINE CLINIC | Facility: CLINIC | Age: 85
End: 2025-01-30
Payer: MEDICARE

## 2025-01-30 VITALS
OXYGEN SATURATION: 96 % | SYSTOLIC BLOOD PRESSURE: 120 MMHG | DIASTOLIC BLOOD PRESSURE: 70 MMHG | HEART RATE: 78 BPM | WEIGHT: 152 LBS | HEIGHT: 61 IN | TEMPERATURE: 97.5 F | BODY MASS INDEX: 28.7 KG/M2

## 2025-01-30 DIAGNOSIS — C51.9 VULVAR CANCER, CARCINOMA: Primary | ICD-10-CM

## 2025-01-30 DIAGNOSIS — R60.0 BILATERAL LEG EDEMA: ICD-10-CM

## 2025-01-30 DIAGNOSIS — E03.9 ACQUIRED HYPOTHYROIDISM: ICD-10-CM

## 2025-01-30 DIAGNOSIS — K21.9 GASTROESOPHAGEAL REFLUX DISEASE WITHOUT ESOPHAGITIS: ICD-10-CM

## 2025-01-30 DIAGNOSIS — F41.9 ANXIETY: ICD-10-CM

## 2025-01-30 DIAGNOSIS — G89.3 CANCER RELATED PAIN: ICD-10-CM

## 2025-01-30 DIAGNOSIS — I10 ESSENTIAL HYPERTENSION: ICD-10-CM

## 2025-01-30 RX ORDER — LETROZOLE 2.5 MG/1
2.5 TABLET, FILM COATED ORAL DAILY
Qty: 90 TABLET | Refills: 3 | Status: SHIPPED | OUTPATIENT
Start: 2025-01-30

## 2025-01-30 RX ORDER — HYDROCODONE BITARTRATE AND ACETAMINOPHEN 10; 325 MG/1; MG/1
0.5 TABLET ORAL EVERY 4 HOURS PRN
Qty: 60 TABLET | Refills: 0 | Status: SHIPPED | OUTPATIENT
Start: 2025-01-30

## 2025-02-14 ENCOUNTER — TELEPHONE (OUTPATIENT)
Dept: FAMILY MEDICINE CLINIC | Facility: CLINIC | Age: 85
End: 2025-02-14
Payer: MEDICARE

## 2025-02-14 RX ORDER — NYSTATIN AND TRIAMCINOLONE ACETONIDE 100000; 1 [USP'U]/G; MG/G
1 OINTMENT TOPICAL 2 TIMES DAILY
Qty: 30 G | Refills: 3 | Status: SHIPPED | OUTPATIENT
Start: 2025-02-14

## 2025-02-14 NOTE — TELEPHONE ENCOUNTER
Caller: NunezDago    Relationship: Self    Best call back number: [phone]     Requested Prescriptions:   Requested Prescriptions      No prescriptions requested or ordered in this encounter    NYSTATIN (NOT SHOWING IN LIST)     Pharmacy where request should be sent: [facility] - [place], [place] - [address] - [phone]  - [phone] FX     Last office visit with prescribing clinician: 1/30/2025   Last telemedicine visit with prescribing clinician: Visit date not found   Next office visit with prescribing clinician: 7/30/2025     Additional details provided by patient:     Does the patient have less than a 3 day supply:  [x] Yes  [] No    Would you like a call back once the refill request has been completed: [] Yes [x] No    If the office needs to give you a call back, can they leave a voicemail: [] Yes [x] No    Michael Bacon III, RegSched Rep   02/14/25 15:30 CST

## 2025-02-15 NOTE — PROGRESS NOTES
"Chief Complaint  Hypertension (Patient presents to clinic for a 3 month follow )    Subjective        Syndal Waqar Nunez presents to [facility]  Hypertension      Pain stable on hydrocodone, needs refill  Mood stable on celexa  GERD stable on PPI  Energy doing well on synthroid      Objective   Vital Signs:  /70 (BP Location: Right arm, Patient Position: Sitting, Cuff Size: Adult)   Pulse 78   Temp 97.5 °F (36.4 °C) (Temporal)   Ht 154.9 cm (61\")   Wt 68.9 kg (152 lb)   SpO2 96%   BMI 28.72 kg/m²   Estimated body mass index is 28.72 kg/m² as calculated from the following:    Height as of this encounter: 154.9 cm (61\").    Weight as of this encounter: 68.9 kg (152 lb).            Physical Exam  Vitals and nursing note reviewed.   Constitutional:       General: She is not in acute distress.     Appearance: She is not diaphoretic.   HENT:      Head: Normocephalic and atraumatic.      Nose: Nose normal.   Eyes:      General: No scleral icterus.        Right eye: No discharge.         Left eye: No discharge.      Conjunctiva/sclera: Conjunctivae normal.   Neck:      Trachea: No tracheal deviation.   Pulmonary:      Effort: Pulmonary effort is normal.   Musculoskeletal:      Right lower leg: Right lower leg edema: baseline.      Left lower leg: Left lower leg edema: baseline.   Skin:     General: Skin is warm and dry.      Coloration: Skin is not pale.   Neurological:      Mental Status: She is alert and oriented to person, place, and time.   Psychiatric:         Behavior: Behavior normal.         Thought Content: Thought content normal.         Judgment: Judgment normal.        Result Review :                Assessment and Plan   Diagnoses and all orders for this visit:    1. Vulvar cancer, carcinoma (Primary)  -     HYDROcodone-acetaminophen (NORCO)  MG per tablet; Take 0.5 tablets by mouth Every 4 (Four) Hours As Needed for Moderate Pain or Severe Pain.  Dispense: 60 " tablet; Refill: 0    2. Cancer related pain  -     HYDROcodone-acetaminophen (NORCO)  MG per tablet; Take 0.5 tablets by mouth Every 4 (Four) Hours As Needed for Moderate Pain or Severe Pain.  Dispense: 60 tablet; Refill: 0    3. Essential hypertension    4. Bilateral leg edema    5. Acquired hypothyroidism    6. Gastroesophageal reflux disease without esophagitis    7. Anxiety    Stable  Continue PRN lasix         Follow Up   Return in about 6 months (around 7/30/2025) for Recheck.  Patient was given instructions and counseling regarding her condition or for health maintenance advice. Please see specific information pulled into the AVS if appropriate.

## 2025-02-17 DIAGNOSIS — L03.115 CELLULITIS OF RIGHT LOWER EXTREMITY: ICD-10-CM

## 2025-02-17 RX ORDER — BUMETANIDE 1 MG/1
1 TABLET ORAL 2 TIMES DAILY
Qty: 60 TABLET | Refills: 1 | Status: SHIPPED | OUTPATIENT
Start: 2025-02-17

## 2025-02-21 DIAGNOSIS — K52.1 DIARRHEA DUE TO DRUG: Primary | ICD-10-CM

## 2025-02-25 RX ORDER — DIPHENOXYLATE HYDROCHLORIDE AND ATROPINE SULFATE 2.5; .025 MG/1; MG/1
TABLET ORAL
Qty: 90 TABLET | Refills: 2 | Status: SHIPPED | OUTPATIENT
Start: 2025-02-25

## 2025-03-18 ENCOUNTER — TELEPHONE (OUTPATIENT)
Dept: VASCULAR SURGERY | Facility: CLINIC | Age: 85
End: 2025-03-18
Payer: MEDICARE

## 2025-03-18 NOTE — TELEPHONE ENCOUNTER
SPOKE WITH PT AS A REMINDER OF 0830 ARRIVAL FOR 0900 [facility] THEN TO SEE JASVIR AT 1015.

## 2025-03-19 ENCOUNTER — HOSPITAL ENCOUNTER (OUTPATIENT)
Dept: ULTRASOUND IMAGING | Facility: HOSPITAL | Age: 85
Discharge: HOME | End: 2025-03-19
Admitting: NURSE PRACTITIONER
Payer: MEDICARE

## 2025-03-19 ENCOUNTER — OFFICE VISIT (OUTPATIENT)
Dept: VASCULAR SURGERY | Facility: CLINIC | Age: 85
End: 2025-03-19
Payer: MEDICARE

## 2025-03-19 VITALS
HEIGHT: 61 IN | WEIGHT: 143 LBS | HEART RATE: 79 BPM | BODY MASS INDEX: 27 KG/M2 | SYSTOLIC BLOOD PRESSURE: 122 MMHG | DIASTOLIC BLOOD PRESSURE: 74 MMHG | OXYGEN SATURATION: 97 %

## 2025-03-19 DIAGNOSIS — I10 ESSENTIAL HYPERTENSION: ICD-10-CM

## 2025-03-19 DIAGNOSIS — I65.23 BILATERAL CAROTID ARTERY STENOSIS: Primary | ICD-10-CM

## 2025-03-19 DIAGNOSIS — E66.3 OVERWEIGHT (BMI 25.0-29.9): ICD-10-CM

## 2025-03-19 DIAGNOSIS — E78.2 MIXED HYPERLIPIDEMIA: ICD-10-CM

## 2025-03-19 DIAGNOSIS — E11.42 TYPE 2 DIABETES MELLITUS WITH DIABETIC POLYNEUROPATHY, WITHOUT LONG-TERM CURRENT USE OF INSULIN: ICD-10-CM

## 2025-03-19 DIAGNOSIS — I65.23 BILATERAL CAROTID ARTERY STENOSIS: ICD-10-CM

## 2025-03-19 PROCEDURE — 93880 EXTRACRANIAL BILAT STUDY: CPT

## 2025-03-19 NOTE — PROGRESS NOTES
" 3/19/2025        Shaheen Akbar, DO  [address] [facility] [address]  [facility] [address]      Dago Nunez  1940    Chief Complaint   Patient presents with    Follow-up     6 month follow up w/ testing. Last seen 8/19/24. Patient denies any stroke type symptoms.        Dear Dr. Shaheen Akbra:    HPI  I had the pleasure of seeing your patient Dago Nunez in the office today.    As you recall, Dago Nunez is a 84 y.o.  female who you are currently following for routine health maintenance.  She is here today for 6-month follow-up with testing.  She underwent a right transcarotid artery revascularization on 8/5/2024.  She was previously hospitalized with left-sided weakness and some cognitive decline.  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  She is palliative with regards to chemotherapy, she has a history of vulvar cancer and breast cancer.  She also complains of leg swelling, right lower extremity greater than left.  She does wear compression stockings on a daily basis.  She does have home lymphedema pumps, but she states she does not use them.  She is maintained on aspirin, and Pravachol.  She did have noninvasive testing performed, which I did review in office.      Review of Systems   Constitutional: Negative.  Negative for diaphoresis and fever.   HENT: Negative.     Eyes: Negative.    Respiratory: Negative.  Negative for shortness of breath and wheezing.    Cardiovascular:  Positive for leg swelling (Right lower extremity greater than left).   Gastrointestinal: Negative.  Negative for abdominal pain.   Endocrine: Negative.    Genitourinary: Negative.    Musculoskeletal:  Positive for arthralgias, back pain, gait problem and myalgias.   Skin: Negative.    Allergic/Immunologic: Negative.    Neurological:  Negative for dizziness and numbness.   Hematological: Negative.    Psychiatric/Behavioral: Negative.       /74   Pulse 79   Ht 154.9 cm (61\")   " Wt 64.9 kg (143 lb)   SpO2 97%   BMI 27.02 kg/m²     Physical Exam  Vitals and nursing note reviewed.   Constitutional:       General: She is not in acute distress.     Appearance: Normal appearance. She is overweight. She is not diaphoretic.   HENT:      Head: Normocephalic. No right periorbital erythema or left periorbital erythema.      Nose: Nose normal.   Eyes:      General: No scleral icterus.     Pupils: Pupils are equal.   Cardiovascular:      Rate and Rhythm: Normal rate and regular rhythm.      Heart sounds: Normal heart sounds. No murmur heard.  Pulmonary:      Effort: Pulmonary effort is normal. No respiratory distress.      Breath sounds: Normal breath sounds.   Abdominal:      General: Bowel sounds are normal. There is no distension.      Palpations: Abdomen is soft.      Tenderness: There is no abdominal tenderness. There is no guarding.   Musculoskeletal:         General: No swelling or tenderness. Normal range of motion.      Cervical back: Normal range of motion and neck supple.      Right lower le+ Edema present.      Left lower le+ Edema present.   Feet:      Right foot:      Skin integrity: Skin integrity normal.      Left foot:      Skin integrity: Skin integrity normal.   Skin:     General: Skin is warm and dry.      Findings: No erythema or rash.   Neurological:      General: No focal deficit present.      Mental Status: She is alert and oriented to person, place, and time. Mental status is at baseline.      Cranial Nerves: No cranial nerve deficit.      Gait: Gait normal.   Psychiatric:         Attention and Perception: Attention normal.         Mood and Affect: Mood normal.         Behavior: Behavior normal.         Thought Content: Thought content normal.         Judgment: Judgment normal.       Diagnostic Data:  Narrative & Impression   History: Carotid occlusive disease     IMPRESSION:  Impression:  1. There is less than 50% stenosis of the right internal carotid  artery  stent.  2. There is 50 to 69% stenosis of the left internal carotid artery.  3. Antegrade flow is demonstrated in bilateral vertebral arteries.     Comments: Bilateral carotid vertebral arterial duplex scan was  performed.     Grayscale imaging shows the right internal carotid artery stent to be  widely patent. The right internal carotid artery peak systolic velocity  is 118.5 cm/sec. The end-diastolic velocity is 22.1 cm/sec. The right  ICA/CCA ratio is approximately 0.6. These findings correlate with less  than 50% stenosis of the right internal carotid artery.     Grayscale imaging shows intimal thickening and calcified elements at the  carotid bifurcation. The left internal carotid artery peak systolic  velocity is 135.6 cm/sec. The end-diastolic velocity is 25.7 cm/sec. The  left ICA/CCA ratio is approximately 0.8. These findings correlate with  50 to 69% stenosis of the left internal carotid artery.     Antegrade flow is demonstrated in bilateral vertebral arteries.  There is greater than 50% stenosis in bilateral common carotid arteries  and bilateral external carotid arteries.     This report was signed and finalized on 3/19/2025 2:42 PM by Dr. Junior Meyers MD.          Patient Active Problem List   Diagnosis    Meatal stenosis    Retention of urine    Type 2 diabetes mellitus, without long-term current use of insulin    Essential hypertension    Grief reaction    Vulvar intraepithelial neoplasia (MOODY) grade 3    Chronic midline low back pain without sciatica    Bilateral lower extremity edema    History of urethral stricture    Epidermal cyst of neck    Normocytic anemia    Neck abscess    Mixed hyperlipidemia    Gastroesophageal reflux disease without esophagitis    Anxiety    Iron deficiency and chemotherapy induced anemia    Stage 3 chronic kidney disease    Anemia    Preop cardiovascular exam    Lower extremity edema    Vulvar cancer, carcinoma    Former smoker    Secondary malignancy of  inguinal lymph nodes    Febrile illness    Chemotherapy-induced thrombocytopenia    Hyponatremia    Hypokalemia    Neutropenic fever    Moderate malnutrition    Antineoplastic chemotherapy induced anemia    History of radiation therapy    Encounter for care related to Port-a-Cath    Malignant neoplasm of upper-outer quadrant of left breast in female, estrogen receptor positive    Encounter for care related to vascular access port    Angiodysplasia    Bronchitis    Obesity (BMI 30-39.9)    Wheezing    Cough    Post-COVID-19 condition    Radiation induced proctitis    Rectal bleeding    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 Bilateral carotid artery stenosis        2. Essential hypertension        3. Mixed hyperlipidemia        4. Type 2 diabetes mellitus with diabetic polyneuropathy, without long-term current use of insulin        5. Overweight (BMI 25.0-29.9)                Plan: After thoroughly evaluating Dago Nunez, I believe the best course of action is to remain conservative from vascular surgery standpoint.  Her carotid duplex shows less than 50% bilateral carotid stenosis.  We will see her back in 6 months with repeat noninvasive testing to include carotid duplex for continued surveillance.  We did discuss her swelling to her right lower extremity, it does appear she has lymphedema.  She does have home lymphedema pumps however she is not using them.  I have encouraged her to begin using those nightly for an hour.  I would like her to continue wearing her compression stockings on a daily basis and elevating her legs when not on them.  She should continue her aspirin 81 mg daily, and Pravachol 40 mg nightly in addition to her other medications.  I did discuss vascular risk factors as they pertain to the progression of vascular  disease including controlling her hypertension, hyperlipidemia, and diabetes.  Her blood pressure stable today in office.  Her last lipid panel 10 months ago shows an elevated LDL at 151, triglycerides elevated at 153, and total cholesterol elevated at 226, all other values are within normal limits.  Her hemoglobin A1c 3 months ago was controlled at 5.3%.  Body mass index is 27.02 kg/m².     This was all discussed in full with complete understanding.    Thank you for allowing me to participate in the care of your patient.  Please do not hesitate to call with any questions or concerns.  We will keep you aware of any further encounters with Dago Nunez.        Sincerely yours,         Charmaine Norris, Shaheen Centeno, DO

## 2025-03-20 ENCOUNTER — TELEPHONE (OUTPATIENT)
Dept: FAMILY MEDICINE CLINIC | Facility: CLINIC | Age: 85
End: 2025-03-20
Payer: MEDICARE

## 2025-03-20 DIAGNOSIS — L03.115 CELLULITIS OF RIGHT LOWER EXTREMITY: ICD-10-CM

## 2025-03-20 NOTE — TELEPHONE ENCOUNTER
Caller: Dago Nunez    Relationship to patient: Self    Best call back number: [phone]       Patient is needing: CELLULITIS IS ACTING UP AND NEEDS ANTIBIOTIC AGAIN.

## 2025-03-21 DIAGNOSIS — F41.9 ANXIETY: Primary | ICD-10-CM

## 2025-03-21 RX ORDER — ERGOCALCIFEROL 1.25 MG/1
CAPSULE, LIQUID FILLED ORAL
Qty: 12 CAPSULE | Refills: 3 | Status: SHIPPED | OUTPATIENT
Start: 2025-03-21

## 2025-03-21 RX ORDER — CITALOPRAM HYDROBROMIDE 20 MG/1
20 TABLET ORAL DAILY
Qty: 30 TABLET | Refills: 1 | Status: SHIPPED | OUTPATIENT
Start: 2025-03-21

## 2025-03-22 DIAGNOSIS — L03.115 CELLULITIS OF RIGHT LOWER EXTREMITY: ICD-10-CM

## 2025-03-24 RX ORDER — BUMETANIDE 1 MG/1
1 TABLET ORAL 2 TIMES DAILY
Qty: 60 TABLET | Refills: 1 | Status: SHIPPED | OUTPATIENT
Start: 2025-03-24

## 2025-03-25 RX ORDER — CLINDAMYCIN HYDROCHLORIDE 300 MG/1
300 CAPSULE ORAL 3 TIMES DAILY
Qty: 15 CAPSULE | Refills: 0 | Status: SHIPPED | OUTPATIENT
Start: 2025-03-25 | End: 2025-03-30

## 2025-03-25 NOTE — TELEPHONE ENCOUNTER
Spoke with patient and informed her of physician comments. Patient has been scheduled for an appt on 03/28/25 with Dr. Fung

## 2025-03-25 NOTE — TELEPHONE ENCOUNTER
Spoke with patient states her legs look a little better. She has been keeping her legs wrapped. Her feet are also swollen. Patient is requesting an oral abx, due to this is the only thing that helped previously.     Please advise, thanks!

## 2025-03-27 DIAGNOSIS — I10 ESSENTIAL HYPERTENSION: ICD-10-CM

## 2025-03-27 RX ORDER — OLMESARTAN MEDOXOMIL 20 MG/1
20 TABLET ORAL DAILY
Qty: 90 TABLET | Refills: 2 | Status: SHIPPED | OUTPATIENT
Start: 2025-03-27

## 2025-03-27 NOTE — PROGRESS NOTES
Martita Fung,   [facility]   Family Medicine  [address]  [place] [address]  Phone: [phone]  Fax: [phone]         Chief Complaint:  Chief Complaint   Patient presents with    Edema     Patient presents to clinic for increased BLE edema. Patient reports having pain to the LE with touch.         History:  Dago Nunez is a 84 y.o. female who presents for leg edema and redness follow-up.  Has had similar issues in the past, usually resolves with short course of clindamycin.  Started on oral antibiotics a couple days ago and reports premed of redness, but swelling is still persistent.  She has pain secondary to the swelling.  Is able to get up and walk, but for limited amount of time.      Has ongoing irritation of her vulva, around her urethra.  She has tried Aquaphor without significant relief.  Has appointment with urology in [place] for bladder irregularity in June.      HPI           reports that she has quit smoking. Her smoking use included cigarettes. She has a 0.8 pack-year smoking history. She has been exposed to tobacco smoke. She has never used smokeless tobacco. She reports that she does not currently use alcohol. She reports that she does not use drugs.    Current Outpatient Medications   Medication Instructions    Acetaminophen (TYLENOL ARTHRITIS PAIN PO) 1 tablet, Daily PRN    albuterol sulfate  (90 Base) MCG/ACT inhaler 2 puffs, Every 4 Hours PRN    aspirin 81 mg, Daily    bisoprolol-hydrochlorothiazide (ZIAC) 5-6.25 MG per tablet 1 tablet, Oral, Daily    bumetanide (BUMEX) 1 mg, Oral, 2 Times Daily    cetirizine (ZYRTEC) 10 mg, Daily PRN    citalopram (CELEXA) 20 mg, Oral, Daily    clindamycin (CLEOCIN T) 1 % lotion 1 Application, Daily PRN    clindamycin (CLEOCIN) 300 mg, Oral, 3 Times Daily    cyanocobalamin 1,000 mcg, Every 30 Days    diphenoxylate-atropine (LOMOTIL) 2.5-0.025 MG per tablet TAKE 2 TABLETS BY MOUTH 4 TIMES DAILY AS NEEDED FOR  "DIARRHEA    esomeprazole (NEXIUM) 40 mg, Oral, 2 Times Daily    Homeopathic Products (LEG CRAMPS) tablet 1 tablet, Daily    HYDROcodone-acetaminophen (NORCO)  MG per tablet 0.5 tablets, Oral, Every 4 Hours PRN    hydrocortisone 1 % ointment 1 Application, Topical, 2 Times Daily    letrozole (FEMARA) 2.5 mg, Oral, Daily    levothyroxine (SYNTHROID, LEVOTHROID) 75 mcg, Oral, Daily    memantine (NAMENDA) 5 mg, Oral, 2 Times Daily    metaxalone (SKELAXIN) 800 MG tablet TAKE 1 TABLET BY MOUTH 3 TIMES DAILY AS NEEDED FOR MUSCLE SPASMS    metOLazone (ZAROXOLYN) 2.5 MG tablet TAKE 1 TABLET BY MOUTH EVERY DAY AS NEEDED FOR WEIGHT GAIN OF 3 LBS OR MORE IN 24 HOURS.    nystatin-triamcinolone (MYCOLOG II) 572066-4.1 UNIT/GM-% cream 1 Application, 2 Times Daily PRN    nystatin-triamcinolone (MYCOLOG) 624480-0.1 UNIT/GM-% ointment 1 Application, Topical, 2 Times Daily    olmesartan (BENICAR) 20 mg, Oral, Daily    phenazopyridine (PYRIDIUM) 200 mg, Oral, 3 Times Daily PRN    potassium chloride (KLOR-CON M20) 20 MEQ CR tablet 40 mEq, Oral, Daily    potassium chloride ER (K-TAB) 20 MEQ tablet controlled-release ER tablet 40 mEq, Daily    pravastatin (PRAVACHOL) 40 mg, Oral, Every Night at Bedtime    Probiotic Product (PROBIOTIC DAILY PO) 1 tablet, Daily    saccharomyces boulardii (FLORASTOR) 250 mg, Oral, 2 Times Daily    sodium bicarbonate 650 mg, 2 Times Daily    tacrolimus (PROTOPIC) 0.1 % ointment 1 Application, 2 Times Daily PRN    vitamin D (ERGOCALCIFEROL) 1.25 MG (79094 UT) capsule capsule TAKE 1 CAPSULE BY MOUTH ON THE SAME DAY EACH WEEK.       OBJECTIVE:  /60 (BP Location: Right arm, Patient Position: Sitting, Cuff Size: Adult)   Pulse 78   Temp 97.5 °F (36.4 °C) (Temporal)   Ht 154.9 cm (61\")   Wt 66.2 kg (146 lb)   SpO2 99%   BMI 27.59 kg/m²      Gen: No acute distress.   Head and neck: Normocephalic, atraumatic.  HEENT: PERRLA, EOMI.  CV: Well perfused, no pallor.   Resp: Normal respiratory effort. No " distress.   Musculoskeletal: No gross deformity.   Neuro: Alert and oriented.   Skin: Mild redness to right lower extremity with 3+ edema.  Left lower extremity with 1+ edema.  Psych: Appropriate.       Procedures    Assessment/Plan:     Diagnoses and all orders for this visit:    1. Leg swelling (Primary)  Has completed 3-day course of clindamycin for redness.  Feels like redness is minimal after antibiotics.  She has some remaining topical clindamycin from previous episodes.  She can do this 1 to 2 days to prevent flare.  In regards to the swelling, she is currently only taking Bumex once a day, as she often forgets at night.  Encouraged 2 mg/day dosing.  Due to kidney issues, using lowest effective dose of diuretics.  Can consider increase if symptoms persist.    2. Vaginal irritation  Ongoing irritation for a while, generally around the urethra.  She has an upcoming appointment in [place] for bladder irregularities.  She is using Aquaphor as needed.  Encouraged 2 to 3-day use of steroid ointment, then regular daily use of Aquaphor thereafter.  If symptoms flare, can then do a couple day and of steroids.  -     hydrocortisone 1 % ointment; Apply 1 Application topically to the appropriate area as directed 2 (Two) Times a Day.  Dispense: 28 g; Refill: 1      An After Visit Summary was printed and given to the patient at discharge.  Return if symptoms worsen or fail to improve.         Martita Fung,   3/28/2025   Electronically signed.

## 2025-03-28 ENCOUNTER — OFFICE VISIT (OUTPATIENT)
Dept: FAMILY MEDICINE CLINIC | Facility: CLINIC | Age: 85
End: 2025-03-28
Payer: MEDICARE

## 2025-03-28 VITALS
OXYGEN SATURATION: 99 % | SYSTOLIC BLOOD PRESSURE: 140 MMHG | DIASTOLIC BLOOD PRESSURE: 60 MMHG | TEMPERATURE: 97.5 F | HEART RATE: 78 BPM | BODY MASS INDEX: 27.56 KG/M2 | WEIGHT: 146 LBS | HEIGHT: 61 IN

## 2025-03-28 DIAGNOSIS — N89.8 VAGINAL IRRITATION: ICD-10-CM

## 2025-03-28 DIAGNOSIS — M79.89 LEG SWELLING: Primary | ICD-10-CM

## 2025-03-28 RX ORDER — DIAPER,BRIEF,INFANT-TODD,DISP
1 EACH MISCELLANEOUS 2 TIMES DAILY
Qty: 28 G | Refills: 1 | Status: SHIPPED | OUTPATIENT
Start: 2025-03-28

## 2025-04-02 ENCOUNTER — OFFICE VISIT (OUTPATIENT)
Dept: CARDIOLOGY | Facility: CLINIC | Age: 85
End: 2025-04-02
Payer: MEDICARE

## 2025-04-02 VITALS
HEART RATE: 66 BPM | SYSTOLIC BLOOD PRESSURE: 157 MMHG | OXYGEN SATURATION: 97 % | DIASTOLIC BLOOD PRESSURE: 66 MMHG | BODY MASS INDEX: 33.79 KG/M2 | HEIGHT: 55 IN | WEIGHT: 146 LBS

## 2025-04-02 DIAGNOSIS — G45.9 TRANSIENT ISCHEMIC ATTACK (TIA): ICD-10-CM

## 2025-04-02 DIAGNOSIS — I10 ESSENTIAL HYPERTENSION: ICD-10-CM

## 2025-04-02 DIAGNOSIS — E78.2 MIXED HYPERLIPIDEMIA: Primary | ICD-10-CM

## 2025-04-02 NOTE — PROGRESS NOTES
Dago Nunez  [id number]  1940  84 y.o.  female    Referring Provider: Shaheen Akbar DO    Reason for  Visit:  Here for routine follow up     Subjective    S/P uneventful right carotid  surgery and recovered well  RIGHT TRANSCAROTID ARTERY REVASCULARIZATION     Also had cataract surgery and doing well   Surgical wound healed very well. No evidence of excessive bruising, pain, redness, swelling, discharge or foul odor noted post op per patient      Mild chronic exertional shortness of breath on exertion relieved with rest  No significant cough or wheezing    No palpitations  No associated chest pain  No significant pedal edema    No fever or chills  No significant expectoration    No hemoptysis  No presyncope or syncope    Tolerating current medications well with no untoward side effects   Compliant with prescribed medication regimen. Tries to adhere to cardiac diet.   Ex smoker   Now in wheel chair   BP well controlled at home.    BP in clinic as below        History of present illness:  Dago Nunez is a 84 y.o. yo female with carotid stenosis who presents today for   Chief Complaint   Patient presents with    Mixed hyperlipidemia     Pt stated she feels good.   .    History  Past Medical History:   Diagnosis Date    Anemia in stage 3 chronic kidney disease 11/11/2019    Aneurysm of carotid artery     Arthritis     Breast cancer 09/14/2021    Left Mastectomy w/ sentinel node Bx    Bronchitis     Carotid stenosis     Cellulitis     ROSA LEGS    CKD (chronic kidney disease)     Coronary artery disease     Disease of thyroid gland     hypothyroid    GERD (gastroesophageal reflux disease)     History of transfusion     Hyperlipidemia     Hypertension     Kyphosis     Macrocytic anemia     Osteoporosis     Personal history of COVID-19 05/2022    Scoliosis     Self-catheterizes urinary bladder     10FR SIZED CATH    Stage 3 chronic kidney disease 11/11/2019    TIA (transient ischemic attack)     Type  2 diabetes mellitus     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 Laguerre-Normal colonoscopy; Normal terminal ileum; Repeat 5 years    COLONOSCOPY N/A 11/05/2021    Petechia(e) in the rectum, in the recto-sigmoid colon and in the distal sigmoid colon; No specimens collected; No plans to repeat colonoscopy due to advance age and/or medical problems    CYSTOSCOPY N/A 09/20/2022    Procedure: CYSTOSCOPY WITH URETHRAL DILATATION;  Surgeon: Shaheen Conway MD;  Location: [facility];  Service: Urology;  Laterality: N/A;    ENDOSCOPY  07/01/2014    Normal esophagus; Normal stomach; Normal examined duodenum; BRAVO pH capsule deployed;     ENDOSCOPY  08/14/2013    Mild gastritis-biopsies for H.Pylor obtained    ENDOSCOPY  02/16/2005    Dr. LaguerreXxupj-Abpbtlocy-qldhydoe    ENDOSCOPY  10/21/2003    Dr. Laguerre-Stage 1 reflux esophagitis    ENDOSCOPY N/A 11/05/2021    Small HH; A single gastroesophageal junction polyp; Normal stomach; A single non-bleeding angiodysplastic lesion in the duodenum    HYSTERECTOMY      MASTECTOMY W/ SENTINEL NODE BIOPSY Left 09/14/2021    Procedure: LEFT PARTIAL MASTECTOMY WITH MAGSEED AND LEFT SENTINEL LYMPH NODE BIOPSY MAGTRACE;  Surgeon: Quynh Rosario MD;  Location: [facility];  Service: General;  Laterality: Left;    TONSILLECTOMY      VAGINA SURGERY      Laser surgery X 2    VENOUS ACCESS DEVICE (PORT) INSERTION N/A 09/29/2020    Procedure: SINGLE LUMEN PORT - A- CATH PLACEMENT WITH FLUOROSCOPY;  Surgeon: Quynh Rosario MD;   Location: [facility] OR;  Service: General;  Laterality: N/A;   ,   Family History   Problem Relation Age of Onset    Cancer Mother     Hypertension Mother     Osteoporosis Mother     Dementia Mother     Uterine cancer Mother     Heart disease Father     Parkinsonism Father     Cancer Sister     Breast cancer Sister     Kidney cancer Sister     Diabetes Brother     Heart disease Paternal Grandfather     No Known Problems Maternal Grandmother     No Known Problems Maternal Grandfather     No Known Problems Paternal Grandmother     Colon cancer Neg Hx     Colon polyps Neg Hx     Esophageal cancer Neg Hx     Liver cancer Neg Hx     Liver disease Neg Hx     Rectal cancer Neg Hx     Stomach cancer Neg Hx    ,   Social History     Tobacco Use    Smoking status: Former     Current packs/day: 0.25     Average packs/day: 0.3 packs/day for 3.0 years (0.8 ttl pk-yrs)     Types: Cigarettes     Passive exposure: Past    Smokeless tobacco: Never    Tobacco comments:     social smoker in 1Ring   Vaping Use    Vaping status: Never Used   Substance Use Topics    Alcohol use: Not Currently     Comment: Occasional glass of wine    Drug use: No   ,     Medications  Current Outpatient Medications   Medication Sig Dispense Refill    Acetaminophen (TYLENOL ARTHRITIS PAIN PO) Take 1 tablet by mouth Daily As Needed (BACK PAIN).      albuterol sulfate  (90 Base) MCG/ACT inhaler Inhale 2 puffs Every 4 (Four) Hours As Needed for Shortness of Air.      aspirin 81 MG EC tablet Take 1 tablet by mouth Daily.      bisoprolol-hydrochlorothiazide (ZIAC) 5-6.25 MG per tablet Take 1 tablet by mouth Daily. 90 tablet 1    bumetanide (BUMEX) 1 MG tablet TAKE 1 TABLET BY MOUTH 2 TIMES DAILY 60 tablet 1    cetirizine (zyrTEC) 10 MG tablet Take 1 tablet by mouth Daily As Needed for Allergies.      citalopram (CeleXA) 20 MG tablet TAKE 1 TABLET BY MOUTH 1 TIME DAILY 30 tablet 1    clindamycin (CLEOCIN T) 1 % lotion Apply 1 Application topically to the  appropriate area as directed Daily As Needed (Rash On Legs).      cyanocobalamin 1000 MCG/ML injection Inject 1 mL into the appropriate muscle as directed by prescriber Every 30 (Thirty) Days.      diphenoxylate-atropine (LOMOTIL) 2.5-0.025 MG per tablet TAKE 2 TABLETS BY MOUTH 4 TIMES DAILY AS NEEDED FOR DIARRHEA 90 tablet 2    esomeprazole (nexIUM) 40 MG capsule TAKE 1 CAPSULE TWICE DAILY 180 capsule 3    Homeopathic Products (LEG CRAMPS) tablet Take 1 tablet by mouth Daily.      HYDROcodone-acetaminophen (NORCO)  MG per tablet Take 0.5 tablets by mouth Every 4 (Four) Hours As Needed for Moderate Pain or Severe Pain. 60 tablet 0    letrozole (FEMARA) 2.5 MG tablet TAKE 1 TABLET BY MOUTH 1 TIME DAILY 90 tablet 3    levothyroxine (SYNTHROID, LEVOTHROID) 75 MCG tablet TAKE 1 TABLET BY MOUTH EVERY DAY 90 tablet 1    metaxalone (SKELAXIN) 800 MG tablet TAKE 1 TABLET BY MOUTH 3 TIMES DAILY AS NEEDED FOR MUSCLE SPASMS 30 tablet 0    metOLazone (ZAROXOLYN) 2.5 MG tablet TAKE 1 TABLET BY MOUTH EVERY DAY AS NEEDED FOR WEIGHT GAIN OF 3 LBS OR MORE IN 24 HOURS. 30 tablet 5    nystatin-triamcinolone (MYCOLOG II) 828744-7.1 UNIT/GM-% cream Apply 1 Application topically to the appropriate area as directed 2 (Two) Times a Day As Needed (rash under breast).      nystatin-triamcinolone (MYCOLOG) 477404-8.1 UNIT/GM-% ointment Apply 1 Application topically to the appropriate area as directed 2 (Two) Times a Day. 30 g 3    olmesartan (BENICAR) 20 MG tablet TAKE 1 TABLET BY MOUTH 1 TIME DAILY 90 tablet 2    phenazopyridine (PYRIDIUM) 200 MG tablet Take 1 tablet by mouth 3 (Three) Times a Day As Needed for Dysuria. 15 tablet 0    potassium chloride (KLOR-CON M20) 20 MEQ CR tablet TAKE 2 TABLETS BY MOUTH EVERY DAY 60 tablet 5    potassium chloride ER (K-TAB) 20 MEQ tablet controlled-release ER tablet Take 2 tablets by mouth Daily.      pravastatin (PRAVACHOL) 40 MG tablet TAKE 1 TABLET BY MOUTH AT BEDTIME 90 tablet 1    Probiotic  Product (PROBIOTIC DAILY PO) Take 1 tablet by mouth Daily.      saccharomyces boulardii (Florastor) 250 MG capsule Take 1 capsule by mouth 2 (Two) Times a Day. 5 capsule 0    sodium bicarbonate 650 MG tablet Take 1 tablet by mouth 2 (Two) Times a Day.      vitamin D (ERGOCALCIFEROL) 1.25 MG (01344 UT) capsule capsule TAKE 1 CAPSULE BY MOUTH ON THE SAME DAY EACH WEEK. 12 capsule 3    memantine (Namenda) 5 MG tablet Take 1 tablet by mouth 2 (Two) Times a Day. 60 tablet 2    tacrolimus (PROTOPIC) 0.1 % ointment Apply 1 Application topically to the appropriate area as directed 2 (Two) Times a Day As Needed (dermatitis).       No current facility-administered medications for this visit.     Facility-Administered Medications Ordered in Other Visits   Medication Dose Route Frequency Provider Last Rate Last Admin    heparin injection 500 Units  500 Units Intravenous Drake Helms MD   500 Units at 07/01/21 1354    heparin injection 500 Units  500 Units Intravenous Drake Helms MD   500 Units at 01/11/22 1134    heparin injection 500 Units  500 Units Intravenous Drake Helms MD   500 Units at 09/28/22 1418    heparin injection 500 Units  500 Units Intravenous Drake Helms MD   500 Units at 01/25/23 1537    heparin injection 500 Units  500 Units Intravenous Drake Helms MD   500 Units at 06/26/23 1426    heparin injection 500 Units  500 Units Intravenous Drake Helms MD   500 Units at 03/05/24 1350    heparin injection 500 Units  500 Units Intravenous Drake Helms MD   500 Units at 10/02/24 1508    heparin injection 500 Units  500 Units Intravenous Drake Helms MD   500 Units at 01/15/25 1410    sodium chloride 0.9 % flush 10 mL  10 mL Intravenous Drake Helms MD   10 mL at 07/01/21 1354    sodium chloride 0.9 % flush 10 mL  10 mL Intravenous Drake Helms MD   10 mL at 01/11/22 1134    sodium chloride 0.9 % flush 10 mL  10 mL Intravenous Drake Helms MD   10 mL  "at 09/28/22 1418    sodium chloride 0.9 % flush 10 mL  10 mL Intravenous PRN Drake Stafford MD   10 mL at 01/25/23 1537    sodium chloride 0.9 % flush 10 mL  10 mL Intravenous PRN Drake Stafford MD   10 mL at 06/26/23 1426    sodium chloride 0.9 % flush 10 mL  10 mL Intravenous PRN Drake Stafford MD   10 mL at 03/05/24 1350    sodium chloride 0.9 % flush 10 mL  10 mL Intravenous PRN Drake Stafford MD   10 mL at 10/02/24 1508    sodium chloride 0.9 % flush 10 mL  10 mL Intravenous PRN Drake Stafford MD   10 mL at 01/15/25 1402       Allergies:  Scopolamine, Amoxicillin-pot clavulanate, Keflex [cephalexin], Septra [sulfamethoxazole-trimethoprim], Tequin [gatifloxacin], and Trovan [alatrofloxacin]    Review of Systems  Review of Systems   Constitutional: Positive for malaise/fatigue.   HENT: Negative.     Eyes: Negative.    Cardiovascular:  Positive for dyspnea on exertion. Negative for chest pain, claudication, cyanosis, irregular heartbeat, leg swelling, near-syncope, orthopnea, palpitations, paroxysmal nocturnal dyspnea and syncope.   Respiratory: Negative.     Endocrine: Negative.    Hematologic/Lymphatic: Negative.    Skin: Negative.    Musculoskeletal:  Positive for arthritis and joint pain.   Gastrointestinal:  Negative for anorexia.   Genitourinary: Negative.    Neurological: Negative.    Psychiatric/Behavioral: Negative.         Objective     Physical Exam:  /66   Pulse 66   Ht 137.2 cm (54\")   Wt 66.2 kg (146 lb)   SpO2 97%   BMI 35.20 kg/m²     Physical Exam  Constitutional:       Appearance: She is well-developed.   HENT:      Head: Normocephalic.   Neck:      Vascular: Normal carotid pulses. No carotid bruit or JVD.      Trachea: No tracheal tenderness or tracheal deviation.   Cardiovascular:      Rate and Rhythm: Regular rhythm.      Pulses: Normal pulses.      Heart sounds: Normal heart sounds.   Pulmonary:      Effort: Pulmonary effort is normal.      Breath sounds: No stridor.   Abdominal: "      Palpations: Abdomen is soft.   Musculoskeletal:      Cervical back: No edema.   Skin:     General: Skin is warm.   Neurological:      Mental Status: She is alert.      Cranial Nerves: No cranial nerve deficit.      Sensory: No sensory deficit.   Psychiatric:         Speech: Speech normal.         Behavior: Behavior normal.         Results Review:    Diagnostic Data:  MRI Angiogram Neck Without Contrast     Result Date: 6/21/2024  Narrative: EXAMINATION: MRI ANGIOGRAM NECK WO CONTRAST- 6/21/2024 9:20 AM  HISTORY: Carotid artery stenosis.  REPORT: 3D time-of-flight MR a of the neck was performed without intravenous contrast to evaluate the carotid and vertebral arteries.  COMPARISON: Ultrasound of the carotid arteries 5/20/2024.  There is mild motion artifact. On the right, the common carotid artery is patent, there is limited visualization of the vessel origin. There is narrowing of the proximal right ICA with estimated 2D diameter of 2.8 mm, centered approximately 1.3 cm from its origin. Compared with the more distal caliber measurements this stenosis is less than 50%. The right external carotid artery is patent. The distal right ICA appears normally patent.  On the left, the common carotid artery is patent, with limited visualization at its origin and proximal segment due to tortuosity. There is no significant stenosis involving the left ICA. There is moderate focal stenosis of the left external carotid artery approximately 9 mm distal to its origin, without occlusion. The distal left ICA appears normally patent.  Both vertebral arteries are patent, the left vertebral artery appears dominant compared to the right. No evidence of vertebral dissection is identified.       Impression: 1. No flow-limiting stenosis involving the right or left ICA. 2. Patency of the vertebral arteries with no stenosis or dissection. 3. Moderate short segment stenosis of the proximal left external carotid artery.  This report was  signed and finalized on 6/21/2024 9:26 AM by Dr. Gabino Capone MD.         Right carotid: upon personal review, there is about 70% right carotid stenosis       History: Carotid occlusive disease     IMPRESSION:  Impression:  1. There is 50 to 69% stenosis of the right internal carotid artery.  2. There is less than 50% stenosis of the left internal carotid artery.  3. Antegrade flow is demonstrated in bilateral vertebral arteries.  4. There is heavy plaque burden at the right bifurcation. Further  imaging/evaluation may be warranted with a CTA of the neck.     Comments: Bilateral carotid vertebral arterial duplex scan was  performed.     Grayscale imaging shows intimal thickening and calcified elements at the  carotid bifurcation. The right internal carotid artery peak systolic  velocity is 187 cm/sec. The end-diastolic velocity is 21.4 cm/sec. The  right ICA/CCA ratio is approximately 1.8. These findings correlate with  50 to 69% stenosis of the right internal carotid artery.     Grayscale imaging shows intimal thickening and calcified elements at the  carotid bifurcation. The left internal carotid artery peak systolic  velocity is 110.6 cm/sec. The end-diastolic velocity is 20.7 cm/sec. The  left ICA/CCA ratio is approximately 0.7. These findings correlate with  less than 50% stenosis of the left internal carotid artery.     Antegrade flow is demonstrated in bilateral vertebral arteries.  There is greater than 50% stenosis in the left common carotid artery and  bilateral external carotid arteries.     This report was signed and finalized on 5/21/2024 1:12 PM by Dr. Junior Meyers MD.       Narrative & Impression   EXAMINATION: MRI ANGIOGRAM NECK WO CONTRAST- 6/21/2024 9:20 AM     HISTORY: Carotid artery stenosis.     REPORT: 3D time-of-flight MR a of the neck was performed without  intravenous contrast to evaluate the carotid and vertebral arteries.     COMPARISON: Ultrasound of the carotid arteries  5/20/2024.     There is mild motion artifact. On the right, the common carotid artery  is patent, there is limited visualization of the vessel origin. There is  narrowing of the proximal right ICA with estimated 2D diameter of 2.8  mm, centered approximately 1.3 cm from its origin. Compared with the  more distal caliber measurements this stenosis is less than 50%. The  right external carotid artery is patent. The distal right ICA appears  normally patent.     On the left, the common carotid artery is patent, with limited  visualization at its origin and proximal segment due to tortuosity.  There is no significant stenosis involving the left ICA. There is  moderate focal stenosis of the left external carotid artery  approximately 9 mm distal to its origin, without occlusion. The distal  left ICA appears normally patent.     Both vertebral arteries are patent, the left vertebral artery appears  dominant compared to the right. No evidence of vertebral dissection is  identified.     IMPRESSION:  1. No flow-limiting stenosis involving the right or left ICA.  2. Patency of the vertebral arteries with no stenosis or dissection.  3. Moderate short segment stenosis of the proximal left external carotid  artery.     This report was signed and finalized on 6/21/2024 9:26 AM by Dr. Gabino Capone MD.        US Carotid Bilateral [[id number]] (Order [id number])  Order  Status: Final result     Appointment Information    PACS Images     Radiology Images  Study Result    Narrative & Impression   History: Carotid occlusive disease     IMPRESSION:  Impression:  1. There is 50 to 69% stenosis of the right internal carotid artery.  2. There is less than 50% stenosis of the left internal carotid artery.  3. Antegrade flow is demonstrated in bilateral vertebral arteries.  4. There is heavy plaque burden at the right bifurcation. Further  imaging/evaluation may be warranted with a CTA of the neck.       Results for orders placed during the  "hospital encounter of 07/02/24    Adult Stress Echo W/ Cont or Stress Agent if Necessary Per Protocol    Interpretation Summary    Left ventricular ejection fraction appears to be 56 - 60%.    The patient denied chest pain.    Stress induced distal anterior wall hypokinesis noted in some views    Equivocal stress echo for ischemia but overall appears to be lower risk     Complete Transthoracic Echocardiogram with Complete Doppler, Color Flow and Contrast    Accession Number: [id number]   Date of Study: 5/20/24   Ordering Provider: Oleksandr Oviedo MD    Clinical Indications: Cardiac source of emboli - Stroke        Reading Physicians  Performing Staff   Cardiology: Azam Delgado DO     Tech: Sandi Lugo         Patient Hx Of Height, Weight, and Vitals    Height Weight BSA (Calculated - sq m) BMI (Calculated) Retired BMI (kg/m2) Pulse BP   142.2 cm (56\") 72.6 kg (160 lb) 1.61 sq meters 35.9  70 150/45      [facility] PACS Images     Show images for Adult Transthoracic Echo Complete W/ Cont if Necessary Per Protocol (With Agitated Saline)   Clinical Indication    Cardiac source of emboli - Stroke     Interpretation Summary         Left ventricular systolic function is normal. Left ventricular ejection fraction appears to be 66 - 70%.    Left ventricular wall thickness is consistent with mild concentric hypertrophy.    Left ventricular diastolic function is consistent with (grade II w/high LAP) pseudonormalization.    Normal right ventricular cavity size and systolic function noted.    Saline test results are negative for right to left atrial level shunt.    Severe mitral annular calcification is present. There is moderate calcification of the mitral valve. Mild mitral valve regurgitation is present. No significant mitral valve stenosis is present.    Mild pulmonary hypertension is present.      "   ____________________________________________________________________________________________________________________________________________  Health maintenance and recommendations    Low salt/ HTN/ Heart healthy carbohydrate restricted cardiac diet   The patient is advised to reduce or avoid caffeine or other cardiac stimulants.   Minimize or avoid  NSAID-type medications      Monitor for any signs of bleeding including red or dark stools. Fall precautions.   Advised staying uptodate with immunizations per established standard guidelines.    Offered to give patient  a copy of my notes     Questions were encouraged, asked and answered to the patient's  understanding and satisfaction. Questions if any regarding current medications and side effects, need for refills and importance of compliance to medications stressed.    Reviewed available prior notes, consults, prior visits, laboratory findings, radiology and cardiology relevant reports. Updated chart as applicable. I have reviewed the patient's medical history in detail and updated the computerized patient record as relevant.      Updated patient regarding any new or relevant abnormalities on review of records or any new findings on physical exam. Mentioned to patient about purpose of visit and desirable health short and long term goals and objectives.    Primary to monitor CBC CMP Lipid panel and TSH as applicable    ___________________________________________________________________________________________________________________________________________     ECG 12 Lead    Date/Time: 4/2/2025 11:38 AM  Performed by: Tam Maldonado MD    Authorized by: Tam Maldonado MD  Comparison: compared with previous ECG from 5/19/2024  Comparison to previous ECG: Ventricular rate changed from 58  to 67  beats per minute      Rhythm: sinus rhythm  Rate: normal  Conduction: conduction normal  ST Segments: ST segments normal  T Waves: T waves normal  QRS axis: normal  Other: no  other findings    Clinical impression: normal ECG          Assessment & Plan   Diagnoses and all orders for this visit:    1. Mixed hyperlipidemia (Primary)    2. Essential hypertension    3. Transient ischemic attack (TIA)    Other orders  -     ECG 12 Lead          Plan    Overall doing well no new cardiovascular symptoms and therefore no additional cardiac testing is required prior to next visit  If any interim issues arise will call me for further evaluation.     Patient expressed understanding  Encouraged and answered all questions   Discussed with the patient and all questioned fully answered. She will call me if any problems arise.   Discussed results of prior testing with patient : echo and stress echo    as well electrocardiogram today      She will call primary for possible antibiotics for redness right shin   Keep LDL below 70 mg/dl. Monitor liver and renal functions.   Monitor CBC, CMP, TSH (as indicated) and Lipid Panel by primary     Check BP and heart rates twice daily initially till blood pressures and heart rates under good control and then at least 3x / week,   If blood pressures continue to be well-controlled then can check week a month  at home and bring a recording for review next visit  If BP >130/85 or < 100/60 persistently over 3 reading 30 mins apart or if heart rates persistently above 100 bpm or less than 55 bpm call sooner for evaluation and advise               Return in about 1 year (around 4/2/2026).

## 2025-04-09 NOTE — PROGRESS NOTES
MGW ONC 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  She is on adjuvant letrozole for the past 42.5 months.  Patient had declined adjuvant radiation.  She is also seen for macrocytic anemia secondary to chronic kidney disease Stage IV, GFR 24.2 mL/minute on 6/14/2024, iron deficiency, and thrombocytopenia. She had PRBC 6/14/202.  She is intolerant to oral iron (nausea, stomach cramps, and constipation).  Patient given ferric carboxymaltose 9.5 months ago. She is also seen for vulvar cancer. She is seen 54.5 months post C1D1 Mitomycin C and 5FU with radiation. Her D29 to 32 chemo was cancelled due to hospitalization, poor tolerance, and poor performance status.  She is seen with Joseph, spouse. History is obtained from spouse.  Spouse is a reliable historian.         Pertinent history.  Squamous cell cancer in situ, urethra.  Followed by Dr. Callahan.       Oncology/Hematology History Overview Note   DIAGNOSTIC ABNORMALITIES: Breast cancer.  Screening mammogram 08/18/2021.New dense 7 mm partially obscured nodule in the upper outer quadrant of the left breast, with associated architectural distortion.  Diagnostic mammogram 08/20/2021.  Small subcentimeter suspicious spiculated mass at 12:00 in the left breast, approximately 3 cm to 4 cm deep from the nipple. This is suspicious for breast carcinoma, ultrasound-guided biopsy is recommended.  Sonogram 08/20/2021.  Small suspicious solid mass at 12:00 in the left breast. 5.5 x 5.1 x 4.7 mm, suspicious for breast carcinoma. This corresponds with the  finding on mammograms.  Successful ultrasound-guided left breast biopsy and clip on 2021.  Pathology report 2021.  Left breast at 12:00, core needle biopsies: Invasive carcinoma no special type (ductal).  Histologic grade (Abdullahi histologic score).   Glandular (acinar)/tubular differentiation: Score 1.   Nuclear pleomorphism: Score 2.   Mitotic rate: Score 1.  Overall grade: Grade 1. Maximum tumor diameter is at least 0.8 cm.  Estrogen 87%, progesterone 47% and negative HER-2/gabriela.  Genetic testing was sent.  Patient was seen by Dr. Quynh Rosario 2021.  She is .  She had first delivery at 20.  She took birth control for 1 month.  She took hormone replacement therapy for approximately 5 years.  Family history positive for breast cancer, sister at 78. No ovarian cancer  Chest x-ray 2021.  No acute cardiopulmonary process.  Pathology report 2021.  Left sentinel lymph nodes: Four of 4 lymph nodes are negative for metastatic mammary carcinoma.Comment: Absence of micrometastases is confirmed utilizing immunohistochemical stains for pankeratin.  Left breast, partial mastectomy:  A.  Invasive carcinoma of no special type (ductal), grade 1 (1.1 cm in greatest dimension).  B.  Minor associated low-grade ductal carcinoma in situ component.  C.  The inked surgical margins and skin are negative for malignancy.  D.  Prior biopsy site changes including fat necrosis.  Comment: Microscopically, the tumor is approximately 7 mm from the closest inked (superior) surgical margin.  AJCC stage: pT1c snpN0.      PREVIOUS INTERVENTIONS:  She had undergone left partial mastectomy with left sentinel lymph node biopsy 2021 by Dr. Rosario.  Declined adjuvant radiation.  Adjuvant Femara 10/01/2021 through present.        DIAGNOSTIC ABNORMALITIES: Vulvar cancer  She had developed recurrent vulvar cancer left inguinal node that is PET positive measuring 2.1 cm.  The patient was seen by Dr. Marks in  favor definitive chemo radiation.  Pathology report 07/10/2020 periurethral biopsy, poorly differentiated carcinoma with squamous and papillary features, focally invasive in the subepithelial connective tissue.  PET 07/31/2020 showed intense FDG avid left inguinal node is highly suspicious for local regional metastasis from known vulvar squamous cell carcinoma.  No additional sites of suspicious FDG activity.  MRI 07/31/2020 showed redemonstration of mildly T2 hyperintense mass involving the urethral meatus, similar dimensions to prior exam on 02/18/2020 however there is now a polypoid lesion seen along the anterior aspect of the perineum, possibly contiguous with the urethral mass, concerning for disease progression.  Market interval enlargement of the left inguinal node almost certainly representing disease involvement.  Patient was notified by Dr. Siddiqui 08/19/2020.  Consensus for chemoradiation.  Patient reluctant to take chemotherapy.  Follow-up with Dr. Siddiqui in 4 to 6 weeks after radiation is completed.         PREVIOUS INTERVENTIONS:  Mitomycin C and 5-FU 10/05/2020 through 10/08/2020 with radiation completed 12/10/2020 at [facility].  D29 to d32 of chemo discontinued due to poor performance status.       DIAGNOSTIC ABNORMALITIES:   The patient was seen by Dr. Greg Alberts 01/29/2018 complaining of coughing and wheezing. The patient was given Tussionex. Blood work was ordered. CBC 01/29/2018 revealed a WBC of 7.8, hemoglobin 11.2, hematocrit 35.1, MCV 96.2, platelets 43,000, and ANC 4.47. CMP remarkable for of glucose of 177 and GFR 59 mL/minute.  The patient was seen by VINCENT Jones, 02/02/2018. She was coughing and wheezing. Tussionex did not help. The patient was given prednisone.  The patient was seen by VINCENT Jones, 02/15/2018. She is followed for anemia from iron deficiency. CBC 02/15/2018 revealed a WBC of 12.9, hemoglobin 11.4, hematocrit 34.5, MCV 95, and  "platelets 68,000.       PREVIOUS INTERVENTIONS:   Ferrous sulfate 325 mg 03/07/2018 through 05/06/2018.  Not resumed 06/08/2018. \"I misunderstood.\"   Resume 09/05/2018 through 10/03/2018, stopped due to intolerance.  Injectafer 750 mg 10/20/2018 at the [place] office.  Retacrit 10/27/2020 through present.  1 unit packed RBC 6/14/2024.  Injectafer 750 mg 5/18/2021, 1/26/2022, 5/25/2022 and 6/21/2024 at [facility]           Vulvar cancer, carcinoma    Initial Diagnosis    Vulvar cancer, carcinoma (CMS/HCC)     3/19/2001 Biopsy    Clinical Features: red vaginal lesion with bleeding since 1995. TX with  Carafate douche and Premarin vaginal cream with intermittent improvement.    DIAGNOSIS:    VAGINA, BIOPSY: FOCAL ULCERATION, MARKED ACUTE AND CHRONIC INFLAMMATION,  SPONGIOSIS AND REACTIVE ATYPIA; NEGATIVE FOR DYSPLASIA.     8/17/2001 Procedure    Pap Smear:  DIAGNOSIS:            Atypical keratinized squamous cells, suspicious                           for squamous cell carcinoma.         COMMENTS:             There are numerous atypical keratinized cells                           present in an inflammatory background.  These are                           suspicious for squamous cell carcinoma.  Biopsy                           confirmation is recommended.      10/16/2001 Procedure    Pap Smear:  DIAGNOSIS:            Mild dysplasia       ADDITIONAL FINDINGS:  Marked inflammation                           Excessive hyperkeratosis         BETHESDA SYSTEM:      Low grade squamous intraepithelial lesion    DIAGNOSIS:            Negative, no abnormal cells seen           BETHESDA SYSTEM:      Within normal limits.       ADEQUACY:             Specimen satisfactory for evaluation       SOURCE:               Vagina, diagnostic thin prep PAP     11/7/2001 Biopsy      DIAGNOSIS:    VAGINA AND VULVA, RIGHT POSTERIOR INTROITUS, WIDE LOCAL EXCISION:  VAGINAL INTRAEPITHELIAL NEOPLASIA (VAIN) II-III, FOCALLY EXTENDING " TO  VAGINAL MARGIN AT 12-4:00; ALL OTHER MARGINS NEGATIVE FOR  INTRAEPITHELIAL NEOPLASIA. (SEE COMMENT)    COMMENT: The lesion involves vaginal type epithelium with associated  chronic inflammation and erosion. The adjacent vulvar epithelium is  hyperkeratotic.     3/15/2002 Procedure    Pap Smear:  BETHESDA SYSTEM:      High grade squamous intraepithelial lesion       DESCRIPTOR:           Moderate dysplasia           ADEQUACY:             Specimen satisfactory for evaluation       SOURCE:               Vagina, Thin Prep Pap, Diagnostic     6/13/2003 Procedure         BETHESDA SYSTEM:      High grade squamous intraepithelial lesion       DESCRIPTOR:           Moderate dysplasia       ADDITIONAL FINDINGS:  Inflammation         ADEQUACY:             Specimen satisfactory for evaluation       SOURCE:               Vagina, Thin Prep Pap, Diagnostic    HUMAN PAPILLOMAVIRUS         CASE ACCESSION #:    [id number]        Category                  HPV Types                Patient Results         High/Intermed Risk    16,18,31,33,35,39              Negative                            45,51,52,56,58,59,68      Specimen Source        Cervical / Vaginal Specimen     12/5/2003 Procedure    Gynecological Cytology        CASE ACCESSION #:     [id number]       BETHESDA SYSTEM:      Low grade squamous intraepithelial lesion       DESCRIPTOR:           Mild dysplasia           ADEQUACY:             Specimen satisfactory for evaluation       SOURCE:               Vagina, Thin Prep Pap, Diagnostic     11/29/2011 Biopsy    ADDENDUM FINDINGS:    The high grade MOODY in the right labia majora biopsy was of the usual type.  There was no evidence of differentiated MOODY.      DIAGNOSIS:    1) PERINEUM, BIOPSY: SQUAMOUS EPITHELIUM WITH FLORID HYPERKERATOSIS,  CONSISTENT WITH CHRONIC IRRITATION; NO EVIDENCE OF DYSPLASIA OR MALIGNANCY  (SEE COMMENT).    Comment: Immunohistochemical studies (p16, p53, MIB-1) confirm the  rendered  interpretations.    2) VULVA, RIGHT LABIUM MAJORUM, BIOPSY: VULVAR INTRAEPITHELIAL NEOPLASIA II  (MODERATE DYSPLASIA); (SEE COMMENT).    Comment: Immunohistochemical studies for p16 (nuclear and cytoplasmic  positivity in lower epithelial half) and MIB-1 (nuclear positivity in lower  epithelial half) confirms the diagnosis; p53 is positive only in rare  cells. A GMS histochemical study for fungal forms is negative.  Nevertheless, parakeratosis associated with neutrophils, as was seen  herein, is commonly associated with fungal vulvitis.     7/3/2012 Procedure    Gynecological Cytology    CASE ACCESSION #:                     [id number]  BETHESDA SYSTEM:                      High grade squamous intraepithelial                                        lesion  DESCRIPTOR:                           Mild to moderate dysplasia  ADEQUACY:                             Specimen satisfactory for evaluation  SOURCE:                               Vagina, Thin Prep Pap, Diagnostic  # [id number] REVIEWED:                    1     1/29/2013 Biopsy    DIAGNOSIS:    1) VULVA, RIGHT, ANTERIOR, BIOPSY:  SPONGIOTIC DERMATITIS WITH EXTENSIVE  DERMAL GRANULATION TISSUE, MIXED INFLAMMATION AND FIBROSIS (SEE COMENT).    Comment: Sections show spongiosis, basement membrane thickening, dermal  fibrosis, and extensive dermal granulation tissue with a predominantly  chronic inflammatory infiltrate. There is no evidence of dysplasia or  malignancy. The basement membrane thickening and dermal fibrosis suggests  that there may be component of lichen sclerosus. However, the underlying  cause of the ongoing inflammation and repair (granulation tissue) is not  clear from this sample. A tissue gram stain fails to reveal any large  bacterial aggregates.  GMS and AFB studies for fungal forms and acid fast  bacilli were negative respectively     11/27/2013 Surgery      Diagnosis    1) VULVA, LEFT ANTERIOR, BIOPSY: HIGH GRADE SQUAMOUS INTRAEPITHELIAL  LESION (SEVERE DYSPLASIA, MOODY III).    2) VAGINAL WALL, ANTERIOR, BIOPSY: HIGH GRADE SQUAMOUS INTRAEPITHELIAL LESION (SEVERE DYSPLASIA, VaIN III).    3) VULVA, VULVECTOMY: FOCUS OF MICROINVASIVE SQUAMOUS CELL CARCINOMA, WELL-DIFFERENTIATED, DEPTH OF STROMAL INVASION 0.4 MM,  8 MM FROM CLOSEST DEEP MARGINS, 4 MM FROM RIGHT ANTERIOR VAGINAL MARGINS AT 8 - 9 O'CLOCK (PROXIMAL TIP OF SPECIMEN DESIGNATED   12 O'CLOCK); NEGATIVE FOR LYMPHOVASCULAR INVASION; ARISING IN A BACKGROUND OF EXTENSIVE VULVAR INTRAEPITHELIAL NEOPLASIA (SEVERE DYSPLASIA, MOODY III, CLASSICAL AND DIFFERENTIATED TYPES); MOODY III IS PRESENT AT RIGHT LATERAL SURGICAL MARGIN (7 - 9 O'CLOCK),   LEFT LATERAL SURGICAL MARGIN (3 - 5 O'CLOCK) AND RIGHT AND LEFT VAGINAL MARGINS; TOTAL LESIONAL AREA (INVASIVE CARCINOMA AND MOODY III) MEASURES 8.2 CM IN GREATEST DIMENSION (GROSS MEASUREMENT); BACKGROUND SEVERE INTERFACE DERMATITIS AND LICHEN SCLEROSUS.        **Electronically signed out by Dimas Cardenas M.D.**on 12/2/2013  HAYLEY/YAZMIN/franky  Case reviewed by Attending Pathologist      Synoptic Diagnosis  3)  VULVA:     Specimen:                        Vulva  Procedure:                       Other: Vulvectomy  Lymph Node Sampling:             Not applicable  Specimen Size:                   Greatest dimension: 13.5 cm                                   Additional dimension: 9.5 cm                                   Additional dimension: 1.2 cm  Tumor Site:                      Right vulva, labium minus  Tumor Size:                      Greatest dimension: 0.04 cm  Tumor Focality:                  Unifocal  Histologic Type:                 Squamous cell carcinoma  Histologic Grade:                G1: Well differentiated  Microscopic Tumor Extension:     Depth of invasion: 0.4 mm  Margins:                         Uninvolved by invasive carcinoma                                   Distance of invasive carcinoma from closest margin: 4 mm  Lymph-Vascular Invasion:          Not identified  Lymph Nodes:                     No nodes submitted or found  Extranodal Extension:            Cannot be determined (explain):    Fixed or Ulcerated Femoral-Inguinal Lymph Nodes:                                    Not identified  Laterality of Involved Lymph Nodes:                                    Cannot be determined:    Primary Tumor (pT):              pT1a [FIGO IA]: Lesions 2 cm or less in size, confined to the vulva or perineum, and with stromal invasion 1.0 mm or less  Regional Lymph Nodes (pN):       pNX:  Regional lymph nodes cannot be assessed  Distant Metastasis (pM):         Not applicable  Additional Pathologic Findings:  Vulvar intraepithelial neoplasia (MOODY) 3 (severe dysplasia/carcinoma in situ)  --------------------------------------------------------     5/20/2014 Procedure    Gynecologic Cytology  Epps Diagnosis:  High grade squamous intraepithelial lesion.    Descriptor/Additional Findings:  Moderate dysplasia.    Adequacy:  Specimen satisfactory for evaluation.    Source:  Vagina, Thin Prep Pap, Imaged Diagnostic     11/11/2014 Biopsy    Diagnosis    ANTERIOR VAGINAL WALL, BIOPSY:  HIGH GRADE SQUAMOUS INTRAEPITHELIAL LESION (VaIN 3, SEVERE DYSPLASIA)       12/19/2014 Surgery    Diagnosis  1)  ANTERIOR VAGINAL WALL, PARTIAL VAGINECTOMY: HIGH-GRADE SQUAMOUS INTRAEPITHELIAL LESION (VaIN 3, SEVERE DYSPLASIA), 2.7 CM; HIGH GRADE DYSPLASIA EXTENDS TO THE 2 AND 8 O'CLOCK TIP MARGINS, THE 5-8 O'CLOCK LATERAL MARGIN, AND THE 11-2 O'CLOCK LATERAL   MARGIN.          2)  JOYA-CLITORAL AREA, EXCISION: HIGH-GRADE SQUAMOUS INTRAEPITHELIAL LESION (VaIN 3, SEVERE DYSPLASIA), EXTENDING TO A LATERAL MARGIN.         2/9/2015 Surgery    Diagnosis  1.          VULVA, LEFT PERIURETHRAL PORTION, EXCISION: FOCAL HIGH-GRADE SQUAMOUS INTRAEPITHELIAL LESION (MOODY 2, MODERATE DYSPLASIA); MARGINS ARE NEGATIVE FOR DYSPLASIA.    2.          VULVA, RIGHT PERIURETHRAL PORTION, EXCISION: BENIGN SQUAMOUS MUCOSA  "WITH ACUTE AND CHRONIC INFLAMMATION AND REACTIVE CHANGES.         3.          VULVA, LEFT, LOWER PORTION, EXCISION: BENIGN SQUAMOUS MUCOSA WITH ACUTE AND CHRONIC INFLAMMATION, REACTIVE CHANGES AND FEATURES CONSISTENT WITH PREVIOUS SURGICAL PROCEDURE.         4.          VULVA, RIGHT PORTION, EXCISION: BENIGN SQUAMOUS MUCOSA WITH CHRONIC INFLAMMATION AND DERMAL FIBROSIS.        9/6/2017 Procedure    Diagnosis  \"PAP SMEAR FROM THE URETHRA\":  HIGH GRADE SQUAMOUS INTRAEPITHELIAL LESION.          10/26/2017 Biopsy    Urethral Meatus Biopsy:  Urothelial mucosa with ulceration, chronic inflammation and focal high grade squamous intraepithelial lesion, moderate dysplasia     7/10/2018 Imaging    MRI Pelvis:  Impression:    1.  There is a 2.3 x 1.8 cm urethral lesion at the external urethral   meatus demonstrating enhancement and T2 hyperintensity. The lesion is   located approximately 8 mm from the bladder neck/internal urethral   orifice.  There is no extension of the lesion into the para vaginal   fat or ischiorectal fossa. There is some edema of the bilateral   ischiocavernosus muscles without invasion by the mass. No pelvic or   inguinal adenopathy is identified.     2.  The patient has a 2.5 cm cystocele and the urethra is almost   horizontal. There is pelvic floor laxity with loss of upper convexity   of the left iliococcygeus muscle.     7/20/2018 Biopsy    Diagnosis  URETHRA, BIOPSY:  SQUAMOUS CELL CARCINOMA IN SITU; SEE COMMENT.    Comments  P16 immunostain and HPV RNA in situ hybridization are strongly and diffusely positive within the lesion, consistent with HPV-related pathogenesis.     1/8/2019 Imaging    MRI Pelvis:  1.  Stable size and appearance of a nonspecific enhancing nodular   lesion at the caudal margin of the vaginal introitus adjacent to   extensive postsurgical changes related to prior vulvectomy and   vaginectomy. This lesion does not appear to conform to the expected   course of the urethra which " is somewhat poorly defined, and this felt   more likely be located immediately caudal to the urethra. It is   possible this may reflect postsurgical scarring as opposed to a true   lesion. Continued follow-up is suggested to ensure stability.  2.  No lymphadenopathy or other evidence of metastatic disease in the   pelvis.  3.  Evidence of pelvic floor laxity with cystocele, not significantly   changed.     8/6/2019 Imaging    MRI Pelvis:  1. No significant change from the prior exam on this limited   noncontrast study.  2. Stable indeterminate nodule anterior to the external urethral   meatus which may represent focal scarring; however,   residual/recurrent disease is not entirely excluded.  Recommend continued attention on follow-up. 3. Extensive pelvic   postsurgical changes with no evidence of local recurrence.  4. Pelvic floor laxity with cystocele unchanged from prior exam.     1/13/2020 Procedure    Urethral stricture dilation     2/18/2020 Imaging    MRI Pelvis:  Impression:  1.  No significant interval change in 1.7 x 1.7 cm T2 hyperintense   ovoid lesion at the urethral meatus.  2.  Numerous postoperative findings status post vaginectomy and   hysterectomy.  3.  Pelvic floor laxity with persistent cystocele.  4.  No pelvic adenopathy or bony lesion identified.     5/13/2020 Procedure    Urethral stricture dilation      7/10/2020 Biopsy    Diagnosis  PERIURETHRA, BIOPSY: POORLY DIFFERENTIATED CARCINOMA WITH SQUAMOUS AND PAPILLARY FEATURES, FOCALLY INVASIVE IN THE SUBEPITHELIAL CONNECTIVE TISSUE; SEE COMMENT.    Comments  The patient's history of vulvar microinvasive squamous cell carcinoma is noted. The current biopsy shows a poorly differentiated carcinoma with papillary formation. P16 immunostain and HPV RNA in situ hybridization are strongly and diffusely positive within the lesion, consistent with HPV-related pathogenesis. A KERRI-3 immunostain shows patchy, weak positivity in the lesional cells. The  patient's prior biopsy ([id number]) is reviewed and shows similar morphologic and immunophenotypic features but the papillary features were not present in the prior material.  Dr. Ilene Pablo reviewed this case and concurs with the diagnosis.      7/31/2020 Imaging    MRI Pelvis:  1.  Redemonstration of a mildly T2 hyperintense mass involving the   urethral meatus, similar dimensions to prior exam on 2/18/2020,   however there is now a polypoid lesion seen along the anterior aspect   of the perineum, possibly contiguous with the urethral mass,   concerning for disease progression. This could potentially represent   the recently biopsied lesion.  2.  Marked interval enlargement of a left inguinal lymph node, almost   certainly representing disease involvement. This would be amenable to   ultrasound-guided biopsy if warranted.  3.  Findings related to pelvic floor laxity, with cystocele.    PET/CT:  1.  Intensely FDG avid left inguinal lymph node is highly suspicious   for locoregional metastasis from known vulvar squamous cell   carcinoma. There are no additional sites of suspicious FDG activity.  2.   Intense physiologic FDG activity within the urine obscures   visualization of the periurethral mass. Findings are better   characterized on same day pelvic MRI.     9/21/2020 - 12/10/2020 Radiation    Radiation OncologyTreatment Course:  Dago Nunez received 6940 cGy in 38 fractions to vulva via external beam radiation therapy.     10/5/2020 - 10/12/2020 Chemotherapy    OP Vulvar MitoMYcin / Fluorouracil CIV + XRT       10/9/2020 - 9/7/2021 Chemotherapy    OP CENTRAL VENOUS ACCESS DEVICE ACCESS, CARE, AND MAINTENANCE ([facility]AD)     10/19/2020 Imaging    CT Head:  Impression:    1. No acute intracranial process.     10/21/2021 -  Chemotherapy    OP CENTRAL VENOUS ACCESS DEVICE ACCESS, CARE, AND MAINTENANCE ([facility])     Secondary malignancy of inguinal lymph nodes   8/31/2020 Initial Diagnosis    Secondary malignancy  of inguinal lymph nodes (CMS/HCC)     10/9/2020 - 9/7/2021 Chemotherapy    OP CENTRAL VENOUS ACCESS DEVICE ACCESS, CARE, AND MAINTENANCE ([facility])     10/21/2021 -  Chemotherapy    OP CENTRAL VENOUS ACCESS DEVICE ACCESS, CARE, AND MAINTENANCE ([facility])     Malignant neoplasm of upper-outer quadrant of left breast in female, estrogen receptor positive   9/14/2021 Initial Diagnosis    Malignant neoplasm of upper-outer quadrant of left breast in female, estrogen receptor positive         PAST MEDICAL HISTORY:  ALLERGIES:  Allergies   Allergen Reactions    Scopolamine Swelling     Other reaction(s): ANGIOEDEMA        Amoxicillin-Pot Clavulanate Rash    Keflex [Cephalexin] Rash    Septra [Sulfamethoxazole-Trimethoprim] Rash    Tequin [Gatifloxacin] Other (See Comments)     Doesn't remember    Trovan [Alatrofloxacin] Dizziness     CURRENT MEDICATIONS:  Outpatient Encounter Medications as of 4/16/2025   Medication Sig Dispense Refill    Acetaminophen (TYLENOL ARTHRITIS PAIN PO) Take 1 tablet by mouth Daily As Needed (BACK PAIN).      albuterol sulfate  (90 Base) MCG/ACT inhaler Inhale 2 puffs Every 4 (Four) Hours As Needed for Shortness of Air.      aspirin 81 MG EC tablet Take 1 tablet by mouth Daily.      bisoprolol-hydrochlorothiazide (ZIAC) 5-6.25 MG per tablet Take 1 tablet by mouth Daily. 90 tablet 1    bumetanide (BUMEX) 1 MG tablet TAKE 1 TABLET BY MOUTH 2 TIMES DAILY 60 tablet 1    cetirizine (zyrTEC) 10 MG tablet Take 1 tablet by mouth Daily As Needed for Allergies.      citalopram (CeleXA) 20 MG tablet TAKE 1 TABLET BY MOUTH 1 TIME DAILY 30 tablet 1    clindamycin (CLEOCIN T) 1 % lotion Apply 1 Application topically to the appropriate area as directed Daily As Needed (Rash On Legs).      cyanocobalamin 1000 MCG/ML injection Inject 1 mL into the appropriate muscle as directed by prescriber Every 30 (Thirty) Days.      diphenoxylate-atropine (LOMOTIL) 2.5-0.025 MG per tablet TAKE 2 TABLETS BY MOUTH 4 TIMES DAILY  AS NEEDED FOR DIARRHEA 90 tablet 2    esomeprazole (nexIUM) 40 MG capsule TAKE 1 CAPSULE TWICE DAILY 180 capsule 3    Homeopathic Products (LEG CRAMPS) tablet Take 1 tablet by mouth Daily.      HYDROcodone-acetaminophen (NORCO)  MG per tablet Take 0.5 tablets by mouth Every 4 (Four) Hours As Needed for Moderate Pain or Severe Pain. 60 tablet 0    letrozole (FEMARA) 2.5 MG tablet TAKE 1 TABLET BY MOUTH 1 TIME DAILY 90 tablet 3    levothyroxine (SYNTHROID, LEVOTHROID) 75 MCG tablet TAKE 1 TABLET BY MOUTH EVERY DAY 90 tablet 1    memantine (Namenda) 5 MG tablet Take 1 tablet by mouth 2 (Two) Times a Day. 60 tablet 2    metaxalone (SKELAXIN) 800 MG tablet TAKE 1 TABLET BY MOUTH 3 TIMES DAILY AS NEEDED FOR MUSCLE SPASMS 30 tablet 0    metOLazone (ZAROXOLYN) 2.5 MG tablet TAKE 1 TABLET BY MOUTH EVERY DAY AS NEEDED FOR WEIGHT GAIN OF 3 LBS OR MORE IN 24 HOURS. 30 tablet 5    nystatin-triamcinolone (MYCOLOG II) 939813-0.1 UNIT/GM-% cream Apply 1 Application topically to the appropriate area as directed 2 (Two) Times a Day As Needed (rash under breast).      nystatin-triamcinolone (MYCOLOG) 326476-3.1 UNIT/GM-% ointment Apply 1 Application topically to the appropriate area as directed 2 (Two) Times a Day. 30 g 3    olmesartan (BENICAR) 20 MG tablet TAKE 1 TABLET BY MOUTH 1 TIME DAILY 90 tablet 2    phenazopyridine (PYRIDIUM) 200 MG tablet Take 1 tablet by mouth 3 (Three) Times a Day As Needed for Dysuria. 15 tablet 0    potassium chloride (KLOR-CON M20) 20 MEQ CR tablet TAKE 2 TABLETS BY MOUTH EVERY DAY 60 tablet 5    potassium chloride ER (K-TAB) 20 MEQ tablet controlled-release ER tablet Take 2 tablets by mouth Daily.      pravastatin (PRAVACHOL) 40 MG tablet TAKE 1 TABLET BY MOUTH AT BEDTIME 90 tablet 1    Probiotic Product (PROBIOTIC DAILY PO) Take 1 tablet by mouth Daily.      saccharomyces boulardii (Florastor) 250 MG capsule Take 1 capsule by mouth 2 (Two) Times a Day. 5 capsule 0    sodium bicarbonate 650 MG  tablet Take 1 tablet by mouth 2 (Two) Times a Day.      tacrolimus (PROTOPIC) 0.1 % ointment Apply 1 Application topically to the appropriate area as directed 2 (Two) Times a Day As Needed (dermatitis).      vitamin D (ERGOCALCIFEROL) 1.25 MG (14948 UT) capsule capsule TAKE 1 CAPSULE BY MOUTH ON THE SAME DAY EACH WEEK. 12 capsule 3     Facility-Administered Encounter Medications as of 4/16/2025   Medication Dose Route Frequency Provider Last Rate Last Admin    heparin injection 500 Units  500 Units Intravenous PRN Drake Stafford MD   500 Units at 07/01/21 1354    heparin injection 500 Units  500 Units Intravenous PRN Drake Stafford MD   500 Units at 01/11/22 1134    heparin injection 500 Units  500 Units Intravenous PRN Drake Stafford MD   500 Units at 09/28/22 1418    heparin injection 500 Units  500 Units Intravenous PRN Drake Stafford MD   500 Units at 01/25/23 1537    heparin injection 500 Units  500 Units Intravenous Drake Helms MD   500 Units at 06/26/23 1426    heparin injection 500 Units  500 Units Intravenous PRN Drake Stafford MD   500 Units at 03/05/24 1350    heparin injection 500 Units  500 Units Intravenous Drake Helms MD   500 Units at 10/02/24 1508    heparin injection 500 Units  500 Units Intravenous Drake Helms MD   500 Units at 01/15/25 1410    sodium chloride 0.9 % flush 10 mL  10 mL Intravenous PRN Drake Stafford MD   10 mL at 07/01/21 1354    sodium chloride 0.9 % flush 10 mL  10 mL Intravenous PRN Drake Stafford MD   10 mL at 01/11/22 1134    sodium chloride 0.9 % flush 10 mL  10 mL Intravenous PRN Drake Stafford MD   10 mL at 09/28/22 1418    sodium chloride 0.9 % flush 10 mL  10 mL Intravenous PRN Drake Stafford MD   10 mL at 01/25/23 1537    sodium chloride 0.9 % flush 10 mL  10 mL Intravenous PRN Drake Stafford MD   10 mL at 06/26/23 1426    sodium chloride 0.9 % flush 10 mL  10 mL Intravenous PRN Drake Stafford MD   10 mL at 03/05/24 1350    sodium chloride 0.9 %  flush 10 mL  10 mL Intravenous PRN Drake Stafford MD   10 mL at 10/02/24 1508    sodium chloride 0.9 % flush 10 mL  10 mL Intravenous PRN Drake Stafford MD   10 mL at 01/15/25 1402     ADULT ILLNESSES:  Patient Active Problem List   Diagnosis Code    Meatal stenosis [id number]    Retention of urine R33.9    Type 2 diabetes mellitus, without long-term current use of insulin E11.9    Essential hypertension I10    Grief reaction F43.20    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    Chronic midline low back pain without sciatica M54.50, G89.29    Bilateral lower extremity edema R60.0    History of urethral stricture Z87.448    Epidermal cyst of neck L72.0    Normocytic anemia D64.9    Neck abscess L02.11    Mixed hyperlipidemia E78.2    Gastroesophageal reflux disease without esophagitis K21.9    Anxiety F41.9    Iron deficiency and chemotherapy induced anemia D50.9    Stage 3 chronic kidney disease N18.30    Anemia D64.9    Preop cardiovascular exam Z01.810    Lower extremity edema R60.0    Vulvar cancer, carcinoma C51.9    Former smoker Z87.891    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    Febrile illness R50.9    Chemotherapy-induced thrombocytopenia D69.59, T45.1X5A    Hyponatremia E87.1    Hypokalemia E87.6    Neutropenic fever D70.9, R50.81    Moderate malnutrition E44.0    Antineoplastic chemotherapy induced anemia D64.81, T45.1X5A    History of radiation therapy Z92.3    Encounter for care related to Port-a-Cath Z45.2    Malignant neoplasm of upper-outer quadrant of left breast in female, estrogen receptor positive C50.412, Z17.0    Encounter for care related to vascular access port Z45.2    Angiodysplasia K55.20    Bronchitis J40    Obesity (BMI 30-39.9) E66.9    Wheezing R06.2    Cough R05.9    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    Rectal bleeding K62.5    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 M81.8, T38.7X5A    CVA (cerebral vascular accident) I63.9     Transient ischemic attack (TIA) G45.9    Stenosis of right carotid artery I65.21    Carotid stenosis, right I65.21    Carotid stenosis I65.29     SURGERIES:  Past Surgical History:   Procedure Laterality Date    APPENDECTOMY      BENJAMIN PROCEDURE      No evidence of reflux disease while on Nexium 40mg daily-See report    BREAST BIOPSY      BREAST CYST ASPIRATION Left     BREAST LUMPECTOMY      COLONOSCOPY  01/12/2011    Diverticulosis sigmoid colon; The examination was otherwise normal; Repeat 10 years    COLONOSCOPY  11/03/2003    Dr. Laguerre-Normal colonoscopy; Normal terminal ileum; Repeat 5 years    COLONOSCOPY N/A 11/05/2021    Petechia(e) in the rectum, in the recto-sigmoid colon and in the distal sigmoid colon; No specimens collected; No plans to repeat colonoscopy due to advance age and/or medical problems    CYSTOSCOPY N/A 09/20/2022    Procedure: CYSTOSCOPY WITH URETHRAL DILATATION;  Surgeon: Shaheen Conway MD;  Location: [facility];  Service: Urology;  Laterality: N/A;    ENDOSCOPY  07/01/2014    Normal esophagus; Normal stomach; Normal examined duodenum; BRAVO pH capsule deployed;     ENDOSCOPY  08/14/2013    Mild gastritis-biopsies for H.Pylor obtained    ENDOSCOPY  02/16/2005    Dr. LaguerreYbilc-Gsysqzwcx-ptsyignz    ENDOSCOPY  10/21/2003    Dr. Laguerre-Stage 1 reflux esophagitis    ENDOSCOPY N/A 11/05/2021    Small HH; A single gastroesophageal junction polyp; Normal stomach; A single non-bleeding angiodysplastic lesion in the duodenum    HYSTERECTOMY      MASTECTOMY W/ SENTINEL NODE BIOPSY Left 09/14/2021    Procedure: LEFT PARTIAL MASTECTOMY WITH MAGSEED AND LEFT SENTINEL LYMPH NODE BIOPSY MAGTRACE;  Surgeon: Quynh Rosario MD;  Location: [facility];  Service: General;  Laterality: Left;    TONSILLECTOMY      VAGINA SURGERY      Laser surgery X 2    VENOUS ACCESS DEVICE (PORT) INSERTION N/A 09/29/2020    Procedure: SINGLE LUMEN PORT - A- CATH PLACEMENT WITH FLUOROSCOPY;  Surgeon: Quynh Rosario  MD KIANA;  Location: [facility];  Service: General;  Laterality: N/A;     HEALTH MAINTENANCE ITEMS:  Health Maintenance Due   Topic Date Due    URINE MICROALBUMIN-CREATININE RATIO (uACR)  Never done    RSV Vaccine - Adults (1 - 1-dose 75+ series) Never done    HEMOGLOBIN A1C  06/13/2025       <no information>  Last Completed Colonoscopy            Needs Review       COLORECTAL CANCER SCREENING (COLONOSCOPY - Every 10 Years) Tentatively due on 11/5/2031 11/05/2021  COLONOSCOPY    11/05/2021  Surgical Procedure: COLONOSCOPY    01/29/2014  Outside Claim: [place] FECAL BLOOD SCRN IMMUNOASSAY    02/01/2013  Outside Claim: CHG BLOOD,OCCULT,FECAL HGB,FECES,1-3 SIMULT    01/12/2011  SCANNED - COLONOSCOPY     Only the first 5 history entries have been loaded, but more history exists.                        Immunization History   Administered Date(s) Administered    COVID-19 (MODERNA) 1st,2nd,3rd Dose Monovalent 03/26/2021, 04/23/2021    COVID-19 (MODERNA) BIVALENT 12+YRS 01/03/2023    COVID-19 (MODERNA) Monovalent Original Booster 12/28/2021    FLUAD TRI 65YR+ 10/22/2019    Flu Vaccine Split Quad 10/12/2018    Fluad Quad 65+ 11/09/2020    Fluzone High-Dose 65+YRS 10/01/2018, 11/12/2021, 10/31/2024    Fluzone High-Dose 65+yrs 11/12/2021, 10/24/2022, 10/24/2023    Pneumococcal Conjugate 20-Valent (PCV20) 10/24/2022    Pneumococcal Polysaccharide (PPSV23) 10/16/2013    Pneumococcal, Unspecified 10/01/2017    Shingrix 06/15/2023, 05/03/2024    Tdap 05/01/2019    Zostavax 01/14/2010, 10/01/2015     Last Completed Mammogram            Completed or No Longer Recommended       MAMMOGRAM  Discontinued        Frequency changed to Never automatically (Topic No Longer Applies)    11/11/2024  Order placed for Mammo Diagnostic Digital Tomosynthesis Bilateral With CAD by Quynh Rosario MD    10/31/2024  Mammo Diagnostic Digital Tomosynthesis Bilateral With CAD    10/30/2023  Mammo Diagnostic Digital Tomosynthesis Bilateral With CAD     "10/10/2022  Mammo Diagnostic Digital Tomosynthesis Bilateral With CAD      Only the first 5 history entries have been loaded, but more history exists.                              FAMILY HISTORY:  Family History   Problem Relation Age of Onset    Cancer Mother     Hypertension Mother     Osteoporosis Mother     Dementia Mother     Uterine cancer Mother     Heart disease Father     Parkinsonism Father     Cancer Sister     Breast cancer Sister     Kidney cancer Sister     Diabetes Brother     Heart disease Paternal Grandfather     No Known Problems Maternal Grandmother     No Known Problems Maternal Grandfather     No Known Problems Paternal Grandmother     Colon cancer Neg Hx     Colon polyps Neg Hx     Esophageal cancer Neg Hx     Liver cancer Neg Hx     Liver disease Neg Hx     Rectal cancer Neg Hx     Stomach cancer Neg Hx      SOCIAL HISTORY:  Social History     Socioeconomic History    Marital status:    Tobacco Use    Smoking status: Former     Current packs/day: 0.25     Average packs/day: 0.3 packs/day for 3.0 years (0.8 ttl pk-yrs)     Types: Cigarettes     Passive exposure: Past    Smokeless tobacco: Never    Tobacco comments:     social smoker in [place]   Vaping Use    Vaping status: Never Used   Substance and Sexual Activity    Alcohol use: Not Currently     Comment: Occasional glass of wine    Drug use: No    Sexual activity: Defer       REVIEW OF SYSTEMS:    Review of Systems   Constitutional:  Positive for fatigue. Negative for chills and fever.        \"Lots of stress.\"   HENT:  Negative for congestion and facial swelling.    Eyes:  Negative for discharge and redness.   Respiratory:  Negative for shortness of breath and wheezing.    Cardiovascular:  Positive for leg swelling. Negative for chest pain.   Gastrointestinal:  Negative for abdominal pain, nausea and vomiting.   Endocrine: Negative for cold intolerance and heat intolerance.   Genitourinary:  Negative for dysuria and hematuria. " "  Musculoskeletal:  Negative for myalgias.   Skin:  Positive for pallor.   Allergic/Immunologic: Negative for food allergies.   Neurological:  Negative for dizziness and speech difficulty.   Hematological:  Negative for adenopathy. Does not bruise/bleed easily.   Psychiatric/Behavioral:  Negative for agitation and confusion. The patient is not nervous/anxious.        VITAL SIGNS: /60   Pulse 85   Temp 97.7 °F (36.5 °C)   Resp 16   Ht 137.2 cm (54\")   Wt 63.8 kg (140 lb 9.6 oz)   SpO2 98%   Breastfeeding No   BMI 33.90 kg/m²  Lost 10 pounds. \"Lots of stress.\"  Pain Score    04/16/25 1418   PainSc: 0-No pain       PHYSICAL EXAMINATION:     Physical Exam  Vitals reviewed.   Constitutional:       Comments: She arrived in the exam room in a wheelchair. Frail looking.    HENT:      Head: Normocephalic and atraumatic.   Eyes:      General: No scleral icterus.  Cardiovascular:      Rate and Rhythm: Normal rate.   Pulmonary:      Effort: No respiratory distress.      Breath sounds: No wheezing.      Comments: Port, right. No erythema.   Abdominal:      General: Bowel sounds are normal.      Palpations: Abdomen is soft.      Tenderness: There is no abdominal tenderness.   Musculoskeletal:         General: Swelling present.      Cervical back: Neck supple.   Skin:     Coloration: Skin is pale.   Neurological:      Mental Status: She is oriented to person, place, and time.   Psychiatric:         Mood and Affect: Mood normal.         Behavior: Behavior normal.         Thought Content: Thought content normal.         Judgment: Judgment normal.         LABS    Lab Results - Last 18 Months   Lab Units 04/16/25  1411 01/15/25  1314 09/11/24  1358 08/14/24  1357 07/24/24  1552 07/17/24  1416 06/19/24  1533 06/14/24  1116 05/20/24  0438 05/19/24  2057 05/15/24  1339 02/06/24  1335 02/02/24  1230   HEMOGLOBIN g/dL 10.4* 8.6* 8.4* 9.3* 9.6* 9.0*   < > 6.6*   < > 9.4* 8.7*   < > 10.8*   HEMATOCRIT % 31.1* 27.1* 25.9* 28.2* " 30.3* 28.1*   < > 21.6*   < > 30.2* 28.0*   < > 33.0*   MCV fL 105.1* 103.0* 100.0* 98.6* 102.7* 101.1*  --  110.8*   < > 111.0* 110.2*   < > 104.8*   WBC 10*3/mm3 6.26 7.03 7.17 7.82 7.83 8.65  --  8.66   < > 9.75 6.29   < > 9.91   RDW % 13.2 13.2 15.9* 16.3* 17.1* 17.1*  --  16.5*   < > 15.2 14.9   < > 14.1   MPV fL 9.9 10.3 10.6 10.4 11.0 11.0  --  10.5   < > 11.4 10.4   < > 12.5*   PLATELETS 10*3/mm3 176 147 270 171 157 190  --  175   < > 170 155   < > 120*   IMM GRAN % %  --  0.3 0.4 0.5 0.5 0.5  --   --   --  0.5  --    < >  --    NEUTROS ABS 10*3/mm3  --  5.15 5.65 5.80 5.75 6.73  --  7.53*  --  7.62* 4.52   < > 8.62*   LYMPHS ABS 10*3/mm3  --  1.18 0.86 1.14 1.11 0.99  --   --   --  1.19  --    < >  --    MONOS ABS 10*3/mm3  --  0.45 0.53 0.67 0.58 0.62  --   --   --  0.56  --    < >  --    EOS ABS 10*3/mm3  --  0.20 0.08 0.13 0.30 0.24  --  0.35  --  0.28 0.44*   < >  --    BASOS ABS 10*3/mm3  --  0.03 0.02 0.04 0.05 0.03  --  0.09  --  0.05 0.26*   < >  --    IMMATURE GRANS (ABS) 10*3/mm3  --  0.02 0.03 0.04 0.04 0.04  --   --   --  0.05  --    < >  --    NRBC /100 WBC 0.0 0.0 0.0 0.0 0.0 0.0  --   --   --  0.0  --    < >  --    NEUTROPHIL % %  --   --   --   --   --   --   --  83.0*  --   --  67.6  --  84.0*   MONOCYTES % %  --   --   --   --   --   --   --  1.0*  --   --  5.6  --  2.0*   BASOPHIL % %  --   --   --   --   --   --   --  1.0  --   --  4.2*  --   --    ATYP LYMPH % %  --   --   --   --   --   --   --   --   --   --   --   --  3.0   ANISOCYTOSIS   --   --   --   --   --   --   --  Slight/1+  --   --  Slight/1+  --  Slight/1+   GIANT PLT   --   --   --   --   --   --   --   --   --   --   --   --  Slight/1+    < > = values in this interval not displayed.       Lab Results - Last 18 Months   Lab Units 01/15/25  1314 10/02/24  1408 07/24/24  1552 06/14/24  1116 05/22/24  0409 05/21/24  0400 05/20/24  0438 05/19/24  2057 05/15/24  1339 04/04/24  1412   GLUCOSE mg/dL 166* 205* 141* 184* 120* 112*   " < > 152* 100* 126*   SODIUM mmol/L 136 137 139 139 140 141   < > 140 140 138   POTASSIUM mmol/L 4.3 4.5 3.7 3.6 4.1 4.3   < > 4.4 4.4 4.3   CO2 mmol/L 27.0 26.0 33.0* 29.0 28.0 29.0   < > 27.0 27.0 25.0   CHLORIDE mmol/L 97* 100 96* 95* 101 103   < > 101 105 100   ANION GAP mmol/L 12.0 11.0 10.0 15.0 11.0 9.0   < > 12.0 8.0 13.0   CREATININE mg/dL 1.80* 1.95* 1.47* 2.03* 1.86* 2.07*   < > 2.29* 1.68* 1.78*   BUN mg/dL 39* 32* 20 51* 32* 31*   < > 32* 29* 28*   BUN / CREAT RATIO  21.7 16.4 13.6 25.1* 17.2 15.0   < > 14.0 17.3 15.7   CALCIUM mg/dL 9.6 9.5 9.8 10.2 8.6 9.1   < > 10.3 9.7 10.1   ALK PHOS U/L 53 47  --  55  --   --   --  53 46 44   TOTAL PROTEIN g/dL 7.1 7.3  --  7.2  --   --   --  7.2 7.0 8.3   ALT (SGPT) U/L 9 11  --  9  --   --   --  10 11 11   AST (SGOT) U/L 15 15  --  21  --   --   --  23 17 21   BILIRUBIN mg/dL 0.3 0.3  --  0.4  --   --   --  0.2 0.2 0.3   ALBUMIN g/dL 4.0 4.0  --  4.0  --   --   --  3.9 4.0 4.7   GLOBULIN gm/dL 3.1 3.3  --  3.2  --   --   --  3.3 3.0 3.6    < > = values in this interval not displayed.       No results for input(s): \"MSPIKE\", \"KAPPALAMB\", \"IGLFLC\", \"URICACID\", \"FREEKAPPAL\", \"CEA\", \"LDH\", \"REFLABREPO\" in the last 74630 hours.    Lab Results - Last 18 Months   Lab Units 01/15/25  1314 10/31/24  1228 10/02/24  1408 08/14/24  1357 07/24/24  1552 07/17/24  1416 06/14/24  1116 05/19/24  2057 05/15/24  1339   IRON mcg/dL 43  --  56 53  --  54 46  --  57   TIBC mcg/dL 289*  --  274* 323  --  314 325  --  298   IRON SATURATION (TSAT) % 15*  --  20 16*  --  17* 14*  --  19*   FERRITIN ng/mL 861.70*  --  1,974.00* 1,346.00*  --  1,290.00* 567.30*  --  624.60*   TSH uIU/mL  --  3.490  --   --  75.880*  --   --   --   --    FOLATE ng/mL  --   --   --   --   --   --   --  >20.00  --        Dago Nunez reports a pain score of 0.         ASSESSMENT:  1.  Left breast cancer, upper outer quadrant.  Tumor size 1.1 cm.  Negative genetic testing.  AJCC stage: 1A (pT1c, snpN0, " cM0)  Receptor status: Estrogen 87%, progesterone 47% and negative HER-2/gabriela.  Treatment status: Post left lumpectomy and sentinel lymph node biopsy.  Declined adjuvant radiation.  Patient is taking adjuvant letrozole.  2.  Macrocytic anemia from iron deficiency, B12 deficiency and history of chronic kidney disease stage IV, GFR 24.2 mL/min on 6/14/2024.   She is off epoetin alpha.  3.   Iron deficiency. Intolerant to oral iron.  Intravenous iron therapy as indicated.  4.   Chronic kidney disease Stage IV.  GFR 24.2 ml/min on 6/14/2024.  Contributing to her anemia.  5.   Performance status of 3.    6.   Grade 1 nausea from letrozole.  Taking ondansetron.   7.   Osteoporosis.  Taking calcium and vitamin D.  The patient is on subcutaneous denosumab.  8.   Recurrent squamous cell carcinoma, vulva.  AJCC stage IIIA (T2, Nib, M0, G3).  Treatment status.  Had Mitomycin C and 5 FU D1 to D4 with radiation.  D29 - 32 chemo was cancelled due to poor performance status.             PLAN:  1.    Re:  Heme status.  Hemoglobin 10.4, hematocrit 31.1, and .1.    2.    Re:  Pre-office CMP.  GFR 39.7 from 27.8 ml/min.    3.    Re:  Ferritin latest 861.7 from 1974 from 1346 from 1290 and saturation 26 from 15 from 20 from 16 from 17 from 14 from 19 from 33 %.     4.    Re: MRI pelvis 1/9/2025. 3.2 x 0.4 cm rind of diffusion restriction within the dependent  portion of the bladder. This raises suspicion for a mucosal neoplasm with a differential of layering proteinaceous/hemorrhagic debris. In addition along the midportion of the urethra is an expansile appearance demonstrating heterogeneous signal measuring up to 14 mm. Recommend further evaluation with cystoscopy to evaluate the urethra and bladder to exclude recurrent urothelial malignancy. No pelvic lymphadenopathy. Curvilinear zones of T2 hyperintense signal about the bilateral  supra-acetabular regions as well as the left superior pubic ramus.  "These raise the suspicion of stress fractures.  Mild symmetric edema involving the vulvar soft tissues which is favored reactive. In addition there is diffuse body wall anasarca with more confluent edema involving the bilateral adductor musculature which is similar to prior studies.  5.   Re: Note from Dr. Young on 1/28/2025.  Seen for ureteric stricture post radiation and no bladder and urothelial abnormalities.  Discussed pursuing cystoscopy and biopsy with possible repeat urethral dilatation. Cystoscopy on 6/18/2025.   6.   Order CBC with differential, ferritin and iron panel every 4 weeks.  7.   Epoetin alpha 40,000 units SQ every 4 weeks if hemoglobin below 11 and hematocrit below 33.  Monitor for hypertension.  8.  Transfuse 1 unit packed RBCs if hemoglobin less than 7.  Premed Tylenol 500 mg p.o. and Benadryl 12.5 mg IV.  Lasix 20 mg IV push after a unit of blood.  Observe for transfusion reactions transfusion reactions.  9.  Cervical/vaginal cytology per gynecology.  10. Advance Care Planning  ACP discussion was held with the patient during this visit. Patient has an advance directive in EMR which is still valid.   11.  eRx ondansetron 8 mg po every 8 hours as needed for nausea/vomiting, # 60, 2 refills if needed.  12.  eRx letrozole 2.5 mg p.o. daily #90 with 3 refills if needed.  \"I take it at night.\"  Observe for worsening bone loss or arthralgias or hot flashes.\"  13.  Vitamin B12 1000 mcg IM monthly administered by her granddaughter.  Observe for local reaction.  14.  Will not obtain breast tumor markers or Oncotype DX.  Patient is not a candidate for adjuvant chemotherapy.  15.  Continue ongoing management per primary care physician and other specialists.  16.  Order port flushes every 6 weeks.  17.  Order next bone density 10/2025.  18.Plan of care discussed with her spouse, Joseph.  Understanding expressed. Spouse is agreeable to proceed.  19.   Order denosumab 60 mg SQ every 6 months for " osteoporosis. She is on letrozole.  Observe for osteonecrosis of the jaw.  20. Mammogram order per surgery.  21.  Ferric gluconate 750 mg as needed.  Premed Tylenol 500 mg p.o. Intolerant to oral iron.  Monitor for anaphylaxis or infusion reactions.   22.  Return to the office in 3 months with CBC with differential, ferritin, iron panel, and CMP.                  I have reviewed the assessment and plan and verified the accuracy of it. No changes to assessment and plan since the information was documented. Drake Stafford MD 04/16/25         I spent 31 total minutes, face-to-face, caring for Syndal today. Greater than 50% of this time involved counseling and/or coordination of care as documented within this note.                cc: (Shaheen Akabr MD )        (Annita Loaiza MD)        (Ulises Roche MD)        (Guillermina Rosario MD)        (Octavio Fernando MD)        Gilberto Mills MD        (Moustapha Callahan MD)        (Radha Marks MD)

## 2025-04-16 ENCOUNTER — LAB (OUTPATIENT)
Dept: LAB | Facility: HOSPITAL | Age: 85
End: 2025-04-16
Payer: MEDICARE

## 2025-04-16 ENCOUNTER — OFFICE VISIT (OUTPATIENT)
Dept: ONCOLOGY | Facility: CLINIC | Age: 85
End: 2025-04-16
Payer: MEDICARE

## 2025-04-16 VITALS
HEIGHT: 55 IN | WEIGHT: 140.6 LBS | BODY MASS INDEX: 32.54 KG/M2 | HEART RATE: 85 BPM | RESPIRATION RATE: 16 BRPM | OXYGEN SATURATION: 98 % | TEMPERATURE: 97.7 F | SYSTOLIC BLOOD PRESSURE: 134 MMHG | DIASTOLIC BLOOD PRESSURE: 60 MMHG

## 2025-04-16 DIAGNOSIS — Z17.0 MALIGNANT NEOPLASM OF UPPER-OUTER QUADRANT OF LEFT BREAST IN FEMALE, ESTROGEN RECEPTOR POSITIVE: Primary | ICD-10-CM

## 2025-04-16 DIAGNOSIS — C77.4 SECONDARY MALIGNANCY OF INGUINAL LYMPH NODES: ICD-10-CM

## 2025-04-16 DIAGNOSIS — C50.412 MALIGNANT NEOPLASM OF UPPER-OUTER QUADRANT OF LEFT BREAST IN FEMALE, ESTROGEN RECEPTOR POSITIVE: Primary | ICD-10-CM

## 2025-04-16 DIAGNOSIS — D50.8 IRON DEFICIENCY ANEMIA SECONDARY TO INADEQUATE DIETARY IRON INTAKE: ICD-10-CM

## 2025-04-16 DIAGNOSIS — Z45.2 ENCOUNTER FOR CARE RELATED TO VASCULAR ACCESS PORT: ICD-10-CM

## 2025-04-16 DIAGNOSIS — C51.9 VULVAR CANCER, CARCINOMA: ICD-10-CM

## 2025-04-16 LAB
ALBUMIN SERPL-MCNC: 4.1 G/DL (ref 3.5–5.2)
ALBUMIN/GLOB SERPL: 1.2 G/DL
ALP SERPL-CCNC: 60 U/L (ref 39–117)
ALT SERPL W P-5'-P-CCNC: 11 U/L (ref 1–33)
ANION GAP SERPL CALCULATED.3IONS-SCNC: 14 MMOL/L (ref 5–15)
ANISOCYTOSIS BLD QL: ABNORMAL
AST SERPL-CCNC: 16 U/L (ref 1–32)
BASOPHILS # BLD MANUAL: 0 10*3/MM3 (ref 0–0.2)
BASOPHILS NFR BLD MANUAL: 0 % (ref 0–1.5)
BILIRUB SERPL-MCNC: 0.3 MG/DL (ref 0–1.2)
BUN SERPL-MCNC: 27 MG/DL (ref 8–23)
BUN/CREAT SERPL: 20.1 (ref 7–25)
CALCIUM SPEC-SCNC: 10 MG/DL (ref 8.6–10.5)
CHLORIDE SERPL-SCNC: 100 MMOL/L (ref 98–107)
CO2 SERPL-SCNC: 24 MMOL/L (ref 22–29)
CREAT SERPL-MCNC: 1.34 MG/DL (ref 0.57–1)
DEPRECATED RDW RBC AUTO: 51.2 FL (ref 37–54)
EGFRCR SERPLBLD CKD-EPI 2021: 39.7 ML/MIN/1.73
EOSINOPHIL # BLD MANUAL: 0.13 10*3/MM3 (ref 0–0.4)
EOSINOPHIL NFR BLD MANUAL: 2 % (ref 0.3–6.2)
ERYTHROCYTE [DISTWIDTH] IN BLOOD BY AUTOMATED COUNT: 13.2 % (ref 12.3–15.4)
FERRITIN SERPL-MCNC: 1433 NG/ML (ref 13–150)
GLOBULIN UR ELPH-MCNC: 3.3 GM/DL
GLUCOSE SERPL-MCNC: 179 MG/DL (ref 65–99)
HCT VFR BLD AUTO: 31.1 % (ref 34–46.6)
HGB BLD-MCNC: 10.4 G/DL (ref 12–15.9)
HOLD SPECIMEN: NORMAL
IRON 24H UR-MRATE: 74 MCG/DL (ref 37–145)
IRON SATN MFR SERPL: 26 % (ref 20–50)
LYMPHOCYTES # BLD MANUAL: 1.26 10*3/MM3 (ref 0.7–3.1)
LYMPHOCYTES NFR BLD MANUAL: 3 % (ref 5–12)
MCH RBC QN AUTO: 35.1 PG (ref 26.6–33)
MCHC RBC AUTO-ENTMCNC: 33.4 G/DL (ref 31.5–35.7)
MCV RBC AUTO: 105.1 FL (ref 79–97)
MONOCYTES # BLD: 0.19 10*3/MM3 (ref 0.1–0.9)
NEUTROPHILS # BLD AUTO: 4.68 10*3/MM3 (ref 1.7–7)
NEUTROPHILS NFR BLD MANUAL: 74.7 % (ref 42.7–76)
PLAT MORPH BLD: NORMAL
PLATELET # BLD AUTO: 176 10*3/MM3 (ref 140–450)
PMV BLD AUTO: 9.9 FL (ref 6–12)
POTASSIUM SERPL-SCNC: 4 MMOL/L (ref 3.5–5.2)
PROT SERPL-MCNC: 7.4 G/DL (ref 6–8.5)
RBC # BLD AUTO: 2.96 10*6/MM3 (ref 3.77–5.28)
SODIUM SERPL-SCNC: 138 MMOL/L (ref 136–145)
TIBC SERPL-MCNC: 286 MCG/DL (ref 298–536)
TOXIC GRANULATION: ABNORMAL
TRANSFERRIN SERPL-MCNC: 192 MG/DL (ref 200–360)
VARIANT LYMPHS NFR BLD MANUAL: 14.1 % (ref 19.6–45.3)
VARIANT LYMPHS NFR BLD MANUAL: 6.1 % (ref 0–5)
WBC NRBC COR # BLD AUTO: 6.26 10*3/MM3 (ref 3.4–10.8)

## 2025-04-16 PROCEDURE — 84466 ASSAY OF TRANSFERRIN: CPT

## 2025-04-16 PROCEDURE — 85025 COMPLETE CBC W/AUTO DIFF WBC: CPT

## 2025-04-16 PROCEDURE — 36415 COLL VENOUS BLD VENIPUNCTURE: CPT

## 2025-04-16 PROCEDURE — 80053 COMPREHEN METABOLIC PANEL: CPT

## 2025-04-16 PROCEDURE — 85007 BL SMEAR W/DIFF WBC COUNT: CPT

## 2025-04-16 PROCEDURE — 82728 ASSAY OF FERRITIN: CPT

## 2025-04-16 PROCEDURE — 83540 ASSAY OF IRON: CPT

## 2025-04-16 RX ORDER — HEPARIN SODIUM (PORCINE) LOCK FLUSH IV SOLN 100 UNIT/ML 100 UNIT/ML
500 SOLUTION INTRAVENOUS AS NEEDED
OUTPATIENT
Start: 2025-04-16

## 2025-04-16 RX ORDER — SODIUM CHLORIDE 0.9 % (FLUSH) 0.9 %
10 SYRINGE (ML) INJECTION AS NEEDED
OUTPATIENT
Start: 2025-04-16

## 2025-04-16 RX ORDER — SODIUM CHLORIDE 0.9 % (FLUSH) 0.9 %
10 SYRINGE (ML) INJECTION AS NEEDED
Status: SHIPPED | OUTPATIENT
Start: 2025-04-16

## 2025-04-16 RX ORDER — HEPARIN SODIUM (PORCINE) LOCK FLUSH IV SOLN 100 UNIT/ML 100 UNIT/ML
500 SOLUTION INTRAVENOUS AS NEEDED
Status: SHIPPED | OUTPATIENT
Start: 2025-04-16

## 2025-04-16 RX ADMIN — HEPARIN SODIUM (PORCINE) LOCK FLUSH IV SOLN 100 UNIT/ML 500 UNITS: 100 SOLUTION at 14:45

## 2025-04-16 RX ADMIN — Medication 10 ML: at 14:45

## 2025-04-16 NOTE — LETTER
April 16, 2025     Gilberto Mills MD  [address]  [address]  [facility] [address]    Patient: Dago Nunez   YOB: 1940   Date of Visit: 4/16/2025     Dear Gilberto Mills MD:       Thank you for referring Dago Nunez to me for evaluation. Below are the relevant portions of my assessment and plan of care.    If you have questions, please do not hesitate to call me. I look forward to following Dago along with you.         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  She is on adjuvant letrozole for the past 42.5 months.  Patient had declined adjuvant radiation.  She is also seen for macrocytic anemia secondary to chronic kidney disease Stage IV, GFR 24.2 mL/minute on 6/14/2024, iron deficiency, and thrombocytopenia. She had PRBC 6/14/202.  She is intolerant to oral iron (nausea, stomach cramps, and constipation).  Patient given ferric carboxymaltose 9.5 months ago. She is also seen for vulvar cancer. She is seen 54.5 months post C1D1 Mitomycin C and 5FU with radiation. Her D29 to 32 chemo was cancelled due to hospitalization, poor tolerance, and poor performance status.  She is seen with Joseph spouse. History is obtained from spouse.  Spouse is a reliable historian.         Pertinent history.  Squamous cell cancer in situ, urethra.  Followed by Dr. Callahan.       Oncology/Hematology History Overview Note   DIAGNOSTIC ABNORMALITIES: Breast  cancer.  Screening mammogram 2021.New dense 7 mm partially obscured nodule in the upper outer quadrant of the left breast, with associated architectural distortion.  Diagnostic mammogram 2021.  Small subcentimeter suspicious spiculated mass at 12:00 in the left breast, approximately 3 cm to 4 cm deep from the nipple. This is suspicious for breast carcinoma, ultrasound-guided biopsy is recommended.  Sonogram 2021.  Small suspicious solid mass at 12:00 in the left breast. 5.5 x 5.1 x 4.7 mm, suspicious for breast carcinoma. This corresponds with the finding on mammograms.  Successful ultrasound-guided left breast biopsy and clip on 2021.  Pathology report 2021.  Left breast at 12:00, core needle biopsies: Invasive carcinoma no special type (ductal).  Histologic grade ([place] histologic score).   Glandular (acinar)/tubular differentiation: Score 1.   Nuclear pleomorphism: Score 2.   Mitotic rate: Score 1.  Overall grade: Grade 1. Maximum tumor diameter is at least 0.8 cm.  Estrogen 87%, progesterone 47% and negative HER-2/gabriela.  Genetic testing was sent.  Patient was seen by Dr. Quynh Rosario 2021.  She is .  She had first delivery at 20.  She took birth control for 1 month.  She took hormone replacement therapy for approximately 5 years.  Family history positive for breast cancer, sister at 78. No ovarian cancer  Chest x-ray 2021.  No acute cardiopulmonary process.  Pathology report 2021.  Left sentinel lymph nodes: Four of 4 lymph nodes are negative for metastatic mammary carcinoma.Comment: Absence of micrometastases is confirmed utilizing immunohistochemical stains for pankeratin.  Left breast, partial mastectomy:  A.  Invasive carcinoma of no special type (ductal), grade 1 (1.1 cm in greatest dimension).  B.  Minor associated low-grade ductal carcinoma in situ component.  C.  The inked surgical margins and skin are negative for malignancy.  D.  Prior biopsy  site changes including fat necrosis.  Comment: Microscopically, the tumor is approximately 7 mm from the closest inked (superior) surgical margin.  AJCC stage: pT1c snpN0.      PREVIOUS INTERVENTIONS:  She had undergone left partial mastectomy with left sentinel lymph node biopsy 09/14/2021 by Dr. Rosario.  Declined adjuvant radiation.  Adjuvant Femara 10/01/2021 through present.        DIAGNOSTIC ABNORMALITIES: Vulvar cancer  She had developed recurrent vulvar cancer left inguinal node that is PET positive measuring 2.1 cm.  The patient was seen by Dr. Marks in favor definitive chemo radiation.  Pathology report 07/10/2020 periurethral biopsy, poorly differentiated carcinoma with squamous and papillary features, focally invasive in the subepithelial connective tissue.  PET 07/31/2020 showed intense FDG avid left inguinal node is highly suspicious for local regional metastasis from known vulvar squamous cell carcinoma.  No additional sites of suspicious FDG activity.  MRI 07/31/2020 showed redemonstration of mildly T2 hyperintense mass involving the urethral meatus, similar dimensions to prior exam on 02/18/2020 however there is now a polypoid lesion seen along the anterior aspect of the perineum, possibly contiguous with the urethral mass, concerning for disease progression.  Market interval enlargement of the left inguinal node almost certainly representing disease involvement.  Patient was notified by Dr. Siddiqui 08/19/2020.  Consensus for chemoradiation.  Patient reluctant to take chemotherapy.  Follow-up with Dr. Siddiqui in 4 to 6 weeks after radiation is completed.         PREVIOUS INTERVENTIONS:  Mitomycin C and 5-FU 10/05/2020 through 10/08/2020 with radiation completed 12/10/2020 at [facility].  D29 to d32 of chemo discontinued due to poor performance status.       DIAGNOSTIC ABNORMALITIES:   The patient was seen by Dr. Greg Alberts 01/29/2018 complaining of coughing and wheezing.  "The patient was given Tussionex. Blood work was ordered. CBC 01/29/2018 revealed a WBC of 7.8, hemoglobin 11.2, hematocrit 35.1, MCV 96.2, platelets 43,000, and ANC 4.47. CMP remarkable for of glucose of 177 and GFR 59 mL/minute.  The patient was seen by VINCENT Jones, 02/02/2018. She was coughing and wheezing. Tussionex did not help. The patient was given prednisone.  The patient was seen by VINCENT Jones, 02/15/2018. She is followed for anemia from iron deficiency. CBC 02/15/2018 revealed a WBC of 12.9, hemoglobin 11.4, hematocrit 34.5, MCV 95, and platelets 68,000.       PREVIOUS INTERVENTIONS:   Ferrous sulfate 325 mg 03/07/2018 through 05/06/2018.  Not resumed 06/08/2018. \"I misunderstood.\"   Resume 09/05/2018 through 10/03/2018, stopped due to intolerance.  Injectafer 750 mg 10/20/2018 at the [place] office.  Retacrit 10/27/2020 through present.  1 unit packed RBC 6/14/2024.  Injectafer 750 mg 5/18/2021, 1/26/2022, 5/25/2022 and 6/21/2024 at [facility]           Vulvar cancer, carcinoma    Initial Diagnosis    Vulvar cancer, carcinoma (CMS/HCC)     3/19/2001 Biopsy    Clinical Features: red vaginal lesion with bleeding since 1995. TX with  Carafate douche and Premarin vaginal cream with intermittent improvement.    DIAGNOSIS:    VAGINA, BIOPSY: FOCAL ULCERATION, MARKED ACUTE AND CHRONIC INFLAMMATION,  SPONGIOSIS AND REACTIVE ATYPIA; NEGATIVE FOR DYSPLASIA.     8/17/2001 Procedure    Pap Smear:  DIAGNOSIS:            Atypical keratinized squamous cells, suspicious                           for squamous cell carcinoma.         COMMENTS:             There are numerous atypical keratinized cells                           present in an inflammatory background.  These are                           suspicious for squamous cell carcinoma.  Biopsy                           confirmation is recommended.      10/16/2001 Procedure    Pap Smear:  DIAGNOSIS:            Mild dysplasia       " ADDITIONAL FINDINGS:  Marked inflammation                           Excessive hyperkeratosis         BETHESDA SYSTEM:      Low grade squamous intraepithelial lesion    DIAGNOSIS:            Negative, no abnormal cells seen           BETHESDA SYSTEM:      Within normal limits.       ADEQUACY:             Specimen satisfactory for evaluation       SOURCE:               Vagina, diagnostic thin prep PAP     11/7/2001 Biopsy      DIAGNOSIS:    VAGINA AND VULVA, RIGHT POSTERIOR INTROITUS, WIDE LOCAL EXCISION:  VAGINAL INTRAEPITHELIAL NEOPLASIA (VAIN) II-III, FOCALLY EXTENDING TO  VAGINAL MARGIN AT 12-4:00; ALL OTHER MARGINS NEGATIVE FOR  INTRAEPITHELIAL NEOPLASIA. (SEE COMMENT)    COMMENT: The lesion involves vaginal type epithelium with associated  chronic inflammation and erosion. The adjacent vulvar epithelium is  hyperkeratotic.     3/15/2002 Procedure    Pap Smear:  BETHESDA SYSTEM:      High grade squamous intraepithelial lesion       DESCRIPTOR:           Moderate dysplasia           ADEQUACY:             Specimen satisfactory for evaluation       SOURCE:               Vagina, Thin Prep Pap, Diagnostic     6/13/2003 Procedure         BETHESDA SYSTEM:      High grade squamous intraepithelial lesion       DESCRIPTOR:           Moderate dysplasia       ADDITIONAL FINDINGS:  Inflammation         ADEQUACY:             Specimen satisfactory for evaluation       SOURCE:               Vagina, Thin Prep Pap, Diagnostic    HUMAN PAPILLOMAVIRUS         CASE ACCESSION #:    [id number]        Category                  HPV Types                Patient Results         High/Intermed Risk    16,18,31,33,35,39              Negative                            45,51,52,56,58,59,68      Specimen Source        Cervical / Vaginal Specimen     12/5/2003 Procedure    Gynecological Cytology        CASE ACCESSION #:     [id number]       BETHESDA SYSTEM:      Low grade squamous intraepithelial lesion       DESCRIPTOR:           Mild  dysplasia           ADEQUACY:             Specimen satisfactory for evaluation       SOURCE:               Vagina, Thin Prep Pap, Diagnostic     11/29/2011 Biopsy    ADDENDUM FINDINGS:    The high grade MOODY in the right labia majora biopsy was of the usual type.  There was no evidence of differentiated MOODY.      DIAGNOSIS:    1) PERINEUM, BIOPSY: SQUAMOUS EPITHELIUM WITH FLORID HYPERKERATOSIS,  CONSISTENT WITH CHRONIC IRRITATION; NO EVIDENCE OF DYSPLASIA OR MALIGNANCY  (SEE COMMENT).    Comment: Immunohistochemical studies (p16, p53, MIB-1) confirm the rendered  interpretations.    2) VULVA, RIGHT LABIUM MAJORUM, BIOPSY: VULVAR INTRAEPITHELIAL NEOPLASIA II  (MODERATE DYSPLASIA); (SEE COMMENT).    Comment: Immunohistochemical studies for p16 (nuclear and cytoplasmic  positivity in lower epithelial half) and MIB-1 (nuclear positivity in lower  epithelial half) confirms the diagnosis; p53 is positive only in rare  cells. A GMS histochemical study for fungal forms is negative.  Nevertheless, parakeratosis associated with neutrophils, as was seen  herein, is commonly associated with fungal vulvitis.     7/3/2012 Procedure    Gynecological Cytology    CASE ACCESSION #:                     [id number]  BETHESDA SYSTEM:                      High grade squamous intraepithelial                                        lesion  DESCRIPTOR:                           Mild to moderate dysplasia  ADEQUACY:                             Specimen satisfactory for evaluation  SOURCE:                               Vagina, Thin Prep Pap, Diagnostic  # [id number] REVIEWED:                    1     1/29/2013 Biopsy    DIAGNOSIS:    1) VULVA, RIGHT, ANTERIOR, BIOPSY:  SPONGIOTIC DERMATITIS WITH EXTENSIVE  DERMAL GRANULATION TISSUE, MIXED INFLAMMATION AND FIBROSIS (SEE COMENT).    Comment: Sections show spongiosis, basement membrane thickening, dermal  fibrosis, and extensive dermal granulation tissue with a predominantly  chronic inflammatory  infiltrate. There is no evidence of dysplasia or  malignancy. The basement membrane thickening and dermal fibrosis suggests  that there may be component of lichen sclerosus. However, the underlying  cause of the ongoing inflammation and repair (granulation tissue) is not  clear from this sample. A tissue gram stain fails to reveal any large  bacterial aggregates.  GMS and AFB studies for fungal forms and acid fast  bacilli were negative respectively     11/27/2013 Surgery      Diagnosis    1) VULVA, LEFT ANTERIOR, BIOPSY: HIGH GRADE SQUAMOUS INTRAEPITHELIAL LESION (SEVERE DYSPLASIA, MOODY III).    2) VAGINAL WALL, ANTERIOR, BIOPSY: HIGH GRADE SQUAMOUS INTRAEPITHELIAL LESION (SEVERE DYSPLASIA, VaIN III).    3) VULVA, VULVECTOMY: FOCUS OF MICROINVASIVE SQUAMOUS CELL CARCINOMA, WELL-DIFFERENTIATED, DEPTH OF STROMAL INVASION 0.4 MM,  8 MM FROM CLOSEST DEEP MARGINS, 4 MM FROM RIGHT ANTERIOR VAGINAL MARGINS AT 8 - 9 O'CLOCK (PROXIMAL TIP OF SPECIMEN DESIGNATED   12 O'CLOCK); NEGATIVE FOR LYMPHOVASCULAR INVASION; ARISING IN A BACKGROUND OF EXTENSIVE VULVAR INTRAEPITHELIAL NEOPLASIA (SEVERE DYSPLASIA, MOODY III, CLASSICAL AND DIFFERENTIATED TYPES); MOODY III IS PRESENT AT RIGHT LATERAL SURGICAL MARGIN (7 - 9 O'CLOCK),   LEFT LATERAL SURGICAL MARGIN (3 - 5 O'CLOCK) AND RIGHT AND LEFT VAGINAL MARGINS; TOTAL LESIONAL AREA (INVASIVE CARCINOMA AND MOODY III) MEASURES 8.2 CM IN GREATEST DIMENSION (GROSS MEASUREMENT); BACKGROUND SEVERE INTERFACE DERMATITIS AND LICHEN SCLEROSUS.        **Electronically signed out by Dimas Cardenas M.D.**on 12/2/2013  HAYLEY/YAZMIN/franky  Case reviewed by Attending Pathologist      Synoptic Diagnosis  3)  VULVA:     Specimen:                        Vulva  Procedure:                       Other: Vulvectomy  Lymph Node Sampling:             Not applicable  Specimen Size:                   Greatest dimension: 13.5 cm                                   Additional dimension: 9.5 cm                                    Additional dimension: 1.2 cm  Tumor Site:                      Right vulva, labium minus  Tumor Size:                      Greatest dimension: 0.04 cm  Tumor Focality:                  Unifocal  Histologic Type:                 Squamous cell carcinoma  Histologic Grade:                G1: Well differentiated  Microscopic Tumor Extension:     Depth of invasion: 0.4 mm  Margins:                         Uninvolved by invasive carcinoma                                   Distance of invasive carcinoma from closest margin: 4 mm  Lymph-Vascular Invasion:         Not identified  Lymph Nodes:                     No nodes submitted or found  Extranodal Extension:            Cannot be determined (explain):    Fixed or Ulcerated Femoral-Inguinal Lymph Nodes:                                    Not identified  Laterality of Involved Lymph Nodes:                                    Cannot be determined:    Primary Tumor (pT):              pT1a [FIGO IA]: Lesions 2 cm or less in size, confined to the vulva or perineum, and with stromal invasion 1.0 mm or less  Regional Lymph Nodes (pN):       pNX:  Regional lymph nodes cannot be assessed  Distant Metastasis (pM):         Not applicable  Additional Pathologic Findings:  Vulvar intraepithelial neoplasia (MOODY) 3 (severe dysplasia/carcinoma in situ)  --------------------------------------------------------     5/20/2014 Procedure    Gynecologic Cytology  [place] Diagnosis:  High grade squamous intraepithelial lesion.    Descriptor/Additional Findings:  Moderate dysplasia.    Adequacy:  Specimen satisfactory for evaluation.    Source:  Vagina, Thin Prep Pap, Imaged Diagnostic     11/11/2014 Biopsy    Diagnosis    ANTERIOR VAGINAL WALL, BIOPSY:  HIGH GRADE SQUAMOUS INTRAEPITHELIAL LESION (VaIN 3, SEVERE DYSPLASIA)       12/19/2014 Surgery    Diagnosis  1)  ANTERIOR VAGINAL WALL, PARTIAL VAGINECTOMY: HIGH-GRADE SQUAMOUS INTRAEPITHELIAL LESION (VaIN 3, SEVERE DYSPLASIA), 2.7 CM; HIGH GRADE  "DYSPLASIA EXTENDS TO THE 2 AND 8 O'CLOCK TIP MARGINS, THE 5-8 O'CLOCK LATERAL MARGIN, AND THE 11-2 O'CLOCK LATERAL   MARGIN.          2)  JOYA-CLITORAL AREA, EXCISION: HIGH-GRADE SQUAMOUS INTRAEPITHELIAL LESION (VaIN 3, SEVERE DYSPLASIA), EXTENDING TO A LATERAL MARGIN.         2/9/2015 Surgery    Diagnosis  1.          VULVA, LEFT PERIURETHRAL PORTION, EXCISION: FOCAL HIGH-GRADE SQUAMOUS INTRAEPITHELIAL LESION (MOODY 2, MODERATE DYSPLASIA); MARGINS ARE NEGATIVE FOR DYSPLASIA.    2.          VULVA, RIGHT PERIURETHRAL PORTION, EXCISION: BENIGN SQUAMOUS MUCOSA WITH ACUTE AND CHRONIC INFLAMMATION AND REACTIVE CHANGES.         3.          VULVA, LEFT, LOWER PORTION, EXCISION: BENIGN SQUAMOUS MUCOSA WITH ACUTE AND CHRONIC INFLAMMATION, REACTIVE CHANGES AND FEATURES CONSISTENT WITH PREVIOUS SURGICAL PROCEDURE.         4.          VULVA, RIGHT PORTION, EXCISION: BENIGN SQUAMOUS MUCOSA WITH CHRONIC INFLAMMATION AND DERMAL FIBROSIS.        9/6/2017 Procedure    Diagnosis  \"PAP SMEAR FROM THE URETHRA\":  HIGH GRADE SQUAMOUS INTRAEPITHELIAL LESION.          10/26/2017 Biopsy    Urethral Meatus Biopsy:  Urothelial mucosa with ulceration, chronic inflammation and focal high grade squamous intraepithelial lesion, moderate dysplasia     7/10/2018 Imaging    MRI Pelvis:  Impression:    1.  There is a 2.3 x 1.8 cm urethral lesion at the external urethral   meatus demonstrating enhancement and T2 hyperintensity. The lesion is   located approximately 8 mm from the bladder neck/internal urethral   orifice.  There is no extension of the lesion into the para vaginal   fat or ischiorectal fossa. There is some edema of the bilateral   ischiocavernosus muscles without invasion by the mass. No pelvic or   inguinal adenopathy is identified.     2.  The patient has a 2.5 cm cystocele and the urethra is almost   horizontal. There is pelvic floor laxity with loss of upper convexity   of the left iliococcygeus muscle.     7/20/2018 Biopsy    " Diagnosis  URETHRA, BIOPSY:  SQUAMOUS CELL CARCINOMA IN SITU; SEE COMMENT.    Comments  P16 immunostain and HPV RNA in situ hybridization are strongly and diffusely positive within the lesion, consistent with HPV-related pathogenesis.     1/8/2019 Imaging    MRI Pelvis:  1.  Stable size and appearance of a nonspecific enhancing nodular   lesion at the caudal margin of the vaginal introitus adjacent to   extensive postsurgical changes related to prior vulvectomy and   vaginectomy. This lesion does not appear to conform to the expected   course of the urethra which is somewhat poorly defined, and this felt   more likely be located immediately caudal to the urethra. It is   possible this may reflect postsurgical scarring as opposed to a true   lesion. Continued follow-up is suggested to ensure stability.  2.  No lymphadenopathy or other evidence of metastatic disease in the   pelvis.  3.  Evidence of pelvic floor laxity with cystocele, not significantly   changed.     8/6/2019 Imaging    MRI Pelvis:  1. No significant change from the prior exam on this limited   noncontrast study.  2. Stable indeterminate nodule anterior to the external urethral   meatus which may represent focal scarring; however,   residual/recurrent disease is not entirely excluded.  Recommend continued attention on follow-up. 3. Extensive pelvic   postsurgical changes with no evidence of local recurrence.  4. Pelvic floor laxity with cystocele unchanged from prior exam.     1/13/2020 Procedure    Urethral stricture dilation     2/18/2020 Imaging    MRI Pelvis:  Impression:  1.  No significant interval change in 1.7 x 1.7 cm T2 hyperintense   ovoid lesion at the urethral meatus.  2.  Numerous postoperative findings status post vaginectomy and   hysterectomy.  3.  Pelvic floor laxity with persistent cystocele.  4.  No pelvic adenopathy or bony lesion identified.     5/13/2020 Procedure    Urethral stricture dilation      7/10/2020 Biopsy     Diagnosis  PERIURETHRA, BIOPSY: POORLY DIFFERENTIATED CARCINOMA WITH SQUAMOUS AND PAPILLARY FEATURES, FOCALLY INVASIVE IN THE SUBEPITHELIAL CONNECTIVE TISSUE; SEE COMMENT.    Comments  The patient's history of vulvar microinvasive squamous cell carcinoma is noted. The current biopsy shows a poorly differentiated carcinoma with papillary formation. P16 immunostain and HPV RNA in situ hybridization are strongly and diffusely positive within the lesion, consistent with HPV-related pathogenesis. A KERRI-3 immunostain shows patchy, weak positivity in the lesional cells. The patient's prior biopsy ([id number]) is reviewed and shows similar morphologic and immunophenotypic features but the papillary features were not present in the prior material.  Dr. Ilene Pablo reviewed this case and concurs with the diagnosis.      7/31/2020 Imaging    MRI Pelvis:  1.  Redemonstration of a mildly T2 hyperintense mass involving the   urethral meatus, similar dimensions to prior exam on 2/18/2020,   however there is now a polypoid lesion seen along the anterior aspect   of the perineum, possibly contiguous with the urethral mass,   concerning for disease progression. This could potentially represent   the recently biopsied lesion.  2.  Marked interval enlargement of a left inguinal lymph node, almost   certainly representing disease involvement. This would be amenable to   ultrasound-guided biopsy if warranted.  3.  Findings related to pelvic floor laxity, with cystocele.    PET/CT:  1.  Intensely FDG avid left inguinal lymph node is highly suspicious   for locoregional metastasis from known vulvar squamous cell   carcinoma. There are no additional sites of suspicious FDG activity.  2.   Intense physiologic FDG activity within the urine obscures   visualization of the periurethral mass. Findings are better   characterized on same day pelvic MRI.     9/21/2020 - 12/10/2020 Radiation    Radiation OncologyTreatment Course:  Dago TOLBERT  Enrique received 6940 cGy in 38 fractions to vulva via external beam radiation therapy.     10/5/2020 - 10/12/2020 Chemotherapy    OP Vulvar MitoMYcin / Fluorouracil CIV + XRT       10/9/2020 - 9/7/2021 Chemotherapy    OP CENTRAL VENOUS ACCESS DEVICE ACCESS, CARE, AND MAINTENANCE (CVAD)     10/19/2020 Imaging    CT Head:  Impression:    1. No acute intracranial process.     10/21/2021 -  Chemotherapy    OP CENTRAL VENOUS ACCESS DEVICE ACCESS, CARE, AND MAINTENANCE ([facility])     Secondary malignancy of inguinal lymph nodes   8/31/2020 Initial Diagnosis    Secondary malignancy of inguinal lymph nodes (CMS/HCC)     10/9/2020 - 9/7/2021 Chemotherapy    OP CENTRAL VENOUS ACCESS DEVICE ACCESS, CARE, AND MAINTENANCE ([facility]AD)     10/21/2021 -  Chemotherapy    OP CENTRAL VENOUS ACCESS DEVICE ACCESS, CARE, AND MAINTENANCE (CVAD)     Malignant neoplasm of upper-outer quadrant of left breast in female, estrogen receptor positive   9/14/2021 Initial Diagnosis    Malignant neoplasm of upper-outer quadrant of left breast in female, estrogen receptor positive         PAST MEDICAL HISTORY:  ALLERGIES:  Allergies   Allergen Reactions   • Scopolamine Swelling     Other reaction(s): ANGIOEDEMA       • Amoxicillin-Pot Clavulanate Rash   • Keflex [Cephalexin] Rash   • Septra [Sulfamethoxazole-Trimethoprim] Rash   • Tequin [Gatifloxacin] Other (See Comments)     Doesn't remember   • Trovan [Alatrofloxacin] Dizziness     CURRENT MEDICATIONS:  Outpatient Encounter Medications as of 4/16/2025   Medication Sig Dispense Refill   • Acetaminophen (TYLENOL ARTHRITIS PAIN PO) Take 1 tablet by mouth Daily As Needed (BACK PAIN).     • albuterol sulfate  (90 Base) MCG/ACT inhaler Inhale 2 puffs Every 4 (Four) Hours As Needed for Shortness of Air.     • aspirin 81 MG EC tablet Take 1 tablet by mouth Daily.     • bisoprolol-hydrochlorothiazide (ZIAC) 5-6.25 MG per tablet Take 1 tablet by mouth Daily. 90 tablet 1   • bumetanide (BUMEX) 1 MG tablet TAKE  1 TABLET BY MOUTH 2 TIMES DAILY 60 tablet 1   • cetirizine (zyrTEC) 10 MG tablet Take 1 tablet by mouth Daily As Needed for Allergies.     • citalopram (CeleXA) 20 MG tablet TAKE 1 TABLET BY MOUTH 1 TIME DAILY 30 tablet 1   • clindamycin (CLEOCIN T) 1 % lotion Apply 1 Application topically to the appropriate area as directed Daily As Needed (Rash On Legs).     • cyanocobalamin 1000 MCG/ML injection Inject 1 mL into the appropriate muscle as directed by prescriber Every 30 (Thirty) Days.     • diphenoxylate-atropine (LOMOTIL) 2.5-0.025 MG per tablet TAKE 2 TABLETS BY MOUTH 4 TIMES DAILY AS NEEDED FOR DIARRHEA 90 tablet 2   • esomeprazole (nexIUM) 40 MG capsule TAKE 1 CAPSULE TWICE DAILY 180 capsule 3   • Homeopathic Products (LEG CRAMPS) tablet Take 1 tablet by mouth Daily.     • HYDROcodone-acetaminophen (NORCO)  MG per tablet Take 0.5 tablets by mouth Every 4 (Four) Hours As Needed for Moderate Pain or Severe Pain. 60 tablet 0   • letrozole (FEMARA) 2.5 MG tablet TAKE 1 TABLET BY MOUTH 1 TIME DAILY 90 tablet 3   • levothyroxine (SYNTHROID, LEVOTHROID) 75 MCG tablet TAKE 1 TABLET BY MOUTH EVERY DAY 90 tablet 1   • memantine (Namenda) 5 MG tablet Take 1 tablet by mouth 2 (Two) Times a Day. 60 tablet 2   • metaxalone (SKELAXIN) 800 MG tablet TAKE 1 TABLET BY MOUTH 3 TIMES DAILY AS NEEDED FOR MUSCLE SPASMS 30 tablet 0   • metOLazone (ZAROXOLYN) 2.5 MG tablet TAKE 1 TABLET BY MOUTH EVERY DAY AS NEEDED FOR WEIGHT GAIN OF 3 LBS OR MORE IN 24 HOURS. 30 tablet 5   • nystatin-triamcinolone (MYCOLOG II) 112568-2.1 UNIT/GM-% cream Apply 1 Application topically to the appropriate area as directed 2 (Two) Times a Day As Needed (rash under breast).     • nystatin-triamcinolone (MYCOLOG) 775842-1.1 UNIT/GM-% ointment Apply 1 Application topically to the appropriate area as directed 2 (Two) Times a Day. 30 g 3   • olmesartan (BENICAR) 20 MG tablet TAKE 1 TABLET BY MOUTH 1 TIME DAILY 90 tablet 2   • phenazopyridine (PYRIDIUM)  200 MG tablet Take 1 tablet by mouth 3 (Three) Times a Day As Needed for Dysuria. 15 tablet 0   • potassium chloride (KLOR-CON M20) 20 MEQ CR tablet TAKE 2 TABLETS BY MOUTH EVERY DAY 60 tablet 5   • potassium chloride ER (K-TAB) 20 MEQ tablet controlled-release ER tablet Take 2 tablets by mouth Daily.     • pravastatin (PRAVACHOL) 40 MG tablet TAKE 1 TABLET BY MOUTH AT BEDTIME 90 tablet 1   • Probiotic Product (PROBIOTIC DAILY PO) Take 1 tablet by mouth Daily.     • saccharomyces boulardii (Florastor) 250 MG capsule Take 1 capsule by mouth 2 (Two) Times a Day. 5 capsule 0   • sodium bicarbonate 650 MG tablet Take 1 tablet by mouth 2 (Two) Times a Day.     • tacrolimus (PROTOPIC) 0.1 % ointment Apply 1 Application topically to the appropriate area as directed 2 (Two) Times a Day As Needed (dermatitis).     • vitamin D (ERGOCALCIFEROL) 1.25 MG (96808 UT) capsule capsule TAKE 1 CAPSULE BY MOUTH ON THE SAME DAY EACH WEEK. 12 capsule 3     Facility-Administered Encounter Medications as of 4/16/2025   Medication Dose Route Frequency Provider Last Rate Last Admin   • heparin injection 500 Units  500 Units Intravenous PRN Drake Stafford MD   500 Units at 07/01/21 1354   • heparin injection 500 Units  500 Units Intravenous PRN Drake Stafford MD   500 Units at 01/11/22 1134   • heparin injection 500 Units  500 Units Intravenous PRN Drake Stafford MD   500 Units at 09/28/22 1418   • heparin injection 500 Units  500 Units Intravenous PRN Drake Stafford MD   500 Units at 01/25/23 1537   • heparin injection 500 Units  500 Units Intravenous PRN Drake Stafford MD   500 Units at 06/26/23 1426   • heparin injection 500 Units  500 Units Intravenous PRN Drake Stafford MD   500 Units at 03/05/24 1350   • heparin injection 500 Units  500 Units Intravenous PRN Drake Stafford MD   500 Units at 10/02/24 1508   • heparin injection 500 Units  500 Units Intravenous PRN Drake Stafford MD   500 Units at 01/15/25 1410   • sodium chloride 0.9 %  flush 10 mL  10 mL Intravenous PRN Drake Stafford MD   10 mL at 07/01/21 1354   • sodium chloride 0.9 % flush 10 mL  10 mL Intravenous PRN Drake Stafford MD   10 mL at 01/11/22 1134   • sodium chloride 0.9 % flush 10 mL  10 mL Intravenous PRN Drake Stafford MD   10 mL at 09/28/22 1418   • sodium chloride 0.9 % flush 10 mL  10 mL Intravenous PRN Drake Stafford MD   10 mL at 01/25/23 1537   • sodium chloride 0.9 % flush 10 mL  10 mL Intravenous PRN Drake Stafford MD   10 mL at 06/26/23 1426   • sodium chloride 0.9 % flush 10 mL  10 mL Intravenous PRN Drake Stafford MD   10 mL at 03/05/24 1350   • sodium chloride 0.9 % flush 10 mL  10 mL Intravenous PRN Drake Stafford MD   10 mL at 10/02/24 1508   • sodium chloride 0.9 % flush 10 mL  10 mL Intravenous PRN Drake Stafford MD   10 mL at 01/15/25 1402     ADULT ILLNESSES:  Patient Active Problem List   Diagnosis Code   • Meatal stenosis [id number]   • Retention of urine R33.9   • Type 2 diabetes mellitus, without long-term current use of insulin E11.9   • Essential hypertension I10   • Grief reaction F43.20   • 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   • Chronic midline low back pain without sciatica M54.50, G89.29   • Bilateral lower extremity edema R60.0   • History of urethral stricture Z87.448   • Epidermal cyst of neck L72.0   • Normocytic anemia D64.9   • Neck abscess L02.11   • Mixed hyperlipidemia E78.2   • Gastroesophageal reflux disease without esophagitis K21.9   • Anxiety F41.9   • Iron deficiency and chemotherapy induced anemia D50.9   • Stage 3 chronic kidney disease N18.30   • Anemia D64.9   • Preop cardiovascular exam Z01.810   • Lower extremity edema R60.0   • Vulvar cancer, carcinoma C51.9   • Former smoker Z87.891   • 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   • Febrile illness R50.9   • Chemotherapy-induced thrombocytopenia D69.59, T45.1X5A   • Hyponatremia E87.1   • Hypokalemia E87.6   • Neutropenic fever D70.9, R50.81   • Moderate  malnutrition E44.0   • Antineoplastic chemotherapy induced anemia D64.81, T45.1X5A   • History of radiation therapy Z92.3   • Encounter for care related to Port-a-Cath Z45.2   • Malignant neoplasm of upper-outer quadrant of left breast in female, estrogen receptor positive C50.412, Z17.0   • Encounter for care related to vascular access port Z45.2   • Angiodysplasia K55.20   • Bronchitis J40   • Obesity (BMI 30-39.9) E66.9   • Wheezing R06.2   • Cough R05.9   • 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   • 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   • Rectal bleeding K62.5   • 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 M81.8, T38.7X5A   • CVA (cerebral vascular accident) I63.9   • Transient ischemic attack (TIA) G45.9   • Stenosis of right carotid artery I65.21   • Carotid stenosis, right I65.21   • Carotid stenosis I65.29     SURGERIES:  Past Surgical History:   Procedure Laterality Date   • APPENDECTOMY     • BENJAMIN PROCEDURE      No evidence of reflux disease while on Nexium 40mg daily-See report   • BREAST BIOPSY     • BREAST CYST ASPIRATION Left    • BREAST LUMPECTOMY     • COLONOSCOPY  01/12/2011    Diverticulosis sigmoid colon; The examination was otherwise normal; Repeat 10 years   • COLONOSCOPY  11/03/2003    Dr. Laguerre-Normal colonoscopy; Normal terminal ileum; Repeat 5 years   • COLONOSCOPY N/A 11/05/2021    Petechia(e) in the rectum, in the recto-sigmoid colon and in the distal sigmoid colon; No specimens collected; No plans to repeat colonoscopy due to advance age and/or medical problems   • CYSTOSCOPY N/A 09/20/2022    Procedure: CYSTOSCOPY WITH URETHRAL DILATATION;  Surgeon: Shaheen Conway MD;  Location: [facility];  Service: Urology;  Laterality: N/A;   • ENDOSCOPY  07/01/2014    Normal esophagus; Normal stomach; Normal examined duodenum; BRAVO pH capsule deployed;    • ENDOSCOPY  08/14/2013    Mild gastritis-biopsies for H.Pylor obtained   • ENDOSCOPY  02/16/2005    Dr. LaguerreFdswg-Hzrelsnbe-dqgbvffj   • ENDOSCOPY  10/21/2003     Dr. Laguerre-Stage 1 reflux esophagitis   • ENDOSCOPY N/A 11/05/2021    Small HH; A single gastroesophageal junction polyp; Normal stomach; A single non-bleeding angiodysplastic lesion in the duodenum   • HYSTERECTOMY     • MASTECTOMY W/ SENTINEL NODE BIOPSY Left 09/14/2021    Procedure: LEFT PARTIAL MASTECTOMY WITH MAGSEED AND LEFT SENTINEL LYMPH NODE BIOPSY MAGTRACE;  Surgeon: Quynh Rosario MD;  Location:  [facility];  Service: General;  Laterality: Left;   • TONSILLECTOMY     • VAGINA SURGERY      Laser surgery X 2   • VENOUS ACCESS DEVICE (PORT) INSERTION N/A 09/29/2020    Procedure: SINGLE LUMEN PORT - A- CATH PLACEMENT WITH FLUOROSCOPY;  Surgeon: Quynh Rosario MD;  Location:  [facility];  Service: General;  Laterality: N/A;     HEALTH MAINTENANCE ITEMS:  Health Maintenance Due   Topic Date Due   • URINE MICROALBUMIN-CREATININE RATIO (uACR)  Never done   • RSV Vaccine - Adults (1 - 1-dose 75+ series) Never done   • HEMOGLOBIN A1C  06/13/2025       <no information>  Last Completed Colonoscopy            Needs Review       COLORECTAL CANCER SCREENING (COLONOSCOPY - Every 10 Years) Tentatively due on 11/5/2031 11/05/2021  COLONOSCOPY    11/05/2021  Surgical Procedure: COLONOSCOPY    01/29/2014  Outside Claim: MS FECAL BLOOD SCRN IMMUNOASSAY    02/01/2013  Outside Claim: CHG BLOOD,OCCULT,FECAL HGB,FECES,1-3 SIMULT    01/12/2011  SCANNED - COLONOSCOPY     Only the first 5 history entries have been loaded, but more history exists.                        Immunization History   Administered Date(s) Administered   • COVID-19 (MODERNA) 1st,2nd,3rd Dose Monovalent 03/26/2021, 04/23/2021   • COVID-19 (MODERNA) BIVALENT 12+YRS 01/03/2023   • COVID-19 (MODERNA) Monovalent Original Booster 12/28/2021   • FLUAD TRI 65YR+ 10/22/2019   • Flu Vaccine Split Quad 10/12/2018   • Fluad Quad 65+ 11/09/2020   • Fluzone High-Dose 65+YRS 10/01/2018, 11/12/2021, 10/31/2024   • Fluzone High-Dose 65+yrs 11/12/2021,  10/24/2022, 10/24/2023   • Pneumococcal Conjugate 20-Valent (PCV20) 10/24/2022   • Pneumococcal Polysaccharide (PPSV23) 10/16/2013   • Pneumococcal, Unspecified 10/01/2017   • Shingrix 06/15/2023, 05/03/2024   • Tdap 05/01/2019   • Zostavax 01/14/2010, 10/01/2015     Last Completed Mammogram            Completed or No Longer Recommended       MAMMOGRAM  Discontinued        Frequency changed to Never automatically (Topic No Longer Applies)    11/11/2024  Order placed for Mammo Diagnostic Digital Tomosynthesis Bilateral With CAD by Quynh Rosario MD    10/31/2024  Mammo Diagnostic Digital Tomosynthesis Bilateral With CAD    10/30/2023  Mammo Diagnostic Digital Tomosynthesis Bilateral With CAD    10/10/2022  Mammo Diagnostic Digital Tomosynthesis Bilateral With CAD      Only the first 5 history entries have been loaded, but more history exists.                              FAMILY HISTORY:  Family History   Problem Relation Age of Onset   • Cancer Mother    • Hypertension Mother    • Osteoporosis Mother    • Dementia Mother    • Uterine cancer Mother    • Heart disease Father    • Parkinsonism Father    • Cancer Sister    • Breast cancer Sister    • Kidney cancer Sister    • Diabetes Brother    • Heart disease Paternal Grandfather    • No Known Problems Maternal Grandmother    • No Known Problems Maternal Grandfather    • No Known Problems Paternal Grandmother    • Colon cancer Neg Hx    • Colon polyps Neg Hx    • Esophageal cancer Neg Hx    • Liver cancer Neg Hx    • Liver disease Neg Hx    • Rectal cancer Neg Hx    • Stomach cancer Neg Hx      SOCIAL HISTORY:  Social History     Socioeconomic History   • Marital status:    Tobacco Use   • Smoking status: Former     Current packs/day: 0.25     Average packs/day: 0.3 packs/day for 3.0 years (0.8 ttl pk-yrs)     Types: Cigarettes     Passive exposure: Past   • Smokeless tobacco: Never   • Tobacco comments:     social smoker in college   Vaping Use   •  "Vaping status: Never Used   Substance and Sexual Activity   • Alcohol use: Not Currently     Comment: Occasional glass of wine   • Drug use: No   • Sexual activity: Defer       REVIEW OF SYSTEMS:    Review of Systems   Constitutional:  Positive for fatigue. Negative for chills and fever.        \"Lots of stress.\"   HENT:  Negative for congestion and facial swelling.    Eyes:  Negative for discharge and redness.   Respiratory:  Negative for shortness of breath and wheezing.    Cardiovascular:  Positive for leg swelling. Negative for chest pain.   Gastrointestinal:  Negative for abdominal pain, nausea and vomiting.   Endocrine: Negative for cold intolerance and heat intolerance.   Genitourinary:  Negative for dysuria and hematuria.   Musculoskeletal:  Negative for myalgias.   Skin:  Positive for pallor.   Allergic/Immunologic: Negative for food allergies.   Neurological:  Negative for dizziness and speech difficulty.   Hematological:  Negative for adenopathy. Does not bruise/bleed easily.   Psychiatric/Behavioral:  Negative for agitation and confusion. The patient is not nervous/anxious.        VITAL SIGNS: /60   Pulse 85   Temp 97.7 °F (36.5 °C)   Resp 16   Ht 137.2 cm (54\")   Wt 63.8 kg (140 lb 9.6 oz)   SpO2 98%   Breastfeeding No   BMI 33.90 kg/m²  Lost 10 pounds. \"Lots of stress.\"  Pain Score    04/16/25 1418   PainSc: 0-No pain       PHYSICAL EXAMINATION:     Physical Exam  Vitals reviewed.   Constitutional:       Comments: She arrived in the exam room in a wheelchair. Frail looking.    HENT:      Head: Normocephalic and atraumatic.   Eyes:      General: No scleral icterus.  Cardiovascular:      Rate and Rhythm: Normal rate.   Pulmonary:      Effort: No respiratory distress.      Breath sounds: No wheezing.      Comments: Port, right. No erythema.   Abdominal:      General: Bowel sounds are normal.      Palpations: Abdomen is soft.      Tenderness: There is no abdominal tenderness. "   Musculoskeletal:         General: Swelling present.      Cervical back: Neck supple.   Skin:     Coloration: Skin is pale.   Neurological:      Mental Status: She is oriented to person, place, and time.   Psychiatric:         Mood and Affect: Mood normal.         Behavior: Behavior normal.         Thought Content: Thought content normal.         Judgment: Judgment normal.         LABS    Lab Results - Last 18 Months   Lab Units 04/16/25  1411 01/15/25  1314 09/11/24  1358 08/14/24  1357 07/24/24  1552 07/17/24  1416 06/19/24  1533 06/14/24  1116 05/20/24  0438 05/19/24  2057 05/15/24  1339 02/06/24  1335 02/02/24  1230   HEMOGLOBIN g/dL 10.4* 8.6* 8.4* 9.3* 9.6* 9.0*   < > 6.6*   < > 9.4* 8.7*   < > 10.8*   HEMATOCRIT % 31.1* 27.1* 25.9* 28.2* 30.3* 28.1*   < > 21.6*   < > 30.2* 28.0*   < > 33.0*   MCV fL 105.1* 103.0* 100.0* 98.6* 102.7* 101.1*  --  110.8*   < > 111.0* 110.2*   < > 104.8*   WBC 10*3/mm3 6.26 7.03 7.17 7.82 7.83 8.65  --  8.66   < > 9.75 6.29   < > 9.91   RDW % 13.2 13.2 15.9* 16.3* 17.1* 17.1*  --  16.5*   < > 15.2 14.9   < > 14.1   MPV fL 9.9 10.3 10.6 10.4 11.0 11.0  --  10.5   < > 11.4 10.4   < > 12.5*   PLATELETS 10*3/mm3 176 147 270 171 157 190  --  175   < > 170 155   < > 120*   IMM GRAN % %  --  0.3 0.4 0.5 0.5 0.5  --   --   --  0.5  --    < >  --    NEUTROS ABS 10*3/mm3  --  5.15 5.65 5.80 5.75 6.73  --  7.53*  --  7.62* 4.52   < > 8.62*   LYMPHS ABS 10*3/mm3  --  1.18 0.86 1.14 1.11 0.99  --   --   --  1.19  --    < >  --    MONOS ABS 10*3/mm3  --  0.45 0.53 0.67 0.58 0.62  --   --   --  0.56  --    < >  --    EOS ABS 10*3/mm3  --  0.20 0.08 0.13 0.30 0.24  --  0.35  --  0.28 0.44*   < >  --    BASOS ABS 10*3/mm3  --  0.03 0.02 0.04 0.05 0.03  --  0.09  --  0.05 0.26*   < >  --    IMMATURE GRANS (ABS) 10*3/mm3  --  0.02 0.03 0.04 0.04 0.04  --   --   --  0.05  --    < >  --    NRBC /100 WBC 0.0 0.0 0.0 0.0 0.0 0.0  --   --   --  0.0  --    < >  --    NEUTROPHIL % %  --   --   --   --    "--   --   --  83.0*  --   --  67.6  --  84.0*   MONOCYTES % %  --   --   --   --   --   --   --  1.0*  --   --  5.6  --  2.0*   BASOPHIL % %  --   --   --   --   --   --   --  1.0  --   --  4.2*  --   --    ATYP LYMPH % %  --   --   --   --   --   --   --   --   --   --   --   --  3.0   ANISOCYTOSIS   --   --   --   --   --   --   --  Slight/1+  --   --  Slight/1+  --  Slight/1+   GIANT PLT   --   --   --   --   --   --   --   --   --   --   --   --  Slight/1+    < > = values in this interval not displayed.       Lab Results - Last 18 Months   Lab Units 01/15/25  1314 10/02/24  1408 07/24/24  1552 06/14/24  1116 05/22/24  0409 05/21/24  0400 05/20/24  0438 05/19/24  2057 05/15/24  1339 04/04/24  1412   GLUCOSE mg/dL 166* 205* 141* 184* 120* 112*   < > 152* 100* 126*   SODIUM mmol/L 136 137 139 139 140 141   < > 140 140 138   POTASSIUM mmol/L 4.3 4.5 3.7 3.6 4.1 4.3   < > 4.4 4.4 4.3   CO2 mmol/L 27.0 26.0 33.0* 29.0 28.0 29.0   < > 27.0 27.0 25.0   CHLORIDE mmol/L 97* 100 96* 95* 101 103   < > 101 105 100   ANION GAP mmol/L 12.0 11.0 10.0 15.0 11.0 9.0   < > 12.0 8.0 13.0   CREATININE mg/dL 1.80* 1.95* 1.47* 2.03* 1.86* 2.07*   < > 2.29* 1.68* 1.78*   BUN mg/dL 39* 32* 20 51* 32* 31*   < > 32* 29* 28*   BUN / CREAT RATIO  21.7 16.4 13.6 25.1* 17.2 15.0   < > 14.0 17.3 15.7   CALCIUM mg/dL 9.6 9.5 9.8 10.2 8.6 9.1   < > 10.3 9.7 10.1   ALK PHOS U/L 53 47  --  55  --   --   --  53 46 44   TOTAL PROTEIN g/dL 7.1 7.3  --  7.2  --   --   --  7.2 7.0 8.3   ALT (SGPT) U/L 9 11  --  9  --   --   --  10 11 11   AST (SGOT) U/L 15 15  --  21  --   --   --  23 17 21   BILIRUBIN mg/dL 0.3 0.3  --  0.4  --   --   --  0.2 0.2 0.3   ALBUMIN g/dL 4.0 4.0  --  4.0  --   --   --  3.9 4.0 4.7   GLOBULIN gm/dL 3.1 3.3  --  3.2  --   --   --  3.3 3.0 3.6    < > = values in this interval not displayed.       No results for input(s): \"MSPIKE\", \"KAPPALAMB\", \"IGLFLC\", \"URICACID\", \"FREEKAPPAL\", \"CEA\", \"LDH\", \"REFLABREPO\" in the last 13140 " hours.    Lab Results - Last 18 Months   Lab Units 01/15/25  1314 10/31/24  1228 10/02/24  1408 08/14/24  1357 07/24/24  1552 07/17/24  1416 06/14/24  1116 05/19/24 2057 05/15/24  1339   IRON mcg/dL 43  --  56 53  --  54 46  --  57   TIBC mcg/dL 289*  --  274* 323  --  314 325  --  298   IRON SATURATION (TSAT) % 15*  --  20 16*  --  17* 14*  --  19*   FERRITIN ng/mL 861.70*  --  1,974.00* 1,346.00*  --  1,290.00* 567.30*  --  624.60*   TSH uIU/mL  --  3.490  --   --  75.880*  --   --   --   --    FOLATE ng/mL  --   --   --   --   --   --   --  >20.00  --        Dago Nunez reports a pain score of 0.         ASSESSMENT:  1.  Left breast cancer, upper outer quadrant.  Tumor size 1.1 cm.  Negative genetic testing.  AJCC stage: 1A (pT1c, snpN0, cM0)  Receptor status: Estrogen 87%, progesterone 47% and negative HER-2/gabriela.  Treatment status: Post left lumpectomy and sentinel lymph node biopsy.  Declined adjuvant radiation.  Patient is taking adjuvant letrozole.  2.  Macrocytic anemia from iron deficiency, B12 deficiency and history of chronic kidney disease stage IV, GFR 24.2 mL/min on 6/14/2024.   She is off epoetin alpha.  3.   Iron deficiency. Intolerant to oral iron.  Intravenous iron therapy as indicated.  4.   Chronic kidney disease Stage IV.  GFR 24.2 ml/min on 6/14/2024.  Contributing to her anemia.  5.   Performance status of 3.    6.   Grade 1 nausea from letrozole.  Taking ondansetron.   7.   Osteoporosis.  Taking calcium and vitamin D.  The patient is on subcutaneous denosumab.  8.   Recurrent squamous cell carcinoma, vulva.  AJCC stage IIIA (T2, Nib, M0, G3).  Treatment status.  Had Mitomycin C and 5 FU D1 to D4 with radiation.  D29 - 32 chemo was cancelled due to poor performance status.             PLAN:  1.    Re:  Heme status.  Hemoglobin 10.4, hematocrit 31.1, and .1.    2.    Re:  Pre-office CMP.  GFR 39.7 from 27.8 ml/min.    3.    Re:  Ferritin latest 861.7 from  1974 from 1346 from 1290 and saturation 26 from 15 from 20 from 16 from 17 from 14 from 19 from 33 %.     4.    Re: MRI pelvis 1/9/2025. 3.2 x 0.4 cm rind of diffusion restriction within the dependent  portion of the bladder. This raises suspicion for a mucosal neoplasm with a differential of layering proteinaceous/hemorrhagic debris. In addition along the midportion of the urethra is an expansile appearance demonstrating heterogeneous signal measuring up to 14 mm. Recommend further evaluation with cystoscopy to evaluate the urethra and bladder to exclude recurrent urothelial malignancy. No pelvic lymphadenopathy. Curvilinear zones of T2 hyperintense signal about the bilateral  supra-acetabular regions as well as the left superior pubic ramus. These raise the suspicion of stress fractures.  Mild symmetric edema involving the vulvar soft tissues which is favored reactive. In addition there is diffuse body wall anasarca with more confluent edema involving the bilateral adductor musculature which is similar to prior studies.  5.   Re: Note from Dr. Young on 1/28/2025.  Seen for ureteric stricture post radiation and no bladder and urothelial abnormalities.  Discussed pursuing cystoscopy and biopsy with possible repeat urethral dilatation. Cystoscopy on 6/18/2025.   6.   Order CBC with differential, ferritin and iron panel every 4 weeks.  7.   Epoetin alpha 40,000 units SQ every 4 weeks if hemoglobin below 11 and hematocrit below 33.  Monitor for hypertension.  8.  Transfuse 1 unit packed RBCs if hemoglobin less than 7.  Premed Tylenol 500 mg p.o. and Benadryl 12.5 mg IV.  Lasix 20 mg IV push after a unit of blood.  Observe for transfusion reactions transfusion reactions.  9.  Cervical/vaginal cytology per gynecology.  10. Advance Care Planning  ACP discussion was held with the patient during this visit. Patient has an advance directive in EMR which is still valid.   11.  eRx ondansetron 8 mg po every 8  "hours as needed for nausea/vomiting, # 60, 2 refills if needed.  12.  eRx letrozole 2.5 mg p.o. daily #90 with 3 refills if needed.  \"I take it at night.\"  Observe for worsening bone loss or arthralgias or hot flashes.\"  13.  Vitamin B12 1000 mcg IM monthly administered by her granddaughter.  Observe for local reaction.  14.  Will not obtain breast tumor markers or Oncotype DX.  Patient is not a candidate for adjuvant chemotherapy.  15.  Continue ongoing management per primary care physician and other specialists.  16.  Order port flushes every 6 weeks.  17.  Order next bone density 10/2025.  18.Plan of care discussed with her spouse, Joseph.  Understanding expressed. Spouse is agreeable to proceed.  19.   Order denosumab 60 mg SQ every 6 months for osteoporosis. She is on letrozole.  Observe for osteonecrosis of the jaw.  20. Mammogram order per surgery.  21.  Ferric gluconate 750 mg as needed.  Premed Tylenol 500 mg p.o. Intolerant to oral iron.  Monitor for anaphylaxis or infusion reactions.   22.  Return to the office in 3 months with CBC with differential, ferritin, iron panel, and CMP.                  I have reviewed the assessment and plan and verified the accuracy of it. No changes to assessment and plan since the information was documented. Drake Stafford MD 04/16/25         I spent 31 total minutes, face-to-face, caring for Syndal today. Greater than 50% of this time involved counseling and/or coordination of care as documented within this note.                cc: (Shaheen Akbar MD )        (Annita Loaiza MD)        (Ulises Roche MD)        (Guillermina Rosario MD)        (Octavio Fernando MD)        Gilberto Mills MD        (Moustapha Callahan MD)        (Radha Marks MD)    "

## 2025-04-21 ENCOUNTER — OFFICE VISIT (OUTPATIENT)
Dept: PULMONOLOGY | Facility: CLINIC | Age: 85
End: 2025-04-21
Payer: MEDICARE

## 2025-04-21 VITALS
BODY MASS INDEX: 32.86 KG/M2 | DIASTOLIC BLOOD PRESSURE: 60 MMHG | HEIGHT: 55 IN | WEIGHT: 142 LBS | SYSTOLIC BLOOD PRESSURE: 130 MMHG | OXYGEN SATURATION: 98 % | HEART RATE: 82 BPM

## 2025-04-21 DIAGNOSIS — R06.2 WHEEZING: Chronic | ICD-10-CM

## 2025-04-21 DIAGNOSIS — E66.9 OBESITY (BMI 30-39.9): Chronic | ICD-10-CM

## 2025-04-21 DIAGNOSIS — J40 BRONCHITIS: Chronic | ICD-10-CM

## 2025-04-21 DIAGNOSIS — Z87.891 FORMER SMOKER: Chronic | ICD-10-CM

## 2025-04-21 DIAGNOSIS — J00 NASOPHARYNGITIS ACUTE: Primary | ICD-10-CM

## 2025-04-21 DIAGNOSIS — U09.9 POST-COVID-19 CONDITION: Chronic | ICD-10-CM

## 2025-04-21 PROCEDURE — 1160F RVW MEDS BY RX/DR IN RCRD: CPT | Performed by: INTERNAL MEDICINE

## 2025-04-21 PROCEDURE — 1159F MED LIST DOCD IN RCRD: CPT | Performed by: INTERNAL MEDICINE

## 2025-04-21 PROCEDURE — G2211 COMPLEX E/M VISIT ADD ON: HCPCS | Performed by: INTERNAL MEDICINE

## 2025-04-21 PROCEDURE — 3078F DIAST BP <80 MM HG: CPT | Performed by: INTERNAL MEDICINE

## 2025-04-21 PROCEDURE — 3075F SYST BP GE 130 - 139MM HG: CPT | Performed by: INTERNAL MEDICINE

## 2025-04-21 PROCEDURE — 99214 OFFICE O/P EST MOD 30 MIN: CPT | Performed by: INTERNAL MEDICINE

## 2025-04-21 RX ORDER — AZITHROMYCIN 250 MG/1
TABLET, FILM COATED ORAL
Qty: 6 TABLET | Refills: 0 | Status: SHIPPED | OUTPATIENT
Start: 2025-04-21

## 2025-04-21 NOTE — ASSESSMENT & PLAN NOTE
Dago Nunez  reports that she has quit smoking. Her smoking use included cigarettes. She has a 0.8 pack-year smoking history. She has been exposed to tobacco smoke. She has never used smokeless tobacco.

## 2025-04-21 NOTE — ASSESSMENT & PLAN NOTE
I did prescribe a Z-Serge and also she can utilize over-the-counter steroid nasal spray such as Flonase and also over-the-counter saline nasal sprays

## 2025-04-21 NOTE — ASSESSMENT & PLAN NOTE
Currently she is having no major issues with wheezing and may utilize her albuterol HFA as needed.

## 2025-04-21 NOTE — PROGRESS NOTES
Chief Complaint  Bronchitis, Wheezing, and Sinus congestion    Subjective    History of Present Illness {CC  Problem List  Visit Diagnosis   Encounters  Notes  Medications  Labs  Result Review Imaging  Media: 23}    Dago Nunez presents to [facility] for bronchitis, wheezing, and sinus congestion.    History of Present Illness   The patient's major complaint at present is a lot of nasal congestion and she is concerned she may have a mild sinus infection.  I did E prescribe a Z-Serge and told her she can also use over-the-counter steroid nasal sprays and saline nasal sprays.  Otherwise she has done fairly well since her last visit.  She has had the COVID-19 vaccine including standard booster and a bivalent booster in the form of the Moderna vaccine.  She had the flu shot this past October to set a Prevnar 20 and Pneumovax in the past as well.  Dago Nunez  reports that she has quit smoking. Her smoking use included cigarettes. She has a 0.8 pack-year smoking history. She has been exposed to tobacco smoke. She has never used smokeless tobacco.           Current Outpatient Medications:     Acetaminophen (TYLENOL ARTHRITIS PAIN PO), Take 1 tablet by mouth Daily As Needed (BACK PAIN)., Disp: , Rfl:     albuterol sulfate  (90 Base) MCG/ACT inhaler, Inhale 2 puffs Every 4 (Four) Hours As Needed for Shortness of Air., Disp: , Rfl:     aspirin 81 MG EC tablet, Take 1 tablet by mouth Daily., Disp: , Rfl:     bisoprolol-hydrochlorothiazide (ZIAC) 5-6.25 MG per tablet, Take 1 tablet by mouth Daily., Disp: 90 tablet, Rfl: 1    bumetanide (BUMEX) 1 MG tablet, TAKE 1 TABLET BY MOUTH 2 TIMES DAILY, Disp: 60 tablet, Rfl: 1    cetirizine (zyrTEC) 10 MG tablet, Take 1 tablet by mouth Daily As Needed for Allergies., Disp: , Rfl:     citalopram (CeleXA) 20 MG tablet, TAKE 1 TABLET BY MOUTH 1 TIME DAILY, Disp: 30 tablet, Rfl: 1    clindamycin (CLEOCIN T) 1 %  lotion, Apply 1 Application topically to the appropriate area as directed Daily As Needed (Rash On Legs)., Disp: , Rfl:     cyanocobalamin 1000 MCG/ML injection, Inject 1 mL into the appropriate muscle as directed by prescriber Every 30 (Thirty) Days., Disp: , Rfl:     diphenoxylate-atropine (LOMOTIL) 2.5-0.025 MG per tablet, TAKE 2 TABLETS BY MOUTH 4 TIMES DAILY AS NEEDED FOR DIARRHEA, Disp: 90 tablet, Rfl: 2    esomeprazole (nexIUM) 40 MG capsule, TAKE 1 CAPSULE TWICE DAILY, Disp: 180 capsule, Rfl: 3    Homeopathic Products (LEG CRAMPS) tablet, Take 1 tablet by mouth Daily., Disp: , Rfl:     HYDROcodone-acetaminophen (NORCO)  MG per tablet, Take 0.5 tablets by mouth Every 4 (Four) Hours As Needed for Moderate Pain or Severe Pain., Disp: 60 tablet, Rfl: 0    letrozole (FEMARA) 2.5 MG tablet, TAKE 1 TABLET BY MOUTH 1 TIME DAILY, Disp: 90 tablet, Rfl: 3    levothyroxine (SYNTHROID, LEVOTHROID) 75 MCG tablet, TAKE 1 TABLET BY MOUTH EVERY DAY, Disp: 90 tablet, Rfl: 1    memantine (Namenda) 5 MG tablet, Take 1 tablet by mouth 2 (Two) Times a Day., Disp: 60 tablet, Rfl: 2    metaxalone (SKELAXIN) 800 MG tablet, TAKE 1 TABLET BY MOUTH 3 TIMES DAILY AS NEEDED FOR MUSCLE SPASMS, Disp: 30 tablet, Rfl: 0    metOLazone (ZAROXOLYN) 2.5 MG tablet, TAKE 1 TABLET BY MOUTH EVERY DAY AS NEEDED FOR WEIGHT GAIN OF 3 LBS OR MORE IN 24 HOURS., Disp: 30 tablet, Rfl: 5    nystatin-triamcinolone (MYCOLOG II) 989778-6.1 UNIT/GM-% cream, Apply 1 Application topically to the appropriate area as directed 2 (Two) Times a Day As Needed (rash under breast)., Disp: , Rfl:     nystatin-triamcinolone (MYCOLOG) 023536-1.1 UNIT/GM-% ointment, Apply 1 Application topically to the appropriate area as directed 2 (Two) Times a Day., Disp: 30 g, Rfl: 3    olmesartan (BENICAR) 20 MG tablet, TAKE 1 TABLET BY MOUTH 1 TIME DAILY, Disp: 90 tablet, Rfl: 2    phenazopyridine (PYRIDIUM) 200 MG tablet, Take 1 tablet by mouth 3 (Three) Times a Day As Needed  for Dysuria., Disp: 15 tablet, Rfl: 0    potassium chloride (KLOR-CON M20) 20 MEQ CR tablet, TAKE 2 TABLETS BY MOUTH EVERY DAY, Disp: 60 tablet, Rfl: 5    potassium chloride ER (K-TAB) 20 MEQ tablet controlled-release ER tablet, Take 2 tablets by mouth Daily., Disp: , Rfl:     pravastatin (PRAVACHOL) 40 MG tablet, TAKE 1 TABLET BY MOUTH AT BEDTIME, Disp: 90 tablet, Rfl: 1    Probiotic Product (PROBIOTIC DAILY PO), Take 1 tablet by mouth Daily., Disp: , Rfl:     saccharomyces boulardii (Florastor) 250 MG capsule, Take 1 capsule by mouth 2 (Two) Times a Day., Disp: 5 capsule, Rfl: 0    sodium bicarbonate 650 MG tablet, Take 1 tablet by mouth 2 (Two) Times a Day., Disp: , Rfl:     tacrolimus (PROTOPIC) 0.1 % ointment, Apply 1 Application topically to the appropriate area as directed 2 (Two) Times a Day As Needed (dermatitis)., Disp: , Rfl:     vitamin D (ERGOCALCIFEROL) 1.25 MG (22901 UT) capsule capsule, TAKE 1 CAPSULE BY MOUTH ON THE SAME DAY EACH WEEK., Disp: 12 capsule, Rfl: 3    azithromycin (ZITHROMAX) 250 MG tablet, Take 2 by mouth today then 1 daily for 4 days, Disp: 6 tablet, Rfl: 0  No current facility-administered medications for this visit.    Facility-Administered Medications Ordered in Other Visits:     heparin injection 500 Units, 500 Units, Intravenous, Rivera DAMON Winston, MD, 500 Units at 07/01/21 1354    heparin injection 500 Units, 500 Units, Intravenous, Rivera DAMON Winston, MD, 500 Units at 01/11/22 1134    heparin injection 500 Units, 500 Units, Intravenous, Rivera DAMON Winston, MD, 500 Units at 09/28/22 1418    heparin injection 500 Units, 500 Units, Intravenous, Rivera DAMON Winston, MD, 500 Units at 01/25/23 1537    heparin injection 500 Units, 500 Units, Intravenous, Rivera DAMON Winston, MD, 500 Units at 06/26/23 1426    heparin injection 500 Units, 500 Units, Intravenous, Rivera DAMON Winston, MD, 500 Units at 03/05/24 1350    heparin injection 500 Units, 500 Units, Intravenous, PRN, Drake Stafford,  "MD, 500 Units at 10/02/24 1508    heparin injection 500 Units, 500 Units, IntravenousMARISOL Chua, Winston, MD, 500 Units at 01/15/25 1410    heparin injection 500 Units, 500 Units, IntravenousMARISOL Chua, Winston, MD, 500 Units at 04/16/25 1445    sodium chloride 0.9 % flush 10 mL, 10 mL, IntravenousMARISOL Chua, Winston, MD, 10 mL at 07/01/21 1354    sodium chloride 0.9 % flush 10 mL, 10 mL, Intravenous, Rivera DAMON Winston, MD, 10 mL at 01/11/22 1134    sodium chloride 0.9 % flush 10 mL, 10 mL, IntravenousMARISOL Chua, Winston, MD, 10 mL at 09/28/22 1418    sodium chloride 0.9 % flush 10 mL, 10 mL, IntravenousMARISOL Chua, Winston, MD, 10 mL at 01/25/23 1537    sodium chloride 0.9 % flush 10 mL, 10 mL, Intravenous, Rivera DAMON Winston, MD, 10 mL at 06/26/23 1426    sodium chloride 0.9 % flush 10 mL, 10 mL, IntravenousMARISOL Chua, Winston, MD, 10 mL at 03/05/24 1350    sodium chloride 0.9 % flush 10 mL, 10 mL, IntravenousMARISOL Chua, Winston, MD, 10 mL at 10/02/24 1508    sodium chloride 0.9 % flush 10 mL, 10 mL, MARISOL Fulton Chua, Winston, MD, 10 mL at 01/15/25 1402    sodium chloride 0.9 % flush 10 mL, 10 mL, IntravenousMARISOL Chua, Winston, MD, 10 mL at 04/16/25 1445    Social History     Socioeconomic History    Marital status:    Tobacco Use    Smoking status: Former     Current packs/day: 0.25     Average packs/day: 0.3 packs/day for 3.0 years (0.8 ttl pk-yrs)     Types: Cigarettes     Passive exposure: Past    Smokeless tobacco: Never    Tobacco comments:     social smoker in college   Vaping Use    Vaping status: Never Used   Substance and Sexual Activity    Alcohol use: Not Currently     Comment: Occasional glass of wine    Drug use: No    Sexual activity: Defer       Objective   Vital Signs:   /60   Pulse 82   Ht 137.2 cm (54.02\")   Wt 64.4 kg (142 lb)   SpO2 98% Comment: RA  BMI 34.22 kg/m²     Physical Exam  Vitals and nursing note reviewed.   Constitutional:       Appearance: She is " obese.      Comments: She ambulates with a walker.   HENT:      Head: Normocephalic.   Eyes:      Extraocular Movements: Extraocular movements intact.      Pupils: Pupils are equal, round, and reactive to light.   Cardiovascular:      Rate and Rhythm: Normal rate and regular rhythm.   Pulmonary:      Effort: Pulmonary effort is normal.      Comments: Lung fields are clear with reasonable air movement bilaterally and no adventitious sounds are heard.  Musculoskeletal:      Comments: She ambulates with a walker.   Skin:     General: Skin is warm and dry.   Neurological:      General: No focal deficit present.      Mental Status: She is alert and oriented to person, place, and time.   Psychiatric:         Mood and Affect: Mood normal.         Behavior: Behavior normal.        Result Review :          No results found for this or any previous visit.                 My interpretation of imaging:    XR chest 2 vw (07/24/2024 15:32)         Assessment and Plan {CC Problem List  Visit Diagnosis  ROS  Review (Popup)  Health Maintenance  Quality  BestPractice  Medications  SmartSets  SnapShot Encounters  Media : 23}    Diagnoses and all orders for this visit:    1. Nasopharyngitis acute (Primary)  Assessment & Plan:  I did prescribe a Z-Serge and also she can utilize over-the-counter steroid nasal spray such as Flonase and also over-the-counter saline nasal sprays    Orders:  -     azithromycin (ZITHROMAX) 250 MG tablet; Take 2 by mouth today then 1 daily for 4 days  Dispense: 6 tablet; Refill: 0    2. Bronchitis  Assessment & Plan:  She can utilize her albuterol HFA as needed.      3. Wheezing  Assessment & Plan:  Currently she is having no major issues with wheezing and may utilize her albuterol HFA as needed.      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      5. Former smoker  Assessment & Plan:  Jameeldulce Nunez  reports that she  has quit smoking. Her smoking use included cigarettes. She has a 0.8 pack-year smoking history. She has been exposed to tobacco smoke. She has never used smokeless tobacco.           6. Obesity (BMI 30-39.9)  Assessment & Plan:  Patient's (Body mass index is 34.22 kg/m².) indicates that they are obese (BMI >30) with health conditions that include hypertension . Weight is unchanged.  Diet is encouraged.  Her exercise capabilities will be quite limited by her mobility issues.  She will follow-up with her primary care physician regarding her elevated BMI otherwise.            Jesus Guzman MD  4/21/2025  13:33 CDT    Follow Up   Return in about 1 year (around 4/21/2026) for To see me specifically.    Patient was given instructions and counseling regarding her condition or for health maintenance advice. Please see specific information pulled into the AVS if appropriate.

## 2025-04-21 NOTE — ASSESSMENT & PLAN NOTE
Patient's (Body mass index is 34.22 kg/m².) indicates that they are obese (BMI >30) with health conditions that include hypertension . Weight is unchanged.  Diet is encouraged.  Her exercise capabilities will be quite limited by her mobility issues.  She will follow-up with her primary care physician regarding her elevated BMI otherwise.

## 2025-04-21 NOTE — PATIENT INSTRUCTIONS
The patient has had some sinus congestion symptoms and is concerned she may have a sinus infection and I will send in a prescription for Z-Serge and she can use over-the-counter steroid nasal sprays and saline nasal sprays.  Otherwise she is stable from a pulmonary standpoint.  I will see her back in 1 year.

## 2025-05-12 DIAGNOSIS — I65.29 STENOSIS OF CAROTID ARTERY, UNSPECIFIED LATERALITY: ICD-10-CM

## 2025-05-12 DIAGNOSIS — F41.9 ANXIETY: ICD-10-CM

## 2025-05-12 DIAGNOSIS — I65.21 CAROTID STENOSIS, RIGHT: ICD-10-CM

## 2025-05-12 RX ORDER — CITALOPRAM HYDROBROMIDE 20 MG/1
20 TABLET ORAL DAILY
Qty: 30 TABLET | Refills: 1 | Status: SHIPPED | OUTPATIENT
Start: 2025-05-12

## 2025-05-12 RX ORDER — PRAVASTATIN SODIUM 40 MG
40 TABLET ORAL
Qty: 90 TABLET | Refills: 1 | Status: SHIPPED | OUTPATIENT
Start: 2025-05-12

## 2025-05-21 ENCOUNTER — TELEPHONE (OUTPATIENT)
Dept: FAMILY MEDICINE CLINIC | Facility: CLINIC | Age: 85
End: 2025-05-21
Payer: MEDICARE

## 2025-05-21 NOTE — TELEPHONE ENCOUNTER
Caller: Dago Nunez Waqar    Relationship: Self    Best call back number: [phone]       What medication are you requesting: SHE STATES SOMETHING FOR THE SYMPTOM     What are your current symptoms: BURNING WITH URINATION SHE STATES SHE THINKS IT IS AN UTI   SHE STATES SHE IS IN A LOT OF PAIN     How long have you been experiencing symptoms: ONE WEEK     Have you had these symptoms before:    [x] Yes  [] No    Have you been treated for these symptoms before:   [x] Yes  [] No    If a prescription is needed, what is your preferred pharmacy and phone number:  [facility] [place] [address] - [phone]  - [phone]  [phone]     Additional notes: NONE

## 2025-05-22 ENCOUNTER — OFFICE VISIT (OUTPATIENT)
Dept: FAMILY MEDICINE CLINIC | Facility: CLINIC | Age: 85
End: 2025-05-22
Payer: MEDICARE

## 2025-05-22 VITALS
DIASTOLIC BLOOD PRESSURE: 78 MMHG | HEIGHT: 55 IN | TEMPERATURE: 98 F | WEIGHT: 146 LBS | HEART RATE: 63 BPM | BODY MASS INDEX: 33.79 KG/M2 | OXYGEN SATURATION: 100 % | SYSTOLIC BLOOD PRESSURE: 136 MMHG

## 2025-05-22 DIAGNOSIS — R39.89 SUSPECTED UTI: Primary | ICD-10-CM

## 2025-05-22 DIAGNOSIS — R30.0 DYSURIA: ICD-10-CM

## 2025-05-22 LAB
BACTERIA UR QL AUTO: ABNORMAL /HPF
BILIRUB BLD-MCNC: NEGATIVE MG/DL
BILIRUB UR QL STRIP: NEGATIVE
CLARITY UR: ABNORMAL
CLARITY, POC: ABNORMAL
COLOR UR: ABNORMAL
COLOR UR: YELLOW
GLUCOSE UR STRIP-MCNC: NEGATIVE MG/DL
GLUCOSE UR STRIP-MCNC: NEGATIVE MG/DL
HGB UR QL STRIP.AUTO: ABNORMAL
HYALINE CASTS UR QL AUTO: ABNORMAL /LPF
KETONES UR QL STRIP: NEGATIVE
KETONES UR QL: NEGATIVE
LEUKOCYTE EST, POC: ABNORMAL
LEUKOCYTE ESTERASE UR QL STRIP.AUTO: ABNORMAL
NITRITE UR QL STRIP: NEGATIVE
NITRITE UR-MCNC: NEGATIVE MG/ML
PH UR STRIP.AUTO: 6 [PH] (ref 5–8)
PH UR: 6 [PH] (ref 5–8)
PROT UR QL STRIP: NEGATIVE
PROT UR STRIP-MCNC: NEGATIVE MG/DL
RBC # UR STRIP: ABNORMAL /HPF
RBC # UR STRIP: NEGATIVE /UL
REF LAB TEST METHOD: ABNORMAL
SP GR UR STRIP: 1.01 (ref 1–1.03)
SP GR UR: 1.02 (ref 1–1.03)
SQUAMOUS #/AREA URNS HPF: ABNORMAL /HPF
UROBILINOGEN UR QL STRIP: ABNORMAL
UROBILINOGEN UR QL: ABNORMAL
WBC # UR STRIP: ABNORMAL /HPF

## 2025-05-22 PROCEDURE — 87077 CULTURE AEROBIC IDENTIFY: CPT | Performed by: NURSE PRACTITIONER

## 2025-05-22 PROCEDURE — 87181 SC STD AGAR DILUTION PER AGT: CPT | Performed by: NURSE PRACTITIONER

## 2025-05-22 PROCEDURE — 87086 URINE CULTURE/COLONY COUNT: CPT | Performed by: NURSE PRACTITIONER

## 2025-05-22 PROCEDURE — 87186 SC STD MICRODIL/AGAR DIL: CPT | Performed by: NURSE PRACTITIONER

## 2025-05-22 PROCEDURE — 81001 URINALYSIS AUTO W/SCOPE: CPT | Performed by: NURSE PRACTITIONER

## 2025-05-22 RX ORDER — NITROFURANTOIN 25; 75 MG/1; MG/1
100 CAPSULE ORAL 2 TIMES DAILY
Qty: 10 CAPSULE | Refills: 0 | Status: SHIPPED | OUTPATIENT
Start: 2025-05-22 | End: 2025-05-27

## 2025-05-23 NOTE — PROGRESS NOTES
VINCENT Cool  [facility]   Family Medicine  [address]  [place] [address]  Phone: [phone]  Fax: [phone]         Chief Complaint:  Chief Complaint   Patient presents with    Urinary Tract Infection        History:  Dago Nunez is a 84 y.o. female that is an established patient. She  is here for evaluation of the above complaint.    Urinary Tract Infection  Associated symptoms: flank pain and frequency       History of Present Illness  The patient presents for evaluation of urinary tract infection.    She reports experiencing pain during urination, which has been progressively worsening since 05/18/2025. She has a history of multiple surgeries in the same area, making it difficult to pinpoint the exact onset of the pain. She is under the care of a urologist in [place] and is scheduled for a surgical procedure on 06/18/2025. An MRI revealed an abnormality in her bladder, prompting further investigation by. She suspects that the issue may be related to a previous cancer diagnosis. She reports no high-grade fever but mentions occasional low-grade fevers. She is currently on a regimen of self-catheterization twice daily, once in the morning and once at night, but still experiences the need to urinate a few times during the night. She is able to urinate without the catheter. She has previously been treated with Bactrim and Macrobid for urinary tract infections. Her last treatment for a urinary tract infection was in 01/2025.    Approximately two weeks ago, she consulted with Dr. Mills, a nephrologist, who conducted a urine test and expressed concern about her kidney function. He noted a decrease in her GFR from 30 to 27 but did not prescribe any medication at that time. She has been advised to consume eight glasses of water daily.          Results  Labs   - GFR: 01/2025, 27.8   - GFR: 04/16/2025, 39.7    Imaging   - MRI of the bladder: Showed a spot       ROS:  Review  of Systems   Constitutional:  Negative for fever.   HENT: Negative.     Respiratory: Negative.     Genitourinary:  Positive for dysuria, flank pain and frequency.         reports that she has quit smoking. Her smoking use included cigarettes. She has a 0.8 pack-year smoking history. She has been exposed to tobacco smoke. She has never used smokeless tobacco. She reports that she does not currently use alcohol. She reports that she does not use drugs.    Current Outpatient Medications   Medication Instructions    Acetaminophen (TYLENOL ARTHRITIS PAIN PO) 1 tablet, Daily PRN    albuterol sulfate  (90 Base) MCG/ACT inhaler 2 puffs, Every 4 Hours PRN    aspirin 81 mg, Daily    bisoprolol-hydrochlorothiazide (ZIAC) 5-6.25 MG per tablet 1 tablet, Oral, Daily    bumetanide (BUMEX) 1 mg, Oral, 2 Times Daily    cetirizine (ZYRTEC) 10 mg, Daily PRN    citalopram (CELEXA) 20 mg, Oral, Daily    clindamycin (CLEOCIN T) 1 % lotion 1 Application, Daily PRN    cyanocobalamin 1,000 mcg, Every 30 Days    diphenoxylate-atropine (LOMOTIL) 2.5-0.025 MG per tablet TAKE 2 TABLETS BY MOUTH 4 TIMES DAILY AS NEEDED FOR DIARRHEA    esomeprazole (NEXIUM) 40 mg, Oral, 2 Times Daily    Homeopathic Products (LEG CRAMPS) tablet 1 tablet, Daily    HYDROcodone-acetaminophen (NORCO)  MG per tablet 0.5 tablets, Oral, Every 4 Hours PRN    letrozole (FEMARA) 2.5 mg, Oral, Daily    levothyroxine (SYNTHROID, LEVOTHROID) 75 mcg, Oral, Daily    memantine (NAMENDA) 5 mg, Oral, 2 Times Daily    metaxalone (SKELAXIN) 800 MG tablet TAKE 1 TABLET BY MOUTH 3 TIMES DAILY AS NEEDED FOR MUSCLE SPASMS    metOLazone (ZAROXOLYN) 2.5 MG tablet TAKE 1 TABLET BY MOUTH EVERY DAY AS NEEDED FOR WEIGHT GAIN OF 3 LBS OR MORE IN 24 HOURS.    nitrofurantoin (macrocrystal-monohydrate) (MACROBID) 100 mg, Oral, 2 Times Daily    nystatin-triamcinolone (MYCOLOG II) 455811-1.1 UNIT/GM-% cream 1 Application, 2 Times Daily PRN    nystatin-triamcinolone (MYCOLOG) 726576-4.1  "UNIT/GM-% ointment 1 Application, Topical, 2 Times Daily    olmesartan (BENICAR) 20 mg, Oral, Daily    phenazopyridine (PYRIDIUM) 200 mg, Oral, 3 Times Daily PRN    potassium chloride (KLOR-CON M20) 20 MEQ CR tablet 40 mEq, Oral, Daily    potassium chloride ER (K-TAB) 20 MEQ tablet controlled-release ER tablet 40 mEq, Daily    pravastatin (PRAVACHOL) 40 mg, Oral, Every Night at Bedtime    Probiotic Product (PROBIOTIC DAILY PO) 1 tablet, Daily    saccharomyces boulardii (FLORASTOR) 250 mg, Oral, 2 Times Daily    sodium bicarbonate 650 mg, 2 Times Daily    tacrolimus (PROTOPIC) 0.1 % ointment 1 Application, 2 Times Daily PRN    vitamin D (ERGOCALCIFEROL) 1.25 MG (85460 UT) capsule capsule TAKE 1 CAPSULE BY MOUTH ON THE SAME DAY EACH WEEK.       OBJECTIVE:  /78   Pulse 63   Temp 98 °F (36.7 °C)   Ht 137.2 cm (54.02\")   Wt 66.2 kg (146 lb)   SpO2 100%   BMI 35.18 kg/m²    Physical Exam  Vitals and nursing note reviewed.   Constitutional:       Appearance: Normal appearance.   HENT:      Head: Normocephalic and atraumatic.      Nose: Nose normal.   Eyes:      Conjunctiva/sclera: Conjunctivae normal.   Cardiovascular:      Rate and Rhythm: Normal rate and regular rhythm.   Pulmonary:      Effort: Pulmonary effort is normal. No respiratory distress.      Breath sounds: Normal breath sounds. No wheezing or rales.   Abdominal:      Tenderness: There is no right CVA tenderness or left CVA tenderness.   Neurological:      General: No focal deficit present.      Mental Status: She is alert and oriented to person, place, and time.   Psychiatric:         Mood and Affect: Mood normal.         Behavior: Behavior normal.       Physical Exam  Genitourinary: Tenderness noted on the right side of the back    Procedures    Assessment/Plan:     Diagnoses and all orders for this visit:    1. Suspected UTI (Primary)  -     nitrofurantoin, macrocrystal-monohydrate, (Macrobid) 100 MG capsule; Take 1 capsule by mouth 2 (Two) Times " a Day for 5 days.  Dispense: 10 capsule; Refill: 0  -     Urinalysis With Culture If Indicated -; Future  -     Urinalysis With Culture If Indicated - Urine, Random Void  -     Urine Culture - Urine, Urine, Random Void  -     Urinalysis, Microscopic Only - Urine, Random Void    2. Dysuria  -     POC Urinalysis Dipstick, Multipro           Assessment & Plan  1. Urinary Tract Infection.  - Symptoms include pain during urination and low-grade fever.  - Physical examination revealed tenderness on the side of the back.  - A urine culture will be sent for analysis to confirm the infection.  - Macrobid 100 mg prescribed, to be taken twice daily for 5 days. Adequate hydration with at least 8 glasses of water daily is recommended. If culture results suggest a different antibiotic, medication will be adjusted accordingly.    2. Kidney Function Monitoring.  - Reported decrease in kidney function from 30 to 27 according to Dr. Mills.  - Recent tests show improvement with GFR increasing from 27.8 in January to 39.7 on 04/16/2025.  - Advised to continue monitoring kidney function and follow up with Dr. Mills in 4 months.    3. Pain Management.  - Mentioned having only five pain pills left and infrequent use.  - Advised to contact Dr. Akbar for any additional pain medication needs.    4. Upcoming Surgery.  - Scheduled for surgery on 06/18/2025 in [place] to address bladder issues.  - MRI showed a spot on the bladder, and two doctors will evaluate during the procedure.    An After Visit Summary was printed and given to the patient at discharge.  No follow-ups on file.       There are no Patient Instructions on file for this visit.      Discussion:     I spent 25 minutes caring for Syndal on this date of service. This time includes time spent by me in the following activities: preparing for the visit, reviewing tests, performing a medically appropriate examination and/or evaluation, counseling and educating the  patient/family/caregiver, documenting information in the medical record, independently interpreting results and communicating that information with the patient/family/caregiver, care coordination, ordering medications, ordering test(s), obtaining a separately obtained history, and reviewing a separately obtained history   Patient or patient representative verbalized consent for the use of Ambient Listening during the visit with  VINCENT Cool for chart documentation. 5/23/2025  10:05 CDT    Ashley KAUR 5/23/2025   Electronically signed.

## 2025-05-24 DIAGNOSIS — E03.9 ACQUIRED HYPOTHYROIDISM: ICD-10-CM

## 2025-05-25 LAB — BACTERIA SPEC AEROBE CULT: ABNORMAL

## 2025-05-27 RX ORDER — LEVOTHYROXINE SODIUM 75 UG/1
75 TABLET ORAL DAILY
Qty: 90 TABLET | Refills: 1 | Status: SHIPPED | OUTPATIENT
Start: 2025-05-27

## 2025-05-28 ENCOUNTER — TELEPHONE (OUTPATIENT)
Dept: FAMILY MEDICINE CLINIC | Facility: CLINIC | Age: 85
End: 2025-05-28
Payer: MEDICARE

## 2025-05-28 DIAGNOSIS — A49.8 BACTERIAL INFECTION DUE TO MORGANELLA MORGANII: Primary | ICD-10-CM

## 2025-05-28 DIAGNOSIS — N30.00 ACUTE CYSTITIS WITHOUT HEMATURIA: ICD-10-CM

## 2025-05-28 RX ORDER — CEFDINIR 300 MG/1
300 CAPSULE ORAL 2 TIMES DAILY
Qty: 14 CAPSULE | Refills: 0 | Status: SHIPPED | OUTPATIENT
Start: 2025-05-28 | End: 2025-06-04

## 2025-05-28 NOTE — TELEPHONE ENCOUNTER
Spoke with pt regarding bacterium in UTI resistant to previously prescribed nitrofurantoin. She is allergic to bactrim, flouroquinolones, PCN, Keflex. The reaction she had to Keflex was rash, no anaphylaxis. She states she has had cefdinir in the past with no issues.

## 2025-06-30 DIAGNOSIS — F41.9 ANXIETY: ICD-10-CM

## 2025-06-30 RX ORDER — CITALOPRAM HYDROBROMIDE 20 MG/1
20 TABLET ORAL DAILY
Qty: 30 TABLET | Refills: 1 | Status: SHIPPED | OUTPATIENT
Start: 2025-06-30

## 2025-07-01 ENCOUNTER — TRANSCRIBE ORDERS (OUTPATIENT)
Dept: ADMINISTRATIVE | Facility: HOSPITAL | Age: 85
End: 2025-07-01
Payer: MEDICARE

## 2025-07-01 DIAGNOSIS — G45.9 TIA (TRANSIENT ISCHEMIC ATTACK): Primary | ICD-10-CM

## 2025-07-16 ENCOUNTER — TELEPHONE (OUTPATIENT)
Dept: ONCOLOGY | Facility: CLINIC | Age: 85
End: 2025-07-16

## 2025-07-16 NOTE — TELEPHONE ENCOUNTER
Caller: Dago Nunez    Relationship:  Self    Best call back number: [phone]     PATIENT CALLED REQUESTING TO CANCEL SAME DAY APPT.    Did the patient call AFTER the start time of their scheduled appointment?  []YES  [x]NO    Was the patient's appointment rescheduled? []YES  [x]NO    Any additional information: PATIENT WILL BE UNAVAILABLE 7/28/25 DUE TO SURGERY AND POSSIBLY 1-2 WEEK POS-OP

## 2025-07-30 ENCOUNTER — APPOINTMENT (OUTPATIENT)
Dept: CT IMAGING | Facility: HOSPITAL | Age: 85
End: 2025-07-30
Payer: MEDICARE

## 2025-07-30 ENCOUNTER — HOSPITAL ENCOUNTER (EMERGENCY)
Facility: HOSPITAL | Age: 85
Discharge: ANOTHER HOSPITAL | End: 2025-07-30
Admitting: FAMILY MEDICINE
Payer: MEDICARE

## 2025-07-30 VITALS
SYSTOLIC BLOOD PRESSURE: 183 MMHG | HEART RATE: 99 BPM | HEIGHT: 56 IN | BODY MASS INDEX: 35.32 KG/M2 | OXYGEN SATURATION: 94 % | TEMPERATURE: 98.6 F | RESPIRATION RATE: 18 BRPM | DIASTOLIC BLOOD PRESSURE: 65 MMHG | WEIGHT: 157 LBS

## 2025-07-30 DIAGNOSIS — T83.090A OBSTRUCTION OF SUPRAPUBIC CATHETER, INITIAL ENCOUNTER: Primary | ICD-10-CM

## 2025-07-30 DIAGNOSIS — N17.9 AKI (ACUTE KIDNEY INJURY): ICD-10-CM

## 2025-07-30 DIAGNOSIS — E87.1 HYPONATREMIA: ICD-10-CM

## 2025-07-30 LAB
ALBUMIN SERPL-MCNC: 4 G/DL (ref 3.5–5.2)
ALBUMIN/GLOB SERPL: 1.2 G/DL
ALP SERPL-CCNC: 61 U/L (ref 39–117)
ALT SERPL W P-5'-P-CCNC: 11 U/L (ref 1–33)
ANION GAP SERPL CALCULATED.3IONS-SCNC: 14 MMOL/L (ref 5–15)
AST SERPL-CCNC: 27 U/L (ref 1–32)
BASOPHILS # BLD AUTO: 0.02 10*3/MM3 (ref 0–0.2)
BASOPHILS NFR BLD AUTO: 0.1 % (ref 0–1.5)
BILIRUB SERPL-MCNC: 0.5 MG/DL (ref 0–1.2)
BUN SERPL-MCNC: 47.8 MG/DL (ref 8–23)
BUN/CREAT SERPL: 14.6 (ref 7–25)
CALCIUM SPEC-SCNC: 9.8 MG/DL (ref 8.6–10.5)
CHLORIDE SERPL-SCNC: 87 MMOL/L (ref 98–107)
CO2 SERPL-SCNC: 21 MMOL/L (ref 22–29)
CREAT SERPL-MCNC: 3.27 MG/DL (ref 0.57–1)
DEPRECATED RDW RBC AUTO: 49 FL (ref 37–54)
EGFRCR SERPLBLD CKD-EPI 2021: 13.6 ML/MIN/1.73
EOSINOPHIL # BLD AUTO: 0.01 10*3/MM3 (ref 0–0.4)
EOSINOPHIL NFR BLD AUTO: 0.1 % (ref 0.3–6.2)
ERYTHROCYTE [DISTWIDTH] IN BLOOD BY AUTOMATED COUNT: 13.1 % (ref 12.3–15.4)
GLOBULIN UR ELPH-MCNC: 3.3 GM/DL
GLUCOSE SERPL-MCNC: 125 MG/DL (ref 65–99)
HCT VFR BLD AUTO: 29.9 % (ref 34–46.6)
HGB BLD-MCNC: 10.2 G/DL (ref 12–15.9)
IMM GRANULOCYTES # BLD AUTO: 0.11 10*3/MM3 (ref 0–0.05)
IMM GRANULOCYTES NFR BLD AUTO: 0.7 % (ref 0–0.5)
LYMPHOCYTES # BLD AUTO: 0.88 10*3/MM3 (ref 0.7–3.1)
LYMPHOCYTES NFR BLD AUTO: 5.2 % (ref 19.6–45.3)
MCH RBC QN AUTO: 34.8 PG (ref 26.6–33)
MCHC RBC AUTO-ENTMCNC: 34.1 G/DL (ref 31.5–35.7)
MCV RBC AUTO: 102 FL (ref 79–97)
MONOCYTES # BLD AUTO: 1.01 10*3/MM3 (ref 0.1–0.9)
MONOCYTES NFR BLD AUTO: 6 % (ref 5–12)
NEUTROPHILS NFR BLD AUTO: 14.8 10*3/MM3 (ref 1.7–7)
NEUTROPHILS NFR BLD AUTO: 87.9 % (ref 42.7–76)
NRBC BLD AUTO-RTO: 0 /100 WBC (ref 0–0.2)
PLATELET # BLD AUTO: 161 10*3/MM3 (ref 140–450)
PMV BLD AUTO: 10.6 FL (ref 6–12)
POTASSIUM SERPL-SCNC: 4.9 MMOL/L (ref 3.5–5.2)
PROT SERPL-MCNC: 7.3 G/DL (ref 6–8.5)
RBC # BLD AUTO: 2.93 10*6/MM3 (ref 3.77–5.28)
SODIUM SERPL-SCNC: 122 MMOL/L (ref 136–145)
WBC NRBC COR # BLD AUTO: 16.83 10*3/MM3 (ref 3.4–10.8)

## 2025-07-30 PROCEDURE — 96374 THER/PROPH/DIAG INJ IV PUSH: CPT

## 2025-07-30 PROCEDURE — 80053 COMPREHEN METABOLIC PANEL: CPT

## 2025-07-30 PROCEDURE — 25010000002 ONDANSETRON PER 1 MG

## 2025-07-30 PROCEDURE — 25010000002 HYDRALAZINE PER 20 MG

## 2025-07-30 PROCEDURE — 96361 HYDRATE IV INFUSION ADD-ON: CPT

## 2025-07-30 PROCEDURE — 74176 CT ABD & PELVIS W/O CONTRAST: CPT

## 2025-07-30 PROCEDURE — 99285 EMERGENCY DEPT VISIT HI MDM: CPT

## 2025-07-30 PROCEDURE — 85025 COMPLETE CBC W/AUTO DIFF WBC: CPT

## 2025-07-30 PROCEDURE — 51798 US URINE CAPACITY MEASURE: CPT

## 2025-07-30 PROCEDURE — 96375 TX/PRO/DX INJ NEW DRUG ADDON: CPT

## 2025-07-30 PROCEDURE — 25810000003 SODIUM CHLORIDE 0.9 % SOLUTION

## 2025-07-30 RX ORDER — HYDRALAZINE HYDROCHLORIDE 20 MG/ML
10 INJECTION INTRAMUSCULAR; INTRAVENOUS ONCE
Status: COMPLETED | OUTPATIENT
Start: 2025-07-30 | End: 2025-07-30

## 2025-07-30 RX ORDER — ONDANSETRON 4 MG/1
4 TABLET, FILM COATED ORAL EVERY 8 HOURS PRN
COMMUNITY
Start: 2025-07-29 | End: 2025-08-06

## 2025-07-30 RX ORDER — LIDOCAINE HYDROCHLORIDE 20 MG/ML
SOLUTION OROPHARYNGEAL
COMMUNITY

## 2025-07-30 RX ORDER — ONDANSETRON 2 MG/ML
4 INJECTION INTRAMUSCULAR; INTRAVENOUS ONCE
Status: COMPLETED | OUTPATIENT
Start: 2025-07-30 | End: 2025-07-30

## 2025-07-30 RX ORDER — TROSPIUM CHLORIDE 20 MG/1
20 TABLET, FILM COATED ORAL
COMMUNITY
Start: 2025-06-18

## 2025-07-30 RX ORDER — OXYCODONE HYDROCHLORIDE 5 MG/1
5 TABLET ORAL EVERY 6 HOURS PRN
COMMUNITY
Start: 2025-07-29 | End: 2025-08-02

## 2025-07-30 RX ADMIN — SODIUM CHLORIDE 500 ML: 9 INJECTION, SOLUTION INTRAVENOUS at 12:27

## 2025-07-30 RX ADMIN — ONDANSETRON 4 MG: 2 INJECTION INTRAMUSCULAR; INTRAVENOUS at 11:40

## 2025-07-30 RX ADMIN — HYDRALAZINE HYDROCHLORIDE 10 MG: 20 INJECTION INTRAMUSCULAR; INTRAVENOUS at 12:27

## 2025-07-30 RX ADMIN — SODIUM CHLORIDE 500 ML: 9 INJECTION, SOLUTION INTRAVENOUS at 11:40

## 2025-07-30 NOTE — ED PROVIDER NOTES
Subjective   History of Present Illness  This patient is an 84-year-old female with past medical history of vulvar cancer s/p radiation treatments requiring self catheterization due to ureteral stricture, s/p suprapubic catheter placed 2 days ago at [place].  Presents to the ER due to low urine output since 8 PM last night.  Daughter reports that patient has had increased abdominal pain with nausea and vomiting as well.  Denies any fevers overnight.        Review of Systems   Gastrointestinal:  Positive for abdominal pain, nausea and vomiting.   Genitourinary:  Positive for difficulty urinating.   All other systems reviewed and are negative.      Past Medical History:   Diagnosis Date    Anemia in stage 3 chronic kidney disease 11/11/2019    Aneurysm of carotid artery     Arthritis     Breast cancer 09/14/2021    Left Mastectomy w/ sentinel node Bx    Bronchitis     Carotid stenosis     Cellulitis     ROSA LEGS    CKD (chronic kidney disease)     Coronary artery disease     Disease of thyroid gland     hypothyroid    GERD (gastroesophageal reflux disease)     History of transfusion     Hyperlipidemia     Hypertension     Kyphosis     Macrocytic anemia     Osteoporosis     Personal history of COVID-19 05/2022    Scoliosis     Self-catheterizes urinary bladder     10FR SIZED CATH    Stage 3 chronic kidney disease 11/11/2019    TIA (transient ischemic attack)     Type 2 diabetes mellitus     Urethral meatal stenosis 09/2022    w/ urine retention    Vulva cancer     Vulvar intraepithelial neoplasia (MOODY) grade 3        Allergies   Allergen Reactions    Scopolamine Swelling     Other reaction(s): ANGIOEDEMA        Amoxicillin-Pot Clavulanate Rash    Keflex [Cephalexin] Rash    Septra [Sulfamethoxazole-Trimethoprim] Rash    Tequin [Gatifloxacin] Other (See Comments)     Doesn't remember    Trovan [Alatrofloxacin] Dizziness       Past Surgical History:   Procedure Laterality Date    APPENDECTOMY      BENJAMIN PROCEDURE       No evidence of reflux disease while on Nexium 40mg daily-See report    BREAST BIOPSY      BREAST CYST ASPIRATION Left     BREAST LUMPECTOMY      COLONOSCOPY  01/12/2011    Diverticulosis sigmoid colon; The examination was otherwise normal; Repeat 10 years    COLONOSCOPY  11/03/2003    Dr. Laguerre-Normal colonoscopy; Normal terminal ileum; Repeat 5 years    COLONOSCOPY N/A 11/05/2021    Petechia(e) in the rectum, in the recto-sigmoid colon and in the distal sigmoid colon; No specimens collected; No plans to repeat colonoscopy due to advance age and/or medical problems    CYSTOSCOPY N/A 09/20/2022    Procedure: CYSTOSCOPY WITH URETHRAL DILATATION;  Surgeon: Shaheen Conway MD;  Location: [facility] OR;  Service: Urology;  Laterality: N/A;    ENDOSCOPY  07/01/2014    Normal esophagus; Normal stomach; Normal examined duodenum; BRAVO pH capsule deployed;     ENDOSCOPY  08/14/2013    Mild gastritis-biopsies for H.Pylor obtained    ENDOSCOPY  02/16/2005    Dr. LaguerreXwhiy-Tubtbrqwu-urgwftus    ENDOSCOPY  10/21/2003    Dr. Laguerre-Stage 1 reflux esophagitis    ENDOSCOPY N/A 11/05/2021    Small HH; A single gastroesophageal junction polyp; Normal stomach; A single non-bleeding angiodysplastic lesion in the duodenum    HYSTERECTOMY      MASTECTOMY W/ SENTINEL NODE BIOPSY Left 09/14/2021    Procedure: LEFT PARTIAL MASTECTOMY WITH MAGSEED AND LEFT SENTINEL LYMPH NODE BIOPSY MAGTRACE;  Surgeon: Quynh Rosario MD;  Location: [facility] OR;  Service: General;  Laterality: Left;    SUPRAPUBIC CATHETER INSERTION  07/28/2025    TONSILLECTOMY      VAGINA SURGERY      Laser surgery X 2    VENOUS ACCESS DEVICE (PORT) INSERTION N/A 09/29/2020    Procedure: SINGLE LUMEN PORT - A- CATH PLACEMENT WITH FLUOROSCOPY;  Surgeon: Quynh Rosario MD;  Location:  [facility] OR;  Service: General;  Laterality: N/A;       Family History   Problem Relation Age of Onset    Cancer Mother     Hypertension Mother     Osteoporosis Mother     Dementia Mother      Uterine cancer Mother     Heart disease Father     Parkinsonism Father     Cancer Sister     Breast cancer Sister     Kidney cancer Sister     Diabetes Brother     Heart disease Paternal Grandfather     No Known Problems Maternal Grandmother     No Known Problems Maternal Grandfather     No Known Problems Paternal Grandmother     Colon cancer Neg Hx     Colon polyps Neg Hx     Esophageal cancer Neg Hx     Liver cancer Neg Hx     Liver disease Neg Hx     Rectal cancer Neg Hx     Stomach cancer Neg Hx        Social History     Socioeconomic History    Marital status:    Tobacco Use    Smoking status: Former     Current packs/day: 0.25     Average packs/day: 0.3 packs/day for 3.0 years (0.8 ttl pk-yrs)     Types: Cigarettes     Passive exposure: Past    Smokeless tobacco: Never    Tobacco comments:     social smoker in [employer]   Vaping Use    Vaping status: Never Used   Substance and Sexual Activity    Alcohol use: Not Currently     Comment: Occasional glass of wine    Drug use: No    Sexual activity: Defer           Objective   Physical Exam  Vitals reviewed.   Constitutional:       General: She is not in acute distress.     Appearance: Normal appearance. She is ill-appearing.   Cardiovascular:      Rate and Rhythm: Normal rate and regular rhythm.      Heart sounds: Normal heart sounds.   Pulmonary:      Effort: Pulmonary effort is normal.      Breath sounds: Normal breath sounds.   Abdominal:      General: Abdomen is flat. Bowel sounds are normal. There is no distension.      Palpations: Abdomen is soft.      Tenderness: There is abdominal tenderness (lower, suprapubic).      Comments: Suprapubic catheter placed, clean dry intact, no evidence of infection present on exam   Musculoskeletal:         General: Normal range of motion.      Cervical back: Normal range of motion and neck supple.   Skin:     General: Skin is warm and dry.   Neurological:      General: No focal deficit present.      Mental Status: She  is alert and oriented to person, place, and time. Mental status is at baseline.   Psychiatric:         Mood and Affect: Mood normal.         Procedures       Labs Reviewed   COMPREHENSIVE METABOLIC PANEL - Abnormal; Notable for the following components:       Result Value    Glucose 125 (*)     BUN 47.8 (*)     Creatinine 3.27 (*)     Sodium 122 (*)     Chloride 87 (*)     CO2 21.0 (*)     eGFR 13.6 (*)     All other components within normal limits    Narrative:     GFR Categories in Chronic Kidney Disease (CKD)              GFR Category          GFR (mL/min/1.73)    Interpretation  G1                    90 or greater        Normal or high (1)  G2                    60-89                Mild decrease (1)  G3a                   45-59                Mild to moderate decrease  G3b                   30-44                Moderate to severe decrease  G4                    15-29                Severe decrease  G5                    14 or less           Kidney failure    (1)In the absence of evidence of kidney disease, neither GFR category G1 or G2 fulfill the criteria for CKD.    eGFR calculation 2021 CKD-EPI creatinine equation, which does not include race as a factor   CBC WITH AUTO DIFFERENTIAL - Abnormal; Notable for the following components:    WBC 16.83 (*)     RBC 2.93 (*)     Hemoglobin 10.2 (*)     Hematocrit 29.9 (*)     .0 (*)     MCH 34.8 (*)     Neutrophil % 87.9 (*)     Lymphocyte % 5.2 (*)     Eosinophil % 0.1 (*)     Immature Grans % 0.7 (*)     Neutrophils, Absolute 14.80 (*)     Monocytes, Absolute 1.01 (*)     Immature Grans, Absolute 0.11 (*)     All other components within normal limits   URINALYSIS W/ CULTURE IF INDICATED   CBC AND DIFFERENTIAL    Narrative:     The following orders were created for panel order CBC & Differential.  Procedure                               Abnormality         Status                     ---------                               -----------         ------                      CBC Auto Differential[[id number]]        Abnormal            Final result                 Please view results for these tests on the individual orders.     CT Abdomen Pelvis Without Contrast   Final Result   1. The suprapubic catheter enters the bladder normally though the tip   extends to the base of the bladder and enters the urethra. Based on this   position I suspect the catheter tip is obstructed. This catheter should   be pulled back 2.5-3 cm and will probably started draining.   2. Mildly distended fluid-filled stomach as well as a moderate size   fluid filled hiatal hernia. No sign of gastric outlet obstruction.   Nasogastric tube decompression of the stomach may help reduce the fluid   filled hiatal hernia.   2. New finding of a small amount of ascites within the upper abdomen and   within the pelvis.       This report was signed and finalized on 7/30/2025 12:29 PM by Dr. Miah Pineda MD.                  ED Course  ED Course as of 07/30/25 1304   Wed Jul 30, 2025   1134 Bladder scan 42 mL [KR]   1247 Call placed to [facility] [KR]   1303 Discussed with [facility].  Will be an ED to ED transfer.  Accepting physician is Dr. Dacia Al.  Updated patient and family. [KR]      ED Course User Index  [KR] Merry Briceno APRN                                                       Medical Decision Making  Problems Addressed:  SAUNDRA (acute kidney injury): complicated acute illness or injury  Hyponatremia: complicated acute illness or injury  Obstruction of suprapubic catheter, initial encounter: complicated acute illness or injury    Amount and/or Complexity of Data Reviewed  Labs: ordered.  Radiology: ordered.    Risk  Prescription drug management.        Final diagnoses:   Obstruction of suprapubic catheter, initial encounter   SAUNDRA (acute kidney injury)   Hyponatremia       ED Disposition  ED Disposition       ED Disposition   Transfer to Another Facility     Condition   --     Comment   --               No follow-up provider specified.       Medication List      No changes were made to your prescriptions during this visit.            Merry Briceno, APRN  07/30/25 [id number]

## 2025-08-25 DIAGNOSIS — I65.21 CAROTID STENOSIS, RIGHT: ICD-10-CM

## 2025-08-25 DIAGNOSIS — I65.29 STENOSIS OF CAROTID ARTERY, UNSPECIFIED LATERALITY: ICD-10-CM

## 2025-08-25 DIAGNOSIS — L03.115 CELLULITIS OF RIGHT LOWER EXTREMITY: ICD-10-CM

## 2025-08-25 DIAGNOSIS — F41.9 ANXIETY: ICD-10-CM

## 2025-08-25 RX ORDER — PRAVASTATIN SODIUM 40 MG
40 TABLET ORAL
Qty: 90 TABLET | Refills: 1 | Status: SHIPPED | OUTPATIENT
Start: 2025-08-25

## 2025-08-25 RX ORDER — BUMETANIDE 1 MG/1
1 TABLET ORAL 2 TIMES DAILY
Qty: 60 TABLET | Refills: 1 | Status: SHIPPED | OUTPATIENT
Start: 2025-08-25

## 2025-08-25 RX ORDER — CITALOPRAM HYDROBROMIDE 20 MG/1
20 TABLET ORAL DAILY
Qty: 30 TABLET | Refills: 1 | Status: SHIPPED | OUTPATIENT
Start: 2025-08-25

## (undated) DEVICE — THE CHANNEL CLEANING BRUSH IS A NYLON FLEXI BRUSH ATTACHED TO A FLEXIBLE PLASTIC SHEATH DESIGNED TO SAFELY REMOVE DEBRIS FROM FLEXIBLE ENDOSCOPES.

## (undated) DEVICE — SUT SILK 2/0 SH CR8 18IN CR8 C012D

## (undated) DEVICE — SENSR O2 OXIMAX FNGR A/ 18IN NONSTR

## (undated) DEVICE — SUT MNCRYL 4/0 PS2 27IN UD MCP426H

## (undated) DEVICE — GLV SURG SENSICARE W/ALOE PF LF 6.5 STRL

## (undated) DEVICE — 3M™ IOBAN™ 2 ANTIMICROBIAL INCISE DRAPE 6651EZ: Brand: IOBAN™ 2

## (undated) DEVICE — GLV SURG SENSICARE W/ALOE PF LF SZ6 STRL

## (undated) DEVICE — SYR SLP TP 10ML DISP

## (undated) DEVICE — STRIP,CLOSURE,WOUND,MEDI-STRIP,1/2X4: Brand: MEDLINE

## (undated) DEVICE — SNAR POLYP SENSATION MICRO OVL 13 240X40

## (undated) DEVICE — ANTIBACTERIAL UNDYED BRAIDED (POLYGLACTIN 910), SYNTHETIC ABSORBABLE SUTURE: Brand: COATED VICRYL

## (undated) DEVICE — APPL CHLORAPREP HI/LITE 26ML ORNG

## (undated) DEVICE — SUT SILK 2/0 CX1 18IN 737G

## (undated) DEVICE — NDL HYPO PRECISIONGLIDE REG 25G 1 1/2

## (undated) DEVICE — INFLATION DEVICE: Brand: ENCORE™ 26

## (undated) DEVICE — CVR UNIV C/ARM

## (undated) DEVICE — Device

## (undated) DEVICE — SYR CONTRL LUERLOK 10CC

## (undated) DEVICE — DECANT BG O JET

## (undated) DEVICE — ST MIC/INTRO ACC SHRP/NDL TUNG/TP NITNL 5F 45CM 7CM

## (undated) DEVICE — MASK,OXYGEN,MED CONC,ADLT,7' TUB, UC: Brand: PENDING

## (undated) DEVICE — SYR PRECISIONGLIDE LL 5CC 20X1 1/2IN

## (undated) DEVICE — DISPOSABLE SPECIMEN RADIOGRAPHY SYSTEM WITH LOCALIZING GRID, 4.65" RADIOLUCENT GRID: Brand: ACCUGRID

## (undated) DEVICE — 3M™ IOBAN™ 2 ANTIMICROBIAL INCISE DRAPE 6650EZ: Brand: IOBAN™ 2

## (undated) DEVICE — TOWEL,OR,DSP,ST,BLUE,STD,4/PK,20PK/CS: Brand: MEDLINE

## (undated) DEVICE — ANGLED-TIP ARTERIAL SHEATH CONFIGURATION: Brand: ENROUTE TRANSCAROTID NEUROPROTECTION SYSTEM

## (undated) DEVICE — DRSNG SURESITE WNDW 4X4.5

## (undated) DEVICE — INTENDED FOR TISSUE SEPARATION, AND OTHER PROCEDURES THAT REQUIRE A SHARP SURGICAL BLADE TO PUNCTURE OR CUT.: Brand: BARD-PARKER ®  SAFETY SCALPED

## (undated) DEVICE — PK TURNOVER RM ADV

## (undated) DEVICE — TRAP FLD MINIVAC MEGADYNE 100ML

## (undated) DEVICE — GAUZE,SPONGE,4"X4",16PLY,XRAY,STRL,LF: Brand: MEDLINE

## (undated) DEVICE — SINGLE-USE POLYPECTOMY SNARE: Brand: CAPTIVATOR II

## (undated) DEVICE — SNAP KOVER: Brand: UNBRANDED

## (undated) DEVICE — GW ENROUTE HC .014IN 95CM

## (undated) DEVICE — CUFF,BP,DISP,1 TUBE,ADULT,HP: Brand: MEDLINE

## (undated) DEVICE — ELECTRD BLD EZ CLN MOD XLNG 2.75IN

## (undated) DEVICE — KT INTRO MIC TROC 4F 21GA 7CM KT/EA/1

## (undated) DEVICE — GAUZE,SPONGE,4"X4",12PLY,STERILE,LF,2'S: Brand: MEDLINE

## (undated) DEVICE — CVR PROB GEN PURP W ISOSILK 6X48

## (undated) DEVICE — Device: Brand: DEFENDO AIR/WATER/SUCTION AND BIOPSY VALVE

## (undated) DEVICE — DRP SPECIAL PROC 4PC W/FC POUCH

## (undated) DEVICE — SPNG GZ 2S 2X2 8PLY STRL PK/2

## (undated) DEVICE — CONMED SCOPE SAVER BITE BLOCK, 20X27 MM: Brand: SCOPE SAVER

## (undated) DEVICE — YANKAUER,BULB TIP WITH VENT: Brand: ARGYLE

## (undated) DEVICE — PAD MAJOR VASCULAR: Brand: MEDLINE INDUSTRIES, INC.

## (undated) DEVICE — TBG SMPL FLTR LINE NASL 02/C02 A/ BX/100

## (undated) DEVICE — UNDYED BRAIDED (POLYGLACTIN 910), SYNTHETIC ABSORBABLE SUTURE: Brand: COATED VICRYL

## (undated) DEVICE — SUT NONABS PROLENE TPR/HEMOSEAL DBL/ARM HS/7 5/0 60CM BLU

## (undated) DEVICE — SUT SILK 2/0 SH 30IN K833H

## (undated) DEVICE — GLV SURG BIOGEL M LTX PF 7 1/2

## (undated) DEVICE — BHS TURNOVER CYSTO: Brand: MEDLINE INDUSTRIES, INC.

## (undated) DEVICE — THE SINGLE USE ETRAP – POLYP TRAP IS USED FOR SUCTION RETRIEVAL OF ENDOSCOPICALLY REMOVED POLYPS.: Brand: ETRAP

## (undated) DEVICE — FRCP GRASP BABCOCK 6IN DISP STRL

## (undated) DEVICE — SKIN AFFIX SURG ADHESIVE 72/CS 0.55ML: Brand: MEDLINE

## (undated) DEVICE — STERILE (14X122CM) TELESCOPICALLY-FOLDED COVER: Brand: CIV-CLEAR™ TRANSDUCER COVER

## (undated) DEVICE — SYR LUERLOK 20CC BX/50

## (undated) DEVICE — GLV SURG SENSICARE W/ALOE PF LF 7.5 STRL

## (undated) DEVICE — PK CYSTO 30

## (undated) DEVICE — PAD MINOR UNIVERSAL: Brand: MEDLINE INDUSTRIES, INC.

## (undated) DEVICE — 5MM X 35MM: Brand: ENROUTE ENFLATE TRANSCAROTID RX BALLOON DILATATION CATHETER